# Patient Record
Sex: FEMALE | Race: WHITE | NOT HISPANIC OR LATINO | Employment: OTHER | URBAN - METROPOLITAN AREA
[De-identification: names, ages, dates, MRNs, and addresses within clinical notes are randomized per-mention and may not be internally consistent; named-entity substitution may affect disease eponyms.]

---

## 2017-01-13 DIAGNOSIS — E03.8 OTHER SPECIFIED ACQUIRED HYPOTHYROIDISM: Primary | ICD-10-CM

## 2017-01-18 ENCOUNTER — RESULTS ENCOUNTER (OUTPATIENT)
Dept: FAMILY MEDICINE CLINIC | Facility: CLINIC | Age: 77
End: 2017-01-18

## 2017-01-18 DIAGNOSIS — E03.8 OTHER SPECIFIED ACQUIRED HYPOTHYROIDISM: ICD-10-CM

## 2017-01-18 LAB — TSH SERPL DL<=0.005 MIU/L-ACNC: 2.19 MIU/ML (ref 0.27–4.2)

## 2017-03-03 RX ORDER — ROSUVASTATIN CALCIUM 20 MG/1
TABLET, COATED ORAL
Qty: 90 TABLET | Refills: 1 | Status: SHIPPED | OUTPATIENT
Start: 2017-03-03 | End: 2017-09-01 | Stop reason: SDUPTHER

## 2017-03-20 RX ORDER — OXYBUTYNIN CHLORIDE 10 MG/1
TABLET, EXTENDED RELEASE ORAL
Qty: 90 TABLET | Refills: 1 | Status: SHIPPED | OUTPATIENT
Start: 2017-03-20 | End: 2018-04-04

## 2017-04-08 DIAGNOSIS — M19.90 ARTHRITIS: ICD-10-CM

## 2017-04-10 RX ORDER — LOSARTAN POTASSIUM AND HYDROCHLOROTHIAZIDE 12.5; 5 MG/1; MG/1
1 TABLET ORAL DAILY
Qty: 90 TABLET | Refills: 0 | Status: SHIPPED | OUTPATIENT
Start: 2017-04-10 | End: 2017-07-05 | Stop reason: SDUPTHER

## 2017-04-11 RX ORDER — TRAMADOL HYDROCHLORIDE 50 MG/1
TABLET ORAL
Qty: 60 TABLET | Refills: 0 | OUTPATIENT
Start: 2017-04-11 | End: 2018-04-04 | Stop reason: SDUPTHER

## 2017-07-05 RX ORDER — LOSARTAN POTASSIUM AND HYDROCHLOROTHIAZIDE 12.5; 5 MG/1; MG/1
TABLET ORAL
Qty: 90 TABLET | Refills: 0 | Status: SHIPPED | OUTPATIENT
Start: 2017-07-05 | End: 2017-10-06 | Stop reason: SDUPTHER

## 2017-08-18 RX ORDER — LEVOTHYROXINE SODIUM 0.1 MG/1
TABLET ORAL
Qty: 90 TABLET | Refills: 0 | Status: SHIPPED | OUTPATIENT
Start: 2017-08-18 | End: 2018-02-15 | Stop reason: SDUPTHER

## 2017-09-01 RX ORDER — ROSUVASTATIN CALCIUM 20 MG/1
TABLET, COATED ORAL
Qty: 90 TABLET | Refills: 0 | Status: SHIPPED | OUTPATIENT
Start: 2017-09-01 | End: 2017-11-27 | Stop reason: SDUPTHER

## 2017-10-06 RX ORDER — LOSARTAN POTASSIUM AND HYDROCHLOROTHIAZIDE 12.5; 5 MG/1; MG/1
TABLET ORAL
Qty: 90 TABLET | Refills: 0 | Status: SHIPPED | OUTPATIENT
Start: 2017-10-06 | End: 2018-04-04 | Stop reason: SDUPTHER

## 2017-11-27 RX ORDER — ROSUVASTATIN CALCIUM 20 MG/1
TABLET, COATED ORAL
Qty: 30 TABLET | Refills: 0 | Status: SHIPPED | OUTPATIENT
Start: 2017-11-27 | End: 2018-04-04 | Stop reason: SDUPTHER

## 2017-12-26 RX ORDER — ROSUVASTATIN CALCIUM 20 MG/1
TABLET, COATED ORAL
Qty: 30 TABLET | Refills: 0 | OUTPATIENT
Start: 2017-12-26

## 2018-01-02 RX ORDER — LOSARTAN POTASSIUM AND HYDROCHLOROTHIAZIDE 12.5; 5 MG/1; MG/1
TABLET ORAL
Qty: 90 TABLET | Refills: 0 | OUTPATIENT
Start: 2018-01-02

## 2018-02-16 RX ORDER — LEVOTHYROXINE SODIUM 0.1 MG/1
TABLET ORAL
Qty: 30 TABLET | Refills: 0 | Status: SHIPPED | OUTPATIENT
Start: 2018-02-16 | End: 2018-04-04 | Stop reason: SDUPTHER

## 2018-04-04 ENCOUNTER — OFFICE VISIT (OUTPATIENT)
Dept: FAMILY MEDICINE CLINIC | Facility: CLINIC | Age: 78
End: 2018-04-04

## 2018-04-04 VITALS
BODY MASS INDEX: 35.14 KG/M2 | WEIGHT: 223.9 LBS | HEIGHT: 67 IN | DIASTOLIC BLOOD PRESSURE: 78 MMHG | OXYGEN SATURATION: 98 % | RESPIRATION RATE: 16 BRPM | HEART RATE: 76 BPM | SYSTOLIC BLOOD PRESSURE: 122 MMHG

## 2018-04-04 DIAGNOSIS — M25.561 CHRONIC PAIN OF RIGHT KNEE: ICD-10-CM

## 2018-04-04 DIAGNOSIS — I10 ESSENTIAL HYPERTENSION: ICD-10-CM

## 2018-04-04 DIAGNOSIS — E03.9 ACQUIRED HYPOTHYROIDISM: Primary | ICD-10-CM

## 2018-04-04 DIAGNOSIS — B00.9 HERPES SIMPLEX: ICD-10-CM

## 2018-04-04 DIAGNOSIS — R21 RASH AND NONSPECIFIC SKIN ERUPTION: ICD-10-CM

## 2018-04-04 DIAGNOSIS — E78.49 OTHER HYPERLIPIDEMIA: ICD-10-CM

## 2018-04-04 DIAGNOSIS — G89.29 CHRONIC PAIN OF RIGHT KNEE: ICD-10-CM

## 2018-04-04 DIAGNOSIS — Z91.018 HX OF FOOD ANAPHYLAXIS: ICD-10-CM

## 2018-04-04 PROBLEM — T78.40XA ALLERGIC REACTION: Status: ACTIVE | Noted: 2018-04-04

## 2018-04-04 PROCEDURE — 99214 OFFICE O/P EST MOD 30 MIN: CPT | Performed by: FAMILY MEDICINE

## 2018-04-04 RX ORDER — LEVOTHYROXINE SODIUM 0.1 MG/1
100 TABLET ORAL DAILY
Qty: 90 TABLET | Refills: 3 | Status: SHIPPED | OUTPATIENT
Start: 2018-04-04 | End: 2018-08-30 | Stop reason: SDUPTHER

## 2018-04-04 RX ORDER — GABAPENTIN 400 MG/1
400 CAPSULE ORAL 2 TIMES DAILY
Qty: 60 CAPSULE | Refills: 5 | Status: SHIPPED | OUTPATIENT
Start: 2018-04-04 | End: 2018-06-07 | Stop reason: DRUGHIGH

## 2018-04-04 RX ORDER — LOSARTAN POTASSIUM AND HYDROCHLOROTHIAZIDE 12.5; 5 MG/1; MG/1
1 TABLET ORAL DAILY
Qty: 90 TABLET | Refills: 2 | Status: SHIPPED | OUTPATIENT
Start: 2018-04-04 | End: 2019-01-03 | Stop reason: SDUPTHER

## 2018-04-04 RX ORDER — EPINEPHRINE 0.3 MG/.3ML
0.3 INJECTION SUBCUTANEOUS ONCE
Qty: 1 EACH | Refills: 0 | Status: SHIPPED | OUTPATIENT
Start: 2018-04-04 | End: 2018-04-04

## 2018-04-04 RX ORDER — TRAMADOL HYDROCHLORIDE 50 MG/1
50 TABLET ORAL EVERY 8 HOURS PRN
Qty: 60 TABLET | Refills: 5 | Status: SHIPPED | OUTPATIENT
Start: 2018-04-04 | End: 2018-10-17 | Stop reason: SDUPTHER

## 2018-04-04 RX ORDER — ROSUVASTATIN CALCIUM 20 MG/1
20 TABLET, COATED ORAL DAILY
Qty: 90 TABLET | Refills: 2 | Status: SHIPPED | OUTPATIENT
Start: 2018-04-04 | End: 2019-01-03 | Stop reason: SDUPTHER

## 2018-04-04 RX ORDER — METOPROLOL SUCCINATE 50 MG/1
25 TABLET, EXTENDED RELEASE ORAL DAILY
Qty: 90 TABLET | Refills: 2 | Status: SHIPPED | OUTPATIENT
Start: 2018-04-04 | End: 2019-11-27 | Stop reason: SDUPTHER

## 2018-04-04 RX ORDER — ACYCLOVIR 50 MG/G
OINTMENT TOPICAL 4 TIMES DAILY
Qty: 15 G | Refills: 4 | Status: SHIPPED | OUTPATIENT
Start: 2018-04-04 | End: 2018-05-10

## 2018-04-04 RX ORDER — SOLIFENACIN SUCCINATE 10 MG/1
10 TABLET, FILM COATED ORAL DAILY
COMMUNITY
End: 2020-04-10 | Stop reason: HOSPADM

## 2018-04-04 NOTE — PROGRESS NOTES
Subjective   Rekha Bear is a 78 y.o. female.     History of Present Illness   Pt is here for a thyroid f/u.   Pt states she has a skin condition in her lips, surrounding her mouth. Problem started after a fever blister she had 2 months ago. Pt says she is been using vinegar, and Zovirax. It helped a little.   She has a hx of herpes simplex and needs a refill of Zovirax to help with breakouts.   Rekha has a history of chronic hypothyroidism and has been well controlled on current dose of replacement.She report no concerning symptoms: no cold intolerance, fatigue or hair loss.   She has multiple allergies and needs an Epipen.  She has pain from arthritis in right leg and has avoided knee replacement. She is taking tramadol and gababpentin to help with pain.She needs refills today.  Rekha has a history of chronic hypertension and has been well controlled on current medications.She is tolerating medications without side effect. She reports no vision changes, headaches or lightheadedness. She is requesting refills of medications.  She has a history of chronic hyperlipidemia . She is tolerating medications well without side effects.      The following portions of the patient's history were reviewed and updated as appropriate: allergies, current medications, past medical history, past social history and problem list.    Review of Systems   Skin: Positive for color change and rash.       Objective   Physical Exam   Constitutional: She is oriented to person, place, and time. She appears well-developed.   Eyes: EOM are normal. Pupils are equal, round, and reactive to light.   Cardiovascular: Normal rate and regular rhythm.    Pulmonary/Chest: Effort normal and breath sounds normal.   Abdominal: Soft.   Neurological: She is alert and oriented to person, place, and time.   Skin: Skin is warm and dry. Rash noted.   Red rash on cheeks, chin   Psychiatric: She has a normal mood and affect. Her behavior is normal.  Judgment and thought content normal.   Nursing note and vitals reviewed.      Assessment/Plan   Rekha was seen today for rash and hypothyroidism.    Diagnoses and all orders for this visit:    Acquired hypothyroidism  Labs today to make sure she is well managed.   -     TSH  -     levothyroxine (SYNTHROID, LEVOTHROID) 100 MCG tablet; Take 1 tablet by mouth Daily.    Rash and nonspecific skin eruption  -     Ambulatory Referral to Dermatology    Chronic pain of right knee  -     traMADol (ULTRAM) 50 MG tablet; Take 1 tablet by mouth Every 8 (Eight) Hours As Needed for Moderate Pain .  -     gabapentin (NEURONTIN) 400 MG capsule; Take 1 capsule by mouth 2 (Two) Times a Day.  The patient has read and signed the [facility] Controlled Substance Contract.  I will continue to see patient for regular follow up appointments.  She are well controlled on current medication.  SAUL has been reviewed by me and is updated every 3 months. The patient is aware of the potential for addiction and dependence.      Other hyperlipidemia  Well controlled on Crestor  -     rosuvastatin (CRESTOR) 20 MG tablet; Take 1 tablet by mouth Daily.    Essential hypertension  Well controlled on current medications.  -     losartan-hydrochlorothiazide (HYZAAR) 50-12.5 MG per tablet; Take 1 tablet by mouth Daily.  -     metoprolol succinate XL (TOPROL-XL) 50 MG 24 hr tablet; Take 0.5 tablets by mouth Daily.    Hx of food anaphylaxis  -     EPINEPHrine (EPIPEN) 0.3 MG/0.3ML solution auto-injector injection; Inject 0.3 mL into the shoulder, thigh, or buttocks 1 (One) Time for 1 dose.    Herpes simplex  -     acyclovir (ZOVIRAX) 5 % ointment; Apply  topically 4 (Four) Times a Day.      She will come back for a preventive exam.

## 2018-04-05 LAB — TSH SERPL DL<=0.005 MIU/L-ACNC: 5.83 UIU/ML (ref 0.45–4.5)

## 2018-05-10 ENCOUNTER — OFFICE VISIT (OUTPATIENT)
Dept: ORTHOPEDIC SURGERY | Facility: CLINIC | Age: 78
End: 2018-05-10

## 2018-05-10 VITALS — WEIGHT: 216 LBS | TEMPERATURE: 97.3 F | HEIGHT: 67 IN | BODY MASS INDEX: 33.9 KG/M2

## 2018-05-10 DIAGNOSIS — M25.561 RIGHT KNEE PAIN, UNSPECIFIED CHRONICITY: Primary | ICD-10-CM

## 2018-05-10 DIAGNOSIS — M17.11 PRIMARY OSTEOARTHRITIS OF RIGHT KNEE: ICD-10-CM

## 2018-05-10 PROCEDURE — 73562 X-RAY EXAM OF KNEE 3: CPT | Performed by: ORTHOPAEDIC SURGERY

## 2018-05-10 PROCEDURE — 99204 OFFICE O/P NEW MOD 45 MIN: CPT | Performed by: ORTHOPAEDIC SURGERY

## 2018-05-10 RX ORDER — KETOCONAZOLE 20 MG/ML
SHAMPOO TOPICAL
COMMUNITY
Start: 2018-05-03 | End: 2020-04-10 | Stop reason: HOSPADM

## 2018-05-10 RX ORDER — VENLAFAXINE HYDROCHLORIDE 150 MG/1
CAPSULE, EXTENDED RELEASE ORAL
COMMUNITY
Start: 2018-04-23 | End: 2019-11-27 | Stop reason: SDUPTHER

## 2018-05-10 RX ORDER — VENLAFAXINE HYDROCHLORIDE 75 MG/1
CAPSULE, EXTENDED RELEASE ORAL
COMMUNITY
Start: 2018-04-23 | End: 2018-11-30

## 2018-05-10 NOTE — PROGRESS NOTES
Patient: Rekha Bear  YOB: 1940 78 y.o. female  Medical Record Number: [id number]    Chief Complaints:   Chief Complaint   Patient presents with   • Right Knee - Pain, Establish Care       History of Present Illness:Rekha Bear is a 78 y.o. female who presents with Right knee pain which is severe and constant in nature.  It has been ongoing for over 5 years.  She describes a grinding aching pain on the medial aspect of the right knee.  She has seen Dr. Mckeon's in years past and has had both her hips and her left knee replaced.  They considered replacement of her right knee a few years ago but she is held off.  She is using a cane.  The pain limits her basic activities of daily living.  She lives at home by herself.  The pain is gotten to the point where she has trouble performing basic activities and walking even short distances.      Allergies:   Allergies   Allergen Reactions   • Iodine        Medications:   Current Outpatient Prescriptions   Medication Sig Dispense Refill   • Acetylcysteine (N-ACETYL-L-CYSTEINE) 600 MG capsule Take 1 tablet by mouth 3 (Three) Times a Day.     • ALPRAZolam (XANAX) 0.25 MG tablet Take 1 tablet by mouth daily as needed.     • BuPROPion HCl (WELLBUTRIN PO) Take 300 mg by mouth Daily.     • cetirizine (ZyrTEC) 10 MG tablet Take 10 mg by mouth daily.     • clobetasol (TEMOVATE) 0.05 % cream Apply  topically 2 (two) times a day.     • colesevelam (WELCHOL) 625 MG tablet Take 625 mg by mouth 6 (six) times a day.     • ESTRACE VAGINAL 0.1 MG/GM vaginal cream 3x weekly     • gabapentin (NEURONTIN) 400 MG capsule Take 1 capsule by mouth 2 (Two) Times a Day. 60 capsule 5   • guaiFENesin (HUMIBID 3) 400 MG tablet Take 400 mg by mouth daily as needed.     • hydrocortisone 2.5 % cream      • ketoconazole (NIZORAL) 2 % shampoo      • levothyroxine (SYNTHROID, LEVOTHROID) 100 MCG tablet Take 1 tablet by mouth Daily. 90 tablet 3   • losartan-hydrochlorothiazide (HYZAAR)  "50-12.5 MG per tablet Take 1 tablet by mouth Daily. 90 tablet 2   • metoprolol succinate XL (TOPROL-XL) 50 MG 24 hr tablet Take 0.5 tablets by mouth Daily. 90 tablet 2   • Ranitidine HCl (ZANTAC 75 PO) Take 2 tablets by mouth 1 (One) Time.     • rosuvastatin (CRESTOR) 20 MG tablet Take 1 tablet by mouth Daily. 90 tablet 2   • solifenacin (VESICARE) 10 MG tablet Take 10 mg by mouth Daily.     • traMADol (ULTRAM) 50 MG tablet Take 1 tablet by mouth Every 8 (Eight) Hours As Needed for Moderate Pain . 60 tablet 5   • venlafaxine XR (EFFEXOR-XR) 150 MG 24 hr capsule      • venlafaxine XR (EFFEXOR-XR) 75 MG 24 hr capsule        No current facility-administered medications for this visit.          The following portions of the patient's history were reviewed and updated as appropriate: allergies, current medications, past family history, past medical history, past social history, past surgical history and problem list.    Review of Systems:   A 14 point review of systems was performed. All systems negative except pertinent positives/negative listed in HPI above    Physical Exam:   Vitals:    05/10/18 1437   Temp: 97.3 °F (36.3 °C)   Weight: 98 kg (216 lb)   Height: 170.2 cm (67\")       General: A and O x 3, ASA, NAD    SCLERA:    Normal    DENTITION:   Normal   Knee:  right    ALIGNMENT:     Varus  ,   Patella  tracks  midline    GAIT:    Antalgic    SKIN:    No abnormality    RANGE OF MOTION:   2  -  120   DEG    STRENGTH:   4  / 5    LIGAMENTS:    No varus / valgus instability.   Negative  Lachman.    MENISCUS:     Negative   Hardy       DISTAL PULSES:    Paplable    DISTAL SENSATION :   Intact    LYMPHATICS:     No   lymphadenopathy    OTHER:          - Positive   effusion      - Crepitance with ROM         Radiology:  Xrays 3views RIGHT KNEE (ap,lateral, sunrise) were ordered and reviewed for evaluation of knee pain demonstrating advanced varus osteoarthritis with bone on bone articulation, subchondral cysts, and " periarticular osteophytes. There are no previous films for comparision.    Assessment/Plan: Severe end-stage right knee osteoarthritis.  Clearly she has at the point where her knee is severely debilitating.  She has become deconditioned due to this.  I set a goal for her to lose 10 pounds.  She currently weighs 216.  I will send her to physical therapy for some leg strengthening exercises.  I have asked that she see Dr. Porter her primary care physician as well as her cardiologist for clearance.  I'm going to see her back in a month if she is ready to proceed at that point we'll get her scheduled for a right total knee replacement.  We had discussions about inpatient versus outpatient categorization and I explained that she should plan on being able to go home.  If there is a medical reason why she cannot be discharged home we could document that and work on rehabilitation stay but she should not plan on that being guaranteed based on Medicare guidelines.  She is going to discuss with her children the possibility of having somebody at home following surgery to assist in recovery for the first week.  I'll check her back in a month and ready to proceed below get her scheduled.  She does have a trip planned in mid September and we would need to have her surgery done by mid July for me to feel comfortable with her traveling.  Of note she did state that her mother developed a pulmonary embolism at age 40 after an abdominal surgery so that is certainly on her mind.  Body mass index is 33.83 kg/m².      Saji Kuo MD  5/10/2018

## 2018-05-17 ENCOUNTER — HOSPITAL ENCOUNTER (OUTPATIENT)
Dept: PHYSICAL THERAPY | Facility: HOSPITAL | Age: 78
Setting detail: THERAPIES SERIES
Discharge: HOME | End: 2018-05-17
Attending: ORTHOPAEDIC SURGERY

## 2018-05-17 DIAGNOSIS — M17.11 OSTEOARTHRITIS OF RIGHT KNEE, UNSPECIFIED OSTEOARTHRITIS TYPE: ICD-10-CM

## 2018-05-17 DIAGNOSIS — M54.41 CHRONIC BILATERAL LOW BACK PAIN WITH RIGHT-SIDED SCIATICA: ICD-10-CM

## 2018-05-17 DIAGNOSIS — R26.2 DIFFICULTY WALKING: ICD-10-CM

## 2018-05-17 DIAGNOSIS — M25.561 RECURRENT PAIN OF RIGHT KNEE: Primary | ICD-10-CM

## 2018-05-17 DIAGNOSIS — R26.81 GAIT INSTABILITY: ICD-10-CM

## 2018-05-17 DIAGNOSIS — G89.29 CHRONIC BILATERAL LOW BACK PAIN WITH RIGHT-SIDED SCIATICA: ICD-10-CM

## 2018-05-17 PROCEDURE — 97110 THERAPEUTIC EXERCISES: CPT | Performed by: PHYSICAL THERAPIST

## 2018-05-17 PROCEDURE — G8978 MOBILITY CURRENT STATUS: HCPCS | Performed by: PHYSICAL THERAPIST

## 2018-05-17 PROCEDURE — 97161 PT EVAL LOW COMPLEX 20 MIN: CPT | Performed by: PHYSICAL THERAPIST

## 2018-05-17 PROCEDURE — G8979 MOBILITY GOAL STATUS: HCPCS | Performed by: PHYSICAL THERAPIST

## 2018-05-17 NOTE — THERAPY EVALUATION
"    Outpatient Physical Therapy Ortho Initial Evaluation   [facility]     Patient Name: Rekha Bear  : 1940  MRN: [id number]  Today's Date: 2018      Visit Date: 2018    Patient Active Problem List   Diagnosis   • Acute bronchitis   • Chronic pain of right knee   • Chest pain   • Depression   • Diabetes mellitus   • Hyperlipidemia   • Hypertension   • Hypothyroidism   • Impaired glucose tolerance   • Renal insufficiency   • Urinary incontinence   • Urinary tract infection   • Cobalamin deficiency   • Cardiac arrhythmia   • Hyperlipemia   • Allergic reaction   • Hx of food anaphylaxis   • Herpes simplex        Past Medical History:   Diagnosis Date   • Acute bronchitis    • Anxiety    • Chest pain    • Chilblains     \"Chilblian's\"   • Depression    • Diabetes mellitus, type II    • Fatty liver    • GERD (gastroesophageal reflux disease)    • Health care maintenance    • History of mammogram    • Hyperlipidemia    • Hypertension    • Hypothyroid    • Incontinence    • Osteoarthritis     OA  Marvin THR, LT TKR - hydrocodone prescibed by Dr. Almaguer   • Pain of esophagus     \" nervous esophagus\" - she takes the occasional alprazolam   • Peptic ulcer disease    • Prediabetes    • Raynaud's disease     \"Raynauds\"   • Renal disease     followed by Dr. Grewal   • UTI (urinary tract infection)    • Vitamin B 12 deficiency         Past Surgical History:   Procedure Laterality Date   • CATARACT EXTRACTION     • CHOLECYSTECTOMY     • COLONOSCOPY     • COLONOSCOPY N/A 2016    Procedure: COLONOSCOPY with hot snare polypectomy;  Surgeon: George Pabon MD;  Location: [facility] ENDOSCOPY;  Service:    • FOOT SURGERY     • TONSILLECTOMY     • TOTAL HIP ARTHROPLASTY Bilateral     OA  Marvin THR, LT TKR - hydrocodone prescibed by Dr. Almaguer   • TOTAL KNEE ARTHROPLASTY Left     OA  Marvin THR, LT TKR - hydrocodone prescibed by Dr. Almaguer       Visit Dx:     ICD-10-CM ICD-9-CM   1. Recurrent pain of right " "knee M25.561 719.46   2. Osteoarthritis of right knee, unspecified osteoarthritis type M17.11 715.96   3. Difficulty walking R26.2 719.7   4. Gait instability R26.81 781.2   5. Chronic bilateral low back pain with right-sided sciatica M54.41 724.2    G89.29 724.3     338.29                 PT Ortho     Row Name 05/17/18 1500       Subjective Comments    Subjective Comments 79 y/o F with history of B SAVITA and L TKR by Dr. Gonzalez referred now to Dr. Kuo for preoperative strengthening of her R knee.  States she thinks her problem started when partially tearing her achilles 4-5 years ago. States her walking has been off since; was unable to have surgery to correct per Raynaud's chilbains.  She is here today for pre-operative management of her knee.  States Dr. Kuo would like her to lose 10 lbs before TKA; will follow up with his office in 3 weeks. Also with c/o current \"sciatica\" from her lumbar region to below her R knee.  She does own a cane but forgets it sometimes. Her walking is limited to less than a block.  Standing tolerance is better.  Sitting is not bothered. She does wake at night but this may be due to her sciatica. She denies paresthesias.  -GR       Subjective Pain    Able to rate subjective pain? yes  -GR    Pre-Treatment Pain Level 5  -GR    Post-Treatment Pain Level 5  -GR    Subjective Pain Comment at worst 7/10 at best 2/10  -GR       Posture/Observations    Posture/Observations Comments Trendelenburg lurch  -GR       General ROM    RT Lower Ext Rt Knee Extension/Flexion  -GR    LT Lower Ext Lt Knee Extension/Flexion  -GR       Right Lower Ext    Rt Knee Extension/Flexion AROM -6/115  -GR       Left Lower Ext    Lt Knee Extension/Flexion AROM 0/130  -GR       MMT (Manual Muscle Testing)    Additional Documentation General Assessment (Manual Muscle Testing) (Group)  -GR       General Assessment (Manual Muscle Testing)    General Manual Muscle Testing (MMT) Assessment lower extremity strength " deficits identified  -GR       Lower Extremity (Manual Muscle Testing)    Lower Extremity: Manual Muscle Testing (MMT) left hip strength deficit;right hip strength deficit;left knee strength deficit;right knee strength deficit;left ankle strength deficit;right ankle strength deficit  -GR       Left Hip (Manual Muscle Testing)    MMT, Left Hip: Manual Muscle Testing (MMT) hip flex 5/5 hip abd 4/5  -GR       Right Hip (Manual Muscle Testing)    MMT, Right Hip: Manual Muscle Testing (MMT) hip flex 5/5 hip abd 4-/5  -GR       Left Knee (Manual Muscle Testing)    Comment, Left Knee: Manual Muscle Testing (MMT) flex/ext 5/5  -GR       Right Knee (Manual Muscle Testing)    Comment, Right Knee: Manual Muscle Testing (MMT) flex/ext 5/5  -GR       Left Ankle/Foot (Manual Muscle Testing)    Comment, MMT: Left Ankle/Foot DF 5/5 PF 4-/5  -GR       Right Ankle/Foot (Manual Muscle Testing)    Comment, MMT: Right Ankle/Foot DF 3+/5 PF 3+/5  -GR       Gait/Stairs Assessment/Training    Comment (Gait/Stairs) Reciprocal stairs with B rails and grimacing  -GR      User Key  (r) = Recorded By, (t) = Taken By, (c) = Cosigned By    Initials Name Provider Type    GR Roe Vasquez, PT Physical Therapist                      Therapy Education  Education Details: role of preop PT, expectations, goals, importance of glut strength and back pain control for gait  Given: HEP, Symptoms/condition management, Pain management  Program: New  How Provided: Verbal, Written, Demonstration  Provided to: Patient  Level of Understanding: Teach back education performed, Verbalized, Demonstrated           PT OP Goals     Row Name 05/17/18 1600          PT Short Term Goals    STG Date to Achieve 06/01/18  -GR     STG 1 Patient will be independent with initial HEP.  -GR     STG 1 Progress New  -GR     STG 2 Patient will deny falls.  -GR     STG 2 Progress New  -GR     STG 3 Patient will demonstrate R knee flexion >/=120 degrees to facilitate ease with  stairs.  -GR     STG 3 Progress New  -GR        Long Term Goals    LTG Date to Achieve 07/01/18  -GR     LTG 1 Patient will be independent with progressive HEP.  -GR     LTG 1 Progress New  -GR     LTG 2 Patient will demonstrate R knee AROM 0-125 to facilitate optimal post operative outcomes.  -GR     LTG 2 Progress New  -GR     LTG 3 Patient will demonstrate R hip abd to >/=4/5 per MMT to assist with gait normalization.  -GR     LTG 3 Progress New  -GR     LTG 4 Patient will score </=60% disability on the KOS-ADL to indicate improved perceived performance with ADLs.  -GR     LTG 4 Progress New  -GR        Time Calculation    PT Goal Re-Cert Due Date 08/15/18  -GR       User Key  (r) = Recorded By, (t) = Taken By, (c) = Cosigned By    Initials Name Provider Type    HALINA Vasquez, PT Physical Therapist                PT Assessment/Plan     Row Name 05/17/18 8920          PT Assessment    Functional Limitations Decreased safety during functional activities;Impaired gait;Limitation in home management;Impaired locomotion;Limitations in community activities;Limitations in functional capacity and performance;Performance in leisure activities;Performance in self-care ADL  -GR     Impairments Balance;Endurance;Gait;Muscle strength;Pain;Poor body mechanics;Posture;Range of motion;Joint mobility  -     Assessment Comments 79 y/o F referred to outpatient PT for preoperative R knee strengthening (TKA pending).  She presents lacking AROM -6 to 115 degrees.  Quad and hamstring strength is good, gluteal strength is decreased and presents as lurch with gait.  Patient also with c/o bilateral low back pain referring to RLE which has impaired her mobility and impedes her endurance.  Recommend skilled PT to address her current impairments and promote optimal post operative outcomes.  Her perceived disability as per the KOS-ADL is 72%. Thank you for this referral.  -GR     Please refer to paper survey for additional  self-reported information Yes  -GR     Rehab Potential Good  -GR     Patient/caregiver participated in establishment of treatment plan and goals Yes  -GR     Patient would benefit from skilled therapy intervention Yes  -GR        PT Plan    PT Frequency 2x/week  -GR     Predicted Duration of Therapy Intervention (OT Eval) 3-4  weeks.  -GR     Planned CPT's? PT EVAL LOW COMPLEXITY: 83639;PT RE-EVAL: 75098;PT THER PROC EA 15 MIN: 01787;PT THER ACT EA 15 MIN: 90952;PT MANUAL THERAPY EA 15 MIN: 20723;PT NEUROMUSC RE-EDUCATION EA 15 MIN: 70634;PT GAIT TRAINING EA 15 MIN: 48099;PT SELF CARE/HOME MGMT/TRAIN EA 15: 57894;PT HOT OR COLD PACK TREAT MCARE;PT ELECTRICAL STIM UNATTEND:   -GR     Physical Therapy Interventions (Optional Details) balance training;home exercise program;joint mobilization;manual therapy techniques;modalities;neuromuscular re-education;patient/family education;postural re-education;ROM (Range of Motion);stair training;strengthening;stretching;swiss ball techniques  -GR     PT Plan Comments Begin with nustep or recumbent bike within tolerance.  Review HEP.  Add standing LE strengthening series with cues for TA activation and weighted LAQ/SAQ.  Patient is realistic regarding strength and range focused goals/expectations for R knee; will monitor and control LBP symptoms as best able to facilitate optimal outcomes.  -GR       User Key  (r) = Recorded By, (t) = Taken By, (c) = Cosigned By    Initials Name Provider Type     Roe Vasquez, PT Physical Therapist                  Exercises     Row Name 05/17/18 1500             Subjective Comments    Subjective Comments 79 y/o F with history of B SAVITA and L TKR by Dr. Gonzalez referred now to Dr. Kuo for preoperative strengthening of her R knee.  States she thinks her problem started when partially tearing her achilles 4-5 years ago. States her walking has been off since; was unable to have surgery to correct per Raynaud's chilbains.  She is here  "today for pre-operative management of her knee.  States Dr. Kuo would like her to lose 10 lbs before TKA; will follow up with his office in 3 weeks. Also with c/o current \"sciatica\" from her lumbar region to below her R knee.  She does own a cane but forgets it sometimes. Her walking is limited to less than a block.  Standing tolerance is better.  Sitting is not bothered. She does wake at night but this may be due to her sciatica. She denies paresthesias.  -GR         Subjective Pain    Able to rate subjective pain? yes  -GR      Pre-Treatment Pain Level 5  -GR      Post-Treatment Pain Level 5  -GR      Subjective Pain Comment at worst 7/10 at best 2/10  -GR         Exercise 1    Exercise Name 1 Nustep L1  -GR      Time 1 5 minutes  -GR         Exercise 2    Exercise Name 2 PPT  -GR      Cueing 2 Demo  -GR      Sets 2 1  -GR      Reps 2 10  -GR         Exercise 3    Exercise Name 3 H/L clam  -GR      Cueing 3 Demo  -GR      Sets 3 1  -GR      Reps 3 10  -GR      Additional Comments YTB  -GR         Exercise 4    Exercise Name 4 H/L hip add  -GR      Cueing 4 Demo  -GR      Sets 4 1  -GR      Reps 4 10  -GR         Exercise 5    Exercise Name 5 Bridge  -GR      Cueing 5 Demo  -GR      Sets 5 1  -GR      Reps 5 10  -GR         Exercise 6    Exercise Name 6 S/L clam  -GR      Cueing 6 Demo  -GR      Sets 6 1  -GR      Reps 6 10  -GR        User Key  (r) = Recorded By, (t) = Taken By, (c) = Cosigned By    Initials Name Provider Type    GR Roe Vasquez PT Physical Therapist                        Outcome Measure Options: Knee Outcome Score- ADL  Knee Outcome Score  Knee Outcome Score Comments: 72.5% disability      Time Calculation:   Start Time: 1545  Stop Time: 1630  Time Calculation (min): 45 min  Total Timed Code Minutes- PT: 15 minute(s)     Therapy Charges for Today     Code Description Service Date Service Provider Modifiers Qty    12214517980  PT MOBILITY CURRENT 5/17/2018 Roe Vasquez PT GP, CL 1 "    01266218136 HC PT MOBILITY PROJECTED 5/17/2018 Roe Vasquez, PT GP, CK 1    27125635759 HC PT THER PROC EA 15 MIN 5/17/2018 Roe Vasquez, PT GP 1    30308308445 HC PT EVAL LOW COMPLEXITY 2 5/17/2018 Roe Vasquez, PT GP 1          PT G-Codes  Outcome Measure Options: Knee Outcome Score- ADL  Score: 72.5% disability  Functional Limitation: Mobility: Walking and moving around  Mobility: Walking and Moving Around Current Status (): At least 60 percent but less than 80 percent impaired, limited or restricted  Mobility: Walking and Moving Around Goal Status (): At least 40 percent but less than 60 percent impaired, limited or restricted         Roe Vasquez, PT  5/17/2018

## 2018-05-23 ENCOUNTER — HOSPITAL ENCOUNTER (OUTPATIENT)
Dept: PHYSICAL THERAPY | Facility: HOSPITAL | Age: 78
Setting detail: THERAPIES SERIES
Discharge: HOME | End: 2018-05-23

## 2018-05-23 DIAGNOSIS — G89.29 CHRONIC BILATERAL LOW BACK PAIN WITH RIGHT-SIDED SCIATICA: ICD-10-CM

## 2018-05-23 DIAGNOSIS — R26.81 GAIT INSTABILITY: ICD-10-CM

## 2018-05-23 DIAGNOSIS — M54.41 CHRONIC BILATERAL LOW BACK PAIN WITH RIGHT-SIDED SCIATICA: ICD-10-CM

## 2018-05-23 DIAGNOSIS — R26.2 DIFFICULTY WALKING: ICD-10-CM

## 2018-05-23 DIAGNOSIS — M25.561 RECURRENT PAIN OF RIGHT KNEE: Primary | ICD-10-CM

## 2018-05-23 DIAGNOSIS — M17.11 OSTEOARTHRITIS OF RIGHT KNEE, UNSPECIFIED OSTEOARTHRITIS TYPE: ICD-10-CM

## 2018-05-23 PROCEDURE — 97110 THERAPEUTIC EXERCISES: CPT | Performed by: PHYSICAL THERAPIST

## 2018-05-23 NOTE — THERAPY TREATMENT NOTE
"    Outpatient Physical Therapy Ortho Treatment Note   [facility]     Patient Name: Rekha Bear  : 1940  MRN: [id number]  Today's Date: 2018      Visit Date: 2018    Visit Dx:    ICD-10-CM ICD-9-CM   1. Recurrent pain of right knee M25.561 719.46   2. Osteoarthritis of right knee, unspecified osteoarthritis type M17.11 715.96   3. Difficulty walking R26.2 719.7   4. Gait instability R26.81 781.2   5. Chronic bilateral low back pain with right-sided sciatica M54.41 724.2    G89.29 724.3     338.29       Patient Active Problem List   Diagnosis   • Acute bronchitis   • Chronic pain of right knee   • Chest pain   • Depression   • Diabetes mellitus   • Hyperlipidemia   • Hypertension   • Hypothyroidism   • Impaired glucose tolerance   • Renal insufficiency   • Urinary incontinence   • Urinary tract infection   • Cobalamin deficiency   • Cardiac arrhythmia   • Hyperlipemia   • Allergic reaction   • Hx of food anaphylaxis   • Herpes simplex        Past Medical History:   Diagnosis Date   • Acute bronchitis    • Anxiety    • Chest pain    • Chilblains     \"Chilblian's\"   • Depression    • Diabetes mellitus, type II    • Fatty liver    • GERD (gastroesophageal reflux disease)    • Health care maintenance    • History of mammogram    • Hyperlipidemia    • Hypertension    • Hypothyroid    • Incontinence    • Osteoarthritis     OA  Marvin THR, LT TKR - hydrocodone prescibed by Dr. Almaguer   • Pain of esophagus     \" nervous esophagus\" - she takes the occasional alprazolam   • Peptic ulcer disease    • Prediabetes    • Raynaud's disease     \"Raynauds\"   • Renal disease     followed by Dr. Grewal   • UTI (urinary tract infection)    • Vitamin B 12 deficiency         Past Surgical History:   Procedure Laterality Date   • CATARACT EXTRACTION     • CHOLECYSTECTOMY     • COLONOSCOPY     • COLONOSCOPY N/A 2016    Procedure: COLONOSCOPY with hot snare polypectomy;  Surgeon: George Pabon MD;  " Location: [facility] ENDOSCOPY;  Service:    • FOOT SURGERY     • TONSILLECTOMY     • TOTAL HIP ARTHROPLASTY Bilateral     OA  Marvin THR, LT TKR - hydrocodone prescibed by Dr. Almaguer   • TOTAL KNEE ARTHROPLASTY Left     OA  Marvin THR, LT TKR - hydrocodone prescibed by Dr. Almaguer             PT Ortho     Row Name 05/23/18 1156       Subjective Comments    Subjective Comments Reports suffering from head cold, therefore did not perform any exercises at home. R knee pain is improved.  -JS       Subjective Pain    Able to rate subjective pain? yes  -JS    Pre-Treatment Pain Level 4  -JS    Post-Treatment Pain Level 4  -JS      User Key  (r) = Recorded By, (t) = Taken By, (c) = Cosigned By    Initials Name Provider Type    SUKUMAR Link PT Physical Therapist                            PT Assessment/Plan     Row Name 05/23/18 2773          PT Assessment    Assessment Comments Pt presents with improving subjective report of pain.  No performance of HEP due to recent illness, though good understanding of initial exercises with intermittent cueing for core stabilization & postural awareness.  Tolerates progression of exercises without increased pain.  -JS        PT Plan    PT Plan Comments Continue to emphasize core stabilization during exercises, progressing LE/hip strengthening as tolerated.  -JS       User Key  (r) = Recorded By, (t) = Taken By, (c) = Cosigned By    Initials Name Provider Type    SUKUMAR Link PT Physical Therapist                    Exercises     Row Name 05/23/18 1578             Subjective Comments    Subjective Comments Reports suffering from head cold, therefore did not perform any exercises at home. R knee pain is improved.  -JS         Subjective Pain    Able to rate subjective pain? yes  -JS      Pre-Treatment Pain Level 4  -JS      Post-Treatment Pain Level 4  -JS         Exercise 1    Exercise Name 1 NuStep L2  -JS      Time 1 5 minutes  -JS         Exercise 2    Exercise Name 2 PPT  -JS      Cueing 2  Verbal  -JS      Sets 2 2  -JS      Reps 2 10  -JS         Exercise 3    Exercise Name 3 H/L clam  -JS      Cueing 3 Verbal  -JS      Sets 3 2  -JS      Reps 3 10  -JS      Additional Comments YTB  -JS         Exercise 4    Exercise Name 4 H/L hip add   -JS      Cueing 4 Verbal  -JS      Sets 4 2  -JS      Reps 4 10  -JS      Additional Comments Ball  -JS         Exercise 5    Exercise Name 5 Bridge  -JS      Cueing 5 Verbal   cues for breathing  -JS      Sets 5 2  -JS      Reps 5 5  -JS         Exercise 6    Exercise Name 6 S/L clam  -JS      Cueing 6 Verbal  -JS      Sets 6 2  -JS      Reps 6 10  -JS         Exercise 7    Exercise Name 7 Standing bilateral heel raise  -JS      Reps 7 20  -JS         Exercise 8    Exercise Name 8 Sidestepping at bar  -JS      Reps 8 8 feet x 3  -JS         Exercise 9    Exercise Name 9 LAQ  -JS      Sets 9 2  -JS      Reps 9 10  -JS      Additional Comments 2#  -JS         Exercise 10    Exercise Name 10 SAQ  -JS      Sets 10 2  -JS      Reps 10 10  -JS      Additional Comments 2#  -JS        User Key  (r) = Recorded By, (t) = Taken By, (c) = Cosigned By    Initials Name Provider Type    SUKUMAR Link PT Physical Therapist                             Therapy Education  Education Details: Progression of HEP & review of exercises issued at initial eval. intermittent cueing for proper breathing & core stabilization during exercises.  Added weight with LAQ & SAQ, as well as addition of initial standing ex  Given: HEP  Program: Reinforced  How Provided: Verbal  Provided to: Patient  Level of Understanding: Teach back education performed, Verbalized, Demonstrated              Time Calculation:   Start Time: 1145  Stop Time: 1235  Time Calculation (min): 50 min    Therapy Charges for Today     Code Description Service Date Service Provider Modifiers Qty    52687059073 HC PT THER PROC EA 15 MIN 5/23/2018 Shona Link PT GP 3                    Shona Link PT  5/23/2018

## 2018-05-30 ENCOUNTER — HOSPITAL ENCOUNTER (OUTPATIENT)
Dept: PHYSICAL THERAPY | Facility: HOSPITAL | Age: 78
Setting detail: THERAPIES SERIES
Discharge: HOME | End: 2018-05-30

## 2018-05-30 DIAGNOSIS — M17.11 OSTEOARTHRITIS OF RIGHT KNEE, UNSPECIFIED OSTEOARTHRITIS TYPE: ICD-10-CM

## 2018-05-30 DIAGNOSIS — M25.561 RECURRENT PAIN OF RIGHT KNEE: Primary | ICD-10-CM

## 2018-05-30 DIAGNOSIS — R26.2 DIFFICULTY WALKING: ICD-10-CM

## 2018-05-30 PROCEDURE — 97110 THERAPEUTIC EXERCISES: CPT | Performed by: PHYSICAL THERAPIST

## 2018-05-30 NOTE — THERAPY TREATMENT NOTE
"    Outpatient Physical Therapy Ortho Treatment Note   [facility]     Patient Name: Rekha Bear  : 1940  MRN: [id number]  Today's Date: 2018      Visit Date: 2018    Visit Dx:    ICD-10-CM ICD-9-CM   1. Recurrent pain of right knee M25.561 719.46   2. Osteoarthritis of right knee, unspecified osteoarthritis type M17.11 715.96   3. Difficulty walking R26.2 719.7       Patient Active Problem List   Diagnosis   • Acute bronchitis   • Chronic pain of right knee   • Chest pain   • Depression   • Diabetes mellitus   • Hyperlipidemia   • Hypertension   • Hypothyroidism   • Impaired glucose tolerance   • Renal insufficiency   • Urinary incontinence   • Urinary tract infection   • Cobalamin deficiency   • Cardiac arrhythmia   • Hyperlipemia   • Allergic reaction   • Hx of food anaphylaxis   • Herpes simplex        Past Medical History:   Diagnosis Date   • Acute bronchitis    • Anxiety    • Chest pain    • Chilblains     \"Chilblian's\"   • Depression    • Diabetes mellitus, type II    • Fatty liver    • GERD (gastroesophageal reflux disease)    • Health care maintenance    • History of mammogram    • Hyperlipidemia    • Hypertension    • Hypothyroid    • Incontinence    • Osteoarthritis     OA  Marvin THR, LT TKR - hydrocodone prescibed by Dr. Almaguer   • Pain of esophagus     \" nervous esophagus\" - she takes the occasional alprazolam   • Peptic ulcer disease    • Prediabetes    • Raynaud's disease     \"Raynauds\"   • Renal disease     followed by Dr. Grewal   • UTI (urinary tract infection)    • Vitamin B 12 deficiency         Past Surgical History:   Procedure Laterality Date   • CATARACT EXTRACTION     • CHOLECYSTECTOMY     • COLONOSCOPY     • COLONOSCOPY N/A 2016    Procedure: COLONOSCOPY with hot snare polypectomy;  Surgeon: George Pabon MD;  Location: [facility];  Service:    • FOOT SURGERY     • TONSILLECTOMY     • TOTAL HIP ARTHROPLASTY Bilateral     OA  Marvin THR, LT TKR - " hydrocodone prescibed by Dr. Almaguer   • TOTAL KNEE ARTHROPLASTY Left     OA  Marvin THR, LT TKR - hydrocodone prescibed by Dr. Almaguer                             PT Assessment/Plan     Row Name 05/30/18 8121          PT Assessment    Assessment Comments Pt. presents with moderate gait deficit without AD, obvious R knee varus. Pt. would likely benefit from R knee brace to allow for quality of life and avoid compensation injury untl patient can have surgery in the fall.   -KJ        PT Plan    PT Plan Comments Continue to progress core and LE stab program. To see Ayaan next week and discuss surgery plan (likely Fall) and option of brace.   -KJ       User Key  (r) = Recorded By, (t) = Taken By, (c) = Cosigned By    Initials Name Provider Type    LIZETTE Lopez, PAULA Physical Therapist                Modalities     Row Name 05/30/18 1700             Ice    Location Ice pack to R knee in supine over pilllow  -KJ      Rx Minutes 10 mins  -KJ        User Key  (r) = Recorded By, (t) = Taken By, (c) = Cosigned By    Initials Name Provider Type    LIZETTE Lopez, PT Physical Therapist                Exercises     Row Name 05/30/18 1700             Subjective Comments    Subjective Comments It's OK, hurts when I walk, 4/10. I think I'm going to wait until the fall to do the TKA because I'll travel to [place] for a wedding and have a higher risk of a blood clot. Denies using a brace.   -KJ         Subjective Pain    Pre-Treatment Pain Level 4  -KJ         Exercise 1    Exercise Name 1 NuStep L3  -KJ      Time 1 7 minutes  -KJ         Exercise 2    Exercise Name 2 PPT  -KJ      Cueing 2 Verbal  -KJ      Sets 2 --  -KJ      Reps 2 15  -KJ         Exercise 3    Exercise Name 3 H/L hip abduction/ER  -KJ      Cueing 3 Verbal  -KJ      Sets 3 --  -KJ      Reps 3 15  -KJ      Additional Comments RTB  -KJ         Exercise 4    Exercise Name 4 H/L hip add   -KJ      Cueing 4 Verbal  -KJ      Sets 4 --  -KJ      Reps 4 15  -KJ          Exercise 5    Exercise Name 5 Bridge  -KJ      Cueing 5 Verbal   cues for breathing  -KJ      Sets 5 --  -KJ      Reps 5 15  -KJ         Exercise 6    Exercise Name 6 --  -KJ      Cueing 6 --  -KJ      Sets 6 --  -KJ      Reps 6 --  -KJ         Exercise 7    Exercise Name 7 Standing bilateral heel raise  -KJ      Reps 7 15  -KJ         Exercise 8    Exercise Name 8 Standing hip abduction  -KJ      Reps 8 15  -KJ      Additional Comments R  -KJ         Exercise 9    Exercise Name 9 LAQ  -KJ      Cueing 9 Verbal;Tactile  -KJ      Sets 9 --  -KJ      Reps 9 15  -KJ      Additional Comments 2#  -KJ         Exercise 10    Exercise Name 10 SAQ  -KJ      Cueing 10 Verbal;Tactile  -KJ      Sets 10 --  -KJ      Reps 10 15  -KJ      Additional Comments 2#  -KJ        User Key  (r) = Recorded By, (t) = Taken By, (c) = Cosigned By    Initials Name Provider Type    LIZETTE Lopez, PT Physical Therapist                               PT OP Goals     Row Name 05/30/18 1700          PT Short Term Goals    STG Date to Achieve 06/01/18  -KJ     STG 1 Patient will be independent with initial HEP.  -KJ     STG 1 Progress Ongoing  -KJ     STG 1 Progress Comments Continuing to require cuing.   -KJ     STG 2 Patient will deny falls.  -KJ     STG 2 Progress Ongoing  -KJ     STG 2 Progress Comments No falls in last three weeks.   -KJ     STG 3 Patient will demonstrate R knee flexion >/=120 degrees to facilitate ease with stairs.  -KJ     STG 3 Progress Ongoing  -KJ        Long Term Goals    LTG Date to Achieve 07/01/18  -KJ     LTG 1 Patient will be independent with progressive HEP.  -KJ     LTG 1 Progress Ongoing  -KJ     LTG 2 Patient will demonstrate R knee AROM 0-125 to facilitate optimal post operative outcomes.  -KJ     LTG 2 Progress Ongoing  -KJ     LTG 3 Patient will demonstrate R hip abd to >/=4/5 per MMT to assist with gait normalization.  -KJ     LTG 3 Progress Ongoing  -KJ     LTG 4 Patient will score </=60%  disability on the KOS-ADL to indicate improved perceived performance with ADLs.  -KJ     LTG 4 Progress Ongoing  -KJ       User Key  (r) = Recorded By, (t) = Taken By, (c) = Cosigned By    Initials Name Provider Type    LIZETTE Lopez PT Physical Therapist          Therapy Education  Education Details: Discussed use of ice, brace, strengthening to protect knee. Some education provided while on ice.   Given: HEP, Symptoms/condition management, Pain management  Program: Reinforced  How Provided: Verbal, Demonstration  Provided to: Patient  Level of Understanding: Teach back education performed, Verbalized, Demonstrated              Time Calculation:   Start Time: 1720  Stop Time: 1806  Time Calculation (min): 46 min  Total Timed Code Minutes- PT: 38 minute(s)    Therapy Charges for Today     Code Description Service Date Service Provider Modifiers Qty    93871923296 HC PT HOT OR COLD PACK TREAT MCARE 5/30/2018 Brigid Lopez, PT GP 1    65958820125 HC PT THER PROC EA 15 MIN 5/30/2018 Brigid Lopez, PT GP 3                    Brigid Lopez, PT  5/30/2018

## 2018-06-01 ENCOUNTER — OFFICE VISIT (OUTPATIENT)
Dept: CARDIOLOGY | Facility: CLINIC | Age: 78
End: 2018-06-01

## 2018-06-01 ENCOUNTER — HOSPITAL ENCOUNTER (OUTPATIENT)
Dept: PHYSICAL THERAPY | Facility: HOSPITAL | Age: 78
Setting detail: THERAPIES SERIES
Discharge: HOME | End: 2018-06-01

## 2018-06-01 VITALS
WEIGHT: 219 LBS | HEIGHT: 67 IN | BODY MASS INDEX: 34.37 KG/M2 | HEART RATE: 58 BPM | SYSTOLIC BLOOD PRESSURE: 122 MMHG | DIASTOLIC BLOOD PRESSURE: 64 MMHG

## 2018-06-01 DIAGNOSIS — M17.11 OSTEOARTHRITIS OF RIGHT KNEE, UNSPECIFIED OSTEOARTHRITIS TYPE: ICD-10-CM

## 2018-06-01 DIAGNOSIS — M25.561 RECURRENT PAIN OF RIGHT KNEE: Primary | ICD-10-CM

## 2018-06-01 DIAGNOSIS — I10 ESSENTIAL HYPERTENSION: ICD-10-CM

## 2018-06-01 DIAGNOSIS — G89.29 CHRONIC BILATERAL LOW BACK PAIN WITH RIGHT-SIDED SCIATICA: ICD-10-CM

## 2018-06-01 DIAGNOSIS — R26.2 DIFFICULTY WALKING: ICD-10-CM

## 2018-06-01 DIAGNOSIS — R26.81 GAIT INSTABILITY: ICD-10-CM

## 2018-06-01 DIAGNOSIS — R94.31 ABNORMAL ELECTROCARDIOGRAM: ICD-10-CM

## 2018-06-01 DIAGNOSIS — M54.41 CHRONIC BILATERAL LOW BACK PAIN WITH RIGHT-SIDED SCIATICA: ICD-10-CM

## 2018-06-01 DIAGNOSIS — E78.5 HYPERLIPIDEMIA, UNSPECIFIED HYPERLIPIDEMIA TYPE: ICD-10-CM

## 2018-06-01 DIAGNOSIS — I49.9 CARDIAC ARRHYTHMIA, UNSPECIFIED CARDIAC ARRHYTHMIA TYPE: Primary | ICD-10-CM

## 2018-06-01 PROCEDURE — 97110 THERAPEUTIC EXERCISES: CPT | Performed by: PHYSICAL THERAPIST

## 2018-06-01 PROCEDURE — 99214 OFFICE O/P EST MOD 30 MIN: CPT | Performed by: INTERNAL MEDICINE

## 2018-06-01 PROCEDURE — 93000 ELECTROCARDIOGRAM COMPLETE: CPT | Performed by: INTERNAL MEDICINE

## 2018-06-01 NOTE — PROGRESS NOTES
Subjective:     Encounter Date:06/01/2018      Patient ID: Rekha Bear is a 78 y.o. female.    Chief Complaint:  History of Present Illness    The patient is a 78-year-old female with a history of GERD, hypertension, dyslipidemia, diabetes mellitus type 2, and intermittent wandering atrial pacemaker who presents for pre-operative evaluation.      I saw the patient initially in 10/2015 when she presented for an evaluation of chest pain.  She was also complaining of chronic stable dyspnea on exertion.  I set her up for a PET stress study which was negative for ischemia.  A few months later I was asked to clear the patient for a gynecologic surgery, which I went ahead and did.  However, prior to that the patient called to say that she had received a copy of her stress test and it was noted on the baseline EKG that she was in atrial fibrillation.  I had the patient come in at that time and an EKG done in the office showed a wandering atrial pacemaker with a heart rate in the low 40s.  I asked her to decrease her metoprolol succinate and had her followup.  I also had her undergo a 24-hour Holter monitor which was performed in 05/2016 which revealed sinus rhythm with rare PACs and PVCs but no atrial arrhythmias including atrial fibrillation.   At her last follow-up in 11/2016, on a lower dose of metoprolol succinate her rhythm appeared to be in sinus with a rate in the 50's.     Today she presents for preoperative evaluation.  She continues to have dyspnea and fatigue on exertion that has worsened some since her last visit.  She should use this to worsening issues with her right knee preventing her from exerting herself for very long.  She reports that with minimal activity she is having a great discomfort with her knee now.  She recently saw Dr. Doreen Kuo and it was recommended that she proceed with knee replacement.  She initially was getting at the stent in the summer but because her granddaughter is getting   in September and she believes that she is can wait until after the wedding.  She denies any chest pain, palpitations, PND or orthopnea, near-syncope or syncope, or lower extremity edema.  She does have some lightheadedness with position changes that is chronic and unchanged.    Review of Systems   Constitution: Negative for weakness and malaise/fatigue.   HENT: Negative for hearing loss, hoarse voice, nosebleeds and sore throat.    Eyes: Negative for pain.   Cardiovascular: Positive for dyspnea on exertion. Negative for chest pain, claudication, cyanosis, irregular heartbeat, leg swelling, near-syncope, orthopnea, palpitations, paroxysmal nocturnal dyspnea and syncope.   Respiratory: Negative for shortness of breath and snoring.    Endocrine: Negative for cold intolerance, heat intolerance, polydipsia, polyphagia and polyuria.   Skin: Negative for itching and rash.   Musculoskeletal: Positive for joint pain. Negative for arthritis, falls, joint swelling, muscle cramps, muscle weakness and myalgias.   Gastrointestinal: Negative for constipation, diarrhea, dysphagia, heartburn, hematemesis, hematochezia, melena, nausea and vomiting.   Genitourinary: Negative for frequency, hematuria and hesitancy.   Neurological: Positive for light-headedness. Negative for excessive daytime sleepiness, dizziness, headaches and numbness.   Psychiatric/Behavioral: Negative for depression. The patient is not nervous/anxious.           Current Outpatient Prescriptions:   •  Acetylcysteine (N-ACETYL-L-CYSTEINE) 600 MG capsule, Take 1 tablet by mouth 3 (Three) Times a Day., Disp: , Rfl:   •  ALPRAZolam (XANAX) 0.25 MG tablet, Take 1 tablet by mouth daily as needed., Disp: , Rfl:   •  BuPROPion HCl (WELLBUTRIN PO), Take 300 mg by mouth Daily., Disp: , Rfl:   •  cetirizine (ZyrTEC) 10 MG tablet, Take 10 mg by mouth daily., Disp: , Rfl:   •  clobetasol (TEMOVATE) 0.05 % cream, Apply  topically 2 (two) times a day., Disp: , Rfl:   •   "colesevelam (WELCHOL) 625 MG tablet, Take 625 mg by mouth 6 (six) times a day., Disp: , Rfl:   •  ESTRACE VAGINAL 0.1 MG/GM vaginal cream, 3x weekly, Disp: , Rfl:   •  gabapentin (NEURONTIN) 400 MG capsule, Take 1 capsule by mouth 2 (Two) Times a Day., Disp: 60 capsule, Rfl: 5  •  guaiFENesin (HUMIBID 3) 400 MG tablet, Take 400 mg by mouth daily as needed., Disp: , Rfl:   •  hydrocortisone 2.5 % cream, , Disp: , Rfl:   •  ketoconazole (NIZORAL) 2 % shampoo, , Disp: , Rfl:   •  levothyroxine (SYNTHROID, LEVOTHROID) 100 MCG tablet, Take 1 tablet by mouth Daily., Disp: 90 tablet, Rfl: 3  •  losartan-hydrochlorothiazide (HYZAAR) 50-12.5 MG per tablet, Take 1 tablet by mouth Daily., Disp: 90 tablet, Rfl: 2  •  metoprolol succinate XL (TOPROL-XL) 50 MG 24 hr tablet, Take 0.5 tablets by mouth Daily., Disp: 90 tablet, Rfl: 2  •  Ranitidine HCl (ZANTAC 75 PO), Take 2 tablets by mouth 1 (One) Time., Disp: , Rfl:   •  rosuvastatin (CRESTOR) 20 MG tablet, Take 1 tablet by mouth Daily., Disp: 90 tablet, Rfl: 2  •  solifenacin (VESICARE) 10 MG tablet, Take 10 mg by mouth Daily., Disp: , Rfl:   •  traMADol (ULTRAM) 50 MG tablet, Take 1 tablet by mouth Every 8 (Eight) Hours As Needed for Moderate Pain ., Disp: 60 tablet, Rfl: 5  •  venlafaxine XR (EFFEXOR-XR) 150 MG 24 hr capsule, , Disp: , Rfl:   •  venlafaxine XR (EFFEXOR-XR) 75 MG 24 hr capsule, , Disp: , Rfl:     Past Medical History:   Diagnosis Date   • Acute bronchitis    • Anxiety    • Chest pain    • Chilblains     \"Chilblian's\"   • Depression    • Diabetes mellitus, type II    • Fatty liver    • GERD (gastroesophageal reflux disease)    • Health care maintenance    • History of mammogram 2009   • Hyperlipidemia    • Hypertension    • Hypothyroid    • Incontinence    • Osteoarthritis     OA  Marvin THR, LT TKR - hydrocodone prescibed by Dr. Almaguer   • Pain of esophagus     \" nervous esophagus\" - she takes the occasional alprazolam   • Peptic ulcer disease    • Prediabetes  " "  • Raynaud's disease     \"Raynauds\"   • Renal disease     followed by Dr. Grewal   • UTI (urinary tract infection)    • Vitamin B 12 deficiency      Past Surgical History:   Procedure Laterality Date   • CATARACT EXTRACTION     • CHOLECYSTECTOMY     • COLONOSCOPY  2009   • COLONOSCOPY N/A 12/20/2016    Procedure: COLONOSCOPY with hot snare polypectomy;  Surgeon: George Pabon MD;  Location: [facility] [place];  Service:    • FOOT SURGERY     • TONSILLECTOMY     • TOTAL HIP ARTHROPLASTY Bilateral     OA  Marvin THR, LT TKR - hydrocodone prescibed by Dr. Almaguer   • TOTAL KNEE ARTHROPLASTY Left     OA  Marvin THR, LT TKR - hydrocodone prescibed by Dr. Almaguer     Family History   Problem Relation Age of Onset   • Hypertension Mother    • Diabetes type II Mother    • Hypertension Father    • Coronary artery disease Father    • Diabetes type II Father    • Colon cancer Brother    • Stroke Son    • Breast cancer Maternal Aunt    • Hypertension Other    • Other Other         Lipids  Thyroid     Social History   Substance Use Topics   • Smoking status: Former Smoker     Quit date: 1975   • Smokeless tobacco: Not on file      Comment: 40 yrs quit   • Alcohol use No           ECG 12 Lead  Date/Time: 6/1/2018 3:42 PM  Performed by: REGINA WHALEN  Authorized by: REGINA WHALEN   Comparison: compared with previous ECG   Similar to previous ECG  Rhythm: sinus rhythm  Conduction: LAFB               Objective:         Visit Vitals  /64 (BP Location: Right arm, Patient Position: Sitting)   Pulse 58   Ht 170.2 cm (67\")   Wt 99.3 kg (219 lb)   BMI 34.30 kg/m²          Physical Exam   Constitutional: She is oriented to person, place, and time. She appears well-developed and well-nourished.   HENT:   Head: Normocephalic and atraumatic.   Eyes: Conjunctivae, EOM and lids are normal. Pupils are equal, round, and reactive to light.   Neck: Normal range of motion and full passive range of motion without pain. Neck supple. No JVD present. " Carotid bruit is not present.   Cardiovascular: Normal rate, regular rhythm, S1 normal and S2 normal.  Exam reveals no gallop.    No murmur heard.  Pulses:       Radial pulses are 2+ on the right side, and 2+ on the left side.   No bilateral lower extremity edema   Pulmonary/Chest: Effort normal and breath sounds normal.   Abdominal: Soft. Normal appearance.   Lymphadenopathy:     She has no cervical adenopathy.   Neurological: She is alert and oriented to person, place, and time.   Skin: Skin is warm, dry and intact.   Psychiatric: She has a normal mood and affect.       Lab Review:       Assessment:          Diagnosis Plan   1. Cardiac arrhythmia, unspecified cardiac arrhythmia type  Adult Transthoracic Echo Complete W/ Cont if Necessary Per Protocol   2. Essential hypertension     3. Hyperlipidemia, unspecified hyperlipidemia type     4. Abnormal electrocardiogram   Adult Transthoracic Echo Complete W/ Cont if Necessary Per Protocol          Plan:       1.  History of wandering atrial pacemaker.  Not present on her EKG today.  This appears to have resolved after reduction in her metoprolol succinate dosage.  2.  Dyspnea on exertion.  This is a chronic issue that has worsened lately.  The worsening is likely related to deconditioning and limited mobility from her right knee osteoarthritis.  Since she is planning on having surgery will go ahead and proceed with an echocardiogram to ensure no new issues have developed.  If this is okay at think she is okay to proceed with her planned surgery.  3.  Hypertension.  Well-controlled on her current medications.  4.  Hyperlipidemia.    We'll call and discuss results of the echocardiogram is showing further workup, management, and follow-up based on those results.

## 2018-06-01 NOTE — THERAPY TREATMENT NOTE
"    Outpatient Physical Therapy Ortho Treatment Note  [facility]     Patient Name: Rekha Bear  : 1940  MRN: [id number]  Today's Date: 2018      Visit Date: 2018    Visit Dx:    ICD-10-CM ICD-9-CM   1. Recurrent pain of right knee M25.561 719.46   2. Osteoarthritis of right knee, unspecified osteoarthritis type M17.11 715.96   3. Difficulty walking R26.2 719.7   4. Gait instability R26.81 781.2   5. Chronic bilateral low back pain with right-sided sciatica M54.41 724.2    G89.29 724.3     338.29       Patient Active Problem List   Diagnosis   • Acute bronchitis   • Chronic pain of right knee   • Chest pain   • Depression   • Diabetes mellitus   • Hyperlipidemia   • Hypertension   • Hypothyroidism   • Impaired glucose tolerance   • Renal insufficiency   • Urinary incontinence   • Urinary tract infection   • Cobalamin deficiency   • Cardiac arrhythmia   • Hyperlipemia   • Allergic reaction   • Hx of food anaphylaxis   • Herpes simplex        Past Medical History:   Diagnosis Date   • Acute bronchitis    • Anxiety    • Chest pain    • Chilblains     \"Chilblian's\"   • Depression    • Diabetes mellitus, type II    • Fatty liver    • GERD (gastroesophageal reflux disease)    • Health care maintenance    • History of mammogram    • Hyperlipidemia    • Hypertension    • Hypothyroid    • Incontinence    • Osteoarthritis     OA  Marvin THR, LT TKR - hydrocodone prescibed by Dr. Almaguer   • Pain of esophagus     \" nervous esophagus\" - she takes the occasional alprazolam   • Peptic ulcer disease    • Prediabetes    • Raynaud's disease     \"Raynauds\"   • Renal disease     followed by Dr. Grewal   • UTI (urinary tract infection)    • Vitamin B 12 deficiency         Past Surgical History:   Procedure Laterality Date   • CATARACT EXTRACTION     • CHOLECYSTECTOMY     • COLONOSCOPY     • COLONOSCOPY N/A 2016    Procedure: COLONOSCOPY with hot snare polypectomy;  Surgeon: George Pabon MD;  " Location: [facility] ENDOSCOPY;  Service:    • FOOT SURGERY     • TONSILLECTOMY     • TOTAL HIP ARTHROPLASTY Bilateral     OA  Marvin THR, LT TKR - hydrocodone prescibed by Dr. Almaguer   • TOTAL KNEE ARTHROPLASTY Left     OA  Marvin THR, LT TKR - hydrocodone prescibed by Dr. Almaguer                             PT Assessment/Plan     Row Name 06/01/18 1528          PT Assessment    Assessment Comments Patient presents using quad cane today - reports 2/2 increased knee pain today from grocery shopping yesterday (no motorized carts available) and even reports having some L knee pain as well.  She is thinking she will hold off on knee surgery until October (has a wedding to go to in [place] mid September).  -RA        PT Plan    PT Plan Comments Skilled therapy for core/hip/knee strength/stabilization and functional mobility in preparation for future TKA.    -RA       User Key  (r) = Recorded By, (t) = Taken By, (c) = Cosigned By    Initials Name Provider Type    HERMELINDA Kaye, PT Physical Therapist                Modalities     Row Name 06/01/18 1700             Ice    Location Ice pack to R knee in supine over pilllow  -RA      Rx Minutes 12 mins  -RA      Ice S/P Rx Yes  -RA        User Key  (r) = Recorded By, (t) = Taken By, (c) = Cosigned By    Initials Name Provider Type    HERMELINDA Kaye, PT Physical Therapist                Exercises     Row Name 06/01/18 1500             Subjective Comments    Subjective Comments R knee worse today, went grocery shopping yesterday and no motorized cart available so more WB/walking and I'm paying for it now.  My L knee is even hurting some.     -RA         Subjective Pain    Pre-Treatment Pain Level 6  -RA         Exercise 1    Exercise Name 1 NuStep L3  -RA      Time 1 6 minutes  -RA         Exercise 2    Exercise Name 2 PPT  -RA      Cueing 2 Verbal  -RA      Reps 2 15  -RA         Exercise 3    Exercise Name 3 TB H/L hip abduction/ER  -RA      Cueing 3 Verbal  -RA      Reps 3  15  -RA      Additional Comments red  -RA         Exercise 4    Exercise Name 4 H/L hip add   -RA      Cueing 4 Verbal  -RA      Reps 4 15  -RA         Exercise 5    Exercise Name 5 Bridge  -RA      Cueing 5 Verbal   cues for breathing  -RA      Reps 5 15  -RA         Exercise 6    Exercise Name 6 S/L clam  -RA      Cueing 6 Verbal  -RA      Reps 6 12  -RA      Additional Comments B   -RA         Exercise 7    Exercise Name 7 Standing bilateral heel raise  -RA      Reps 7 15  -RA         Exercise 8    Exercise Name 8 Standing hip abduction  -RA      Reps 8 15  -RA      Additional Comments R only - too painful to stand on R to perform on L   -RA         Exercise 9    Exercise Name 9 LAQ  -RA      Cueing 9 Verbal;Tactile  -RA      Reps 9 15  -RA      Additional Comments 2# B   -RA         Exercise 10    Exercise Name 10 SAQ  -RA      Cueing 10 Verbal;Tactile  -RA      Reps 10 15  -RA      Additional Comments 2# B  -RA        User Key  (r) = Recorded By, (t) = Taken By, (c) = Cosigned By    Initials Name Provider Type    RA Cristina Kaye, PT Physical Therapist                             Therapy Education  Given: Symptoms/condition management, Mobility training, HEP  Program: Reinforced  How Provided: Verbal, Demonstration  Provided to: Patient  Level of Understanding: Teach back education performed, Verbalized              Time Calculation:   Start Time: 1445  Stop Time: 1535  Time Calculation (min): 50 min  Total Timed Code Minutes- PT: 40 minute(s)    Therapy Charges for Today     Code Description Service Date Service Provider Modifiers Qty    61140440419 HC PT THER PROC EA 15 MIN 6/1/2018 Cristina Kaye, PT GP 3    49592925181 HC PT HOT OR COLD PACK TREAT MCARE 6/1/2018 Cristina Kaye, PT GP 1                    Cristina Kaye, PT  6/1/2018

## 2018-06-04 ENCOUNTER — HOSPITAL ENCOUNTER (OUTPATIENT)
Dept: PHYSICAL THERAPY | Facility: HOSPITAL | Age: 78
Setting detail: THERAPIES SERIES
Discharge: HOME | End: 2018-06-04

## 2018-06-04 DIAGNOSIS — M54.41 CHRONIC BILATERAL LOW BACK PAIN WITH RIGHT-SIDED SCIATICA: ICD-10-CM

## 2018-06-04 DIAGNOSIS — M25.561 RECURRENT PAIN OF RIGHT KNEE: Primary | ICD-10-CM

## 2018-06-04 DIAGNOSIS — R26.2 DIFFICULTY WALKING: ICD-10-CM

## 2018-06-04 DIAGNOSIS — M17.11 OSTEOARTHRITIS OF RIGHT KNEE, UNSPECIFIED OSTEOARTHRITIS TYPE: ICD-10-CM

## 2018-06-04 DIAGNOSIS — G89.29 CHRONIC BILATERAL LOW BACK PAIN WITH RIGHT-SIDED SCIATICA: ICD-10-CM

## 2018-06-04 DIAGNOSIS — R26.81 GAIT INSTABILITY: ICD-10-CM

## 2018-06-04 PROCEDURE — 97110 THERAPEUTIC EXERCISES: CPT

## 2018-06-04 NOTE — THERAPY TREATMENT NOTE
"    Outpatient Physical Therapy Ortho Treatment Note  [facility]     Patient Name: Rekha Bear  : 1940  MRN: [id number]  Today's Date: 2018      Visit Date: 2018    Visit Dx:    ICD-10-CM ICD-9-CM   1. Recurrent pain of right knee M25.561 719.46   2. Osteoarthritis of right knee, unspecified osteoarthritis type M17.11 715.96   3. Difficulty walking R26.2 719.7   4. Gait instability R26.81 781.2   5. Chronic bilateral low back pain with right-sided sciatica M54.41 724.2    G89.29 724.3     338.29       Patient Active Problem List   Diagnosis   • Acute bronchitis   • Chronic pain of right knee   • Chest pain   • Depression   • Diabetes mellitus   • Hyperlipidemia   • Hypertension   • Hypothyroidism   • Impaired glucose tolerance   • Renal insufficiency   • Urinary incontinence   • Urinary tract infection   • Cobalamin deficiency   • Cardiac arrhythmia   • Hyperlipemia   • Allergic reaction   • Hx of food anaphylaxis   • Herpes simplex        Past Medical History:   Diagnosis Date   • Acute bronchitis    • Anxiety    • Chest pain    • Chilblains     \"Chilblian's\"   • Depression    • Diabetes mellitus, type II    • Fatty liver    • GERD (gastroesophageal reflux disease)    • Health care maintenance    • History of mammogram    • Hyperlipidemia    • Hypertension    • Hypothyroid    • Incontinence    • Osteoarthritis     OA  Marvin THR, LT TKR - hydrocodone prescibed by Dr. Almaguer   • Pain of esophagus     \" nervous esophagus\" - she takes the occasional alprazolam   • Peptic ulcer disease    • Prediabetes    • Raynaud's disease     \"Raynauds\"   • Renal disease     followed by Dr. Grewal   • UTI (urinary tract infection)    • Vitamin B 12 deficiency         Past Surgical History:   Procedure Laterality Date   • CATARACT EXTRACTION     • CHOLECYSTECTOMY     • COLONOSCOPY     • COLONOSCOPY N/A 2016    Procedure: COLONOSCOPY with hot snare polypectomy;  Surgeon: George Pabon MD;  " Location: [facility] ENDOSCOPY;  Service:    • FOOT SURGERY     • TONSILLECTOMY     • TOTAL HIP ARTHROPLASTY Bilateral     OA  Marvin THR, LT TKR - hydrocodone prescibed by Dr. Almaguer   • TOTAL KNEE ARTHROPLASTY Left     OA  Marvin THR, LT TKR - hydrocodone prescibed by Dr. Almaguer                             PT Assessment/Plan     Row Name 06/04/18 1327          PT Assessment    Assessment Comments Added HS curl to HEP. Continue work on strengtheng (Prehab)  -WS       User Key  (r) = Recorded By, (t) = Taken By, (c) = Cosigned By    Initials Name Provider Type    WS Fidencio Jain PTA Physical Therapy Assistant                Modalities     Row Name 06/04/18 1240             Ice    Ice Applied Yes  -WS      Location Ice pack to R knee in supine over pilllow  -WS      Rx Minutes 10 mins  -WS      Ice S/P Rx Yes  -WS        User Key  (r) = Recorded By, (t) = Taken By, (c) = Cosigned By    Initials Name Provider Type     Fidencio Jain PTA Physical Therapy Assistant                Exercises     Row Name 06/04/18 1240             Subjective Comments    Subjective Comments Walked a little more over the weekend  -WS         Subjective Pain    Pre-Treatment Pain Level 6  -WS         Exercise 1    Exercise Name 1 NuStep L3  -WS      Time 1 6 minutes  -WS         Exercise 2    Exercise Name 2 PPT  -WS      Cueing 2 Verbal  -WS      Reps 2 15  -WS         Exercise 3    Exercise Name 3 TB H/L hip abduction/ER  -WS      Cueing 3 Verbal  -WS      Reps 3 15  -WS      Additional Comments red  -WS         Exercise 4    Exercise Name 4 H/L hip add   -WS      Cueing 4 Verbal  -WS      Reps 4 15  -WS      Additional Comments ball  -WS         Exercise 5    Exercise Name 5 Bridge  -WS      Cueing 5 Verbal  -WS      Reps 5 15  -WS         Exercise 6    Exercise Name 6 S/L clam  -WS      Cueing 6 Verbal  -WS      Reps 6 15  -WS      Additional Comments B  -WS         Exercise 7    Exercise Name 7 Standing bilateral heel raise  -WS       Reps 7 15  -WS         Exercise 8    Exercise Name 8 Standing hip abduction  -WS      Reps 8 15  -WS      Additional Comments R only 2#  -WS         Exercise 9    Exercise Name 9 LAQ  -WS      Cueing 9 Verbal;Tactile  -WS      Reps 9 15  -WS      Additional Comments 3# B  -WS         Exercise 10    Exercise Name 10 SAQ  -WS      Cueing 10 Verbal;Tactile  -WS      Reps 10 15  -WS      Additional Comments 3#  -WS         Exercise 11    Exercise Name 11 standing HS curl  -WS      Additional Comments R only 2#  -WS        User Key  (r) = Recorded By, (t) = Taken By, (c) = Cosigned By    Initials Name Provider Type    DOUGLAS Jain PTA Physical Therapy Assistant                               PT OP Goals     Row Name 06/04/18 1300          PT Short Term Goals    STG Date to Achieve 06/01/18  -     STG 1 Patient will be independent with initial HEP.  -     STG 1 Progress Met  -     STG 2 Patient will deny falls.  -     STG 2 Progress Ongoing  -     STG 3 Patient will demonstrate R knee flexion >/=120 degrees to facilitate ease with stairs.  -     STG 3 Progress Ongoing  -        Long Term Goals    LTG Date to Achieve 07/01/18  -     LTG 1 Patient will be independent with progressive HEP.  -     LTG 1 Progress Ongoing  -     LTG 2 Patient will demonstrate R knee AROM 0-125 to facilitate optimal post operative outcomes.  -     LTG 2 Progress Ongoing  -     LTG 3 Patient will demonstrate R hip abd to >/=4/5 per MMT to assist with gait normalization.  -     LTG 3 Progress Ongoing  -     LTG 4 Patient will score </=60% disability on the KOS-ADL to indicate improved perceived performance with ADLs.  -     LTG 4 Progress Ongoing  -       User Key  (r) = Recorded By, (t) = Taken By, (c) = Cosigned By    Initials Name Provider Type    DOUGLAS Jain PTA Physical Therapy Assistant          Therapy Education  Given: HEP, Symptoms/condition management, Pain management, Edema management,  Posture/body mechanics  Program: Reinforced  How Provided: Verbal  Provided to: Patient              Time Calculation:   Start Time: 1240  Stop Time: 1340  Time Calculation (min): 60 min    Therapy Charges for Today     Code Description Service Date Service Provider Modifiers Qty    93734898037 HC PT THER PROC EA 15 MIN 6/4/2018 Fidencio Jain PTA GP 3    96921079465 HC PT HOT OR COLD PACK TREAT MCARE 6/4/2018 Fidencio Jain PTA GP 1                    Fidencio Jain PTA  6/4/2018

## 2018-06-06 ENCOUNTER — HOSPITAL ENCOUNTER (OUTPATIENT)
Dept: PHYSICAL THERAPY | Facility: HOSPITAL | Age: 78
Setting detail: THERAPIES SERIES
Discharge: HOME | End: 2018-06-06

## 2018-06-06 DIAGNOSIS — M25.561 RECURRENT PAIN OF RIGHT KNEE: Primary | ICD-10-CM

## 2018-06-06 DIAGNOSIS — M17.11 OSTEOARTHRITIS OF RIGHT KNEE, UNSPECIFIED OSTEOARTHRITIS TYPE: ICD-10-CM

## 2018-06-06 DIAGNOSIS — R26.81 GAIT INSTABILITY: ICD-10-CM

## 2018-06-06 DIAGNOSIS — R26.2 DIFFICULTY WALKING: ICD-10-CM

## 2018-06-06 PROCEDURE — 97110 THERAPEUTIC EXERCISES: CPT | Performed by: PHYSICAL THERAPIST

## 2018-06-06 NOTE — THERAPY TREATMENT NOTE
"    Outpatient Physical Therapy Ortho Treatment Note  [facility]     Patient Name: Rekha Bear  : 1940  MRN: [id number]  Today's Date: 2018      Visit Date: 2018    Visit Dx:    ICD-10-CM ICD-9-CM   1. Recurrent pain of right knee M25.561 719.46   2. Osteoarthritis of right knee, unspecified osteoarthritis type M17.11 715.96   3. Difficulty walking R26.2 719.7   4. Gait instability R26.81 781.2       Patient Active Problem List   Diagnosis   • Acute bronchitis   • Chronic pain of right knee   • Chest pain   • Depression   • Diabetes mellitus   • Hyperlipidemia   • Hypertension   • Hypothyroidism   • Impaired glucose tolerance   • Renal insufficiency   • Urinary incontinence   • Urinary tract infection   • Cobalamin deficiency   • Cardiac arrhythmia   • Hyperlipemia   • Allergic reaction   • Hx of food anaphylaxis   • Herpes simplex        Past Medical History:   Diagnosis Date   • Acute bronchitis    • Anxiety    • Chest pain    • Chilblains     \"Chilblian's\"   • Depression    • Diabetes mellitus, type II    • Fatty liver    • GERD (gastroesophageal reflux disease)    • Health care maintenance    • History of mammogram    • Hyperlipidemia    • Hypertension    • Hypothyroid    • Incontinence    • Osteoarthritis     OA  Marvin THR, LT TKR - hydrocodone prescibed by Dr. Almaguer   • Pain of esophagus     \" nervous esophagus\" - she takes the occasional alprazolam   • Peptic ulcer disease    • Prediabetes    • Raynaud's disease     \"Raynauds\"   • Renal disease     followed by Dr. Grewal   • UTI (urinary tract infection)    • Vitamin B 12 deficiency         Past Surgical History:   Procedure Laterality Date   • CATARACT EXTRACTION     • CHOLECYSTECTOMY     • COLONOSCOPY     • COLONOSCOPY N/A 2016    Procedure: COLONOSCOPY with hot snare polypectomy;  Surgeon: George Pabon MD;  Location: [facility] ENDOSCOPY;  Service:    • FOOT SURGERY     • TONSILLECTOMY     • TOTAL HIP ARTHROPLASTY " "Bilateral     OA  Marvin THR, LT TKR - hydrocodone prescibed by Dr. Almaguer   • TOTAL KNEE ARTHROPLASTY Left     OA  Marvin THR, LT TKR - hydrocodone prescibed by Dr. Almaguer                             PT Assessment/Plan     Row Name 06/06/18 1300          PT Assessment    Assessment Comments Ms. Bear returns today to work on \"prehab\" for upcoming R TKA.  We progressed her program today and discussed activity modifications.  We moved her HEP to once every other day to allow for recovery.  Ms. Bear verbalizes a good understanding of her condition and remains motivated.  She continue sto be a good candidate for skilled physical therapy.   -GJ        PT Plan    PT Plan Comments continue pre hab for R knee, workon hip/core strength/stabilization.  HEP to be every other day, modalities prn  -GJ       User Key  (r) = Recorded By, (t) = Taken By, (c) = Cosigned By    Initials Name Provider Type    KIERA Weber, PT Physical Therapist                Modalities     Row Name 06/06/18 1100             Ice    Ice Applied Yes  -GJ      Location Ice pack to R knee in supine over pilllow  -GJ      Rx Minutes 10 mins  -GJ      Ice S/P Rx Yes  -GJ        User Key  (r) = Recorded By, (t) = Taken By, (c) = Cosigned By    Initials Name Provider Type    KIERA Weber, PT Physical Therapist                Exercises     Row Name 06/06/18 1100             Subjective Comments    Subjective Comments appt time 1130, arrival 1144.  Just getting ready for surgery, my R leg is weak.    -GJ         Subjective Pain    Pre-Treatment Pain Level 4  -GJ         Exercise 1    Exercise Name 1 NuStep L3  -GJ      Time 1 5 mmin  -GJ         Exercise 5    Exercise Name 5 Bridge  -GJ      Cueing 5 Verbal  -GJ      Reps 5 15  -GJ      Time 5 3s  -GJ         Exercise 6    Exercise Name 6 S/L clam  -GJ      Cueing 6 Verbal  -GJ      Reps 6 15  -GJ      Additional Comments bialteral  -GJ         Exercise 7    Exercise Name 7 Standing bilateral heel raise  " -GJ      Cueing 7 Verbal  -GJ      Reps 7 20  -GJ         Exercise 8    Exercise Name 8 Standing hip abduction  -GJ      Sets 8 2  -GJ      Reps 8 10  -GJ      Additional Comments 2#. RLE only   -GJ         Exercise 9    Exercise Name 9 LAQ  -GJ      Cueing 9 Verbal;Tactile  -GJ      Reps 9 15  -GJ      Additional Comments 3#  -GJ         Exercise 10    Exercise Name 10 SAQ  -GJ      Cueing 10 Verbal;Tactile  -GJ      Reps 10 15  -GJ      Additional Comments 3#  -GJ         Exercise 11    Exercise Name 11 standing HS curl  -GJ      Cueing 11 Verbal  -GJ      Reps 11 15  -GJ      Additional Comments 2#, RLE only   -GJ         Exercise 12    Exercise Name 12 seated TKE into ball  -GJ      Cueing 12 Demo;Verbal  -GJ      Reps 12 15  -GJ      Time 12 5 s  -GJ        User Key  (r) = Recorded By, (t) = Taken By, (c) = Cosigned By    Initials Name Provider Type    KIERA Weber, PT Physical Therapist                               PT OP Goals     Row Name 06/06/18 1100          PT Short Term Goals    STG Date to Achieve 06/01/18  -GJ     STG 1 Patient will be independent with initial HEP.  -GJ     STG 1 Progress Met  -GJ     STG 2 Patient will deny falls.  -GJ     STG 2 Progress Ongoing  -GJ     STG 2 Progress Comments pt denies falls since initiating therapy, will continue to assess  -GJ     STG 3 Patient will demonstrate R knee flexion >/=120 degrees to facilitate ease with stairs.  -GJ     STG 3 Progress Ongoing  -GJ        Long Term Goals    LTG Date to Achieve 07/01/18  -GJ     LTG 1 Patient will be independent with progressive HEP.  -GJ     LTG 1 Progress Ongoing  -GJ     LTG 2 Patient will demonstrate R knee AROM 0-125 to facilitate optimal post operative outcomes.  -GJ     LTG 2 Progress Ongoing  -GJ     LTG 3 Patient will demonstrate R hip abd to >/=4/5 per MMT to assist with gait normalization.  -GJ     LTG 3 Progress Ongoing  -GJ     LTG 4 Patient will score </=60% disability on the KOS-ADL to indicate  improved perceived performance with ADLs.  -KIERA     LTG 4 Progress Ongoing  -KIERA       User Key  (r) = Recorded By, (t) = Taken By, (c) = Cosigned By    Initials Name Provider Type    KIERA Weber PT Physical Therapist          Therapy Education  Education Details: HEP to be only once every other day. encouraged use of home ice daily  Given: HEP, Symptoms/condition management, Pain management, Posture/body mechanics, Mobility training, Edema management, Fall prevention and home safety  Program: New  How Provided: Verbal, Demonstration, Written  Provided to: Patient  Level of Understanding: Teach back education performed, Verbalized, Demonstrated              Time Calculation:   Start Time: 1144  Stop Time: 1240  Time Calculation (min): 56 min    Therapy Charges for Today     Code Description Service Date Service Provider Modifiers Qty    48036590871 HC PT THER PROC EA 15 MIN 6/6/2018 Henrique Weber, PT GP 3    [id number]  PT HOT OR COLD PACK TREAT MCARE 6/6/2018 Henrique Weber, PT GP 1                    Henrique Weber, PT  6/6/2018

## 2018-06-07 ENCOUNTER — OFFICE VISIT (OUTPATIENT)
Dept: ORTHOPEDIC SURGERY | Facility: CLINIC | Age: 78
End: 2018-06-07

## 2018-06-07 VITALS — WEIGHT: 215.4 LBS | HEIGHT: 67 IN | BODY MASS INDEX: 33.81 KG/M2 | TEMPERATURE: 97.2 F

## 2018-06-07 DIAGNOSIS — M25.561 RIGHT KNEE PAIN, UNSPECIFIED CHRONICITY: Primary | ICD-10-CM

## 2018-06-07 PROCEDURE — 20610 DRAIN/INJ JOINT/BURSA W/O US: CPT | Performed by: NURSE PRACTITIONER

## 2018-06-07 RX ORDER — METHYLPREDNISOLONE ACETATE 80 MG/ML
80 INJECTION, SUSPENSION INTRA-ARTICULAR; INTRALESIONAL; INTRAMUSCULAR; SOFT TISSUE
Status: COMPLETED | OUTPATIENT
Start: 2018-06-07 | End: 2018-06-07

## 2018-06-07 RX ORDER — LIDOCAINE HYDROCHLORIDE 20 MG/ML
2 INJECTION, SOLUTION EPIDURAL; INFILTRATION; INTRACAUDAL; PERINEURAL
Status: COMPLETED | OUTPATIENT
Start: 2018-06-07 | End: 2018-06-07

## 2018-06-07 RX ORDER — GABAPENTIN 600 MG/1
400 TABLET ORAL 2 TIMES DAILY
COMMUNITY
Start: 2018-06-01 | End: 2020-04-10 | Stop reason: HOSPADM

## 2018-06-07 RX ADMIN — LIDOCAINE HYDROCHLORIDE 2 ML: 20 INJECTION, SOLUTION EPIDURAL; INFILTRATION; INTRACAUDAL; PERINEURAL at 11:33

## 2018-06-07 RX ADMIN — METHYLPREDNISOLONE ACETATE 80 MG: 80 INJECTION, SUSPENSION INTRA-ARTICULAR; INTRALESIONAL; INTRAMUSCULAR; SOFT TISSUE at 11:33

## 2018-06-07 NOTE — PROGRESS NOTES
6/7/2018    Rekha Bear is here today for worsening knee pain. Pt knows that she needs a knee replacement, but would like to hold off until the fall.  She is requesting cortisone injection today.    KNEE Injection Procedure Note:    Large Joint Arthrocentesis  Date/Time: 6/7/2018 11:33 AM  Consent given by: patient  Site marked: site marked  Timeout: Immediately prior to procedure a time out was called to verify the correct patient, procedure, equipment, support staff and site/side marked as required   Supporting Documentation  Indications: pain and joint swelling   Procedure Details  Location: knee - R knee  Preparation: Patient was prepped and draped in the usual sterile fashion  Needle size: 22 G  Approach: anterolateral  Medications administered: 80 mg methylPREDNISolone acetate 80 MG/ML; 2 mL lidocaine PF 2% 2 %  Patient tolerance: patient tolerated the procedure well with no immediate complications        Prior to injection risks were discussed including pain and infection.  Patient verbalized understanding would like to proceed with injection  At the conclusion of the injection I discussed the importance of continued quad strengthening exercises on a daily basis. I will see the patient back if the symptoms should fail to improve or worsen.    Zoraida Mclean, APRN  6/7/2018

## 2018-06-11 DIAGNOSIS — Z79.899 HIGH RISK MEDICATION USE: ICD-10-CM

## 2018-06-11 DIAGNOSIS — E11.9 DIABETES MELLITUS, STABLE (HCC): ICD-10-CM

## 2018-06-11 DIAGNOSIS — E03.9 HYPOTHYROIDISM, UNSPECIFIED TYPE: ICD-10-CM

## 2018-06-11 DIAGNOSIS — F32.A DEPRESSION, UNSPECIFIED DEPRESSION TYPE: ICD-10-CM

## 2018-06-11 DIAGNOSIS — E78.2 MIXED HYPERLIPIDEMIA: Primary | ICD-10-CM

## 2018-06-11 LAB
ALBUMIN SERPL-MCNC: 4.4 G/DL (ref 3.5–5.2)
ALBUMIN/GLOB SERPL: 1.6 G/DL
ALP SERPL-CCNC: 82 U/L (ref 39–117)
ALT SERPL-CCNC: 19 U/L (ref 1–33)
AST SERPL-CCNC: 24 U/L (ref 1–32)
BILIRUB SERPL-MCNC: 0.8 MG/DL (ref 0.1–1.2)
BUN SERPL-MCNC: 15 MG/DL (ref 8–23)
BUN/CREAT SERPL: 13.2 (ref 7–25)
CALCIUM SERPL-MCNC: 9.9 MG/DL (ref 8.6–10.5)
CHLORIDE SERPL-SCNC: 97 MMOL/L (ref 98–107)
CHOLEST SERPL-MCNC: 147 MG/DL (ref 0–200)
CO2 SERPL-SCNC: 29.4 MMOL/L (ref 22–29)
CREAT SERPL-MCNC: 1.14 MG/DL (ref 0.57–1)
ERYTHROCYTE [DISTWIDTH] IN BLOOD BY AUTOMATED COUNT: 14.1 % (ref 11.7–13)
GFR SERPLBLD CREATININE-BSD FMLA CKD-EPI: 46 ML/MIN/1.73
GFR SERPLBLD CREATININE-BSD FMLA CKD-EPI: 56 ML/MIN/1.73
GLOBULIN SER CALC-MCNC: 2.8 GM/DL
GLUCOSE SERPL-MCNC: 186 MG/DL (ref 65–99)
HBA1C MFR BLD: 8.1 % (ref 4.8–5.6)
HCT VFR BLD AUTO: 45.3 % (ref 35.6–45.5)
HDLC SERPL-MCNC: 51 MG/DL (ref 40–60)
HGB BLD-MCNC: 14.1 G/DL (ref 11.9–15.5)
LDLC SERPL CALC-MCNC: 61 MG/DL (ref 0–100)
LDLC/HDLC SERPL: 1.19 {RATIO}
MCH RBC QN AUTO: 29.3 PG (ref 26.9–32)
MCHC RBC AUTO-ENTMCNC: 31.1 G/DL (ref 32.4–36.3)
MCV RBC AUTO: 94 FL (ref 80.5–98.2)
PLATELET # BLD AUTO: 241 10*3/MM3 (ref 140–500)
POTASSIUM SERPL-SCNC: 4.9 MMOL/L (ref 3.5–5.2)
PROT SERPL-MCNC: 7.2 G/DL (ref 6–8.5)
RBC # BLD AUTO: 4.82 10*6/MM3 (ref 3.9–5.2)
SODIUM SERPL-SCNC: 140 MMOL/L (ref 136–145)
TRIGL SERPL-MCNC: 177 MG/DL (ref 0–150)
TSH SERPL DL<=0.005 MIU/L-ACNC: 2.16 MIU/ML (ref 0.27–4.2)
VLDLC SERPL CALC-MCNC: 35.4 MG/DL (ref 5–40)
WBC # BLD AUTO: 7.41 10*3/MM3 (ref 4.5–10.7)

## 2018-06-12 ENCOUNTER — HOSPITAL ENCOUNTER (OUTPATIENT)
Dept: CARDIOLOGY | Facility: HOSPITAL | Age: 78
Discharge: HOME | End: 2018-06-12
Attending: INTERNAL MEDICINE | Admitting: INTERNAL MEDICINE

## 2018-06-12 VITALS
BODY MASS INDEX: 33.74 KG/M2 | WEIGHT: 215 LBS | HEART RATE: 60 BPM | HEIGHT: 67 IN | SYSTOLIC BLOOD PRESSURE: 138 MMHG | DIASTOLIC BLOOD PRESSURE: 73 MMHG

## 2018-06-12 PROCEDURE — 93306 TTE W/DOPPLER COMPLETE: CPT

## 2018-06-12 PROCEDURE — 93306 TTE W/DOPPLER COMPLETE: CPT | Performed by: INTERNAL MEDICINE

## 2018-06-12 PROCEDURE — 0399T ADULT TRANSTHORACIC ECHO COMPLETE W/ CONT IF NECESSARY PER PROTOCOL: CPT | Performed by: INTERNAL MEDICINE

## 2018-06-12 PROCEDURE — 0399T HC MYOCARDL STRAIN IMAG QUAN ASSMT PER SESS: CPT

## 2018-06-13 ENCOUNTER — HOSPITAL ENCOUNTER (OUTPATIENT)
Dept: PHYSICAL THERAPY | Facility: HOSPITAL | Age: 78
Setting detail: THERAPIES SERIES
Discharge: HOME | End: 2018-06-13

## 2018-06-13 ENCOUNTER — OFFICE VISIT (OUTPATIENT)
Dept: FAMILY MEDICINE CLINIC | Facility: CLINIC | Age: 78
End: 2018-06-13

## 2018-06-13 VITALS
DIASTOLIC BLOOD PRESSURE: 74 MMHG | HEART RATE: 54 BPM | HEIGHT: 67 IN | WEIGHT: 210 LBS | RESPIRATION RATE: 16 BRPM | OXYGEN SATURATION: 98 % | BODY MASS INDEX: 32.96 KG/M2 | SYSTOLIC BLOOD PRESSURE: 132 MMHG

## 2018-06-13 DIAGNOSIS — N18.30 TYPE 2 DIABETES MELLITUS WITH STAGE 3 CHRONIC KIDNEY DISEASE, WITHOUT LONG-TERM CURRENT USE OF INSULIN (HCC): ICD-10-CM

## 2018-06-13 DIAGNOSIS — M17.11 OSTEOARTHRITIS OF RIGHT KNEE, UNSPECIFIED OSTEOARTHRITIS TYPE: ICD-10-CM

## 2018-06-13 DIAGNOSIS — Z00.00 ROUTINE GENERAL MEDICAL EXAMINATION AT A HEALTH CARE FACILITY: Primary | ICD-10-CM

## 2018-06-13 DIAGNOSIS — R26.81 GAIT INSTABILITY: ICD-10-CM

## 2018-06-13 DIAGNOSIS — E78.5 HYPERLIPIDEMIA, UNSPECIFIED HYPERLIPIDEMIA TYPE: ICD-10-CM

## 2018-06-13 DIAGNOSIS — Z23 NEED FOR VACCINATION: ICD-10-CM

## 2018-06-13 DIAGNOSIS — I10 ESSENTIAL HYPERTENSION: ICD-10-CM

## 2018-06-13 DIAGNOSIS — R26.2 DIFFICULTY WALKING: ICD-10-CM

## 2018-06-13 DIAGNOSIS — S80.212A ABRASION OF LEFT KNEE, INITIAL ENCOUNTER: ICD-10-CM

## 2018-06-13 DIAGNOSIS — E11.22 TYPE 2 DIABETES MELLITUS WITH STAGE 3 CHRONIC KIDNEY DISEASE, WITHOUT LONG-TERM CURRENT USE OF INSULIN (HCC): ICD-10-CM

## 2018-06-13 DIAGNOSIS — M15.8 OTHER OSTEOARTHRITIS INVOLVING MULTIPLE JOINTS: ICD-10-CM

## 2018-06-13 DIAGNOSIS — M25.561 RECURRENT PAIN OF RIGHT KNEE: Primary | ICD-10-CM

## 2018-06-13 DIAGNOSIS — E03.9 ACQUIRED HYPOTHYROIDISM: ICD-10-CM

## 2018-06-13 PROBLEM — M19.90 OSTEOARTHRITIS: Status: ACTIVE | Noted: 2018-06-13

## 2018-06-13 LAB
ASCENDING AORTA: 3.2 CM
BH CV ECHO MEAS - ACS: 1.8 CM
BH CV ECHO MEAS - AO MAX PG (FULL): 1.8 MMHG
BH CV ECHO MEAS - AO MAX PG: 5.5 MMHG
BH CV ECHO MEAS - AO MEAN PG (FULL): 1.5 MMHG
BH CV ECHO MEAS - AO MEAN PG: 3.5 MMHG
BH CV ECHO MEAS - AO ROOT AREA (BSA CORRECTED): 1.4
BH CV ECHO MEAS - AO ROOT AREA: 6.8 CM^2
BH CV ECHO MEAS - AO ROOT DIAM: 2.9 CM
BH CV ECHO MEAS - AO V2 MAX: 116.7 CM/SEC
BH CV ECHO MEAS - AO V2 MEAN: 89.4 CM/SEC
BH CV ECHO MEAS - AO V2 VTI: 27.1 CM
BH CV ECHO MEAS - AVA(I,A): 2.7 CM^2
BH CV ECHO MEAS - AVA(I,D): 2.7 CM^2
BH CV ECHO MEAS - AVA(V,A): 2.5 CM^2
BH CV ECHO MEAS - AVA(V,D): 2.5 CM^2
BH CV ECHO MEAS - BSA(HAYCOCK): 2.2 M^2
BH CV ECHO MEAS - BSA: 2.1 M^2
BH CV ECHO MEAS - BZI_BMI: 33.7 KILOGRAMS/M^2
BH CV ECHO MEAS - BZI_METRIC_HEIGHT: 170.2 CM
BH CV ECHO MEAS - BZI_METRIC_WEIGHT: 97.5 KG
BH CV ECHO MEAS - CONTRAST EF (2CH): 65.2 ML/M^2
BH CV ECHO MEAS - CONTRAST EF 4CH: 65.7 ML/M^2
BH CV ECHO MEAS - EDV(MOD-SP2): 112 ML
BH CV ECHO MEAS - EDV(MOD-SP4): 99 ML
BH CV ECHO MEAS - EDV(TEICH): 89.3 ML
BH CV ECHO MEAS - EF(CUBED): 68.7 %
BH CV ECHO MEAS - EF(MOD-BP): 66 %
BH CV ECHO MEAS - EF(MOD-SP2): 65.2 %
BH CV ECHO MEAS - EF(MOD-SP4): 65.7 %
BH CV ECHO MEAS - EF(TEICH): 60.5 %
BH CV ECHO MEAS - ESV(MOD-SP2): 39 ML
BH CV ECHO MEAS - ESV(MOD-SP4): 34 ML
BH CV ECHO MEAS - ESV(TEICH): 35.3 ML
BH CV ECHO MEAS - FS: 32.1 %
BH CV ECHO MEAS - IVS/LVPW: 0.97
BH CV ECHO MEAS - IVSD: 1.3 CM
BH CV ECHO MEAS - LAT PEAK E' VEL: 10 CM/SEC
BH CV ECHO MEAS - LV DIASTOLIC VOL/BSA (35-75): 47.5 ML/M^2
BH CV ECHO MEAS - LV MASS(C)D: 233.5 GRAMS
BH CV ECHO MEAS - LV MASS(C)DI: 112 GRAMS/M^2
BH CV ECHO MEAS - LV MAX PG: 3.7 MMHG
BH CV ECHO MEAS - LV MEAN PG: 2 MMHG
BH CV ECHO MEAS - LV SYSTOLIC VOL/BSA (12-30): 16.3 ML/M^2
BH CV ECHO MEAS - LV V1 MAX: 95.6 CM/SEC
BH CV ECHO MEAS - LV V1 MEAN: 66.5 CM/SEC
BH CV ECHO MEAS - LV V1 VTI: 24.6 CM
BH CV ECHO MEAS - LVIDD: 4.4 CM
BH CV ECHO MEAS - LVIDS: 3 CM
BH CV ECHO MEAS - LVLD AP2: 7.5 CM
BH CV ECHO MEAS - LVLD AP4: 7.7 CM
BH CV ECHO MEAS - LVLS AP2: 6.4 CM
BH CV ECHO MEAS - LVLS AP4: 6.5 CM
BH CV ECHO MEAS - LVOT AREA (M): 3.1 CM^2
BH CV ECHO MEAS - LVOT AREA: 3 CM^2
BH CV ECHO MEAS - LVOT DIAM: 2 CM
BH CV ECHO MEAS - LVPWD: 1.4 CM
BH CV ECHO MEAS - MED PEAK E' VEL: 7 CM/SEC
BH CV ECHO MEAS - MR MAX PG: 14.7 MMHG
BH CV ECHO MEAS - MR MAX VEL: 191.4 CM/SEC
BH CV ECHO MEAS - MV A DUR: 0.14 SEC
BH CV ECHO MEAS - MV A MAX VEL: 70.3 CM/SEC
BH CV ECHO MEAS - MV DEC SLOPE: 279.8 CM/SEC^2
BH CV ECHO MEAS - MV DEC TIME: 0.23 SEC
BH CV ECHO MEAS - MV E MAX VEL: 66 CM/SEC
BH CV ECHO MEAS - MV E/A: 0.94
BH CV ECHO MEAS - MV MAX PG: 2 MMHG
BH CV ECHO MEAS - MV MEAN PG: 0.87 MMHG
BH CV ECHO MEAS - MV P1/2T MAX VEL: 66 CM/SEC
BH CV ECHO MEAS - MV P1/2T: 69.1 MSEC
BH CV ECHO MEAS - MV V2 MAX: 70.6 CM/SEC
BH CV ECHO MEAS - MV V2 MEAN: 44 CM/SEC
BH CV ECHO MEAS - MV V2 VTI: 24.8 CM
BH CV ECHO MEAS - MVA P1/2T LCG: 3.3 CM^2
BH CV ECHO MEAS - MVA(P1/2T): 3.2 CM^2
BH CV ECHO MEAS - MVA(VTI): 3 CM^2
BH CV ECHO MEAS - PA ACC TIME: 0.16 SEC
BH CV ECHO MEAS - PA MAX PG (FULL): 1.7 MMHG
BH CV ECHO MEAS - PA MAX PG: 4.8 MMHG
BH CV ECHO MEAS - PA PR(ACCEL): 7.7 MMHG
BH CV ECHO MEAS - PA V2 MAX: 109.9 CM/SEC
BH CV ECHO MEAS - PI END-D VEL: 85.5 CM/SEC
BH CV ECHO MEAS - PULM A REVS DUR: 0.13 SEC
BH CV ECHO MEAS - PULM A REVS VEL: 20.3 CM/SEC
BH CV ECHO MEAS - PULM DIAS VEL: 22.2 CM/SEC
BH CV ECHO MEAS - PULM S/D: 1.7
BH CV ECHO MEAS - PULM SYS VEL: 38.1 CM/SEC
BH CV ECHO MEAS - PVA(V,A): 3.1 CM^2
BH CV ECHO MEAS - PVA(V,D): 3.1 CM^2
BH CV ECHO MEAS - QP/QS: 1
BH CV ECHO MEAS - RAP SYSTOLE: 8 MMHG
BH CV ECHO MEAS - RV MAX PG: 3.1 MMHG
BH CV ECHO MEAS - RV MEAN PG: 1.5 MMHG
BH CV ECHO MEAS - RV V1 MAX: 88.3 CM/SEC
BH CV ECHO MEAS - RV V1 MEAN: 57 CM/SEC
BH CV ECHO MEAS - RV V1 VTI: 19.7 CM
BH CV ECHO MEAS - RVOT AREA: 3.9 CM^2
BH CV ECHO MEAS - RVOT DIAM: 2.2 CM
BH CV ECHO MEAS - RVSP: 33.5 MMHG
BH CV ECHO MEAS - SI(AO): 87.6 ML/M^2
BH CV ECHO MEAS - SI(CUBED): 28.7 ML/M^2
BH CV ECHO MEAS - SI(LVOT): 35.5 ML/M^2
BH CV ECHO MEAS - SI(MOD-SP2): 35 ML/M^2
BH CV ECHO MEAS - SI(MOD-SP4): 31.2 ML/M^2
BH CV ECHO MEAS - SI(TEICH): 25.9 ML/M^2
BH CV ECHO MEAS - SUP REN AO DIAM: 2 CM
BH CV ECHO MEAS - SV(AO): 182.8 ML
BH CV ECHO MEAS - SV(CUBED): 59.9 ML
BH CV ECHO MEAS - SV(LVOT): 73.9 ML
BH CV ECHO MEAS - SV(MOD-SP2): 73 ML
BH CV ECHO MEAS - SV(MOD-SP4): 65 ML
BH CV ECHO MEAS - SV(RVOT): 76.6 ML
BH CV ECHO MEAS - SV(TEICH): 54 ML
BH CV ECHO MEAS - TAPSE (>1.6): 2.1 CM2
BH CV ECHO MEAS - TR MAX VEL: 252.5 CM/SEC
BH CV ECHO MEASUREMENTS AVERAGE E/E' RATIO: 7.76
BH CV XLRA - RV BASE: 2.6 CM
BH CV XLRA - TDI S': 13 CM/SEC
LEFT ATRIUM VOLUME INDEX: 32 ML/M2
LV EF 2D ECHO EST: 66 %
MAXIMAL PREDICTED HEART RATE: 144 BPM
SINUS: 2.6 CM
STJ: 2.5 CM
STRESS TARGET HR: 122 BPM

## 2018-06-13 PROCEDURE — 90714 TD VACC NO PRESV 7 YRS+ IM: CPT | Performed by: FAMILY MEDICINE

## 2018-06-13 PROCEDURE — 96372 THER/PROPH/DIAG INJ SC/IM: CPT | Performed by: FAMILY MEDICINE

## 2018-06-13 PROCEDURE — 99214 OFFICE O/P EST MOD 30 MIN: CPT | Performed by: FAMILY MEDICINE

## 2018-06-13 PROCEDURE — 90732 PPSV23 VACC 2 YRS+ SUBQ/IM: CPT | Performed by: FAMILY MEDICINE

## 2018-06-13 PROCEDURE — G0009 ADMIN PNEUMOCOCCAL VACCINE: HCPCS | Performed by: FAMILY MEDICINE

## 2018-06-13 PROCEDURE — 97110 THERAPEUTIC EXERCISES: CPT

## 2018-06-13 PROCEDURE — G0439 PPPS, SUBSEQ VISIT: HCPCS | Performed by: FAMILY MEDICINE

## 2018-06-13 RX ORDER — GLIMEPIRIDE 2 MG/1
2 TABLET ORAL
Qty: 30 TABLET | Refills: 3 | Status: SHIPPED | OUTPATIENT
Start: 2018-06-13 | End: 2018-10-21 | Stop reason: SDUPTHER

## 2018-06-13 NOTE — THERAPY TREATMENT NOTE
"    Outpatient Physical Therapy Ortho Treatment Note  [facility]     Patient Name: Rekha Bear  : 1940  MRN: [id number]  Today's Date: 2018      Visit Date: 2018    Visit Dx:    ICD-10-CM ICD-9-CM   1. Recurrent pain of right knee M25.561 719.46   2. Osteoarthritis of right knee, unspecified osteoarthritis type M17.11 715.96   3. Difficulty walking R26.2 719.7   4. Gait instability R26.81 781.2       Patient Active Problem List   Diagnosis   • Acute bronchitis   • Chronic pain of right knee   • Chest pain   • Depression   • Type 2 diabetes mellitus with stage 3 chronic kidney disease, without long-term current use of insulin   • Hyperlipidemia   • Hypertension   • Hypothyroidism   • Impaired glucose tolerance   • Renal insufficiency   • Urinary incontinence   • Urinary tract infection   • Cobalamin deficiency   • Cardiac arrhythmia   • Hyperlipemia   • Allergic reaction   • Hx of food anaphylaxis   • Herpes simplex   • Osteoarthritis        Past Medical History:   Diagnosis Date   • Acute bronchitis    • Anxiety    • Chest pain    • Chilblains     \"Chilblian's\"   • Depression    • Diabetes mellitus, type II    • Fatty liver    • GERD (gastroesophageal reflux disease)    • Health care maintenance    • History of mammogram    • Hyperlipidemia    • Hypertension    • Hypothyroid    • Incontinence    • Osteoarthritis     OA  Marvin THR, LT TKR - hydrocodone prescibed by Dr. Almaguer   • Pain of esophagus     \" nervous esophagus\" - she takes the occasional alprazolam   • Peptic ulcer disease    • Prediabetes    • Raynaud's disease     \"Raynauds\"   • Renal disease     followed by Dr. Grewal   • UTI (urinary tract infection)    • Vitamin B 12 deficiency         Past Surgical History:   Procedure Laterality Date   • CATARACT EXTRACTION     • CHOLECYSTECTOMY     • COLONOSCOPY     • COLONOSCOPY N/A 2016    Procedure: COLONOSCOPY with hot snare polypectomy;  Surgeon: George Pabon MD;  " Location: [facility] ENDOSCOPY;  Service:    • FOOT SURGERY     • TONSILLECTOMY     • TOTAL HIP ARTHROPLASTY Bilateral     OA  Marvin THR, LT TKR - hydrocodone prescibed by Dr. Almaguer   • TOTAL KNEE ARTHROPLASTY Left     OA  Marvin THR, LT TKR - hydrocodone prescibed by Dr. Almaguer                             PT Assessment/Plan     Row Name 06/13/18 1534          PT Assessment    Assessment Comments Patient fell on opp knee riday from tripping. Continue to work on increasing strength for future TKA  -WS       User Key  (r) = Recorded By, (t) = Taken By, (c) = Cosigned By    Initials Name Provider Type     Fidencio Jain PTA Physical Therapy Assistant                Modalities     Row Name 06/13/18 1500             Ice    Ice Applied Yes  -WS      Location Ice pack to R knee in supine over pilllow  -WS      Rx Minutes 10 mins  -WS      Ice S/P Rx Yes  -WS        User Key  (r) = Recorded By, (t) = Taken By, (c) = Cosigned By    Initials Name Provider Type     Fidencio Jain PTA Physical Therapy Assistant                Exercises     Row Name 06/13/18 1500             Subjective Comments    Subjective Comments Right knee feels a little better after injection  -WS         Subjective Pain    Pre-Treatment Pain Level 3  -WS      Subjective Pain Comment Fell on other knee friday and had a hard time bending it a couple days  -WS         Total Minutes    75119 - PT Therapeutic Exercise Minutes 30  -WS         Exercise 1    Exercise Name 1 NuStep L3  -WS      Time 1 5 min  -WS         Exercise 2    Exercise Name 2 PPT  -WS      Reps 2 15  -WS         Exercise 3    Exercise Name 3 TB H/L hip abduction/ER  -WS      Cueing 3 Verbal  -WS      Reps 3 15  -WS      Additional Comments red  -WS         Exercise 4    Exercise Name 4 H/L hip add   -WS      Cueing 4 Verbal  -WS      Reps 4 15  -WS      Additional Comments ball  -WS         Exercise 5    Exercise Name 5 Bridge  -WS      Cueing 5 Verbal  -WS      Reps 5 15  -WS      Time 5  3s  -WS         Exercise 6    Exercise Name 6 S/L clam  -WS      Cueing 6 Verbal  -WS      Reps 6 15  -WS      Additional Comments tierney  -WS         Exercise 7    Exercise Name 7 Standing bilateral heel raise  -WS      Cueing 7 Verbal  -WS      Reps 7 20  -WS         Exercise 8    Exercise Name 8 Standing hip abduction  -WS      Sets 8 2  -WS      Reps 8 10  -WS      Additional Comments 3# right only  -WS         Exercise 9    Exercise Name 9 LAQ  -WS      Cueing 9 Verbal;Tactile  -WS      Reps 9 20  -WS      Additional Comments 4# B  -WS         Exercise 10    Exercise Name 10 SAQ  -WS      Cueing 10 Verbal;Tactile  -WS      Reps 10 20  -WS      Additional Comments 4# B  -WS         Exercise 11    Exercise Name 11 standing HS curl  -WS      Cueing 11 Verbal  -WS      Reps 11 20  -WS      Additional Comments 3 R  -WS         Exercise 12    Exercise Name 12 seated TKE into ball  -WS      Cueing 12 Demo;Verbal  -WS      Reps 12 15  -WS      Time 12 5 s  -WS        User Key  (r) = Recorded By, (t) = Taken By, (c) = Cosigned By    Initials Name Provider Type    DOUGLAS Jain, PTA Physical Therapy Assistant                               PT OP Goals     Row Name 06/13/18 1500          PT Short Term Goals    STG Date to Achieve 06/01/18  -     STG 1 Patient will be independent with initial HEP.  -     STG 1 Progress Met  -     STG 2 Patient will deny falls.  -     STG 2 Progress Ongoing  -     STG 3 Patient will demonstrate R knee flexion >/=120 degrees to facilitate ease with stairs.  -     STG 3 Progress Ongoing  -        Long Term Goals    LTG Date to Achieve 07/01/18  -     LTG 1 Patient will be independent with progressive HEP.  -     LTG 1 Progress Ongoing  -     LTG 2 Patient will demonstrate R knee AROM 0-125 to facilitate optimal post operative outcomes.  -     LTG 2 Progress Ongoing  -     LTG 3 Patient will demonstrate R hip abd to >/=4/5 per MMT to assist with gait normalization.   -     LTG 3 Progress Ongoing  -     LTG 4 Patient will score </=60% disability on the KOS-ADL to indicate improved perceived performance with ADLs.  -     LTG 4 Progress Ongoing  -       User Key  (r) = Recorded By, (t) = Taken By, (c) = Cosigned By    Initials Name Provider Type    DOUGLAS Jain PTA Physical Therapy Assistant          Therapy Education  Given: HEP, Symptoms/condition management, Pain management, Edema management  Program: Reinforced  How Provided: Verbal  Provided to: Patient              Time Calculation:   Start Time: 1500  Stop Time: 1540  Time Calculation (min): 40 min  Therapy Suggested Charges     Code   Minutes Charges    08154 (CPT®) Hc Pt Neuromusc Re Education Ea 15 Min      74878 (CPT®) Hc Pt Ther Proc Ea 15 Min 30 2    71205 (CPT®) Hc Gait Training Ea 15 Min      68673 (CPT®) Hc Pt Therapeutic Act Ea 15 Min      17447 (CPT®) Hc Pt Manual Therapy Ea 15 Min      88291 (CPT®) Hc Pt Ther Massage- Per 15 Min      25668 (CPT®) Hc Pt Iontophoresis Ea 15 Min      61002 (CPT®) Hc Pt Elec Stim Ea-Per 15 Min      53946 (CPT®) Hc Pt Ultrasound Ea 15 Min      95194 (CPT®) Hc Pt Self Care/Mgmt/Train Ea 15 Min      Total  30 2        Therapy Charges for Today     Code Description Service Date Service Provider Modifiers Qty    56546052732 HC PT HOT OR COLD PACK TREAT MCARE 6/13/2018 Fidencio Jain PTA GP 1    70900803691 HC PT THER PROC EA 15 MIN 6/13/2018 Fidencio Jain PTA GP 2                    Fidencio Jain PTA  6/13/2018

## 2018-06-13 NOTE — PATIENT INSTRUCTIONS
Medicare Wellness  Personal Prevention Plan of Service   I have started you on Amaryl 2 mg a day. Please come back in 3 months for a blood test.  Date of Office Visit:  2018  Encounter Provider:  Eben Porter MD  Place of Service:  [facility] PR[facility]  Patient Name: Rekha Bear  :  1940    As part of the Medicare Wellness portion of your visit today, we are providing you with this personalized preventive plan of services (PPPS). This plan is based upon recommendations of the [place] Preventive Services Task Force (USPSTF) and the Advisory Committee on Immunization Practices (ACIP).    This lists the preventive care services that should be considered, and provides dates of when you are due. Items listed as completed are up-to-date and do not require any further intervention.    Health Maintenance   Topic Date Due   • ZOSTER VACCINE (2 of 2) 2015   • TDAP/TD VACCINES (1 - Tdap) 2016   • PNEUMOCOCCAL VACCINES (65+ LOW/MEDIUM RISK) (2 of 2 - PPSV23) 2017   • INFLUENZA VACCINE  2018   • HEMOGLOBIN A1C  2018   • LIPID PANEL  2019   • MEDICARE ANNUAL WELLNESS  2019   • DIABETIC FOOT EXAM  2019   • URINE MICROALBUMIN  2019   • MAMMOGRAM  2019   • COLONOSCOPY  2026       Orders Placed This Encounter   Procedures   • Td Vaccine Greater Than or Equal To 6yo With Preservative IM   • Pneumococcal Polysaccharide Vaccine 23-Valent Greater Than or Equal To 3yo Subcutaneous / IM       Return in about 3 months (around 2018) for Recheck HBA1C.

## 2018-06-13 NOTE — PROGRESS NOTES
Medicare Subsequent Wellness Visit  Subjective   History of Present Illness    Rekha Bear is a 78 y.o. female who presents for an Medicare Wellness Visit. In addition, we addressed the following health issues:  She is seeing Dr. Grewal for renal disease, Dr Contreras for urogyn issues and Esperanza Love for psych concerns.  1)She fell and scraped left knee a week ago and notes that her tetanus shot is not UTD.    2)She has DM2 and was doing well of medication but now needs a little more help with blood sugar control. She is on a statin. She is on an ARB.  She is going to see the dentist soon and eye exam is UTD.    3) Rekha has a history of chronic hypertension and has been well controlled on current medications.She is tolerating medications without side effect. She reports no vision changes, headaches or lightheadedness. She is requesting refills of medications.    4)Rekha has a history of chronic hypothyroidism and has been well controlled on current dose of replacement.She report no concerning symptoms: no cold intolerance, fatigue or hair loss.     5) She has a history of chronic hyperlipidemia and needs lab work today to evaluate response to therapy. She is tolerating medications well without side effects.    6)Pain management - She has a hx of OA in multiple joints and cannot take NSAIDs. She is getting relief from tramadol and gabapentin. SAUL and contract UTD and on the chart.       PMH, PSH, SocHx, FamHx, Allergies, and Medications: Reviewed and updated in the history section of chart.  Family History   Problem Relation Age of Onset   • Hypertension Mother    • Diabetes type II Mother    • Hypertension Father    • Coronary artery disease Father    • Diabetes type II Father    • Colon cancer Brother    • Stroke Son    • Breast cancer Maternal Aunt    • Hypertension Other    • Other Other         Lipids  Thyroid       Social History     Social History Narrative    Lives in own home       Allergies    Allergen Reactions   • Iodine Rash       Outpatient Medications Prior to Visit   Medication Sig Dispense Refill   • Acetylcysteine (N-ACETYL-L-CYSTEINE) 600 MG capsule Take 1 tablet by mouth 3 (Three) Times a Day.     • BuPROPion HCl (WELLBUTRIN PO) Take 300 mg by mouth Daily.     • cetirizine (ZyrTEC) 10 MG tablet Take 10 mg by mouth daily.     • ESTRACE VAGINAL 0.1 MG/GM vaginal cream 3x weekly     • gabapentin (NEURONTIN) 600 MG tablet      • levothyroxine (SYNTHROID, LEVOTHROID) 100 MCG tablet Take 1 tablet by mouth Daily. 90 tablet 3   • losartan-hydrochlorothiazide (HYZAAR) 50-12.5 MG per tablet Take 1 tablet by mouth Daily. 90 tablet 2   • metoprolol succinate XL (TOPROL-XL) 50 MG 24 hr tablet Take 0.5 tablets by mouth Daily. 90 tablet 2   • rosuvastatin (CRESTOR) 20 MG tablet Take 1 tablet by mouth Daily. 90 tablet 2   • solifenacin (VESICARE) 10 MG tablet Take 10 mg by mouth Daily.     • traMADol (ULTRAM) 50 MG tablet Take 1 tablet by mouth Every 8 (Eight) Hours As Needed for Moderate Pain . 60 tablet 5   • venlafaxine XR (EFFEXOR-XR) 150 MG 24 hr capsule      • ALPRAZolam (XANAX) 0.25 MG tablet Take 1 tablet by mouth daily as needed.     • clobetasol (TEMOVATE) 0.05 % cream Apply  topically 2 (two) times a day.     • colesevelam (WELCHOL) 625 MG tablet Take 625 mg by mouth 6 (six) times a day.     • guaiFENesin (HUMIBID 3) 400 MG tablet Take 400 mg by mouth daily as needed.     • hydrocortisone 2.5 % cream      • ketoconazole (NIZORAL) 2 % shampoo      • Ranitidine HCl (ZANTAC 75 PO) Take 2 tablets by mouth 1 (One) Time.     • venlafaxine XR (EFFEXOR-XR) 75 MG 24 hr capsule        No facility-administered medications prior to visit.         Patient Active Problem List   Diagnosis   • Acute bronchitis   • Chronic pain of right knee   • Chest pain   • Depression   • Type 2 diabetes mellitus with stage 3 chronic kidney disease, without long-term current use of insulin   • Hyperlipidemia   • Hypertension   •  Hypothyroidism   • Impaired glucose tolerance   • Renal insufficiency   • Urinary incontinence   • Urinary tract infection   • Cobalamin deficiency   • Cardiac arrhythmia   • Hyperlipemia   • Allergic reaction   • Hx of food anaphylaxis   • Herpes simplex   • Osteoarthritis         Patient Care Team:  Eben Porter MD as PCP - General  Eben Porter MD as PCP - Family Medicine  Health Habits:  Current Diet: Well balanced diet  Dental Exam. advised  Eye Exam. UTD  Exercise:none  Current exercise activities include:    Recent Hospitalizations:  none    Age-appropriate Screening Schedule:  Refer to the list below for future screening recommendations based on patient's age. Orders for these recommended tests are listed in the plan section. The patient has been provided with a written plan.    Health Maintenance   Topic Date Due   • ZOSTER VACCINE (2 of 2) 12/11/2015   • TDAP/TD VACCINES (1 - Tdap) 08/17/2016   • PNEUMOCOCCAL VACCINES (65+ LOW/MEDIUM RISK) (2 of 2 - PPSV23) 01/14/2017   • INFLUENZA VACCINE  08/01/2018   • HEMOGLOBIN A1C  12/11/2018   • LIPID PANEL  06/11/2019   • MEDICARE ANNUAL WELLNESS  06/13/2019   • DIABETIC FOOT EXAM  06/13/2019   • URINE MICROALBUMIN  06/13/2019   • MAMMOGRAM  08/24/2019   • COLONOSCOPY  12/20/2026       Depression Screen:   PHQ-2/PHQ-9 Depression Screening 6/13/2018   Little interest or pleasure in doing things 0   Feeling down, depressed, or hopeless 0   Total Score 0       Functional and Cognitive Screening:  Functional & Cognitive Status 6/13/2018   Do you have difficulty preparing food and eating? No   Do you have difficulty bathing yourself, getting dressed or grooming yourself? No   Do you have difficulty using the toilet? No   Do you have difficulty moving around from place to place? No   Do you have trouble with steps or getting out of a bed or a chair? Yes   In the past year have you fallen or experienced a near fall? Yes   Do you need help using the phone?  No  "  Are you deaf or do you have serious difficulty hearing?  No   Do you need help with transportation? No   Do you need help shopping? No   Do you need help preparing meals?  No   Do you need help with housework?  Yes   Do you need help with laundry? Yes   Do you need help taking your medications? No   Do you need help managing money? No   Do you ever drive or ride in a car without wearing a seat belt? No   Have you felt unusual stress, anger or loneliness in the last month? No   Who do you live with? Alone   If you need help, do you have trouble finding someone available to you? No   Have you been bothered in the last four weeks by sexual problems? No     Does the patient have evidence of cognitive impairment? none      Review of Systems   Constitutional: Positive for activity change.   HENT: Negative.    Eyes: Negative.    Respiratory: Negative.    Cardiovascular: Negative.    Gastrointestinal: Negative.    Endocrine: Negative.    Genitourinary: Negative.    Musculoskeletal: Positive for back pain, gait problem, joint swelling and myalgias.   Skin: Negative.    Allergic/Immunologic: Negative.    Hematological: Negative.    Psychiatric/Behavioral: Negative.        Objective     Vitals:    06/13/18 1118   BP: 132/74   Pulse: 54   Resp: 16   SpO2: 98%   Weight: 95.3 kg (210 lb)   Height: 170.2 cm (67\")       Body mass index is 32.89 kg/m².    PHYSICAL EXAM  Vitals reviewed and on chart.  HEENT: PERRLA, EOMI. Oral mucosa moist,   No LAD.  CV: RRR, no murmurs, rubs, clicks or gallops  LUNGS: CTA bilaterally  EXT: No edema, FROM in bilateral upper and lower ext  NEURO: CN II - XII grossly intact  Diabetic Foot Exam  Bilateral feet have toes with decreased cap refill, thick callouses from Raynauds  Sensitive to filament on bilateral plantar surface of feet but not toes  No wounds noted          ASSESSMENT AND PLAN      Problem List Items Addressed This Visit        Cardiovascular and Mediastinum    Hyperlipidemia  Will " check lipid panel today and She was advised to continue a healthy low fat diet, include regular exercise and take medications as prescribed.    Hypertension  Well controlled on current medication, will refill medication today and as needed. She will RTO for repeat B/P check in 6 months.       Endocrine    Type 2 diabetes mellitus with stage 3 chronic kidney disease, without long-term current use of insulin  She is not well controlled but is not a candidate for metformin. I have started her on  Amaryl to help with blood sugar. She will f/u with me for labs in 3 months    Relevant Medications    glimepiride (AMARYL) 2 MG tablet    Hypothyroidism  Well managed on current dose of thyroid replacement. TSH reviewed and on chart.  Well refill medications as needed.       Musculoskeletal and Integument    Osteoarthritis  The patient has read and signed the [facility] Controlled Substance Contract.  I will continue to see patient for regular follow up appointments.  She are well controlled on current medication.  SAUL has been reviewed by me and is updated every 3 months. The patient is aware of the potential for addiction and dependence.          Other Visit Diagnoses     Routine general medical examination at a health care facility    -  Primary  Labs reviewed and on chart    Abrasion of left knee, initial encounter        Relevant Orders    Td Vaccine Greater Than or Equal To 6yo With Preservative IM    Need for vaccination        Relevant Orders    Pneumococcal Polysaccharide Vaccine 23-Valent Greater Than or Equal To 3yo Subcutaneous / IM          Orders:  Orders Placed This Encounter   Procedures   • Td Vaccine Greater Than or Equal To 6yo With Preservative IM   • Pneumococcal Polysaccharide Vaccine 23-Valent Greater Than or Equal To 3yo Subcutaneous / IM       Follow Up:  Return in about 3 months (around 9/13/2018) for Recheck HBA1C.     ADVANCED DIRECTIVES: advised    An After Visit Summary and PPPS with all of  these plans were given to the patient.

## 2018-06-14 LAB
ALBUMIN/CREAT UR: 23.8 MG/G CREAT (ref 0–30)
CREAT UR-MCNC: 106.1 MG/DL
MICROALBUMIN UR-MCNC: 25.3 UG/ML

## 2018-06-15 ENCOUNTER — HOSPITAL ENCOUNTER (OUTPATIENT)
Dept: PHYSICAL THERAPY | Facility: HOSPITAL | Age: 78
Setting detail: THERAPIES SERIES
Discharge: HOME | End: 2018-06-15

## 2018-06-15 DIAGNOSIS — R26.81 GAIT INSTABILITY: ICD-10-CM

## 2018-06-15 DIAGNOSIS — M25.561 RECURRENT PAIN OF RIGHT KNEE: Primary | ICD-10-CM

## 2018-06-15 DIAGNOSIS — R26.2 DIFFICULTY WALKING: ICD-10-CM

## 2018-06-15 DIAGNOSIS — M17.11 OSTEOARTHRITIS OF RIGHT KNEE, UNSPECIFIED OSTEOARTHRITIS TYPE: ICD-10-CM

## 2018-06-15 PROCEDURE — 97110 THERAPEUTIC EXERCISES: CPT

## 2018-06-20 ENCOUNTER — APPOINTMENT (OUTPATIENT)
Dept: PHYSICAL THERAPY | Facility: HOSPITAL | Age: 78
End: 2018-06-20

## 2018-06-22 ENCOUNTER — APPOINTMENT (OUTPATIENT)
Dept: PHYSICAL THERAPY | Facility: HOSPITAL | Age: 78
End: 2018-06-22

## 2018-06-26 ENCOUNTER — HOSPITAL ENCOUNTER (OUTPATIENT)
Dept: PHYSICAL THERAPY | Facility: HOSPITAL | Age: 78
Setting detail: THERAPIES SERIES
Discharge: HOME | End: 2018-06-26

## 2018-06-26 DIAGNOSIS — M54.41 CHRONIC BILATERAL LOW BACK PAIN WITH RIGHT-SIDED SCIATICA: ICD-10-CM

## 2018-06-26 DIAGNOSIS — R26.81 GAIT INSTABILITY: ICD-10-CM

## 2018-06-26 DIAGNOSIS — M25.561 RECURRENT PAIN OF RIGHT KNEE: Primary | ICD-10-CM

## 2018-06-26 DIAGNOSIS — G89.29 CHRONIC BILATERAL LOW BACK PAIN WITH RIGHT-SIDED SCIATICA: ICD-10-CM

## 2018-06-26 DIAGNOSIS — M17.11 OSTEOARTHRITIS OF RIGHT KNEE, UNSPECIFIED OSTEOARTHRITIS TYPE: ICD-10-CM

## 2018-06-26 DIAGNOSIS — R26.2 DIFFICULTY WALKING: ICD-10-CM

## 2018-06-26 PROCEDURE — G8978 MOBILITY CURRENT STATUS: HCPCS | Performed by: PHYSICAL THERAPIST

## 2018-06-26 PROCEDURE — G8979 MOBILITY GOAL STATUS: HCPCS | Performed by: PHYSICAL THERAPIST

## 2018-06-26 PROCEDURE — 97110 THERAPEUTIC EXERCISES: CPT | Performed by: PHYSICAL THERAPIST

## 2018-06-26 PROCEDURE — 97112 NEUROMUSCULAR REEDUCATION: CPT | Performed by: PHYSICAL THERAPIST

## 2018-06-26 NOTE — THERAPY RE-EVALUATION
"    Outpatient Physical Therapy Ortho Re-Assessment  [facility]     Patient Name: Rekha Bear  : 1940  MRN: [id number]  Today's Date: 2018      Visit Date: 2018    Patient Active Problem List   Diagnosis   • Acute bronchitis   • Chronic pain of right knee   • Chest pain   • Depression   • Type 2 diabetes mellitus with stage 3 chronic kidney disease, without long-term current use of insulin   • Hyperlipidemia   • Hypertension   • Hypothyroidism   • Impaired glucose tolerance   • Renal insufficiency   • Urinary incontinence   • Urinary tract infection   • Cobalamin deficiency   • Cardiac arrhythmia   • Hyperlipemia   • Allergic reaction   • Hx of food anaphylaxis   • Herpes simplex   • Osteoarthritis        Past Medical History:   Diagnosis Date   • Acute bronchitis    • Anxiety    • Chest pain    • Chilblains     \"Chilblian's\"   • Depression    • Diabetes mellitus, type II    • Fatty liver    • GERD (gastroesophageal reflux disease)    • Health care maintenance    • History of mammogram    • Hyperlipidemia    • Hypertension    • Hypothyroid    • Incontinence    • Osteoarthritis     OA  Marvin THR, LT TKR - hydrocodone prescibed by Dr. Almaguer   • Pain of esophagus     \" nervous esophagus\" - she takes the occasional alprazolam   • Peptic ulcer disease    • Prediabetes    • Raynaud's disease     \"Raynauds\"   • Renal disease     followed by Dr. Grewal   • UTI (urinary tract infection)    • Vitamin B 12 deficiency         Past Surgical History:   Procedure Laterality Date   • CATARACT EXTRACTION     • CHOLECYSTECTOMY     • COLONOSCOPY     • COLONOSCOPY N/A 2016    Procedure: COLONOSCOPY with hot snare polypectomy;  Surgeon: George Pabon MD;  Location: [facility] ENDOSCOPY;  Service:    • FOOT SURGERY     • TONSILLECTOMY     • TOTAL HIP ARTHROPLASTY Bilateral     OA  Marvin THR, LT TKR - hydrocodone prescibed by Dr. Almaguer   • TOTAL KNEE ARTHROPLASTY Left     OA  Marvin THR, LT TKR - " hydrocodone prescibed by Dr. Almaguer       Visit Dx:     ICD-10-CM ICD-9-CM   1. Recurrent pain of right knee M25.561 719.46   2. Osteoarthritis of right knee, unspecified osteoarthritis type M17.11 715.96   3. Difficulty walking R26.2 719.7   4. Gait instability R26.81 781.2   5. Chronic bilateral low back pain with right-sided sciatica M54.41 724.2    G89.29 724.3     338.29                 PT Ortho     Row Name 06/26/18 0900       Subjective Comments    Subjective Comments Patient reports improvements in her strength since coming to PT.  Initial problem knee doing better, L side remains sore 2/2 fall and back has been irritable.  R TKA likely in October.  -GR       Subjective Pain    Able to rate subjective pain? yes  -GR       Right Lower Ext    Rt Knee Extension/Flexion AROM -6/120  -GR       Right Knee (Manual Muscle Testing)    Right Knee Manual Muscle Testing (MMT) extension;flexion  -GR    MMT: Extension, Right Knee extension  -GR    MMT, Gross Movement: Right Knee Extension (4+/5) good plus  -GR    MMT: Flexion, Right Knee flexion  -GR    MMT, Gross Movement: Right Knee Flexion (4+/5) good plus  -GR      User Key  (r) = Recorded By, (t) = Taken By, (c) = Cosigned By    Initials Name Provider Type    HALINA Vasquez, PT Physical Therapist                      Therapy Education  Education Details: addition of balance exercises and update to POC; conserve visits for post op recommended  Given: HEP, Symptoms/condition management  Program: New  How Provided: Verbal  Provided to: Patient  Level of Understanding: Teach back education performed, Verbalized, Demonstrated           PT OP Goals     Row Name 06/26/18 0900          PT Short Term Goals    STG Date to Achieve 06/01/18  -GR     STG 1 Patient will be independent with initial HEP.  -GR     STG 1 Progress Met  -GR     STG 2 Patient will deny falls.  -GR     STG 2 Progress Not Met  -GR     STG 3 Patient will demonstrate R knee flexion >/=120 degrees to  facilitate ease with stairs.  -     STG 3 Progress Met  -        Long Term Goals    LTG Date to Achieve 07/01/18  -GR     LTG 1 Patient will be independent with progressive HEP.  -GR     LTG 1 Progress Ongoing  -GR     LTG 2 Patient will demonstrate R knee AROM 0-125 to facilitate optimal post operative outcomes.  -GR     LTG 2 Progress Ongoing  -GR     LTG 3 Patient will demonstrate R hip abd to >/=4/5 per MMT to assist with gait normalization.  -GR     LTG 3 Progress Ongoing  -GR     LTG 4 Patient will score </=60% disability on the KOS-ADL to indicate improved perceived performance with ADLs.  -GR     LTG 4 Progress Met  -     LTG 4 Progress Comments 55%  -       User Key  (r) = Recorded By, (t) = Taken By, (c) = Cosigned By    Initials Name Provider Type    HALINA Vasquez PT Physical Therapist                PT Assessment/Plan     Row Name 06/26/18 1215          PT Assessment    Assessment Comments Ms. Bear has attended 9 visits of skilled PT for R knee pain, preoperative.  She has experienced 1 fall at home since IE and with continued c/o L knee pain since.  Perceived disability as per the KOS-ADL has improved from 72% impaired to 55% impaired.  Active knee flexion R has improved from 115 to 120, continues to lack 6 degrees of extension.  Patient remains with hip girdle weakness and balance defictis. May benefit from 1-3 more visits to finalize HEP, advance with stability training.   -        PT Plan    PT Plan Comments Continue 1x/week x 3 weeks. Resume hip girdle strengthening and continue with new  balance exercises.  -       User Key  (r) = Recorded By, (t) = Taken By, (c) = Cosigned By    Initials Name Provider Type    HALINA Vasquez PT Physical Therapist                  Exercises     Row Name 06/26/18 0900             Subjective Comments    Subjective Comments Patient reports improvements in her strength since coming to PT.  Initial problem knee doing better, L side remains sore  2/2 fall and back has been irritable.  R TKA likely in October.  -GR         Subjective Pain    Able to rate subjective pain? yes  -GR      Pre-Treatment Pain Level 2  -GR      Subjective Pain Comment 2 in R knee, 5 in L knee (still from fall)  -GR         Total Minutes    20861 - PT Therapeutic Exercise Minutes 27  -GR      66707 -  PT Neuromuscular Reeducation Minutes 13  -GR         Exercise 1    Exercise Name 1 Nustep L4  -GR      Time 1 6 min  -GR         Exercise 2    Exercise Name 2 PPT  -GR      Sets 2 2  -GR      Reps 2 10  -GR         Exercise 13    Exercise Name 13 Yellow foam  -GR      Time 13 5 minutes  -GR      Additional Comments eyes open, closed, head turns both directions  -GR         Exercise 14    Exercise Name 14 Blue foam  -GR      Time 14 5 minutes  -GR      Additional Comments eyes open, closed, head turns both directions  -GR         Exercise 15    Exercise Name 15 Green foam  -GR      Time 15 3 minutes  -GR      Additional Comments eyes open, closed, head turns both directions  -GR        User Key  (r) = Recorded By, (t) = Taken By, (c) = Cosigned By    Initials Name Provider Type    GR Roe Vasquez, PT Physical Therapist                        Outcome Measure Options: Knee Outcome Score- ADL  Knee Outcome Score  Knee Outcome Score Comments: 55% disability      Time Calculation:     Therapy Suggested Charges     Code   Minutes Charges    76689 (CPT®) Hc Pt Neuromusc Re Education Ea 15 Min 13 1    74676 (CPT®) Hc Pt Ther Proc Ea 15 Min 27 2    14333 (CPT®) Hc Gait Training Ea 15 Min      42149 (CPT®) Hc Pt Therapeutic Act Ea 15 Min      24724 (CPT®) Hc Pt Manual Therapy Ea 15 Min      27506 (CPT®) Hc Pt Ther Massage- Per 15 Min      34614 (CPT®) Hc Pt Iontophoresis Ea 15 Min      34238 (CPT®) Hc Pt Elec Stim Ea-Per 15 Min      60746 (CPT®) Hc Pt Ultrasound Ea 15 Min      50512 (CPT®) Hc Pt Self Care/Mgmt/Train Ea 15 Min      Total  40 3          Start Time: 0935  Stop Time: 1015  Time  Calculation (min): 40 min  Total Timed Code Minutes- PT: 40 minute(s)     Therapy Charges for Today     Code Description Service Date Service Provider Modifiers Qty    95440451167 HC PT MOBILITY CURRENT 6/26/2018 Roe Vasquez, PT GP, CK 1    47401610344 HC PT MOBILITY PROJECTED 6/26/2018 Roe Vasquez, PT GP, CJ 1    01901404668 HC PT NEUROMUSC RE EDUCATION EA 15 MIN 6/26/2018 Roe Vasquez PT GP 1    19931786815 HC PT THER PROC EA 15 MIN 6/26/2018 Roe Vasquez, PT GP 2          PT G-Codes  PT Professional Judgement Used?: Yes  Outcome Measure Options: Knee Outcome Score- ADL  Score: 55%  Functional Limitation: Mobility: Walking and moving around  Mobility: Walking and Moving Around Current Status (): At least 40 percent but less than 60 percent impaired, limited or restricted  Mobility: Walking and Moving Around Goal Status (): At least 20 percent but less than 40 percent impaired, limited or restricted         Roe Vasquez, PT  6/26/2018

## 2018-07-06 ENCOUNTER — HOSPITAL ENCOUNTER (OUTPATIENT)
Dept: PHYSICAL THERAPY | Facility: HOSPITAL | Age: 78
Setting detail: THERAPIES SERIES
Discharge: HOME | End: 2018-07-06

## 2018-07-06 DIAGNOSIS — M25.561 RECURRENT PAIN OF RIGHT KNEE: Primary | ICD-10-CM

## 2018-07-06 DIAGNOSIS — R26.81 GAIT INSTABILITY: ICD-10-CM

## 2018-07-06 DIAGNOSIS — M54.41 CHRONIC BILATERAL LOW BACK PAIN WITH RIGHT-SIDED SCIATICA: ICD-10-CM

## 2018-07-06 DIAGNOSIS — R26.2 DIFFICULTY WALKING: ICD-10-CM

## 2018-07-06 DIAGNOSIS — M17.11 OSTEOARTHRITIS OF RIGHT KNEE, UNSPECIFIED OSTEOARTHRITIS TYPE: ICD-10-CM

## 2018-07-06 DIAGNOSIS — G89.29 CHRONIC BILATERAL LOW BACK PAIN WITH RIGHT-SIDED SCIATICA: ICD-10-CM

## 2018-07-06 PROCEDURE — 97110 THERAPEUTIC EXERCISES: CPT | Performed by: PHYSICAL THERAPIST

## 2018-07-06 PROCEDURE — 97112 NEUROMUSCULAR REEDUCATION: CPT | Performed by: PHYSICAL THERAPIST

## 2018-07-06 NOTE — THERAPY TREATMENT NOTE
"    Outpatient Physical Therapy Ortho Treatment Note  [facility]     Patient Name: Rekha Bear  : 1940  MRN: [id number]  Today's Date: 2018      Visit Date: 2018    Visit Dx:    ICD-10-CM ICD-9-CM   1. Recurrent pain of right knee M25.561 719.46   2. Osteoarthritis of right knee, unspecified osteoarthritis type M17.11 715.96   3. Difficulty walking R26.2 719.7   4. Gait instability R26.81 781.2   5. Chronic bilateral low back pain with right-sided sciatica M54.41 724.2    G89.29 724.3     338.29       Patient Active Problem List   Diagnosis   • Acute bronchitis   • Chronic pain of right knee   • Chest pain   • Depression   • Type 2 diabetes mellitus with stage 3 chronic kidney disease, without long-term current use of insulin (CMS/HCC)   • Hyperlipidemia   • Hypertension   • Hypothyroidism   • Impaired glucose tolerance   • Renal insufficiency   • Urinary incontinence   • Urinary tract infection   • Cobalamin deficiency   • Cardiac arrhythmia   • Hyperlipemia   • Allergic reaction   • Hx of food anaphylaxis   • Herpes simplex   • Osteoarthritis        Past Medical History:   Diagnosis Date   • Acute bronchitis    • Anxiety    • Chest pain    • Chilblains     \"Chilblian's\"   • Depression    • Diabetes mellitus, type II (CMS/HCC)    • Fatty liver    • GERD (gastroesophageal reflux disease)    • Health care maintenance    • History of mammogram    • Hyperlipidemia    • Hypertension    • Hypothyroid    • Incontinence    • Osteoarthritis     OA  Marvin THR, LT TKR - hydrocodone prescibed by Dr. Almaguer   • Pain of esophagus     \" nervous esophagus\" - she takes the occasional alprazolam   • Peptic ulcer disease    • Prediabetes    • Raynaud's disease     \"Raynauds\"   • Renal disease     followed by Dr. Grewal   • UTI (urinary tract infection)    • Vitamin B 12 deficiency         Past Surgical History:   Procedure Laterality Date   • CATARACT EXTRACTION     • CHOLECYSTECTOMY     • COLONOSCOPY   "   • COLONOSCOPY N/A 12/20/2016    Procedure: COLONOSCOPY with hot snare polypectomy;  Surgeon: George Pabon MD;  Location: [facility] ENDOSCOPY;  Service:    • FOOT SURGERY     • TONSILLECTOMY     • TOTAL HIP ARTHROPLASTY Bilateral     OA  Marvin THR, LT TKR - hydrocodone prescibed by Dr. Almaguer   • TOTAL KNEE ARTHROPLASTY Left     OA  Marvin THR, LT TKR - hydrocodone prescibed by Dr. Almaguer                             PT Assessment/Plan     Row Name 07/06/18 1157          PT Assessment    Assessment Comments Patient tolerates balance well with seated rest breaks to offload her spine and knee.  Needs cueing for body mechanics.  -GR       User Key  (r) = Recorded By, (t) = Taken By, (c) = Cosigned By    Initials Name Provider Type    HALINA Vasquez, PT Physical Therapist                Modalities     Row Name 07/06/18 1100             Moist Heat    MH Applied Yes  -GR      Location lumbar spine   -GR      Rx Minutes Other:   20 mins  -GR      MH S/P Rx Yes  -GR         Ice    Ice Applied Yes  -GR      Location Ice pack to R knee in supine over pilllow  -GR      Rx Minutes Other:   20 mins  -GR      Ice S/P Rx Yes  -GR        User Key  (r) = Recorded By, (t) = Taken By, (c) = Cosigned By    Initials Name Provider Type    HALINA Vasquez, PT Physical Therapist                Exercises     Row Name 07/06/18 1100             Subjective Comments    Subjective Comments Patient arrives 11 minutes late.  She denies pain in the R knee but states the L knee and back are bothering her and did for 3 days following the balance exercises.  -GR         Subjective Pain    Able to rate subjective pain? yes  -GR      Pre-Treatment Pain Level 4  -GR      Subjective Pain Comment L knee and back  -GR         Total Minutes    59001 - PT Therapeutic Exercise Minutes 15  -GR      49907 -  PT Neuromuscular Reeducation Minutes 15  -GR         Exercise 1    Exercise Name 1 Nustep L5  -GR      Time 1 5 min  -GR         Exercise 5    Exercise  Name 5 Bridge  -GR      Cueing 5 Verbal  -GR      Reps 5 15  -GR      Time 5 3s  -GR         Exercise 9    Exercise Name 9 LAQ  -GR      Cueing 9 Verbal;Tactile  -GR      Reps 9 20  -GR      Additional Comments 4#  -GR         Exercise 10    Exercise Name 10 SAQ  -GR      Cueing 10 Verbal;Tactile  -GR      Reps 10 20  -GR      Additional Comments 4#  -GR         Exercise 14    Exercise Name 14 Blue foam - eyes open, closed, head turns  -GR      Time 14 3 minutes  -GR      Additional Comments 1 seated rest break  -GR         Exercise 15    Exercise Name 15 Green foam - eyes open, closed, head turns  -GR      Time 15 3 minutes  -GR      Additional Comments 1 seated rest break   -GR         Exercise 16    Exercise Name 16 Seated HS stretch  -GR      Cueing 16 Demo  -GR      Sets 16 1  -GR      Reps 16 3  -GR      Time 16 20 sec   -GR      Additional Comments each - during rest break from balance  -GR         Exercise 17    Exercise Name 17 Seated swiss ball roll out for trunk flexion  -GR      Cueing 17 Demo  -GR      Sets 17 1  -GR      Reps 17 10  -GR      Time 17 10 sec  -GR      Additional Comments during rest break from balance  -GR        User Key  (r) = Recorded By, (t) = Taken By, (c) = Cosigned By    Initials Name Provider Type    GR Roe Vasquez, PT Physical Therapist                               PT OP Goals     Row Name 07/06/18 1100          PT Short Term Goals    STG Date to Achieve 06/01/18  -GR     STG 1 Patient will be independent with initial HEP.  -GR     STG 1 Progress Met  -GR     STG 2 Patient will deny falls.  -GR     STG 2 Progress Not Met  -GR     STG 3 Patient will demonstrate R knee flexion >/=120 degrees to facilitate ease with stairs.  -GR     STG 3 Progress Met  -GR        Long Term Goals    LTG Date to Achieve 07/01/18  -GR     LTG 1 Patient will be independent with progressive HEP.  -GR     LTG 1 Progress Ongoing  -GR     LTG 2 Patient will demonstrate R knee AROM 0-125 to facilitate  optimal post operative outcomes.  -GR     LTG 2 Progress Ongoing  -GR     LTG 3 Patient will demonstrate R hip abd to >/=4/5 per MMT to assist with gait normalization.  -GR     LTG 3 Progress Ongoing  -GR     LTG 4 Patient will score </=60% disability on the KOS-ADL to indicate improved perceived performance with ADLs.  -GR     LTG 4 Progress Met  -GR       User Key  (r) = Recorded By, (t) = Taken By, (c) = Cosigned By    Initials Name Provider Type    GR Roe Vasquez PT Physical Therapist          Therapy Education  Education Details: rest breaks and TA activation to off load spine during alance exercises  Given: HEP, Symptoms/condition management  Program: New  How Provided: Verbal  Provided to: Patient  Level of Understanding: Teach back education performed              Time Calculation:   Start Time: 1100  Stop Time: 1150  Time Calculation (min): 50 min  Total Timed Code Minutes- PT: 30 minute(s)  Therapy Suggested Charges     Code   Minutes Charges    07607 (CPT®) Hc Pt Neuromusc Re Education Ea 15 Min 15 1    97290 (CPT®) Hc Pt Ther Proc Ea 15 Min 15 1    92363 (CPT®) Hc Gait Training Ea 15 Min      06791 (CPT®) Hc Pt Therapeutic Act Ea 15 Min      55833 (CPT®) Hc Pt Manual Therapy Ea 15 Min      04208 (CPT®) Hc Pt Ther Massage- Per 15 Min      23420 (CPT®) Hc Pt Iontophoresis Ea 15 Min      22044 (CPT®) Hc Pt Elec Stim Ea-Per 15 Min      31869 (CPT®) Hc Pt Ultrasound Ea 15 Min      66310 (CPT®) Hc Pt Self Care/Mgmt/Train Ea 15 Min      Total  30 2        Therapy Charges for Today     Code Description Service Date Service Provider Modifiers Qty    19568652238 HC PT NEUROMUSC RE EDUCATION EA 15 MIN 7/6/2018 Roe Vasquez, PT GP 1    88480058566 HC PT THER PROC EA 15 MIN 7/6/2018 Roe Vasquez, PT GP 1    60980846021 HC PT HOT OR COLD PACK TREAT MCARE 7/6/2018 Roe Vasquez, PT GP 1                    Roe Vasquez, PT  7/6/2018

## 2018-07-13 ENCOUNTER — HOSPITAL ENCOUNTER (OUTPATIENT)
Dept: PHYSICAL THERAPY | Facility: HOSPITAL | Age: 78
Setting detail: THERAPIES SERIES
Discharge: HOME | End: 2018-07-13

## 2018-07-13 DIAGNOSIS — G89.29 CHRONIC BILATERAL LOW BACK PAIN WITH RIGHT-SIDED SCIATICA: ICD-10-CM

## 2018-07-13 DIAGNOSIS — M25.561 RECURRENT PAIN OF RIGHT KNEE: Primary | ICD-10-CM

## 2018-07-13 DIAGNOSIS — R26.81 GAIT INSTABILITY: ICD-10-CM

## 2018-07-13 DIAGNOSIS — M17.11 OSTEOARTHRITIS OF RIGHT KNEE, UNSPECIFIED OSTEOARTHRITIS TYPE: ICD-10-CM

## 2018-07-13 DIAGNOSIS — M54.41 CHRONIC BILATERAL LOW BACK PAIN WITH RIGHT-SIDED SCIATICA: ICD-10-CM

## 2018-07-13 DIAGNOSIS — R26.2 DIFFICULTY WALKING: ICD-10-CM

## 2018-07-13 PROCEDURE — 97112 NEUROMUSCULAR REEDUCATION: CPT | Performed by: PHYSICAL THERAPIST

## 2018-07-13 PROCEDURE — 97110 THERAPEUTIC EXERCISES: CPT | Performed by: PHYSICAL THERAPIST

## 2018-07-13 NOTE — THERAPY TREATMENT NOTE
"    Outpatient Physical Therapy Ortho Treatment Note  [facility]     Patient Name: Rekha Bear  : 1940  MRN: [id number]  Today's Date: 2018      Visit Date: 2018    Visit Dx:    ICD-10-CM ICD-9-CM   1. Recurrent pain of right knee M25.561 719.46   2. Osteoarthritis of right knee, unspecified osteoarthritis type M17.11 715.96   3. Difficulty walking R26.2 719.7   4. Gait instability R26.81 781.2   5. Chronic bilateral low back pain with right-sided sciatica M54.41 724.2    G89.29 724.3     338.29       Patient Active Problem List   Diagnosis   • Acute bronchitis   • Chronic pain of right knee   • Chest pain   • Depression   • Type 2 diabetes mellitus with stage 3 chronic kidney disease, without long-term current use of insulin (CMS/HCC)   • Hyperlipidemia   • Hypertension   • Hypothyroidism   • Impaired glucose tolerance   • Renal insufficiency   • Urinary incontinence   • Urinary tract infection   • Cobalamin deficiency   • Cardiac arrhythmia   • Hyperlipemia   • Allergic reaction   • Hx of food anaphylaxis   • Herpes simplex   • Osteoarthritis        Past Medical History:   Diagnosis Date   • Acute bronchitis    • Anxiety    • Chest pain    • Chilblains     \"Chilblian's\"   • Depression    • Diabetes mellitus, type II (CMS/HCC)    • Fatty liver    • GERD (gastroesophageal reflux disease)    • Health care maintenance    • History of mammogram    • Hyperlipidemia    • Hypertension    • Hypothyroid    • Incontinence    • Osteoarthritis     OA  Marvin THR, LT TKR - hydrocodone prescibed by Dr. Almaguer   • Pain of esophagus     \" nervous esophagus\" - she takes the occasional alprazolam   • Peptic ulcer disease    • Prediabetes    • Raynaud's disease     \"Raynauds\"   • Renal disease     followed by Dr. Grewal   • UTI (urinary tract infection)    • Vitamin B 12 deficiency         Past Surgical History:   Procedure Laterality Date   • CATARACT EXTRACTION     • CHOLECYSTECTOMY     • COLONOSCOPY   "   • COLONOSCOPY N/A 12/20/2016    Procedure: COLONOSCOPY with hot snare polypectomy;  Surgeon: George Pabon MD;  Location: [facility] ENDOSCOPY;  Service:    • FOOT SURGERY     • TONSILLECTOMY     • TOTAL HIP ARTHROPLASTY Bilateral     OA  Marvin THR, LT TKR - hydrocodone prescibed by Dr. Almaguer   • TOTAL KNEE ARTHROPLASTY Left     OA  Marvin THR, LT TKR - hydrocodone prescibed by Dr. Almaguer                       Vestibular Eval     Row Name 07/13/18 1300             Positional Testing    Bath-Hallpike Right No nystagmus  -GR      Bath-Hallpike Left No nystagmus  -GR        User Key  (r) = Recorded By, (t) = Taken By, (c) = Cosigned By    Initials Name Provider Type    HALINA Vasquez, PT Physical Therapist                  PT Assessment/Plan     Row Name 07/13/18 1453          PT Assessment    Assessment Comments Progressed with tandem stance balance/functional strengthening. Screened for BPPV at her request; all findings were negative.  -GR        PT Plan    PT Plan Comments Continue with hip girld strengthening and functional balance.  -GR       User Key  (r) = Recorded By, (t) = Taken By, (c) = Cosigned By    Initials Name Provider Type    HALINA Vasquez, PT Physical Therapist                    Exercises     Row Name 07/13/18 1300             Subjective Comments    Subjective Comments Patient arrives 7' late.  States she found a closer spot today so she didn't bring her cane. Pain in L leg with movement 5/10 and R knee 4/10. No pain atrest.  -GR         Subjective Pain    Able to rate subjective pain? yes  -GR      Pre-Treatment Pain Level 4  -GR         Total Minutes    16627 - PT Therapeutic Exercise Minutes 10  -GR      75543 -  PT Neuromuscular Reeducation Minutes 28  -GR         Exercise 1    Exercise Name 1 Nustep L5  -GR      Time 1 5 min  -GR         Exercise 14    Exercise Name 14 Blue foam - eyes open, closed, head turns  -GR      Time 14 3 minutes  -GR      Additional Comments 1 seated rest break   -GR         Exercise 15    Exercise Name 15 Green foam - eyes open, closed, head turns  -GR      Time 15 3 minutes  -GR      Additional Comments 1 seated rest break  -GR         Exercise 16    Exercise Name 16 Seated HS stretch  -GR      Cueing 16 Demo  -GR      Sets 16 1  -GR      Reps 16 3  -GR      Time 16 20 sec   -GR      Additional Comments each - during rest break from balance  -GR         Exercise 17    Exercise Name 17 Seated swiss ball roll out for trunk flexion  -GR      Cueing 17 Demo  -GR      Sets 17 1  -GR      Reps 17 10  -GR      Time 17 10 sec  -GR      Additional Comments each - during rest break from balance  -GR         Exercise 18    Exercise Name 18 Tandem stance red beam  -GR      Cueing 18 Demo  -GR      Sets 18 1  -GR      Reps 18 3  -GR      Time 18 30 seconds  -GR        User Key  (r) = Recorded By, (t) = Taken By, (c) = Cosigned By    Initials Name Provider Type    GR Roe Vasquez, PT Physical Therapist                               PT OP Goals     Row Name 07/13/18 1400          PT Short Term Goals    STG Date to Achieve 06/01/18  -GR     STG 1 Patient will be independent with initial HEP.  -GR     STG 1 Progress Met  -GR     STG 2 Patient will deny falls.  -GR     STG 2 Progress Not Met  -GR     STG 3 Patient will demonstrate R knee flexion >/=120 degrees to facilitate ease with stairs.  -GR     STG 3 Progress Met  -GR        Long Term Goals    LTG Date to Achieve 07/01/18  -GR     LTG 1 Patient will be independent with progressive HEP.  -GR     LTG 1 Progress Ongoing  -GR     LTG 2 Patient will demonstrate R knee AROM 0-125 to facilitate optimal post operative outcomes.  -GR     LTG 2 Progress Ongoing  -GR     LTG 3 Patient will demonstrate R hip abd to >/=4/5 per MMT to assist with gait normalization.  -GR     LTG 3 Progress Ongoing  -GR     LTG 4 Patient will score </=60% disability on the KOS-ADL to indicate improved perceived performance with ADLs.  -GR     LTG 4 Progress Met   -GR       User Key  (r) = Recorded By, (t) = Taken By, (c) = Cosigned By    Initials Name Provider Type    GR Roe Vasquez PT Physical Therapist          Therapy Education  Education Details: screened BPPV at her request and per c/o dizziness that her dtr thought PT might help for- this was negative and explained process  Given: Symptoms/condition management  Program: New  How Provided: Verbal  Provided to: Patient  Level of Understanding: Teach back education performed, Verbalized              Time Calculation:   Start Time: 1322  Stop Time: 1400  Time Calculation (min): 38 min  Total Timed Code Minutes- PT: 38 minute(s)  Therapy Suggested Charges     Code   Minutes Charges    54457 (CPT®) Hc Pt Neuromusc Re Education Ea 15 Min 28 2    18544 (CPT®) Hc Pt Ther Proc Ea 15 Min 10 1    42143 (CPT®) Hc Gait Training Ea 15 Min      05375 (CPT®) Hc Pt Therapeutic Act Ea 15 Min      04742 (CPT®) Hc Pt Manual Therapy Ea 15 Min      96707 (CPT®) Hc Pt Ther Massage- Per 15 Min      27510 (CPT®) Hc Pt Iontophoresis Ea 15 Min      03496 (CPT®) Hc Pt Elec Stim Ea-Per 15 Min      36871 (CPT®) Hc Pt Ultrasound Ea 15 Min      47084 (CPT®) Hc Pt Self Care/Mgmt/Train Ea 15 Min      Total  38 3        Therapy Charges for Today     Code Description Service Date Service Provider Modifiers Qty    93321451303 HC PT NEUROMUSC RE EDUCATION EA 15 MIN 7/13/2018 Roe Vasquez, PT GP 2    70754366708 HC PT THER PROC EA 15 MIN 7/13/2018 Roe Vasquez, PT GP 1                    Roe Vasquez, PT  7/13/2018

## 2018-07-20 ENCOUNTER — HOSPITAL ENCOUNTER (OUTPATIENT)
Dept: PHYSICAL THERAPY | Facility: HOSPITAL | Age: 78
Setting detail: THERAPIES SERIES
Discharge: HOME | End: 2018-07-20

## 2018-07-20 DIAGNOSIS — R26.2 DIFFICULTY WALKING: ICD-10-CM

## 2018-07-20 DIAGNOSIS — M25.561 RECURRENT PAIN OF RIGHT KNEE: Primary | ICD-10-CM

## 2018-07-20 DIAGNOSIS — M54.41 CHRONIC BILATERAL LOW BACK PAIN WITH RIGHT-SIDED SCIATICA: ICD-10-CM

## 2018-07-20 DIAGNOSIS — G89.29 CHRONIC BILATERAL LOW BACK PAIN WITH RIGHT-SIDED SCIATICA: ICD-10-CM

## 2018-07-20 DIAGNOSIS — M17.11 OSTEOARTHRITIS OF RIGHT KNEE, UNSPECIFIED OSTEOARTHRITIS TYPE: ICD-10-CM

## 2018-07-20 DIAGNOSIS — R26.81 GAIT INSTABILITY: ICD-10-CM

## 2018-07-20 PROCEDURE — G8979 MOBILITY GOAL STATUS: HCPCS | Performed by: PHYSICAL THERAPIST

## 2018-07-20 PROCEDURE — G8978 MOBILITY CURRENT STATUS: HCPCS | Performed by: PHYSICAL THERAPIST

## 2018-07-20 PROCEDURE — G8980 MOBILITY D/C STATUS: HCPCS | Performed by: PHYSICAL THERAPIST

## 2018-07-20 PROCEDURE — 97112 NEUROMUSCULAR REEDUCATION: CPT | Performed by: PHYSICAL THERAPIST

## 2018-07-20 NOTE — THERAPY DISCHARGE NOTE
"     Outpatient Physical Therapy Ortho Treatment Note/Discharge Summary  Psychiatric     Patient Name: Rekha Bear  : 1940  MRN: [id number]  Today's Date: 2018      Visit Date: 2018    Visit Dx:    ICD-10-CM ICD-9-CM   1. Recurrent pain of right knee M25.561 719.46   2. Osteoarthritis of right knee, unspecified osteoarthritis type M17.11 715.96   3. Difficulty walking R26.2 719.7   4. Gait instability R26.81 781.2   5. Chronic bilateral low back pain with right-sided sciatica M54.41 724.2    G89.29 724.3     338.29       Patient Active Problem List   Diagnosis   • Acute bronchitis   • Chronic pain of right knee   • Chest pain   • Depression   • Type 2 diabetes mellitus with stage 3 chronic kidney disease, without long-term current use of insulin (CMS/[facility], [facility])   • Hyperlipidemia   • Hypertension   • Hypothyroidism   • Impaired glucose tolerance   • Renal insufficiency   • Urinary incontinence   • Urinary tract infection   • Cobalamin deficiency   • Cardiac arrhythmia   • Hyperlipemia   • Allergic reaction   • Hx of food anaphylaxis   • Herpes simplex   • Osteoarthritis        Past Medical History:   Diagnosis Date   • Acute bronchitis    • Anxiety    • Chest pain    • Chilblains     \"Chilblian's\"   • Depression    • Diabetes mellitus, type II (CMS/HCC)    • Fatty liver    • GERD (gastroesophageal reflux disease)    • Health care maintenance    • History of mammogram    • Hyperlipidemia    • Hypertension    • Hypothyroid    • Incontinence    • Osteoarthritis     OA  Marvin THR, LT TKR - hydrocodone prescibed by Dr. Almaguer   • Pain of esophagus     \" nervous esophagus\" - she takes the occasional alprazolam   • Peptic ulcer disease    • Prediabetes    • Raynaud's disease     \"Raynauds\"   • Renal disease     followed by Dr. Grewal   • UTI (urinary tract infection)    • Vitamin B 12 deficiency         Past Surgical History:   Procedure Laterality Date   • CATARACT EXTRACTION     • CHOLECYSTECTOMY   "   • COLONOSCOPY  2009   • COLONOSCOPY N/A 12/20/2016    Procedure: COLONOSCOPY with hot snare polypectomy;  Surgeon: George Pabon MD;  Location: [facility] ENDOSCOPY;  Service:    • FOOT SURGERY     • TONSILLECTOMY     • TOTAL HIP ARTHROPLASTY Bilateral     OA  Marvin THR, LT TKR - hydrocodone prescibed by Dr. Almaguer   • TOTAL KNEE ARTHROPLASTY Left     OA  Marvin THR, LT TKR - hydrocodone prescibed by Dr. Almaguer                             PT Assessment/Plan     Row Name 07/20/18 1440          PT Assessment    Assessment Comments Ms. Bear attended 12 visits of skilled PT for treatment of R knee pain, likely pre-operative. At this time her R knee pain is well controlled and strength is progressing; she does c/o L knee pain secondary to fall and is pending ortho follow up.  She is independent with a progressive HEP and appropriate for d/c. Thank you for this referral.  -GR        PT Plan    PT Plan Comments D/C to I HEP.  -GR       User Key  (r) = Recorded By, (t) = Taken By, (c) = Cosigned By    Initials Name Provider Type    GR Roe Vasquez, PT Physical Therapist                    Exercises     Row Name 07/20/18 1400             Subjective Comments    Subjective Comments Patient arrives 15' late.  Plans to d/c today and follow up with ortho regarding L knee (not the knee she was referred here for).  -GR         Subjective Pain    Able to rate subjective pain? yes  -GR      Pre-Treatment Pain Level 4  -GR      Subjective Pain Comment L knee  -GR         Total Minutes    74373 - PT Therapeutic Exercise Minutes 5  -GR      98744 -  PT Neuromuscular Reeducation Minutes 25  -GR         Exercise 1    Exercise Name 1 Nustep L5  -GR      Time 1 5 min  -GR         Exercise 2    Exercise Name 2 Review of finalized HEP and reissued handout  -GR         Exercise 14    Exercise Name 14 Trampoline - eyes open, eyes closed, head turns  -GR      Time 14 5 minutes  -GR         Exercise 15    Exercise Name 15 Green foam - eyes  open, closed, head turns  -GR      Time 15 5 minutes  -GR         Exercise 16    Exercise Name 16 BOSU - eyes open, closed  -GR      Time 16 5 minutes  -GR         Exercise 17    Exercise Name 17 Seated swiss ball roll out for trunk flexion  -GR      Cueing 17 Verbal  -GR      Sets 17 1  -GR      Reps 17 10  -GR        User Key  (r) = Recorded By, (t) = Taken By, (c) = Cosigned By    Initials Name Provider Type    HALINA Vasquez PT Physical Therapist                               PT OP Goals     Row Name 07/20/18 1400          PT Short Term Goals    STG Date to Achieve 06/01/18  -GR     STG 1 Patient will be independent with initial HEP.  -GR     STG 1 Progress Met  -GR     STG 2 Patient will deny falls.  -GR     STG 2 Progress Not Met  -GR     STG 3 Patient will demonstrate R knee flexion >/=120 degrees to facilitate ease with stairs.  -GR     STG 3 Progress Met  -GR        Long Term Goals    LTG Date to Achieve 07/01/18  -GR     LTG 1 Patient will be independent with progressive HEP.  -GR     LTG 1 Progress Met  -GR     LTG 2 Patient will demonstrate R knee AROM 0-125 to facilitate optimal post operative outcomes.  -GR     LTG 2 Progress Partially Met  -GR     LTG 3 Patient will demonstrate R hip abd to >/=4/5 per MMT to assist with gait normalization.  -GR     LTG 3 Progress Partially Met  -GR     LTG 4 Patient will score </=60% disability on the KOS-ADL to indicate improved perceived performance with ADLs.  -GR     LTG 4 Progress Met  -GR       User Key  (r) = Recorded By, (t) = Taken By, (c) = Cosigned By    Initials Name Provider Type    HALINA Vasquez PT Physical Therapist          Therapy Education  Education Details: finalized HEP  Given: HEP, Symptoms/condition management  Program: New  How Provided: Verbal  Provided to: Patient  Level of Understanding: Teach back education performed              Time Calculation:   Start Time: 1330  Stop Time: 1400  Time Calculation (min): 30 min  Total Timed  Code Minutes- PT: 30 minute(s)  Therapy Suggested Charges     Code   Minutes Charges    14260 (CPT®) Hc Pt Neuromusc Re Education Ea 15 Min 25 2    41031 (CPT®) Hc Pt Ther Proc Ea 15 Min 5     12633 (CPT®) Hc Gait Training Ea 15 Min      69854 (CPT®) Hc Pt Therapeutic Act Ea 15 Min      80143 (CPT®) Hc Pt Manual Therapy Ea 15 Min      92525 (CPT®) Hc Pt Ther Massage- Per 15 Min      52617 (CPT®) Hc Pt Iontophoresis Ea 15 Min      06376 (CPT®) Hc Pt Elec Stim Ea-Per 15 Min      21712 (CPT®) Hc Pt Ultrasound Ea 15 Min      65616 (CPT®) Hc Pt Self Care/Mgmt/Train Ea 15 Min      Total  30 2        Therapy Charges for Today     Code Description Service Date Service Provider Modifiers Qty    51875617628 HC PT MOBILITY CURRENT 7/20/2018 Roe Vasquez, PT GP, CK 1    08421500843 HC PT MOBILITY PROJECTED 7/20/2018 Roe Vasquez, PT GP, CJ 1    96301990238 HC PT MOBILITY DISCHARGE 7/20/2018 Roe Vasquez, PT GP, CK 1    39775493225 HC PT NEUROMUSC RE EDUCATION EA 15 MIN 7/20/2018 Roe Vasquez, PT GP 2          PT G-Codes  PT Professional Judgement Used?: Yes  Functional Limitation: Mobility: Walking and moving around  Mobility: Walking and Moving Around Current Status (): At least 40 percent but less than 60 percent impaired, limited or restricted  Mobility: Walking and Moving Around Goal Status (): At least 20 percent but less than 40 percent impaired, limited or restricted  Mobility: Walking and Moving Around Discharge Status (): At least 40 percent but less than 60 percent impaired, limited or restricted     OP PT Discharge Summary  Date of Discharge: 07/20/18  Reason for Discharge: Maximum functional potential achieved  Outcomes Achieved: Patient able to partially acheive established goals  Discharge Destination: Home with home program  Discharge Instructions/Additional Comments: d/c to I HEP.      Roe Vasquez, PT  7/20/2018

## 2018-08-30 DIAGNOSIS — E03.9 ACQUIRED HYPOTHYROIDISM: ICD-10-CM

## 2018-08-30 RX ORDER — LEVOTHYROXINE SODIUM 0.1 MG/1
100 TABLET ORAL DAILY
Qty: 90 TABLET | Refills: 3 | Status: SHIPPED | OUTPATIENT
Start: 2018-08-30 | End: 2019-10-31 | Stop reason: SDUPTHER

## 2018-10-17 DIAGNOSIS — M25.561 CHRONIC PAIN OF RIGHT KNEE: ICD-10-CM

## 2018-10-17 DIAGNOSIS — G89.29 CHRONIC PAIN OF RIGHT KNEE: ICD-10-CM

## 2018-10-21 DIAGNOSIS — N18.30 TYPE 2 DIABETES MELLITUS WITH STAGE 3 CHRONIC KIDNEY DISEASE, WITHOUT LONG-TERM CURRENT USE OF INSULIN (HCC): ICD-10-CM

## 2018-10-21 DIAGNOSIS — E11.22 TYPE 2 DIABETES MELLITUS WITH STAGE 3 CHRONIC KIDNEY DISEASE, WITHOUT LONG-TERM CURRENT USE OF INSULIN (HCC): ICD-10-CM

## 2018-10-22 RX ORDER — GLIMEPIRIDE 2 MG/1
TABLET ORAL
Qty: 30 TABLET | Refills: 0 | Status: SHIPPED | OUTPATIENT
Start: 2018-10-22 | End: 2018-11-19 | Stop reason: SDUPTHER

## 2018-10-22 RX ORDER — TRAMADOL HYDROCHLORIDE 50 MG/1
TABLET ORAL
Qty: 60 TABLET | Refills: 4 | Status: SHIPPED | OUTPATIENT
Start: 2018-10-22 | End: 2019-11-27 | Stop reason: SDUPTHER

## 2018-11-19 DIAGNOSIS — E11.22 TYPE 2 DIABETES MELLITUS WITH STAGE 3 CHRONIC KIDNEY DISEASE, WITHOUT LONG-TERM CURRENT USE OF INSULIN (HCC): ICD-10-CM

## 2018-11-19 DIAGNOSIS — N18.30 TYPE 2 DIABETES MELLITUS WITH STAGE 3 CHRONIC KIDNEY DISEASE, WITHOUT LONG-TERM CURRENT USE OF INSULIN (HCC): ICD-10-CM

## 2018-11-19 RX ORDER — GLIMEPIRIDE 2 MG/1
TABLET ORAL
Qty: 30 TABLET | Refills: 1 | Status: SHIPPED | OUTPATIENT
Start: 2018-11-19 | End: 2019-11-27 | Stop reason: SDUPTHER

## 2018-11-30 ENCOUNTER — OFFICE VISIT (OUTPATIENT)
Dept: FAMILY MEDICINE CLINIC | Facility: CLINIC | Age: 78
End: 2018-11-30

## 2018-11-30 VITALS
HEART RATE: 68 BPM | BODY MASS INDEX: 34.21 KG/M2 | OXYGEN SATURATION: 98 % | RESPIRATION RATE: 20 BRPM | WEIGHT: 218 LBS | SYSTOLIC BLOOD PRESSURE: 118 MMHG | DIASTOLIC BLOOD PRESSURE: 76 MMHG | HEIGHT: 67 IN

## 2018-11-30 DIAGNOSIS — E11.22 TYPE 2 DIABETES MELLITUS WITH STAGE 3 CHRONIC KIDNEY DISEASE, WITHOUT LONG-TERM CURRENT USE OF INSULIN (HCC): Primary | ICD-10-CM

## 2018-11-30 DIAGNOSIS — G89.29 CHRONIC PAIN OF RIGHT KNEE: ICD-10-CM

## 2018-11-30 DIAGNOSIS — M15.8 OTHER OSTEOARTHRITIS INVOLVING MULTIPLE JOINTS: ICD-10-CM

## 2018-11-30 DIAGNOSIS — N18.30 TYPE 2 DIABETES MELLITUS WITH STAGE 3 CHRONIC KIDNEY DISEASE, WITHOUT LONG-TERM CURRENT USE OF INSULIN (HCC): Primary | ICD-10-CM

## 2018-11-30 DIAGNOSIS — E03.9 ACQUIRED HYPOTHYROIDISM: ICD-10-CM

## 2018-11-30 DIAGNOSIS — M25.561 CHRONIC PAIN OF RIGHT KNEE: ICD-10-CM

## 2018-11-30 LAB — TSH SERPL DL<=0.005 MIU/L-ACNC: 4.72 MIU/ML (ref 0.27–4.2)

## 2018-11-30 PROCEDURE — 99214 OFFICE O/P EST MOD 30 MIN: CPT | Performed by: FAMILY MEDICINE

## 2018-11-30 RX ORDER — BUPROPION HYDROCHLORIDE 300 MG/1
TABLET ORAL
COMMUNITY
Start: 2018-11-04 | End: 2020-07-16 | Stop reason: SDUPTHER

## 2018-11-30 NOTE — PROGRESS NOTES
Subjective   Rekha Bear is a 78 y.o. female.     History of Present Illness   Rekha is here today for medication recheck for A1c.  She has a hx of OA in multiple joints and cannot take NSAIDs. She is getting relief from tramadol and gabapentin. SAUL and contract UTD and on the chart  She has DM2 and was doing well off medication but we recently added amaryl 2 mg and is tolerating well and needs labs today. She is on a statin. She is on an ARB.  Dental and eye exam are UTD.  Rekha has a history of chronic hypothyroidism and has been well controlled on current dose of replacement.She report no concerning symptoms: no cold intolerance, fatigue or hair loss.     The following portions of the patient's history were reviewed and updated as appropriate: allergies, current medications, past medical history, past social history, past surgical history and problem list.    Review of Systems   Constitutional: Negative for activity change, appetite change, chills, fatigue, fever and unexpected weight change.   HENT: Negative for congestion, ear pain, hearing loss, nosebleeds, rhinorrhea and sore throat.    Eyes: Negative for pain, redness and visual disturbance.   Respiratory: Negative for cough, shortness of breath and wheezing.    Cardiovascular: Negative for chest pain, palpitations and leg swelling.   Gastrointestinal: Negative for abdominal pain, blood in stool, constipation, diarrhea, nausea and vomiting.   Endocrine: Negative for cold intolerance and heat intolerance.   Genitourinary: Negative for difficulty urinating, dysuria, frequency, hematuria, pelvic pain, urgency and vaginal discharge.   Musculoskeletal: Negative for arthralgias, back pain and joint swelling.   Skin: Negative for rash and wound.   Neurological: Negative for dizziness, weakness, numbness and headaches.   Hematological: Does not bruise/bleed easily.   Psychiatric/Behavioral: Negative for dysphoric mood, sleep disturbance and suicidal  ideas. The patient is not nervous/anxious.        Objective   Physical Exam   Constitutional: She is oriented to person, place, and time. She appears well-developed.   Cardiovascular: Normal rate and regular rhythm.   Pulmonary/Chest: Effort normal.   Neurological: She is alert and oriented to person, place, and time.   Nursing note and vitals reviewed.      Assessment/Plan   Rekha was seen today for diabetes.    Diagnoses and all orders for this visit:    Type 2 diabetes mellitus with stage 3 chronic kidney disease, without long-term current use of insulin (CMS/[facility])  -     Hemoglobin A1c    Other osteoarthritis involving multiple joints  Chronic pain of right knee  The patient has read and signed the [place] Controlled Substance Contract.  I will continue to see patient for regular follow up appointments.  She are well controlled on current medication.  SAUL has been reviewed by me and is updated every 3 months. The patient is aware of the potential for addiction and dependence.    Hypothyroid  I will check TSH today

## 2018-12-01 LAB — HBA1C MFR BLD: 7.45 % (ref 4.8–5.6)

## 2019-01-03 DIAGNOSIS — E78.49 OTHER HYPERLIPIDEMIA: ICD-10-CM

## 2019-01-03 RX ORDER — LOSARTAN POTASSIUM AND HYDROCHLOROTHIAZIDE 12.5; 5 MG/1; MG/1
TABLET ORAL
Qty: 90 TABLET | Refills: 0 | Status: SHIPPED | OUTPATIENT
Start: 2019-01-03 | End: 2019-11-27

## 2019-01-03 RX ORDER — ROSUVASTATIN CALCIUM 20 MG/1
TABLET, COATED ORAL
Qty: 90 TABLET | Refills: 0 | Status: SHIPPED | OUTPATIENT
Start: 2019-01-03 | End: 2019-06-02 | Stop reason: SDUPTHER

## 2019-03-19 DIAGNOSIS — R73.01 IMPAIRED FASTING GLUCOSE: ICD-10-CM

## 2019-03-19 DIAGNOSIS — E03.9 HYPOTHYROIDISM, UNSPECIFIED TYPE: Primary | ICD-10-CM

## 2019-06-02 DIAGNOSIS — E78.49 OTHER HYPERLIPIDEMIA: ICD-10-CM

## 2019-06-03 RX ORDER — ROSUVASTATIN CALCIUM 20 MG/1
TABLET, COATED ORAL
Qty: 90 TABLET | Refills: 0 | Status: SHIPPED | OUTPATIENT
Start: 2019-06-03 | End: 2019-09-04 | Stop reason: SDUPTHER

## 2019-06-07 ENCOUNTER — TELEPHONE (OUTPATIENT)
Dept: FAMILY MEDICINE CLINIC | Facility: CLINIC | Age: 79
End: 2019-06-07

## 2019-06-07 NOTE — TELEPHONE ENCOUNTER
----- Message from Kateryna Castillo MA sent at 6/3/2019  2:06 PM EDT -----  Please call patient and remind her she is due for her physical and labs  ----- Message -----  From: SYSTEM  Sent: 3/24/2019  12:12 AM  To: [facility]

## 2019-06-19 ENCOUNTER — OFFICE VISIT (OUTPATIENT)
Dept: CARDIOLOGY | Facility: CLINIC | Age: 79
End: 2019-06-19

## 2019-06-19 VITALS
BODY MASS INDEX: 33.74 KG/M2 | SYSTOLIC BLOOD PRESSURE: 165 MMHG | HEART RATE: 59 BPM | WEIGHT: 215 LBS | DIASTOLIC BLOOD PRESSURE: 74 MMHG | HEIGHT: 67 IN

## 2019-06-19 DIAGNOSIS — E11.22 TYPE 2 DIABETES MELLITUS WITH STAGE 3 CHRONIC KIDNEY DISEASE, WITHOUT LONG-TERM CURRENT USE OF INSULIN (HCC): ICD-10-CM

## 2019-06-19 DIAGNOSIS — E78.5 HYPERLIPIDEMIA, UNSPECIFIED HYPERLIPIDEMIA TYPE: ICD-10-CM

## 2019-06-19 DIAGNOSIS — N18.30 TYPE 2 DIABETES MELLITUS WITH STAGE 3 CHRONIC KIDNEY DISEASE, WITHOUT LONG-TERM CURRENT USE OF INSULIN (HCC): ICD-10-CM

## 2019-06-19 DIAGNOSIS — I10 ESSENTIAL HYPERTENSION: ICD-10-CM

## 2019-06-19 DIAGNOSIS — I49.8 WANDERING ATRIAL PACEMAKER BY ELECTROCARDIOGRAM: Primary | ICD-10-CM

## 2019-06-19 PROCEDURE — 99214 OFFICE O/P EST MOD 30 MIN: CPT | Performed by: INTERNAL MEDICINE

## 2019-06-19 PROCEDURE — 93000 ELECTROCARDIOGRAM COMPLETE: CPT | Performed by: INTERNAL MEDICINE

## 2019-06-19 NOTE — PROGRESS NOTES
Subjective:     Encounter Date:06/19/2019      Patient ID: Rekha Bear is a 79 y.o. female.    Chief Complaint:  History of Present Illness    This is a 79-year-old female with a history of GERD, hypertension, dyslipidemia, diabetes mellitus type 2, and intermittent wandering atrial pacemaker, who presents for follow up.        I saw the patient initially in 10/2015 when she presented for an evaluation of chest pain.  She was also complaining of chronic stable dyspnea on exertion at that time.  I set her up for a PET stress study which was negative for ischemia.  A few months later I was asked to clear the patient for a gynecologic surgery, which I went ahead and did.  However, prior to that the patient called to say that she had received a copy of her stress test and it was noted on the baseline EKG that she was in atrial fibrillation.  I had the patient come in at that time and an EKG done in the office showed a wandering atrial pacemaker with a heart rate in the low 40s.  I asked her to decrease her metoprolol succinate and had her followup.  I also had her undergo a 24-hour Holter monitor which was performed in 05/2016 which revealed sinus rhythm with rare PACs and PVCs but no atrial arrhythmias including atrial fibrillation.   At her last follow-up in 11/2016, on a lower dose of metoprolol succinate her rhythm appeared to be in sinus with a rate in the 50's.      I saw her last in 6/2018 again for preoperative evaluation.  She continued to complain of dyspnea and fatigue on exertion that had worsened.    She did admit that some of this could be due to increasing issues with her knee for which she is planning on having surgery performed by Dr. Saji Kuo.  At that office visit I felt that her symptoms were likely due to deconditioning and limited mobility related to her right knee.  I recommended proceeding with an echocardiogram which was performed on 6/12/2018 and showed normal left ventricular  systolic function and wall motion with an EF 66%, normal diastolic function, and no significant valvular disease.  I cleared the patient for surgery at that time.    Today she presents for routine, annual follow-up.  Since I saw her last she has not had opportunity to have her surgery performed but is still hoping to get it done sometime this year.  She reports that her dyspnea on exertion is stable and unchanged.  She denies any chest pain, palpitations, PND orthopnea, near-syncope or syncope, or significant lower extremity edema.  She does report some episodes of dizziness that occur intermittently whether she is at rest or standing up.  These episodes have been present for the last year and a half or so and have not really changed in frequency.  They usually pretty brief and they are not associated with any near-syncope or syncope.    Review of Systems   Constitution: Positive for malaise/fatigue. Negative for weakness.   HENT: Negative for hearing loss, hoarse voice, nosebleeds and sore throat.    Eyes: Negative for pain.   Cardiovascular: Positive for dyspnea on exertion. Negative for chest pain, claudication, cyanosis, irregular heartbeat, leg swelling, near-syncope, orthopnea, palpitations, paroxysmal nocturnal dyspnea and syncope.   Respiratory: Negative for shortness of breath and snoring.    Endocrine: Negative for cold intolerance, heat intolerance, polydipsia, polyphagia and polyuria.   Skin: Negative for itching and rash.   Musculoskeletal: Negative for arthritis, falls, joint pain, joint swelling, muscle cramps, muscle weakness and myalgias.   Gastrointestinal: Negative for constipation, diarrhea, dysphagia, heartburn, hematemesis, hematochezia, melena, nausea and vomiting.   Genitourinary: Negative for frequency, hematuria and hesitancy.   Neurological: Positive for light-headedness. Negative for excessive daytime sleepiness, dizziness, headaches and numbness.   Psychiatric/Behavioral: Negative for  depression. The patient is not nervous/anxious.           Current Outpatient Medications:   •  Acetylcysteine (N-ACETYL-L-CYSTEINE) 600 MG capsule, Take 1 tablet by mouth 3 (Three) Times a Day., Disp: , Rfl:   •  buPROPion XL (WELLBUTRIN XL) 300 MG 24 hr tablet, , Disp: , Rfl:   •  cetirizine (ZyrTEC) 10 MG tablet, Take 10 mg by mouth daily., Disp: , Rfl:   •  clobetasol (TEMOVATE) 0.05 % cream, Apply  topically 2 (two) times a day., Disp: , Rfl:   •  colesevelam (WELCHOL) 625 MG tablet, Take 625 mg by mouth 6 (six) times a day., Disp: , Rfl:   •  ESTRACE VAGINAL 0.1 MG/GM vaginal cream, 3x weekly, Disp: , Rfl:   •  gabapentin (NEURONTIN) 600 MG tablet, Take 600 mg by mouth 2 (Two) Times a Day., Disp: , Rfl:   •  glimepiride (AMARYL) 2 MG tablet, TAKE ONE TABLET BY MOUTH EVERY MORNING BEFORE BREAKFAST, Disp: 30 tablet, Rfl: 1  •  guaiFENesin (HUMIBID 3) 400 MG tablet, Take 400 mg by mouth daily as needed., Disp: , Rfl:   •  hydrocortisone 2.5 % cream, , Disp: , Rfl:   •  ketoconazole (NIZORAL) 2 % shampoo, , Disp: , Rfl:   •  levothyroxine (SYNTHROID, LEVOTHROID) 100 MCG tablet, Take 1 tablet by mouth Daily., Disp: 90 tablet, Rfl: 3  •  losartan-hydrochlorothiazide (HYZAAR) 50-12.5 MG per tablet, TAKE ONE TABLET BY MOUTH DAILY, Disp: 90 tablet, Rfl: 0  •  metoprolol succinate XL (TOPROL-XL) 50 MG 24 hr tablet, Take 0.5 tablets by mouth Daily., Disp: 90 tablet, Rfl: 2  •  Ranitidine HCl (ZANTAC 75 PO), Take 2 tablets by mouth 1 (One) Time., Disp: , Rfl:   •  rosuvastatin (CRESTOR) 20 MG tablet, TAKE ONE TABLET BY MOUTH DAILY, Disp: 90 tablet, Rfl: 0  •  solifenacin (VESICARE) 10 MG tablet, Take 10 mg by mouth Daily., Disp: , Rfl:   •  traMADol (ULTRAM) 50 MG tablet, TAKE ONE TABLET BY MOUTH EVERY 8 HOURS AS NEEDED FOR MODERATE PAIN, Disp: 60 tablet, Rfl: 4  •  venlafaxine XR (EFFEXOR-XR) 150 MG 24 hr capsule, , Disp: , Rfl:   •  ALPRAZolam (XANAX) 0.25 MG tablet, Take 1 tablet by mouth daily as needed., Disp: , Rfl:  "    Past Medical History:   Diagnosis Date   • Acute bronchitis    • Anxiety    • Chest pain    • Chilblains     \"Chilblian's\"   • Depression    • Diabetes mellitus, type II (CMS/HCC)    • Fatty liver    • GERD (gastroesophageal reflux disease)    • Health care maintenance    • History of mammogram    • Hyperlipidemia    • Hypertension    • Hypothyroid    • Incontinence    • Osteoarthritis     OA  Marvin THR, LT TKR - hydrocodone prescibed by Dr. Almaguer   • Pain of esophagus     \" nervous esophagus\" - she takes the occasional alprazolam   • Peptic ulcer disease    • Prediabetes    • Raynaud's disease     \"Raynauds\"   • Renal disease     followed by Dr. Grewal   • UTI (urinary tract infection)    • Vitamin B 12 deficiency      Past Surgical History:   Procedure Laterality Date   • CATARACT EXTRACTION     • CHOLECYSTECTOMY     • COLONOSCOPY     • COLONOSCOPY N/A 2016    Procedure: COLONOSCOPY with hot snare polypectomy;  Surgeon: George Pabon MD;  Location: [facility] ENDOSCOPY;  Service:    • DENTAL PROCEDURE     • FOOT SURGERY     • TONSILLECTOMY     • TOTAL HIP ARTHROPLASTY Bilateral     OA  Marvin THR, LT TKR - hydrocodone prescibed by Dr. Almaguer   • TOTAL KNEE ARTHROPLASTY Left     OA  Marvin THR, LT TKR - hydrocodone prescibed by Dr. Almaguer     Family History   Problem Relation Age of Onset   • Hypertension Mother    • Diabetes type II Mother    • Hypertension Father    • Coronary artery disease Father    • Diabetes type II Father    • Colon cancer Brother    • Stroke Son    • Breast cancer Maternal Aunt    • Hypertension Other    • Other Other         Lipids  Thyroid     Social History     Tobacco Use   • Smoking status: Former Smoker     Last attempt to quit: 1975     Years since quittin.4   • Smokeless tobacco: Never Used   • Tobacco comment: 40 yrs quit   Substance Use Topics   • Alcohol use: No   • Drug use: No           ECG 12 Lead  Date/Time: 2019 4:01 PM  Performed by: Elizabeth Pina, " "MD  Authorized by: Elizabeth Pina MD   Comments: Wandering atrial pacemaker               Objective:         Visit Vitals  /74   Pulse 59   Ht 170.2 cm (67\")   Wt 97.5 kg (215 lb)   LMP  (LMP Unknown)   BMI 33.67 kg/m²          Physical Exam   Constitutional: She is oriented to person, place, and time. She appears well-developed and well-nourished.   HENT:   Head: Normocephalic and atraumatic.   Eyes: Conjunctivae, EOM and lids are normal. Pupils are equal, round, and reactive to light.   Neck: Normal range of motion and full passive range of motion without pain. Neck supple. No JVD present. Carotid bruit is not present.   Cardiovascular: Normal rate, regular rhythm, S1 normal and S2 normal. Exam reveals no gallop.   No murmur heard.  Pulses:       Radial pulses are 2+ on the right side, and 2+ on the left side.   No bilateral lower extremity edema   Pulmonary/Chest: Effort normal and breath sounds normal.   Abdominal: Soft. Normal appearance.   Lymphadenopathy:     She has no cervical adenopathy.   Neurological: She is alert and oriented to person, place, and time.   Skin: Skin is warm, dry and intact.   Psychiatric: She has a normal mood and affect.       Lab Review:       Assessment:          Diagnosis Plan   1. 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 Essential hypertension     3. Hyperlipidemia, unspecified hyperlipidemia type     4. Type 2 diabetes mellitus with stage 3 chronic kidney disease, without long-term current use of insulin (CMS/[facility])            Plan:       1.  Dizziness.  Monitor episodes for now.  If they start to increase in frequency we will consider getting her set up with a monitor.  2.  Wandering atrial pacemaker.  Noted on her EKG today again.  3.  Hypertension.  Elevated in the office but she reports it is normally well controlled.  Will monitor for now.  4.  Hyperlipidemia.  On rosuvastatin which is managed by Dr. Porter.  5.  Diabetes mellitus type 2    We will plan on " seeing the patient back again in 6 months.

## 2019-09-04 DIAGNOSIS — E78.49 OTHER HYPERLIPIDEMIA: ICD-10-CM

## 2019-09-04 RX ORDER — ROSUVASTATIN CALCIUM 20 MG/1
TABLET, COATED ORAL
Qty: 30 TABLET | Refills: 0 | Status: SHIPPED | OUTPATIENT
Start: 2019-09-04 | End: 2019-10-06 | Stop reason: SDUPTHER

## 2019-10-06 DIAGNOSIS — E78.49 OTHER HYPERLIPIDEMIA: ICD-10-CM

## 2019-10-07 RX ORDER — ROSUVASTATIN CALCIUM 20 MG/1
TABLET, COATED ORAL
Qty: 30 TABLET | Refills: 0 | Status: SHIPPED | OUTPATIENT
Start: 2019-10-07 | End: 2019-11-14 | Stop reason: SDUPTHER

## 2019-10-31 DIAGNOSIS — E03.9 ACQUIRED HYPOTHYROIDISM: ICD-10-CM

## 2019-10-31 RX ORDER — LEVOTHYROXINE SODIUM 0.1 MG/1
TABLET ORAL
Qty: 30 TABLET | Refills: 0 | Status: SHIPPED | OUTPATIENT
Start: 2019-10-31 | End: 2019-11-27 | Stop reason: SDUPTHER

## 2019-11-14 DIAGNOSIS — E78.49 OTHER HYPERLIPIDEMIA: ICD-10-CM

## 2019-11-14 RX ORDER — ROSUVASTATIN CALCIUM 20 MG/1
TABLET, COATED ORAL
Qty: 30 TABLET | Refills: 0 | Status: SHIPPED | OUTPATIENT
Start: 2019-11-14 | End: 2019-11-27 | Stop reason: SDUPTHER

## 2019-11-27 ENCOUNTER — OFFICE VISIT (OUTPATIENT)
Dept: FAMILY MEDICINE CLINIC | Facility: CLINIC | Age: 79
End: 2019-11-27

## 2019-11-27 VITALS
DIASTOLIC BLOOD PRESSURE: 60 MMHG | BODY MASS INDEX: 31.55 KG/M2 | HEIGHT: 67 IN | SYSTOLIC BLOOD PRESSURE: 124 MMHG | OXYGEN SATURATION: 99 % | WEIGHT: 201 LBS | HEART RATE: 64 BPM | RESPIRATION RATE: 16 BRPM

## 2019-11-27 DIAGNOSIS — E11.22 TYPE 2 DIABETES MELLITUS WITH STAGE 3 CHRONIC KIDNEY DISEASE, WITHOUT LONG-TERM CURRENT USE OF INSULIN (HCC): ICD-10-CM

## 2019-11-27 DIAGNOSIS — M25.561 CHRONIC PAIN OF RIGHT KNEE: ICD-10-CM

## 2019-11-27 DIAGNOSIS — G89.29 CHRONIC PAIN OF RIGHT KNEE: ICD-10-CM

## 2019-11-27 DIAGNOSIS — N28.9 RENAL INSUFFICIENCY: ICD-10-CM

## 2019-11-27 DIAGNOSIS — E03.9 ACQUIRED HYPOTHYROIDISM: ICD-10-CM

## 2019-11-27 DIAGNOSIS — N18.30 TYPE 2 DIABETES MELLITUS WITH STAGE 3 CHRONIC KIDNEY DISEASE, WITHOUT LONG-TERM CURRENT USE OF INSULIN (HCC): ICD-10-CM

## 2019-11-27 DIAGNOSIS — E78.49 OTHER HYPERLIPIDEMIA: ICD-10-CM

## 2019-11-27 DIAGNOSIS — I10 ESSENTIAL HYPERTENSION: ICD-10-CM

## 2019-11-27 DIAGNOSIS — Z00.00 MEDICARE ANNUAL WELLNESS VISIT, SUBSEQUENT: Primary | ICD-10-CM

## 2019-11-27 DIAGNOSIS — Z12.31 ENCOUNTER FOR SCREENING MAMMOGRAM FOR MALIGNANT NEOPLASM OF BREAST: ICD-10-CM

## 2019-11-27 DIAGNOSIS — Z12.39 SCREENING FOR MALIGNANT NEOPLASM OF BREAST: ICD-10-CM

## 2019-11-27 LAB
CHOLEST SERPL-MCNC: 215 MG/DL (ref 0–200)
HBA1C MFR BLD: 10.3 % (ref 4.8–5.6)
HDLC SERPL-MCNC: 38 MG/DL (ref 40–60)
LDLC SERPL CALC-MCNC: 120 MG/DL (ref 0–100)
LDLC/HDLC SERPL: 3.16 {RATIO}
TRIGL SERPL-MCNC: 284 MG/DL (ref 0–150)
TSH SERPL DL<=0.005 MIU/L-ACNC: 4.35 UIU/ML (ref 0.27–4.2)
VLDLC SERPL CALC-MCNC: 56.8 MG/DL

## 2019-11-27 PROCEDURE — G0439 PPPS, SUBSEQ VISIT: HCPCS | Performed by: FAMILY MEDICINE

## 2019-11-27 PROCEDURE — 99214 OFFICE O/P EST MOD 30 MIN: CPT | Performed by: FAMILY MEDICINE

## 2019-11-27 RX ORDER — LEVOTHYROXINE SODIUM 0.1 MG/1
100 TABLET ORAL DAILY
Qty: 90 TABLET | Refills: 1 | Status: SHIPPED | OUTPATIENT
Start: 2019-11-27 | End: 2020-07-16 | Stop reason: SDUPTHER

## 2019-11-27 RX ORDER — VENLAFAXINE HYDROCHLORIDE 150 MG/1
150 CAPSULE, EXTENDED RELEASE ORAL DAILY
COMMUNITY
End: 2020-04-10 | Stop reason: HOSPADM

## 2019-11-27 RX ORDER — GLIMEPIRIDE 2 MG/1
2 TABLET ORAL
Qty: 90 TABLET | Refills: 1 | Status: SHIPPED | OUTPATIENT
Start: 2019-11-27 | End: 2020-01-14 | Stop reason: SDUPTHER

## 2019-11-27 RX ORDER — METOPROLOL SUCCINATE 50 MG/1
25 TABLET, EXTENDED RELEASE ORAL DAILY
Qty: 90 TABLET | Refills: 1 | Status: SHIPPED | OUTPATIENT
Start: 2019-11-27 | End: 2020-04-10 | Stop reason: HOSPADM

## 2019-11-27 RX ORDER — TRAMADOL HYDROCHLORIDE 50 MG/1
50 TABLET ORAL EVERY 8 HOURS PRN
Qty: 60 TABLET | Refills: 4 | Status: SHIPPED | OUTPATIENT
Start: 2019-11-27 | End: 2020-04-10 | Stop reason: HOSPADM

## 2019-11-27 RX ORDER — ROSUVASTATIN CALCIUM 20 MG/1
20 TABLET, COATED ORAL DAILY
Qty: 90 TABLET | Refills: 1 | Status: SHIPPED | OUTPATIENT
Start: 2019-11-27 | End: 2020-07-16 | Stop reason: SDUPTHER

## 2019-11-27 RX ORDER — LEVOTHYROXINE SODIUM 0.1 MG/1
TABLET ORAL
Qty: 30 TABLET | Refills: 0 | Status: SHIPPED | OUTPATIENT
Start: 2019-11-27 | End: 2019-11-27 | Stop reason: SDUPTHER

## 2019-11-27 NOTE — PATIENT INSTRUCTIONS
Medicare Wellness  Personal Prevention Plan of Service Counseling   I will call you with lab results.   Date of Office Visit:  2019  Encounter Provider:  Eben Porter MD  Place of Service:  [facility] PRIMARY [facility]ARE  Patient Name: Rekha Bear  :  1940    As part of the Medicare Wellness portion of your visit today, we are providing you with this personalized preventive plan of services (PPPS). This plan is based upon recommendations of the [place] Preventive Services Task Force (USPSTF) and the Advisory Committee on Immunization Practices (ACIP).    This lists the preventive care services that should be considered, and provides dates of when you are due. Items listed as completed are up-to-date and do not require any further intervention.    Health Maintenance   Topic Date Due   • ZOSTER VACCINE (2 of 2) 2015   • TDAP/TD VACCINES (1 - Tdap) 2018   • HEMOGLOBIN A1C  2019   • URINE MICROALBUMIN  2019   • MAMMOGRAM  2019   • LIPID PANEL  2019 (Originally 2019)   • DIABETIC EYE EXAM  2020   • MEDICARE ANNUAL WELLNESS  2020   • COLONOSCOPY  2026   • INFLUENZA VACCINE  Completed   • PNEUMOCOCCAL VACCINES (65+ LOW/MEDIUM RISK)  Completed       Orders Placed This Encounter   Procedures   • Mammo Screening Bilateral With CAD     Standing Status:   Future     Order Specific Question:   Reason for Exam:     Answer:   screening   • Comprehensive Metabolic Panel   • Microalbumin / Creatinine Urine Ratio - Urine, Clean Catch   • Lipid Panel With LDL / HDL Ratio   • Hemoglobin A1c       Return in about 3 months (around 2020) for Recheck.

## 2019-11-27 NOTE — PROGRESS NOTES
Medicare Subsequent Wellness Visit  Subjective   History of Present Illness    Rekha Bear is a 79 y.o. female who presents for an Medicare Wellness Visit. In addition, we addressed the following health issues:  She has a hx of OA in multiple joints and cannot take NSAIDs because of chronic renal disease. She is getting relief from tramadol and gabapentin. SAUL and contract UTD and on the chart  She has DM2 and was doing well off medication but we added amaryl 2 mg - she was tolerating well but has run out of medication and has not been taking this recently.. She is on a statin. She is no longer on an ARB.  Dental and eye exam are UTD.  Rekha has a history of chronic hypothyroidism and has been well controlled on current dose of replacement.She report no concerning symptoms: no cold intolerance, fatigue or hair loss.   Rekha has chronic hypertension and has been well controlled on current medications.She is tolerating medications without side effect. She reports no vision changes, headaches or lightheadedness. She is requesting refills of medications.She is now taking metoprolol.       PMH, PSH, SocHx, FamHx, Allergies, and Medications: Reviewed and updated in the history section of chart.  Family History   Problem Relation Age of Onset   • Hypertension Mother    • Diabetes type II Mother    • Hypertension Father    • Coronary artery disease Father    • Diabetes type II Father    • Colon cancer Brother    • Stroke Son    • Breast cancer Maternal Aunt    • Hypertension Other    • Other Other         Lipids  Thyroid       Social History     Social History Narrative    Lives in own home, by herself. She has good family support       Allergies   Allergen Reactions   • Iodine Rash       Outpatient Medications Prior to Visit   Medication Sig Dispense Refill   • CBD (cannabidiol) oral oil Take  by mouth.     • venlafaxine XR (EFFEXOR-XR) 150 MG 24 hr capsule Take 150 mg by mouth Daily.     • Acetylcysteine  (N-ACETYL-L-CYSTEINE) 600 MG capsule Take 1 tablet by mouth 3 (Three) Times a Day.     • buPROPion XL (WELLBUTRIN XL) 300 MG 24 hr tablet      • cetirizine (ZyrTEC) 10 MG tablet Take 10 mg by mouth daily.     • clobetasol (TEMOVATE) 0.05 % cream Apply  topically 2 (two) times a day.     • colesevelam (WELCHOL) 625 MG tablet Take 625 mg by mouth 6 (six) times a day.     • ESTRACE VAGINAL 0.1 MG/GM vaginal cream 3x weekly     • gabapentin (NEURONTIN) 600 MG tablet Take 600 mg by mouth 2 (Two) Times a Day.     • guaiFENesin (HUMIBID 3) 400 MG tablet Take 400 mg by mouth daily as needed.     • hydrocortisone 2.5 % cream      • ketoconazole (NIZORAL) 2 % shampoo      • Ranitidine HCl (ZANTAC 75 PO) Take 2 tablets by mouth 1 (One) Time.     • solifenacin (VESICARE) 10 MG tablet Take 10 mg by mouth Daily.     • ALPRAZolam (XANAX) 0.25 MG tablet Take 1 tablet by mouth daily as needed.     • glimepiride (AMARYL) 2 MG tablet TAKE ONE TABLET BY MOUTH EVERY MORNING BEFORE BREAKFAST 30 tablet 1   • levothyroxine (SYNTHROID, LEVOTHROID) 100 MCG tablet TAKE ONE TABLET BY MOUTH DAILY *MUST BE SEEN* 30 tablet 0   • losartan-hydrochlorothiazide (HYZAAR) 50-12.5 MG per tablet TAKE ONE TABLET BY MOUTH DAILY 90 tablet 0   • metoprolol succinate XL (TOPROL-XL) 50 MG 24 hr tablet Take 0.5 tablets by mouth Daily. 90 tablet 2   • rosuvastatin (CRESTOR) 20 MG tablet TAKE ONE TABLET BY MOUTH DAILY 30 tablet 0   • traMADol (ULTRAM) 50 MG tablet TAKE ONE TABLET BY MOUTH EVERY 8 HOURS AS NEEDED FOR MODERATE PAIN 60 tablet 4   • venlafaxine XR (EFFEXOR-XR) 150 MG 24 hr capsule        No facility-administered medications prior to visit.         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 UTD  Eye Exam. UTD  Exercise:none    Recent Hospitalizations:  none    Age-appropriate Screening Schedule:  Refer to the list below for future screening recommendations based on patient's age. Orders for these recommended tests are listed in the plan section. The patient has been provided with a written plan.      Health Maintenance   Topic Date Due   • ZOSTER VACCINE (2 of 2) 12/11/2015   • TDAP/TD VACCINES (1 - Tdap) 06/14/2018   • HEMOGLOBIN A1C  05/30/2019   • URINE MICROALBUMIN  06/13/2019   • MAMMOGRAM  08/24/2019   • LIPID PANEL  11/27/2019 (Originally 6/11/2019)   • DIABETIC EYE EXAM  09/01/2020   • MEDICARE ANNUAL WELLNESS  11/27/2020   • COLONOSCOPY  12/20/2026   • INFLUENZA VACCINE  Completed   • PNEUMOCOCCAL VACCINES (65+ LOW/MEDIUM RISK)  Completed       Depression Screen:   PHQ-2/PHQ-9 Depression Screening 11/27/2019   Little interest or pleasure in doing things 0   Feeling down, depressed, or hopeless 0   Total Score 0       Functional and Cognitive Screening:  Functional & Cognitive Status 11/27/2019   Do you have difficulty preparing food and eating? No   Do you have difficulty bathing yourself, getting dressed or grooming yourself? No   Do you have difficulty using the toilet? No   Do you have difficulty moving around from place to place? Yes   Do you have trouble with steps or getting out of a bed or a chair? Yes   Current Diet Well Balanced Diet   Dental Exam Up to date   Eye Exam Up  "to date   Exercise (times per week) 0 times per week   Current Exercise Activities Include None   Do you need help using the phone?  No   Are you deaf or do you have serious difficulty hearing?  No   Do you need help with transportation? No   Do you need help shopping? Yes   Do you need help preparing meals?  No   Do you need help with housework?  Yes   Do you need help with laundry? No   Do you need help taking your medications? No   Do you need help managing money? No   Do you ever drive or ride in a car without wearing a seat belt? No   Have you felt unusual stress, anger or loneliness in the last month? No   Who do you live with? Alone   If you need help, do you have trouble finding someone available to you? Yes   Have you been bothered in the last four weeks by sexual problems? No   Do you have difficulty concentrating, remembering or making decisions? No     Does the patient have evidence of cognitive impairment? none    Compared to one year ago, the patient feels their physical health is about the same and mental health is a little worse.       Review of Systems   Constitutional: Positive for activity change.   HENT: Negative.    Eyes: Negative.    Respiratory: Negative.    Cardiovascular: Negative.    Gastrointestinal: Negative.    Endocrine: Negative.    Genitourinary: Negative.    Musculoskeletal: Positive for back pain, gait problem, joint swelling and myalgias.   Skin: Negative.    Allergic/Immunologic: Negative.    Hematological: Negative.    Psychiatric/Behavioral: Positive for dysphoric mood.       Objective     Vitals:    11/27/19 1135   BP: 124/60   Pulse: 64   Resp: 16   SpO2: 99%   Weight: 91.2 kg (201 lb)   Height: 170.2 cm (67\")       Body mass index is 31.48 kg/m².    PHYSICAL EXAM  Vitals reviewed and on chart.  HEENT: PERRLA, EOMI. Oral mucosa moist,   No LAD.  CV: RRR, no murmurs, rubs, clicks or gallops  LUNGS: CTA bilaterally  EXT: No edema, FROM in bilateral upper and lower ext  NEURO: CN " II - XII grossly intact  PSYCH: Sad and aware she is not taking good care of herself,  alert and engaged. No thoughts of self harm expressed.    ASSESSMENT AND PLAN      Problem List Items Addressed This Visit        Cardiovascular and Mediastinum    Hyperlipidemia  Refilled medications and will check labs    Relevant Medications    rosuvastatin (CRESTOR) 20 MG tablet    Other Relevant Orders    Lipid Panel With LDL / HDL Ratio    Hypertension  Well controlled on current medication, will refill medication today and as needed. She will RTO for repeat B/P check in 6 months.    Relevant Medications    metoprolol succinate XL (TOPROL-XL) 50 MG 24 hr tablet       Endocrine    Type 2 diabetes mellitus with stage 3 chronic kidney disease, without long-term current use of insulin (CMS/[facility])  Refilled meds and will check labs.     Relevant Medications    glimepiride (AMARYL) 2 MG tablet    Other Relevant Orders    Comprehensive Metabolic Panel    Microalbumin / Creatinine Urine Ratio - Urine, Clean Catch    Hemoglobin A1c    Hypothyroidism  This is a chronic problem that has been well managed on current medications. Will refill as needed.   Labs today to evaluate    Relevant Medications    levothyroxine (SYNTHROID, LEVOTHROID) 100 MCG tablet    metoprolol succinate XL (TOPROL-XL) 50 MG 24 hr tablet       Musculoskeletal and Integument    Chronic pain of right knee\  The patient has read and signed the [facility] Controlled Substance Contract.  I will continue to see patient for regular follow up appointments.  She are well controlled on current medication.  SAUL has been reviewed by me and is updated every 3 months. The patient is aware of the potential for addiction and dependence.      Relevant Medications    traMADol (ULTRAM) 50 MG tablet       Genitourinary    Renal insufficiency  Followed by renal and stable per her report.       Other Visit Diagnoses     Medicare annual wellness visit, subsequent    -  Primary     Screening for malignant neoplasm of breast        Relevant Orders    Mammo Screening Bilateral With CAD    Encounter for screening mammogram for malignant neoplasm of breast         Relevant Orders    Mammo Screening Bilateral With CAD          Orders:  Orders Placed This Encounter   Procedures   • Mammo Screening Bilateral With CAD   • Comprehensive Metabolic Panel   • Microalbumin / Creatinine Urine Ratio - Urine, Clean Catch   • Lipid Panel With LDL / HDL Ratio   • Hemoglobin A1c            Medicare Wellness  Personal Prevention Plan of Service Counseling   I will call you with lab results.   Date of Office Visit:  2019  Encounter Provider:  Eben Porter MD  Place of Service:  [facility]  Patient Name: Rekha Bear  :  1940    As part of the Medicare Wellness portion of your visit today, we are providing you with this personalized preventive plan of services (PPPS). This plan is based upon recommendations of the [place] Preventive Services Task Force (USPSTF) and the Advisory Committee on Immunization Practices (ACIP).    This lists the preventive care services that should be considered, and provides dates of when you are due. Items listed as completed are up-to-date and do not require any further intervention.    Health Maintenance   Topic Date Due   • ZOSTER VACCINE (2 of 2) 2015   • TDAP/TD VACCINES (1 - Tdap) 2018   • HEMOGLOBIN A1C  2019   • URINE MICROALBUMIN  2019   • MAMMOGRAM  2019   • LIPID PANEL  2019 (Originally 2019)   • DIABETIC EYE EXAM  2020   • MEDICARE ANNUAL WELLNESS  2020   • COLONOSCOPY  2026   • INFLUENZA VACCINE  Completed   • PNEUMOCOCCAL VACCINES (65+ LOW/MEDIUM RISK)  Completed       Orders Placed This Encounter   Procedures   • Mammo Screening Bilateral With CAD     Standing Status:   Future     Order Specific Question:   Reason for Exam:     Answer:   screening   •  Comprehensive Metabolic Panel   • Microalbumin / Creatinine Urine Ratio - Urine, Clean Catch   • Lipid Panel With LDL / HDL Ratio   • Hemoglobin A1c       Follow Up:  Return in about 3 months (around 2/27/2020) for Recheck.     ADVANCED DIRECTIVES:   Patient does not have an advance directive - information provided to the patient today    An After Visit Summary and PPPS with all of these plans were given to the patient.

## 2019-11-28 LAB
ALBUMIN SERPL-MCNC: 4.4 G/DL (ref 3.5–5.2)
ALBUMIN/CREAT UR: 13.2 MG/G CREAT (ref 0–30)
ALBUMIN/GLOB SERPL: 1.5 G/DL
ALP SERPL-CCNC: 93 U/L (ref 39–117)
ALT SERPL-CCNC: 21 U/L (ref 1–33)
AST SERPL-CCNC: 27 U/L (ref 1–32)
BILIRUB SERPL-MCNC: 1.1 MG/DL (ref 0.2–1.2)
BUN SERPL-MCNC: 20 MG/DL (ref 8–23)
BUN/CREAT SERPL: 15.9 (ref 7–25)
CALCIUM SERPL-MCNC: 9.3 MG/DL (ref 8.6–10.5)
CHLORIDE SERPL-SCNC: 94 MMOL/L (ref 98–107)
CO2 SERPL-SCNC: 26.9 MMOL/L (ref 22–29)
CREAT SERPL-MCNC: 1.26 MG/DL (ref 0.57–1)
CREAT UR-MCNC: 277 MG/DL
GLOBULIN SER CALC-MCNC: 3 GM/DL
GLUCOSE SERPL-MCNC: 401 MG/DL (ref 65–99)
MICROALBUMIN UR-MCNC: 36.5 UG/ML
POTASSIUM SERPL-SCNC: 3.9 MMOL/L (ref 3.5–5.2)
PROT SERPL-MCNC: 7.4 G/DL (ref 6–8.5)
SODIUM SERPL-SCNC: 135 MMOL/L (ref 136–145)

## 2019-12-10 ENCOUNTER — TELEPHONE (OUTPATIENT)
Dept: FAMILY MEDICINE CLINIC | Facility: CLINIC | Age: 79
End: 2019-12-10

## 2020-01-14 ENCOUNTER — OFFICE VISIT (OUTPATIENT)
Dept: FAMILY MEDICINE CLINIC | Facility: CLINIC | Age: 80
End: 2020-01-14

## 2020-01-14 VITALS
SYSTOLIC BLOOD PRESSURE: 114 MMHG | DIASTOLIC BLOOD PRESSURE: 68 MMHG | HEIGHT: 67 IN | WEIGHT: 200 LBS | BODY MASS INDEX: 31.39 KG/M2 | OXYGEN SATURATION: 98 % | HEART RATE: 61 BPM | RESPIRATION RATE: 16 BRPM

## 2020-01-14 DIAGNOSIS — E11.22 TYPE 2 DIABETES MELLITUS WITH STAGE 3 CHRONIC KIDNEY DISEASE, WITHOUT LONG-TERM CURRENT USE OF INSULIN (HCC): ICD-10-CM

## 2020-01-14 DIAGNOSIS — N18.30 TYPE 2 DIABETES MELLITUS WITH STAGE 3 CHRONIC KIDNEY DISEASE, WITHOUT LONG-TERM CURRENT USE OF INSULIN (HCC): ICD-10-CM

## 2020-01-14 DIAGNOSIS — E03.9 ACQUIRED HYPOTHYROIDISM: ICD-10-CM

## 2020-01-14 DIAGNOSIS — I10 ESSENTIAL HYPERTENSION: Primary | ICD-10-CM

## 2020-01-14 PROCEDURE — 99214 OFFICE O/P EST MOD 30 MIN: CPT | Performed by: FAMILY MEDICINE

## 2020-01-14 RX ORDER — GLIMEPIRIDE 2 MG/1
4 TABLET ORAL
Qty: 60 TABLET | Refills: 4 | Status: SHIPPED | OUTPATIENT
Start: 2020-01-14 | End: 2020-04-10 | Stop reason: HOSPADM

## 2020-01-14 NOTE — PROGRESS NOTES
Subjective   Rekha Bear is a 79 y.o. female.     History of Present Illness   Imelda is here for diabetes follow up.  Her medication was increased after her last visit.  She states she is doing well on the increased dosage and reports no side effects.Her last HBA1C was 10.3 . She is here for a re-check. She has been trying to lose weight. She cannot exercise much because of joint pain.     She has chronic hypothyroidism  and needs this checked today because her THS was elevated.    Rekha has chronic hypertension and has been well controlled on current medications.She is tolerating medications without side effect. She reports no vision changes, headaches or lightheadedness. She is requesting refills of medications.    The following portions of the patient's history were reviewed and updated as appropriate: allergies, current medications, past family history, past medical history, past social history, past surgical history and problem list.    Review of Systems   Respiratory: Negative for shortness of breath.    Cardiovascular: Negative for chest pain.   Endocrine: Negative for polydipsia, polyphagia and polyuria.   Musculoskeletal: Positive for arthralgias.   All other systems reviewed and are negative.      Objective   Physical Exam   Constitutional: She is oriented to person, place, and time. She appears well-developed.   HENT:   Head: Normocephalic and atraumatic.   Eyes: Pupils are equal, round, and reactive to light. EOM are normal.   Neck: Normal range of motion. Neck supple.   Cardiovascular: Normal rate, regular rhythm and normal heart sounds.   Pulmonary/Chest: Effort normal and breath sounds normal.   Abdominal: Soft. Bowel sounds are normal.   Musculoskeletal: Normal range of motion. She exhibits no edema.   Neurological: She is alert and oriented to person, place, and time.   Skin: Skin is warm and dry. No rash noted.   Psychiatric: She has a normal mood and affect. Her behavior is normal.  Judgment and thought content normal.   Nursing note and vitals reviewed.        Assessment/Plan   Rekha was seen today for hypothyroidism, hyperlipidemia and diabetes.    Diagnoses and all orders for this visit:    Essential hypertension  Well controlled on current medication, will refill medication today and as needed. She will RTO for repeat B/P check in 6 months.    Acquired hypothyroidism  -     TSH    Type 2 diabetes mellitus with stage 3 chronic kidney disease, without long-term current use of insulin (CMS/[facility])  -     Hemoglobin A1c  -     Comprehensive Metabolic Panel  -     glimepiride (AMARYL) 2 MG tablet; Take 2 tablets by mouth Every Morning Before Breakfast.

## 2020-01-15 LAB
ALBUMIN SERPL-MCNC: 4.2 G/DL (ref 3.5–5.2)
ALBUMIN/GLOB SERPL: 1.7 G/DL
ALP SERPL-CCNC: 72 U/L (ref 39–117)
ALT SERPL-CCNC: 11 U/L (ref 1–33)
AST SERPL-CCNC: 16 U/L (ref 1–32)
BILIRUB SERPL-MCNC: 1.2 MG/DL (ref 0.2–1.2)
BUN SERPL-MCNC: 16 MG/DL (ref 8–23)
BUN/CREAT SERPL: 13.3 (ref 7–25)
CALCIUM SERPL-MCNC: 9.3 MG/DL (ref 8.6–10.5)
CHLORIDE SERPL-SCNC: 99 MMOL/L (ref 98–107)
CO2 SERPL-SCNC: 26.2 MMOL/L (ref 22–29)
CREAT SERPL-MCNC: 1.2 MG/DL (ref 0.57–1)
GLOBULIN SER CALC-MCNC: 2.5 GM/DL
GLUCOSE SERPL-MCNC: 182 MG/DL (ref 65–99)
HBA1C MFR BLD: 8.7 % (ref 4.8–5.6)
POTASSIUM SERPL-SCNC: 4.5 MMOL/L (ref 3.5–5.2)
PROT SERPL-MCNC: 6.7 G/DL (ref 6–8.5)
SODIUM SERPL-SCNC: 139 MMOL/L (ref 136–145)
TSH SERPL DL<=0.005 MIU/L-ACNC: 1.42 UIU/ML (ref 0.27–4.2)

## 2020-02-24 DIAGNOSIS — Z12.39 SCREENING FOR MALIGNANT NEOPLASM OF BREAST: Primary | ICD-10-CM

## 2020-02-24 DIAGNOSIS — Z09 ENCOUNTER FOR FOLLOW-UP EXAMINATION AFTER COMPLETED TREATMENT FOR CONDITIONS OTHER THAN MALIGNANT NEOPLASM: ICD-10-CM

## 2020-02-25 DIAGNOSIS — N64.59 BREAST THICKENING: Primary | ICD-10-CM

## 2020-03-21 ENCOUNTER — HOSPITAL ENCOUNTER (INPATIENT)
Facility: HOSPITAL | Age: 80
LOS: 20 days | Discharge: NURSING, REHAB OR LONG-TERM CARE | End: 2020-04-10
Attending: EMERGENCY MEDICINE | Admitting: INTERNAL MEDICINE

## 2020-03-21 ENCOUNTER — APPOINTMENT (OUTPATIENT)
Dept: GENERAL RADIOLOGY | Facility: HOSPITAL | Age: 80
End: 2020-03-21

## 2020-03-21 DIAGNOSIS — J18.9 COMMUNITY ACQUIRED PNEUMONIA OF LEFT LOWER LOBE OF LUNG: Primary | ICD-10-CM

## 2020-03-21 DIAGNOSIS — N28.9 ACUTE RENAL INSUFFICIENCY: ICD-10-CM

## 2020-03-21 DIAGNOSIS — E87.1 HYPONATREMIA: ICD-10-CM

## 2020-03-21 DIAGNOSIS — J18.9 COMMUNITY ACQUIRED PNEUMONIA OF RIGHT LOWER LOBE OF LUNG: ICD-10-CM

## 2020-03-21 PROBLEM — E86.0 DEHYDRATION: Status: ACTIVE | Noted: 2020-03-21

## 2020-03-21 PROBLEM — E11.9 DIABETES MELLITUS, TYPE II: Status: ACTIVE | Noted: 2020-03-21

## 2020-03-21 LAB
ALBUMIN SERPL-MCNC: 3.6 G/DL (ref 3.5–5.2)
ALBUMIN/GLOB SERPL: 1 G/DL
ALP SERPL-CCNC: 63 U/L (ref 39–117)
ALT SERPL W P-5'-P-CCNC: 13 U/L (ref 1–33)
ANION GAP SERPL CALCULATED.3IONS-SCNC: 14.6 MMOL/L (ref 5–15)
AST SERPL-CCNC: 23 U/L (ref 1–32)
B PARAPERT DNA SPEC QL NAA+PROBE: NOT DETECTED
B PERT DNA SPEC QL NAA+PROBE: NOT DETECTED
BACTERIA UR QL AUTO: ABNORMAL /HPF
BASOPHILS # BLD AUTO: 0.01 10*3/MM3 (ref 0–0.2)
BASOPHILS NFR BLD AUTO: 0.2 % (ref 0–1.5)
BILIRUB SERPL-MCNC: 0.7 MG/DL (ref 0.2–1.2)
BILIRUB UR QL STRIP: NEGATIVE
BUN BLD-MCNC: 27 MG/DL (ref 8–23)
BUN/CREAT SERPL: 18.5 (ref 7–25)
C PNEUM DNA NPH QL NAA+NON-PROBE: NOT DETECTED
CALCIUM SPEC-SCNC: 8.7 MG/DL (ref 8.6–10.5)
CHLORIDE SERPL-SCNC: 91 MMOL/L (ref 98–107)
CLARITY UR: ABNORMAL
CO2 SERPL-SCNC: 21.4 MMOL/L (ref 22–29)
COLOR UR: YELLOW
CREAT BLD-MCNC: 1.46 MG/DL (ref 0.57–1)
D-LACTATE SERPL-SCNC: 1.7 MMOL/L (ref 0.5–2)
D-LACTATE SERPL-SCNC: 3.3 MMOL/L (ref 0.5–2)
DEPRECATED RDW RBC AUTO: 45.8 FL (ref 37–54)
EOSINOPHIL # BLD AUTO: 0 10*3/MM3 (ref 0–0.4)
EOSINOPHIL NFR BLD AUTO: 0 % (ref 0.3–6.2)
ERYTHROCYTE [DISTWIDTH] IN BLOOD BY AUTOMATED COUNT: 14.1 % (ref 12.3–15.4)
FLUAV H1 2009 PAND RNA NPH QL NAA+PROBE: NOT DETECTED
FLUAV H1 HA GENE NPH QL NAA+PROBE: NOT DETECTED
FLUAV H3 RNA NPH QL NAA+PROBE: NOT DETECTED
FLUAV SUBTYP SPEC NAA+PROBE: NOT DETECTED
FLUBV RNA ISLT QL NAA+PROBE: NOT DETECTED
GFR SERPL CREATININE-BSD FRML MDRD: 35 ML/MIN/1.73
GLOBULIN UR ELPH-MCNC: 3.5 GM/DL
GLUCOSE BLD-MCNC: 301 MG/DL (ref 65–99)
GLUCOSE BLDC GLUCOMTR-MCNC: 261 MG/DL (ref 70–130)
GLUCOSE UR STRIP-MCNC: ABNORMAL MG/DL
GRAN CASTS URNS QL MICRO: ABNORMAL /LPF
HADV DNA SPEC NAA+PROBE: NOT DETECTED
HCOV 229E RNA SPEC QL NAA+PROBE: NOT DETECTED
HCOV HKU1 RNA SPEC QL NAA+PROBE: NOT DETECTED
HCOV NL63 RNA SPEC QL NAA+PROBE: NOT DETECTED
HCOV OC43 RNA SPEC QL NAA+PROBE: NOT DETECTED
HCT VFR BLD AUTO: 47.1 % (ref 34–46.6)
HGB BLD-MCNC: 15.8 G/DL (ref 12–15.9)
HGB UR QL STRIP.AUTO: NEGATIVE
HMPV RNA NPH QL NAA+NON-PROBE: NOT DETECTED
HOLD SPECIMEN: NORMAL
HOLD SPECIMEN: NORMAL
HPIV1 RNA SPEC QL NAA+PROBE: NOT DETECTED
HPIV2 RNA SPEC QL NAA+PROBE: NOT DETECTED
HPIV3 RNA NPH QL NAA+PROBE: NOT DETECTED
HPIV4 P GENE NPH QL NAA+PROBE: NOT DETECTED
HYALINE CASTS UR QL AUTO: ABNORMAL /LPF
IMM GRANULOCYTES # BLD AUTO: 0.02 10*3/MM3 (ref 0–0.05)
IMM GRANULOCYTES NFR BLD AUTO: 0.4 % (ref 0–0.5)
KETONES UR QL STRIP: ABNORMAL
LACTATE HOLD SPECIMEN: NORMAL
LEUKOCYTE ESTERASE UR QL STRIP.AUTO: NEGATIVE
LYMPHOCYTES # BLD AUTO: 0.82 10*3/MM3 (ref 0.7–3.1)
LYMPHOCYTES NFR BLD AUTO: 15.3 % (ref 19.6–45.3)
M PNEUMO IGG SER IA-ACNC: NOT DETECTED
MCH RBC QN AUTO: 29.4 PG (ref 26.6–33)
MCHC RBC AUTO-ENTMCNC: 33.5 G/DL (ref 31.5–35.7)
MCV RBC AUTO: 87.5 FL (ref 79–97)
MONOCYTES # BLD AUTO: 0.35 10*3/MM3 (ref 0.1–0.9)
MONOCYTES NFR BLD AUTO: 6.5 % (ref 5–12)
NEUTROPHILS # BLD AUTO: 4.16 10*3/MM3 (ref 1.7–7)
NEUTROPHILS NFR BLD AUTO: 77.6 % (ref 42.7–76)
NITRITE UR QL STRIP: NEGATIVE
NRBC BLD AUTO-RTO: 0 /100 WBC (ref 0–0.2)
PH UR STRIP.AUTO: <=5 [PH] (ref 5–8)
PLATELET # BLD AUTO: 138 10*3/MM3 (ref 140–450)
PMV BLD AUTO: 9.6 FL (ref 6–12)
POTASSIUM BLD-SCNC: 3.9 MMOL/L (ref 3.5–5.2)
PROCALCITONIN SERPL-MCNC: 0.16 NG/ML (ref 0.1–0.25)
PROT SERPL-MCNC: 7.1 G/DL (ref 6–8.5)
PROT UR QL STRIP: ABNORMAL
RBC # BLD AUTO: 5.38 10*6/MM3 (ref 3.77–5.28)
RBC # UR: ABNORMAL /HPF
REF LAB TEST METHOD: ABNORMAL
RHINOVIRUS RNA SPEC NAA+PROBE: NOT DETECTED
RSV RNA NPH QL NAA+NON-PROBE: NOT DETECTED
SODIUM BLD-SCNC: 127 MMOL/L (ref 136–145)
SP GR UR STRIP: 1.03 (ref 1–1.03)
SQUAMOUS #/AREA URNS HPF: ABNORMAL /HPF
TRANS CELLS #/AREA URNS HPF: ABNORMAL /HPF
TROPONIN T SERPL-MCNC: <0.01 NG/ML (ref 0–0.03)
UROBILINOGEN UR QL STRIP: ABNORMAL
WBC NRBC COR # BLD: 5.36 10*3/MM3 (ref 3.4–10.8)
WBC UR QL AUTO: ABNORMAL /HPF
WHOLE BLOOD HOLD SPECIMEN: NORMAL
WHOLE BLOOD HOLD SPECIMEN: NORMAL

## 2020-03-21 PROCEDURE — P9612 CATHETERIZE FOR URINE SPEC: HCPCS

## 2020-03-21 PROCEDURE — 85025 COMPLETE CBC W/AUTO DIFF WBC: CPT | Performed by: NURSE PRACTITIONER

## 2020-03-21 PROCEDURE — 63710000001 INSULIN LISPRO (HUMAN) PER 5 UNITS: Performed by: INTERNAL MEDICINE

## 2020-03-21 PROCEDURE — 84145 PROCALCITONIN (PCT): CPT | Performed by: NURSE PRACTITIONER

## 2020-03-21 PROCEDURE — 87040 BLOOD CULTURE FOR BACTERIA: CPT | Performed by: NURSE PRACTITIONER

## 2020-03-21 PROCEDURE — 80053 COMPREHEN METABOLIC PANEL: CPT | Performed by: NURSE PRACTITIONER

## 2020-03-21 PROCEDURE — 93010 ELECTROCARDIOGRAM REPORT: CPT | Performed by: INTERNAL MEDICINE

## 2020-03-21 PROCEDURE — 81001 URINALYSIS AUTO W/SCOPE: CPT | Performed by: NURSE PRACTITIONER

## 2020-03-21 PROCEDURE — 25010000002 CEFTRIAXONE PER 250 MG: Performed by: NURSE PRACTITIONER

## 2020-03-21 PROCEDURE — 84484 ASSAY OF TROPONIN QUANT: CPT | Performed by: NURSE PRACTITIONER

## 2020-03-21 PROCEDURE — 93005 ELECTROCARDIOGRAM TRACING: CPT | Performed by: NURSE PRACTITIONER

## 2020-03-21 PROCEDURE — 82962 GLUCOSE BLOOD TEST: CPT

## 2020-03-21 PROCEDURE — 99285 EMERGENCY DEPT VISIT HI MDM: CPT

## 2020-03-21 PROCEDURE — 0100U HC BIOFIRE FILMARRAY RESP PANEL 2: CPT | Performed by: NURSE PRACTITIONER

## 2020-03-21 PROCEDURE — 83605 ASSAY OF LACTIC ACID: CPT | Performed by: NURSE PRACTITIONER

## 2020-03-21 PROCEDURE — 25010000002 AZITHROMYCIN PER 500 MG: Performed by: NURSE PRACTITIONER

## 2020-03-21 PROCEDURE — 25010000002 ENOXAPARIN PER 10 MG: Performed by: INTERNAL MEDICINE

## 2020-03-21 PROCEDURE — 71046 X-RAY EXAM CHEST 2 VIEWS: CPT

## 2020-03-21 RX ORDER — CHOLESTYRAMINE LIGHT 4 G/5.7G
1 POWDER, FOR SUSPENSION ORAL DAILY
Status: DISCONTINUED | OUTPATIENT
Start: 2020-03-22 | End: 2020-03-26

## 2020-03-21 RX ORDER — ACETAMINOPHEN 650 MG/1
650 SUPPOSITORY RECTAL EVERY 4 HOURS PRN
Status: DISCONTINUED | OUTPATIENT
Start: 2020-03-21 | End: 2020-03-25 | Stop reason: SDUPTHER

## 2020-03-21 RX ORDER — SODIUM CHLORIDE 0.9 % (FLUSH) 0.9 %
10 SYRINGE (ML) INJECTION AS NEEDED
Status: DISCONTINUED | OUTPATIENT
Start: 2020-03-21 | End: 2020-04-10 | Stop reason: HOSPADM

## 2020-03-21 RX ORDER — DEXTROSE MONOHYDRATE 25 G/50ML
25 INJECTION, SOLUTION INTRAVENOUS
Status: DISCONTINUED | OUTPATIENT
Start: 2020-03-21 | End: 2020-04-10 | Stop reason: HOSPADM

## 2020-03-21 RX ORDER — NICOTINE POLACRILEX 4 MG
15 LOZENGE BUCCAL
Status: DISCONTINUED | OUTPATIENT
Start: 2020-03-21 | End: 2020-04-10 | Stop reason: HOSPADM

## 2020-03-21 RX ORDER — GLIPIZIDE 5 MG/1
5 TABLET ORAL
Status: DISCONTINUED | OUTPATIENT
Start: 2020-03-22 | End: 2020-03-24

## 2020-03-21 RX ORDER — NITROGLYCERIN 0.4 MG/1
0.4 TABLET SUBLINGUAL
Status: DISCONTINUED | OUTPATIENT
Start: 2020-03-21 | End: 2020-04-10 | Stop reason: HOSPADM

## 2020-03-21 RX ORDER — CEFTRIAXONE SODIUM 1 G/50ML
1 INJECTION, SOLUTION INTRAVENOUS ONCE
Status: COMPLETED | OUTPATIENT
Start: 2020-03-21 | End: 2020-03-21

## 2020-03-21 RX ORDER — ACETAMINOPHEN 500 MG
1000 TABLET ORAL ONCE
Status: COMPLETED | OUTPATIENT
Start: 2020-03-21 | End: 2020-03-21

## 2020-03-21 RX ORDER — CETIRIZINE HYDROCHLORIDE 10 MG/1
5 TABLET ORAL DAILY
Status: DISCONTINUED | OUTPATIENT
Start: 2020-03-22 | End: 2020-03-25

## 2020-03-21 RX ORDER — GUAIFENESIN 200 MG/1
400 TABLET ORAL DAILY PRN
Status: DISCONTINUED | OUTPATIENT
Start: 2020-03-21 | End: 2020-03-25

## 2020-03-21 RX ORDER — ACETAMINOPHEN 325 MG/1
650 TABLET ORAL EVERY 4 HOURS PRN
Status: DISCONTINUED | OUTPATIENT
Start: 2020-03-21 | End: 2020-03-25 | Stop reason: SDUPTHER

## 2020-03-21 RX ORDER — ACETAMINOPHEN 160 MG/5ML
650 SOLUTION ORAL EVERY 4 HOURS PRN
Status: DISCONTINUED | OUTPATIENT
Start: 2020-03-21 | End: 2020-03-25 | Stop reason: SDUPTHER

## 2020-03-21 RX ORDER — CEFTRIAXONE SODIUM 1 G/50ML
1 INJECTION, SOLUTION INTRAVENOUS EVERY 24 HOURS
Status: COMPLETED | OUTPATIENT
Start: 2020-03-22 | End: 2020-03-26

## 2020-03-21 RX ORDER — GABAPENTIN 300 MG/1
600 CAPSULE ORAL EVERY 12 HOURS SCHEDULED
Status: DISCONTINUED | OUTPATIENT
Start: 2020-03-21 | End: 2020-03-26

## 2020-03-21 RX ORDER — TRAMADOL HYDROCHLORIDE 50 MG/1
50 TABLET ORAL EVERY 8 HOURS PRN
Status: DISCONTINUED | OUTPATIENT
Start: 2020-03-21 | End: 2020-03-27

## 2020-03-21 RX ORDER — SODIUM CHLORIDE 9 MG/ML
125 INJECTION, SOLUTION INTRAVENOUS CONTINUOUS
Status: DISCONTINUED | OUTPATIENT
Start: 2020-03-21 | End: 2020-03-24

## 2020-03-21 RX ORDER — LEVOTHYROXINE SODIUM 0.1 MG/1
100 TABLET ORAL DAILY
Status: DISCONTINUED | OUTPATIENT
Start: 2020-03-22 | End: 2020-04-10 | Stop reason: HOSPADM

## 2020-03-21 RX ORDER — ROSUVASTATIN CALCIUM 20 MG/1
20 TABLET, COATED ORAL DAILY
Status: DISCONTINUED | OUTPATIENT
Start: 2020-03-22 | End: 2020-03-25

## 2020-03-21 RX ORDER — SODIUM CHLORIDE 0.9 % (FLUSH) 0.9 %
10 SYRINGE (ML) INJECTION EVERY 12 HOURS SCHEDULED
Status: DISCONTINUED | OUTPATIENT
Start: 2020-03-21 | End: 2020-04-10 | Stop reason: HOSPADM

## 2020-03-21 RX ORDER — ONDANSETRON 2 MG/ML
4 INJECTION INTRAMUSCULAR; INTRAVENOUS EVERY 6 HOURS PRN
Status: DISCONTINUED | OUTPATIENT
Start: 2020-03-21 | End: 2020-04-07 | Stop reason: SDUPTHER

## 2020-03-21 RX ORDER — OXYBUTYNIN CHLORIDE 10 MG/1
10 TABLET, EXTENDED RELEASE ORAL DAILY
Status: DISCONTINUED | OUTPATIENT
Start: 2020-03-22 | End: 2020-03-25

## 2020-03-21 RX ORDER — METOPROLOL SUCCINATE 25 MG/1
25 TABLET, EXTENDED RELEASE ORAL DAILY
Status: DISCONTINUED | OUTPATIENT
Start: 2020-03-22 | End: 2020-03-26

## 2020-03-21 RX ADMIN — AZITHROMYCIN MONOHYDRATE 500 MG: 500 INJECTION, POWDER, LYOPHILIZED, FOR SOLUTION INTRAVENOUS at 20:14

## 2020-03-21 RX ADMIN — SODIUM CHLORIDE 125 ML/HR: 9 INJECTION, SOLUTION INTRAVENOUS at 21:45

## 2020-03-21 RX ADMIN — ACETAMINOPHEN 1000 MG: 500 TABLET, FILM COATED ORAL at 20:14

## 2020-03-21 RX ADMIN — SODIUM CHLORIDE 1000 ML: 9 INJECTION, SOLUTION INTRAVENOUS at 17:32

## 2020-03-21 RX ADMIN — INSULIN LISPRO 6 UNITS: 100 INJECTION, SOLUTION INTRAVENOUS; SUBCUTANEOUS at 22:01

## 2020-03-21 RX ADMIN — GABAPENTIN 600 MG: 300 CAPSULE ORAL at 22:01

## 2020-03-21 RX ADMIN — SODIUM CHLORIDE, PRESERVATIVE FREE 10 ML: 5 INJECTION INTRAVENOUS at 23:03

## 2020-03-21 RX ADMIN — ENOXAPARIN SODIUM 40 MG: 40 INJECTION SUBCUTANEOUS at 22:01

## 2020-03-21 RX ADMIN — CEFTRIAXONE SODIUM 1 G: 1 INJECTION, SOLUTION INTRAVENOUS at 19:23

## 2020-03-22 LAB
ADV 40+41 DNA STL QL NAA+NON-PROBE: NOT DETECTED
ALBUMIN SERPL-MCNC: 3.2 G/DL (ref 3.5–5.2)
ALBUMIN/GLOB SERPL: 1 G/DL
ALP SERPL-CCNC: 53 U/L (ref 39–117)
ALT SERPL W P-5'-P-CCNC: 12 U/L (ref 1–33)
ANION GAP SERPL CALCULATED.3IONS-SCNC: 10.9 MMOL/L (ref 5–15)
AST SERPL-CCNC: 27 U/L (ref 1–32)
ASTRO TYP 1-8 RNA STL QL NAA+NON-PROBE: NOT DETECTED
BASOPHILS # BLD AUTO: 0.01 10*3/MM3 (ref 0–0.2)
BASOPHILS NFR BLD AUTO: 0.2 % (ref 0–1.5)
BILIRUB SERPL-MCNC: 0.4 MG/DL (ref 0.2–1.2)
BUN BLD-MCNC: 29 MG/DL (ref 8–23)
BUN/CREAT SERPL: 21 (ref 7–25)
C CAYETANENSIS DNA STL QL NAA+NON-PROBE: NOT DETECTED
CALCIUM SPEC-SCNC: 8.2 MG/DL (ref 8.6–10.5)
CAMPY SP DNA.DIARRHEA STL QL NAA+PROBE: NOT DETECTED
CHLORIDE SERPL-SCNC: 99 MMOL/L (ref 98–107)
CO2 SERPL-SCNC: 23.1 MMOL/L (ref 22–29)
CREAT BLD-MCNC: 1.38 MG/DL (ref 0.57–1)
CRYPTOSP STL CULT: NOT DETECTED
D DIMER PPP FEU-MCNC: 0.99 MCGFEU/ML (ref 0–0.49)
D-LACTATE SERPL-SCNC: 2.7 MMOL/L (ref 0.5–2)
DEPRECATED RDW RBC AUTO: 42.5 FL (ref 37–54)
E COLI DNA SPEC QL NAA+PROBE: NOT DETECTED
E HISTOLYT AG STL-ACNC: NOT DETECTED
EAEC PAA PLAS AGGR+AATA ST NAA+NON-PRB: NOT DETECTED
EC STX1 + STX2 GENES STL NAA+PROBE: NOT DETECTED
EOSINOPHIL # BLD AUTO: 0.01 10*3/MM3 (ref 0–0.4)
EOSINOPHIL NFR BLD AUTO: 0.2 % (ref 0.3–6.2)
EPEC EAE GENE STL QL NAA+NON-PROBE: NOT DETECTED
ERYTHROCYTE [DISTWIDTH] IN BLOOD BY AUTOMATED COUNT: 13.7 % (ref 12.3–15.4)
ETEC LTA+ST1A+ST1B TOX ST NAA+NON-PROBE: NOT DETECTED
FERRITIN SERPL-MCNC: 273 NG/ML (ref 13–150)
G LAMBLIA DNA SPEC QL NAA+PROBE: NOT DETECTED
GFR SERPL CREATININE-BSD FRML MDRD: 37 ML/MIN/1.73
GLOBULIN UR ELPH-MCNC: 3.1 GM/DL
GLUCOSE BLD-MCNC: 145 MG/DL (ref 65–99)
GLUCOSE BLDC GLUCOMTR-MCNC: 109 MG/DL (ref 70–130)
GLUCOSE BLDC GLUCOMTR-MCNC: 115 MG/DL (ref 70–130)
GLUCOSE BLDC GLUCOMTR-MCNC: 130 MG/DL (ref 70–130)
GLUCOSE BLDC GLUCOMTR-MCNC: 76 MG/DL (ref 70–130)
HBA1C MFR BLD: 8 % (ref 4.8–5.6)
HCT VFR BLD AUTO: 41.7 % (ref 34–46.6)
HGB BLD-MCNC: 14.4 G/DL (ref 12–15.9)
IMM GRANULOCYTES # BLD AUTO: 0.01 10*3/MM3 (ref 0–0.05)
IMM GRANULOCYTES NFR BLD AUTO: 0.2 % (ref 0–0.5)
L PNEUMO1 AG UR QL IA: NEGATIVE
LYMPHOCYTES # BLD AUTO: 1.2 10*3/MM3 (ref 0.7–3.1)
LYMPHOCYTES NFR BLD AUTO: 24.5 % (ref 19.6–45.3)
MCH RBC QN AUTO: 29.6 PG (ref 26.6–33)
MCHC RBC AUTO-ENTMCNC: 34.5 G/DL (ref 31.5–35.7)
MCV RBC AUTO: 85.8 FL (ref 79–97)
MONOCYTES # BLD AUTO: 0.4 10*3/MM3 (ref 0.1–0.9)
MONOCYTES NFR BLD AUTO: 8.2 % (ref 5–12)
NEUTROPHILS # BLD AUTO: 3.27 10*3/MM3 (ref 1.7–7)
NEUTROPHILS NFR BLD AUTO: 66.7 % (ref 42.7–76)
NOROVIRUS GI+II RNA STL QL NAA+NON-PROBE: NOT DETECTED
NRBC BLD AUTO-RTO: 0 /100 WBC (ref 0–0.2)
P SHIGELLOIDES DNA STL QL NAA+PROBE: NOT DETECTED
PLATELET # BLD AUTO: 121 10*3/MM3 (ref 140–450)
PMV BLD AUTO: 9.1 FL (ref 6–12)
POTASSIUM BLD-SCNC: 4.3 MMOL/L (ref 3.5–5.2)
PROT SERPL-MCNC: 6.3 G/DL (ref 6–8.5)
RBC # BLD AUTO: 4.86 10*6/MM3 (ref 3.77–5.28)
RV RNA STL NAA+PROBE: NOT DETECTED
S PNEUM AG SPEC QL LA: NEGATIVE
SALMONELLA DNA SPEC QL NAA+PROBE: NOT DETECTED
SAPO I+II+IV+V RNA STL QL NAA+NON-PROBE: NOT DETECTED
SHIGELLA SP+EIEC IPAH STL QL NAA+PROBE: NOT DETECTED
SODIUM BLD-SCNC: 133 MMOL/L (ref 136–145)
V CHOLERAE DNA SPEC QL NAA+PROBE: NOT DETECTED
VIBRIO DNA SPEC NAA+PROBE: NOT DETECTED
WBC NRBC COR # BLD: 4.9 10*3/MM3 (ref 3.4–10.8)
YERSINIA STL CULT: NOT DETECTED

## 2020-03-22 PROCEDURE — 85379 FIBRIN DEGRADATION QUANT: CPT | Performed by: INTERNAL MEDICINE

## 2020-03-22 PROCEDURE — 0097U HC BIOFIRE FILMARRAY GI PANEL: CPT | Performed by: INTERNAL MEDICINE

## 2020-03-22 PROCEDURE — 87899 AGENT NOS ASSAY W/OPTIC: CPT | Performed by: INTERNAL MEDICINE

## 2020-03-22 PROCEDURE — 82962 GLUCOSE BLOOD TEST: CPT

## 2020-03-22 PROCEDURE — 83036 HEMOGLOBIN GLYCOSYLATED A1C: CPT | Performed by: INTERNAL MEDICINE

## 2020-03-22 PROCEDURE — 82728 ASSAY OF FERRITIN: CPT | Performed by: INTERNAL MEDICINE

## 2020-03-22 PROCEDURE — 25010000002 AZITHROMYCIN PER 500 MG: Performed by: INTERNAL MEDICINE

## 2020-03-22 PROCEDURE — 25010000002 CEFTRIAXONE PER 250 MG: Performed by: INTERNAL MEDICINE

## 2020-03-22 PROCEDURE — 83605 ASSAY OF LACTIC ACID: CPT | Performed by: INTERNAL MEDICINE

## 2020-03-22 PROCEDURE — 25010000002 ENOXAPARIN PER 10 MG: Performed by: INTERNAL MEDICINE

## 2020-03-22 PROCEDURE — 80053 COMPREHEN METABOLIC PANEL: CPT | Performed by: INTERNAL MEDICINE

## 2020-03-22 PROCEDURE — 87040 BLOOD CULTURE FOR BACTERIA: CPT | Performed by: INTERNAL MEDICINE

## 2020-03-22 PROCEDURE — 85025 COMPLETE CBC W/AUTO DIFF WBC: CPT | Performed by: INTERNAL MEDICINE

## 2020-03-22 RX ADMIN — ACETAMINOPHEN 650 MG: 325 TABLET, FILM COATED ORAL at 08:44

## 2020-03-22 RX ADMIN — GABAPENTIN 600 MG: 300 CAPSULE ORAL at 20:17

## 2020-03-22 RX ADMIN — CETIRIZINE HYDROCHLORIDE 5 MG: 10 TABLET, FILM COATED ORAL at 08:44

## 2020-03-22 RX ADMIN — OXYBUTYNIN CHLORIDE 10 MG: 10 TABLET, EXTENDED RELEASE ORAL at 08:45

## 2020-03-22 RX ADMIN — SODIUM CHLORIDE 125 ML/HR: 9 INJECTION, SOLUTION INTRAVENOUS at 13:54

## 2020-03-22 RX ADMIN — SODIUM CHLORIDE 125 ML/HR: 9 INJECTION, SOLUTION INTRAVENOUS at 05:53

## 2020-03-22 RX ADMIN — SODIUM CHLORIDE, PRESERVATIVE FREE 10 ML: 5 INJECTION INTRAVENOUS at 20:19

## 2020-03-22 RX ADMIN — CEFTRIAXONE SODIUM 1 G: 1 INJECTION, SOLUTION INTRAVENOUS at 18:32

## 2020-03-22 RX ADMIN — ACETAMINOPHEN 650 MG: 325 TABLET, FILM COATED ORAL at 22:20

## 2020-03-22 RX ADMIN — AZITHROMYCIN DIHYDRATE 500 MG: 500 INJECTION, POWDER, LYOPHILIZED, FOR SOLUTION INTRAVENOUS at 20:17

## 2020-03-22 RX ADMIN — ENOXAPARIN SODIUM 40 MG: 40 INJECTION SUBCUTANEOUS at 22:20

## 2020-03-22 RX ADMIN — SODIUM CHLORIDE, PRESERVATIVE FREE 10 ML: 5 INJECTION INTRAVENOUS at 11:18

## 2020-03-22 RX ADMIN — ROSUVASTATIN CALCIUM 20 MG: 20 TABLET, FILM COATED ORAL at 08:47

## 2020-03-22 RX ADMIN — GABAPENTIN 600 MG: 300 CAPSULE ORAL at 08:44

## 2020-03-22 RX ADMIN — ACETAMINOPHEN 650 MG: 325 TABLET, FILM COATED ORAL at 18:32

## 2020-03-22 RX ADMIN — ACETAMINOPHEN 650 MG: 325 TABLET, FILM COATED ORAL at 13:55

## 2020-03-22 RX ADMIN — METOPROLOL SUCCINATE 25 MG: 25 TABLET, EXTENDED RELEASE ORAL at 08:46

## 2020-03-22 RX ADMIN — GLIPIZIDE 5 MG: 5 TABLET ORAL at 06:36

## 2020-03-22 RX ADMIN — CHOLESTYRAMINE 4 G: 4 POWDER, FOR SUSPENSION ORAL at 11:18

## 2020-03-22 RX ADMIN — LEVOTHYROXINE SODIUM 100 MCG: 100 TABLET ORAL at 06:36

## 2020-03-23 LAB
ANION GAP SERPL CALCULATED.3IONS-SCNC: 12.4 MMOL/L (ref 5–15)
BUN BLD-MCNC: 19 MG/DL (ref 8–23)
BUN/CREAT SERPL: 17.4 (ref 7–25)
CALCIUM SPEC-SCNC: 7.7 MG/DL (ref 8.6–10.5)
CHLORIDE SERPL-SCNC: 105 MMOL/L (ref 98–107)
CO2 SERPL-SCNC: 18.6 MMOL/L (ref 22–29)
CREAT BLD-MCNC: 1.09 MG/DL (ref 0.57–1)
DEPRECATED RDW RBC AUTO: 42.2 FL (ref 37–54)
ERYTHROCYTE [DISTWIDTH] IN BLOOD BY AUTOMATED COUNT: 13.6 % (ref 12.3–15.4)
GFR SERPL CREATININE-BSD FRML MDRD: 49 ML/MIN/1.73
GLUCOSE BLD-MCNC: 114 MG/DL (ref 65–99)
GLUCOSE BLD-MCNC: 61 MG/DL (ref 65–99)
GLUCOSE BLDC GLUCOMTR-MCNC: 104 MG/DL (ref 70–130)
GLUCOSE BLDC GLUCOMTR-MCNC: 113 MG/DL (ref 70–130)
GLUCOSE BLDC GLUCOMTR-MCNC: 39 MG/DL (ref 70–130)
GLUCOSE BLDC GLUCOMTR-MCNC: 44 MG/DL (ref 70–130)
GLUCOSE BLDC GLUCOMTR-MCNC: 54 MG/DL (ref 70–130)
GLUCOSE BLDC GLUCOMTR-MCNC: 88 MG/DL (ref 70–130)
GLUCOSE BLDC GLUCOMTR-MCNC: 98 MG/DL (ref 70–130)
HCT VFR BLD AUTO: 38 % (ref 34–46.6)
HGB BLD-MCNC: 12.6 G/DL (ref 12–15.9)
LYMPHOCYTES # BLD MANUAL: 1.1 10*3/MM3 (ref 0.7–3.1)
LYMPHOCYTES NFR BLD MANUAL: 21 % (ref 19.6–45.3)
LYMPHOCYTES NFR BLD MANUAL: 3 % (ref 5–12)
MCH RBC QN AUTO: 28.5 PG (ref 26.6–33)
MCHC RBC AUTO-ENTMCNC: 33.2 G/DL (ref 31.5–35.7)
MCV RBC AUTO: 86 FL (ref 79–97)
MONOCYTES # BLD AUTO: 0.16 10*3/MM3 (ref 0.1–0.9)
NEUTROPHILS # BLD AUTO: 4 10*3/MM3 (ref 1.7–7)
NEUTROPHILS NFR BLD MANUAL: 76 % (ref 42.7–76)
PLAT MORPH BLD: NORMAL
PLATELET # BLD AUTO: 112 10*3/MM3 (ref 140–450)
PMV BLD AUTO: 9.4 FL (ref 6–12)
POTASSIUM BLD-SCNC: 3.8 MMOL/L (ref 3.5–5.2)
RBC # BLD AUTO: 4.42 10*6/MM3 (ref 3.77–5.28)
RBC MORPH BLD: NORMAL
SODIUM BLD-SCNC: 136 MMOL/L (ref 136–145)
WBC MORPH BLD: NORMAL
WBC NRBC COR # BLD: 5.26 10*3/MM3 (ref 3.4–10.8)

## 2020-03-23 PROCEDURE — 80048 BASIC METABOLIC PNL TOTAL CA: CPT | Performed by: INTERNAL MEDICINE

## 2020-03-23 PROCEDURE — 25010000002 ENOXAPARIN PER 10 MG: Performed by: INTERNAL MEDICINE

## 2020-03-23 PROCEDURE — 82962 GLUCOSE BLOOD TEST: CPT

## 2020-03-23 PROCEDURE — 85007 BL SMEAR W/DIFF WBC COUNT: CPT | Performed by: INTERNAL MEDICINE

## 2020-03-23 PROCEDURE — 82947 ASSAY GLUCOSE BLOOD QUANT: CPT | Performed by: INTERNAL MEDICINE

## 2020-03-23 PROCEDURE — 85025 COMPLETE CBC W/AUTO DIFF WBC: CPT | Performed by: INTERNAL MEDICINE

## 2020-03-23 PROCEDURE — 25010000002 CEFTRIAXONE PER 250 MG: Performed by: INTERNAL MEDICINE

## 2020-03-23 PROCEDURE — 25010000002 AZITHROMYCIN PER 500 MG: Performed by: INTERNAL MEDICINE

## 2020-03-23 RX ORDER — L.ACID,PARA/B.BIFIDUM/S.THERM 8B CELL
1 CAPSULE ORAL DAILY
Status: DISCONTINUED | OUTPATIENT
Start: 2020-03-23 | End: 2020-03-26

## 2020-03-23 RX ADMIN — ROSUVASTATIN CALCIUM 20 MG: 20 TABLET, FILM COATED ORAL at 09:04

## 2020-03-23 RX ADMIN — SODIUM CHLORIDE, PRESERVATIVE FREE 10 ML: 5 INJECTION INTRAVENOUS at 21:00

## 2020-03-23 RX ADMIN — AZITHROMYCIN DIHYDRATE 500 MG: 500 INJECTION, POWDER, LYOPHILIZED, FOR SOLUTION INTRAVENOUS at 19:48

## 2020-03-23 RX ADMIN — CEFTRIAXONE SODIUM 1 G: 1 INJECTION, SOLUTION INTRAVENOUS at 19:48

## 2020-03-23 RX ADMIN — LEVOTHYROXINE SODIUM 100 MCG: 100 TABLET ORAL at 06:11

## 2020-03-23 RX ADMIN — GABAPENTIN 600 MG: 300 CAPSULE ORAL at 09:09

## 2020-03-23 RX ADMIN — ACETAMINOPHEN 650 MG: 325 TABLET, FILM COATED ORAL at 02:00

## 2020-03-23 RX ADMIN — ENOXAPARIN SODIUM 40 MG: 40 INJECTION SUBCUTANEOUS at 21:49

## 2020-03-23 RX ADMIN — METOPROLOL SUCCINATE 25 MG: 25 TABLET, EXTENDED RELEASE ORAL at 09:04

## 2020-03-23 RX ADMIN — SODIUM CHLORIDE 125 ML/HR: 9 INJECTION, SOLUTION INTRAVENOUS at 16:20

## 2020-03-23 RX ADMIN — Medication 1 CAPSULE: at 16:20

## 2020-03-23 RX ADMIN — SODIUM CHLORIDE 125 ML/HR: 9 INJECTION, SOLUTION INTRAVENOUS at 06:12

## 2020-03-23 RX ADMIN — ACETAMINOPHEN 650 MG: 325 TABLET, FILM COATED ORAL at 21:12

## 2020-03-23 RX ADMIN — OXYBUTYNIN CHLORIDE 10 MG: 10 TABLET, EXTENDED RELEASE ORAL at 09:04

## 2020-03-23 RX ADMIN — ACETAMINOPHEN 650 MG: 325 TABLET, FILM COATED ORAL at 23:57

## 2020-03-23 RX ADMIN — SODIUM CHLORIDE, PRESERVATIVE FREE 10 ML: 5 INJECTION INTRAVENOUS at 09:01

## 2020-03-23 RX ADMIN — CETIRIZINE HYDROCHLORIDE 5 MG: 10 TABLET, FILM COATED ORAL at 09:03

## 2020-03-23 RX ADMIN — LINAGLIPTIN 5 MG: 5 TABLET, FILM COATED ORAL at 08:30

## 2020-03-23 RX ADMIN — CHOLESTYRAMINE 4 G: 4 POWDER, FOR SUSPENSION ORAL at 09:05

## 2020-03-23 RX ADMIN — ACETAMINOPHEN 650 MG: 325 TABLET, FILM COATED ORAL at 06:08

## 2020-03-23 RX ADMIN — GLIPIZIDE 5 MG: 5 TABLET ORAL at 09:04

## 2020-03-23 RX ADMIN — GABAPENTIN 600 MG: 300 CAPSULE ORAL at 21:49

## 2020-03-24 PROBLEM — U07.1 PNEUMONIA DUE TO COVID-19 VIRUS: Status: ACTIVE | Noted: 2020-03-24

## 2020-03-24 PROBLEM — J12.82 PNEUMONIA DUE TO COVID-19 VIRUS: Status: ACTIVE | Noted: 2020-03-24

## 2020-03-24 LAB
ANION GAP SERPL CALCULATED.3IONS-SCNC: 10.2 MMOL/L (ref 5–15)
BUN BLD-MCNC: 12 MG/DL (ref 8–23)
BUN/CREAT SERPL: 12.8 (ref 7–25)
CALCIUM SPEC-SCNC: 7.1 MG/DL (ref 8.6–10.5)
CHLORIDE SERPL-SCNC: 109 MMOL/L (ref 98–107)
CO2 SERPL-SCNC: 17.8 MMOL/L (ref 22–29)
CREAT BLD-MCNC: 0.94 MG/DL (ref 0.57–1)
DEPRECATED RDW RBC AUTO: 43.2 FL (ref 37–54)
ERYTHROCYTE [DISTWIDTH] IN BLOOD BY AUTOMATED COUNT: 14 % (ref 12.3–15.4)
GFR SERPL CREATININE-BSD FRML MDRD: 58 ML/MIN/1.73
GLUCOSE BLD-MCNC: 117 MG/DL (ref 65–99)
GLUCOSE BLD-MCNC: 77 MG/DL (ref 65–99)
GLUCOSE BLDC GLUCOMTR-MCNC: 129 MG/DL (ref 70–130)
GLUCOSE BLDC GLUCOMTR-MCNC: 135 MG/DL (ref 70–130)
GLUCOSE BLDC GLUCOMTR-MCNC: 227 MG/DL (ref 70–130)
GLUCOSE BLDC GLUCOMTR-MCNC: 65 MG/DL (ref 70–130)
HCT VFR BLD AUTO: 34.5 % (ref 34–46.6)
HGB BLD-MCNC: 11.8 G/DL (ref 12–15.9)
MCH RBC QN AUTO: 29.3 PG (ref 26.6–33)
MCHC RBC AUTO-ENTMCNC: 34.2 G/DL (ref 31.5–35.7)
MCV RBC AUTO: 85.6 FL (ref 79–97)
PLATELET # BLD AUTO: 121 10*3/MM3 (ref 140–450)
PMV BLD AUTO: 9.7 FL (ref 6–12)
POTASSIUM BLD-SCNC: 3.7 MMOL/L (ref 3.5–5.2)
PROCALCITONIN SERPL-MCNC: 0.6 NG/ML (ref 0.1–0.25)
RBC # BLD AUTO: 4.03 10*6/MM3 (ref 3.77–5.28)
SODIUM BLD-SCNC: 137 MMOL/L (ref 136–145)
WBC NRBC COR # BLD: 4.97 10*3/MM3 (ref 3.4–10.8)

## 2020-03-24 PROCEDURE — 25010000002 CEFTRIAXONE PER 250 MG: Performed by: INTERNAL MEDICINE

## 2020-03-24 PROCEDURE — 25010000002 ENOXAPARIN PER 10 MG: Performed by: INTERNAL MEDICINE

## 2020-03-24 PROCEDURE — 80048 BASIC METABOLIC PNL TOTAL CA: CPT | Performed by: INTERNAL MEDICINE

## 2020-03-24 PROCEDURE — 84145 PROCALCITONIN (PCT): CPT | Performed by: INTERNAL MEDICINE

## 2020-03-24 PROCEDURE — 82962 GLUCOSE BLOOD TEST: CPT

## 2020-03-24 PROCEDURE — 85027 COMPLETE CBC AUTOMATED: CPT | Performed by: INTERNAL MEDICINE

## 2020-03-24 PROCEDURE — 63710000001 INSULIN LISPRO (HUMAN) PER 5 UNITS: Performed by: INTERNAL MEDICINE

## 2020-03-24 PROCEDURE — 99223 1ST HOSP IP/OBS HIGH 75: CPT | Performed by: INTERNAL MEDICINE

## 2020-03-24 PROCEDURE — 82947 ASSAY GLUCOSE BLOOD QUANT: CPT | Performed by: INTERNAL MEDICINE

## 2020-03-24 RX ORDER — BUPROPION HYDROCHLORIDE 150 MG/1
150 TABLET ORAL DAILY
Status: DISCONTINUED | OUTPATIENT
Start: 2020-03-24 | End: 2020-03-25

## 2020-03-24 RX ORDER — BENZONATATE 100 MG/1
100 CAPSULE ORAL 3 TIMES DAILY PRN
Status: DISCONTINUED | OUTPATIENT
Start: 2020-03-24 | End: 2020-03-25

## 2020-03-24 RX ORDER — VENLAFAXINE HYDROCHLORIDE 150 MG/1
150 CAPSULE, EXTENDED RELEASE ORAL
Status: DISCONTINUED | OUTPATIENT
Start: 2020-03-25 | End: 2020-03-25

## 2020-03-24 RX ORDER — SODIUM CHLORIDE 9 MG/ML
75 INJECTION, SOLUTION INTRAVENOUS CONTINUOUS
Status: DISCONTINUED | OUTPATIENT
Start: 2020-03-24 | End: 2020-03-25

## 2020-03-24 RX ADMIN — GABAPENTIN 600 MG: 300 CAPSULE ORAL at 20:52

## 2020-03-24 RX ADMIN — LEVOTHYROXINE SODIUM 100 MCG: 100 TABLET ORAL at 06:14

## 2020-03-24 RX ADMIN — METOPROLOL SUCCINATE 25 MG: 25 TABLET, EXTENDED RELEASE ORAL at 08:24

## 2020-03-24 RX ADMIN — INSULIN LISPRO 4 UNITS: 100 INJECTION, SOLUTION INTRAVENOUS; SUBCUTANEOUS at 17:16

## 2020-03-24 RX ADMIN — Medication 1 CAPSULE: at 08:23

## 2020-03-24 RX ADMIN — CEFTRIAXONE SODIUM 1 G: 1 INJECTION, SOLUTION INTRAVENOUS at 19:38

## 2020-03-24 RX ADMIN — ACETAMINOPHEN 650 MG: 325 TABLET, FILM COATED ORAL at 10:24

## 2020-03-24 RX ADMIN — TRAMADOL HYDROCHLORIDE 50 MG: 50 TABLET, FILM COATED ORAL at 18:23

## 2020-03-24 RX ADMIN — SODIUM CHLORIDE, PRESERVATIVE FREE 10 ML: 5 INJECTION INTRAVENOUS at 08:24

## 2020-03-24 RX ADMIN — SODIUM CHLORIDE 75 ML/HR: 9 INJECTION, SOLUTION INTRAVENOUS at 10:25

## 2020-03-24 RX ADMIN — BENZONATATE 100 MG: 100 CAPSULE ORAL at 11:33

## 2020-03-24 RX ADMIN — ACETAMINOPHEN 650 MG: 325 TABLET, FILM COATED ORAL at 20:52

## 2020-03-24 RX ADMIN — OXYBUTYNIN CHLORIDE 10 MG: 10 TABLET, EXTENDED RELEASE ORAL at 08:24

## 2020-03-24 RX ADMIN — GABAPENTIN 600 MG: 300 CAPSULE ORAL at 10:24

## 2020-03-24 RX ADMIN — BUPROPION HYDROCHLORIDE 150 MG: 150 TABLET, FILM COATED, EXTENDED RELEASE ORAL at 22:03

## 2020-03-24 RX ADMIN — ACETAMINOPHEN 650 MG: 325 TABLET, FILM COATED ORAL at 14:27

## 2020-03-24 RX ADMIN — BENZONATATE 100 MG: 100 CAPSULE ORAL at 18:14

## 2020-03-24 RX ADMIN — GUAIFENESIN 400 MG: 200 TABLET ORAL at 20:51

## 2020-03-24 RX ADMIN — CETIRIZINE HYDROCHLORIDE 5 MG: 10 TABLET, FILM COATED ORAL at 08:23

## 2020-03-24 RX ADMIN — ENOXAPARIN SODIUM 40 MG: 40 INJECTION SUBCUTANEOUS at 20:51

## 2020-03-24 RX ADMIN — SODIUM CHLORIDE, PRESERVATIVE FREE 10 ML: 5 INJECTION INTRAVENOUS at 22:03

## 2020-03-24 RX ADMIN — TRAMADOL HYDROCHLORIDE 50 MG: 50 TABLET, FILM COATED ORAL at 01:03

## 2020-03-24 RX ADMIN — CHOLESTYRAMINE 4 G: 4 POWDER, FOR SUSPENSION ORAL at 08:24

## 2020-03-24 RX ADMIN — ROSUVASTATIN CALCIUM 20 MG: 20 TABLET, FILM COATED ORAL at 08:24

## 2020-03-25 ENCOUNTER — APPOINTMENT (OUTPATIENT)
Dept: GENERAL RADIOLOGY | Facility: HOSPITAL | Age: 80
End: 2020-03-25

## 2020-03-25 LAB
ANION GAP SERPL CALCULATED.3IONS-SCNC: 10.3 MMOL/L (ref 5–15)
ARTERIAL PATENCY WRIST A: POSITIVE
ATMOSPHERIC PRESS: 748.4 MMHG
BASE EXCESS BLDA CALC-SCNC: -1.9 MMOL/L (ref 0–2)
BDY SITE: ABNORMAL
BUN BLD-MCNC: 8 MG/DL (ref 8–23)
BUN/CREAT SERPL: 9.6 (ref 7–25)
CALCIUM SPEC-SCNC: 7.6 MG/DL (ref 8.6–10.5)
CHLORIDE SERPL-SCNC: 105 MMOL/L (ref 98–107)
CO2 SERPL-SCNC: 18.7 MMOL/L (ref 22–29)
CREAT BLD-MCNC: 0.83 MG/DL (ref 0.57–1)
CRP SERPL-MCNC: 21.56 MG/DL (ref 0–0.5)
D DIMER PPP FEU-MCNC: 3.06 MCGFEU/ML (ref 0–0.49)
DEPRECATED RDW RBC AUTO: 42.3 FL (ref 37–54)
ERYTHROCYTE [DISTWIDTH] IN BLOOD BY AUTOMATED COUNT: 14 % (ref 12.3–15.4)
FERRITIN SERPL-MCNC: 486 NG/ML (ref 13–150)
GFR SERPL CREATININE-BSD FRML MDRD: 66 ML/MIN/1.73
GLUCOSE BLD-MCNC: 121 MG/DL (ref 65–99)
GLUCOSE BLDC GLUCOMTR-MCNC: 161 MG/DL (ref 70–130)
GLUCOSE BLDC GLUCOMTR-MCNC: 171 MG/DL (ref 70–130)
GLUCOSE BLDC GLUCOMTR-MCNC: 192 MG/DL (ref 70–130)
HCO3 BLDA-SCNC: 23.5 MMOL/L (ref 22–28)
HCT VFR BLD AUTO: 33.1 % (ref 34–46.6)
HGB BLD-MCNC: 11.4 G/DL (ref 12–15.9)
HOROWITZ INDEX BLD+IHG-RTO: 100 %
LYMPHOCYTES # BLD MANUAL: 0.8 10*3/MM3 (ref 0.7–3.1)
LYMPHOCYTES NFR BLD MANUAL: 12 % (ref 19.6–45.3)
LYMPHOCYTES NFR BLD MANUAL: 4 % (ref 5–12)
MCH RBC QN AUTO: 28.9 PG (ref 26.6–33)
MCHC RBC AUTO-ENTMCNC: 34.4 G/DL (ref 31.5–35.7)
MCV RBC AUTO: 84 FL (ref 79–97)
MODALITY: ABNORMAL
MONOCYTES # BLD AUTO: 0.27 10*3/MM3 (ref 0.1–0.9)
NEUTROPHILS # BLD AUTO: 5.58 10*3/MM3 (ref 1.7–7)
NEUTROPHILS NFR BLD MANUAL: 84 % (ref 42.7–76)
O2 A-A PPRESDIFF RESPIRATORY: 0.4 MMHG
PCO2 BLDA: 41.4 MM HG (ref 35–45)
PEEP RESPIRATORY: 10 CM[H2O]
PH BLDA: 7.36 PH UNITS (ref 7.35–7.45)
PLAT MORPH BLD: NORMAL
PLATELET # BLD AUTO: 158 10*3/MM3 (ref 140–450)
PMV BLD AUTO: 9.5 FL (ref 6–12)
PO2 BLDA: 305.2 MM HG (ref 80–100)
POTASSIUM BLD-SCNC: 4.1 MMOL/L (ref 3.5–5.2)
RBC # BLD AUTO: 3.94 10*6/MM3 (ref 3.77–5.28)
RBC MORPH BLD: NORMAL
REF LAB TEST METHOD: ABNORMAL
SAO2 % BLDCOA: 99.9 % (ref 92–99)
SARS-COV-2 RNA RESP QL NAA+PROBE: DETECTED
SET MECH RESP RATE: 18
SODIUM BLD-SCNC: 134 MMOL/L (ref 136–145)
TOTAL RATE: 21 BREATHS/MINUTE
VENTILATOR MODE: ABNORMAL
WBC MORPH BLD: NORMAL
WBC NRBC COR # BLD: 6.64 10*3/MM3 (ref 3.4–10.8)

## 2020-03-25 PROCEDURE — 82962 GLUCOSE BLOOD TEST: CPT

## 2020-03-25 PROCEDURE — 25010000002 CEFTRIAXONE PER 250 MG: Performed by: INTERNAL MEDICINE

## 2020-03-25 PROCEDURE — 25010000002 PROPOFOL 10 MG/ML EMULSION

## 2020-03-25 PROCEDURE — 02HV33Z INSERTION OF INFUSION DEVICE INTO SUPERIOR VENA CAVA, PERCUTANEOUS APPROACH: ICD-10-PCS | Performed by: INTERNAL MEDICINE

## 2020-03-25 PROCEDURE — 80048 BASIC METABOLIC PNL TOTAL CA: CPT | Performed by: INTERNAL MEDICINE

## 2020-03-25 PROCEDURE — 25010000002 FUROSEMIDE PER 20 MG: Performed by: INTERNAL MEDICINE

## 2020-03-25 PROCEDURE — 94799 UNLISTED PULMONARY SVC/PX: CPT

## 2020-03-25 PROCEDURE — 25010000002 FENTANYL CITRATE (PF) 100 MCG/2ML SOLUTION: Performed by: INTERNAL MEDICINE

## 2020-03-25 PROCEDURE — 85007 BL SMEAR W/DIFF WBC COUNT: CPT | Performed by: INTERNAL MEDICINE

## 2020-03-25 PROCEDURE — 0BH17EZ INSERTION OF ENDOTRACHEAL AIRWAY INTO TRACHEA, VIA NATURAL OR ARTIFICIAL OPENING: ICD-10-PCS | Performed by: INTERNAL MEDICINE

## 2020-03-25 PROCEDURE — 25010000002 ENOXAPARIN PER 10 MG: Performed by: INTERNAL MEDICINE

## 2020-03-25 PROCEDURE — 82803 BLOOD GASES ANY COMBINATION: CPT

## 2020-03-25 PROCEDURE — 82728 ASSAY OF FERRITIN: CPT | Performed by: INTERNAL MEDICINE

## 2020-03-25 PROCEDURE — 86140 C-REACTIVE PROTEIN: CPT | Performed by: INTERNAL MEDICINE

## 2020-03-25 PROCEDURE — 5A1945Z RESPIRATORY VENTILATION, 24-96 CONSECUTIVE HOURS: ICD-10-PCS | Performed by: INTERNAL MEDICINE

## 2020-03-25 PROCEDURE — 94002 VENT MGMT INPAT INIT DAY: CPT

## 2020-03-25 PROCEDURE — 71045 X-RAY EXAM CHEST 1 VIEW: CPT

## 2020-03-25 PROCEDURE — 36600 WITHDRAWAL OF ARTERIAL BLOOD: CPT

## 2020-03-25 PROCEDURE — 85379 FIBRIN DEGRADATION QUANT: CPT | Performed by: INTERNAL MEDICINE

## 2020-03-25 PROCEDURE — 25010000002 PROPOFOL 10 MG/ML EMULSION: Performed by: INTERNAL MEDICINE

## 2020-03-25 PROCEDURE — 85025 COMPLETE CBC W/AUTO DIFF WBC: CPT | Performed by: INTERNAL MEDICINE

## 2020-03-25 PROCEDURE — 25010000002 FENTANYL CITRATE (PF) 100 MCG/2ML SOLUTION

## 2020-03-25 RX ORDER — FENTANYL CITRATE 50 UG/ML
100 INJECTION, SOLUTION INTRAMUSCULAR; INTRAVENOUS
Status: DISCONTINUED | OUTPATIENT
Start: 2020-03-25 | End: 2020-03-28

## 2020-03-25 RX ORDER — FUROSEMIDE 10 MG/ML
40 INJECTION INTRAMUSCULAR; INTRAVENOUS ONCE
Status: COMPLETED | OUTPATIENT
Start: 2020-03-25 | End: 2020-03-25

## 2020-03-25 RX ORDER — POTASSIUM CHLORIDE 1.5 G/1.77G
40 POWDER, FOR SOLUTION ORAL AS NEEDED
Status: DISCONTINUED | OUTPATIENT
Start: 2020-03-25 | End: 2020-04-10 | Stop reason: HOSPADM

## 2020-03-25 RX ORDER — NOREPINEPHRINE BIT/0.9 % NACL 8 MG/250ML
.02-.3 INFUSION BOTTLE (ML) INTRAVENOUS
Status: DISCONTINUED | OUTPATIENT
Start: 2020-03-26 | End: 2020-03-28

## 2020-03-25 RX ORDER — ONDANSETRON 2 MG/ML
4 INJECTION INTRAMUSCULAR; INTRAVENOUS EVERY 6 HOURS PRN
Status: DISCONTINUED | OUTPATIENT
Start: 2020-03-25 | End: 2020-04-10 | Stop reason: HOSPADM

## 2020-03-25 RX ORDER — POTASSIUM CHLORIDE 750 MG/1
40 CAPSULE, EXTENDED RELEASE ORAL AS NEEDED
Status: DISCONTINUED | OUTPATIENT
Start: 2020-03-25 | End: 2020-04-10 | Stop reason: HOSPADM

## 2020-03-25 RX ORDER — HYDROXYCHLOROQUINE SULFATE 200 MG/1
200 TABLET, FILM COATED ORAL EVERY 8 HOURS
Status: DISCONTINUED | OUTPATIENT
Start: 2020-03-26 | End: 2020-03-30

## 2020-03-25 RX ORDER — FENTANYL CITRATE 50 UG/ML
INJECTION, SOLUTION INTRAMUSCULAR; INTRAVENOUS
Status: COMPLETED
Start: 2020-03-25 | End: 2020-03-25

## 2020-03-25 RX ORDER — NALOXONE HCL 0.4 MG/ML
0.4 VIAL (ML) INJECTION
Status: DISCONTINUED | OUTPATIENT
Start: 2020-03-25 | End: 2020-04-10 | Stop reason: HOSPADM

## 2020-03-25 RX ORDER — MAGNESIUM SULFATE HEPTAHYDRATE 40 MG/ML
2 INJECTION, SOLUTION INTRAVENOUS AS NEEDED
Status: DISCONTINUED | OUTPATIENT
Start: 2020-03-25 | End: 2020-04-10 | Stop reason: HOSPADM

## 2020-03-25 RX ORDER — ALUMINA, MAGNESIA, AND SIMETHICONE 2400; 2400; 240 MG/30ML; MG/30ML; MG/30ML
15 SUSPENSION ORAL EVERY 6 HOURS PRN
Status: DISCONTINUED | OUTPATIENT
Start: 2020-03-25 | End: 2020-04-10 | Stop reason: HOSPADM

## 2020-03-25 RX ORDER — ACETAMINOPHEN 325 MG/1
650 TABLET ORAL EVERY 4 HOURS PRN
Status: DISCONTINUED | OUTPATIENT
Start: 2020-03-25 | End: 2020-04-10 | Stop reason: HOSPADM

## 2020-03-25 RX ORDER — HYDROCODONE BITARTRATE AND ACETAMINOPHEN 5; 325 MG/1; MG/1
1 TABLET ORAL EVERY 4 HOURS PRN
Status: DISCONTINUED | OUTPATIENT
Start: 2020-03-25 | End: 2020-04-10 | Stop reason: HOSPADM

## 2020-03-25 RX ORDER — BISACODYL 10 MG
10 SUPPOSITORY, RECTAL RECTAL DAILY PRN
Status: DISCONTINUED | OUTPATIENT
Start: 2020-03-25 | End: 2020-04-10 | Stop reason: HOSPADM

## 2020-03-25 RX ORDER — BUPROPION HYDROCHLORIDE 100 MG/1
100 TABLET ORAL EVERY 12 HOURS SCHEDULED
Status: DISPENSED | OUTPATIENT
Start: 2020-03-25 | End: 2020-04-01

## 2020-03-25 RX ORDER — HYDROXYCHLOROQUINE SULFATE 200 MG/1
600 TABLET, FILM COATED ORAL EVERY 12 HOURS
Status: COMPLETED | OUTPATIENT
Start: 2020-03-25 | End: 2020-03-26

## 2020-03-25 RX ORDER — POTASSIUM CHLORIDE 29.8 MG/ML
20 INJECTION INTRAVENOUS
Status: DISCONTINUED | OUTPATIENT
Start: 2020-03-25 | End: 2020-04-10 | Stop reason: HOSPADM

## 2020-03-25 RX ORDER — MAGNESIUM SULFATE HEPTAHYDRATE 40 MG/ML
4 INJECTION, SOLUTION INTRAVENOUS AS NEEDED
Status: DISCONTINUED | OUTPATIENT
Start: 2020-03-25 | End: 2020-04-10 | Stop reason: HOSPADM

## 2020-03-25 RX ORDER — PROPOFOL 10 MG/ML
VIAL (ML) INTRAVENOUS
Status: COMPLETED
Start: 2020-03-25 | End: 2020-03-25

## 2020-03-25 RX ORDER — ONDANSETRON 4 MG/1
4 TABLET, FILM COATED ORAL EVERY 6 HOURS PRN
Status: DISCONTINUED | OUTPATIENT
Start: 2020-03-25 | End: 2020-04-10 | Stop reason: HOSPADM

## 2020-03-25 RX ORDER — MORPHINE SULFATE 2 MG/ML
1 INJECTION, SOLUTION INTRAMUSCULAR; INTRAVENOUS EVERY 4 HOURS PRN
Status: DISCONTINUED | OUTPATIENT
Start: 2020-03-25 | End: 2020-03-26

## 2020-03-25 RX ORDER — FENTANYL CITRATE 50 UG/ML
75 INJECTION, SOLUTION INTRAMUSCULAR; INTRAVENOUS
Status: DISCONTINUED | OUTPATIENT
Start: 2020-03-25 | End: 2020-03-28

## 2020-03-25 RX ORDER — ACETAMINOPHEN 160 MG/5ML
650 SOLUTION ORAL EVERY 4 HOURS PRN
Status: DISCONTINUED | OUTPATIENT
Start: 2020-03-25 | End: 2020-04-10 | Stop reason: HOSPADM

## 2020-03-25 RX ORDER — BISACODYL 5 MG/1
5 TABLET, DELAYED RELEASE ORAL DAILY PRN
Status: DISCONTINUED | OUTPATIENT
Start: 2020-03-25 | End: 2020-04-10 | Stop reason: HOSPADM

## 2020-03-25 RX ORDER — NALOXONE HCL 0.4 MG/ML
0.4 VIAL (ML) INJECTION
Status: DISCONTINUED | OUTPATIENT
Start: 2020-03-25 | End: 2020-04-07 | Stop reason: SDUPTHER

## 2020-03-25 RX ORDER — HYDROCODONE BITARTRATE AND ACETAMINOPHEN 10; 325 MG/1; MG/1
1 TABLET ORAL EVERY 4 HOURS PRN
Status: DISCONTINUED | OUTPATIENT
Start: 2020-03-25 | End: 2020-03-28

## 2020-03-25 RX ORDER — HYDROMORPHONE HYDROCHLORIDE 1 MG/ML
0.5 INJECTION, SOLUTION INTRAMUSCULAR; INTRAVENOUS; SUBCUTANEOUS
Status: DISCONTINUED | OUTPATIENT
Start: 2020-03-25 | End: 2020-03-26

## 2020-03-25 RX ORDER — ACETAMINOPHEN 650 MG/1
650 SUPPOSITORY RECTAL EVERY 4 HOURS PRN
Status: DISCONTINUED | OUTPATIENT
Start: 2020-03-25 | End: 2020-04-10 | Stop reason: HOSPADM

## 2020-03-25 RX ORDER — FAMOTIDINE 10 MG/ML
20 INJECTION, SOLUTION INTRAVENOUS EVERY 12 HOURS SCHEDULED
Status: DISCONTINUED | OUTPATIENT
Start: 2020-03-25 | End: 2020-03-26

## 2020-03-25 RX ADMIN — ENOXAPARIN SODIUM 40 MG: 40 INJECTION SUBCUTANEOUS at 22:01

## 2020-03-25 RX ADMIN — BUPROPION HYDROCHLORIDE 150 MG: 150 TABLET, FILM COATED, EXTENDED RELEASE ORAL at 08:37

## 2020-03-25 RX ADMIN — Medication 1 CAPSULE: at 08:37

## 2020-03-25 RX ADMIN — ACETAMINOPHEN 650 MG: 325 TABLET, FILM COATED ORAL at 04:25

## 2020-03-25 RX ADMIN — ROSUVASTATIN CALCIUM 20 MG: 20 TABLET, FILM COATED ORAL at 08:37

## 2020-03-25 RX ADMIN — FENTANYL CITRATE 100 MCG: 50 INJECTION, SOLUTION INTRAMUSCULAR; INTRAVENOUS at 19:07

## 2020-03-25 RX ADMIN — LEVOTHYROXINE SODIUM 100 MCG: 100 TABLET ORAL at 08:37

## 2020-03-25 RX ADMIN — CETIRIZINE HYDROCHLORIDE 5 MG: 10 TABLET, FILM COATED ORAL at 08:38

## 2020-03-25 RX ADMIN — FAMOTIDINE 20 MG: 10 INJECTION, SOLUTION INTRAVENOUS at 21:56

## 2020-03-25 RX ADMIN — BENZONATATE 100 MG: 100 CAPSULE ORAL at 01:02

## 2020-03-25 RX ADMIN — SODIUM CHLORIDE, PRESERVATIVE FREE 10 ML: 5 INJECTION INTRAVENOUS at 20:27

## 2020-03-25 RX ADMIN — CEFTRIAXONE SODIUM 1 G: 1 INJECTION, SOLUTION INTRAVENOUS at 21:31

## 2020-03-25 RX ADMIN — FUROSEMIDE 40 MG: 10 INJECTION, SOLUTION INTRAMUSCULAR; INTRAVENOUS at 10:46

## 2020-03-25 RX ADMIN — ACETAMINOPHEN 650 MG: 325 TABLET, FILM COATED ORAL at 22:11

## 2020-03-25 RX ADMIN — FENTANYL CITRATE 100 MCG: 50 INJECTION INTRAMUSCULAR; INTRAVENOUS at 17:00

## 2020-03-25 RX ADMIN — SODIUM CHLORIDE 75 ML/HR: 9 INJECTION, SOLUTION INTRAVENOUS at 01:03

## 2020-03-25 RX ADMIN — CHOLESTYRAMINE 4 G: 4 POWDER, FOR SUSPENSION ORAL at 08:35

## 2020-03-25 RX ADMIN — PROPOFOL 40 MCG/KG/MIN: 10 INJECTION, EMULSION INTRAVENOUS at 20:26

## 2020-03-25 RX ADMIN — Medication 0.02 MCG/KG/MIN: at 23:37

## 2020-03-25 RX ADMIN — HYDROXYCHLOROQUINE SULFATE 600 MG: 200 TABLET ORAL at 21:55

## 2020-03-25 RX ADMIN — VENLAFAXINE HYDROCHLORIDE 150 MG: 150 CAPSULE, EXTENDED RELEASE ORAL at 08:38

## 2020-03-25 RX ADMIN — FENTANYL CITRATE 100 MCG: 50 INJECTION, SOLUTION INTRAMUSCULAR; INTRAVENOUS at 17:00

## 2020-03-25 RX ADMIN — METOPROLOL SUCCINATE 25 MG: 25 TABLET, EXTENDED RELEASE ORAL at 08:37

## 2020-03-25 RX ADMIN — PROPOFOL 40 MCG/KG/MIN: 10 INJECTION, EMULSION INTRAVENOUS at 17:20

## 2020-03-25 RX ADMIN — OXYBUTYNIN CHLORIDE 10 MG: 10 TABLET, EXTENDED RELEASE ORAL at 08:37

## 2020-03-25 RX ADMIN — SODIUM CHLORIDE, PRESERVATIVE FREE 10 ML: 5 INJECTION INTRAVENOUS at 08:38

## 2020-03-25 RX ADMIN — GABAPENTIN 600 MG: 300 CAPSULE ORAL at 08:36

## 2020-03-25 RX ADMIN — GABAPENTIN 600 MG: 300 CAPSULE ORAL at 21:58

## 2020-03-26 ENCOUNTER — APPOINTMENT (OUTPATIENT)
Dept: GENERAL RADIOLOGY | Facility: HOSPITAL | Age: 80
End: 2020-03-26

## 2020-03-26 LAB
ANION GAP SERPL CALCULATED.3IONS-SCNC: 13.1 MMOL/L (ref 5–15)
BACTERIA SPEC AEROBE CULT: NORMAL
BACTERIA SPEC AEROBE CULT: NORMAL
BUN BLD-MCNC: 12 MG/DL (ref 8–23)
BUN/CREAT SERPL: 11.3 (ref 7–25)
C3 SERPL-MCNC: 116 MG/DL (ref 82–167)
C4 SERPL-MCNC: 29 MG/DL (ref 14–44)
CALCIUM SPEC-SCNC: 7.7 MG/DL (ref 8.6–10.5)
CHLORIDE SERPL-SCNC: 98 MMOL/L (ref 98–107)
CO2 SERPL-SCNC: 19.9 MMOL/L (ref 22–29)
CREAT BLD-MCNC: 1.06 MG/DL (ref 0.57–1)
CRP SERPL-MCNC: 24.66 MG/DL (ref 0–0.5)
D DIMER PPP FEU-MCNC: 3.3 MCGFEU/ML (ref 0–0.49)
DEPRECATED RDW RBC AUTO: 44.3 FL (ref 37–54)
ERYTHROCYTE [DISTWIDTH] IN BLOOD BY AUTOMATED COUNT: 14.2 % (ref 12.3–15.4)
FERRITIN SERPL-MCNC: 527 NG/ML (ref 13–150)
GFR SERPL CREATININE-BSD FRML MDRD: 50 ML/MIN/1.73
GLUCOSE BLD-MCNC: 191 MG/DL (ref 65–99)
GLUCOSE BLDC GLUCOMTR-MCNC: 116 MG/DL (ref 70–130)
GLUCOSE BLDC GLUCOMTR-MCNC: 133 MG/DL (ref 70–130)
GLUCOSE BLDC GLUCOMTR-MCNC: 199 MG/DL (ref 70–130)
HCT VFR BLD AUTO: 33.6 % (ref 34–46.6)
HGB BLD-MCNC: 11.3 G/DL (ref 12–15.9)
LYMPHOCYTES # BLD MANUAL: 0.82 10*3/MM3 (ref 0.7–3.1)
LYMPHOCYTES NFR BLD MANUAL: 10.4 % (ref 19.6–45.3)
MCH RBC QN AUTO: 29 PG (ref 26.6–33)
MCHC RBC AUTO-ENTMCNC: 33.6 G/DL (ref 31.5–35.7)
MCV RBC AUTO: 86.4 FL (ref 79–97)
NEUTROPHILS # BLD AUTO: 7.03 10*3/MM3 (ref 1.7–7)
NEUTROPHILS NFR BLD MANUAL: 89.6 % (ref 42.7–76)
PLAT MORPH BLD: NORMAL
PLATELET # BLD AUTO: 214 10*3/MM3 (ref 140–450)
PMV BLD AUTO: 10 FL (ref 6–12)
POTASSIUM BLD-SCNC: 4 MMOL/L (ref 3.5–5.2)
RBC # BLD AUTO: 3.89 10*6/MM3 (ref 3.77–5.28)
RBC MORPH BLD: NORMAL
SMUDGE CELLS BLD QL SMEAR: ABNORMAL
SODIUM BLD-SCNC: 131 MMOL/L (ref 136–145)
WBC NRBC COR # BLD: 7.85 10*3/MM3 (ref 3.4–10.8)

## 2020-03-26 PROCEDURE — 25010000002 FENTANYL CITRATE (PF) 100 MCG/2ML SOLUTION: Performed by: INTERNAL MEDICINE

## 2020-03-26 PROCEDURE — 85379 FIBRIN DEGRADATION QUANT: CPT | Performed by: INTERNAL MEDICINE

## 2020-03-26 PROCEDURE — 80048 BASIC METABOLIC PNL TOTAL CA: CPT | Performed by: INTERNAL MEDICINE

## 2020-03-26 PROCEDURE — 85007 BL SMEAR W/DIFF WBC COUNT: CPT | Performed by: INTERNAL MEDICINE

## 2020-03-26 PROCEDURE — 25010000002 PROPOFOL 10 MG/ML EMULSION: Performed by: INTERNAL MEDICINE

## 2020-03-26 PROCEDURE — 86140 C-REACTIVE PROTEIN: CPT | Performed by: INTERNAL MEDICINE

## 2020-03-26 PROCEDURE — 82728 ASSAY OF FERRITIN: CPT | Performed by: INTERNAL MEDICINE

## 2020-03-26 PROCEDURE — 94799 UNLISTED PULMONARY SVC/PX: CPT

## 2020-03-26 PROCEDURE — 86160 COMPLEMENT ANTIGEN: CPT | Performed by: INTERNAL MEDICINE

## 2020-03-26 PROCEDURE — 85025 COMPLETE CBC W/AUTO DIFF WBC: CPT | Performed by: INTERNAL MEDICINE

## 2020-03-26 PROCEDURE — 82962 GLUCOSE BLOOD TEST: CPT

## 2020-03-26 PROCEDURE — 83520 IMMUNOASSAY QUANT NOS NONAB: CPT | Performed by: INTERNAL MEDICINE

## 2020-03-26 PROCEDURE — 25010000002 HYDROMORPHONE 1 MG/ML SOLUTION: Performed by: INTERNAL MEDICINE

## 2020-03-26 PROCEDURE — 25010000002 ENOXAPARIN PER 10 MG: Performed by: INTERNAL MEDICINE

## 2020-03-26 PROCEDURE — 63710000001 INSULIN LISPRO (HUMAN) PER 5 UNITS: Performed by: INTERNAL MEDICINE

## 2020-03-26 PROCEDURE — 94003 VENT MGMT INPAT SUBQ DAY: CPT

## 2020-03-26 PROCEDURE — 71045 X-RAY EXAM CHEST 1 VIEW: CPT

## 2020-03-26 PROCEDURE — 25010000002 CEFTRIAXONE PER 250 MG: Performed by: INTERNAL MEDICINE

## 2020-03-26 RX ORDER — FAMOTIDINE 20 MG/1
20 TABLET, FILM COATED ORAL
Status: DISCONTINUED | OUTPATIENT
Start: 2020-03-26 | End: 2020-03-28

## 2020-03-26 RX ORDER — GABAPENTIN 250 MG/5ML
300 SOLUTION ORAL EVERY 8 HOURS SCHEDULED
Status: DISCONTINUED | OUTPATIENT
Start: 2020-03-26 | End: 2020-03-29

## 2020-03-26 RX ADMIN — BUPROPION HYDROCHLORIDE 100 MG: 100 TABLET, FILM COATED ORAL at 21:47

## 2020-03-26 RX ADMIN — FENTANYL CITRATE 100 MCG: 50 INJECTION, SOLUTION INTRAMUSCULAR; INTRAVENOUS at 10:25

## 2020-03-26 RX ADMIN — GABAPENTIN 300 MG: 250 SOLUTION ORAL at 21:46

## 2020-03-26 RX ADMIN — PROPOFOL 40 MCG/KG/MIN: 10 INJECTION, EMULSION INTRAVENOUS at 04:01

## 2020-03-26 RX ADMIN — FAMOTIDINE 20 MG: 10 INJECTION, SOLUTION INTRAVENOUS at 08:40

## 2020-03-26 RX ADMIN — BUPROPION HYDROCHLORIDE 100 MG: 100 TABLET, FILM COATED ORAL at 08:40

## 2020-03-26 RX ADMIN — PROPOFOL 50 MCG/KG/MIN: 10 INJECTION, EMULSION INTRAVENOUS at 06:22

## 2020-03-26 RX ADMIN — FENTANYL CITRATE 100 MCG: 50 INJECTION, SOLUTION INTRAMUSCULAR; INTRAVENOUS at 18:35

## 2020-03-26 RX ADMIN — HYDROMORPHONE HYDROCHLORIDE 1 MG: 1 INJECTION, SOLUTION INTRAMUSCULAR; INTRAVENOUS; SUBCUTANEOUS at 10:25

## 2020-03-26 RX ADMIN — Medication 1 CAPSULE: at 08:40

## 2020-03-26 RX ADMIN — HYDROMORPHONE HYDROCHLORIDE 1 MG: 10 INJECTION INTRAMUSCULAR; INTRAVENOUS; SUBCUTANEOUS at 21:39

## 2020-03-26 RX ADMIN — GABAPENTIN 600 MG: 300 CAPSULE ORAL at 08:40

## 2020-03-26 RX ADMIN — FAMOTIDINE 20 MG: 20 TABLET, FILM COATED ORAL at 18:18

## 2020-03-26 RX ADMIN — LEVOTHYROXINE SODIUM 100 MCG: 100 TABLET ORAL at 06:28

## 2020-03-26 RX ADMIN — ENOXAPARIN SODIUM 40 MG: 40 INJECTION SUBCUTANEOUS at 21:44

## 2020-03-26 RX ADMIN — INSULIN LISPRO 2 UNITS: 100 INJECTION, SOLUTION INTRAVENOUS; SUBCUTANEOUS at 08:40

## 2020-03-26 RX ADMIN — SODIUM CHLORIDE, PRESERVATIVE FREE 10 ML: 5 INJECTION INTRAVENOUS at 21:41

## 2020-03-26 RX ADMIN — HYDROMORPHONE HYDROCHLORIDE 1 MG: 10 INJECTION INTRAMUSCULAR; INTRAVENOUS; SUBCUTANEOUS at 16:28

## 2020-03-26 RX ADMIN — CEFTRIAXONE SODIUM 1 G: 1 INJECTION, SOLUTION INTRAVENOUS at 21:38

## 2020-03-26 RX ADMIN — HYDROXYCHLOROQUINE SULFATE 600 MG: 200 TABLET ORAL at 06:26

## 2020-03-26 RX ADMIN — PROPOFOL 20 MCG/KG/MIN: 10 INJECTION, EMULSION INTRAVENOUS at 21:38

## 2020-03-26 RX ADMIN — HYDROMORPHONE HYDROCHLORIDE 1 MG: 10 INJECTION INTRAMUSCULAR; INTRAVENOUS; SUBCUTANEOUS at 12:26

## 2020-03-26 RX ADMIN — GABAPENTIN 300 MG: 250 SOLUTION ORAL at 15:18

## 2020-03-26 RX ADMIN — FENTANYL CITRATE 100 MCG: 50 INJECTION, SOLUTION INTRAMUSCULAR; INTRAVENOUS at 15:22

## 2020-03-26 RX ADMIN — ACETAMINOPHEN 650 MG: 325 TABLET, FILM COATED ORAL at 21:57

## 2020-03-26 RX ADMIN — HYDROXYCHLOROQUINE SULFATE 200 MG: 200 TABLET ORAL at 18:19

## 2020-03-26 RX ADMIN — SODIUM CHLORIDE, PRESERVATIVE FREE 10 ML: 5 INJECTION INTRAVENOUS at 11:45

## 2020-03-26 RX ADMIN — PROPOFOL 40 MCG/KG/MIN: 10 INJECTION, EMULSION INTRAVENOUS at 10:25

## 2020-03-27 LAB
ANION GAP SERPL CALCULATED.3IONS-SCNC: 11.1 MMOL/L (ref 5–15)
BACTERIA SPEC AEROBE CULT: NORMAL
BACTERIA SPEC AEROBE CULT: NORMAL
BASOPHILS # BLD AUTO: 0 10*3/MM3 (ref 0–0.2)
BASOPHILS NFR BLD AUTO: 0 % (ref 0–1.5)
BUN BLD-MCNC: 17 MG/DL (ref 8–23)
BUN/CREAT SERPL: 13.3 (ref 7–25)
C3 SERPL-MCNC: 123 MG/DL (ref 82–167)
C4 SERPL-MCNC: 30 MG/DL (ref 14–44)
CALCIUM SPEC-SCNC: 7.9 MG/DL (ref 8.6–10.5)
CHLORIDE SERPL-SCNC: 103 MMOL/L (ref 98–107)
CO2 SERPL-SCNC: 22.9 MMOL/L (ref 22–29)
CREAT BLD-MCNC: 1.28 MG/DL (ref 0.57–1)
CRP SERPL-MCNC: 14.61 MG/DL (ref 0–0.5)
D DIMER PPP FEU-MCNC: 3.97 MCGFEU/ML (ref 0–0.49)
DEPRECATED RDW RBC AUTO: 43.7 FL (ref 37–54)
EOSINOPHIL # BLD AUTO: 0.05 10*3/MM3 (ref 0–0.4)
EOSINOPHIL NFR BLD AUTO: 1.2 % (ref 0.3–6.2)
ERYTHROCYTE [DISTWIDTH] IN BLOOD BY AUTOMATED COUNT: 13.8 % (ref 12.3–15.4)
FERRITIN SERPL-MCNC: 572 NG/ML (ref 13–150)
GFR SERPL CREATININE-BSD FRML MDRD: 40 ML/MIN/1.73
GLUCOSE BLD-MCNC: 163 MG/DL (ref 65–99)
GLUCOSE BLDC GLUCOMTR-MCNC: 190 MG/DL (ref 70–130)
GLUCOSE BLDC GLUCOMTR-MCNC: 209 MG/DL (ref 70–130)
HCT VFR BLD AUTO: 29.2 % (ref 34–46.6)
HGB BLD-MCNC: 9.5 G/DL (ref 12–15.9)
IMM GRANULOCYTES # BLD AUTO: 0.05 10*3/MM3 (ref 0–0.05)
IMM GRANULOCYTES NFR BLD AUTO: 1.2 % (ref 0–0.5)
LYMPHOCYTES # BLD AUTO: 0.58 10*3/MM3 (ref 0.7–3.1)
LYMPHOCYTES NFR BLD AUTO: 13.5 % (ref 19.6–45.3)
MCH RBC QN AUTO: 28.4 PG (ref 26.6–33)
MCHC RBC AUTO-ENTMCNC: 32.5 G/DL (ref 31.5–35.7)
MCV RBC AUTO: 87.2 FL (ref 79–97)
MONOCYTES # BLD AUTO: 0.25 10*3/MM3 (ref 0.1–0.9)
MONOCYTES NFR BLD AUTO: 5.8 % (ref 5–12)
NEUTROPHILS # BLD AUTO: 3.36 10*3/MM3 (ref 1.7–7)
NEUTROPHILS NFR BLD AUTO: 78.3 % (ref 42.7–76)
NRBC BLD AUTO-RTO: 0 /100 WBC (ref 0–0.2)
PLATELET # BLD AUTO: 163 10*3/MM3 (ref 140–450)
PMV BLD AUTO: 9.4 FL (ref 6–12)
POTASSIUM BLD-SCNC: 4.2 MMOL/L (ref 3.5–5.2)
RBC # BLD AUTO: 3.35 10*6/MM3 (ref 3.77–5.28)
SODIUM BLD-SCNC: 137 MMOL/L (ref 136–145)
WBC NRBC COR # BLD: 4.29 10*3/MM3 (ref 3.4–10.8)

## 2020-03-27 PROCEDURE — 94799 UNLISTED PULMONARY SVC/PX: CPT

## 2020-03-27 PROCEDURE — 86160 COMPLEMENT ANTIGEN: CPT | Performed by: INTERNAL MEDICINE

## 2020-03-27 PROCEDURE — 25010000002 FENTANYL CITRATE (PF) 100 MCG/2ML SOLUTION: Performed by: INTERNAL MEDICINE

## 2020-03-27 PROCEDURE — 25010000002 HYDROMORPHONE 1 MG/ML SOLUTION: Performed by: INTERNAL MEDICINE

## 2020-03-27 PROCEDURE — 82962 GLUCOSE BLOOD TEST: CPT

## 2020-03-27 PROCEDURE — 85025 COMPLETE CBC W/AUTO DIFF WBC: CPT | Performed by: INTERNAL MEDICINE

## 2020-03-27 PROCEDURE — 85379 FIBRIN DEGRADATION QUANT: CPT | Performed by: INTERNAL MEDICINE

## 2020-03-27 PROCEDURE — 80048 BASIC METABOLIC PNL TOTAL CA: CPT | Performed by: INTERNAL MEDICINE

## 2020-03-27 PROCEDURE — 82728 ASSAY OF FERRITIN: CPT | Performed by: INTERNAL MEDICINE

## 2020-03-27 PROCEDURE — 86140 C-REACTIVE PROTEIN: CPT | Performed by: INTERNAL MEDICINE

## 2020-03-27 PROCEDURE — 25010000002 ENOXAPARIN PER 10 MG: Performed by: INTERNAL MEDICINE

## 2020-03-27 PROCEDURE — 63710000001 INSULIN REGULAR HUMAN PER 5 UNITS: Performed by: INTERNAL MEDICINE

## 2020-03-27 PROCEDURE — 94003 VENT MGMT INPAT SUBQ DAY: CPT

## 2020-03-27 RX ORDER — TRAMADOL HYDROCHLORIDE 50 MG/1
100 TABLET ORAL EVERY 8 HOURS PRN
Status: DISCONTINUED | OUTPATIENT
Start: 2020-03-27 | End: 2020-04-10 | Stop reason: HOSPADM

## 2020-03-27 RX ADMIN — FAMOTIDINE 20 MG: 20 TABLET, FILM COATED ORAL at 13:41

## 2020-03-27 RX ADMIN — FENTANYL CITRATE 100 MCG: 50 INJECTION, SOLUTION INTRAMUSCULAR; INTRAVENOUS at 06:53

## 2020-03-27 RX ADMIN — GABAPENTIN 300 MG: 250 SOLUTION ORAL at 06:09

## 2020-03-27 RX ADMIN — HYDROXYCHLOROQUINE SULFATE 200 MG: 200 TABLET ORAL at 13:36

## 2020-03-27 RX ADMIN — SODIUM CHLORIDE, PRESERVATIVE FREE 10 ML: 5 INJECTION INTRAVENOUS at 22:04

## 2020-03-27 RX ADMIN — BUPROPION HYDROCHLORIDE 100 MG: 100 TABLET, FILM COATED ORAL at 08:28

## 2020-03-27 RX ADMIN — HYDROXYCHLOROQUINE SULFATE 200 MG: 200 TABLET ORAL at 08:30

## 2020-03-27 RX ADMIN — INSULIN HUMAN 3 UNITS: 100 INJECTION, SOLUTION PARENTERAL at 08:29

## 2020-03-27 RX ADMIN — TRAMADOL HYDROCHLORIDE 100 MG: 50 TABLET, FILM COATED ORAL at 16:16

## 2020-03-27 RX ADMIN — FENTANYL CITRATE 75 MCG: 50 INJECTION, SOLUTION INTRAMUSCULAR; INTRAVENOUS at 12:26

## 2020-03-27 RX ADMIN — GABAPENTIN 300 MG: 250 SOLUTION ORAL at 22:03

## 2020-03-27 RX ADMIN — INSULIN HUMAN 3 UNITS: 100 INJECTION, SOLUTION PARENTERAL at 17:23

## 2020-03-27 RX ADMIN — SODIUM CHLORIDE, PRESERVATIVE FREE 10 ML: 5 INJECTION INTRAVENOUS at 08:29

## 2020-03-27 RX ADMIN — INSULIN HUMAN 5 UNITS: 100 INJECTION, SOLUTION PARENTERAL at 12:17

## 2020-03-27 RX ADMIN — HYDROXYCHLOROQUINE SULFATE 200 MG: 200 TABLET ORAL at 06:10

## 2020-03-27 RX ADMIN — BUPROPION HYDROCHLORIDE 100 MG: 100 TABLET, FILM COATED ORAL at 22:03

## 2020-03-27 RX ADMIN — HYDROXYCHLOROQUINE SULFATE 200 MG: 200 TABLET ORAL at 22:04

## 2020-03-27 RX ADMIN — ENOXAPARIN SODIUM 40 MG: 40 INJECTION SUBCUTANEOUS at 22:03

## 2020-03-27 RX ADMIN — LEVOTHYROXINE SODIUM 100 MCG: 100 TABLET ORAL at 06:10

## 2020-03-27 RX ADMIN — FAMOTIDINE 20 MG: 20 TABLET, FILM COATED ORAL at 06:11

## 2020-03-27 RX ADMIN — HYDROMORPHONE HYDROCHLORIDE 1 MG: 10 INJECTION INTRAMUSCULAR; INTRAVENOUS; SUBCUTANEOUS at 11:03

## 2020-03-27 RX ADMIN — GABAPENTIN 300 MG: 250 SOLUTION ORAL at 13:38

## 2020-03-28 ENCOUNTER — APPOINTMENT (OUTPATIENT)
Dept: GENERAL RADIOLOGY | Facility: HOSPITAL | Age: 80
End: 2020-03-28

## 2020-03-28 LAB
ALBUMIN SERPL-MCNC: 2.7 G/DL (ref 3.5–5.2)
ALBUMIN/GLOB SERPL: 0.9 G/DL
ALP SERPL-CCNC: 123 U/L (ref 39–117)
ALT SERPL W P-5'-P-CCNC: 20 U/L (ref 1–33)
ANION GAP SERPL CALCULATED.3IONS-SCNC: 9.3 MMOL/L (ref 5–15)
AST SERPL-CCNC: 46 U/L (ref 1–32)
BASOPHILS # BLD AUTO: 0 10*3/MM3 (ref 0–0.2)
BASOPHILS NFR BLD AUTO: 0 % (ref 0–1.5)
BILIRUB SERPL-MCNC: 0.3 MG/DL (ref 0.2–1.2)
BUN BLD-MCNC: 18 MG/DL (ref 8–23)
BUN/CREAT SERPL: 15.7 (ref 7–25)
C3 SERPL-MCNC: 132 MG/DL (ref 82–167)
C4 SERPL-MCNC: 30 MG/DL (ref 14–44)
CALCIUM SPEC-SCNC: 8 MG/DL (ref 8.6–10.5)
CHLORIDE SERPL-SCNC: 101 MMOL/L (ref 98–107)
CO2 SERPL-SCNC: 24.7 MMOL/L (ref 22–29)
CREAT BLD-MCNC: 1.15 MG/DL (ref 0.57–1)
CRP SERPL-MCNC: 9.61 MG/DL (ref 0–0.5)
D DIMER PPP FEU-MCNC: 5.26 MCGFEU/ML (ref 0–0.49)
DEPRECATED RDW RBC AUTO: 43.1 FL (ref 37–54)
EOSINOPHIL # BLD AUTO: 0.06 10*3/MM3 (ref 0–0.4)
EOSINOPHIL NFR BLD AUTO: 1.2 % (ref 0.3–6.2)
ERYTHROCYTE [DISTWIDTH] IN BLOOD BY AUTOMATED COUNT: 13.5 % (ref 12.3–15.4)
FERRITIN SERPL-MCNC: 461 NG/ML (ref 13–150)
GFR SERPL CREATININE-BSD FRML MDRD: 46 ML/MIN/1.73
GLOBULIN UR ELPH-MCNC: 3.1 GM/DL
GLUCOSE BLD-MCNC: 171 MG/DL (ref 65–99)
GLUCOSE BLDC GLUCOMTR-MCNC: 132 MG/DL (ref 70–130)
GLUCOSE BLDC GLUCOMTR-MCNC: 181 MG/DL (ref 70–130)
GLUCOSE BLDC GLUCOMTR-MCNC: 193 MG/DL (ref 70–130)
GLUCOSE BLDC GLUCOMTR-MCNC: 194 MG/DL (ref 70–130)
GLUCOSE BLDC GLUCOMTR-MCNC: 228 MG/DL (ref 70–130)
HCT VFR BLD AUTO: 29 % (ref 34–46.6)
HGB BLD-MCNC: 9.5 G/DL (ref 12–15.9)
IL6 SERPL-MCNC: 198.5 PG/ML (ref 0–15.5)
IMM GRANULOCYTES # BLD AUTO: 0.05 10*3/MM3 (ref 0–0.05)
IMM GRANULOCYTES NFR BLD AUTO: 1 % (ref 0–0.5)
LYMPHOCYTES # BLD AUTO: 0.58 10*3/MM3 (ref 0.7–3.1)
LYMPHOCYTES NFR BLD AUTO: 12.1 % (ref 19.6–45.3)
MAGNESIUM SERPL-MCNC: 2 MG/DL (ref 1.6–2.4)
MCH RBC QN AUTO: 28.7 PG (ref 26.6–33)
MCHC RBC AUTO-ENTMCNC: 32.8 G/DL (ref 31.5–35.7)
MCV RBC AUTO: 87.6 FL (ref 79–97)
MONOCYTES # BLD AUTO: 0.25 10*3/MM3 (ref 0.1–0.9)
MONOCYTES NFR BLD AUTO: 5.2 % (ref 5–12)
NEUTROPHILS # BLD AUTO: 3.87 10*3/MM3 (ref 1.7–7)
NEUTROPHILS NFR BLD AUTO: 80.5 % (ref 42.7–76)
NRBC BLD AUTO-RTO: 0 /100 WBC (ref 0–0.2)
PHOSPHATE SERPL-MCNC: 1.1 MG/DL (ref 2.5–4.5)
PHOSPHATE SERPL-MCNC: 2 MG/DL (ref 2.5–4.5)
PLATELET # BLD AUTO: 178 10*3/MM3 (ref 140–450)
PMV BLD AUTO: 10.2 FL (ref 6–12)
POTASSIUM BLD-SCNC: 4 MMOL/L (ref 3.5–5.2)
PROT SERPL-MCNC: 5.8 G/DL (ref 6–8.5)
RBC # BLD AUTO: 3.31 10*6/MM3 (ref 3.77–5.28)
SODIUM BLD-SCNC: 135 MMOL/L (ref 136–145)
WBC NRBC COR # BLD: 4.81 10*3/MM3 (ref 3.4–10.8)

## 2020-03-28 PROCEDURE — 86140 C-REACTIVE PROTEIN: CPT | Performed by: INTERNAL MEDICINE

## 2020-03-28 PROCEDURE — 94799 UNLISTED PULMONARY SVC/PX: CPT

## 2020-03-28 PROCEDURE — 85379 FIBRIN DEGRADATION QUANT: CPT | Performed by: INTERNAL MEDICINE

## 2020-03-28 PROCEDURE — 94003 VENT MGMT INPAT SUBQ DAY: CPT

## 2020-03-28 PROCEDURE — 74018 RADEX ABDOMEN 1 VIEW: CPT

## 2020-03-28 PROCEDURE — 85025 COMPLETE CBC W/AUTO DIFF WBC: CPT | Performed by: INTERNAL MEDICINE

## 2020-03-28 PROCEDURE — 82962 GLUCOSE BLOOD TEST: CPT

## 2020-03-28 PROCEDURE — 25010000002 ENOXAPARIN PER 10 MG: Performed by: INTERNAL MEDICINE

## 2020-03-28 PROCEDURE — 25010000002 HYDROMORPHONE 1 MG/ML SOLUTION: Performed by: INTERNAL MEDICINE

## 2020-03-28 PROCEDURE — 80053 COMPREHEN METABOLIC PANEL: CPT | Performed by: INTERNAL MEDICINE

## 2020-03-28 PROCEDURE — 86160 COMPLEMENT ANTIGEN: CPT | Performed by: INTERNAL MEDICINE

## 2020-03-28 PROCEDURE — 83735 ASSAY OF MAGNESIUM: CPT | Performed by: INTERNAL MEDICINE

## 2020-03-28 PROCEDURE — 63710000001 INSULIN REGULAR HUMAN PER 5 UNITS: Performed by: INTERNAL MEDICINE

## 2020-03-28 PROCEDURE — 84100 ASSAY OF PHOSPHORUS: CPT | Performed by: INTERNAL MEDICINE

## 2020-03-28 PROCEDURE — 82728 ASSAY OF FERRITIN: CPT | Performed by: INTERNAL MEDICINE

## 2020-03-28 RX ADMIN — FAMOTIDINE 20 MG: 20 TABLET, FILM COATED ORAL at 08:12

## 2020-03-28 RX ADMIN — INSULIN HUMAN 3 UNITS: 100 INJECTION, SOLUTION PARENTERAL at 00:13

## 2020-03-28 RX ADMIN — INSULIN HUMAN 5 UNITS: 100 INJECTION, SOLUTION PARENTERAL at 18:31

## 2020-03-28 RX ADMIN — SODIUM CHLORIDE, PRESERVATIVE FREE 10 ML: 5 INJECTION INTRAVENOUS at 08:12

## 2020-03-28 RX ADMIN — ENOXAPARIN SODIUM 40 MG: 40 INJECTION SUBCUTANEOUS at 21:04

## 2020-03-28 RX ADMIN — GABAPENTIN 300 MG: 250 SOLUTION ORAL at 08:12

## 2020-03-28 RX ADMIN — HYDROMORPHONE HYDROCHLORIDE 1 MG: 10 INJECTION INTRAMUSCULAR; INTRAVENOUS; SUBCUTANEOUS at 13:19

## 2020-03-28 RX ADMIN — BUPROPION HYDROCHLORIDE 100 MG: 100 TABLET, FILM COATED ORAL at 08:12

## 2020-03-28 RX ADMIN — POTASSIUM PHOSPHATE, MONOBASIC AND POTASSIUM PHOSPHATE, DIBASIC 45 MMOL: 224; 236 INJECTION, SOLUTION INTRAVENOUS at 05:55

## 2020-03-28 RX ADMIN — GABAPENTIN 300 MG: 250 SOLUTION ORAL at 13:18

## 2020-03-28 RX ADMIN — HYDROXYCHLOROQUINE SULFATE 200 MG: 200 TABLET ORAL at 21:04

## 2020-03-28 RX ADMIN — HYDROXYCHLOROQUINE SULFATE 200 MG: 200 TABLET ORAL at 13:19

## 2020-03-28 RX ADMIN — SODIUM CHLORIDE, PRESERVATIVE FREE 10 ML: 5 INJECTION INTRAVENOUS at 21:05

## 2020-03-28 RX ADMIN — HYDROMORPHONE HYDROCHLORIDE 1 MG: 10 INJECTION INTRAMUSCULAR; INTRAVENOUS; SUBCUTANEOUS at 02:53

## 2020-03-28 RX ADMIN — FAMOTIDINE 20 MG: 20 TABLET, FILM COATED ORAL at 13:18

## 2020-03-28 RX ADMIN — HYDROCODONE BITARTRATE AND ACETAMINOPHEN 1 TABLET: 10; 325 TABLET ORAL at 08:12

## 2020-03-28 RX ADMIN — LEVOTHYROXINE SODIUM 100 MCG: 100 TABLET ORAL at 08:12

## 2020-03-28 RX ADMIN — BUPROPION HYDROCHLORIDE 100 MG: 100 TABLET, FILM COATED ORAL at 21:04

## 2020-03-28 RX ADMIN — HYDROMORPHONE HYDROCHLORIDE 1 MG: 10 INJECTION INTRAMUSCULAR; INTRAVENOUS; SUBCUTANEOUS at 05:55

## 2020-03-28 RX ADMIN — HYDROXYCHLOROQUINE SULFATE 200 MG: 200 TABLET ORAL at 08:12

## 2020-03-28 RX ADMIN — INSULIN HUMAN 3 UNITS: 100 INJECTION, SOLUTION PARENTERAL at 06:01

## 2020-03-28 RX ADMIN — GABAPENTIN 300 MG: 250 SOLUTION ORAL at 21:04

## 2020-03-29 LAB
C3 SERPL-MCNC: 139 MG/DL (ref 82–167)
C4 SERPL-MCNC: 32 MG/DL (ref 14–44)
CRP SERPL-MCNC: 11.61 MG/DL (ref 0–0.5)
D DIMER PPP FEU-MCNC: 4.3 MCGFEU/ML (ref 0–0.49)
FERRITIN SERPL-MCNC: 448 NG/ML (ref 13–150)
GLUCOSE BLDC GLUCOMTR-MCNC: 186 MG/DL (ref 70–130)
GLUCOSE BLDC GLUCOMTR-MCNC: 201 MG/DL (ref 70–130)
GLUCOSE BLDC GLUCOMTR-MCNC: 255 MG/DL (ref 70–130)

## 2020-03-29 PROCEDURE — 86160 COMPLEMENT ANTIGEN: CPT | Performed by: INTERNAL MEDICINE

## 2020-03-29 PROCEDURE — 63710000001 INSULIN REGULAR HUMAN PER 5 UNITS: Performed by: INTERNAL MEDICINE

## 2020-03-29 PROCEDURE — 25010000002 LORAZEPAM PER 2 MG: Performed by: INTERNAL MEDICINE

## 2020-03-29 PROCEDURE — 25010000002 LORAZEPAM PER 2 MG

## 2020-03-29 PROCEDURE — 82728 ASSAY OF FERRITIN: CPT | Performed by: INTERNAL MEDICINE

## 2020-03-29 PROCEDURE — 86140 C-REACTIVE PROTEIN: CPT | Performed by: INTERNAL MEDICINE

## 2020-03-29 PROCEDURE — 82962 GLUCOSE BLOOD TEST: CPT

## 2020-03-29 PROCEDURE — 94799 UNLISTED PULMONARY SVC/PX: CPT

## 2020-03-29 PROCEDURE — 25010000002 ENOXAPARIN PER 10 MG: Performed by: INTERNAL MEDICINE

## 2020-03-29 PROCEDURE — 85379 FIBRIN DEGRADATION QUANT: CPT | Performed by: INTERNAL MEDICINE

## 2020-03-29 RX ORDER — LORAZEPAM 2 MG/ML
1 INJECTION INTRAMUSCULAR ONCE
Status: DISCONTINUED | OUTPATIENT
Start: 2020-03-29 | End: 2020-04-07

## 2020-03-29 RX ORDER — LORAZEPAM 2 MG/ML
INJECTION INTRAMUSCULAR
Status: COMPLETED
Start: 2020-03-29 | End: 2020-03-29

## 2020-03-29 RX ORDER — DEXTROMETHORPHAN POLISTIREX 30 MG/5ML
30 SUSPENSION ORAL 2 TIMES DAILY PRN
Status: DISCONTINUED | OUTPATIENT
Start: 2020-03-29 | End: 2020-04-10 | Stop reason: HOSPADM

## 2020-03-29 RX ORDER — GABAPENTIN 250 MG/5ML
300 SOLUTION ORAL EVERY 12 HOURS SCHEDULED
Status: DISCONTINUED | OUTPATIENT
Start: 2020-03-29 | End: 2020-03-31

## 2020-03-29 RX ADMIN — HYDROCODONE BITARTRATE AND ACETAMINOPHEN 1 TABLET: 5; 325 TABLET ORAL at 22:05

## 2020-03-29 RX ADMIN — HYDROCODONE BITARTRATE AND ACETAMINOPHEN 1 TABLET: 5; 325 TABLET ORAL at 17:18

## 2020-03-29 RX ADMIN — HYDROXYCHLOROQUINE SULFATE 200 MG: 200 TABLET ORAL at 14:38

## 2020-03-29 RX ADMIN — HYDROXYCHLOROQUINE SULFATE 200 MG: 200 TABLET ORAL at 06:18

## 2020-03-29 RX ADMIN — INSULIN HUMAN 3 UNITS: 100 INJECTION, SOLUTION PARENTERAL at 18:37

## 2020-03-29 RX ADMIN — INSULIN HUMAN 3 UNITS: 100 INJECTION, SOLUTION PARENTERAL at 00:22

## 2020-03-29 RX ADMIN — GUAIFENESIN 200 MG: 200 SOLUTION ORAL at 18:37

## 2020-03-29 RX ADMIN — BUPROPION HYDROCHLORIDE 100 MG: 100 TABLET, FILM COATED ORAL at 08:44

## 2020-03-29 RX ADMIN — DEXMEDETOMIDINE HYDROCHLORIDE 0.3 MCG/KG/HR: 100 INJECTION, SOLUTION, CONCENTRATE INTRAVENOUS at 10:27

## 2020-03-29 RX ADMIN — HYDROXYCHLOROQUINE SULFATE 200 MG: 200 TABLET ORAL at 22:05

## 2020-03-29 RX ADMIN — GABAPENTIN 300 MG: 250 SOLUTION ORAL at 20:11

## 2020-03-29 RX ADMIN — GUAIFENESIN 200 MG: 200 SOLUTION ORAL at 14:38

## 2020-03-29 RX ADMIN — SODIUM CHLORIDE, PRESERVATIVE FREE 10 ML: 5 INJECTION INTRAVENOUS at 20:11

## 2020-03-29 RX ADMIN — SODIUM CHLORIDE, PRESERVATIVE FREE 10 ML: 5 INJECTION INTRAVENOUS at 09:13

## 2020-03-29 RX ADMIN — HYDROCODONE BITARTRATE AND ACETAMINOPHEN 1 TABLET: 5; 325 TABLET ORAL at 08:44

## 2020-03-29 RX ADMIN — Medication 0.5 MCG/KG/HR: at 00:25

## 2020-03-29 RX ADMIN — DEXMEDETOMIDINE HYDROCHLORIDE 0.5 MCG/KG/HR: 100 INJECTION, SOLUTION, CONCENTRATE INTRAVENOUS at 00:25

## 2020-03-29 RX ADMIN — LORAZEPAM: 2 INJECTION INTRAMUSCULAR; INTRAVENOUS at 00:23

## 2020-03-29 RX ADMIN — GABAPENTIN 300 MG: 250 SOLUTION ORAL at 14:38

## 2020-03-29 RX ADMIN — DEXTROMETHORPHAN POLISTIREX 30 MG: 30 SUSPENSION ORAL at 14:38

## 2020-03-29 RX ADMIN — ENOXAPARIN SODIUM 40 MG: 40 INJECTION SUBCUTANEOUS at 22:05

## 2020-03-29 RX ADMIN — POTASSIUM PHOSPHATE, MONOBASIC AND POTASSIUM PHOSPHATE, DIBASIC 15 MMOL: 224; 236 INJECTION, SOLUTION INTRAVENOUS at 01:18

## 2020-03-29 RX ADMIN — BUPROPION HYDROCHLORIDE 100 MG: 100 TABLET, FILM COATED ORAL at 20:11

## 2020-03-29 RX ADMIN — DEXTROMETHORPHAN POLISTIREX 30 MG: 30 SUSPENSION ORAL at 22:05

## 2020-03-29 RX ADMIN — INSULIN HUMAN 8 UNITS: 100 INJECTION, SOLUTION PARENTERAL at 06:19

## 2020-03-29 RX ADMIN — LEVOTHYROXINE SODIUM 100 MCG: 100 TABLET ORAL at 06:18

## 2020-03-29 RX ADMIN — GABAPENTIN 300 MG: 250 SOLUTION ORAL at 06:18

## 2020-03-29 RX ADMIN — INSULIN HUMAN 5 UNITS: 100 INJECTION, SOLUTION PARENTERAL at 12:31

## 2020-03-30 LAB
ALBUMIN SERPL-MCNC: 2.2 G/DL (ref 3.5–5.2)
ALBUMIN/GLOB SERPL: 0.6 G/DL
ALP SERPL-CCNC: 244 U/L (ref 39–117)
ALT SERPL W P-5'-P-CCNC: 35 U/L (ref 1–33)
ANION GAP SERPL CALCULATED.3IONS-SCNC: 11.3 MMOL/L (ref 5–15)
AST SERPL-CCNC: 45 U/L (ref 1–32)
BASOPHILS # BLD AUTO: 0.01 10*3/MM3 (ref 0–0.2)
BASOPHILS NFR BLD AUTO: 0.1 % (ref 0–1.5)
BILIRUB SERPL-MCNC: 0.7 MG/DL (ref 0.2–1.2)
BUN BLD-MCNC: 23 MG/DL (ref 8–23)
BUN/CREAT SERPL: 19 (ref 7–25)
C3 SERPL-MCNC: 162 MG/DL (ref 82–167)
C4 SERPL-MCNC: 33 MG/DL (ref 14–44)
CALCIUM SPEC-SCNC: 8.4 MG/DL (ref 8.6–10.5)
CHLORIDE SERPL-SCNC: 101 MMOL/L (ref 98–107)
CO2 SERPL-SCNC: 24.7 MMOL/L (ref 22–29)
CREAT BLD-MCNC: 1.21 MG/DL (ref 0.57–1)
CRP SERPL-MCNC: 13.2 MG/DL (ref 0–0.5)
D DIMER PPP FEU-MCNC: 5.82 MCGFEU/ML (ref 0–0.49)
DEPRECATED RDW RBC AUTO: 45.1 FL (ref 37–54)
EOSINOPHIL # BLD AUTO: 0.03 10*3/MM3 (ref 0–0.4)
EOSINOPHIL NFR BLD AUTO: 0.3 % (ref 0.3–6.2)
ERYTHROCYTE [DISTWIDTH] IN BLOOD BY AUTOMATED COUNT: 13.6 % (ref 12.3–15.4)
FERRITIN SERPL-MCNC: 501 NG/ML (ref 13–150)
GFR SERPL CREATININE-BSD FRML MDRD: 43 ML/MIN/1.73
GLOBULIN UR ELPH-MCNC: 4 GM/DL
GLUCOSE BLD-MCNC: 178 MG/DL (ref 65–99)
GLUCOSE BLDC GLUCOMTR-MCNC: 156 MG/DL (ref 70–130)
GLUCOSE BLDC GLUCOMTR-MCNC: 214 MG/DL (ref 70–130)
GLUCOSE BLDC GLUCOMTR-MCNC: 252 MG/DL (ref 70–130)
HCT VFR BLD AUTO: 31.6 % (ref 34–46.6)
HGB BLD-MCNC: 10.3 G/DL (ref 12–15.9)
LYMPHOCYTES # BLD AUTO: 0.83 10*3/MM3 (ref 0.7–3.1)
LYMPHOCYTES NFR BLD AUTO: 9 % (ref 19.6–45.3)
MCH RBC QN AUTO: 29.1 PG (ref 26.6–33)
MCHC RBC AUTO-ENTMCNC: 32.6 G/DL (ref 31.5–35.7)
MCV RBC AUTO: 89.3 FL (ref 79–97)
MONOCYTES # BLD AUTO: 0.63 10*3/MM3 (ref 0.1–0.9)
MONOCYTES NFR BLD AUTO: 6.9 % (ref 5–12)
NEUTROPHILS # BLD AUTO: 7.57 10*3/MM3 (ref 1.7–7)
NEUTROPHILS NFR BLD AUTO: 82.5 % (ref 42.7–76)
PHOSPHATE SERPL-MCNC: 1.5 MG/DL (ref 2.5–4.5)
PHOSPHATE SERPL-MCNC: 3 MG/DL (ref 2.5–4.5)
PLATELET # BLD AUTO: 259 10*3/MM3 (ref 140–450)
PMV BLD AUTO: 9.4 FL (ref 6–12)
POTASSIUM BLD-SCNC: 5.3 MMOL/L (ref 3.5–5.2)
POTASSIUM BLD-SCNC: 5.9 MMOL/L (ref 3.5–5.2)
PROT SERPL-MCNC: 6.2 G/DL (ref 6–8.5)
RBC # BLD AUTO: 3.54 10*6/MM3 (ref 3.77–5.28)
SODIUM BLD-SCNC: 137 MMOL/L (ref 136–145)
WBC NRBC COR # BLD: 9.18 10*3/MM3 (ref 3.4–10.8)

## 2020-03-30 PROCEDURE — 82728 ASSAY OF FERRITIN: CPT | Performed by: INTERNAL MEDICINE

## 2020-03-30 PROCEDURE — 86160 COMPLEMENT ANTIGEN: CPT | Performed by: INTERNAL MEDICINE

## 2020-03-30 PROCEDURE — 84100 ASSAY OF PHOSPHORUS: CPT | Performed by: INTERNAL MEDICINE

## 2020-03-30 PROCEDURE — 25010000002 ENOXAPARIN PER 10 MG: Performed by: INTERNAL MEDICINE

## 2020-03-30 PROCEDURE — 82962 GLUCOSE BLOOD TEST: CPT

## 2020-03-30 PROCEDURE — 84132 ASSAY OF SERUM POTASSIUM: CPT | Performed by: INTERNAL MEDICINE

## 2020-03-30 PROCEDURE — 85025 COMPLETE CBC W/AUTO DIFF WBC: CPT | Performed by: INTERNAL MEDICINE

## 2020-03-30 PROCEDURE — 85379 FIBRIN DEGRADATION QUANT: CPT | Performed by: INTERNAL MEDICINE

## 2020-03-30 PROCEDURE — 86140 C-REACTIVE PROTEIN: CPT | Performed by: INTERNAL MEDICINE

## 2020-03-30 PROCEDURE — 85007 BL SMEAR W/DIFF WBC COUNT: CPT | Performed by: INTERNAL MEDICINE

## 2020-03-30 PROCEDURE — 80053 COMPREHEN METABOLIC PANEL: CPT | Performed by: INTERNAL MEDICINE

## 2020-03-30 PROCEDURE — 63710000001 INSULIN REGULAR HUMAN PER 5 UNITS: Performed by: INTERNAL MEDICINE

## 2020-03-30 RX ORDER — SODIUM POLYSTYRENE SULFONATE 15 G/60ML
30 SUSPENSION ORAL; RECTAL ONCE
Status: COMPLETED | OUTPATIENT
Start: 2020-03-30 | End: 2020-03-30

## 2020-03-30 RX ORDER — HYDROXYCHLOROQUINE SULFATE 200 MG/1
200 TABLET, FILM COATED ORAL 3 TIMES DAILY
Status: COMPLETED | OUTPATIENT
Start: 2020-03-30 | End: 2020-04-04

## 2020-03-30 RX ADMIN — SODIUM POLYSTYRENE SULFONATE 30 G: 15 SUSPENSION ORAL; RECTAL at 20:35

## 2020-03-30 RX ADMIN — GABAPENTIN 300 MG: 250 SOLUTION ORAL at 20:35

## 2020-03-30 RX ADMIN — ENOXAPARIN SODIUM 40 MG: 40 INJECTION SUBCUTANEOUS at 20:35

## 2020-03-30 RX ADMIN — HYDROXYCHLOROQUINE SULFATE 200 MG: 200 TABLET ORAL at 14:15

## 2020-03-30 RX ADMIN — INSULIN HUMAN 5 UNITS: 100 INJECTION, SOLUTION PARENTERAL at 05:17

## 2020-03-30 RX ADMIN — INSULIN HUMAN 8 UNITS: 100 INJECTION, SOLUTION PARENTERAL at 00:59

## 2020-03-30 RX ADMIN — BUPROPION HYDROCHLORIDE 100 MG: 100 TABLET, FILM COATED ORAL at 08:06

## 2020-03-30 RX ADMIN — GUAIFENESIN 200 MG: 200 SOLUTION ORAL at 04:01

## 2020-03-30 RX ADMIN — SODIUM CHLORIDE, PRESERVATIVE FREE 10 ML: 5 INJECTION INTRAVENOUS at 08:06

## 2020-03-30 RX ADMIN — HYDROXYCHLOROQUINE SULFATE 200 MG: 200 TABLET ORAL at 05:13

## 2020-03-30 RX ADMIN — GUAIFENESIN 200 MG: 200 SOLUTION ORAL at 08:06

## 2020-03-30 RX ADMIN — GABAPENTIN 300 MG: 250 SOLUTION ORAL at 08:06

## 2020-03-30 RX ADMIN — INSULIN HUMAN 3 UNITS: 100 INJECTION, SOLUTION PARENTERAL at 12:06

## 2020-03-30 RX ADMIN — POTASSIUM PHOSPHATE, MONOBASIC AND POTASSIUM PHOSPHATE, DIBASIC 30 MMOL: 224; 236 INJECTION, SOLUTION INTRAVENOUS at 01:31

## 2020-03-30 RX ADMIN — HYDROXYCHLOROQUINE SULFATE 200 MG: 200 TABLET ORAL at 20:35

## 2020-03-30 RX ADMIN — BUPROPION HYDROCHLORIDE 100 MG: 100 TABLET, FILM COATED ORAL at 20:35

## 2020-03-30 RX ADMIN — GUAIFENESIN 200 MG: 200 SOLUTION ORAL at 20:35

## 2020-03-30 RX ADMIN — LEVOTHYROXINE SODIUM 100 MCG: 100 TABLET ORAL at 05:13

## 2020-03-30 RX ADMIN — HYDROCODONE BITARTRATE AND ACETAMINOPHEN 1 TABLET: 5; 325 TABLET ORAL at 08:13

## 2020-03-30 RX ADMIN — SODIUM CHLORIDE, PRESERVATIVE FREE 10 ML: 5 INJECTION INTRAVENOUS at 20:38

## 2020-03-31 LAB
ANION GAP SERPL CALCULATED.3IONS-SCNC: 10.4 MMOL/L (ref 5–15)
BASOPHILS # BLD AUTO: 0.01 10*3/MM3 (ref 0–0.2)
BASOPHILS NFR BLD AUTO: 0.1 % (ref 0–1.5)
BUN BLD-MCNC: 21 MG/DL (ref 8–23)
BUN/CREAT SERPL: 21.2 (ref 7–25)
CALCIUM SPEC-SCNC: 8.1 MG/DL (ref 8.6–10.5)
CHLORIDE SERPL-SCNC: 99 MMOL/L (ref 98–107)
CO2 SERPL-SCNC: 28.6 MMOL/L (ref 22–29)
CREAT BLD-MCNC: 0.99 MG/DL (ref 0.57–1)
DEPRECATED RDW RBC AUTO: 42.3 FL (ref 37–54)
EOSINOPHIL # BLD AUTO: 0.06 10*3/MM3 (ref 0–0.4)
EOSINOPHIL NFR BLD AUTO: 0.7 % (ref 0.3–6.2)
ERYTHROCYTE [DISTWIDTH] IN BLOOD BY AUTOMATED COUNT: 13.1 % (ref 12.3–15.4)
GFR SERPL CREATININE-BSD FRML MDRD: 54 ML/MIN/1.73
GLUCOSE BLD-MCNC: 146 MG/DL (ref 65–99)
GLUCOSE BLDC GLUCOMTR-MCNC: 103 MG/DL (ref 70–130)
GLUCOSE BLDC GLUCOMTR-MCNC: 200 MG/DL (ref 70–130)
GLUCOSE BLDC GLUCOMTR-MCNC: 206 MG/DL (ref 70–130)
HCT VFR BLD AUTO: 31.4 % (ref 34–46.6)
HGB BLD-MCNC: 10.2 G/DL (ref 12–15.9)
IMM GRANULOCYTES # BLD AUTO: 0.12 10*3/MM3 (ref 0–0.05)
IMM GRANULOCYTES NFR BLD AUTO: 1.5 % (ref 0–0.5)
LYMPHOCYTES # BLD AUTO: 0.8 10*3/MM3 (ref 0.7–3.1)
LYMPHOCYTES NFR BLD AUTO: 9.7 % (ref 19.6–45.3)
MCH RBC QN AUTO: 28.5 PG (ref 26.6–33)
MCHC RBC AUTO-ENTMCNC: 32.5 G/DL (ref 31.5–35.7)
MCV RBC AUTO: 87.7 FL (ref 79–97)
MONOCYTES # BLD AUTO: 0.6 10*3/MM3 (ref 0.1–0.9)
MONOCYTES NFR BLD AUTO: 7.3 % (ref 5–12)
NEUTROPHILS # BLD AUTO: 6.63 10*3/MM3 (ref 1.7–7)
NEUTROPHILS NFR BLD AUTO: 80.7 % (ref 42.7–76)
NRBC BLD AUTO-RTO: 0 /100 WBC (ref 0–0.2)
NT-PROBNP SERPL-MCNC: ABNORMAL PG/ML (ref 5–1800)
PLATELET # BLD AUTO: 292 10*3/MM3 (ref 140–450)
PMV BLD AUTO: 9.1 FL (ref 6–12)
POTASSIUM BLD-SCNC: 4.7 MMOL/L (ref 3.5–5.2)
RBC # BLD AUTO: 3.58 10*6/MM3 (ref 3.77–5.28)
SODIUM BLD-SCNC: 138 MMOL/L (ref 136–145)
WBC NRBC COR # BLD: 8.22 10*3/MM3 (ref 3.4–10.8)

## 2020-03-31 PROCEDURE — 85025 COMPLETE CBC W/AUTO DIFF WBC: CPT | Performed by: INTERNAL MEDICINE

## 2020-03-31 PROCEDURE — 92610 EVALUATE SWALLOWING FUNCTION: CPT

## 2020-03-31 PROCEDURE — 63710000001 INSULIN REGULAR HUMAN PER 5 UNITS: Performed by: INTERNAL MEDICINE

## 2020-03-31 PROCEDURE — 63710000001 INSULIN LISPRO (HUMAN) PER 5 UNITS: Performed by: INTERNAL MEDICINE

## 2020-03-31 PROCEDURE — 83880 ASSAY OF NATRIURETIC PEPTIDE: CPT | Performed by: INTERNAL MEDICINE

## 2020-03-31 PROCEDURE — 25010000002 ENOXAPARIN PER 10 MG: Performed by: INTERNAL MEDICINE

## 2020-03-31 PROCEDURE — 82962 GLUCOSE BLOOD TEST: CPT

## 2020-03-31 PROCEDURE — 80048 BASIC METABOLIC PNL TOTAL CA: CPT | Performed by: INTERNAL MEDICINE

## 2020-03-31 RX ORDER — GABAPENTIN 300 MG/1
300 CAPSULE ORAL EVERY 12 HOURS SCHEDULED
Status: COMPLETED | OUTPATIENT
Start: 2020-03-31 | End: 2020-04-09

## 2020-03-31 RX ORDER — BUDESONIDE AND FORMOTEROL FUMARATE DIHYDRATE 160; 4.5 UG/1; UG/1
2 AEROSOL RESPIRATORY (INHALATION)
Status: DISPENSED | OUTPATIENT
Start: 2020-03-31 | End: 2020-04-03

## 2020-03-31 RX ORDER — BUPROPION HYDROCHLORIDE 300 MG/1
300 TABLET ORAL DAILY
Status: DISCONTINUED | OUTPATIENT
Start: 2020-04-01 | End: 2020-04-10 | Stop reason: HOSPADM

## 2020-03-31 RX ADMIN — HYDROXYCHLOROQUINE SULFATE 200 MG: 200 TABLET ORAL at 21:39

## 2020-03-31 RX ADMIN — BUPROPION HYDROCHLORIDE 100 MG: 100 TABLET, FILM COATED ORAL at 11:54

## 2020-03-31 RX ADMIN — ENOXAPARIN SODIUM 40 MG: 40 INJECTION SUBCUTANEOUS at 21:39

## 2020-03-31 RX ADMIN — INSULIN HUMAN 5 UNITS: 100 INJECTION, SOLUTION PARENTERAL at 06:28

## 2020-03-31 RX ADMIN — HYDROXYCHLOROQUINE SULFATE 200 MG: 200 TABLET ORAL at 18:05

## 2020-03-31 RX ADMIN — BUPROPION HYDROCHLORIDE 100 MG: 100 TABLET, FILM COATED ORAL at 21:39

## 2020-03-31 RX ADMIN — SODIUM CHLORIDE, PRESERVATIVE FREE 10 ML: 5 INJECTION INTRAVENOUS at 10:40

## 2020-03-31 RX ADMIN — LEVOTHYROXINE SODIUM 100 MCG: 100 TABLET ORAL at 13:00

## 2020-03-31 RX ADMIN — DEXTROMETHORPHAN POLISTIREX 30 MG: 30 SUSPENSION ORAL at 03:28

## 2020-03-31 RX ADMIN — GABAPENTIN 300 MG: 300 CAPSULE ORAL at 13:19

## 2020-03-31 RX ADMIN — HYDROCODONE BITARTRATE AND ACETAMINOPHEN 1 TABLET: 5; 325 TABLET ORAL at 03:28

## 2020-03-31 RX ADMIN — GABAPENTIN 300 MG: 300 CAPSULE ORAL at 21:39

## 2020-03-31 RX ADMIN — SODIUM CHLORIDE, PRESERVATIVE FREE 10 ML: 5 INJECTION INTRAVENOUS at 21:40

## 2020-03-31 RX ADMIN — INSULIN LISPRO 5 UNITS: 100 INJECTION, SOLUTION INTRAVENOUS; SUBCUTANEOUS at 18:05

## 2020-03-31 RX ADMIN — HYDROXYCHLOROQUINE SULFATE 200 MG: 200 TABLET ORAL at 11:53

## 2020-04-01 LAB
ANION GAP SERPL CALCULATED.3IONS-SCNC: 12.9 MMOL/L (ref 5–15)
BUN BLD-MCNC: 18 MG/DL (ref 8–23)
BUN/CREAT SERPL: 20.7 (ref 7–25)
CALCIUM SPEC-SCNC: 8.1 MG/DL (ref 8.6–10.5)
CHLORIDE SERPL-SCNC: 97 MMOL/L (ref 98–107)
CO2 SERPL-SCNC: 24.1 MMOL/L (ref 22–29)
CREAT BLD-MCNC: 0.87 MG/DL (ref 0.57–1)
GFR SERPL CREATININE-BSD FRML MDRD: 63 ML/MIN/1.73
GLUCOSE BLD-MCNC: 111 MG/DL (ref 65–99)
GLUCOSE BLDC GLUCOMTR-MCNC: 123 MG/DL (ref 70–130)
GLUCOSE BLDC GLUCOMTR-MCNC: 140 MG/DL (ref 70–130)
GLUCOSE BLDC GLUCOMTR-MCNC: 160 MG/DL (ref 70–130)
GLUCOSE BLDC GLUCOMTR-MCNC: 184 MG/DL (ref 70–130)
PHOSPHATE SERPL-MCNC: 3.2 MG/DL (ref 2.5–4.5)
POTASSIUM BLD-SCNC: 4.5 MMOL/L (ref 3.5–5.2)
SODIUM BLD-SCNC: 134 MMOL/L (ref 136–145)

## 2020-04-01 PROCEDURE — 84100 ASSAY OF PHOSPHORUS: CPT | Performed by: INTERNAL MEDICINE

## 2020-04-01 PROCEDURE — 63710000001 INSULIN LISPRO (HUMAN) PER 5 UNITS: Performed by: INTERNAL MEDICINE

## 2020-04-01 PROCEDURE — 94640 AIRWAY INHALATION TREATMENT: CPT

## 2020-04-01 PROCEDURE — 94799 UNLISTED PULMONARY SVC/PX: CPT

## 2020-04-01 PROCEDURE — 80048 BASIC METABOLIC PNL TOTAL CA: CPT | Performed by: INTERNAL MEDICINE

## 2020-04-01 PROCEDURE — 97163 PT EVAL HIGH COMPLEX 45 MIN: CPT

## 2020-04-01 PROCEDURE — 97110 THERAPEUTIC EXERCISES: CPT

## 2020-04-01 PROCEDURE — 82962 GLUCOSE BLOOD TEST: CPT

## 2020-04-01 PROCEDURE — 25010000002 ENOXAPARIN PER 10 MG: Performed by: INTERNAL MEDICINE

## 2020-04-01 RX ADMIN — SODIUM CHLORIDE, PRESERVATIVE FREE 10 ML: 5 INJECTION INTRAVENOUS at 09:26

## 2020-04-01 RX ADMIN — INSULIN LISPRO 3 UNITS: 100 INJECTION, SOLUTION INTRAVENOUS; SUBCUTANEOUS at 20:47

## 2020-04-01 RX ADMIN — HYDROXYCHLOROQUINE SULFATE 200 MG: 200 TABLET ORAL at 09:25

## 2020-04-01 RX ADMIN — SODIUM CHLORIDE, PRESERVATIVE FREE 10 ML: 5 INJECTION INTRAVENOUS at 20:49

## 2020-04-01 RX ADMIN — LEVOTHYROXINE SODIUM 100 MCG: 100 TABLET ORAL at 09:25

## 2020-04-01 RX ADMIN — BUDESONIDE AND FORMOTEROL FUMARATE DIHYDRATE 2 PUFF: 160; 4.5 AEROSOL RESPIRATORY (INHALATION) at 19:53

## 2020-04-01 RX ADMIN — BUDESONIDE AND FORMOTEROL FUMARATE DIHYDRATE 2 PUFF: 160; 4.5 AEROSOL RESPIRATORY (INHALATION) at 07:25

## 2020-04-01 RX ADMIN — HYDROXYCHLOROQUINE SULFATE 200 MG: 200 TABLET ORAL at 17:09

## 2020-04-01 RX ADMIN — GABAPENTIN 300 MG: 300 CAPSULE ORAL at 09:30

## 2020-04-01 RX ADMIN — HYDROXYCHLOROQUINE SULFATE 200 MG: 200 TABLET ORAL at 20:47

## 2020-04-01 RX ADMIN — INSULIN LISPRO 3 UNITS: 100 INJECTION, SOLUTION INTRAVENOUS; SUBCUTANEOUS at 17:15

## 2020-04-01 RX ADMIN — GABAPENTIN 300 MG: 300 CAPSULE ORAL at 20:47

## 2020-04-01 RX ADMIN — BUPROPION HYDROCHLORIDE 300 MG: 300 TABLET, EXTENDED RELEASE ORAL at 09:25

## 2020-04-01 RX ADMIN — ENOXAPARIN SODIUM 40 MG: 40 INJECTION SUBCUTANEOUS at 20:48

## 2020-04-01 NOTE — PLAN OF CARE
"  Problem: Patient Care Overview  Goal: Plan of Care Review  Outcome: Ongoing (interventions implemented as appropriate)  Flowsheets  Taken 3/28/2020 0618 by Jalen Chauhan, RN  Progress: no change  Taken 3/31/2020 2046 by Barbara Alexander, RN  Plan of Care Reviewed With: patient  Note:   Pt vss, pt denies worsening of SOA, pt states she is \"feeling much better\" she is calm cooperative and denies pain at this time, Pt o2 was weaned to 1.5 LNC prior to shift start and titrated up to 2LNC as needed. PT cont on diet as planned, Spoke to daughter via phone with status updated, plan of care discussed with pt and family with questions and concerns addressed, Will cont to monitor.      "

## 2020-04-01 NOTE — PLAN OF CARE
Pt stable, doing well. Remains on 2 L O2. Performed passive & active ROM and strength training with pt in bed, pt tolerated well. PT eval today; pt was able to stand a few times per PT. Probably will need rehab. Chest x-ray ordered for tomorrow am. IJ removed. M patient.

## 2020-04-01 NOTE — THERAPY EVALUATION
"Patient Name: Rekha Bear  : 1940    MRN: [id number]                              Today's Date: 2020       Admit Date: 3/21/2020    Visit Dx:     ICD-10-CM ICD-9-CM   1. Community acquired pneumonia of left lower lobe of lung (CMS/HCC) J18.1 481   2. Community acquired pneumonia of right lower lobe of lung (CMS/HCC) J18.1 481   3. Hyponatremia E87.1 276.1   4. 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   • Community acquired pneumonia of left lower lobe of lung (CMS/HCC)   • Diabetes mellitus, type II (CMS/[facility])   • Hyponatremia   • Dehydration   • Pneumonia due to COVID-19 virus     Past Medical History:   Diagnosis Date   • Acute bronchitis    • Anxiety    • Chest pain    • Chilblains     \"Chilblian's\"   • Depression    • Diabetes mellitus, type II (CMS/[facility])    • Fatty liver    • GERD (gastroesophageal reflux disease)    • Health care maintenance    • History of mammogram    • Hyperlipidemia    • Hypertension    • Hypothyroid    • Incontinence    • Osteoarthritis     OA  Marvin THR, LT TKR - hydrocodone prescibed by Dr. Almaguer   • Pain of esophagus     \" nervous esophagus\" - she takes the occasional alprazolam   • Peptic ulcer disease    • Prediabetes    • Raynaud's disease     \"Raynauds\"   • Renal disease     followed by Dr. Grewal   • UTI (urinary tract infection)    • Vitamin B 12 deficiency    • Wandering atrial pacemaker by " electrocardiogram      Past Surgical History:   Procedure Laterality Date   • CATARACT EXTRACTION     • CHOLECYSTECTOMY     • COLONOSCOPY  2009   • COLONOSCOPY N/A 12/20/2016    Procedure: COLONOSCOPY with hot snare polypectomy;  Surgeon: George Pabon MD;  Location: [facility] ENDOSCOPY;  Service:    • DENTAL PROCEDURE     • FOOT SURGERY     • TONSILLECTOMY     • TOTAL HIP ARTHROPLASTY Bilateral     OA  Marvin THR, LT TKR - hydrocodone prescibed by Dr. Almaguer   • TOTAL KNEE ARTHROPLASTY Left     OA  Marvin THR, LT TKR - hydrocodone prescibed by Dr. Almaguer     General Information     Row Name 04/01/20 1442          PT Evaluation Time/Intention    Document Type  evaluation  -EM     Mode of Treatment  individual therapy;physical therapy  -EM     Row Name 04/01/20 1442          General Information    Patient Profile Reviewed?  yes  -EM     Prior Level of Function  independent:;community mobility  -EM     Existing Precautions/Restrictions  (S) -- COVID + isolation   -EM     Row Name 04/01/20 1442          Relationship/Environment    Lives With  alone  -EM     Row Name 04/01/20 1442          Resource/Environmental Concerns    Current Living Arrangements  home/apartment/condo  -EM     Row Name 04/01/20 1442          Home Main Entrance    Number of Stairs, Main Entrance  five  -EM     Stair Railings, Main Entrance  railings safe and in good condition  -EM     Row Name 04/01/20 1442          Stairs Within Home, Primary    Number of Stairs, Within Home, Primary  none  -EM     Row Name 04/01/20 1442          Cognitive Assessment/Intervention- PT/OT    Orientation Status (Cognition)  oriented x 4  -EM     Row Name 04/01/20 1442          Safety Issues, Functional Mobility    Impairments Affecting Function (Mobility)  balance;endurance/activity tolerance;strength  -EM       User Key  (r) = Recorded By, (t) = Taken By, (c) = Cosigned By    Initials Name Provider Type    EM Jessy Altman, PT Physical Therapist        Mobility      Row Name 04/01/20 1444          Bed Mobility Assessment/Treatment    Bed Mobility Assessment/Treatment  supine-sit;sit-supine  -EM     Supine-Sit [place] (Bed Mobility)  minimum assist (75% patient effort)  -EM     Sit-Supine [place] (Bed Mobility)  contact guard  -EM     Assistive Device (Bed Mobility)  head of bed elevated;bed rails  -EM     Row Name 04/01/20 1444          Sit-Stand Transfer    Sit-Stand [place] (Transfers)  moderate assist (50% patient effort);verbal cues  -EM     Assistive Device (Sit-Stand Transfers)  walker, front-wheeled  -EM     Row Name 04/01/20 1444          Gait/Stairs Assessment/Training    Comment (Gait/Stairs)  unable to take steps, attempted sidestepping but unable to complete, sit to stand x 2 attempts, able to achieve full upright standing   -EM       User Key  (r) = Recorded By, (t) = Taken By, (c) = Cosigned By    Initials Name Provider Type    EM Jessy Altman, PT Physical Therapist        Obj/Interventions     Row Name 04/01/20 1444          General ROM    GENERAL ROM COMMENTS  ROM WNL   -EM     Row Name 04/01/20 1444          MMT (Manual Muscle Testing)    General MMT Comments  no focal deficits identified, significant generalized weakness from prolonged immobility   -EM     Row Name 04/01/20 1444          Therapeutic Exercise    Lower Extremity (Therapeutic Exercise)  quad sets, bilateral;gluteal sets;LAQ (long arc quad), bilateral  -EM     Lower Extremity Range of Motion (Therapeutic Exercise)  ankle dorsiflexion/plantar flexion, bilateral  -EM     Sets/Reps (Therapeutic Exercise)  1/10  -EM     Row Name 04/01/20 1444          Static Sitting Balance    Level of [place] (Unsupported Sitting, Static Balance)  supervision  -EM     Sitting Position (Unsupported Sitting, Static Balance)  sitting on edge of bed  -EM     Row Name 04/01/20 1444          Dynamic Sitting Balance    Level of [place], Reaches Outside Midline (Sitting, Dynamic Balance)   contact guard assist  -EM     Sitting Position, Reaches Outside Midline (Sitting, Dynamic Balance)  sitting on edge of bed  -EM     Row Name 04/01/20 1444          Static Standing Balance    Level of [place] (Supported Standing, Static Balance)  minimal assist, 75% patient effort  -EM     Assistive Device Utilized (Supported Standing, Static Balance)  walker, rolling  -EM     Row Name 04/01/20 1444          Sensory Assessment/Intervention    Sensory General Assessment  no sensation deficits identified  -EM       User Key  (r) = Recorded By, (t) = Taken By, (c) = Cosigned By    Initials Name Provider Type    EM Jessy Altman, PT Physical Therapist        Goals/Plan     Row Name 04/01/20 1451          Bed Mobility Goal 1 (PT)    Activity/Assistive Device (Bed Mobility Goal 1, PT)  bed mobility activities, all  -EM     [place] Level/Cues Needed (Bed Mobility Goal 1, PT)  supervision required  -EM     Time Frame (Bed Mobility Goal 1, PT)  2 weeks  -EM     Row Name 04/01/20 1451          Transfer Goal 1 (PT)    Activity/Assistive Device (Transfer Goal 1, PT)  sit-to-stand/stand-to-sit;walker, rolling  -EM     [place] Level/Cues Needed (Transfer Goal 1, PT)  minimum assist (75% or more patient effort)  -EM     Time Frame (Transfer Goal 1, PT)  2 weeks  -EM     Row Name 04/01/20 1451          Gait Training Goal 1 (PT)    Activity/Assistive Device (Gait Training Goal 1, PT)  gait (walking locomotion);walker, rolling  -EM     [place] Level (Gait Training Goal 1, PT)  minimum assist (75% or more patient effort)  -EM     Distance (Gait Goal 1, PT)  25  -EM     Time Frame (Gait Training Goal 1, PT)  2 weeks  -EM     Row Name 04/01/20 1451          Patient Education Goal (PT)    Activity (Patient Education Goal, PT)  patient able to perform HEP for generalized strengthening   -EM     [place]/Cues/Accuracy (Memory Goal 2, PT)  demonstrates adequately  -EM     Time Frame (Patient Education Goal, PT)   2 weeks  -EM       User Key  (r) = Recorded By, (t) = Taken By, (c) = Cosigned By    Initials Name Provider Type    Jessy Allison, PT Physical Therapist        Clinical Impression     Row Name 04/01/20 1449          Plan of Care Review    Plan of Care Reviewed With  patient  -EM     Row Name 04/01/20 1449          Physical Therapy Clinical Impression    Patient/Family Goals Statement (PT Clinical Impression)  get stronger   -EM     Criteria for Skilled Interventions Met (PT Clinical Impression)  yes;treatment indicated  -EM     Rehab Potential (PT Clinical Summary)  good, to achieve stated therapy goals  -EM     Row Name 04/01/20 1449          Vital Signs    Pre SpO2 (%)  93  -EM     O2 Delivery Pre Treatment  supplemental O2  -EM     Post SpO2 (%)  90  -EM     O2 Delivery Post Treatment  supplemental O2  -EM     Row Name 04/01/20 1449          Positioning and Restraints    Pre-Treatment Position  in bed  -EM     Post Treatment Position  bed  -EM     In Bed  side lying right;call light within reach;with nsg  -EM       User Key  (r) = Recorded By, (t) = Taken By, (c) = Cosigned By    Initials Name Provider Type    Jessy Allison, PT Physical Therapist        Outcome Measures     Row Name 04/01/20 1453          How much help from another person do you currently need...    Turning from your back to your side while in flat bed without using bedrails?  3  -EM     Moving from lying on back to sitting on the side of a flat bed without bedrails?  3  -EM     Moving to and from a bed to a chair (including a wheelchair)?  2  -EM     Standing up from a chair using your arms (e.g., wheelchair, bedside chair)?  2  -EM     Climbing 3-5 steps with a railing?  1  -EM     To walk in hospital room?  1  -EM     AM-PAC 6 Clicks Score (PT)  12  -EM     Row Name 04/01/20 1453          Functional Assessment    Outcome Measure Options  AM-PAC 6 Clicks Basic Mobility (PT)  -EM       User Key  (r) = Recorded By, (t) =  Taken By, (c) = Cosigned By    Initials Name Provider Type    Jessy Allison PT Physical Therapist          PT Recommendation and Plan  Planned Therapy Interventions (PT Eval): balance training, bed mobility training, gait training, home exercise program, transfer training  Outcome Summary/Treatment Plan (PT)  Anticipated Discharge Disposition (PT): skilled nursing facility  Plan of Care Reviewed With: patient  Outcome Summary: Patient is a 80 y.o. female admitted to [facility] for pna, COVID+ requiring mechanical ventilation on 3/21/2020. PMHx includes DM, CKD. Patient is independent at baseline and lives alone. Patient reports 5 steps to enter home, none inside, use of cane prior to admission. Today, patient performed bed mobility with [place], required modA for transfers, and unable to ambulate.  Patient demonstrates impairments consisting of generalized weakness, decreased activity tolerance. Patient may benefit from skilled PT services acutely to address functional deficits as well as improve level of independence prior to discharge. Anticipate SNU upon DC. Given current situation and attempts to limit all patient exposures, PT will f/u every 3 days. Patient instructed in supine exercises today, communication with [facility] staff about mobilization efforts of up to chair to progress mobility as patient able to tolerate.     Time Calculation:   PT Charges     Row Name 04/01/20 1457             Time Calculation    Start Time  1345  -EM      Stop Time  1425  -EM      Time Calculation (min)  40 min  -EM      PT Received On  04/01/20  -EM      PT - Next Appointment  04/04/20  -EM      PT Goal Re-Cert Due Date  04/15/20  -EM         Time Calculation- PT    Total Timed Code Minutes- PT  25 minute(s)  -EM        User Key  (r) = Recorded By, (t) = Taken By, (c) = Cosigned By    Initials Name Provider Type    Jessy Allison PT Physical Therapist        Therapy Charges for Today     Code Description Service Date  Service Provider Modifiers Qty    81240026944 HC PT EVAL HIGH COMPLEXITY 2 4/1/2020 Jessy Altman, PT GP 1    57600314693 HC PT THER PROC EA 15 MIN 4/1/2020 Jessy Altman, PT GP 2          PT G-Codes  Outcome Measure Options: AM-PAC 6 Clicks Basic Mobility (PT)  AM-PAC 6 Clicks Score (PT): 12    Jessy Altman, PT  4/1/2020

## 2020-04-01 NOTE — PROGRESS NOTES
Name: Rekha Bear ADMIT: 3/21/2020   : 1940  PCP: Eben Porter MD    MRN: [id number] LOS: 11 days   AGE/SEX: 80 y.o. female  ROOM: [facility]     Subjective   Subjective   Patient says she feels better but still very weak.  Denies shortness of breath    Review of Systems     Objective   Objective   Vital Signs  Temp:  [96.2 °F (35.7 °C)-98.5 °F (36.9 °C)] 98.5 °F (36.9 °C)  Heart Rate:  [75-90] 83  Resp:  [18-22] 20  BP: (147-157)/(85-93) 157/87  SpO2:  [90 %-95 %] 92 %  on  Flow (L/min):  [1.5-3] 2;   Device (Oxygen Therapy): nasal cannula  Body mass index is 33.15 kg/m².  Physical Exam   Constitutional: She has a sickly appearance. No distress.   HENT:   Head: Normocephalic and atraumatic.   .   Eyes: Conjunctivae are normal. No scleral icterus.   Neck: Neck supple. No JVD present.   Cardiovascular: Normal rate and regular rhythm.   Murmur heard.  Pulmonary/Chest: Effort normal. No stridor. She has no wheezes.   Scattered rales   Abdominal: Soft. Bowel sounds are normal. She exhibits no distension and no mass. There is no tenderness. There is no rebound and no guarding.   Musculoskeletal: She exhibits no edema.   Neurological: She is alert.   Skin: Skin is dry. No rash noted. She is not diaphoretic.   Psychiatric: She has a normal mood and affect.   Nursing note and vitals reviewed.      Results Review:       I reviewed the patient's new clinical results.  Results from last 7 days   Lab Units 20  1632 20  0822 20  0307 20  0603   WBC 10*3/mm3 8.22 9.18 4.81 4.29   HEMOGLOBIN g/dL 10.2* 10.3* 9.5* 9.5*   PLATELETS 10*3/mm3 292 259 178 163     Results from last 7 days   Lab Units 20  0427 20  1117 20  1600 20  0822 20  0307   SODIUM mmol/L 134* 138  --  137 135*   POTASSIUM mmol/L 4.5 4.7 5.9* 5.3* 4.0   CHLORIDE mmol/L 97* 99  --  101 101   CO2 mmol/L 24.1 28.6  --  24.7 24.7   BUN mg/dL 18 21  --  23 18   CREATININE mg/dL 0.87 0.99  --   1.21* 1.15*   GLUCOSE mg/dL 111* 146*  --  178* 171*   Estimated Creatinine Clearance: 63.4 mL/min (by C-G formula based on SCr of 0.87 mg/dL).  Results from last 7 days   Lab Units 03/30/20  0822 03/28/20  0307   ALBUMIN g/dL 2.20* 2.70*   BILIRUBIN mg/dL 0.7 0.3   ALK PHOS U/L 244* 123*   AST (SGOT) U/L 45* 46*   ALT (SGPT) U/L 35* 20     Results from last 7 days   Lab Units 04/01/20  0427 03/31/20  1117 03/30/20  1600 03/30/20  0822 03/30/20  0005 03/28/20 2120 03/28/20  0307   CALCIUM mg/dL 8.1* 8.1*  --  8.4*  --   --  8.0*   ALBUMIN g/dL  --   --   --  2.20*  --   --  2.70*   MAGNESIUM mg/dL  --   --   --   --   --   --  2.0   PHOSPHORUS mg/dL 3.2  --  3.0  --  1.5* 2.0* 1.1*       Glucose   Date/Time Value Ref Range Status   04/01/2020 1134 140 (H) 70 - 130 mg/dL Final   04/01/2020 0637 123 70 - 130 mg/dL Final   03/31/2020 2137 103 70 - 130 mg/dL Final   03/31/2020 1732 200 (H) 70 - 130 mg/dL Final   03/31/2020 0610 206 (H) 70 - 130 mg/dL Final   03/30/2020 1118 156 (H) 70 - 130 mg/dL Final   03/30/2020 0514 214 (H) 70 - 130 mg/dL Final         budesonide-formoterol 2 puff Inhalation BID - RT   buPROPion  mg Oral Daily   enoxaparin 40 mg Subcutaneous Q24H   gabapentin 300 mg Oral Q12H   hydroxychloroquine 200 mg Oral TID   insulin lispro 0-14 Units Subcutaneous 4x Daily With Meals & Nightly   levothyroxine 100 mcg Oral Daily   LORazepam 1 mg Intravenous Once   sodium chloride 10 mL Intravenous Q12H      Diet Dysphagia; III - Pureed With Some Mashed; Nectar / Syrup Thick; Consistent Carbohydrate       Assessment/Plan     Active Hospital Problems    Diagnosis  POA   • **Community acquired pneumonia of left lower lobe of lung (CMS/HCC) [J18.1]  Yes   • Pneumonia due to COVID-19 virus [U07.1, J12.89]  Yes   • Diabetes mellitus, type II (CMS/HCC) [E11.9]  Yes   • Hyponatremia [E87.1]  Yes   • Dehydration [E86.0]  Yes   • Hypertension [I10]  Yes   • Hypothyroidism [E03.9]  Yes   • Renal insufficiency  [N28.9]  Yes   • Depression [F32.9]  Yes      Resolved Hospital Problems   No resolved problems to display.     Sepsis secondary to pneumonia related to COVID 19  -Improving steadily.  -Continue hydroxychloroquine    Acute hypoxic respiratory failure secondary to above  -Sats improving, wean as tolerated    DM2, stable    Transaminitis, mild related to viral infection    Generalized weakness secondary to above/deconditioning  -Complicated issues secondary to the COVID 19.  PT has evaluated and is recommending skilled nursing placement but not sure of the status of skilled nursing facilities accepting patients in the midst of this outbreak.  Will discuss with CCP  -Continue PT as tolerated      Silvano Rose MD  [facility]  04/01/20  15:52

## 2020-04-01 NOTE — PROGRESS NOTES
Adult Nutrition  Assessment/PES    Patient Name:  Rekha Bear  YOB: 1940  MRN: [id number]  Admit Date:  3/21/2020    Assessment Date:  4/1/2020    Comments:  TF/PO f/u: PO diet started on 3/31 after cleared by SLP and Cortrak removed. Tolerating diet well. RN notes following water protocol in between meals with adequate oral care. No need for supplement at this time. Will continue to monitor.     Reason for Assessment     Row Name 04/01/20 1516          Reason for Assessment    Reason For Assessment  follow-up protocol;TF/PN     Diagnosis  infection/sepsis     Identified At Risk by Screening Criteria  difficulty chewing/swallowing;tube feeding or parenteral nutrition         Nutrition/Diet History     Row Name 04/01/20 1516          Nutrition/Diet History    Typical Food/Fluid Intake  PO diet started on 3/31 and Cortrak removed. Tolerating diet well. RN notes following water protocol in between meals with adequate oral care.      Factors Affecting Nutritional Intake  difficulty/impaired swallowing           Labs/Tests/Procedures/Meds     Row Name 04/01/20 1517          Labs/Procedures/Meds    Lab Results Reviewed  reviewed, pertinent     Lab Results Comments  Glu, Na, CL, Ca, H/H        Diagnostic Tests/Procedures    Diagnostic Test/Procedure Reviewed  reviewed        Medications    Pertinent Medications Reviewed  reviewed, pertinent     Pertinent Medications Comments  lovenox, humalog, synthroid         Physical Findings     Row Name 04/01/20 1518          Physical Findings    Overall Physical Appearance  obese;on oxygen therapy     Oral/Mouth Cavity  poor dentition;other (see comments) missing teeth, dry mouth     Skin  other (see comments) B=15           Nutrition Prescription Ordered     Row Name 04/01/20 1518          Nutrition Prescription PO    Current PO Diet  Dysphagia     Dysphagia Level  3  Pureed with some mashed     Fluid Consistency  Nectar/syrup thick     Common Modifiers   Consistent Carbohydrate           Problem/Interventions:  Problem 1     Row Name 04/01/20 1519          Nutrition Diagnoses Problem 1    Problem 1  Swallowing Difficulty     Pulmonary/Critical Care  Pneumonia;Extubated     Signs/Symptoms (evidenced by)  SLP/Swallow eval     Swallow eval status  Done     Type of SLP Evaluation  VFSS           Intervention Goal     Row Name 04/01/20 1519          Intervention Goal    General  Maintain nutrition;Meet nutritional needs for age/condition;Disease management/therapy     PO  Maintain intake;Meet estimated needs     Weight  Maintain weight         Nutrition Intervention     Row Name 04/01/20 1519          Nutrition Intervention    RD/Tech Action  Care plan reviewd;Follow Tx progress;Encourage intake           Education/Evaluation     Row Name 04/01/20 1519          Education    Education  Will Instruct as appropriate        Monitor/Evaluation    Monitor  Per protocol;PO intake;Pertinent labs;Weight;Skin status;Symptoms     Education Follow-up  Reinforce PRN           Electronically signed by:  Juliane Fontenot MS,RD,LD  04/01/20 15:20

## 2020-04-01 NOTE — PROGRESS NOTES
LPC INPATIENT PROGRESS NOTE         [address]    2020      PATIENT IDENTIFICATION:  Name: Rekha Bear ADMIT: 3/21/2020   : 1940  PCP: Eben Porter MD    MRN: [id number] LOS: 11 days   AGE/SEX: 80 y.o. female  ROOM: [facility]                     LOS 11    Reason for visit: Follow-up COVID-19 pneumonia and respiratory failure      SUBJECTIVE:      Breathing much better.  Down to 2 L of oxygen.  Fairly weak appearing and need to get physical therapy to at least assess.  Need to work on plan for discharge whether it be rehab somewhere versus home with family.  Diminished breath sounds on auscultation with no significant wheezing or rhonchi.  Discussed with nursing at bedside.  Discontinue central line.    Objective   OBJECTIVE:    Vital Sign Min/Max for last 24 hours  Temp  Min: 96.2 °F (35.7 °C)  Max: 98.5 °F (36.9 °C)   BP  Min: 147/86  Max: 157/87   Pulse  Min: 71  Max: 89   Resp  Min: 18  Max: 22   SpO2  Min: 88 %  Max: 96 %   No data recorded   No data recorded                   FiO2 (%): 30 %     Body mass index is 33.15 kg/m².    Intake/Output Summary (Last 24 hours) at 2020 1226  Last data filed at 2020 0400  Gross per 24 hour   Intake --   Output 1100 ml   Net -1100 ml         Exam:  GEN:  No distress, appears stated age  EYES:   PERRL, anicteric sclera  ENT:    External ears/nose normal, OP clear  NECK:  No adenopathy, midline trachea  LUNGS: Normal chest on inspection, palpation and diminished on auscultation  CV:  Normal S1S2, without murmur  ABD:  Non tender, non distended, no hepatosplenomegaly, +BS  EXT:  No edema, cyanosis or clubbing    Scheduled meds:    budesonide-formoterol 2 puff Inhalation BID - RT   buPROPion  mg Oral Daily   enoxaparin 40 mg Subcutaneous Q24H   gabapentin 300 mg Oral Q12H   hydroxychloroquine 200 mg Oral TID   insulin lispro 0-14 Units Subcutaneous 4x Daily With Meals & Nightly   levothyroxine 100 mcg Oral Daily    LORazepam 1 mg Intravenous Once   sodium chloride 10 mL Intravenous Q12H     IV meds:                         Data Review:  Results from last 7 days   Lab Units 04/01/20  0427 03/31/20  1117 03/30/20  1600 03/30/20  0822 03/28/20  0307 03/27/20  0603   SODIUM mmol/L 134* 138  --  137 135* 137   POTASSIUM mmol/L 4.5 4.7 5.9* 5.3* 4.0 4.2   CHLORIDE mmol/L 97* 99  --  101 101 103   CO2 mmol/L 24.1 28.6  --  24.7 24.7 22.9   BUN mg/dL 18 21  --  23 18 17   CREATININE mg/dL 0.87 0.99  --  1.21* 1.15* 1.28*   GLUCOSE mg/dL 111* 146*  --  178* 171* 163*   CALCIUM mg/dL 8.1* 8.1*  --  8.4* 8.0* 7.9*         Estimated Creatinine Clearance: 63.4 mL/min (by C-G formula based on SCr of 0.87 mg/dL).  Results from last 7 days   Lab Units 03/31/20  1632 03/30/20  0822 03/28/20  0307 03/27/20  0603 03/26/20  0414   WBC 10*3/mm3 8.22 9.18 4.81 4.29 7.85   HEMOGLOBIN g/dL 10.2* 10.3* 9.5* 9.5* 11.3*   PLATELETS 10*3/mm3 292 259 178 163 214         Results from last 7 days   Lab Units 03/30/20  0822 03/28/20  0307   ALT (SGPT) U/L 35* 20   AST (SGOT) U/L 45* 46*     Results from last 7 days   Lab Units 03/25/20  1756   PH, ARTERIAL pH units 7.362   PO2 ART mm Hg 305.2*   PCO2, ARTERIAL mm Hg 41.4   HCO3 ART mmol/L 23.5             proBNP 12,654        Most recent chest x-ray 3/28/2020 reviewed: Shows persistent infiltrates.          Microbiology reviewed: Cultures negative.  Respiratory viral panel negative.    COVID19   Date Value Ref Range Status   03/21/2020 Detected (C) Not Detected, Invalid Final       )        Active Hospital Problems    Diagnosis  POA   • **Community acquired pneumonia of left lower lobe of lung (CMS/HCC) [J18.1]  Yes   • Pneumonia due to COVID-19 virus [U07.1, J12.89]  Yes   • Diabetes mellitus, type II (CMS/HCC) [E11.9]  Yes   • Hyponatremia [E87.1]  Yes   • Dehydration [E86.0]  Yes   • Hypertension [I10]  Yes   • Hypothyroidism [E03.9]  Yes   • Renal insufficiency [N28.9]  Yes   • Depression [F32.9]  Yes       Resolved Hospital Problems   No resolved problems to display.       Assessment   ASSESSMENT:  COVID-19 pneumonia  Acute respiratory failure requiring mechanical ventilation  Acute kidney injury  Hypophosphatemia  Diabetes mellitus type 2  Hyponatremia    PLAN:  Making good progress.  Weaning oxygen as able.  Still fairly weak and will get physical therapy to do at least an assessment.  With positive COVID-19 unlikely to be able to go to rehab.  May need to consider home with family depending on how she does with physical therapy assessment.  Making pretty good progress however.  Discontinue central line.  Discussed with nursing staff at bedside.    I am seeing the patient for the first time today.  All patient problems are new to me.      Eyal Caldwell MD  Pulmonary and Critical Care Medicine  [facility], [facility]  4/1/2020    12:26

## 2020-04-01 NOTE — PLAN OF CARE
Problem: Patient Care Overview  Goal: Plan of Care Review  Flowsheets  Taken 4/1/2020 144  Plan of Care Reviewed With: patient  Taken 4/1/2020 1889  Outcome Summary: Patient is a 80 y.o. female admitted to [facility] for pna, COVID+ requiring mechanical ventilation on 3/21/2020. PMHx includes DM, CKD. Patient is independent at baseline and lives alone. Patient reports 5 steps to enter home, none inside, use of cane prior to admission. Today, patient performed bed mobility with [place], required modA for transfers, and unable to ambulate.  Patient demonstrates impairments consisting of generalized weakness, decreased activity tolerance. Patient may benefit from skilled PT services acutely to address functional deficits as well as improve level of independence prior to discharge. Anticipate SNU upon DC. Given current situation and attempts to limit all patient exposures, PT will f/u every 3 days. Patient instructed in supine exercises today, communication with [facility] staff about mobilization efforts of up to chair to progress mobility as patient able to tolerate.

## 2020-04-01 NOTE — NURSING NOTE
A&O x 4, No distress noted, [place] in place, Meds whole with applesauce, Safety maintained, will continue to monitor.

## 2020-04-02 ENCOUNTER — APPOINTMENT (OUTPATIENT)
Dept: GENERAL RADIOLOGY | Facility: HOSPITAL | Age: 80
End: 2020-04-02

## 2020-04-02 LAB
ANION GAP SERPL CALCULATED.3IONS-SCNC: 11.8 MMOL/L (ref 5–15)
BUN BLD-MCNC: 17 MG/DL (ref 8–23)
BUN/CREAT SERPL: 16.3 (ref 7–25)
CALCIUM SPEC-SCNC: 8.2 MG/DL (ref 8.6–10.5)
CHLORIDE SERPL-SCNC: 96 MMOL/L (ref 98–107)
CO2 SERPL-SCNC: 25.2 MMOL/L (ref 22–29)
CREAT BLD-MCNC: 1.04 MG/DL (ref 0.57–1)
GFR SERPL CREATININE-BSD FRML MDRD: 51 ML/MIN/1.73
GLUCOSE BLD-MCNC: 126 MG/DL (ref 65–99)
GLUCOSE BLDC GLUCOMTR-MCNC: 121 MG/DL (ref 70–130)
GLUCOSE BLDC GLUCOMTR-MCNC: 138 MG/DL (ref 70–130)
GLUCOSE BLDC GLUCOMTR-MCNC: 154 MG/DL (ref 70–130)
GLUCOSE BLDC GLUCOMTR-MCNC: 172 MG/DL (ref 70–130)
POTASSIUM BLD-SCNC: 4.5 MMOL/L (ref 3.5–5.2)
SODIUM BLD-SCNC: 133 MMOL/L (ref 136–145)

## 2020-04-02 PROCEDURE — 94799 UNLISTED PULMONARY SVC/PX: CPT

## 2020-04-02 PROCEDURE — 80048 BASIC METABOLIC PNL TOTAL CA: CPT | Performed by: INTERNAL MEDICINE

## 2020-04-02 PROCEDURE — 25010000002 ENOXAPARIN PER 10 MG: Performed by: INTERNAL MEDICINE

## 2020-04-02 PROCEDURE — 71045 X-RAY EXAM CHEST 1 VIEW: CPT

## 2020-04-02 PROCEDURE — 82962 GLUCOSE BLOOD TEST: CPT

## 2020-04-02 RX ADMIN — LEVOTHYROXINE SODIUM 100 MCG: 100 TABLET ORAL at 08:13

## 2020-04-02 RX ADMIN — GABAPENTIN 300 MG: 300 CAPSULE ORAL at 21:01

## 2020-04-02 RX ADMIN — HYDROXYCHLOROQUINE SULFATE 200 MG: 200 TABLET ORAL at 16:22

## 2020-04-02 RX ADMIN — HYDROXYCHLOROQUINE SULFATE 200 MG: 200 TABLET ORAL at 08:13

## 2020-04-02 RX ADMIN — GABAPENTIN 300 MG: 300 CAPSULE ORAL at 08:14

## 2020-04-02 RX ADMIN — BUDESONIDE AND FORMOTEROL FUMARATE DIHYDRATE 2 PUFF: 160; 4.5 AEROSOL RESPIRATORY (INHALATION) at 10:10

## 2020-04-02 RX ADMIN — ENOXAPARIN SODIUM 40 MG: 40 INJECTION SUBCUTANEOUS at 21:01

## 2020-04-02 RX ADMIN — SODIUM CHLORIDE, PRESERVATIVE FREE 10 ML: 5 INJECTION INTRAVENOUS at 21:01

## 2020-04-02 RX ADMIN — HYDROXYCHLOROQUINE SULFATE 200 MG: 200 TABLET ORAL at 21:00

## 2020-04-02 RX ADMIN — BUDESONIDE AND FORMOTEROL FUMARATE DIHYDRATE 2 PUFF: 160; 4.5 AEROSOL RESPIRATORY (INHALATION) at 20:37

## 2020-04-02 RX ADMIN — SODIUM CHLORIDE, PRESERVATIVE FREE 10 ML: 5 INJECTION INTRAVENOUS at 08:14

## 2020-04-02 RX ADMIN — BUPROPION HYDROCHLORIDE 300 MG: 300 TABLET, EXTENDED RELEASE ORAL at 08:14

## 2020-04-02 NOTE — PROGRESS NOTES
Continued Stay Note  [facility]     Patient Name: Rekha Bear  MRN: [id number]  Today's Date: 4/2/2020    Admit Date: 3/21/2020    Discharge Plan     Row Name 04/02/20 1128       Plan    Plan  SNF    Provided Post Acute Provider List?  Yes    Post Acute Provider List  Nursing Home    N/A Provider List Comment  Dara Hernández [phone]     Provided Post Acute Provider Quality & Resource List?  Yes    Delivered To  Support Person    Support Person  Dara Hernández [phone]     Patient/Family in Agreement with Plan  yes    Plan Comments  Spoke with Dr. Dara Hernández daughter [phone], discussed with daughter patient's discharge needs, per therapy and nursing discussion and notes, patient is only able to stand at bedside and is very weak even moving in bed.  Discussed with daughter rehab options, gave her instruction on hope to access Medicare.gov and [email or web address].  Daughter states that patient has been to [facility] in the past, she requested referral, spoke to Brandi with [facility], she will follow, Brandi is unclear of [facility] requirements for COVID positive patients.  Daughter also requested referral to [place], spoke to Marsha she will follow.  Per Marsha since patient is COVID +, they will need 2 negative COVID tests, 24 hours apart, before they can accept any COVID + patient.  Updated daughter on findings.  Will continue monitor for new or changing needs.        Discharge Codes    No documentation.             Zandra Rodríguez RN

## 2020-04-02 NOTE — PROGRESS NOTES
LPC INPATIENT PROGRESS NOTE         [address]    2020      PATIENT IDENTIFICATION:  Name: Rekha Bear ADMIT: 3/21/2020   : 1940  PCP: Eben Porter MD    MRN: [id number] LOS: 12 days   AGE/SEX: 80 y.o. female  ROOM: [facility]                     LOS 12    Reason for visit: Follow-up COVID-19 pneumonia and respiratory failure      SUBJECTIVE:      Breathing much better.  Down to 2 L of oxygen.  Fairly weak appearing and need to get physical therapy to at least assess.  Need to work on plan for discharge whether it be rehab somewhere versus home with family.  Diminished breath sounds on auscultation with no significant wheezing or rhonchi.  Discussed with nursing at bedside.  Discontinue central line.    Objective   OBJECTIVE:    Vital Sign Min/Max for last 24 hours  Temp  Min: 97.4 °F (36.3 °C)  Max: 98.7 °F (37.1 °C)   BP  Min: 146/94  Max: 154/88   Pulse  Min: 77  Max: 90   Resp  Min: 16  Max: 20   SpO2  Min: 90 %  Max: 97 %   No data recorded   No data recorded                   FiO2 (%): 30 %     Body mass index is 33.15 kg/m².    Intake/Output Summary (Last 24 hours) at 2020 1045  Last data filed at 2020 0234  Gross per 24 hour   Intake --   Output 600 ml   Net -600 ml         Exam:  GEN:  No distress    Scheduled meds:      budesonide-formoterol 2 puff Inhalation BID - RT   buPROPion  mg Oral Daily   enoxaparin 40 mg Subcutaneous Q24H   gabapentin 300 mg Oral Q12H   hydroxychloroquine 200 mg Oral TID   insulin lispro 0-14 Units Subcutaneous 4x Daily With Meals & Nightly   levothyroxine 100 mcg Oral Daily   LORazepam 1 mg Intravenous Once   sodium chloride 10 mL Intravenous Q12H     IV meds:                         Data Review:  Results from last 7 days   Lab Units 20  0446 20  0427 20  1117 20  1600 20  0822 20  0307   SODIUM mmol/L 133* 134* 138  --  137 135*   POTASSIUM mmol/L 4.5 4.5 4.7 5.9* 5.3* 4.0   CHLORIDE  mmol/L 96* 97* 99  --  101 101   CO2 mmol/L 25.2 24.1 28.6  --  24.7 24.7   BUN mg/dL 17 18 21  --  23 18   CREATININE mg/dL 1.04* 0.87 0.99  --  1.21* 1.15*   GLUCOSE mg/dL 126* 111* 146*  --  178* 171*   CALCIUM mg/dL 8.2* 8.1* 8.1*  --  8.4* 8.0*         Estimated Creatinine Clearance: 53.1 mL/min (A) (by C-G formula based on SCr of 1.04 mg/dL (H)).  Results from last 7 days   Lab Units 03/31/20  1632 03/30/20  0822 03/28/20  0307 03/27/20  0603   WBC 10*3/mm3 8.22 9.18 4.81 4.29   HEMOGLOBIN g/dL 10.2* 10.3* 9.5* 9.5*   PLATELETS 10*3/mm3 292 259 178 163         Results from last 7 days   Lab Units 03/30/20  0822 03/28/20  0307   ALT (SGPT) U/L 35* 20   AST (SGOT) U/L 45* 46*                 proBNP 12,654        Most recent chest x-ray 4/2/2020 reviewed: Shows improving infiltrates.          Microbiology reviewed: Cultures negative.  Respiratory viral panel negative.    COVID19   Date Value Ref Range Status   03/21/2020 Detected (C) Not Detected, Invalid Final       )        Active Hospital Problems    Diagnosis  POA   • **Community acquired pneumonia of left lower lobe of lung (CMS/HCC) [J18.1]  Yes   • Pneumonia due to COVID-19 virus [U07.1, J12.89]  Yes   • Diabetes mellitus, type II (CMS/HCC) [E11.9]  Yes   • Hyponatremia [E87.1]  Yes   • Dehydration [E86.0]  Yes   • Hypertension [I10]  Yes   • Hypothyroidism [E03.9]  Yes   • Renal insufficiency [N28.9]  Yes   • Depression [F32.9]  Yes      Resolved Hospital Problems   No resolved problems to display.       Assessment   ASSESSMENT:  COVID-19 pneumonia  Acute respiratory failure requiring mechanical ventilation  Acute kidney injury  Hypophosphatemia  Diabetes mellitus type 2  Hyponatremia    PLAN:  Making good progress.  Weaning oxygen as able.  Still fairly weak.   Respiratory status improving.  Following peripherally.  Discussed with Dr Rose last night.    Eyal Caldwell MD  Pulmonary and Critical Care Medicine  [facility],  [facility]  4/2/2020    10:45

## 2020-04-02 NOTE — DISCHARGE PLACEMENT REQUEST
"Rekha Osman (80 y.o. Female)     Date of Birth Social Security Number Address Home Phone MRN    1940  [address] [id number] [phone] [id number]    [employer] Mar[employer] Status          Voodoo        Admission Date Admission Type Admitting Provider Attending Provider Department, Room/Bed    3/21/20 Emergency Davon Byers MD Anaya, Ervin H., MD [address], E447/1    Discharge Date Discharge Disposition Discharge Destination                       Attending Provider:  Davon Byers MD    Allergies:  Iodine    Isolation:  Enh Drop/Con   Infection:  COVID (confirmed) (03/24/20)   Code Status:  CPR    Ht:  170.2 cm (67\")   Wt:  96 kg (211 lb 10.3 oz)    Admission Cmt:  None   Principal Problem:  Community acquired pneumonia of left lower lobe of lung (CMS/HCC) [J18.1]                 Active Insurance as of 3/21/2020     Primary Coverage     Payor Plan Insurance Group Employer/Plan Group    MEDICARE MEDICARE A & B      Payor Plan Address Payor Plan Phone Number Payor Plan Fax Number Effective Dates    [address] [phone]  1/1/2008 - None Entered    [facility] [id number]       Subscriber Name Subscriber Birth Date Member ID       REKHA OSMAN 1940 [id number]           Secondary Coverage     Payor Plan Insurance Group Employer/Plan Group    ANTHEM BLUE CROSS [facility] SUPP [id number]     Payor Plan Address Payor Plan Phone Number Payor Plan Fax Number Effective Dates    [address]   12/1/2016 - None Entered    [facility] [id number]       Subscriber Name Subscriber Birth Date Member ID       REKHA OSMAN 1940 [id number]                 Emergency Contacts      (Rel.) Home Phone Work Phone Mobile Phone    Dara Hernández (Daughter) [phone] -- [phone]              "

## 2020-04-02 NOTE — DISCHARGE PLACEMENT REQUEST
"Rekha Osman (80 y.o. Female)     Date of Birth Social Security Number Address Home Phone MRN    1940  [address] [id number] [phone] [id number]    [employer] Marital Status          [place]        Admission Date Admission Type Admitting Provider Attending Provider Department, Room/Bed    3/21/20 Emergency Davon Byers MD Anaya, Ervin H., MD [address], E447/1    Discharge Date Discharge Disposition Discharge Destination                       Attending Provider:  Davon Byers MD    Allergies:  Iodine    Isolation:  Enh Drop/Con   Infection:  COVID (confirmed) (03/24/20)   Code Status:  CPR    Ht:  170.2 cm (67\")   Wt:  96 kg (211 lb 10.3 oz)    Admission Cmt:  None   Principal Problem:  Community acquired pneumonia of left lower lobe of lung (CMS/HCC) [J18.1]                 Active Insurance as of 3/21/2020     Primary Coverage     Payor Plan Insurance Group Employer/Plan Group    MEDICARE MEDICARE A & B      Payor Plan Address Payor Plan Phone Number Payor Plan Fax Number Effective Dates    [address] [phone]  1/1/2008 - None Entered    [facility] [id number]       Subscriber Name Subscriber Birth Date Member ID       REKHA OSMAN 1940 [id number]           Secondary Coverage     Payor Plan Insurance Group Employer/Plan Group    ANTHEM BLUE CROSS [facility] SUPP [id number]     Payor Plan Address Payor Plan Phone Number Payor Plan Fax Number Effective Dates    [address]   12/1/2016 - None Entered    [facility] [id number]       Subscriber Name Subscriber Birth Date Member ID       REKHA OSMAN 1940 [id number]                 Emergency Contacts      (Rel.) Home Phone Work Phone Mobile Phone    Dara Hernández (Daughter) [phone] -- [phone]            "

## 2020-04-02 NOTE — PROGRESS NOTES
Continued Stay Note  [facility]     Patient Name: Rekha Bear  MRN: [id number]  Today's Date: 4/2/2020    Admit Date: 3/21/2020    Discharge Plan     Row Name 04/02/20 1345       Plan    Plan  SNF    Plan Comments  Daughter returned call, requested the following facilities be added to the referral list for her mom Guy Rojas, [facility].     Row Name 04/02/20 1127       Plan    Plan  SNF    Provided Post Acute Provider List?  Yes    Post Acute Provider List  Nursing Home    N/A Provider List Comment  Dara Hernández [phone]     Provided Post Acute Provider Quality & Resource List?  Yes    Delivered To  Support Person    Support Person  Dara Hernández [phone]     Patient/Family in Agreement with Plan  yes    Plan Comments  Spoke with Dr. Dara Hernández daughter [phone], discussed with daughter patient's discharge needs, per therapy and nursing discussion and notes, patient is only able to stand at bedside and is very weak even moving in bed.  Discussed with daughter rehab options, gave her instruction on hope to access Medicare.gov and [facility].  Daughter states that patient has been to [facility] in the past, she requested referral, spoke to Brandi with [facility], she will follow, Brandi is unclear of [facility] requirements for COVID positive patients.  Daughter also requested referral to [place], spoke to Marsha she will follow.  Per Marsha since patient is COVID +, they will need 2 negative COVID tests, 24 hours apart, before they can accept any COVID + patient.  Updated daughter on findings.  Will continue monitor for new or changing needs.        Discharge Codes    No documentation.             Zandra Rodríguez RN

## 2020-04-02 NOTE — PROGRESS NOTES
INFECTIOUS DISEASES PROGRESS NOTE    CC: f/u COVID 19    S:   She is feeling weak  Breathing is improved, still productive thick mucus  No f/c/ns    O:  Physical Exam:  Temp:  [97.4 °F (36.3 °C)-98.7 °F (37.1 °C)] 97.4 °F (36.3 °C)  Heart Rate:  [77-90] 78  Resp:  [16-20] 16  BP: (146-154)/(84-94) 154/88  Physical Exam  No acute distress, speech clear coherent, appropriate mood and affect, good insight, rest of exam deferred due to strict isolation precautions and need to preserve PPE   Diagnostics:    Cr 1.04   CXR, independently interpreted: improved patchy infiltrates  Glucose 121 through 184    microbiology:  3/21 respiratory pathogen panel negative  3/21 blood cultures 2/2 no growth  3/22 blood cultures 2/2 no growth  3/20 GI PCR, strep and Legionella antigens all negative    Assessment/Plan   1.  COVID-19 with pneumonia:   2.  Acute hypoxic respiratory failure secondary above.    3.  Diabetes mellitus type 2, continue glycemic control efforts to prevent/control infectious complications    She is feeling okay.  Continues to improve and is actually quite stable.  She is going to need rehabilitation and we will see what her accepting facility may require in terms of repeat COVID-19 testing or not.  Continue the Plaquenil.      Scottie Oneill MD  11:21 AM  04/02/20

## 2020-04-02 NOTE — PLAN OF CARE
Pt asmt unchanged from previous shift, pt on 2LNC, pt denies pain, family called and updated on plan of care  and advised that PT stated pt needs rehab before returning home. Will cont to monitor

## 2020-04-02 NOTE — PROGRESS NOTES
Name: Rekha Bear ADMIT: 3/21/2020   : 1940  PCP: Eben Porter MD    MRN: [id number] LOS: 12 days   AGE/SEX: 80 y.o. female  ROOM: [facility]     Subjective   Subjective   Patient says she feels better but still very weak.  Denies shortness of breath    Review of Systems     Objective   Objective   Vital Signs  Temp:  [97.4 °F (36.3 °C)-98.7 °F (37.1 °C)] 97.6 °F (36.4 °C)  Heart Rate:  [77-90] 79  Resp:  [16-20] 18  BP: (140-154)/(84-94) 140/88  SpO2:  [90 %-97 %] 96 %  on  Flow (L/min):  [2] 2;   Device (Oxygen Therapy): nasal cannula  Body mass index is 33.15 kg/m².  Physical Exam   Constitutional: She has a sickly appearance. No distress.   HENT:   Head: Normocephalic and atraumatic.   .   Eyes: Conjunctivae are normal. No scleral icterus.   Neck: Neck supple. No JVD present.   Cardiovascular: Normal rate and regular rhythm.   Murmur heard.  Pulmonary/Chest: Effort normal. No stridor. She has no wheezes.   Scattered rales   Abdominal: Soft. Bowel sounds are normal. She exhibits no distension and no mass. There is no tenderness. There is no rebound and no guarding.   Musculoskeletal: She exhibits no edema.   Neurological: She is alert.   Skin: Skin is dry. No rash noted. She is not diaphoretic.   Psychiatric: She has a normal mood and affect.   Nursing note and vitals reviewed.      Results Review:       I reviewed the patient's new clinical results.  Results from last 7 days   Lab Units 20  1632 20  0822 20  0307 20  0603   WBC 10*3/mm3 8.22 9.18 4.81 4.29   HEMOGLOBIN g/dL 10.2* 10.3* 9.5* 9.5*   PLATELETS 10*3/mm3 292 259 178 163     Results from last 7 days   Lab Units 20  0446 20  0427 20  1117 20  1600 20  0822   SODIUM mmol/L 133* 134* 138  --  137   POTASSIUM mmol/L 4.5 4.5 4.7 5.9* 5.3*   CHLORIDE mmol/L 96* 97* 99  --  101   CO2 mmol/L 25.2 24.1 28.6  --  24.7   BUN mg/dL 17 18 21  --  23   CREATININE mg/dL 1.04* 0.87 0.99  --   1.21*   GLUCOSE mg/dL 126* 111* 146*  --  178*   Estimated Creatinine Clearance: 53.1 mL/min (A) (by C-G formula based on SCr of 1.04 mg/dL (H)).  Results from last 7 days   Lab Units 03/30/20  0822 03/28/20  0307   ALBUMIN g/dL 2.20* 2.70*   BILIRUBIN mg/dL 0.7 0.3   ALK PHOS U/L 244* 123*   AST (SGOT) U/L 45* 46*   ALT (SGPT) U/L 35* 20     Results from last 7 days   Lab Units 04/02/20  0446 04/01/20  0427 03/31/20  1117 03/30/20  1600 03/30/20  0822 03/30/20  0005 03/28/20 2120 03/28/20  0307   CALCIUM mg/dL 8.2* 8.1* 8.1*  --  8.4*  --   --  8.0*   ALBUMIN g/dL  --   --   --   --  2.20*  --   --  2.70*   MAGNESIUM mg/dL  --   --   --   --   --   --   --  2.0   PHOSPHORUS mg/dL  --  3.2  --  3.0  --  1.5* 2.0* 1.1*       Glucose   Date/Time Value Ref Range Status   04/02/2020 1122 138 (H) 70 - 130 mg/dL Final   04/02/2020 0607 121 70 - 130 mg/dL Final   04/01/2020 1925 184 (H) 70 - 130 mg/dL Final   04/01/2020 1707 160 (H) 70 - 130 mg/dL Final   04/01/2020 1134 140 (H) 70 - 130 mg/dL Final   04/01/2020 0637 123 70 - 130 mg/dL Final   03/31/2020 2137 103 70 - 130 mg/dL Final         budesonide-formoterol 2 puff Inhalation BID - RT   buPROPion  mg Oral Daily   enoxaparin 40 mg Subcutaneous Q24H   gabapentin 300 mg Oral Q12H   hydroxychloroquine 200 mg Oral TID   insulin lispro 0-14 Units Subcutaneous 4x Daily With Meals & Nightly   levothyroxine 100 mcg Oral Daily   LORazepam 1 mg Intravenous Once   sodium chloride 10 mL Intravenous Q12H      Diet Dysphagia; III - Pureed With Some Mashed; Nectar / Syrup Thick; Consistent Carbohydrate       Assessment/Plan     Active Hospital Problems    Diagnosis  POA   • **Community acquired pneumonia of left lower lobe of lung (CMS/HCC) [J18.1]  Yes   • Pneumonia due to COVID-19 virus [U07.1, J12.89]  Yes   • Diabetes mellitus, type II (CMS/HCC) [E11.9]  Yes   • Hyponatremia [E87.1]  Yes   • Dehydration [E86.0]  Yes   • Hypertension [I10]  Yes   • Hypothyroidism [E03.9]   Yes   • Renal insufficiency [N28.9]  Yes   • Depression [F32.9]  Yes      Resolved Hospital Problems   No resolved problems to display.     Sepsis secondary to pneumonia related to COVID 19  -Improving steadily.  -Continue hydroxychloroquine    Acute hypoxic respiratory failure secondary to above  -Sats improving, wean as tolerated    DM2, stable    Transaminitis, mild related to viral infection    Generalized weakness secondary to above/deconditioning  -Complicated issue secondary to the COVID 19.  PT has evaluated and is recommending skilled nursing placement   - CCP working on SNF. Not sure of efficacy of retesting, how long test will be positive after infection, etc.  -Continue PT as tolerated      Silvano Rose MD  [facility]  04/02/20  13:56

## 2020-04-02 NOTE — PLAN OF CARE
Problem: Patient Care Overview  Goal: Plan of Care Review  Outcome: Ongoing (interventions implemented as appropriate)   Vss. Up in chair with assist x's 2 and walker. Remains afebrile and on o2 @ 2l n/c. No c/o pain or n/v.

## 2020-04-03 PROBLEM — E86.0 DEHYDRATION: Status: RESOLVED | Noted: 2020-03-21 | Resolved: 2020-04-03

## 2020-04-03 LAB
ANION GAP SERPL CALCULATED.3IONS-SCNC: 12.3 MMOL/L (ref 5–15)
BUN BLD-MCNC: 17 MG/DL (ref 8–23)
BUN/CREAT SERPL: 16.5 (ref 7–25)
CALCIUM SPEC-SCNC: 8 MG/DL (ref 8.6–10.5)
CHLORIDE SERPL-SCNC: 98 MMOL/L (ref 98–107)
CO2 SERPL-SCNC: 23.7 MMOL/L (ref 22–29)
CREAT BLD-MCNC: 1.03 MG/DL (ref 0.57–1)
GFR SERPL CREATININE-BSD FRML MDRD: 52 ML/MIN/1.73
GLUCOSE BLD-MCNC: 125 MG/DL (ref 65–99)
GLUCOSE BLDC GLUCOMTR-MCNC: 130 MG/DL (ref 70–130)
GLUCOSE BLDC GLUCOMTR-MCNC: 133 MG/DL (ref 70–130)
GLUCOSE BLDC GLUCOMTR-MCNC: 137 MG/DL (ref 70–130)
GLUCOSE BLDC GLUCOMTR-MCNC: 199 MG/DL (ref 70–130)
POTASSIUM BLD-SCNC: 4.5 MMOL/L (ref 3.5–5.2)
SODIUM BLD-SCNC: 134 MMOL/L (ref 136–145)

## 2020-04-03 PROCEDURE — 63710000001 INSULIN LISPRO (HUMAN) PER 5 UNITS: Performed by: INTERNAL MEDICINE

## 2020-04-03 PROCEDURE — 94799 UNLISTED PULMONARY SVC/PX: CPT

## 2020-04-03 PROCEDURE — 80048 BASIC METABOLIC PNL TOTAL CA: CPT | Performed by: INTERNAL MEDICINE

## 2020-04-03 PROCEDURE — 25010000002 ENOXAPARIN PER 10 MG: Performed by: INTERNAL MEDICINE

## 2020-04-03 PROCEDURE — 82962 GLUCOSE BLOOD TEST: CPT

## 2020-04-03 PROCEDURE — 92526 ORAL FUNCTION THERAPY: CPT | Performed by: SPEECH-LANGUAGE PATHOLOGIST

## 2020-04-03 RX ADMIN — HYDROXYCHLOROQUINE SULFATE 200 MG: 200 TABLET ORAL at 16:26

## 2020-04-03 RX ADMIN — INSULIN LISPRO 3 UNITS: 100 INJECTION, SOLUTION INTRAVENOUS; SUBCUTANEOUS at 12:30

## 2020-04-03 RX ADMIN — SODIUM CHLORIDE, PRESERVATIVE FREE 10 ML: 5 INJECTION INTRAVENOUS at 10:03

## 2020-04-03 RX ADMIN — GABAPENTIN 300 MG: 300 CAPSULE ORAL at 20:50

## 2020-04-03 RX ADMIN — BUDESONIDE AND FORMOTEROL FUMARATE DIHYDRATE 2 PUFF: 160; 4.5 AEROSOL RESPIRATORY (INHALATION) at 07:37

## 2020-04-03 RX ADMIN — SODIUM CHLORIDE, PRESERVATIVE FREE 10 ML: 5 INJECTION INTRAVENOUS at 21:14

## 2020-04-03 RX ADMIN — BUPROPION HYDROCHLORIDE 300 MG: 300 TABLET, EXTENDED RELEASE ORAL at 10:01

## 2020-04-03 RX ADMIN — LEVOTHYROXINE SODIUM 100 MCG: 100 TABLET ORAL at 10:01

## 2020-04-03 RX ADMIN — GABAPENTIN 300 MG: 300 CAPSULE ORAL at 10:01

## 2020-04-03 RX ADMIN — HYDROXYCHLOROQUINE SULFATE 200 MG: 200 TABLET ORAL at 20:50

## 2020-04-03 RX ADMIN — HYDROXYCHLOROQUINE SULFATE 200 MG: 200 TABLET ORAL at 10:01

## 2020-04-03 RX ADMIN — ENOXAPARIN SODIUM 40 MG: 40 INJECTION SUBCUTANEOUS at 20:50

## 2020-04-03 NOTE — PLAN OF CARE
Problem: Patient Care Overview  Goal: Plan of Care Review  Outcome: Ongoing (interventions implemented as appropriate)  Flowsheets (Taken 4/3/2020 3084)  Progress: improving     VSS. Pt on 2L of O2. Pt sat in chair for an hour of the beginning of the shift. Pt slept most of the night. Will continue to monitor.

## 2020-04-03 NOTE — PLAN OF CARE
Problem: Patient Care Overview  Goal: Plan of Care Review  Outcome: Ongoing (interventions implemented as appropriate)  Flowsheets (Taken 4/3/2020 4963)  Plan of Care Reviewed With: patient  Outcome Summary: Pt swallow re-evaluated.  Pt showed inconsistent throat clearing with mixed consistencies.  No overt s/s with solids or thin liquids by small cup sip.  Slow but adequate mastication.  Pt assessed both with and without upper partial.  REC mech soft, no mixed consistencies, chopped meats, thin liquids.  Upright w/ all po, no straws.  Meds w/ puree.

## 2020-04-03 NOTE — THERAPY RE-EVALUATION
"Acute Care - Speech Language Pathology   Swallow Re-Evaluation [facility]     Patient Name: Rekha Bear  : 1940  MRN: [id number]  Today's Date: 4/3/2020               Admit Date: 3/21/2020    Visit Dx:     ICD-10-CM ICD-9-CM   1. Community acquired pneumonia of left lower lobe of lung (CMS/HCC) J18.1 481   2. Community acquired pneumonia of right lower lobe of lung (CMS/HCC) J18.1 481   3. Hyponatremia E87.1 276.1   4. 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   • Community acquired pneumonia of left lower lobe of lung (CMS/[facility])   • Diabetes mellitus, type II (CMS/[facility])   • Hyponatremia   • Pneumonia due to COVID-19 virus     Past Medical History:   Diagnosis Date   • Acute bronchitis    • Anxiety    • Chest pain    • Chilblains     \"Chilblian's\"   • Depression    • Diabetes mellitus, type II (CMS/[facility])    • Fatty liver    • GERD (gastroesophageal reflux disease)    • Health care maintenance    • History of mammogram    • Hyperlipidemia    • Hypertension    • Hypothyroid    • Incontinence    • Osteoarthritis     OA  Marvin THR, LT TKR - hydrocodone prescibed by Dr. Almaguer   • Pain of esophagus     \" nervous esophagus\" - she takes the occasional alprazolam   • Peptic ulcer disease    • Prediabetes    • Raynaud's disease     \"Raynauds\"   • Renal disease     followed by Dr. 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 Almaguer   • TOTAL KNEE ARTHROPLASTY Left     OA  Marvin THR, LT TKR - hydrocodone prescibed by Dr. Almaguer        SWALLOW EVALUATION (last 72 hours)      SLP Adult Swallow Evaluation     Row Name 04/03/20 1600                   Rehab Evaluation    Document Type  re-evaluation  -SA        Subjective Information  complains of food; hasn't eaten food  -SA        Patient Observations  alert;cooperative  -SA        Patient Effort  good  -SA        Symptoms Noted During/After Treatment  none  -SA           General Information    Current Method of Nutrition  pureed with some mashed;nectar/syrup-thick liquids  -SA        Plans/Goals Discussed with  patient  -SA        Barriers to Rehab  medically complex  -SA        Patient's Goals for Discharge  return to regular diet  -SA           Oral Motor and Function    Dentition Assessment  missing teeth;other (see comments) partial placed; eval'd both w/ and w/o partial  -SA           Clinical Swallow Eval    Oral Prep Phase  impaired  -SA        Oral Transit  WFL  -SA        Oral Residue  WFL  -SA        Pharyngeal Phase  suspected pharyngeal impairment  -SA        Esophageal Phase  suspected esophageal impairment  -SA           Oral Prep Concerns    Oral Prep Concerns  prolonged mastication  -SA        Prolonged Mastication  mixed consistencies;mechanical soft  -SA           Pharyngeal Phase Concerns    Pharyngeal Phase Concerns  wet vocal quality;cough;throat clear  -SA        Throat Clear  mixed  consistencies  -           Clinical Impression    SLP Swallowing Diagnosis  suspected pharyngeal dysfunction  -        Functional Impact  risk of aspiration/pneumonia  -        Rehab Potential/Prognosis, Swallowing  adequate, monitor progress closely  -        Swallow Criteria for Skilled Therapeutic Interventions Met  demonstrates skilled criteria  -           Recommendations    Therapy Frequency (Swallow)  PRN  -        SLP Diet Recommendation  mechanical soft with no mixed consistencies;chopped;thin liquids  -        Recommended Precautions and Strategies  upright posture during/after eating;small bites of food and sips of liquid  -        SLP Rec. for Method of Medication Administration  with pudding or applesauce  -        Monitor for Signs of Aspiration  notify SLP if any concerns  -          User Key  (r) = Recorded By, (t) = Taken By, (c) = Cosigned By    Initials Name Effective Dates     Justina Dewey MS [facility]-SLP 03/07/18 -           EDUCATION  The patient has been educated in the following areas:   Dysphagia (Swallowing Impairment) Modified Diet Instruction.    SLP Recommendation and Plan  SLP Swallowing Diagnosis: suspected pharyngeal dysfunction  SLP Diet Recommendation: mechanical soft with no mixed consistencies, chopped, thin liquids  Recommended Precautions and Strategies: upright posture during/after eating, small bites of food and sips of liquid  SLP Rec. for Method of Medication Administration: with pudding or applesauce     Monitor for Signs of Aspiration: notify SLP if any concerns     Swallow Criteria for Skilled Therapeutic Interventions Met: demonstrates skilled criteria     Rehab Potential/Prognosis, Swallowing: adequate, monitor progress closely  Therapy Frequency (Swallow): PRN          Plan of Care Reviewed With: patient  Outcome Summary: Pt swallow re-evaluated.  Pt showed inconsistent throat clearing with mixed consistencies.  No overt s/s with solids or thin  liquids by small cup sip.  Slow but adequate mastication.  Pt assessed both with and without upper partial.  REC mech soft, no mixed consistencies, thin liquids.  Upright w/ all po, no straws.  Meds w/ puree.         SLP Outcome Measures (last 72 hours)      SLP Outcome Measures     Row Name 04/03/20 1600             SLP Outcome Measures    Outcome Measure Used?  Adult NOMS  -SA         Adult FCM Scores    Swallowing FCM Score  5  -        User Key  (r) = Recorded By, (t) = Taken By, (c) = Cosigned By    Initials Name Effective Dates    Justina Interiano MS CCC-PAULETTE 03/07/18 -            Time Calculation:   Time Calculation- SLP     Row Name 04/03/20 1644             Time Calculation- SLP    SLP Start Time  1530  -      SLP Received On  04/03/20  -        User Key  (r) = Recorded By, (t) = Taken By, (c) = Cosigned By    Initials Name Provider Type    Justina Interiano MS CCC-SLP Speech and Language Pathologist          Therapy Charges for Today     Code Description Service Date Service Provider Modifiers Qty    [id number] HC ST TREATMENT SWALLOW 5 4/3/2020 Justina Dewey MS CCC-PAULETTE GN 1               MS THOM Crawford  4/3/2020

## 2020-04-03 NOTE — PROGRESS NOTES
[facility] INPATIENT PROGRESS NOTE         [address]    4/3/2020      PATIENT IDENTIFICATION:  Name: Rekha Bear ADMIT: 3/21/2020   : 1940  PCP: Eben Porter MD    MRN: [id number] LOS: 13 days   AGE/SEX: 80 y.o. female  ROOM: [facility]                     LOS 13    Reason for visit: Follow-up COVID-19 pneumonia and respiratory failure      SUBJECTIVE:      Breathing much better.  Down to 2 L of oxygen.     Objective   OBJECTIVE:    Vital Sign Min/Max for last 24 hours  Temp  Min: 96 °F (35.6 °C)  Max: 98 °F (36.7 °C)   BP  Min: 124/77  Max: 160/96   Pulse  Min: 68  Max: 83   Resp  Min: 16  Max: 20   SpO2  Min: 90 %  Max: 97 %   No data recorded   No data recorded                   FiO2 (%): 30 %     Body mass index is 33.15 kg/m².    Intake/Output Summary (Last 24 hours) at 4/3/2020 1352  Last data filed at 4/3/2020 0846  Gross per 24 hour   Intake --   Output 850 ml   Net -850 ml         Exam:  GEN:  No distress    Scheduled meds:      budesonide-formoterol 2 puff Inhalation BID - RT   buPROPion  mg Oral Daily   enoxaparin 40 mg Subcutaneous Q24H   gabapentin 300 mg Oral Q12H   hydroxychloroquine 200 mg Oral TID   insulin lispro 0-14 Units Subcutaneous 4x Daily With Meals & Nightly   levothyroxine 100 mcg Oral Daily   LORazepam 1 mg Intravenous Once   sodium chloride 10 mL Intravenous Q12H     IV meds:                         Data Review:  Results from last 7 days   Lab Units 20  0320 20  0446 20  0427 20  1117 20  1600 20  0822   SODIUM mmol/L 134* 133* 134* 138  --  137   POTASSIUM mmol/L 4.5 4.5 4.5 4.7 5.9* 5.3*   CHLORIDE mmol/L 98 96* 97* 99  --  101   CO2 mmol/L 23.7 25.2 24.1 28.6  --  24.7   BUN mg/dL 17 17 18 21  --  23   CREATININE mg/dL 1.03* 1.04* 0.87 0.99  --  1.21*   GLUCOSE mg/dL 125* 126* 111* 146*  --  178*   CALCIUM mg/dL 8.0* 8.2* 8.1* 8.1*  --  8.4*         Estimated Creatinine Clearance: 53.6 mL/min (A) (by C-G  formula based on SCr of 1.03 mg/dL (H)).  Results from last 7 days   Lab Units 03/31/20  1632 03/30/20  0822 03/28/20  0307   WBC 10*3/mm3 8.22 9.18 4.81   HEMOGLOBIN g/dL 10.2* 10.3* 9.5*   PLATELETS 10*3/mm3 292 259 178         Results from last 7 days   Lab Units 03/30/20  0822 03/28/20  0307   ALT (SGPT) U/L 35* 20   AST (SGOT) U/L 45* 46*                 proBNP 12,654        Most recent chest x-ray 4/2/2020 reviewed: Shows improving infiltrates.          Microbiology reviewed: Cultures negative.  Respiratory viral panel negative.    COVID19   Date Value Ref Range Status   03/21/2020 Detected (C) Not Detected, Invalid Final       )        Active Hospital Problems    Diagnosis  POA   • **Community acquired pneumonia of left lower lobe of lung (CMS/HCC) [J18.1]  Yes   • Pneumonia due to COVID-19 virus [U07.1, J12.89]  Yes   • Diabetes mellitus, type II (CMS/HCC) [E11.9]  Yes   • Hyponatremia [E87.1]  Yes   • Dehydration [E86.0]  Yes   • Hypertension [I10]  Yes   • Hypothyroidism [E03.9]  Yes   • Renal insufficiency [N28.9]  Yes   • Depression [F32.9]  Yes      Resolved Hospital Problems   No resolved problems to display.       Assessment   ASSESSMENT:  COVID-19 pneumonia  Acute respiratory failure requiring mechanical ventilation  Acute kidney injury  Hypophosphatemia  Diabetes mellitus type 2  Hyponatremia    PLAN:  Making good progress.  Weaning oxygen as able.  Still fairly weak.   Respiratory status improving.  Following peripherally.      Eyal Caldwell MD  Pulmonary and Critical Care Medicine  [facility], [facility]  4/3/2020    13:52

## 2020-04-03 NOTE — PROGRESS NOTES
INFECTIOUS DISEASES PROGRESS NOTE    CC: f/u COVID 19    S:   She is feeling weak  Breathing is improved, still on 2L; still with thick productive clear  No f/c/ns    O:  Physical Exam:  Temp:  [96 °F (35.6 °C)-98 °F (36.7 °C)] 96 °F (35.6 °C)  Heart Rate:  [68-83] 74  Resp:  [18-20] 18  BP: (134-160)/(82-96) 134/82  Physical Exam  No acute distress, speech clear coherent, appropriate mood and affect, good insight, rest of exam deferred due to strict isolation precautions and need to preserve PPE   Diagnostics:    Cr 1.03   CXR, independently interpreted: improved patchy infiltrates  Glucose 1121-199    microbiology:  3/21 respiratory pathogen panel negative  3/21 blood cultures 2/2 no growth  3/22 blood cultures 2/2 no growth  3/20 GI PCR, strep and Legionella antigens all negative    Assessment/Plan   1.  COVID-19 with pneumonia:   2.  Acute hypoxic respiratory failure secondary above.    3.  Diabetes mellitus type 2, continue glycemic control efforts to prevent/control infectious complications    She is feeling okay.  Continues to improve and is actually quite stable.  She is going to need rehabilitation and we will see what her accepting facility may require in terms of repeat COVID-19 testing or not.  Does not appear we will have capability to perform multiple negative tests given scarcity of kits. We will be happy to assist in discharge planning to the extent possible but otherwise have nothing to add and will not be planning to see daily    Scottie Oneill MD  11:21 AM  04/03/20

## 2020-04-03 NOTE — PROGRESS NOTES
Name: Rekha Bear ADMIT: 3/21/2020   : 1940  PCP: Eben Porter MD    MRN: [id number] LOS: 13 days   AGE/SEX: 80 y.o. female  ROOM: [facility]     Subjective   Subjective   Patient says she feels better but still very weak.  Denies shortness of breath    Review of Systems     Objective   Objective   Vital Signs  Temp:  [96 °F (35.6 °C)-98 °F (36.7 °C)] 96.9 °F (36.1 °C)  Heart Rate:  [68-83] 83  Resp:  [16-20] 16  BP: (124-160)/(77-96) 124/77  SpO2:  [90 %-97 %] 95 %  on  Flow (L/min):  [2] 2;   Device (Oxygen Therapy): nasal cannula  Body mass index is 33.15 kg/m².  Physical Exam   Constitutional: She has a sickly appearance. No distress.   HENT:   Head: Normocephalic and atraumatic.   .   Eyes: Conjunctivae are normal. No scleral icterus.   Neck: Neck supple. No JVD present.   Cardiovascular: Normal rate and regular rhythm.   Murmur heard.  Pulmonary/Chest: Effort normal. No stridor. She has no wheezes.   Scattered rales   Abdominal: Soft. Bowel sounds are normal. She exhibits no distension and no mass. There is no tenderness. There is no rebound and no guarding.   Musculoskeletal: She exhibits no edema.   Neurological: She is alert.   Skin: Skin is dry. No rash noted. She is not diaphoretic.   Psychiatric: She has a normal mood and affect.   Nursing note and vitals reviewed.      Results Review:       I reviewed the patient's new clinical results.  Results from last 7 days   Lab Units 20  1632 20  0822 20  0307   WBC 10*3/mm3 8.22 9.18 4.81   HEMOGLOBIN g/dL 10.2* 10.3* 9.5*   PLATELETS 10*3/mm3 292 259 178     Results from last 7 days   Lab Units 20  0320 20  0446 20  0427 20  1117   SODIUM mmol/L 134* 133* 134* 138   POTASSIUM mmol/L 4.5 4.5 4.5 4.7   CHLORIDE mmol/L 98 96* 97* 99   CO2 mmol/L 23.7 25.2 24.1 28.6   BUN mg/dL 17 17 18 21   CREATININE mg/dL 1.03* 1.04* 0.87 0.99   GLUCOSE mg/dL 125* 126* 111* 146*   Estimated Creatinine Clearance: 53.6  mL/min (A) (by C-G formula based on SCr of 1.03 mg/dL (H)).  Results from last 7 days   Lab Units 03/30/20  0822 03/28/20  0307   ALBUMIN g/dL 2.20* 2.70*   BILIRUBIN mg/dL 0.7 0.3   ALK PHOS U/L 244* 123*   AST (SGOT) U/L 45* 46*   ALT (SGPT) U/L 35* 20     Results from last 7 days   Lab Units 04/03/20  0320 04/02/20  0446 04/01/20  0427 03/31/20  1117 03/30/20  1600 03/30/20  0822 03/30/20  0005 03/28/20  2120 03/28/20  0307   CALCIUM mg/dL 8.0* 8.2* 8.1* 8.1*  --  8.4*  --   --  8.0*   ALBUMIN g/dL  --   --   --   --   --  2.20*  --   --  2.70*   MAGNESIUM mg/dL  --   --   --   --   --   --   --   --  2.0   PHOSPHORUS mg/dL  --   --  3.2  --  3.0  --  1.5* 2.0* 1.1*       Glucose   Date/Time Value Ref Range Status   04/03/2020 1141 199 (H) 70 - 130 mg/dL Final   04/03/2020 0620 137 (H) 70 - 130 mg/dL Final   04/02/2020 2014 172 (H) 70 - 130 mg/dL Final   04/02/2020 1619 154 (H) 70 - 130 mg/dL Final   04/02/2020 1122 138 (H) 70 - 130 mg/dL Final   04/02/2020 0607 121 70 - 130 mg/dL Final   04/01/2020 1925 184 (H) 70 - 130 mg/dL Final         budesonide-formoterol 2 puff Inhalation BID - RT   buPROPion  mg Oral Daily   enoxaparin 40 mg Subcutaneous Q24H   gabapentin 300 mg Oral Q12H   hydroxychloroquine 200 mg Oral TID   insulin lispro 0-14 Units Subcutaneous 4x Daily With Meals & Nightly   levothyroxine 100 mcg Oral Daily   LORazepam 1 mg Intravenous Once   sodium chloride 10 mL Intravenous Q12H      Diet Dysphagia; III - Pureed With Some Mashed; Nectar / Syrup Thick; Consistent Carbohydrate       Assessment/Plan     Active Hospital Problems    Diagnosis  POA   • **Pneumonia due to COVID-19 virus [U07.1, J12.89]  Yes   • Diabetes mellitus, type II (CMS/HCC) [E11.9]  Yes   • Hyponatremia [E87.1]  Yes   • Hypertension [I10]  Yes   • Hypothyroidism [E03.9]  Yes   • Renal insufficiency [N28.9]  Yes   • Depression [F32.9]  Yes      Resolved Hospital Problems    Diagnosis Date Resolved POA   • Dehydration [E86.0]  04/03/2020 Yes     Sepsis secondary to pneumonia related to COVID 19  -Improving steadily.  -Continue hydroxychloroquine  -Chest x-ray improving 4/2    Acute hypoxic respiratory failure secondary to above  -Sats improving, wean as tolerated    DM2, stable    Transaminitis, mild related to viral infection    Generalized weakness secondary to above/deconditioning  -Complicated issue secondary to the COVID 19.  PT has evaluated and is recommending skilled nursing placement   - CCP working on SNF but COVID diagnosis is making this difficult.  Patient is medically stable for discharge whenever suitable disposition is arranged.  -Continue PT as tolerated.  Asked nursing to get the patient out of bed twice daily at least since PT is only rounding every 72 hours      Silvano Rose MD  [facility]  04/03/20  14:32

## 2020-04-03 NOTE — PLAN OF CARE
Pt stable, VSS, afebrile. Remains on 2 L O2. D/c planning for rehab. SLP re-eval today. Updated Dara, daughter, via telephone. Up to BSC/chair and had a BM this afternoon. WCM patient.

## 2020-04-03 NOTE — PROGRESS NOTES
Continued Stay Note  [facility]     Patient Name: Rekha Bear  MRN: [id number]  Today's Date: 4/3/2020    Admit Date: 3/21/2020    Discharge Plan     Row Name 04/03/20 1120       Plan    Plan  [facility] for SNF. [facility] is requiring 2 neg COVID test 24 hours apart before being able to accept.       Patient/Family in Agreement with Plan  yes    Plan Comments  [facility] is the only facility accepting pt's that have had a positive Covid 19 test. [facility] does require the pt have 2 neg COVID test 24 hours apart before being able to accept.           Discharge Codes    No documentation.             Drew Layton RN

## 2020-04-04 LAB
DEPRECATED RDW RBC AUTO: 42.2 FL (ref 37–54)
ERYTHROCYTE [DISTWIDTH] IN BLOOD BY AUTOMATED COUNT: 13.1 % (ref 12.3–15.4)
GLUCOSE BLDC GLUCOMTR-MCNC: 121 MG/DL (ref 70–130)
GLUCOSE BLDC GLUCOMTR-MCNC: 144 MG/DL (ref 70–130)
GLUCOSE BLDC GLUCOMTR-MCNC: 182 MG/DL (ref 70–130)
GLUCOSE BLDC GLUCOMTR-MCNC: 198 MG/DL (ref 70–130)
HCT VFR BLD AUTO: 31.2 % (ref 34–46.6)
HGB BLD-MCNC: 10.1 G/DL (ref 12–15.9)
MCH RBC QN AUTO: 28.8 PG (ref 26.6–33)
MCHC RBC AUTO-ENTMCNC: 32.4 G/DL (ref 31.5–35.7)
MCV RBC AUTO: 88.9 FL (ref 79–97)
PLATELET # BLD AUTO: 251 10*3/MM3 (ref 140–450)
PMV BLD AUTO: 9.6 FL (ref 6–12)
RBC # BLD AUTO: 3.51 10*6/MM3 (ref 3.77–5.28)
WBC NRBC COR # BLD: 7.35 10*3/MM3 (ref 3.4–10.8)

## 2020-04-04 PROCEDURE — 82962 GLUCOSE BLOOD TEST: CPT

## 2020-04-04 PROCEDURE — 85027 COMPLETE CBC AUTOMATED: CPT | Performed by: INTERNAL MEDICINE

## 2020-04-04 PROCEDURE — 63710000001 INSULIN LISPRO (HUMAN) PER 5 UNITS: Performed by: INTERNAL MEDICINE

## 2020-04-04 PROCEDURE — 25010000002 ENOXAPARIN PER 10 MG: Performed by: INTERNAL MEDICINE

## 2020-04-04 RX ADMIN — GABAPENTIN 300 MG: 300 CAPSULE ORAL at 21:31

## 2020-04-04 RX ADMIN — SODIUM CHLORIDE, PRESERVATIVE FREE 10 ML: 5 INJECTION INTRAVENOUS at 21:31

## 2020-04-04 RX ADMIN — HYDROXYCHLOROQUINE SULFATE 200 MG: 200 TABLET ORAL at 08:33

## 2020-04-04 RX ADMIN — GABAPENTIN 300 MG: 300 CAPSULE ORAL at 08:33

## 2020-04-04 RX ADMIN — LEVOTHYROXINE SODIUM 100 MCG: 100 TABLET ORAL at 08:33

## 2020-04-04 RX ADMIN — INSULIN LISPRO 3 UNITS: 100 INJECTION, SOLUTION INTRAVENOUS; SUBCUTANEOUS at 12:44

## 2020-04-04 RX ADMIN — ENOXAPARIN SODIUM 40 MG: 40 INJECTION SUBCUTANEOUS at 21:31

## 2020-04-04 RX ADMIN — INSULIN LISPRO 3 UNITS: 100 INJECTION, SOLUTION INTRAVENOUS; SUBCUTANEOUS at 21:30

## 2020-04-04 RX ADMIN — SODIUM CHLORIDE, PRESERVATIVE FREE 10 ML: 5 INJECTION INTRAVENOUS at 08:33

## 2020-04-04 RX ADMIN — BUPROPION HYDROCHLORIDE 300 MG: 300 TABLET, EXTENDED RELEASE ORAL at 08:33

## 2020-04-04 NOTE — PROGRESS NOTES
[facility] INPATIENT PROGRESS NOTE         [address]    2020      PATIENT IDENTIFICATION:  Name: Rekha Bear ADMIT: 3/21/2020   : 1940  PCP: Eben Porter MD    MRN: [id number] LOS: 14 days   AGE/SEX: 80 y.o. female  ROOM: [facility]                     LOS 14    Reason for visit: Follow-up COVID-19 pneumonia and respiratory failure      SUBJECTIVE:      Breathing much better.  Working with therapy q72hr to try to build strength and nursing daily.    Objective   OBJECTIVE:    Vital Sign Min/Max for last 24 hours  Temp  Min: 96.8 °F (36 °C)  Max: 98.2 °F (36.8 °C)   BP  Min: 124/77  Max: 139/91   Pulse  Min: 72  Max: 83   Resp  Min: 16  Max: 18   SpO2  Min: 90 %  Max: 97 %   No data recorded   No data recorded                   FiO2 (%): 30 %     Body mass index is 33.15 kg/m².    Intake/Output Summary (Last 24 hours) at 2020 1229  Last data filed at 2020 1200  Gross per 24 hour   Intake --   Output 1000 ml   Net -1000 ml         Exam:  GEN:  No distress    Scheduled meds:      buPROPion  mg Oral Daily   enoxaparin 40 mg Subcutaneous Q24H   gabapentin 300 mg Oral Q12H   insulin lispro 0-14 Units Subcutaneous 4x Daily With Meals & Nightly   levothyroxine 100 mcg Oral Daily   LORazepam 1 mg Intravenous Once   sodium chloride 10 mL Intravenous Q12H     IV meds:                         Data Review:  Results from last 7 days   Lab Units 20  0320 20  0446 20  0427 20  1117 20  1600 20  0822   SODIUM mmol/L 134* 133* 134* 138  --  137   POTASSIUM mmol/L 4.5 4.5 4.5 4.7 5.9* 5.3*   CHLORIDE mmol/L 98 96* 97* 99  --  101   CO2 mmol/L 23.7 25.2 24.1 28.6  --  24.7   BUN mg/dL 17 17 18 21  --  23   CREATININE mg/dL 1.03* 1.04* 0.87 0.99  --  1.21*   GLUCOSE mg/dL 125* 126* 111* 146*  --  178*   CALCIUM mg/dL 8.0* 8.2* 8.1* 8.1*  --  8.4*         Estimated Creatinine Clearance: 53.6 mL/min (A) (by C-G formula based on SCr of 1.03 mg/dL  (H)).  Results from last 7 days   Lab Units 04/04/20  0439 03/31/20  1632 03/30/20  0822   WBC 10*3/mm3 7.35 8.22 9.18   HEMOGLOBIN g/dL 10.1* 10.2* 10.3*   PLATELETS 10*3/mm3 251 292 259         Results from last 7 days   Lab Units 03/30/20  0822   ALT (SGPT) U/L 35*   AST (SGOT) U/L 45*                 proBNP 12,654        Most recent chest x-ray 4/2/2020 reviewed: Shows improving infiltrates.          Microbiology reviewed: Cultures negative.  Respiratory viral panel negative.    COVID19   Date Value Ref Range Status   03/21/2020 Detected (C) Not Detected, Invalid Final       )        Active Hospital Problems    Diagnosis  POA   • **Pneumonia due to COVID-19 virus [U07.1, J12.89]  Yes   • Diabetes mellitus, type II (CMS/HCC) [E11.9]  Yes   • Hyponatremia [E87.1]  Yes   • Hypertension [I10]  Yes   • Hypothyroidism [E03.9]  Yes   • Renal insufficiency [N28.9]  Yes   • Depression [F32.9]  Yes      Resolved Hospital Problems    Diagnosis Date Resolved POA   • Dehydration [E86.0] 04/03/2020 Yes       Assessment   ASSESSMENT:  COVID-19 pneumonia  Acute respiratory failure requiring mechanical ventilation  Acute kidney injury  Hypophosphatemia  Diabetes mellitus type 2  Hyponatremia    PLAN:  Making good progress.    Still fairly weak.   Respiratory status improving.  Following peripherally.      Eyal Caldwell MD  Pulmonary and Critical Care Medicine  [facility], [facility]  4/4/2020    12:29

## 2020-04-04 NOTE — PROGRESS NOTES
Name: Rekha Bear ADMIT: 3/21/2020   : 1940  PCP: Eben Porter MD    MRN: [id number] LOS: 14 days   AGE/SEX: 80 y.o. female  ROOM: [facility]     Subjective   Subjective   Patient still has some cough. Non productive. Feels OK    Review of Systems     Objective   Objective   Vital Signs  Temp:  [96.8 °F (36 °C)-98.2 °F (36.8 °C)] 96.8 °F (36 °C)  Heart Rate:  [72-75] 74  Resp:  [16-18] 18  BP: (127-139)/(73-91) 130/85  SpO2:  [90 %-97 %] 94 %  on  Flow (L/min):  [2] 2;   Device (Oxygen Therapy): nasal cannula  Body mass index is 33.15 kg/m².  Physical Exam   Constitutional: She has a sickly appearance. No distress.   HENT:   Head: Normocephalic and atraumatic.   .   Eyes: No scleral icterus.   Neck: Neck supple. No JVD present.   Cardiovascular: Normal rate and regular rhythm.   Murmur heard.  Pulmonary/Chest: Effort normal. No stridor. She has no wheezes.   Scattered rales   Abdominal: Soft. She exhibits no distension and no mass. There is no tenderness. There is no rebound and no guarding.   Musculoskeletal: She exhibits no edema.   Neurological: She is alert.   Skin: Skin is dry. No rash noted. She is not diaphoretic.   Psychiatric: She has a normal mood and affect.   Nursing note and vitals reviewed.      Results Review:       I reviewed the patient's new clinical results.  Results from last 7 days   Lab Units 20  0439 20  1632 20  0822   WBC 10*3/mm3 7.35 8.22 9.18   HEMOGLOBIN g/dL 10.1* 10.2* 10.3*   PLATELETS 10*3/mm3 251 292 259     Results from last 7 days   Lab Units 20  0320 20  0446 20  0427 20  1117   SODIUM mmol/L 134* 133* 134* 138   POTASSIUM mmol/L 4.5 4.5 4.5 4.7   CHLORIDE mmol/L 98 96* 97* 99   CO2 mmol/L 23.7 25.2 24.1 28.6   BUN mg/dL 17 17 18 21   CREATININE mg/dL 1.03* 1.04* 0.87 0.99   GLUCOSE mg/dL 125* 126* 111* 146*   Estimated Creatinine Clearance: 53.6 mL/min (A) (by C-G formula based on SCr of 1.03 mg/dL (H)).  Results  from last 7 days   Lab Units 03/30/20  0822   ALBUMIN g/dL 2.20*   BILIRUBIN mg/dL 0.7   ALK PHOS U/L 244*   AST (SGOT) U/L 45*   ALT (SGPT) U/L 35*     Results from last 7 days   Lab Units 04/03/20  0320 04/02/20  0446 04/01/20  0427 03/31/20  1117 03/30/20  1600 03/30/20  0822  03/30/20  0005 03/28/20  2120   CALCIUM mg/dL 8.0* 8.2* 8.1* 8.1*  --  8.4*   < >  --   --    ALBUMIN g/dL  --   --   --   --   --  2.20*  --   --   --    PHOSPHORUS mg/dL  --   --  3.2  --  3.0  --   --  1.5* 2.0*    < > = values in this interval not displayed.       Glucose   Date/Time Value Ref Range Status   04/04/2020 1156 182 (H) 70 - 130 mg/dL Final   04/04/2020 0631 144 (H) 70 - 130 mg/dL Final   04/03/2020 2026 133 (H) 70 - 130 mg/dL Final   04/03/2020 1630 130 70 - 130 mg/dL Final   04/03/2020 1141 199 (H) 70 - 130 mg/dL Final   04/03/2020 0620 137 (H) 70 - 130 mg/dL Final   04/02/2020 2014 172 (H) 70 - 130 mg/dL Final         buPROPion  mg Oral Daily   enoxaparin 40 mg Subcutaneous Q24H   gabapentin 300 mg Oral Q12H   insulin lispro 0-14 Units Subcutaneous 4x Daily With Meals & Nightly   levothyroxine 100 mcg Oral Daily   LORazepam 1 mg Intravenous Once   sodium chloride 10 mL Intravenous Q12H      Diet Dysphagia; IV - Mechanical Soft No Mixed Consistencies; Thin; Consistent Carbohydrate       Assessment/Plan     Active Hospital Problems    Diagnosis  POA   • **Pneumonia due to COVID-19 virus [U07.1, J12.89]  Yes   • Diabetes mellitus, type II (CMS/HCC) [E11.9]  Yes   • Hyponatremia [E87.1]  Yes   • Hypertension [I10]  Yes   • Hypothyroidism [E03.9]  Yes   • Renal insufficiency [N28.9]  Yes   • Depression [F32.9]  Yes      Resolved Hospital Problems    Diagnosis Date Resolved POA   • Dehydration [E86.0] 04/03/2020 Yes     Sepsis secondary to pneumonia related to COVID 19  -Improving steadily.  - Finished hydroxychloroquine course  -Chest x-ray improving 4/2    Acute hypoxic respiratory failure secondary to above  -Sats  stable on 2L, wean as tolerated    DM2, stable    Transaminitis, mild related to viral infection    Generalized weakness secondary to above/deconditioning  -Complicated issue secondary to the COVID 19.  PT has evaluated and is recommending skilled nursing placement   - CCP working on SNF but COVID diagnosis is making this difficult.  Patient is medically stable for discharge whenever suitable disposition is arranged.  -Continue PT as tolerated.  Asked nursing to get the patient out of bed twice daily at least since PT is only rounding every 72 hours        Silvano Rose MD  [facility]  04/04/20  14:05

## 2020-04-04 NOTE — PLAN OF CARE
Problem: Patient Care Overview  Goal: Plan of Care Review  Outcome: Ongoing (interventions implemented as appropriate)   Vss. Pt sat on edge of bed instead of chair for lunch and into early afternoon. Will get up to chair for dinner. O2 @ 2l n/c. No c/o pain or n/v.

## 2020-04-04 NOTE — PLAN OF CARE
Problem: Patient Care Overview  Goal: Plan of Care Review  Outcome: Ongoing (interventions implemented as appropriate)  Flowsheets  Taken 4/4/2020 0314  Progress: improving  Outcome Summary: Pt up with assist x 2; pt afebrile; pills with applesauce; pt in high spirits after being taken off of vent; no s/s of distress  Taken 4/4/2020 0038  Plan of Care Reviewed With: patient

## 2020-04-05 LAB
GLUCOSE BLDC GLUCOMTR-MCNC: 102 MG/DL (ref 70–130)
GLUCOSE BLDC GLUCOMTR-MCNC: 204 MG/DL (ref 70–130)
GLUCOSE BLDC GLUCOMTR-MCNC: 217 MG/DL (ref 70–130)

## 2020-04-05 PROCEDURE — 63710000001 INSULIN LISPRO (HUMAN) PER 5 UNITS: Performed by: HOSPITALIST

## 2020-04-05 PROCEDURE — 97110 THERAPEUTIC EXERCISES: CPT

## 2020-04-05 PROCEDURE — 25010000002 ENOXAPARIN PER 10 MG: Performed by: INTERNAL MEDICINE

## 2020-04-05 PROCEDURE — 82962 GLUCOSE BLOOD TEST: CPT

## 2020-04-05 RX ADMIN — BUPROPION HYDROCHLORIDE 300 MG: 300 TABLET, EXTENDED RELEASE ORAL at 07:58

## 2020-04-05 RX ADMIN — INSULIN LISPRO 5 UNITS: 100 INJECTION, SOLUTION INTRAVENOUS; SUBCUTANEOUS at 17:07

## 2020-04-05 RX ADMIN — ENOXAPARIN SODIUM 40 MG: 40 INJECTION SUBCUTANEOUS at 20:23

## 2020-04-05 RX ADMIN — SODIUM CHLORIDE, PRESERVATIVE FREE 10 ML: 5 INJECTION INTRAVENOUS at 07:58

## 2020-04-05 RX ADMIN — SODIUM CHLORIDE, PRESERVATIVE FREE 10 ML: 5 INJECTION INTRAVENOUS at 20:30

## 2020-04-05 RX ADMIN — LEVOTHYROXINE SODIUM 100 MCG: 100 TABLET ORAL at 07:58

## 2020-04-05 RX ADMIN — GABAPENTIN 300 MG: 300 CAPSULE ORAL at 20:23

## 2020-04-05 RX ADMIN — GABAPENTIN 300 MG: 300 CAPSULE ORAL at 07:57

## 2020-04-05 NOTE — PROGRESS NOTES
Name: Rekha Bear ADMIT: 3/21/2020   : 1940  PCP: Eben Porter MD    MRN: [id number] LOS: 15 days   AGE/SEX: 80 y.o. female  ROOM: [facility]     Subjective   Subjective   Spoke with patient by phone today.  She says that she feels about the same, still winded with too much activity but able to sit on the edge of the bed and overall stable.  She is asking about when she can leave as she is hoping to get out of the hospital soon.  She still feels too weak to go home and think she needs rehab which is pending at this time.  Denies significant chest pain or cough, nausea, vomiting or other complaints.    Review of Systems     Objective   Objective   Vital Signs  Temp:  [96.1 °F (35.6 °C)-99.2 °F (37.3 °C)] 97.1 °F (36.2 °C)  Heart Rate:  [69-79] 79  Resp:  [18] 18  BP: (134-162)/(81-92) 162/92  SpO2:  [93 %-99 %] 93 %  on  Flow (L/min):  [2] 2;   Device (Oxygen Therapy): nasal cannula  Body mass index is 33.15 kg/m².  Physical Exam   Constitutional: She is oriented to person, place, and time. She is cooperative. No distress.   Neurological: She is alert and oriented to person, place, and time.   Psychiatric: She has a normal mood and affect. Her behavior is normal. Judgment and thought content normal.   Nursing note and vitals reviewed.  Encounter conducted via telephone so as to minimize exposure and PPE usage.    Results Review:       I reviewed the patient's new clinical results.  Results from last 7 days   Lab Units 20  0439 20  1632 20  0822   WBC 10*3/mm3 7.35 8.22 9.18   HEMOGLOBIN g/dL 10.1* 10.2* 10.3*   PLATELETS 10*3/mm3 251 292 259     Results from last 7 days   Lab Units 20  0320 20  0446 20  0427 20  1117   SODIUM mmol/L 134* 133* 134* 138   POTASSIUM mmol/L 4.5 4.5 4.5 4.7   CHLORIDE mmol/L 98 96* 97* 99   CO2 mmol/L 23.7 25.2 24.1 28.6   BUN mg/dL 17 17 18 21   CREATININE mg/dL 1.03* 1.04* 0.87 0.99   GLUCOSE mg/dL 125* 126* 111* 146*    Estimated Creatinine Clearance: 53.6 mL/min (A) (by C-G formula based on SCr of 1.03 mg/dL (H)).  Results from last 7 days   Lab Units 03/30/20  0822   ALBUMIN g/dL 2.20*   BILIRUBIN mg/dL 0.7   ALK PHOS U/L 244*   AST (SGOT) U/L 45*   ALT (SGPT) U/L 35*     Results from last 7 days   Lab Units 04/03/20  0320 04/02/20  0446 04/01/20  0427 03/31/20  1117 03/30/20  1600 03/30/20  0822  03/30/20  0005   CALCIUM mg/dL 8.0* 8.2* 8.1* 8.1*  --  8.4*   < >  --    ALBUMIN g/dL  --   --   --   --   --  2.20*  --   --    PHOSPHORUS mg/dL  --   --  3.2  --  3.0  --   --  1.5*    < > = values in this interval not displayed.       Glucose   Date/Time Value Ref Range Status   04/05/2020 0558 102 70 - 130 mg/dL Final   04/04/2020 2012 198 (H) 70 - 130 mg/dL Final   04/04/2020 1622 121 70 - 130 mg/dL Final   04/04/2020 1156 182 (H) 70 - 130 mg/dL Final   04/04/2020 0631 144 (H) 70 - 130 mg/dL Final   04/03/2020 2026 133 (H) 70 - 130 mg/dL Final   04/03/2020 1630 130 70 - 130 mg/dL Final         buPROPion  mg Oral Daily   enoxaparin 40 mg Subcutaneous Q24H   gabapentin 300 mg Oral Q12H   insulin lispro 0-14 Units Subcutaneous BID AC   levothyroxine 100 mcg Oral Daily   LORazepam 1 mg Intravenous Once   sodium chloride 10 mL Intravenous Q12H      Diet Dysphagia; IV - Mechanical Soft No Mixed Consistencies; Thin; Consistent Carbohydrate       Assessment/Plan     Active Hospital Problems    Diagnosis  POA   • **Pneumonia due to COVID-19 virus [U07.1, J12.89]  Yes   • Diabetes mellitus, type II (CMS/HCC) [E11.9]  Yes   • Hyponatremia [E87.1]  Yes   • Hypertension [I10]  Yes   • Hypothyroidism [E03.9]  Yes   • Renal insufficiency [N28.9]  Yes   • Depression [F32.9]  Yes      Resolved Hospital Problems    Diagnosis Date Resolved POA   • Dehydration [E86.0] 04/03/2020 Yes     Sepsis secondary to pneumonia related to COVID 19  -Improving steadily.  - Finished hydroxychloroquine course  -Chest x-ray improving 4/2    Acute hypoxic  respiratory failure secondary to above  -Sats stable on 2L, wean as tolerated    DM2, stable    Transaminitis, mild related to viral infection    Generalized weakness secondary to above/deconditioning  -Skilled nursing placement pending due to her COVID positive status.  Per discussion with CCP Friday they are working with 2 facilities to try to start excepting COVID patients.  Hopefully can make progress on this tomorrow and get her out of the hospital soon as she is quite stable medically.  She does need continued PT though, and it looks like they have not seen her for 4 days now.  We will reconsult.          Silvano Rose MD  [facility]  04/05/20  13:55

## 2020-04-05 NOTE — PLAN OF CARE
Problem: Patient Care Overview  Goal: Plan of Care Review  Outcome: Ongoing (interventions implemented as appropriate)   Vss. Sat on edge of bed for breakfast and lunch. Worked with PT. Awaiting rehab placement. No c/o pain or n/v.

## 2020-04-05 NOTE — PLAN OF CARE
Problem: Patient Care Overview  Goal: Plan of Care Review  Outcome: Ongoing (interventions implemented as appropriate)  Flowsheets  Taken 4/5/2020 0331  Progress: improving  Outcome Summary: VSS; pt remains afebrile; no c/o pain or nausea; no s/s of  distress  Taken 4/5/2020 0005  Plan of Care Reviewed With: patient

## 2020-04-05 NOTE — THERAPY TREATMENT NOTE
Acute Care - Physical Therapy Treatment Note  [facility]     Patient Name: Rekha Bear  : 1940  MRN: [id number]  Today's Date: 2020             Admit Date: 3/21/2020    Visit Dx:    ICD-10-CM ICD-9-CM   1. Community acquired pneumonia of left lower lobe of lung (CMS/HCC) J18.1 481   2. Community acquired pneumonia of right lower lobe of lung (CMS/HCC) J18.1 481   3. Hyponatremia E87.1 276.1   4. Acute renal insufficiency N28.9 593.9     Patient Active Problem List   Diagnosis   • Acute bronchitis   • Chronic pain of right knee   • Chest pain   • Depression   • Type 2 diabetes mellitus with stage 3 chronic kidney disease, without long-term current use of insulin (CMS/[facility])   • Hyperlipidemia   • Hypertension   • Hypothyroidism   • Renal insufficiency   • Urinary incontinence   • Urinary tract infection   • Cobalamin deficiency   • Cardiac arrhythmia   • Hyperlipemia   • Allergic reaction   • Hx of food anaphylaxis   • Herpes simplex   • Osteoarthritis   • 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   • Community acquired pneumonia of left lower lobe of lung (CMS/HCC)   • Diabetes mellitus, type II (CMS/[facility])   • Hyponatremia   • Pneumonia due to COVID-19 virus       Therapy Treatment    Rehabilitation Treatment Summary     Row Name 20 7405             Treatment Time/Intention    Discipline  physical therapy assistant  -      Document Type  therapy note (daily note)  -NOHEMI      Subjective Information  complains of;weakness;pain  -      Mode of Treatment  individual therapy;physical therapy  -      Care Plan Review  patient/other agree to care plan  -      Existing Precautions/Restrictions  fall  -      Treatment Considerations/Comments  (S) -- COVID+ prec  -NOHEMI      Recorded by [NOHEMI] Ragini Veronica PTA 20 1619      Row Name 20 1541             Bed Mobility Assessment/Treatment    Supine-Sit [place] (Bed Mobility)  minimum assist (75% patient effort)  -      Bed  Mobility, Safety Issues  decreased use of arms for pushing/pulling;decreased use of legs for bridging/pushing  -      Assistive Device (Bed Mobility)  bed rails  -      Comment (Bed Mobility)  pt wanting to be indep but wkness limiting-used rail  -      Recorded by [] Ragini Veronica PTA 04/05/20 1619      Row Name 04/05/20 1549             Sit-Stand Transfer    Sit-Stand [place] (Transfers)  moderate assist (50% patient effort);maximum assist (25% patient effort);verbal cues  -      Assistive Device (Sit-Stand Transfers)  -- HHA, perf x2 prior to pivot to chair  -      Recorded by [] Ragini Veronica PTA 04/05/20 1619      Row Name 04/05/20 1545             Gait/Stairs Assessment/Training    Comment (Gait/Stairs)  3 shuffle steps to chair , HHA, blocked R knee pt states she need knee replacement R  -      Recorded by [] Ragini Veronica PTA 04/05/20 1619      Row Name 04/05/20 154             Therapeutic Exercise    Lower Extremity (Therapeutic Exercise)  quad sets, bilateral;LAQ (long arc quad), bilateral;marching while seated;gluteal sets  -      Lower Extremity Range of Motion (Therapeutic Exercise)  ankle dorsiflexion/plantar flexion, bilateral  -      Sets/Reps (Therapeutic Exercise)  10 -12 reps  -      Recorded by [] Ragini Veronica [facility] 04/05/20 1619      Row Name 04/05/20 154             Positioning and Restraints    Pre-Treatment Position  in bed  -      Post Treatment Position  chair  -      In Chair  reclined;call light within reach;encouraged to call for assist;exit alarm on  -      Recorded by [] Ragini Veronica [facility] 04/05/20 1619      Row Name 04/05/20 1541             Pain Scale: Numbers Pre/Post-Treatment    Pre/Post Treatment Pain Comment  just tenderness R knee-no exact pn level reported  -      Recorded by [] Ragini Veronica [facility] 04/05/20 1619        User Key  (r) = Recorded By, (t) = Taken By, (c) = Cosigned By    Initials Name Effective Dates  Discipline    Ragini Dhillon PTA 03/07/18 -  PT                       PT Recommendation and Plan     Plan of Care Reviewed With: patient  Progress: improving  Outcome Summary: yuval standing x2 at bedside, LE ther exer, pivot tfer to chair taking a few shuffle  steps w/R knee blocked; still reports feeling very weak, but highly motivated; pt lives alone so will need SNU at [place]  Outcome Measures     Row Name 04/05/20 1600             How much help from another person do you currently need...    Turning from your back to your side while in flat bed without using bedrails?  3  -JM      Moving from lying on back to sitting on the side of a flat bed without bedrails?  3  -JM      Moving to and from a bed to a chair (including a wheelchair)?  2  -JM      Standing up from a chair using your arms (e.g., wheelchair, bedside chair)?  2  -JM      Climbing 3-5 steps with a railing?  1  -JM      To walk in hospital room?  1  -JM      AM-PAC 6 Clicks Score (PT)  12  -        User Key  (r) = Recorded By, (t) = Taken By, (c) = Cosigned By    Initials Name Provider Type    Ragini Dhillon PTA Physical Therapy Assistant         Time Calculation:   PT Charges     Row Name 04/05/20 1601             Time Calculation    Start Time  1500  -      Stop Time  1539  -      Time Calculation (min)  39 min  -      PT Received On  04/05/20  -NOHEMI      PT - Next Appointment  04/07/20  -NOHEMI        User Key  (r) = Recorded By, (t) = Taken By, (c) = Cosigned By    Initials Name Provider Type    Ragini Dhillon PTA Physical Therapy Assistant        Therapy Charges for Today     Code Description Service Date Service Provider Modifiers Qty    [id number] HC PT THER PROC EA 15 MIN 4/5/2020 Ragini Veronica PTA GP 3          PT G-Codes  Outcome Measure Options: AM-PAC 6 Clicks Basic Mobility (PT)  AM-PAC 6 Clicks Score (PT): 12    Ragini Veronica PTA  4/5/2020

## 2020-04-05 NOTE — PROGRESS NOTES
[facility] INPATIENT PROGRESS NOTE         [address]    2020      PATIENT IDENTIFICATION:  Name: Rekha Bear ADMIT: 3/21/2020   : 1940  PCP: Eben Porter MD    MRN: [id number] LOS: 15 days   AGE/SEX: 80 y.o. female  ROOM: [facility]                     LOS 15    Reason for visit: Follow-up COVID-19 pneumonia and respiratory failure      SUBJECTIVE:      Breathing much better.  Working with therapy q72hr to try to build strength and nursing daily.    Objective   OBJECTIVE:    Vital Sign Min/Max for last 24 hours  Temp  Min: 96.1 °F (35.6 °C)  Max: 99.2 °F (37.3 °C)   BP  Min: 134/81  Max: 162/92   Pulse  Min: 69  Max: 79   Resp  Min: 18  Max: 18   SpO2  Min: 93 %  Max: 99 %   No data recorded   No data recorded                   FiO2 (%): 30 %     Body mass index is 33.15 kg/m².    Intake/Output Summary (Last 24 hours) at 2020 0944  Last data filed at 2020 0429  Gross per 24 hour   Intake --   Output 1600 ml   Net -1600 ml         Exam:  GEN:  No distress    Scheduled meds:      buPROPion  mg Oral Daily   enoxaparin 40 mg Subcutaneous Q24H   gabapentin 300 mg Oral Q12H   insulin lispro 0-14 Units Subcutaneous 4x Daily With Meals & Nightly   levothyroxine 100 mcg Oral Daily   LORazepam 1 mg Intravenous Once   sodium chloride 10 mL Intravenous Q12H     IV meds:                         Data Review:  Results from last 7 days   Lab Units 20  0320 20  0446 20  0427 20  1117 20  1600 20  0822   SODIUM mmol/L 134* 133* 134* 138  --  137   POTASSIUM mmol/L 4.5 4.5 4.5 4.7 5.9* 5.3*   CHLORIDE mmol/L 98 96* 97* 99  --  101   CO2 mmol/L 23.7 25.2 24.1 28.6  --  24.7   BUN mg/dL 17 17 18 21  --  23   CREATININE mg/dL 1.03* 1.04* 0.87 0.99  --  1.21*   GLUCOSE mg/dL 125* 126* 111* 146*  --  178*   CALCIUM mg/dL 8.0* 8.2* 8.1* 8.1*  --  8.4*         Estimated Creatinine Clearance: 53.6 mL/min (A) (by C-G formula based on SCr of 1.03 mg/dL  (H)).  Results from last 7 days   Lab Units 04/04/20  0439 03/31/20  1632 03/30/20  0822   WBC 10*3/mm3 7.35 8.22 9.18   HEMOGLOBIN g/dL 10.1* 10.2* 10.3*   PLATELETS 10*3/mm3 251 292 259         Results from last 7 days   Lab Units 03/30/20  0822   ALT (SGPT) U/L 35*   AST (SGOT) U/L 45*                 proBNP 12,654        Most recent chest x-ray 4/2/2020 reviewed: Shows improving infiltrates.          Microbiology reviewed: Cultures negative.  Respiratory viral panel negative.    COVID19   Date Value Ref Range Status   03/21/2020 Detected (C) Not Detected, Invalid Final       )        Active Hospital Problems    Diagnosis  POA   • **Pneumonia due to COVID-19 virus [U07.1, J12.89]  Yes   • Diabetes mellitus, type II (CMS/HCC) [E11.9]  Yes   • Hyponatremia [E87.1]  Yes   • Hypertension [I10]  Yes   • Hypothyroidism [E03.9]  Yes   • Renal insufficiency [N28.9]  Yes   • Depression [F32.9]  Yes      Resolved Hospital Problems    Diagnosis Date Resolved POA   • Dehydration [E86.0] 04/03/2020 Yes       Assessment   ASSESSMENT:  COVID-19 pneumonia  Acute respiratory failure requiring mechanical ventilation  Acute kidney injury  Hypophosphatemia  Diabetes mellitus type 2  Hyponatremia    PLAN:  Making good progress.  Only requiring 2 L supplemental oxygen.  Completed hydroxychloroquine course.    Still fairly weak.  Working with PT and nursing.  Respiratory status improving.  Following peripherally.      Eyal Caldwell MD  Pulmonary and Critical Care Medicine  [facility]  4/5/2020    09:44

## 2020-04-05 NOTE — PLAN OF CARE
Problem: Patient Care Overview  Goal: Plan of Care Review  Outcome: Ongoing (interventions implemented as appropriate)  Flowsheets (Taken 4/5/2020 1619)  Progress: improving  Plan of Care Reviewed With: patient  Outcome Summary: yuval standing x2 at bedside, LE ther exer, pivot tfer to chair taking a few shuffle  steps w/R knee blocked; still reports feeling very weak, but highly motivated; pt lives alone so will need SNU at [place]

## 2020-04-06 LAB
GLUCOSE BLDC GLUCOMTR-MCNC: 161 MG/DL (ref 70–130)
GLUCOSE BLDC GLUCOMTR-MCNC: 174 MG/DL (ref 70–130)
GLUCOSE BLDC GLUCOMTR-MCNC: 179 MG/DL (ref 70–130)

## 2020-04-06 PROCEDURE — 63710000001 INSULIN LISPRO (HUMAN) PER 5 UNITS: Performed by: HOSPITALIST

## 2020-04-06 PROCEDURE — 82962 GLUCOSE BLOOD TEST: CPT

## 2020-04-06 PROCEDURE — 25010000002 ENOXAPARIN PER 10 MG: Performed by: INTERNAL MEDICINE

## 2020-04-06 RX ADMIN — SODIUM CHLORIDE, PRESERVATIVE FREE 10 ML: 5 INJECTION INTRAVENOUS at 21:00

## 2020-04-06 RX ADMIN — INSULIN LISPRO 3 UNITS: 100 INJECTION, SOLUTION INTRAVENOUS; SUBCUTANEOUS at 07:55

## 2020-04-06 RX ADMIN — LEVOTHYROXINE SODIUM 100 MCG: 100 TABLET ORAL at 10:18

## 2020-04-06 RX ADMIN — GABAPENTIN 300 MG: 300 CAPSULE ORAL at 21:51

## 2020-04-06 RX ADMIN — GABAPENTIN 300 MG: 300 CAPSULE ORAL at 07:55

## 2020-04-06 RX ADMIN — INSULIN LISPRO 3 UNITS: 100 INJECTION, SOLUTION INTRAVENOUS; SUBCUTANEOUS at 18:13

## 2020-04-06 RX ADMIN — BUPROPION HYDROCHLORIDE 300 MG: 300 TABLET, EXTENDED RELEASE ORAL at 07:55

## 2020-04-06 RX ADMIN — TRAMADOL HYDROCHLORIDE 100 MG: 50 TABLET, FILM COATED ORAL at 21:51

## 2020-04-06 RX ADMIN — SODIUM CHLORIDE, PRESERVATIVE FREE 10 ML: 5 INJECTION INTRAVENOUS at 07:56

## 2020-04-06 RX ADMIN — ENOXAPARIN SODIUM 40 MG: 40 INJECTION SUBCUTANEOUS at 21:51

## 2020-04-06 NOTE — PROGRESS NOTES
[facility]     LOS: 16 days     Name: Rekha Bear  Age: 80 y.o.  Sex: female  :  1940  MRN: [id number]         Primary Care Physician: Eben Porter MD    Diet Dysphagia; IV - Mechanical Soft No Mixed Consistencies; Thin; Consistent Carbohydrate    Subjective   no new complaints. weakness and some JUSTICE  patient new to me. reviewed some records    Review of Systems   Constitutional: Negative for fever.     Objective   Temp:  [96.9 °F (36.1 °C)-98.9 °F (37.2 °C)] 96.9 °F (36.1 °C)  Heart Rate:  [74-81] 74  Resp:  [18] 18  BP: (139-148)/(89-95) 146/89  SpO2:  [96 %] 96 %  on  Flow (L/min):  [2] 2;   Device (Oxygen Therapy): room air  Body mass index is 33.15 kg/m².    Physical Exam   Constitutional: She is oriented to person, place, and time. No distress.   Cardiovascular: Normal rate and regular rhythm.   Pulmonary/Chest: Effort normal. No respiratory distress. She has decreased breath sounds.   Abdominal: Soft. Bowel sounds are normal. There is no tenderness.   Musculoskeletal: She exhibits no edema.   Neurological: She is alert and oriented to person, place, and time.   Skin: Skin is warm and dry.   Psychiatric: She has a normal mood and affect. Her behavior is normal.   Nursing note and vitals reviewed.  patient examined with current PPE recommendations. patient did not have mask. no coughing    Reviewed medications and new clinical results    Scheduled Meds  buPROPion  mg Oral Daily   enoxaparin 40 mg Subcutaneous Q24H   gabapentin 300 mg Oral Q12H   insulin lispro 0-14 Units Subcutaneous BID AC   levothyroxine 100 mcg Oral Daily   LORazepam 1 mg Intravenous Once   sodium chloride 10 mL Intravenous Q12H     Continuous Infusions   PRN Meds  •  acetaminophen **OR** acetaminophen **OR** acetaminophen  •  aluminum-magnesium hydroxide-simethicone  •  bisacodyl  •  bisacodyl  •  calcium gluconate IVPB **AND** calcium gluconate IVPB **AND**  Calcium  •  dextromethorphan polistirex ER  •  dextrose  •  dextrose  •  glucagon (human recombinant)  •  guaiFENesin  •  HYDROcodone-acetaminophen  •  magnesium sulfate **OR** magnesium sulfate **OR** magnesium sulfate  •  [DISCONTINUED] Morphine **AND** naloxone  •  [DISCONTINUED] HYDROmorphone **AND** naloxone  •  nitroglycerin  •  ondansetron  •  ondansetron **OR** ondansetron  •  potassium chloride **OR** potassium chloride **OR** potassium chloride  •  potassium phosphate infusion greater than 15 mMoles **OR** potassium phosphate infusion greater than 15 mMoles **OR** potassium phosphate **OR** sodium phosphate IVPB **OR** sodium phosphate IVPB  •  sodium chloride  •  [COMPLETED] Insert peripheral IV **AND** sodium chloride  •  sodium chloride  •  traMADol    Results from last 7 days   Lab Units 04/04/20  0439 03/31/20  1632   WBC 10*3/mm3 7.35 8.22   HEMOGLOBIN g/dL 10.1* 10.2*   PLATELETS 10*3/mm3 251 292     Results from last 7 days   Lab Units 04/03/20  0320 04/02/20  0446 04/01/20  0427 03/31/20  1117 03/30/20  1600   SODIUM mmol/L 134* 133* 134* 138  --    POTASSIUM mmol/L 4.5 4.5 4.5 4.7 5.9*   CHLORIDE mmol/L 98 96* 97* 99  --    CO2 mmol/L 23.7 25.2 24.1 28.6  --    BUN mg/dL 17 17 18 21  --    CREATININE mg/dL 1.03* 1.04* 0.87 0.99  --    CALCIUM mg/dL 8.0* 8.2* 8.1* 8.1*  --    GLUCOSE mg/dL 125* 126* 111* 146*  --      Lab Results   Component Value Date    ANIONGAP 12.3 04/03/2020     Glucose   Date/Time Value Ref Range Status   04/06/2020 1129 174 (H) 70 - 130 mg/dL Final   04/06/2020 0629 161 (H) 70 - 130 mg/dL Final   04/05/2020 1955 204 (H) 70 - 130 mg/dL Final   04/05/2020 1558 217 (H) 70 - 130 mg/dL Final   04/05/2020 0558 102 70 - 130 mg/dL Final   04/04/2020 2012 198 (H) 70 - 130 mg/dL Final   04/04/2020 1622 121 70 - 130 mg/dL Final   04/04/2020 1156 182 (H) 70 - 130 mg/dL Final     Estimated Creatinine Clearance: 53.6 mL/min (A) (by C-G formula based on SCr of 1.03 mg/dL (H)).    I  personally reviewed CXR    Assessment/Plan   Active Hospital Problems    Diagnosis  POA   • **Pneumonia due to COVID-19 virus [U07.1, J12.89]  Yes   • Diabetes mellitus, type II (CMS/HCC) [E11.9]  Yes   • Hyponatremia [E87.1]  Yes   • Hypertension [I10]  Yes   • Hypothyroidism [E03.9]  Yes   • Renal insufficiency [N28.9]  Yes   • Depression [F32.9]  Yes      Resolved Hospital Problems    Diagnosis Date Resolved POA   • Dehydration [E86.0] 04/03/2020 Yes     80 y.o. female admitted with cough    · COVID 19: improving, s/p hydroxychloroquine  · acute hypoxic respiratory failure d/t above now on room air  · DM 2 controlled  · Disposition awaiting skilled facility  · Discussed with patient and nursing staff.    Lenard Hinojosa MD   04/06/20  13:55

## 2020-04-06 NOTE — PROGRESS NOTES
"  PROGRESS NOTE  Patient Name: Rekha Bear  Age/Sex: 80 y.o. female  : 1940  MRN: [id number]    Date of Admission: 3/21/2020  Date of Encounter Visit: 20   LOS: 16 days   Patient Care Team:  Eben Porter MD as PCP - General  Eben Porter MD as PCP - Family Medicine  Brendan Grewal MD as PCP - Claims Attributed    Chief Complaint: On room air    Hospital course: This is a follow-up on pneumonia, patient is doing better, out of the unit, on 2 L nasal cannula oxygen, working with physical therapy, less respiratory complaints    Interval History: Improving dyspnea, tolerating p.o., diabetes mellitus per the primary team    REVIEW OF SYSTEMS:   CONSTITUTIONAL: no fever or chills    Ventilator/Non-Invasive Ventilation Settings (From admission, onward)       Vital Signs  Temp:  [96.9 °F (36.1 °C)-98.9 °F (37.2 °C)] 98.1 °F (36.7 °C)  Heart Rate:  [74-81] 76  Resp:  [18] 18  BP: (139-149)/(83-95) 149/83  SpO2:  [92 %-96 %] 92 %  on  Flow (L/min):  [2] 2 Device (Oxygen Therapy): room air    Intake/Output Summary (Last 24 hours) at 2020 1730  Last data filed at 2020 0637  Gross per 24 hour   Intake 330 ml   Output 1500 ml   Net -1170 ml     Flowsheet Rows      First Filed Value   Admission Height  170.2 cm (67\") Documented at 2020 1626   Admission Weight  90.7 kg (200 lb) Documented at 2020 1626        Body mass index is 33.15 kg/m².      20  0500 20  0545 20  0316   Weight: 90.7 kg (199 lb 15.3 oz) 96.1 kg (211 lb 13.8 oz) 96 kg (211 lb 10.3 oz)       Physical Exam:  Exam was not done trying to preserve PPE during the COVID pandemic, hospitalist exam findings were reviewed    Results Review:      Results from last 7 days   Lab Units 20  0320 20  0446 20  0427 20  1117   SODIUM mmol/L 134* 133* 134* 138   POTASSIUM mmol/L 4.5 4.5 4.5 4.7   CHLORIDE mmol/L 98 96* 97* 99   CO2 mmol/L 23.7 25.2 24.1 28.6   BUN mg/dL 17 17 18 21 "   CREATININE mg/dL 1.03* 1.04* 0.87 0.99   CALCIUM mg/dL 8.0* 8.2* 8.1* 8.1*   ANION GAP mmol/L 12.3 11.8 12.9 10.4             Results from last 7 days   Lab Units 03/31/20  1117   PROBNP pg/mL 12,654.0*     Results from last 7 days   Lab Units 04/04/20  0439 03/31/20  1632   WBC 10*3/mm3 7.35 8.22   HEMOGLOBIN g/dL 10.1* 10.2*   HEMATOCRIT % 31.2* 31.4*   PLATELETS 10*3/mm3 251 292   MCV fL 88.9 87.7   NEUTROPHIL % %  --  80.7*   LYMPHOCYTE % %  --  9.7*   MONOCYTES % %  --  7.3   EOSINOPHIL % %  --  0.7   BASOPHIL % %  --  0.1   IMM GRAN % %  --  1.5*                   Invalid input(s): LDLCALC          Glucose   Date/Time Value Ref Range Status   04/06/2020 1604 179 (H) 70 - 130 mg/dL Final   04/06/2020 1129 174 (H) 70 - 130 mg/dL Final   04/06/2020 0629 161 (H) 70 - 130 mg/dL Final   04/05/2020 1955 204 (H) 70 - 130 mg/dL Final   04/05/2020 1558 217 (H) 70 - 130 mg/dL Final   04/05/2020 0558 102 70 - 130 mg/dL Final   04/04/2020 2012 198 (H) 70 - 130 mg/dL Final   04/04/2020 1622 121 70 - 130 mg/dL Final                               Imaging:     I reviewed the patient's new clinical results.  I personally viewed and interpreted the patient's imaging results: No new films for me to review        Medication Review:     buPROPion  mg Oral Daily   enoxaparin 40 mg Subcutaneous Q24H   gabapentin 300 mg Oral Q12H   insulin lispro 0-14 Units Subcutaneous BID AC   levothyroxine 100 mcg Oral Daily   LORazepam 1 mg Intravenous Once   sodium chloride 10 mL Intravenous Q12H            ASSESSMENT:   COVID-19 pneumonia  Acute respiratory failure requiring mechanical ventilation  Acute kidney injury  Hypophosphatemia  Diabetes mellitus type 2  Hyponatremia    PLAN:  On room air, we will sign off  Hydroxychloroquine can be discontinued from my standpoint  We will transfer the care back to the hospitalist group if okay with them    Disposition: Hospitalist    Deangelo Jade MD  04/06/20  17:30         Dictated utilizing  Francisca dictation

## 2020-04-06 NOTE — PROGRESS NOTES
Continued Stay Note  [facility]     Patient Name: Rekha Bear  MRN: [id number]  Today's Date: 4/6/2020    Admit Date: 3/21/2020    Discharge Plan     Row Name 04/06/20 1332       Plan    Plan  [facility] ([facility] requiring 2 neg COVID test 24 hours apart before being able to accept.) vs [facility] (an accept if asymptomatic with 1 negative Covid test. )      Patient/Family in Agreement with Plan  yes    Plan Comments  Called and spoke to pt and Daughter Dara Hernández [phone] regarding discharge. Informed of issue with facilities not accepting pt's that have tested Covid 19 positive. Discussed that due to limited testing available per day, repeat testing could not be done at this time. Pt states a relative of her's works at [facility] and request referral. Referral placed.  Called [facility] and was told that they require 1 negative covid test and the pt must be symptom free before they can accept. Pt and daughter informed that pt would be held until acceptance to SNF.  Drew Layton RN-BC    Row Name 04/06/20 1200       Plan    Plan  [facility] for SNF. [facility] is requiring 2 neg COVID test 24 hours apart before being able to accept.       Patient/Family in Agreement with Plan  yes    Plan Comments  [facility] requiring 2 negative Covid 19 test prior to acceptance to SNF. Due to limited testing ability this can not currently occur. Unable to find SNF to take pt's that are positive for Covid 19 at this time. Daily follow ups with facilities regarding acceptance of Covid 19 positive pts. Drew Layton RN-BC        Discharge Codes    No documentation.             Drew Layton RN

## 2020-04-06 NOTE — PROGRESS NOTES
Continued Stay Note  [facility]     Patient Name: Rekha Bear  MRN: [id number]  Today's Date: 4/6/2020    Admit Date: 3/21/2020    Discharge Plan     Row Name 04/06/20 1440       Plan    Plan  Referrals for [facility] placed for Covid 19 units opening week of 4/13    Patient/Family in Agreement with Plan  yes    Plan Comments  [facility] opening Covid 19 unit at their facility the week of 4/13. Referrals placed for both. Drew LANCASTER    Row Name 04/06/20 1332       Plan    Plan  [facility] ([facility] is requiring 2 neg COVID test 24 hours apart before being able to accept.) vs [facility] (an accept if asymptomatic with 1 negative Covid test. )      Patient/Family in Agreement with Plan  yes    Plan Comments  Called and spoke to pt and Daughter Dara Hernández [phone] regarding discharge. Informed of issue with facilities not accepting pt's that have tested Covid 19 positive. Discussed that due to limited testing available per day, repeat testing could not be done at this time. Pt states a relative of her's works at [facility] and request referral. Referral placed.  Called [facility] and was told that they require 1 negative covid test and the pt must be symptom free before they can accept. Pt and daughter informed that pt would be held until acceptance to SNF.  Drew LANCASTER    Row Name 04/06/20 1200       Plan    Plan  [facility] for SNF. [facility] is requiring 2 neg COVID test 24 hours apart before being able to accept.       Patient/Family in Agreement with Plan  yes    Plan Comments  [facility] requiring 2 negative Covid 19 test prior to acceptance to SNF. Due to limited testing ability this can not currently occur. Unable to find SNF to take pt's that are positive for Covid 19 at this time. Daily follow ups with facilities regarding acceptance of Covid 19 positive pts. Drew LANCASTER        Discharge Codes    No documentation.              Drew Layton RN

## 2020-04-06 NOTE — PLAN OF CARE
Problem: Patient Care Overview  Goal: Plan of Care Review  Outcome: Ongoing (interventions implemented as appropriate)  Flowsheets (Taken 4/6/2020 9133)  Progress: improving  Outcome Summary: VSS. Awaiting acceptence to rehab. Due to limited testing cannot do 2 more test. Coordinating d/c plans. Will continue to monitor.

## 2020-04-06 NOTE — PROGRESS NOTES
Continued Stay Note  [facility]     Patient Name: Rekha Bear  MRN: [id number]  Today's Date: 4/6/2020    Admit Date: 3/21/2020    Discharge Plan     Row Name 04/06/20 1200       Plan    Plan  [facility] for SNF. [facility] is requiring 2 neg COVID test 24 hours apart before being able to accept.       Patient/Family in Agreement with Plan  yes    Plan Comments  [facility] requiring 2 negative Covid 19 test prior to acceptance to SNF. Due to limited testing ability this can not currently occur. Unable to find SNF to take pt's that are positive for Covid 19 at this time. Daily follow ups with facilities regarding acceptance of Covid 19 positive pts. Drew Layton RN-BC        Discharge Codes    No documentation.             Drew Layton RN

## 2020-04-07 LAB
GLUCOSE BLDC GLUCOMTR-MCNC: 146 MG/DL (ref 70–130)
GLUCOSE BLDC GLUCOMTR-MCNC: 163 MG/DL (ref 70–130)
GLUCOSE BLDC GLUCOMTR-MCNC: 222 MG/DL (ref 70–130)

## 2020-04-07 PROCEDURE — 97110 THERAPEUTIC EXERCISES: CPT

## 2020-04-07 PROCEDURE — 97530 THERAPEUTIC ACTIVITIES: CPT

## 2020-04-07 PROCEDURE — 63710000001 INSULIN LISPRO (HUMAN) PER 5 UNITS: Performed by: HOSPITALIST

## 2020-04-07 PROCEDURE — 82962 GLUCOSE BLOOD TEST: CPT

## 2020-04-07 PROCEDURE — 25010000002 ENOXAPARIN PER 10 MG: Performed by: INTERNAL MEDICINE

## 2020-04-07 RX ADMIN — GABAPENTIN 300 MG: 300 CAPSULE ORAL at 20:01

## 2020-04-07 RX ADMIN — SODIUM CHLORIDE, PRESERVATIVE FREE 10 ML: 5 INJECTION INTRAVENOUS at 20:01

## 2020-04-07 RX ADMIN — GABAPENTIN 300 MG: 300 CAPSULE ORAL at 08:38

## 2020-04-07 RX ADMIN — SODIUM CHLORIDE, PRESERVATIVE FREE 10 ML: 5 INJECTION INTRAVENOUS at 08:39

## 2020-04-07 RX ADMIN — BUPROPION HYDROCHLORIDE 300 MG: 300 TABLET, EXTENDED RELEASE ORAL at 08:38

## 2020-04-07 RX ADMIN — INSULIN LISPRO 2 UNITS: 100 INJECTION, SOLUTION INTRAVENOUS; SUBCUTANEOUS at 06:28

## 2020-04-07 RX ADMIN — ENOXAPARIN SODIUM 40 MG: 40 INJECTION SUBCUTANEOUS at 20:01

## 2020-04-07 RX ADMIN — LEVOTHYROXINE SODIUM 100 MCG: 100 TABLET ORAL at 08:40

## 2020-04-07 NOTE — PROGRESS NOTES
Adult Nutrition  Assessment/PES    Patient Name:  Rekha Bear  YOB: 1940  MRN: [id number]  Admit Date:  3/21/2020    Assessment Date:  4/7/2020    Comments:  PO follow up: Diet advanced to MS with thin liquids on 4/3. Continues to tolerate diet well with adequate intake. Wt stable. Improving from COVID19 and now back on room air, pulmonology has signed off but remains in precautions. D/c planning for pt to be placed in SNF, will likely need retested prior to d/c. Pt is confirmed COVID19, d/t pandemic and efforts to preserve PPEs, follow up was done remotely and completely based on review of EMR and/or discussions with RN/MD. Will continue to monitor.     Reason for Assessment     Row Name 04/07/20 1533          Reason for Assessment    Reason For Assessment  follow-up protocol     Diagnosis  infection/sepsis;pulmonary disease     Identified At Risk by Screening Criteria  difficulty chewing/swallowing         Nutrition/Diet History     Row Name 04/07/20 1533          Nutrition/Diet History    Typical Food/Fluid Intake  Diet advanced to MS with thin liquids on 4/3. Continues to tolerate diet well with adequate intake. Wt stable.      Factors Affecting Nutritional Intake  chewing difficulties/inability to chew food           Labs/Tests/Procedures/Meds     Row Name 04/07/20 1535          Labs/Procedures/Meds    Lab Results Reviewed  reviewed     Lab Results Comments  Glu        Diagnostic Tests/Procedures    Diagnostic Test/Procedure Reviewed  reviewed        Medications    Pertinent Medications Reviewed  reviewed, pertinent     Pertinent Medications Comments  lovenox, humalog, synthroid         Physical Findings     Row Name 04/07/20 1540          Physical Findings    Overall Physical Appearance  obese     Oral/Mouth Cavity  poor dentition missing teeth     Skin  other (see comments) B=19           Nutrition Prescription Ordered     Row Name 04/07/20 1541          Nutrition Prescription PO     Current PO Diet  Dysphagia     Dysphagia Level  4  Mechanical soft no mixed consistencies     Fluid Consistency  Thin     Common Modifiers  Consistent Carbohydrate         Problem/Interventions:    Intervention Goal     Row Name 04/07/20 1542          Intervention Goal    General  Maintain nutrition;Meet nutritional needs for age/condition;Disease management/therapy     PO  Maintain intake;Meet estimated needs     Weight  Maintain weight         Nutrition Intervention     Row Name 04/07/20 1542          Nutrition Intervention    RD/Tech Action  Care plan reviewd;Follow Tx progress           Education/Evaluation     Row Name 04/07/20 1543          Education    Education  Will Instruct as appropriate        Monitor/Evaluation    Monitor  Per protocol;PO intake;Weight;Pertinent labs     Education Follow-up  Reinforce PRN           Electronically signed by:  Juliane Fontenot, MS,RD,LD  04/07/20 15:44

## 2020-04-07 NOTE — THERAPY TREATMENT NOTE
"Patient Name: Rekha Bear  : 1940    MRN: [id number]                              Today's Date: 2020       Admit Date: 3/21/2020    Visit Dx:     ICD-10-CM ICD-9-CM   1. Community acquired pneumonia of left lower lobe of lung (CMS/HCC) J18.1 481   2. Community acquired pneumonia of right lower lobe of lung (CMS/HCC) J18.1 481   3. Hyponatremia E87.1 276.1   4. Acute renal insufficiency N28.9 593.9     Patient Active Problem List   Diagnosis   • Acute bronchitis   • Chronic pain of right knee   • Chest pain   • Depression   • Type 2 diabetes mellitus with stage 3 chronic kidney disease, without long-term current use of insulin (CMS/[facility])   • Hyperlipidemia   • Hypertension   • Hypothyroidism   • Renal insufficiency   • Urinary incontinence   • Urinary tract infection   • Cobalamin deficiency   • Cardiac arrhythmia   • Hyperlipemia   • Allergic reaction   • Hx of food anaphylaxis   • Herpes simplex   • Osteoarthritis   • 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   • Community acquired pneumonia of left lower lobe of lung (CMS/HCC)   • Diabetes mellitus, type II (CMS/[facility])   • Hyponatremia   • Pneumonia due to COVID-19 virus     Past Medical History:   Diagnosis Date   • Acute bronchitis    • Anxiety    • Chest pain    • Chilblains     \"Chilblian's\"   • Depression    • Diabetes mellitus, type II (CMS/[facility])    • Fatty liver    • GERD (gastroesophageal reflux disease)    • Health care maintenance    • History of mammogram    • Hyperlipidemia    • Hypertension    • Hypothyroid    • Incontinence    • Osteoarthritis     OA  Marvin THR, LT TKR - hydrocodone prescibed by Dr. Almaguer   • Pain of esophagus     \" nervous esophagus\" - she takes the occasional alprazolam   • Peptic ulcer disease    • Prediabetes    • Raynaud's disease     \"Raynauds\"   • Renal disease     followed by Dr. 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 Almaguer   • TOTAL KNEE ARTHROPLASTY Left     OA  Marvin THR, LT TKR - hydrocodone prescibed by Dr. Almaguer     General Information     Row Name 04/07/20 1449          PT Evaluation Time/Intention    Document Type  therapy note (daily note)  -     Mode of Treatment  individual therapy;physical therapy  -[facility] Name 04/07/20 1449          General Information    Existing Precautions/Restrictions  fall  -[facility] Name 04/07/20 1449          Cognitive Assessment/Intervention- PT/OT    Orientation Status (Cognition)  oriented x 4  -[facility] Name 04/07/20 1449          Safety Issues, Functional Mobility    Impairments Affecting Function (Mobility)  balance;endurance/activity tolerance;strength  -       User Key  (r) = Recorded By, (t) = Taken By, (c) = Cosigned By    Initials Name Provider Type     Mely Shen PTA Physical Therapy Assistant        Mobility     Row Name 04/07/20 1449          Bed Mobility Assessment/Treatment    Supine-Sit [place] (Bed Mobility)  supervision  -     Sit-Supine [place] (Bed Mobility)  not tested  -     Assistive Device (Bed Mobility)  bed rails;head of bed elevated  -[facility] Name 04/07/20 1449          Sit-Stand Transfer    Sit-Stand [place] (Transfers)  minimum assist (75% patient effort);verbal cues  -     Assistive Device (Sit-Stand Transfers)  walker, front-wheeled  -     Row Name 04/07/20 1449          Gait/Stairs Assessment/Training    Labette Level (Gait)  minimum assist (75% patient effort)  -     Assistive Device  (Gait)  walker, front-wheeled  -     Distance in Feet (Gait)  5  -     Deviations/Abnormal Patterns (Gait)  base of support, narrow;stride length decreased;gait speed decreased;eric decreased  -     Comment (Gait/Stairs)  pt did several standing marches and ambulated towards chair for a total of 5 feet.   -       User Key  (r) = Recorded By, (t) = Taken By, (c) = Cosigned By    Initials Name Provider Type     Mely Shen PTA Physical Therapy Assistant        Obj/Interventions     Row Name 04/07/20 1451          Therapeutic Exercise    Lower Extremity (Therapeutic Exercise)  heel slides, bilateral;LAQ (long arc quad), bilateral;marching while seated;marching while standing;quad sets, bilateral  -     Lower Extremity Range of Motion (Therapeutic Exercise)  hip abduction/adduction, bilateral;ankle dorsiflexion/plantar flexion, bilateral  -     Exercise Type (Therapeutic Exercise)  AROM (active range of motion)  -     Position (Therapeutic Exercise)  seated;standing;supine  -     Sets/Reps (Therapeutic Exercise)  X10  -     Row Name 04/07/20 1451          Static Sitting Balance    Level of [place] (Unsupported Sitting, Static Balance)  supervision  -     Sitting Position (Unsupported Sitting, Static Balance)  sitting on edge of bed  -     Time Able to Maintain Position (Unsupported Sitting, Static Balance)  3 to 4 minutes  -       User Key  (r) = Recorded By, (t) = Taken By, (c) = Cosigned By    Initials Name Provider Type     Mely Shen PTA Physical Therapy Assistant        Goals/Plan    No documentation.       Clinical Impression     Row Name 04/07/20 1451          Plan of Care Review    Plan of Care Reviewed With  patient  -     Progress  improving  -     Outcome Summary  Pt tolerated treatment with no complaints. Pt is progressing with mobility. Pt performed several bilateral LE exercises in supine,seated, and standing position with minimal c/o difficulty. Pt required  supervision for supine to sit transfer. Pt is Sean for STS transfers and ambulated 5ft with rwx, Sean. Pt has some mild unsteadiness but no LOB. Will continue to progress pt as able.   -     Row Name 04/07/20 0108          Positioning and Restraints    Pre-Treatment Position  in bed  -EH     Post Treatment Position  chair  -EH     In Chair  reclined;call light within reach;encouraged to call for assist;notified nsg  -       User Key  (r) = Recorded By, (t) = Taken By, (c) = Cosigned By    Initials Name Provider Type     Mely Shen PTA Physical Therapy Assistant        Outcome Measures     Row Name 04/07/20 7093          How much help from another person do you currently need...    Turning from your back to your side while in flat bed without using bedrails?  3  -EH     Moving from lying on back to sitting on the side of a flat bed without bedrails?  3  -EH     Moving to and from a bed to a chair (including a wheelchair)?  3  -EH     Standing up from a chair using your arms (e.g., wheelchair, bedside chair)?  3  -EH     Climbing 3-5 steps with a railing?  1  -EH     To walk in hospital room?  3  -EH     AM-PAC 6 Clicks Score (PT)  16  -EH       User Key  (r) = Recorded By, (t) = Taken By, (c) = Cosigned By    Initials Name Provider Type    Mely Anthony PTA Physical Therapy Assistant          PT Recommendation and Plan     Plan of Care Reviewed With: patient  Progress: improving  Outcome Summary: Pt tolerated treatment with no complaints. Pt is progressing with mobility. Pt performed several bilateral LE exercises in supine,seated, and standing position with minimal c/o difficulty. Pt required supervision for supine to sit transfer. Pt is Sean for STS transfers and ambulated 5ft with rwx, Sean. Pt has some mild unsteadiness but no LOB. Will continue to progress pt as able.      Time Calculation:   PT Charges     Row Name 04/07/20 1445             Time Calculation    Start Time  1315  -      Stop Time   1350  -      Time Calculation (min)  35 min  -      PT Received On  04/07/20  -      PT - Next Appointment  04/09/20  -         Time Calculation- PT    Total Timed Code Minutes- PT  35 minute(s)  -        User Key  (r) = Recorded By, (t) = Taken By, (c) = Cosigned By    Initials Name Provider Type     Mely Shen PTA Physical Therapy Assistant        Therapy Charges for Today     Code Description Service Date Service Provider Modifiers Qty    79255191194 HC PT THER PROC EA 15 MIN 4/7/2020 Mely Shen PTA GP 1    18607579653 HC PT THERAPEUTIC ACT EA 15 MIN 4/7/2020 Mely Shen PTA GP 1          PT G-Codes  Outcome Measure Options: AM-PAC 6 Clicks Basic Mobility (PT)  AM-PAC 6 Clicks Score (PT): 16    Mely Shen PTA  4/7/2020

## 2020-04-07 NOTE — PLAN OF CARE
Problem: Patient Care Overview  Goal: Plan of Care Review  Outcome: Ongoing (interventions implemented as appropriate)  Flowsheets  Taken 4/7/2020 0213  Progress: improving  Taken 4/7/2020 0000  Plan of Care Reviewed With: patient  Note:   VSS, no complaints during the night, appears to have slept well. Perineum kept clean and barrier cream applied, tolerating liquids well. Will continue to monitor.

## 2020-04-07 NOTE — PROGRESS NOTES
[facility]     LOS: 17 days     Name: Rekha Bear  Age: 80 y.o.  Sex: female  :  1940  MRN: [id number]         Primary Care Physician: Eben Porter MD    Diet Dysphagia; IV - Mechanical Soft No Mixed Consistencies; Thin; Consistent Carbohydrate    Subjective    discussed with patient over the phone to limit exposure.  She has some dyspnea on exertion that she thinks is improving.  Appetite is fine for the food she is getting.  She is worried about getting pneumonia since she is not ambulating much on her own.  No worsening cough, fever, shortness of breath, nausea, vomiting.    Review of Systems   Constitutional: Negative for fever.   Respiratory: Negative for cough and shortness of breath.    Cardiovascular: Negative for chest pain.   Gastrointestinal: Negative for abdominal pain.     Objective   Temp:  [97.3 °F (36.3 °C)-98.9 °F (37.2 °C)] 97.3 °F (36.3 °C)  Heart Rate:  [70-79] 70  Resp:  [16-18] 18  BP: (132-166)/(79-98) 132/85  SpO2:  [92 %-98 %] 94 %  on  Flow (L/min):  [2] 2;   Device (Oxygen Therapy): room air  Body mass index is 33.15 kg/m².    Physical Exam   Constitutional: She is oriented to person, place, and time. She is cooperative.   Neurological: She is alert and oriented to person, place, and time.   Psychiatric: She has a normal mood and affect. Her behavior is normal. Her speech is not slurred. She is not agitated and not aggressive. Cognition and memory are not impaired. She is attentive.   Nursing note and vitals reviewed.  Encounter conducted via telephone to minimize exposure and PPE usage.    Reviewed medications and new clinical results    Scheduled Meds    buPROPion  mg Oral Daily   enoxaparin 40 mg Subcutaneous Q24H   gabapentin 300 mg Oral Q12H   insulin lispro 0-14 Units Subcutaneous BID AC   levothyroxine 100 mcg Oral Daily   sodium chloride 10 mL Intravenous Q12H     Continuous Infusions   PRN Meds  •   acetaminophen **OR** acetaminophen **OR** acetaminophen  •  aluminum-magnesium hydroxide-simethicone  •  bisacodyl  •  bisacodyl  •  calcium gluconate IVPB **AND** calcium gluconate IVPB **AND** Calcium  •  dextromethorphan polistirex ER  •  dextrose  •  dextrose  •  glucagon (human recombinant)  •  guaiFENesin  •  HYDROcodone-acetaminophen  •  magnesium sulfate **OR** magnesium sulfate **OR** magnesium sulfate  •  [DISCONTINUED] HYDROmorphone **AND** naloxone  •  nitroglycerin  •  ondansetron **OR** ondansetron  •  potassium chloride **OR** potassium chloride **OR** potassium chloride  •  potassium phosphate infusion greater than 15 mMoles **OR** potassium phosphate infusion greater than 15 mMoles **OR** potassium phosphate **OR** sodium phosphate IVPB **OR** sodium phosphate IVPB  •  sodium chloride  •  [COMPLETED] Insert peripheral IV **AND** sodium chloride  •  sodium chloride  •  traMADol    Results from last 7 days   Lab Units 04/04/20  0439 03/31/20  1632   WBC 10*3/mm3 7.35 8.22   HEMOGLOBIN g/dL 10.1* 10.2*   PLATELETS 10*3/mm3 251 292     Results from last 7 days   Lab Units 04/03/20  0320 04/02/20  0446 04/01/20  0427 03/31/20  1117   SODIUM mmol/L 134* 133* 134* 138   POTASSIUM mmol/L 4.5 4.5 4.5 4.7   CHLORIDE mmol/L 98 96* 97* 99   CO2 mmol/L 23.7 25.2 24.1 28.6   BUN mg/dL 17 17 18 21   CREATININE mg/dL 1.03* 1.04* 0.87 0.99   CALCIUM mg/dL 8.0* 8.2* 8.1* 8.1*   GLUCOSE mg/dL 125* 126* 111* 146*     Lab Results   Component Value Date    ANIONGAP 12.3 04/03/2020     Glucose   Date/Time Value Ref Range Status   04/07/2020 0604 163 (H) 70 - 130 mg/dL Final   04/06/2020 1604 179 (H) 70 - 130 mg/dL Final   04/06/2020 1129 174 (H) 70 - 130 mg/dL Final   04/06/2020 0629 161 (H) 70 - 130 mg/dL Final   04/05/2020 1955 204 (H) 70 - 130 mg/dL Final   04/05/2020 1558 217 (H) 70 - 130 mg/dL Final   04/05/2020 0558 102 70 - 130 mg/dL Final   04/04/2020 2012 198 (H) 70 - 130 mg/dL Final     Estimated Creatinine  Clearance: 53.6 mL/min (A) (by C-G formula based on SCr of 1.03 mg/dL (H)).    Assessment/Plan   Active Hospital Problems    Diagnosis  POA   • **Pneumonia due to COVID-19 virus [U07.1, J12.89]  Yes   • Diabetes mellitus, type II (CMS/HCC) [E11.9]  Yes   • Hyponatremia [E87.1]  Yes   • Hypertension [I10]  Yes   • Hypothyroidism [E03.9]  Yes   • Renal insufficiency [N28.9]  Yes   • Depression [F32.9]  Yes      Resolved Hospital Problems    Diagnosis Date Resolved POA   • Dehydration [E86.0] 04/03/2020 Yes     80 y.o. female admitted with cough    · COVID 19: improving, s/p hydroxychloroquine  · acute hypoxic respiratory failure d/t above now on room air (though was on nasal cannula 2 L/min overnight)  · DM 2 controlled  · Disposition awaiting skilled facility  · Discussed with patient and nursing staff and CCP.  Discussed with Dr. Jade yesterday.    Lenard Hinojosa MD   04/07/20  10:42

## 2020-04-07 NOTE — PLAN OF CARE
Problem: Patient Care Overview  Goal: Plan of Care Review  Outcome: Ongoing (interventions implemented as appropriate)  Flowsheets (Taken 4/7/2020 1000)  Progress: improving  Plan of Care Reviewed With: patient  Outcome Summary: No c/o pain or soa. Currently on room air. Up with PT and then nursing staff to chair. Tolerating diet. Awaiting rehab placement.  Goal: Individualization and Mutuality  Outcome: Ongoing (interventions implemented as appropriate)  Goal: Discharge Needs Assessment  Outcome: Ongoing (interventions implemented as appropriate)  Goal: Interprofessional Rounds/Family Conf  Outcome: Ongoing (interventions implemented as appropriate)     Problem: Fall Risk (Adult)  Goal: Absence of Fall  Outcome: Ongoing (interventions implemented as appropriate)     Problem: Skin Injury Risk (Adult)  Goal: Skin Health and Integrity  Outcome: Ongoing (interventions implemented as appropriate)     Problem: Pneumonia (Adult)  Goal: Signs and Symptoms of Listed Potential Problems Will be Absent, Minimized or Managed (Pneumonia)  Outcome: Ongoing (interventions implemented as appropriate)

## 2020-04-07 NOTE — PLAN OF CARE
Problem: Patient Care Overview  Goal: Plan of Care Review  Outcome: Ongoing (interventions implemented as appropriate)  Flowsheets (Taken 4/7/2020 6892)  Progress: improving  Plan of Care Reviewed With: patient  Outcome Summary: Pt tolerated treatment with no complaints. Pt is progressing with mobility. Pt performed several bilateral LE exercises in supine,seated, and standing position with minimal c/o difficulty. Pt required supervision for supine to sit transfer. Pt is Sean for STS transfers and ambulated 5ft with rwx, Sean. Pt has some mild unsteadiness but no LOB. Will continue to progress pt as able.

## 2020-04-08 LAB
GLUCOSE BLDC GLUCOMTR-MCNC: 167 MG/DL (ref 70–130)
GLUCOSE BLDC GLUCOMTR-MCNC: 218 MG/DL (ref 70–130)
GLUCOSE BLDC GLUCOMTR-MCNC: 245 MG/DL (ref 70–130)

## 2020-04-08 PROCEDURE — 82962 GLUCOSE BLOOD TEST: CPT

## 2020-04-08 PROCEDURE — 63710000001 INSULIN LISPRO (HUMAN) PER 5 UNITS: Performed by: HOSPITALIST

## 2020-04-08 PROCEDURE — 25010000002 ENOXAPARIN PER 10 MG: Performed by: INTERNAL MEDICINE

## 2020-04-08 PROCEDURE — 87635 SARS-COV-2 COVID-19 AMP PRB: CPT | Performed by: HOSPITALIST

## 2020-04-08 RX ADMIN — GABAPENTIN 300 MG: 300 CAPSULE ORAL at 08:07

## 2020-04-08 RX ADMIN — SODIUM CHLORIDE, PRESERVATIVE FREE 10 ML: 5 INJECTION INTRAVENOUS at 08:13

## 2020-04-08 RX ADMIN — GABAPENTIN 300 MG: 300 CAPSULE ORAL at 20:07

## 2020-04-08 RX ADMIN — BUPROPION HYDROCHLORIDE 300 MG: 300 TABLET, EXTENDED RELEASE ORAL at 08:07

## 2020-04-08 RX ADMIN — INSULIN LISPRO 5 UNITS: 100 INJECTION, SOLUTION INTRAVENOUS; SUBCUTANEOUS at 17:14

## 2020-04-08 RX ADMIN — SODIUM CHLORIDE, PRESERVATIVE FREE 10 ML: 5 INJECTION INTRAVENOUS at 20:08

## 2020-04-08 RX ADMIN — LEVOTHYROXINE SODIUM 100 MCG: 100 TABLET ORAL at 08:07

## 2020-04-08 RX ADMIN — INSULIN LISPRO 3 UNITS: 100 INJECTION, SOLUTION INTRAVENOUS; SUBCUTANEOUS at 08:07

## 2020-04-08 RX ADMIN — ENOXAPARIN SODIUM 40 MG: 40 INJECTION SUBCUTANEOUS at 20:07

## 2020-04-08 NOTE — PROGRESS NOTES
Continued Stay Note  [facility]     Patient Name: Rekha Bear  MRN: [id number]  Today's Date: 4/8/2020    Admit Date: 3/21/2020    Discharge Plan     Row Name 04/08/20 1011       Plan    Plan  [facility]errals for [facility] for Covid 19 units opening week of 4/13    Patient/Family in Agreement with Plan  yes    Plan Comments  Continue to wait for skilled rehab that will accept Covid 19 positive patients. [facility] Covid 19 unit at their facility the week of 4/13. Referrals placed for both. Derw Layton RN-BC        Discharge Codes    No documentation.             Drew Layton, RN

## 2020-04-08 NOTE — PROGRESS NOTES
[facility]     LOS: 18 days     Name: Rekha Bear  Age: 80 y.o.  Sex: female  :  1940  MRN: [id number]         Primary Care Physician: Eben Porter MD    Diet Dysphagia; IV - Mechanical Soft No Mixed Consistencies; Thin; Consistent Carbohydrate    Subjective    No new complaints.  She is feeling better. Coughing improved. No fever.    Review of Systems   Constitutional: Negative for fever.   Respiratory: Negative for cough and shortness of breath.    Cardiovascular: Negative for chest pain.   Gastrointestinal: Negative for abdominal pain.     Objective   Temp:  [97.4 °F (36.3 °C)-98.5 °F (36.9 °C)] 98.5 °F (36.9 °C)  Heart Rate:  [69-75] 74  Resp:  [17-18] 18  BP: (138-152)/(74-85) 149/81  SpO2:  [91 %-95 %] 95 %  on   ;   Device (Oxygen Therapy): room air  Body mass index is 33.15 kg/m².    Physical Exam   Constitutional: She is oriented to person, place, and time. She is cooperative.   Pulmonary/Chest: Effort normal. She has no wheezes. She has no rhonchi.   Abdominal: Soft. There is no tenderness.   Neurological: She is alert and oriented to person, place, and time.   Psychiatric: She has a normal mood and affect. Her behavior is normal. Her speech is not slurred. She is not agitated and not aggressive. Cognition and memory are not impaired. She is attentive.   Nursing note and vitals reviewed.    Reviewed medications and new clinical results    Scheduled Meds    buPROPion  mg Oral Daily   enoxaparin 40 mg Subcutaneous Q24H   gabapentin 300 mg Oral Q12H   insulin lispro 0-14 Units Subcutaneous BID AC   levothyroxine 100 mcg Oral Daily   sodium chloride 10 mL Intravenous Q12H     Continuous Infusions   PRN Meds  •  acetaminophen **OR** acetaminophen **OR** acetaminophen  •  aluminum-magnesium hydroxide-simethicone  •  bisacodyl  •  bisacodyl  •  calcium gluconate IVPB **AND** calcium gluconate IVPB **AND** Calcium  •   dextromethorphan polistirex ER  •  dextrose  •  dextrose  •  glucagon (human recombinant)  •  guaiFENesin  •  HYDROcodone-acetaminophen  •  magnesium sulfate **OR** magnesium sulfate **OR** magnesium sulfate  •  [DISCONTINUED] HYDROmorphone **AND** naloxone  •  nitroglycerin  •  ondansetron **OR** ondansetron  •  potassium chloride **OR** potassium chloride **OR** potassium chloride  •  potassium phosphate infusion greater than 15 mMoles **OR** potassium phosphate infusion greater than 15 mMoles **OR** potassium phosphate **OR** sodium phosphate IVPB **OR** sodium phosphate IVPB  •  sodium chloride  •  [COMPLETED] Insert peripheral IV **AND** sodium chloride  •  sodium chloride  •  traMADol    Results from last 7 days   Lab Units 04/04/20  0439   WBC 10*3/mm3 7.35   HEMOGLOBIN g/dL 10.1*   PLATELETS 10*3/mm3 251     Results from last 7 days   Lab Units 04/03/20  0320 04/02/20  0446   SODIUM mmol/L 134* 133*   POTASSIUM mmol/L 4.5 4.5   CHLORIDE mmol/L 98 96*   CO2 mmol/L 23.7 25.2   BUN mg/dL 17 17   CREATININE mg/dL 1.03* 1.04*   CALCIUM mg/dL 8.0* 8.2*   GLUCOSE mg/dL 125* 126*     Lab Results   Component Value Date    ANIONGAP 12.3 04/03/2020     Glucose   Date/Time Value Ref Range Status   04/08/2020 0559 167 (H) 70 - 130 mg/dL Final   04/07/2020 2059 222 (H) 70 - 130 mg/dL Final   04/07/2020 1638 146 (H) 70 - 130 mg/dL Final   04/07/2020 0604 163 (H) 70 - 130 mg/dL Final   04/06/2020 1604 179 (H) 70 - 130 mg/dL Final   04/06/2020 1129 174 (H) 70 - 130 mg/dL Final   04/06/2020 0629 161 (H) 70 - 130 mg/dL Final   04/05/2020 1955 204 (H) 70 - 130 mg/dL Final     Estimated Creatinine Clearance: 53.6 mL/min (A) (by C-G formula based on SCr of 1.03 mg/dL (H)).    Assessment/Plan   Active Hospital Problems    Diagnosis  POA   • **Pneumonia due to COVID-19 virus [U07.1, J12.89]  Yes   • Diabetes mellitus, type II (CMS/HCC) [E11.9]  Yes   • Hyponatremia [E87.1]  Yes   • Hypertension [I10]  Yes   • Hypothyroidism [E03.9]   Yes   • Renal insufficiency [N28.9]  Yes   • Depression [F32.9]  Yes      Resolved Hospital Problems    Diagnosis Date Resolved POA   • Dehydration [E86.0] 04/03/2020 Yes     80 y.o. female admitted with cough    · COVID 19: improving, s/p hydroxychloroquine  · acute hypoxic respiratory failure d/t above now on room air  · DM 2 controlled  · Disposition awaiting skilled facility  · Discussed with patient and nursing staff and CCP.  Discussed with ID and they are looking into recommendations for discontinuation of isolation    Lenard Hinojosa MD   04/08/20  12:49

## 2020-04-08 NOTE — PLAN OF CARE
Problem: Patient Care Overview  Goal: Plan of Care Review  Outcome: Ongoing (interventions implemented as appropriate)  Flowsheets (Taken 4/8/2020 0500)  Progress: improving  Plan of Care Reviewed With: patient  Outcome Summary: VSS. RA/2L with sleep. SR. Purewick in place. No complaints of pain. Awaiting rehab placement. Up with assist. Will continue to monitor.

## 2020-04-08 NOTE — PLAN OF CARE
Problem: Patient Care Overview  Goal: Plan of Care Review  Outcome: Ongoing (interventions implemented as appropriate)  Flowsheets  Taken 4/8/2020 3681  Progress: improving  Outcome Summary: VSS. 1L O2. no c/o of SOA or pain. tolerated ambulating to the bathroom with walker. up in chair for the afternoon. COVID 19 swab to be sent to determine placement in a rehab facility. will continue to monitor  Taken 4/8/2020 8315  Plan of Care Reviewed With: patient

## 2020-04-09 LAB
GLUCOSE BLDC GLUCOMTR-MCNC: 156 MG/DL (ref 70–130)
GLUCOSE BLDC GLUCOMTR-MCNC: 191 MG/DL (ref 70–130)
SARS-COV-2 RNA RESP QL NAA+PROBE: NOT DETECTED

## 2020-04-09 PROCEDURE — 63710000001 INSULIN LISPRO (HUMAN) PER 5 UNITS: Performed by: HOSPITALIST

## 2020-04-09 PROCEDURE — 25010000002 ENOXAPARIN PER 10 MG: Performed by: INTERNAL MEDICINE

## 2020-04-09 PROCEDURE — 82962 GLUCOSE BLOOD TEST: CPT

## 2020-04-09 RX ADMIN — SODIUM CHLORIDE, PRESERVATIVE FREE 10 ML: 5 INJECTION INTRAVENOUS at 20:07

## 2020-04-09 RX ADMIN — INSULIN LISPRO 3 UNITS: 100 INJECTION, SOLUTION INTRAVENOUS; SUBCUTANEOUS at 16:56

## 2020-04-09 RX ADMIN — INSULIN LISPRO 3 UNITS: 100 INJECTION, SOLUTION INTRAVENOUS; SUBCUTANEOUS at 08:16

## 2020-04-09 RX ADMIN — GABAPENTIN 300 MG: 300 CAPSULE ORAL at 08:16

## 2020-04-09 RX ADMIN — BUPROPION HYDROCHLORIDE 300 MG: 300 TABLET, EXTENDED RELEASE ORAL at 08:16

## 2020-04-09 RX ADMIN — SODIUM CHLORIDE, PRESERVATIVE FREE 10 ML: 5 INJECTION INTRAVENOUS at 08:16

## 2020-04-09 RX ADMIN — LEVOTHYROXINE SODIUM 100 MCG: 100 TABLET ORAL at 08:16

## 2020-04-09 RX ADMIN — ENOXAPARIN SODIUM 40 MG: 40 INJECTION SUBCUTANEOUS at 20:07

## 2020-04-09 NOTE — CONSULTS
"Spiritual Care: pastoral care and assessment via phone due to COVID status.  Pt very open to conversation, drawing upon her spiritual resources for increased hoping and coping, \"Violet will be a celebration no matter what because they thought I was gonna die.\"  rober Moreira  "

## 2020-04-09 NOTE — PROGRESS NOTES
Continued Stay Note  [facility]     Patient Name: Rekha Bear  MRN: [id number]  Today's Date: 4/9/2020    Admit Date: 3/21/2020    Discharge Plan     Row Name 04/09/20 1358       Plan    Plan  [facility] for Covid 19 units opening week of 4/13    Patient/Family in Agreement with Plan  yes    Plan Comments  #1 repeat Covid 19 test negative. Plan is for #2 Covid 19 test tomorrow. If both negative pt would be able to transfer to SNF. Drew Layton RN-BC        Discharge Codes    No documentation.             Drew Layton, RN

## 2020-04-09 NOTE — PROGRESS NOTES
Continued Stay Note  [facility]     Patient Name: Rekha Bear  MRN: [id number]  Today's Date: 4/9/2020    Admit Date: 3/21/2020    Discharge Plan     Row Name 04/09/20 1437       Plan    Plan  [facility] for SNF. [facility] is requiring 2 neg COVID test 24 hours apart before being able to accept.  [facility] for Covid 19 units opening week of 4/13    Patient/Family in Agreement with Plan  yes    Plan Comments  [facility]ed [facility] regarding acceptance of pt tomorrow if #2 Covid 19 test is negative.  [facility] stated [facility] could accept as long as she has 2 negative Covid 19 test. Called into room and spoke with pt regarding discharge plan. Pt agreeable to [facility] for SNF possibly tomorrow. Drew Layton RN-BC    Row Name 04/09/20 6790       Plan    Plan  [facility]errals [facility] for Covid 19 units opening week of 4/13    Patient/Family in Agreement with Plan  yes    Plan Comments  #1 repeat Covid 19 test negative. Plan is for #2 Covid 19 test tomorrow. If both negative pt would be able to transfer to SNF. Drew Layton RN-BC        Discharge Codes    No documentation.             Drew Layton RN

## 2020-04-09 NOTE — PLAN OF CARE
Problem: Patient Care Overview  Goal: Plan of Care Review  Outcome: Ongoing (interventions implemented as appropriate)  Flowsheets  Taken 4/9/2020 0158  Progress: no change  Outcome Summary: VSS; up to bathroom with walker with 1 assist; no c/o pain or nausea; no s/s of distress  Taken 4/9/2020 0046  Plan of Care Reviewed With: patient

## 2020-04-09 NOTE — PROGRESS NOTES
[facility]     LOS: 19 days     Name: Rekha Bear  Age: 80 y.o.  Sex: female  :  1940  MRN: [id number]         Primary Care Physician: Eben Porter MD    Diet Dysphagia; IV - Mechanical Soft No Mixed Consistencies; Thin; Consistent Carbohydrate    Subjective    discussed with patient over the phone to limit exposure.  She states she is feeling better and getting stronger daily.  Mild cough no worse.  Discussed with nursing staff.    Review of Systems   Constitutional: Negative for fever.   Respiratory: Negative for shortness of breath.    Cardiovascular: Negative for chest pain.   Gastrointestinal: Negative for abdominal pain.     Objective   Temp:  [97.3 °F (36.3 °C)-98.8 °F (37.1 °C)] 97.3 °F (36.3 °C)  Heart Rate:  [70-75] 70  Resp:  [16-18] 16  BP: (126-163)/(78-98) 160/98  SpO2:  [96 %-98 %] 98 %  on  Flow (L/min):  [1-2] 1;   Device (Oxygen Therapy): nasal cannula  Body mass index is 33.15 kg/m².    Physical Exam   Constitutional: She is oriented to person, place, and time. She is cooperative.   Neurological: She is alert and oriented to person, place, and time.   Psychiatric: She has a normal mood and affect. Her behavior is normal. Her speech is not slurred. She is not agitated and not aggressive. Cognition and memory are not impaired. She is attentive.   Nursing note and vitals reviewed.  Encounter conducted via telephone to minimize exposure and PPE usage.    Reviewed medications and new clinical results    Scheduled Meds    buPROPion  mg Oral Daily   enoxaparin 40 mg Subcutaneous Q24H   insulin lispro 0-14 Units Subcutaneous BID AC   levothyroxine 100 mcg Oral Daily   sodium chloride 10 mL Intravenous Q12H     Continuous Infusions   PRN Meds  •  acetaminophen **OR** acetaminophen **OR** acetaminophen  •  aluminum-magnesium hydroxide-simethicone  •  bisacodyl  •  bisacodyl  •  calcium gluconate IVPB **AND** calcium  gluconate IVPB **AND** Calcium  •  dextromethorphan polistirex ER  •  dextrose  •  dextrose  •  glucagon (human recombinant)  •  guaiFENesin  •  HYDROcodone-acetaminophen  •  magnesium sulfate **OR** magnesium sulfate **OR** magnesium sulfate  •  [DISCONTINUED] HYDROmorphone **AND** naloxone  •  nitroglycerin  •  ondansetron **OR** ondansetron  •  potassium chloride **OR** potassium chloride **OR** potassium chloride  •  potassium phosphate infusion greater than 15 mMoles **OR** potassium phosphate infusion greater than 15 mMoles **OR** potassium phosphate **OR** sodium phosphate IVPB **OR** sodium phosphate IVPB  •  sodium chloride  •  [COMPLETED] Insert peripheral IV **AND** sodium chloride  •  sodium chloride  •  traMADol    Results from last 7 days   Lab Units 04/04/20  0439   WBC 10*3/mm3 7.35   HEMOGLOBIN g/dL 10.1*   PLATELETS 10*3/mm3 251     Results from last 7 days   Lab Units 04/03/20  0320   SODIUM mmol/L 134*   POTASSIUM mmol/L 4.5   CHLORIDE mmol/L 98   CO2 mmol/L 23.7   BUN mg/dL 17   CREATININE mg/dL 1.03*   CALCIUM mg/dL 8.0*   GLUCOSE mg/dL 125*     Lab Results   Component Value Date    ANIONGAP 12.3 04/03/2020     Glucose   Date/Time Value Ref Range Status   04/09/2020 0552 156 (H) 70 - 130 mg/dL Final   04/08/2020 2104 218 (H) 70 - 130 mg/dL Final   04/08/2020 1632 245 (H) 70 - 130 mg/dL Final   04/08/2020 0559 167 (H) 70 - 130 mg/dL Final   04/07/2020 2059 222 (H) 70 - 130 mg/dL Final   04/07/2020 1638 146 (H) 70 - 130 mg/dL Final   04/07/2020 0604 163 (H) 70 - 130 mg/dL Final   04/06/2020 1604 179 (H) 70 - 130 mg/dL Final     Estimated Creatinine Clearance: 53.6 mL/min (A) (by C-G formula based on SCr of 1.03 mg/dL (H)).    Assessment/Plan   Active Hospital Problems    Diagnosis  POA   • **Pneumonia due to COVID-19 virus [U07.1, J12.89]  Yes   • Diabetes mellitus, type II (CMS/HCC) [E11.9]  Yes   • Hyponatremia [E87.1]  Yes   • Hypertension [I10]  Yes   • Hypothyroidism [E03.9]  Yes   • Renal  insufficiency [N28.9]  Yes   • Depression [F32.9]  Yes      Resolved Hospital Problems    Diagnosis Date Resolved POA   • Dehydration [E86.0] 04/03/2020 Yes     80 y.o. female admitted with cough    · COVID 19: improving, s/p hydroxychloroquine  · acute hypoxic respiratory failure d/t above currently on 1 L nasal cannula  · DM 2 controlled  · Disposition awaiting skilled facility.  Discussed with ID yesterday awaiting negative COVID 19 x2 for discharge to facility (4/8/2020 test negative)  · Discussed with patient and nursing staff     Lenard Hinojosa MD   04/09/20  13:26

## 2020-04-10 VITALS
DIASTOLIC BLOOD PRESSURE: 74 MMHG | TEMPERATURE: 97.8 F | RESPIRATION RATE: 18 BRPM | OXYGEN SATURATION: 96 % | BODY MASS INDEX: 33.22 KG/M2 | HEIGHT: 67 IN | HEART RATE: 76 BPM | SYSTOLIC BLOOD PRESSURE: 125 MMHG | WEIGHT: 211.64 LBS

## 2020-04-10 LAB
GLUCOSE BLDC GLUCOMTR-MCNC: 230 MG/DL (ref 70–130)
SARS-COV-2 RNA RESP QL NAA+PROBE: NOT DETECTED

## 2020-04-10 PROCEDURE — 82962 GLUCOSE BLOOD TEST: CPT

## 2020-04-10 PROCEDURE — 87635 SARS-COV-2 COVID-19 AMP PRB: CPT | Performed by: HOSPITALIST

## 2020-04-10 PROCEDURE — 63710000001 INSULIN LISPRO (HUMAN) PER 5 UNITS: Performed by: HOSPITALIST

## 2020-04-10 RX ORDER — DEXTROMETHORPHAN POLISTIREX 30 MG/5ML
30 SUSPENSION ORAL 2 TIMES DAILY PRN
Qty: 280 ML
Start: 2020-04-10

## 2020-04-10 RX ADMIN — BUPROPION HYDROCHLORIDE 300 MG: 300 TABLET, EXTENDED RELEASE ORAL at 08:53

## 2020-04-10 RX ADMIN — SODIUM CHLORIDE, PRESERVATIVE FREE 10 ML: 5 INJECTION INTRAVENOUS at 10:06

## 2020-04-10 RX ADMIN — LEVOTHYROXINE SODIUM 100 MCG: 100 TABLET ORAL at 08:53

## 2020-04-10 RX ADMIN — INSULIN LISPRO 5 UNITS: 100 INJECTION, SOLUTION INTRAVENOUS; SUBCUTANEOUS at 09:01

## 2020-04-10 NOTE — PROGRESS NOTES
[facility]     LOS: 20 days     Name: Rekha Bear  Age: 80 y.o.  Sex: female  :  1940  MRN: [id number]         Primary Care Physician: Eben Porter MD    Diet Dysphagia; IV - Mechanical Soft No Mixed Consistencies; Thin; Consistent Carbohydrate    Subjective    discussed with patient over the phone to limit exposure.  She is feeling stronger today.  Still some mild dyspnea on exertion but it is improved.    Review of Systems   Constitutional: Negative for fever.   Respiratory: Negative for shortness of breath.    Cardiovascular: Negative for chest pain.   Gastrointestinal: Negative for abdominal pain.     Objective   Temp:  [97.2 °F (36.2 °C)-98.2 °F (36.8 °C)] 97.2 °F (36.2 °C)  Heart Rate:  [72-97] 97  Resp:  [16] 16  BP: (143-158)/(74-95) 143/74  SpO2:  [94 %-97 %] 96 %  on  Flow (L/min):  [0.05-1] 0.05;   Device (Oxygen Therapy): nasal cannula  Body mass index is 33.15 kg/m².    Physical Exam   Constitutional: She is oriented to person, place, and time. She is cooperative.   Neurological: She is alert and oriented to person, place, and time.   Psychiatric: She has a normal mood and affect. Her behavior is normal. Her speech is not slurred. She is not agitated and not aggressive. Cognition and memory are not impaired. She is attentive.   Nursing note and vitals reviewed.  Encounter conducted via telephone to minimize exposure and PPE usage.    Reviewed medications and new clinical results    Scheduled Meds    buPROPion  mg Oral Daily   enoxaparin 40 mg Subcutaneous Q24H   insulin lispro 0-14 Units Subcutaneous BID AC   levothyroxine 100 mcg Oral Daily   sodium chloride 10 mL Intravenous Q12H     Continuous Infusions   PRN Meds  •  acetaminophen **OR** acetaminophen **OR** acetaminophen  •  aluminum-magnesium hydroxide-simethicone  •  bisacodyl  •  bisacodyl  •  calcium gluconate IVPB **AND** calcium gluconate IVPB **AND**  Calcium  •  dextromethorphan polistirex ER  •  dextrose  •  dextrose  •  glucagon (human recombinant)  •  guaiFENesin  •  HYDROcodone-acetaminophen  •  magnesium sulfate **OR** magnesium sulfate **OR** magnesium sulfate  •  [DISCONTINUED] HYDROmorphone **AND** naloxone  •  nitroglycerin  •  ondansetron **OR** ondansetron  •  potassium chloride **OR** potassium chloride **OR** potassium chloride  •  potassium phosphate infusion greater than 15 mMoles **OR** potassium phosphate infusion greater than 15 mMoles **OR** potassium phosphate **OR** sodium phosphate IVPB **OR** sodium phosphate IVPB  •  sodium chloride  •  [COMPLETED] Insert peripheral IV **AND** sodium chloride  •  sodium chloride  •  traMADol    Results from last 7 days   Lab Units 04/04/20  0439   WBC 10*3/mm3 7.35   HEMOGLOBIN g/dL 10.1*   PLATELETS 10*3/mm3 251         Lab Results   Component Value Date    ANIONGAP 12.3 04/03/2020     Glucose   Date/Time Value Ref Range Status   04/10/2020 0855 230 (H) 70 - 130 mg/dL Final   04/09/2020 1654 191 (H) 70 - 130 mg/dL Final   04/09/2020 0552 156 (H) 70 - 130 mg/dL Final   04/08/2020 2104 218 (H) 70 - 130 mg/dL Final   04/08/2020 1632 245 (H) 70 - 130 mg/dL Final   04/08/2020 0559 167 (H) 70 - 130 mg/dL Final   04/07/2020 2059 222 (H) 70 - 130 mg/dL Final   04/07/2020 1638 146 (H) 70 - 130 mg/dL Final     Estimated Creatinine Clearance: 53.6 mL/min (A) (by C-G formula based on SCr of 1.03 mg/dL (H)).    Assessment/Plan   Active Hospital Problems    Diagnosis  POA   • **Pneumonia due to COVID-19 virus [U07.1, J12.89]  Yes   • Diabetes mellitus, type II (CMS/HCC) [E11.9]  Yes   • Hyponatremia [E87.1]  Yes   • Hypertension [I10]  Yes   • Hypothyroidism [E03.9]  Yes   • Renal insufficiency [N28.9]  Yes   • Depression [F32.9]  Yes      Resolved Hospital Problems    Diagnosis Date Resolved POA   • Dehydration [E86.0] 04/03/2020 Yes     80 y.o. female admitted with cough    · COVID 19: improving, s/p  hydroxychloroquine  · 4/8/2020 test negative  · Repeat swab done today  · acute hypoxic respiratory failure d/t above improved  · DM 2 control acceptable  · Disposition awaiting skilled facility.  Awaiting second negative test  · Discussed with patient and nursing staff     Lenard Hinojosa MD   04/10/20  11:16

## 2020-04-10 NOTE — PROGRESS NOTES
Continued Stay Note  [facility]     Patient Name: Rekha Bear  MRN: [id number]  Today's Date: 4/10/2020    Admit Date: 3/21/2020    Discharge Plan     Row Name 04/10/20 1556       Plan    Plan  D/C to [facility]    Patient/Family in Agreement with Plan  yes    Plan Comments  Pt discharging to [facility]. Notified daughter and pt of acceptance and discharge to [id number] to transport to [facility] at 1700. Packet given to nurse. Drew Layton RN-BC        Discharge Codes    No documentation.       Expected Discharge Date and Time     Expected Discharge Date Expected Discharge Time    Apr 10, 2020             Drew Layton RN

## 2020-04-10 NOTE — DISCHARGE SUMMARY
[facility]    Date of Discharge:  4/10/2020    PCP: Eben Porter MD    Discharge Diagnosis:   Active Hospital Problems    Diagnosis  POA   • **Pneumonia due to COVID-19 virus [U07.1, J12.89]  Yes   • Diabetes mellitus, type II (CMS/HCC) [E11.9]  Yes   • Hyponatremia [E87.1]  Yes   • Hypertension [I10]  Yes   • Hypothyroidism [E03.9]  Yes   • Renal insufficiency [N28.9]  Yes   • Depression [F32.9]  Yes      Resolved Hospital Problems    Diagnosis Date Resolved POA   • Dehydration [E86.0] 04/03/2020 Yes      Consults     Date and Time Order Name Status Description    3/24/2020 0904 Inpatient Infectious Diseases Consult Completed     3/22/2020 0813 Inpatient Pulmonology Consult Completed     3/21/2020 1847 [facility] (on-call MD unless specified) Details Completed         Hospital Course  Please see history and physical for details. Patient is a 80 y.o. female admitted with cough and fevers diagnosed with COVID 19.  She had worsening respiratory failure and had to be transferred to the intensive care unit and intubated.  She was on vent support for a few days and managed by intensivist and also received hydroxychloroquine therapy and made steady improvement.  Length of stay was prolonged due to need for two negative tests which the last one resulted today.  She is going to be transferred to skilled nursing facility.    I discussed the patient's findings and my recommendations with patient and nursing staff.    Condition on Discharge: Improved.     Temp:  [97.2 °F (36.2 °C)-98.2 °F (36.8 °C)] 97.2 °F (36.2 °C)  Heart Rate:  [72-97] 97  Resp:  [16] 16  BP: (143-158)/(74-95) 143/74  Body mass index is 33.15 kg/m².    Physical Exam   see progress note from today      Discharge Medications      New Medications      Instructions Start Date   dextromethorphan polistirex ER 30 MG/5ML Suspension Extended Release oral suspension  Commonly known as:  DELSYM   30 mg, Oral, 2  Times Daily PRN      guaiFENesin 100 MG/5ML solution oral solution  Commonly known as:  ROBITUSSIN  Replaces:  guaiFENesin 400 MG tablet   200 mg, Oral, Every 4 Hours PRN      insulin lispro 100 UNIT/ML injection  Commonly known as:  humaLOG   0-14 Units, Subcutaneous, 2 Times Daily Before Meals         Continue These Medications      Instructions Start Date   buPROPion  MG 24 hr tablet  Commonly known as:  WELLBUTRIN XL   No dose, route, or frequency recorded.      levothyroxine 100 MCG tablet  Commonly known as:  SYNTHROID, LEVOTHROID   100 mcg, Oral, Daily      rosuvastatin 20 MG tablet  Commonly known as:  CRESTOR   20 mg, Oral, Daily         Stop These Medications    CBD oral oil  Commonly known as:  cannabidiol     cetirizine 10 MG tablet  Commonly known as:  zyrTEC     clobetasol 0.05 % cream  Commonly known as:  TEMOVATE     ESTRACE VAGINAL 0.1 MG/GM vaginal cream  Generic drug:  estradiol     gabapentin 600 MG tablet  Commonly known as:  NEURONTIN     glimepiride 2 MG tablet  Commonly known as:  AMARYL     guaiFENesin 400 MG tablet  Commonly known as:  HUMIBID 3  Replaced by:  guaiFENesin 100 MG/5ML solution oral solution     hydrocortisone 2.5 % cream     ketoconazole 2 % shampoo  Commonly known as:  NIZORAL     metoprolol succinate XL 50 MG 24 hr tablet  Commonly known as:  TOPROL-XL     N-Acetyl-L-Cysteine 600 MG capsule     traMADol 50 MG tablet  Commonly known as:  ULTRAM     venlafaxine  MG 24 hr capsule  Commonly known as:  EFFEXOR-XR     VESIcare 10 MG tablet  Generic drug:  solifenacin     Welchol 625 MG tablet  Generic drug:  colesevelam     ZANTAC 75 PO           Diet Instructions     Diet: Regular, Dysphagia, Consistent Carbohydrate; Thin Liquids, No Restrictions; Mechanical Soft      Discharge Diet:   Regular  Dysphagia  Consistent Carbohydrate       Fluid Consistency:  Thin Liquids, No Restrictions    Pureed Options:  Mechanical Soft         Activity Instructions     Activity as  Tolerated           Additional Instructions for the Follow-ups that You Need to Schedule     Call MD for problems / concerns.   As directed        Follow-up Information     Eben Porter MD Follow up.    Specialty:  Family Medicine  Contact information:  [address]  [place] [id number] [phone]             Brendan Grewal MD .    Specialty:  Nephrology  Contact information:  [address]    [place] [address]  [phone]                  Lenard Hinojosa MD  04/10/20  13:36    Discharge time spent greater than 30 minutes.

## 2020-04-10 NOTE — PLAN OF CARE
Problem: Patient Care Overview  Goal: Plan of Care Review  Outcome: Ongoing (interventions implemented as appropriate)  Flowsheets  Taken 4/10/2020 0127  Progress: improving  Outcome Summary: Pt still waiting on DC to facility - possible [facility] now; Walked with patient/walker in room to door and back x 2; no s/s of distress  Taken 4/9/2020 2007  Plan of Care Reviewed With: patient

## 2020-04-12 NOTE — PROGRESS NOTES
Case Management Discharge Note      Final Note: pt dc'd to SNF at [facility] via [facility] 4/10/2020    Provided Post Acute Provider List?: Yes  Post Acute Provider List: Nursing Home  N/A Provider List Comment: Dara Edgar [phone]   Provided Post Acute Provider Quality & Resource List?: Yes  Delivered To: Support Person  Support Person: Dara Edgar [phone]     Destination - Selection Complete      Service Provider Request Status Selected Services Address Phone Number Fax Number    [facility] Selected Skilled Nursing [address] [place] [id number] [phone]      Durable Medical Equipment      No service has been selected for the patient.      Dialysis/Infusion      No service has been selected for the patient.      Home Medical Care      No service has been selected for the patient.      Therapy      No service has been selected for the patient.      Community Resources      No service has been selected for the patient.        Transportation Services  [facility]    Final Discharge Disposition Code: 03 - skilled nursing facility (SNF)

## 2020-04-13 ENCOUNTER — EPISODE CHANGES (OUTPATIENT)
Dept: CASE MANAGEMENT | Facility: OTHER | Age: 80
End: 2020-04-13

## 2020-04-28 ENCOUNTER — TELEPHONE (OUTPATIENT)
Dept: FAMILY MEDICINE CLINIC | Facility: CLINIC | Age: 80
End: 2020-04-28

## 2020-04-28 NOTE — TELEPHONE ENCOUNTER
Marc calling [facility].    Tested positive for Covid and was in the hospital and has had 2 negative tests before entering [facility].    Now she is going home with her daughter to [facility].    They would like orders for home health      Pt and ot.    OK?

## 2020-04-30 ENCOUNTER — EPISODE CHANGES (OUTPATIENT)
Dept: CASE MANAGEMENT | Facility: OTHER | Age: 80
End: 2020-04-30

## 2020-05-01 ENCOUNTER — PATIENT OUTREACH (OUTPATIENT)
Dept: CASE MANAGEMENT | Facility: OTHER | Age: 80
End: 2020-05-01

## 2020-05-01 NOTE — OUTREACH NOTE
Patient Outreach Note    Spoke briefly as reported home health nurse arrived for initial start of care.  Transitioned from SNF to her daughter's home in [place], [place], with [facility] securing home health orders from PCP, Dr. Porter. Agreeable to return call next week.     Vandana Arreaga RN  Ambulatory     5/1/2020, 10:40

## 2020-05-06 ENCOUNTER — TELEPHONE (OUTPATIENT)
Dept: FAMILY MEDICINE CLINIC | Facility: CLINIC | Age: 80
End: 2020-05-06

## 2020-05-06 NOTE — TELEPHONE ENCOUNTER
Home Health nurse linh is calling to advise that patient refused nurse evaluation. 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

## 2020-05-07 ENCOUNTER — EPISODE CHANGES (OUTPATIENT)
Dept: CASE MANAGEMENT | Facility: OTHER | Age: 80
End: 2020-05-07

## 2020-05-07 ENCOUNTER — TELEPHONE (OUTPATIENT)
Dept: FAMILY MEDICINE CLINIC | Facility: CLINIC | Age: 80
End: 2020-05-07

## 2020-05-07 NOTE — TELEPHONE ENCOUNTER
Home health nurse Brandi has called to report the following vitals post COVID-19:       Resting Hr 60bpm    After Walking 10 ft she became dizzy and weak 50bpm after activity.     Sitting at rest her hr dropped to 48bpm then flucuated to 52bpm

## 2020-05-07 NOTE — TELEPHONE ENCOUNTER
Please call her tomorrow - Brandi is PT and Imelda had declined nursing visits. Brandi thinks she will do them now and she called and asked home health to try again. Please call and reinforce the need.  I called and left her a VM as well.

## 2020-05-08 ENCOUNTER — TELEPHONE (OUTPATIENT)
Dept: CARDIOLOGY | Facility: CLINIC | Age: 80
End: 2020-05-08

## 2020-05-08 ENCOUNTER — TELEPHONE (OUTPATIENT)
Dept: FAMILY MEDICINE CLINIC | Facility: CLINIC | Age: 80
End: 2020-05-08

## 2020-05-08 ENCOUNTER — HOSPITAL ENCOUNTER (EMERGENCY)
Facility: HOSPITAL | Age: 80
Discharge: HOME | End: 2020-05-08
Attending: EMERGENCY MEDICINE | Admitting: EMERGENCY MEDICINE

## 2020-05-08 ENCOUNTER — APPOINTMENT (OUTPATIENT)
Dept: CARDIOLOGY | Facility: HOSPITAL | Age: 80
End: 2020-05-08

## 2020-05-08 VITALS
SYSTOLIC BLOOD PRESSURE: 167 MMHG | RESPIRATION RATE: 18 BRPM | WEIGHT: 192.8 LBS | TEMPERATURE: 95.6 F | HEART RATE: 52 BPM | DIASTOLIC BLOOD PRESSURE: 84 MMHG | OXYGEN SATURATION: 100 % | HEIGHT: 67 IN | BODY MASS INDEX: 30.26 KG/M2

## 2020-05-08 DIAGNOSIS — R42 INTERMITTENT LIGHTHEADEDNESS: ICD-10-CM

## 2020-05-08 DIAGNOSIS — N28.9 MILD RENAL INSUFFICIENCY: ICD-10-CM

## 2020-05-08 DIAGNOSIS — R00.1 SINUS BRADYCARDIA: Primary | ICD-10-CM

## 2020-05-08 LAB
ALBUMIN SERPL-MCNC: 4 G/DL (ref 3.5–5.2)
ALBUMIN/GLOB SERPL: 1.3 G/DL
ALP SERPL-CCNC: 68 U/L (ref 39–117)
ALT SERPL W P-5'-P-CCNC: 14 U/L (ref 1–33)
ANION GAP SERPL CALCULATED.3IONS-SCNC: 9.6 MMOL/L (ref 5–15)
AST SERPL-CCNC: 18 U/L (ref 1–32)
BASOPHILS # BLD AUTO: 0.03 10*3/MM3 (ref 0–0.2)
BASOPHILS NFR BLD AUTO: 0.5 % (ref 0–1.5)
BILIRUB SERPL-MCNC: 0.5 MG/DL (ref 0.2–1.2)
BUN BLD-MCNC: 27 MG/DL (ref 8–23)
BUN/CREAT SERPL: 19.7 (ref 7–25)
CALCIUM SPEC-SCNC: 9.2 MG/DL (ref 8.6–10.5)
CHLORIDE SERPL-SCNC: 100 MMOL/L (ref 98–107)
CO2 SERPL-SCNC: 28.4 MMOL/L (ref 22–29)
CREAT BLD-MCNC: 1.37 MG/DL (ref 0.57–1)
DEPRECATED RDW RBC AUTO: 45.3 FL (ref 37–54)
EOSINOPHIL # BLD AUTO: 0.18 10*3/MM3 (ref 0–0.4)
EOSINOPHIL NFR BLD AUTO: 3.2 % (ref 0.3–6.2)
ERYTHROCYTE [DISTWIDTH] IN BLOOD BY AUTOMATED COUNT: 14.2 % (ref 12.3–15.4)
GFR SERPL CREATININE-BSD FRML MDRD: 37 ML/MIN/1.73
GLOBULIN UR ELPH-MCNC: 3.2 GM/DL
GLUCOSE BLD-MCNC: 138 MG/DL (ref 65–99)
HCT VFR BLD AUTO: 37.3 % (ref 34–46.6)
HGB BLD-MCNC: 12.4 G/DL (ref 12–15.9)
HOLD SPECIMEN: NORMAL
IMM GRANULOCYTES # BLD AUTO: 0.01 10*3/MM3 (ref 0–0.05)
IMM GRANULOCYTES NFR BLD AUTO: 0.2 % (ref 0–0.5)
LYMPHOCYTES # BLD AUTO: 2.39 10*3/MM3 (ref 0.7–3.1)
LYMPHOCYTES NFR BLD AUTO: 42.6 % (ref 19.6–45.3)
MCH RBC QN AUTO: 29.5 PG (ref 26.6–33)
MCHC RBC AUTO-ENTMCNC: 33.2 G/DL (ref 31.5–35.7)
MCV RBC AUTO: 88.8 FL (ref 79–97)
MONOCYTES # BLD AUTO: 0.55 10*3/MM3 (ref 0.1–0.9)
MONOCYTES NFR BLD AUTO: 9.8 % (ref 5–12)
NEUTROPHILS # BLD AUTO: 2.45 10*3/MM3 (ref 1.7–7)
NEUTROPHILS NFR BLD AUTO: 43.7 % (ref 42.7–76)
NRBC BLD AUTO-RTO: 0 /100 WBC (ref 0–0.2)
PLATELET # BLD AUTO: 200 10*3/MM3 (ref 140–450)
PMV BLD AUTO: 9.2 FL (ref 6–12)
POTASSIUM BLD-SCNC: 4.1 MMOL/L (ref 3.5–5.2)
PROT SERPL-MCNC: 7.2 G/DL (ref 6–8.5)
RBC # BLD AUTO: 4.2 10*6/MM3 (ref 3.77–5.28)
SODIUM BLD-SCNC: 138 MMOL/L (ref 136–145)
TROPONIN T SERPL-MCNC: <0.01 NG/ML (ref 0–0.03)
WBC NRBC COR # BLD: 5.61 10*3/MM3 (ref 3.4–10.8)
WHOLE BLOOD HOLD SPECIMEN: NORMAL

## 2020-05-08 PROCEDURE — 99284 EMERGENCY DEPT VISIT MOD MDM: CPT

## 2020-05-08 PROCEDURE — 85025 COMPLETE CBC W/AUTO DIFF WBC: CPT | Performed by: EMERGENCY MEDICINE

## 2020-05-08 PROCEDURE — 84484 ASSAY OF TROPONIN QUANT: CPT | Performed by: EMERGENCY MEDICINE

## 2020-05-08 PROCEDURE — 0296T HC EXT ECG > 48HR TO 21 DAY RCRD W/CONECT INTL RCRD: CPT

## 2020-05-08 PROCEDURE — 80053 COMPREHEN METABOLIC PANEL: CPT | Performed by: EMERGENCY MEDICINE

## 2020-05-08 PROCEDURE — 93010 ELECTROCARDIOGRAM REPORT: CPT | Performed by: INTERNAL MEDICINE

## 2020-05-08 PROCEDURE — 93005 ELECTROCARDIOGRAM TRACING: CPT | Performed by: EMERGENCY MEDICINE

## 2020-05-08 RX ORDER — SODIUM CHLORIDE 0.9 % (FLUSH) 0.9 %
10 SYRINGE (ML) INJECTION AS NEEDED
Status: DISCONTINUED | OUTPATIENT
Start: 2020-05-08 | End: 2020-05-08 | Stop reason: HOSPADM

## 2020-05-08 RX ADMIN — SODIUM CHLORIDE 500 ML: 9 INJECTION, SOLUTION INTRAVENOUS at 18:26

## 2020-05-08 NOTE — ED TRIAGE NOTES
Pt reports she has felt lightheaded when she walks. Pt reports she has home health come to see her and she has had a low heart rate. Pt reports its been as low as 48.     Pt was wearing a mask upon arrival.  This RN was wearing PPE.

## 2020-05-08 NOTE — TELEPHONE ENCOUNTER
05/08/20  1:47 PM  Rekha Bear  1940  Home Phone [phone]   Mobile [phone]       Rekha Bear is a patient of Dr Pina.  Last seen 6/19/19 and was diagnosed with a wandering atrial pacemaker.  Dr Eben Porter called and is wanting this patient seen today for possible pacemaker need.    Since that time the patient has been hospitalized for COVID and received hydroxychloroquine.  She is now home after a rehab stay at [facility].  She is negative for COVID and she is having heart rates in the 40s and 50s.  She is dizzy and weak and Dr Porter feel the Hydroxychloroquine could have worsened her bradycardia.    Please let me know if you will see this patient in the [facility]?  Ping Damico RN  Triage nurse

## 2020-05-08 NOTE — ED PROVIDER NOTES
EMERGENCY DEPARTMENT ENCOUNTER    Room Number:  39/39  Date of encounter:  5/8/2020  PCP: Eben Porter MD  Historian: Patient     I used full protective equipment while examining this patient.  This includes face mask, gloves and protective eyewear.  I washed my hands before entering the room and immediately upon leaving the room.  Patient was wearing a surgical mask.      HPI:  Chief Complaint: Lightheaded  A complete HPI/ROS/PMH/PSH/SH/FH are unobtainable due to: None    Context: Rekha Bear is a 80 y.o. female who presents to the ED c/o getting lightheaded when she walks.  The symptoms are intermittent and have been going on for several days.  Patient reports that while she is walking she will feel lightheaded and off balance.  Her heart rate during these episodes has been in the upper 40s to low 50s.  Symptoms resolve after she sits down.  She denies feeling faint or having any chest pain, shortness of breath, nausea, vomiting, headache, palpitations, numbness/tingling/weakness, urinary complaints, or sweating.  Patient is on Toprol 25 mg daily but has not taken this for the past 2 days.  Patient was hospitalized here in March for COVID-19 pneumonia and respiratory failure.  Patient states her appetite has been normal.  She denies any recent changes in her medications.  PAST MEDICAL HISTORY  Active Ambulatory Problems     Diagnosis Date Noted   • Acute bronchitis 05/18/2016   • Chronic pain of right knee 05/18/2016   • Chest pain 05/18/2016   • Depression 05/18/2016   • Type 2 diabetes mellitus with stage 3 chronic kidney disease, without long-term current use of insulin (CMS/[facility]) 05/18/2016   • Hyperlipidemia 05/18/2016   • Hypertension 05/18/2016   • Hypothyroidism 05/18/2016   • Renal insufficiency 05/18/2016   • Urinary incontinence 05/18/2016   • Urinary tract infection 05/18/2016   • Cobalamin deficiency 05/18/2016   • Cardiac arrhythmia 05/18/2016   • Hyperlipemia 05/18/2016   • Allergic  reaction 04/04/2018   • Hx of food anaphylaxis 04/04/2018   • Herpes simplex 04/04/2018   • Osteoarthritis 06/13/2018   • 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/19/2019   • Community acquired pneumonia of left lower lobe of lung (CMS/HCC) 03/21/2020   • Diabetes mellitus, type II (CMS/[facility]) 03/21/2020   • Hyponatremia 03/21/2020   • Pneumonia due to COVID-19 virus 03/24/2020     Resolved Ambulatory Problems     Diagnosis Date Noted   • Dehydration 03/21/2020     Past Medical History:   Diagnosis Date   • Anxiety    • Chilblains    • Fatty liver    • GERD (gastroesophageal reflux disease)    • Health care maintenance    • History of mammogram 2009   • Hypothyroid    • Incontinence    • Pain of esophagus    • Peptic ulcer disease    • Prediabetes    • Raynaud's disease    • Renal disease    • UTI (urinary tract infection)    • Vitamin B 12 deficiency          PAST SURGICAL HISTORY  Past Surgical History:   Procedure Laterality Date   • CATARACT EXTRACTION     • CHOLECYSTECTOMY     • COLONOSCOPY  2009   • COLONOSCOPY N/A 12/20/2016    Procedure: COLONOSCOPY with hot snare polypectomy;  Surgeon: George Pabon MD;  Location: [facility] ENDOSCOPY;  Service:    • DENTAL PROCEDURE     • FOOT SURGERY     • TONSILLECTOMY     • TOTAL HIP ARTHROPLASTY Bilateral     OA  Marvin THR, LT TKR - hydrocodone prescibed by Dr. Almaguer   • TOTAL KNEE ARTHROPLASTY Left     OA  Marvin THR, LT TKR - hydrocodone prescibed by Dr. Almaguer         FAMILY HISTORY  Family History   Problem Relation Age of Onset   • Hypertension Mother    • Diabetes type II Mother    • Hypertension Father    • Coronary artery disease Father    • Diabetes type II Father    • Colon cancer Brother    • Stroke Son    • Breast cancer Maternal Aunt    • Hypertension Other    • Other Other         Lipids  Thyroid         SOCIAL HISTORY  Social History     Socioeconomic History   • Marital status:      Spouse name: Not on file   • Number of children: 5   •  Years of education: Not on file   • Highest education level: Not on file   Occupational History   • Occupation: retired nurse   Tobacco Use   • Smoking status: Former Smoker     Last attempt to quit: 1975     Years since quittin.3   • Smokeless tobacco: Never Used   • Tobacco comment: 40 yrs quit   Substance and Sexual Activity   • Alcohol use: No   • Drug use: No   • Sexual activity: Never   Social History Narrative    Lives in own home, by herself. She has good family support         ALLERGIES  Iodine       REVIEW OF SYSTEMS  Review of Systems      All systems have been reviewed and are negative except as as discussed in the HPI    PHYSICAL EXAM    I have reviewed the triage vital signs and nursing notes.    ED Triage Vitals [20 1653]   Temp Heart Rate Resp BP SpO2   95.6 °F (35.3 °C) 63 18 -- 97 %      Temp src Heart Rate Source Patient Position BP Location FiO2 (%)   Tympanic Monitor -- -- --       Physical Exam  GENERAL: Awake and alert in no acute distress  HENT: NCAT, nares patent, moist mucous membranes, TMs are normal bilaterally  NECK: supple, no lymphadenopathy, no carotid bruit  EYES: no scleral icterus, extraocular muscles intact, no nystagmus  CV: regular rhythm, regular rate, no murmur  RESPIRATORY: normal effort, clear to auscultation bilaterally  ABDOMEN: soft, nontender, nondistended  MUSCULOSKELETAL: no deformity, normal range of motion, no calf tenderness, no pedal edema  NEURO: Cranial nerves II through XII are intact bilaterally.  Normal finger-to-nose and heel shin testing bilaterally.  Normal strength and sensation in bilateral upper and lower extremities.  Speech and mentation are unremarkable.  No aphasia.  No facial droop.  SKIN: warm, dry, no rash  PSYCH: Normal mood and affect      LAB RESULTS  Recent Results (from the past 24 hour(s))   Comprehensive Metabolic Panel    Collection Time: 20  5:26 PM   Result Value Ref Range    Glucose 138 (H) 65 - 99 mg/dL    BUN 27 (H) 8  - 23 mg/dL    Creatinine 1.37 (H) 0.57 - 1.00 mg/dL    Sodium 138 136 - 145 mmol/L    Potassium 4.1 3.5 - 5.2 mmol/L    Chloride 100 98 - 107 mmol/L    CO2 28.4 22.0 - 29.0 mmol/L    Calcium 9.2 8.6 - 10.5 mg/dL    Total Protein 7.2 6.0 - 8.5 g/dL    Albumin 4.00 3.50 - 5.20 g/dL    ALT (SGPT) 14 1 - 33 U/L    AST (SGOT) 18 1 - 32 U/L    Alkaline Phosphatase 68 39 - 117 U/L    Total Bilirubin 0.5 0.2 - 1.2 mg/dL    eGFR Non African Amer 37 (L) >60 mL/min/1.73    Globulin 3.2 gm/dL    A/G Ratio 1.3 g/dL    BUN/Creatinine Ratio 19.7 7.0 - 25.0    Anion Gap 9.6 5.0 - 15.0 mmol/L   Troponin    Collection Time: 05/08/20  5:26 PM   Result Value Ref Range    Troponin T <0.010 0.000 - 0.030 ng/mL   CBC Auto Differential    Collection Time: 05/08/20  5:26 PM   Result Value Ref Range    WBC 5.61 3.40 - 10.80 10*3/mm3    RBC 4.20 3.77 - 5.28 10*6/mm3    Hemoglobin 12.4 12.0 - 15.9 g/dL    Hematocrit 37.3 34.0 - 46.6 %    MCV 88.8 79.0 - 97.0 fL    MCH 29.5 26.6 - 33.0 pg    MCHC 33.2 31.5 - 35.7 g/dL    RDW 14.2 12.3 - 15.4 %    RDW-SD 45.3 37.0 - 54.0 fl    MPV 9.2 6.0 - 12.0 fL    Platelets 200 140 - 450 10*3/mm3    Neutrophil % 43.7 42.7 - 76.0 %    Lymphocyte % 42.6 19.6 - 45.3 %    Monocyte % 9.8 5.0 - 12.0 %    Eosinophil % 3.2 0.3 - 6.2 %    Basophil % 0.5 0.0 - 1.5 %    Immature Grans % 0.2 0.0 - 0.5 %    Neutrophils, Absolute 2.45 1.70 - 7.00 10*3/mm3    Lymphocytes, Absolute 2.39 0.70 - 3.10 10*3/mm3    Monocytes, Absolute 0.55 0.10 - 0.90 10*3/mm3    Eosinophils, Absolute 0.18 0.00 - 0.40 10*3/mm3    Basophils, Absolute 0.03 0.00 - 0.20 10*3/mm3    Immature Grans, Absolute 0.01 0.00 - 0.05 10*3/mm3    nRBC 0.0 0.0 - 0.2 /100 WBC   Light Blue Top    Collection Time: 05/08/20  5:31 PM   Result Value Ref Range    Extra Tube hold for add-on    Gold Top - SST    Collection Time: 05/08/20  5:31 PM   Result Value Ref Range    Extra Tube Hold for add-ons.        Ordered the above labs and independently reviewed the  results.      RADIOLOGY  No Radiology Exams Resulted Within Past 24 Hours    I ordered the above noted radiological studies. Reviewed by me and discussed with radiologist.  See dictation for official radiology interpretation.      PROCEDURES  Procedures      MEDICATIONS GIVEN IN ER    Medications   sodium chloride 0.9 % flush 10 mL (has no administration in time range)   sodium chloride 0.9 % bolus 500 mL (0 mL Intravenous Stopped 5/8/20 1925)         PROGRESS, DATA ANALYSIS, CONSULTS, AND MEDICAL DECISION MAKING    All labs have been independently reviewed by me.  All radiology studies have been reviewed by me and discussed with radiologist dictating the report.   EKG's independently viewed and interpreted by me.  I have reviewed the nurse's notes, vital signs, past medical history, and medication list.  Discussion below represents my analysis of pertinent findings related to patient's condition, differential diagnosis, treatment plan and final disposition.      ED Course as of May 08 2119   Fri May 08, 2020   1709 Old records reviewed.  Patient was admitted here 3/21 through 4/10/2020 for COVID-19 pneumonia.  Patient was intubated at that time.    [WH]   1755 Orthostatics reviewed.  Patient's systolic blood pressure decreased upon standing.  Heart rate slightly increased.  Patient will be given IV fluids.    [WH]   1807 EKG          EKG time: 1752  Rhythm/Rate: Sinus bradycardia rate 55  P waves and AZ: Normal, first-degree AV block  QRS, axis: Right axis deviation, IVCD  ST and T waves: Lateral T wave changes    Interpreted Contemporaneously by me, independently viewed  EKG is changed compared to prior EKG done 3/21/2020.  Rate was 84 and lateral T wave changes were less pronounced at that time.      [WH]   1809 1.03 one month ago   Creatinine(!): 1.37 [WH]   1926 Test results discussed with the patient.  ZIO monitor has been placed.  I advised her to stop taking Toprol and to follow-up with her primary care  doctor to have her blood pressure checked.  I also recommended that she drink an adequate amount of fluid daily as her creatinine is slightly elevated from baseline.  She was informed that she will need to have her creatinine level rechecked in the next 1 to 2 weeks.    [WH]   2116 Patient was mildly bradycardic while in the ER.  Labs are unremarkable except for mildly elevated creatinine.  She denied having any presyncopal symptoms or chest pain.  Patient was able to ambulate without difficulty.  Patient is on Toprol although she has not taken it for the past 2 days.  Suspect patient's symptoms are due to combination of dehydration, Toprol, and deconditioning secondary to recent hospitalization.  She will be discharged with a ZIO patch.    [WH]      ED Course User Index  [WH] Fidencio Woodard MD       AS OF 21:19 VITALS:    BP - 167/84  HR - 52  TEMP - 95.6 °F (35.3 °C) (Tympanic)  O2 SATS - 100%      DIAGNOSIS  Final diagnoses:   Sinus bradycardia   Intermittent lightheadedness   Mild renal insufficiency         DISPOSITION  Discharge    DISCHARGE    Patient discharged in stable condition.    Reviewed implications of results, diagnosis, meds, responsibility to follow up, warning signs and symptoms of possible worsening, potential complications and reasons to return to ER, including worsening or persistent symptoms, fainting, chest pain, shortness of breath, or other concern..    Patient/Family voiced understanding of above instructions.    Discussed plan for discharge, as there is no emergent indication for admission. Patient referred to primary care provider for BP management due to today's BP. Pt/family is agreeable and understands need for follow up and repeat testing.  Pt is aware that discharge does not mean that nothing is wrong but it indicates no emergency is present that requires admission and they must continue care with follow-up as given below or physician of their choice.     FOLLOW-UP  [place]  Eben Hliton MD  [address] [facility] [id number] [phone]    Call in 3 days           Medication List      No changes were made to your prescriptions during this visit.                Fidencio Woodard MD  05/08/20 [id number]

## 2020-05-08 NOTE — TELEPHONE ENCOUNTER
I spoke with Dr. Hernández, Imelda's daughter, and she is going to talk to her mother and connect her to the cardiology office. I have advised  that she should encourage her to go to the ER for evaluation. She is a great risk for falls.

## 2020-05-08 NOTE — TELEPHONE ENCOUNTER
Called dtr, joao Hernández and left vm with recommendations to take her mom to the ER and to call me back to assure they received my message.  Thanks  Ping Damico RN  Triage nurse

## 2020-05-08 NOTE — TELEPHONE ENCOUNTER
Patient called back and I passed on that she needed to go to the ER and our concerns about her low heart rate.  She is waiting on her daughter to come home so she can bring her to the ER.  She is agreeable to go.  Thanks  Ping Damico RN  Triage nurse

## 2020-05-08 NOTE — ED NOTES
Pt states that a couple days ago during PT, she became lightheaded and dizzy. Pt denies loc. Pt states that it has happened multiple times. Onset only when walking. Pt reports checking heart rate during episodes and it was low. Symptoms subside with rest. No facial droop. Speech clear. No drift     Arabella Patel, RN  05/08/20 [id number]

## 2020-05-08 NOTE — DISCHARGE INSTRUCTIONS
Stop taking metoprolol/Toprol.  Monitor your blood pressure regularly.  Wear heart monitor as instructed.  Make sure you are drinking an adequate amount of fluid every day.  Return to the emergency department for worsening symptoms, fainting, chest pain, shortness of breath, or other concern.  Follow-up with your primary care doctor next week.  Your creatinine level was mildly elevated today.  You will need to have this rechecked in the next 1 to 2 weeks.

## 2020-05-08 NOTE — TELEPHONE ENCOUNTER
Discussed case with Dr Fuentes/RAMONA.  She thinks, due to the complicated history and likely need for pacemaker she need to go the ER.    i have tried calling home number and was unable to leave a message.  Called the cell phone and left a VM I will continue to try and reach the patient   Ping Damico RN  Triage nurse

## 2020-05-11 ENCOUNTER — EPISODE CHANGES (OUTPATIENT)
Dept: CASE MANAGEMENT | Facility: OTHER | Age: 80
End: 2020-05-11

## 2020-05-12 ENCOUNTER — TELEPHONE (OUTPATIENT)
Dept: CARDIOLOGY | Facility: CLINIC | Age: 80
End: 2020-05-12

## 2020-05-12 NOTE — TELEPHONE ENCOUNTER
Patient called today asking about the next step after heart monitor.  I explained to her that would depend on the outcome of those results, and that we would call to discuss once available.

## 2020-06-09 PROCEDURE — 0298T HOLTER MONITOR - 72 HOUR UP TO 21 DAY: CPT | Performed by: INTERNAL MEDICINE

## 2020-07-16 ENCOUNTER — TELEMEDICINE (OUTPATIENT)
Dept: FAMILY MEDICINE CLINIC | Facility: CLINIC | Age: 80
End: 2020-07-16

## 2020-07-16 VITALS — BODY MASS INDEX: 29.6 KG/M2 | WEIGHT: 189 LBS | SYSTOLIC BLOOD PRESSURE: 136 MMHG | DIASTOLIC BLOOD PRESSURE: 82 MMHG

## 2020-07-16 DIAGNOSIS — M25.561 CHRONIC PAIN OF RIGHT KNEE: ICD-10-CM

## 2020-07-16 DIAGNOSIS — E11.22 TYPE 2 DIABETES MELLITUS WITH STAGE 3 CHRONIC KIDNEY DISEASE, WITHOUT LONG-TERM CURRENT USE OF INSULIN (HCC): ICD-10-CM

## 2020-07-16 DIAGNOSIS — M15.8 OTHER OSTEOARTHRITIS INVOLVING MULTIPLE JOINTS: ICD-10-CM

## 2020-07-16 DIAGNOSIS — F34.1 PRIMARY DYSTHYMIA: ICD-10-CM

## 2020-07-16 DIAGNOSIS — G89.29 CHRONIC PAIN OF RIGHT KNEE: ICD-10-CM

## 2020-07-16 DIAGNOSIS — N18.30 TYPE 2 DIABETES MELLITUS WITH STAGE 3 CHRONIC KIDNEY DISEASE, WITHOUT LONG-TERM CURRENT USE OF INSULIN (HCC): ICD-10-CM

## 2020-07-16 DIAGNOSIS — J12.82 PNEUMONIA DUE TO COVID-19 VIRUS: Primary | ICD-10-CM

## 2020-07-16 DIAGNOSIS — E03.9 ACQUIRED HYPOTHYROIDISM: ICD-10-CM

## 2020-07-16 DIAGNOSIS — E78.49 OTHER HYPERLIPIDEMIA: ICD-10-CM

## 2020-07-16 DIAGNOSIS — N39.41 URGE INCONTINENCE OF URINE: ICD-10-CM

## 2020-07-16 DIAGNOSIS — I10 ESSENTIAL HYPERTENSION: ICD-10-CM

## 2020-07-16 DIAGNOSIS — U07.1 PNEUMONIA DUE TO COVID-19 VIRUS: Primary | ICD-10-CM

## 2020-07-16 PROCEDURE — 99214 OFFICE O/P EST MOD 30 MIN: CPT | Performed by: FAMILY MEDICINE

## 2020-07-16 RX ORDER — TRAMADOL HYDROCHLORIDE 50 MG/1
50 TABLET ORAL EVERY 6 HOURS PRN
Qty: 90 TABLET | Refills: 2 | Status: SHIPPED | OUTPATIENT
Start: 2020-07-16 | End: 2021-01-29 | Stop reason: SDUPTHER

## 2020-07-16 RX ORDER — SOLIFENACIN SUCCINATE 10 MG/1
10 TABLET, FILM COATED ORAL DAILY
COMMUNITY
End: 2020-07-16

## 2020-07-16 RX ORDER — VENLAFAXINE HYDROCHLORIDE 150 MG/1
150 TABLET, EXTENDED RELEASE ORAL
Qty: 90 EACH | Refills: 1 | Status: SHIPPED | OUTPATIENT
Start: 2020-07-16 | End: 2021-04-29

## 2020-07-16 RX ORDER — TRAMADOL HYDROCHLORIDE 50 MG/1
50 TABLET ORAL EVERY 6 HOURS PRN
COMMUNITY
End: 2020-07-16 | Stop reason: SDUPTHER

## 2020-07-16 RX ORDER — GLIMEPIRIDE 2 MG/1
4 TABLET ORAL 2 TIMES DAILY
Qty: 90 TABLET | Refills: 2 | Status: SHIPPED | OUTPATIENT
Start: 2020-07-16 | End: 2020-09-04

## 2020-07-16 RX ORDER — GLIMEPIRIDE 2 MG/1
2 TABLET ORAL 2 TIMES DAILY
COMMUNITY
End: 2020-07-16 | Stop reason: SDUPTHER

## 2020-07-16 RX ORDER — BUPROPION HYDROCHLORIDE 300 MG/1
300 TABLET ORAL EVERY MORNING
Qty: 90 TABLET | Refills: 1 | Status: SHIPPED | OUTPATIENT
Start: 2020-07-16 | End: 2021-01-27

## 2020-07-16 RX ORDER — TOLTERODINE 4 MG/1
4 CAPSULE, EXTENDED RELEASE ORAL DAILY
Qty: 90 CAPSULE | Refills: 1 | Status: SHIPPED | OUTPATIENT
Start: 2020-07-16 | End: 2021-05-14

## 2020-07-16 RX ORDER — LEVOTHYROXINE SODIUM 0.1 MG/1
100 TABLET ORAL DAILY
Qty: 90 TABLET | Refills: 1 | Status: SHIPPED | OUTPATIENT
Start: 2020-07-16 | End: 2021-01-27

## 2020-07-16 RX ORDER — GABAPENTIN 600 MG/1
600 TABLET ORAL 3 TIMES DAILY
COMMUNITY
End: 2020-07-16 | Stop reason: SDUPTHER

## 2020-07-16 RX ORDER — ROSUVASTATIN CALCIUM 20 MG/1
20 TABLET, COATED ORAL DAILY
Qty: 90 TABLET | Refills: 1 | Status: SHIPPED | OUTPATIENT
Start: 2020-07-16 | End: 2021-01-27

## 2020-07-16 RX ORDER — VENLAFAXINE HYDROCHLORIDE 150 MG/1
TABLET, EXTENDED RELEASE ORAL
COMMUNITY
Start: 2017-03-12 | End: 2020-07-16 | Stop reason: SDUPTHER

## 2020-07-16 RX ORDER — GABAPENTIN 600 MG/1
600 TABLET ORAL 3 TIMES DAILY
Qty: 90 TABLET | Refills: 3 | Status: SHIPPED | OUTPATIENT
Start: 2020-07-16 | End: 2021-10-14 | Stop reason: SDUPTHER

## 2020-07-16 RX ORDER — ERGOCALCIFEROL (VITAMIN D2) 10 MCG
400 TABLET ORAL 2 TIMES DAILY
COMMUNITY

## 2020-07-16 NOTE — PATIENT INSTRUCTIONS
I have reviewed her recent history refill medications as needed.  I have asked her to come back and see me in 3 months.

## 2020-07-16 NOTE — PROGRESS NOTES
Subjective   Rekha Bear is a 80 y.o. female.   Hypertension (Hospital follow up)    History of Present Illness   Imelda is doing a video visit for follow up after having Covid, being in the hospital and going to rehab.  She has been home almost 2 mos.  She states she still has not gotten her strength back.  She can manage her ADL's but not much else.  She is having some Lt shoulder pain that radiates to her Lt shoulder blade.  Imelda is also c/o her hair falling out.  B/P medication was stopped after she got lightheadedness. She is doing well off medication.   She has hypothyroidism and is on medication to treat.  Feels she is doing well with this.  And is asking for refills  She has chronic pain from her back and arthralgias she has been taking tramadol occasionally and she takes gabapentin 3 times daily to help manage her back discomfort.  She feels like these medications are working well for her and she would like to continue.  She is asking for refills.  She has chronic dysthymia and has found life recently even a little more difficult but she has been taking venlafaxine 150 mg and bupropion  mg once a day and believes it really helps her get through the day.  She would like to continue taking these medications and feels they help her with her  activities of daily living and she would like to have refills.  She has chronic urge incontinence and had been taking Vesicare but it is too expensive.  She is asking me to prescribe generic Detrol for her instead.  She is also has chronic type 2 diabetes and has been well controlled with diet and she has been taking Amaryl 4 mg once a day.  This was not refilled when she did was discharged in the hospital and she is now at a medication and she is requesting a refill.  She is on a statin but is no longer taking anything for blood pressure because her blood pressure dropped too low when she was in the hospital.  She was diagnosed with pneumonia and due to COVID-19  while in the hospital and she needs follow-up with a pulmonologist.  She would like to see the pulmonologist she saw in the hospital.  The following portions of the patient's history were reviewed and updated as appropriate: allergies, current medications, past family history, past medical history, past social history, past surgical history and problem list.    Review of Systems   Constitutional: Positive for fatigue. Negative for fever.   Respiratory: Positive for shortness of breath. Negative for cough.    Genitourinary: Positive for urinary incontinence.   Musculoskeletal: Positive for arthralgias and back pain.        Lt Shoulder pain   Neurological: Negative.    Psychiatric/Behavioral: Negative.        Objective   Physical Exam   Constitutional: She appears well-developed and well-nourished.   HENT:   Head: Normocephalic and atraumatic.   Eyes: Pupils are equal, round, and reactive to light. Conjunctivae and EOM are normal.   Neck: Neck normal appearance.  Pulmonary/Chest: Effort normal.  No respiratory distress.  Abdominal: Abdomen appears normal.   Musculoskeletal: Normal range of motion.   Neurological: She is alert.   Skin: No rash noted.   Psychiatric: She has a normal mood and affect.   Vitals reviewed.        Assessment/Plan   Problem List Items Addressed This Visit        Cardiovascular and Mediastinum    Hyperlipidemia  She has been well controlled on current medication will refill as requested.      Relevant Medications    rosuvastatin (CRESTOR) 20 MG tablet    Hypertension  She is off all medication now and her blood pressure is in normal range       Respiratory    Pneumonia due to COVID-19 virus - Primary  He has recovered well but is still a little tired and she is not complaining of shortness of breath.  I have referred her to pulmonology for further evaluation    Relevant Orders    Ambulatory Referral to Pulmonology       Endocrine    Type 2 diabetes mellitus with stage 3 chronic kidney disease,  without long-term current use of insulin (CMS/[facility])  He has done well for several years on Amaryl I have refilled that medication for her and she will follow-up with me in the next couple of months.    Relevant Medications    glimepiride (AMARYL) 2 MG tablet    Hypothyroidism  Has been well controlled on current medication.  She needs to come in to get labs.    Relevant Medications    levothyroxine (SYNTHROID, LEVOTHROID) 100 MCG tablet       Musculoskeletal and Integument    Chronic pain of right knee    Relevant Medications    traMADol (ULTRAM) 50 MG tablet    Osteoarthritis  The patient has read and signed the [facility] Controlled Substance Contract.  I will continue to see patient for regular follow up appointments.  She are well controlled on current medication.  SAUL has been reviewed by me and is updated every 3 months. The patient is aware of the potential for addiction and dependence.        Overview            Relevant Medications    gabapentin (NEURONTIN) 600 MG tablet         Genitourinary    Urinary incontinence   I have DC'd Vesicare and started her on generic Detrol.    Relevant Medications    tolterodine LA (DETROL LA) 4 MG 24 hr capsule       Other    Primary dysthymia  She is well managed on current meds and reports no signs or symptoms of self harm, suicidal ideation. This medication helps with daily life and relationships. Will refill as needed and advised 6 month follow up.    Relevant Medications    buPROPion XL (WELLBUTRIN XL) 300 MG 24 hr tablet    venlafaxine (EFFEXOR) 150 MG tablet sustained-release 24 hour 24 hr tablet               Return in about 3 months (around 10/16/2020).

## 2020-07-24 ENCOUNTER — OFFICE VISIT (OUTPATIENT)
Dept: CARDIOLOGY | Facility: CLINIC | Age: 80
End: 2020-07-24

## 2020-07-24 VITALS
SYSTOLIC BLOOD PRESSURE: 128 MMHG | HEIGHT: 67 IN | HEART RATE: 71 BPM | BODY MASS INDEX: 29.41 KG/M2 | DIASTOLIC BLOOD PRESSURE: 80 MMHG | WEIGHT: 187.4 LBS

## 2020-07-24 DIAGNOSIS — E78.5 HYPERLIPIDEMIA, UNSPECIFIED HYPERLIPIDEMIA TYPE: ICD-10-CM

## 2020-07-24 DIAGNOSIS — E11.649 TYPE 2 DIABETES MELLITUS WITH HYPOGLYCEMIA WITHOUT COMA, UNSPECIFIED WHETHER LONG TERM INSULIN USE (HCC): ICD-10-CM

## 2020-07-24 DIAGNOSIS — I49.8 WANDERING ATRIAL PACEMAKER BY ELECTROCARDIOGRAM: Primary | ICD-10-CM

## 2020-07-24 DIAGNOSIS — I10 ESSENTIAL HYPERTENSION: ICD-10-CM

## 2020-07-24 PROCEDURE — 93000 ELECTROCARDIOGRAM COMPLETE: CPT | Performed by: INTERNAL MEDICINE

## 2020-07-24 PROCEDURE — 99214 OFFICE O/P EST MOD 30 MIN: CPT | Performed by: INTERNAL MEDICINE

## 2020-07-24 NOTE — PROGRESS NOTES
Subjective:     Encounter Date:07/24/2020      Patient ID: Rekha Bear is a 80 y.o. female.    Chief Complaint:  History of Present Illness    This is a 80-year-old female with a history of GERD, hypertension, dyslipidemia, diabetes mellitus type 2, and intermittent wandering atrial pacemaker, who presents for follow up.        She presents today for routine follow-up.  She is continues to slowly recover from her hospitalization and COVID-19.  She continues to have fatigue and easy fatigability with activity.  She otherwise denies any significant shortness of breath, chest pain, palpitations, orthopnea, near-syncope or syncope, or lower extremity edema.    Prior History:  I saw the patient initially in 10/2015 when she presented for an evaluation of chest pain.  She was also complaining of chronic stable dyspnea on exertion at that time.  I set her up for a PET stress study which was negative for ischemia.  A few months later I was asked to clear the patient for a gynecologic surgery, which I went ahead and did.  However, prior to that the patient called to say that she had received a copy of her stress test and it was noted on the baseline EKG that she was in atrial fibrillation.  I had the patient come in at that time and an EKG done in the office showed a wandering atrial pacemaker with a heart rate in the low 40s.  I asked her to decrease her metoprolol succinate and had her followup.  I also had her undergo a 24-hour Holter monitor which was performed in 05/2016 which revealed sinus rhythm with rare PACs and PVCs but no atrial arrhythmias including atrial fibrillation.   At her last follow-up in 11/2016, on a lower dose of metoprolol succinate her rhythm appeared to be in sinus with a rate in the 50's.      I saw her last in 6/2018 again for preoperative evaluation.  She continued to complain of dyspnea and fatigue on exertion that had worsened.    She did admit that some of this could be due to  increasing issues with her knee for which she is planning on having surgery performed by Dr. Saji Kuo.  At that office visit I felt that her symptoms were likely due to deconditioning and limited mobility related to her right knee.  I recommended proceeding with an echocardiogram which was performed on 6/12/2018 and showed normal left ventricular systolic function and wall motion with an EF 66%, normal diastolic function, and no significant valvular disease.  I cleared the patient for surgery at that time.     Since she was last seen in the office she was admitted from 3/22/2020 to 4/10/2020 with COVID-19 pneumonia and acute hypoxic respiratory failure.  She required intubation and was treated with hydroxychloroquine.  Following her discharge she was a transfer to a skilled nursing facility where she underwent rehab..  In 5/2020 she began having issues with low heart rates in the 40s to 50s associated with dizziness and weakness.  She ended up going to the emergency room and her metoprolol succinate was discontinued.  She had a ZIO monitor placed around that time which was unremarkable except for rare episodes of nonsustained trigger tachycardia.  When I called her to discuss the monitor results she reported that she was feeling better off of the metoprolol.    Review of Systems   Constitution: Positive for malaise/fatigue.   HENT: Negative for hearing loss, hoarse voice, nosebleeds and sore throat.    Eyes: Negative for pain.   Cardiovascular: Positive for dyspnea on exertion. Negative for chest pain, claudication, cyanosis, irregular heartbeat, leg swelling, near-syncope, orthopnea, palpitations, paroxysmal nocturnal dyspnea and syncope.   Respiratory: Negative for shortness of breath and snoring.    Endocrine: Negative for cold intolerance, heat intolerance, polydipsia, polyphagia and polyuria.   Skin: Negative for itching and rash.   Musculoskeletal: Positive for joint pain and myalgias. Negative for  arthritis, falls, joint swelling, muscle cramps and muscle weakness.   Gastrointestinal: Negative for constipation, diarrhea, dysphagia, heartburn, hematemesis, hematochezia, melena, nausea and vomiting.   Genitourinary: Negative for frequency, hematuria and hesitancy.   Neurological: Negative for excessive daytime sleepiness, dizziness, headaches, light-headedness, numbness and weakness.   Psychiatric/Behavioral: Positive for depression. The patient is nervous/anxious.          Current Outpatient Medications:   •  Acetylcysteine (Nac 600) 600 MG capsule, Take 2 capsules by mouth Daily., Disp: , Rfl:   •  buPROPion XL (WELLBUTRIN XL) 300 MG 24 hr tablet, Take 1 tablet by mouth Every Morning., Disp: 90 tablet, Rfl: 1  •  dextromethorphan polistirex ER (DELSYM) 30 MG/5ML Suspension Extended Release oral suspension, Take 30 mg by mouth 2 (Two) Times a Day As Needed (cough)., Disp: 280 mL, Rfl:   •  gabapentin (NEURONTIN) 600 MG tablet, Take 1 tablet by mouth 3 (Three) Times a Day., Disp: 90 tablet, Rfl: 3  •  glimepiride (AMARYL) 2 MG tablet, Take 2 tablets by mouth 2 (two) times a day., Disp: 90 tablet, Rfl: 2  •  guaiFENesin (ROBITUSSIN) 100 MG/5ML solution oral solution, Take 10 mL by mouth Every 4 (Four) Hours As Needed (cough)., Disp: , Rfl:   •  levothyroxine (SYNTHROID, LEVOTHROID) 100 MCG tablet, Take 1 tablet by mouth Daily., Disp: 90 tablet, Rfl: 1  •  rosuvastatin (CRESTOR) 20 MG tablet, Take 1 tablet by mouth Daily., Disp: 90 tablet, Rfl: 1  •  tolterodine LA (DETROL LA) 4 MG 24 hr capsule, Take 1 capsule by mouth Daily., Disp: 90 capsule, Rfl: 1  •  traMADol (ULTRAM) 50 MG tablet, Take 1 tablet by mouth Every 6 (Six) Hours As Needed for Moderate Pain ., Disp: 90 tablet, Rfl: 2  •  venlafaxine (EFFEXOR) 150 MG tablet sustained-release 24 hour 24 hr tablet, Take 1 tablet by mouth Daily With Breakfast., Disp: 90 each, Rfl: 1  •  Vitamin D, Cholecalciferol, (CHOLECALCIFEROL) 10 MCG (400 UNIT) tablet, Take 400  "Units by mouth 2 (two) times a day., Disp: , Rfl:     Past Medical History:   Diagnosis Date   • Acute bronchitis    • Anxiety    • Chest pain    • Chilblains     \"Chilblian's\"   • Depression    • Diabetes mellitus, type II (CMS/HCC)    • Fatty liver    • GERD (gastroesophageal reflux disease)    • Health care maintenance    • History of mammogram 2009   • Hyperlipidemia    • Hypertension    • Hypothyroid    • Incontinence    • Intermittent lightheadedness    • Osteoarthritis     OA  Marvin THR, LT TKR - hydrocodone prescibed by Dr. Almaguer   • Pain of esophagus     \" nervous esophagus\" - she takes the occasional alprazolam   • Peptic ulcer disease    • Pneumonia due to COVID-19 virus 03/2020    now tested negative   • Prediabetes    • Raynaud's disease     \"Raynauds\"   • Renal disease     followed by Dr. Grewal   • Sinus bradycardia    • UTI (urinary tract infection)    • Vitamin B 12 deficiency    • 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        Past Surgical History:   Procedure Laterality Date   • CATARACT EXTRACTION     • CHOLECYSTECTOMY     • COLONOSCOPY  2009   • COLONOSCOPY N/A 12/20/2016    Procedure: COLONOSCOPY with hot snare polypectomy;  Surgeon: George Pabon MD;  Location: [facility] ENDOSCOPY;  Service:    • DENTAL PROCEDURE     • FOOT SURGERY     • TONSILLECTOMY     • TOTAL HIP ARTHROPLASTY Bilateral     OA  Marvin THR, LT TKR - hydrocodone prescibed by Dr. Almaguer   • TOTAL KNEE ARTHROPLASTY Left     OA  Marvin THR, LT TKR - hydrocodone prescibed by Dr. Almaguer       Family History   Problem Relation Age of Onset   • Hypertension Mother    • Diabetes type II Mother    • Hypertension Father    • Coronary artery disease Father    • Diabetes type II Father    • Colon cancer Brother    • Stroke Son    • Breast cancer Maternal Aunt    • Hypertension Other    • Other Other         Lipids  Thyroid       Social History     Tobacco Use   • Smoking status: Former Smoker     Packs/day: 0.50     Years: 14.00     Pack " "years: 7.00     Last attempt to quit:      Years since quittin.5   • Smokeless tobacco: Never Used   • Tobacco comment: 40 yrs quit   Substance Use Topics   • Alcohol use: No   • Drug use: No         ECG 12 Lead  Date/Time: 2020 5:18 PM  Performed by: Elizabeth Pina MD  Authorized by: Elizabeth Pina MD   Comparison: compared with previous ECG   Similar to previous ECG  Conduction: left anterior fascicular block  Comments: Wandering atrial pacemaker                Objective:     Visit Vitals  /80 (BP Location: Right arm, Patient Position: Sitting)   Pulse 71   Ht 170.2 cm (67\")   Wt 85 kg (187 lb 6.4 oz)   LMP  (LMP Unknown)   BMI 29.35 kg/m²         Physical Exam   Constitutional: She is oriented to person, place, and time. She appears well-developed and well-nourished.   HENT:   Head: Normocephalic and atraumatic.   Eyes: Pupils are equal, round, and reactive to light. Conjunctivae, EOM and lids are normal.   Neck: Normal range of motion and full passive range of motion without pain. Neck supple. No JVD present. Carotid bruit is not present.   Cardiovascular: Normal rate, regular rhythm, S1 normal and S2 normal. Exam reveals no gallop.   No murmur heard.  Pulses:       Radial pulses are 2+ on the right side, and 2+ on the left side.   Pulmonary/Chest: Effort normal and breath sounds normal.   Abdominal: Soft. Normal appearance.   Musculoskeletal: She exhibits no edema.   Lymphadenopathy:     She has no cervical adenopathy.   Neurological: She is alert and oriented to person, place, and time.   Skin: Skin is warm, dry and intact.   Psychiatric: She has a normal mood and affect.           Assessment:          Diagnosis Plan   1. 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 Hyperlipidemia, unspecified hyperlipidemia type     3. Essential hypertension     4. Type 2 diabetes mellitus with hypoglycemia without coma, unspecified whether long term insulin use (CMS/[facility])            Plan:     "   1.  Wandering atrial pacemaker.  Still present on her EKG today.  The only changes you have slightly different heart rates.  2.  Lightheadedness.  This is resolved with discontinuation of the metoprolol succinate.  3.  Hypertension.  Well-controlled despite being off of the metoprolol.  She reports that she on occasion has pressures in the 140s to 150s but unclear how often this occurs.  Will monitor for now.  4.  Hyperlipidemia.  On rosuvastatin which is managed by Dr. Porter.  5.  Diabetes mellitus type 2    We will plan on seeing the patient back again in 6 months.

## 2020-09-03 ENCOUNTER — TELEPHONE (OUTPATIENT)
Dept: FAMILY MEDICINE CLINIC | Facility: CLINIC | Age: 80
End: 2020-09-03

## 2020-09-03 NOTE — TELEPHONE ENCOUNTER
PATIENT CALLED STATING PHARMACY HAS BEEN TRYING TO REACH THE OFFICE TO GET A CLARIFICATION ON DOSAGE OF GLIMEPIRIDE. THERE IS A DOSAGE DISCREPANCY THEY NEED TO DISCUSS BEFORE DISPENSING    [place] [id number] AT [facility] [address]  - [phone]  - [phone] FX

## 2020-09-04 DIAGNOSIS — E11.22 TYPE 2 DIABETES MELLITUS WITH STAGE 3 CHRONIC KIDNEY DISEASE, WITHOUT LONG-TERM CURRENT USE OF INSULIN (HCC): Primary | ICD-10-CM

## 2020-09-04 DIAGNOSIS — N18.30 TYPE 2 DIABETES MELLITUS WITH STAGE 3 CHRONIC KIDNEY DISEASE, WITHOUT LONG-TERM CURRENT USE OF INSULIN (HCC): ICD-10-CM

## 2020-09-04 DIAGNOSIS — E11.22 TYPE 2 DIABETES MELLITUS WITH STAGE 3 CHRONIC KIDNEY DISEASE, WITHOUT LONG-TERM CURRENT USE OF INSULIN (HCC): ICD-10-CM

## 2020-09-04 DIAGNOSIS — N18.30 TYPE 2 DIABETES MELLITUS WITH STAGE 3 CHRONIC KIDNEY DISEASE, WITHOUT LONG-TERM CURRENT USE OF INSULIN (HCC): Primary | ICD-10-CM

## 2020-09-04 RX ORDER — GLIMEPIRIDE 2 MG/1
4 TABLET ORAL
Qty: 180 TABLET | Refills: 1 | Status: SHIPPED | OUTPATIENT
Start: 2020-09-04 | End: 2021-04-29

## 2020-09-04 RX ORDER — GLIMEPIRIDE 2 MG/1
4 TABLET ORAL 2 TIMES DAILY
Qty: 90 TABLET | Refills: 2 | Status: CANCELLED | OUTPATIENT
Start: 2020-09-04

## 2021-01-23 DIAGNOSIS — E78.49 OTHER HYPERLIPIDEMIA: ICD-10-CM

## 2021-01-23 DIAGNOSIS — E03.9 ACQUIRED HYPOTHYROIDISM: ICD-10-CM

## 2021-01-23 DIAGNOSIS — F34.1 PRIMARY DYSTHYMIA: ICD-10-CM

## 2021-01-27 RX ORDER — ROSUVASTATIN CALCIUM 20 MG/1
TABLET, COATED ORAL
Qty: 90 TABLET | Refills: 0 | Status: SHIPPED | OUTPATIENT
Start: 2021-01-27 | End: 2021-04-26

## 2021-01-27 RX ORDER — LEVOTHYROXINE SODIUM 0.1 MG/1
TABLET ORAL
Qty: 90 TABLET | Refills: 0 | Status: SHIPPED | OUTPATIENT
Start: 2021-01-27 | End: 2021-04-26

## 2021-01-27 RX ORDER — BUPROPION HYDROCHLORIDE 300 MG/1
TABLET ORAL
Qty: 90 TABLET | Refills: 0 | Status: SHIPPED | OUTPATIENT
Start: 2021-01-27 | End: 2021-04-26

## 2021-01-29 DIAGNOSIS — M25.561 CHRONIC PAIN OF RIGHT KNEE: ICD-10-CM

## 2021-01-29 DIAGNOSIS — G89.29 CHRONIC PAIN OF RIGHT KNEE: ICD-10-CM

## 2021-01-29 RX ORDER — TRAMADOL HYDROCHLORIDE 50 MG/1
50 TABLET ORAL EVERY 6 HOURS PRN
Qty: 90 TABLET | Refills: 2 | Status: SHIPPED | OUTPATIENT
Start: 2021-01-29 | End: 2021-08-23

## 2021-02-02 ENCOUNTER — OFFICE VISIT (OUTPATIENT)
Dept: CARDIOLOGY | Facility: CLINIC | Age: 81
End: 2021-02-02

## 2021-02-02 VITALS
HEART RATE: 84 BPM | HEIGHT: 67 IN | BODY MASS INDEX: 32.08 KG/M2 | OXYGEN SATURATION: 98 % | DIASTOLIC BLOOD PRESSURE: 96 MMHG | WEIGHT: 204.4 LBS | SYSTOLIC BLOOD PRESSURE: 160 MMHG | RESPIRATION RATE: 18 BRPM

## 2021-02-02 DIAGNOSIS — E11.649 TYPE 2 DIABETES MELLITUS WITH HYPOGLYCEMIA WITHOUT COMA, UNSPECIFIED WHETHER LONG TERM INSULIN USE (HCC): ICD-10-CM

## 2021-02-02 DIAGNOSIS — I49.8 WANDERING ATRIAL PACEMAKER BY ELECTROCARDIOGRAM: ICD-10-CM

## 2021-02-02 DIAGNOSIS — I10 ESSENTIAL HYPERTENSION: Primary | ICD-10-CM

## 2021-02-02 PROCEDURE — 99214 OFFICE O/P EST MOD 30 MIN: CPT | Performed by: INTERNAL MEDICINE

## 2021-02-02 PROCEDURE — 93000 ELECTROCARDIOGRAM COMPLETE: CPT | Performed by: INTERNAL MEDICINE

## 2021-02-02 RX ORDER — AMLODIPINE BESYLATE 5 MG/1
5 TABLET ORAL DAILY
Qty: 30 TABLET | Refills: 5 | Status: SHIPPED | OUTPATIENT
Start: 2021-02-02 | End: 2021-02-05

## 2021-02-02 NOTE — PROGRESS NOTES
Subjective:     Encounter Date:02/02/2021      Patient ID: Rekha Bear is a 80 y.o. female.    Chief Complaint:  History of Present Illness    This is a 80-year-old female with a history of GERD, hypertension, dyslipidemia, diabetes mellitus type 2, and intermittent wandering atrial pacemaker, who presents for follow up.        She presents today for 6-month follow-up appointment.  She has been following her blood pressures at home and has noted that they have been more elevated.  She reports chronic dyspnea on exertion which has been a little bit worse over the last several months since her hospitalization with Covid last year.  She admits that she has not been quite as active especially due to joint issues.  She denies any chest pain, palpitations, orthopnea, near-syncope or syncope, or lower extremity edema.      Prior History:  I saw the patient initially in 10/2015 when she presented for an evaluation of chest pain.  She was also complaining of chronic stable dyspnea on exertion at that time.  I set her up for a PET stress study which was negative for ischemia.  A few months later I was asked to clear the patient for a gynecologic surgery, which I went ahead and did.  However, prior to that the patient called to say that she had received a copy of her stress test and it was noted on the baseline EKG that she was in atrial fibrillation.  I had the patient come in at that time and an EKG done in the office showed a wandering atrial pacemaker with a heart rate in the low 40s.  I asked her to decrease her metoprolol succinate and had her followup.  I also had her undergo a 24-hour Holter monitor which was performed in 05/2016 which revealed sinus rhythm with rare PACs and PVCs but no atrial arrhythmias including atrial fibrillation.   At her last follow-up in 11/2016, on a lower dose of metoprolol succinate her rhythm appeared to be in sinus with a rate in the 50's.      I saw her last in 6/2018 again  for preoperative evaluation.  She continued to complain of dyspnea and fatigue on exertion that had worsened.    She did admit that some of this could be due to increasing issues with her knee for which she is planning on having surgery performed by Dr. Saji Kuo.  At that office visit I felt that her symptoms were likely due to deconditioning and limited mobility related to her right knee.  I recommended proceeding with an echocardiogram which was performed on 6/12/2018 and showed normal left ventricular systolic function and wall motion with an EF 66%, normal diastolic function, and no significant valvular disease.  I cleared the patient for surgery at that time.     Since she was last seen in the office she was admitted from 3/22/2020 to 4/10/2020 with COVID-19 pneumonia and acute hypoxic respiratory failure.  She required intubation and was treated with hydroxychloroquine.  Following her discharge she was a transfer to a skilled nursing facility where she underwent rehab..  In 5/2020 she began having issues with low heart rates in the 40s to 50s associated with dizziness and weakness.  She ended up going to the emergency room and her metoprolol succinate was discontinued.  She had a ZIO monitor placed around that time which was unremarkable except for rare episodes of nonsustained trigger tachycardia.  When I called her to discuss the monitor results she reported that she was feeling better off of the metoprolol.  At her follow-up in 7/2020 she was slowly recovering from her hospitalization.  Her blood pressures remained well controlled off the metoprolol.    Review of Systems   Constitution: Positive for malaise/fatigue.   HENT: Negative for hearing loss, hoarse voice, nosebleeds and sore throat.    Eyes: Negative for pain.   Cardiovascular: Positive for dyspnea on exertion. Negative for chest pain, claudication, cyanosis, irregular heartbeat, leg swelling, near-syncope, orthopnea, palpitations, paroxysmal  nocturnal dyspnea and syncope.   Respiratory: Negative for shortness of breath and snoring.    Endocrine: Negative for cold intolerance, heat intolerance, polydipsia, polyphagia and polyuria.   Skin: Negative for itching and rash.   Musculoskeletal: Positive for joint pain. Negative for arthritis, falls, joint swelling, muscle cramps, muscle weakness and myalgias.   Gastrointestinal: Negative for constipation, diarrhea, dysphagia, heartburn, hematemesis, hematochezia, melena, nausea and vomiting.   Genitourinary: Negative for frequency, hematuria and hesitancy.   Neurological: Positive for light-headedness. Negative for excessive daytime sleepiness, dizziness, headaches, numbness and weakness.   Psychiatric/Behavioral: Positive for depression. The patient is not nervous/anxious.          Current Outpatient Medications:   •  Acetylcysteine (Nac 600) 600 MG capsule, Take 2 capsules by mouth Daily., Disp: , Rfl:   •  buPROPion XL (WELLBUTRIN XL) 300 MG 24 hr tablet, TAKE ONE TABLET BY MOUTH EVERY MORNING, Disp: 90 tablet, Rfl: 0  •  dextromethorphan polistirex ER (DELSYM) 30 MG/5ML Suspension Extended Release oral suspension, Take 30 mg by mouth 2 (Two) Times a Day As Needed (cough)., Disp: 280 mL, Rfl:   •  gabapentin (NEURONTIN) 600 MG tablet, Take 1 tablet by mouth 3 (Three) Times a Day., Disp: 90 tablet, Rfl: 3  •  glimepiride (Amaryl) 2 MG tablet, Take 2 tablets by mouth Every Morning Before Breakfast., Disp: 180 tablet, Rfl: 1  •  guaiFENesin (ROBITUSSIN) 100 MG/5ML solution oral solution, Take 10 mL by mouth Every 4 (Four) Hours As Needed (cough)., Disp: , Rfl:   •  levothyroxine (SYNTHROID, LEVOTHROID) 100 MCG tablet, TAKE ONE TABLET BY MOUTH DAILY, Disp: 90 tablet, Rfl: 0  •  rosuvastatin (CRESTOR) 20 MG tablet, TAKE ONE TABLET BY MOUTH DAILY, Disp: 90 tablet, Rfl: 0  •  tolterodine LA (DETROL LA) 4 MG 24 hr capsule, Take 1 capsule by mouth Daily., Disp: 90 capsule, Rfl: 1  •  traMADol (ULTRAM) 50 MG tablet,  "Take 1 tablet by mouth Every 6 (Six) Hours As Needed for Moderate Pain ., Disp: 90 tablet, Rfl: 2  •  venlafaxine (EFFEXOR) 150 MG tablet sustained-release 24 hour 24 hr tablet, Take 1 tablet by mouth Daily With Breakfast., Disp: 90 each, Rfl: 1  •  Vitamin D, Cholecalciferol, (CHOLECALCIFEROL) 10 MCG (400 UNIT) tablet, Take 400 Units by mouth 2 (two) times a day., Disp: , Rfl:     Past Medical History:   Diagnosis Date   • Acute bronchitis    • Anxiety    • Chest pain    • Chilblains     \"Chilblian's\"   • Depression    • Diabetes mellitus, type II (CMS/HCC)    • Fatty liver    • GERD (gastroesophageal reflux disease)    • Health care maintenance    • History of mammogram 2009   • Hyperlipidemia    • Hypertension    • Hypothyroid    • Incontinence    • Intermittent lightheadedness    • Osteoarthritis     OA  Marvin THR, LT TKR - hydrocodone prescibed by Dr. Almaguer   • Pain of esophagus     \" nervous esophagus\" - she takes the occasional alprazolam   • Peptic ulcer disease    • Pneumonia due to COVID-19 virus 03/2020    now tested negative   • Prediabetes    • Raynaud's disease     \"Raynauds\"   • Renal disease     followed by Dr. Grewal   • Sinus bradycardia    • UTI (urinary tract infection)    • Vitamin B 12 deficiency    • 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        Past Surgical History:   Procedure Laterality Date   • CATARACT EXTRACTION     • CHOLECYSTECTOMY     • COLONOSCOPY  2009   • COLONOSCOPY N/A 12/20/2016    Procedure: COLONOSCOPY with hot snare polypectomy;  Surgeon: Geroge Pabon MD;  Location: [facility] ENDOSCOPY;  Service:    • DENTAL PROCEDURE     • FOOT SURGERY     • TONSILLECTOMY     • TOTAL HIP ARTHROPLASTY Bilateral     OA  Marvin THR, LT TKR - hydrocodone prescibed by Dr. Almaguer   • TOTAL KNEE ARTHROPLASTY Left     OA  Marvin THR, LT TKR - hydrocodone prescibed by Dr. Almaguer       Family History   Problem Relation Age of Onset   • Hypertension Mother    • Diabetes type II Mother    • " "Hypertension Father    • Coronary artery disease Father    • Diabetes type II Father    • Colon cancer Brother    • Stroke Son    • Breast cancer Maternal Aunt    • Hypertension Other    • Other Other         Lipids  Thyroid       Social History     Tobacco Use   • Smoking status: Former Smoker     Packs/day: 0.50     Years: 14.00     Pack years: 7.00     Quit date:      Years since quittin.1   • Smokeless tobacco: Never Used   • Tobacco comment: CAFFEINE USE   Substance Use Topics   • Alcohol use: No   • Drug use: No         ECG 12 Lead    Date/Time: 2021 1:22 PM  Performed by: Elizabeth Pina MD  Authorized by: Elizabeth Pina MD   Comparison: compared with previous ECG   Similar to previous ECG  Conduction: left anterior fascicular block  Comments: Wandering atrial pacemaker               Objective:     Visit Vitals  /96 (BP Location: Left arm, Patient Position: Sitting)   Pulse 84   Resp 18   Ht 170.2 cm (67\")   Wt 92.7 kg (204 lb 6.4 oz)   LMP  (LMP Unknown)   SpO2 98%   BMI 32.01 kg/m²         Constitutional:       Appearance: Normal appearance. Well-developed.   Eyes:      General: Lids are normal.      Conjunctiva/sclera: Conjunctivae normal.      Pupils: Pupils are equal, round, and reactive to light.   HENT:      Head: Normocephalic and atraumatic.   Neck:      Musculoskeletal: Full passive range of motion without pain, normal range of motion and neck supple.      Vascular: No carotid bruit or JVD.      Lymphadenopathy: No cervical adenopathy.   Pulmonary:      Effort: Pulmonary effort is normal.      Breath sounds: Normal breath sounds.   Cardiovascular:      Normal rate. Regular rhythm.      No gallop.   Pulses:     Radial: 2+ bilaterally.  Edema:     Peripheral edema absent.   Abdominal:      Palpations: Abdomen is soft.   Skin:     General: Skin is warm and dry.   Neurological:      Mental Status: Alert and oriented to person, place, and time.             Assessment:          " Diagnosis Plan   1. Essential hypertension     2. 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     3. Type 2 diabetes mellitus with hypoglycemia without coma, unspecified whether long term insulin use (CMS/[facility])            Plan:       1.  Hypertension.  Poorly controlled.  Suspect this is due to her 17 pound weight gain in the last 6 months.  Since she had issues with bradycardia in the past with beta-blocker and she has chronic renal insufficiency we will try amlodipine 5 mg daily.  2.  Wandering atrial pacemaker.  Stable appearing rhythm.  She remains asymptomatic.  3.  Dyspnea on exertion.  Chronic symptom with some worsening recently.  This may be due to deconditioning and weight gain.  Will monitor for now.  4.  Hyperlipidemia.  On rosuvastatin which is managed by Dr. Porter.  5.  Diabetes mellitus type 2  6.  Weight gain.  Discussed working on weight loss and increasing her activity.    We will plan on seeing the patient back again in 2 months.  Will call me with any issues after starting amlodipine prior to that.

## 2021-02-05 ENCOUNTER — TELEPHONE (OUTPATIENT)
Dept: FAMILY MEDICINE CLINIC | Facility: CLINIC | Age: 81
End: 2021-02-05

## 2021-02-05 ENCOUNTER — TELEPHONE (OUTPATIENT)
Dept: CARDIOLOGY | Facility: CLINIC | Age: 81
End: 2021-02-05

## 2021-02-05 RX ORDER — METOPROLOL SUCCINATE 25 MG/1
25 TABLET, EXTENDED RELEASE ORAL DAILY
Qty: 30 TABLET | Refills: 5 | Status: SHIPPED | OUTPATIENT
Start: 2021-02-05 | End: 2021-08-19

## 2021-02-05 NOTE — TELEPHONE ENCOUNTER
KIMBERLY WITH [facility] WOULD LIKE TO KNOW IF PT HAS TRIED THE CAPSULE VERSION OF venlafaxine (EFFEXOR) 150 MG tablet sustained-release 24 hour 24 hr tablet.    KIMBERLY STATED THAT CAPSULE IS COVERED BY INSURANCE BUT TABLET IS NOT. PHARMACY REQUESTED A CALL BY 4 TODAY TO BE FILLED.    KIMBERLY [phone]

## 2021-02-05 NOTE — TELEPHONE ENCOUNTER
Dr. Pina,    Pt called this morning. She said she has had a headache last night and this morning. She said she took her B/P this morning. It is 161/114, . She feels like it's the amlodipine that is causing her symptoms. She asked if you could start her back on the Toprol she was on last year. She did not know the dose she was on and I couldn't find it in her med list.    Do you have any recommendations for her?    Thank you,    Juliane Suarez RN  Triage MG

## 2021-02-05 NOTE — TELEPHONE ENCOUNTER
Notified pt of Dr. Pina's orders and recommendations. She verbalized understanding.    Thank you,    Juliane Suarez, RN  Triage MG

## 2021-02-05 NOTE — TELEPHONE ENCOUNTER
We will send in a prescription for metoprolol succinate 25 mg daily.  Her know she will need to monitor her heart rates with this since we stopped it last year because she was having issues with low heart rates.

## 2021-03-02 DIAGNOSIS — Z23 IMMUNIZATION DUE: ICD-10-CM

## 2021-04-26 DIAGNOSIS — E78.49 OTHER HYPERLIPIDEMIA: ICD-10-CM

## 2021-04-26 DIAGNOSIS — F34.1 PRIMARY DYSTHYMIA: ICD-10-CM

## 2021-04-26 DIAGNOSIS — E03.9 ACQUIRED HYPOTHYROIDISM: ICD-10-CM

## 2021-04-26 RX ORDER — LEVOTHYROXINE SODIUM 0.1 MG/1
TABLET ORAL
Qty: 30 TABLET | Refills: 0 | Status: SHIPPED | OUTPATIENT
Start: 2021-04-26 | End: 2022-11-03 | Stop reason: SDUPTHER

## 2021-04-26 RX ORDER — ROSUVASTATIN CALCIUM 20 MG/1
TABLET, COATED ORAL
Qty: 30 TABLET | Refills: 0 | Status: SHIPPED | OUTPATIENT
Start: 2021-04-26

## 2021-04-26 RX ORDER — BUPROPION HYDROCHLORIDE 300 MG/1
TABLET ORAL
Qty: 30 TABLET | Refills: 0 | Status: SHIPPED | OUTPATIENT
Start: 2021-04-26 | End: 2022-05-31 | Stop reason: SDUPTHER

## 2021-04-27 ENCOUNTER — OFFICE VISIT (OUTPATIENT)
Dept: CARDIOLOGY | Facility: CLINIC | Age: 81
End: 2021-04-27

## 2021-04-27 VITALS
SYSTOLIC BLOOD PRESSURE: 138 MMHG | BODY MASS INDEX: 32.49 KG/M2 | WEIGHT: 207 LBS | HEIGHT: 67 IN | DIASTOLIC BLOOD PRESSURE: 72 MMHG | HEART RATE: 73 BPM

## 2021-04-27 DIAGNOSIS — E78.5 HYPERLIPIDEMIA, UNSPECIFIED HYPERLIPIDEMIA TYPE: ICD-10-CM

## 2021-04-27 DIAGNOSIS — U07.1 PNEUMONIA DUE TO COVID-19 VIRUS: ICD-10-CM

## 2021-04-27 DIAGNOSIS — E11.22 TYPE 2 DIABETES MELLITUS WITH STAGE 3 CHRONIC KIDNEY DISEASE, WITHOUT LONG-TERM CURRENT USE OF INSULIN, UNSPECIFIED WHETHER STAGE 3A OR 3B CKD (HCC): ICD-10-CM

## 2021-04-27 DIAGNOSIS — J12.82 PNEUMONIA DUE TO COVID-19 VIRUS: ICD-10-CM

## 2021-04-27 DIAGNOSIS — N18.30 TYPE 2 DIABETES MELLITUS WITH STAGE 3 CHRONIC KIDNEY DISEASE, WITHOUT LONG-TERM CURRENT USE OF INSULIN, UNSPECIFIED WHETHER STAGE 3A OR 3B CKD (HCC): ICD-10-CM

## 2021-04-27 DIAGNOSIS — I10 ESSENTIAL HYPERTENSION: ICD-10-CM

## 2021-04-27 DIAGNOSIS — I49.8 WANDERING ATRIAL PACEMAKER BY ELECTROCARDIOGRAM: Primary | ICD-10-CM

## 2021-04-27 PROCEDURE — 93000 ELECTROCARDIOGRAM COMPLETE: CPT | Performed by: INTERNAL MEDICINE

## 2021-04-27 PROCEDURE — 99214 OFFICE O/P EST MOD 30 MIN: CPT | Performed by: INTERNAL MEDICINE

## 2021-04-27 NOTE — PROGRESS NOTES
Subjective:     Encounter Date:04/27/2021      Patient ID: Rekha Bear is a 81 y.o. female.    Chief Complaint:  History of Present Illness    This is a 81-year-old female with a history of GERD, hypertension, dyslipidemia, diabetes mellitus type 2, and intermittent wandering atrial pacemaker, who presents for follow up.        At a routine follow-up in 2/2021 she reported that her blood pressures were elevated at the time.  She did report some worsening of her chronic dyspnea on exertion but admitted that this occurred after she contracted Covid 19 the year prior and had gained weight over the prior year.  I recommended starting amlodipine 5 mg daily at the time.  Was hesitant to resume metoprolol since she had had issues with bradycardia with beta-blockers in the past.  Shortly after he started the medication she reported issues with headaches.  At that point we discontinue the amlodipine and started her back on metoprolol succinate 25 mg daily.    She returns today for routine follow-up.  She reports that her blood pressure cuff at home which is a wrist cuff indicates that her blood pressures are running high.  However her blood pressures are well controlled today in the office.  She denies any chest pain, palpitations, orthopnea, near-syncope or syncope, or lower extremity edema.  She is gained a few more pounds since I saw her last.  In addition she has been having a lot of issues with her joints.  This is limited her activity.  She continues to have dyspnea on exertion although she feels like this is largely unchanged.  She admits that her dyspnea is probably in part due to her weight gain and just activity due to her joint issues.       Prior History:  I saw the patient initially in 10/2015 when she presented for an evaluation of chest pain.  She was also complaining of chronic stable dyspnea on exertion at that time.  I set her up for a PET stress study which was negative for ischemia.  A few  months later I was asked to clear the patient for a gynecologic surgery, which I went ahead and did.  However, prior to that the patient called to say that she had received a copy of her stress test and it was noted on the baseline EKG that she was in atrial fibrillation.  I had the patient come in at that time and an EKG done in the office showed a wandering atrial pacemaker with a heart rate in the low 40s.  I asked her to decrease her metoprolol succinate and had her followup.  I also had her undergo a 24-hour Holter monitor which was performed in 05/2016 which revealed sinus rhythm with rare PACs and PVCs but no atrial arrhythmias including atrial fibrillation.   At her last follow-up in 11/2016, on a lower dose of metoprolol succinate her rhythm appeared to be in sinus with a rate in the 50's.      I saw her last in 6/2018 again for preoperative evaluation.  She continued to complain of dyspnea and fatigue on exertion that had worsened.    She did admit that some of this could be due to increasing issues with her knee for which she is planning on having surgery performed by Dr. Saji Kuo.  At that office visit I felt that her symptoms were likely due to deconditioning and limited mobility related to her right knee.  I recommended proceeding with an echocardiogram which was performed on 6/12/2018 and showed normal left ventricular systolic function and wall motion with an EF 66%, normal diastolic function, and no significant valvular disease.  I cleared the patient for surgery at that time.     Since she was last seen in the office she was admitted from 3/22/2020 to 4/10/2020 with COVID-19 pneumonia and acute hypoxic respiratory failure.  She required intubation and was treated with hydroxychloroquine.  Following her discharge she was a transfer to a skilled nursing facility where she underwent rehab..  In 5/2020 she began having issues with low heart rates in the 40s to 50s associated with dizziness and  weakness.  She ended up going to the emergency room and her metoprolol succinate was discontinued.  She had a ZIO monitor placed around that time which was unremarkable except for rare episodes of nonsustained trigger tachycardia.  When I called her to discuss the monitor results she reported that she was feeling better off of the metoprolol.  At her follow-up in 7/2020 she was slowly recovering from her hospitalization.  Her blood pressures remained well controlled off the metoprolol.    Review of Systems   Constitutional: Negative for malaise/fatigue.   HENT: Negative for hearing loss, hoarse voice, nosebleeds and sore throat.    Eyes: Negative for pain.   Cardiovascular: Positive for dyspnea on exertion. Negative for chest pain, claudication, cyanosis, irregular heartbeat, leg swelling, near-syncope, orthopnea, palpitations, paroxysmal nocturnal dyspnea and syncope.   Respiratory: Negative for shortness of breath and snoring.    Endocrine: Negative for cold intolerance, heat intolerance, polydipsia, polyphagia and polyuria.   Skin: Negative for itching and rash.   Musculoskeletal: Positive for joint pain. Negative for arthritis, falls, joint swelling, muscle cramps, muscle weakness and myalgias.   Gastrointestinal: Negative for constipation, diarrhea, dysphagia, heartburn, hematemesis, hematochezia, melena, nausea and vomiting.   Genitourinary: Negative for frequency, hematuria and hesitancy.   Neurological: Positive for light-headedness. Negative for excessive daytime sleepiness, dizziness, headaches, numbness and weakness.   Psychiatric/Behavioral: Negative for depression. The patient is not nervous/anxious.          Current Outpatient Medications:   •  Acetylcysteine (Nac 600) 600 MG capsule, Take 2 capsules by mouth Daily., Disp: , Rfl:   •  buPROPion XL (WELLBUTRIN XL) 300 MG 24 hr tablet, TAKE ONE TABLET BY MOUTH EVERY MORNING, Disp: 30 tablet, Rfl: 0  •  dextromethorphan polistirex ER (DELSYM) 30 MG/5ML  "Suspension Extended Release oral suspension, Take 30 mg by mouth 2 (Two) Times a Day As Needed (cough)., Disp: 280 mL, Rfl:   •  gabapentin (NEURONTIN) 600 MG tablet, Take 1 tablet by mouth 3 (Three) Times a Day., Disp: 90 tablet, Rfl: 3  •  glimepiride (Amaryl) 2 MG tablet, Take 2 tablets by mouth Every Morning Before Breakfast., Disp: 180 tablet, Rfl: 1  •  guaiFENesin (ROBITUSSIN) 100 MG/5ML solution oral solution, Take 10 mL by mouth Every 4 (Four) Hours As Needed (cough)., Disp: , Rfl:   •  levothyroxine (SYNTHROID, LEVOTHROID) 100 MCG tablet, TAKE ONE TABLET BY MOUTH DAILY, Disp: 30 tablet, Rfl: 0  •  metoprolol succinate XL (TOPROL-XL) 25 MG 24 hr tablet, Take 1 tablet by mouth Daily., Disp: 30 tablet, Rfl: 5  •  rosuvastatin (CRESTOR) 20 MG tablet, TAKE ONE TABLET BY MOUTH DAILY, Disp: 30 tablet, Rfl: 0  •  tolterodine LA (DETROL LA) 4 MG 24 hr capsule, Take 1 capsule by mouth Daily., Disp: 90 capsule, Rfl: 1  •  traMADol (ULTRAM) 50 MG tablet, Take 1 tablet by mouth Every 6 (Six) Hours As Needed for Moderate Pain ., Disp: 90 tablet, Rfl: 2  •  venlafaxine (EFFEXOR) 150 MG tablet sustained-release 24 hour 24 hr tablet, Take 1 tablet by mouth Daily With Breakfast., Disp: 90 each, Rfl: 1  •  Vitamin D, Cholecalciferol, (CHOLECALCIFEROL) 10 MCG (400 UNIT) tablet, Take 400 Units by mouth 2 (two) times a day., Disp: , Rfl:     Past Medical History:   Diagnosis Date   • Acute bronchitis    • Anxiety    • Chest pain    • Chilblains     \"Chilblian's\"   • Depression    • Diabetes mellitus, type II (CMS/HCC)    • Fatty liver    • GERD (gastroesophageal reflux disease)    • Health care maintenance    • History of mammogram 2009   • Hyperlipidemia    • Hypertension    • Hypothyroid    • Incontinence    • Intermittent lightheadedness    • Osteoarthritis     OA  Marvin THR, LT TKR - hydrocodone prescibed by Dr. Almaguer   • Pain of esophagus     \" nervous esophagus\" - she takes the occasional alprazolam   • Peptic ulcer " "disease    • Pneumonia due to COVID-19 virus 2020    now tested negative   • Prediabetes    • Raynaud's disease     \"Raynauds\"   • Renal disease     followed by Dr. Grewal   • Sinus bradycardia    • UTI (urinary tract infection)    • Vitamin B 12 deficiency    • 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        Past Surgical History:   Procedure Laterality Date   • CATARACT EXTRACTION     • CHOLECYSTECTOMY     • COLONOSCOPY     • COLONOSCOPY N/A 2016    Procedure: COLONOSCOPY with hot snare polypectomy;  Surgeon: George Pabon MD;  Location: [facility] ENDOSCOPY;  Service:    • DENTAL PROCEDURE     • FOOT SURGERY     • TONSILLECTOMY     • TOTAL HIP ARTHROPLASTY Bilateral     OA  Marvin THR, LT TKR - hydrocodone prescibed by Dr. Almaguer   • TOTAL KNEE ARTHROPLASTY Left     OA  Marvin THR, LT TKR - hydrocodone prescibed by Dr. Almaguer       Family History   Problem Relation Age of Onset   • Hypertension Mother    • Diabetes type II Mother    • Hypertension Father    • Coronary artery disease Father    • Diabetes type II Father    • Colon cancer Brother    • Stroke Son    • Breast cancer Maternal Aunt    • Hypertension Other    • Other Other         Lipids  Thyroid       Social History     Tobacco Use   • Smoking status: Former Smoker     Packs/day: 0.50     Years: 14.00     Pack years: 7.00     Quit date:      Years since quittin.3   • Smokeless tobacco: Never Used   • Tobacco comment: CAFFEINE USE   Vaping Use   • Vaping Use: Never used   Substance Use Topics   • Alcohol use: No   • Drug use: No         ECG 12 Lead    Date/Time: 2021 12:22 PM  Performed by: Elizabeth Pina MD  Authorized by: Elizabeth Pina MD   Comparison: compared with previous ECG   Comments: Wandering atrial pacemaker                 Objective:     Visit Vitals  /72 (BP Location: Left arm, Patient Position: Sitting, Cuff Size: Large Adult)   Pulse 73   Ht 170.2 cm (67\")   Wt 93.9 kg (207 lb)   LMP  (LMP Unknown)   BMI " 32.42 kg/m²         Constitutional:       Appearance: Normal appearance. Well-developed.   Eyes:      General: Lids are normal.      Conjunctiva/sclera: Conjunctivae normal.      Pupils: Pupils are equal, round, and reactive to light.   HENT:      Head: Normocephalic and atraumatic.   Neck:      Vascular: No carotid bruit or JVD.      Lymphadenopathy: No cervical adenopathy.   Pulmonary:      Effort: Pulmonary effort is normal.      Breath sounds: Normal breath sounds.   Cardiovascular:      Normal rate. Regular rhythm.      No gallop.   Pulses:     Radial: 2+ bilaterally.  Edema:     Peripheral edema absent.   Abdominal:      Palpations: Abdomen is soft.   Musculoskeletal:      Cervical back: Full passive range of motion without pain, normal range of motion and neck supple. Skin:     General: Skin is warm and dry.   Neurological:      Mental Status: Alert and oriented to person, place, and time.             Assessment:          Diagnosis Plan   1. 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 Essential hypertension     3. Hyperlipidemia, unspecified hyperlipidemia type     4. Type 2 diabetes mellitus with stage 3 chronic kidney disease, without long-term current use of insulin, unspecified whether stage 3a or 3b CKD (CMS/HCC)     5. Pneumonia due to COVID-19 virus            Plan:         1.  Hypertension.  Better controlled with resumption of metoprolol succinate.  So far her heart rates appear to be tolerating it.  We will continue current management.  2.  Wandering atrial pacemaker.  Appears stable.  3.  Hyperlipidemia.  On rosuvastatin which is managed by Dr. Porter.  4.  Dyspnea on exertion.  Largely stable.  This appears to be multifactorial due to weight gain and deconditioning.  5.  Diabetes mellitus type 2    I will plan on seeing the patient back again in 6 months.

## 2021-04-29 DIAGNOSIS — F34.1 PRIMARY DYSTHYMIA: ICD-10-CM

## 2021-04-29 DIAGNOSIS — N18.30 TYPE 2 DIABETES MELLITUS WITH STAGE 3 CHRONIC KIDNEY DISEASE, WITHOUT LONG-TERM CURRENT USE OF INSULIN (HCC): ICD-10-CM

## 2021-04-29 DIAGNOSIS — E11.22 TYPE 2 DIABETES MELLITUS WITH STAGE 3 CHRONIC KIDNEY DISEASE, WITHOUT LONG-TERM CURRENT USE OF INSULIN (HCC): ICD-10-CM

## 2021-04-29 RX ORDER — GLIMEPIRIDE 2 MG/1
4 TABLET ORAL
Qty: 180 TABLET | Refills: 0 | Status: SHIPPED | OUTPATIENT
Start: 2021-04-29 | End: 2021-07-28

## 2021-04-29 RX ORDER — VENLAFAXINE HYDROCHLORIDE 150 MG/1
TABLET, EXTENDED RELEASE ORAL
Qty: 90 EACH | Refills: 0 | Status: SHIPPED | OUTPATIENT
Start: 2021-04-29 | End: 2021-05-06

## 2021-05-03 NOTE — PLAN OF CARE
Problem: Patient Care Overview  Goal: Plan of Care Review  Outcome: Ongoing (interventions implemented as appropriate)  Flowsheets (Taken 4/9/2020 0326)  Progress: improving  Plan of Care Reviewed With: patient  Outcome Summary: No c/o pain or soa. Covid test from yesterday negative, need another negative tomorrow for possible discharge tomorrow to SNF.  Goal: Individualization and Mutuality  Outcome: Ongoing (interventions implemented as appropriate)  Goal: Discharge Needs Assessment  Outcome: Ongoing (interventions implemented as appropriate)  Goal: Interprofessional Rounds/Family Conf  Outcome: Ongoing (interventions implemented as appropriate)     Problem: Fall Risk (Adult)  Goal: Absence of Fall  Outcome: Ongoing (interventions implemented as appropriate)     Problem: Skin Injury Risk (Adult)  Goal: Skin Health and Integrity  Outcome: Ongoing (interventions implemented as appropriate)     Problem: Pneumonia (Adult)  Goal: Signs and Symptoms of Listed Potential Problems Will be Absent, Minimized or Managed (Pneumonia)  Outcome: Ongoing (interventions implemented as appropriate)      SOG7ORY, BEART, [id number], PHART, THGBART, [id number], PO2ART, [id number]    

## 2021-05-06 DIAGNOSIS — F34.1 PRIMARY DYSTHYMIA: ICD-10-CM

## 2021-05-06 RX ORDER — VENLAFAXINE HYDROCHLORIDE 150 MG/1
TABLET, EXTENDED RELEASE ORAL
Qty: 90 EACH | Refills: 0 | Status: SHIPPED | OUTPATIENT
Start: 2021-05-06 | End: 2021-11-22

## 2021-05-07 DIAGNOSIS — E78.2 MIXED HYPERLIPIDEMIA: Primary | ICD-10-CM

## 2021-05-07 DIAGNOSIS — E03.8 OTHER SPECIFIED HYPOTHYROIDISM: ICD-10-CM

## 2021-05-07 DIAGNOSIS — Z11.59 NEED FOR HEPATITIS C SCREENING TEST: ICD-10-CM

## 2021-05-07 DIAGNOSIS — E11.9 DIABETES MELLITUS WITHOUT COMPLICATION (HCC): ICD-10-CM

## 2021-05-11 ENCOUNTER — TELEPHONE (OUTPATIENT)
Dept: FAMILY MEDICINE CLINIC | Facility: CLINIC | Age: 81
End: 2021-05-11

## 2021-05-11 NOTE — TELEPHONE ENCOUNTER
Caller: Rekha Bear    Relationship to patient: Self    Best call back number: [phone] (M)    Patient is needing: PATIENT CALLING IN REGARDS TO NEEDING HER A1C RESULTS SENT TO DENTIST PATIENT STATES WILL COME BY AND  RESULTS      PLEASE ADVISE

## 2021-05-12 LAB
ALBUMIN SERPL-MCNC: 4.1 G/DL (ref 3.5–5.2)
ALBUMIN/CREAT UR: 89 MG/G CREAT (ref 0–29)
ALBUMIN/GLOB SERPL: 1.7 G/DL
ALP SERPL-CCNC: 87 U/L (ref 39–117)
ALT SERPL-CCNC: 10 U/L (ref 1–33)
AST SERPL-CCNC: 14 U/L (ref 1–32)
BILIRUB SERPL-MCNC: 0.7 MG/DL (ref 0–1.2)
BUN SERPL-MCNC: 19 MG/DL (ref 8–23)
BUN/CREAT SERPL: 14.5 (ref 7–25)
CALCIUM SERPL-MCNC: 9.4 MG/DL (ref 8.6–10.5)
CHLORIDE SERPL-SCNC: 101 MMOL/L (ref 98–107)
CHOLEST SERPL-MCNC: 149 MG/DL (ref 0–200)
CO2 SERPL-SCNC: 24.7 MMOL/L (ref 22–29)
CREAT SERPL-MCNC: 1.31 MG/DL (ref 0.57–1)
CREAT UR-MCNC: 138.7 MG/DL
GLOBULIN SER CALC-MCNC: 2.4 GM/DL
GLUCOSE SERPL-MCNC: 267 MG/DL (ref 65–99)
HBA1C MFR BLD: 9.1 % (ref 4.8–5.6)
HCV AB S/CO SERPL IA: <0.1 S/CO RATIO (ref 0–0.9)
HDLC SERPL-MCNC: 48 MG/DL (ref 40–60)
LDLC SERPL CALC-MCNC: 64 MG/DL (ref 0–100)
LDLC/HDLC SERPL: 1.16 {RATIO}
MICROALBUMIN UR-MCNC: 123.6 UG/ML
POTASSIUM SERPL-SCNC: 4.5 MMOL/L (ref 3.5–5.2)
PROT SERPL-MCNC: 6.5 G/DL (ref 6–8.5)
SODIUM SERPL-SCNC: 137 MMOL/L (ref 136–145)
TRIGL SERPL-MCNC: 227 MG/DL (ref 0–150)
TSH SERPL DL<=0.005 MIU/L-ACNC: 3.84 UIU/ML (ref 0.27–4.2)
VLDLC SERPL CALC-MCNC: 37 MG/DL (ref 5–40)

## 2021-05-14 ENCOUNTER — OFFICE VISIT (OUTPATIENT)
Dept: FAMILY MEDICINE CLINIC | Facility: CLINIC | Age: 81
End: 2021-05-14

## 2021-05-14 VITALS
DIASTOLIC BLOOD PRESSURE: 70 MMHG | SYSTOLIC BLOOD PRESSURE: 120 MMHG | OXYGEN SATURATION: 99 % | BODY MASS INDEX: 32.65 KG/M2 | HEIGHT: 67 IN | RESPIRATION RATE: 16 BRPM | HEART RATE: 64 BPM | WEIGHT: 208 LBS

## 2021-05-14 DIAGNOSIS — N39.41 URGE INCONTINENCE OF URINE: ICD-10-CM

## 2021-05-14 DIAGNOSIS — R35.0 URINARY FREQUENCY: ICD-10-CM

## 2021-05-14 DIAGNOSIS — R31.9 HEMATURIA, UNSPECIFIED TYPE: Primary | ICD-10-CM

## 2021-05-14 DIAGNOSIS — Z91.81 AT MODERATE RISK FOR FALL: ICD-10-CM

## 2021-05-14 DIAGNOSIS — N18.30 TYPE 2 DIABETES MELLITUS WITH STAGE 3 CHRONIC KIDNEY DISEASE, WITHOUT LONG-TERM CURRENT USE OF INSULIN, UNSPECIFIED WHETHER STAGE 3A OR 3B CKD (HCC): ICD-10-CM

## 2021-05-14 DIAGNOSIS — I47.1 SUPRAVENTRICULAR TACHYCARDIA (HCC): ICD-10-CM

## 2021-05-14 DIAGNOSIS — E11.22 TYPE 2 DIABETES MELLITUS WITH STAGE 3 CHRONIC KIDNEY DISEASE, WITHOUT LONG-TERM CURRENT USE OF INSULIN, UNSPECIFIED WHETHER STAGE 3A OR 3B CKD (HCC): ICD-10-CM

## 2021-05-14 DIAGNOSIS — J96.01 ACUTE RESPIRATORY FAILURE WITH HYPOXIA (HCC): ICD-10-CM

## 2021-05-14 DIAGNOSIS — F33.0 MAJOR DEPRESSIVE DISORDER, RECURRENT, MILD (HCC): ICD-10-CM

## 2021-05-14 DIAGNOSIS — I10 ESSENTIAL HYPERTENSION: ICD-10-CM

## 2021-05-14 DIAGNOSIS — Z00.00 MEDICARE ANNUAL WELLNESS VISIT, SUBSEQUENT: Primary | ICD-10-CM

## 2021-05-14 DIAGNOSIS — E03.8 OTHER SPECIFIED HYPOTHYROIDISM: ICD-10-CM

## 2021-05-14 PROBLEM — I47.10 SUPRAVENTRICULAR TACHYCARDIA: Status: ACTIVE | Noted: 2021-05-14

## 2021-05-14 LAB
BILIRUB BLD-MCNC: NEGATIVE MG/DL
CLARITY, POC: CLEAR
COLOR UR: ABNORMAL
GLUCOSE UR STRIP-MCNC: ABNORMAL MG/DL
KETONES UR QL: ABNORMAL
LEUKOCYTE EST, POC: NEGATIVE
NITRITE UR-MCNC: POSITIVE MG/ML
PH UR: 5 [PH] (ref 5–8)
PROT UR STRIP-MCNC: ABNORMAL MG/DL
RBC # UR STRIP: ABNORMAL /UL
SP GR UR: 1.03 (ref 1–1.03)
UROBILINOGEN UR QL: NORMAL

## 2021-05-14 PROCEDURE — 99214 OFFICE O/P EST MOD 30 MIN: CPT | Performed by: FAMILY MEDICINE

## 2021-05-14 PROCEDURE — 81002 URINALYSIS NONAUTO W/O SCOPE: CPT | Performed by: FAMILY MEDICINE

## 2021-05-14 PROCEDURE — G0439 PPPS, SUBSEQ VISIT: HCPCS | Performed by: FAMILY MEDICINE

## 2021-05-14 RX ORDER — SOLIFENACIN SUCCINATE 5 MG/1
5 TABLET, FILM COATED ORAL DAILY
Qty: 90 TABLET | Refills: 1 | Status: SHIPPED | OUTPATIENT
Start: 2021-05-14 | End: 2021-08-23

## 2021-05-14 RX ORDER — CIPROFLOXACIN 500 MG/1
500 TABLET, FILM COATED ORAL 2 TIMES DAILY
Qty: 10 TABLET | Refills: 0 | Status: SHIPPED | OUTPATIENT
Start: 2021-05-14 | End: 2021-05-19

## 2021-05-14 RX ORDER — METFORMIN HYDROCHLORIDE 500 MG/1
500 TABLET, EXTENDED RELEASE ORAL
Qty: 30 TABLET | Refills: 1 | Status: CANCELLED | OUTPATIENT
Start: 2021-05-14

## 2021-05-16 LAB
BACTERIA UR CULT: ABNORMAL
BACTERIA UR CULT: ABNORMAL
OTHER ANTIBIOTIC SUSC ISLT: ABNORMAL

## 2021-06-03 ENCOUNTER — OFFICE VISIT (OUTPATIENT)
Dept: FAMILY MEDICINE CLINIC | Facility: CLINIC | Age: 81
End: 2021-06-03

## 2021-06-03 VITALS
SYSTOLIC BLOOD PRESSURE: 124 MMHG | BODY MASS INDEX: 32.8 KG/M2 | HEIGHT: 67 IN | WEIGHT: 209 LBS | RESPIRATION RATE: 16 BRPM | DIASTOLIC BLOOD PRESSURE: 70 MMHG | OXYGEN SATURATION: 100 % | HEART RATE: 54 BPM

## 2021-06-03 DIAGNOSIS — N18.31 TYPE 2 DIABETES MELLITUS WITH STAGE 3A CHRONIC KIDNEY DISEASE, WITHOUT LONG-TERM CURRENT USE OF INSULIN (HCC): Primary | ICD-10-CM

## 2021-06-03 DIAGNOSIS — E11.22 TYPE 2 DIABETES MELLITUS WITH STAGE 3A CHRONIC KIDNEY DISEASE, WITHOUT LONG-TERM CURRENT USE OF INSULIN (HCC): Primary | ICD-10-CM

## 2021-06-03 LAB — HBA1C MFR BLD: 7.3 %

## 2021-06-03 PROCEDURE — 99213 OFFICE O/P EST LOW 20 MIN: CPT | Performed by: FAMILY MEDICINE

## 2021-06-03 PROCEDURE — 83036 HEMOGLOBIN GLYCOSYLATED A1C: CPT | Performed by: FAMILY MEDICINE

## 2021-06-03 NOTE — PROGRESS NOTES
Subjective   Rekha Bear is a 81 y.o. female.   Diabetes and Fall    History of Present Illness   Imelda is here for on Type 2 DM.  She is trying to get some dental work done and her dentist will not move ahead until her blood sugar is under better control.  She is now taking Januvia and states she is doing well and no adverse side effects.  She continues to take glimepiride.  She cannot take Metformin because of her kidney function.  She has been trying to eat well and move a little bit more  She last ate at 4 am.    Imelda had a fall on Sunday at [employer].  She states she caught her shoe on a pew and fell forward hitting her head.  She did not seek medical care.Did not feel dizzy, no loss of consciousness.     The following portions of the patient's history were reviewed and updated as appropriate: allergies, current medications, past family history, past medical history, past social history, past surgical history and problem list.    Review of Systems   Constitutional: Negative.    HENT: Negative.    Eyes: Negative.    Respiratory: Negative.    Cardiovascular: Negative.    Genitourinary: Negative.    Skin: Negative.    Neurological: Negative.    Psychiatric/Behavioral: Negative.        Objective   Physical Exam  Constitutional:       Appearance: Normal appearance.   HENT:      Head: Normocephalic.      Comments: Bruise on forehead  Eyes:      Extraocular Movements: Extraocular movements intact.      Pupils: Pupils are equal, round, and reactive to light.   Cardiovascular:      Rate and Rhythm: Normal rate. Rhythm irregular.   Pulmonary:      Effort: Pulmonary effort is normal.      Breath sounds: Normal breath sounds.   Musculoskeletal:         General: Normal range of motion.      Cervical back: Normal range of motion.   Skin:     General: Skin is warm and dry.   Neurological:      Mental Status: She is alert.   Psychiatric:         Mood and Affect: Mood normal.         Behavior: Behavior normal.         Thought  Content: Thought content normal.         Judgment: Judgment normal.           Assessment/Plan   Problem List Items Addressed This Visit        Endocrine and Metabolic    Type 2 diabetes mellitus with stage 3 chronic kidney disease, without long-term current use of insulin (CMS/[facility]) - Primary    Relevant Orders    Comprehensive metabolic panel    POC Glycosylated Hemoglobin (Hb A1C) (Completed)        She is trying and working on diet.  Is important for her to get this done so she can get her teeth taken care of.  Today's POC hemoglobin A1c was 7.3.  We are running a CMP and hemoglobin A1c today.       No follow-ups on file.

## 2021-06-04 LAB
ALBUMIN SERPL-MCNC: 4.5 G/DL (ref 3.5–5.2)
ALBUMIN/GLOB SERPL: 1.9 G/DL
ALP SERPL-CCNC: 71 U/L (ref 39–117)
ALT SERPL-CCNC: 10 U/L (ref 1–33)
AST SERPL-CCNC: 16 U/L (ref 1–32)
BILIRUB SERPL-MCNC: 0.6 MG/DL (ref 0–1.2)
BUN SERPL-MCNC: 18 MG/DL (ref 8–23)
BUN/CREAT SERPL: 15.4 (ref 7–25)
CALCIUM SERPL-MCNC: 9.9 MG/DL (ref 8.6–10.5)
CHLORIDE SERPL-SCNC: 105 MMOL/L (ref 98–107)
CO2 SERPL-SCNC: 27.1 MMOL/L (ref 22–29)
CREAT SERPL-MCNC: 1.17 MG/DL (ref 0.57–1)
GLOBULIN SER CALC-MCNC: 2.4 GM/DL
GLUCOSE SERPL-MCNC: 172 MG/DL (ref 65–99)
POTASSIUM SERPL-SCNC: 4.7 MMOL/L (ref 3.5–5.2)
PROT SERPL-MCNC: 6.9 G/DL (ref 6–8.5)
SODIUM SERPL-SCNC: 142 MMOL/L (ref 136–145)
SPECIMEN STATUS: NORMAL

## 2021-06-15 DIAGNOSIS — N18.31 TYPE 2 DIABETES MELLITUS WITH STAGE 3A CHRONIC KIDNEY DISEASE, WITHOUT LONG-TERM CURRENT USE OF INSULIN (HCC): Primary | ICD-10-CM

## 2021-06-15 DIAGNOSIS — E11.22 TYPE 2 DIABETES MELLITUS WITH STAGE 3A CHRONIC KIDNEY DISEASE, WITHOUT LONG-TERM CURRENT USE OF INSULIN (HCC): Primary | ICD-10-CM

## 2021-06-23 LAB
ALBUMIN SERPL-MCNC: 4.3 G/DL (ref 3.5–5.2)
ALBUMIN/GLOB SERPL: 1.7 G/DL
ALP SERPL-CCNC: 79 U/L (ref 39–117)
ALT SERPL-CCNC: 10 U/L (ref 1–33)
AST SERPL-CCNC: 17 U/L (ref 1–32)
BILIRUB SERPL-MCNC: 0.9 MG/DL (ref 0–1.2)
BUN SERPL-MCNC: 22 MG/DL (ref 8–23)
BUN/CREAT SERPL: 16.2 (ref 7–25)
CALCIUM SERPL-MCNC: 9.5 MG/DL (ref 8.6–10.5)
CHLORIDE SERPL-SCNC: 100 MMOL/L (ref 98–107)
CO2 SERPL-SCNC: 25.8 MMOL/L (ref 22–29)
CREAT SERPL-MCNC: 1.36 MG/DL (ref 0.57–1)
GLOBULIN SER CALC-MCNC: 2.5 GM/DL
GLUCOSE SERPL-MCNC: 145 MG/DL (ref 65–99)
HBA1C MFR BLD: 8.6 % (ref 4.8–5.6)
POTASSIUM SERPL-SCNC: 4.3 MMOL/L (ref 3.5–5.2)
PROT SERPL-MCNC: 6.8 G/DL (ref 6–8.5)
SODIUM SERPL-SCNC: 137 MMOL/L (ref 136–145)

## 2021-07-28 DIAGNOSIS — N18.30 TYPE 2 DIABETES MELLITUS WITH STAGE 3 CHRONIC KIDNEY DISEASE, WITHOUT LONG-TERM CURRENT USE OF INSULIN (HCC): ICD-10-CM

## 2021-07-28 DIAGNOSIS — E11.22 TYPE 2 DIABETES MELLITUS WITH STAGE 3 CHRONIC KIDNEY DISEASE, WITHOUT LONG-TERM CURRENT USE OF INSULIN (HCC): ICD-10-CM

## 2021-07-28 DIAGNOSIS — N39.41 URGE INCONTINENCE OF URINE: ICD-10-CM

## 2021-07-28 RX ORDER — TOLTERODINE 4 MG/1
CAPSULE, EXTENDED RELEASE ORAL
Qty: 90 CAPSULE | Refills: 1 | Status: SHIPPED | OUTPATIENT
Start: 2021-07-28 | End: 2021-10-14 | Stop reason: SDUPTHER

## 2021-07-28 RX ORDER — GLIMEPIRIDE 2 MG/1
4 TABLET ORAL
Qty: 180 TABLET | Refills: 1 | Status: SHIPPED | OUTPATIENT
Start: 2021-07-28 | End: 2022-03-21

## 2021-08-19 DIAGNOSIS — E11.22 TYPE 2 DIABETES MELLITUS WITH STAGE 3 CHRONIC KIDNEY DISEASE, WITHOUT LONG-TERM CURRENT USE OF INSULIN, UNSPECIFIED WHETHER STAGE 3A OR 3B CKD (HCC): ICD-10-CM

## 2021-08-19 DIAGNOSIS — N18.30 TYPE 2 DIABETES MELLITUS WITH STAGE 3 CHRONIC KIDNEY DISEASE, WITHOUT LONG-TERM CURRENT USE OF INSULIN, UNSPECIFIED WHETHER STAGE 3A OR 3B CKD (HCC): ICD-10-CM

## 2021-08-19 RX ORDER — METOPROLOL SUCCINATE 25 MG/1
TABLET, EXTENDED RELEASE ORAL
Qty: 30 TABLET | Refills: 1 | Status: SHIPPED | OUTPATIENT
Start: 2021-08-19 | End: 2022-01-19

## 2021-08-19 RX ORDER — SITAGLIPTIN 25 MG/1
TABLET, FILM COATED ORAL
Qty: 30 TABLET | Refills: 2 | Status: SHIPPED | OUTPATIENT
Start: 2021-08-19 | End: 2022-01-18

## 2021-08-20 DIAGNOSIS — M25.561 CHRONIC PAIN OF RIGHT KNEE: ICD-10-CM

## 2021-08-20 DIAGNOSIS — G89.29 CHRONIC PAIN OF RIGHT KNEE: ICD-10-CM

## 2021-08-20 DIAGNOSIS — N39.41 URGE INCONTINENCE OF URINE: ICD-10-CM

## 2021-08-23 RX ORDER — SOLIFENACIN SUCCINATE 5 MG/1
TABLET, FILM COATED ORAL
Qty: 90 TABLET | Refills: 1 | Status: SHIPPED | OUTPATIENT
Start: 2021-08-23 | End: 2021-10-14 | Stop reason: SDUPTHER

## 2021-08-23 RX ORDER — TRAMADOL HYDROCHLORIDE 50 MG/1
TABLET ORAL
Qty: 90 TABLET | Refills: 0 | Status: SHIPPED | OUTPATIENT
Start: 2021-08-23 | End: 2021-12-23 | Stop reason: SDUPTHER

## 2021-10-14 ENCOUNTER — OFFICE VISIT (OUTPATIENT)
Dept: FAMILY MEDICINE CLINIC | Facility: CLINIC | Age: 81
End: 2021-10-14

## 2021-10-14 VITALS
HEART RATE: 72 BPM | HEIGHT: 67 IN | WEIGHT: 200 LBS | BODY MASS INDEX: 31.39 KG/M2 | DIASTOLIC BLOOD PRESSURE: 78 MMHG | RESPIRATION RATE: 16 BRPM | OXYGEN SATURATION: 98 % | SYSTOLIC BLOOD PRESSURE: 128 MMHG

## 2021-10-14 DIAGNOSIS — E11.22 TYPE 2 DIABETES MELLITUS WITH STAGE 3A CHRONIC KIDNEY DISEASE, WITHOUT LONG-TERM CURRENT USE OF INSULIN (HCC): Primary | ICD-10-CM

## 2021-10-14 DIAGNOSIS — M15.8 OTHER OSTEOARTHRITIS INVOLVING MULTIPLE JOINTS: ICD-10-CM

## 2021-10-14 DIAGNOSIS — N18.31 TYPE 2 DIABETES MELLITUS WITH STAGE 3A CHRONIC KIDNEY DISEASE, WITHOUT LONG-TERM CURRENT USE OF INSULIN (HCC): Primary | ICD-10-CM

## 2021-10-14 DIAGNOSIS — Z80.0 FAMILY HX OF COLON CANCER REQUIRING SCREENING COLONOSCOPY: ICD-10-CM

## 2021-10-14 DIAGNOSIS — N39.41 URGE INCONTINENCE OF URINE: ICD-10-CM

## 2021-10-14 DIAGNOSIS — R39.9 UTI SYMPTOMS: ICD-10-CM

## 2021-10-14 DIAGNOSIS — E03.8 OTHER SPECIFIED HYPOTHYROIDISM: ICD-10-CM

## 2021-10-14 LAB
BILIRUB BLD-MCNC: NEGATIVE MG/DL
CLARITY, POC: CLEAR
COLOR UR: YELLOW
GLUCOSE UR STRIP-MCNC: NEGATIVE MG/DL
KETONES UR QL: NEGATIVE
LEUKOCYTE EST, POC: ABNORMAL
NITRITE UR-MCNC: NEGATIVE MG/ML
PH UR: 5 [PH] (ref 5–8)
PROT UR STRIP-MCNC: ABNORMAL MG/DL
RBC # UR STRIP: ABNORMAL /UL
SP GR UR: 1.03 (ref 1–1.03)
UROBILINOGEN UR QL: NORMAL

## 2021-10-14 PROCEDURE — 81002 URINALYSIS NONAUTO W/O SCOPE: CPT | Performed by: FAMILY MEDICINE

## 2021-10-14 PROCEDURE — 99214 OFFICE O/P EST MOD 30 MIN: CPT | Performed by: FAMILY MEDICINE

## 2021-10-14 RX ORDER — TOLTERODINE 4 MG/1
4 CAPSULE, EXTENDED RELEASE ORAL DAILY
Qty: 180 CAPSULE | Refills: 3 | Status: SHIPPED | OUTPATIENT
Start: 2021-10-14

## 2021-10-14 RX ORDER — CIPROFLOXACIN 250 MG/1
250 TABLET, FILM COATED ORAL 2 TIMES DAILY
Qty: 6 TABLET | Refills: 0 | Status: SHIPPED | OUTPATIENT
Start: 2021-10-14 | End: 2021-10-17

## 2021-10-14 RX ORDER — GABAPENTIN 600 MG/1
600 TABLET ORAL 3 TIMES DAILY
Qty: 90 TABLET | Refills: 3 | Status: SHIPPED | OUTPATIENT
Start: 2021-10-14 | End: 2022-02-22

## 2021-10-14 NOTE — PROGRESS NOTES
Subjective   Rekha Bear is a 81 y.o. female.   Diabetes and Urinary Tract Infection    History of Present Illness     Imelda is here for a follow up on diabetes.   She has type 2 diabetes that has not been well controlled and she has chronic kidney disease that prevents her from taking Metformin on a regular basis.  I started her on glimepiride and Januvia and she is working on a low-carb diet.  She has been in the process of getting implants and is not eating much and that is making it a little bit easier for her to control her diet.  She is here today for hemoglobin A1c and to evaluate her response to therapy.  Rekha has chronic hypothyroidism and has been well controlled on current dose of replacement.She report no concerning symptoms: no cold intolerance, fatigue or hair loss.   She has chronic urinary urge incontinence and has been taking Detrol 2 mg and would like to increase to the 4 mg pill to see how that we will do for her.  She has chronic osteoarthritis involving multiple joints and has been relying on me to provide tramadol and gabapentin to help her with her discomfort.  This is helping her get through her day and she would like refills on both medications.  She also notes that she has had urinary frequency and discomfort and is concerned that she may have a urinary tract infection and has left a specimen for evaluation.  The following portions of the patient's history were reviewed and updated as appropriate: allergies, current medications, past family history, past medical history, past social history, past surgical history and problem list.    Review of Systems   Constitutional: Positive for activity change.   HENT:        Healing implant spikes.   Eyes: Negative.    Respiratory: Negative.    Genitourinary: Positive for pelvic pressure, urgency and urinary incontinence.   Musculoskeletal: Positive for arthralgias and back pain.   Neurological: Negative.    Psychiatric/Behavioral: Negative.         Objective   Physical Exam  Constitutional:       Appearance: Normal appearance.   HENT:      Head: Normocephalic.   Eyes:      Extraocular Movements: Extraocular movements intact.      Pupils: Pupils are equal, round, and reactive to light.   Cardiovascular:      Rate and Rhythm: Normal rate. Rhythm irregular.   Pulmonary:      Effort: Pulmonary effort is normal.      Breath sounds: Normal breath sounds.   Musculoskeletal:         General: Normal range of motion.      Cervical back: Normal range of motion.   Skin:     General: Skin is warm and dry.   Neurological:      Mental Status: She is alert.   Psychiatric:         Mood and Affect: Mood normal.         Behavior: Behavior normal.         Thought Content: Thought content normal.         Judgment: Judgment normal.           Assessment/Plan   Problem List Items Addressed This Visit        Endocrine and Metabolic    Type 2 diabetes mellitus with stage 3 chronic kidney disease, without long-term current use of insulin (HCC) - Primary    Overview     We have been fighting this for quite a while.  Due to chronic kidney disease we cannot use metformin.  So she has been on Amaryl 4 mg and Januvia 25 mg a day.  She has been tolerating well and we will check her hemoglobin A1c today to evaluate her response to therapy.         Relevant Orders    Comprehensive Metabolic Panel (Completed)    Hemoglobin A1c (Completed)    Lipid Panel With LDL / HDL Ratio (Completed)    Hypothyroidism    Overview     Well managed on current dose of thyroid replacement. TSH reviewed and on chart.Patient will return to the office in 6 month for regularly scheduled review of treatment.   Well refill medications as needed.  Levothyroxine 100 mcg a day.         Relevant Orders    TSH (Completed)       Genitourinary and Reproductive     Urinary incontinence    Overview     She has tolerated Detrol LA in the past to 2 mg and she would like to increase to 4 mg for better control.          Relevant Medications    tolterodine LA (DETROL LA) 4 MG 24 hr capsule       Musculoskeletal and Injuries    Osteoarthritis    Overview     Imelda has chronic osteoarthritis and has been taking tramadol as needed and gabapentin 600 mg 3 times a day.  Imelda has read and signed the [facility] Controlled Substance Contract.  I will continue to see Imelda for regular follow up appointments.  She are well controlled on current medication.  SAUL has been reviewed by me and is updated every 3 months. The patient is aware of the potential for addiction and dependence.               Relevant Medications    gabapentin (NEURONTIN) 600 MG tablet      Other Visit Diagnoses     UTI symptoms        Relevant Medications    ciprofloxacin (Cipro) 250 MG tablet    Other Relevant Orders    Urine Culture - Urine, Urine, Clean Catch    POC Urinalysis Dipstick (Completed)    Family hx of colon cancer requiring screening colonoscopy        Relevant Orders    Ambulatory Referral to General Surgery               Return in about 3 months (around 1/14/2022) for Recheck.

## 2021-10-15 LAB
ALBUMIN SERPL-MCNC: 4.4 G/DL (ref 3.5–5.2)
ALBUMIN/GLOB SERPL: 1.8 G/DL
ALP SERPL-CCNC: 87 U/L (ref 39–117)
ALT SERPL-CCNC: 13 U/L (ref 1–33)
AST SERPL-CCNC: 15 U/L (ref 1–32)
BILIRUB SERPL-MCNC: 0.3 MG/DL (ref 0–1.2)
BUN SERPL-MCNC: 16 MG/DL (ref 8–23)
BUN/CREAT SERPL: 15.1 (ref 7–25)
CALCIUM SERPL-MCNC: 9.7 MG/DL (ref 8.6–10.5)
CHLORIDE SERPL-SCNC: 104 MMOL/L (ref 98–107)
CHOLEST SERPL-MCNC: 146 MG/DL (ref 0–200)
CO2 SERPL-SCNC: 27.9 MMOL/L (ref 22–29)
CREAT SERPL-MCNC: 1.06 MG/DL (ref 0.57–1)
GLOBULIN SER CALC-MCNC: 2.4 GM/DL
GLUCOSE SERPL-MCNC: 271 MG/DL (ref 65–99)
HBA1C MFR BLD: 7.9 % (ref 4.8–5.6)
HDLC SERPL-MCNC: 41 MG/DL (ref 40–60)
LDLC SERPL CALC-MCNC: 64 MG/DL (ref 0–100)
LDLC/HDLC SERPL: 1.3 {RATIO}
POTASSIUM SERPL-SCNC: 5.3 MMOL/L (ref 3.5–5.2)
PROT SERPL-MCNC: 6.8 G/DL (ref 6–8.5)
SODIUM SERPL-SCNC: 142 MMOL/L (ref 136–145)
TRIGL SERPL-MCNC: 258 MG/DL (ref 0–150)
TSH SERPL DL<=0.005 MIU/L-ACNC: 4.5 UIU/ML (ref 0.27–4.2)
VLDLC SERPL CALC-MCNC: 41 MG/DL (ref 5–40)

## 2021-10-17 LAB
BACTERIA UR CULT: ABNORMAL
BACTERIA UR CULT: ABNORMAL
OTHER ANTIBIOTIC SUSC ISLT: ABNORMAL

## 2021-10-21 ENCOUNTER — OFFICE VISIT (OUTPATIENT)
Dept: CARDIOLOGY | Facility: CLINIC | Age: 81
End: 2021-10-21

## 2021-10-21 VITALS
WEIGHT: 201.8 LBS | OXYGEN SATURATION: 97 % | SYSTOLIC BLOOD PRESSURE: 122 MMHG | BODY MASS INDEX: 31.67 KG/M2 | HEIGHT: 67 IN | DIASTOLIC BLOOD PRESSURE: 70 MMHG | HEART RATE: 58 BPM

## 2021-10-21 DIAGNOSIS — I49.8 WANDERING ATRIAL PACEMAKER BY ELECTROCARDIOGRAM: Primary | ICD-10-CM

## 2021-10-21 DIAGNOSIS — N18.31 TYPE 2 DIABETES MELLITUS WITH STAGE 3A CHRONIC KIDNEY DISEASE, WITHOUT LONG-TERM CURRENT USE OF INSULIN (HCC): ICD-10-CM

## 2021-10-21 DIAGNOSIS — I10 PRIMARY HYPERTENSION: ICD-10-CM

## 2021-10-21 DIAGNOSIS — E78.5 HYPERLIPIDEMIA, UNSPECIFIED HYPERLIPIDEMIA TYPE: ICD-10-CM

## 2021-10-21 DIAGNOSIS — I47.1 SUPRAVENTRICULAR TACHYCARDIA (HCC): ICD-10-CM

## 2021-10-21 DIAGNOSIS — E11.22 TYPE 2 DIABETES MELLITUS WITH STAGE 3A CHRONIC KIDNEY DISEASE, WITHOUT LONG-TERM CURRENT USE OF INSULIN (HCC): ICD-10-CM

## 2021-10-21 PROCEDURE — 99214 OFFICE O/P EST MOD 30 MIN: CPT | Performed by: INTERNAL MEDICINE

## 2021-10-21 PROCEDURE — 93000 ELECTROCARDIOGRAM COMPLETE: CPT | Performed by: INTERNAL MEDICINE

## 2021-10-21 NOTE — PROGRESS NOTES
Subjective:     Encounter Date:10/21/2021      Patient ID: Rekha Bear is a 81 y.o. female.    Chief Complaint:  History of Present Illness    This is a 81-year-old female with a history of GERD, hypertension, dyslipidemia, diabetes mellitus type 2, and intermittent wandering atrial pacemaker, who presents for follow up.        She presents today for follow-up.  She does report intermittent episodes of lightheadedness but this mainly is with position changes or if she is on her feet for period of time.  She felt this may be due to dehydration because she is not good about drinking water.  Since then she has been trying to stay better hydrated and does report that her lightheadedness has improved some.  She cannot tell me what her heart rates are running at home.  She denies any chest pain, palpitations, orthopnea, near-syncope or syncope, or lower extremity edema.    Prior History:  I saw the patient initially in 10/2015 when she presented for an evaluation of chest pain.  She was also complaining of chronic stable dyspnea on exertion at that time.  I set her up for a PET stress study which was negative for ischemia.  A few months later I was asked to clear the patient for a gynecologic surgery, which I went ahead and did.  However, prior to that the patient called to say that she had received a copy of her stress test and it was noted on the baseline EKG that she was in atrial fibrillation.  I had the patient come in at that time and an EKG done in the office showed a wandering atrial pacemaker with a heart rate in the low 40s.  I asked her to decrease her metoprolol succinate and had her followup.  I also had her undergo a 24-hour Holter monitor which was performed in 05/2016 which revealed sinus rhythm with rare PACs and PVCs but no atrial arrhythmias including atrial fibrillation.   At her last follow-up in 11/2016, on a lower dose of metoprolol succinate her rhythm appeared to be in sinus with a rate  in the 50's.      I saw her last in 6/2018 again for preoperative evaluation.  She continued to complain of dyspnea and fatigue on exertion that had worsened.    She did admit that some of this could be due to increasing issues with her knee for which she is planning on having surgery performed by Dr. Saji Kuo.  At that office visit I felt that her symptoms were likely due to deconditioning and limited mobility related to her right knee.  I recommended proceeding with an echocardiogram which was performed on 6/12/2018 and showed normal left ventricular systolic function and wall motion with an EF 66%, normal diastolic function, and no significant valvular disease.  I cleared the patient for surgery at that time.     Since she was last seen in the office she was admitted from 3/22/2020 to 4/10/2020 with COVID-19 pneumonia and acute hypoxic respiratory failure.  She required intubation and was treated with hydroxychloroquine.  Following her discharge she was a transfer to a skilled nursing facility where she underwent rehab..  In 5/2020 she began having issues with low heart rates in the 40s to 50s associated with dizziness and weakness.  She ended up going to the emergency room and her metoprolol succinate was discontinued.  She had a ZIO monitor placed around that time which was unremarkable except for rare episodes of nonsustained atrial tachycardia.  When I called her to discuss the monitor results she reported that she was feeling better off of the metoprolol.  At her follow-up in 7/2020 she was slowly recovering from her hospitalization.  Her blood pressures remained well controlled off the metoprolol.     In 2/2021 she reported that her blood pressures were elevated at the time.  She did report some worsening of her chronic dyspnea on exertion but admitted that this occurred after she contracted Covid 19 the year prior and had gained weight over the prior year.  I recommended starting amlodipine 5 mg daily at  the time but she developed headaches with this.  We ultimately switched her back to metoprolol succinate 25 mg daily with plans to monitor her for the development of bradycardia which she had issues with in the past.    Follow-up her blood pressures were well controlled and she was not having any significant issues with bradycardia.       Review of Systems   Constitutional: Negative for malaise/fatigue.   HENT: Negative for hearing loss, hoarse voice, nosebleeds and sore throat.    Eyes: Negative for pain.   Cardiovascular: Negative for chest pain, claudication, cyanosis, dyspnea on exertion, irregular heartbeat, leg swelling, near-syncope, orthopnea, palpitations, paroxysmal nocturnal dyspnea and syncope.   Respiratory: Negative for shortness of breath and snoring.    Endocrine: Negative for cold intolerance, heat intolerance, polydipsia, polyphagia and polyuria.   Skin: Negative for itching and rash.   Musculoskeletal: Negative for arthritis, falls, joint pain, joint swelling, muscle cramps, muscle weakness and myalgias.   Gastrointestinal: Negative for constipation, diarrhea, dysphagia, heartburn, hematemesis, hematochezia, melena, nausea and vomiting.   Genitourinary: Negative for frequency, hematuria and hesitancy.   Neurological: Positive for light-headedness. Negative for excessive daytime sleepiness, dizziness, headaches, numbness and weakness.   Psychiatric/Behavioral: Negative for depression. The patient is not nervous/anxious.          Current Outpatient Medications:   •  Acetylcysteine (Nac 600) 600 MG capsule, Take 2 capsules by mouth Daily., Disp: , Rfl:   •  buPROPion XL (WELLBUTRIN XL) 300 MG 24 hr tablet, TAKE ONE TABLET BY MOUTH EVERY MORNING, Disp: 30 tablet, Rfl: 0  •  dextromethorphan polistirex ER (DELSYM) 30 MG/5ML Suspension Extended Release oral suspension, Take 30 mg by mouth 2 (Two) Times a Day As Needed (cough)., Disp: 280 mL, Rfl:   •  gabapentin (NEURONTIN) 600 MG tablet, Take 1 tablet  "by mouth 3 (Three) Times a Day., Disp: 90 tablet, Rfl: 3  •  glimepiride (AMARYL) 2 MG tablet, TAKE 2 TABLETS BY MOUTH EVERY MORNING BEFORE BREAKFAST, Disp: 180 tablet, Rfl: 1  •  guaiFENesin (ROBITUSSIN) 100 MG/5ML solution oral solution, Take 10 mL by mouth Every 4 (Four) Hours As Needed (cough)., Disp: , Rfl:   •  Januvia 25 MG tablet, TAKE ONE TABLET BY MOUTH DAILY, Disp: 30 tablet, Rfl: 2  •  levothyroxine (SYNTHROID, LEVOTHROID) 100 MCG tablet, TAKE ONE TABLET BY MOUTH DAILY, Disp: 30 tablet, Rfl: 0  •  metoprolol succinate XL (TOPROL-XL) 25 MG 24 hr tablet, TAKE ONE TABLET BY MOUTH DAILY, Disp: 30 tablet, Rfl: 1  •  rosuvastatin (CRESTOR) 20 MG tablet, TAKE ONE TABLET BY MOUTH DAILY, Disp: 30 tablet, Rfl: 0  •  tolterodine LA (DETROL LA) 4 MG 24 hr capsule, Take 1 capsule by mouth Daily., Disp: 180 capsule, Rfl: 3  •  traMADol (ULTRAM) 50 MG tablet, TAKE ONE TABLET BY MOUTH EVERY 6 HOURS AS NEEDED FOR MODERATE PAIN, Disp: 90 tablet, Rfl: 0  •  venlafaxine (EFFEXOR) 150 MG tablet sustained-release 24 hour 24 hr tablet, TAKE ONE TABLET BY MOUTH DAILY WITH BREAKFAST, Disp: 90 each, Rfl: 0  •  Vitamin D, Cholecalciferol, (CHOLECALCIFEROL) 10 MCG (400 UNIT) tablet, Take 400 Units by mouth 2 (two) times a day., Disp: , Rfl:     Past Medical History:   Diagnosis Date   • Acute bronchitis    • Chest pain    • Chilblains     \"Chilblian's\"   • Depression    • Fatty liver    • GERD (gastroesophageal reflux disease)    • Hyperlipidemia    • Hypertension    • Incontinence    • Intermittent lightheadedness    • Osteoarthritis     OA  Marvin THR, LT TKR - hydrocodone prescibed by Dr. Almaguer   • Pain of esophagus     \" nervous esophagus\" - she takes the occasional alprazolam   • Peptic ulcer disease    • Pneumonia due to COVID-19 virus 03/2020    now tested negative   • Raynaud's disease     \"Raynauds\"   • Renal disease     followed by Dr. Grewal   • Sinus bradycardia    • Vitamin B 12 deficiency    • Wandering atrial pacemaker by " "electrocardiogram        Past Surgical History:   Procedure Laterality Date   • CATARACT EXTRACTION     • CHOLECYSTECTOMY     • COLONOSCOPY     • COLONOSCOPY N/A 2016    Procedure: COLONOSCOPY with hot snare polypectomy;  Surgeon: George Pabon MD;  Location: [facility] ENDOSCOPY;  Service:    • DENTAL PROCEDURE     • FOOT SURGERY     • TONSILLECTOMY     • TOTAL HIP ARTHROPLASTY Bilateral     OA  Marvin THR, LT TKR - hydrocodone prescibed by Dr. Almaguer   • TOTAL KNEE ARTHROPLASTY Left     OA  Marvin THR, LT TKR - hydrocodone prescibed by Dr. Almaguer       Family History   Problem Relation Age of Onset   • Hypertension Mother    • Diabetes type II Mother    • Hypertension Father    • Coronary artery disease Father    • Diabetes type II Father    • Colon cancer Brother    • Stroke Son    • Breast cancer Maternal Aunt    • Hypertension Other    • Other Other         Lipids  Thyroid       Social History     Tobacco Use   • Smoking status: Former Smoker     Packs/day: 0.50     Years: 14.00     Pack years: 7.00     Quit date:      Years since quittin.8   • Smokeless tobacco: Never Used   • Tobacco comment: CAFFEINE USE   Vaping Use   • Vaping Use: Never used   Substance Use Topics   • Alcohol use: No   • Drug use: No         ECG 12 Lead    Date/Time: 10/21/2021 6:18 PM  Performed by: Elizabeth Pina MD  Authorized by: Elizabeth Pina MD   Comparison: compared with previous ECG   Comparison to previous ECG: Rate has decreased  Comments: Wandering atrial pacemaker               Objective:     Visit Vitals  /70 (BP Location: Right arm, Patient Position: Sitting, Cuff Size: Large Adult)   Pulse 58   Ht 170.2 cm (67\")   Wt 91.5 kg (201 lb 12.8 oz)   LMP  (LMP Unknown)   SpO2 97%   BMI 31.61 kg/m²         Constitutional:       Appearance: Normal appearance. Well-developed.   Eyes:      General: Lids are normal.      Conjunctiva/sclera: Conjunctivae normal.      Pupils: Pupils are equal, round, and reactive " to light.   HENT:      Head: Normocephalic and atraumatic.   Neck:      Vascular: No carotid bruit or JVD.      Lymphadenopathy: No cervical adenopathy.   Pulmonary:      Effort: Pulmonary effort is normal.      Breath sounds: Normal breath sounds.   Cardiovascular:      Normal rate. Irregular rhythm.      No gallop.   Pulses:     Radial: 2+ bilaterally.  Edema:     Peripheral edema absent.   Abdominal:      Palpations: Abdomen is soft.   Musculoskeletal:      Cervical back: Full passive range of motion without pain, normal range of motion and neck supple. Skin:     General: Skin is warm and dry.   Neurological:      Mental Status: Alert and oriented to person, place, and time.           Assessment:          Diagnosis Plan   1. 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 Supraventricular tachycardia (HCC)     3. Hyperlipidemia, unspecified hyperlipidemia type     4. Primary hypertension     5. Type 2 diabetes mellitus with stage 3a chronic kidney disease, without long-term current use of insulin (HCC)            Plan:       1.  Bradycardia.  Heart rates are in the 40s on her EKG today but up in the 50s on exam.  She is not having any apparent symptoms with this at this time.  Cannot really associate this with the lightheadedness she has been intermittently feeling which has improved with better hydration.  I recommended that we just monitor her heart rates for now on her current dose of metoprolol.  2.  Hypertension.  Well-controlled on the metoprolol.  Plan to monitor her heart rates for now as above.  Continue current management.  3.  Wandering atrial pacemaker.  Appears stable.  4.  Hyperlipidemia.  On rosuvastatin which is managed by Dr. Porter.  5.  Diabetes mellitus type 2    I will plan on seeing the patient back again in 6 months.

## 2021-11-20 DIAGNOSIS — F34.1 PRIMARY DYSTHYMIA: ICD-10-CM

## 2021-11-22 RX ORDER — VENLAFAXINE HYDROCHLORIDE 150 MG/1
TABLET, EXTENDED RELEASE ORAL
Qty: 90 TABLET | Refills: 1 | Status: SHIPPED | OUTPATIENT
Start: 2021-11-22 | End: 2022-04-12

## 2021-12-22 DIAGNOSIS — R39.9 UTI SYMPTOMS: ICD-10-CM

## 2021-12-22 RX ORDER — CIPROFLOXACIN 250 MG/1
TABLET, FILM COATED ORAL
Qty: 6 TABLET | Refills: 0 | OUTPATIENT
Start: 2021-12-22

## 2021-12-23 DIAGNOSIS — G89.29 CHRONIC PAIN OF RIGHT KNEE: ICD-10-CM

## 2021-12-23 DIAGNOSIS — M25.561 CHRONIC PAIN OF RIGHT KNEE: ICD-10-CM

## 2021-12-23 RX ORDER — TRAMADOL HYDROCHLORIDE 50 MG/1
50 TABLET ORAL EVERY 6 HOURS PRN
Qty: 90 TABLET | Refills: 0 | Status: SHIPPED | OUTPATIENT
Start: 2021-12-23

## 2022-01-18 DIAGNOSIS — N18.30 TYPE 2 DIABETES MELLITUS WITH STAGE 3 CHRONIC KIDNEY DISEASE, WITHOUT LONG-TERM CURRENT USE OF INSULIN, UNSPECIFIED WHETHER STAGE 3A OR 3B CKD: ICD-10-CM

## 2022-01-18 DIAGNOSIS — E11.22 TYPE 2 DIABETES MELLITUS WITH STAGE 3 CHRONIC KIDNEY DISEASE, WITHOUT LONG-TERM CURRENT USE OF INSULIN, UNSPECIFIED WHETHER STAGE 3A OR 3B CKD: ICD-10-CM

## 2022-01-18 RX ORDER — SITAGLIPTIN 25 MG/1
TABLET, FILM COATED ORAL
Qty: 30 TABLET | Refills: 2 | Status: SHIPPED | OUTPATIENT
Start: 2022-01-18 | End: 2022-04-21

## 2022-01-19 RX ORDER — METOPROLOL SUCCINATE 25 MG/1
TABLET, EXTENDED RELEASE ORAL
Qty: 30 TABLET | Refills: 5 | Status: SHIPPED | OUTPATIENT
Start: 2022-01-19 | End: 2022-08-03

## 2022-02-22 DIAGNOSIS — M15.8 OTHER OSTEOARTHRITIS INVOLVING MULTIPLE JOINTS: ICD-10-CM

## 2022-02-22 RX ORDER — GABAPENTIN 600 MG/1
TABLET ORAL
Qty: 90 TABLET | Refills: 3 | Status: SHIPPED | OUTPATIENT
Start: 2022-02-22 | End: 2022-07-06

## 2022-03-19 DIAGNOSIS — E11.22 TYPE 2 DIABETES MELLITUS WITH STAGE 3 CHRONIC KIDNEY DISEASE, WITHOUT LONG-TERM CURRENT USE OF INSULIN: ICD-10-CM

## 2022-03-19 DIAGNOSIS — N18.30 TYPE 2 DIABETES MELLITUS WITH STAGE 3 CHRONIC KIDNEY DISEASE, WITHOUT LONG-TERM CURRENT USE OF INSULIN: ICD-10-CM

## 2022-03-21 RX ORDER — GLIMEPIRIDE 2 MG/1
4 TABLET ORAL
Qty: 180 TABLET | Refills: 0 | Status: SHIPPED | OUTPATIENT
Start: 2022-03-21 | End: 2022-10-31

## 2022-04-12 DIAGNOSIS — F34.1 PRIMARY DYSTHYMIA: ICD-10-CM

## 2022-04-12 RX ORDER — VENLAFAXINE HYDROCHLORIDE 150 MG/1
150 CAPSULE, EXTENDED RELEASE ORAL DAILY
Qty: 90 CAPSULE | Refills: 1 | Status: SHIPPED | OUTPATIENT
Start: 2022-04-12 | End: 2022-10-24

## 2022-04-21 ENCOUNTER — OFFICE VISIT (OUTPATIENT)
Dept: CARDIOLOGY | Facility: CLINIC | Age: 82
End: 2022-04-21

## 2022-04-21 VITALS
SYSTOLIC BLOOD PRESSURE: 128 MMHG | OXYGEN SATURATION: 99 % | HEIGHT: 67 IN | HEART RATE: 84 BPM | WEIGHT: 198.4 LBS | DIASTOLIC BLOOD PRESSURE: 82 MMHG | BODY MASS INDEX: 31.14 KG/M2

## 2022-04-21 DIAGNOSIS — I47.1 SUPRAVENTRICULAR TACHYCARDIA: ICD-10-CM

## 2022-04-21 DIAGNOSIS — E11.22 TYPE 2 DIABETES MELLITUS WITH STAGE 3A CHRONIC KIDNEY DISEASE, WITHOUT LONG-TERM CURRENT USE OF INSULIN: ICD-10-CM

## 2022-04-21 DIAGNOSIS — R06.09 DYSPNEA ON EXERTION: ICD-10-CM

## 2022-04-21 DIAGNOSIS — I10 PRIMARY HYPERTENSION: ICD-10-CM

## 2022-04-21 DIAGNOSIS — I49.3 PVC (PREMATURE VENTRICULAR CONTRACTION): ICD-10-CM

## 2022-04-21 DIAGNOSIS — E78.5 HYPERLIPIDEMIA, UNSPECIFIED HYPERLIPIDEMIA TYPE: ICD-10-CM

## 2022-04-21 DIAGNOSIS — I49.8 WANDERING ATRIAL PACEMAKER BY ELECTROCARDIOGRAM: Primary | ICD-10-CM

## 2022-04-21 DIAGNOSIS — N18.31 TYPE 2 DIABETES MELLITUS WITH STAGE 3A CHRONIC KIDNEY DISEASE, WITHOUT LONG-TERM CURRENT USE OF INSULIN: ICD-10-CM

## 2022-04-21 PROCEDURE — 99214 OFFICE O/P EST MOD 30 MIN: CPT | Performed by: INTERNAL MEDICINE

## 2022-04-21 PROCEDURE — 93000 ELECTROCARDIOGRAM COMPLETE: CPT | Performed by: INTERNAL MEDICINE

## 2022-04-21 NOTE — PROGRESS NOTES
Subjective:     Encounter Date:04/21/2022      Patient ID: Rekha Bear is a 82 y.o. female.    Chief Complaint:  History of Present Illness    This is a 82-year-old female with a history of GERD, hypertension, dyslipidemia, diabetes mellitus type 2, and intermittent wandering atrial pacemaker, who presents for follow up.        She presents today for routine follow-up.  Her main complaint today is dyspnea on exertion.  This is a chronic issue but she does feel like it has progressed.  She cannot tell me exactly to what degree she feels like her symptoms have progressed.  She does state that she can do very little without getting out of breath.  She is now having to use the motorized cart at the grocery store to get her shopping done.  She denies any significant chest pain.  She denies any palpitations, orthopnea, near-syncope or syncope, or lower extremity edema.     Prior History:  I saw the patient initially in 10/2015 when she presented for an evaluation of chest pain.  She was also complaining of chronic stable dyspnea on exertion at that time.  I set her up for a PET stress study which was negative for ischemia.  A few months later I was asked to clear the patient for a gynecologic surgery, which I went ahead and did.  However, prior to that the patient called to say that she had received a copy of her stress test and it was noted on the baseline EKG that she was in atrial fibrillation.  I had the patient come in at that time and an EKG done in the office showed a wandering atrial pacemaker with a heart rate in the low 40s.  I asked her to decrease her metoprolol succinate and had her followup.  I also had her undergo a 24-hour Holter monitor which was performed in 05/2016 which revealed sinus rhythm with rare PACs and PVCs but no atrial arrhythmias including atrial fibrillation.   At her last follow-up in 11/2016, on a lower dose of metoprolol succinate her rhythm appeared to be in sinus with a rate  in the 50's.      I saw her last in 6/2018 again for preoperative evaluation.  She continued to complain of dyspnea and fatigue on exertion that had worsened.    She did admit that some of this could be due to increasing issues with her knee for which she is planning on having surgery performed by Dr. Saji Kuo.  At that office visit I felt that her symptoms were likely due to deconditioning and limited mobility related to her right knee.  I recommended proceeding with an echocardiogram which was performed on 6/12/2018 and showed normal left ventricular systolic function and wall motion with an EF 66%, normal diastolic function, and no significant valvular disease.  I cleared the patient for surgery at that time.     Since she was last seen in the office she was admitted from 3/22/2020 to 4/10/2020 with COVID-19 pneumonia and acute hypoxic respiratory failure.  She required intubation and was treated with hydroxychloroquine.  Following her discharge she was a transfer to a skilled nursing facility where she underwent rehab..  In 5/2020 she began having issues with low heart rates in the 40s to 50s associated with dizziness and weakness.  She ended up going to the emergency room and her metoprolol succinate was discontinued.  She had a ZIO monitor placed around that time which was unremarkable except for rare episodes of nonsustained atrial tachycardia.  When I called her to discuss the monitor results she reported that she was feeling better off of the metoprolol.  At her follow-up in 7/2020 she was slowly recovering from her hospitalization.  Her blood pressures remained well controlled off the metoprolol.     In 2/2021 she reported that her blood pressures were elevated at the time.  She did report some worsening of her chronic dyspnea on exertion but admitted that this occurred after she contracted Covid 19 the year prior and had gained weight over the prior year.  I recommended starting amlodipine 5 mg daily at  the time but she developed headaches with this.  We ultimately switched her back to metoprolol succinate 25 mg daily with plans to monitor her for the development of bradycardia which she had issues with in the past.    Follow-up her blood pressures were well controlled and she was not having any significant issues with bradycardia.     Review of Systems   Constitutional: Positive for malaise/fatigue.   HENT: Negative for hearing loss, hoarse voice, nosebleeds and sore throat.    Eyes: Negative for pain.   Cardiovascular: Positive for dyspnea on exertion. Negative for chest pain, claudication, cyanosis, irregular heartbeat, leg swelling, near-syncope, orthopnea, palpitations, paroxysmal nocturnal dyspnea and syncope.   Respiratory: Negative for shortness of breath and snoring.    Endocrine: Negative for cold intolerance, heat intolerance, polydipsia, polyphagia and polyuria.   Skin: Negative for itching and rash.   Musculoskeletal: Positive for joint pain. Negative for arthritis, falls, joint swelling, muscle cramps, muscle weakness and myalgias.   Gastrointestinal: Negative for constipation, diarrhea, dysphagia, heartburn, hematemesis, hematochezia, melena, nausea and vomiting.   Genitourinary: Negative for frequency, hematuria and hesitancy.   Neurological: Negative for excessive daytime sleepiness, dizziness, headaches, light-headedness, numbness and weakness.   Psychiatric/Behavioral: Negative for depression. The patient is not nervous/anxious.          Current Outpatient Medications:   •  Acetylcysteine capsule capsule, Take 2 capsules by mouth Daily., Disp: , Rfl:   •  buPROPion XL (WELLBUTRIN XL) 300 MG 24 hr tablet, TAKE ONE TABLET BY MOUTH EVERY MORNING, Disp: 30 tablet, Rfl: 0  •  dextromethorphan polistirex ER (DELSYM) 30 MG/5ML Suspension Extended Release oral suspension, Take 30 mg by mouth 2 (Two) Times a Day As Needed (cough)., Disp: 280 mL, Rfl:   •  gabapentin (NEURONTIN) 600 MG tablet, TAKE ONE  "TABLET BY MOUTH THREE TIMES A DAY, Disp: 90 tablet, Rfl: 3  •  glimepiride (AMARYL) 2 MG tablet, TAKE 2 TABLETS BY MOUTH EVERY MORNING BEFORE BREAKFAST, Disp: 180 tablet, Rfl: 0  •  guaiFENesin (ROBITUSSIN) 100 MG/5ML solution oral solution, Take 10 mL by mouth Every 4 (Four) Hours As Needed (cough)., Disp: , Rfl:   •  levothyroxine (SYNTHROID, LEVOTHROID) 100 MCG tablet, TAKE ONE TABLET BY MOUTH DAILY, Disp: 30 tablet, Rfl: 0  •  metoprolol succinate XL (TOPROL-XL) 25 MG 24 hr tablet, TAKE ONE TABLET BY MOUTH DAILY, Disp: 30 tablet, Rfl: 5  •  rosuvastatin (CRESTOR) 20 MG tablet, TAKE ONE TABLET BY MOUTH DAILY, Disp: 30 tablet, Rfl: 0  •  tolterodine LA (DETROL LA) 4 MG 24 hr capsule, Take 1 capsule by mouth Daily., Disp: 180 capsule, Rfl: 3  •  traMADol (ULTRAM) 50 MG tablet, Take 1 tablet by mouth Every 6 (Six) Hours As Needed for Moderate Pain ., Disp: 90 tablet, Rfl: 0  •  venlafaxine XR (EFFEXOR-XR) 150 MG 24 hr capsule, Take 1 capsule by mouth Daily., Disp: 90 capsule, Rfl: 1  •  Vitamin D, Cholecalciferol, (CHOLECALCIFEROL) 10 MCG (400 UNIT) tablet, Take 400 Units by mouth 2 (two) times a day., Disp: , Rfl:     Past Medical History:   Diagnosis Date   • Acute bronchitis    • Chest pain    • Chilblains     \"Chilblian's\"   • Depression    • Fatty liver    • GERD (gastroesophageal reflux disease)    • Hyperlipidemia    • Hypertension    • Incontinence    • Intermittent lightheadedness    • Osteoarthritis     OA  Marvin THR, LT TKR - hydrocodone prescibed by Dr. Almaguer   • Pain of esophagus     \" nervous esophagus\" - she takes the occasional alprazolam   • Peptic ulcer disease    • Pneumonia due to COVID-19 virus 03/2020    now tested negative   • Raynaud's disease     \"Raynauds\"   • Renal disease     followed by Dr. Grewal   • Sinus bradycardia    • Vitamin B 12 deficiency    • 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        Past Surgical History:   Procedure Laterality Date   • CATARACT EXTRACTION     • " "CHOLECYSTECTOMY     • COLONOSCOPY     • COLONOSCOPY N/A 2016    Procedure: COLONOSCOPY with hot snare polypectomy;  Surgeon: George Pabon MD;  [facility] END[facility]COPY;  Service:    • DENTAL PROCEDURE     • FOOT SURGERY     • TONSILLECTOMY     • TOTAL HIP ARTHROPLASTY Bilateral     OA  Marvin THR, LT TKR - hydrocodone prescibed by Dr. Almaguer   • TOTAL KNEE ARTHROPLASTY Left     OA  Marvin THR, LT TKR - hydrocodone prescibed by Dr. Almaguer       Family History   Problem Relation Age of Onset   • Hypertension Mother    • Diabetes type II Mother    • Hypertension Father    • Coronary artery disease Father    • Diabetes type II Father    • Colon cancer Brother    • Stroke Son    • Breast cancer Maternal Aunt    • Hypertension Other    • Other Other         Lipids  Thyroid       Social History     Tobacco Use   • Smoking status: Former Smoker     Packs/day: 0.50     Years: 14.00     Pack years: 7.00     Quit date:      Years since quittin.3   • Smokeless tobacco: Never Used   • Tobacco comment: CAFFEINE USE   Vaping Use   • Vaping Use: Never used   Substance Use Topics   • Alcohol use: No   • Drug use: No         ECG 12 Lead    Date/Time: 2022 11:46 AM  Performed by: Elizabeth Pina MD  Authorized by: Elizabeth Pina MD   Comparison: compared with previous ECG   Comparison to previous ECG: Wandering atrial pacemaker has resolved, PVCs are new  Rhythm: sinus rhythm  Ectopy: unifocal PVCs  Conduction: left anterior fascicular block               Objective:     Visit Vitals  /82 (BP Location: Right arm, Patient Position: Sitting, Cuff Size: Large Adult)   Pulse 84   Ht 170.2 cm (67\")   Wt 90 kg (198 lb 6.4 oz)   LMP  (LMP Unknown)   SpO2 99%   BMI 31.07 kg/m²         Constitutional:       Appearance: Normal appearance. Well-developed.   Eyes:      General: Lids are normal.      Conjunctiva/sclera: Conjunctivae normal.      Pupils: Pupils are equal, round, and reactive to light.   HENT:      " Head: Normocephalic and atraumatic.   Neck:      Vascular: No carotid bruit or JVD.      Lymphadenopathy: No cervical adenopathy.   Pulmonary:      Effort: Pulmonary effort is normal.      Breath sounds: Normal breath sounds.   Cardiovascular:      Normal rate. Regular rhythm.      No gallop.   Pulses:     Radial: 2+ bilaterally.  Edema:     Peripheral edema absent.   Abdominal:      Palpations: Abdomen is soft.   Musculoskeletal:      Cervical back: Full passive range of motion without pain, normal range of motion and neck supple. Skin:     General: Skin is warm and dry.   Neurological:      Mental Status: Alert and oriented to person, place, and time.             Assessment:          Diagnosis Plan   1. 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 Hyperlipidemia, unspecified hyperlipidemia type     3. Primary hypertension     4. Supraventricular tachycardia (HCC)     5. Type 2 diabetes mellitus with stage 3a chronic kidney disease, without long-term current use of insulin (HCC)     6. PVC (premature ventricular contraction)  Stress Test With Myocardial Perfusion One Day    Adult Transthoracic Echo Complete w/ Color, Spectral and Contrast if Necessary Per Protocol   7. Dyspnea on exertion  Stress Test With Myocardial Perfusion One Day    Adult Transthoracic Echo Complete w/ Color, Spectral and Contrast if Necessary Per Protocol          Plan:       1.  Dyspnea on exertion.  Her EKG shows no new ischemic changes although she does have PVCs which are new.  Is unclear if her dyspnea is due to deconditioning from her joint issues and possibly some lingering lung issues since her bout with COVID-19 pneumonia in 2020 or if this could be an anginal equivalent.  She has not had any cardiac testing performed in a few years.  I will go ahead and proceed with an echocardiogram and stress test to rule out a cardiac issue in light of her risk factors.  2.  PVCs.  Plan as per #1.  3.  Wandering atrial pacemaker.  No  evidence of this on her EKG today.  4.  Hypertension.  Well-controlled on current medications.  Continue the same.  5.  Hyperlipidemia.  On rosuvastatin which is managed by Dr. Porter.  6.  Diabetes mellitus type 2.    I will call and discuss results of her stress test and echocardiogram and determine further recommendations based on those results.

## 2022-04-26 ENCOUNTER — APPOINTMENT (OUTPATIENT)
Dept: CT IMAGING | Facility: HOSPITAL | Age: 82
End: 2022-04-26

## 2022-04-26 ENCOUNTER — HOSPITAL ENCOUNTER (OUTPATIENT)
Facility: HOSPITAL | Age: 82
Setting detail: OBSERVATION
Discharge: HOME | End: 2022-04-27
Attending: EMERGENCY MEDICINE | Admitting: EMERGENCY MEDICINE

## 2022-04-26 DIAGNOSIS — I95.1 ORTHOSTASIS: ICD-10-CM

## 2022-04-26 DIAGNOSIS — E11.65 TYPE 2 DIABETES MELLITUS WITH HYPERGLYCEMIA, WITHOUT LONG-TERM CURRENT USE OF INSULIN: ICD-10-CM

## 2022-04-26 DIAGNOSIS — R42 DIZZINESS: Primary | ICD-10-CM

## 2022-04-26 DIAGNOSIS — N18.9 CHRONIC KIDNEY DISEASE, UNSPECIFIED CKD STAGE: ICD-10-CM

## 2022-04-26 LAB
ALBUMIN SERPL-MCNC: 3.8 G/DL (ref 3.5–5.2)
ALBUMIN/GLOB SERPL: 1.4 G/DL
ALP SERPL-CCNC: 89 U/L (ref 39–117)
ALT SERPL W P-5'-P-CCNC: 9 U/L (ref 1–33)
ANION GAP SERPL CALCULATED.3IONS-SCNC: 12.1 MMOL/L (ref 5–15)
AST SERPL-CCNC: 11 U/L (ref 1–32)
BACTERIA UR QL AUTO: ABNORMAL /HPF
BASOPHILS # BLD AUTO: 0.03 10*3/MM3 (ref 0–0.2)
BASOPHILS NFR BLD AUTO: 0.5 % (ref 0–1.5)
BILIRUB SERPL-MCNC: 0.7 MG/DL (ref 0–1.2)
BILIRUB UR QL STRIP: NEGATIVE
BUN SERPL-MCNC: 16 MG/DL (ref 8–23)
BUN/CREAT SERPL: 13.4 (ref 7–25)
CALCIUM SPEC-SCNC: 8.9 MG/DL (ref 8.6–10.5)
CHLORIDE SERPL-SCNC: 97 MMOL/L (ref 98–107)
CLARITY UR: ABNORMAL
CO2 SERPL-SCNC: 24.9 MMOL/L (ref 22–29)
COLOR UR: YELLOW
CREAT SERPL-MCNC: 1.19 MG/DL (ref 0.57–1)
DEPRECATED RDW RBC AUTO: 44.3 FL (ref 37–54)
EGFRCR SERPLBLD CKD-EPI 2021: 46.3 ML/MIN/1.73
EOSINOPHIL # BLD AUTO: 0.16 10*3/MM3 (ref 0–0.4)
EOSINOPHIL NFR BLD AUTO: 2.8 % (ref 0.3–6.2)
ERYTHROCYTE [DISTWIDTH] IN BLOOD BY AUTOMATED COUNT: 13.7 % (ref 12.3–15.4)
GLOBULIN UR ELPH-MCNC: 2.8 GM/DL
GLUCOSE SERPL-MCNC: 279 MG/DL (ref 65–99)
GLUCOSE UR STRIP-MCNC: ABNORMAL MG/DL
HCT VFR BLD AUTO: 41.3 % (ref 34–46.6)
HGB BLD-MCNC: 14.1 G/DL (ref 12–15.9)
HGB UR QL STRIP.AUTO: ABNORMAL
HOLD SPECIMEN: NORMAL
HOLD SPECIMEN: NORMAL
HYALINE CASTS UR QL AUTO: ABNORMAL /LPF
IMM GRANULOCYTES # BLD AUTO: 0.01 10*3/MM3 (ref 0–0.05)
IMM GRANULOCYTES NFR BLD AUTO: 0.2 % (ref 0–0.5)
KETONES UR QL STRIP: ABNORMAL
LEUKOCYTE ESTERASE UR QL STRIP.AUTO: ABNORMAL
LYMPHOCYTES # BLD AUTO: 1.44 10*3/MM3 (ref 0.7–3.1)
LYMPHOCYTES NFR BLD AUTO: 24.8 % (ref 19.6–45.3)
MCH RBC QN AUTO: 30.2 PG (ref 26.6–33)
MCHC RBC AUTO-ENTMCNC: 34.1 G/DL (ref 31.5–35.7)
MCV RBC AUTO: 88.4 FL (ref 79–97)
MONOCYTES # BLD AUTO: 0.53 10*3/MM3 (ref 0.1–0.9)
MONOCYTES NFR BLD AUTO: 9.1 % (ref 5–12)
NEUTROPHILS NFR BLD AUTO: 3.64 10*3/MM3 (ref 1.7–7)
NEUTROPHILS NFR BLD AUTO: 62.6 % (ref 42.7–76)
NITRITE UR QL STRIP: NEGATIVE
NRBC BLD AUTO-RTO: 0 /100 WBC (ref 0–0.2)
PH UR STRIP.AUTO: <=5 [PH] (ref 5–8)
PLATELET # BLD AUTO: 164 10*3/MM3 (ref 140–450)
PMV BLD AUTO: 8.4 FL (ref 6–12)
POTASSIUM SERPL-SCNC: 4.3 MMOL/L (ref 3.5–5.2)
PROT SERPL-MCNC: 6.6 G/DL (ref 6–8.5)
PROT UR QL STRIP: ABNORMAL
QT INTERVAL: 459 MS
RBC # BLD AUTO: 4.67 10*6/MM3 (ref 3.77–5.28)
RBC # UR STRIP: ABNORMAL /HPF
REF LAB TEST METHOD: ABNORMAL
SARS-COV-2 RNA RESP QL NAA+PROBE: NOT DETECTED
SODIUM SERPL-SCNC: 134 MMOL/L (ref 136–145)
SP GR UR STRIP: 1.02 (ref 1–1.03)
SQUAMOUS #/AREA URNS HPF: ABNORMAL /HPF
TROPONIN T SERPL-MCNC: <0.01 NG/ML (ref 0–0.03)
TSH SERPL DL<=0.05 MIU/L-ACNC: 5.52 UIU/ML (ref 0.27–4.2)
UROBILINOGEN UR QL STRIP: ABNORMAL
WBC # UR STRIP: ABNORMAL /HPF
WBC NRBC COR # BLD: 5.81 10*3/MM3 (ref 3.4–10.8)
WHOLE BLOOD HOLD SPECIMEN: NORMAL
WHOLE BLOOD HOLD SPECIMEN: NORMAL
YEAST URNS QL MICRO: ABNORMAL /HPF

## 2022-04-26 PROCEDURE — 99284 EMERGENCY DEPT VISIT MOD MDM: CPT

## 2022-04-26 PROCEDURE — 70450 CT HEAD/BRAIN W/O DYE: CPT

## 2022-04-26 PROCEDURE — C9803 HOPD COVID-19 SPEC COLLECT: HCPCS

## 2022-04-26 PROCEDURE — 93010 ELECTROCARDIOGRAM REPORT: CPT | Performed by: INTERNAL MEDICINE

## 2022-04-26 PROCEDURE — 80053 COMPREHEN METABOLIC PANEL: CPT | Performed by: NURSE PRACTITIONER

## 2022-04-26 PROCEDURE — G0378 HOSPITAL OBSERVATION PER HR: HCPCS

## 2022-04-26 PROCEDURE — 84443 ASSAY THYROID STIM HORMONE: CPT | Performed by: STUDENT IN AN ORGANIZED HEALTH CARE EDUCATION/TRAINING PROGRAM

## 2022-04-26 PROCEDURE — 96360 HYDRATION IV INFUSION INIT: CPT

## 2022-04-26 PROCEDURE — U0003 INFECTIOUS AGENT DETECTION BY NUCLEIC ACID (DNA OR RNA); SEVERE ACUTE RESPIRATORY SYNDROME CORONAVIRUS 2 (SARS-COV-2) (CORONAVIRUS DISEASE [COVID-19]), AMPLIFIED PROBE TECHNIQUE, MAKING USE OF HIGH THROUGHPUT TECHNOLOGIES AS DESCRIBED BY CMS-2020-01-R: HCPCS | Performed by: NURSE PRACTITIONER

## 2022-04-26 PROCEDURE — 81001 URINALYSIS AUTO W/SCOPE: CPT | Performed by: NURSE PRACTITIONER

## 2022-04-26 PROCEDURE — 96361 HYDRATE IV INFUSION ADD-ON: CPT

## 2022-04-26 PROCEDURE — 93005 ELECTROCARDIOGRAM TRACING: CPT | Performed by: NURSE PRACTITIONER

## 2022-04-26 PROCEDURE — 84439 ASSAY OF FREE THYROXINE: CPT | Performed by: STUDENT IN AN ORGANIZED HEALTH CARE EDUCATION/TRAINING PROGRAM

## 2022-04-26 PROCEDURE — 84484 ASSAY OF TROPONIN QUANT: CPT | Performed by: NURSE PRACTITIONER

## 2022-04-26 PROCEDURE — 85025 COMPLETE CBC W/AUTO DIFF WBC: CPT | Performed by: NURSE PRACTITIONER

## 2022-04-26 PROCEDURE — 84481 FREE ASSAY (FT-3): CPT | Performed by: STUDENT IN AN ORGANIZED HEALTH CARE EDUCATION/TRAINING PROGRAM

## 2022-04-26 PROCEDURE — U0005 INFEC AGEN DETEC AMPLI PROBE: HCPCS | Performed by: NURSE PRACTITIONER

## 2022-04-26 RX ORDER — SODIUM CHLORIDE 0.9 % (FLUSH) 0.9 %
10 SYRINGE (ML) INJECTION AS NEEDED
Status: DISCONTINUED | OUTPATIENT
Start: 2022-04-26 | End: 2022-04-27 | Stop reason: HOSPADM

## 2022-04-26 RX ORDER — GABAPENTIN 300 MG/1
600 CAPSULE ORAL 3 TIMES DAILY
Refills: 3 | Status: DISCONTINUED | OUTPATIENT
Start: 2022-04-26 | End: 2022-04-27 | Stop reason: HOSPADM

## 2022-04-26 RX ORDER — METOPROLOL SUCCINATE 25 MG/1
25 TABLET, EXTENDED RELEASE ORAL DAILY
Status: DISCONTINUED | OUTPATIENT
Start: 2022-04-27 | End: 2022-04-27 | Stop reason: HOSPADM

## 2022-04-26 RX ORDER — NITROGLYCERIN 0.4 MG/1
0.4 TABLET SUBLINGUAL
Status: DISCONTINUED | OUTPATIENT
Start: 2022-04-26 | End: 2022-04-27 | Stop reason: HOSPADM

## 2022-04-26 RX ORDER — SODIUM CHLORIDE 9 MG/ML
75 INJECTION, SOLUTION INTRAVENOUS CONTINUOUS
Status: DISCONTINUED | OUTPATIENT
Start: 2022-04-26 | End: 2022-04-27 | Stop reason: HOSPADM

## 2022-04-26 RX ORDER — LEVOTHYROXINE SODIUM 0.05 MG/1
100 TABLET ORAL DAILY
Status: DISCONTINUED | OUTPATIENT
Start: 2022-04-27 | End: 2022-04-27 | Stop reason: HOSPADM

## 2022-04-26 RX ORDER — DEXTROSE MONOHYDRATE 25 G/50ML
25 INJECTION, SOLUTION INTRAVENOUS
Status: DISCONTINUED | OUTPATIENT
Start: 2022-04-26 | End: 2022-04-27 | Stop reason: HOSPADM

## 2022-04-26 RX ORDER — INSULIN LISPRO 100 [IU]/ML
0-9 INJECTION, SOLUTION INTRAVENOUS; SUBCUTANEOUS
Status: DISCONTINUED | OUTPATIENT
Start: 2022-04-27 | End: 2022-04-27 | Stop reason: HOSPADM

## 2022-04-26 RX ORDER — SODIUM CHLORIDE 9 MG/ML
100 INJECTION, SOLUTION INTRAVENOUS CONTINUOUS
Status: DISCONTINUED | OUTPATIENT
Start: 2022-04-26 | End: 2022-04-26

## 2022-04-26 RX ORDER — TRAMADOL HYDROCHLORIDE 50 MG/1
50 TABLET ORAL EVERY 6 HOURS PRN
Status: DISCONTINUED | OUTPATIENT
Start: 2022-04-26 | End: 2022-04-27 | Stop reason: HOSPADM

## 2022-04-26 RX ORDER — ONDANSETRON 2 MG/ML
4 INJECTION INTRAMUSCULAR; INTRAVENOUS EVERY 6 HOURS PRN
Status: DISCONTINUED | OUTPATIENT
Start: 2022-04-26 | End: 2022-04-27 | Stop reason: HOSPADM

## 2022-04-26 RX ORDER — SODIUM CHLORIDE 0.9 % (FLUSH) 0.9 %
10 SYRINGE (ML) INJECTION EVERY 12 HOURS SCHEDULED
Status: DISCONTINUED | OUTPATIENT
Start: 2022-04-26 | End: 2022-04-27 | Stop reason: HOSPADM

## 2022-04-26 RX ORDER — ACETAMINOPHEN 325 MG/1
650 TABLET ORAL EVERY 4 HOURS PRN
Status: DISCONTINUED | OUTPATIENT
Start: 2022-04-26 | End: 2022-04-27 | Stop reason: HOSPADM

## 2022-04-26 RX ORDER — ROSUVASTATIN CALCIUM 20 MG/1
20 TABLET, COATED ORAL DAILY
Status: DISCONTINUED | OUTPATIENT
Start: 2022-04-27 | End: 2022-04-27 | Stop reason: HOSPADM

## 2022-04-26 RX ORDER — BUPROPION HYDROCHLORIDE 150 MG/1
300 TABLET ORAL EVERY MORNING
Status: DISCONTINUED | OUTPATIENT
Start: 2022-04-27 | End: 2022-04-27 | Stop reason: HOSPADM

## 2022-04-26 RX ORDER — ACETAMINOPHEN 650 MG/1
650 SUPPOSITORY RECTAL EVERY 4 HOURS PRN
Status: DISCONTINUED | OUTPATIENT
Start: 2022-04-26 | End: 2022-04-27 | Stop reason: HOSPADM

## 2022-04-26 RX ORDER — NICOTINE POLACRILEX 4 MG
15 LOZENGE BUCCAL
Status: DISCONTINUED | OUTPATIENT
Start: 2022-04-26 | End: 2022-04-27 | Stop reason: HOSPADM

## 2022-04-26 RX ORDER — VENLAFAXINE HYDROCHLORIDE 150 MG/1
150 CAPSULE, EXTENDED RELEASE ORAL DAILY
Status: DISCONTINUED | OUTPATIENT
Start: 2022-04-27 | End: 2022-04-27 | Stop reason: HOSPADM

## 2022-04-26 RX ORDER — ACETAMINOPHEN 160 MG/5ML
650 SOLUTION ORAL EVERY 4 HOURS PRN
Status: DISCONTINUED | OUTPATIENT
Start: 2022-04-26 | End: 2022-04-27 | Stop reason: HOSPADM

## 2022-04-26 RX ADMIN — SODIUM CHLORIDE 500 ML: 9 INJECTION, SOLUTION INTRAVENOUS at 16:08

## 2022-04-26 RX ADMIN — SODIUM CHLORIDE 75 ML/HR: 9 INJECTION, SOLUTION INTRAVENOUS at 23:14

## 2022-04-26 RX ADMIN — SODIUM CHLORIDE 500 ML: 9 INJECTION, SOLUTION INTRAVENOUS at 16:42

## 2022-04-26 RX ADMIN — SODIUM CHLORIDE 100 ML/HR: 9 INJECTION, SOLUTION INTRAVENOUS at 17:40

## 2022-04-26 RX ADMIN — TRAMADOL HYDROCHLORIDE 50 MG: 50 TABLET, COATED ORAL at 23:13

## 2022-04-26 RX ADMIN — GABAPENTIN 600 MG: 300 CAPSULE ORAL at 23:13

## 2022-04-27 ENCOUNTER — READMISSION MANAGEMENT (OUTPATIENT)
Dept: CALL CENTER | Facility: HOSPITAL | Age: 82
End: 2022-04-27

## 2022-04-27 VITALS
OXYGEN SATURATION: 95 % | RESPIRATION RATE: 18 BRPM | TEMPERATURE: 98.4 F | BODY MASS INDEX: 31.08 KG/M2 | SYSTOLIC BLOOD PRESSURE: 138 MMHG | DIASTOLIC BLOOD PRESSURE: 62 MMHG | HEART RATE: 61 BPM | HEIGHT: 67 IN | WEIGHT: 198 LBS

## 2022-04-27 LAB
ANION GAP SERPL CALCULATED.3IONS-SCNC: 10.9 MMOL/L (ref 5–15)
BUN SERPL-MCNC: 15 MG/DL (ref 8–23)
BUN/CREAT SERPL: 15.6 (ref 7–25)
CALCIUM SPEC-SCNC: 8.3 MG/DL (ref 8.6–10.5)
CHLORIDE SERPL-SCNC: 101 MMOL/L (ref 98–107)
CO2 SERPL-SCNC: 23.1 MMOL/L (ref 22–29)
CREAT SERPL-MCNC: 0.96 MG/DL (ref 0.57–1)
DEPRECATED RDW RBC AUTO: 45.4 FL (ref 37–54)
EGFRCR SERPLBLD CKD-EPI 2021: 59.9 ML/MIN/1.73
ERYTHROCYTE [DISTWIDTH] IN BLOOD BY AUTOMATED COUNT: 13.7 % (ref 12.3–15.4)
GLUCOSE BLDC GLUCOMTR-MCNC: 214 MG/DL (ref 70–130)
GLUCOSE BLDC GLUCOMTR-MCNC: 255 MG/DL (ref 70–130)
GLUCOSE SERPL-MCNC: 242 MG/DL (ref 65–99)
HCT VFR BLD AUTO: 38.9 % (ref 34–46.6)
HGB BLD-MCNC: 12.7 G/DL (ref 12–15.9)
MCH RBC QN AUTO: 29.4 PG (ref 26.6–33)
MCHC RBC AUTO-ENTMCNC: 32.6 G/DL (ref 31.5–35.7)
MCV RBC AUTO: 90 FL (ref 79–97)
PLATELET # BLD AUTO: 166 10*3/MM3 (ref 140–450)
PMV BLD AUTO: 8.4 FL (ref 6–12)
POTASSIUM SERPL-SCNC: 3.9 MMOL/L (ref 3.5–5.2)
RBC # BLD AUTO: 4.32 10*6/MM3 (ref 3.77–5.28)
SODIUM SERPL-SCNC: 135 MMOL/L (ref 136–145)
T3FREE SERPL-MCNC: 2.2 PG/ML (ref 2–4.4)
T4 FREE SERPL-MCNC: 1.17 NG/DL (ref 0.93–1.7)
WBC NRBC COR # BLD: 7.37 10*3/MM3 (ref 3.4–10.8)

## 2022-04-27 PROCEDURE — 82962 GLUCOSE BLOOD TEST: CPT

## 2022-04-27 PROCEDURE — 80048 BASIC METABOLIC PNL TOTAL CA: CPT | Performed by: EMERGENCY MEDICINE

## 2022-04-27 PROCEDURE — 97161 PT EVAL LOW COMPLEX 20 MIN: CPT

## 2022-04-27 PROCEDURE — 85027 COMPLETE CBC AUTOMATED: CPT | Performed by: EMERGENCY MEDICINE

## 2022-04-27 PROCEDURE — 99213 OFFICE O/P EST LOW 20 MIN: CPT | Performed by: INTERNAL MEDICINE

## 2022-04-27 PROCEDURE — G0378 HOSPITAL OBSERVATION PER HR: HCPCS

## 2022-04-27 PROCEDURE — 99214 OFFICE O/P EST MOD 30 MIN: CPT | Performed by: NURSE PRACTITIONER

## 2022-04-27 PROCEDURE — 97110 THERAPEUTIC EXERCISES: CPT

## 2022-04-27 PROCEDURE — 63710000001 INSULIN LISPRO (HUMAN) PER 5 UNITS: Performed by: STUDENT IN AN ORGANIZED HEALTH CARE EDUCATION/TRAINING PROGRAM

## 2022-04-27 RX ADMIN — LEVOTHYROXINE SODIUM 100 MCG: 0.05 TABLET ORAL at 09:38

## 2022-04-27 RX ADMIN — Medication 10 ML: at 09:38

## 2022-04-27 RX ADMIN — GABAPENTIN 600 MG: 300 CAPSULE ORAL at 09:38

## 2022-04-27 RX ADMIN — INSULIN LISPRO 4 UNITS: 100 INJECTION, SOLUTION INTRAVENOUS; SUBCUTANEOUS at 07:29

## 2022-04-27 RX ADMIN — VENLAFAXINE HYDROCHLORIDE 150 MG: 150 CAPSULE, EXTENDED RELEASE ORAL at 09:38

## 2022-04-27 RX ADMIN — BUPROPION HYDROCHLORIDE 300 MG: 150 TABLET, EXTENDED RELEASE ORAL at 07:29

## 2022-04-27 RX ADMIN — INSULIN LISPRO 6 UNITS: 100 INJECTION, SOLUTION INTRAVENOUS; SUBCUTANEOUS at 12:06

## 2022-04-27 RX ADMIN — METOPROLOL SUCCINATE 25 MG: 25 TABLET, EXTENDED RELEASE ORAL at 09:38

## 2022-04-27 NOTE — OUTREACH NOTE
Prep Survey    Flowsheet Row Responses   [facility] patient discharged from? [place]   Is LACE score < 7 ? Yes   Emergency Room discharge w/ pulse ox? No   Eligibility [facility]   Date of Admission 04/26/22   Date of Discharge 04/27/22   Discharge Disposition Home or Self Care   Discharge diagnosis dizziness   Does the patient have one of the following disease processes/diagnoses(primary or secondary)? Other   Does the patient have Home health ordered? No   Is there a DME ordered? No   Prep survey completed? Yes          XIOMARA Rojas Registered Nurse

## 2022-04-28 ENCOUNTER — TRANSITIONAL CARE MANAGEMENT TELEPHONE ENCOUNTER (OUTPATIENT)
Dept: CALL CENTER | Facility: HOSPITAL | Age: 82
End: 2022-04-28

## 2022-04-28 NOTE — OUTREACH NOTE
Call Center TCM Note    Flowsheet Row Responses   [facility] patient discharged from? [place]   Does the patient have one of the following disease processes/diagnoses(primary or secondary)? Other   TCM attempt successful? No   Unsuccessful attempts Attempt 2          Jazzmine Sánchez RN    4/28/2022, 14:30 EDT

## 2022-04-28 NOTE — OUTREACH NOTE
Call Center TCM Note    Flowsheet Row Responses   [facility] patient discharged from? [place]   Does the patient have one of the following disease processes/diagnoses(primary or secondary)? Other   TCM attempt successful? No   Unsuccessful attempts Attempt 1  [PCP Verbal release out of date ]          Jazzmine Sánchez RN    4/28/2022, 11:20 EDT

## 2022-04-29 ENCOUNTER — TRANSITIONAL CARE MANAGEMENT TELEPHONE ENCOUNTER (OUTPATIENT)
Dept: CALL CENTER | Facility: HOSPITAL | Age: 82
End: 2022-04-29

## 2022-04-29 NOTE — OUTREACH NOTE
Call Center TCM Note    Flowsheet Row Responses   [facility] patient discharged from? [place]   Does the patient have one of the following disease processes/diagnoses(primary or secondary)? Other   TCM attempt successful? No  [no verbal release]   Unsuccessful attempts Attempt 3          Pilar Beltre RN    4/29/2022, 08:43 EDT

## 2022-05-04 ENCOUNTER — OFFICE VISIT (OUTPATIENT)
Dept: FAMILY MEDICINE CLINIC | Facility: CLINIC | Age: 82
End: 2022-05-04

## 2022-05-04 VITALS
HEIGHT: 67 IN | BODY MASS INDEX: 31.55 KG/M2 | SYSTOLIC BLOOD PRESSURE: 134 MMHG | OXYGEN SATURATION: 99 % | WEIGHT: 201 LBS | HEART RATE: 54 BPM | RESPIRATION RATE: 16 BRPM | DIASTOLIC BLOOD PRESSURE: 74 MMHG

## 2022-05-04 DIAGNOSIS — R41.3 MEMORY DEFICITS: Primary | ICD-10-CM

## 2022-05-04 DIAGNOSIS — Z23 NEED FOR VACCINATION: ICD-10-CM

## 2022-05-04 DIAGNOSIS — F33.0 MAJOR DEPRESSIVE DISORDER, RECURRENT, MILD: ICD-10-CM

## 2022-05-04 DIAGNOSIS — H91.90 HEARING LOSS, UNSPECIFIED HEARING LOSS TYPE, UNSPECIFIED LATERALITY: ICD-10-CM

## 2022-05-04 PROCEDURE — 99215 OFFICE O/P EST HI 40 MIN: CPT | Performed by: FAMILY MEDICINE

## 2022-05-04 PROCEDURE — 91305 COVID-19 (PFIZER) 12+ YRS: CPT | Performed by: FAMILY MEDICINE

## 2022-05-04 PROCEDURE — 0051A COVID-19 (PFIZER) 12+ YRS: CPT | Performed by: FAMILY MEDICINE

## 2022-05-04 NOTE — PROGRESS NOTES
"Subjective   Rekha Bear is a 82 y.o. female.   Memory Loss, Hypertension, and Diabetes    History of Present Illness   Imelda is here accompanied by her daughter and gives permission to discuss her health issues in her presence.  Her children are concerned because Imelda has had word finding issues, memory issues and difficulty organizing certain events in her mind  She was in the ER last week because she had missed some medications and became dizzy.  Her daughter notes that her mother had not eaten that day.  Imelda does not feel this is a big problem for her but her daughter states they would like to discuss an evaluation for their mom.  Just feels that she is always had ADHD.  She thinks that a lot of the problem.  She definitely has some mobility issues and is taking tramadol to help with the pain that she feels in her legs but says she only takes it occasionally.  She is also taking gabapentin 900 mg 3 times a day.  She ambulates with a cane because of the pain in her feet.  She has pain from neuropathy and she has been diagnosed in the past with chilblains.  She has not seen anyone for see in quite a long time and was advised to always wear socks to help keep her toes warm and she does not like to do that so she is not wearing socks.  She is having a lot of difficulty managing a new cell phone.  She tells me right upfront that she is \"a hoarder\".  Her daughter tells me that they have cleaned out the house at least 3 times and she just goes back to her familiar behavior.  A Mini-Mental status was done in office and Imelda had a score of 29.  I asked daughter about possible hearing loss and she is thinks it could be a possibility and is willing to get the hearing evaluation for her mother.  She is agrees to this as well.    The following portions of the patient's history were reviewed and updated as appropriate: allergies, current medications, past family history, past medical history, past social history, past " surgical history and problem list.    Review of Systems   Constitutional: Negative.    HENT: Negative.    Eyes: Negative.    Respiratory: Negative.    Cardiovascular: Negative.    Genitourinary: Negative.    Musculoskeletal: Positive for gait problem and myalgias.   Skin: Negative.    Neurological: Positive for numbness.   Psychiatric/Behavioral: Negative.        Objective   Physical Exam  Constitutional:       Appearance: Normal appearance.   HENT:      Head: Normocephalic.   Eyes:      Extraocular Movements: Extraocular movements intact.      Pupils: Pupils are equal, round, and reactive to light.   Cardiovascular:      Rate and Rhythm: Normal rate. Rhythm irregular.   Pulmonary:      Effort: Pulmonary effort is normal.      Breath sounds: Normal breath sounds.   Musculoskeletal:         General: Normal range of motion.      Cervical back: Normal range of motion.      Comments: All toes on both feet are swollen, cool to the touch and callused.  No tenderness with palpation.   Skin:     General: Skin is warm and dry.   Neurological:      Mental Status: She is alert and oriented to person, place, and time.   Psychiatric:         Mood and Affect: Mood normal.         Behavior: Behavior normal.         Thought Content: Thought content normal.         Judgment: Judgment normal.           Assessment/Plan   Problem List Items Addressed This Visit        Mental Health    Major depressive disorder, recurrent, mild (HCC)    Overview     She is well managed on current meds and reports no signs or symptoms of self harm, suicidal ideation. This medication helps with daily life and relationships. Will refill as needed and advised 6 month follow up.Effexor 150 mg a day.             Other Visit Diagnoses     Memory deficits    -  Primary    Relevant Orders    Ambulatory Referral to Neuropsychology    Hearing loss, unspecified hearing loss type, unspecified laterality        Relevant Orders    Ambulatory Referral to Audiology     Need for vaccination        Relevant Orders    COVID-19 Vaccine (Pfizer) Gray Cap (Completed)      Imelda, her daughter and I spent over 40 minutes in face-to-face conversation regarding the above-noted concerns.  I conducted a Mini-Mental status exam, and I have advised follow-up for hearing loss and for neuropsych evaluation.  Patient and her daughter will follow up with me after we have these results.         Return in about 2 months (around 7/4/2022) for Recheck.

## 2022-05-10 ENCOUNTER — HOSPITAL ENCOUNTER (OUTPATIENT)
Dept: CARDIOLOGY | Facility: HOSPITAL | Age: 82
Discharge: HOME | End: 2022-05-10

## 2022-05-10 VITALS
DIASTOLIC BLOOD PRESSURE: 84 MMHG | WEIGHT: 201 LBS | HEIGHT: 67 IN | SYSTOLIC BLOOD PRESSURE: 116 MMHG | BODY MASS INDEX: 31.55 KG/M2 | HEART RATE: 60 BPM

## 2022-05-10 DIAGNOSIS — R06.09 DYSPNEA ON EXERTION: ICD-10-CM

## 2022-05-10 DIAGNOSIS — I49.3 PVC (PREMATURE VENTRICULAR CONTRACTION): ICD-10-CM

## 2022-05-10 LAB
AORTIC ARCH: 2.4 CM
ASCENDING AORTA: 3.1 CM
BH CV ECHO MEAS - ACS: 1.67 CM
BH CV ECHO MEAS - AO MAX PG: 10.3 MMHG
BH CV ECHO MEAS - AO MEAN PG: 5.1 MMHG
BH CV ECHO MEAS - AO ROOT DIAM: 3.1 CM
BH CV ECHO MEAS - AO V2 MAX: 160.2 CM/SEC
BH CV ECHO MEAS - AO V2 VTI: 34.7 CM
BH CV ECHO MEAS - AVA(I,D): 1.2 CM2
BH CV ECHO MEAS - EDV(CUBED): 85.8 ML
BH CV ECHO MEAS - EDV(MOD-SP2): 95 ML
BH CV ECHO MEAS - EDV(MOD-SP4): 107 ML
BH CV ECHO MEAS - EF(MOD-BP): 66.3 %
BH CV ECHO MEAS - EF(MOD-SP2): 68.4 %
BH CV ECHO MEAS - EF(MOD-SP4): 66.4 %
BH CV ECHO MEAS - ESV(CUBED): 23.6 ML
BH CV ECHO MEAS - ESV(MOD-SP2): 30 ML
BH CV ECHO MEAS - ESV(MOD-SP4): 36 ML
BH CV ECHO MEAS - FS: 34.9 %
BH CV ECHO MEAS - IVS/LVPW: 1.06 CM
BH CV ECHO MEAS - IVSD: 1.24 CM
BH CV ECHO MEAS - LAT PEAK E' VEL: 7.3 CM/SEC
BH CV ECHO MEAS - LV DIASTOLIC VOL/BSA (35-75): 52.8 CM2
BH CV ECHO MEAS - LV MASS(C)D: 193.6 GRAMS
BH CV ECHO MEAS - LV MAX PG: 1.44 MMHG
BH CV ECHO MEAS - LV MEAN PG: 0.87 MMHG
BH CV ECHO MEAS - LV SYSTOLIC VOL/BSA (12-30): 17.8 CM2
BH CV ECHO MEAS - LV V1 MAX: 60 CM/SEC
BH CV ECHO MEAS - LV V1 VTI: 15.7 CM
BH CV ECHO MEAS - LVIDD: 4.4 CM
BH CV ECHO MEAS - LVIDS: 2.9 CM
BH CV ECHO MEAS - LVOT AREA: 2.6 CM2
BH CV ECHO MEAS - LVOT DIAM: 1.83 CM
BH CV ECHO MEAS - LVPWD: 1.17 CM
BH CV ECHO MEAS - MED PEAK E' VEL: 5.3 CM/SEC
BH CV ECHO MEAS - MR MAX PG: 70.4 MMHG
BH CV ECHO MEAS - MR MAX VEL: 419.4 CM/SEC
BH CV ECHO MEAS - MV A DUR: 0.11 SEC
BH CV ECHO MEAS - MV A MAX VEL: 79.7 CM/SEC
BH CV ECHO MEAS - MV DEC SLOPE: 219.9 CM/SEC2
BH CV ECHO MEAS - MV DEC TIME: 0.25 MSEC
BH CV ECHO MEAS - MV E MAX VEL: 82.7 CM/SEC
BH CV ECHO MEAS - MV E/A: 1.04
BH CV ECHO MEAS - MV MAX PG: 5.8 MMHG
BH CV ECHO MEAS - MV MEAN PG: 1.04 MMHG
BH CV ECHO MEAS - MV P1/2T: 164.5 MSEC
BH CV ECHO MEAS - MV V2 VTI: 45.6 CM
BH CV ECHO MEAS - MVA(P1/2T): 1.34 CM2
BH CV ECHO MEAS - MVA(VTI): 0.91 CM2
BH CV ECHO MEAS - PA ACC TIME: 0.19 SEC
BH CV ECHO MEAS - PA PR(ACCEL): -5.4 MMHG
BH CV ECHO MEAS - PA V2 MAX: 116.7 CM/SEC
BH CV ECHO MEAS - PULM A REVS DUR: 0.07 SEC
BH CV ECHO MEAS - PULM A REVS VEL: 15.9 CM/SEC
BH CV ECHO MEAS - PULM DIAS VEL: 39.4 CM/SEC
BH CV ECHO MEAS - PULM S/D: 1.02
BH CV ECHO MEAS - PULM SYS VEL: 40.3 CM/SEC
BH CV ECHO MEAS - QP/QS: 1.89
BH CV ECHO MEAS - RAP SYSTOLE: 3 MMHG
BH CV ECHO MEAS - RV MAX PG: 2.5 MMHG
BH CV ECHO MEAS - RV V1 MAX: 79.7 CM/SEC
BH CV ECHO MEAS - RV V1 VTI: 23.1 CM
BH CV ECHO MEAS - RVOT DIAM: 2.08 CM
BH CV ECHO MEAS - RVSP: 31 MMHG
BH CV ECHO MEAS - SI(MOD-SP2): 32.1 ML/M2
BH CV ECHO MEAS - SI(MOD-SP4): 35 ML/M2
BH CV ECHO MEAS - SUP REN AO DIAM: 2.3 CM
BH CV ECHO MEAS - SV(LVOT): 41.5 ML
BH CV ECHO MEAS - SV(MOD-SP2): 65 ML
BH CV ECHO MEAS - SV(MOD-SP4): 71 ML
BH CV ECHO MEAS - SV(RVOT): 78.2 ML
BH CV ECHO MEAS - TAPSE (>1.6): 2.6 CM
BH CV ECHO MEAS - TR MAX PG: 27 MMHG
BH CV ECHO MEAS - TR MAX VEL: 261 CM/SEC
BH CV ECHO MEASUREMENTS AVERAGE E/E' RATIO: 13.13
BH CV NUCLEAR PRIOR STUDY: 3
BH CV REST NUCLEAR ISOTOPE DOSE: 10.9 MCI
BH CV STRESS BP STAGE 1: NORMAL
BH CV STRESS COMMENTS STAGE 1: NORMAL
BH CV STRESS DOSE REGADENOSON STAGE 1: 0.4
BH CV STRESS DURATION MIN STAGE 1: 0
BH CV STRESS DURATION SEC STAGE 1: 10
BH CV STRESS HR STAGE 1: 58
BH CV STRESS NUCLEAR ISOTOPE DOSE: 34.6 MCI
BH CV STRESS PROTOCOL 1: NORMAL
BH CV STRESS RECOVERY BP: NORMAL MMHG
BH CV STRESS RECOVERY HR: 47 BPM
BH CV STRESS STAGE 1: 1
BH CV XLRA - RV BASE: 2.8 CM
BH CV XLRA - RV LENGTH: 7.5 CM
BH CV XLRA - RV MID: 3.4 CM
BH CV XLRA - TDI S': 10.8 CM/SEC
LEFT ATRIUM VOLUME INDEX: 22.9 ML/M2
LV EF NUC BP: 61 %
MAXIMAL PREDICTED HEART RATE: 140 BPM
MAXIMAL PREDICTED HEART RATE: 140 BPM
PERCENT MAX PREDICTED HR: 41.43 %
SINUS: 2.7 CM
STJ: 2.21 CM
STRESS BASELINE BP: NORMAL MMHG
STRESS BASELINE HR: 39 BPM
STRESS PERCENT HR: 49 %
STRESS POST EXERCISE DUR SEC: 10 SEC
STRESS POST PEAK BP: NORMAL MMHG
STRESS POST PEAK HR: 58 BPM
STRESS TARGET HR: 119 BPM
STRESS TARGET HR: 119 BPM

## 2022-05-10 PROCEDURE — 93018 CV STRESS TEST I&R ONLY: CPT | Performed by: INTERNAL MEDICINE

## 2022-05-10 PROCEDURE — 93017 CV STRESS TEST TRACING ONLY: CPT

## 2022-05-10 PROCEDURE — A9502 TC99M TETROFOSMIN: HCPCS | Performed by: INTERNAL MEDICINE

## 2022-05-10 PROCEDURE — 78452 HT MUSCLE IMAGE SPECT MULT: CPT | Performed by: INTERNAL MEDICINE

## 2022-05-10 PROCEDURE — 93016 CV STRESS TEST SUPVJ ONLY: CPT | Performed by: INTERNAL MEDICINE

## 2022-05-10 PROCEDURE — 25010000002 REGADENOSON 0.4 MG/5ML SOLUTION: Performed by: INTERNAL MEDICINE

## 2022-05-10 PROCEDURE — 78452 HT MUSCLE IMAGE SPECT MULT: CPT

## 2022-05-10 PROCEDURE — 93306 TTE W/DOPPLER COMPLETE: CPT | Performed by: INTERNAL MEDICINE

## 2022-05-10 PROCEDURE — 93306 TTE W/DOPPLER COMPLETE: CPT

## 2022-05-10 PROCEDURE — 0 TECHNETIUM TETROFOSMIN KIT: Performed by: INTERNAL MEDICINE

## 2022-05-10 RX ADMIN — TETROFOSMIN 1 DOSE: 1.38 INJECTION, POWDER, LYOPHILIZED, FOR SOLUTION INTRAVENOUS at 13:25

## 2022-05-10 RX ADMIN — TETROFOSMIN 1 DOSE: 1.38 INJECTION, POWDER, LYOPHILIZED, FOR SOLUTION INTRAVENOUS at 14:06

## 2022-05-10 RX ADMIN — REGADENOSON 0.4 MG: 0.08 INJECTION, SOLUTION INTRAVENOUS at 14:06

## 2022-05-11 NOTE — PROGRESS NOTES
I called and left a message with the normal stress test and unremarkable echocardiogram results.  I will arrange for her to see me back in follow-up in 6 months.

## 2022-05-31 ENCOUNTER — TELEPHONE (OUTPATIENT)
Dept: FAMILY MEDICINE CLINIC | Facility: CLINIC | Age: 82
End: 2022-05-31

## 2022-05-31 DIAGNOSIS — F34.1 PRIMARY DYSTHYMIA: ICD-10-CM

## 2022-05-31 RX ORDER — BUPROPION HYDROCHLORIDE 300 MG/1
300 TABLET ORAL EVERY MORNING
Qty: 90 TABLET | Refills: 1 | Status: SHIPPED | OUTPATIENT
Start: 2022-05-31 | End: 2022-12-12

## 2022-05-31 NOTE — TELEPHONE ENCOUNTER
Caller: Rekha Bear    Relationship: Self    Best call back number: [phone]*    Requested Prescriptions:   Requested Prescriptions     Pending Prescriptions Disp Refills   • buPROPion XL (WELLBUTRIN XL) 300 MG 24 hr tablet 30 tablet 0     Sig: Take 1 tablet by mouth Every Morning.        Pharmacy where request should be sent: [id number] - [phone]  - [phone] FX     Additional details provided by patient: PATIENT STATES SHE IS COMPLETELY OUT OF MEDICATION AND HADN'T HAD IT REFILLED DUE TO THE COST, BUT SHE WILL BE USING [facility] AT [facility] AND REQUESTING 90-DAY REFILL.    Does the patient have less than a 3 day supply:  [x] Yes  [] No    Pawan Rivera   05/31/22 13:55 EDT

## 2022-05-31 NOTE — TELEPHONE ENCOUNTER
Caller: Rekha Bear    Relationship: Self    Best call back number: [phone]*    What is the best time to reach you: ANYTIME    Who are you requesting to speak with (clinical staff, provider,  specific staff member): GERSON    What was the call regarding: PATIENT CALLING FOR GERSON. PATIENT STATES THAT SHE IS NOT HAVING ANY LUCK WITH GETTING ASSISTANCE WITH THE JANUVIA, TO HELP WITH THE COST. THE PATIENT IS WANTING TO KNOW IF THERE IS ANOTHER MEDICATION THAT SHE CAN TAKE THAT WOULD BE LOWER IN COST.    Do you require a callback: YES

## 2022-06-01 NOTE — TELEPHONE ENCOUNTER
Please call Imelda - I think we can increase her glimepiride up to 8 mg a day.  Please confirm that she is now taking 4 mg a day.  If so, I would like her to come in for a hemoglobin A1c and a complete metabolic profile so I can gauge where she is at this moment.  Her last hemoglobin A1c looks to be done about 6 months ago.  And if her kidney function is good and her hemoglobin A1c is what I think it is we can try to increase this medication slowly but we can do it.  Please ask her to come in for these labs and we can go from there.  And make sure Marino about her current dose.

## 2022-06-03 DIAGNOSIS — E11.9 DIABETES MELLITUS WITHOUT COMPLICATION: Primary | ICD-10-CM

## 2022-06-08 ENCOUNTER — OFFICE VISIT (OUTPATIENT)
Dept: FAMILY MEDICINE CLINIC | Facility: CLINIC | Age: 82
End: 2022-06-08

## 2022-06-08 VITALS
WEIGHT: 197 LBS | HEART RATE: 56 BPM | HEIGHT: 67 IN | SYSTOLIC BLOOD PRESSURE: 120 MMHG | RESPIRATION RATE: 16 BRPM | BODY MASS INDEX: 30.92 KG/M2 | DIASTOLIC BLOOD PRESSURE: 82 MMHG

## 2022-06-08 DIAGNOSIS — E03.9 ACQUIRED HYPOTHYROIDISM: Primary | ICD-10-CM

## 2022-06-08 DIAGNOSIS — E11.22 TYPE 2 DIABETES MELLITUS WITH STAGE 3A CHRONIC KIDNEY DISEASE, WITHOUT LONG-TERM CURRENT USE OF INSULIN: ICD-10-CM

## 2022-06-08 DIAGNOSIS — N18.31 TYPE 2 DIABETES MELLITUS WITH STAGE 3A CHRONIC KIDNEY DISEASE, WITHOUT LONG-TERM CURRENT USE OF INSULIN: ICD-10-CM

## 2022-06-08 PROCEDURE — 99214 OFFICE O/P EST MOD 30 MIN: CPT | Performed by: FAMILY MEDICINE

## 2022-06-08 NOTE — PROGRESS NOTES
Subjective   Rekha Bear is a 82 y.o. female.   Diabetes    History of Present Illness   Imelda is here for diabetes follow up.  She could not afford the Januvia and is here to discuss medication changes.  She has type 2 diabetes that has not been well controlled and she has chronic kidney disease that has prevented me from prescribing Metformin in the past. However, she cannot afford other meds as advised and she may have adequate renal function now. She needs labs today to see if this is possible. She is taking glimepiride and she is working on a low-carb diet.  She has been in the process of getting implants and is not eating much and that is making it a little bit easier for her to control her diet.      The following portions of the patient's history were reviewed and updated as appropriate: allergies, current medications, past family history, past medical history, past social history, past surgical history and problem list.    Review of Systems   Constitutional: Negative.    HENT: Negative.    Eyes: Negative.    Respiratory: Negative.    Cardiovascular: Negative.    Genitourinary: Negative.    Musculoskeletal: Positive for gait problem and myalgias.   Skin: Negative.    Neurological: Positive for numbness.   Psychiatric/Behavioral: Negative.        Objective   Physical Exam  Constitutional:       Appearance: Normal appearance.   HENT:      Head: Normocephalic.      Comments: Bruise on forehead  Eyes:      Extraocular Movements: Extraocular movements intact.      Pupils: Pupils are equal, round, and reactive to light.   Cardiovascular:      Rate and Rhythm: Normal rate. Rhythm irregular.   Pulmonary:      Effort: Pulmonary effort is normal.      Breath sounds: Normal breath sounds.   Musculoskeletal:         General: Normal range of motion.      Cervical back: Normal range of motion.   Skin:     General: Skin is warm and dry.   Neurological:      Mental Status: She is alert.   Psychiatric:         Mood and  Affect: Mood normal.         Behavior: Behavior normal.         Thought Content: Thought content normal.         Judgment: Judgment normal.           Assessment & Plan   Problem List Items Addressed This Visit        Endocrine and Metabolic    Type 2 diabetes mellitus with stage 3 chronic kidney disease, without long-term current use of insulin (HCC)    Overview     We have been fighting this for quite a while.  Due to chronic kidney disease in the past, I have not tried  metformin.  So she has been on Amaryl 4 mg  Will check CMP today to see if we can give metformin a chance..           Hypothyroidism - Primary    Overview     Well managed on current dose of thyroid replacement. TSH reviewed and on chart.Patient will return to the office in 6 month for regularly scheduled review of treatment.   Well refill medications as needed.  Levothyroxine 100 mcg a day.           Relevant Orders    TSH               Return in about 4 weeks (around 7/6/2022) for Recheck.

## 2022-06-09 DIAGNOSIS — E11.9 DIABETES MELLITUS WITHOUT COMPLICATION: Primary | ICD-10-CM

## 2022-06-09 DIAGNOSIS — Z79.899 HIGH RISK MEDICATION USE: Primary | ICD-10-CM

## 2022-06-09 LAB
ALBUMIN SERPL-MCNC: 4.2 G/DL (ref 3.7–4.7)
ALBUMIN/GLOB SERPL: 1.6 {RATIO} (ref 1.2–2.2)
ALP SERPL-CCNC: 90 IU/L (ref 44–121)
ALT SERPL-CCNC: 11 IU/L (ref 0–32)
AST SERPL-CCNC: 15 IU/L (ref 0–40)
BILIRUB SERPL-MCNC: 0.5 MG/DL (ref 0–1.2)
BUN SERPL-MCNC: 18 MG/DL (ref 8–27)
BUN/CREAT SERPL: 17 (ref 12–28)
CALCIUM SERPL-MCNC: 9.5 MG/DL (ref 8.7–10.3)
CHLORIDE SERPL-SCNC: 100 MMOL/L (ref 96–106)
CO2 SERPL-SCNC: 23 MMOL/L (ref 20–29)
CREAT SERPL-MCNC: 1.09 MG/DL (ref 0.57–1)
EGFRCR SERPLBLD CKD-EPI 2021: 51 ML/MIN/1.73
GLOBULIN SER CALC-MCNC: 2.7 G/DL (ref 1.5–4.5)
GLUCOSE SERPL-MCNC: 273 MG/DL (ref 65–99)
HBA1C MFR BLD: 9.3 % (ref 4.8–5.6)
POTASSIUM SERPL-SCNC: 4.8 MMOL/L (ref 3.5–5.2)
PROT SERPL-MCNC: 6.9 G/DL (ref 6–8.5)
SODIUM SERPL-SCNC: 138 MMOL/L (ref 134–144)
TSH SERPL DL<=0.005 MIU/L-ACNC: 2.75 UIU/ML (ref 0.45–4.5)

## 2022-06-09 RX ORDER — METFORMIN HYDROCHLORIDE 500 MG/1
500 TABLET, EXTENDED RELEASE ORAL
Qty: 30 TABLET | Refills: 2 | Status: SHIPPED | OUTPATIENT
Start: 2022-06-09

## 2022-07-01 DIAGNOSIS — E11.9 DIABETES MELLITUS WITHOUT COMPLICATION: Primary | ICD-10-CM

## 2022-07-01 LAB
ALBUMIN SERPL-MCNC: 4.1 G/DL (ref 3.7–4.7)
ALBUMIN/GLOB SERPL: 1.4 {RATIO} (ref 1.2–2.2)
ALP SERPL-CCNC: 95 IU/L (ref 44–121)
ALT SERPL-CCNC: 8 IU/L (ref 0–32)
AST SERPL-CCNC: 13 IU/L (ref 0–40)
BILIRUB SERPL-MCNC: 0.9 MG/DL (ref 0–1.2)
BUN SERPL-MCNC: 22 MG/DL (ref 8–27)
BUN/CREAT SERPL: 15 (ref 12–28)
CALCIUM SERPL-MCNC: 9.1 MG/DL (ref 8.7–10.3)
CHLORIDE SERPL-SCNC: 100 MMOL/L (ref 96–106)
CO2 SERPL-SCNC: 20 MMOL/L (ref 20–29)
CREAT SERPL-MCNC: 1.48 MG/DL (ref 0.57–1)
EGFRCR SERPLBLD CKD-EPI 2021: 36 ML/MIN/1.73
GLOBULIN SER CALC-MCNC: 2.9 G/DL (ref 1.5–4.5)
GLUCOSE SERPL-MCNC: 178 MG/DL (ref 65–99)
POTASSIUM SERPL-SCNC: 4.5 MMOL/L (ref 3.5–5.2)
PROT SERPL-MCNC: 7 G/DL (ref 6–8.5)
SODIUM SERPL-SCNC: 140 MMOL/L (ref 134–144)
TSH SERPL DL<=0.005 MIU/L-ACNC: 0.99 UIU/ML (ref 0.45–4.5)

## 2022-07-06 DIAGNOSIS — M15.8 OTHER OSTEOARTHRITIS INVOLVING MULTIPLE JOINTS: ICD-10-CM

## 2022-07-06 RX ORDER — GABAPENTIN 600 MG/1
TABLET ORAL
Qty: 90 TABLET | Refills: 1 | Status: SHIPPED | OUTPATIENT
Start: 2022-07-06 | End: 2022-09-02

## 2022-08-03 RX ORDER — METOPROLOL SUCCINATE 25 MG/1
TABLET, EXTENDED RELEASE ORAL
Qty: 30 TABLET | Refills: 5 | Status: SHIPPED | OUTPATIENT
Start: 2022-08-03

## 2022-09-02 DIAGNOSIS — M15.8 OTHER OSTEOARTHRITIS INVOLVING MULTIPLE JOINTS: ICD-10-CM

## 2022-09-02 RX ORDER — GABAPENTIN 600 MG/1
TABLET ORAL
Qty: 90 TABLET | Refills: 4 | Status: SHIPPED | OUTPATIENT
Start: 2022-09-02

## 2022-10-24 RX ORDER — VENLAFAXINE HYDROCHLORIDE 150 MG/1
CAPSULE, EXTENDED RELEASE ORAL
Qty: 90 CAPSULE | Refills: 1 | Status: SHIPPED | OUTPATIENT
Start: 2022-10-24

## 2022-10-30 DIAGNOSIS — N18.30 TYPE 2 DIABETES MELLITUS WITH STAGE 3 CHRONIC KIDNEY DISEASE, WITHOUT LONG-TERM CURRENT USE OF INSULIN: ICD-10-CM

## 2022-10-30 DIAGNOSIS — E11.22 TYPE 2 DIABETES MELLITUS WITH STAGE 3 CHRONIC KIDNEY DISEASE, WITHOUT LONG-TERM CURRENT USE OF INSULIN: ICD-10-CM

## 2022-10-31 RX ORDER — GLIMEPIRIDE 2 MG/1
TABLET ORAL
Qty: 180 TABLET | Refills: 0 | Status: SHIPPED | OUTPATIENT
Start: 2022-10-31 | End: 2023-01-27

## 2022-11-03 DIAGNOSIS — E03.9 ACQUIRED HYPOTHYROIDISM: ICD-10-CM

## 2022-11-03 NOTE — TELEPHONE ENCOUNTER
Caller: Rekha Bear    Relationship: Self    Best call back number:   [phone]          Requested Prescriptions:   Requested Prescriptions     Pending Prescriptions Disp Refills   • levothyroxine (SYNTHROID, LEVOTHROID) 100 MCG tablet 30 tablet 0     Sig: Take 1 tablet by mouth Daily.        Pharmacy where request should be sent: [facility] [id number] [address] [address] [facility] [address]  - [phone]  - [phone] FX     Additional details provided by patient:       OUT OF MEDICATION    PATIENT WOULD LIKE FOR THE SCRIPT TO BE WRITTEN FOR 90 DAYS NOT 30 PLEASE    Does the patient have less than a 3 day supply:  [x] Yes  [] No    Marilyn Galeas Rep   11/03/22 15:44 EDT

## 2022-11-04 ENCOUNTER — TELEPHONE (OUTPATIENT)
Dept: CARDIOLOGY | Facility: CLINIC | Age: 82
End: 2022-11-04

## 2022-11-04 RX ORDER — LEVOTHYROXINE SODIUM 0.1 MG/1
100 TABLET ORAL DAILY
Qty: 90 TABLET | Refills: 2 | Status: SHIPPED | OUTPATIENT
Start: 2022-11-04

## 2022-11-04 NOTE — TELEPHONE ENCOUNTER
Pt left vm. B/p has been a little low she went to the her endocrinologist b/p was 70/51& they stop her metoprolol. She says she is felling fine but she will go back sometime next week to get her b/b check again.

## 2022-11-28 ENCOUNTER — TELEPHONE (OUTPATIENT)
Dept: FAMILY MEDICINE CLINIC | Facility: CLINIC | Age: 82
End: 2022-11-28

## 2022-11-28 DIAGNOSIS — N64.59 BREAST THICKENING: ICD-10-CM

## 2022-11-28 DIAGNOSIS — N63.10 MASS OF RIGHT BREAST, UNSPECIFIED QUADRANT: ICD-10-CM

## 2022-11-28 DIAGNOSIS — R92.8 ABNORMAL MAMMOGRAM OF BOTH BREASTS: Primary | ICD-10-CM

## 2022-11-28 NOTE — TELEPHONE ENCOUNTER
Tami from [facility] called to request orders for Mammo for bilateral diagnostic with ultrasound. Patient is scheduled for 11/29/22.    Reason for this is a Right breast mass, biopsy  Left breast thickening     Caller requesting verbal orders if we cant send one over today.     Phone: [phone] ask for aTmi  Fax: [phone]

## 2022-11-29 NOTE — TELEPHONE ENCOUNTER
Received call from Tami at [facility] requesting that both referrals be updated and faxed to their office as soon as possible. Caller states that patient is being seen today 11/29 and is currently in the office. Requesting that dx on order for both US and mammo be updated to:   Right Breast Mass  Left Breast Thickening    See fax/phone info below. Please advise.

## 2022-12-10 DIAGNOSIS — F34.1 PRIMARY DYSTHYMIA: ICD-10-CM

## 2022-12-12 RX ORDER — BUPROPION HYDROCHLORIDE 300 MG/1
300 TABLET ORAL EVERY MORNING
Qty: 30 TABLET | Refills: 0 | Status: SHIPPED | OUTPATIENT
Start: 2022-12-12

## 2023-01-27 DIAGNOSIS — N18.30 TYPE 2 DIABETES MELLITUS WITH STAGE 3 CHRONIC KIDNEY DISEASE, WITHOUT LONG-TERM CURRENT USE OF INSULIN: ICD-10-CM

## 2023-01-27 DIAGNOSIS — E11.22 TYPE 2 DIABETES MELLITUS WITH STAGE 3 CHRONIC KIDNEY DISEASE, WITHOUT LONG-TERM CURRENT USE OF INSULIN: ICD-10-CM

## 2023-01-27 RX ORDER — GLIMEPIRIDE 2 MG/1
4 TABLET ORAL
Qty: 60 TABLET | Refills: 0 | Status: SHIPPED | OUTPATIENT
Start: 2023-01-27

## 2023-04-18 DIAGNOSIS — F34.1 PRIMARY DYSTHYMIA: ICD-10-CM

## 2023-04-18 RX ORDER — BUPROPION HYDROCHLORIDE 300 MG/1
300 TABLET ORAL EVERY MORNING
Qty: 30 TABLET | Refills: 0 | Status: SHIPPED | OUTPATIENT
Start: 2023-04-18

## 2023-04-18 NOTE — TELEPHONE ENCOUNTER
PATIENT CALLED FOR MEDICATION REFILL/ NEW PRESCRIPTION OF    buPROPion XL (WELLBUTRIN XL) 300 MG 24 hr tablet  SHE IS OUT OF MEDICATION    NEW PHARMACY    [id number] - [place], [place] - [address] - [phone]  - [phone]   [phone]    CALL BACK NUMBER [phone]

## 2023-05-09 RX ORDER — VENLAFAXINE HYDROCHLORIDE 150 MG/1
CAPSULE, EXTENDED RELEASE ORAL
Qty: 90 CAPSULE | Refills: 0 | Status: SHIPPED | OUTPATIENT
Start: 2023-05-09

## 2023-05-12 DIAGNOSIS — F34.1 PRIMARY DYSTHYMIA: ICD-10-CM

## 2023-05-12 RX ORDER — BUPROPION HYDROCHLORIDE 300 MG/1
TABLET ORAL
Qty: 30 TABLET | Refills: 4 | Status: SHIPPED | OUTPATIENT
Start: 2023-05-12

## 2023-08-06 DIAGNOSIS — E03.9 ACQUIRED HYPOTHYROIDISM: ICD-10-CM

## 2023-08-07 RX ORDER — LEVOTHYROXINE SODIUM 0.1 MG/1
TABLET ORAL
Qty: 90 TABLET | Refills: 0 | Status: SHIPPED | OUTPATIENT
Start: 2023-08-07

## 2023-10-08 ENCOUNTER — APPOINTMENT (OUTPATIENT)
Dept: GENERAL RADIOLOGY | Facility: HOSPITAL | Age: 83
End: 2023-10-08
Payer: MEDICARE

## 2023-10-08 ENCOUNTER — APPOINTMENT (OUTPATIENT)
Dept: CT IMAGING | Facility: HOSPITAL | Age: 83
End: 2023-10-08
Payer: MEDICARE

## 2023-10-08 ENCOUNTER — HOSPITAL ENCOUNTER (EMERGENCY)
Facility: HOSPITAL | Age: 83
Discharge: HOME | End: 2023-10-08
Attending: EMERGENCY MEDICINE | Admitting: EMERGENCY MEDICINE
Payer: MEDICARE

## 2023-10-08 VITALS
HEART RATE: 58 BPM | DIASTOLIC BLOOD PRESSURE: 82 MMHG | HEIGHT: 67 IN | TEMPERATURE: 98.1 F | WEIGHT: 189 LBS | OXYGEN SATURATION: 94 % | RESPIRATION RATE: 14 BRPM | BODY MASS INDEX: 29.66 KG/M2 | SYSTOLIC BLOOD PRESSURE: 132 MMHG

## 2023-10-08 DIAGNOSIS — S09.90XA INJURY OF HEAD, INITIAL ENCOUNTER: Primary | ICD-10-CM

## 2023-10-08 DIAGNOSIS — S01.01XA LACERATION OF SCALP, INITIAL ENCOUNTER: ICD-10-CM

## 2023-10-08 DIAGNOSIS — M17.11 PRIMARY OSTEOARTHRITIS OF RIGHT KNEE: ICD-10-CM

## 2023-10-08 DIAGNOSIS — S80.211A ABRASION OF RIGHT KNEE, INITIAL ENCOUNTER: ICD-10-CM

## 2023-10-08 PROCEDURE — 70450 CT HEAD/BRAIN W/O DYE: CPT

## 2023-10-08 PROCEDURE — 99284 EMERGENCY DEPT VISIT MOD MDM: CPT

## 2023-10-08 PROCEDURE — 90471 IMMUNIZATION ADMIN: CPT | Performed by: NURSE PRACTITIONER

## 2023-10-08 PROCEDURE — 25010000002 TETANUS-DIPHTH-ACELL PERTUSSIS 5-2.5-18.5 LF-MCG/0.5 SUSPENSION PREFILLED SYRINGE: Performed by: NURSE PRACTITIONER

## 2023-10-08 PROCEDURE — 90715 TDAP VACCINE 7 YRS/> IM: CPT | Performed by: NURSE PRACTITIONER

## 2023-10-08 PROCEDURE — 73562 X-RAY EXAM OF KNEE 3: CPT

## 2023-10-08 RX ORDER — DIAPER,BRIEF,INFANT-TODD,DISP
1 EACH MISCELLANEOUS ONCE
Status: COMPLETED | OUTPATIENT
Start: 2023-10-08 | End: 2023-10-08

## 2023-10-08 RX ORDER — LIDOCAINE HYDROCHLORIDE AND EPINEPHRINE 10; 10 MG/ML; UG/ML
10 INJECTION, SOLUTION INFILTRATION; PERINEURAL ONCE
Status: COMPLETED | OUTPATIENT
Start: 2023-10-08 | End: 2023-10-08

## 2023-10-08 RX ADMIN — LIDOCAINE HYDROCHLORIDE,EPINEPHRINE BITARTRATE 10 ML: 10; .01 INJECTION, SOLUTION INFILTRATION; PERINEURAL at 20:04

## 2023-10-08 RX ADMIN — TETANUS TOXOID, REDUCED DIPHTHERIA TOXOID AND ACELLULAR PERTUSSIS VACCINE, ADSORBED 0.5 ML: 5; 2.5; 8; 8; 2.5 SUSPENSION INTRAMUSCULAR at 18:41

## 2023-10-08 RX ADMIN — BACITRACIN 0.9 G: 500 OINTMENT TOPICAL at 20:34

## 2023-10-08 NOTE — FSED PROVIDER NOTE
"Subjective   History of Present Illness  Patient is an 83-year-old female who presents complaining of head injury and right knee pain status post mechanical fall.  States she tripped while walking through [employer].  She denies anticoagulants, LOC.  Denies any neck or back pain.  Reports laceration to her forehead and abrasions to her knee.      Review of Systems   Musculoskeletal:  Positive for arthralgias.   Skin:  Positive for wound.   Neurological:  Positive for headaches.   All other systems reviewed and are negative.      Past Medical History:   Diagnosis Date    Acute bronchitis     Chest pain     Chilblains     \"Chilblian's\"    Depression     Fatty liver     GERD (gastroesophageal reflux disease)     Hyperlipidemia     Hypertension     Incontinence     Intermittent lightheadedness     Osteoarthritis     OA  Marvin THR, LT TKR - hydrocodone prescibed by Dr. Almaguer    Pain of esophagus     \" nervous esophagus\" - she takes the occasional alprazolam    Peptic ulcer disease     Pneumonia due to COVID-19 virus 03/2020    now tested negative    Raynaud's disease     \"Raynauds\"    Renal disease     followed by Dr. 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 ENDOSCOPY;  Service:     DENTAL PROCEDURE      FOOT SURGERY      TONSILLECTOMY      TOTAL HIP ARTHROPLASTY Bilateral     OA  Marvin THR, LT TKR - hydrocodone prescibed by Dr. Almaguer    TOTAL KNEE ARTHROPLASTY Left     OA  Marvin THR, LT TKR - hydrocodone prescibed by Dr. Almaguer       Family History   Problem Relation Age " of Onset    Hypertension Mother     Diabetes type II Mother     Hypertension Father     Coronary artery disease Father     Diabetes type II Father     Colon cancer Brother     Stroke Son     Breast cancer Maternal Aunt     Hypertension Other     Other Other         Lipids  Thyroid       Social History     Socioeconomic History    Marital status:     Number of children: 5   Tobacco Use    Smoking status: Former     Packs/day: 0.50     Years: 14.00     Additional pack years: 0.00     Total pack years: 7.00     Types: Cigarettes     Quit date:      Years since quittin.8    Smokeless tobacco: Never    Tobacco comments:     CAFFEINE USE   Vaping Use    Vaping Use: Never used   Substance and Sexual Activity    Alcohol use: No    Drug use: No    Sexual activity: Never           Objective   Physical Exam  Vitals and nursing note reviewed.   Constitutional:       Appearance: Normal appearance.   HENT:      Head: Normocephalic.   Pulmonary:      Effort: Pulmonary effort is normal.   Musculoskeletal:      Cervical back: Normal range of motion and neck supple. No tenderness.   Skin:     General: Skin is warm and dry.      Capillary Refill: Capillary refill takes less than 2 seconds.      Comments: Laceration to right forehead, abrasion to right knee.   Neurological:      General: No focal deficit present.      Mental Status: She is alert and oriented to person, place, and time.         Laceration Repair    Date/Time: 10/8/2023 6:59 PM    Performed by: Conchita Wiley APRN  Authorized by: Ran Sanon MD    Consent:     Consent obtained:  Verbal    Consent given by:  Patient    Risks discussed:  Infection and pain  Anesthesia:     Anesthesia method:  Local infiltration    Local anesthetic:  Lidocaine 1% WITH epi  Laceration details:     Location:  Scalp    Scalp location:  Frontal    Length (cm):  1.5  Exploration:     Hemostasis achieved with:  Direct pressure    Contaminated: no    Treatment:      Area cleansed with:  Chlorhexidine    Irrigation solution:  Sterile saline  Skin repair:     Repair method:  Sutures    Suture size:  4-0    Suture material:  Nylon    Suture technique:  Simple interrupted    Number of sutures:  3  Approximation:     Approximation:  Close  Repair type:     Repair type:  Simple             ED Course                                           Medical Decision Making  Care transferred to Dr Mendoza pending imaging results and dispo    Amount and/or Complexity of Data Reviewed  Radiology: ordered.    Risk  Prescription drug management.        Final diagnoses:   Injury of head, initial encounter   Laceration of scalp, initial encounter       ED Disposition  ED Disposition       None            No follow-up provider specified.       Medication List      No changes were made to your prescriptions during this visit.

## 2023-10-09 ENCOUNTER — TELEPHONE (OUTPATIENT)
Dept: FAMILY MEDICINE CLINIC | Facility: CLINIC | Age: 83
End: 2023-10-09

## 2023-10-09 NOTE — FSED PROVIDER NOTE
"Subjective   History of Present Illness  Assumed care from [facility] 2000hrs.  See previous note for H&P/ROS.    Fall      Review of Systems    Past Medical History:   Diagnosis Date    Acute bronchitis     Chest pain     Chilblains     \"Chilblian's\"    Depression     Fatty liver     GERD (gastroesophageal reflux disease)     Hyperlipidemia     Hypertension     Incontinence     Intermittent lightheadedness     Osteoarthritis     OA  Marvin THR, LT TKR - hydrocodone prescibed by Dr. Almaguer    Pain of esophagus     \" nervous esophagus\" - she takes the occasional alprazolam    Peptic ulcer disease     Pneumonia due to COVID-19 virus 03/2020    now tested negative    Raynaud's disease     \"Raynauds\"    Renal disease     followed by Dr. 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 Almaguer    TOTAL KNEE ARTHROPLASTY Left     OA  Marvin THR, LT TKR - hydrocodone prescibed by Dr. Almaguer       Family History   Problem Relation Age of Onset    Hypertension Mother     Diabetes type II Mother     Hypertension Father     Coronary artery disease Father     Diabetes type II Father     Colon cancer Brother     Stroke Son     Breast cancer Maternal Aunt     Hypertension Other     Other Other         Lipids  Thyroid       Social History     Socioeconomic History    Marital status:     Number of children: " 5   Tobacco Use    Smoking status: Former     Packs/day: 0.50     Years: 14.00     Additional pack years: 0.00     Total pack years: 7.00     Types: Cigarettes     Quit date:      Years since quittin.8    Smokeless tobacco: Never    Tobacco comments:     CAFFEINE USE   Vaping Use    Vaping Use: Never used   Substance and Sexual Activity    Alcohol use: No    Drug use: No    Sexual activity: Never           Objective   Physical Exam  Vitals and nursing note reviewed.   HENT:      Head: Normocephalic. No raccoon eyes or Simpson's sign.        Right Ear: External ear normal.      Left Ear: External ear normal.      Nose: Nose normal.      Mouth/Throat:      Mouth: Mucous membranes are moist.      Pharynx: Oropharynx is clear.   Eyes:      Pupils: Pupils are equal, round, and reactive to light.   Cardiovascular:      Rate and Rhythm: Normal rate and regular rhythm.      Pulses: Normal pulses.      Heart sounds: Normal heart sounds. No murmur heard.     No friction rub.   Pulmonary:      Effort: Pulmonary effort is normal. No respiratory distress.      Breath sounds: Normal breath sounds. No wheezing, rhonchi or rales.   Abdominal:      General: Abdomen is flat. Bowel sounds are normal. There is no distension.      Palpations: Abdomen is soft.      Tenderness: There is no abdominal tenderness.   Musculoskeletal:      Cervical back: Full passive range of motion without pain, normal range of motion and neck supple. No rigidity or tenderness.      Right knee: No swelling, deformity, effusion, erythema or lacerations. Decreased range of motion. Tenderness present over the patellar tendon.        Legs:    Lymphadenopathy:      Cervical: No cervical adenopathy.   Skin:     General: Skin is warm and dry.   Neurological:      General: No focal deficit present.      Mental Status: She is alert and oriented to person, place, and time.      GCS: GCS eye subscore is 4. GCS verbal subscore is 5. GCS motor subscore is 6.       Sensory: Sensation is intact.      Motor: Motor function is intact.         Procedures           ED Course      XR Knee 3 View Right    Result Date: 10/8/2023  Narrative: RIGHT KNEE  HISTORY: Fall, knee pain.  COMPARISON: None.  FINDINGS: AP, lateral and sunrise views of the right knee demonstrates severe loss of joint space involving the medial compartment. There is no evidence of fracture or of a suprapatellar fluid collection. Extensive vascular calcifications noted.      CT Head Without Contrast    Result Date: 10/8/2023  Narrative: CT HEAD WITHOUT CONTRAST  HISTORY: Fall, hit head.  COMPARISON: CT head 04/26/2022.  FINDINGS: The brain and ventricles are symmetrical. There is no evidence of intracranial hemorrhage, hydrocephalus or of a calvarial fracture. A scalp laceration is noted in the frontal region to the right.      Impression: There is a frontal scalp laceration to the right. There is no evidence of fracture or of intracranial hemorrhage.   Radiation dose reduction techniques were utilized, including automated exposure control and exposure modulation based on body size.                                           Medical Decision Making  ED note/radiographic findings reviewed.  CT head: negative.  (R) knee XR: neg acute finding/advanced osteoarthritis right knee.  GCS 15. Nontender c spine. AFVSS. Stable discharge with pmd followup.  Return precautions provided.    Problems Addressed:  Abrasion of right knee, initial encounter: complicated acute illness or injury  Injury of head, initial encounter: complicated acute illness or injury  Laceration of scalp, initial encounter: complicated acute illness or injury  Primary osteoarthritis of right knee: complicated acute illness or injury    Amount and/or Complexity of Data Reviewed  Radiology: ordered.    Risk  OTC drugs.  Prescription drug management.        Final diagnoses:   Injury of head, initial encounter   Laceration of scalp, initial encounter    Abrasion of right knee, initial encounter   Primary osteoarthritis of right knee       ED Disposition  ED Disposition       ED Disposition   Discharge    Condition   Good    Comment   --               Eben Porter MD  [id number] [id number] [phone]    In 2 days  For wound re-check         Medication List      No changes were made to your prescriptions during this visit.

## 2023-10-09 NOTE — DISCHARGE INSTRUCTIONS
Suture removal 7 days  Return ED headache, vomiting, neck pain, infection, bleeding, worse condition, any other concerns

## 2023-10-09 NOTE — TELEPHONE ENCOUNTER
"  Caller: Rekha Bear \"MARIA ANTONIA\"    Relationship: Self    Best call back number: [phone]    What was the call regarding: PLEASE CONTACT PATIENT TO ADVISE IF SHE CAN HAVE HER STICHES REMOVED ON 10/16 WHEN SHE COMES IN FOR HER ANNUAL. PATIENT STATES THAT ON 10/08 SHE HAD STICHES PUT IN, AND NEEDS THEM REMOVED ONE WEEK FROM THAT DATE. PLEASE ADVISE IF THIS CAN BE DONE ON THE 16TH.         "

## 2023-10-11 ENCOUNTER — TELEPHONE (OUTPATIENT)
Dept: CASE MANAGEMENT | Facility: OTHER | Age: 83
End: 2023-10-11
Payer: MEDICARE

## 2023-10-16 ENCOUNTER — OFFICE VISIT (OUTPATIENT)
Dept: FAMILY MEDICINE CLINIC | Facility: CLINIC | Age: 83
End: 2023-10-16
Payer: MEDICARE

## 2023-10-16 VITALS
DIASTOLIC BLOOD PRESSURE: 78 MMHG | HEIGHT: 67 IN | SYSTOLIC BLOOD PRESSURE: 128 MMHG | OXYGEN SATURATION: 98 % | HEART RATE: 61 BPM | RESPIRATION RATE: 18 BRPM | BODY MASS INDEX: 29.19 KG/M2 | WEIGHT: 186 LBS

## 2023-10-16 DIAGNOSIS — Z48.02 VISIT FOR SUTURE REMOVAL: ICD-10-CM

## 2023-10-16 DIAGNOSIS — Z00.00 ENCOUNTER FOR SUBSEQUENT ANNUAL WELLNESS VISIT (AWV) IN MEDICARE PATIENT: Primary | ICD-10-CM

## 2023-10-16 DIAGNOSIS — M15.8 OTHER OSTEOARTHRITIS INVOLVING MULTIPLE JOINTS: ICD-10-CM

## 2023-10-16 DIAGNOSIS — E11.22 TYPE 2 DIABETES MELLITUS WITH STAGE 3A CHRONIC KIDNEY DISEASE, WITHOUT LONG-TERM CURRENT USE OF INSULIN: ICD-10-CM
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
## 2023-10-16 DIAGNOSIS — Z85.89 HISTORY OF SQUAMOUS CELL CARCINOMA: ICD-10-CM

## 2023-10-16 DIAGNOSIS — N18.31 TYPE 2 DIABETES MELLITUS WITH STAGE 3A CHRONIC KIDNEY DISEASE, WITHOUT LONG-TERM CURRENT USE OF INSULIN: ICD-10-CM

## 2023-10-16 DIAGNOSIS — Z80.0 FAMILY HISTORY OF COLON CANCER: ICD-10-CM

## 2023-10-16 DIAGNOSIS — Z78.0 POSTMENOPAUSAL: ICD-10-CM

## 2023-10-16 RX ORDER — COLESEVELAM 180 1/1
1875 TABLET ORAL
COMMUNITY

## 2023-10-16 RX ORDER — GINSENG 100 MG
1 CAPSULE ORAL 2 TIMES DAILY
Qty: 14.2 G | Refills: 0 | Status: SHIPPED | OUTPATIENT
Start: 2023-10-16

## 2023-10-16 RX ORDER — LINAGLIPTIN 5 MG/1
5 TABLET, FILM COATED ORAL DAILY
COMMUNITY

## 2023-10-16 NOTE — PROGRESS NOTES
The ABCs of the Annual Wellness Visit  Subsequent Medicare Wellness Visit    Subjective    Rekha Bear is a 83 y.o. female who presents for a Subsequent Medicare Wellness Visit.    The following portions of the patient's history were reviewed and   updated as appropriate: allergies, current medications, past family history, past medical history, past social history, past surgical history, and problem list.    Compared to one year ago, the patient feels her physical   health is worse.    Compared to one year ago, the patient feels her mental   health is the same.    Recent Hospitalizations:  She was not admitted to the hospital during the last year.       Current Medical Providers:  Patient Care Team:  Eben Porter MD as PCP - General  Eben Porter MD as PCP - Family Medicine    Outpatient Medications Prior to Visit   Medication Sig Dispense Refill   • Acetylcysteine capsule capsule Take 2 capsules by mouth Daily.     • buPROPion XL (WELLBUTRIN XL) 300 MG 24 hr tablet TAKE 1 TABLET BY MOUTH EVERY DAY IN THE MORNING 30 tablet 4   • colesevelam (WELCHOL) 625 MG tablet Take 3 tablets by mouth.     • dextromethorphan polistirex ER (DELSYM) 30 MG/5ML Suspension Extended Release oral suspension Take 30 mg by mouth 2 (Two) Times a Day As Needed (cough). 280 mL    • empagliflozin (JARDIANCE) 10 MG tablet tablet 1 tablet Daily.     • gabapentin (NEURONTIN) 600 MG tablet TAKE ONE TABLET BY MOUTH THREE TIMES A DAY 90 tablet 4   • glimepiride (AMARYL) 2 MG tablet Take 2 tablets by mouth Every Morning Before Breakfast. APPOINTMENT IS NEEDED FOR MORE REFILLS 60 tablet 0   • guaiFENesin (ROBITUSSIN) 100 MG/5ML solution oral solution Take 10 mL by mouth Every 4 (Four) Hours As Needed (cough).     • levothyroxine (SYNTHROID, LEVOTHROID) 100 MCG tablet TAKE 1 TABLET BY MOUTH EVERY DAY 90 tablet 0   • metFORMIN ER (GLUCOPHAGE-XR) 500 MG 24 hr tablet Take 1 tablet by mouth Daily With Breakfast. 30 tablet 2   •  metoprolol succinate XL (TOPROL-XL) 25 MG 24 hr tablet TAKE ONE TABLET BY MOUTH DAILY 30 tablet 5   • rosuvastatin (CRESTOR) 20 MG tablet TAKE ONE TABLET BY MOUTH DAILY 30 tablet 0   • tolterodine LA (DETROL LA) 4 MG 24 hr capsule Take 1 capsule by mouth Daily. 180 capsule 3   • Tradjenta 5 MG tablet tablet Take 1 tablet by mouth Daily.     • traMADol (ULTRAM) 50 MG tablet Take 1 tablet by mouth Every 6 (Six) Hours As Needed for Moderate Pain . 90 tablet 0   • venlafaxine XR (EFFEXOR-XR) 150 MG 24 hr capsule TAKE 1 CAPSULE BY MOUTH EVERY DAY 90 capsule 0   • Vitamin D, Cholecalciferol, (CHOLECALCIFEROL) 10 MCG (400 UNIT) tablet Take 1 tablet by mouth 2 (two) times a day.       No facility-administered medications prior to visit.       Opioid medication/s are on active medication list.  and I have evaluated her active treatment plan and pain score trends (see table).  There were no vitals filed for this visit.  I have reviewed the chart for potential of high risk medication and harmful drug interactions in the elderly.          Aspirin is not on active medication list.  Aspirin use is not indicated based on review of current medical condition/s. Risk of harm outweighs potential benefits.  .    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  ACP discussion was held with the patient during this visit. Patient does not have an advance directive, information provided.     Objective    Vitals:    10/16/23 1005   BP: 128/78   Pulse: 61   Resp: 18   SpO2: 98%   Weight: 84.4 kg (186 lb)   Height: 170.2 cm (67\")     Estimated body mass index is 29.13 kg/m² as calculated from the following:    Height as of this encounter: 170.2 cm (67\").    Weight as of this encounter: 84.4 kg (186 lb).    BMI is >= 25 and <30. (Overweight) The following options were offered after discussion;: exercise counseling/recommendations and nutrition counseling/recommendations      Does the patient have evidence of cognitive impairment? No    Lab Results   Component Value Date    CHLPL 151 2023    TRIG 264 (H) 2023    HDL 47 (L) 2023    LDL 69 2023        HEALTH RISK ASSESSMENT    Smoking Status:  Social History     Tobacco Use   Smoking Status Former   • Packs/day: 0.50   • Years: 14.00   • Additional pack years: 0.00   • Total pack years: 7.00   • Types: Cigarettes   • Quit date:    • Years since quittin.8   Smokeless Tobacco Never   Tobacco Comments    CAFFEINE USE     Alcohol Consumption:  Social History     Substance and Sexual Activity   Alcohol Use No     Fall Risk Screen:    STEADI Fall Risk Assessment was completed, and patient is at LOW risk for falls.Assessment completed on:10/16/2023    Depression Screening:      10/16/2023    10:13 AM   PHQ-2/PHQ-9 Depression Screening   Little Interest or Pleasure in Doing Things 0-->not at all   Feeling Down, Depressed or Hopeless 0-->not at all   PHQ-9: Brief Depression Severity Measure Score 0       Health Habits and Functional and " Cognitive Screening:      10/16/2023    10:11 AM   Functional & Cognitive Status   Do you have difficulty preparing food and eating? No   Do you have difficulty bathing yourself, getting dressed or grooming yourself? No   Do you have difficulty using the toilet? No   Do you have difficulty moving around from place to place? No   Do you have trouble with steps or getting out of a bed or a chair? No   Current Diet Well Balanced Diet   Dental Exam Up to date   Eye Exam Up to date   Exercise (times per week) 0 times per week   Current Exercises Include No Regular Exercise   Do you need help using the phone?  No   Are you deaf or do you have serious difficulty hearing?  No   Do you need help to go to places out of walking distance? No   Do you need help shopping? No   Do you need help preparing meals?  No   Do you need help with housework?  Yes   Do you need help with laundry? No   Do you need help taking your medications? No   Do you need help managing money? No   Do you ever drive or ride in a car without wearing a seat belt? No   Have you felt unusual stress, anger or loneliness in the last month? No   Who do you live with? Alone   If you need help, do you have trouble finding someone available to you? No   Have you been bothered in the last four weeks by sexual problems? No   Do you have difficulty concentrating, remembering or making decisions? No       Age-appropriate Screening Schedule:  Refer to the list below for future screening recommendations based on patient's age, sex and/or medical conditions. Orders for these recommended tests are listed in the plan section. The patient has been provided with a written plan.    Health Maintenance   Topic Date Due   • DXA SCAN  Never done   • COLORECTAL CANCER SCREENING  12/20/2021   • COVID-19 Vaccine (8 - 2023-24 season) 11/25/2023 (Originally 9/1/2023)   • URINE MICROALBUMIN  12/29/2023   • DIABETIC EYE EXAM  12/30/2023   • HEMOGLOBIN A1C  01/19/2024   • LIPID PANEL   07/19/2024   • ANNUAL WELLNESS VISIT  10/16/2024   • BMI FOLLOWUP  10/16/2024   • TDAP/TD VACCINES (6 - Td or Tdap) 10/08/2033   • INFLUENZA VACCINE  Completed   • Pneumococcal Vaccine 65+  Completed   • ZOSTER VACCINE  Completed                  CMS Preventative Services Quick Reference  Risk Factors Identified During Encounter  Chronic Pain: Chronic Pain Educational material Discussed and Shared in After Visit Summary for Patient.  Physical Therapy Referral Ordered  Orthopedics Referral Ordered  Fall Risk-High or Moderate: Discussed Fall Prevention in the home  Immunizations Discussed/Encouraged: Influenza, COVID19, and RSV  Dental Screening Recommended  Vision Screening Recommended  The above risks/problems have been discussed with the patient.  Pertinent information has been shared with the patient in the After Visit Summary.  An After Visit Summary and PPPS were made available to the patient.    Follow Up:   Next Medicare Wellness visit to be scheduled in 1 year.       Additional E&M Note during same encounter follows:  Patient has multiple medical problems which are significant and separately identifiable that require additional work above and beyond the Medicare Wellness Visit.      Chief Complaint  Annual Exam and Suture / Staple Removal    Subjective        HPI  Rekha Bear is also being seen today for acute fall on concrete and ER visit 10/8/23. She received sutures x 3 . All imaging negative, No LOC.  She has been using neosporin and site is erythematous. No dng. She has been patting this and cleansing daily. TDAP given in ER. Bruising resolving. Some headache after she cleanses wound.     Acute on Chronic OA and R knee arthritis. Worsening R knee pain. She has prev seen Dr CHACHA Kuo. Would consider injections or knee replacement. Complicated by diabetes. Last A1c 8.7 (sees endo). She is on tramadol, gabapentin. She was previously on diclofenac PO and this harmed her kidneys (last renal fxn creat  "1.52, EGFR 34).  She is not taking any tylenol at all. Reviewed liver fxn and this is normal (AST 14, ALT 11)    Review of Systems   Constitutional:  Positive for fatigue. Negative for activity change and fever.   HENT: Negative.  Negative for congestion.    Respiratory:  Negative for cough, shortness of breath and wheezing.    Cardiovascular:  Negative for chest pain, palpitations and leg swelling.   Gastrointestinal:  Negative for constipation, diarrhea, nausea and vomiting.   Endocrine: Negative for polydipsia, polyphagia and polyuria.   Genitourinary:  Negative for dysuria and hematuria.   Musculoskeletal:  Positive for arthralgias and gait problem. Negative for back pain.   Skin:  Positive for wound.   Allergic/Immunologic: Negative for environmental allergies.   Neurological:  Positive for weakness. Negative for seizures and syncope.   Hematological:  Does not bruise/bleed easily.   Psychiatric/Behavioral:  Negative for dysphoric mood and sleep disturbance.        Objective   Vital Signs:  /78   Pulse 61   Resp 18   Ht 170.2 cm (67\")   Wt 84.4 kg (186 lb)   SpO2 98%   BMI 29.13 kg/m²     Physical Exam  Vitals reviewed.   Constitutional:       Appearance: Normal appearance. She is normal weight.   HENT:      Head: Normocephalic.      Nose: Nose normal.      Mouth/Throat:      Mouth: Mucous membranes are moist.   Eyes:      Pupils: Pupils are equal, round, and reactive to light.   Cardiovascular:      Rate and Rhythm: Normal rate and regular rhythm.      Pulses: Normal pulses.      Heart sounds: Normal heart sounds.   Pulmonary:      Effort: Pulmonary effort is normal.      Breath sounds: Normal breath sounds.   Abdominal:      General: Bowel sounds are normal.      Palpations: Abdomen is soft.   Musculoskeletal:         General: Tenderness (R knee) present. No swelling. Normal range of motion.      Cervical back: Normal range of motion.   Skin:     General: Skin is warm.      Findings: Bruising (R " eye and R forehead) and erythema present.   Neurological:      General: No focal deficit present.      Mental Status: She is alert.   Psychiatric:         Mood and Affect: Mood normal.                         Assessment and Plan   Diagnoses and all orders for this visit:    1. Encounter for subsequent annual wellness visit (AWV) in Medicare patient (Primary)    2. Fall on concrete  -     Ambulatory Referral to Orthopedic Surgery  -     Ambulatory Referral to Physical Therapy Evaluate and treat    3. Visit for suture removal    4. Other osteoarthritis involving multiple joints  -     Ambulatory Referral to Orthopedic Surgery  -     DEXA Bone Density Axial; Future  -     Ambulatory Referral to Physical Therapy Evaluate and treat    5. BMI 29.0-29.9,adult    6. Type 2 diabetes mellitus with stage 3a chronic kidney disease, without long-term current use of insulin    7. Postmenopausal  -     DEXA Bone Density Axial; Future    8. Family history of colon cancer  -     Ambulatory Referral For Screening Colonoscopy    9. History of squamous cell carcinoma  -     Ambulatory Referral to Dermatology    Other orders  -     bacitracin 500 UNIT/GM ointment; Apply 1 application  topically to the appropriate area as directed 2 (Two) Times a Day.  Dispense: 14.2 g; Refill: 0  -     Fluzone High-Dose 65+yrs (2884-0564)    Fall/sutures- do not use neosporin, cleanse w soap and water and apply bacitracin daily. Return in 2 days for suture removal, wound is still very moist and swollen. Refer PT    OA- reviewed labs, try tylenol 500-1000 tid for pain, heat.ice, cw tramdol and gabapentin per Dr Porter. Refer ortho Dr Saji Kuo to consider injections or replacement    Discussed risk vs benefit colonoscopy and she wishes to have colonoscopy.referral sent    Refer derm for skin check w hx skin ca, wear sunscreen      BMI is >= 25 and <30. (Overweight) The following options were offered after discussion;: weight loss educational material  (shared in after visit summary), exercise counseling/recommendations, and nutrition counseling/recommendations        Follow Up   Return in about 6 months (around 4/16/2024) for Recheck.  Patient was given instructions and counseling regarding her condition or for health maintenance advice. Please see specific information pulled into the AVS if appropriate.

## 2023-10-16 NOTE — PATIENT INSTRUCTIONS
Xs tylenol 1000 mg up to 3 x day for knee pain   Topical rubs (stay away from NSAIDs and voltaren gel)

## 2023-10-18 ENCOUNTER — CLINICAL SUPPORT (OUTPATIENT)
Dept: FAMILY MEDICINE CLINIC | Facility: CLINIC | Age: 83
End: 2023-10-18
Payer: MEDICARE

## 2023-11-01 DIAGNOSIS — F34.1 PRIMARY DYSTHYMIA: ICD-10-CM

## 2023-11-02 RX ORDER — BUPROPION HYDROCHLORIDE 300 MG/1
TABLET ORAL
Qty: 30 TABLET | Refills: 0 | Status: SHIPPED | OUTPATIENT
Start: 2023-11-02

## 2023-11-09 DIAGNOSIS — E03.9 ACQUIRED HYPOTHYROIDISM: ICD-10-CM

## 2023-11-10 ENCOUNTER — TELEPHONE (OUTPATIENT)
Dept: FAMILY MEDICINE CLINIC | Facility: CLINIC | Age: 83
End: 2023-11-10
Payer: MEDICARE

## 2023-11-10 DIAGNOSIS — E78.2 MIXED HYPERLIPIDEMIA: Primary | ICD-10-CM

## 2023-11-10 DIAGNOSIS — E03.8 OTHER SPECIFIED HYPOTHYROIDISM: ICD-10-CM

## 2023-11-10 DIAGNOSIS — E11.22 TYPE 2 DIABETES MELLITUS WITH STAGE 3 CHRONIC KIDNEY DISEASE, WITHOUT LONG-TERM CURRENT USE OF INSULIN, UNSPECIFIED WHETHER STAGE 3A OR 3B CKD: ICD-10-CM

## 2023-11-10 DIAGNOSIS — N18.30 TYPE 2 DIABETES MELLITUS WITH STAGE 3 CHRONIC KIDNEY DISEASE, WITHOUT LONG-TERM CURRENT USE OF INSULIN, UNSPECIFIED WHETHER STAGE 3A OR 3B CKD: ICD-10-CM

## 2023-11-10 RX ORDER — LEVOTHYROXINE SODIUM 0.1 MG/1
TABLET ORAL
Qty: 15 TABLET | Refills: 0 | Status: SHIPPED | OUTPATIENT
Start: 2023-11-10

## 2023-12-05 DIAGNOSIS — F34.1 PRIMARY DYSTHYMIA: ICD-10-CM

## 2023-12-05 RX ORDER — BUPROPION HYDROCHLORIDE 300 MG/1
TABLET ORAL
Qty: 30 TABLET | Refills: 0 | Status: SHIPPED | OUTPATIENT
Start: 2023-12-05

## 2023-12-21 ENCOUNTER — APPOINTMENT (OUTPATIENT)
Dept: GENERAL RADIOLOGY | Facility: HOSPITAL | Age: 83
End: 2023-12-21
Payer: MEDICARE

## 2023-12-21 ENCOUNTER — APPOINTMENT (OUTPATIENT)
Dept: CT IMAGING | Facility: HOSPITAL | Age: 83
End: 2023-12-21
Payer: MEDICARE

## 2023-12-21 ENCOUNTER — HOSPITAL ENCOUNTER (INPATIENT)
Facility: HOSPITAL | Age: 83
LOS: 5 days | End: 2023-12-26
Attending: EMERGENCY MEDICINE | Admitting: INTERNAL MEDICINE
Payer: MEDICARE

## 2023-12-21 DIAGNOSIS — N18.9 ACUTE ON CHRONIC RENAL INSUFFICIENCY: ICD-10-CM

## 2023-12-21 DIAGNOSIS — I63.532 ACUTE ISCHEMIC LEFT PCA STROKE: Primary | ICD-10-CM

## 2023-12-21 DIAGNOSIS — I63.532 ACUTE LEFT PCA STROKE: ICD-10-CM

## 2023-12-21 DIAGNOSIS — R53.1 GENERALIZED WEAKNESS: ICD-10-CM

## 2023-12-21 DIAGNOSIS — Z86.79 HISTORY OF HYPERTENSION: ICD-10-CM

## 2023-12-21 DIAGNOSIS — N28.9 ACUTE ON CHRONIC RENAL INSUFFICIENCY: ICD-10-CM

## 2023-12-21 DIAGNOSIS — R41.0 CONFUSION: ICD-10-CM

## 2023-12-21 DIAGNOSIS — I10 HYPERTENSION, UNSPECIFIED TYPE: ICD-10-CM

## 2023-12-21 PROBLEM — I63.9 STROKE: Status: ACTIVE | Noted: 2023-12-21

## 2023-12-21 LAB
ALBUMIN SERPL-MCNC: 4.2 G/DL (ref 3.5–5.2)
ALBUMIN/GLOB SERPL: 1.7 G/DL
ALP SERPL-CCNC: 86 U/L (ref 39–117)
ALT SERPL W P-5'-P-CCNC: 11 U/L (ref 1–33)
ANION GAP SERPL CALCULATED.3IONS-SCNC: 12 MMOL/L (ref 5–15)
AST SERPL-CCNC: 20 U/L (ref 1–32)
BACTERIA UR QL AUTO: NORMAL /HPF
BASOPHILS # BLD AUTO: 0.01 10*3/MM3 (ref 0–0.2)
BASOPHILS NFR BLD AUTO: 0.1 % (ref 0–1.5)
BILIRUB SERPL-MCNC: 1.2 MG/DL (ref 0–1.2)
BILIRUB UR QL STRIP: NEGATIVE
BUN SERPL-MCNC: 19 MG/DL (ref 8–23)
BUN/CREAT SERPL: 11.9 (ref 7–25)
CALCIUM SPEC-SCNC: 9.4 MG/DL (ref 8.6–10.5)
CHLORIDE SERPL-SCNC: 98 MMOL/L (ref 98–107)
CK SERPL-CCNC: 141 U/L (ref 20–180)
CLARITY UR: CLEAR
CO2 SERPL-SCNC: 25 MMOL/L (ref 22–29)
COLOR UR: YELLOW
CREAT SERPL-MCNC: 1.59 MG/DL (ref 0.57–1)
D-LACTATE SERPL-SCNC: 1.7 MMOL/L (ref 0.5–2)
DEPRECATED RDW RBC AUTO: 48.3 FL (ref 37–54)
EGFRCR SERPLBLD CKD-EPI 2021: 32.5 ML/MIN/1.73
EOSINOPHIL # BLD AUTO: 0.05 10*3/MM3 (ref 0–0.4)
EOSINOPHIL NFR BLD AUTO: 0.5 % (ref 0.3–6.2)
ERYTHROCYTE [DISTWIDTH] IN BLOOD BY AUTOMATED COUNT: 14.9 % (ref 12.3–15.4)
GLOBULIN UR ELPH-MCNC: 2.5 GM/DL
GLUCOSE BLDC GLUCOMTR-MCNC: 92 MG/DL (ref 70–130)
GLUCOSE SERPL-MCNC: 225 MG/DL (ref 65–99)
GLUCOSE UR STRIP-MCNC: NEGATIVE MG/DL
HCT VFR BLD AUTO: 43 % (ref 34–46.6)
HGB BLD-MCNC: 13.9 G/DL (ref 12–15.9)
HGB UR QL STRIP.AUTO: NEGATIVE
HOLD SPECIMEN: NORMAL
HOLD SPECIMEN: NORMAL
HYALINE CASTS UR QL AUTO: NORMAL /LPF
IMM GRANULOCYTES # BLD AUTO: 0.03 10*3/MM3 (ref 0–0.05)
IMM GRANULOCYTES NFR BLD AUTO: 0.3 % (ref 0–0.5)
KETONES UR QL STRIP: ABNORMAL
LEUKOCYTE ESTERASE UR QL STRIP.AUTO: ABNORMAL
LYMPHOCYTES # BLD AUTO: 1.2 10*3/MM3 (ref 0.7–3.1)
LYMPHOCYTES NFR BLD AUTO: 12.4 % (ref 19.6–45.3)
MAGNESIUM SERPL-MCNC: 2 MG/DL (ref 1.6–2.4)
MCH RBC QN AUTO: 29.1 PG (ref 26.6–33)
MCHC RBC AUTO-ENTMCNC: 32.3 G/DL (ref 31.5–35.7)
MCV RBC AUTO: 90 FL (ref 79–97)
MONOCYTES # BLD AUTO: 0.7 10*3/MM3 (ref 0.1–0.9)
MONOCYTES NFR BLD AUTO: 7.2 % (ref 5–12)
NEUTROPHILS NFR BLD AUTO: 7.71 10*3/MM3 (ref 1.7–7)
NEUTROPHILS NFR BLD AUTO: 79.5 % (ref 42.7–76)
NITRITE UR QL STRIP: NEGATIVE
NRBC BLD AUTO-RTO: 0 /100 WBC (ref 0–0.2)
PH UR STRIP.AUTO: 7 [PH] (ref 5–8)
PLATELET # BLD AUTO: 198 10*3/MM3 (ref 140–450)
PMV BLD AUTO: 8.7 FL (ref 6–12)
POTASSIUM SERPL-SCNC: 4.9 MMOL/L (ref 3.5–5.2)
PROT SERPL-MCNC: 6.7 G/DL (ref 6–8.5)
PROT UR QL STRIP: ABNORMAL
RBC # BLD AUTO: 4.78 10*6/MM3 (ref 3.77–5.28)
RBC # UR STRIP: NORMAL /HPF
REF LAB TEST METHOD: NORMAL
SODIUM SERPL-SCNC: 135 MMOL/L (ref 136–145)
SP GR UR STRIP: 1.02 (ref 1–1.03)
SQUAMOUS #/AREA URNS HPF: NORMAL /HPF
UROBILINOGEN UR QL STRIP: ABNORMAL
WBC # UR STRIP: NORMAL /HPF
WBC NRBC COR # BLD AUTO: 9.7 10*3/MM3 (ref 3.4–10.8)
WHOLE BLOOD HOLD COAG: NORMAL
WHOLE BLOOD HOLD SPECIMEN: NORMAL

## 2023-12-21 PROCEDURE — 83735 ASSAY OF MAGNESIUM: CPT | Performed by: EMERGENCY MEDICINE

## 2023-12-21 PROCEDURE — 82948 REAGENT STRIP/BLOOD GLUCOSE: CPT

## 2023-12-21 PROCEDURE — 82550 ASSAY OF CK (CPK): CPT | Performed by: EMERGENCY MEDICINE

## 2023-12-21 PROCEDURE — 83605 ASSAY OF LACTIC ACID: CPT | Performed by: EMERGENCY MEDICINE

## 2023-12-21 PROCEDURE — 25810000003 SODIUM CHLORIDE 0.9 % SOLUTION: Performed by: EMERGENCY MEDICINE

## 2023-12-21 PROCEDURE — 73521 X-RAY EXAM HIPS BI 2 VIEWS: CPT

## 2023-12-21 PROCEDURE — 85025 COMPLETE CBC W/AUTO DIFF WBC: CPT | Performed by: EMERGENCY MEDICINE

## 2023-12-21 PROCEDURE — 81001 URINALYSIS AUTO W/SCOPE: CPT | Performed by: EMERGENCY MEDICINE

## 2023-12-21 PROCEDURE — 25810000003 SODIUM CHLORIDE 0.9 % SOLUTION: Performed by: INTERNAL MEDICINE

## 2023-12-21 PROCEDURE — P9612 CATHETERIZE FOR URINE SPEC: HCPCS

## 2023-12-21 PROCEDURE — 70450 CT HEAD/BRAIN W/O DYE: CPT

## 2023-12-21 PROCEDURE — 99285 EMERGENCY DEPT VISIT HI MDM: CPT

## 2023-12-21 PROCEDURE — 80053 COMPREHEN METABOLIC PANEL: CPT | Performed by: EMERGENCY MEDICINE

## 2023-12-21 RX ORDER — GABAPENTIN 400 MG/1
400 CAPSULE ORAL EVERY 8 HOURS SCHEDULED
Status: DISCONTINUED | OUTPATIENT
Start: 2023-12-21 | End: 2023-12-26 | Stop reason: HOSPADM

## 2023-12-21 RX ORDER — BUPROPION HYDROCHLORIDE 300 MG/1
300 TABLET ORAL EVERY MORNING
Status: DISCONTINUED | OUTPATIENT
Start: 2023-12-22 | End: 2023-12-26 | Stop reason: HOSPADM

## 2023-12-21 RX ORDER — VENLAFAXINE HYDROCHLORIDE 150 MG/1
150 CAPSULE, EXTENDED RELEASE ORAL DAILY
Status: DISCONTINUED | OUTPATIENT
Start: 2023-12-22 | End: 2023-12-26 | Stop reason: HOSPADM

## 2023-12-21 RX ORDER — ONDANSETRON 2 MG/ML
4 INJECTION INTRAMUSCULAR; INTRAVENOUS EVERY 6 HOURS PRN
Status: DISCONTINUED | OUTPATIENT
Start: 2023-12-21 | End: 2023-12-26 | Stop reason: HOSPADM

## 2023-12-21 RX ORDER — IBUPROFEN 600 MG/1
1 TABLET ORAL
Status: DISCONTINUED | OUTPATIENT
Start: 2023-12-21 | End: 2023-12-26 | Stop reason: HOSPADM

## 2023-12-21 RX ORDER — NICOTINE POLACRILEX 4 MG
15 LOZENGE BUCCAL
Status: DISCONTINUED | OUTPATIENT
Start: 2023-12-21 | End: 2023-12-26 | Stop reason: HOSPADM

## 2023-12-21 RX ORDER — OXYBUTYNIN CHLORIDE 10 MG/1
10 TABLET, EXTENDED RELEASE ORAL DAILY
Status: DISCONTINUED | OUTPATIENT
Start: 2023-12-22 | End: 2023-12-26 | Stop reason: HOSPADM

## 2023-12-21 RX ORDER — ATORVASTATIN CALCIUM 80 MG/1
80 TABLET, FILM COATED ORAL NIGHTLY
Status: DISCONTINUED | OUTPATIENT
Start: 2023-12-21 | End: 2023-12-22

## 2023-12-21 RX ORDER — SODIUM CHLORIDE 0.9 % (FLUSH) 0.9 %
10 SYRINGE (ML) INJECTION AS NEEDED
Status: DISCONTINUED | OUTPATIENT
Start: 2023-12-21 | End: 2023-12-26 | Stop reason: HOSPADM

## 2023-12-21 RX ORDER — SODIUM CHLORIDE 9 MG/ML
75 INJECTION, SOLUTION INTRAVENOUS CONTINUOUS
Status: DISCONTINUED | OUTPATIENT
Start: 2023-12-21 | End: 2023-12-23

## 2023-12-21 RX ORDER — ASPIRIN 325 MG
325 TABLET ORAL DAILY
Status: DISCONTINUED | OUTPATIENT
Start: 2023-12-21 | End: 2023-12-26 | Stop reason: HOSPADM

## 2023-12-21 RX ORDER — METOPROLOL SUCCINATE 25 MG/1
25 TABLET, EXTENDED RELEASE ORAL DAILY
Status: DISCONTINUED | OUTPATIENT
Start: 2023-12-22 | End: 2023-12-23

## 2023-12-21 RX ORDER — ACETAMINOPHEN 650 MG/1
650 SUPPOSITORY RECTAL EVERY 4 HOURS PRN
Status: DISCONTINUED | OUTPATIENT
Start: 2023-12-21 | End: 2023-12-26 | Stop reason: HOSPADM

## 2023-12-21 RX ORDER — ACETAMINOPHEN 325 MG/1
650 TABLET ORAL EVERY 4 HOURS PRN
Status: DISCONTINUED | OUTPATIENT
Start: 2023-12-21 | End: 2023-12-26 | Stop reason: HOSPADM

## 2023-12-21 RX ORDER — DEXTROSE MONOHYDRATE 25 G/50ML
25 INJECTION, SOLUTION INTRAVENOUS
Status: DISCONTINUED | OUTPATIENT
Start: 2023-12-21 | End: 2023-12-26 | Stop reason: HOSPADM

## 2023-12-21 RX ORDER — ASPIRIN 300 MG/1
300 SUPPOSITORY RECTAL DAILY
Status: DISCONTINUED | OUTPATIENT
Start: 2023-12-21 | End: 2023-12-26 | Stop reason: HOSPADM

## 2023-12-21 RX ORDER — PIOGLITAZONEHYDROCHLORIDE 15 MG/1
15 TABLET ORAL DAILY
Status: ON HOLD | COMMUNITY

## 2023-12-21 RX ORDER — INSULIN LISPRO 100 [IU]/ML
2-7 INJECTION, SOLUTION INTRAVENOUS; SUBCUTANEOUS
Status: DISCONTINUED | OUTPATIENT
Start: 2023-12-21 | End: 2023-12-26 | Stop reason: HOSPADM

## 2023-12-21 RX ORDER — BISACODYL 10 MG
10 SUPPOSITORY, RECTAL RECTAL DAILY PRN
Status: DISCONTINUED | OUTPATIENT
Start: 2023-12-21 | End: 2023-12-26 | Stop reason: HOSPADM

## 2023-12-21 RX ORDER — ASPIRIN 325 MG
325 TABLET ORAL ONCE
Status: COMPLETED | OUTPATIENT
Start: 2023-12-21 | End: 2023-12-21

## 2023-12-21 RX ORDER — LEVOTHYROXINE SODIUM 0.1 MG/1
100 TABLET ORAL
Status: DISCONTINUED | OUTPATIENT
Start: 2023-12-22 | End: 2023-12-26 | Stop reason: HOSPADM

## 2023-12-21 RX ORDER — OMEGA-3S/DHA/EPA/FISH OIL/D3 300MG-1000
400 CAPSULE ORAL DAILY
Status: DISCONTINUED | OUTPATIENT
Start: 2023-12-22 | End: 2023-12-26 | Stop reason: HOSPADM

## 2023-12-21 RX ADMIN — ATORVASTATIN CALCIUM 80 MG: 80 TABLET, FILM COATED ORAL at 22:47

## 2023-12-21 RX ADMIN — SODIUM CHLORIDE 75 ML/HR: 9 INJECTION, SOLUTION INTRAVENOUS at 19:59

## 2023-12-21 RX ADMIN — GABAPENTIN 400 MG: 400 CAPSULE ORAL at 22:47

## 2023-12-21 RX ADMIN — ASPIRIN 325 MG: 325 TABLET ORAL at 18:00

## 2023-12-21 RX ADMIN — SODIUM CHLORIDE 1000 ML: 9 INJECTION, SOLUTION INTRAVENOUS at 16:09

## 2023-12-21 NOTE — ED PROVIDER NOTES
EMERGENCY DEPARTMENT ENCOUNTER    Room Number:  10/10  PCP: Eben Porter MD  Historian: Patient, daughter      HPI:  Chief Complaint: Confusion, weakness  A complete HPI/ROS/PMH/PSH/SH/FH are unobtainable due to: None    Context: Rekha Bear is a 83 y.o. female who presents to the ED via same [facility] from home when the patient had been on the ground for several hours, unclear how she got to the ground, she denies fall but she also cannot member how she got to the ground.  Was able to eventually crawl to the front door to let her family and over the course of a couple of hours.  Complaining of maybe some mild left hip pain.  Daughter reports some recent confusion.  Patient denies any recent nausea, vomit, diarrhea, dysuria although urinary output has decreased somewhat.  Denies any chest pain, shortness of breath.  Not on any anticoagulation.      MEDICAL RECORD REVIEW    External (non-ED) record review: CT head without contrast reviewed from October 8, 2023 that showed a frontal scalp laceration to the right side with no skull fracture or intracranial hemorrhage noted    PAST MEDICAL HISTORY  Active Ambulatory Problems     Diagnosis Date Noted    Acute bronchitis 05/18/2016    Chronic pain of right knee 05/18/2016    Chest pain 05/18/2016    Type 2 diabetes mellitus with stage 3 chronic kidney disease, without long-term current use of insulin 05/18/2016    Hyperlipidemia 05/18/2016    Hypertension 05/18/2016    Hypothyroidism 05/18/2016    Urinary incontinence 05/18/2016    Cardiac arrhythmia 05/18/2016    Hyperlipemia 05/18/2016    Allergic reaction 04/04/2018    Hx of food anaphylaxis 04/04/2018    Herpes simplex 04/04/2018    Osteoarthritis 06/13/2018    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/19/2019    Community acquired pneumonia of left lower lobe of lung 03/21/2020    Hyponatremia 03/21/2020    Pneumonia due to COVID-19 virus 03/24/2020    Major depressive disorder, recurrent,  mild 05/14/2021    Acute respiratory failure with hypoxia 05/14/2021    Supraventricular tachycardia 05/14/2021    PVC (premature ventricular contraction) 04/21/2022    Dyspnea on exertion 04/21/2022    Dizziness 04/26/2022    Depressive disorder 02/04/2016    Gastroesophageal reflux disease 02/04/2016    Midline cystocele 02/04/2016    Rectocele 02/04/2016    BMI 29.0-29.9,adult 10/16/2023     Resolved Ambulatory Problems     Diagnosis Date Noted    Dehydration 03/21/2020     Past Medical History:   Diagnosis Date    Chilblains     Depression     Fatty liver     GERD (gastroesophageal reflux disease)     Incontinence     Intermittent lightheadedness     Pain of esophagus     Peptic ulcer disease     Raynaud's disease     Renal disease     Sinus bradycardia     Squamous cell carcinoma of neck     Vitamin B 12 deficiency          PAST SURGICAL HISTORY  Past Surgical History:   Procedure Laterality Date    CATARACT EXTRACTION      CHOLECYSTECTOMY      COLONOSCOPY  2009    COLONOSCOPY N/A 12/20/2016    Procedure: COLONOSCOPY with hot snare polypectomy;  Surgeon: George Pabon MD;  Location: [facility] ENDOSCOPY;  Service:     DENTAL PROCEDURE      FOOT SURGERY      TONSILLECTOMY      TOTAL HIP ARTHROPLASTY Bilateral     OA  Marvin THR, LT TKR - hydrocodone prescibed by Dr. Almaguer    TOTAL KNEE ARTHROPLASTY Left     OA  Marvin THR, LT TKR - hydrocodone prescibed by Dr. Almaguer         FAMILY HISTORY  Family History   Problem Relation Age of Onset    Hypertension Mother     Diabetes type II Mother     Hypertension Father     Coronary artery disease Father     Diabetes type II Father     Colon cancer Brother     Skin cancer Brother     Stroke Son     Breast cancer Maternal Aunt     Hypertension Other     Other Other         Lipids  Thyroid         SOCIAL HISTORY  Social History     Socioeconomic History    Marital status:     Number of children: 5   Tobacco Use    Smoking status: Former     Packs/day: 0.50     Years:  14.00     Additional pack years: 0.00     Total pack years: 7.00     Types: Cigarettes     Quit date:      Years since quittin.0    Smokeless tobacco: Never    Tobacco comments:     CAFFEINE USE   Vaping Use    Vaping Use: Never used   Substance and Sexual Activity    Alcohol use: No    Drug use: No    Sexual activity: Never         ALLERGIES  Shellfish-derived products, Amlodipine, and Iodine        REVIEW OF SYSTEMS  Review of Systems     All systems reviewed and negative except for those discussed in HPI.       PHYSICAL EXAM    I have reviewed the triage vital signs and nursing notes.    ED Triage Vitals [23 1514]   Temp Heart Rate Resp BP SpO2   97.5 °F (36.4 °C) 72 18 179/75 98 %      Temp src Heart Rate Source Patient Position BP Location FiO2 (%)   -- -- -- -- --       Physical Exam  General: No acute distress, nontoxic  HEENT: Mucous membranes dry, contusion to the right superior lateral forehead noted, EOMI  Neck: Full ROM, supple, nontender  Pulm: Symmetric chest rise, nonlabored, lungs CTAB  Cardiovascular: Regular rate and rhythm, intact distal pulses  GI: Soft, nontender, nondistended, no rebound, no guarding, bowel sounds present  MSK: Full ROM, no deformity, tenderness to the left hip joint to palpation but no shortening or malrotation  Skin: Warm, dry  Neuro: Awake, alert, oriented x 4, GCS 15, moving all extremities, no focal deficits  Psych: Calm, cooperative        LAB RESULTS  Recent Results (from the past 24 hour(s))   Green Top (Gel)    Collection Time: 23  3:31 PM   Result Value Ref Range    Extra Tube Hold for add-ons.    Lavender Top    Collection Time: 23  3:31 PM   Result Value Ref Range    Extra Tube hold for add-on    Gold Top - SST    Collection Time: 23  3:31 PM   Result Value Ref Range    Extra Tube Hold for add-ons.    Light Blue Top    Collection Time: 23  3:31 PM   Result Value Ref Range    Extra Tube Hold for add-ons.    Comprehensive  Metabolic Panel    Collection Time: 12/21/23  3:31 PM    Specimen: Blood   Result Value Ref Range    Glucose 225 (H) 65 - 99 mg/dL    BUN 19 8 - 23 mg/dL    Creatinine 1.59 (H) 0.57 - 1.00 mg/dL    Sodium 135 (L) 136 - 145 mmol/L    Potassium 4.9 3.5 - 5.2 mmol/L    Chloride 98 98 - 107 mmol/L    CO2 25.0 22.0 - 29.0 mmol/L    Calcium 9.4 8.6 - 10.5 mg/dL    Total Protein 6.7 6.0 - 8.5 g/dL    Albumin 4.2 3.5 - 5.2 g/dL    ALT (SGPT) 11 1 - 33 U/L    AST (SGOT) 20 1 - 32 U/L    Alkaline Phosphatase 86 39 - 117 U/L    Total Bilirubin 1.2 0.0 - 1.2 mg/dL    Globulin 2.5 gm/dL    A/G Ratio 1.7 g/dL    BUN/Creatinine Ratio 11.9 7.0 - 25.0    Anion Gap 12.0 5.0 - 15.0 mmol/L    eGFR 32.5 (L) >60.0 mL/min/1.73   CK    Collection Time: 12/21/23  3:31 PM    Specimen: Blood   Result Value Ref Range    Creatine Kinase 141 20 - 180 U/L   Magnesium    Collection Time: 12/21/23  3:31 PM    Specimen: Blood   Result Value Ref Range    Magnesium 2.0 1.6 - 2.4 mg/dL   CBC Auto Differential    Collection Time: 12/21/23  3:31 PM    Specimen: Blood   Result Value Ref Range    WBC 9.70 3.40 - 10.80 10*3/mm3    RBC 4.78 3.77 - 5.28 10*6/mm3    Hemoglobin 13.9 12.0 - 15.9 g/dL    Hematocrit 43.0 34.0 - 46.6 %    MCV 90.0 79.0 - 97.0 fL    MCH 29.1 26.6 - 33.0 pg    MCHC 32.3 31.5 - 35.7 g/dL    RDW 14.9 12.3 - 15.4 %    RDW-SD 48.3 37.0 - 54.0 fl    MPV 8.7 6.0 - 12.0 fL    Platelets 198 140 - 450 10*3/mm3    Neutrophil % 79.5 (H) 42.7 - 76.0 %    Lymphocyte % 12.4 (L) 19.6 - 45.3 %    Monocyte % 7.2 5.0 - 12.0 %    Eosinophil % 0.5 0.3 - 6.2 %    Basophil % 0.1 0.0 - 1.5 %    Immature Grans % 0.3 0.0 - 0.5 %    Neutrophils, Absolute 7.71 (H) 1.70 - 7.00 10*3/mm3    Lymphocytes, Absolute 1.20 0.70 - 3.10 10*3/mm3    Monocytes, Absolute 0.70 0.10 - 0.90 10*3/mm3    Eosinophils, Absolute 0.05 0.00 - 0.40 10*3/mm3    Basophils, Absolute 0.01 0.00 - 0.20 10*3/mm3    Immature Grans, Absolute 0.03 0.00 - 0.05 10*3/mm3    nRBC 0.0 0.0 - 0.2 /100  WBC   Lactic Acid, Plasma    Collection Time: 12/21/23  4:09 PM    Specimen: Blood   Result Value Ref Range    Lactate 1.7 0.5 - 2.0 mmol/L   Urinalysis With Microscopic If Indicated (No Culture) - Straight Cath    Collection Time: 12/21/23  4:22 PM    Specimen: Straight Cath; Urine   Result Value Ref Range    Color, UA Yellow Yellow, Straw    Appearance, UA Clear Clear    pH, UA 7.0 5.0 - 8.0    Specific Gravity, UA 1.022 1.005 - 1.030    Glucose, UA Negative Negative    Ketones, UA Trace (A) Negative    Bilirubin, UA Negative Negative    Blood, UA Negative Negative    Protein, UA Trace (A) Negative    Leuk Esterase, UA Trace (A) Negative    Nitrite, UA Negative Negative    Urobilinogen, UA 1.0 E.U./dL 0.2 - 1.0 E.U./dL   Urinalysis, Microscopic Only - Straight Cath    Collection Time: 12/21/23  4:22 PM    Specimen: Straight Cath; Urine   Result Value Ref Range    RBC, UA 0-2 None Seen, 0-2 /HPF    WBC, UA 0-2 None Seen, 0-2 /HPF    Bacteria, UA None Seen None Seen /HPF    Squamous Epithelial Cells, UA 0-2 None Seen, 0-2 /HPF    Hyaline Casts, UA 3-6 None Seen /LPF    Methodology Automated Microscopy        Ordered the above labs and independently interpreted results. My findings will be discussed in the medical decision making section below        RADIOLOGY  No Radiology Exams Resulted Within Past 24 Hours    Ordered the above noted radiological studies.  Independently interpreted by me and my independent review of findings can be found in the ED Course.  See dictation for official radiology interpretation.      PROCEDURES    Procedures      MEDICATIONS GIVEN IN ER    Medications   sodium chloride 0.9 % flush 10 mL (has no administration in time range)   sodium chloride 0.9 % infusion (has no administration in time range)   acetaminophen (TYLENOL) tablet 650 mg (has no administration in time range)     Or   acetaminophen (TYLENOL) suppository 650 mg (has no administration in time range)   aspirin tablet 325 mg  (has no administration in time range)     Or   aspirin suppository 300 mg (has no administration in time range)   atorvastatin (LIPITOR) tablet 80 mg (has no administration in time range)   ondansetron (ZOFRAN) injection 4 mg (has no administration in time range)   bisacodyl (DULCOLAX) suppository 10 mg (has no administration in time range)   sodium chloride 0.9 % bolus 1,000 mL (0 mL Intravenous Stopped 12/21/23 1712)   aspirin tablet 325 mg (325 mg Oral Given 12/21/23 1800)         PROGRESS, DATA ANALYSIS, CONSULTS, AND MEDICAL DECISION MAKING    Please note that this section constitutes my independent interpretation of clinical data including lab results, radiology, EKG's.  This constitutes my independent professional opinion regarding differential diagnosis and management of this patient.  It may include any factors such as history from outside sources, review of external records, social determinants of health, management of medications, response to those treatments, and discussions with other providers.    Differential Diagnosis and Plan: Initial concern for possible fall, head injury, intracranial hemorrhage, dehydration, renal failure, electrolyte abnormalities, anemia, UTI, among others.  Plan for labs, IV fluids, CT scan, and reevaluation with results.    Additional sources:  - Discussed/ obtained information from independent historians: Daughter provides history of recent confusion and prolonged downtime on the floor today due to weakness     - Chronic or social conditions impacting care:      - Shared decision making:  Patient and daughter at bedside fully updated on and in agreement with the course and plan moving forward    ED Course as of 12/21/23 1813   Thu Dec 21, 2023   1609 WBC: 9.70 [DC]   1609 Hemoglobin: 13.9 [DC]   1609 Platelets: 198 [DC]   1638 Glucose(!): 225 [DC]   1638 BUN: 19 [DC]   1638 Creatinine(!): 1.59  1.48 one year ago [DC]   1638 Sodium(!): 135 [DC]   1638 Potassium: 4.9 [DC]    1638 ALT (SGPT): 11 [DC]   1638 AST (SGOT): 20 [DC]   1638 Alkaline Phosphatase: 86 [DC]   1638 Total Bilirubin: 1.2 [DC]   1638 Creatine Kinase: 141 [DC]   1638 Lactate: 1.7 [DC]   1638 Magnesium: 2.0 [DC]   1702 Nitrite, UA: Negative [DC]   1702 Leukocytes, UA(!): Trace [DC]   1702 Ketones, UA(!): Trace [DC]   1702 Bacteria, UA: None Seen [DC]   1702 WBC, UA: 0-2 [DC]   1702 RBC, UA: 0-2 [DC]   1702 Lactate: 1.7 [DC]   1720 CT Head Without Contrast  Per my independent interpretation of the CT head, no overtly obvious intracranial hemorrhage [DC]   1728 CT Head Without Contrast  Discussed with Dr. Whitehead, radiology, patient with evidence of a left PCA distribution stroke, likely acute [DC]   1806 Discussed with Dr. Watson, Neurology, discussed patient's clinical course and findings today, treatment modalities, and need for hospitalization with consult.  He recommends holding off on CT angiogram of the head and neck, will plan for MRI, full dose aspirin [DC]   1810 Discussed with Dr. Sawant, [facility], discussed patient's clinical course and findings today, treatment modalities, and need for hospitalization. [DC]      ED Course User Index  [DC] Julian Ortiz MD     Patient and family fully updated on the findings today and discussions with neurology, plans for hospitalization for further stroke workup.  All questions or concerns addressed.    Hospitalization Considered?: yes    Orders Placed During This Visit:  Orders Placed This Encounter   Procedures    CT Head Without Contrast    XR Hips Bilateral With or Without Pelvis 2 View    MRI Brain Without Contrast    [place] Draw    Comprehensive Metabolic Panel    Urinalysis With Microscopic If Indicated (No Culture) - Urine, Catheter    Lactic Acid, Plasma    CK    Magnesium    CBC Auto Differential    Urinalysis, Microscopic Only - Urine, Clean Catch    CBC (No Diff)    Comprehensive Metabolic Panel    Hemoglobin A1c    Lipid Panel    TSH    NPO Diet NPO Type: Strict  NPO    Vital Signs    Pulse Oximetry, Continuous    Telemetry - Maintain IV Access    Telemetry - Place Orders & Notify Provider of Results When Patient Experiences Acute Chest Pain, Dysrhythmia or Respiratory Distress    Notify physician of changes in level of consciousness, worsening of stroke symptoms, acute headache or severe nausea and vomiting or any of the following vital sign parameters:    Activity As Tolerated    Nursing Dysphagia Screening    RN to Place Order SLP Consult - Eval & Treat Choosing Reason of RN Dysphagia Screen Failed    Nurse to Call MD or Nutrition Services for Diet if Patient Passes Dysphagia Screen    Intake and Output    Neuro Checks    NIHSS Assessment    Order CT Head Without Contrast for Neurological Decline    Provide Stroke Education Material    Tobacco Cessation Education    Place Sequential Compression Device    Maintain Sequential Compression Device    Inpatient Neurology Consult Stroke    [facility] (on-call MD unless specified) Details    Notify Stroke Coordinator    Inpatient Rehab Admission Consult    Consult to Case Management     Consult to Diabetes Educator    Inpatient Neurology Consult Stroke    OT Consult: Eval & Treat    PT Consult: Eval & Treat as tolerated    Oxygen Therapy-    SLP Consult: Eval & Treat Communication Disorder    POC Glucose Q6H    ECG 12 Lead Stroke Evaluation    Adult transthoracic echo complete    Insert peripheral IV    Inpatient Admission    Fall Precautions    Green Top (Gel)    Lavender Top    Gold Top - SST    Light Blue Top    CBC & Differential       Additional orders considered but not placed:      Independent interpretation of labs, radiology studies, and discussions with consultants: See ED Course        AS OF 18:13 EST VITALS:    BP - 155/95  HR - 64  TEMP - 97.5 °F (36.4 °C)  02 SATS - 99%        DIAGNOSIS  Final diagnoses:   Acute ischemic left PCA stroke   Generalized weakness   Confusion   Hypertension, unspecified type    History of hypertension   Acute on chronic renal insufficiency         DISPOSITION  HOSPITALIZATION    Discussed treatment plan and reason for hospitalization with pt/family and hospitalizing physician.  Pt/family voiced understanding of the plan for hospitalization for further testing/treatment as needed.                 --    Please note that portions of this were completed with a voice recognition program.       Note Disclaimer: At [facility], we believe that sharing information builds trust and better relationships. You are receiving this note because you are receiving care at [facility] or recently visited. It is possible you will see health information before a provider has talked with you about it. This kind of information can be easy to misunderstand. To help you fully understand what it means for your health, we urge you to discuss this note with your provider.           Julian Ortiz MD  12/21/23 5009

## 2023-12-21 NOTE — ED TRIAGE NOTES
Pt from home via ems, meals on wheels called family after pt didn't answer the door, family found pt on the floor, pt denies injuries from the fall but was on the floor for a few hours, family concerned for AMS but also reports has memory issues

## 2023-12-22 ENCOUNTER — APPOINTMENT (OUTPATIENT)
Dept: CARDIOLOGY | Facility: HOSPITAL | Age: 83
End: 2023-12-22
Payer: MEDICARE

## 2023-12-22 ENCOUNTER — APPOINTMENT (OUTPATIENT)
Dept: MRI IMAGING | Facility: HOSPITAL | Age: 83
End: 2023-12-22
Payer: MEDICARE

## 2023-12-22 PROBLEM — I63.532 ACUTE LEFT PCA STROKE: Status: ACTIVE | Noted: 2023-12-21

## 2023-12-22 LAB
ALBUMIN SERPL-MCNC: 3.5 G/DL (ref 3.5–5.2)
ALBUMIN/GLOB SERPL: 1.3 G/DL
ALP SERPL-CCNC: 70 U/L (ref 39–117)
ALT SERPL W P-5'-P-CCNC: 8 U/L (ref 1–33)
ANION GAP SERPL CALCULATED.3IONS-SCNC: 10.7 MMOL/L (ref 5–15)
ANION GAP SERPL CALCULATED.3IONS-SCNC: 13 MMOL/L (ref 5–15)
ASCENDING AORTA: 3.3 CM
AST SERPL-CCNC: 16 U/L (ref 1–32)
BH CV ECHO MEAS - AO MAX PG: 9.1 MMHG
BH CV ECHO MEAS - AO MEAN PG: 4.7 MMHG
BH CV ECHO MEAS - AO V2 MAX: 150.5 CM/SEC
BH CV ECHO MEAS - AO V2 VTI: 34.1 CM
BH CV ECHO MEAS - AVA(I,D): 2.25 CM2
BH CV ECHO MEAS - EDV(CUBED): 129.5 ML
BH CV ECHO MEAS - EDV(MOD-SP2): 79 ML
BH CV ECHO MEAS - EDV(MOD-SP4): 97 ML
BH CV ECHO MEAS - EF(MOD-BP): 59.6 %
BH CV ECHO MEAS - EF(MOD-SP2): 64.6 %
BH CV ECHO MEAS - EF(MOD-SP4): 58.8 %
BH CV ECHO MEAS - ESV(CUBED): 53.3 ML
BH CV ECHO MEAS - ESV(MOD-SP2): 28 ML
BH CV ECHO MEAS - ESV(MOD-SP4): 40 ML
BH CV ECHO MEAS - FS: 25.6 %
BH CV ECHO MEAS - IVS/LVPW: 1.02 CM
BH CV ECHO MEAS - IVSD: 1.06 CM
BH CV ECHO MEAS - LAT PEAK E' VEL: 7.2 CM/SEC
BH CV ECHO MEAS - LV MASS(C)D: 196.3 GRAMS
BH CV ECHO MEAS - LV MAX PG: 4.1 MMHG
BH CV ECHO MEAS - LV MEAN PG: 2.23 MMHG
BH CV ECHO MEAS - LV V1 MAX: 100.7 CM/SEC
BH CV ECHO MEAS - LV V1 VTI: 24.7 CM
BH CV ECHO MEAS - LVIDD: 5.1 CM
BH CV ECHO MEAS - LVIDS: 3.8 CM
BH CV ECHO MEAS - LVOT AREA: 3.1 CM2
BH CV ECHO MEAS - LVOT DIAM: 1.99 CM
BH CV ECHO MEAS - LVPWD: 1.03 CM
BH CV ECHO MEAS - MED PEAK E' VEL: 6.1 CM/SEC
BH CV ECHO MEAS - MV A DUR: 0.15 SEC
BH CV ECHO MEAS - MV A MAX VEL: 64.7 CM/SEC
BH CV ECHO MEAS - MV DEC SLOPE: 458.7 CM/SEC2
BH CV ECHO MEAS - MV DEC TIME: 0.24 SEC
BH CV ECHO MEAS - MV E MAX VEL: 76.3 CM/SEC
BH CV ECHO MEAS - MV E/A: 1.18
BH CV ECHO MEAS - MV MAX PG: 4.5 MMHG
BH CV ECHO MEAS - MV MEAN PG: 1.85 MMHG
BH CV ECHO MEAS - MV P1/2T: 67.8 MSEC
BH CV ECHO MEAS - MV V2 VTI: 31.6 CM
BH CV ECHO MEAS - MVA(P1/2T): 3.2 CM2
BH CV ECHO MEAS - MVA(VTI): 2.43 CM2
BH CV ECHO MEAS - PA ACC TIME: 0.17 SEC
BH CV ECHO MEAS - PA V2 MAX: 95 CM/SEC
BH CV ECHO MEAS - PULM A REVS DUR: 0.1 SEC
BH CV ECHO MEAS - PULM A REVS VEL: 19.3 CM/SEC
BH CV ECHO MEAS - PULM DIAS VEL: 44.6 CM/SEC
BH CV ECHO MEAS - PULM S/D: 0.94
BH CV ECHO MEAS - PULM SYS VEL: 42 CM/SEC
BH CV ECHO MEAS - QP/QS: 0.69
BH CV ECHO MEAS - RAP SYSTOLE: 3 MMHG
BH CV ECHO MEAS - RV MAX PG: 2.8 MMHG
BH CV ECHO MEAS - RV V1 MAX: 83.6 CM/SEC
BH CV ECHO MEAS - RV V1 VTI: 19.6 CM
BH CV ECHO MEAS - RVOT DIAM: 1.85 CM
BH CV ECHO MEAS - RVSP: 38 MMHG
BH CV ECHO MEAS - SV(LVOT): 76.7 ML
BH CV ECHO MEAS - SV(MOD-SP2): 51 ML
BH CV ECHO MEAS - SV(MOD-SP4): 57 ML
BH CV ECHO MEAS - SV(RVOT): 52.5 ML
BH CV ECHO MEAS - TAPSE (>1.6): 2.5 CM
BH CV ECHO MEAS - TR MAX PG: 35 MMHG
BH CV ECHO MEAS - TR MAX VEL: 295.9 CM/SEC
BH CV ECHO MEASUREMENTS AVERAGE E/E' RATIO: 11.47
BH CV ECHO SHUNT ASSESSMENT PERFORMED (HIDDEN SCRIPTING): 1
BH CV XLRA - RV BASE: 3.8 CM
BH CV XLRA - RV LENGTH: 6.3 CM
BH CV XLRA - RV MID: 3.6 CM
BH CV XLRA - TDI S': 10.7 CM/SEC
BH CV XLRA MEAS LEFT DIST CCA EDV: -11.8 CM/SEC
BH CV XLRA MEAS LEFT DIST CCA PSV: -61.4 CM/SEC
BH CV XLRA MEAS LEFT DIST ICA EDV: -17.2 CM/SEC
BH CV XLRA MEAS LEFT DIST ICA PSV: -63.8 CM/SEC
BH CV XLRA MEAS LEFT ICA/CCA RATIO: 2.04
BH CV XLRA MEAS LEFT MID ICA EDV: -13 CM/SEC
BH CV XLRA MEAS LEFT MID ICA PSV: -50.8 CM/SEC
BH CV XLRA MEAS LEFT PROX CCA EDV: -13.7 CM/SEC
BH CV XLRA MEAS LEFT PROX CCA PSV: -75.2 CM/SEC
BH CV XLRA MEAS LEFT PROX ECA EDV: 0 CM/SEC
BH CV XLRA MEAS LEFT PROX ECA PSV: -113.5 CM/SEC
BH CV XLRA MEAS LEFT PROX ICA EDV: -23.1 CM/SEC
BH CV XLRA MEAS LEFT PROX ICA PSV: -125.4 CM/SEC
BH CV XLRA MEAS LEFT PROX SCLA PSV: 147.2 CM/SEC
BH CV XLRA MEAS LEFT VERTEBRAL A EDV: -9.3 CM/SEC
BH CV XLRA MEAS LEFT VERTEBRAL A PSV: -72.7 CM/SEC
BH CV XLRA MEAS RIGHT DIST CCA EDV: -12.3 CM/SEC
BH CV XLRA MEAS RIGHT DIST CCA PSV: -72.2 CM/SEC
BH CV XLRA MEAS RIGHT DIST ICA EDV: -12.9 CM/SEC
BH CV XLRA MEAS RIGHT DIST ICA PSV: -48.2 CM/SEC
BH CV XLRA MEAS RIGHT ICA/CCA RATIO: 1
BH CV XLRA MEAS RIGHT MID ICA EDV: -15.8 CM/SEC
BH CV XLRA MEAS RIGHT MID ICA PSV: -70.2 CM/SEC
BH CV XLRA MEAS RIGHT PROX CCA EDV: 11.3 CM/SEC
BH CV XLRA MEAS RIGHT PROX CCA PSV: 72.2 CM/SEC
BH CV XLRA MEAS RIGHT PROX ECA EDV: -8.4 CM/SEC
BH CV XLRA MEAS RIGHT PROX ECA PSV: -125.4 CM/SEC
BH CV XLRA MEAS RIGHT PROX ICA EDV: -12.7 CM/SEC
BH CV XLRA MEAS RIGHT PROX ICA PSV: -69.4 CM/SEC
BH CV XLRA MEAS RIGHT PROX SCLA PSV: 161.3 CM/SEC
BH CV XLRA MEAS RIGHT VERTEBRAL A EDV: 6.5 CM/SEC
BH CV XLRA MEAS RIGHT VERTEBRAL A PSV: 27.9 CM/SEC
BILIRUB SERPL-MCNC: 1.2 MG/DL (ref 0–1.2)
BUN SERPL-MCNC: 17 MG/DL (ref 8–23)
BUN SERPL-MCNC: 17 MG/DL (ref 8–23)
BUN/CREAT SERPL: 12.4 (ref 7–25)
BUN/CREAT SERPL: 13.4 (ref 7–25)
CALCIUM SPEC-SCNC: 8.7 MG/DL (ref 8.6–10.5)
CALCIUM SPEC-SCNC: 8.7 MG/DL (ref 8.6–10.5)
CHLORIDE SERPL-SCNC: 102 MMOL/L (ref 98–107)
CHLORIDE SERPL-SCNC: 103 MMOL/L (ref 98–107)
CHOLEST SERPL-MCNC: 146 MG/DL (ref 0–200)
CO2 SERPL-SCNC: 22 MMOL/L (ref 22–29)
CO2 SERPL-SCNC: 24.3 MMOL/L (ref 22–29)
CREAT SERPL-MCNC: 1.27 MG/DL (ref 0.57–1)
CREAT SERPL-MCNC: 1.37 MG/DL (ref 0.57–1)
DEPRECATED RDW RBC AUTO: 46.8 FL (ref 37–54)
EGFRCR SERPLBLD CKD-EPI 2021: 38.9 ML/MIN/1.73
EGFRCR SERPLBLD CKD-EPI 2021: 42.6 ML/MIN/1.73
ERYTHROCYTE [DISTWIDTH] IN BLOOD BY AUTOMATED COUNT: 14.7 % (ref 12.3–15.4)
FOLATE SERPL-MCNC: 7.47 NG/ML (ref 4.78–24.2)
GLOBULIN UR ELPH-MCNC: 2.7 GM/DL
GLUCOSE BLDC GLUCOMTR-MCNC: 100 MG/DL (ref 70–130)
GLUCOSE BLDC GLUCOMTR-MCNC: 113 MG/DL (ref 70–130)
GLUCOSE BLDC GLUCOMTR-MCNC: 118 MG/DL (ref 70–130)
GLUCOSE BLDC GLUCOMTR-MCNC: 148 MG/DL (ref 70–130)
GLUCOSE SERPL-MCNC: 105 MG/DL (ref 65–99)
GLUCOSE SERPL-MCNC: 138 MG/DL (ref 65–99)
HBA1C MFR BLD: 9.1 % (ref 4.8–5.6)
HCT VFR BLD AUTO: 37 % (ref 34–46.6)
HDLC SERPL-MCNC: 44 MG/DL (ref 40–60)
HGB BLD-MCNC: 12 G/DL (ref 12–15.9)
LDLC SERPL CALC-MCNC: 75 MG/DL (ref 0–100)
LDLC/HDLC SERPL: 1.6 {RATIO}
LEFT ATRIUM VOLUME INDEX: 45.5 ML/M2
MCH RBC QN AUTO: 28.8 PG (ref 26.6–33)
MCHC RBC AUTO-ENTMCNC: 32.4 G/DL (ref 31.5–35.7)
MCV RBC AUTO: 88.9 FL (ref 79–97)
PLATELET # BLD AUTO: 181 10*3/MM3 (ref 140–450)
PMV BLD AUTO: 8.3 FL (ref 6–12)
POTASSIUM SERPL-SCNC: 4.5 MMOL/L (ref 3.5–5.2)
POTASSIUM SERPL-SCNC: 4.7 MMOL/L (ref 3.5–5.2)
PROT SERPL-MCNC: 6.2 G/DL (ref 6–8.5)
QT INTERVAL: 477 MS
QTC INTERVAL: 461 MS
RBC # BLD AUTO: 4.16 10*6/MM3 (ref 3.77–5.28)
SINUS: 2.7 CM
SODIUM SERPL-SCNC: 137 MMOL/L (ref 136–145)
SODIUM SERPL-SCNC: 138 MMOL/L (ref 136–145)
STJ: 2.46 CM
TRIGL SERPL-MCNC: 158 MG/DL (ref 0–150)
TSH SERPL DL<=0.05 MIU/L-ACNC: 5.13 UIU/ML (ref 0.27–4.2)
VIT B12 BLD-MCNC: 154 PG/ML (ref 211–946)
VLDLC SERPL-MCNC: 27 MG/DL (ref 5–40)
WBC NRBC COR # BLD AUTO: 7.96 10*3/MM3 (ref 3.4–10.8)

## 2023-12-22 PROCEDURE — 80053 COMPREHEN METABOLIC PANEL: CPT | Performed by: INTERNAL MEDICINE

## 2023-12-22 PROCEDURE — 82746 ASSAY OF FOLIC ACID SERUM: CPT | Performed by: HOSPITALIST

## 2023-12-22 PROCEDURE — 92523 SPEECH SOUND LANG COMPREHEN: CPT

## 2023-12-22 PROCEDURE — 93880 EXTRACRANIAL BILAT STUDY: CPT

## 2023-12-22 PROCEDURE — 93306 TTE W/DOPPLER COMPLETE: CPT

## 2023-12-22 PROCEDURE — 82607 VITAMIN B-12: CPT | Performed by: HOSPITALIST

## 2023-12-22 PROCEDURE — 70544 MR ANGIOGRAPHY HEAD W/O DYE: CPT

## 2023-12-22 PROCEDURE — 97530 THERAPEUTIC ACTIVITIES: CPT

## 2023-12-22 PROCEDURE — 36415 COLL VENOUS BLD VENIPUNCTURE: CPT | Performed by: INTERNAL MEDICINE

## 2023-12-22 PROCEDURE — 84443 ASSAY THYROID STIM HORMONE: CPT | Performed by: INTERNAL MEDICINE

## 2023-12-22 PROCEDURE — 85027 COMPLETE CBC AUTOMATED: CPT | Performed by: INTERNAL MEDICINE

## 2023-12-22 PROCEDURE — 93005 ELECTROCARDIOGRAM TRACING: CPT | Performed by: PSYCHIATRY & NEUROLOGY

## 2023-12-22 PROCEDURE — 82948 REAGENT STRIP/BLOOD GLUCOSE: CPT

## 2023-12-22 PROCEDURE — 83036 HEMOGLOBIN GLYCOSYLATED A1C: CPT | Performed by: INTERNAL MEDICINE

## 2023-12-22 PROCEDURE — 25810000003 SODIUM CHLORIDE 0.9 % SOLUTION: Performed by: INTERNAL MEDICINE

## 2023-12-22 PROCEDURE — 70551 MRI BRAIN STEM W/O DYE: CPT

## 2023-12-22 PROCEDURE — 93010 ELECTROCARDIOGRAM REPORT: CPT | Performed by: STUDENT IN AN ORGANIZED HEALTH CARE EDUCATION/TRAINING PROGRAM

## 2023-12-22 PROCEDURE — 93306 TTE W/DOPPLER COMPLETE: CPT | Performed by: INTERNAL MEDICINE

## 2023-12-22 PROCEDURE — 25010000002 CYANOCOBALAMIN PER 1000 MCG: Performed by: PSYCHIATRY & NEUROLOGY

## 2023-12-22 PROCEDURE — 97166 OT EVAL MOD COMPLEX 45 MIN: CPT

## 2023-12-22 PROCEDURE — 80061 LIPID PANEL: CPT | Performed by: INTERNAL MEDICINE

## 2023-12-22 PROCEDURE — 97162 PT EVAL MOD COMPLEX 30 MIN: CPT

## 2023-12-22 PROCEDURE — 99223 1ST HOSP IP/OBS HIGH 75: CPT | Performed by: PSYCHIATRY & NEUROLOGY

## 2023-12-22 RX ORDER — CYANOCOBALAMIN 1000 UG/ML
1000 INJECTION, SOLUTION INTRAMUSCULAR; SUBCUTANEOUS DAILY
Status: DISCONTINUED | OUTPATIENT
Start: 2023-12-22 | End: 2023-12-26 | Stop reason: HOSPADM

## 2023-12-22 RX ORDER — ATORVASTATIN CALCIUM 20 MG/1
40 TABLET, FILM COATED ORAL NIGHTLY
Status: DISCONTINUED | OUTPATIENT
Start: 2023-12-22 | End: 2023-12-26 | Stop reason: HOSPADM

## 2023-12-22 RX ORDER — CHOLECALCIFEROL (VITAMIN D3) 125 MCG
1000 CAPSULE ORAL DAILY
Status: DISCONTINUED | OUTPATIENT
Start: 2023-12-29 | End: 2023-12-26 | Stop reason: HOSPADM

## 2023-12-22 RX ADMIN — LEVOTHYROXINE SODIUM 100 MCG: 100 TABLET ORAL at 06:22

## 2023-12-22 RX ADMIN — ATORVASTATIN CALCIUM 40 MG: 20 TABLET, FILM COATED ORAL at 21:07

## 2023-12-22 RX ADMIN — BUPROPION HYDROCHLORIDE 300 MG: 300 TABLET, EXTENDED RELEASE ORAL at 06:22

## 2023-12-22 RX ADMIN — SODIUM CHLORIDE 75 ML/HR: 9 INJECTION, SOLUTION INTRAVENOUS at 10:50

## 2023-12-22 RX ADMIN — VENLAFAXINE HYDROCHLORIDE 150 MG: 150 CAPSULE, EXTENDED RELEASE ORAL at 10:50

## 2023-12-22 RX ADMIN — OXYBUTYNIN CHLORIDE 10 MG: 10 TABLET, EXTENDED RELEASE ORAL at 10:50

## 2023-12-22 RX ADMIN — GABAPENTIN 400 MG: 400 CAPSULE ORAL at 14:40

## 2023-12-22 RX ADMIN — GABAPENTIN 400 MG: 400 CAPSULE ORAL at 21:07

## 2023-12-22 RX ADMIN — GABAPENTIN 400 MG: 400 CAPSULE ORAL at 06:22

## 2023-12-22 RX ADMIN — CHOLECALCIFEROL TAB 10 MCG (400 UNIT) 400 UNITS: 10 TAB at 10:50

## 2023-12-22 RX ADMIN — CYANOCOBALAMIN 1000 MCG: 1000 INJECTION, SOLUTION INTRAMUSCULAR; SUBCUTANEOUS at 21:07

## 2023-12-22 RX ADMIN — METOPROLOL SUCCINATE 25 MG: 25 TABLET, EXTENDED RELEASE ORAL at 10:50

## 2023-12-22 RX ADMIN — ASPIRIN 325 MG: 325 TABLET ORAL at 10:50

## 2023-12-22 NOTE — PLAN OF CARE
The pt was admitted to [facility] 2/2 to being found down at home. + acute stroke. Pmhx significant for HTN, renal insufficiency. The pt lives alone, independent with ADL's and uses a cane. Today the pt was pleasant and agreeable to therapy. Generalized confusion present. She completed bed mobility with CGA. On exam the pt demonstrated a severe R visual deficits - Homonymous hemianopia. She required consistent cues for turning her head to scan her environment while mobilizing. She currently requires assistance for all ADL's and mobility due to her visual deficits and generalized weakness. IRF vs. SNF recommended.    Anticipated Discharge Disposition (OT): inpatient rehabilitation facility, skilled nursing facility

## 2023-12-22 NOTE — CONSULTS
"Neurology Consult Note    Consult Date: 12/22/2023    Referring MD: Dr. Hinojosa    Reason for Consult I have been asked to see the patient in neurological consultation to render advice and opinion regarding stroke    Rekha Bear is a 83 y.o. female with hypertension, GERD, hyperlipidemia, chronic kidney disease.  No prior history of stroke or dementia although per family report patient has a serious problem with hoarding in her home.    Her son came to check on her yesterday and she had difficulty coming to the door.  It took her over an hour to get the door open.  He noted that she was confused and had urinated on herself.  She seemed to have trouble seeing on the right side.  When her confusion failed to improve she was brought to the hospital.  MRI brain revealed stroke.  Symptoms have been fairly stable today.  They note persistent difficulty with vision as well as some impairment of memory.    No previous history of stroke or atrial fibrillation.    Past Medical History:   Diagnosis Date    Acute bronchitis     Chest pain     Chilblains     \"Chilblian's\"    Depression     Fatty liver     GERD (gastroesophageal reflux disease)     Hyperlipidemia     Hypertension     Incontinence     Intermittent lightheadedness     Osteoarthritis     OA  Marvin THR, LT TKR - hydrocodone prescibed by Dr. Almaguer    Pain of esophagus     \" nervous esophagus\" - she takes the occasional alprazolam    Peptic ulcer disease     Pneumonia due to COVID-19 virus 03/2020    now tested negative    Raynaud's disease     \"Raynauds\"    Renal disease     followed by Dr. 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  CN: Right homonymous hemianopia, pupils equal, conjugate gaze, no facial droop, no dysarthria  Motor: 5/5 throughout upper and lower extremities, normal tone  Coordination: No dysmetria  Sensory: Mildly diminished cold temperature sensation right upper extremity    DATA:    Lab Results   Component Value Date    GLUCOSE 138 (H) 12/22/2023    CALCIUM 8.7 12/22/2023     12/22/2023    K 4.5 12/22/2023    CO2 22.0 12/22/2023     12/22/2023    BUN 17 12/22/2023    CREATININE 1.27 (H) 12/22/2023    EGFRIFAFRI 61 10/14/2021    EGFRIFNONA 50 (L) 10/14/2021    BCR 13.4 12/22/2023    ANIONGAP 13.0 12/22/2023     Lab Results   Component Value Date    WBC 7.96 12/22/2023    HGB 12.0 12/22/2023    HCT 37.0 12/22/2023    MCV 88.9 12/22/2023     12/22/2023       Lab review: Creatinine 1.3, CBC normal    Imaging review: I personally reviewed her MRI of the brain performed since admission which is significant for a relatively complete left PCA stroke with slight involvement of the thalamus and corpus callosum.  Radiology report reviewed.  MRA head and bilateral carotid duplex ordered    2D echo showed moderate left atrial enlargement    Diagnoses:  Stroke, left posterior cerebral artery, embolic  Right homonymous hemianopia  B12 deficiency    Pre-stroke MRS: 0  NIHSS: 3    Comment: Left PCA stroke.  Most likely cardioembolic.  No previous history of A-fib.  2D echo with moderate LAE    PLAN:  Aspirin, statin  Check EKG  MRI head  Bilateral carotid duplex  Replace B12    Prognosis discussed at length with family.  No driving from a neurology standpoint due to hemianopia.  High risk of vascular dementia with this type of stroke.

## 2023-12-22 NOTE — PROGRESS NOTES
[facility] Acute Inpt Rehab Note     Referral received via stroke order set.  Please note this is a screening only, rehab admissions will not actively be evaluating this patient.  If felt patient is appropriate for our services once therapies begin, please call our office at [phone], to initiate a full referral.    Thank you,   Anay Veronica RN   Rehab Admission Nurse

## 2023-12-22 NOTE — PROGRESS NOTES
[facility] Acute Inpt Rehab     Received request for acute inpatient rehab eval.    Chart reviewed.    Therapy notes reviewed.    Will reach out and discuss with patient and family acute rehab and discharge plan.     Will follow up with CCP when eval is complete.    Thanks,   Anay Veronica RN

## 2023-12-22 NOTE — PLAN OF CARE
Goal Outcome Evaluation:                      VSS. Pt up to chair; ambulating with assist. MRI completed; positive for stroke.

## 2023-12-22 NOTE — PROGRESS NOTES
Name: Rekha Bear ADMIT: 2023   : 1940  PCP: Eben Porter MD    MRN: [id number] LOS: 1 days   AGE/SEX: 83 y.o. female  ROOM: [facility]     Subjective   Subjective   Denies current complaint. No chest pain or shortness of breath. No abdominal pain.      Objective   Objective   Vital Signs  Temp:  [97.5 °F (36.4 °C)-98.1 °F (36.7 °C)] 98.1 °F (36.7 °C)  Heart Rate:  [62-72] 65  Resp:  [18] 18  BP: (155-179)/(75-98) 166/86  SpO2:  [78 %-100 %] 98 %  on  Flow (L/min):  [2] 2;   Device (Oxygen Therapy): nasal cannula  Body mass index is 29.76 kg/m².    Physical Exam  Constitutional:       General: She is not in acute distress.     Appearance: Normal appearance. She is not toxic-appearing.   HENT:      Head: Normocephalic and atraumatic.   Eyes:      Extraocular Movements: Extraocular movements intact.   Cardiovascular:      Rate and Rhythm: Normal rate and regular rhythm.   Pulmonary:      Effort: Pulmonary effort is normal. No respiratory distress.      Breath sounds: Normal breath sounds.   Abdominal:      General: Bowel sounds are normal.      Palpations: Abdomen is soft.      Tenderness: There is no abdominal tenderness. There is no guarding or rebound.   Musculoskeletal:         General: No swelling.      Right lower leg: No edema.      Left lower leg: No edema.   Skin:     General: Skin is warm and dry.   Neurological:      Mental Status: She is alert and oriented to person, place, and time.      Comments: states not seeing some of my fingers on exam (right). moving all extremities, no drift.   Psychiatric:         Mood and Affect: Mood normal.         Behavior: Behavior normal.        1a. Level of Consciousness: 0-->Alert, keenly responsive  1b. LOC Questions: 0-->Answers both questions correctly  1c. LOC Commands: 0-->Performs both tasks correctly  2. Best Gaze: 0-->Normal  3. Visual: 0-->No visual loss  4. Facial Palsy: 0-->Normal symmetrical movements  5a. Motor Arm, Left: 0-->No  drift, limb holds 90 (or 45) degrees for full 10 secs  5b. Motor Arm, Right: 0-->No drift, limb holds 90 (or 45) degrees for full 10 secs  6a. Motor Leg, Left: 0-->No drift, leg holds 30 degree position for full 5 secs  6b. Motor Leg, Right: 0-->No drift, leg holds 30 degree position for full 5 secs  7. Limb Ataxia: 0-->Absent  8. Sensory: 0-->Normal, no sensory loss  9. Best Language: 0-->No aphasia, normal  10. Dysarthria: 0-->Normal  11. Extinction and Inattention (formerly Neglect): 0-->No abnormality    Total (NIH Stroke Scale): 0    Results Review  I reviewed the patient's new clinical results.  Results from last 7 days   Lab Units 12/22/23  0647 12/21/23  1531   WBC 10*3/mm3 7.96 9.70   HEMOGLOBIN g/dL 12.0 13.9   PLATELETS 10*3/mm3 181 198     Results from last 7 days   Lab Units 12/21/23  1531   SODIUM mmol/L 135*   POTASSIUM mmol/L 4.9   CHLORIDE mmol/L 98   CO2 mmol/L 25.0   BUN mg/dL 19   CREATININE mg/dL 1.59*   GLUCOSE mg/dL 225*     Lab Results   Component Value Date    ANIONGAP 12.0 12/21/2023     Estimated Creatinine Clearance: 31.3 mL/min (A) (by C-G formula based on SCr of 1.59 mg/dL (H)).   Lab Results   Component Value Date    EGFR 32.5 (L) 12/21/2023     Results from last 7 days   Lab Units 12/21/23  1531   ALBUMIN g/dL 4.2   BILIRUBIN mg/dL 1.2   ALK PHOS U/L 86   AST (SGOT) U/L 20   ALT (SGPT) U/L 11     Results from last 7 days   Lab Units 12/21/23  1531   CALCIUM mg/dL 9.4   ALBUMIN g/dL 4.2   MAGNESIUM mg/dL 2.0     Results from last 7 days   Lab Units 12/21/23  1609   LACTATE mmol/L 1.7     Hemoglobin A1C   Date/Time Value Ref Range Status   12/22/2023 0647 9.10 (H) 4.80 - 5.60 % Final     Glucose   Date/Time Value Ref Range Status   12/22/2023 0555 100 70 - 130 mg/dL Final   12/21/2023 2245 92 70 - 130 mg/dL Final       CT Head Without Contrast    Result Date: 12/21/2023  There is an acute infarct involving the left PCA vascular distribution measuring approximately 1.6 x 4.2 x 3.4 cm in  size. There is faint increased attenuation suggestive of petechial hemorrhage. There is no evidence of a focal intraparenchymal hematoma, hydrocephalus or of midline shift. Vascular calcification and moderate small vessel ischemic disease is noted. The above information was called to and discussed with Dr. Ortiz.   Radiation dose reduction techniques were utilized, including automated exposure control and exposure modulation based on body size.   This report was finalized on 12/21/2023 8:39 PM by Dr. George Whitehead M.D on Workstation: [id number]       Scheduled Meds  aspirin, 325 mg, Oral, Daily   Or  aspirin, 300 mg, Rectal, Daily  atorvastatin, 80 mg, Oral, Nightly  buPROPion XL, 300 mg, Oral, QAM  cholecalciferol, 400 Units, Oral, Daily  gabapentin, 400 mg, Oral, Q8H  insulin lispro, 2-7 Units, Subcutaneous, 4x Daily AC & at Bedtime  levothyroxine, 100 mcg, Oral, Q AM  metoprolol succinate XL, 25 mg, Oral, Daily  oxybutynin XL, 10 mg, Oral, Daily  venlafaxine XR, 150 mg, Oral, Daily    Continuous Infusions  sodium chloride, 75 mL/hr, Last Rate: 75 mL/hr (12/21/23 1959)    PRN Meds    acetaminophen **OR** acetaminophen    bisacodyl    dextrose    dextrose    glucagon (human recombinant)    ondansetron    sodium chloride    sodium chloride, 75 mL/hr, Last Rate: 75 mL/hr (12/21/23 1959)    Diet  NPO Diet NPO Type: Strict NPO    I have personally reviewed:  [x]  Medications  [x]  Laboratory   []  Microbiology   [x]  Radiology   []  EKG/Telemetry   []  Cardiology/Vascular   []  Pathology   []  Records      Assessment/Plan     Active Hospital Problems    Diagnosis  POA    **Acute left PCA stroke [I63.532]  Yes    Hyperlipemia [E78.5]  Yes    Hypothyroidism [E03.9]  Yes    Hypertension [I10]  Yes    Type 2 diabetes mellitus with stage 3 chronic kidney disease, without long-term current use of insulin [E11.22, N18.30]  Yes      Resolved Hospital Problems   No resolved problems to display.     83 y.o. female found on floor.  Family reported recent confusion    Acute left PCA stroke  Aspirin, statin, IVF  MRI pending  Echocardiogram  Neurochecks, NIHSS  Neurology consult.   Therapies    DM2 with CKD 3  Correctional insulin  A1c 9.10%  Creatinine a little higher than previous continue to monitor on IVF    HTN  Metoprolol with holding parameters for SBP less than 160 for now    Hypothyroidism  Levothyroxine  Last TSH 2022 5.520. recheck already ordered    DVT prophylaxis  SCDs    Discharge  TBD  Expected discharge date/ time has not been documented.    Discussed with patient and nursing staff    Lenard Hinojosa MD  [facility]  12/22/23

## 2023-12-22 NOTE — THERAPY EVALUATION
"Acute Care - Speech Language Pathology Initial Evaluation  [facility]     Patient Name: Rekha Bear  : 1940  MRN: [id number]  Today's Date: 2023               Admit Date: 2023     Visit Dx:    ICD-10-CM ICD-9-CM   1. Acute ischemic left PCA stroke  I63.532 434.91   2. Generalized weakness  R53.1 780.79   3. Confusion  R41.0 298.9   4. Hypertension, unspecified type  I10 401.9   5. History of hypertension  Z86.79 V12.59   6. 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    Community acquired pneumonia of left lower lobe of lung    Hyponatremia    Pneumonia due to COVID-19 virus    Major depressive disorder, recurrent, mild    Acute respiratory failure with hypoxia    Supraventricular tachycardia    PVC (premature ventricular contraction)    Dyspnea on exertion    Dizziness    Depressive disorder    Gastroesophageal reflux disease    Midline cystocele    Rectocele    BMI 29.0-29.9,adult    Acute left PCA stroke     Past Medical History:   Diagnosis Date    Acute bronchitis     Chest pain     Chilblains     \"Chilblian's\"    Depression     Fatty liver     GERD (gastroesophageal reflux disease)     Hyperlipidemia     Hypertension     Incontinence     Intermittent lightheadedness     Osteoarthritis     OA  Marvin THR, LT TKR - hydrocodone prescibed by Dr. Almaguer    Pain of esophagus     \" nervous esophagus\" - she takes the occasional alprazolam    Peptic ulcer disease     Pneumonia due to COVID-19 virus 2020    now tested " "negative    Raynaud's disease     \"Raynauds\"    Renal disease     followed by Dr. 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 ENDOSCOPY;  Service:     DENTAL PROCEDURE      FOOT SURGERY      TONSILLECTOMY      TOTAL HIP ARTHROPLASTY Bilateral     OA  Marvin THR, LT TKR - hydrocodone prescibed by Dr. Almaguer    TOTAL KNEE ARTHROPLASTY Left     OA  Marvin THR, LT TKR - hydrocodone prescibed by Dr. Almaguer       SLP Recommendation and Plan  Clinical impression: moderate to severe cognitive deficits, mild to moderate expressive aphasia.   Ongoing intensive tx required at next level of care. Pt with high PLOF - lives in home alone, independent with all IADLS including driving, finance, medication management. 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 Pt highly motivated for return to all prior roles/responsibility. Excellent rehab potential.       SLP EVALUATION (last 72 hours)       SLP SLC Evaluation       Row Name 12/22/23 1300       Communication Assessment/Intervention    Document Type evaluation  -AB    Subjective Information no complaints  -AB    Patient Observations alert;cooperative;agree to therapy  -AB    Patient/Family/Caregiver Comments/Observations up in chair, seen in room p596, son and daughter in law at bedside  -AB    Patient Effort excellent  -AB    Symptoms Noted During/After Treatment none  -AB       General Information    Patient Profile Reviewed yes  -AB    Pertinent History Of Current Problem 83 y.o. female found on floor. Family reported recent confusion     Acute left PCA stroke  -AB    Precautions/Limitations, Vision WFL  -AB " "   Precautions/Limitations, Hearing WFL  -AB    Patient Level of Education Social hx: pt lives in home alone, independent with all IADLS including driving, finance, medication management. 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 Pt and family report mild memory/word finding concerns at baseline.  -AB    Prior Level of Function-Communication --  mild concerns with memory, word finding, no dx  -AB    Plans/Goals Discussed with patient;family;agreed upon  -AB    Barriers to Rehab none identified  -AB    Patient's Goals for Discharge return to all previous roles/activities  -AB    Family Goals for Discharge patient able to return to previous activities/roles  -AB    Standardized Assessment Used SLUMS  -AB       Comprehension Assessment/Intervention    Comprehension Assessment/Intervention Auditory Comprehension  -AB       Auditory Comprehension Assessment/Intervention    Auditory Comprehension (Communication) WFL  -AB       Expression Assessment/Intervention    Expression Assessment/Intervention verbal expression  -AB       Verbal Expression Assessment/Intervention    Verbal Expression mild impairment;moderate impairment  -AB    Sentence Formulation circumlocution;delayed responses  -AB    Verbal Expression, Comment Informally, anomia and expressive aphasia present at sentence level tasks. Confrontational and responsive naming tasks 100 accurate. Automatics for counting, KARO 100% accurate with increased time, delayed responses. Wh- questions, 50% accurate. Anomic events additionally appreciated when pt describing home or address, prior career, improved with field of 3 options presents. Frequent rote responses \"wait a minute,\" or \"Oh I know it.\"  -AB       Motor Speech Assessment/Intervention    Motor Speech Function WNL  -AB    Speech intelligibility 100%;with unfamiliar listener  -AB       Cognitive Assessment Intervention- SLP    Cognitive Function (Cognition) moderate " "impairment;severe impairment  -AB    Cognition, Comment [facility] SLUMS breakdown as follows:    Orientation: 2/3 (year: \", closing on \")    Mental math: 1/3 (correct addition, incorrect subtraction)     Divergent namin/3 (4 items in 1 minute, concrete category, x2 repetition)     Mental manipulation: 1/2 (+ 2 and 3 digit, error for 4 digit)     Delayed recall: 2/5 (can recall 4/5 semantic cues, 5/5 field of 2 cues)     Clock draw: 0/4 (see attached photo, x13 numbers, number 11 perseverated, numbers presented counter clockwise, inefficient spacing, no hands for time)           Directions: 2/2     Working memory paragraph:      Total score: 14/30 - may indicate dementia.  -AB       Standardized Tests    Cognitive/Memory Tests SLUMS: [facility] Status Examination  -AB       SLUMS: [facility] Status Examination    SLUMS Score 14  -AB    SLUMS Range 1-20: Dementia (High school education or higher)  -AB       SLP Evaluation Clinical Impressions    SLP Diagnosis moderate-severe;cognitive-linguistic disorder;mild-moderate;aphasia  -AB    Rehab Potential/Prognosis excellent  -AB    SLC Criteria for Skilled Therapy Interventions Met yes  -AB    Functional Impact difficulty in expressing complex messages;restrictions in personal and social life;difficulty completing home management task  -AB       SLP Treatment Clinical Impressions    Care Plan Review evaluation/treatment results reviewed;care plan/treatment goals reviewed;risks/benefits reviewed;current/potential barriers reviewed;patient/other agree to care plan  -AB    Care Plan Review, Other Participant(s) son;daughter  -AB       Recommendations    Therapy Frequency (SLP SLC) PRN  -AB    Predicted Duration Therapy Intervention (Days) until discharge  -AB    Anticipated Discharge Disposition (SLP) anticipate therapy at next level of care;inpatient rehabilitation facility;skilled nursing facility  -AB       Communication " Treatment Objective and Progress Goals (SLP)    Verbal Expression Treatment Objectives Verbal Expression Treatment Objectives (Group)  -AB    Cognitive Linguistic Treatment Objectives Cognitive Linguistic Treatment Objectives (Group)  -AB       Verbal Expression Treatment Objectives    Word Retrieval Skills Selection word retrieval, SLP goal 1  -AB    Connected Speech Selection connected speech, SLP goal 1  -AB       Word Retrieval Skills Goal 1 (SLP)    Improve Word Retrieval Skills By Goal 1 (SLP) completing functional word finding tasks;90%;independently (over 90% accuracy)  -AB    Time Frame (Word Retrieval Goal 1, SLP) short term goal (STG)  -AB    Progress/Outcomes (Word Retrieval Goal 1, SLP) new goal  -AB       Connected Speech to Express Thoughts Goal 1 (SLP)    Improve Narrative Discourse to Express Thoughts By Goal 1 (SLP) describing a picture;conversational task on a given topic;90%;independently (over 90% accuracy)  -AB    Time Frame (Connected Speech Goal 1, SLP) short term goal (STG)  -AB    Progress/Outcomes (Connected Speech Goal 1, SLP) new goal  -AB       Cognitive Linguistic Treatment Objectives    Memory Skills Selection memory skills, SLP goal 1  -AB    Functional Math Skills Selection functional math skills, SLP goal 1  -AB    Executive Function Skills Selection executive function skills, SLP goal 1  -AB    Right Hemisphere Function Selection right hemisphere function, SLP goal 1  -AB       Memory Skills Goal 1 (SLP)    Improve Memory Skills Through Goal 1 (SLP) use memory strategies;use external memory aid;80%;with minimal cues (75-90%)  -AB    Time Frame (Memory Skills Goal 1, SLP) short term goal (STG)  -AB    Progress/Outcomes (Memory Skills Goal 1, SLP) new goal  -AB       Functional Math Skills Goal 1 (SLP)    Improve Functional Math Skills Through Goal 1 (SLP) complete functional math task;80%;with minimal cues (75-90%)  -AB    Time Frame (Functional Math Skills Goal 1, SLP) short term  goal (STG)  -AB    Progress/Outcomes (Functional Math Skills Goal 1, SLP) new goal  -AB       Executive Functional Skills Goal 1 (SLP)    Improve Executive Function Skills Goal 1 (SLP) home management activity;80%;with minimal cues (75-90%)  -AB    Time Frame (Executive Function Skills Goal 1, SLP) short term goal (STG)  -AB    Progress/Outcomes (Executive Function Skills Goal 1, SLP) new goal  -AB       Right Hemisphere Function Goal 1 (SLP)    Improve Right Hemisphere Function Through Goal 1 (SLP) complete visuo-spatial activities (visual closure, trail making, mazes;complete visuo-perceptual activities (L/R discrimination, spatial concepts);80%;with minimal cues (75-90%)  -AB    Time Frame (Right Hemisphere Function Goal 1, SLP) short term goal (STG)  -AB    Progress/Outcomes (Right Hemisphere Function Goal 1, SLP) new goal  -AB              User Key  (r) = Recorded By, (t) = Taken By, (c) = Cosigned By      Initials Name Effective Dates    Jazzmine Brown, MS CCC-SLP 12/27/22 -                        EDUCATION  The patient has been educated in the following areas:     Cognitive Impairment.           SLP GOALS       Row Name 12/22/23 1300             Word Retrieval Skills Goal 1 (SLP)    Improve Word Retrieval Skills By Goal 1 (SLP) completing functional word finding tasks;90%;independently (over 90% accuracy)  -AB      Time Frame (Word Retrieval Goal 1, SLP) short term goal (STG)  -AB      Progress/Outcomes (Word Retrieval Goal 1, SLP) new goal  -AB         Connected Speech to Express Thoughts Goal 1 (SLP)    Improve Narrative Discourse to Express Thoughts By Goal 1 (SLP) describing a picture;conversational task on a given topic;90%;independently (over 90% accuracy)  -AB      Time Frame (Connected Speech Goal 1, SLP) short term goal (STG)  -AB      Progress/Outcomes (Connected Speech Goal 1, SLP) new goal  -AB         Memory Skills Goal 1 (SLP)    Improve Memory Skills Through Goal 1 (SLP) use memory  strategies;use external memory aid;80%;with minimal cues (75-90%)  -AB      Time Frame (Memory Skills Goal 1, SLP) short term goal (STG)  -AB      Progress/Outcomes (Memory Skills Goal 1, SLP) new goal  -AB         Functional Math Skills Goal 1 (SLP)    Improve Functional Math Skills Through Goal 1 (SLP) complete functional math task;80%;with minimal cues (75-90%)  -AB      Time Frame (Functional Math Skills Goal 1, SLP) short term goal (STG)  -AB      Progress/Outcomes (Functional Math Skills Goal 1, SLP) new goal  -AB         Executive Functional Skills Goal 1 (SLP)    Improve Executive Function Skills Goal 1 (SLP) home management activity;80%;with minimal cues (75-90%)  -AB      Time Frame (Executive Function Skills Goal 1, SLP) short term goal (STG)  -AB      Progress/Outcomes (Executive Function Skills Goal 1, SLP) new goal  -AB         Right Hemisphere Function Goal 1 (SLP)    Improve Right Hemisphere Function Through Goal 1 (SLP) complete visuo-spatial activities (visual closure, trail making, mazes;complete visuo-perceptual activities (L/R discrimination, spatial concepts);80%;with minimal cues (75-90%)  -AB      Time Frame (Right Hemisphere Function Goal 1, SLP) short term goal (STG)  -AB      Progress/Outcomes (Right Hemisphere Function Goal 1, SLP) new goal  -AB                User Key  (r) = Recorded By, (t) = Taken By, (c) = Cosigned By      Initials Name Provider Type    Jazzmine Brown, MS CCC-SLP Speech and Language Pathologist                            Time Calculation:      Time Calculation- SLP       Row Name 12/22/23 1336             Time Calculation- SLP    SLP Received On 12/22/23  -AB                User Key  (r) = Recorded By, (t) = Taken By, (c) = Cosigned By      Initials Name Provider Type    Jazzmine Brown, MS CCC-SLP Speech and Language Pathologist                    Therapy Charges for Today       Code Description Service Date Service Provider Modifiers Qty    [id number]   ST EVAL SPEECH AND PROD W LANG  5 12/22/2023 Jazzmine Kendall, MS CCC-SLP GN 1                       Jazzmine Kendall MS CCC-SLP  12/22/2023

## 2023-12-22 NOTE — PLAN OF CARE
Goal Outcome Evaluation:  Plan of Care Reviewed With: patient           Outcome Evaluation: Pt. is an 83 year old Female admitted after being found down on the floor in her home.  Pt. with confusion/weakness and diagnostic testing reveals an acute CVA.  Pt./family reports that prior to admission pt. was using cane for ambulation and has had a h/o recent falls at home. Pt. currently presents with decreased strength, decreased balance, visual impairments (Bilateral right sided visual field deficit), and decreased tolerance to functional activity.  This AM, pt. able to ambulate 100 feet, CGA x 2, with use of Rwx.  Pt. requires CGA x 1 for bed mobility and CGA x 1 for sit <-> stand transfers.  Verbal/tactile cues given during ambulation for posture correction and Rwx guidance largely due to visual impairment and overall weakness.  BLE ther. ex. program x 10 reps completed for general strengthening.  Pt. will benefit from skilled inpt. P.T. to address her functional deficits and to assist pt. in regaining her maximum level of independence with functional mobility.      Anticipated Discharge Disposition (PT): inpatient rehabilitation facility, skilled nursing facility (Pending pt. progress)

## 2023-12-22 NOTE — H&P
"HISTORY AND PHYSshe wasICAL   [facility]        Date of Admission: 2023  Patient Identification:  Name: Rekha Bear  Age: 83 y.o.  Sex: female  :  1940  MRN: [id number]                     Primary Care Physician: Eben Porter MD    Chief Complaint:  83 year old female who was brought in from home where she was found on the ground; she was able to crawl to the door and ems was called; she is not sure how she ended up on the ground; she is not able to give any history     History of Present Illness:   As above    Past Medical History:  Past Medical History:   Diagnosis Date    Acute bronchitis     Chest pain     Chilblains     \"Chilblian's\"    Depression     Fatty liver     GERD (gastroesophageal reflux disease)     Hyperlipidemia     Hypertension     Incontinence     Intermittent lightheadedness     Osteoarthritis     OA  Marvin THR, LT TKR - hydrocodone prescibed by Dr. Almaguer    Pain of esophagus     \" nervous esophagus\" - she takes the occasional alprazolam    Peptic ulcer disease     Pneumonia due to COVID-19 virus 2020    now tested negative    Raynaud's disease     \"Raynauds\"    Renal disease     followed by Dr. 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 Almaguer    TOTAL KNEE ARTHROPLASTY Left     OA  Marvin THR, LT TKR - hydrocodone prescibed by Dr. Almaguer      Home Meds:  Medications Prior to Admission   Medication Sig Dispense " Refill Last Dose    buPROPion XL (WELLBUTRIN XL) 300 MG 24 hr tablet TAKE 1 TABLET BY MOUTH EVERY DAY IN THE MORNING 30 tablet 0 12/20/2023 at 0900    gabapentin (NEURONTIN) 600 MG tablet TAKE ONE TABLET BY MOUTH THREE TIMES A DAY 90 tablet 4 12/19/2023 at 2100    glimepiride (AMARYL) 2 MG tablet Take 2 tablets by mouth Every Morning Before Breakfast. APPOINTMENT IS NEEDED FOR MORE REFILLS 60 tablet 0 12/19/2023 at 0900    levothyroxine (SYNTHROID, LEVOTHROID) 100 MCG tablet TAKE 1 TABLET BY MOUTH EVERY DAY 15 tablet 0 12/19/2023 at 0600    rosuvastatin (CRESTOR) 20 MG tablet TAKE ONE TABLET BY MOUTH DAILY 30 tablet 0 12/19/2023 at 2100    tolterodine LA (DETROL LA) 4 MG 24 hr capsule Take 1 capsule by mouth Daily. 180 capsule 3 12/19/2023 at 0900    Acetylcysteine capsule capsule Take 2 capsules by mouth Daily.       bacitracin 500 UNIT/GM ointment Apply 1 application  topically to the appropriate area as directed 2 (Two) Times a Day. 14.2 g 0     colesevelam (WELCHOL) 625 MG tablet Take 3 tablets by mouth.       dextromethorphan polistirex ER (DELSYM) 30 MG/5ML Suspension Extended Release oral suspension Take 30 mg by mouth 2 (Two) Times a Day As Needed (cough). 280 mL      empagliflozin (JARDIANCE) 10 MG tablet tablet 1 tablet Daily.       guaiFENesin (ROBITUSSIN) 100 MG/5ML solution oral solution Take 10 mL by mouth Every 4 (Four) Hours As Needed (cough).       metFORMIN ER (GLUCOPHAGE-XR) 500 MG 24 hr tablet Take 1 tablet by mouth Daily With Breakfast. 30 tablet 2     metoprolol succinate XL (TOPROL-XL) 25 MG 24 hr tablet TAKE ONE TABLET BY MOUTH DAILY 30 tablet 5     pioglitazone (ACTOS) 15 MG tablet Take 1 tablet by mouth Daily.   12/19/2023 at 0900    Tradjenta 5 MG tablet tablet Take 1 tablet by mouth Daily.   12/19/2023    traMADol (ULTRAM) 50 MG tablet Take 1 tablet by mouth Every 6 (Six) Hours As Needed for Moderate Pain . 90 tablet 0 Unknown    venlafaxine XR (EFFEXOR-XR) 150 MG 24 hr capsule TAKE 1  CAPSULE BY MOUTH EVERY DAY 90 capsule 0 2023 at 0900    Vitamin D, Cholecalciferol, (CHOLECALCIFEROL) 10 MCG (400 UNIT) tablet Take 1 tablet by mouth 2 (two) times a day.          Allergies:  Allergies   Allergen Reactions    Shellfish-Derived Products Anaphylaxis    Amlodipine Other (See Comments)     Heart race    Iodine Rash     Immunizations:  Immunization History   Administered Date(s) Administered    COVID-19 (PFIZER) BIVALENT 12+YRS 10/06/2022    COVID-19 (PFIZER) Purple Cap Monovalent 2021, 2021, 2021, 2021, 10/14/2021    Covid-19 (Pfizer) Gray Cap Monovalent 2022    FLUAD TRI 65YR+ 2020, 2020    FluMist 2-49yrs 2015    Fluad Quad 65+ 10/14/2021    Fluzone High Dose =>65 Years (Vaxcare ONLY) 10/21/2016, 2017    Fluzone High-Dose 65+yrs 10/03/2022, 10/16/2023    Pneumococcal Conjugate 13-Valent (PCV13) 2016    Pneumococcal Polysaccharide (PPSV23) 2018    Shingrix 2021, 2021    TD Preservative Free (Tenivac) 2016, 2018    Td (TDVAX) 2016, 2018    Tdap 10/08/2023     Social History:   Social History     Social History Narrative    Lives in own home, by herself. She has good family support     Social History     Socioeconomic History    Marital status:     Number of children: 5   Tobacco Use    Smoking status: Former     Packs/day: 0.50     Years: 14.00     Additional pack years: 0.00     Total pack years: 7.00     Types: Cigarettes     Quit date: 1975     Years since quittin.0    Smokeless tobacco: Never    Tobacco comments:     CAFFEINE USE   Vaping Use    Vaping Use: Never used   Substance and Sexual Activity    Alcohol use: No    Drug use: No    Sexual activity: Never       Family History:  Family History   Problem Relation Age of Onset    Hypertension Mother     Diabetes type II Mother     Hypertension Father     Coronary artery disease Father     Diabetes type II Father     Colon cancer  "Brother     Skin cancer Brother     Stroke Son     Breast cancer Maternal Aunt     Hypertension Other     Other Other         Lipids  Thyroid        Review of Systems  Not obtainable    Objective:  T Max 24 hrs: Temp (24hrs), Av.6 °F (36.4 °C), Min:97.5 °F (36.4 °C), Max:97.7 °F (36.5 °C)    Vitals Ranges:   Temp:  [97.5 °F (36.4 °C)-97.7 °F (36.5 °C)] 97.7 °F (36.5 °C)  Heart Rate:  [62-72] 68  Resp:  [18] 18  BP: (155-179)/(75-98) 158/78      Exam:  /78 (BP Location: Left arm, Patient Position: Lying)   Pulse 68   Temp 97.7 °F (36.5 °C) (Oral)   Resp 18   Ht 170.2 cm (67\")   Wt 86.2 kg (190 lb)   LMP  (LMP Unknown)   SpO2 (!) 78%   BMI 29.76 kg/m²     General Appearance:    Alert, cooperative, no distress, appears stated age   Head:    Normocephalic, without obvious abnormality, atraumatic   Eyes:    PERRL, conjunctivae/corneas clear, EOM's intact, both eyes   Ears:    Normal external ear canals, both ears   Nose:   Nares normal, septum midline, mucosa normal, no drainage    or sinus tenderness   Throat:   Lips, mucosa, and tongue normal   Neck:   Supple, symmetrical, trachea midline, no adenopathy;     thyroid:  no enlargement/tenderness/nodules; no carotid    bruit or JVD   Back:     Symmetric, no curvature, ROM normal, no CVA tenderness   Lungs:     Clear to auscultation bilaterally, respirations unlabored   Chest Wall:    No tenderness or deformity    Heart:    Regular rate and rhythm, S1 and S2 normal, no murmur, rub   or gallop   Abdomen:     Soft, nontender, bowel sounds active all four quadrants,     no masses, no hepatomegaly, no splenomegaly   Extremities:   Extremities normal, atraumatic, no cyanosis or edema   Pulses:   2+ and symmetric all extremities   Skin:   Skin color, texture, turgor normal, no rashes or lesions   Lymph nodes:   Cervical, supraclavicular, and axillary nodes normal   Neurologic:   CNII-XII intact, normal strength, sensation intact throughout      .    Data " Review:  Labs in chart were reviewed.  WBC   Date Value Ref Range Status   12/21/2023 9.70 3.40 - 10.80 10*3/mm3 Final     Hemoglobin   Date Value Ref Range Status   12/21/2023 13.9 12.0 - 15.9 g/dL Final     Hematocrit   Date Value Ref Range Status   12/21/2023 43.0 34.0 - 46.6 % Final     Platelets   Date Value Ref Range Status   12/21/2023 198 140 - 450 10*3/mm3 Final     Sodium   Date Value Ref Range Status   12/21/2023 135 (L) 136 - 145 mmol/L Final     Potassium   Date Value Ref Range Status   12/21/2023 4.9 3.5 - 5.2 mmol/L Final     Comment:     Slight hemolysis detected by analyzer. Result may be falsely elevated.     Chloride   Date Value Ref Range Status   12/21/2023 98 98 - 107 mmol/L Final     CO2   Date Value Ref Range Status   12/21/2023 25.0 22.0 - 29.0 mmol/L Final     BUN   Date Value Ref Range Status   12/21/2023 19 8 - 23 mg/dL Final     Creatinine   Date Value Ref Range Status   12/21/2023 1.59 (H) 0.57 - 1.00 mg/dL Final     Glucose   Date Value Ref Range Status   12/21/2023 225 (H) 65 - 99 mg/dL Final     Calcium   Date Value Ref Range Status   12/21/2023 9.4 8.6 - 10.5 mg/dL Final     Magnesium   Date Value Ref Range Status   12/21/2023 2.0 1.6 - 2.4 mg/dL Final                Imaging Results (All)       Procedure Component Value Units Date/Time    CT Head Without Contrast [[id number]] Collected: 12/21/23 1921     Updated: 12/21/23 2042    Narrative:      CT HEAD WITHOUT CONTRAST     HISTORY:  Unwitnessed fall, dementia.     COMPARISON: CT head 10/08/2023.     FINDINGS: There is an area of decreased attenuation involving the medial  aspect of the left occipital lobe consistent with an acute infarct. This  is new as compared to 10/08/2023 and measures 43 mm in AP dimension and  approximately 1.6 cm in transverse dimension anteriorly. It measures  approximately 3.4 cm in the craniocaudal dimension. There is faint  increased attenuation centrally, gyriform which may represent mild  petechial  hemorrhage.     There is no evidence of hydrocephalus or midline shift. Moderate  vascular calcification is noted.       Impression:      There is an acute infarct involving the left PCA vascular  distribution measuring approximately 1.6 x 4.2 x 3.4 cm in size. There  is faint increased attenuation suggestive of petechial hemorrhage. There  is no evidence of a focal intraparenchymal hematoma, hydrocephalus or of  midline shift. Vascular calcification and moderate small vessel ischemic  disease is noted. The above information was called to and discussed with  Dr. Ortiz.         Radiation dose reduction techniques were utilized, including automated  exposure control and exposure modulation based on body size.        This report was finalized on 12/21/2023 8:39 PM by Dr. George Whitehead M.D on Workstation: [id number]       XR Hips Bilateral With or Without Pelvis 2 View [[id number]] Collected: 12/21/23 1954     Updated: 12/21/23 2001    Narrative:      BILATERAL HIPS     HISTORY: Fall, bilateral hip pain.     COMPARISON: None.     FINDINGS: A single AP view of the pelvis as well as AP and frog-leg  lateral views of the right hip and AP and frog-leg lateral views of the  left hip were obtained. Bilateral hip prostheses are appreciated with no  evidence of fracture or dislocation. There is a mild amount of stool  present within the colon distally.        This report was finalized on 12/21/2023 7:58 PM by Dr. George Whitehead M.D on Workstation: [id number]                 Assessment:  Active Hospital Problems    Diagnosis  POA    **Stroke [I63.9]  Yes      Resolved Hospital Problems   No resolved problems to display.   Diabetes  Hypertension  Hyperlipidemia  ckd3    Plan:  Will continue stroke workup  Neurology to see   Therapies evaluation  Ct noted  Accu checks, insulin sliding scale  Dw patient and ed provider    Ronna Sawant MD  12/21/2023  21:59 EST

## 2023-12-22 NOTE — DISCHARGE PLACEMENT REQUEST
"Rekha Bear \"MARIA ANTONIA\" (83 y.o. Female)       Date of Birth   1940    Social Security Number       Address   [address] [place] [address]    Home Phone   [phone]    MRN   [id number]       [employer]   [employer]    Marital Status                               Admission Date   12/21/23    Admission Type   Emergency    Admitting Provider   Ronna Sawant MD    Attending Provider   Lenard Hinojosa MD    Department, Room/Bed   [address], P596/1       Discharge Date       Discharge Disposition       Discharge Destination                                 Attending Provider: Lenard Hinojosa MD    Allergies: Shellfish-derived Products, Amlodipine, Iodine    Isolation: None   Infection: None   Code Status: CPR    Ht: 170.2 cm (67\")   Wt: 86.2 kg (190 lb)    Admission Cmt: None   Principal Problem: Acute left PCA stroke [I63.532]                   Active Insurance as of 12/21/2023       Primary Coverage       Payor Plan Insurance Group Employer/Plan Group    MEDICARE MEDICARE A & B        Payor Plan Address Payor Plan Phone Number Payor Plan Fax Number Effective Dates    [address] [phone]  1/1/2008 - None Entered    [facility] [id number]         Subscriber Name Subscriber Birth Date Member ID       REKHA BEAR 1940 [id number]               Secondary Coverage       Payor Plan Insurance Group Employer/Plan Group    [facility] SUPP [id number]       Payor Plan Address Payor Plan Phone Number Payor Plan Fax Number Effective Dates    [address]   12/1/2016 - None Entered    [facility] [id number]         Subscriber Name Subscriber Birth Date Member ID       REKHA BEAR 1940 [id number]                     Emergency Contacts        (Rel.) Home Phone Work Phone Mobile Phone    Ping Hernández (Daughter) [phone] -- [phone]              "

## 2023-12-22 NOTE — PLAN OF CARE
"Goal Outcome Evaluation:    SLP orders received and appreciated per CVA protocol.     Pt passed initial swallow screen. Regular/thin diet initiated per MD orders. Pt/family report no difficulties with oropharyngeal swallow function. Bedside swallow evaluation deferred at this time.     Cognitive linguistic eval completed in setting of acute L PCA CVA and concerns of memory impairment/word finding deficits at baseline.     [facility] SLUMS breakdown as follows:  Orientation: 2/3 (year: \", closing on \")  Mental math: 1/3 (correct addition, incorrect subtraction)   Divergent namin/3 (4 items in 1 minute, concrete category, x2 repetition)   Mental manipulation: 1/2 (+ 2 and 3 digit, error for 4 digit)   Delayed recall: 2/5 (can recall 4/5 semantic cues, 5/5 field of 2 cues)   Clock draw: 0/4 (see attached photo in eval, x13 numbers, number 11 perseverated, numbers presented counter clockwise, inefficient spacing, no hands for time)   Directions: 2/2   Working memory paragraph:    Total score: 14/30 - may indicate dementia.     Informally, anomia and expressive aphasia present at sentence level tasks. Confrontational and responsive naming tasks 100 accurate. Automatics for counting, KARO 100% accurate with increased time, delayed responses. Wh- questions, 50% accurate. Anomic events additionally appreciated when pt describing home or address, prior career, improved with field of 3 options presents. Frequent rote responses \"wait a minute,\" or \"Oh I know it.\"     Clinical impression: moderate to severe cognitive deficits, mild to moderate expressive aphasia.   Ongoing intensive tx required at next level of care. Pt with high PLOF - lives in home alone, independent with all IADLS including driving, finance, medication management. 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 Pt highly motivated for return to all prior roles/responsibility. Excellent rehab potential.   "

## 2023-12-22 NOTE — THERAPY EVALUATION
"Patient Name: Rekha Bear  : 1940    MRN: [id number]                              Today's Date: 2023       Admit Date: 2023    Visit Dx:     ICD-10-CM ICD-9-CM   1. Acute ischemic left PCA stroke  I63.532 434.91   2. Generalized weakness  R53.1 780.79   3. Confusion  R41.0 298.9   4. Hypertension, unspecified type  I10 401.9   5. History of hypertension  Z86.79 V12.59   6. 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    Community acquired pneumonia of left lower lobe of lung    Hyponatremia    Pneumonia due to COVID-19 virus    Major depressive disorder, recurrent, mild    Acute respiratory failure with hypoxia    Supraventricular tachycardia    PVC (premature ventricular contraction)    Dyspnea on exertion    Dizziness    Depressive disorder    Gastroesophageal reflux disease    Midline cystocele    Rectocele    BMI 29.0-29.9,adult    Acute left PCA stroke     Past Medical History:   Diagnosis Date    Acute bronchitis     Chest pain     Chilblains     \"Chilblian's\"    Depression     Fatty liver     GERD (gastroesophageal reflux disease)     Hyperlipidemia     Hypertension     Incontinence     Intermittent lightheadedness     Osteoarthritis     OA  Marvin THR, LT TKR - hydrocodone prescibed by Dr. Almaguer    Pain of esophagus     \" nervous esophagus\" - she takes the occasional alprazolam    Peptic ulcer disease     Pneumonia due to COVID-19 virus 2020    now tested negative    Raynaud's disease     \"Raynauds\"    Renal " disease     followed by Dr. 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 Almaguer    TOTAL KNEE ARTHROPLASTY Left     OA  Marvin THR, LT TKR - hydrocodone prescibed by Dr. Almaguer      General Information       Row Name 12/22/23 1129          Physical Therapy Time and Intention    Document Type evaluation  Pt. admitted after being found down on the floor at home with confusion/weakness;  Diagnostic testing reveals an acute CVA  -MS     Mode of Treatment physical therapy;occupational therapy;co-treatment  Activity limitation due to fatigue/weakness; Working on functional balance and strengthening while performing ADL's  -MS       Row Name 12/22/23 1129          General Information    Patient Profile Reviewed yes  -MS     Prior Level of Function --  Use of Cane for ambulation just prior to admission  -MS     Existing Precautions/Restrictions fall  Exit alarm  -MS     Barriers to Rehab visual deficit   Pt. with bilateral Right sided visual deficit  -MS       Row Name 12/22/23 1129          Cognition    Orientation Status (Cognition) oriented to;person;place  -MS       Row Name 12/22/23 1129          Safety Issues, Functional Mobility    Comment, Safety Issues/Impairments (Mobility) Gait belt used for safety.  -MS               User Key  (r) = Recorded By, (t) = Taken By, (c) = Cosigned By      Initials Name Provider Type    Silvano Rayo, PT Physical Therapist                   Mobility       Row Name  12/22/23 1131          Bed Mobility    Bed Mobility supine-sit;sit-supine  -MS     Supine-Sit [place] (Bed Mobility) contact guard  -MS       Row Name 12/22/23 1131          Sit-Stand Transfer    Sit-Stand [place] (Transfers) contact guard  -MS     Assistive Device (Sit-Stand Transfers) walker, front-wheeled  -MS       Row Name 12/22/23 1131          Gait/Stairs (Locomotion)    [place] Level (Gait) contact guard;2 person assist  -MS     Assistive Device (Gait) walker, front-wheeled  -MS     Distance in Feet (Gait) 100 feet  -MS     Deviations/Abnormal Patterns (Gait) eric decreased  -MS     Bilateral Gait Deviations forward flexed posture  -MS     Comment, (Gait/Stairs) Verbal/tactile cues given for posture correction and Rwx guidance. Limited in safety due to visual impairment and overall weakness.  -MS               User Key  (r) = Recorded By, (t) = Taken By, (c) = Cosigned By      Initials Name Provider Type    Silvano Rayo, PT Physical Therapist                   Obj/Interventions       Row Name 12/22/23 1132          Range of Motion Comprehensive    Comment, General Range of Motion BLE (WFL's)  -MS       Row Name 12/22/23 1132          Strength Comprehensive (MMT)    Comment, General Manual Muscle Testing (MMT) Assessment BLE (3+/5)  -MS       Row Name 12/22/23 1132          Motor Skills    Therapeutic Exercise --  BLE ther. ex. program x 10 reps completed (Hip Flexion, LAQ's)  -MS               User Key  (r) = Recorded By, (t) = Taken By, (c) = Cosigned By      Initials Name Provider Type    Silvano Rayo, PT Physical Therapist                   Goals/Plan       Row Name 12/22/23 1133          Bed Mobility Goal 1 (PT)    Activity/Assistive Device (Bed Mobility Goal 1, PT) bed mobility activities, all  -MS     [place] Level/Cues Needed (Bed Mobility Goal 1, PT) standby assist  -MS     Time Frame (Bed Mobility Goal 1, PT) long term goal (LTG);1 week  -MS       Row Name  12/22/23 1133          Transfer Goal 1 (PT)    Activity/Assistive Device (Transfer Goal 1, PT) transfers, all;walker, rolling  -MS     [place] Level/Cues Needed (Transfer Goal 1, PT) standby assist  -MS     Time Frame (Transfer Goal 1, PT) long term goal (LTG);1 week  -MS       Row Name 12/22/23 1133          Gait Training Goal 1 (PT)    Activity/Assistive Device (Gait Training Goal 1, PT) gait (walking locomotion);walker, rolling  -MS     [place] Level (Gait Training Goal 1, PT) standby assist  -MS     Distance (Gait Training Goal 1, PT) 150 feet  -MS     Time Frame (Gait Training Goal 1, PT) long term goal (LTG);1 week  -MS       Row Name 12/22/23 1133          Therapy Assessment/Plan (PT)    Planned Therapy Interventions (PT) balance training;bed mobility training;gait training;home exercise program;patient/family education;postural re-education;transfer training;strengthening  -MS               User Key  (r) = Recorded By, (t) = Taken By, (c) = Cosigned By      Initials Name Provider Type    MS Silvano Mayers JOSE, PT Physical Therapist                   Clinical Impression       Row Name 12/22/23 1132          Pain    Pretreatment Pain Rating 0/10 - no pain  -MS     Posttreatment Pain Rating 0/10 - no pain  -MS       Row Name 12/22/23 1132          Plan of Care Review    Plan of Care Reviewed With patient;family  -MS       Row Name 12/22/23 1132          Therapy Assessment/Plan (PT)    Rehab Potential (PT) good, to achieve stated therapy goals  -MS     Criteria for Skilled Interventions Met (PT) skilled treatment is necessary  -MS     Therapy Frequency (PT) daily  -MS       Row Name 12/22/23 1132          Positioning and Restraints    Pre-Treatment Position in bed  -MS     Post Treatment Position chair  -MS     In Chair notified nsg;reclined;sitting;call light within reach;encouraged to call for assist;exit alarm on;with family/caregiver  -MS               User Key  (r) = Recorded By, (t) = Taken By,  (c) = Cosigned By      Initials Name Provider Type    MS Mayers, Silvano GARCIA, PT Physical Therapist                   Outcome Measures       Row Name 12/22/23 1133          How much help from another person do you currently need...    Turning from your back to your side while in flat bed without using bedrails? 3  -MS     Moving from lying on back to sitting on the side of a flat bed without bedrails? 3  -MS     Moving to and from a bed to a chair (including a wheelchair)? 3  -MS     Standing up from a chair using your arms (e.g., wheelchair, bedside chair)? 3  -MS     Climbing 3-5 steps with a railing? 2  -MS     To walk in hospital room? 3  -MS     AM-PAC 6 Clicks Score (PT) 17  -MS     Highest Level of Mobility Goal 5 --> Static standing  -MS       Row Name 12/22/23 1133          Functional Assessment    Outcome Measure Options AM-PAC 6 Clicks Basic Mobility (PT)  -MS               User Key  (r) = Recorded By, (t) = Taken By, (c) = Cosigned By      Initials Name Provider Type    MS MayersSilvano, PT Physical Therapist                                 Physical Therapy Education       Title: PT OT SLP Therapies (Done)       Topic: Physical Therapy (Done)       Point: Mobility training (Done)       Learning Progress Summary             Patient Acceptance, E,D, VU,NR by MS at 12/22/2023 1133                         Point: Home exercise program (Done)       Learning Progress Summary             Patient Acceptance, E,D, VU,NR by MS at 12/22/2023 1133                         Point: Body mechanics (Done)       Learning Progress Summary             Patient Acceptance, E,D, VU,NR by MS at 12/22/2023 1133                         Point: Precautions (Done)       Learning Progress Summary             Patient Acceptance, E,D, VU,NR by MS at 12/22/2023 1133                                         User Key       Initials Effective Dates Name Provider Type Discipline    MS 06/16/21 -  Silvano Mayers, PT Physical Therapist PT                   PT Recommendation and Plan  Planned Therapy Interventions (PT): balance training, bed mobility training, gait training, home exercise program, patient/family education, postural re-education, transfer training, strengthening  Plan of Care Reviewed With: patient  Outcome Evaluation: Pt. is an 83 year old Female admitted after being found down on the floor in her home.  Pt. with confusion/weakness and diagnostic testing reveals an acute CVA.  Pt./family reports that prior to admission pt. was using cane for ambulation and has had a h/o recent falls at home. Pt. currently presents with decreased strength, decreased balance, visual impairments (Bilateral right sided visual field deficit), and decreased tolerance to functional activity.  This AM, pt. able to ambulate 100 feet, CGA x 2, with use of Rwx.  Pt. requires CGA x 1 for bed mobility and CGA x 1 for sit <-> stand transfers.  Verbal/tactile cues given during ambulation for posture correction and Rwx guidance largely due to visual impairment and overall weakness.  BLE ther. ex. program x 10 reps completed for general strengthening.  Pt. will benefit from skilled inpt. P.T. to address her functional deficits and to assist pt. in regaining her maximum level of independence with functional mobility.     Time Calculation:         PT Charges       Row Name 12/22/23 1135             Time Calculation    Start Time 1020  -MS      Stop Time 1053  -MS      Time Calculation (min) 33 min  -MS      PT Received On 12/22/23  -MS      PT - Next Appointment 12/23/23  -MS      PT Goal Re-Cert Due Date 12/29/23  -MS         Time Calculation- PT    Total Timed Code Minutes- PT 31 minute(s)  -MS                User Key  (r) = Recorded By, (t) = Taken By, (c) = Cosigned By      Initials Name Provider Type    Silvano Rayo, PT Physical Therapist                  Therapy Charges for Today       Code Description Service Date Service Provider Modifiers Qty     56359314379 HC PT EVAL MOD COMPLEXITY 3 12/22/2023 Silvano Mayers, PT GP 1    11714925744 HC PT THERAPEUTIC ACT EA 15 MIN 12/22/2023 Silvano Mayers, PT GP 1            PT G-Codes  Outcome Measure Options: AM-PAC 6 Clicks Basic Mobility (PT)  AM-PAC 6 Clicks Score (PT): 17  PT Discharge Summary  Anticipated Discharge Disposition (PT): skilled nursing facility, inpatient rehabilitation facility    Silvano Mayers, PT  12/22/2023

## 2023-12-22 NOTE — CASE MANAGEMENT/SOCIAL WORK
Discharge Planning Assessment  [facility]     Patient Name: Rekha Bear  MRN: [id number]  Today's Date: 12/22/2023    Admit Date: 12/21/2023    Plan: Pending referral to [facility] vs[facility] SNF   Discharge Needs Assessment       Row Name 12/22/23 1426       Living Environment    People in Home alone    Current Living Arrangements home    Potentially Unsafe Housing Conditions none    Primary Care Provided by self    Provides Primary Care For no one    Family Caregiver if Needed child([place]), adult    Quality of Family Relationships helpful;involved       Transition Planning    Patient/Family Anticipates Transition to inpatient rehabilitation facility       Discharge Needs Assessment    Current Outpatient/Agency/Support Group skilled nursing facility    Equipment Currently Used at Home walker, rolling;cane, straight    Concerns to be Addressed care coordination/care conferences    Anticipated Changes Related to Illness none    Equipment Needed After Discharge none    Outpatient/Agency/Support Group Needs inpatient rehabilitation facility;skilled nursing facility    Discharge Facility/Level of Care Needs nursing facility, skilled;rehabilitation facility                   Discharge Plan       Row Name 12/22/23 1426       Plan    Plan Pending referral to [facility] vs[facility] SNF    Plan Comments CCP met with patient and patient's family at bedside (daughter-Jessica [phone] and son-Jl [phone] and daughter in law). Patient agreeable to CCP discussing discharge plans with family members present. Patient lives alone, with 5 steps to the entrance of her home. Patient's bedroom and bathroom are on the main level. Patient has a cane and walker at home. Patient receives meals on wheels. Patient's family inquired about additional assistance at home if needed. CCP discussed [facility] services; patient and family agreeable to [facility] referral. Patient has been to [facility] for rehab. CCP discussed  PT/OT recommendations of acute rehab vs. subacute. Patient's family request [facility] as first choice, then [facility]. CCP discussed needing to meet certain criteria for acute rehab; patient and family verbalized understanding. CCP discussed SNF as back up options. Patient and family requested referrals to 5 and 4 star facilities. CCP placed referrals in [facility]. CCP will follow for pending referrals and assist as needed. Joellen ATKINS                  Continued Care and Services - Admitted Since 12/21/2023    Coordination has not been started for this encounter.          Demographic Summary       Row Name 12/22/23 1425       General Information    Admission Type inpatient    Arrived From emergency department    Required Notices Provided Important Message from Medicare    Referral Source admission list    Reason for Consult discharge planning    Preferred Language English                   Functional Status       Row Name 12/22/23 1425       Functional Status    Usual Activity Tolerance good    Current Activity Tolerance moderate       Functional Status, IADL    Medications assistive equipment    Meal Preparation assistive equipment    Housekeeping assistive equipment    Laundry assistive equipment    Shopping assistive equipment       Mental Status    General Appearance WDL WDL       Mental Status Summary    Recent Changes in Mental Status/Cognitive Functioning no changes                   Psychosocial    No documentation.                  Abuse/Neglect    No documentation.                  Legal    No documentation.                  Substance Abuse    No documentation.                  Patient Forms    No documentation.                     WILBERT Yee

## 2023-12-23 PROBLEM — R00.1 BRADYCARDIA: Status: ACTIVE | Noted: 2023-12-23

## 2023-12-23 PROBLEM — I49.1 PREMATURE ATRIAL CONTRACTIONS: Status: ACTIVE | Noted: 2023-12-23

## 2023-12-23 PROBLEM — E53.8 B12 DEFICIENCY: Status: ACTIVE | Noted: 2023-12-23

## 2023-12-23 LAB
ANION GAP SERPL CALCULATED.3IONS-SCNC: 9 MMOL/L (ref 5–15)
BUN SERPL-MCNC: 20 MG/DL (ref 8–23)
BUN/CREAT SERPL: 16.9 (ref 7–25)
CALCIUM SPEC-SCNC: 8.7 MG/DL (ref 8.6–10.5)
CHLORIDE SERPL-SCNC: 102 MMOL/L (ref 98–107)
CO2 SERPL-SCNC: 24 MMOL/L (ref 22–29)
CREAT SERPL-MCNC: 1.18 MG/DL (ref 0.57–1)
DEPRECATED RDW RBC AUTO: 51.5 FL (ref 37–54)
EGFRCR SERPLBLD CKD-EPI 2021: 46.5 ML/MIN/1.73
ERYTHROCYTE [DISTWIDTH] IN BLOOD BY AUTOMATED COUNT: 15 % (ref 12.3–15.4)
GLUCOSE BLDC GLUCOMTR-MCNC: 127 MG/DL (ref 70–130)
GLUCOSE BLDC GLUCOMTR-MCNC: 149 MG/DL (ref 70–130)
GLUCOSE BLDC GLUCOMTR-MCNC: 153 MG/DL (ref 70–130)
GLUCOSE BLDC GLUCOMTR-MCNC: 167 MG/DL (ref 70–130)
GLUCOSE BLDC GLUCOMTR-MCNC: 197 MG/DL (ref 70–130)
GLUCOSE SERPL-MCNC: 137 MG/DL (ref 65–99)
HCT VFR BLD AUTO: 38.6 % (ref 34–46.6)
HGB BLD-MCNC: 12.8 G/DL (ref 12–15.9)
MCH RBC QN AUTO: 30.8 PG (ref 26.6–33)
MCHC RBC AUTO-ENTMCNC: 33.2 G/DL (ref 31.5–35.7)
MCV RBC AUTO: 93 FL (ref 79–97)
PLATELET # BLD AUTO: 175 10*3/MM3 (ref 140–450)
PMV BLD AUTO: 8.7 FL (ref 6–12)
POTASSIUM SERPL-SCNC: 4 MMOL/L (ref 3.5–5.2)
RBC # BLD AUTO: 4.15 10*6/MM3 (ref 3.77–5.28)
SODIUM SERPL-SCNC: 135 MMOL/L (ref 136–145)
WBC NRBC COR # BLD AUTO: 9.52 10*3/MM3 (ref 3.4–10.8)

## 2023-12-23 PROCEDURE — 99222 1ST HOSP IP/OBS MODERATE 55: CPT | Performed by: INTERNAL MEDICINE

## 2023-12-23 PROCEDURE — 63710000001 INSULIN LISPRO (HUMAN) PER 5 UNITS: Performed by: INTERNAL MEDICINE

## 2023-12-23 PROCEDURE — 97530 THERAPEUTIC ACTIVITIES: CPT

## 2023-12-23 PROCEDURE — 99233 SBSQ HOSP IP/OBS HIGH 50: CPT | Performed by: PHYSICIAN ASSISTANT

## 2023-12-23 PROCEDURE — 85027 COMPLETE CBC AUTOMATED: CPT | Performed by: HOSPITALIST

## 2023-12-23 PROCEDURE — 82948 REAGENT STRIP/BLOOD GLUCOSE: CPT

## 2023-12-23 PROCEDURE — 25810000003 SODIUM CHLORIDE 0.9 % SOLUTION: Performed by: HOSPITALIST

## 2023-12-23 PROCEDURE — 93010 ELECTROCARDIOGRAM REPORT: CPT | Performed by: INTERNAL MEDICINE

## 2023-12-23 PROCEDURE — 93005 ELECTROCARDIOGRAM TRACING: CPT | Performed by: HOSPITALIST

## 2023-12-23 PROCEDURE — 25010000002 CYANOCOBALAMIN PER 1000 MCG: Performed by: PSYCHIATRY & NEUROLOGY

## 2023-12-23 PROCEDURE — 80048 BASIC METABOLIC PNL TOTAL CA: CPT | Performed by: HOSPITALIST

## 2023-12-23 RX ORDER — SODIUM CHLORIDE 9 MG/ML
75 INJECTION, SOLUTION INTRAVENOUS CONTINUOUS
Status: DISCONTINUED | OUTPATIENT
Start: 2023-12-23 | End: 2023-12-25

## 2023-12-23 RX ADMIN — OXYBUTYNIN CHLORIDE 10 MG: 10 TABLET, EXTENDED RELEASE ORAL at 09:52

## 2023-12-23 RX ADMIN — ASPIRIN 325 MG: 325 TABLET ORAL at 09:52

## 2023-12-23 RX ADMIN — SODIUM CHLORIDE 75 ML/HR: 9 INJECTION, SOLUTION INTRAVENOUS at 09:56

## 2023-12-23 RX ADMIN — GABAPENTIN 400 MG: 400 CAPSULE ORAL at 06:10

## 2023-12-23 RX ADMIN — SODIUM CHLORIDE 75 ML/HR: 9 INJECTION, SOLUTION INTRAVENOUS at 14:47

## 2023-12-23 RX ADMIN — VENLAFAXINE HYDROCHLORIDE 150 MG: 150 CAPSULE, EXTENDED RELEASE ORAL at 09:52

## 2023-12-23 RX ADMIN — CYANOCOBALAMIN 1000 MCG: 1000 INJECTION, SOLUTION INTRAMUSCULAR; SUBCUTANEOUS at 09:52

## 2023-12-23 RX ADMIN — INSULIN LISPRO 2 UNITS: 100 INJECTION, SOLUTION INTRAVENOUS; SUBCUTANEOUS at 12:33

## 2023-12-23 RX ADMIN — GABAPENTIN 400 MG: 400 CAPSULE ORAL at 14:52

## 2023-12-23 RX ADMIN — INSULIN LISPRO 2 UNITS: 100 INJECTION, SOLUTION INTRAVENOUS; SUBCUTANEOUS at 17:19

## 2023-12-23 RX ADMIN — GABAPENTIN 400 MG: 400 CAPSULE ORAL at 22:26

## 2023-12-23 RX ADMIN — ATORVASTATIN CALCIUM 40 MG: 20 TABLET, FILM COATED ORAL at 22:26

## 2023-12-23 RX ADMIN — BUPROPION HYDROCHLORIDE 300 MG: 300 TABLET, EXTENDED RELEASE ORAL at 06:10

## 2023-12-23 RX ADMIN — INSULIN LISPRO 2 UNITS: 100 INJECTION, SOLUTION INTRAVENOUS; SUBCUTANEOUS at 22:27

## 2023-12-23 RX ADMIN — CHOLECALCIFEROL TAB 10 MCG (400 UNIT) 400 UNITS: 10 TAB at 09:52

## 2023-12-23 RX ADMIN — LEVOTHYROXINE SODIUM 100 MCG: 100 TABLET ORAL at 06:10

## 2023-12-23 NOTE — PLAN OF CARE
Goal Outcome Evaluation:  Plan of Care Reviewed With: patient           Outcome Evaluation: Upon entering room, pt. supine in bed, awake/alert, and agreeable to work with P.T. this date. Pt. able to ambulate 80 feet, Min. assist x 1, with use of Rwx this PM. Pt. requires Min. assist x 1 for sit <-> stand transfers and CGA x1 for bed mobility. BLE ther. ex. program x 10 reps completed for general strengthening. Verbal/tactile cues given during ambulation for posture correction and Rwx guidance.  Will continue to progress functional mobility as tolerated.      Anticipated Discharge Disposition (PT): skilled nursing facility, inpatient rehabilitation facility

## 2023-12-23 NOTE — PLAN OF CARE
Goal Outcome Evaluation:        Problem: Adult Inpatient Plan of Care  Goal: Plan of Care Review  Outcome: Ongoing, Progressing  Goal: Patient-Specific Goal (Individualized)  Outcome: Ongoing, Progressing  Goal: Absence of Hospital-Acquired Illness or Injury  Outcome: Ongoing, Progressing  Intervention: Identify and Manage Fall Risk  Recent Flowsheet Documentation  Taken 12/23/2023 0400 by Elkin Greenwood RN  Safety Promotion/Fall Prevention:   activity supervised   fall prevention program maintained   safety round/check completed  Taken 12/23/2023 0200 by Elkin Greenwood RN  Safety Promotion/Fall Prevention:   activity supervised   fall prevention program maintained   room organization consistent  Taken 12/23/2023 0000 by Elkin Greenwood RN  Safety Promotion/Fall Prevention:   activity supervised   fall prevention program maintained   safety round/check completed  Taken 12/22/2023 2200 by Elkin Greenwood RN  Safety Promotion/Fall Prevention:   assistive device/personal items within reach   fall prevention program maintained   safety round/check completed  Taken 12/22/2023 1958 by Elkin Greenwood RN  Safety Promotion/Fall Prevention:   activity supervised   fall prevention program maintained   safety round/check completed  Intervention: Prevent Skin Injury  Recent Flowsheet Documentation  Taken 12/23/2023 0400 by Elkin Greenwood RN  Body Position: position changed independently  Taken 12/23/2023 0200 by Elkin Greenwood RN  Body Position: position changed independently  Taken 12/23/2023 0000 by Elkin Greenwood RN  Body Position: position changed independently  Taken 12/22/2023 2200 by Elkin Greenwood RN  Body Position: position changed independently  Taken 12/22/2023 1958 by Elkin Greenwood RN  Body Position: position changed independently  Intervention: Prevent and Manage VTE (Venous Thromboembolism) Risk  Recent Flowsheet Documentation  Taken 12/22/2023 1958 by Elkin Greenwood RN  VTE Prevention/Management:    bilateral   compression stockings on  Intervention: Prevent Infection  Recent Flowsheet Documentation  Taken 12/22/2023 1958 by Elkin Greenwood RN  Infection Prevention:   single patient room provided   rest/sleep promoted  Goal: Optimal Comfort and Wellbeing  Outcome: Ongoing, Progressing  Intervention: Provide Person-Centered Care  Recent Flowsheet Documentation  Taken 12/22/2023 1958 by Elkin Greenwood RN  Trust Relationship/Rapport: choices provided  Goal: Readiness for Transition of Care  Outcome: Ongoing, Progressing     Problem: Fall Injury Risk  Goal: Absence of Fall and Fall-Related Injury  Outcome: Ongoing, Progressing  Intervention: Identify and Manage Contributors  Recent Flowsheet Documentation  Taken 12/23/2023 0400 by Elkin Greenwood RN  Medication Review/Management: medications reviewed  Taken 12/23/2023 0200 by Elkin Greenwood RN  Medication Review/Management: medications reviewed  Taken 12/23/2023 0000 by Elkin Greenwood RN  Medication Review/Management: medications reviewed  Taken 12/22/2023 2200 by Elkin Greenwood RN  Medication Review/Management: medications reviewed  Taken 12/22/2023 1958 by Elkin Greenwood RN  Medication Review/Management: medications reviewed  Intervention: Promote Injury-Free Environment  Recent Flowsheet Documentation  Taken 12/23/2023 0400 by Elkin Greenwood RN  Safety Promotion/Fall Prevention:   activity supervised   fall prevention program maintained   safety round/check completed  Taken 12/23/2023 0200 by Elkin Greenwood RN  Safety Promotion/Fall Prevention:   activity supervised   fall prevention program maintained   room organization consistent  Taken 12/23/2023 0000 by Elkin Greenwood RN  Safety Promotion/Fall Prevention:   activity supervised   fall prevention program maintained   safety round/check completed  Taken 12/22/2023 2200 by Elkin Greenwood RN  Safety Promotion/Fall Prevention:   assistive device/personal items within reach   fall prevention program  maintained   safety round/check completed  Taken 12/22/2023 1958 by Elkin Greenwood RN  Safety Promotion/Fall Prevention:   activity supervised   fall prevention program maintained   safety round/check completed     Problem: Adjustment to Illness (Stroke, Ischemic/Transient Ischemic Attack)  Goal: Optimal Coping  Outcome: Ongoing, Progressing  Intervention: Support Psychosocial Response to Stroke  Recent Flowsheet Documentation  Taken 12/22/2023 1958 by Elkin Greenwood RN  Supportive Measures: active listening utilized  Family/Support System Care:   presence promoted   self-care encouraged     Problem: Bowel Elimination Impaired (Stroke, Ischemic/Transient Ischemic Attack)  Goal: Effective Bowel Elimination  Outcome: Ongoing, Progressing     Problem: Cerebral Tissue Perfusion (Stroke, Ischemic/Transient Ischemic Attack)  Goal: Optimal Cerebral Tissue Perfusion  Outcome: Ongoing, Progressing     Problem: Cognitive Impairment (Stroke, Ischemic/Transient Ischemic Attack)  Goal: Optimal Cognitive Function  Outcome: Ongoing, Progressing     Problem: Communication Impairment (Stroke, Ischemic/Transient Ischemic Attack)  Goal: Improved Communication Skills  Outcome: Ongoing, Progressing     Problem: Functional Ability Impaired (Stroke, Ischemic/Transient Ischemic Attack)  Goal: Optimal Functional Ability  Outcome: Ongoing, Progressing     Problem: Respiratory Compromise (Stroke, Ischemic/Transient Ischemic Attack)  Goal: Effective Oxygenation and Ventilation  Outcome: Ongoing, Progressing  Intervention: Optimize Oxygenation and Ventilation  Recent Flowsheet Documentation  Taken 12/23/2023 0400 by Elkin Greenwood RN  Head of Bed (HOB) Positioning: HOB elevated  Taken 12/23/2023 0200 by Elkin Greenwood RN  Head of Bed (HOB) Positioning: HOB elevated  Taken 12/23/2023 0000 by Elkin Greenwood RN  Head of Bed (HOB) Positioning: HOB elevated  Taken 12/22/2023 2200 by Elkin Greenwood RN  Head of Bed (HOB) Positioning: HOB at  20-30 degrees  Taken 12/22/2023 1958 by Elkin Greenwood RN  Head of Bed (HOB) Positioning: HOB at 30-45 degrees     Problem: Sensorimotor Impairment (Stroke, Ischemic/Transient Ischemic Attack)  Goal: Improved Sensorimotor Function  Outcome: Ongoing, Progressing  Intervention: Optimize Range of Motion, Motor Control and Function  Recent Flowsheet Documentation  Taken 12/23/2023 0400 by Elkin Greenwood RN  Positioning/Transfer Devices:   pillows   in use  Taken 12/23/2023 0200 by Elkin Greenwood RN  Positioning/Transfer Devices:   pillows   in use  Taken 12/23/2023 0000 by Elkin Greenwood RN  Positioning/Transfer Devices:   pillows   in use  Taken 12/22/2023 2200 by Elkin Greenwood RN  Positioning/Transfer Devices:   pillows   in use  Taken 12/22/2023 1958 by Elkin Greenwood RN  Positioning/Transfer Devices:   pillows   in use     Problem: Swallowing Impairment (Stroke, Ischemic/Transient Ischemic Attack)  Goal: Optimal Eating and Swallowing without Aspiration  Outcome: Ongoing, Progressing     Problem: Urinary Elimination Impaired (Stroke, Ischemic/Transient Ischemic Attack)  Goal: Effective Urinary Elimination  Outcome: Ongoing, Progressing     Problem: Asthma Comorbidity  Goal: Maintenance of Asthma Control  Outcome: Ongoing, Progressing  Intervention: Maintain Asthma Symptom Control  Recent Flowsheet Documentation  Taken 12/23/2023 0400 by Elkin Greenwood RN  Medication Review/Management: medications reviewed  Taken 12/23/2023 0200 by Elkin Greenwood RN  Medication Review/Management: medications reviewed  Taken 12/23/2023 0000 by Elkin Greenwood RN  Medication Review/Management: medications reviewed  Taken 12/22/2023 2200 by Elkin Greenwood RN  Medication Review/Management: medications reviewed  Taken 12/22/2023 1958 by Elkin Greenwood RN  Medication Review/Management: medications reviewed     Problem: Behavioral Health Comorbidity  Goal: Maintenance of Behavioral Health Symptom Control  Outcome: Ongoing,  Progressing  Intervention: Maintain Behavioral Health Symptom Control  Recent Flowsheet Documentation  Taken 12/23/2023 0400 by Elkin Greenwood RN  Medication Review/Management: medications reviewed  Taken 12/23/2023 0200 by Elkin Greenwood RN  Medication Review/Management: medications reviewed  Taken 12/23/2023 0000 by Elkin Greenwood RN  Medication Review/Management: medications reviewed  Taken 12/22/2023 2200 by Elkin Greenwood RN  Medication Review/Management: medications reviewed  Taken 12/22/2023 1958 by Elkin Greenwood RN  Medication Review/Management: medications reviewed     Problem: COPD (Chronic Obstructive Pulmonary Disease) Comorbidity  Goal: Maintenance of COPD Symptom Control  Outcome: Ongoing, Progressing  Intervention: Maintain COPD-Symptom Control  Recent Flowsheet Documentation  Taken 12/23/2023 0400 by Elkin Greenwood RN  Medication Review/Management: medications reviewed  Taken 12/23/2023 0200 by Elkin Greenwood RN  Medication Review/Management: medications reviewed  Taken 12/23/2023 0000 by Elkin Greenwood RN  Medication Review/Management: medications reviewed  Taken 12/22/2023 2200 by Elkin Greenwood RN  Medication Review/Management: medications reviewed  Taken 12/22/2023 1958 by Elkin Greenwood RN  Supportive Measures: active listening utilized  Medication Review/Management: medications reviewed     Problem: Diabetes Comorbidity  Goal: Blood Glucose Level Within Targeted Range  Outcome: Ongoing, Progressing     Problem: Heart Failure Comorbidity  Goal: Maintenance of Heart Failure Symptom Control  Outcome: Ongoing, Progressing  Intervention: Maintain Heart Failure-Management  Recent Flowsheet Documentation  Taken 12/23/2023 0400 by Elkin Greenwood RN  Medication Review/Management: medications reviewed  Taken 12/23/2023 0200 by Elkin Greenwood RN  Medication Review/Management: medications reviewed  Taken 12/23/2023 0000 by Elkin Greenwood RN  Medication Review/Management: medications  reviewed  Taken 12/22/2023 2200 by Elkin Greenwood RN  Medication Review/Management: medications reviewed  Taken 12/22/2023 1958 by Elkin Greenwood RN  Medication Review/Management: medications reviewed     Problem: Hypertension Comorbidity  Goal: Blood Pressure in Desired Range  Outcome: Ongoing, Progressing  Intervention: Maintain Blood Pressure Management  Recent Flowsheet Documentation  Taken 12/23/2023 0400 by Elkin Greenwood RN  Medication Review/Management: medications reviewed  Taken 12/23/2023 0200 by Elkin Greenwood RN  Medication Review/Management: medications reviewed  Taken 12/23/2023 0000 by Elkin Greenwood RN  Medication Review/Management: medications reviewed  Taken 12/22/2023 2200 by Elkin Greenwood RN  Medication Review/Management: medications reviewed  Taken 12/22/2023 1958 by Elkin Greenwood RN  Medication Review/Management: medications reviewed     Problem: Obstructive Sleep Apnea Risk or Actual Comorbidity Management  Goal: Unobstructed Breathing During Sleep  Outcome: Ongoing, Progressing     Problem: Osteoarthritis Comorbidity  Goal: Maintenance of Osteoarthritis Symptom Control  Outcome: Ongoing, Progressing  Intervention: Maintain Osteoarthritis Symptom Control  Recent Flowsheet Documentation  Taken 12/23/2023 0400 by Elkin Greenwood RN  Medication Review/Management: medications reviewed  Taken 12/23/2023 0200 by Elkin Greenwood RN  Medication Review/Management: medications reviewed  Taken 12/23/2023 0000 by Elkin Greenwood RN  Medication Review/Management: medications reviewed  Taken 12/22/2023 2200 by Elkin Greenwood RN  Medication Review/Management: medications reviewed  Taken 12/22/2023 1958 by Elkin Greenwood RN  Medication Review/Management: medications reviewed     Problem: Pain Chronic (Persistent) (Comorbidity Management)  Goal: Acceptable Pain Control and Functional Ability  Outcome: Ongoing, Progressing  Intervention: Manage Persistent Pain  Recent Flowsheet Documentation  Taken  12/23/2023 0400 by Elkin Greenwood RN  Medication Review/Management: medications reviewed  Taken 12/23/2023 0200 by Elkin Greenwood RN  Medication Review/Management: medications reviewed  Taken 12/23/2023 0000 by Elkin Greenwood RN  Medication Review/Management: medications reviewed  Taken 12/22/2023 2200 by Elkin Greenwood RN  Medication Review/Management: medications reviewed  Taken 12/22/2023 1958 by Elkin Greenwood RN  Medication Review/Management: medications reviewed  Intervention: Optimize Psychosocial Wellbeing  Recent Flowsheet Documentation  Taken 12/22/2023 1958 by Elkin Greenwood RN  Supportive Measures: active listening utilized  Diversional Activities: television  Family/Support System Care:   presence promoted   self-care encouraged     Problem: Seizure Disorder Comorbidity  Goal: Maintenance of Seizure Control  Outcome: Ongoing, Progressing

## 2023-12-23 NOTE — THERAPY TREATMENT NOTE
"Patient Name: Rekha Bear  : 1940    MRN: [id number]                              Today's Date: 2023       Admit Date: 2023    Visit Dx:     ICD-10-CM ICD-9-CM   1. Acute ischemic left PCA stroke  I63.532 434.91   2. Generalized weakness  R53.1 780.79   3. Confusion  R41.0 298.9   4. Hypertension, unspecified type  I10 401.9   5. History of hypertension  Z86.79 V12.59   6. 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    Community acquired pneumonia of left lower lobe of lung    Hyponatremia    Pneumonia due to COVID-19 virus    Major depressive disorder, recurrent, mild    Acute respiratory failure with hypoxia    Supraventricular tachycardia    PVC (premature ventricular contraction)    Dyspnea on exertion    Dizziness    Depressive disorder    Gastroesophageal reflux disease    Midline cystocele    Rectocele    BMI 29.0-29.9,adult    Acute left PCA stroke    B12 deficiency    Bradycardia    Premature atrial contractions     Past Medical History:   Diagnosis Date    Chilblains     \"Chilblian's\"    CKD (chronic kidney disease)     Depression     Fatty liver     GERD (gastroesophageal reflux disease)     Hyperlipidemia     Hypertension     Incontinence     Osteoarthritis     OA  Marvin THR, LT TKR - hydrocodone prescibed by Dr. 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;  Service:     DENTAL PROCEDURE      FOOT SURGERY      TONSILLECTOMY      TOTAL HIP ARTHROPLASTY Bilateral     OA  Marvin THR, LT TKR - hydrocodone prescibed by Dr. Almaguer    TOTAL KNEE ARTHROPLASTY Left     OA  Marvin THR, LT TKR - hydrocodone prescibed by Dr. Almaguer      General Information       Row Name 12/23/23 1518          Physical Therapy Time and Intention    Document Type therapy note (daily note)  -MS     Mode of Treatment physical therapy;individual therapy  -MS       Row Name 12/23/23 1518          General Information    Patient Profile Reviewed yes  -MS     Existing Precautions/Restrictions fall   Exit alarm  -MS     Barriers to Rehab visual deficit  -MS       Row Name 12/23/23 1518          Cognition    Orientation Status (Cognition) oriented to;person  -MS       Row Name 12/23/23 1518          Safety Issues, Functional Mobility    Comment, Safety Issues/Impairments (Mobility) Gait belt used for safety.  -MS               User Key  (r) = Recorded By, (t) = Taken By, (c) = Cosigned By      Initials Name Provider Type    MS Silvano Mayers, PT Physical Therapist                   Mobility       Row Name 12/23/23 1519          Bed Mobility    Supine-Sit [place] (Bed Mobility) contact guard  -MS       Row Name 12/23/23 1519          Sit-Stand Transfer    Sit-Stand [place] (Transfers) minimum assist (75% patient effort)  -MS     Assistive Device (Sit-Stand Transfers) walker, front-wheeled  -MS       Row Name 12/23/23 1519          Gait/Stairs (Locomotion)    [place] Level " (Gait) minimum assist (75% patient effort)  -MS     Assistive Device (Gait) walker, front-wheeled  -MS     Distance in Feet (Gait) 80 feet  -MS     Deviations/Abnormal Patterns (Gait) eric decreased;stride length decreased  -MS     Bilateral Gait Deviations forward flexed posture  -MS     Comment, (Gait/Stairs) Verbal/tactile cues given for posture correction and Rwx guidance.  -MS               User Key  (r) = Recorded By, (t) = Taken By, (c) = Cosigned By      Initials Name Provider Type    Silvano Rayo, PT Physical Therapist                   Obj/Interventions       Row Name 12/23/23 1519          Motor Skills    Therapeutic Exercise --  BLE ther. ex. program x 10 reps completed (Ankle pumps, Hip Flexion, LAQ's)  -MS               User Key  (r) = Recorded By, (t) = Taken By, (c) = Cosigned By      Initials Name Provider Type    Silvano Rayo, PT Physical Therapist                   Goals/Plan    No documentation.                  Clinical Impression       Row Name 12/23/23 1519          Pain    Pretreatment Pain Rating 0/10 - no pain  -MS     Posttreatment Pain Rating 0/10 - no pain  -MS       Row Name 12/23/23 1519          Positioning and Restraints    Pre-Treatment Position in bed  -MS     Post Treatment Position chair  -MS     In Chair notified nsg;sitting;call light within reach;encouraged to call for assist;exit alarm on;with nsg;with family/caregiver  -MS               User Key  (r) = Recorded By, (t) = Taken By, (c) = Cosigned By      Initials Name Provider Type    Silvano Rayo, PT Physical Therapist                   Outcome Measures       Row Name 12/23/23 1520          How much help from another person do you currently need...    Turning from your back to your side while in flat bed without using bedrails? 3  -MS     Moving from lying on back to sitting on the side of a flat bed without bedrails? 3  -MS     Moving to and from a bed to a chair (including a wheelchair)? 3  -MS      Standing up from a chair using your arms (e.g., wheelchair, bedside chair)? 3  -MS     Climbing 3-5 steps with a railing? 2  -MS     To walk in hospital room? 3  -MS     AM-PAC 6 Clicks Score (PT) 17  -MS     Highest Level of Mobility Goal 5 --> Static standing  -MS       Row Name 12/23/23 1520          Functional Assessment    Outcome Measure Options AM-PAC 6 Clicks Basic Mobility (PT)  -MS               User Key  (r) = Recorded By, (t) = Taken By, (c) = Cosigned By      Initials Name Provider Type    MS Silvano Mayers, PT Physical Therapist                                 Physical Therapy Education       Title: PT OT SLP Therapies (In Progress)       Topic: Physical Therapy (Done)       Point: Mobility training (Done)       Learning Progress Summary             Patient Acceptance, E,D, VU,NR by MS at 12/23/2023 1520    Acceptance, E,D, VU,NR by MS at 12/22/2023 1133                         Point: Home exercise program (Done)       Learning Progress Summary             Patient Acceptance, E,D, VU,NR by MS at 12/23/2023 1520    Acceptance, E,D, VU,NR by MS at 12/22/2023 1133                         Point: Body mechanics (Done)       Learning Progress Summary             Patient Acceptance, E,D, VU,NR by MS at 12/23/2023 1520    Acceptance, E,D, VU,NR by MS at 12/22/2023 1133                         Point: Precautions (Done)       Learning Progress Summary             Patient Acceptance, E,D, VU,NR by MS at 12/23/2023 1520    Acceptance, E,D, VU,NR by MS at 12/22/2023 1133                                         User Key       Initials Effective Dates Name Provider Type Discipline    MS 06/16/21 -  Silvano Mayers PT Physical Therapist PT                  PT Recommendation and Plan  Planned Therapy Interventions (PT): balance training, bed mobility training, gait training, home exercise program, patient/family education, postural re-education, transfer training, strengthening  Plan of Care Reviewed With:  patient  Outcome Evaluation: Upon entering room, pt. supine in bed, awake/alert, and agreeable to work with P.T. this date. Pt. able to ambulate 80 feet, Min. assist x 1, with use of Rwx this PM. Pt. requires Min. assist x 1 for sit <-> stand transfers and CGA x1 for bed mobility. BLE ther. ex. program x 10 reps completed for general strengthening. Verbal/tactile cues given during ambulation for posture correction and Rwx guidance.  Will continue to progress functional mobility as tolerated.     Time Calculation:         PT Charges       Row Name 12/23/23 1522             Time Calculation    Start Time 1410  -MS      Stop Time 1423  -MS      Time Calculation (min) 13 min  -MS      PT Received On 12/23/23  -MS      PT - Next Appointment 12/26/23  -MS         Time Calculation- PT    Total Timed Code Minutes- PT 12 minute(s)  -MS                User Key  (r) = Recorded By, (t) = Taken By, (c) = Cosigned By      Initials Name Provider Type    Silvano Rayo, PT Physical Therapist                  Therapy Charges for Today       Code Description Service Date Service Provider Modifiers Qty    23599585850  PT EVAL MOD COMPLEXITY 3 12/22/2023 Silvano Mayers, PT GP 1    34556230049 HC PT THERAPEUTIC ACT EA 15 MIN 12/22/2023 Silvano Mayers, PT GP 1    79469803214 HC PT THERAPEUTIC ACT EA 15 MIN 12/23/2023 Silvano Mayers, PT GP 1            PT G-Codes  Outcome Measure Options: AM-PAC 6 Clicks Basic Mobility (PT)  AM-PAC 6 Clicks Score (PT): 17  AM-PAC 6 Clicks Score (OT): 12  Modified Daniel Scale: 4 - Moderately severe disability.  Unable to walk without assistance, and unable to attend to own bodily needs without assistance.  PT Discharge Summary  Anticipated Discharge Disposition (PT): skilled nursing facility, inpatient rehabilitation facility    Silvano Mayers, PAULA  12/23/2023

## 2023-12-23 NOTE — PROGRESS NOTES
"    Name: Rekha Bear ADMIT: 2023   : 1940  PCP: Eben Porter MD    MRN: [id number] LOS: 2 days   AGE/SEX: 83 y.o. female  ROOM: [facility]     Subjective   Subjective   Denies current complaint. No chest pain or shortness of breath. No abdominal pain. Daughter at bedside. Patient is bradycardic however denies any lightheadedness, weakness, any complaint really     Objective   Objective   Vital Signs  Temp:  [97.9 °F (36.6 °C)-98.8 °F (37.1 °C)] 97.9 °F (36.6 °C)  Heart Rate:  [55-66] 55  Resp:  [16-20] 16  BP: (129-168)/(11-95) 129/11  SpO2:  [94 %-100 %] 97 %  on  Flow (L/min):  [2] 2;   Device (Oxygen Therapy): room air  Body mass index is 29.76 kg/m².    Physical Exam  Constitutional:       General: She is not in acute distress.     Appearance: Normal appearance. She is not toxic-appearing.   HENT:      Head: Normocephalic.   Eyes:      Extraocular Movements: Extraocular movements intact.   Cardiovascular:      Rate and Rhythm: Regular rhythm. Bradycardia present.   Pulmonary:      Effort: Pulmonary effort is normal. No respiratory distress.      Breath sounds: Normal breath sounds. No wheezing or rhonchi.   Abdominal:      General: Bowel sounds are normal.      Palpations: Abdomen is soft.      Tenderness: There is no abdominal tenderness. There is no guarding or rebound.   Musculoskeletal:         General: No swelling.      Right lower leg: No edema.      Left lower leg: No edema.   Skin:     General: Skin is warm and dry.   Neurological:      Mental Status: She is alert.      Comments: right visual field deficit. oriented to self and hospital (but not specific) not year. situation \"something wrong with my brain\"   Psychiatric:         Mood and Affect: Mood normal.         Behavior: Behavior normal.        1a. Level of Consciousness: 0-->Alert, keenly responsive  1b. LOC Questions: 0-->Answers both questions correctly  1c. LOC Commands: 0-->Performs both tasks correctly  2. Best Gaze: " 0-->Normal  3. Visual: 1-->Partial hemianopia  4. Facial Palsy: 0-->Normal symmetrical movements  5a. Motor Arm, Left: 0-->No drift, limb holds 90 (or 45) degrees for full 10 secs  5b. Motor Arm, Right: 0-->No drift, limb holds 90 (or 45) degrees for full 10 secs  6a. Motor Leg, Left: 0-->No drift, leg holds 30 degree position for full 5 secs  6b. Motor Leg, Right: 0-->No drift, leg holds 30 degree position for full 5 secs  7. Limb Ataxia: 0-->Absent  8. Sensory: 1-->Mild-to-moderate sensory loss, patient feels pinprick is less sharp or is dull on the affected side, or there is a loss of superficial pain with pinprick, but patient is aware of being touched  9. Best Language: 0-->No aphasia, normal  10. Dysarthria: 0-->Normal  11. Extinction and Inattention (formerly Neglect): 0-->No abnormality    Total (NIH Stroke Scale): 2    Results Review  I reviewed the patient's new clinical results.  Results from last 7 days   Lab Units 12/23/23  0635 12/22/23  0647 12/21/23  1531   WBC 10*3/mm3 9.52 7.96 9.70   HEMOGLOBIN g/dL 12.8 12.0 13.9   PLATELETS 10*3/mm3 175 181 198     Results from last 7 days   Lab Units 12/23/23  0635 12/22/23  1128 12/22/23  0647 12/21/23  1531   SODIUM mmol/L 135* 137 138 135*   POTASSIUM mmol/L 4.0 4.5 4.7 4.9   CHLORIDE mmol/L 102 102 103 98   CO2 mmol/L 24.0 22.0 24.3 25.0   BUN mg/dL 20 17 17 19   CREATININE mg/dL 1.18* 1.27* 1.37* 1.59*   GLUCOSE mg/dL 137* 138* 105* 225*     Lab Results   Component Value Date    ANIONGAP 9.0 12/23/2023     Estimated Creatinine Clearance: 42.1 mL/min (A) (by C-G formula based on SCr of 1.18 mg/dL (H)).   Lab Results   Component Value Date    EGFR 46.5 (L) 12/23/2023     Results from last 7 days   Lab Units 12/22/23  0647 12/21/23  1531   ALBUMIN g/dL 3.5 4.2   BILIRUBIN mg/dL 1.2 1.2   ALK PHOS U/L 70 86   AST (SGOT) U/L 16 20   ALT (SGPT) U/L 8 11     Results from last 7 days   Lab Units 12/23/23  0635 12/22/23  1128 12/22/23  0647 12/21/23  1531   CALCIUM  mg/dL 8.7 8.7 8.7 9.4   ALBUMIN g/dL  --   --  3.5 4.2   MAGNESIUM mg/dL  --   --   --  2.0     Results from last 7 days   Lab Units 12/21/23  1609   LACTATE mmol/L 1.7     Hemoglobin A1C   Date/Time Value Ref Range Status   12/22/2023 0647 9.10 (H) 4.80 - 5.60 % Final     Glucose   Date/Time Value Ref Range Status   12/23/2023 0621 127 70 - 130 mg/dL Final   12/22/2023 2345 148 (H) 70 - 130 mg/dL Final   12/22/2023 1224 118 70 - 130 mg/dL Final   12/22/2023 0805 113 70 - 130 mg/dL Final   12/22/2023 0555 100 70 - 130 mg/dL Final   12/21/2023 2245 92 70 - 130 mg/dL Final       MRI Angiogram Head Without Contrast    Result Date: 12/22/2023  1. Limited whole brain MRI images consisting of just axial FLAIR and diffusion weighted images are degraded by motion artifact, limits evaluation, but they appear unchanged since MRI of the brain earlier this morning on 12/22/2023 at 9:17 a.m. There is a 6 x 3 cm acute infarct throughout the medial left occipital lobe in the left PCA territory with patchy areas of signal loss centrally within the acutely infarcted brain on the FLAIR and diffusion weighted images indicating petechial hemorrhage throughout the central portion of this acute infarct, and there is a 1 cm acute infarct in the left side of the splenium of the corpus callosum, also in the left PCA territory, and there are 3 small infarcts in the left thalamus in the distribution of thalamoperforator branches off the P1 segment left posterior cerebral artery. There is moderate small vessel disease in cerebral white matter. The remainder of the limited whole brain MRI of the brain is normal.  2. The MRA of the head is significantly degraded by motion artifact which significantly limits evaluation. There is limited evaluation of the proximal intracranial segments of the vertebral arteries, but I believe the vertebral arteries are most probably widely patent from the C1 ring level to the vertebrobasilar junction. The basilar  artery and basilar tip is normal in appearance. There is limited evaluation of the cavernous segments of the internal carotid arteries, distal internal carotid arteries are normal in appearance. The P1 and P2 segments of the posterior cerebral arteries are widely patent. The P3 branches are not adequately assessed. M1 segments of the middle cerebral arteries in the middle cerebral artery bifurcations, in the A1 segments of the anterior cerebral arteries and A2 segments are normal in appearance.  This report was finalized on 12/22/2023 10:02 PM by Dr. Edouard Curiel M.D on Workstation: [id number]      MRI Brain Without Contrast    Result Date: 12/22/2023   There is an acute infarct within the medial aspect left occipital lobe with petechial hemorrhagic transformation that is within the left PCA distribution. This measures up to 6.0 x 2.6 cm in greatest axial dimensions. A 1 cm focus of acute infarction is seen within the left lateral aspect of the splenium of the corpus callosum also within the left PCA distribution. Foci of acute infarction are noted within the superior and medial aspect of the left thalamus measuring up to 1.5 x 0.5 cm in greatest axial dimensions and within the pulvinar of the left thalamus measuring up to 1.5 x 0.5 cm in greatest axial dimensions. These are within thalamoperforator distribution.  Moderate changes of chronic small vessel ischemic phenomena are incidentally appreciated.  These findings were discussed with Dr. Watson on 12/22/2023 at approximately 10:45 a.m.  This report was finalized on 12/22/2023 11:46 AM by Dr. Kong Trotter M.D on Workstation: [id number]      CT Head Without Contrast    Result Date: 12/21/2023  There is an acute infarct involving the left PCA vascular distribution measuring approximately 1.6 x 4.2 x 3.4 cm in size. There is faint increased attenuation suggestive of petechial hemorrhage. There is no evidence of a focal intraparenchymal hematoma, hydrocephalus or of  midline shift. Vascular calcification and moderate small vessel ischemic disease is noted. The above information was called to and discussed with Dr. Ortiz.   Radiation dose reduction techniques were utilized, including automated exposure control and exposure modulation based on body size.   This report was finalized on 12/21/2023 8:39 PM by Dr. George Whitehead M.D on Workstation: [id number]       Scheduled Meds  aspirin, 325 mg, Oral, Daily   Or  aspirin, 300 mg, Rectal, Daily  atorvastatin, 40 mg, Oral, Nightly  buPROPion XL, 300 mg, Oral, QAM  cholecalciferol, 400 Units, Oral, Daily  cyanocobalamin, 1,000 mcg, Subcutaneous, Daily   Followed by  [START ON 12/29/2023] vitamin B-12, 1,000 mcg, Oral, Daily  gabapentin, 400 mg, Oral, Q8H  insulin lispro, 2-7 Units, Subcutaneous, 4x Daily AC & at Bedtime  levothyroxine, 100 mcg, Oral, Q AM  oxybutynin XL, 10 mg, Oral, Daily  venlafaxine XR, 150 mg, Oral, Daily    Continuous Infusions  sodium chloride, 75 mL/hr, Last Rate: 75 mL/hr (12/22/23 1050)    PRN Meds    acetaminophen **OR** acetaminophen    bisacodyl    dextrose    dextrose    glucagon (human recombinant)    ondansetron    sodium chloride    sodium chloride, 75 mL/hr, Last Rate: 75 mL/hr (12/22/23 1050)    Diet  Diet: Regular/House Diet; Texture: Regular Texture (IDDSI 7); Fluid Consistency: Thin (IDDSI 0)    I have personally reviewed:  [x]  Medications  [x]  Laboratory   []  Microbiology   [x]  Radiology   [x]  EKG/Telemetry kelli  []  Cardiology/Vascular   []  Pathology   []  Records     Results for orders placed during the hospital encounter of 12/21/23    Adult transthoracic echo complete    Interpretation Summary    Left ventricular systolic function is normal. Calculated left ventricular EF = 59.6%    Left ventricular wall thickness is consistent with borderline concentric hypertrophy.    Left ventricular diastolic function was normal.    Left atrial volume is moderately increased.    Saline test results are  negative.    There is calcification of the aortic valve.    Estimated right ventricular systolic pressure from tricuspid regurgitation is mildly elevated (35-45 mmHg). Calculated right ventricular systolic pressure from tricuspid regurgitation is 38 mmHg.    Mild pulmonary hypertension is present.     Results for orders placed during the hospital encounter of 12/21/23    Duplex Carotid Ultrasound CAR    Interpretation Summary    Right internal carotid artery demonstrates a less than 50% stenosis.    Left internal carotid artery demonstrates a 50-69% stenosis.        Assessment/Plan     Active Hospital Problems    Diagnosis  POA    **Acute left PCA stroke [I63.532]  Yes    B12 deficiency [E53.8]  Unknown    Bradycardia [R00.1]  Unknown    Hyperlipemia [E78.5]  Yes    Hypothyroidism [E03.9]  Yes    Hypertension [I10]  Yes    Type 2 diabetes mellitus with stage 3 chronic kidney disease, without long-term current use of insulin [E11.22, N18.30]  Yes      Resolved Hospital Problems   No resolved problems to display.     83 y.o. female found on floor. Family reported recent confusion    Acute left PCA stroke, embolic  Right homonymous hemianopsia  Bilateral carotid stenosis  Aspirin, statin  Echocardiogram and duplex above  Neurochecks, NIHSS  Neurology following.   Therapies following will likely need rehab  Patient knows no driving d/t vision deficit    DM2 with CKD 3  Correctional insulin and control is acceptable  A1c 9.10%  Creatinine improved stop IVF    HTN  Bradycardia (asymptomatic)  stop metoprolol due to bradycardia and monitor (received a dose yesterday)  Consult cardio    Hypothyroidism  Levothyroxine  Family is concerned that she is not compliant probably contributing to bradycardia. Family states multiple pill bottles with various levels of medication at patient's house    B12 deficiency  On replacement now  Family member states has some memory impairment a little worse in the hospital    DVT  prophylaxis  SCDs    Discharge  Rehab  Expected Discharge Date: 12/26/2023; Expected Discharge Time:     Discussed with patient, family, and nursing staff (daughter is a physician).    Lenard Hinojosa MD  [facility]  12/23/23

## 2023-12-23 NOTE — CONSULTS
Date of Hospital Visit: 23  Encounter Provider: Cy Fuentes MD  Place of Service: [facility] CARDIOLOGY  Patient Name: Rekha Bear  :1940  Referral Provider: Dr Watson    Chief complaint: visual changes and weakness    History of Present Illness    Ms. Bear is an 83 year old woman who follows with Dr Pina.  She has a history of hypertension, diabetes, hyperlipidemia and wandering atrial pacemaker.  She has a known history of sinus bradycardia.  Metoprolol was discontinued in  due to this finding.  However, it was resumed in  due to hypertension.  She was last seen in the office in 2022 and has not followed up since.  She had a normal perfusion stress test at that time.    I have reviewed the chart and spoke with her daughter, who is a psychiatrist.  The patient lives alone in a house and is a hoarder.  She has been falling frequently due to mechanical reasons and still has an abrasion on her right forehead from where she fell and hit the concrete at [employer] in October.  She has not passed out or felt like she was going to pass out.  She can barely get around her home due to the amount of material that she has collected.  She also appears to be experiencing some memory loss.    She was brought in 2 days ago after being found down.  She was eventually able to crawl to the door and open it to let family come in.  She has a left PCA stroke and there is concern that it could be cardioembolic.  She has also been noted to have bradycardia.  She denies lightheadedness or near syncope.  She denies palpitations.    An echo was performed this admission and shows severe left atrial dilation.  The left atrial appendage is visualized and appears markedly dilated.  It is not visualized well enough to truly say whether or not there is a thrombus, but there is a haziness within the appendage that could be consistent with thrombus.          Cardiology has been  "asked to see this patient for bradycardia.     ECHO 12/22/23    Left ventricular systolic function is normal. Calculated left ventricular EF = 59.6%    Left ventricular wall thickness is consistent with borderline concentric hypertrophy.    Left ventricular diastolic function was normal.    Left atrial volume is moderately increased.    Saline test results are negative.    There is calcification of the aortic valve.    Estimated right ventricular systolic pressure from tricuspid regurgitation is mildly elevated (35-45 mmHg). Calculated right ventricular systolic pressure from tricuspid regurgitation is 38 mmHg.    Mild pulmonary hypertension is present.     Stress test 5/10/2022  Left ventricular ejection fraction is normal. (Calculated EF = 61%).  Myocardial perfusion imaging indicates a normal myocardial perfusion study with no evidence of ischemia.  Impressions are consistent with a low risk study.    Past Medical History:   Diagnosis Date    Chilblains     \"Chilblian's\"    CKD (chronic kidney disease)     Depression     Fatty liver     GERD (gastroesophageal reflux disease)     Hyperlipidemia     Hypertension     Incontinence     Osteoarthritis     OA  Marvin THR, LT TKR - hydrocodone prescibed by Dr. 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 Almaguer    TOTAL KNEE ARTHROPLASTY Left     OA  Marvin THR, LT TKR - hydrocodone prescibed by Dr. Almaguer       Prior to Admission medications    Medication Sig Start Date End Date Taking? Authorizing Provider   buPROPion XL (WELLBUTRIN XL) 300 MG 24 hr tablet TAKE 1 TABLET BY MOUTH EVERY DAY IN THE MORNING 12/5/23  Yes Eben Porter MD   gabapentin (NEURONTIN) 600 MG tablet TAKE ONE TABLET BY MOUTH THREE TIMES A DAY 9/2/22  Yes Eben Porter MD   glimepiride (AMARYL) 2 MG tablet Take 2 tablets by mouth Every Morning Before Breakfast. APPOINTMENT IS NEEDED FOR MORE REFILLS 1/27/23  Yes Eben Porter MD   levothyroxine (SYNTHROID, LEVOTHROID) 100 MCG tablet TAKE 1 TABLET BY MOUTH EVERY DAY 11/10/23  Yes Eben Porter MD   rosuvastatin (CRESTOR) 20 MG tablet TAKE ONE TABLET BY MOUTH DAILY 4/26/21  Yes Ragini Selby APRN   tolterodine LA (DETROL LA) 4 MG 24 hr capsule Take 1 capsule by mouth Daily. 10/14/21  Yes Eben Porter MD   Acetylcysteine capsule capsule Take 2 capsules by mouth Daily.    ProviderDat MD   bacitracin 500 UNIT/GM ointment Apply 1 application  topically to the appropriate area as directed 2 (Two) Times a Day. 10/16/23   Ynes Mccrary APRN   colesevelam (WELCHOL) 625 MG tablet Take 3 tablets by mouth.    ProviderDat MD   dextromethorphan polistirex ER (DELSYM) 30 MG/5ML Suspension Extended Release oral suspension Take 30 mg by mouth 2 (Two) Times a Day As Needed (cough). 4/10/20   Lenard Hinojosa MD   empagliflozin (JARDIANCE) 10 MG tablet tablet 1 tablet Daily. 7/26/23   Dat Leon MD   guaiFENesin (ROBITUSSIN) 100 MG/5ML solution oral solution Take 10 mL by mouth Every 4 (Four) Hours As Needed (cough). 4/10/20   Lenard Hinojosa MD   metFORMIN ER (GLUCOPHAGE-XR) 500 MG 24 hr tablet Take 1 tablet by mouth Daily With Breakfast. 6/9/22   Eben Porter MD    metoprolol succinate XL (TOPROL-XL) 25 MG 24 hr tablet TAKE ONE TABLET BY MOUTH DAILY 8/3/22   Elizabeth Pina MD   pioglitazone (ACTOS) 15 MG tablet Take 1 tablet by mouth Daily.    ProviderDat MD   Tradjenta 5 MG tablet tablet Take 1 tablet by mouth Daily.    Dat Leon MD   traMADol (ULTRAM) 50 MG tablet Take 1 tablet by mouth Every 6 (Six) Hours As Needed for Moderate Pain . 21   Cesar Quiroz MD   venlafaxine XR (EFFEXOR-XR) 150 MG 24 hr capsule TAKE 1 CAPSULE BY MOUTH EVERY DAY 23   Eben Porter MD   Vitamin D, Cholecalciferol, (CHOLECALCIFEROL) 10 MCG (400 UNIT) tablet Take 1 tablet by mouth 2 (two) times a day.    Dat Leon MD       Social History     Socioeconomic History    Marital status:     Number of children: 5   Tobacco Use    Smoking status: Former     Packs/day: 0.50     Years: 14.00     Additional pack years: 0.00     Total pack years: 7.00     Types: Cigarettes     Quit date:      Years since quittin.0    Smokeless tobacco: Never    Tobacco comments:     CAFFEINE USE   Vaping Use    Vaping Use: Never used   Substance and Sexual Activity    Alcohol use: No    Drug use: No    Sexual activity: Never       Family History   Problem Relation Age of Onset    Hypertension Mother     Diabetes type II Mother     Hypertension Father     Coronary artery disease Father     Diabetes type II Father     Colon cancer Brother     Skin cancer Brother     Stroke Son     Breast cancer Maternal Aunt     Hypertension Other     Other Other         Lipids  Thyroid       Review of Systems     Objective:     Vitals:    23 2345 23 0410 23 0959 23 1214   BP: 150/90 (!) 129/11  123/63   BP Location: Right arm Right arm  Left arm   Patient Position: Lying Lying  Lying   Pulse: 55      Resp: 18 16 14 15   Temp: 97.9 °F (36.6 °C) 97.9 °F (36.6 °C)  98.2 °F (36.8 °C)   TempSrc: Oral Oral  Oral   SpO2: 97%      Weight:      "  Height:         Body mass index is 29.76 kg/m².    Last Weight and Admission Weight        12/22/23  0856   Weight: 86.2 kg (190 lb)     Flowsheet Rows      Flowsheet Row First Filed Value   Admission Height 170.2 cm (67\") Documented at 12/21/2023 1514   Admission Weight 86.2 kg (190 lb) Documented at 12/21/2023 1514              Intake/Output Summary (Last 24 hours) at 12/23/2023 1329  Last data filed at 12/23/2023 0410  Gross per 24 hour   Intake --   Output 550 ml   Net -550 ml         Physical Exam  Constitutional:       General: She is not in acute distress.  HENT:      Head: Normocephalic.      Nose: Nose normal.      Mouth/Throat:      Mouth: Mucous membranes are dry.   Eyes:      Conjunctiva/sclera: Conjunctivae normal.   Cardiovascular:      Rate and Rhythm: Bradycardia present. Frequent      Heart sounds: Normal heart sounds.   Pulmonary:      Effort: Pulmonary effort is normal.      Breath sounds: Normal breath sounds.   Abdominal:      Palpations: Abdomen is soft.      Tenderness: There is no abdominal tenderness.   Musculoskeletal:      Right lower leg: No edema.      Left lower leg: No edema.   Skin:     General: Skin is warm and dry.      Comments: Healing abrasion right forehead   Psychiatric:         Mood and Affect: Mood normal.                 Lab Review:                Results from last 7 days   Lab Units 12/23/23  0635   SODIUM mmol/L 135*   POTASSIUM mmol/L 4.0   CHLORIDE mmol/L 102   CO2 mmol/L 24.0   BUN mg/dL 20   CREATININE mg/dL 1.18*   GLUCOSE mg/dL 137*   CALCIUM mg/dL 8.7     Results from last 7 days   Lab Units 12/21/23  1531   CK TOTAL U/L 141     Results from last 7 days   Lab Units 12/23/23  0635   WBC 10*3/mm3 9.52   HEMOGLOBIN g/dL 12.8   HEMATOCRIT % 38.6   PLATELETS 10*3/mm3 175         Results from last 7 days   Lab Units 12/22/23  0647   CHOLESTEROL mg/dL 146     Results from last 7 days   Lab Units 12/21/23  1531   MAGNESIUM mg/dL 2.0     Results from last 7 days   Lab " Units 12/22/23  0647   CHOLESTEROL mg/dL 146   TRIGLYCERIDES mg/dL 158*   HDL CHOL mg/dL 44   LDL CHOL mg/dL 75       12/22/23 4/26/22        I personally viewed and interpreted the patient's EKG/Telemetry data    Assessment/Plan:     1. Acute left PCA stroke  2. Sinus bradycardia/wandering atrial pacemaker  3. Frequent falls  4. HTN  5. DM  6. Hoarding    She has severe left atrial dilation and a dilated left atrial appendage.  I really cannot see the appendage well, but it is hazy so I cannot say that there is no thrombus.  I would recommend starting oral anticoagulation as soon as allowed from a neurologic standpoint.  I think it is extremely reasonable to suspect that she has intermittent atrial fibrillation.  Anticoagulation also carry significant risk in this patient who lives alone and appears to have an unsafe living environment as well as frequent falls.  It is certainly not an easy choice.    From the standpoint of her bradycardia, this is also longstanding issue.  She has not had any syncopal events, just falls.  While I think these are mechanical, I cannot say that her heart rate is not playing a role.  I agree with permanently stopping metoprolol.  If her blood pressure goes up, we will have to choose a different option.  She was previously on amlodipine and did not tolerate it due to headaches.    If she is still in the hospital, we will see her again on Tuesday.

## 2023-12-23 NOTE — PROGRESS NOTES
"DOS: 2023  NAME: Rekha Bear   : 1940  PCP: Eben Porter MD  Chief Complaint   Patient presents with    Weakness - Generalized       Chief complaint: stroke like symptoms  Subjective: Daughter at bedside.  Pt needing information repeated, some inattentive.  No new neuro symptom to report.  She has had sinus kelli on tele    Objective:  Vital signs: /63 (BP Location: Left arm, Patient Position: Lying)   Pulse 55   Temp 98.2 °F (36.8 °C) (Oral)   Resp 15   Ht 170.2 cm (67\")   Wt 86.2 kg (190 lb)   LMP  (LMP Unknown)   SpO2 97%   BMI 29.76 kg/m²      Gen: NAD, vitals reviewed  MS: oriented x3, recent/remote memory intact, normal attention/concentration, language intact, no neglect.  CN: dense HH on R, PERRL, EOMI, no facial droop, no dysarthria  Motor: antigravity, no assymetric weakness, normal tone  Sensory: intact to light touch all 4 ext.    ROS:  No weakness, numbness  No fevers, chills      Laboratory results:  Lab Results   Component Value Date    GLUCOSE 137 (H) 2023    CALCIUM 8.7 2023     (L) 2023    K 4.0 2023    CO2 24.0 2023     2023    BUN 20 2023    CREATININE 1.18 (H) 2023    EGFRIFAFRI 61 10/14/2021    EGFRIFNONA 50 (L) 10/14/2021    BCR 16.9 2023    ANIONGAP 9.0 2023     Lab Results   Component Value Date    WBC 9.52 2023    HGB 12.8 2023    HCT 38.6 2023    MCV 93.0 2023     2023     Lab Results   Component Value Date    LDL 75 2023    LDL 64 10/14/2021    LDL 64 2021         Lab 23  0647   HEMOGLOBIN A1C 9.10*        Review of labs:     Review and interpretation of imaging:   HCT neg for hemorrhage, there is hypodensity of L PCA territory and petechial hemorrhage    MRI brain, MRA head  IMPRESSION:  1. Limited whole brain MRI images consisting of just axial FLAIR and  diffusion weighted images are degraded by motion artifact, " limits  evaluation, but they appear unchanged since MRI of the brain earlier  this morning on 12/22/2023 at 9:17 a.m. There is a 6 x 3 cm acute  infarct throughout the medial left occipital lobe in the left PCA  territory with patchy areas of signal loss centrally within the acutely  infarcted brain on the FLAIR and diffusion weighted images indicating  petechial hemorrhage throughout the central portion of this acute  infarct, and there is a 1 cm acute infarct in the left side of the  splenium of the corpus callosum, also in the left PCA territory, and  there are 3 small infarcts in the left thalamus in the distribution of  thalamoperforator branches off the P1 segment left posterior cerebral  artery. There is moderate small vessel disease in cerebral white matter.  The remainder of the limited whole brain MRI of the brain is normal.     2. The MRA of the head is significantly degraded by motion artifact  which significantly limits evaluation. There is limited evaluation of  the proximal intracranial segments of the vertebral arteries, but I  believe the vertebral arteries are most probably widely patent from the  C1 ring level to the vertebrobasilar junction. The basilar artery and  basilar tip is normal in appearance. There is limited evaluation of the  cavernous segments of the internal carotid arteries, distal internal  carotid arteries are normal in appearance. The P1 and P2 segments of the  posterior cerebral arteries are widely patent. The P3 branches are not  adequately assessed. M1 segments of the middle cerebral arteries in the  middle cerebral artery bifurcations, in the A1 segments of the anterior  cerebral arteries and A2 segments are normal in appearance.  Workup to date:  TTE    Left ventricular systolic function is normal. Calculated left ventricular EF = 59.6%    Left ventricular wall thickness is consistent with borderline concentric hypertrophy.    Left ventricular diastolic function was  normal.    Left atrial volume is moderately increased.    Saline test results are negative.    There is calcification of the aortic valve.    Estimated right ventricular systolic pressure from tricuspid regurgitation is mildly elevated (35-45 mmHg). Calculated right ventricular systolic pressure from tricuspid regurgitation is 38 mmHg.    Mild pulmonary hypertension is present.    Carotid doppler      Right ICA:  Imaging indicates <50% stenosis.            Left ICA:  Imaging indicates 50-69% stenosis.     Diagnoses:  1 L PCA stroke  2 suspected vascular dementia    Impression: 84 yo RH F with h/o HTN, HLD, CKD and severe covid infection in 2020.  She was brought to the hospital by family for confusion and was found to have sizable L PCA infarct.  She was no intervention candidate.  There is no significant vascular abnormality.  Etiology strongly suspected to be cardioembolic and she has had atrial ectopy here on tele.  Agree with anticoagulation for secondary prevention.  Would initiate in a week.  Other things include low thyroid and b12 and encephalopathy.  There is concern for vascular dementia r/t issues of memory and given worsened cognitive complaints now which would not be unexpected if underlying mild dementia at baseline.  She will need ongoing cognitive appts, repletion of thyroid and b12,  and full eval with neuro psych before initiation of anticholinesterase inhibitor    Plan  1 continue ASA and high intensity statin  2 Agree with anticoagulation, would wait at least 7 days before initiation  3 better control of vascular risk factors  4 AR planned, will likely need increased support at home or facility in light of new disability    We will be following along with you      Thank you for this consultation.  Discussed above plan with neuro attending, Dr. Watson who agrees with above plan.  Neurology team is available for concerns or questions.

## 2023-12-24 LAB
ANION GAP SERPL CALCULATED.3IONS-SCNC: 9.1 MMOL/L (ref 5–15)
BUN SERPL-MCNC: 26 MG/DL (ref 8–23)
BUN/CREAT SERPL: 18.7 (ref 7–25)
CALCIUM SPEC-SCNC: 8.8 MG/DL (ref 8.6–10.5)
CHLORIDE SERPL-SCNC: 104 MMOL/L (ref 98–107)
CO2 SERPL-SCNC: 24.9 MMOL/L (ref 22–29)
CREAT SERPL-MCNC: 1.39 MG/DL (ref 0.57–1)
DEPRECATED RDW RBC AUTO: 51.6 FL (ref 37–54)
EGFRCR SERPLBLD CKD-EPI 2021: 38.2 ML/MIN/1.73
ERYTHROCYTE [DISTWIDTH] IN BLOOD BY AUTOMATED COUNT: 15.2 % (ref 12.3–15.4)
GLUCOSE BLDC GLUCOMTR-MCNC: 123 MG/DL (ref 70–130)
GLUCOSE BLDC GLUCOMTR-MCNC: 159 MG/DL (ref 70–130)
GLUCOSE BLDC GLUCOMTR-MCNC: 163 MG/DL (ref 70–130)
GLUCOSE BLDC GLUCOMTR-MCNC: 237 MG/DL (ref 70–130)
GLUCOSE SERPL-MCNC: 143 MG/DL (ref 65–99)
HCT VFR BLD AUTO: 35.8 % (ref 34–46.6)
HGB BLD-MCNC: 11.9 G/DL (ref 12–15.9)
MAGNESIUM SERPL-MCNC: 2.1 MG/DL (ref 1.6–2.4)
MCH RBC QN AUTO: 30.7 PG (ref 26.6–33)
MCHC RBC AUTO-ENTMCNC: 33.2 G/DL (ref 31.5–35.7)
MCV RBC AUTO: 92.5 FL (ref 79–97)
PLATELET # BLD AUTO: 167 10*3/MM3 (ref 140–450)
PMV BLD AUTO: 8.9 FL (ref 6–12)
POTASSIUM SERPL-SCNC: 5.2 MMOL/L (ref 3.5–5.2)
RBC # BLD AUTO: 3.87 10*6/MM3 (ref 3.77–5.28)
SODIUM SERPL-SCNC: 138 MMOL/L (ref 136–145)
WBC NRBC COR # BLD AUTO: 9.76 10*3/MM3 (ref 3.4–10.8)

## 2023-12-24 PROCEDURE — 82948 REAGENT STRIP/BLOOD GLUCOSE: CPT

## 2023-12-24 PROCEDURE — 99233 SBSQ HOSP IP/OBS HIGH 50: CPT | Performed by: PHYSICIAN ASSISTANT

## 2023-12-24 PROCEDURE — 80048 BASIC METABOLIC PNL TOTAL CA: CPT | Performed by: HOSPITALIST

## 2023-12-24 PROCEDURE — 85027 COMPLETE CBC AUTOMATED: CPT | Performed by: HOSPITALIST

## 2023-12-24 PROCEDURE — 25010000002 CYANOCOBALAMIN PER 1000 MCG: Performed by: PSYCHIATRY & NEUROLOGY

## 2023-12-24 PROCEDURE — 83735 ASSAY OF MAGNESIUM: CPT | Performed by: NURSE PRACTITIONER

## 2023-12-24 PROCEDURE — 63710000001 INSULIN LISPRO (HUMAN) PER 5 UNITS: Performed by: INTERNAL MEDICINE

## 2023-12-24 RX ADMIN — OXYBUTYNIN CHLORIDE 10 MG: 10 TABLET, EXTENDED RELEASE ORAL at 09:46

## 2023-12-24 RX ADMIN — INSULIN LISPRO 2 UNITS: 100 INJECTION, SOLUTION INTRAVENOUS; SUBCUTANEOUS at 18:48

## 2023-12-24 RX ADMIN — ASPIRIN 325 MG: 325 TABLET ORAL at 09:45

## 2023-12-24 RX ADMIN — LEVOTHYROXINE SODIUM 100 MCG: 100 TABLET ORAL at 06:17

## 2023-12-24 RX ADMIN — CYANOCOBALAMIN 1000 MCG: 1000 INJECTION, SOLUTION INTRAMUSCULAR; SUBCUTANEOUS at 09:46

## 2023-12-24 RX ADMIN — GABAPENTIN 400 MG: 400 CAPSULE ORAL at 20:40

## 2023-12-24 RX ADMIN — GABAPENTIN 400 MG: 400 CAPSULE ORAL at 15:49

## 2023-12-24 RX ADMIN — INSULIN LISPRO 2 UNITS: 100 INJECTION, SOLUTION INTRAVENOUS; SUBCUTANEOUS at 21:51

## 2023-12-24 RX ADMIN — CHOLECALCIFEROL TAB 10 MCG (400 UNIT) 400 UNITS: 10 TAB at 09:46

## 2023-12-24 RX ADMIN — GABAPENTIN 400 MG: 400 CAPSULE ORAL at 06:16

## 2023-12-24 RX ADMIN — VENLAFAXINE HYDROCHLORIDE 150 MG: 150 CAPSULE, EXTENDED RELEASE ORAL at 09:46

## 2023-12-24 RX ADMIN — BUPROPION HYDROCHLORIDE 300 MG: 300 TABLET, EXTENDED RELEASE ORAL at 06:16

## 2023-12-24 RX ADMIN — ATORVASTATIN CALCIUM 40 MG: 20 TABLET, FILM COATED ORAL at 20:40

## 2023-12-24 RX ADMIN — INSULIN LISPRO 3 UNITS: 100 INJECTION, SOLUTION INTRAVENOUS; SUBCUTANEOUS at 12:02

## 2023-12-24 NOTE — PROGRESS NOTES
"DOS: 2023  NAME: Rekha Bear   : 1940  PCP: Eben Porter MD  Chief Complaint   Patient presents with    Weakness - Generalized       Chief complaint: stroke like symptoms  Subjective: Son at bedside.  Pt up in chair.  She did not sleep well last night and unsure why.  No new neuro complaint    Objective:  Vital signs: /58 (BP Location: Left arm, Patient Position: Lying)   Pulse (!) 48   Temp 98.6 °F (37 °C) (Oral)   Resp 16   Ht 170.2 cm (67\")   Wt 86.2 kg (190 lb)   LMP  (LMP Unknown)   SpO2 100%   BMI 29.76 kg/m²      Gen: NAD, vitals reviewed  MS: oriented x3, recent/remote memory intact, normal attention/concentration, language intact, no neglect.  CN: visual acuity grossly normal, PERRL, EOMI, no facial droop, no dysarthria  Motor: 5/5 throughout upper and lower extremities, normal tone  Sensory: intact to light touch all 4 ext.    ROS:  No weakness, numbness  No fevers, chills      Laboratory results:  Lab Results   Component Value Date    GLUCOSE 143 (H) 2023    CALCIUM 8.8 2023     2023    K 5.2 2023    CO2 24.9 2023     2023    BUN 26 (H) 2023    CREATININE 1.39 (H) 2023    EGFRIFAFRI 61 10/14/2021    EGFRIFNONA 50 (L) 10/14/2021    BCR 18.7 2023    ANIONGAP 9.1 2023     Lab Results   Component Value Date    WBC 9.76 2023    HGB 11.9 (L) 2023    HCT 35.8 2023    MCV 92.5 2023     2023     Lab Results   Component Value Date    LDL 75 2023    LDL 64 10/14/2021    LDL 64 2021         Lab 23  0647   HEMOGLOBIN A1C 9.10*        Review of labs:     Review and interpretation of imaging:     HCT neg for hemorrhage, there is hypodensity of L PCA territory and petechial hemorrhage     MRI brain, MRA head  IMPRESSION:  1. Limited whole brain MRI images consisting of just axial FLAIR and  diffusion weighted images are degraded by motion artifact, " limits  evaluation, but they appear unchanged since MRI of the brain earlier  this morning on 12/22/2023 at 9:17 a.m. There is a 6 x 3 cm acute  infarct throughout the medial left occipital lobe in the left PCA  territory with patchy areas of signal loss centrally within the acutely  infarcted brain on the FLAIR and diffusion weighted images indicating  petechial hemorrhage throughout the central portion of this acute  infarct, and there is a 1 cm acute infarct in the left side of the  splenium of the corpus callosum, also in the left PCA territory, and  there are 3 small infarcts in the left thalamus in the distribution of  thalamoperforator branches off the P1 segment left posterior cerebral  artery. There is moderate small vessel disease in cerebral white matter.  The remainder of the limited whole brain MRI of the brain is normal.     2. The MRA of the head is significantly degraded by motion artifact  which significantly limits evaluation. There is limited evaluation of  the proximal intracranial segments of the vertebral arteries, but I  believe the vertebral arteries are most probably widely patent from the  C1 ring level to the vertebrobasilar junction. The basilar artery and  basilar tip is normal in appearance. There is limited evaluation of the  cavernous segments of the internal carotid arteries, distal internal  carotid arteries are normal in appearance. The P1 and P2 segments of the  posterior cerebral arteries are widely patent. The P3 branches are not  adequately assessed. M1 segments of the middle cerebral arteries in the  middle cerebral artery bifurcations, in the A1 segments of the anterior  cerebral arteries and A2 segments are normal in appearance.    Workup to date:  TTE    Left ventricular systolic function is normal. Calculated left ventricular EF = 59.6%    Left ventricular wall thickness is consistent with borderline concentric hypertrophy.    Left ventricular diastolic function was  normal.    Left atrial volume is moderately increased.    Saline test results are negative.    There is calcification of the aortic valve.    Estimated right ventricular systolic pressure from tricuspid regurgitation is mildly elevated (35-45 mmHg). Calculated right ventricular systolic pressure from tricuspid regurgitation is 38 mmHg.    Mild pulmonary hypertension is present.     Carotid doppler      Right ICA:  Imaging indicates <50% stenosis.            Left ICA:  Imaging indicates 50-69% stenosis.     Diagnoses:  1 L PCA stroke  2 suspected vascular dementia     Impression: 84 yo RH F with h/o HTN, HLD, CKD and severe covid infection in 2020.  She was brought to the hospital by family for confusion and was found to have sizable L PCA infarct.  She was no intervention candidate.  There is no significant vascular abnormality.  Etiology strongly suspected to be cardioembolic and she has had atrial ectopy here on tele.  Agree with anticoagulation for secondary prevention.  Would initiate in a week.  Other things include low thyroid and b12 and encephalopathy.  There is concern for vascular dementia r/t issues of memory and given worsened cognitive complaints now which would not be unexpected if underlying mild dementia at baseline.  She will need ongoing cognitive appts, repletion of thyroid and b12,  and full eval with neuro psych before initiation of anticholinesterase inhibitor     Plan  1 continue ASA and high intensity statin  2 Agree with anticoagulation, would wait at least 7 days before initiation.  Would start eliqius on 1/29 and can d/c asa then, no need for both from neuro perspective  3 better control of vascular risk factors  4 AR planned, will likely need increased support at home or facility in light of new disability    Sent electronic request to f/u in neuro clinic.  Pt referral for h/o SHEY not treated as well    Neuro team available as needed      Thank you for this consultation.  Discussed above  plan with neuro attending, Dr. Watson who agrees with above plan.  Neurology team is available for concerns or questions.

## 2023-12-24 NOTE — PLAN OF CARE
Problem: Adult Inpatient Plan of Care  Goal: Plan of Care Review  Outcome: Ongoing, Progressing  Goal: Patient-Specific Goal (Individualized)  Outcome: Ongoing, Progressing  Goal: Absence of Hospital-Acquired Illness or Injury  Outcome: Ongoing, Progressing  Intervention: Identify and Manage Fall Risk  Recent Flowsheet Documentation  Taken 12/23/2023 2045 by Rosario Guerra RN  Safety Promotion/Fall Prevention: safety round/check completed  Intervention: Prevent Skin Injury  Recent Flowsheet Documentation  Taken 12/23/2023 2045 by Rosario Guerra RN  Body Position: position changed independently  Intervention: Prevent and Manage VTE (Venous Thromboembolism) Risk  Recent Flowsheet Documentation  Taken 12/23/2023 2045 by Rosario Guerra RN  Activity Management: activity encouraged  Intervention: Prevent Infection  Recent Flowsheet Documentation  Taken 12/23/2023 2045 by Rosario Guerra RN  Infection Prevention: rest/sleep promoted  Goal: Optimal Comfort and Wellbeing  Outcome: Ongoing, Progressing  Intervention: Provide Person-Centered Care  Recent Flowsheet Documentation  Taken 12/23/2023 2045 by Rosario Guerra RN  Trust Relationship/Rapport:   care explained   choices provided  Goal: Readiness for Transition of Care  Outcome: Ongoing, Progressing     Problem: Fall Injury Risk  Goal: Absence of Fall and Fall-Related Injury  Outcome: Ongoing, Progressing  Intervention: Identify and Manage Contributors  Recent Flowsheet Documentation  Taken 12/23/2023 2045 by Rosairo Guerra RN  Medication Review/Management: medications reviewed  Intervention: Promote Injury-Free Environment  Recent Flowsheet Documentation  Taken 12/23/2023 2045 by Rosario Guerra RN  Safety Promotion/Fall Prevention: safety round/check completed     Problem: Adjustment to Illness (Stroke, Ischemic/Transient Ischemic Attack)  Goal: Optimal Coping  Outcome: Ongoing, Progressing  Intervention: Support Psychosocial Response to  Stroke  Recent Flowsheet Documentation  Taken 12/23/2023 2045 by Rosario Guerra, RN  Family/Support System Care: self-care encouraged     Problem: Bowel Elimination Impaired (Stroke, Ischemic/Transient Ischemic Attack)  Goal: Effective Bowel Elimination  Outcome: Ongoing, Progressing     Problem: Cerebral Tissue Perfusion (Stroke, Ischemic/Transient Ischemic Attack)  Goal: Optimal Cerebral Tissue Perfusion  Outcome: Ongoing, Progressing     Problem: Cognitive Impairment (Stroke, Ischemic/Transient Ischemic Attack)  Goal: Optimal Cognitive Function  Outcome: Ongoing, Progressing     Problem: Communication Impairment (Stroke, Ischemic/Transient Ischemic Attack)  Goal: Improved Communication Skills  Outcome: Ongoing, Progressing     Problem: Functional Ability Impaired (Stroke, Ischemic/Transient Ischemic Attack)  Goal: Optimal Functional Ability  Outcome: Ongoing, Progressing  Intervention: Optimize Functional Ability  Recent Flowsheet Documentation  Taken 12/23/2023 2045 by Rosario Guerra RN  Activity Management: activity encouraged     Problem: Respiratory Compromise (Stroke, Ischemic/Transient Ischemic Attack)  Goal: Effective Oxygenation and Ventilation  Outcome: Ongoing, Progressing  Intervention: Optimize Oxygenation and Ventilation  Recent Flowsheet Documentation  Taken 12/23/2023 2045 by Rosario Guerra, RN  Head of Bed (HOB) Positioning: HOB elevated     Problem: Sensorimotor Impairment (Stroke, Ischemic/Transient Ischemic Attack)  Goal: Improved Sensorimotor Function  Outcome: Ongoing, Progressing  Intervention: Optimize Range of Motion, Motor Control and Function  Recent Flowsheet Documentation  Taken 12/23/2023 2045 by Rosario Guerra, ALIVIA  Positioning/Transfer Devices:   in use   pillows     Problem: Swallowing Impairment (Stroke, Ischemic/Transient Ischemic Attack)  Goal: Optimal Eating and Swallowing without Aspiration  Outcome: Ongoing, Progressing     Problem: Urinary Elimination Impaired  (Stroke, Ischemic/Transient Ischemic Attack)  Goal: Effective Urinary Elimination  Outcome: Ongoing, Progressing     Problem: Asthma Comorbidity  Goal: Maintenance of Asthma Control  Outcome: Ongoing, Progressing  Intervention: Maintain Asthma Symptom Control  Recent Flowsheet Documentation  Taken 12/23/2023 2045 by Rosario Guerra RN  Medication Review/Management: medications reviewed     Problem: Behavioral Health Comorbidity  Goal: Maintenance of Behavioral Health Symptom Control  Outcome: Ongoing, Progressing  Intervention: Maintain Behavioral Health Symptom Control  Recent Flowsheet Documentation  Taken 12/23/2023 2045 by Rosario Guerra RN  Medication Review/Management: medications reviewed     Problem: COPD (Chronic Obstructive Pulmonary Disease) Comorbidity  Goal: Maintenance of COPD Symptom Control  Outcome: Ongoing, Progressing  Intervention: Maintain COPD-Symptom Control  Recent Flowsheet Documentation  Taken 12/23/2023 2045 by Rosario Guerra RN  Medication Review/Management: medications reviewed     Problem: Diabetes Comorbidity  Goal: Blood Glucose Level Within Targeted Range  Outcome: Ongoing, Progressing     Problem: Heart Failure Comorbidity  Goal: Maintenance of Heart Failure Symptom Control  Outcome: Ongoing, Progressing  Intervention: Maintain Heart Failure-Management  Recent Flowsheet Documentation  Taken 12/23/2023 2045 by Rosario Guerra RN  Medication Review/Management: medications reviewed     Problem: Hypertension Comorbidity  Goal: Blood Pressure in Desired Range  Outcome: Ongoing, Progressing  Intervention: Maintain Blood Pressure Management  Recent Flowsheet Documentation  Taken 12/23/2023 2045 by Rosario Guerra RN  Medication Review/Management: medications reviewed     Problem: Obstructive Sleep Apnea Risk or Actual Comorbidity Management  Goal: Unobstructed Breathing During Sleep  Outcome: Ongoing, Progressing     Problem: Osteoarthritis Comorbidity  Goal: Maintenance of  Osteoarthritis Symptom Control  Outcome: Ongoing, Progressing  Intervention: Maintain Osteoarthritis Symptom Control  Recent Flowsheet Documentation  Taken 12/23/2023 2045 by Rosario Guerra, RN  Activity Management: activity encouraged  Medication Review/Management: medications reviewed     Problem: Pain Chronic (Persistent) (Comorbidity Management)  Goal: Acceptable Pain Control and Functional Ability  Outcome: Ongoing, Progressing  Intervention: Manage Persistent Pain  Recent Flowsheet Documentation  Taken 12/23/2023 2045 by Rosario Guerra, RN  Medication Review/Management: medications reviewed  Intervention: Optimize Psychosocial Wellbeing  Recent Flowsheet Documentation  Taken 12/23/2023 2045 by Rosario Guerra, RN  Family/Support System Care: self-care encouraged     Problem: Seizure Disorder Comorbidity  Goal: Maintenance of Seizure Control  Outcome: Ongoing, Progressing   Goal Outcome Evaluation:

## 2023-12-24 NOTE — PROGRESS NOTES
Name: Rekha Bear ADMIT: 2023   : 1940  PCP: Eben Porter MD    MRN: [id number] LOS: 3 days   AGE/SEX: 83 y.o. female  ROOM: [facility]     Subjective   Subjective   Denies new complaint. No chest pain or shortness of breath. No abdominal pain. Still with right visual deficit     Objective   Objective   Vital Signs  Temp:  [97.7 °F (36.5 °C)-98.6 °F (37 °C)] 98.6 °F (37 °C)  Heart Rate:  [48] 48  Resp:  [14-24] 16  BP: (107-156)/(58-67) 107/58  SpO2:  [100 %] 100 %  on   ;   Device (Oxygen Therapy): room air  Body mass index is 29.76 kg/m².    Physical Exam  Constitutional:       General: She is not in acute distress.     Appearance: Normal appearance. She is not toxic-appearing.   HENT:      Head: Normocephalic.   Eyes:      Extraocular Movements: Extraocular movements intact.   Cardiovascular:      Rate and Rhythm: Normal rate and regular rhythm.   Pulmonary:      Effort: Pulmonary effort is normal. No respiratory distress.      Breath sounds: Normal breath sounds. No wheezing or rhonchi.   Abdominal:      General: Bowel sounds are normal.      Palpations: Abdomen is soft.      Tenderness: There is no abdominal tenderness. There is no guarding or rebound.   Musculoskeletal:         General: No swelling.      Right lower leg: No edema.      Left lower leg: No edema.   Skin:     General: Skin is warm and dry.   Neurological:      Mental Status: She is alert.      Comments: right visual field deficit.    Psychiatric:         Mood and Affect: Mood normal.         Behavior: Behavior normal.        1a. Level of Consciousness: 0-->Alert, keenly responsive  1b. LOC Questions: 0-->Answers both questions correctly  1c. LOC Commands: 0-->Performs both tasks correctly  2. Best Gaze: 0-->Normal  3. Visual: 1-->Partial hemianopia  4. Facial Palsy: 0-->Normal symmetrical movements  5a. Motor Arm, Left: 0-->No drift, limb holds 90 (or 45) degrees for full 10 secs  5b. Motor Arm, Right: 0-->No drift,  limb holds 90 (or 45) degrees for full 10 secs  6a. Motor Leg, Left: 0-->No drift, leg holds 30 degree position for full 5 secs  6b. Motor Leg, Right: 0-->No drift, leg holds 30 degree position for full 5 secs  7. Limb Ataxia: 0-->Absent  8. Sensory: 0-->Normal, no sensory loss  9. Best Language: 0-->No aphasia, normal  10. Dysarthria: 0-->Normal  11. Extinction and Inattention (formerly Neglect): 0-->No abnormality    Total (NIH Stroke Scale): 1    Results Review  I reviewed the patient's new clinical results.  Results from last 7 days   Lab Units 12/24/23  0726 12/23/23  0635 12/22/23  0647 12/21/23  1531   WBC 10*3/mm3 9.76 9.52 7.96 9.70   HEMOGLOBIN g/dL 11.9* 12.8 12.0 13.9   PLATELETS 10*3/mm3 167 175 181 198     Results from last 7 days   Lab Units 12/24/23  0726 12/23/23  0635 12/22/23  1128 12/22/23  0647   SODIUM mmol/L 138 135* 137 138   POTASSIUM mmol/L 5.2 4.0 4.5 4.7   CHLORIDE mmol/L 104 102 102 103   CO2 mmol/L 24.9 24.0 22.0 24.3   BUN mg/dL 26* 20 17 17   CREATININE mg/dL 1.39* 1.18* 1.27* 1.37*   GLUCOSE mg/dL 143* 137* 138* 105*     Lab Results   Component Value Date    ANIONGAP 9.1 12/24/2023     Estimated Creatinine Clearance: 35.8 mL/min (A) (by C-G formula based on SCr of 1.39 mg/dL (H)).   Lab Results   Component Value Date    EGFR 38.2 (L) 12/24/2023     Results from last 7 days   Lab Units 12/22/23  0647 12/21/23  1531   ALBUMIN g/dL 3.5 4.2   BILIRUBIN mg/dL 1.2 1.2   ALK PHOS U/L 70 86   AST (SGOT) U/L 16 20   ALT (SGPT) U/L 8 11     Results from last 7 days   Lab Units 12/24/23  0726 12/23/23  0635 12/22/23  1128 12/22/23  0647 12/21/23  1531   CALCIUM mg/dL 8.8 8.7 8.7 8.7 9.4   ALBUMIN g/dL  --   --   --  3.5 4.2   MAGNESIUM mg/dL 2.1  --   --   --  2.0     Results from last 7 days   Lab Units 12/21/23  1609   LACTATE mmol/L 1.7     Hemoglobin A1C   Date/Time Value Ref Range Status   12/22/2023 0647 9.10 (H) 4.80 - 5.60 % Final     Glucose   Date/Time Value Ref Range Status    12/24/2023 0601 123 70 - 130 mg/dL Final   12/23/2023 2113 153 (H) 70 - 130 mg/dL Final   12/23/2023 1704 197 (H) 70 - 130 mg/dL Final   12/23/2023 1218 167 (H) 70 - 130 mg/dL Final   12/23/2023 0822 149 (H) 70 - 130 mg/dL Final   12/23/2023 0621 127 70 - 130 mg/dL Final   12/22/2023 2345 148 (H) 70 - 130 mg/dL Final       MRI Angiogram Head Without Contrast    Result Date: 12/22/2023  1. Limited whole brain MRI images consisting of just axial FLAIR and diffusion weighted images are degraded by motion artifact, limits evaluation, but they appear unchanged since MRI of the brain earlier this morning on 12/22/2023 at 9:17 a.m. There is a 6 x 3 cm acute infarct throughout the medial left occipital lobe in the left PCA territory with patchy areas of signal loss centrally within the acutely infarcted brain on the FLAIR and diffusion weighted images indicating petechial hemorrhage throughout the central portion of this acute infarct, and there is a 1 cm acute infarct in the left side of the splenium of the corpus callosum, also in the left PCA territory, and there are 3 small infarcts in the left thalamus in the distribution of thalamoperforator branches off the P1 segment left posterior cerebral artery. There is moderate small vessel disease in cerebral white matter. The remainder of the limited whole brain MRI of the brain is normal.  2. The MRA of the head is significantly degraded by motion artifact which significantly limits evaluation. There is limited evaluation of the proximal intracranial segments of the vertebral arteries, but I believe the vertebral arteries are most probably widely patent from the C1 ring level to the vertebrobasilar junction. The basilar artery and basilar tip is normal in appearance. There is limited evaluation of the cavernous segments of the internal carotid arteries, distal internal carotid arteries are normal in appearance. The P1 and P2 segments of the posterior cerebral arteries are  widely patent. The P3 branches are not adequately assessed. M1 segments of the middle cerebral arteries in the middle cerebral artery bifurcations, in the A1 segments of the anterior cerebral arteries and A2 segments are normal in appearance.  This report was finalized on 12/22/2023 10:02 PM by Dr. Edouard Curiel M.D on Workstation: [id number]      MRI Brain Without Contrast    Result Date: 12/22/2023   There is an acute infarct within the medial aspect left occipital lobe with petechial hemorrhagic transformation that is within the left PCA distribution. This measures up to 6.0 x 2.6 cm in greatest axial dimensions. A 1 cm focus of acute infarction is seen within the left lateral aspect of the splenium of the corpus callosum also within the left PCA distribution. Foci of acute infarction are noted within the superior and medial aspect of the left thalamus measuring up to 1.5 x 0.5 cm in greatest axial dimensions and within the pulvinar of the left thalamus measuring up to 1.5 x 0.5 cm in greatest axial dimensions. These are within thalamoperforator distribution.  Moderate changes of chronic small vessel ischemic phenomena are incidentally appreciated.  These findings were discussed with Dr. Watson on 12/22/2023 at approximately 10:45 a.m.  This report was finalized on 12/22/2023 11:46 AM by Dr. Kong Trotter M.D on Workstation: [id number]       Scheduled Meds  aspirin, 325 mg, Oral, Daily   Or  aspirin, 300 mg, Rectal, Daily  atorvastatin, 40 mg, Oral, Nightly  buPROPion XL, 300 mg, Oral, QAM  cholecalciferol, 400 Units, Oral, Daily  cyanocobalamin, 1,000 mcg, Subcutaneous, Daily   Followed by  [START ON 12/29/2023] vitamin B-12, 1,000 mcg, Oral, Daily  gabapentin, 400 mg, Oral, Q8H  insulin lispro, 2-7 Units, Subcutaneous, 4x Daily AC & at Bedtime  levothyroxine, 100 mcg, Oral, Q AM  oxybutynin XL, 10 mg, Oral, Daily  venlafaxine XR, 150 mg, Oral, Daily    Continuous Infusions  sodium chloride, 75 mL/hr, Last Rate: 75  mL/hr (12/23/23 1447)    PRN Meds    acetaminophen **OR** acetaminophen    bisacodyl    dextrose    dextrose    glucagon (human recombinant)    ondansetron    sodium chloride    sodium chloride, 75 mL/hr, Last Rate: 75 mL/hr (12/23/23 1447)    Diet  Diet: Regular/House Diet; Texture: Regular Texture (IDDSI 7); Fluid Consistency: Thin (IDDSI 0)    I have personally reviewed:  [x]  Medications  [x]  Laboratory   []  Microbiology   []  Radiology   [x]  EKG/Telemetry sr  []  Cardiology/Vascular   []  Pathology   []  Records     Results for orders placed during the hospital encounter of 12/21/23    Adult transthoracic echo complete    Interpretation Summary    Left ventricular systolic function is normal. Calculated left ventricular EF = 59.6%    Left ventricular wall thickness is consistent with borderline concentric hypertrophy.    Left ventricular diastolic function was normal.    Left atrial volume is moderately increased.    Saline test results are negative.    There is calcification of the aortic valve.    Estimated right ventricular systolic pressure from tricuspid regurgitation is mildly elevated (35-45 mmHg). Calculated right ventricular systolic pressure from tricuspid regurgitation is 38 mmHg.    Mild pulmonary hypertension is present.     Results for orders placed during the hospital encounter of 12/21/23    Duplex Carotid Ultrasound CAR    Interpretation Summary    Right internal carotid artery demonstrates a less than 50% stenosis.    Left internal carotid artery demonstrates a 50-69% stenosis.        Assessment/Plan     Active Hospital Problems    Diagnosis  POA    **Acute left PCA stroke [I63.532]  Yes    B12 deficiency [E53.8]  Unknown    Bradycardia [R00.1]  Unknown    Premature atrial contractions [I49.1]  Unknown    Hyperlipemia [E78.5]  Yes    Hypothyroidism [E03.9]  Yes    Hypertension [I10]  Yes    Type 2 diabetes mellitus with stage 3 chronic kidney disease, without long-term current use of  insulin [E11.22, N18.30]  Yes      Resolved Hospital Problems   No resolved problems to display.     83 y.o. female found on floor. Family reported recent confusion    Acute left PCA stroke, embolic  Right homonymous hemianopsia  Bilateral carotid stenosis  Aspirin, statin. Start anticoagulation after at least 7 days. Cardiology suspects intermittent atrial fibrillation.   Echocardiogram and duplex above  Neurochecks, NIHSS  Therapies following will likely need rehab  Patient knows no driving d/t vision deficit (discussed again today)    DM2 with CKD 3  Correctional insulin and control is acceptable  A1c 9.10%  Monitoring renal function    HTN  Bradycardia   Bradycardia resolved off metoprolol permanently    Hypothyroidism  Probable noncompliant with medication  Levothyroxine  Family is concerned that she is not compliant probably contributing to bradycardia. Family states multiple pill bottles with various levels of medication at patient's house    B12 deficiency  On replacement now  Family member states has some memory impairment a little worse in the hospital    DVT prophylaxis  SCDs    Discharge  Rehab eval. Long-term she will need a higher level of care than living by herself.  Expected Discharge Date: 12/26/2023; Expected Discharge Time:     Discussed with patient, family, and nursing staff (Dr. Hernández, daughter)     Lenard Hinojosa MD  [facility]  12/24/23

## 2023-12-24 NOTE — PROGRESS NOTES
[facility]    Eval complete. Discussed with Dr. Quan and patient accepted. Bed available tomorrow 12/25 pending medical stability. Please complete discharge -readmit option in epic when discharging to [facility] rehab. The rehab admissions office will be closed tomorrow. Questions may be directed to the rehab unit at [phone].    Dhara Barahona RN  Rehab Admissions Coordinator  [phone]

## 2023-12-24 NOTE — PLAN OF CARE
Problem: Adult Inpatient Plan of Care  Goal: Plan of Care Review  Outcome: Ongoing, Progressing  Goal: Patient-Specific Goal (Individualized)  Outcome: Ongoing, Progressing  Goal: Absence of Hospital-Acquired Illness or Injury  Outcome: Ongoing, Progressing  Intervention: Identify and Manage Fall Risk  Recent Flowsheet Documentation  Taken 12/24/2023 1200 by Joey Hill RN  Safety Promotion/Fall Prevention: activity supervised  Taken 12/24/2023 0800 by Joey Hill RN  Safety Promotion/Fall Prevention: activity supervised  Intervention: Prevent Skin Injury  Recent Flowsheet Documentation  Taken 12/24/2023 0800 by Joey Hill RN  Body Position: position changed independently  Intervention: Prevent and Manage VTE (Venous Thromboembolism) Risk  Recent Flowsheet Documentation  Taken 12/24/2023 1200 by Joey Hill RN  Activity Management: up in chair  Taken 12/24/2023 0800 by Joey Hill RN  Activity Management: activity encouraged  VTE Prevention/Management:   sequential compression devices on   bilateral  Goal: Optimal Comfort and Wellbeing  Outcome: Ongoing, Progressing  Intervention: Provide Person-Centered Care  Recent Flowsheet Documentation  Taken 12/24/2023 0800 by Joey Hill RN  Trust Relationship/Rapport:   questions answered   thoughts/feelings acknowledged  Goal: Readiness for Transition of Care  Outcome: Ongoing, Progressing     Problem: Fall Injury Risk  Goal: Absence of Fall and Fall-Related Injury  Outcome: Ongoing, Progressing  Intervention: Promote Injury-Free Environment  Recent Flowsheet Documentation  Taken 12/24/2023 1200 by Joey Hill RN  Safety Promotion/Fall Prevention: activity supervised  Taken 12/24/2023 0800 by Joey Hill RN  Safety Promotion/Fall Prevention: activity supervised     Problem: Adjustment to Illness (Stroke, Ischemic/Transient Ischemic Attack)  Goal: Optimal Coping  Outcome: Ongoing, Progressing  Intervention:  Support Psychosocial Response to Stroke  Recent Flowsheet Documentation  Taken 12/24/2023 0800 by Joey Hill RN  Supportive Measures:   active listening utilized   verbalization of feelings encouraged     Problem: Bowel Elimination Impaired (Stroke, Ischemic/Transient Ischemic Attack)  Goal: Effective Bowel Elimination  Outcome: Ongoing, Progressing     Problem: Cerebral Tissue Perfusion (Stroke, Ischemic/Transient Ischemic Attack)  Goal: Optimal Cerebral Tissue Perfusion  Outcome: Ongoing, Progressing     Problem: Cognitive Impairment (Stroke, Ischemic/Transient Ischemic Attack)  Goal: Optimal Cognitive Function  Outcome: Ongoing, Progressing     Problem: Communication Impairment (Stroke, Ischemic/Transient Ischemic Attack)  Goal: Improved Communication Skills  Outcome: Ongoing, Progressing  Intervention: Optimize Communication Skills  Recent Flowsheet Documentation  Taken 12/24/2023 0800 by Joey Hill RN  Communication Enhancement Strategies:   communication board used   device use encouraged     Problem: Functional Ability Impaired (Stroke, Ischemic/Transient Ischemic Attack)  Goal: Optimal Functional Ability  Outcome: Ongoing, Progressing  Intervention: Optimize Functional Ability  Recent Flowsheet Documentation  Taken 12/24/2023 1200 by Joey Hill RN  Activity Management: up in chair  Taken 12/24/2023 0800 by Joey Hill RN  Activity Management: activity encouraged     Problem: Respiratory Compromise (Stroke, Ischemic/Transient Ischemic Attack)  Goal: Effective Oxygenation and Ventilation  Outcome: Ongoing, Progressing  Intervention: Optimize Oxygenation and Ventilation  Recent Flowsheet Documentation  Taken 12/24/2023 0800 by Joey Hill RN  Head of Bed (HOB) Positioning: HOB at 30-45 degrees     Problem: Sensorimotor Impairment (Stroke, Ischemic/Transient Ischemic Attack)  Goal: Improved Sensorimotor Function  Outcome: Ongoing, Progressing  Intervention: Optimize  Range of Motion, Motor Control and Function  Recent Flowsheet Documentation  Taken 12/24/2023 0800 by Joey Hill RN  Positioning/Transfer Devices: pillows     Problem: Swallowing Impairment (Stroke, Ischemic/Transient Ischemic Attack)  Goal: Optimal Eating and Swallowing without Aspiration  Outcome: Ongoing, Progressing     Problem: Urinary Elimination Impaired (Stroke, Ischemic/Transient Ischemic Attack)  Goal: Effective Urinary Elimination  Outcome: Ongoing, Progressing     Problem: Asthma Comorbidity  Goal: Maintenance of Asthma Control  Outcome: Ongoing, Progressing     Problem: Behavioral Health Comorbidity  Goal: Maintenance of Behavioral Health Symptom Control  Outcome: Ongoing, Progressing     Problem: COPD (Chronic Obstructive Pulmonary Disease) Comorbidity  Goal: Maintenance of COPD Symptom Control  Outcome: Ongoing, Progressing  Intervention: Maintain COPD-Symptom Control  Recent Flowsheet Documentation  Taken 12/24/2023 0800 by Joey Hill RN  Supportive Measures:   active listening utilized   verbalization of feelings encouraged     Problem: Diabetes Comorbidity  Goal: Blood Glucose Level Within Targeted Range  Outcome: Ongoing, Progressing     Problem: Heart Failure Comorbidity  Goal: Maintenance of Heart Failure Symptom Control  Outcome: Ongoing, Progressing     Problem: Hypertension Comorbidity  Goal: Blood Pressure in Desired Range  Outcome: Ongoing, Progressing     Problem: Obstructive Sleep Apnea Risk or Actual Comorbidity Management  Goal: Unobstructed Breathing During Sleep  Outcome: Ongoing, Progressing     Problem: Osteoarthritis Comorbidity  Goal: Maintenance of Osteoarthritis Symptom Control  Outcome: Ongoing, Progressing  Intervention: Maintain Osteoarthritis Symptom Control  Recent Flowsheet Documentation  Taken 12/24/2023 1200 by Joey Hill RN  Activity Management: up in chair  Taken 12/24/2023 0800 by Joey Hill RN  Activity Management: activity  encouraged     Problem: Pain Chronic (Persistent) (Comorbidity Management)  Goal: Acceptable Pain Control and Functional Ability  Outcome: Ongoing, Progressing  Intervention: Optimize Psychosocial Wellbeing  Recent Flowsheet Documentation  Taken 12/24/2023 0800 by Joey Hill RN  Supportive Measures:   active listening utilized   verbalization of feelings encouraged     Problem: Seizure Disorder Comorbidity  Goal: Maintenance of Seizure Control  Outcome: Ongoing, Progressing   Goal Outcome Evaluation:

## 2023-12-24 NOTE — PROGRESS NOTES
[facility]    PT noted. Will follow up in AM to confirm discharge plans and review with Dr. Quan for determination of most appropriate rehab level.    Dhara Barahona RN  Rehab Admissions Coordinator  [phone]

## 2023-12-25 LAB
ANION GAP SERPL CALCULATED.3IONS-SCNC: 11 MMOL/L (ref 5–15)
BUN SERPL-MCNC: 21 MG/DL (ref 8–23)
BUN/CREAT SERPL: 15.4 (ref 7–25)
CALCIUM SPEC-SCNC: 8.5 MG/DL (ref 8.6–10.5)
CHLORIDE SERPL-SCNC: 104 MMOL/L (ref 98–107)
CO2 SERPL-SCNC: 22 MMOL/L (ref 22–29)
CREAT SERPL-MCNC: 1.36 MG/DL (ref 0.57–1)
DEPRECATED RDW RBC AUTO: 50.5 FL (ref 37–54)
EGFRCR SERPLBLD CKD-EPI 2021: 39.2 ML/MIN/1.73
ERYTHROCYTE [DISTWIDTH] IN BLOOD BY AUTOMATED COUNT: 14.9 % (ref 12.3–15.4)
GLUCOSE BLDC GLUCOMTR-MCNC: 101 MG/DL (ref 70–130)
GLUCOSE BLDC GLUCOMTR-MCNC: 145 MG/DL (ref 70–130)
GLUCOSE BLDC GLUCOMTR-MCNC: 194 MG/DL (ref 70–130)
GLUCOSE BLDC GLUCOMTR-MCNC: 196 MG/DL (ref 70–130)
GLUCOSE SERPL-MCNC: 235 MG/DL (ref 65–99)
HCT VFR BLD AUTO: 35.9 % (ref 34–46.6)
HGB BLD-MCNC: 12 G/DL (ref 12–15.9)
MCH RBC QN AUTO: 30.8 PG (ref 26.6–33)
MCHC RBC AUTO-ENTMCNC: 33.4 G/DL (ref 31.5–35.7)
MCV RBC AUTO: 92.3 FL (ref 79–97)
PLATELET # BLD AUTO: 168 10*3/MM3 (ref 140–450)
PMV BLD AUTO: 9 FL (ref 6–12)
POTASSIUM SERPL-SCNC: 4.2 MMOL/L (ref 3.5–5.2)
QT INTERVAL: 516 MS
QTC INTERVAL: 416 MS
RBC # BLD AUTO: 3.89 10*6/MM3 (ref 3.77–5.28)
SODIUM SERPL-SCNC: 137 MMOL/L (ref 136–145)
WBC NRBC COR # BLD AUTO: 9.36 10*3/MM3 (ref 3.4–10.8)

## 2023-12-25 PROCEDURE — 25010000002 CYANOCOBALAMIN PER 1000 MCG: Performed by: PSYCHIATRY & NEUROLOGY

## 2023-12-25 PROCEDURE — 63710000001 INSULIN LISPRO (HUMAN) PER 5 UNITS: Performed by: INTERNAL MEDICINE

## 2023-12-25 PROCEDURE — 80048 BASIC METABOLIC PNL TOTAL CA: CPT | Performed by: HOSPITALIST

## 2023-12-25 PROCEDURE — 85027 COMPLETE CBC AUTOMATED: CPT | Performed by: HOSPITALIST

## 2023-12-25 PROCEDURE — 82948 REAGENT STRIP/BLOOD GLUCOSE: CPT

## 2023-12-25 RX ADMIN — ATORVASTATIN CALCIUM 40 MG: 20 TABLET, FILM COATED ORAL at 21:03

## 2023-12-25 RX ADMIN — Medication 10 ML: at 21:03

## 2023-12-25 RX ADMIN — GABAPENTIN 400 MG: 400 CAPSULE ORAL at 05:43

## 2023-12-25 RX ADMIN — VENLAFAXINE HYDROCHLORIDE 150 MG: 150 CAPSULE, EXTENDED RELEASE ORAL at 09:08

## 2023-12-25 RX ADMIN — ASPIRIN 325 MG: 325 TABLET ORAL at 09:08

## 2023-12-25 RX ADMIN — OXYBUTYNIN CHLORIDE 10 MG: 10 TABLET, EXTENDED RELEASE ORAL at 09:08

## 2023-12-25 RX ADMIN — GABAPENTIN 400 MG: 400 CAPSULE ORAL at 14:48

## 2023-12-25 RX ADMIN — INSULIN LISPRO 2 UNITS: 100 INJECTION, SOLUTION INTRAVENOUS; SUBCUTANEOUS at 22:09

## 2023-12-25 RX ADMIN — CHOLECALCIFEROL TAB 10 MCG (400 UNIT) 400 UNITS: 10 TAB at 09:08

## 2023-12-25 RX ADMIN — LEVOTHYROXINE SODIUM 100 MCG: 100 TABLET ORAL at 05:43

## 2023-12-25 RX ADMIN — GABAPENTIN 400 MG: 400 CAPSULE ORAL at 21:03

## 2023-12-25 RX ADMIN — CYANOCOBALAMIN 1000 MCG: 1000 INJECTION, SOLUTION INTRAMUSCULAR; SUBCUTANEOUS at 09:08

## 2023-12-25 RX ADMIN — INSULIN LISPRO 2 UNITS: 100 INJECTION, SOLUTION INTRAVENOUS; SUBCUTANEOUS at 09:08

## 2023-12-25 RX ADMIN — BUPROPION HYDROCHLORIDE 300 MG: 300 TABLET, EXTENDED RELEASE ORAL at 09:08

## 2023-12-25 NOTE — PLAN OF CARE
Goal Outcome Evaluation:      VSS throughout shift. Pt up to chair for most of shift. Pt no complaints at this time, awaiting room at [facility].

## 2023-12-25 NOTE — PLAN OF CARE
Goal Outcome Evaluation:  Plan of Care Reviewed With: patient        Progress: improving  Outcome Evaluation: Patient alert and oriented, forgetful at times. Denies any pain this shift, voiding per purewick. Patient to discharge to [facility] [place] by [facility] rehab today.

## 2023-12-25 NOTE — PROGRESS NOTES
Name: Rekha Bear ADMIT: 2023   : 1940  PCP: Eben Porter MD    MRN: [id number] LOS: 4 days   AGE/SEX: 83 y.o. female  ROOM: [facility]     Subjective   Subjective   Patient is lying on the bed and is in no major distress.  Denies numbness, tingling, one-sided weakness and continues to have visual field defect on the right side.     Objective   Objective   Vital Signs  Temp:  [97.4 °F (36.3 °C)-100.2 °F (37.9 °C)] 98.4 °F (36.9 °C)  Heart Rate:  [53-67] 61  Resp:  [15-18] 18  BP: (127-154)/(67-92) 127/78  SpO2:  [98 %-100 %] 100 %  on   ;   Device (Oxygen Therapy): room air  Body mass index is 29.76 kg/m².    Physical Exam  Constitutional:       General: She is not in acute distress.     Appearance: Normal appearance. She is not toxic-appearing.   HENT:      Head: Normocephalic and atraumatic.      Mouth/Throat:      Mouth: Mucous membranes are moist.   Eyes:      Extraocular Movements: Extraocular movements intact.   Cardiovascular:      Rate and Rhythm: Normal rate and regular rhythm.   Pulmonary:      Effort: Pulmonary effort is normal. No respiratory distress.      Breath sounds: Normal breath sounds. No wheezing or rhonchi.   Abdominal:      General: Bowel sounds are normal.      Palpations: Abdomen is soft.      Tenderness: There is no abdominal tenderness. There is no guarding or rebound.   Musculoskeletal:         General: No swelling.      Cervical back: Normal range of motion and neck supple.      Right lower leg: No edema.      Left lower leg: No edema.   Skin:     General: Skin is warm and dry.   Neurological:      General: No focal deficit present.      Mental Status: She is alert and oriented to person, place, and time.      Comments: right visual field deficit.    Psychiatric:         Mood and Affect: Mood normal.         Behavior: Behavior normal.        1a. Level of Consciousness: 0-->Alert, keenly responsive  1b. LOC Questions: 0-->Answers both questions correctly  1c.  LOC Commands: 0-->Performs both tasks correctly  2. Best Gaze: 0-->Normal  3. Visual: 1-->Partial hemianopia  4. Facial Palsy: 0-->Normal symmetrical movements  5a. Motor Arm, Left: 0-->No drift, limb holds 90 (or 45) degrees for full 10 secs  5b. Motor Arm, Right: 0-->No drift, limb holds 90 (or 45) degrees for full 10 secs  6a. Motor Leg, Left: 0-->No drift, leg holds 30 degree position for full 5 secs  6b. Motor Leg, Right: 0-->No drift, leg holds 30 degree position for full 5 secs  7. Limb Ataxia: 0-->Absent  8. Sensory: 1-->Mild-to-moderate sensory loss, patient feels pinprick is less sharp or is dull on the affected side, or there is a loss of superficial pain with pinprick, but patient is aware of being touched  9. Best Language: 0-->No aphasia, normal  10. Dysarthria: 0-->Normal  11. Extinction and Inattention (formerly Neglect): 0-->No abnormality    Total (NIH Stroke Scale): 2    Results Review  I reviewed the patient's new clinical results.  Results from last 7 days   Lab Units 12/25/23  0558 12/24/23  0726 12/23/23  0635 12/22/23  0647   WBC 10*3/mm3 9.36 9.76 9.52 7.96   HEMOGLOBIN g/dL 12.0 11.9* 12.8 12.0   PLATELETS 10*3/mm3 168 167 175 181     Results from last 7 days   Lab Units 12/25/23  0558 12/24/23  0726 12/23/23  0635 12/22/23  1128   SODIUM mmol/L 137 138 135* 137   POTASSIUM mmol/L 4.2 5.2 4.0 4.5   CHLORIDE mmol/L 104 104 102 102   CO2 mmol/L 22.0 24.9 24.0 22.0   BUN mg/dL 21 26* 20 17   CREATININE mg/dL 1.36* 1.39* 1.18* 1.27*   GLUCOSE mg/dL 235* 143* 137* 138*     Lab Results   Component Value Date    ANIONGAP 11.0 12/25/2023     Estimated Creatinine Clearance: 36.6 mL/min (A) (by C-G formula based on SCr of 1.36 mg/dL (H)).   Lab Results   Component Value Date    EGFR 39.2 (L) 12/25/2023     Results from last 7 days   Lab Units 12/22/23  0647 12/21/23  1531   ALBUMIN g/dL 3.5 4.2   BILIRUBIN mg/dL 1.2 1.2   ALK PHOS U/L 70 86   AST (SGOT) U/L 16 20   ALT (SGPT) U/L 8 11     Results  from last 7 days   Lab Units 12/25/23  0558 12/24/23  0726 12/23/23  0635 12/22/23  1128 12/22/23  0647 12/21/23  1531   CALCIUM mg/dL 8.5* 8.8 8.7 8.7 8.7 9.4   ALBUMIN g/dL  --   --   --   --  3.5 4.2   MAGNESIUM mg/dL  --  2.1  --   --   --  2.0     Results from last 7 days   Lab Units 12/21/23  1609   LACTATE mmol/L 1.7     Glucose   Date/Time Value Ref Range Status   12/25/2023 1122 101 70 - 130 mg/dL Final   12/25/2023 0752 194 (H) 70 - 130 mg/dL Final   12/24/2023 2140 159 (H) 70 - 130 mg/dL Final   12/24/2023 1741 163 (H) 70 - 130 mg/dL Final   12/24/2023 1209 237 (H) 70 - 130 mg/dL Final   12/24/2023 0601 123 70 - 130 mg/dL Final   12/23/2023 2113 153 (H) 70 - 130 mg/dL Final       No radiology results for the last day    Scheduled Meds  aspirin, 325 mg, Oral, Daily   Or  aspirin, 300 mg, Rectal, Daily  atorvastatin, 40 mg, Oral, Nightly  buPROPion XL, 300 mg, Oral, QAM  cholecalciferol, 400 Units, Oral, Daily  cyanocobalamin, 1,000 mcg, Subcutaneous, Daily   Followed by  [START ON 12/29/2023] vitamin B-12, 1,000 mcg, Oral, Daily  gabapentin, 400 mg, Oral, Q8H  insulin lispro, 2-7 Units, Subcutaneous, 4x Daily AC & at Bedtime  levothyroxine, 100 mcg, Oral, Q AM  oxybutynin XL, 10 mg, Oral, Daily  venlafaxine XR, 150 mg, Oral, Daily    Continuous Infusions  sodium chloride, 75 mL/hr, Last Rate: 75 mL/hr (12/23/23 1447)    PRN Meds    acetaminophen **OR** acetaminophen    bisacodyl    dextrose    dextrose    glucagon (human recombinant)    ondansetron    sodium chloride    sodium chloride, 75 mL/hr, Last Rate: 75 mL/hr (12/23/23 1447)    Diet  Diet: Regular/House Diet; Texture: Regular Texture (IDDSI 7); Fluid Consistency: Thin (IDDSI 0)    I have personally reviewed:  [x]  Medications  [x]  Laboratory   []  Microbiology   []  Radiology   [x]  EKG/Telemetry sr  []  Cardiology/Vascular   []  Pathology   []  Records     Results for orders placed during the hospital encounter of 12/21/23    Adult transthoracic  echo complete    Interpretation Summary    Left ventricular systolic function is normal. Calculated left ventricular EF = 59.6%    Left ventricular wall thickness is consistent with borderline concentric hypertrophy.    Left ventricular diastolic function was normal.    Left atrial volume is moderately increased.    Saline test results are negative.    There is calcification of the aortic valve.    Estimated right ventricular systolic pressure from tricuspid regurgitation is mildly elevated (35-45 mmHg). Calculated right ventricular systolic pressure from tricuspid regurgitation is 38 mmHg.    Mild pulmonary hypertension is present.     Results for orders placed during the hospital encounter of 12/21/23    Duplex Carotid Ultrasound CAR    Interpretation Summary    Right internal carotid artery demonstrates a less than 50% stenosis.    Left internal carotid artery demonstrates a 50-69% stenosis.        Assessment/Plan     Active Hospital Problems    Diagnosis  POA    **Acute left PCA stroke [I63.532]  Yes    B12 deficiency [E53.8]  Unknown    Bradycardia [R00.1]  Unknown    Premature atrial contractions [I49.1]  Unknown    Hyperlipemia [E78.5]  Yes    Hypothyroidism [E03.9]  Yes    Hypertension [I10]  Yes    Type 2 diabetes mellitus with stage 3 chronic kidney disease, without long-term current use of insulin [E11.22, N18.30]  Yes      Resolved Hospital Problems   No resolved problems to display.     83 y.o. female found on floor. Family reported recent confusion    Acute left PCA stroke, embolic  Right homonymous hemianopsia  Bilateral carotid stenosis  Aspirin, statin.  Cardiology suspects intermittent A-fib and defer anticoagulation to cardiology.  Echocardiogram and duplex above  Neurochecks, NIHSS  PT/OT did evaluate and recommended rehab  Patient knows no driving d/t vision deficit (discussed again today)    DM2 with CKD 3  Correctional insulin and control is acceptable  A1c 9.10%  Monitoring renal  function    HTN  Bradycardia   Bradycardia resolved off metoprolol permanently    Hypothyroidism  Probable noncompliant with medication  Levothyroxine  Family is concerned that she is not compliant probably contributing to bradycardia. Family states multiple pill bottles with various levels of medication at patient's house    B12 deficiency  On replacement now  Family member states has some memory impairment a little worse in the hospital  Mental status at baseline today    DVT prophylaxis  SCDs    Discharge  To rehab once a bed is available  Discussed with patient, family, and nursing staff ( Edgar, daughter)     Copy text on this note has been reviewed by me on 12/25/2023    Santos Brizuela MD  [facility]  12/25/23

## 2023-12-26 ENCOUNTER — HOSPITAL ENCOUNTER (INPATIENT)
Facility: HOSPITAL | Age: 83
DRG: 066 | End: 2023-12-26
Attending: PHYSICAL MEDICINE & REHABILITATION | Admitting: PHYSICAL MEDICINE & REHABILITATION
Payer: MEDICARE

## 2023-12-26 VITALS
HEIGHT: 67 IN | BODY MASS INDEX: 29.82 KG/M2 | WEIGHT: 190 LBS | OXYGEN SATURATION: 96 % | HEART RATE: 59 BPM | RESPIRATION RATE: 19 BRPM | TEMPERATURE: 97.7 F | DIASTOLIC BLOOD PRESSURE: 81 MMHG | SYSTOLIC BLOOD PRESSURE: 148 MMHG

## 2023-12-26 DIAGNOSIS — N39.41 URGE INCONTINENCE OF URINE: ICD-10-CM

## 2023-12-26 DIAGNOSIS — E03.9 ACQUIRED HYPOTHYROIDISM: ICD-10-CM

## 2023-12-26 DIAGNOSIS — M15.8 OTHER OSTEOARTHRITIS INVOLVING MULTIPLE JOINTS: Primary | ICD-10-CM

## 2023-12-26 PROBLEM — I63.9 STROKE: Status: ACTIVE | Noted: 2023-12-26

## 2023-12-26 LAB
ANION GAP SERPL CALCULATED.3IONS-SCNC: 9.9 MMOL/L (ref 5–15)
BASOPHILS # BLD AUTO: 0.02 10*3/MM3 (ref 0–0.2)
BASOPHILS NFR BLD AUTO: 0.3 % (ref 0–1.5)
BUN SERPL-MCNC: 17 MG/DL (ref 8–23)
BUN/CREAT SERPL: 15.3 (ref 7–25)
CALCIUM SPEC-SCNC: 8.9 MG/DL (ref 8.6–10.5)
CHLORIDE SERPL-SCNC: 104 MMOL/L (ref 98–107)
CO2 SERPL-SCNC: 23.1 MMOL/L (ref 22–29)
CREAT SERPL-MCNC: 1.11 MG/DL (ref 0.57–1)
DEPRECATED RDW RBC AUTO: 52.9 FL (ref 37–54)
EGFRCR SERPLBLD CKD-EPI 2021: 50 ML/MIN/1.73
EOSINOPHIL # BLD AUTO: 0.19 10*3/MM3 (ref 0–0.4)
EOSINOPHIL NFR BLD AUTO: 2.5 % (ref 0.3–6.2)
ERYTHROCYTE [DISTWIDTH] IN BLOOD BY AUTOMATED COUNT: 15.4 % (ref 12.3–15.4)
GLUCOSE BLDC GLUCOMTR-MCNC: 120 MG/DL (ref 70–130)
GLUCOSE BLDC GLUCOMTR-MCNC: 185 MG/DL (ref 70–130)
GLUCOSE BLDC GLUCOMTR-MCNC: 361 MG/DL (ref 70–130)
GLUCOSE SERPL-MCNC: 136 MG/DL (ref 65–99)
HCT VFR BLD AUTO: 37.1 % (ref 34–46.6)
HGB BLD-MCNC: 12.4 G/DL (ref 12–15.9)
IMM GRANULOCYTES # BLD AUTO: 0.01 10*3/MM3 (ref 0–0.05)
IMM GRANULOCYTES NFR BLD AUTO: 0.1 % (ref 0–0.5)
LYMPHOCYTES # BLD AUTO: 1.89 10*3/MM3 (ref 0.7–3.1)
LYMPHOCYTES NFR BLD AUTO: 24.7 % (ref 19.6–45.3)
MCH RBC QN AUTO: 30.9 PG (ref 26.6–33)
MCHC RBC AUTO-ENTMCNC: 33.4 G/DL (ref 31.5–35.7)
MCV RBC AUTO: 92.5 FL (ref 79–97)
MONOCYTES # BLD AUTO: 0.8 10*3/MM3 (ref 0.1–0.9)
MONOCYTES NFR BLD AUTO: 10.4 % (ref 5–12)
NEUTROPHILS NFR BLD AUTO: 4.75 10*3/MM3 (ref 1.7–7)
NEUTROPHILS NFR BLD AUTO: 62 % (ref 42.7–76)
NRBC BLD AUTO-RTO: 0 /100 WBC (ref 0–0.2)
PLATELET # BLD AUTO: 175 10*3/MM3 (ref 140–450)
PMV BLD AUTO: 9 FL (ref 6–12)
POTASSIUM SERPL-SCNC: 4.1 MMOL/L (ref 3.5–5.2)
RBC # BLD AUTO: 4.01 10*6/MM3 (ref 3.77–5.28)
SODIUM SERPL-SCNC: 137 MMOL/L (ref 136–145)
WBC NRBC COR # BLD AUTO: 7.66 10*3/MM3 (ref 3.4–10.8)

## 2023-12-26 PROCEDURE — 63710000001 INSULIN LISPRO (HUMAN) PER 5 UNITS: Performed by: INTERNAL MEDICINE

## 2023-12-26 PROCEDURE — 99232 SBSQ HOSP IP/OBS MODERATE 35: CPT | Performed by: STUDENT IN AN ORGANIZED HEALTH CARE EDUCATION/TRAINING PROGRAM

## 2023-12-26 PROCEDURE — 82948 REAGENT STRIP/BLOOD GLUCOSE: CPT

## 2023-12-26 PROCEDURE — 25010000002 CYANOCOBALAMIN PER 1000 MCG: Performed by: PSYCHIATRY & NEUROLOGY

## 2023-12-26 PROCEDURE — 85025 COMPLETE CBC W/AUTO DIFF WBC: CPT | Performed by: INTERNAL MEDICINE

## 2023-12-26 PROCEDURE — 97110 THERAPEUTIC EXERCISES: CPT

## 2023-12-26 PROCEDURE — 80048 BASIC METABOLIC PNL TOTAL CA: CPT | Performed by: HOSPITALIST

## 2023-12-26 RX ORDER — CYANOCOBALAMIN 1000 UG/ML
1000 INJECTION, SOLUTION INTRAMUSCULAR; SUBCUTANEOUS DAILY
Status: CANCELLED | OUTPATIENT
Start: 2023-12-27 | End: 2023-12-29

## 2023-12-26 RX ORDER — GABAPENTIN 400 MG/1
400 CAPSULE ORAL EVERY 8 HOURS SCHEDULED
Status: CANCELLED | OUTPATIENT
Start: 2023-12-26

## 2023-12-26 RX ORDER — OMEGA-3S/DHA/EPA/FISH OIL/D3 300MG-1000
400 CAPSULE ORAL DAILY
Status: DISCONTINUED | OUTPATIENT
Start: 2023-12-27 | End: 2023-12-28

## 2023-12-26 RX ORDER — OXYBUTYNIN CHLORIDE 10 MG/1
10 TABLET, EXTENDED RELEASE ORAL DAILY
Status: CANCELLED | OUTPATIENT
Start: 2023-12-27

## 2023-12-26 RX ORDER — CHOLECALCIFEROL (VITAMIN D3) 125 MCG
1000 CAPSULE ORAL DAILY
Status: CANCELLED | OUTPATIENT
Start: 2023-12-29

## 2023-12-26 RX ORDER — GABAPENTIN 400 MG/1
400 CAPSULE ORAL EVERY 8 HOURS SCHEDULED
Status: DISPENSED | OUTPATIENT
Start: 2023-12-26

## 2023-12-26 RX ORDER — ACETAMINOPHEN 650 MG/1
650 SUPPOSITORY RECTAL EVERY 4 HOURS PRN
Status: CANCELLED | OUTPATIENT
Start: 2023-12-26

## 2023-12-26 RX ORDER — UREA 10 %
1 LOTION (ML) TOPICAL NIGHTLY PRN
Status: ACTIVE | OUTPATIENT
Start: 2023-12-26

## 2023-12-26 RX ORDER — IBUPROFEN 600 MG/1
1 TABLET ORAL
Status: CANCELLED | OUTPATIENT
Start: 2023-12-26

## 2023-12-26 RX ORDER — INSULIN LISPRO 100 [IU]/ML
2-7 INJECTION, SOLUTION INTRAVENOUS; SUBCUTANEOUS
Status: CANCELLED | OUTPATIENT
Start: 2023-12-26

## 2023-12-26 RX ORDER — LEVOTHYROXINE SODIUM 0.1 MG/1
100 TABLET ORAL
Status: DISPENSED | OUTPATIENT
Start: 2023-12-27

## 2023-12-26 RX ORDER — ACETAMINOPHEN 325 MG/1
650 TABLET ORAL EVERY 4 HOURS PRN
Status: ACTIVE | OUTPATIENT
Start: 2023-12-26

## 2023-12-26 RX ORDER — NICOTINE POLACRILEX 4 MG
15 LOZENGE BUCCAL
Status: ACTIVE | OUTPATIENT
Start: 2023-12-26

## 2023-12-26 RX ORDER — BUPROPION HYDROCHLORIDE 150 MG/1
150 TABLET ORAL EVERY MORNING
Status: DISPENSED | OUTPATIENT
Start: 2023-12-27

## 2023-12-26 RX ORDER — INSULIN LISPRO 100 [IU]/ML
2-7 INJECTION, SOLUTION INTRAVENOUS; SUBCUTANEOUS
Status: DISPENSED | OUTPATIENT
Start: 2023-12-26

## 2023-12-26 RX ORDER — NICOTINE POLACRILEX 4 MG
15 LOZENGE BUCCAL
Status: CANCELLED | OUTPATIENT
Start: 2023-12-26

## 2023-12-26 RX ORDER — ONDANSETRON 4 MG/1
4 TABLET, FILM COATED ORAL EVERY 6 HOURS PRN
Status: ACTIVE | OUTPATIENT
Start: 2023-12-26

## 2023-12-26 RX ORDER — IBUPROFEN 600 MG/1
1 TABLET ORAL
Status: ACTIVE | OUTPATIENT
Start: 2023-12-26

## 2023-12-26 RX ORDER — CYANOCOBALAMIN 1000 UG/ML
1000 INJECTION, SOLUTION INTRAMUSCULAR; SUBCUTANEOUS DAILY
Status: COMPLETED | OUTPATIENT
Start: 2023-12-27 | End: 2023-12-28

## 2023-12-26 RX ORDER — BISACODYL 10 MG
10 SUPPOSITORY, RECTAL RECTAL DAILY PRN
Status: CANCELLED | OUTPATIENT
Start: 2023-12-26

## 2023-12-26 RX ORDER — BISACODYL 10 MG
10 SUPPOSITORY, RECTAL RECTAL DAILY PRN
Status: ACTIVE | OUTPATIENT
Start: 2023-12-26

## 2023-12-26 RX ORDER — ACETAMINOPHEN 325 MG/1
650 TABLET ORAL EVERY 4 HOURS PRN
Status: CANCELLED | OUTPATIENT
Start: 2023-12-26

## 2023-12-26 RX ORDER — LEVOTHYROXINE SODIUM 0.1 MG/1
100 TABLET ORAL
Status: CANCELLED | OUTPATIENT
Start: 2023-12-27

## 2023-12-26 RX ORDER — BUPROPION HYDROCHLORIDE 150 MG/1
150 TABLET ORAL EVERY MORNING
Status: CANCELLED | OUTPATIENT
Start: 2023-12-27

## 2023-12-26 RX ORDER — CHOLECALCIFEROL (VITAMIN D3) 125 MCG
1000 CAPSULE ORAL DAILY
Status: DISPENSED | OUTPATIENT
Start: 2023-12-29

## 2023-12-26 RX ORDER — VENLAFAXINE HYDROCHLORIDE 150 MG/1
150 CAPSULE, EXTENDED RELEASE ORAL DAILY
Status: CANCELLED | OUTPATIENT
Start: 2023-12-27

## 2023-12-26 RX ORDER — ASPIRIN 300 MG/1
300 SUPPOSITORY RECTAL DAILY
Status: CANCELLED | OUTPATIENT
Start: 2023-12-27

## 2023-12-26 RX ORDER — ONDANSETRON 2 MG/ML
4 INJECTION INTRAMUSCULAR; INTRAVENOUS EVERY 6 HOURS PRN
Status: CANCELLED | OUTPATIENT
Start: 2023-12-26

## 2023-12-26 RX ORDER — ASPIRIN 300 MG/1
300 SUPPOSITORY RECTAL DAILY
Status: DISCONTINUED | OUTPATIENT
Start: 2023-12-27 | End: 2023-12-27

## 2023-12-26 RX ORDER — OMEGA-3S/DHA/EPA/FISH OIL/D3 300MG-1000
400 CAPSULE ORAL DAILY
Status: CANCELLED | OUTPATIENT
Start: 2023-12-27

## 2023-12-26 RX ORDER — ASPIRIN 325 MG
325 TABLET ORAL DAILY
Status: CANCELLED | OUTPATIENT
Start: 2023-12-27

## 2023-12-26 RX ORDER — VENLAFAXINE HYDROCHLORIDE 150 MG/1
150 CAPSULE, EXTENDED RELEASE ORAL DAILY
Status: DISPENSED | OUTPATIENT
Start: 2023-12-27

## 2023-12-26 RX ORDER — ACETAMINOPHEN 650 MG/1
650 SUPPOSITORY RECTAL EVERY 4 HOURS PRN
Status: ACTIVE | OUTPATIENT
Start: 2023-12-26

## 2023-12-26 RX ORDER — ASPIRIN 325 MG
325 TABLET ORAL DAILY
Status: DISPENSED | OUTPATIENT
Start: 2023-12-27

## 2023-12-26 RX ORDER — ATORVASTATIN CALCIUM 20 MG/1
40 TABLET, FILM COATED ORAL NIGHTLY
Status: CANCELLED | OUTPATIENT
Start: 2023-12-26

## 2023-12-26 RX ORDER — OXYBUTYNIN CHLORIDE 10 MG/1
10 TABLET, EXTENDED RELEASE ORAL DAILY
Status: DISPENSED | OUTPATIENT
Start: 2023-12-27

## 2023-12-26 RX ORDER — ATORVASTATIN CALCIUM 20 MG/1
40 TABLET, FILM COATED ORAL NIGHTLY
Status: DISPENSED | OUTPATIENT
Start: 2023-12-26

## 2023-12-26 RX ADMIN — OXYBUTYNIN CHLORIDE 10 MG: 10 TABLET, EXTENDED RELEASE ORAL at 09:07

## 2023-12-26 RX ADMIN — ASPIRIN 325 MG: 325 TABLET ORAL at 09:07

## 2023-12-26 RX ADMIN — INSULIN LISPRO 6 UNITS: 100 INJECTION, SOLUTION INTRAVENOUS; SUBCUTANEOUS at 11:15

## 2023-12-26 RX ADMIN — CHOLECALCIFEROL TAB 10 MCG (400 UNIT) 400 UNITS: 10 TAB at 09:07

## 2023-12-26 RX ADMIN — GABAPENTIN 400 MG: 400 CAPSULE ORAL at 14:32

## 2023-12-26 RX ADMIN — LEVOTHYROXINE SODIUM 100 MCG: 100 TABLET ORAL at 06:23

## 2023-12-26 RX ADMIN — GABAPENTIN 400 MG: 400 CAPSULE ORAL at 06:24

## 2023-12-26 RX ADMIN — INSULIN LISPRO 2 UNITS: 100 INJECTION, SOLUTION INTRAVENOUS; SUBCUTANEOUS at 23:03

## 2023-12-26 RX ADMIN — GABAPENTIN 400 MG: 400 CAPSULE ORAL at 23:03

## 2023-12-26 RX ADMIN — BUPROPION HYDROCHLORIDE 300 MG: 300 TABLET, EXTENDED RELEASE ORAL at 06:23

## 2023-12-26 RX ADMIN — ATORVASTATIN CALCIUM 40 MG: 20 TABLET, FILM COATED ORAL at 23:03

## 2023-12-26 RX ADMIN — VENLAFAXINE HYDROCHLORIDE 150 MG: 150 CAPSULE, EXTENDED RELEASE ORAL at 09:07

## 2023-12-26 RX ADMIN — CYANOCOBALAMIN 1000 MCG: 1000 INJECTION, SOLUTION INTRAMUSCULAR; SUBCUTANEOUS at 09:07

## 2023-12-26 NOTE — DISCHARGE SUMMARY
Patient Name: Rekha Bear  : 1940  MRN: [id number]    Date of Admission: 2023  Date of Discharge:  2023  Primary Care Physician: Eben Porter MD      Chief Complaint:   Weakness - Generalized      Discharge Diagnoses     Active Hospital Problems    Diagnosis  POA    **Acute left PCA stroke [I63.532]  Yes    B12 deficiency [E53.8]  Unknown    Bradycardia [R00.1]  Unknown    Premature atrial contractions [I49.1]  Unknown    Hyperlipemia [E78.5]  Yes    Hypothyroidism [E03.9]  Yes    Hypertension [I10]  Yes    Type 2 diabetes mellitus with stage 3 chronic kidney disease, without long-term current use of insulin [E11.22, N18.30]  Yes      Resolved Hospital Problems   No resolved problems to display.        Hospital Course   83 year old female who was brought in from home where she was found on the ground; she was able to crawl to the door and ems was called; she is not sure how she ended up on the ground; she is not able to give any history.       Acute left PCA stroke, embolic  Right homonymous hemianopsia  Bilateral carotid stenosis  Aspirin, statin.  Cardiology suspects intermittent A-fib and defer anticoagulation to cardiology.  Echocardiogram and duplex above  Patient does not have any focal findings on exam  PT/OT did evaluate and recommended rehab when the bed is available today  Patient knows no driving d/t vision deficit (discussed  today)  Follow-up with neurology and cardiology as an outpatient basis     DM2 with CKD 3  Correctional insulin and control is acceptable  A1c 9.10%  Monitoring renal function     HTN  Bradycardia   Bradycardia resolved off metoprolol permanently     Hypothyroidism  Probable noncompliant with medication  Levothyroxine     B12 deficiency  On replacement now  Family member states has some memory impairment a little worse in the hospital  Mental status at baseline today    Copied text on this note has been reviewed by me on 2023    Day of  Discharge         Physical Exam:  Temp:  [97.5 °F (36.4 °C)-98.5 °F (36.9 °C)] 97.5 °F (36.4 °C)  Heart Rate:  [53-59] 59  Resp:  [18] 18  BP: (141-196)/(71-85) 154/82  Body mass index is 29.76 kg/m².  Physical Exam  Constitutional:       General: She is not in acute distress.     Appearance: Normal appearance. She is not toxic-appearing.   HENT:      Head: Normocephalic and atraumatic.      Mouth/Throat:      Mouth: Mucous membranes are moist.   Eyes:      Extraocular Movements: Extraocular movements intact.   Cardiovascular:      Rate and Rhythm: Normal rate and regular rhythm.   Pulmonary:      Effort: Pulmonary effort is normal. No respiratory distress.      Breath sounds: Normal breath sounds. No wheezing or rhonchi.   Abdominal:      General: Bowel sounds are normal.      Palpations: Abdomen is soft.      Tenderness: There is no abdominal tenderness. There is no guarding or rebound.   Musculoskeletal:         General: No swelling.      Cervical back: Normal range of motion and neck supple.      Right lower leg: No edema.      Left lower leg: No edema.   Skin:     General: Skin is warm and dry.   Neurological:      General: No focal deficit present.      Mental Status: She is alert and oriented to person, place, and time.      Comments: right visual field deficit.    Psychiatric:         Mood and Affect: Mood normal.         Behavior: Behavior normal.         Consultants     Consult Orders (all) (From admission, onward)       Start     Ordered    12/23/23 0827  Inpatient Cardiology Consult  Once        Specialty:  Cardiology  Provider:  Steve Diop III, MD    12/23/23 0826 12/21/23 1810  Notify Stroke Coordinator  Once        Provider:  (Not yet assigned)    12/21/23 1810 12/21/23 1810  Inpatient Rehab Admission Consult  Once        Provider:  (Not yet assigned)    12/21/23 1810 12/21/23 1810  Consult to Case Management   Once        Provider:  (Not yet assigned)    12/21/23 1810     12/21/23 1810  Consult to Diabetes Educator  Once,   Status:  Canceled        Provider:  (Not yet assigned)    12/21/23 1810 12/21/23 1810  Inpatient Neurology Consult Stroke  Once        Specialty:  Neurology  Provider:  Lance Pike MD    12/21/23 1810 12/21/23 1754  [facility] (on-call MD unless specified) Details  Once        Specialty:  Hospitalist  Provider:  Ronna Sawant MD    12/21/23 1753 12/21/23 1734  Inpatient Neurology Consult Stroke  STAT        Specialty:  Neurology  Provider:  Rayo Watson MD    12/21/23 1733    Signed and Held  Inpatient Rehab Admission Consult  Once        Provider:  (Not yet assigned)    Signed and Held                  Procedures     * Surgery not found *    Imaging Results (All)       Procedure Component Value Units Date/Time    MRI Angiogram Head Without Contrast [[id number]] Collected: 12/22/23 2139     Updated: 12/22/23 2205    Narrative:      MRA OF THE HEAD WITHOUT CONTRAST ON 12/22/2023     CLINICAL HISTORY: Memory loss, confusion, patient was found down on  ground, acute stroke in the left PCA territory.     TECHNIQUE: Axial FLAIR and diffusion-weighted images were obtained of  the entire head, in addition 3D time-of-flight images were obtained of  the vertebrobasilar system and of the [place] with maximum  intensity projection reconstructions for an MRA examination.     COMPARISON: This is correlated to prior MRI of the brain earlier this  morning on 12/22/2023 at 9:17 a.m.     FINDINGS: Limited whole brain MRI images consisted of axial FLAIR and  diffusion weighted images were obtained and they are motion degraded  which limits evaluation. There is patchy and nodular T2 high signal in  periventricular extending into the subcortical white matter of the  cerebral hemispheres, consistent with moderate small vessel disease.  Reidentified is diffusion hyperintensity throughout the medial left  occipital lobe measuring 6 x 3 cm in  anterior posterior and mediolateral  dimension, compatible with acute infarct in the distribution of the  medial occipital branch of left PCA territory. There are some areas of  signal loss centrally within the infarct compatible with areas of  petechial hemorrhage, similar to MRI of the brain earlier this morning.  There is also an acute infarct left-sided splenium corpus callosum  measuring 10 mm in size, also in the left PCA territory, and there are  acute infarcts in the superior medial left thalamus measuring 10 mm in  size, 2 acute infarcts in posterior left thalamus measuring 6 x 4 mm in  size, and these are in the distribution of thalamoperforator branches  off the P1 segment of the left posterior cerebral artery. The remainder  of the brain parenchyma is normal in signal intensity. The ventricles  are normal in size. I see no mass effect, no midline shift, no  extra-axial fluid collections are identified. Time-of-flight images  through the vertebrobasilar system are motion degraded which slightly  limits evaluation. Areas of diminished flow related signal in the  proximal intracranial segment of the vertebral arteries that is likely  artifact. There is good flow signal seen in the upper cervical and  distal intracranial segments of the vertebral arteries to the  vertebrobasilar junction. Proximal basilar artery is normal in  appearance. Upper cervical and petrous segments of the internal carotid  arteries are within normal limits. Time-of-flight images through the  [place] are degraded by motion artifact. The mid and distal  basilar artery and the basilar tip is within normal limits. There is  flow signal seen in the P1 and P2 segments of the posterior cerebral  arteries and they are patent. There is motion artifact degrading  evaluation of the distal internal carotid arteries but the cavernous and  supracavernous segments are patent to the carotid termini. A1 segments  of the anterior cerebral  arteries, anterior communicating artery origin  is within normal limits. M1 segments middle cerebral arteries, middle  cerebral artery bifurcations are within normal limits.       Impression:      1. Limited whole brain MRI images consisting of just axial FLAIR and  diffusion weighted images are degraded by motion artifact, limits  evaluation, but they appear unchanged since MRI of the brain earlier  this morning on 12/22/2023 at 9:17 a.m. There is a 6 x 3 cm acute  infarct throughout the medial left occipital lobe in the left PCA  territory with patchy areas of signal loss centrally within the acutely  infarcted brain on the FLAIR and diffusion weighted images indicating  petechial hemorrhage throughout the central portion of this acute  infarct, and there is a 1 cm acute infarct in the left side of the  splenium of the corpus callosum, also in the left PCA territory, and  there are 3 small infarcts in the left thalamus in the distribution of  thalamoperforator branches off the P1 segment left posterior cerebral  artery. There is moderate small vessel disease in cerebral white matter.  The remainder of the limited whole brain MRI of the brain is normal.     2. The MRA of the head is significantly degraded by motion artifact  which significantly limits evaluation. There is limited evaluation of  the proximal intracranial segments of the vertebral arteries, but I  believe the vertebral arteries are most probably widely patent from the  C1 ring level to the vertebrobasilar junction. The basilar artery and  basilar tip is normal in appearance. There is limited evaluation of the  cavernous segments of the internal carotid arteries, distal internal  carotid arteries are normal in appearance. The P1 and P2 segments of the  posterior cerebral arteries are widely patent. The P3 branches are not  adequately assessed. M1 segments of the middle cerebral arteries in the  middle cerebral artery bifurcations, in the A1 segments  of the anterior  cerebral arteries and A2 segments are normal in appearance.     This report was finalized on 12/22/2023 10:02 PM by Dr. Edouard Curiel M.D  on Workstation: [id number]       MRI Brain Without Contrast [[id number]] Collected: 12/22/23 1105     Updated: 12/22/23 1149    Narrative:      MRI OF THE BRAIN WITHOUT CONTRAST     CLINICAL HISTORY: Acute petechial hemorrhagic infarct within the left  occipital lobe noted on yesterday's head CT.     TECHNIQUE: MRI of the brain was obtained with sagittal T1, axial T1,  axial FLAIR, axial T2, axial diffusion, and axial gradient echo images.     COMPARISON: CT head dated 12/21/2023.     FINDINGS:     There is an acute infarct within the medial aspect of the left occipital  lobe which measures up to 6.0 x 2.6 cm in greatest axial dimensions.  Petechial hemorrhagic transformation is seen within this infarct. This  is within the left PCA distribution. Additionally, there is a 1 cm focus  of acute infarction within the left lateral aspect of the splenium of  the corpus callosum also within the PCA distribution. Foci of acute  infarction are noted within the left thalamus within thalamoperforator  distribution. A focus of infarction within the pulvinar of the left  thalamus measures up to 1.5 x 0.5 cm in greatest axial dimensions and  additional foci of acute infarction are seen within the more superior  and medial aspect of the left thalamus measuring up to approximately 1.5  x 0.9 cm in greatest axial dimensions. Similar findings were seen on the  prior CT scan of the head dated 12/21/2023.     Otherwise, the ventricles, sulci, and cisterns are age-appropriate.  There are moderate changes of chronic small vessel ischemic phenomenon.  The major intracranial flow related signal voids are within normal  limits.     Mucosal thickening is appreciated within the ethmoid air cells.       Impression:         There is an acute infarct within the medial aspect left occipital  lobe  with petechial hemorrhagic transformation that is within the left PCA  distribution. This measures up to 6.0 x 2.6 cm in greatest axial  dimensions. A 1 cm focus of acute infarction is seen within the left  lateral aspect of the splenium of the corpus callosum also within the  left PCA distribution. Foci of acute infarction are noted within the  superior and medial aspect of the left thalamus measuring up to 1.5 x  0.5 cm in greatest axial dimensions and within the pulvinar of the left  thalamus measuring up to 1.5 x 0.5 cm in greatest axial dimensions.  These are within thalamoperforator distribution.     Moderate changes of chronic small vessel ischemic phenomena are  incidentally appreciated.     These findings were discussed with Dr. Watson on 12/22/2023 at  approximately 10:45 a.m.     This report was finalized on 12/22/2023 11:46 AM by Dr. Kong Trotter M.D on Workstation: [id number]       CT Head Without Contrast [[id number]] Collected: 12/21/23 1921     Updated: 12/21/23 2042    Narrative:      CT HEAD WITHOUT CONTRAST     HISTORY:  Unwitnessed fall, dementia.     COMPARISON: CT head 10/08/2023.     FINDINGS: There is an area of decreased attenuation involving the medial  aspect of the left occipital lobe consistent with an acute infarct. This  is new as compared to 10/08/2023 and measures 43 mm in AP dimension and  approximately 1.6 cm in transverse dimension anteriorly. It measures  approximately 3.4 cm in the craniocaudal dimension. There is faint  increased attenuation centrally, gyriform which may represent mild  petechial hemorrhage.     There is no evidence of hydrocephalus or midline shift. Moderate  vascular calcification is noted.       Impression:      There is an acute infarct involving the left PCA vascular  distribution measuring approximately 1.6 x 4.2 x 3.4 cm in size. There  is faint increased attenuation suggestive of petechial hemorrhage. There  is no evidence of a focal  intraparenchymal hematoma, hydrocephalus or of  midline shift. Vascular calcification and moderate small vessel ischemic  disease is noted. The above information was called to and discussed with  Dr. Ortiz.         Radiation dose reduction techniques were utilized, including automated  exposure control and exposure modulation based on body size.        This report was finalized on 12/21/2023 8:39 PM by Dr. George Whitehead M.D on Workstation: [id number]       XR Hips Bilateral With or Without Pelvis 2 View [[id number]] Collected: 12/21/23 1954     Updated: 12/21/23 2001    Narrative:      BILATERAL HIPS     HISTORY: Fall, bilateral hip pain.     COMPARISON: None.     FINDINGS: A single AP view of the pelvis as well as AP and frog-leg  lateral views of the right hip and AP and frog-leg lateral views of the  left hip were obtained. Bilateral hip prostheses are appreciated with no  evidence of fracture or dislocation. There is a mild amount of stool  present within the colon distally.        This report was finalized on 12/21/2023 7:58 PM by Dr. George Whitehead M.D on Workstation: [id number]             Results for orders placed during the hospital encounter of 12/21/23    Duplex Carotid Ultrasound CAR    Interpretation Summary    Right internal carotid artery demonstrates a less than 50% stenosis.    Left internal carotid artery demonstrates a 50-69% stenosis.    Results for orders placed during the hospital encounter of 12/21/23    Adult transthoracic echo complete    Interpretation Summary    Left ventricular systolic function is normal. Calculated left ventricular EF = 59.6%    Left ventricular wall thickness is consistent with borderline concentric hypertrophy.    Left ventricular diastolic function was normal.    Left atrial volume is moderately increased.    Saline test results are negative.    There is calcification of the aortic valve.    Estimated right ventricular systolic pressure from tricuspid regurgitation is  mildly elevated (35-45 mmHg). Calculated right ventricular systolic pressure from tricuspid regurgitation is 38 mmHg.    Mild pulmonary hypertension is present.    Pertinent Labs     Results from last 7 days   Lab Units 12/26/23  0602 12/25/23  0558 12/24/23  0726 12/23/23  0635   WBC 10*3/mm3 7.66 9.36 9.76 9.52   HEMOGLOBIN g/dL 12.4 12.0 11.9* 12.8   PLATELETS 10*3/mm3 175 168 167 175     Results from last 7 days   Lab Units 12/26/23  0602 12/25/23  0558 12/24/23  0726 12/23/23  0635   SODIUM mmol/L 137 137 138 135*   POTASSIUM mmol/L 4.1 4.2 5.2 4.0   CHLORIDE mmol/L 104 104 104 102   CO2 mmol/L 23.1 22.0 24.9 24.0   BUN mg/dL 17 21 26* 20   CREATININE mg/dL 1.11* 1.36* 1.39* 1.18*   GLUCOSE mg/dL 136* 235* 143* 137*   EGFR mL/min/1.73 50.0* 39.2* 38.2* 46.5*     Results from last 7 days   Lab Units 12/22/23  0647 12/21/23  1531   ALBUMIN g/dL 3.5 4.2   BILIRUBIN mg/dL 1.2 1.2   ALK PHOS U/L 70 86   AST (SGOT) U/L 16 20   ALT (SGPT) U/L 8 11     Results from last 7 days   Lab Units 12/26/23  0602 12/25/23  0558 12/24/23  0726 12/23/23  0635 12/22/23  1128 12/22/23  0647 12/21/23  1531   CALCIUM mg/dL 8.9 8.5* 8.8 8.7   < > 8.7 9.4   ALBUMIN g/dL  --   --   --   --   --  3.5 4.2   MAGNESIUM mg/dL  --   --  2.1  --   --   --  2.0    < > = values in this interval not displayed.       Results from last 7 days   Lab Units 12/21/23  1531   CK TOTAL U/L 141       Results from last 7 days   Lab Units 12/22/23  0647   CHOLESTEROL mg/dL 146   TRIGLYCERIDES mg/dL 158*   HDL CHOL mg/dL 44   LDL CHOL mg/dL 75             Test Results Pending at Discharge       Discharge Details        Discharge Medications        ASK your doctor about these medications        Instructions Start Date   Acetylcysteine capsule capsule   2 capsules, Oral, Daily      bacitracin 500 UNIT/GM ointment   0.9 g, Topical, 2 Times Daily      buPROPion  MG 24 hr tablet  Commonly known as: WELLBUTRIN XL   TAKE 1 TABLET BY MOUTH EVERY DAY IN THE  MORNING      colesevelam 625 MG tablet  Commonly known as: WELCHOL   1,875 mg, Oral      dextromethorphan polistirex ER 30 MG/5ML Suspension Extended Release oral suspension  Commonly known as: DELSYM   30 mg, Oral, 2 Times Daily PRN      empagliflozin 10 MG tablet tablet  Commonly known as: JARDIANCE   1 tablet, Daily      gabapentin 600 MG tablet  Commonly known as: NEURONTIN   TAKE ONE TABLET BY MOUTH THREE TIMES A DAY      glimepiride 2 MG tablet  Commonly known as: AMARYL   4 mg, Oral, Every Morning Before Breakfast, APPOINTMENT IS NEEDED FOR MORE REFILLS      guaiFENesin 100 MG/5ML solution oral solution  Commonly known as: ROBITUSSIN   200 mg, Oral, Every 4 Hours PRN      levothyroxine 100 MCG tablet  Commonly known as: SYNTHROID, LEVOTHROID   TAKE 1 TABLET BY MOUTH EVERY DAY      metFORMIN  MG 24 hr tablet  Commonly known as: GLUCOPHAGE-XR   500 mg, Oral, Daily With Breakfast      metoprolol succinate XL 25 MG 24 hr tablet  Commonly known as: TOPROL-XL   TAKE ONE TABLET BY MOUTH DAILY      pioglitazone 15 MG tablet  Commonly known as: ACTOS   15 mg, Oral, Daily      rosuvastatin 20 MG tablet  Commonly known as: CRESTOR   TAKE ONE TABLET BY MOUTH DAILY      tolterodine LA 4 MG 24 hr capsule  Commonly known as: DETROL LA   4 mg, Oral, Daily      Tradjenta 5 MG tablet tablet  Generic drug: linagliptin   5 mg, Oral, Daily      traMADol 50 MG tablet  Commonly known as: ULTRAM   50 mg, Oral, Every 6 Hours PRN      venlafaxine  MG 24 hr capsule  Commonly known as: EFFEXOR-XR   TAKE 1 CAPSULE BY MOUTH EVERY DAY      Vitamin D (Cholecalciferol) 10 MCG (400 UNIT) tablet  Commonly known as: CHOLECALCIFEROL   400 Units, Oral, 2 times daily               Allergies   Allergen Reactions    Shellfish-Derived Products Anaphylaxis    Amlodipine Other (See Comments)     Heart race    Iodine Rash       Discharge Disposition:        Discharge Diet:  Diet Order   Procedures    Diet: Regular/House Diet; Texture:  Regular Texture (IDDSI 7); Fluid Consistency: Thin (IDDSI 0)       Discharge Activity:       CODE STATUS:    Code Status and Medical Interventions:   Ordered at: 12/22/23 0750     Code Status (Patient has no pulse and is not breathing):    CPR (Attempt to Resuscitate)     Medical Interventions (Patient has pulse or is breathing):    Full Support       Future Appointments   Date Time Provider Department Center   2/6/2024  2:00 PM Rayo Mendoza MD [facility] None     Additional Instructions for the Follow-ups that You Need to Schedule       Ambulatory Referral to Sleep Medicine   As directed      H/o SHEY, doesn't like mask    Order Comments: H/o SHEY, doesn't like mask    Follow-up needed: Yes                Additional Instructions for the Follow-ups that You Need to Schedule       Ambulatory Referral to Sleep Medicine   As directed      H/o SHEY, doesn't like mask    Order Comments: H/o SHEY, doesn't like mask    Follow-up needed: Yes            Time Spent on Discharge:  Greater than 30 minutes      Santos Brizuela MD  [facility]  12/26/23  16:12 EST

## 2023-12-26 NOTE — PROGRESS NOTES
[facility] Acute Rehab    Pt has been accepted by Dr. Quan for Acute Rehab.     Per neuro note on 12/24, please conform start date of Eliquis (is it 12/29 or 1/29?)   If medically ready, please complete the DC/ Readmit along with a DC Summary before moving to Acute Rehab    Called [facility] to inform. Crystal will reach out to neuro to conform when to restart Eliquis.     Thank You  Bhavani GomezRN  Acute Rehab Admission Nurse      [id number] called [facility] - she is working with MD to see if ready for dc to acute rehab

## 2023-12-26 NOTE — PLAN OF CARE
Goal Outcome Evaluation:  Plan of Care Reviewed With: patient, family           Outcome Evaluation: Pt agreed to PT session, pt yuval bed to BR , then into hallway, pt amb total of ~60ft, but has R neglect, R visual field deficit and also ambulated to R side inside of rwx, min/CGA due to balance, visual deficits, L lean , plans BAR possibly later today      Anticipated Discharge Disposition (PT): inpatient rehabilitation facility

## 2023-12-26 NOTE — PLAN OF CARE
Goal Outcome Evaluation:      Progressing per careplan. No fall noted. Patient now alert and oriented, some forgetfulness noted. Working with thearpy and tolerating well. Waiting for rehab

## 2023-12-26 NOTE — THERAPY TREATMENT NOTE
"Patient Name: Rekha Bear  : 1940    MRN: [id number]                              Today's Date: 2023       Admit Date: 2023    Visit Dx:     ICD-10-CM ICD-9-CM   1. Acute ischemic left PCA stroke  I63.532 434.91   2. Generalized weakness  R53.1 780.79   3. Confusion  R41.0 298.9   4. Hypertension, unspecified type  I10 401.9   5. History of hypertension  Z86.79 V12.59   6. Acute on chronic renal insufficiency  N28.9 593.9    N18.9 585.9   7. 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    Community acquired pneumonia of left lower lobe of lung    Hyponatremia    Pneumonia due to COVID-19 virus    Major depressive disorder, recurrent, mild    Acute respiratory failure with hypoxia    Supraventricular tachycardia    PVC (premature ventricular contraction)    Dyspnea on exertion    Dizziness    Depressive disorder    Gastroesophageal reflux disease    Midline cystocele    Rectocele    BMI 29.0-29.9,adult    Acute left PCA stroke    B12 deficiency    Bradycardia    Premature atrial contractions     Past Medical History:   Diagnosis Date    Chilblains     \"Chilblian's\"    CKD (chronic kidney disease)     Depression     Fatty liver     GERD (gastroesophageal reflux disease)     Hyperlipidemia     Hypertension     Incontinence     Osteoarthritis     OA  Marvin THR, LT TKR - hydrocodone prescibed by Dr. 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 Almaguer    TOTAL KNEE ARTHROPLASTY Left     OA  Marvin THR, LT TKR - hydrocodone prescibed by Dr. Almaguer      General Information       Row Name 12/26/23 1731          Physical Therapy Time and Intention    Document Type therapy note (daily note)  -     Mode of Treatment individual therapy;physical therapy  -       Row Name 12/26/23 1731          General Information    Patient Profile Reviewed yes  -     Existing Precautions/Restrictions fall  -     Barriers to Rehab visual deficit  R visual field deficit  -       Row Name 12/26/23 1731          Living Environment    People in Home alone  -       Row Name 12/26/23 1731          Cognition    Orientation Status (Cognition) oriented x 3  -       Row Name 12/26/23 1731          Safety Issues, Functional Mobility    Safety Issues Affecting Function (Mobility) awareness of need for assistance;impulsivity  -     Impairments Affecting Function (Mobility) balance;coordination;strength;visual/perceptual  -     Comment, Safety Issues/Impairments (Mobility) R side neglect  -               User Key  (r) = Recorded By, (t) = Taken By, (c) = Cosigned By      Initials Name Provider Type    Ragini Dhillon PTA Physical Therapist Assistant                   Mobility       Row Name 12/26/23 1733          Bed Mobility    Scooting/Bridging " "[place] (Bed Mobility) minimum assist (75% patient effort);contact guard;verbal cues  -JM     Supine-Sit [place] (Bed Mobility) contact guard;minimum assist (75% patient effort);verbal cues  heavy L lean to compensate for R wkness to \"swing\" towards EOB  -     Sit-Supine [place] (Bed Mobility) contact guard;verbal cues  -     Assistive Device (Bed Mobility) bed rails;head of bed elevated  -       Row Name 12/26/23 1733          Sit-Stand Transfer    Sit-Stand [place] (Transfers) minimum assist (75% patient effort);verbal cues  -     Assistive Device (Sit-Stand Transfers) walker, front-wheeled  -       Row Name 12/26/23 1733          Gait/Stairs (Locomotion)    [place] Level (Gait) minimum assist (75% patient effort);verbal cues  -     Assistive Device (Gait) walker, front-wheeled  -     Distance in Feet (Gait) 60  -     Deviations/Abnormal Patterns (Gait) eric decreased;base of support, narrow;stride length decreased  -     Bilateral Gait Deviations forward flexed posture  -     Comment, (Gait/Stairs) ambulates to R side within rwx, NBOS, more noticeable with distance in esteban, L lean with short dist to BR  -               User Key  (r) = Recorded By, (t) = Taken By, (c) = Cosigned By      Initials Name Provider Type    Ragini Dhillon, NELI Physical Therapist Assistant                   Obj/Interventions    No documentation.                  Goals/Plan    No documentation.                  Clinical Impression       Row Name 12/26/23 1736          Pain    Pretreatment Pain Rating 0/10 - no pain  -     Posttreatment Pain Rating 0/10 - no pain  -       Row Name 12/26/23 1736          Plan of Care Review    Plan of Care Reviewed With patient;family  -     Outcome Evaluation Pt agreed to PT session, pt yuval bed to BR , then into hallway, pt amb total of ~60ft, but has R neglect, R visual field deficit and also ambulated to R side inside of rwx, min/CGA due to " balance, visual deficits, L lean , plans BAR possibly later today  -       Row Name 12/26/23 1736          Therapy Assessment/Plan (PT)    Rehab Potential (PT) good, to achieve stated therapy goals  -     Criteria for Skilled Interventions Met (PT) yes  -       Row Name 12/26/23 1736          Vital Signs    Pre SpO2 (%) 96  -JM     O2 Delivery Pre Treatment room air  -JM     Intra SpO2 (%) 98  -JM     O2 Delivery Intra Treatment room air  -JM     Post SpO2 (%) 97  -JM     O2 Delivery Post Treatment room air  -       Row Name 12/26/23 1736          Positioning and Restraints    Pre-Treatment Position in bed  -JM     Post Treatment Position bed  -JM     In Bed fowlers;call light within reach;encouraged to call for assist;exit alarm on;with family/caregiver;SCD pump applied;notified nsg  -               User Key  (r) = Recorded By, (t) = Taken By, (c) = Cosigned By      Initials Name Provider Type    Ragini Dhillon PTA Physical Therapist Assistant                   Outcome Measures       Row Name 12/26/23 1740 12/26/23 1000       How much help from another person do you currently need...    Turning from your back to your side while in flat bed without using bedrails? 4  - 4  -LC    Moving from lying on back to sitting on the side of a flat bed without bedrails? 3  - 3  -LC    Moving to and from a bed to a chair (including a wheelchair)? 3  - 3  -LC    Standing up from a chair using your arms (e.g., wheelchair, bedside chair)? 3  - 3  -LC    Climbing 3-5 steps with a railing? 1  - 2  -LC    To walk in hospital room? 3  -JM 3  -LC    AM-PAC 6 Clicks Score (PT) 17  - 18  -    Highest Level of Mobility Goal 5 --> Static standing  - 6 --> Walk 10 steps or more  -              User Key  (r) = Recorded By, (t) = Taken By, (c) = Cosigned By      Initials Name Provider Type    Ragini Dhillon PTA Physical Therapist Assistant    Keren Tian RN Registered Nurse                                  Physical Therapy Education       Title: PT OT SLP Therapies (In Progress)       Topic: Physical Therapy (Done)       Point: Mobility training (Done)       Learning Progress Summary             Patient Eager, E,TB,D, VU,NR by NOHEMI at 12/26/2023 1741    Acceptance, E, NR by DJ at 12/24/2023 0647    Acceptance, E,D, VU,NR by MS at 12/23/2023 1520    Acceptance, E,D, VU,NR by MS at 12/22/2023 1133   Family Eager, E,TB,D, VU,NR by NOHEMI at 12/26/2023 1741                         Point: Home exercise program (Done)       Learning Progress Summary             Patient Eager, E,TB,D, VU,NR by NOHEMI at 12/26/2023 1741    Acceptance, E, NR by DJ at 12/24/2023 0647    Acceptance, E,D, VU,NR by MS at 12/23/2023 1520    Acceptance, E,D, VU,NR by MS at 12/22/2023 1133   Family Eager, E,TB,D, VU,NR by NOHEMI at 12/26/2023 1741                         Point: Body mechanics (Done)       Learning Progress Summary             Patient Eager, E,TB,D, VU,NR by NOHEMI at 12/26/2023 1741    Acceptance, E, NR by DJ at 12/24/2023 0647    Acceptance, E,D, VU,NR by MS at 12/23/2023 1520    Acceptance, E,D, VU,NR by MS at 12/22/2023 1133   Family Eager, E,TB,D, VU,NR by NOHEMI at 12/26/2023 1741                         Point: Precautions (Done)       Learning Progress Summary             Patient Eager, E,TB,D, VU,NR by NOHEMI at 12/26/2023 1741    Acceptance, E, NR by DJ at 12/24/2023 0647    Acceptance, E,D, VU,NR by MS at 12/23/2023 1520    Acceptance, E,D, VU,NR by MS at 12/22/2023 1133   Family Eager, E,TB,D, VU,NR by NOHEMI at 12/26/2023 1741                                         User Key       Initials Effective Dates Name Provider Type Discipline    NOHEMI 03/07/18 -  Ragini Veronica PTA Physical Therapist Assistant PT    MS 06/16/21 -  Silvano Mayers PT Physical Therapist PT    DJ 11/29/23 -  Rosario Guerra, RN Registered Nurse Nurse                  PT Recommendation and Plan     Plan of Care Reviewed With: patient, family  Outcome Evaluation: Pt  agreed to PT session, pt yuval bed to BR , then into hallway, pt amb total of ~60ft, but has R neglect, R visual field deficit and also ambulated to R side inside of rwx, min/CGA due to balance, visual deficits, L lean , plans BAR possibly later today     Time Calculation:         PT Charges       Row Name 12/26/23 1744             Time Calculation    Start Time 1603  -      Stop Time 1627  -      Time Calculation (min) 24 min  -      PT Received On 12/26/23  -NOHEMI      PT - Next Appointment 12/27/23  -NOHEMI                User Key  (r) = Recorded By, (t) = Taken By, (c) = Cosigned By      Initials Name Provider Type    Ragini Dhillon PTA Physical Therapist Assistant                  Therapy Charges for Today       Code Description Service Date Service Provider Modifiers Qty    50076878132 HC PT THER PROC EA 15 MIN 12/26/2023 Ragini Veronica PTA GP 2            PT G-Codes  Outcome Measure Options: AM-PAC 6 Clicks Basic Mobility (PT)  AM-PAC 6 Clicks Score (PT): 17  AM-PAC 6 Clicks Score (OT): 12  Modified Daniel Scale: 4 - Moderately severe disability.  Unable to walk without assistance, and unable to attend to own bodily needs without assistance.  PT Discharge Summary  Anticipated Discharge Disposition (PT): inpatient rehabilitation facility    Ragini Veronica PTA  12/26/2023

## 2023-12-26 NOTE — PROGRESS NOTES
"    Patient Name: Rekha Bear  :1940  83 y.o.      Patient Care Team:  Eben Porter MD as PCP - General  Eben Porter MD as PCP - Family Medicine    Chief Complaint:   Stroke    Interval History:   NAEO, son at bedside.     Objective   Vital Signs  Temp:  [98.2 °F (36.8 °C)-100.2 °F (37.9 °C)] 98.2 °F (36.8 °C)  Heart Rate:  [55-61] 55  Resp:  [18] 18  BP: (127-196)/(67-78) 169/72    Intake/Output Summary (Last 24 hours) at 2023 0831  Last data filed at 2023 0348  Gross per 24 hour   Intake 440 ml   Output 700 ml   Net -260 ml     Flowsheet Rows      Flowsheet Row First Filed Value   Admission Height 170.2 cm (67\") Documented at 2023 1514   Admission Weight 86.2 kg (190 lb) Documented at 2023 1514            GEN: no distress, alert and oriented  HEENT: NACT, EOMI, moist mucous membranes  Lungs: CTAB, no wheezes, rales or rhonchi  CV: normal rate, regular rhythm, normal S1, S2, no murmurs, +2 radial pulses b/l, no carotid bruit  Abdomen: soft, nontender, nondistended, NABS  Extremities: no edema  Skin: no rash, warm, dry  Heme/Lymph: no bruising  Psych: organized thought, normal behavior and affect    Results Review:    Results from last 7 days   Lab Units 23  0602   SODIUM mmol/L 137   POTASSIUM mmol/L 4.1   CHLORIDE mmol/L 104   CO2 mmol/L 23.1   BUN mg/dL 17   CREATININE mg/dL 1.11*   GLUCOSE mg/dL 136*   CALCIUM mg/dL 8.9     Results from last 7 days   Lab Units 23  1531   CK TOTAL U/L 141     Results from last 7 days   Lab Units 23  0602   WBC 10*3/mm3 7.66   HEMOGLOBIN g/dL 12.4   HEMATOCRIT % 37.1   PLATELETS 10*3/mm3 175         Results from last 7 days   Lab Units 23  0726   MAGNESIUM mg/dL 2.1     Results from last 7 days   Lab Units 23  0647   CHOLESTEROL mg/dL 146   TRIGLYCERIDES mg/dL 158*   HDL CHOL mg/dL 44   LDL CHOL mg/dL 75               Medication Review:   aspirin, 325 mg, Oral, Daily   Or  aspirin, 300 mg, Rectal, " Daily  atorvastatin, 40 mg, Oral, Nightly  buPROPion XL, 300 mg, Oral, QAM  cholecalciferol, 400 Units, Oral, Daily  cyanocobalamin, 1,000 mcg, Subcutaneous, Daily   Followed by  [START ON 12/29/2023] vitamin B-12, 1,000 mcg, Oral, Daily  gabapentin, 400 mg, Oral, Q8H  insulin lispro, 2-7 Units, Subcutaneous, 4x Daily AC & at Bedtime  levothyroxine, 100 mcg, Oral, Q AM  oxybutynin XL, 10 mg, Oral, Daily  venlafaxine XR, 150 mg, Oral, Daily              Assessment & Plan   #L PCA stroke  #DM  #CKD  #HTN  #hypothyroidism    83-year-old woman with hypertension, diabetes, hypothyroidism, CKD who presented with visual changes, found to have left PCA stroke, concerning for cardioembolic source.  Neurology agrees to start AC for secondary prevention given presumed cardioembolic stroke.    - Per neurology, they would wait 7 days since stroke before initiation of AC, for now can continue aspirin and statin. Once stable from neurologic standpoint, can start eliquis 5mg BID    No further cardiac workup indicated at this time.  We will sign off.  Please call back with any questions or concerns.    Jesu Bunch MD, [facility]  12/26/23  08:31 EST

## 2023-12-27 LAB
GLUCOSE BLDC GLUCOMTR-MCNC: 125 MG/DL (ref 70–130)
GLUCOSE BLDC GLUCOMTR-MCNC: 134 MG/DL (ref 70–130)
GLUCOSE BLDC GLUCOMTR-MCNC: 153 MG/DL (ref 70–130)
GLUCOSE BLDC GLUCOMTR-MCNC: 166 MG/DL (ref 70–130)

## 2023-12-27 PROCEDURE — 63710000001 INSULIN LISPRO (HUMAN) PER 5 UNITS: Performed by: INTERNAL MEDICINE

## 2023-12-27 PROCEDURE — 97535 SELF CARE MNGMENT TRAINING: CPT

## 2023-12-27 PROCEDURE — 97530 THERAPEUTIC ACTIVITIES: CPT

## 2023-12-27 PROCEDURE — 82948 REAGENT STRIP/BLOOD GLUCOSE: CPT

## 2023-12-27 PROCEDURE — 97162 PT EVAL MOD COMPLEX 30 MIN: CPT

## 2023-12-27 PROCEDURE — 96125 COGNITIVE TEST BY HC PRO: CPT

## 2023-12-27 PROCEDURE — 25010000002 CYANOCOBALAMIN PER 1000 MCG: Performed by: INTERNAL MEDICINE

## 2023-12-27 PROCEDURE — 97166 OT EVAL MOD COMPLEX 45 MIN: CPT

## 2023-12-27 PROCEDURE — 97112 NEUROMUSCULAR REEDUCATION: CPT

## 2023-12-27 RX ORDER — GLIMEPIRIDE 4 MG/1
4 TABLET ORAL 2 TIMES DAILY
Status: ON HOLD | COMMUNITY

## 2023-12-27 RX ADMIN — CYANOCOBALAMIN 1000 MCG: 1000 INJECTION, SOLUTION INTRAMUSCULAR; SUBCUTANEOUS at 08:52

## 2023-12-27 RX ADMIN — LEVOTHYROXINE SODIUM 100 MCG: 100 TABLET ORAL at 05:41

## 2023-12-27 RX ADMIN — ASPIRIN 325 MG: 325 TABLET ORAL at 08:52

## 2023-12-27 RX ADMIN — GABAPENTIN 400 MG: 400 CAPSULE ORAL at 05:41

## 2023-12-27 RX ADMIN — BUPROPION HYDROCHLORIDE 150 MG: 150 TABLET, EXTENDED RELEASE ORAL at 05:41

## 2023-12-27 RX ADMIN — INSULIN LISPRO 2 UNITS: 100 INJECTION, SOLUTION INTRAVENOUS; SUBCUTANEOUS at 11:44

## 2023-12-27 RX ADMIN — INSULIN LISPRO 2 UNITS: 100 INJECTION, SOLUTION INTRAVENOUS; SUBCUTANEOUS at 20:24

## 2023-12-27 RX ADMIN — OXYBUTYNIN CHLORIDE 10 MG: 10 TABLET, EXTENDED RELEASE ORAL at 08:53

## 2023-12-27 RX ADMIN — ATORVASTATIN CALCIUM 40 MG: 20 TABLET, FILM COATED ORAL at 20:24

## 2023-12-27 RX ADMIN — GABAPENTIN 400 MG: 400 CAPSULE ORAL at 15:02

## 2023-12-27 RX ADMIN — CHOLECALCIFEROL TAB 10 MCG (400 UNIT) 400 UNITS: 10 TAB at 08:53

## 2023-12-27 RX ADMIN — VENLAFAXINE HYDROCHLORIDE 150 MG: 150 CAPSULE, EXTENDED RELEASE ORAL at 08:52

## 2023-12-27 RX ADMIN — GABAPENTIN 400 MG: 400 CAPSULE ORAL at 20:24

## 2023-12-27 NOTE — PROGRESS NOTES
Discharge Planning Assessment  [facility]     Patient Name: Rekha Bear  MRN: [id number]  Today's Date: 12/27/2023    Admit Date: 12/26/2023    Plan: Discharge plan is home with assist from adult children and grandchildren for short term. Family discussing options.   Discharge Needs Assessment    No documentation.                  Discharge Plan       Row Name 12/27/23 7698       Plan    Plan Discharge plan is home with assist from adult children and grandchildren for short term. Family discussing options.    Patient/Family in Agreement with Plan yes    Plan Comments Completed assessment with patient and with son, Jl, and Ping elliott, on phone, as they were at patient's home cleaning. Patient lives alone in one story home with 5 step entry with rails. Patient was independent at home but patient acknowledges she was not maintaining her home well. Patient also reported she has had a lot of falls. She stated she doesn't use any device in the home but takes her cane or walker with her when out. Daughter reports patient would take her cane but wasn't using it resulting in several falls. Daughter and son reported patient's home is cluttered and not well kept. They have had to clean out patient's home in past and currently trying to get it decluttered again. They report some memory issues prior to stroke, but feel memory and cogntion are worse now. Patient was driving and doing own grocery shopping. Patient was managing her own finances and medications. Son and daughter report they are sure patient was forgetting to take her medication as they have found a lot of medications in home. Patient does not have POA and daughter and son report they are not sure about patient's finances. They ackowledge need for POA. Discussed need for this with patient as well. Discussed with son and daughter need to have knowledge of her finances so they can plan for care in future. Discussed option of assisted living with both  patient and family. Patient stated she is concerned about losing her freedom and ability to drive. Patient does get meals on wheels delivered on Thursday to her home. She gets 5 frozen meals a week but family has found she has a lot in her freezer that she has not fixed. Discussed d/c plans. They are currently planning for patient to go home with family providing assistance until they can determine whether patient can return to living alone with intermittent help or if need to look at other options. They are unsure if 24/7 assistance can be provided between adult children and adult grandchildren. Discussed SW role, team and family conference. Will follow and assist with plans.                  Continued Care and Services - Admitted Since 12/26/2023    Coordination has not been started for this encounter.          Demographic Summary    No documentation.                  Functional Status       Row Name 12/27/23 1707       Functional Status    Usual Activity Tolerance moderate    Current Activity Tolerance fair    Functional Status Comments Patient was independent at home with mobility and self care tasks.       Functional Status, IADL    Medications independent    Meal Preparation independent;other (see comments)  Had frozen meals brought to her on Thursdays through Meals on Wheels program    Housekeeping independent    Laundry assistive person    Shopping independent    IADL Comments Patient was driving and doing own grocery shopping. She had frozen meals through [employer] delivered to her on Thursday for 5 day supply. Patient's daughter was doing laundry as they didn't want patient going down to basement.       Mental Status    General Appearance WDL WDL       Mental Status Summary    Recent Changes in Mental Status/Cognitive Functioning mood;memory (recent)    Mental Status Comments Family has noticed more memory impairment since stroke. Patient acknowledges mood is down and worries about being able to be  independent at home and driving.       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                   Psychosocial       Row Name 12/27/23 4966       Values/Beliefs    Spiritual, Cultural Beliefs, Temple Practices, Values that Affect Care no    Values/Beliefs Comment Denominational       Behavior WDL    Behavior WDL interactions    Interactions appropriate to situation;cooperative;eye contact appropriate       Emotion Mood WDL    Emotion/Mood/Affect WDL emotion mood    Emotion/Mood appropriate to situation       Speech WDL    Speech WDL speech    Speech other (see comments)  some word finding issues       Perceptual State WDL    Perceptual State WDL WDL       Thought Process WDL    Thought Process WDL judgment and insight    Judgment and Insight insight not appropriate to situation       Intellectual Performance WDL    Intellectual Performance WDL intellectual performance    Intellectual Performance memory deficit, recent;forgetfulness;impaired concentration       Coping/Stress    Major Change/Loss/Stressor loss of independence;medical condition/diagnosis    Patient Personal Strengths expressive of emotions;expressive of needs;motivated;positive attitude;strong support system    Sources of Support adult child(d[place]);sibling(s)    Techniques to [place] with Loss/Stress/Change medication    Reaction to Health Status motivated;uncertainty    Understanding of Condition and Treatment needs time to process;partial understanding of medical condition;partial understanding of treatment    Coping/Stress Comments Patient expressed being down about what has happened to her. She does report history of depression and takes medications at home for this prescribed by PCP.       Developmental Stage (Alphonseon's)    Developmental Stage Stage 8 (65 years-death/Late Adulthood) Integrity vs. Despair                   Abuse/Neglect    No  documentation.                  Legal       Row Name 12/27/23 2271       Financial/Legal    Source of Income pension/long term;social security    Who Manages Finances if Patient Unable Son, Jl Nguyen Comments Patient does not have POA. Discussed importance of having POA to assist her. She and adult children to discuss.                   Substance Abuse    No documentation.                  Patient Forms    No documentation.                     ROSANNE Leslie

## 2023-12-27 NOTE — PROGRESS NOTES
"Section B. Hearing, Speech, Vision: Expression of Ideas and Wants: Exhibits some  difficulty with expressing needs and ideas (e.g., some words or finishing  thoughts) or speech is not clear.  Understanding Verbal and Non-Verbal Content: Understands: Clear comprehension  without cues or repetitions.    Section C. Cognitive Patterns: Brief Interview for Mental Status (BIMS) was  conducted.  Repetition of Three Words: Three words  Able to report correct year: Correct  Able to report correct month: Accurate within 5 days  Able to report correct day of the week: Incorrect or no answer  Able to recall \"sock\": Yes, after cuing  Able to recall \"blue\": No, could not recall  Able to recall \"bed\": No, could not recall    BIMS SUMMARY SCORE: 9 Moderately impaired Patient was able to complete the Brief  Interview for Mental Status    Section C. Signs and Symptoms of Delirium (from CAM): Evidence of Acute Change  in Mental Status:   No  Inattention: Behavior not present  Thinking Disorganized or Incoherent:   Behavior not present  Altered Level of Consciousness:   Behavior not present    Signed by: Jazzmine Kendall SLP    "

## 2023-12-27 NOTE — PROGRESS NOTES
SECTION GG    Self Care Performance:   Oral Hygiene: Falcon sets up or cleans up; patient completes activity. Falcon  assists only prior to or following the activity.   Toileting Hygiene: Falcon does more than half the effort. Falcon lifts or holds  trunk or limbs and provides more than half the effort.   Shower/Bathe Self: Falcon does more than half the effort. Falcon lifts or holds  trunk or limbs and provides more than half the effort.   Upper Body Dressing: Falcon does less than half the effort. Falcon lifts, holds  or supports trunk or limbs but provides less than half the effort.   Lower Body Dressing: Falcon does more than half the effort. Falcon lifts or  holds trunk or limbs and provides more than half the effort.   Putting On/Taking Off Footwear: Falcon does more than half the effort. Falcon  lifts or holds trunk or limbs and provides more than half the effort.    Self Care Discharge Goals:   Oral Hygiene: Patient completed the activities by themself with no assistance  from a helper.   Toileting Hygiene: Falcon provides verbal cues or touching/steadying assistance  as patient completes activity.   Shower/Bathe Self: Falcon provides verbal cues or touching/steadying assistance  as patient completes activity.   Upper Body Dressing: Patient completed the activities by themself with no  assistance from a helper.   Lower Body Dressing: Falcon provides verbal cues or touching/steadying  assistance as patient completes activity.   Putting On/Taking Off Footwear: Falcon sets up or cleans up; patient completes  activity. Falcon assists only prior to or following the activity.    Mobility Toilet Transfer Performance: Falcon does less than half the effort.  Falcon lifts, holds or supports trunk or limbs but provides less than half the  effort.    Mobility Toilet Transfer Discharge Goal: Falcon provides verbal cues or  touching/steadying assistance as patient completes activity.    Signed by: Andres Estrella OT

## 2023-12-27 NOTE — CONSULTS
"Nutrition Services    Patient Name:  Rekha Bear  YOB: 1940  MRN: [id number]  Admit Date:  12/26/2023    Assessment Date:  12/27/23    Summary: Rehab Admission Assessment    Pt is a, 83 year old female admitted to acute rehab following hospitalization for PCA stroke. Pt's current diet is Consistent Carb (reg/thins). Appetite is good, with intake %. Labs and skin status reviewed, wound noted on left third toe (black eschar noted). Aic 9.1%. Deficits include cognitive & mobility impairments. No swallowing issues.     Plan/Recommend:  Continue with diet as ordered. Assist with meal set up as needed.  Monitor intake, labs, weight and skin status.   RD to follow.      CLINICAL NUTRITION ASSESSMENT      Reason for Assessment Nurse Admission Screen     Admitting Diagnosis   Stroke       Medical/Surgical History Past Medical History:   Diagnosis Date    Chilblains     \"Chilblian's\"    CKD (chronic kidney disease)     Depression     Fatty liver     GERD (gastroesophageal reflux disease)     Hyperlipidemia     Hypertension     Incontinence     Osteoarthritis     OA  Marvin THR, LT TKR - hydrocodone prescibed by Dr. 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 Almaguer        Current Nutrition Orders & Evaluation of Intake       Oral Nutrition      Food Allergies Shellfish    Current PO Diet Diet: Regular/House Diet, Diabetic Diets; Consistent Carbohydrate; Texture: Regular Texture (IDDSI 7); Fluid Consistency: Thin (IDDSI 0)    Supplement -   PO Evaluation      PO Intake % %     Factors Affecting Intake No factors at this time   --  Anthropometrics        Current Height  Current Weight (CBW)  BMI kg/m2 Height: 170.2 cm (67\")  Weight: 87.4 kg (192 lb 10.9 oz) (12/26/23 1917)  Body mass index is 30.18 kg/m².   BMI Category Obese, Class I (30 - 34.9)   Ideal Weight (IBW) 135#   Usual Weight (UBW) 190#   Weight Trend Stable   Weight History Wt Readings from Last 15 Encounters:   12/26/23 1917 87.4 kg (192 lb 10.9 oz)   12/22/23 0856 86.2 kg (190 lb)   12/21/23 1514 86.2 kg (190 lb)   10/16/23 1005 84.4 kg (186 lb)   10/08/23 1819 85.7 kg (189 lb)   06/08/22 1418 89.4 kg (197 lb)   05/10/22 1312 91.2 kg (201 lb)   05/04/22 1516 91.2 kg (201 lb)   04/26/22 1712 89.8 kg (198 lb)   04/26/22 1618 89.8 kg (198 lb)   04/21/22 1133 90 kg (198 lb 6.4 oz)   10/21/21 1525 91.5 kg (201 lb 12.8 oz)   10/14/21 1004 90.7 kg (200 lb)   06/03/21 1322 94.8 kg (209 lb)   05/14/21 1449 94.3 kg (208 lb)   04/27/21 1210 93.9 kg (207 lb)   02/02/21 1207 92.7 kg (204 lb 6.4 oz)      --  Physical Findings          General Appearance alert   Oral/Mouth Cavity dental appliance   Edema  no edema   Gastrointestinal last bowel movement: 12/26   Skin  location of wound: third toe (bottom) with black eschar   Tubes/Drains/Lines none   NFPE Not indicated at this time       Medications & Labs           Scheduled Medications aspirin, 325 mg, Oral, Daily  atorvastatin, 40 mg, Oral, Nightly  buPROPion XL, 150 mg, Oral, QAM  cholecalciferol, 400 Units, Oral, Daily  cyanocobalamin, 1,000 mcg, Subcutaneous, Daily   " Followed by  [START ON 12/29/2023] vitamin B-12, 1,000 mcg, Oral, Daily  gabapentin, 400 mg, Oral, Q8H  insulin lispro, 2-7 Units, Subcutaneous, 4x Daily AC & at Bedtime  levothyroxine, 100 mcg, Oral, Q AM  oxybutynin XL, 10 mg, Oral, Daily  venlafaxine XR, 150 mg, Oral, Daily       Infusions     PRN Medications   acetaminophen **OR** acetaminophen    bisacodyl    dextrose    glucagon (human recombinant)    melatonin    ondansetron            Pertinent Labs   Results from last 7 days   Lab Units 12/26/23  0602 12/25/23  0558 12/24/23  0726 12/22/23  1128 12/22/23  0647 12/21/23  1531   SODIUM mmol/L 137 137 138   < > 138 135*   POTASSIUM mmol/L 4.1 4.2 5.2   < > 4.7 4.9   CHLORIDE mmol/L 104 104 104   < > 103 98   CO2 mmol/L 23.1 22.0 24.9   < > 24.3 25.0   BUN mg/dL 17 21 26*   < > 17 19   CREATININE mg/dL 1.11* 1.36* 1.39*   < > 1.37* 1.59*   CALCIUM mg/dL 8.9 8.5* 8.8   < > 8.7 9.4   BILIRUBIN mg/dL  --   --   --   --  1.2 1.2   ALK PHOS U/L  --   --   --   --  70 86   ALT (SGPT) U/L  --   --   --   --  8 11   AST (SGOT) U/L  --   --   --   --  16 20   GLUCOSE mg/dL 136* 235* 143*   < > 105* 225*    < > = values in this interval not displayed.     Results from last 7 days   Lab Units 12/26/23  0602 12/25/23  0558 12/24/23  0726 12/23/23  0635 12/22/23  0647 12/21/23  1531   MAGNESIUM mg/dL  --   --  2.1  --   --  2.0   HEMOGLOBIN g/dL 12.4   < > 11.9*   < > 12.0 13.9   HEMATOCRIT % 37.1   < > 35.8   < > 37.0 43.0   WBC 10*3/mm3 7.66   < > 9.76   < > 7.96 9.70   TRIGLYCERIDES mg/dL  --   --   --   --  158*  --    ALBUMIN g/dL  --   --   --   --  3.5 4.2    < > = values in this interval not displayed.     Results from last 7 days   Lab Units 12/26/23  0602 12/25/23  0558 12/24/23  0726 12/23/23  0635 12/22/23  0647   PLATELETS 10*3/mm3 175 168 167 175 181     Lab Results   Component Value Date    HGBA1C 9.10 (H) 12/22/2023        Nutrition Diagnosis        Nutrition Dx Problem  Problem: Altered Nutrition Related to  Labs  Etiology: Medical Diagnosis - T2DM    Signs/Symptoms: Biochemical       NUTRITION INTERVENTION / PLAN OF CARE  Goals        Intervention Goal(s) Improved nutrition related labs and Maintain intake     Nutrition Intervention        RD Action Continue to monitor     Prescription        Diet Prescription    Supplement Prescription    Snack Prescription    EN Prescription    New Prescription Ordered? No changes at this time     Monitor/Evaluation        Monitor Per protocol   Discharge Needs Pending clinical course     RD to follow up per protocol.     Electronically signed by:  Yvonne Pompa RD  12/27/23 12:18 EST

## 2023-12-27 NOTE — PROGRESS NOTES
Inpatient Rehabilitation Plan of Care Note    Plan of Care  Updated Problems/Interventions  Mobility    [PT] Bed/Chair/Wheelchair(Active)  Current Status(12/27/2023): Sean RW  Weekly Goal(01/03/2024): CGA  Discharge Goal: SBA/sup    [PT] Bed Mobility(Active)  Current Status(12/27/2023): SBA up to Sean.  Weekly Goal(01/03/2024): CGA  Discharge Goal: Mod-I    [PT] Walk(Active)  Current Status(12/27/2023): Min RW 80'  Weekly Goal(01/03/2024): CGA RW 80'  Discharge Goal: SBA    [PT] Stairs(Active)  Current Status(12/27/2023): Sean 4 steps  Weekly Goal(01/03/2024): CGA  Discharge Goal: SBA    Signed by: Dayanna Turpin, PT

## 2023-12-27 NOTE — PROGRESS NOTES
Inpatient Rehabilitation Functional Measures Assessment and Plan of Care    Plan of Care  Updated Problems/Interventions  Cognition    [ST] Memory(Active)  Current Status(12/27/2023): 0/3 recall with 5 minute delay no cues  Weekly Goal(01/04/2024): 2/3 recall with 5 minute dlay MIN cues  Discharge Goal: functional verbal expression for return to home, PLOF, IADL and  community participation    [ST] Attention(Active)  Current Status(12/27/2023): mild to moderate deficits in sustained attention  Weekly Goal(01/04/2024): min cues for sustained attention  Discharge Goal: functional attention for return to home, PLOF, IADL, and  community participation        Communication    [ST] Expression(Active)  Current Status(12/27/2023): anomic events, utilizes word finding to repair in 0%  opps MIN cues  Weekly Goal(01/04/2024): anomic events, utilizes word finding to repair in 50%  opps MIN cues  Discharge Goal: functional verbal expression for return to home, PLOF, IADL and  community participation    Functional Measures  [facility] Eating:  [facility] Grooming: [facility] Bathing:  [facility] Upper Body Dressing:  [facility] Lower Body Dressing:  [facility] Toileting:  [facility] Bladder Management  Level of Assistance:  [place]  Frequency/Number of Accidents this Shift:  [facility] Bowel Management  Level of Assistance: [place]  Frequency/Number of Accidents this Shift: [facility] Bed/Chair/Wheelchair Transfer:  [facility] Toilet Transfer:  [facility] Tub/Shower Transfer:  [place]    Previously Documented Mode of Locomotion at Discharge: Field  KD Expected Mode of Locomotion at Discharge: [facility] Walk/Wheelchair:  [facility] Stairs:  [facility] Comprehension:  [facility] Expression:  [facility] Social Interaction:  [facility] Problem Solving:  [facility] Memory:  [place]    Therapy Mode Minutes  Occupational Therapy: Branch  Physical Therapy: Branch  Speech Language Pathology:  [facility]    Signed by: Jazzmine  Enoch, SLP

## 2023-12-27 NOTE — PROGRESS NOTES
SECTION GG    Mobility Performance:     Roll Left and Right: [place] provides verbal cues and/or touching/steadying  and/or contact guard assistance as patient completes activity. Assistance may be  provided throughout the activity or intermittently.   Sit to Lying: [place] does less than half the effort. [place] lifts, holds or  supports trunk or limbs but provides less than half the effort.   Lying to Sitting on Side of Bed: [place] does less than half the effort. [place]  lifts, holds or supports trunk or limbs but provides less than half the effort.   Sit to Stand: [place] does less than half the effort. [place] lifts, holds or  supports trunk or limbs but provides less than half the effort.   Chair/Bed to Chair Transfer: [place] does less than half the effort. [place]  lifts, holds or supports trunk or limbs but provides less than half the effort.   Car Transfer: [place] does less than half the effort. [place] lifts, holds or  supports trunk or limbs but provides less than half the effort.   Walk 10 Feet:   [place] does less than half the effort. [place] lifts, holds or  supports trunk or limbs but provides less than half the effort.  Walk 50 Feet with 2 Turns:   [place] does less than half the effort. [place]  lifts, holds or supports trunk or limbs but provides less than half the effort.  Walk 150 Feet:   Not attempted due to medical or safety concerns.  Walking 10 Feet on Uneven Surfaces:   [place] does less than half the effort.  [place] lifts, holds or supports trunk or limbs but provides less than half the  effort.  1 Step Over Curb or Up/Down Stair:   [place] does less than half the effort.  [place] lifts, holds or supports trunk or limbs but provides less than half the  effort.  4 Steps Up and Down, With/Without Rail:   [place] does less than half the effort.  [place] lifts, holds or supports trunk or limbs but provides less than half the  effort.  12 Steps Up and Down, With/Without Rail:   Not attempted due to medical  or  safety concerns.  Picking up an Object:   Not attempted due to medical or safety concerns.  Uses Wheelchair/Scooter: No    Mobility Discharge Goals:   Chair/Bed to Chair Transfer: [place] provides verbal cues or touching/steadying  assistance as patient completes activity.   Sit to Stand: [place] provides verbal cues or touching/steadying assistance as  patient completes activity.   Walk Discharge Goals:   Walk 150 Feet: [place] provides verbal cues or touching/steadying assistance as  patient completes activity.    Section J. Health Conditions (Pain):  Pain Interference with Therapy Activities:   Rarely or not at all  Pain Interference with Day-to-Day Activities:   Rarely or not at all    Signed by: Dayanna Turpin, PT

## 2023-12-27 NOTE — PLAN OF CARE
Problem: Rehabilitation (IRF) Plan of Care  Goal: Plan of Care Review  Outcome: Ongoing, Progressing  Flowsheets (Taken 12/27/2023 0355)  Progress: improving  Plan of Care Reviewed With: patient  Outcome Evaluation: Pt is a new admission to rehab from the [facility] hospital ([facility]) at beginning of shift, she is a stroke, R side rhoda, R side visual field cut, R neglect, R sensory more dull, up w/ asst x1 to WC, meds whole w/ water, pt is A&Ox4, however can be forgetful, NIH 2, I did not notice any aphasia while doing NIH but throughout regular conversation and doing admission noticed at times she had some issues w/ word finding, generalized bruising, SCDS, cont of B/B however does have some urgency and can sometimes be incontinent, but no issues w/ incontinence tonight, resting well, plan of care ongoing.   Goal Outcome Evaluation:  Plan of Care Reviewed With: patient        Progress: improving  Outcome Evaluation: Pt is a new admission to rehab from the [facility] hospital ([facility]) at beginning of shift, she is a stroke, R side rhoda, R side visual field cut, R neglect, R sensory more dull, up w/ asst x1 to WC, meds whole w/ water, pt is A&Ox4, however can be forgetful, NIH 2, I did not notice any aphasia while doing NIH but throughout regular conversation and doing admission noticed at times she had some issues w/ word finding, generalized bruising, SCDS, cont of B/B however does have some urgency and can sometimes be incontinent, but no issues w/ incontinence tonight, resting well, plan of care ongoing.

## 2023-12-27 NOTE — H&P
[facility]   HISTORY AND PHYSICAL    Patient Name: Rekha Bear  : 1940  MRN: [id number]  Primary Care Physician:  Eben Porter MD  Date of admission: 2023    Subjective   Subjective     Chief Complaint:   Status post left PCA stroke-felt cardioembolic  Bilateral carotid stenosis  Stroke prophylaxis-aspirin 7 days through , then start Eliquis 5 mg twice daily on /atorvastatin  Right visual field deficit-right homonymous hemianopsia  Impaired mobility/self-care      History of Present Illness  Patient is 83-year-old female who presented to [facility] on 2023 with history of hypertension, hyperlipidemia, chronic kidney disease who had difficulty coming to the door of her home.  It took her over an hour to get the door open.  Son said she was confused and had urinated on self.  Had trouble seeing to the right side.  Presented to [facility] diagnosed with a left PCA stroke.    She has had cognitive impairment, right homonymous hemianopsia, impairments with her mobility and self-care.  For stroke prophylaxis she is on aspirin statin with aspirin to continue through  and switch to Eliquis 5 mg twice daily as felt to be cardioembolic source.  She had bradycardia that resolved off of beta-blocker.  Started on B12 replacement.    She has right homonymous hemianopsia , impairments with her memory.  On  noted to have moderate to severe cognitive deficits and mild to moderate expressive aphasia.  With physical therapy she ambulated about 60 feet, right neck leg contact-guard min assist for balance, leans to the left.    Given her functional Impairment and comorbidities now admitted for acute inpatient rehabilitation.    Denies any headache.  Continues with impaired vision to the right side.  No swallowing complaints.  Denies any focal weakness.  Tolerating therapies.  No bowel or bladder complaints.  Review of  "Systems     Personal History     Past Medical History:   Diagnosis Date    Chilblains     \"Chilblian's\"    CKD (chronic kidney disease)     Depression     Fatty liver     GERD (gastroesophageal reflux disease)     Hyperlipidemia     Hypertension     Incontinence     Osteoarthritis     OA  Marvin THR, LT TKR - hydrocodone prescibed by Dr. 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 Almaguer    TOTAL KNEE ARTHROPLASTY Left     OA  Marvin THR, LT TKR - hydrocodone prescibed by Dr. Almaguer       Family History: family history includes Breast cancer in her maternal aunt; Colon cancer in her brother; Coronary artery disease in her father; Diabetes type II in her father and mother; Hypertension in her father, mother, and another family member; Other in an other family member; Skin cancer in her brother; Stroke in her son. Otherwise pertinent FHx was reviewed and not pertinent to current issue.    Social History:  reports that she quit smoking about 49 years ago. Her smoking use included cigarettes. She has a 7.00 pack-year smoking history. She has never used smokeless tobacco. She reports that she does not drink alcohol and does not use drugs.  Patient lives alone with 5 steps into the home.  " Bed and bath on the main level.  A cane and walker at home.  Meals on Wheels.  Home Medications:  Vitamin D (Cholecalciferol), buPROPion XL, glimepiride, levothyroxine, linagliptin, metoprolol succinate XL, pioglitazone, rosuvastatin, and venlafaxine XR    Allergies:  Allergies   Allergen Reactions    Shellfish-Derived Products Anaphylaxis    Amlodipine Hives and Other (See Comments)     Heart race    Iodine Rash     Scheduled Meds:aspirin, 325 mg, Oral, Daily  atorvastatin, 40 mg, Oral, Nightly  buPROPion XL, 150 mg, Oral, QAM  cholecalciferol, 400 Units, Oral, Daily  cyanocobalamin, 1,000 mcg, Subcutaneous, Daily   Followed by  [START ON 12/29/2023] vitamin B-12, 1,000 mcg, Oral, Daily  gabapentin, 400 mg, Oral, Q8H  insulin lispro, 2-7 Units, Subcutaneous, 4x Daily AC & at Bedtime  levothyroxine, 100 mcg, Oral, Q AM  oxybutynin XL, 10 mg, Oral, Daily  venlafaxine XR, 150 mg, Oral, Daily      Continuous Infusions:   PRN Meds:.  acetaminophen **OR** acetaminophen    bisacodyl    dextrose    glucagon (human recombinant)    melatonin    ondansetron    Objective    Objective     Vitals:   Temp:  [97.5 °F (36.4 °C)-98.2 °F (36.8 °C)] 98.2 °F (36.8 °C)  Heart Rate:  [59-68] 68  Resp:  [18-19] 18  BP: (144-170)/(66-82) 144/66  Flow (L/min):  [2] 2    Physical Exam  MENTAL STATUS -  AWAKE / ALERT  HEENT- NCAT, PUPILS EQUALLY ROUND, SCLERAE ANICTERIC, CONJUNCTIVAE PINK, OP MOIST, NO JVD, EARS UNREMARKABLE EXTERNALLY  LUNGS - CTA, NO WHEEZES, RALES OR RHONCHI  HEART- RRR, NO RUB, MURMUR, OR GALLOP  ABD - NORMOACTIVE BOWEL SOUNDS, SOFT, NT. NO HEPATOSPLENOMEGALY APPRECIATED  EXT - NO EDEMA OR CYANOSIS  NEURO -oriented person place and general situation  Expressive language appears improved  Able to do simple money management.  Able to spell her last name backward  Right homonymous hemianopsia  MOTOR EXAM - RUE/RLE 5/5. LUE/LLE 5/5.      Result Review    Result Review:    Results from last 7 days   Lab Units 12/26/23  0602  12/25/23  0558 12/24/23  0726   WBC 10*3/mm3 7.66 9.36 9.76   HEMOGLOBIN g/dL 12.4 12.0 11.9*   HEMATOCRIT % 37.1 35.9 35.8   PLATELETS 10*3/mm3 175 168 167     Results from last 7 days   Lab Units 12/26/23  0602 12/25/23  0558 12/24/23  0726 12/22/23  1128 12/22/23  0647 12/21/23  1531   SODIUM mmol/L 137 137 138   < > 138 135*   POTASSIUM mmol/L 4.1 4.2 5.2   < > 4.7 4.9   CHLORIDE mmol/L 104 104 104   < > 103 98   CO2 mmol/L 23.1 22.0 24.9   < > 24.3 25.0   BUN mg/dL 17 21 26*   < > 17 19   CREATININE mg/dL 1.11* 1.36* 1.39*   < > 1.37* 1.59*   CALCIUM mg/dL 8.9 8.5* 8.8   < > 8.7 9.4   BILIRUBIN mg/dL  --   --   --   --  1.2 1.2   ALK PHOS U/L  --   --   --   --  70 86   ALT (SGPT) U/L  --   --   --   --  8 11   AST (SGOT) U/L  --   --   --   --  16 20   GLUCOSE mg/dL 136* 235* 143*   < > 105* 225*    < > = values in this interval not displayed.     Results from last 7 days   Lab Units 12/22/23  0647   CHOLESTEROL mg/dL 146   TRIGLYCERIDES mg/dL 158*   HDL CHOL mg/dL 44   LDL CHOL mg/dL 75      Latest Reference Range & Units 12/22/23 06:47 12/22/23 11:28   Hemoglobin A1C 4.80 - 5.60 % 9.10 (H)    TSH Baseline 0.270 - 4.200 uIU/mL 5.130 (H)    Vitamin B-12 211 - 946 pg/mL  154 (L)   (H): Data is abnormally high  (L): Data is abnormally low    Assessment & Plan   Assessment / Plan          Active Hospital Problems:  Active Hospital Problems    Diagnosis     **Stroke       Status post left PCA stroke-felt cardioembolic-December 22, 2023- 6 x 3 cm acute infarct throughout the medial left occipital lobe in the left PCA territory    Bilateral carotid stenosis-left 50-69%, right less than 50%    Stroke prophylaxis-aspirin 7 days through December 28, then start Eliquis 5 mg twice daily on December 29/atorvastatin    Right visual field deficit-right homonymous hemianopsia    Impaired mobility/self-care    Diabetes mellitus type 2-sliding scale insulin    Chronic kidney disease    Hypertension    Status post  bradycardia-resolved off metoprolol    Hypothyroidism-on replacement    B12 deficiency-on replacement    Depression-bupropion/venlafaxine    -oxybutynin    DVT prophylaxis-SCDs    Outpatient evaluation for obstructive sleep apnea    Now admit for comprehensive acute inpatient rehabilitation .  This would be an interdisciplinary program with physical therapy 1 hour,  occupational therapy 1 hour, and speech therapy 1 hour, 5 days a week.  Rehabilitation nursing for carryover, monitoring of diabetes mellitus and cardiac and neurologic   status, bowel and bladder, and skin  Ongoing physician follow-up.  Weekly team conferences.  Goals are to achieve a level of supervision with  mobility and self-care and improved cognition and attention to the right.   Rehabilitation prognosis fair.  Medical prognosis fair.  Estimated length of stay is approximately 10 days, but is only an estimation.   The patient's functional status and clinical status is unchanged from preadmission assessment and the patient continues appropriate for acute inpatient rehabilitation.  Goal is for home with outpatient   therapies.  Barrier to discharge: Impaired mobility self-care cognition- work on attention to the right, executive function, memory, balance, gait, ADLs to overcome.         DVT prophylaxis:  Mechanical DVT prophylaxis orders are present.    CODE STATUS:    Code Status (Patient has no pulse and is not breathing): CPR (Attempt to Resuscitate)  Medical Interventions (Patient has pulse or is breathing): Full Support    Admission Status:  I believe this patient meets inpatient status.    Sanjeev Quan MD

## 2023-12-27 NOTE — PROGRESS NOTES
Inpatient Rehabilitation Plan of Care Note    Plan of Care  Care Plan Reviewed - No updates at this time.    Safety    [RN] Potential for Injury(Active)  Current Status(12/27/2023): Risk for fall  Weekly Goal(01/03/2024): Cue to use call bell  Discharge Goal: PT /Family will be aware of risk for fall and safety in the home  setting        Psychosocial    [RN] Coping/Adjustment(Active)  Current Status(12/27/2023): Expressing appropriate coping  Weekly Goal(01/03/2024): Provide educational material regarding stroke  Discharge Goal: Knowleadgeable of care needs and stroke recovery        Sphincter Control    [RN] Bladder Management(Active)  Current Status(12/27/2023): Bladder urgency  Weekly Goal(01/03/2024): Continent 50%  Discharge Goal: Continent 100%    [RN] Bowel Management(Active)  Current Status(12/27/2023): Continent 100%  Weekly Goal(01/03/2024): Continent 100%  Discharge Goal: Continent 100%    Signed by: Ro Verma RN

## 2023-12-27 NOTE — PROGRESS NOTES
Case Management  Inpatient Rehabilitation Plan of Care and Discharge Plan Note    Rehabilitation Diagnosis:  Branch  Date of Onset:  Tomer    Medical Summary:  Branch  Past Medical History: Branch    Plan of Care  Updated Problems/Interventions  Field    Expected Intensity:  Branch  Interdisciplinary Team:  Tomer  Estimated Length of Stay/Anticipated Discharge Date: Branch  Anticipated Discharge Destination:  Anticipated discharge destination from inpatient rehabilitation is community  discharge with assistance. Patient lives alone in one story home with 5 steps  and 2 rails to enter.  D/C plan is home with assistance from adult children and grandchildren.      Based on the patient's medical and functional status, their prognosis and  expected level of functional improvement is:  Tomer    Signed by: FEMI Hernandez

## 2023-12-27 NOTE — NURSING NOTE
Wound/Ostomy: We see the patient at the request of the floor nurse, regarding skin issue to left foot on 3rd Toe. Patient is sitting in the wheelchair, able to raise her leg to show me the injury, upon assessment is observed a hard, dry lesion with a callous appearance, according to the patient she has had the injury for around 3 years. Silvasorb gel is ordered, (she is allergic to Iodine).  Please re-consult for any additional needs.

## 2023-12-27 NOTE — PLAN OF CARE
Goal Outcome Evaluation:  Plan of Care Reviewed With: patient        Progress: improving  Outcome Evaluation: Pt has been up in WC attending therapies and has had no pain or problems voiced. Right visual field cut noted.

## 2023-12-27 NOTE — PROGRESS NOTES
SECTION GG    Eating Performance: Hancock sets up or cleans up; patient completes activity.  Hancock assists only prior to or following the activity.    Eating Discharge Goals: Patient completed the activities by themself with no  assistance from a helper.    Section B. Hearing and Vision  Ability to Hear:  Adequate - no difficulty in normal conversation, social  interaction, listening to TV  Ability to See in Adequate Light:  Adequate - sees fine detail, such as regular  print in newspapers/books    Section B. Health Literacy  Frequency of Needing Assistance Reading:  Never    Section D. Mood  Presence of little interest or pleasure in doing things:   Yes  Frequency of having little interest or pleasure in doing things:   2-6 days  (several days)  Presence of feeling down, depressed, or hopeless:   Yes  Frequency of feeling down, depressed, or hopeless:   2-6 days (several days)   Interview Ended. Above responses do not meet criteria to continue.  Total Severity Score:   2    Section D. Social Isolation  Frequecy of Feeling Lonely or Isolated:  Sometimes    Section J. Health Conditions (Pain Effect on Sleep)  Pain Effect on Sleep:   Rarely or not at all    Signed by: Ro Verma RN

## 2023-12-27 NOTE — THERAPY EVALUATION
"Inpatient Rehabilitation - Speech Language Pathology Initial Evaluation  [facility]     Patient Name: Rekha Bear  : 1940  MRN: [id number]  Today's Date: 2023               Admit Date: 2023     Visit Dx:  No diagnosis found.  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    Community acquired pneumonia of left lower lobe of lung    Hyponatremia    Pneumonia due to COVID-19 virus    Major depressive disorder, recurrent, mild    Acute respiratory failure with hypoxia    Supraventricular tachycardia    PVC (premature ventricular contraction)    Dyspnea on exertion    Dizziness    Depressive disorder    Gastroesophageal reflux disease    Midline cystocele    Rectocele    BMI 29.0-29.9,adult    Acute left PCA stroke    B12 deficiency    Bradycardia    Premature atrial contractions    Stroke     Past Medical History:   Diagnosis Date    Chilblains     \"Chilblian's\"    CKD (chronic kidney disease)     Depression     Fatty liver     GERD (gastroesophageal reflux disease)     Hyperlipidemia     Hypertension     Incontinence     Osteoarthritis     OA  Marvin THR, LT TKR - hydrocodone prescibed by Dr. 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 Almaguer    TOTAL KNEE ARTHROPLASTY Left     OA  Marvin THR, LT TKR - hydrocodone prescibed by Dr. Almaguer         SLP EVALUATION (last 72 hours)       SLP SLC Evaluation       Row Name 12/27/23 1200 12/27/23 1030                Communication Assessment/Intervention    Document Type -- evaluation  -AB       Subjective Information -- no complaints  -AB       Patient Observations -- alert;cooperative;agree to therapy  -AB       Patient/Family/Caregiver Comments/Observations -- pt seen in room 4401, motivated, pleasant, cooperative  -AB       Patient Effort -- excellent  -AB       Symptoms Noted During/After Treatment -- none  -AB          General Information    Patient Profile Reviewed -- yes  -AB       Pertinent History Of Current Problem -- 83-year-old female who presented to [facility] on December 21, 2023 with history of hypertension, hyperlipidemia, chronic kidney disease who had difficulty coming to the door of her home.  It took her over an hour to get the door open.  Send on she was confused and had urinated on self.  Had trouble seeing to the right side.  Presented to [facility] diagnosed with a left PCA stroke.     She has had cognitive impairment, right pharmacy [place], impairments with her mobility and self-care.  For stroke prophylaxis she is on aspirin statin with aspirin to continue through December 28 and switch to Eliquis 5 mg twice daily as felt to be cardioembolic source.  She had bradycardia that resolved off of beta-blocker.  Started on B12 replacement.     She has right homonymous  hemianopsia impairments with her memory.  On December 22 noted to have moderate to severe cognitive deficits and mild to moderate expressive aphasia.  With physical therapy she ambulated about 60 feet, right neck leg contact-guard min assist for balance, leans to the left.     Given her functional Impairment and comorbidities now admitted for acute inpatient rehabilitation.     Denies any headache.  Continues with impaired vision to the right side.  No swallowing complaints.  Denies any focal weakness.  Tolerating therapies.  No bowel or bladder complaints.  -AB       Precautions/Limitations, Vision -- vision impairment, right  homonymous hemianopsia  -AB       Precautions/Limitations, Hearing -- WFL  -AB       Patient Level of Education -- Social hx: pt lives in home alone, independent with all IADLS including driving, finance, medication management. 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 Pt and family report mild memory/word finding concerns at baseline. Pt enjoys playing poker, watching TV, going out to eat.  -AB       Prior Level of Function-Communication -- WFL;other (see comments)  mild memory, word finding deficits  -AB       Plans/Goals Discussed with -- patient;agreed upon  -AB       Barriers to Rehab -- none identified  -AB       Patient's Goals for Discharge -- return to all previous roles/activities  -AB          Pain    Additional Documentation -- Pain Scale: Numbers Pre/Post-Treatment (Group)  -AB          Pain Scale: Numbers Pre/Post-Treatment    Pretreatment Pain Rating -- 0/10 - no pain  -AB       Posttreatment Pain Rating -- 0/10 - no pain  -AB          Standardized Tests    Cognitive/Memory Tests CLQT: Cognitive Linguistic Quick Test  -AB --          CLQT (The Cognitive Linguistic Quick Test)    Attention Domain Score 101  -AB --       Attention Severity Rating 3: Mild  -AB --       Memory Domain Score 170  -AB --       Memory Severity Rating 4: WNL  -AB --        Executive Function Domain Score 30  -AB --       Executive Function Severity Rating 4: WNL  -AB --       Language Domain Score 33  -AB --       Language Severity Rating 4: WNL  -AB --       Visuospatial Domain Score 68  -AB --       Visuospatial Severity Rating 4: WNL  -AB --       Clock Drawing Total Score 8  -AB --       Clock Drawing Severity Rating Moderate  -AB --       Composite Severity Rating 3.8  -AB --       Composite Severity Rating Range 4.0 - 3.5: WNL  -AB --       CLQT Comments Within subtests, note the following clinical impressions. PERSONAL FACTS:  ([facility] year of birth ). SYMBOL CANCELLATION: / (incorrect symbol, R neglect). CONFRONTATION NAMING: 10/10. CLOCK DRAWIN/13 (numbers not arranged in [place], poorly spaced with L sided errors, hands of equal length, both pointing at 11). STORY RETELLIN/18 for story elements,  for paragraph level comprehension. SYMBOL TRAILS: 9/10 (error x1 missed pattern) GENERATIVE NAMING: x7 concrete items within 1 minute, x24 abstract items within x1 minute. DESIGN MEMORY: ; MAZES:  (crosses walls, incorrect completion). DESIGN GENERATION: x10 different designs, x3 self repetitions.   Although scores indicated cognition WNL, x3 subtests below criterion cut off - personal facts (paraphasia, [facility] year of birth ); symbol cancellation (2/12 correct and R sided neglect); and clock draw (inaccurate spacing, 2 hands equal length with wrong time).   Within BIMS, pt disoriented to MATI, and unable to recall items with 5 minute delay, cues increase to only 1/3 recall with 5 minute delay.   Within conversation, note over x8 anomic events with pt unable to repair for personally salient words/information including city where child lives, prior workplace, prior surgeries, restaurants, places.   Overall impression, mild cognitive impairment characterized by visuospatial impairment, attention deficits; mild to moderate expressive aphasia at  sentence/conversational level. Pt will benefit from ongoing diagnostic cognitive tx, acute rehabilitation therapy for return to all PLOF. Pt lives home alone, manages all IADLS including driving, finances, medications and hopes to return to independence sa much as possible.  -AB --          SLP Evaluation Clinical Impressions    SLP Diagnosis mild;cognitive-linguistic disorder;mild-moderate;aphasia  -AB --       Rehab Potential/Prognosis excellent  -AB --       [facility] Criteria for Skilled Therapy Interventions Met yes  -AB --       Functional Impact difficulty in expressing complex messages;restrictions in personal and social life;difficulty completing home management task  -AB --          SLP Treatment Clinical Impressions    Care Plan Review evaluation/treatment results reviewed;care plan/treatment goals reviewed;risks/benefits reviewed;current/potential barriers reviewed;patient/other agree to care plan  -AB --          Recommendations    Therapy Frequency (SLP [facility]) 5 days per week  -AB --       Predicted Duration Therapy Intervention (Days) until discharge  -AB --       Anticipated Discharge Disposition (SLP) home with home health;home with  services  -AB --          Communication Treatment Objective and Progress Goals (SLP)    Diagnostic Treatment Objective Diagnostic Treatment Objectives (Group)  -AB --       Verbal Expression Treatment Objectives Verbal Expression Treatment Objectives (Group)  -AB --          Diagnostic Treatment Objectives    Diagnostic Treatment Objective Selection Diagnostic Treatment Objectives  -AB --          SLP Diagnostic Treatment     Patient will participate in further assessment in the following areas verbal expression;higher-level cognitive-linguistic   -AB --       Time Frame (Diagnostic) short term goal (STG)  -AB --       Progress/Outcomes (Additional Goal 1, SLP) new goal  -AB --          Verbal Expression Treatment Objectives    Connected Speech Selection connected speech, SLP  goal 2  -AB --          Word Retrieval Skills Goal 1 (SLP)    Improve Word Retrieval Skills By Goal 1 (SLP) completing functional word finding tasks;90%;independently (over 90% accuracy)  -AB --       Time Frame (Word Retrieval Goal 1, SLP) short term goal (STG)  -AB --       Progress/Outcomes (Word Retrieval Goal 1, SLP) new goal  -AB --          Ability to Construct Phrase and Sentence Level Response Goal 1 (SLP)    Improve Ability to Construct Phrase and Sentence Level Responses By Goal 1 (SLP) constructing a sentence with a key word;90%;independently (over 90% accuracy)  -AB --       Time Frame (Phrase and Sentence Level Response Goal 1, SLP) short term goal (STG)  -AB --       Progress/Outcomes (Phrase and Sentence Level Response Goal 1, SLP) new goal  -AB --          Connected Speech to Express Thoughts Goal 1 (SLP)    Improve Narrative Discourse to Express Thoughts By Goal 1 (SLP) describing a picture;90%;independently (over 90% accuracy)  -AB --       Time Frame (Connected Speech Goal 1, SLP) short term goal (STG)  -AB --       Progress/Outcomes (Connected Speech Goal 1, SLP) new goal  -AB --          Connected Speech to Express Thoughts Goal 2 (SLP)    Improve Narrative Discourse to Express Thoughts By Goal 2 (SLP) giving basic definition of a word;giving multiple definitions of a word;90%;independently (over 90% accuracy)  -AB --       Time Frame (Connected Speech Goal 2, SLP) short term goal (STG)  -AB --       Progress/Outcomes (Connected Speech Goal 2, SLP) new goal  -AB --          Cognitive Linguistic Treatment Objectives    Attention Selection attention, SLP goal 1  -AB --       Organizational Skills Selection organizational skills, SLP goal 1  -AB --          Attention Goal 1 (SLP)    Improve Attention by Goal 1 (SLP) complete sustained attention task;90%;independently (over 90% accuracy)  -AB --       Time Frame (Attention Goal 1, SLP) short term goal (STG)  -AB --       Progress/Outcomes  (Attention Goal 1, SLP) new goal  -AB --          Memory Skills Goal 1 (SLP)    Improve Memory Skills Through Goal 1 (SLP) use memory strategies;use external memory aid;80%;with minimal cues (75-90%)  -AB --       Time Frame (Memory Skills Goal 1, SLP) short term goal (STG)  -AB --       Progress/Outcomes (Memory Skills Goal 1, SLP) new goal  -AB --          Functional Math Skills Goal 1 (SLP)    Improve Functional Math Skills Through Goal 1 (SLP) complete functional math task;80%;with minimal cues (75-90%)  -AB --       Time Frame (Functional Math Skills Goal 1, SLP) short term goal (STG)  -AB --       Progress/Outcomes (Functional Math Skills Goal 1, SLP) new goal  -AB --          Executive Functional Skills Goal 1 (SLP)    Improve Executive Function Skills Goal 1 (SLP) home management activity;80%;with minimal cues (75-90%)  -AB --       Time Frame (Executive Function Skills Goal 1, SLP) short term goal (STG)  -AB --       Progress/Outcomes (Executive Function Skills Goal 1, SLP) new goal  -AB --          Right Hemisphere Function Goal 1 (SLP)    Improve Right Hemisphere Function Through Goal 1 (SLP) complete visuo-spatial activities (visual closure, trail making, mazes;complete visuo-perceptual activities (L/R discrimination, spatial concepts);80%;with minimal cues (75-90%)  -AB --       Time Frame (Right Hemisphere Function Goal 1, SLP) short term goal (STG)  -AB --       Progress/Outcomes (Right Hemisphere Function Goal 1, SLP) new goal  -AB --                 User Key  (r) = Recorded By, (t) = Taken By, (c) = Cosigned By      Initials Name Effective Dates    Jazzmine Brown, MS CCC-SLP 12/27/22 -                        EDUCATION  The patient has been educated in the following areas:     Cognitive Impairment Communication Impairment.           SLP GOALS       Row Name 12/27/23 1200             SLP Diagnostic Treatment     Patient will participate in further assessment in the following areas verbal  expression;higher-level cognitive-linguistic   -AB      Time Frame (Diagnostic) short term goal (STG)  -AB      Progress/Outcomes (Additional Goal 1, SLP) new goal  -AB         Word Retrieval Skills Goal 1 (SLP)    Improve Word Retrieval Skills By Goal 1 (SLP) completing functional word finding tasks;90%;independently (over 90% accuracy)  -AB      Time Frame (Word Retrieval Goal 1, SLP) short term goal (STG)  -AB      Progress/Outcomes (Word Retrieval Goal 1, SLP) new goal  -AB         Ability to Construct Phrase and Sentence Level Response Goal 1 (SLP)    Improve Ability to Construct Phrase and Sentence Level Responses By Goal 1 (SLP) constructing a sentence with a key word;90%;independently (over 90% accuracy)  -AB      Time Frame (Phrase and Sentence Level Response Goal 1, SLP) short term goal (STG)  -AB      Progress/Outcomes (Phrase and Sentence Level Response Goal 1, SLP) new goal  -AB         Connected Speech to Express Thoughts Goal 1 (SLP)    Improve Narrative Discourse to Express Thoughts By Goal 1 (SLP) describing a picture;90%;independently (over 90% accuracy)  -AB      Time Frame (Connected Speech Goal 1, SLP) short term goal (STG)  -AB      Progress/Outcomes (Connected Speech Goal 1, SLP) new goal  -AB         Connected Speech to Express Thoughts Goal 2 (SLP)    Improve Narrative Discourse to Express Thoughts By Goal 2 (SLP) giving basic definition of a word;giving multiple definitions of a word;90%;independently (over 90% accuracy)  -AB      Time Frame (Connected Speech Goal 2, SLP) short term goal (STG)  -AB      Progress/Outcomes (Connected Speech Goal 2, SLP) new goal  -AB         Attention Goal 1 (SLP)    Improve Attention by Goal 1 (SLP) complete sustained attention task;90%;independently (over 90% accuracy)  -AB      Time Frame (Attention Goal 1, SLP) short term goal (STG)  -AB      Progress/Outcomes (Attention Goal 1, SLP) new goal  -AB         Memory Skills Goal 1 (SLP)    Improve Memory  Skills Through Goal 1 (SLP) use memory strategies;use external memory aid;80%;with minimal cues (75-90%)  -AB      Time Frame (Memory Skills Goal 1, SLP) short term goal (STG)  -AB      Progress/Outcomes (Memory Skills Goal 1, SLP) new goal  -AB         Functional Math Skills Goal 1 (SLP)    Improve Functional Math Skills Through Goal 1 (SLP) complete functional math task;80%;with minimal cues (75-90%)  -AB      Time Frame (Functional Math Skills Goal 1, SLP) short term goal (STG)  -AB      Progress/Outcomes (Functional Math Skills Goal 1, SLP) new goal  -AB         Executive Functional Skills Goal 1 (SLP)    Improve Executive Function Skills Goal 1 (SLP) home management activity;80%;with minimal cues (75-90%)  -AB      Time Frame (Executive Function Skills Goal 1, SLP) short term goal (STG)  -AB      Progress/Outcomes (Executive Function Skills Goal 1, SLP) new goal  -AB         Right Hemisphere Function Goal 1 (SLP)    Improve Right Hemisphere Function Through Goal 1 (SLP) complete visuo-spatial activities (visual closure, trail making, mazes;complete visuo-perceptual activities (L/R discrimination, spatial concepts);80%;with minimal cues (75-90%)  -AB      Time Frame (Right Hemisphere Function Goal 1, SLP) short term goal (STG)  -AB      Progress/Outcomes (Right Hemisphere Function Goal 1, SLP) new goal  -AB                User Key  (r) = Recorded By, (t) = Taken By, (c) = Cosigned By      Initials Name Provider Type    Jazzmine Brown, MS CCC-SLP Speech and Language Pathologist                            Time Calculation:      Time Calculation- SLP       Row Name 12/27/23 1456 12/27/23 1312          Time Calculation- SLP    SLP Start Time -- 1030  -AB     SLP Stop Time -- 1130  -AB     SLP Time Calculation (min) -- 60 min  -AB     SLP Received On -- 12/27/23  -AB        Timed Charges    96125-Standardized cognitive performance testing 120  -AB --        Total Minutes    Timed Charges Total Minutes 120  -AB  --      Total Minutes 120  -AB --               User Key  (r) = Recorded By, (t) = Taken By, (c) = Cosigned By      Initials Name Provider Type    AB Jazzmine Kendall, MS CCC-SLP Speech and Language Pathologist                    Therapy Charges for Today       Code Description Service Date Service Provider Modifiers Qty    [id number]  ST STD COG PERF TEST PER HOUR 12/27/2023 Jazzmine Kendall, MS OSIRIS-SLP GN 2                       Jazzmine Kendall MS CCC-PAULETTE  12/27/2023

## 2023-12-27 NOTE — PROGRESS NOTES
Inpatient Rehabilitation Admission  Section A. Transportation  Issues Due to Lack of Transportation:   No    Signed by: FEMI Hernandez

## 2023-12-27 NOTE — THERAPY EVALUATION
"Inpatient Rehabilitation - Occupational Therapy Initial Evaluation    [facility]     Patient Name: Rekha Bear  : 1940  MRN: [id number]    Today's Date: 2023                 Admit Date: 2023       No diagnosis found.    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    Community acquired pneumonia of left lower lobe of lung    Hyponatremia    Pneumonia due to COVID-19 virus    Major depressive disorder, recurrent, mild    Acute respiratory failure with hypoxia    Supraventricular tachycardia    PVC (premature ventricular contraction)    Dyspnea on exertion    Dizziness    Depressive disorder    Gastroesophageal reflux disease    Midline cystocele    Rectocele    BMI 29.0-29.9,adult    Acute left PCA stroke    B12 deficiency    Bradycardia    Premature atrial contractions    Stroke       Past Medical History:   Diagnosis Date    Chilblains     \"Chilblian's\"    CKD (chronic kidney disease)     Depression     Fatty liver     GERD (gastroesophageal reflux disease)     Hyperlipidemia     Hypertension     Incontinence     Osteoarthritis     OA  Marvin THR, LT TKR - hydrocodone prescibed by Dr. 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 Almaguer    TOTAL KNEE ARTHROPLASTY Left     OA  Marvin THR, LT TKR - hydrocodone prescibed by Dr. Almaguer             IRF OT ASSESSMENT FLOWSHEET (last 12 hours)       IRF OT Evaluation and Treatment       Row Name 12/27/23 1500 12/27/23 1000       OT Time and Intention    Document Type initial evaluation  -KN initial evaluation  -KN    Mode of Treatment individual therapy;occupational therapy  -KN individual therapy;occupational therapy  -KN    Patient Effort excellent  -KN excellent  -KN      Row Name 12/27/23 1500 12/27/23 1000       General Information    Patient Profile Reviewed yes  -KN yes  -KN    General Observations of Patient -- supine in bed eating breakfast  -KN    Existing Precautions/Restrictions -- fall  -KN    Limitations/Impairments -- visual  R visual neglect  -KN      Row Name 12/27/23 1000          Previous Level of Function/Home Environm    Bathing/Grooming, Premorbid Functional Level independent  -KN     Dressing, Premorbid Functional Level independent  -KN     Eating/Feeding, Premorbid Functional Level independent  -KN     Toileting, Premorbid Functional Level independent  -KN     BADLs, Premorbid Functional Level independent  -KN     IADLs, Premorbid Functional Level independent  -KN       Row Name 12/27/23 1000          Living Environment    Current Living Arrangements home  -KN     Home Accessibility stairs to enter home  -KN     People in Home alone  -KN       Row Name 12/27/23 1000          Home Use of Assistive/Adaptive Equipment    Equipment Currently Used at Home rollator;walker, rolling;cane, straight  -KN        Row Name 12/27/23 1500 12/27/23 1000       Pain Assessment    Pretreatment Pain Rating 0/10 - no pain  -KN 0/10 - no pain  -KN    Posttreatment Pain Rating 0/10 - no pain  -KN 0/10 - no pain  -KN      Row Name 12/27/23 1500 12/27/23 1000       Cognition/Psychosocial    Affect/Mental Status (Cognition) confused  -KN confused  -KN    Orientation Status (Cognition) oriented x 3  -KN oriented x 3  -KN    Follows Commands (Cognition) follows one-step commands;75-90% accuracy;verbal cues/prompting required  -KN follows one-step commands;75-90% accuracy;verbal cues/prompting required  -KN    Personal Safety Interventions -- safety round/check completed;fall prevention program maintained;gait belt;nonskid shoes/slippers when out of bed  -KN      Row Name 12/27/23 1000          Range of Motion (ROM)    Range of Motion ROM is WNL  -KN       Row Name 12/27/23 1000          Range of Motion Comprehensive    General Range of Motion bilateral upper extremity ROM WNL  -KN       Row Name 12/27/23 1500 12/27/23 1000       Strength (Manual Muscle Testing)    Strength (Manual Muscle Testing) -- bilateral upper extremities  -KN    Left Hand, Setting 2 (Dynamometer Testing) 50  -KN --    Right Hand, Setting 2 (Dynamometer Testing) 60  -KN --    Left Hand: Lateral (Key) Pinch Strength (Pinch Dynamometer Testing) 17  -KN --    Right Hand: Lateral (Key) Pinch Strength (Pinch Dynamometer Testing) 13  -KN --    Additional Documentation Left Hand, Setting 2 (Dynamometer Testing) (Row);Right Hand, Setting 2 (Dynamometer Testing) (Row);Right Hand: Tip (Pincer) Pinch Strength (Pinch Dynamometer Testing) (Row);Right Hand: Lateral (Key) Pinch Strength (Pinch Dynamometer Testing) (Row);Left Hand: Lateral (Key) Pinch Strength (Pinch Dynamometer Testing) (Row);Left Hand: Tip (Pincer) Pinch Strength (Pinch Dynamometer Testing) (Row)  -KN --      Row Name 12/27/23 1000          Strength Comprehensive (MMT)    Comment, General Manual Muscle Testing  (MMT) Assessment 4/5 B UE strength  -       Row Name 12/27/23 1000          Sensory Assessment (Somatosensory)    Sensory Assessment (Somatosensory) UE sensation intact  -[facility] Name 12/27/23 1000          Basic Activities of Daily Living (BADLs)    Basic Activities of Daily Living bathing;upper body dressing;lower body dressing;grooming;toileting;self-feeding  -       Row Name 12/27/23 1000          Bathing    [place] Level (Bathing) moderate assist (50% patient effort);bathing skills;verbal cues  -KN     Position (Bathing) supported sitting;supported standing;sink side  -KN     Set-up Assistance (Bathing) obtain supplies;adjust water temperature  -       Row Name 12/27/23 1000          Upper Body Dressing    [place] Level (Upper Body Dressing) upper body dressing skills;verbal cues;minimum assist (75% or more patient effort)  -KN     Position (Upper Body Dressing) supported sitting  -KN     Set-up Assistance (Upper Body Dressing) obtain clothing  -[facility] Name 12/27/23 1000          Lower Body Dressing    [place] Level (Lower Body Dressing) doff;pants/bottoms;don;shoes/slippers;socks;moderate assist (50% patient effort)  -KN     Position (Lower Body Dressing) supported standing;supported sitting  -KN     Set-up Assistance (Lower Body Dressing) obtain clothing  -[facility] Name 12/27/23 1000          Grooming    [place] Level (Grooming) grooming skills;set up;verbal cues;hair care, combing/brushing;deodorant application;oral care regimen;wash face, hands  -KN     Position (Grooming) sink side;supported sitting  -KN     Set-up Assistance (Grooming) obtain supplies  -KN       Row Name 12/27/23 1000          Toileting    [place] Level (Toileting) maximum assist (25% patient effort);toileting skills  -KN     Position (Toileting) supported sitting;supported standing  -[facility] Name 12/27/23 1000          Self-Feeding    [place] Level (Self-Feeding) feeding  skills;independent  -       Row Name 12/27/23 1000          Transfer Assessment/Treatment    Transfers sit-stand transfer;stand-sit transfer;toilet transfer;wheelchair transfer  -       Row Name 12/27/23 1000          Sit-Stand Transfer    Sit-Stand [place] (Transfers) minimum assist (75% patient effort);verbal cues  -KN     Assistive Device (Sit-Stand Transfers) wheelchair;walker, front-wheeled  -KN       Row Name 12/27/23 1000          Stand-Sit Transfer    Stand-Sit [place] (Transfers) minimum assist (75% patient effort);verbal cues  -KN     Assistive Device (Stand-Sit Transfers) wheelchair;walker, front-wheeled  -KN       Row Name 12/27/23 1000          Toilet Transfer    Type (Toilet Transfer) sit-stand;stand-sit  -KN     [place] Level (Toilet Transfer) minimum assist (75% patient effort)  -KN     Assistive Device (Toilet Transfer) commode;walker, front-wheeled;grab bars/safety frame  -       Row Name 12/27/23 1000          Safety Issues, Functional Mobility    Safety Issues Affecting Function (Mobility) awareness of need for assistance  -       Row Name 12/27/23 1500          Motor Skills    Coordination fine motor deficit;gross motor deficit;9 Hole Peg Test of Fine Motor Coordination Results  -     Results, 9 Hole Peg Test of Fine Motor Coordination R 32 L 28  -KN     Additional Documentation --  box and blocks: R45 L49  -       Row Name 12/27/23 1500 12/27/23 1000       Positioning and Restraints    Pre-Treatment Position --  wc  -KN in bed  -KN    Post Treatment Position wheelchair  -KN wheelchair  -KN    In Wheelchair with other staff  -KN with nsg  -      Row Name 12/27/23 1000          Therapy Assessment/Plan (OT)    Patient's Goals For Discharge return home  -       Row Name 12/27/23 1000          Therapy Assessment/Plan (OT)    Functional Level at Time of Evaluation (OT) Pt presents today with R visual neglect and need mod cues to attend to R side.  -KN     Rehab  Potential/Prognosis (OT) good, to achieve stated therapy goals  -KN     Frequency of Treatment (OT) 5 times per week;30 minutes per session  -KN     Estimated Duration of Therapy (OT) 2 weeks;3 weeks  -KN     Problem List (OT) balance;problems related to;cognition;coordination;strength;vision  -KN     Planned Therapy Interventions (OT) activity tolerance training;adaptive equipment training;BADL retraining;functional balance retraining;neuromuscular control/coordination retraining;manual therapy/joint mobilization;occupation/activity based interventions;orthotic fabrication/fitting/training;patient/caregiver education/training;strengthening exercise;transfer/mobility retraining;ROM/therapeutic exercise  -       Row Name 12/27/23 1000          Evaluation Complexity (OT)    Review Occupational Profile/Medical/Therapy History Complexity expanded/moderate complexity  -KN     Assessment, Occupational Performance/Identification of Deficit Complexity 3-5 performance deficits  -KN     Clinical Decision Making Complexity (OT) detailed assessment/moderate complexity  -KN     Overall Complexity of Evaluation (OT) moderate complexity  -       Row Name 12/27/23 1000          Therapy Plan Review/Discharge Plan (OT)    Therapy Plan Review (OT) evaluation/treatment results reviewed;care plan/treatment goals reviewed;participants voiced agreement with care plan;patient  -KN     Anticipated Discharge Disposition (OT) home with 24/7 care;home with assist  -       Row Name 12/27/23 1000          IRF OT Goals    Transfer Goal Selection (OT-IRF) transfers, OT goal 1;transfers, OT goal 2  -KN     Bathing Goal Selection (OT-IRF) bathing, OT goal 1;bathing, OT goal 2  -KN     UB Dressing Goal Selection (OT-IRF) UB dressing, OT goal 2;UB dressing, OT goal 1  -KN     LB Dressing Goal Selection (OT-IRF) LB dressing, OT goal 1;LB dressing, OT goal 2  -KN     Grooming Goal Selection (OT-IRF) grooming, OT goal 1  -KN     Toileting Goal  Selection (OT-IRF) toileting, OT goal 1;toileting, OT goal 2  -KN     Strength Goal Selection (OT-IRF) strength, OT goal 1 (free text)  -KN     Activity Tolerance Goal Selection (OT) activity tolerance, OT goal 1  -KN     Caregiver Training Goal Selection (OT-IRF) caregiver training, OT goal 1  -KN     Safety Awareness Goal Selection (OT-IRF) safety awareness, OT goal 1  -KN       Row Name 12/27/23 1000          Transfer Goal 1 (OT-IRF)    Activity/Assistive Device (Transfer Goal 1, OT-IRF) all transfers  -KN     [place] Level (Transfer Goal 1, OT-IRF) contact guard required;verbal cues required  -KN     Time Frame (Transfer Goal 1, OT-IRF) short-term goal (STG);1 week  -KN       Row Name 12/27/23 1000          Transfer Goal 2 (OT-IRF)    Activity/Assistive Device (Transfer Goal 2, OT-IRF) all transfers  -KN     [place] Level (Transfer Goal 2, OT-IRF) supervision required  -KN     Time Frame (Transfer Goal 2, OT-IRF) 2 weeks;3 weeks;long-term goal (LTG)  -       Row Name 12/27/23 1000          Bathing Goal 1 (OT-IRF)    Activity/Device (Bathing Goal 1, OT-IRF) bathing skills, all  -KN     [place] Level (Bathing Goal 1, OT-IRF) minimum assist (75% or more patient effort);verbal cues required  -KN     Time Frame (Bathing Goal 1, OT-IRF) short-term goal (STG);1 week  -       Row Name 12/27/23 1000          Bathing Goal 2 (OT-IRF)    Activity/Device (Bathing Goal 2, OT-IRF) bathing skills, all  -KN     [place] Level (Bathing Goal 2, OT-IRF) contact guard required;verbal cues required;supervision required  -KN     Time Frame (Bathing Goal 2, OT-IRF) long-term goal (LTG);3 weeks;2 weeks  -       Row Name 12/27/23 1000          UB Dressing Goal 1 (OT-IRF)    Activity/Device (UB Dressing Goal 1, OT-IRF) upper body dressing  -KN     [place] (UB Dress Goal 1, OT-IRF) set-up required  -KN     Time Frame (UB Dressing Goal 1, OT-IRF) short-term goal (STG);1 week  -       Row Name 12/27/23 1000           UB Dressing Goal 2 (OT-IRF)    Activity/Device (UB Dressing Goal 2, OT-IRF) upper body dressing  -KN     [place] (UB Dress Goal 2, OT-IRF) independent  -KN     Time Frame (UB Dressing Goal 2, OT-IRF) long-term goal (LTG);2 weeks  -KN       Row Name 12/27/23 1000          LB Dressing Goal 1 (OT-IRF)    Activity/Device (LB Dressing Goal 1, OT-IRF) lower body dressing  -KN     [place] (LB Dressing Goal 1, OT-IRF) minimum assist (75% or more patient effort)  -KN     Time Frame (LB Dressing Goal 1, OT-IRF) short-term goal (STG);1 week  -KN       Row Name 12/27/23 1000          LB Dressing Goal 2 (OT-IRF)    Activity/Device (LB Dressing Goal 2, OT-IRF) lower body dressing  -KN     [place] (LB Dressing Goal 2, OT-IRF) contact guard required;supervision required  -KN     Time Frame (LB Dressing Goal 2, OT-IRF) long-term goal (LTG);2 weeks;3 weeks  -KN       Row Name 12/27/23 1000          Grooming Goal 1 (OT-IRF)    Activity/Device (Grooming Goal 1, OT-IRF) grooming skills, all  -KN     [place] (Grooming Goal 1, OT-IRF) independent  seated at sink  -KN     Time Frame (Grooming Goal 1, OT-IRF) long-term goal (LTG);2 weeks  -KN       Row Name 12/27/23 1000          Toileting Goal 1 (OT-IRF)    Activity/Device (Toileting Goal 1, OT-IRF) toileting skills, all  -KN     [place] Level (Toileting Goal 1, OT-IRF) minimum assist (75% or more patient effort);moderate assist (50-74% patient effort)  -KN     Time Frame (Toileting Goal 1, OT-IRF) short-term goal (STG);1 week  -KN       Row Name 12/27/23 1000          Toileting Goal 2 (OT-IRF)    Activity/Device (Toileting Goal 2, OT-IRF) toileting skills, all  -KN     [place] Level (Toileting Goal 2, OT-IRF) contact guard required;supervision required  -KN     Time Frame (Toileting Goal 2, OT-IRF) long-term goal (LTG);2 weeks;3 weeks  -KN       Row Name 12/27/23 1000          Strength Goal 1 (OT-IRF)    Strength Goal 1 (OT-IRF) Pt will improve B UE  shoulder strength in all planes to 4/5 MMT  -KN     Time Frame (Strength Goal 1, OT-IRF) long-term goal (LTG);3 weeks;2 weeks  -KN       Row Name 12/27/23 1000           Activity Tolerance Goal 1 (OT)    Activity Tolerance Goal 1 (OT) Pt will demo improve activity tolerance as seen through standing ADL task  -KN     Activity Level (Endurance Goal 1, OT) 10 min activity  -KN     Time Frame (Activity Tolerance Goal 1, OT) long term goal (LTG);2 weeks;3 weeks  -KN       Row Name 12/27/23 1000          Caregiver Training Goal 1 (OT-IRF)    Caregiver Training Goal 1 (OT-IRF) Pt and caregiver will be ind. with safety techniques for functional tf  -KN     Time Frame (Caregiver Training Goal 1, OT-IRF) long-term goal (LTG);2 weeks  -KN       Row Name 12/27/23 1000          Safety Awareness Goal 1 (OT-IRF)    Activity (Safety Awareness Goal 1, OT-IRF) awareness of need for assistance;awareness of right side;judgment;insight into deficits/self-awareness  -KN     [place]/Cues/Accuracy (Safety Awareness Goal 1, OT-IRF) with minimum  -KN     Time Frame (Safety Awareness Goal 1, OT-IRF) long-term goal (LTG);2 weeks;3 weeks  -KN               User Key  (r) = Recorded By, (t) = Taken By, (c) = Cosigned By      Initials Name Effective Dates    KN Andres Estrella, OT 08/02/22 -                      Occupational Therapy Education       Title: PT OT SLP Therapies (Not Started)       Topic: Occupational Therapy (Not Started)       Point: ADL training (Not Started)       Description:   Instruct learner(s) on proper safety adaptation and remediation techniques during self care or transfers.   Instruct in proper use of assistive devices.                  Learner Progress:  Not documented in this visit.              Point: Home exercise program (Not Started)       Description:   Instruct learner(s) on appropriate technique for monitoring, assisting and/or progressing therapeutic exercises/activities.                  Learner Progress:   Not documented in this visit.              Point: Precautions (Not Started)       Description:   Instruct learner(s) on prescribed precautions during self-care and functional transfers.                  Learner Progress:  Not documented in this visit.              Point: Body mechanics (Not Started)       Description:   Instruct learner(s) on proper positioning and spine alignment during self-care, functional mobility activities and/or exercises.                  Learner Progress:  Not documented in this visit.                                        OT Recommendation and Plan    Planned Therapy Interventions (OT): activity tolerance training, adaptive equipment training, BADL retraining, functional balance retraining, neuromuscular control/coordination retraining, manual therapy/joint mobilization, occupation/activity based interventions, orthotic fabrication/fitting/training, patient/caregiver education/training, strengthening exercise, transfer/mobility retraining, ROM/therapeutic exercise                    Time Calculation:      Time Calculation- OT       Row Name 12/27/23 1529 12/27/23 1202          Time Calculation- OT    OT Start Time 1230  -KN 0800  -KN     OT Stop Time 1300  -KN 0845  -KN     OT Time Calculation (min) 30 min  -KN 45 min  -KN     OT Received On 12/27/23  -KN 12/27/23  -KN        Timed Charges    39748 -  OT Neuromuscular Reeducation Minutes 30  -KN --     65584 - OT Self Care/Mgmt Minutes -- 25  -KN        Untimed Charges    OT Eval/Re-eval Minutes -- 20  -KN        Total Minutes    Timed Charges Total Minutes 30  -KN 25  -KN     Untimed Charges Total Minutes -- 20  -KN      Total Minutes 30  -KN 45  -KN               User Key  (r) = Recorded By, (t) = Taken By, (c) = Cosigned By      Initials Name Provider Type    Andres Burks OT Occupational Therapist                  Therapy Charges for Today       Code Description Service Date Service Provider Modifiers Qty    68780888692  OT SELF  CARE/MGMT/TRAIN EA 15 MIN 12/27/2023 Andres Estrella OT GO 2    08806524002 HC OT EVAL MOD COMPLEXITY 2 12/27/2023 Andres Estrella OT GO 1    55920770375 HC OT NEUROMUSC RE EDUCATION EA 15 MIN 12/27/2023 Andres Estrella OT GO 2                     Andres Estrella OT  12/27/2023

## 2023-12-27 NOTE — CASE MANAGEMENT/SOCIAL WORK
Case Management Discharge Note      Final Note: Patient discharged to [facility] Acute Rehab         Selected Continued Care - Discharged on 12/26/2023 Admission date: 12/21/2023 - Discharge disposition: Skilled Nursing Facility (D[facility] - [facility])      Destination    No services have been selected for the patient.                Durable Medical Equipment    No services have been selected for the patient.                Dialysis/Infusion    No services have been selected for the patient.                Home Medical Care    No services have been selected for the patient.                Therapy    No services have been selected for the patient.                Community Resources    No services have been selected for the patient.                Community & DME    No services have been selected for the patient.                         Final Discharge Disposition Code: 62 - inpatient rehab facility

## 2023-12-27 NOTE — PROGRESS NOTES
Recreational Therapy Note    Patient Name: Rekha Bear   MRN: [id number]    Therapeutic Recreation Eval and Treat (last 12 hours)       Therapeutic Recreation Eval & Treat       Row Name 12/27/23 1450       Pertinent Diagnosis/Presenting Problems    Presenting Problems/Reason for Referral Stroke  -TW      Row Name 12/27/23 1450       Lifestyle/Recreational History/Interest    Profession/Job retired  -TW    Current Living Situation alone  -TW    Transportation Situation car, drives self  -TW      Row Name 12/27/23 1450       Recreational History and Interests    How Important is Recreation to You? somewhat important  -TW    Satisfied With Leisure Lifestyle? yes  -TW    Participate Regularly in Leisure Activity? yes  -TW    Are You a Social Person? yes  -TW    Current Hobbies/Interests group/social activities;games;reading;television/movies/videos;family functions  -TW    Group/Social Activities family functions;dining out;shopping  -TW    Games card games  -TW      Row Name 12/27/23 1450       Barriers To Leisure Activity    Barriers to Leisure Activity cognitive limitations;mobility limitations  -TW    Cognitive Limitations (Barriers to Leisure) decreased attention span  -TW    Mobility Limitations (Barriers to Leisure) balance limitations;strength limitations;endurance limitations  -TW      Row Name 12/27/23 1450       Coping/Wellbeing    Current State of Wellbeing calm;positive attitude  -TW    Factors Impacting Current State of Wellbeing no significant issues  -TW      Row Name 12/27/23 1450       Therapeutic Recreation Participation    Orientation to Therapeutic Recreation patient;received explanation of recreation therapy programs;completed therapeutic recreation assessment  -TW      Row Name 12/27/23 1450       Therapeutic Recreation Assessment/Plan    Recreation Therapy Goals/Objectives improve;concentration attention span;functional leisure skills;strength and endurance  -TW    Recreation Plan leisure  exploration;structured leisure participation  -TW              User Key  (r) = Recorded By, (t) = Taken By, (c) = Cosigned By      Initials Name Provider Type    Rosa Herrera, KATERYNAS Recreational Therapist                      AMADEO [place]  12/27/2023

## 2023-12-27 NOTE — THERAPY EVALUATION
"Inpatient Rehabilitation - Physical Therapy Initial Evaluation       [facility]     Patient Name: Rekha Bear  : 1940  MRN: [id number]    Today's Date: 2023                    Admit Date: 2023      Visit Dx:   No diagnosis found.    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    Community acquired pneumonia of left lower lobe of lung    Hyponatremia    Pneumonia due to COVID-19 virus    Major depressive disorder, recurrent, mild    Acute respiratory failure with hypoxia    Supraventricular tachycardia    PVC (premature ventricular contraction)    Dyspnea on exertion    Dizziness    Depressive disorder    Gastroesophageal reflux disease    Midline cystocele    Rectocele    BMI 29.0-29.9,adult    Acute left PCA stroke    B12 deficiency    Bradycardia    Premature atrial contractions    Stroke       Past Medical History:   Diagnosis Date    Chilblains     \"Chilblian's\"    CKD (chronic kidney disease)     Depression     Fatty liver     GERD (gastroesophageal reflux disease)     Hyperlipidemia     Hypertension     Incontinence     Osteoarthritis     OA  Marvin THR, LT TKR - hydrocodone prescibed by Dr. 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 ENDOSCOPY;  Service:     DENTAL PROCEDURE      FOOT SURGERY      TONSILLECTOMY      TOTAL HIP ARTHROPLASTY Bilateral     OA  Marvin THR, LT TKR - hydrocodone prescibed by Dr. Almaguer    TOTAL KNEE ARTHROPLASTY Left     OA  Marvin THR, LT TKR - hydrocodone prescibed by Dr. Almaguer       PT ASSESSMENT (last 12 hours)       IRF PT Evaluation and Treatment       Row Name 12/27/23 0901          PT Time and Intention    Document Type initial evaluation  -MG     Mode of Treatment individual therapy;physical therapy  -MG     Patient/Family/Caregiver Comments/Observations Pt reports having a good day so far, pleasant and ready for PT.  -MG     Total Minutes, Physical Therapy 60  -MG       Row Name 12/27/23 0901          General Information    Patient Profile Reviewed yes  -MG     Existing Precautions/Restrictions fall  -MG     Limitations/Impairments visual  R visual field/peripheral deficit.  -MG     Comment, General Information Some R neglect/inattention.  -MG       Row Name 12/27/23 0901          Previous Level of Function/Home Environm    Bed Mobility, Premorbid Functional Level independent;uses device or equipment  -MG     Transfers, Premorbid Functional Level independent;uses device or equipment  -MG     Household Ambulation, Premorbid Functional Level independent;uses device or equipment  -MG     Stairs, Premorbid Functional Level independent;uses device or equipment  -MG     Community Ambulation, Premorbid Functional Level independent;uses device or equipment  -MG     Previous Level of Function, Premorbid Driving. 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 Recent history of falls.  -MG       Row Name 12/27/23 0901          Living Environment    Current Living Arrangements home  -MG     Home  Accessibility stairs to enter home;stairs within home  -MG     People in Home alone  -MG       Row Name 12/27/23 0901          Home Main Entrance    Number of Stairs, Main Entrance four;five  2STE front, but always goes in back where there is 4-5.  -MG     Stair Railings, Main Entrance railings safe and in good condition;railings on both sides of stairs  Back steps. Front steps thinks it is 1 HR, but is unsure.  -MG       Row Name 12/27/23 0901          Stairs Within Home, Primary    Stairs, Within Home, Primary Basement, but does not go down there. Laundry in basement - kids help w/ laundry until pt gets washer/dryer put in upstairs.  -MG     Number of Stairs, Within Home, Primary twelve  -MG       Row Name 12/27/23 0901          Home Use of Assistive/Adaptive Equipment    Equipment Currently Used at Home rollator;walker, rolling;cane, straight  States depending on where she was going, but mainly used rollator. Held onto the wall or rollator inside the home.  -MG       Row Name 12/27/23 0901          Pain Assessment    Pretreatment Pain Rating 0/10 - no pain  -MG     Posttreatment Pain Rating 0/10 - no pain  -MG       Row Name 12/27/23 0901          Cognition/Psychosocial    Affect/Mental Status (Cognition) confused  -MG     Orientation Status (Cognition) oriented to;person;place;situation;disoriented to;time;verbal cues/prompts needed for orientation  Stated Dec 1921. With increased time able to state 2023.  -MG     Follows Commands (Cognition) follows one-step commands;75-90% accuracy;verbal cues/prompting required  -MG     Personal Safety Interventions fall prevention program maintained;gait belt;muscle strengthening facilitated;nonskid shoes/slippers when out of bed;supervised activity  -MG       Row Name 12/27/23 0901          Range of Motion Comprehensive    General Range of Motion bilateral lower extremity ROM WNL  -MG       Row Name 12/27/23 0901          Strength Comprehensive (MMT)    Comment, General  Manual Muscle Testing (MMT) Assessment BLE 4+/5  -MG       Row Name 12/27/23 0901          Sensory    Additional Documentation Sensory Assessment (Somatosensory) (Group)  -MG       Row Name 12/27/23 0901          Sensory Assessment (Somatosensory)    Sensory Assessment (Somatosensory) LE sensation intact  N/T BLEs knees down - chronic.  -MG       Row Name 12/27/23 0901          Mobility    Advanced Gait Activity rough/uneven surfaces  -MG     Additional Documentation Advanced Gait Activity (Row)  -MG       Row Name 12/27/23 0901          Bed Mobility    Bed Mobility rolling left;rolling right;supine-sit;sit-supine  -MG     Rolling Left [place] (Bed Mobility) standby assist  -MG     Rolling Right [place] (Bed Mobility) standby assist  -MG     Supine-Sit [place] (Bed Mobility) minimum assist (75% patient effort);verbal cues  -MG     Sit-Supine [place] (Bed Mobility) contact guard  -MG     Bed Mobility, Safety Issues decreased use of arms for pushing/pulling;decreased use of legs for bridging/pushing  -MG     Assistive Device (Bed Mobility) other (see comments)  Mat table  -MG     Comment, (Bed Mobility) L log roll to sit. Increased time to tsf to sitting. Required cueing for directioning of task.  -MG       Row Name 12/27/23 0901          Transfer Assessment/Treatment    Transfers bed-chair transfer;chair-bed transfer;sit-stand transfer;stand-sit transfer;car transfer  -MG       Row Name 12/27/23 0901          Bed-Chair Transfer    Bed-Chair [place] (Transfers) minimum assist (75% patient effort);verbal cues  -MG     Assistive Device (Bed-Chair Transfers) wheelchair;walker, front-wheeled  -MG     Comment, (Bed-Chair Transfer) Cueing for sequencing and visual scanning to R.  -MG       Row Name 12/27/23 0901          Chair-Bed Transfer    Chair-Bed [place] (Transfers) minimum assist (75% patient effort);verbal cues  -MG     Assistive Device (Chair-Bed Transfers) wheelchair;walker,  front-wheeled  -MG     Comment, (Chair-Bed Transfer) Cueing for sequencing and visual scanning to R.  -MG       Row Name 12/27/23 0901          Sit-Stand Transfer    Sit-Stand [place] (Transfers) minimum assist (75% patient effort);verbal cues  -MG     Assistive Device (Sit-Stand Transfers) wheelchair;walker, front-wheeled  -MG     Comment, (Sit-Stand Transfer) Cueing for hand placement.  -MG       Row Name 12/27/23 0901          Stand-Sit Transfer    Stand-Sit [place] (Transfers) minimum assist (75% patient effort);verbal cues  -MG     Assistive Device (Stand-Sit Transfers) wheelchair;walker, front-wheeled  -MG     Comment, (Stand-Sit Transfer) Cued for hand placement.  -MG       Row Name 12/27/23 0901          Car Transfer    Type (Car Transfer) sit-stand;stand-sit  -MG     [place] Level (Car Transfer) minimum assist (75% patient effort);verbal cues  -MG     Assistive Device (Car Transfer) walker, front-wheeled  -MG     Comment, (Car Transfer) Good carryover of teaching back education on hand placement and safety, but difficulty following requiring cueing for hand placement and sequencing.  -MG       Row Name 12/27/23 0901          Gait/Stairs (Locomotion)    [place] Level (Gait) minimum assist (75% patient effort);verbal cues  -MG     Assistive Device (Gait) walker, front-wheeled  -MG     Distance in Feet (Gait) 80' x2  -MG     Pattern (Gait) step-to;step-through  -MG     Deviations/Abnormal Patterns (Gait) gait speed decreased;base of support, narrow;stride length decreased;weight shifting decreased  -MG     Bilateral Gait Deviations forward flexed posture;heel strike decreased  -MG     Gait Assessment/Intervention R veering. Cued and assisted with RW management for obstacle avoidance and turning to R. Cueing for visual scanning/turning head to R. Unsteady throughout each gait bout, but no knee buckling or overt LOB. One moment of scissoring during a turn.  -MG     [place] Level (Stairs)  contact guard;minimum assist (75% patient effort);2 person assist;verbal cues  -MG     Handrail Location (Stairs) both sides  -MG     Number of Steps (Stairs) 4  -MG     Ascending Technique (Stairs) step-to-step  -MG     Descending Technique (Stairs) step-to-step  -MG     Stairs, Safety Issues balance decreased during turns  -MG     Stairs, Impairments strength decreased;impaired balance;coordination impaired;impaired vision  -MG     Stairs Assessment/Intervention R neglect. Required cueing for RUE on handrail. Decreased balance during turn. Cueing to turn head to R for visual scanning to find handrail, place RUE on hand rail and for visualizing step prior to descending.  -       Row Name 12/27/23 0901          Safety Issues, Functional Mobility    Safety Issues Affecting Function (Mobility) awareness of need for assistance;impulsivity;insight into deficits/self-awareness;judgment;safety precaution awareness;safety precautions follow-through/compliance;sequencing abilities  -MG     Impairments Affecting Function (Mobility) balance;cognition;coordination;endurance/activity tolerance;strength;visual/perceptual  -MG       Row Name 12/27/23 0901          Rough/Uneven Surface Gait Skills (Mobility)    [place], Gait on Rough/Uneven Surface (Mobility) minimal assist, 75% or more patient effort;moderate assist, 50-74% patient effort;verbal cues  -MG     Assistive Device (Rough/Uneven Surface Gait) walker, front-wheeled  -MG     Distance in Feet (Rough/Uneven Surface Gait) 10  -MG     Comment, Gait Rough/Uneven Surface (Mobility) Red mat. Decreased balance requiring up to ModA to maintain. Leans to R and has a NBOS.  -MG       Row Name 12/27/23 0901          Motor Skills    Motor Skills coordination  -     Coordination WFL;heel to shin  BLEs SLICK WFL  -MG     Therapeutic Exercise aerobic;core strength;ankle;knee;hip  -MG       Row Name 12/27/23 0901          Hip (Therapeutic Exercise)    Hip (Therapeutic Exercise)  strengthening exercise  -MG       Row Name 12/27/23 0901          Knee (Therapeutic Exercise)    Knee (Therapeutic Exercise) strengthening exercise  -MG       Row Name 12/27/23 0901          Ankle (Therapeutic Exercise)    Ankle (Therapeutic Exercise) AROM (active range of motion)  -MG       Row Name 12/27/23 0901          Positioning and Restraints    Pre-Treatment Position sitting in chair/recliner  -MG     Post Treatment Position wheelchair  PM in room w/ family present, call light in reach and exit alarm on.  -MG     In Wheelchair sitting;call light within reach;encouraged to call for assist;exit alarm on  w/ SW  -MG       Row Name 12/27/23 0901          Therapy Assessment/Plan (PT)    Functional Level at Time of Evaluation (PT) Sean  -MG     PT Diagnosis (PT) Pt is a 84 y/o F who presented to [facility] on 12/21 after being found down in her home with confusion/weakness. Imaging (+) L PCA CVA. Pt subsequently tsf'd to [facility] on 12/26. Pt currently presents with decreased strength, impaired balance/coordination, decreased endurance/activity tolerance, R neglect/inattention, impaired cognition and R visual field deficit. Pt requires primarily Sean for all mobility tasks w/ use of RW and frequent cueing/directioning. Pt will benefit from skilled PT services to address the deficits listed above and progress her functional mobility to decrease her falls risk for a safe return home.  -MG     Rehab Potential/Prognosis (PT) good, to achieve stated therapy goals  -MG     Frequency of Treatment (PT) 60 minutes per session;5 times per week  -MG     Estimated Duration of Therapy (PT) 2 weeks;3 weeks  -MG     Problem List (PT) problems related to;balance;cognition;coordination;mobility;motor control;sensation;strength;postural control;vision;inability to direct their own care  R neglect/inattention.  -MG     Activity Limitations Related to Problem List (PT) unable to ambulate safely;unable to transfer safely;BADLs not performed  adequately or safely;IADLs not performed adequately or safely;community activities not performed adequately or safely;home management activity not performed adequately or safely  -MG       Row Name 12/27/23 0901          Daily Progress Summary (PT)    Daily Progress Summary (PT) Family present at end of session in room. Discussed sitting on pts R side and having her visually scan for family and turning head to R when talking.  -MG       Row Name 12/27/23 0901          IRF PT Goals    Bed Mobility Goal Selection (PT-IRF) bed mobility, PT goal 1  -MG     Transfer Goal Selection (PT-IRF) transfers, PT goal 1;transfers, PT goal 2;transfers, PT goal 3  -MG     Gait (Walking Locomotion) Goal Selection (PT-IRF) gait, PT goal 1;gait, PT goal 2  -MG     Stairs Goal Selection (PT-IRF) stairs, PT goal 1;stairs, PT goal 2  -MG       Row Name 12/27/23 0901          Bed Mobility Goal 1 (PT-IRF)    Activity/Assistive Device (Bed Mobility Goal 1, PT-IRF) bed mobility activities, all  -MG     [place] Level (Bed Mobility Goal 1, PT-IRF) supervision required  -MG     Time Frame (Bed Mobility Goal 1, PT-IRF) long-term goal (LTG);2 weeks  -MG       Row Name 12/27/23 0901          Transfer Goal 1 (PT-IRF)    Activity/Assistive Device (Transfer Goal 1, PT-IRF) sit-to-stand/stand-to-sit;bed-to-chair/chair-to-bed  -MG     [place] Level (Transfer Goal 1, PT-IRF) contact guard required  -MG     Time Frame (Transfer Goal 1, PT-IRF) short-term goal (STG);1 week  -MG       Row Name 12/27/23 0901          Transfer Goal 2 (PT-IRF)    Activity/Assistive Device (Transfer Goal 2, PT-IRF) sit-to-stand/stand-to-sit;bed-to-chair/chair-to-bed  -MG     [place] Level (Transfer Goal 2, PT-IRF) standby assist;supervision required  -MG     Time Frame (Transfer Goal 2, PT-IRF) long-term goal (LTG);2 weeks  -MG       Row Name 12/27/23 0901          Transfer Goal 3 (PT-IRF)    Activity/Assistive Device (Transfer Goal 3, PT-IRF) car transfer  -MG      [place] Level (Transfer Goal 3, PT-IRF) standby assist  -MG     Time Frame (Transfer Goal 3, PT-IRF) long-term goal (LTG);2 weeks  -MG       Row Name 12/27/23 0901          Gait/Walking Locomotion Goal 1 (PT-IRF)    Activity/Assistive Device (Gait/Walking Locomotion Goal 1, PT-IRF) gait (walking locomotion)  LRAD  -MG     Gait/Walking Locomotion Distance Goal 1 (PT-IRF) 80  -MG     [place] Level (Gait/Walking Locomotion Goal 1, PT-IRF) contact guard required  -MG     Time Frame (Gait/Walking Locomotion Goal 1, PT-IRF) short-term goal (STG);1 week  -MG       Row Name 12/27/23 0901          Gait/Walking Locomotion Goal 2 (PT-IRF)    Activity/Assistive Device (Gait/Walking Locomotion Goal 2, PT-IRF) gait (walking locomotion)  LRAD  -MG     Gait/Walking Locomotion Distance Goal 2 (PT-IRF) 160  -MG     [place] Level (Gait/Walking Locomotion Goal 2, PT-IRF) standby assist;supervision required  -MG     Time Frame (Gait/Walking Locomotion Goal 2, PT-IRF) long-term goal (LTG);2 weeks  -MG       Row Name 12/27/23 0901          Stairs Goal 1 (PT-IRF)    Activity/Assistive Device (Stairs Goal 1, PT-IRF) stairs, all skills  -MG     Number of Stairs (Stairs Goal 1, PT-IRF) 4  -MG     [place] Level (Stairs Goal 1, PT-IRF) contact guard required  -MG     Time Frame (Stairs Goal 1, PT-IRF) short-term goal (STG);1 week  -MG       Row Name 12/27/23 0901          Stairs Goal 2 (PT-IRF)    Activity/Assistive Device (Stairs Goal 2, PT-IRF) stairs, all skills  -MG     Number of Stairs (Stairs Goal 2, PT-IRF) 4  -MG     [place] Level (Stairs Goal 2, PT-IRF) standby assist  -MG     Time Frame (Stairs Goal 2, PT-IRF) long-term goal (LTG);2 weeks  -MG               User Key  (r) = Recorded By, (t) = Taken By, (c) = Cosigned By      Initials Name Provider Type    MG Dayanna Turpin, PT Physical Therapist                  Wound 12/27/23 0045 Left posterior third toe (Active)   Wound Image   12/27/23 0047   Closure  Open to air 12/27/23 0901   Base black eschar;dry 12/27/23 1147   Periwound Temperature warm 12/27/23 0047   [place] Skin Turgor soft 12/27/23 0047   Edges callused 12/27/23 0047   Care, Wound cleansed with 12/27/23 1147     Physical Therapy Education       Title: PT OT SLP Therapies (In Progress)       Topic: Physical Therapy (In Progress)       Point: Mobility training (Done)       Learning Progress Summary             Patient Acceptance, E,TB,D, VU,NR by  at 12/27/2023 1554                         Point: Home exercise program (Not Started)       Learner Progress:  Not documented in this visit.              Point: Body mechanics (Done)       Learning Progress Summary             Patient Acceptance, E,TB,D, VU,NR by  at 12/27/2023 1554                         Point: Precautions (Done)       Learning Progress Summary             Patient Acceptance, E,TB,D, VU,NR by  at 12/27/2023 1554                                         User Key       Initials Effective Dates Name Provider Type Discipline     05/24/22 -  Dayanna Turpin, PT Physical Therapist PT                    PT Recommendation and Plan    Planned Therapy Interventions (PT): balance training, bed mobility training, gait training, home exercise program, lumbar stabilization, manual therapy techniques, motor coordination training, neuromuscular re-education, orthotic fitting/training, patient/family education, postural re-education, ROM (range of motion), stair training, strengthening, stretching, swiss ball techniques, transfer training, wheelchair management/propulsion training  Frequency of Treatment (PT): 60 minutes per session, 5 times per week     Daily Progress Summary (PT)  Daily Progress Summary (PT): Family present at end of session in room. Discussed sitting on pts R side and having her visually scan for family and turning head to R when talking.               Time Calculation:      PT Charges       Row Name 12/27/23 1542 12/27/23 1545           Time Calculation    Start Time 1430  -MG 0900  -MG     Stop Time 1500  -MG 0930  -MG     Time Calculation (min) 30 min  -MG 30 min  -MG     PT Received On -- 12/27/23  -MG     PT - Next Appointment -- 12/28/23  -MG     PT Goal Re-Cert Due Date -- 01/03/24  -MG               User Key  (r) = Recorded By, (t) = Taken By, (c) = Cosigned By      Initials Name Provider Type    MG Dayanna Turpin, PT Physical Therapist                    Therapy Charges for Today       Code Description Service Date Service Provider Modifiers Qty    70486336305  PT EVAL MOD COMPLEXITY 2 12/27/2023 Dayanna Turpin, PT GP 1    96777465013 HC PT THERAPEUTIC ACT EA 15 MIN 12/27/2023 Dayanna Turpin, PT GP 3    65363964059 HC PT THER SUPP EA 15 MIN 12/27/2023 Dayanna Turpin, PT GP 1              PT G-Codes  AM-PAC 6 Clicks Score (PT): 16      Dayanna Turpin PT  12/27/2023

## 2023-12-27 NOTE — PLAN OF CARE
Goal Outcome Evaluation:    SLP completed thorough cognitive linguistic evaluation utilizing CLQT (Cognitive Linguistic Quick Test). 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 Within BIMS, pt disoriented to MATI, and unable to recall items with 5 minute delay, cues increase to only 1/3 recall with 5 minute delay.   Within conversation, note over x8 anomic events with pt unable to repair for personally salient words/information including city where child lives, prior workplace, prior surgeries, restaurants, places.     Overall impression, mild cognitive impairment characterized by visuospatial impairment, attention deficits; mild to moderate expressive aphasia at sentence/conversational level. Pt will benefit from ongoing diagnostic cognitive tx, acute rehabilitation therapy for return to all PLOF. Pt lives home alone, manages all IADLS including driving, finances, medications and hopes to return to independence as much as possible.

## 2023-12-27 NOTE — PROGRESS NOTES
Inpatient Rehabilitation Plan of Care Note    Plan of Care  Care Plan Reviewed - No updates at this time.    Safety    Performed Intervention(s)  Safety rounds  Bed alarm and/chair alarm      Psychosocial    Performed Intervention(s)  Vebalizes her needs and concerns      Sphincter Control    Performed Intervention(s)  Bladder training program  Encourage fluid intake  Incontinence products    Signed by: Josefina Gibson RN

## 2023-12-28 LAB
25(OH)D3 SERPL-MCNC: 13.9 NG/ML (ref 30–100)
ANION GAP SERPL CALCULATED.3IONS-SCNC: 9.7 MMOL/L (ref 5–15)
BASOPHILS # BLD AUTO: 0.03 10*3/MM3 (ref 0–0.2)
BASOPHILS NFR BLD AUTO: 0.5 % (ref 0–1.5)
BUN SERPL-MCNC: 22 MG/DL (ref 8–23)
BUN/CREAT SERPL: 17.7 (ref 7–25)
CALCIUM SPEC-SCNC: 9.1 MG/DL (ref 8.6–10.5)
CHLORIDE SERPL-SCNC: 104 MMOL/L (ref 98–107)
CO2 SERPL-SCNC: 25.3 MMOL/L (ref 22–29)
CREAT SERPL-MCNC: 1.24 MG/DL (ref 0.57–1)
DEPRECATED RDW RBC AUTO: 47.8 FL (ref 37–54)
EGFRCR SERPLBLD CKD-EPI 2021: 43.8 ML/MIN/1.73
EOSINOPHIL # BLD AUTO: 0.23 10*3/MM3 (ref 0–0.4)
EOSINOPHIL NFR BLD AUTO: 3.5 % (ref 0.3–6.2)
ERYTHROCYTE [DISTWIDTH] IN BLOOD BY AUTOMATED COUNT: 14.5 % (ref 12.3–15.4)
GLUCOSE BLDC GLUCOMTR-MCNC: 140 MG/DL (ref 70–130)
GLUCOSE BLDC GLUCOMTR-MCNC: 165 MG/DL (ref 70–130)
GLUCOSE BLDC GLUCOMTR-MCNC: 212 MG/DL (ref 70–130)
GLUCOSE BLDC GLUCOMTR-MCNC: 222 MG/DL (ref 70–130)
GLUCOSE SERPL-MCNC: 228 MG/DL (ref 65–99)
HCT VFR BLD AUTO: 34.3 % (ref 34–46.6)
HGB BLD-MCNC: 11.8 G/DL (ref 12–15.9)
IMM GRANULOCYTES # BLD AUTO: 0.03 10*3/MM3 (ref 0–0.05)
IMM GRANULOCYTES NFR BLD AUTO: 0.5 % (ref 0–0.5)
LYMPHOCYTES # BLD AUTO: 2.05 10*3/MM3 (ref 0.7–3.1)
LYMPHOCYTES NFR BLD AUTO: 31.3 % (ref 19.6–45.3)
MCH RBC QN AUTO: 31.1 PG (ref 26.6–33)
MCHC RBC AUTO-ENTMCNC: 34.4 G/DL (ref 31.5–35.7)
MCV RBC AUTO: 90.5 FL (ref 79–97)
MONOCYTES # BLD AUTO: 0.67 10*3/MM3 (ref 0.1–0.9)
MONOCYTES NFR BLD AUTO: 10.2 % (ref 5–12)
NEUTROPHILS NFR BLD AUTO: 3.54 10*3/MM3 (ref 1.7–7)
NEUTROPHILS NFR BLD AUTO: 54 % (ref 42.7–76)
NRBC BLD AUTO-RTO: 0 /100 WBC (ref 0–0.2)
PLATELET # BLD AUTO: 205 10*3/MM3 (ref 140–450)
PMV BLD AUTO: 8.8 FL (ref 6–12)
POTASSIUM SERPL-SCNC: 4.3 MMOL/L (ref 3.5–5.2)
RBC # BLD AUTO: 3.79 10*6/MM3 (ref 3.77–5.28)
SODIUM SERPL-SCNC: 139 MMOL/L (ref 136–145)
WBC NRBC COR # BLD AUTO: 6.55 10*3/MM3 (ref 3.4–10.8)

## 2023-12-28 PROCEDURE — 97112 NEUROMUSCULAR REEDUCATION: CPT | Performed by: OCCUPATIONAL THERAPIST

## 2023-12-28 PROCEDURE — 97130 THER IVNTJ EA ADDL 15 MIN: CPT

## 2023-12-28 PROCEDURE — 63710000001 INSULIN LISPRO (HUMAN) PER 5 UNITS: Performed by: INTERNAL MEDICINE

## 2023-12-28 PROCEDURE — 97535 SELF CARE MNGMENT TRAINING: CPT

## 2023-12-28 PROCEDURE — 97129 THER IVNTJ 1ST 15 MIN: CPT

## 2023-12-28 PROCEDURE — 97112 NEUROMUSCULAR REEDUCATION: CPT

## 2023-12-28 PROCEDURE — 80048 BASIC METABOLIC PNL TOTAL CA: CPT | Performed by: PHYSICAL MEDICINE & REHABILITATION

## 2023-12-28 PROCEDURE — 82306 VITAMIN D 25 HYDROXY: CPT | Performed by: PHYSICAL MEDICINE & REHABILITATION

## 2023-12-28 PROCEDURE — 25010000002 CYANOCOBALAMIN PER 1000 MCG: Performed by: INTERNAL MEDICINE

## 2023-12-28 PROCEDURE — 97116 GAIT TRAINING THERAPY: CPT

## 2023-12-28 PROCEDURE — 82948 REAGENT STRIP/BLOOD GLUCOSE: CPT

## 2023-12-28 PROCEDURE — 85025 COMPLETE CBC W/AUTO DIFF WBC: CPT | Performed by: PHYSICAL MEDICINE & REHABILITATION

## 2023-12-28 RX ORDER — ERGOCALCIFEROL 1.25 MG/1
50000 CAPSULE ORAL
Status: DISPENSED | OUTPATIENT
Start: 2023-12-28

## 2023-12-28 RX ADMIN — ATORVASTATIN CALCIUM 40 MG: 20 TABLET, FILM COATED ORAL at 21:25

## 2023-12-28 RX ADMIN — CYANOCOBALAMIN 1000 MCG: 1000 INJECTION, SOLUTION INTRAMUSCULAR; SUBCUTANEOUS at 08:44

## 2023-12-28 RX ADMIN — ASPIRIN 325 MG: 325 TABLET ORAL at 08:44

## 2023-12-28 RX ADMIN — INSULIN LISPRO 2 UNITS: 100 INJECTION, SOLUTION INTRAVENOUS; SUBCUTANEOUS at 21:26

## 2023-12-28 RX ADMIN — GABAPENTIN 400 MG: 400 CAPSULE ORAL at 06:07

## 2023-12-28 RX ADMIN — INSULIN LISPRO 3 UNITS: 100 INJECTION, SOLUTION INTRAVENOUS; SUBCUTANEOUS at 08:44

## 2023-12-28 RX ADMIN — GABAPENTIN 400 MG: 400 CAPSULE ORAL at 14:49

## 2023-12-28 RX ADMIN — CHOLECALCIFEROL TAB 10 MCG (400 UNIT) 400 UNITS: 10 TAB at 08:44

## 2023-12-28 RX ADMIN — VENLAFAXINE HYDROCHLORIDE 150 MG: 150 CAPSULE, EXTENDED RELEASE ORAL at 08:44

## 2023-12-28 RX ADMIN — INSULIN LISPRO 3 UNITS: 100 INJECTION, SOLUTION INTRAVENOUS; SUBCUTANEOUS at 17:30

## 2023-12-28 RX ADMIN — OXYBUTYNIN CHLORIDE 10 MG: 10 TABLET, EXTENDED RELEASE ORAL at 08:44

## 2023-12-28 RX ADMIN — LEVOTHYROXINE SODIUM 100 MCG: 100 TABLET ORAL at 06:07

## 2023-12-28 RX ADMIN — BUPROPION HYDROCHLORIDE 150 MG: 150 TABLET, EXTENDED RELEASE ORAL at 06:07

## 2023-12-28 RX ADMIN — GABAPENTIN 400 MG: 400 CAPSULE ORAL at 21:26

## 2023-12-28 RX ADMIN — ERGOCALCIFEROL 50000 UNITS: 1.25 CAPSULE ORAL at 14:49

## 2023-12-28 NOTE — PROGRESS NOTES
Inpatient Rehabilitation Plan of Care Note    Plan of Care  Care Plan Reviewed - No updates at this time.    Safety    [RN] Potential for Injury(Active)  Current Status(12/27/2023): Risk for fall  Weekly Goal(01/03/2024): Cue to use call bell  Discharge Goal: PT /Family will be aware of risk for fall and safety in the home  setting    Performed Intervention(s)  Safety rounds  Bed alarm and/chair alarm      Psychosocial    [RN] Coping/Adjustment(Active)  Current Status(12/27/2023): Expressing appropriate coping  Weekly Goal(01/03/2024): Provide educational material regarding stroke  Discharge Goal: Knowleadgeable of care needs and stroke recovery    Performed Intervention(s)  Vebalizes her needs and concerns      Sphincter Control    [RN] Bladder Management(Active)  Current Status(12/27/2023): Bladder urgency  Weekly Goal(01/03/2024): Continent 50%  Discharge Goal: Continent 100%    [RN] Bowel Management(Active)  Current Status(12/27/2023): Continent 100%  Weekly Goal(01/03/2024): Continent 100%  Discharge Goal: Continent 100%    Performed Intervention(s)  Bladder training program  Encourage fluid intake  Incontinence products    Signed by: Bailey Thompson RN

## 2023-12-28 NOTE — THERAPY TREATMENT NOTE
"Inpatient Rehabilitation - Speech Language Pathology Treatment Note    [facility]     Patient Name: Rekha Bear  : 1940  MRN: [id number]    Today's Date: 2023                   Admit Date: 2023       Visit Dx:    No diagnosis found.    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    Community acquired pneumonia of left lower lobe of lung    Hyponatremia    Pneumonia due to COVID-19 virus    Major depressive disorder, recurrent, mild    Acute respiratory failure with hypoxia    Supraventricular tachycardia    PVC (premature ventricular contraction)    Dyspnea on exertion    Dizziness    Depressive disorder    Gastroesophageal reflux disease    Midline cystocele    Rectocele    BMI 29.0-29.9,adult    Acute left PCA stroke    B12 deficiency    Bradycardia    Premature atrial contractions    Stroke       Past Medical History:   Diagnosis Date    Chilblains     \"Chilblian's\"    CKD (chronic kidney disease)     Depression     Fatty liver     GERD (gastroesophageal reflux disease)     Hyperlipidemia     Hypertension     Incontinence     Osteoarthritis     OA  Marvin THR, LT TKR - hydrocodone prescibed by Dr. 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 Almaguer    TOTAL KNEE ARTHROPLASTY Left     OA  Marvin THR, LT TKR - hydrocodone prescibed by Dr. Almaguer       SLP Recommendation and Plan                                                               SLP EVALUATION (last 72 hours)       SLP SLC Evaluation       Row Name 12/28/23 1400 12/28/23 0930 12/27/23 1200 12/27/23 1030          Communication Assessment/Intervention    Document Type therapy note (daily note)  -CR therapy note (daily note)  -CR -- evaluation  -AB     Subjective Information no complaints  -CR no complaints  -CR -- no complaints  -AB     Patient Observations alert;cooperative  -CR alert;cooperative  -CR -- alert;cooperative;agree to therapy  -AB     Patient/Family/Caregiver Comments/Observations -- -- -- pt seen in room 4401, motivated, pleasant, cooperative  -AB     Patient Effort excellent  -CR excellent  -CR -- excellent  -AB     Symptoms Noted During/After Treatment -- -- -- none  -AB        General Information    Patient Profile Reviewed yes  -CR yes  -CR -- yes  -AB     Pertinent History Of Current Problem -- -- -- 83-year-old female who presented to [facility] on December 21, 2023 with history of hypertension, hyperlipidemia, chronic kidney disease who had difficulty coming to the door of her home.  It took her over an hour to get the door open.  Send on she was confused and had urinated on self.  Had trouble seeing to the right side.  Presented to [facility] diagnosed with a left PCA stroke.     She has had cognitive impairment, right pharmacy [place], impairments  with her mobility and self-care.  For stroke prophylaxis she is on aspirin statin with aspirin to continue through December 28 and switch to Eliquis 5 mg twice daily as felt to be cardioembolic source.  She had bradycardia that resolved off of beta-blocker.  Started on B12 replacement.     She has right homonymous hemianopsia impairments with her memory.  On December 22 noted to have moderate to severe cognitive deficits and mild to moderate expressive aphasia.  With physical therapy she ambulated about 60 feet, right neck leg contact-guard min assist for balance, leans to the left.     Given her functional Impairment and comorbidities now admitted for acute inpatient rehabilitation.     Denies any headache.  Continues with impaired vision to the right side.  No swallowing complaints.  Denies any focal weakness.  Tolerating therapies.  No bowel or bladder complaints.  -AB     Precautions/Limitations, Vision -- -- -- vision impairment, right  homonymous hemianopsia  -AB     Precautions/Limitations, Hearing -- -- -- WFL  -AB     Patient Level of Education -- -- -- Social hx: pt lives in home alone, independent with all IADLS including driving, finance, medication management. 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 Pt and family report mild memory/word finding concerns at baseline. Pt enjoys playing poker, watching TV, going out to eat.  -AB     Prior Level of Function-Communication -- -- -- WFL;other (see comments)  mild memory, word finding deficits  -AB     Plans/Goals Discussed with -- -- -- patient;agreed upon  -AB     Barriers to Rehab -- -- -- none identified  -AB     Patient's Goals for Discharge -- -- -- return to all previous roles/activities  -AB        Pain    Additional Documentation -- -- -- Pain Scale: Numbers Pre/Post-Treatment (Group)  -AB        Pain Scale: Numbers Pre/Post-Treatment    Pretreatment Pain Rating 0/10 - no pain  -CR 0/10 - no pain  -CR -- 0/10 - no pain   -AB     Posttreatment Pain Rating 0/10 - no pain  -CR -- -- 0/10 - no pain  -AB        Standardized Tests    Cognitive/Memory Tests -- -- CLQT: Cognitive Linguistic Quick Test  -AB --        CLQT (The Cognitive Linguistic Quick Test)    Attention Domain Score -- -- 101  -AB --     Attention Severity Rating -- -- 3: Mild  -AB --     Memory Domain Score -- -- 170  -AB --     Memory Severity Rating -- -- 4: WNL  -AB --     Executive Function Domain Score -- -- 30  -AB --     Executive Function Severity Rating -- -- 4: WNL  -AB --     Language Domain Score -- -- 33  -AB --     Language Severity Rating -- -- 4: WNL  -AB --     Visuospatial Domain Score -- -- 68  -AB --     Visuospatial Severity Rating -- -- 4: WNL  -AB --     Clock Drawing Total Score -- -- 8  -AB --     Clock Drawing Severity Rating -- -- Moderate  -AB --     Composite Severity Rating -- -- 3.8  -AB --     Composite Severity Rating Range -- -- 4.0 - 3.5: WNL  -AB --     CLQT Comments -- -- Within subtests, note the following clinical impressions. PERSONAL FACTS:  (\[facility] [place] [facility]\"" year of birth 2003). SYMBOL CANCELLATION:  (incorrect symbol, R neglect). CONFRONTATION NAMING: 10/10. CLOCK DRAWIN/13 (numbers not arranged in [place], poorly spaced with L sided errors, hands of equal length, both pointing at 11). STORY RETELLIN/18 for story elements,  for paragraph level comprehension. SYMBOL TRAILS: 9/10 (error x1 missed pattern) GENERATIVE NAMING: x7 concrete items within 1 minute, x24 abstract items within x1 minute. DESIGN MEMORY: ; MAZES:  (crosses walls, incorrect completion). DESIGN GENERATION: x10 different designs, x3 self repetitions.   Although scores indicated cognition WNL, x3 subtests below criterion cut off - personal facts (paraphasia, \[facility] [place] [facility]\""  of birth ); symbol cancellation (2/12 correct and R sided neglect); and clock draw (inaccurate spacing, 2 hands equal length with wrong time).   Within BIMS, pt disoriented to  MATI, and unable to recall items with 5 minute delay, cues increase to only 1/3 recall with 5 minute delay.   Within conversation, note over x8 anomic events with pt unable to repair for personally salient words/information including city where child lives, prior workplace, prior surgeries, restaurants, places.   Overall impression, mild cognitive impairment characterized by visuospatial impairment, attention deficits; mild to moderate expressive aphasia at sentence/conversational level. Pt will benefit from ongoing diagnostic cognitive tx, acute rehabilitation therapy for return to all PLOF. Pt lives home alone, manages all IADLS including driving, finances, medications and hopes to return to independence sa much as possible.  -AB --        SLP Evaluation Clinical Impressions    SLP Diagnosis -- -- mild;cognitive-linguistic disorder;mild-moderate;aphasia  -AB --     Rehab Potential/Prognosis -- -- excellent  -AB --     SLC Criteria for Skilled Therapy Interventions Met -- -- yes  -AB --     Functional Impact -- -- difficulty in expressing complex messages;restrictions in personal and social life;difficulty completing home management task  -AB --        SLP Treatment Clinical Impressions    Care Plan Review -- -- evaluation/treatment results reviewed;care plan/treatment goals reviewed;risks/benefits reviewed;current/potential barriers reviewed;patient/other agree to care plan  -AB --        Recommendations    Therapy Frequency (SLP SLC) -- -- 5 days per week  -AB --     Predicted Duration Therapy Intervention (Days) -- -- until discharge  -AB --     Anticipated Discharge Disposition (SLP) -- -- home with home health;home with  services  -AB --        Communication Treatment Objective and Progress Goals (SLP)    Diagnostic Treatment Objective -- -- Diagnostic Treatment Objectives (Group)  -AB --     Verbal Expression Treatment Objectives -- -- Verbal Expression Treatment Objectives (Group)  -AB --        Diagnostic  Treatment Objectives    Diagnostic Treatment Objective Selection -- -- Diagnostic Treatment Objectives  -AB --        SLP Diagnostic Treatment     Patient will participate in further assessment in the following areas -- -- verbal expression;higher-level cognitive-linguistic   -AB --     Time Frame (Diagnostic) -- -- short term goal (STG)  -AB --     Progress/Outcomes (Additional Goal 1, SLP) -- -- new goal  -AB --        Verbal Expression Treatment Objectives    Connected Speech Selection -- -- connected speech, SLP goal 2  -AB --        Word Retrieval Skills Goal 1 (SLP)    Improve Word Retrieval Skills By Goal 1 (SLP) -- -- completing functional word finding tasks;90%;independently (over 90% accuracy)  -AB --     Time Frame (Word Retrieval Goal 1, SLP) -- -- short term goal (STG)  -AB --     Progress/Outcomes (Word Retrieval Goal 1, SLP) -- -- new goal  -AB --        Ability to Construct Phrase and Sentence Level Response Goal 1 (SLP)    Improve Ability to Construct Phrase and Sentence Level Responses By Goal 1 (SLP) -- -- constructing a sentence with a key word;90%;independently (over 90% accuracy)  -AB --     Time Frame (Phrase and Sentence Level Response Goal 1, SLP) -- -- short term goal (STG)  -AB --     Progress/Outcomes (Phrase and Sentence Level Response Goal 1, SLP) -- -- new goal  -AB --        Connected Speech to Express Thoughts Goal 1 (SLP)    Improve Narrative Discourse to Express Thoughts By Goal 1 (SLP) -- -- describing a picture;90%;independently (over 90% accuracy)  -AB --     Time Frame (Connected Speech Goal 1, SLP) -- -- short term goal (STG)  -AB --     Progress/Outcomes (Connected Speech Goal 1, SLP) -- -- new goal  -AB --        Connected Speech to Express Thoughts Goal 2 (SLP)    Improve Narrative Discourse to Express Thoughts By Goal 2 (SLP) -- -- giving basic definition of a word;giving multiple definitions of a word;90%;independently (over 90% accuracy)  -AB --     Time Frame (Connected  Speech Goal 2, SLP) -- -- short term goal (STG)  -AB --     Progress/Outcomes (Connected Speech Goal 2, SLP) -- -- new goal  -AB --        Cognitive Linguistic Treatment Objectives    Attention Selection -- -- attention, SLP goal 1  -AB --     Organizational Skills Selection -- -- organizational skills, SLP goal 1  -AB --        Attention Goal 1 (SLP)    Improve Attention by Goal 1 (SLP) -- -- complete sustained attention task;90%;independently (over 90% accuracy)  -AB --     Time Frame (Attention Goal 1, SLP) -- -- short term goal (STG)  -AB --     Progress/Outcomes (Attention Goal 1, SLP) -- -- new goal  -AB --        Memory Skills Goal 1 (SLP)    Improve Memory Skills Through Goal 1 (SLP) -- -- use memory strategies;use external memory aid;80%;with minimal cues (75-90%)  -AB --     Time Frame (Memory Skills Goal 1, SLP) -- -- short term goal (STG)  -AB --     Progress/Outcomes (Memory Skills Goal 1, SLP) -- -- new goal  -AB --        Functional Math Skills Goal 1 (SLP)    Improve Functional Math Skills Through Goal 1 (SLP) -- -- complete functional math task;80%;with minimal cues (75-90%)  -AB --     Time Frame (Functional Math Skills Goal 1, SLP) -- -- short term goal (STG)  -AB --     Progress/Outcomes (Functional Math Skills Goal 1, SLP) -- -- new goal  -AB --        Executive Functional Skills Goal 1 (SLP)    Improve Executive Function Skills Goal 1 (SLP) -- -- home management activity;80%;with minimal cues (75-90%)  -AB --     Time Frame (Executive Function Skills Goal 1, SLP) -- -- short term goal (STG)  -AB --     Progress/Outcomes (Executive Function Skills Goal 1, SLP) -- -- new goal  -AB --        Right Hemisphere Function Goal 1 (SLP)    Improve Right Hemisphere Function Through Goal 1 (SLP) -- -- complete visuo-spatial activities (visual closure, trail making, mazes;complete visuo-perceptual activities (L/R discrimination, spatial concepts);80%;with minimal cues (75-90%)  -AB --     Time Frame (Right  Hemisphere Function Goal 1, SLP) -- -- short term goal (STG)  -AB --     Progress/Outcomes (Right Hemisphere Function Goal 1, SLP) -- -- new goal  -AB --               User Key  (r) = Recorded By, (t) = Taken By, (c) = Cosigned By      Initials Name Effective Dates    AB EnochJazzmine, MS CCC-SLP 12/27/22 -     CR Katina Whatley, SLP 08/28/23 -                        EDUCATION    The patient has been educated in the following areas:       Cognitive Impairment Communication Impairment.             SLP GOALS       Row Name 12/28/23 1400 12/28/23 0930 12/27/23 1200       SLP Diagnostic Treatment     Patient will participate in further assessment in the following areas -- -- verbal expression;higher-level cognitive-linguistic   -AB    Time Frame (Diagnostic) -- -- short term goal (STG)  -AB    Progress/Outcomes (Additional Goal 1, SLP) -- -- new goal  -AB       Word Retrieval Skills Goal 1 (SLP)    Improve Word Retrieval Skills By Goal 1 (SLP) completing functional word finding tasks;90%;independently (over 90% accuracy)  -CR completing functional word finding tasks;90%;independently (over 90% accuracy)  -CR completing functional word finding tasks;90%;independently (over 90% accuracy)  -AB    Time Frame (Word Retrieval Goal 1, SLP) short term goal (STG)  -CR short term goal (STG)  -CR short term goal (STG)  -AB    Progress/Outcomes (Word Retrieval Goal 1, SLP) goal ongoing  -CR goal ongoing  -CR new goal  -AB    Comment (Word Retrieval Goal 1, SLP) Convergent task identifying target word given 3 clues completed with 80% accuracy given NO cues, 100% given MIN cues.  -CR Patient completed category member task given target initial letter with 75% accuracy given NO cues, increased to100% accuracy when given MOD cues.  -CR --       Ability to Construct Phrase and Sentence Level Response Goal 1 (SLP)    Improve Ability to Construct Phrase and Sentence Level Responses By Goal 1 (SLP) -- -- constructing a sentence with  a key word;90%;independently (over 90% accuracy)  -AB    Time Frame (Phrase and Sentence Level Response Goal 1, SLP) -- -- short term goal (STG)  -AB    Progress/Outcomes (Phrase and Sentence Level Response Goal 1, SLP) -- -- new goal  -AB       Connected Speech to Express Thoughts Goal 1 (SLP)    Improve Narrative Discourse to Express Thoughts By Goal 1 (SLP) -- -- describing a picture;90%;independently (over 90% accuracy)  -AB    Time Frame (Connected Speech Goal 1, SLP) -- -- short term goal (STG)  -AB    Progress/Outcomes (Connected Speech Goal 1, SLP) -- -- new goal  -AB       Connected Speech to Express Thoughts Goal 2 (SLP)    Improve Narrative Discourse to Express Thoughts By Goal 2 (SLP) -- -- giving basic definition of a word;giving multiple definitions of a word;90%;independently (over 90% accuracy)  -AB    Time Frame (Connected Speech Goal 2, SLP) -- -- short term goal (STG)  -AB    Progress/Outcomes (Connected Speech Goal 2, SLP) -- -- new goal  -AB       Attention Goal 1 (SLP)    Improve Attention by Goal 1 (SLP) complete sustained attention task;90%;independently (over 90% accuracy)  -CR -- complete sustained attention task;90%;independently (over 90% accuracy)  -AB    Time Frame (Attention Goal 1, SLP) short term goal (STG)  -CR -- short term goal (STG)  -AB    Progress/Outcomes (Attention Goal 1, SLP) good progress toward goal  -CR -- new goal  -AB    Comment (Attention Goal 1, SLP) Mildly complex written direction task completed with 50% accuracy given NO cues, increased to 90% accuracy given MOD cues for attention to detail.  -CR -- --       Memory Skills Goal 1 (SLP)    Improve Memory Skills Through Goal 1 (SLP) -- -- use memory strategies;use external memory aid;80%;with minimal cues (75-90%)  -AB    Time Frame (Memory Skills Goal 1, SLP) -- -- short term goal (STG)  -AB    Progress/Outcomes (Memory Skills Goal 1, SLP) -- -- new goal  -AB       Functional Math Skills Goal 1 (SLP)    Improve  Functional Math Skills Through Goal 1 (SLP) complete functional math task;80%;with minimal cues (75-90%)  -CR complete functional math task;80%;with minimal cues (75-90%)  -CR complete functional math task;80%;with minimal cues (75-90%)  -AB    Time Frame (Functional Math Skills Goal 1, SLP) short term goal (STG)  -CR short term goal (STG)  -CR short term goal (STG)  -AB    Progress/Outcomes (Functional Math Skills Goal 1, SLP) goal ongoing  -CR goal ongoing  -CR new goal  -AB    Comment (Functional Math Skills Goal 1, SLP) Completed moderately complex functional math task involving money in PM session. Patient with 80% accuracy given NO cues, increased to 100% accuracy given MIN cues.  -CR Initiated moderately complex functional math task involving money, however, unable to complete due to time constraints. 50% accuracy given NO cues thus far.  -CR --       Executive Functional Skills Goal 1 (SLP)    Improve Executive Function Skills Goal 1 (SLP) -- -- home management activity;80%;with minimal cues (75-90%)  -AB    Time Frame (Executive Function Skills Goal 1, SLP) -- -- short term goal (STG)  -AB    Progress/Outcomes (Executive Function Skills Goal 1, SLP) -- -- new goal  -AB       Right Hemisphere Function Goal 1 (SLP)    Improve Right Hemisphere Function Through Goal 1 (SLP) complete visuo-spatial activities (visual closure, trail making, mazes;complete visuo-perceptual activities (L/R discrimination, spatial concepts);80%;with minimal cues (75-90%)  -CR complete visuo-spatial activities (visual closure, trail making, mazes;complete visuo-perceptual activities (L/R discrimination, spatial concepts);80%;with minimal cues (75-90%)  -CR complete visuo-spatial activities (visual closure, trail making, mazes;complete visuo-perceptual activities (L/R discrimination, spatial concepts);80%;with minimal cues (75-90%)  -AB    Time Frame (Right Hemisphere Function Goal 1, SLP) short term goal (STG)  -CR short term goal  (STG)  -CR short term goal (STG)  -AB    Progress/Outcomes (Right Hemisphere Function Goal 1, SLP) good progress toward goal  -CR good progress toward goal  -CR new goal  -AB    Comment (Right Hemisphere Function Goal 1, SLP) MOD cues required for L/R visual scanning during written direction task and attention to right visual field for calculator use during functional math task.  -CR Visual scanning task with letters completed with 100% accuracy given NO cues and following education for L/R scanning. Patient required MOD cues to attend to right during functional math task.  -CR --              User Key  (r) = Recorded By, (t) = Taken By, (c) = Cosigned By      Initials Name Provider Type    AB Jazzmine Kendall, MS CCC-SLP Speech and Language Pathologist    Katina Lam SLP Speech and Language Pathologist                                Time Calculation:        Time Calculation- SLP       Row Name 12/28/23 1434 12/28/23 1003          Time Calculation- SLP    SLP Start Time 1400  -CR 0930  -CR     SLP Stop Time 1430  -CR 1000  -CR     SLP Time Calculation (min) 30 min  -CR 30 min  -CR     SLP Received On 12/28/23  -CR 12/28/23  -CR        Timed Charges    62784-MT Dev of Cogn Skills Initial Minutes 15  -CR --     48173-NX Dev of Cogn Skills Add Minutes 45  -CR --        Total Minutes    Timed Charges Total Minutes 60  -CR --      Total Minutes 60  -CR --               User Key  (r) = Recorded By, (t) = Taken By, (c) = Cosigned By      Initials Name Provider Type    Katina Lam SLP Speech and Language Pathologist                      Therapy Charges for Today       Code Description Service Date Service Provider Modifiers Qty    21019902603 HC ST DEV OF COGN SKILLS INITIAL 15 MIN 12/28/2023 Katina Whatley SLP  1    98459923007 HC ST DEV OF COGN SKILLS EACH ADDT'L 15 MIN 12/28/2023 Katina Whatley SLP  3                             PAULETTE Rodriguez  12/28/2023

## 2023-12-28 NOTE — THERAPY TREATMENT NOTE
"Inpatient Rehabilitation - Occupational Therapy Treatment Note    [facility]     Patient Name: Rekha Bear  : 1940  MRN: [id number]    Today's Date: 2023                 Admit Date: 2023       No diagnosis found.    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    Community acquired pneumonia of left lower lobe of lung    Hyponatremia    Pneumonia due to COVID-19 virus    Major depressive disorder, recurrent, mild    Acute respiratory failure with hypoxia    Supraventricular tachycardia    PVC (premature ventricular contraction)    Dyspnea on exertion    Dizziness    Depressive disorder    Gastroesophageal reflux disease    Midline cystocele    Rectocele    BMI 29.0-29.9,adult    Acute left PCA stroke    B12 deficiency    Bradycardia    Premature atrial contractions    Stroke       Past Medical History:   Diagnosis Date    Chilblains     \"Chilblian's\"    CKD (chronic kidney disease)     Depression     Fatty liver     GERD (gastroesophageal reflux disease)     Hyperlipidemia     Hypertension     Incontinence     Osteoarthritis     OA  Marvin THR, LT TKR - hydrocodone prescibed by Dr. 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 Almaguer    TOTAL KNEE ARTHROPLASTY Left     OA  Marvin THR, LT TKR - hydrocodone prescibed by Dr. Almaguer             IRF OT ASSESSMENT FLOWSHEET (last 12 hours)       IRF OT Evaluation and Treatment       Row Name 12/28/23 1507 12/28/23 0953       OT Time and Intention    Document Type daily treatment  -KP daily treatment  -CC    Mode of Treatment individual therapy;occupational therapy  -KP occupational therapy  -CC    Patient Effort good  -KP excellent  -CC    Symptoms Noted During/After Treatment none  -KP --      Row Name 12/28/23 1507          General Information    Patient/Family/Caregiver Comments/Observations pt sitting in wc in room. ex  present in room.  -KP     Existing Precautions/Restrictions fall  -KP     Limitations/Impairments visual  -KP     Comment, General Information R neglect  -KP       Row Name 12/28/23 1507 12/28/23 0953       Pain Assessment    Pretreatment Pain Rating 0/10 - no pain  -KP 0/10 - no pain  -CC    Posttreatment Pain Rating 0/10 - no pain  -KP 0/10 - no pain  -CC      Row Name 12/28/23 1507 12/28/23 0953       Cognition/Psychosocial    Affect/Mental Status (Cognition) WFL  -KP WFL  -CC    Orientation Status (Cognition) oriented x 3  -KP oriented x 3  -CC    Follows Commands (Cognition) follows one-step commands;over 90% accuracy;verbal cues/prompting required  -KP follows one-step commands;75-90% accuracy;verbal cues/prompting required  -CC    Personal Safety Interventions fall prevention program maintained;gait belt;muscle strengthening facilitated;nonskid shoes/slippers when out of bed  -KP  fall prevention program maintained;gait belt;nonskid shoes/slippers when out of bed  -    Additional Documentation -- --  word finding deficit  -[facility] Name 12/28/23 1507          Sensory    Additional Documentation Vision Assessment/Intervention (Group)  -[facility] Name 12/28/23 1507          Vision Assessment/Intervention    Visual Field Deficit homonymous hemianopsia, right  -     Visual Treatment Interventions therapeutic activities;visual training/eye exercises  -     Vision Assessment Comment pt completed mighty minds task to incr visual skills on R side w min diff locating item on R side for 25%. pt completed small color peg design toincr visual skills on the R side and pt able to locate all pegs on R side min diff w finding color purple compared to blue.  pt completed drawing of a clock toincr visual skills w 90% accuracy of numbers, numbers correct but number 5 too close to number 4 but pt realized and able to correct. pt completed HWT task toincr visual skills and HWT skills pt reports this HWT is decreased compared to her normal HWT. HWT was w good placement on the line and good spacing.  -[facility] Name 12/28/23 0953          Bathing    [place] Level (Bathing) bathing skills;lower body;upper body;contact guard assist;minimum assist (75% patient effort)  -     Position (Bathing) sink side;supported sitting;supported standing  -     Set-up Assistance (Bathing) adjust water temperature;obtain supplies  -[facility] Name 12/28/23 0953          Upper Body Dressing    [place] Level (Upper Body Dressing) upper body dressing skills;doff;don;pull over garment;supervision  -     Position (Upper Body Dressing) supported sitting  -     Set-up Assistance (Upper Body Dressing) obtain clothing  -[facility] Name 12/28/23 0953          Lower Body Dressing    [place] Level (Lower Body Dressing) doff;don;pants/bottoms;shoes/slippers;socks;minimum assist (75% patient effort)  -      Position (Lower Body Dressing) supported standing;supported sitting  -CC     Set-up Assistance (Lower Body Dressing) obtain clothing  -CC     Comment (Lower Body Dressing) assist w pants  -CC       Row Name 12/28/23 0953          Grooming    [place] Level (Grooming) grooming skills;deodorant application;hair care, combing/brushing;wash face, hands;oral care regimen;supervision  -CC     Position (Grooming) sink side;supported sitting  -CC     Set-up Assistance (Grooming) obtain supplies  -CC       Row Name 12/28/23 0953          Toileting    [place] Level (Toileting) moderate assist (50% patient effort)  -     Position (Toileting) supported standing  -CC     Comment (Toileting) brief change after incont episode. Pt able to perform hygiene. assist needed w brief  -CC       Row Name 12/28/23 1507 12/28/23 0953       Bed Mobility    Rolling Left [place] (Bed Mobility) -- standby assist  -    Supine-Sit [place] (Bed Mobility) -- verbal cues;contact guard  -    Comment, (Bed Mobility) in [facility] --      Row Name 12/28/23 0953          Bed-Chair Transfer    Bed-Chair [place] (Transfers) minimum assist (75% patient effort);verbal cues  -     Assistive Device (Bed-Chair Transfers) wheelchair  -CC       Row Name 12/28/23 1507 12/28/23 0953       Sit-Stand Transfer    Sit-Stand [place] (Transfers) set up;contact guard  - contact guard  -CC    Assistive Device (Sit-Stand Transfers) wheelchair  - wheelchair  -CC      Row Name 12/28/23 1507 12/28/23 0953       Stand-Sit Transfer    Stand-Sit [place] (Transfers) set up;contact guard  - contact guard  -CC    Assistive Device (Stand-Sit Transfers) wheelchair  - wheelchair  -CC      Row Name 12/28/23 1507 12/28/23 0953       Balance    Balance Assessment -- sitting static balance;sitting dynamic balance;standing static balance;standing dynamic balance  -    Static Sitting Balance standby assist  - standby assist  -     Dynamic Sitting Balance standby assist  -KP standby assist  -CC    Position, Sitting Balance sitting edge of mat  -KP --    Static Standing Balance contact guard  -KP contact guard  -CC    Dynamic Standing Balance contact guard  -KP --      Row Name 12/28/23 1507 12/28/23 0953       Positioning and Restraints    Pre-Treatment Position sitting in chair/recliner  -KP in bed  -CC    Post Treatment Position wheelchair  -KP wheelchair  -CC    In Wheelchair sitting;exit alarm on;with PT  -KP sitting;call light within reach;encouraged to call for assist;exit alarm on;notified nsg  -CC              User Key  (r) = Recorded By, (t) = Taken By, (c) = Cosigned By      Initials Name Effective Dates    CC Sahra Crisostomo, OTR 06/16/21 -     KP Brigid Osorio, OTR 07/11/23 -                      Occupational Therapy Education       Title: PT OT SLP Therapies (In Progress)       Topic: Occupational Therapy (Not Started)       Point: ADL training (Not Started)       Description:   Instruct learner(s) on proper safety adaptation and remediation techniques during self care or transfers.   Instruct in proper use of assistive devices.                  Learner Progress:  Not documented in this visit.              Point: Home exercise program (Not Started)       Description:   Instruct learner(s) on appropriate technique for monitoring, assisting and/or progressing therapeutic exercises/activities.                  Learner Progress:  Not documented in this visit.              Point: Precautions (Not Started)       Description:   Instruct learner(s) on prescribed precautions during self-care and functional transfers.                  Learner Progress:  Not documented in this visit.              Point: Body mechanics (Not Started)       Description:   Instruct learner(s) on proper positioning and spine alignment during self-care, functional mobility activities and/or exercises.                  Learner Progress:  Not documented in  this visit.                                        OT Recommendation and Plan                         Time Calculation:      Time Calculation- OT       Row Name 12/28/23 1514 12/28/23 0800          Time Calculation- OT    OT Start Time 1230  - 0800  -CC     OT Stop Time 1300  - 0830  -CC     OT Time Calculation (min) 30 min  - 30 min  -CC               User Key  (r) = Recorded By, (t) = Taken By, (c) = Cosigned By      Initials Name Provider Type    CC Sahra Crisostomo OTR Occupational Therapist     Brigid Osorio OTR Occupational Therapist                  Therapy Charges for Today       Code Description Service Date Service Provider Modifiers Qty    [id number] HC OT NEUROMUSC RE EDUCATION EA 15 MIN 12/28/2023 Brigid Osorio OTR GO 2                     GRACIELA Dow  12/28/2023

## 2023-12-28 NOTE — PLAN OF CARE
Goal Outcome Evaluation:  Plan of Care Reviewed With: patient             Problem: Rehabilitation (IRF) Plan of Care  Goal: Plan of Care Review  Outcome: Ongoing, Progressing  Flowsheets (Taken 12/27/2023 4649)  Plan of Care Reviewed With: patient  Outcome Evaluation: Pt is alert and oriented x 4. Word finding issues at times. Can be forgetful. Meds whole PO with thins. Continent of bowel and bladder. Large BM 12/27. Right visual field cut noted. Denies pain. Compliant with SCD this evening. Able to turn self in bed. Accumax pump added to bed. No unsafe behaviors. Call light within reach.

## 2023-12-28 NOTE — DISCHARGE INSTRUCTIONS
NO DRIVING. NO ALCOHOL.     Ergocalciferol 50,000 units weekly x 8 weeks starting Thursday, December 28, 2023, then change to cholecalciferol 1,000 units twice a day.

## 2023-12-28 NOTE — PROGRESS NOTES
Inpatient Rehabilitation Plan of Care Note    Plan of Care  Care Plan Reviewed - No updates at this time.    Safety    Performed Intervention(s)  Safety rounds  Bed alarm and/chair alarm      Psychosocial    Performed Intervention(s)  Vebalizes her needs and concerns      Sphincter Control    Performed Intervention(s)  Bladder training program  Encourage fluid intake  Incontinence products    Signed by: Jacey Williamson, RN

## 2023-12-28 NOTE — PLAN OF CARE
Goal Outcome Evaluation:  Plan of Care Reviewed With: patient           Outcome Evaluation: Imelda is alert and oriented, assist x1, and continent of b/b, Dressing to left 3rd toe changed this afternoon. Accucheck AC/HS-coverage needed, Vit D 50,000 units every 7 days started, and medication with water. No pain, no unsafe behavior, and VS stable. Tolerated all therapies and famiy/friends at bedside.

## 2023-12-28 NOTE — PROGRESS NOTES
LOS: 2 days   Patient Care Team:  Eben Porter MD as PCP - General  Eben Porter MD as PCP - Family Medicine      MICHAEL S JACQUI  1940      ADMITTING DIAGNOSIS:  Status post left PCA stroke-felt cardioembolic  Bilateral carotid stenosis  Stroke prophylaxis-aspirin 7 days through December 28, then start Eliquis 5 mg twice daily on December 29/atorvastatin  Right visual field deficit-right homonymous hemianopsia  Impaired mobility/self-car      Subjective   Patient denies any headache.  Comfortable with her breathing.  No new weakness.  Continues with impaired vision to the right side.      Objective     Vitals:    12/28/23 0500   BP: 126/60   Pulse: 51   Resp: 19   Temp: 97.5 °F (36.4 °C)   SpO2: 99%       Intake/Output Summary (Last 24 hours) at 12/28/2023 1253  Last data filed at 12/27/2023 1700  Gross per 24 hour   Intake 240 ml   Output --   Net 240 ml     PHYSICAL EXAM:     MENTAL STATUS -  AWAKE / ALERT  HEENT-    LUNGS - CTA, NO WHEEZES, RALES OR RHONCHI  HEART- RRR, NO RUB, MURMUR, OR GALLOP  ABD - NORMOACTIVE BOWEL SOUNDS, SOFT, NT.    EXT - NO EDEMA OR CYANOSIS  NEURO -oriented person place and general situation  Expressive language appears improved  Right homonymous hemianopsia  MOTOR EXAM - RUE/RLE 5/5. LUE/LLE 5/5.      MEDICATIONS  Scheduled Meds:aspirin, 325 mg, Oral, Daily  atorvastatin, 40 mg, Oral, Nightly  buPROPion XL, 150 mg, Oral, QAM  gabapentin, 400 mg, Oral, Q8H  insulin lispro, 2-7 Units, Subcutaneous, 4x Daily AC & at Bedtime  levothyroxine, 100 mcg, Oral, Q AM  oxybutynin XL, 10 mg, Oral, Daily  venlafaxine XR, 150 mg, Oral, Daily  [START ON 12/29/2023] vitamin B-12, 1,000 mcg, Oral, Daily  vitamin D, 50,000 Units, Oral, Q7 Days      Continuous Infusions:   PRN Meds:.  acetaminophen **OR** acetaminophen    bisacodyl    dextrose    glucagon (human recombinant)    melatonin    ondansetron      RESULTS  Glucose   Date/Time Value Ref Range Status   12/28/2023 1112 140  (H) 70 - 130 mg/dL Final   12/28/2023 0734 222 (H) 70 - 130 mg/dL Final   12/27/2023 1937 166 (H) 70 - 130 mg/dL Final   12/27/2023 1633 125 70 - 130 mg/dL Final   12/27/2023 1137 153 (H) 70 - 130 mg/dL Final   12/27/2023 0736 134 (H) 70 - 130 mg/dL Final   12/26/2023 1953 185 (H) 70 - 130 mg/dL Final   12/26/2023 1705 120 70 - 130 mg/dL Final     Results from last 7 days   Lab Units 12/28/23  0610 12/26/23  0602 12/25/23  0558   WBC 10*3/mm3 6.55 7.66 9.36   HEMOGLOBIN g/dL 11.8* 12.4 12.0   HEMATOCRIT % 34.3 37.1 35.9   PLATELETS 10*3/mm3 205 175 168     Results from last 7 days   Lab Units 12/28/23  0610 12/26/23  0602 12/25/23  0558 12/22/23  1128 12/22/23  0647 12/21/23  1531   SODIUM mmol/L 139 137 137   < > 138 135*   POTASSIUM mmol/L 4.3 4.1 4.2   < > 4.7 4.9   CHLORIDE mmol/L 104 104 104   < > 103 98   CO2 mmol/L 25.3 23.1 22.0   < > 24.3 25.0   BUN mg/dL 22 17 21   < > 17 19   CREATININE mg/dL 1.24* 1.11* 1.36*   < > 1.37* 1.59*   CALCIUM mg/dL 9.1 8.9 8.5*   < > 8.7 9.4   BILIRUBIN mg/dL  --   --   --   --  1.2 1.2   ALK PHOS U/L  --   --   --   --  70 86   ALT (SGPT) U/L  --   --   --   --  8 11   AST (SGOT) U/L  --   --   --   --  16 20   GLUCOSE mg/dL 228* 136* 235*   < > 105* 225*    < > = values in this interval not displayed.        Latest Reference Range & Units 12/28/23 06:09   25 Hydroxy, Vitamin D 30.0 - 100.0 ng/ml 13.9 (L)   (L): Data is abnormally low    ASSESSMENT and PLAN    Stroke    Status post left PCA stroke-felt cardioembolic-December 22, 2023- 6 x 3 cm acute infarct throughout the medial left occipital lobe in the left PCA territory     Bilateral carotid stenosis-left 50-69%, right less than 50%     Stroke prophylaxis-aspirin 7 days through December 28, then start Eliquis 5 mg twice daily on December 29/atorvastatin     Right visual field deficit-right homonymous hemianopsia     Impaired mobility/self-care     Diabetes mellitus type 2-sliding scale insulin     Chronic kidney disease      Hypertension     Status post bradycardia-resolved off metoprolol     Hypothyroidism-on replacement     B12 deficiency-on replacement    Vitamin D deficiency-change to ergocalciferol 50,000 units weekly for 8 weeks     Depression-bupropion/venlafaxine     -oxybutynin     DVT prophylaxis-SCDs     Outpatient evaluation for obstructive sleep apnea    TEAM CONF - DEC 28 - TRANSFERS MIN ASSIST. GAIT 80 FEET RW MIN ASSIST. 4 STAIRS MIN ASSIST.   BATH MOD . UBD MIN. LBD MOD MAX. TOILET TRANSFERS MIN ASSIST.   GOOD FINE MOTOR SKILLS. RIGHT VISUAL NEGLECT. DECREASED INSIGHT INTO DEFICITS. OVERALL MILD COGNITIVE IMPAIRMENT.  MILD MODERATE EXPRESSIVE DEFICITS AT SENTENCE LEVEL. MILD ANOMIA. CONTINENT BOWEL AND BLADDER.   ASA COMPLETES TODAY AND STARTS ELIQUIS TOMORROW.   ELOS - TWO WEEKS - MIGHT BE A GOOD CANDIDATE FOR ASSISTED LIVIN    Goal is for home with outpatient   therapies.  Barrier to discharge: Impaired mobility self-care cognition- work on attention to the right, executive function, memory, balance, gait, ADLs to overcome.     Sanjeev Quan MD      Appropriate PPE was worn during the entire visit.  Hand hygiene was completed before and after.

## 2023-12-28 NOTE — PROGRESS NOTES
TEAM CONF - DEC 28 - TRANSFERS MIN ASSIST. GAIT 80 FEET RW MIN ASSIST. 4 STAIRS MIN ASSIST.   BATH MOD . UBD MIN. LBD MOD MAX. TOILET TRANSFERS MIN ASSIST.   GOOD FINE MOTOR SKILLS. RIGHT VISUAL NEGLECT. DECREASED INSIGHT INTO DEFICITS. OVERALL MILD COGNITIVE IMPAIRMENT.  MILD MODERATE EXPRESSIVE DEFICITS AT SENTENCE LEVEL. MILD ANOMIA. CONTINENT BOWEL AND BLADDER.   ASA COMPLETES TODAY AND STARTS ELIQUIS TOMORROW.   ELOS - TWO WEEKS - MIGHT BE A GOOD CANDIDATE FOR ASSISTED LIVING

## 2023-12-28 NOTE — THERAPY TREATMENT NOTE
"Inpatient Rehabilitation - Physical Therapy Treatment Note       [facility]     Patient Name: Rekha Bear  : 1940  MRN: [id number]    Today's Date: 2023                    Admit Date: 2023      Visit Dx:   No diagnosis found.    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    Community acquired pneumonia of left lower lobe of lung    Hyponatremia    Pneumonia due to COVID-19 virus    Major depressive disorder, recurrent, mild    Acute respiratory failure with hypoxia    Supraventricular tachycardia    PVC (premature ventricular contraction)    Dyspnea on exertion    Dizziness    Depressive disorder    Gastroesophageal reflux disease    Midline cystocele    Rectocele    BMI 29.0-29.9,adult    Acute left PCA stroke    B12 deficiency    Bradycardia    Premature atrial contractions    Stroke       Past Medical History:   Diagnosis Date    Chilblains     \"Chilblian's\"    CKD (chronic kidney disease)     Depression     Fatty liver     GERD (gastroesophageal reflux disease)     Hyperlipidemia     Hypertension     Incontinence     Osteoarthritis     OA  Marvin THR, LT TKR - hydrocodone prescibed by Dr. 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 Almaguer    TOTAL KNEE ARTHROPLASTY Left     OA  Marvin THR, LT TKR - hydrocodone prescibed by Dr. Almaguer       PT ASSESSMENT (last 12 hours)       IRF PT Evaluation and Treatment       Row Name 12/28/23 0914          PT Time and Intention    Document Type daily treatment  -MD     Mode of Treatment physical therapy  -MD     Patient/Family/Caregiver Comments/Observations Pt sitting in WC showing no signs of acute distress.  -MD       Row Name 12/28/23 0914          General Information    Existing Precautions/Restrictions fall  -MD       Row Name 12/28/23 0914          Pain Assessment    Pretreatment Pain Rating 0/10 - no pain  -MD       Row Name 12/28/23 0914          Cognition/Psychosocial    Orientation Status (Cognition) oriented x 3  -MD     Follows Commands (Cognition) follows one-step commands;75-90% accuracy;verbal cues/prompting required  -MD     Personal Safety Interventions fall prevention program maintained;gait belt  -MD       Row Name 12/28/23 0914          Sit-Stand Transfer    Sit-Stand [place] (Transfers) verbal cues;minimum assist (75% patient effort)  -MD     Assistive Device (Sit-Stand Transfers) wheelchair;walker, front-wheeled  -MD       Row Name 12/28/23 0914          Stand-Sit Transfer    Stand-Sit [place] (Transfers) minimum assist (75% patient effort);verbal cues  -MD     Assistive Device (Stand-Sit Transfers) wheelchair;walker, front-wheeled  -MD       Row Name 12/28/23 0914          Car Transfer    Type (Car Transfer) sit-stand;stand-sit  -MD     [place] Level (Car Transfer) verbal  cues;contact guard  -MD     Assistive Device (Car Transfer) walker, front-wheeled  -MD     Comment, (Car Transfer) VC for hand placement  -MD       Row Name 12/28/23 0914          Gait/Stairs (Locomotion)    [place] Level (Gait) verbal cues;minimum assist (75% patient effort)  -MD     Assistive Device (Gait) walker, front-wheeled  -MD     Distance in Feet (Gait) 80'x2 and 200'x2  -MD     Deviations/Abnormal Patterns (Gait) gait speed decreased;base of support, narrow;stride length decreased;weight shifting decreased  -MD     Bilateral Gait Deviations forward flexed posture;heel strike decreased  -MD     [place] Level (Stairs) verbal cues;contact guard  -MD     Handrail Location (Stairs) both sides  -MD     Number of Steps (Stairs) 4  -MD     Ascending Technique (Stairs) step-to-step  -MD     Descending Technique (Stairs) step-to-step  -MD     Stairs, Impairments strength decreased;impaired balance;coordination impaired;impaired vision  -MD     Comment, (Gait/Stairs) difficulty w depth perception on the stairs  -MD       Row Name 12/28/23 0914          Balance    Dynamic Standing Balance verbal cues;contact guard  -MD     Balance Interventions standing;combined head and eye movements;core stability exercise;other (see comments)  weaving into and out of cones w RWx  -MD     Comment, Balance locating hidden objects around room (on R side) to increase pts awareness of R side vision deficit.  Pt was able to locate 5/5 objects w mod-max VC from PT to cue pt to scan to the R.  -MD       Row Name 12/28/23 0914          Positioning and Restraints    Pre-Treatment Position sitting in chair/recliner  -MD     Post Treatment Position wheelchair  -MD     In Wheelchair sitting;call light within reach;exit alarm on  -MD               User Key  (r) = Recorded By, (t) = Taken By, (c) = Cosigned By      Initials Name Provider Type    Gianna Eisenberg PT Physical Therapist                  Wound 12/27/23 0045 Left posterior  third toe (Active)   Closure Unable to assess 12/27/23 2028   Base dressing in place, unable to visualize 12/27/23 2028   Edges callused 12/28/23 1345   Dressing Care dressing changed 12/28/23 1345     Physical Therapy Education       Title: PT OT SLP Therapies (In Progress)       Topic: Physical Therapy (In Progress)       Point: Mobility training (Done)       Learning Progress Summary             Patient Acceptance, E,TB,D, VU,NR by MG at 12/27/2023 1554                         Point: Home exercise program (Not Started)       Learner Progress:  Not documented in this visit.              Point: Body mechanics (Done)       Learning Progress Summary             Patient Acceptance, E,TB,D, VU,NR by MG at 12/27/2023 1554                         Point: Precautions (In Progress)       Learning Progress Summary             Patient Acceptance, E, NR by MD at 12/28/2023 0916    Acceptance, E,TB,D, VU,NR by MG at 12/27/2023 1554                                         User Key       Initials Effective Dates Name Provider Type Discipline    MD 06/16/21 -  Gianna Atwood PT Physical Therapist PT    MG 05/24/22 -  Dayanna Turpin PT Physical Therapist PT                    PT Recommendation and Plan                          Time Calculation:      PT Charges       Row Name 12/28/23 1438 12/28/23 0914          Time Calculation    Start Time 1430  -MD 0900  -MD     Stop Time 1500  -MD 0930  -MD     Time Calculation (min) 30 min  -MD 30 min  -MD     PT Received On -- 12/28/23  -MD     PT - Next Appointment -- 12/29/23  -MD               User Key  (r) = Recorded By, (t) = Taken By, (c) = Cosigned By      Initials Name Provider Type    Gianna Eisenberg, PT Physical Therapist                    Therapy Charges for Today       Code Description Service Date Service Provider Modifiers Qty    30626545167 HC GAIT TRAINING EA 15 MIN 12/28/2023 Gianna Atwood, PT GP 2    07849972958 HC PT NEUROMUSC RE EDUCATION EA 15 MIN 12/28/2023 Gianna Atwood, PT GP 2               PT G-Codes  AM-PAC 6 Clicks Score (PT): 17      Gianna Atwood, PT  12/28/2023

## 2023-12-28 NOTE — PROGRESS NOTES
Recreational Therapy Note    Patient Name: Rekha Bear   MRN: [id number]    Therapeutic Recreation Eval and Treat (last 12 hours)       Therapeutic Recreation Eval & Treat       Row Name 12/28/23 1400       Therapeutic Recreation Participation    Recreation Therapy Participation games  -TW    Games card games  [place]  -TW    Objectives of Recreation Participation increase;sense of autonomy by choosing level of participation;positive attitudes leading to a healthy leisure lifestyle;motivation and activity level through successful participation  -TW    Comment, Recreation Participation min cues for directions; BUE use to place cards; occ cues to scan to the R  -TW    Recreation Therapy Summary of Participation active participation  -TW              User Key  (r) = Recorded By, (t) = Taken By, (c) = Cosigned By      Initials Name Provider Type    Rosa Herrera CTRS Recreational Therapist                      AMADEO Alamo  12/28/2023

## 2023-12-28 NOTE — PROGRESS NOTES
Reviewed team conference report with patient and daughter, by phone. Discussed current status, weekly and d/c goals, and ELOS. Also discussed need for 24/7 assist at d/c for safety and supervision. Will schedule family conference closer to d/c. Family trying to find out information on patient's financial situation as they were not helping her with this prior to hospitalization. Discussed with patient need for POA. She stated she wants son, Jl, to be POA and thinks he is working on getting paperwork for this. Daughter stated patient's son, in [place], is an  and he is coming up next weekend so sons can discuss paperwork with her. Will follow and assist with plans.

## 2023-12-29 LAB
GLUCOSE BLDC GLUCOMTR-MCNC: 141 MG/DL (ref 70–130)
GLUCOSE BLDC GLUCOMTR-MCNC: 146 MG/DL (ref 70–130)
GLUCOSE BLDC GLUCOMTR-MCNC: 176 MG/DL (ref 70–130)
GLUCOSE BLDC GLUCOMTR-MCNC: 228 MG/DL (ref 70–130)

## 2023-12-29 PROCEDURE — 63710000001 INSULIN LISPRO (HUMAN) PER 5 UNITS: Performed by: INTERNAL MEDICINE

## 2023-12-29 PROCEDURE — 82948 REAGENT STRIP/BLOOD GLUCOSE: CPT

## 2023-12-29 PROCEDURE — 97112 NEUROMUSCULAR REEDUCATION: CPT | Performed by: OCCUPATIONAL THERAPIST

## 2023-12-29 PROCEDURE — 97116 GAIT TRAINING THERAPY: CPT

## 2023-12-29 PROCEDURE — 97130 THER IVNTJ EA ADDL 15 MIN: CPT

## 2023-12-29 PROCEDURE — 97112 NEUROMUSCULAR REEDUCATION: CPT

## 2023-12-29 PROCEDURE — 97530 THERAPEUTIC ACTIVITIES: CPT

## 2023-12-29 PROCEDURE — 97129 THER IVNTJ 1ST 15 MIN: CPT

## 2023-12-29 PROCEDURE — 97535 SELF CARE MNGMENT TRAINING: CPT | Performed by: OCCUPATIONAL THERAPIST

## 2023-12-29 RX ORDER — LISINOPRIL 2.5 MG/1
2.5 TABLET ORAL
Status: DISPENSED | OUTPATIENT
Start: 2023-12-29

## 2023-12-29 RX ADMIN — ATORVASTATIN CALCIUM 40 MG: 20 TABLET, FILM COATED ORAL at 21:20

## 2023-12-29 RX ADMIN — GABAPENTIN 400 MG: 400 CAPSULE ORAL at 05:45

## 2023-12-29 RX ADMIN — GABAPENTIN 400 MG: 400 CAPSULE ORAL at 21:20

## 2023-12-29 RX ADMIN — Medication 1000 MCG: at 08:39

## 2023-12-29 RX ADMIN — GABAPENTIN 400 MG: 400 CAPSULE ORAL at 14:43

## 2023-12-29 RX ADMIN — LEVOTHYROXINE SODIUM 100 MCG: 100 TABLET ORAL at 05:44

## 2023-12-29 RX ADMIN — INSULIN LISPRO 3 UNITS: 100 INJECTION, SOLUTION INTRAVENOUS; SUBCUTANEOUS at 12:16

## 2023-12-29 RX ADMIN — BUPROPION HYDROCHLORIDE 150 MG: 150 TABLET, EXTENDED RELEASE ORAL at 05:44

## 2023-12-29 RX ADMIN — VENLAFAXINE HYDROCHLORIDE 150 MG: 150 CAPSULE, EXTENDED RELEASE ORAL at 08:39

## 2023-12-29 RX ADMIN — INSULIN LISPRO 2 UNITS: 100 INJECTION, SOLUTION INTRAVENOUS; SUBCUTANEOUS at 21:20

## 2023-12-29 RX ADMIN — LISINOPRIL 2.5 MG: 2.5 TABLET ORAL at 16:46

## 2023-12-29 RX ADMIN — OXYBUTYNIN CHLORIDE 10 MG: 10 TABLET, EXTENDED RELEASE ORAL at 08:39

## 2023-12-29 RX ADMIN — ASPIRIN 325 MG: 325 TABLET ORAL at 08:39

## 2023-12-29 NOTE — PROGRESS NOTES
LOS: 3 days   Patient Care Team:  Eben Porter MD as PCP - General  Eben Porter MD as PCP - Family Medicine      MICHAEL WHITEAF  1940      ADMITTING DIAGNOSIS:  Status post left PCA stroke-felt cardioembolic  Bilateral carotid stenosis  Stroke prophylaxis-aspirin 7 days through December 28, then start Eliquis 5 mg twice daily on December 29/atorvastatin  Right visual field deficit-right homonymous hemianopsia  Impaired mobility/self-car      Subjective     Patient denies headache.  Continues with impaired vision to the right side.  She feels that it is getting a little bit better.  Does not describe any new weakness.  Tolerating therapies      Objective     Vitals:    12/29/23 0837   BP: 113/62   Pulse: 54   Resp:    Temp:    SpO2:        Intake/Output Summary (Last 24 hours) at 12/29/2023 1004  Last data filed at 12/29/2023 0837  Gross per 24 hour   Intake 240 ml   Output --   Net 240 ml     PHYSICAL EXAM:     MENTAL STATUS -  AWAKE / ALERT  HEENT-    LUNGS - CTA, NO WHEEZES, RALES OR RHONCHI  HEART- RRR, NO RUB, MURMUR, OR GALLOP  ABD - NORMOACTIVE BOWEL SOUNDS, SOFT, NT.    EXT - NO EDEMA OR CYANOSIS  NEURO -oriented person place and general situation  Expressive language appears improved  Right homonymous hemianopsia  MOTOR EXAM - RUE/RLE 5/5. LUE/LLE 5/5.      MEDICATIONS  Scheduled Meds:aspirin, 325 mg, Oral, Daily  atorvastatin, 40 mg, Oral, Nightly  buPROPion XL, 150 mg, Oral, QAM  gabapentin, 400 mg, Oral, Q8H  insulin lispro, 2-7 Units, Subcutaneous, 4x Daily AC & at Bedtime  levothyroxine, 100 mcg, Oral, Q AM  oxybutynin XL, 10 mg, Oral, Daily  venlafaxine XR, 150 mg, Oral, Daily  vitamin B-12, 1,000 mcg, Oral, Daily  vitamin D, 50,000 Units, Oral, Q7 Days      Continuous Infusions:   PRN Meds:.  acetaminophen **OR** acetaminophen    bisacodyl    dextrose    glucagon (human recombinant)    melatonin    ondansetron      RESULTS  Glucose   Date/Time Value Ref Range Status    12/29/2023 0705 141 (H) 70 - 130 mg/dL Final   12/28/2023 1928 165 (H) 70 - 130 mg/dL Final   12/28/2023 1625 212 (H) 70 - 130 mg/dL Final   12/28/2023 1112 140 (H) 70 - 130 mg/dL Final   12/28/2023 0734 222 (H) 70 - 130 mg/dL Final   12/27/2023 1937 166 (H) 70 - 130 mg/dL Final   12/27/2023 1633 125 70 - 130 mg/dL Final   12/27/2023 1137 153 (H) 70 - 130 mg/dL Final     Results from last 7 days   Lab Units 12/28/23  0610 12/26/23  0602 12/25/23  0558   WBC 10*3/mm3 6.55 7.66 9.36   HEMOGLOBIN g/dL 11.8* 12.4 12.0   HEMATOCRIT % 34.3 37.1 35.9   PLATELETS 10*3/mm3 205 175 168     Results from last 7 days   Lab Units 12/28/23  0610 12/26/23  0602 12/25/23  0558   SODIUM mmol/L 139 137 137   POTASSIUM mmol/L 4.3 4.1 4.2   CHLORIDE mmol/L 104 104 104   CO2 mmol/L 25.3 23.1 22.0   BUN mg/dL 22 17 21   CREATININE mg/dL 1.24* 1.11* 1.36*   CALCIUM mg/dL 9.1 8.9 8.5*   GLUCOSE mg/dL 228* 136* 235*        Latest Reference Range & Units 12/28/23 06:09   25 Hydroxy, Vitamin D 30.0 - 100.0 ng/ml 13.9 (L)   (L): Data is abnormally low    ASSESSMENT and PLAN    Stroke    Status post left PCA stroke-felt cardioembolic-December 22, 2023- 6 x 3 cm acute infarct throughout the medial left occipital lobe in the left PCA territory     Bilateral carotid stenosis-left 50-69%, right less than 50%     Stroke prophylaxis-aspirin 7 days through December 28, then start Eliquis 5 mg twice daily on December 29/atorvastatin  December 29-starting Eliquis today     Right visual field deficit-right homonymous hemianopsia     Impaired mobility/self-care     Diabetes mellitus type 2-sliding scale insulin     Chronic kidney disease     Hypertension-off metoprolol secondary bradycardia  December 29-systolic blood pressure 157/68 this a.m., later 113/62-variable pattern.  Allergy to amlodipine.  Will add lisinopril initially 2.5 mg daily     Status post bradycardia-resolved off metoprolol     Hypothyroidism-on replacement     B12 deficiency-on  replacement    Vitamin D deficiency-change to ergocalciferol 50,000 units weekly for 8 weeks     Depression-bupropion/venlafaxine     -oxybutynin     DVT prophylaxis-SCDs     Outpatient evaluation for obstructive sleep apnea    TEAM CONF - DEC 28 - TRANSFERS MIN ASSIST. GAIT 80 FEET RW MIN ASSIST. 4 STAIRS MIN ASSIST.   BATH MOD . UBD MIN. LBD MOD MAX. TOILET TRANSFERS MIN ASSIST.   GOOD FINE MOTOR SKILLS. RIGHT VISUAL NEGLECT. DECREASED INSIGHT INTO DEFICITS. OVERALL MILD COGNITIVE IMPAIRMENT.  MILD MODERATE EXPRESSIVE DEFICITS AT SENTENCE LEVEL. MILD ANOMIA. CONTINENT BOWEL AND BLADDER.   ASA COMPLETES TODAY AND STARTS ELIQUIS TOMORROW.   ELOS - TWO WEEKS - MIGHT BE A GOOD CANDIDATE FOR ASSISTED LIVIN    Goal is for home with outpatient   therapies.  Barrier to discharge: Impaired mobility self-care cognition- work on attention to the right, executive function, memory, balance, gait, ADLs to overcome.     Sanjeev Quan MD      Appropriate PPE was worn during the entire visit.  Hand hygiene was completed before and after.

## 2023-12-29 NOTE — THERAPY TREATMENT NOTE
"Inpatient Rehabilitation - Occupational Therapy Treatment Note    [facility]     Patient Name: Rekha Bear  : 1940  MRN: [id number]    Today's Date: 2023                 Admit Date: 2023       No diagnosis found.    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    Community acquired pneumonia of left lower lobe of lung    Hyponatremia    Pneumonia due to COVID-19 virus    Major depressive disorder, recurrent, mild    Acute respiratory failure with hypoxia    Supraventricular tachycardia    PVC (premature ventricular contraction)    Dyspnea on exertion    Dizziness    Depressive disorder    Gastroesophageal reflux disease    Midline cystocele    Rectocele    BMI 29.0-29.9,adult    Acute left PCA stroke    B12 deficiency    Bradycardia    Premature atrial contractions    Stroke       Past Medical History:   Diagnosis Date    Chilblains     \"Chilblian's\"    CKD (chronic kidney disease)     Depression     Fatty liver     GERD (gastroesophageal reflux disease)     Hyperlipidemia     Hypertension     Incontinence     Osteoarthritis     OA  Marvin THR, LT TKR - hydrocodone prescibed by Dr. 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 Almaguer    TOTAL KNEE ARTHROPLASTY Left     OA  Marvin THR, LT TKR - hydrocodone prescibed by Dr. Almaguer             IRF OT ASSESSMENT FLOWSHEET (last 12 hours)       IRF OT Evaluation and Treatment       Row Name 12/29/23 1215          OT Time and Intention    Document Type daily treatment  -KP     Mode of Treatment individual therapy;occupational therapy  -KP     Patient Effort good  -KP     Symptoms Noted During/After Treatment fatigue  rough night sleep, woken up a lot per pt report  -       Row Name 12/29/23 1215          General Information    Patient/Family/Caregiver Comments/Observations pt supine in bed asleep  -     Existing Precautions/Restrictions fall  -KP     Limitations/Impairments visual  -     Comment, General Information R neglect  -KP       Row Name 12/29/23 1215          Pain Assessment    Pretreatment Pain Rating 0/10 - no pain  -     Posttreatment Pain Rating 0/10 - no pain  -KP       Row Name 12/29/23 1215          Cognition/Psychosocial    Affect/Mental Status (Cognition) WFL  -KP     Orientation Status (Cognition) oriented x 3  -KP     Follows Commands (Cognition) follows one-step commands;over 90% accuracy  -     Personal Safety Interventions fall prevention program maintained;gait belt;nonskid shoes/slippers when out of bed  -     Cognitive Function memory deficit  -KP       Row Name 12/29/23 1215          Vision Assessment/Intervention    Visual Field Deficit homonymous hemianopsia, right  -KP       Row Name 12/29/23 1215          Bathing    [place] Level (Bathing) bathing skills;upper body;set  up;standby assist;lower body;contact guard assist  -     Position (Bathing) sink side;supported sitting;supported standing  -     Set-up Assistance (Bathing) open containers;obtain supplies  -       Row Name 12/29/23 [facility]5          Upper Body Dressing    [place] Level (Upper Body Dressing) upper body dressing skills;don;doff;pull over garment;set up assistance;supervision  -     Position (Upper Body Dressing) supported sitting  -     Set-up Assistance (Upper Body Dressing) obtain clothing  -       Row Name 12/29/23 1215          Lower Body Dressing    [place] Level (Lower Body Dressing) doff;don;pants/bottoms;set up;shoes/slippers;socks;standby assist;minimum assist (75% patient effort)  -     Position (Lower Body Dressing) supported sitting;supported standing  -     Set-up Assistance (Lower Body Dressing) obtain clothing  -[facility] Name 12/29/23 1215          Grooming    [place] Level (Grooming) grooming skills;deodorant application;hair care, combing/brushing;wash face, hands;oral care regimen;supervision  -     Position (Grooming) sink side;supported sitting  -     Set-up Assistance (Grooming) obtain supplies;open containers  -[facility] Name 12/29/23 1215          Toileting    [place] Level (Toileting) toileting skills;adjust/manage clothing;perform perineal hygiene;set up assistance;verbal cues;moderate assist (50% patient effort)  -     Position (Toileting) supported sitting;supported standing  -       Row Name 12/29/23 1215          Bed Mobility    Supine-Sit [place] (Bed Mobility) set up;standby assist  -       Row Name 12/29/23 1215          Transfer Assessment/Treatment    Transfers shower transfer  -       Row Name 12/29/23 1215          Bed-Chair Transfer    Bed-Chair [place] (Transfers) set up;verbal cues;minimum assist (75% patient effort)  -     Assistive Device (Bed-Chair Transfers) wheelchair  -       Row Name 12/29/23 1215           Sit-Stand Transfer    Sit-Stand [place] (Transfers) set up;minimum assist (75% patient effort);verbal cues  -     Assistive Device (Sit-Stand Transfers) wheelchair  -KP       Row Name 12/29/23 1215          Stand-Sit Transfer    Stand-Sit [place] (Transfers) set up;minimum assist (75% patient effort)  -     Assistive Device (Stand-Sit Transfers) wheelchair  -KP       Row Name 12/29/23 1215          Toilet Transfer    Type (Toilet Transfer) sit-stand;stand-sit;stand pivot/stand step  -     [place] Level (Toilet Transfer) set up;verbal cues;minimum assist (75% patient effort);contact guard  -     Assistive Device (Toilet Transfer) grab bars/safety frame;wheelchair  -KP       Row Name 12/29/23 1215          Shower Transfer    Comment, (Shower Transfer) pt wanted to wash at the sink, bad night and woken up a lot per pt report  -       Row Name 12/29/23 1215          Balance    Static Sitting Balance standby assist  -     Dynamic Sitting Balance standby assist  -KP     Position, Sitting Balance sitting in chair  -     Static Standing Balance contact guard  -     Dynamic Standing Balance contact guard  -KP       Row Name 12/29/23 1215          Positioning and Restraints    Pre-Treatment Position in bed  -KP     Post Treatment Position wheelchair  -KP     In Wheelchair sitting;call light within reach;encouraged to call for assist;exit alarm on  -               User Key  (r) = Recorded By, (t) = Taken By, (c) = Cosigned By      Initials Name Effective Dates     Brigid Osorio, OTR 07/11/23 -                      Occupational Therapy Education       Title: PT OT SLP Therapies (In Progress)       Topic: Occupational Therapy (Not Started)       Point: ADL training (Not Started)       Description:   Instruct learner(s) on proper safety adaptation and remediation techniques during self care or transfers.   Instruct in proper use of assistive devices.                  Learner Progress:   Not documented in this visit.              Point: Home exercise program (Not Started)       Description:   Instruct learner(s) on appropriate technique for monitoring, assisting and/or progressing therapeutic exercises/activities.                  Learner Progress:  Not documented in this visit.              Point: Precautions (Not Started)       Description:   Instruct learner(s) on prescribed precautions during self-care and functional transfers.                  Learner Progress:  Not documented in this visit.              Point: Body mechanics (Not Started)       Description:   Instruct learner(s) on proper positioning and spine alignment during self-care, functional mobility activities and/or exercises.                  Learner Progress:  Not documented in this visit.                                        OT Recommendation and Plan                         Time Calculation:      Time Calculation- OT       Row Name 12/29/23 1219             Time Calculation- OT    OT Start Time 0800  -      OT Stop Time 0830  -      OT Time Calculation (min) 30 min  -                User Key  (r) = Recorded By, (t) = Taken By, (c) = Cosigned By      Initials Name Provider Type     Brigid Osorio OTR Occupational Therapist                  Therapy Charges for Today       Code Description Service Date Service Provider Modifiers Qty    22683815978  OT NEUROMUSC RE EDUCATION EA 15 MIN 12/28/2023 Brigid Osorio OTR GO 2    [id number]  OT SELF CARE/MGMT/TRAIN EA 15 MIN 12/29/2023 Brigid Osorio OTR GO 2                     GRACIELA Dow  12/29/2023

## 2023-12-29 NOTE — THERAPY TREATMENT NOTE
"Inpatient Rehabilitation - Physical Therapy Treatment Note       [facility]     Patient Name: Rekha Bear  : 1940  MRN: [id number]    Today's Date: 2023                    Admit Date: 2023      Visit Dx:   No diagnosis found.    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    Community acquired pneumonia of left lower lobe of lung    Hyponatremia    Pneumonia due to COVID-19 virus    Major depressive disorder, recurrent, mild    Acute respiratory failure with hypoxia    Supraventricular tachycardia    PVC (premature ventricular contraction)    Dyspnea on exertion    Dizziness    Depressive disorder    Gastroesophageal reflux disease    Midline cystocele    Rectocele    BMI 29.0-29.9,adult    Acute left PCA stroke    B12 deficiency    Bradycardia    Premature atrial contractions    Stroke       Past Medical History:   Diagnosis Date    Chilblains     \"Chilblian's\"    CKD (chronic kidney disease)     Depression     Fatty liver     GERD (gastroesophageal reflux disease)     Hyperlipidemia     Hypertension     Incontinence     Osteoarthritis     OA  Marvin THR, LT TKR - hydrocodone prescibed by Dr. 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 Almaguer    TOTAL KNEE ARTHROPLASTY Left     OA  Marvin THR, LT TKR - hydrocodone prescibed by Dr. Almaguer       PT ASSESSMENT (last 12 hours)       [facility] PT Evaluation and Treatment       Row Name 12/29/23 1135          PT Time and Intention    Document Type daily treatment  -DP     Mode of Treatment individual therapy;physical therapy  -DP     Patient/Family/Caregiver Comments/Observations Pt sitting up in w/c upon PT arrival  -DP       Row Name 12/29/23 1135          General Information    Patient Profile Reviewed yes  -DP     Existing Precautions/Restrictions fall  -DP     Comment, General Information R neglect  -DP       Row Name 12/29/23 1135          Pain Assessment    Pretreatment Pain Rating 0/10 - no pain  -DP     Posttreatment Pain Rating 0/10 - no pain  -DP       Row Name 12/29/23 1135          Cognition/Psychosocial    Affect/Mental Status (Cognition) WFL  -DP     Orientation Status (Cognition) oriented x 3  -DP     Follows Commands (Cognition) follows one-step commands;over 90% accuracy;verbal cues/prompting required  -DP     Personal Safety Interventions safety round/check completed;elopement precautions initiated;fall prevention program maintained;gait belt;muscle strengthening facilitated;nonskid shoes/slippers when out of bed  -DP     Cognitive Function memory deficit  -DP       Row Name 12/29/23 1135          Sit-Stand Transfer    Sit-Stand [place] (Transfers) set up;contact guard  -DP     Assistive Device (Sit-Stand Transfers) wheelchair  -DP       Row Name 12/29/23 1135          Stand-Sit Transfer     Stand-Sit [place] (Transfers) set up;contact guard  -DP     Assistive Device (Stand-Sit Transfers) wheelchair  -DP       Row Name 12/29/23 1135          Gait/Stairs (Locomotion)    [place] Level (Gait) verbal cues;minimum assist (75% patient effort)  -DP     Assistive Device (Gait) walker, front-wheeled  -DP     Distance in Feet (Gait) 80'x1, 100'x1, 200'x2  -DP     Pattern (Gait) step-to;step-through  -DP     Deviations/Abnormal Patterns (Gait) gait speed decreased;base of support, narrow;stride length decreased;weight shifting decreased  -DP     Bilateral Gait Deviations forward flexed posture;heel strike decreased  -DP     Gait Assessment/Intervention needed cuing for turning R as her R neglect is her biggest limiting factor. Winded with longer distances Spo2% 96  -DP       Row Name 12/29/23 1135          Safety Issues, Functional Mobility    Impairments Affecting Function (Mobility) balance;cognition;coordination;endurance/activity tolerance;strength;visual/perceptual  -DP       Row Name 12/29/23 1135          Balance    Balance Interventions step overs  stepping over hurdles 6 times with increased difficulty when stepping over with RLE first vs LLE. [place] when leading with RLE, CGA when leading with LLE  -DP     Comment, Balance Locating hidden object with finding 5/5 on L and R but needed no cuing to find them;however she did need cuing during activity as she was running into a rolling stool with her walker and did not know it. Pt stood with no UE support and threw ~15 bean bags to matching placement mats on the ground with 100% accuracy; placement mats were in an arc configuration fromt left to right.  -DP       Row Name 12/29/23 1135          Knee (Therapeutic Exercise)    Knee Strengthening (Therapeutic Exercise) bilateral;LAQ (long arc quad);sitting;red;resistance band;10 repetitions  -DP       Row Name 12/29/23 1135          Positioning and Restraints    Pre-Treatment Position sitting in  chair/recliner  -DP     Post Treatment Position wheelchair  -DP     In Wheelchair sitting;call light within reach;exit alarm on  -DP               User Key  (r) = Recorded By, (t) = Taken By, (c) = Cosigned By      Initials Name Provider Type    DP Julián Pathak, PT Physical Therapist                  Wound 12/27/23 0045 Left posterior third toe (Active)   Base dressing in place, unable to visualize 12/28/23 1945     Physical Therapy Education       Title: PT OT SLP Therapies (In Progress)       Topic: Physical Therapy (In Progress)       Point: Mobility training (Done)       Learning Progress Summary             Patient Acceptance, E,TB,D, VU,NR by MG at 12/27/2023 1554                         Point: Home exercise program (Not Started)       Learner Progress:  Not documented in this visit.              Point: Body mechanics (Done)       Learning Progress Summary             Patient Acceptance, E,TB,D, VU,NR by MG at 12/27/2023 1554                         Point: Precautions (In Progress)       Learning Progress Summary             Patient Acceptance, E, NR by DP at 12/29/2023 1147    Comment: scanning for objects and avoidance of them when walking    Acceptance, E, NR by MD at 12/28/2023 0916    Acceptance, E,TB,D, VU,NR by MG at 12/27/2023 1554                                         User Key       Initials Effective Dates Name Provider Type Discipline    MD 06/16/21 -  Gianna Atwood, PT Physical Therapist PT    MG 05/24/22 -  Dayanna Turpin, PT Physical Therapist PT    DP 08/24/21 -  Julián Pathak PT Physical Therapist PT                    PT Recommendation and Plan                          Time Calculation:      PT Charges       Row Name 12/29/23 1531 12/29/23 1148          Time Calculation    Start Time 1330  -DP 1100  -DP     Stop Time 1400  -DP 1130  -DP     Time Calculation (min) 30 min  -DP 30 min  -DP     PT Received On -- 12/29/23  -DP     PT - Next Appointment -- 12/30/23  -DP        Time Calculation-  PT    Total Timed Code Minutes- PT 30 minute(s)  -DP 30 minute(s)  -DP               User Key  (r) = Recorded By, (t) = Taken By, (c) = Cosigned By      Initials Name Provider Type    Julián Irwin, PT Physical Therapist                    Therapy Charges for Today       Code Description Service Date Service Provider Modifiers Qty    94446465664 HC PT NEUROMUSC RE EDUCATION EA 15 MIN 12/29/2023 Julián Pathak, PT GP 2    38993959319 HC PT THERAPEUTIC ACT EA 15 MIN 12/29/2023 Julián Pathak, PT GP 1    41228167974 HC GAIT TRAINING EA 15 MIN 12/29/2023 Julián Pathak, PT GP 1              PT G-Codes  AM-PAC 6 Clicks Score (PT): 17      Julián Pathak, PT  12/29/2023

## 2023-12-29 NOTE — PROGRESS NOTES
Inpatient Rehabilitation Plan of Care Note    Plan of Care  Care Plan Reviewed - No updates at this time.    Safety    [RN] Potential for Injury(Active)  Current Status(12/29/2023): Risk for fall  Weekly Goal(01/03/2024): Cue to use call bell  Discharge Goal: PT /Family will be aware of risk for fall and safety in the home  setting    Performed Intervention(s)  Safety rounds  Bed alarm and/chair alarm      Psychosocial    [RN] Coping/Adjustment(Active)  Current Status(12/29/2023): Expressing appropriate coping  Weekly Goal(01/03/2024): Provide educational material regarding stroke  Discharge Goal: Knowleadgeable of care needs and stroke recovery    Performed Intervention(s)  Vebalizes her needs and concerns      Sphincter Control    [RN] Bladder Management(Active)  Current Status(12/29/2023): Bladder urgency  Weekly Goal(01/03/2024): Continent 50%  Discharge Goal: Continent 100%    [RN] Bowel Management(Active)  Current Status(12/29/2023): Continent 100%  Weekly Goal(01/03/2024): Continent 100%  Discharge Goal: Continent 100%    Performed Intervention(s)  Bladder training program  Encourage fluid intake  Incontinence products    Signed by: Ro Verma RN

## 2023-12-29 NOTE — THERAPY TREATMENT NOTE
"Inpatient Rehabilitation - Speech Language Pathology Treatment Note    [facility]     Patient Name: Rekha Bear  : 1940  MRN: [id number]    Today's Date: 2023                   Admit Date: 2023       Visit Dx:    No diagnosis found.    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    Community acquired pneumonia of left lower lobe of lung    Hyponatremia    Pneumonia due to COVID-19 virus    Major depressive disorder, recurrent, mild    Acute respiratory failure with hypoxia    Supraventricular tachycardia    PVC (premature ventricular contraction)    Dyspnea on exertion    Dizziness    Depressive disorder    Gastroesophageal reflux disease    Midline cystocele    Rectocele    BMI 29.0-29.9,adult    Acute left PCA stroke    B12 deficiency    Bradycardia    Premature atrial contractions    Stroke       Past Medical History:   Diagnosis Date    Chilblains     \"Chilblian's\"    CKD (chronic kidney disease)     Depression     Fatty liver     GERD (gastroesophageal reflux disease)     Hyperlipidemia     Hypertension     Incontinence     Osteoarthritis     OA  Marvin THR, LT TKR - hydrocodone prescibed by Dr. 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 Almaguer    TOTAL KNEE ARTHROPLASTY Left     OA  Marvin THR, LT TKR - hydrocodone prescibed by Dr. Almaguer       SLP Recommendation and Plan                                                               SLP EVALUATION (last 72 hours)       SLP SLC Evaluation       Row Name 12/29/23 1000 12/28/23 1400 12/28/23 0930 12/27/23 1200 12/27/23 1030       Communication Assessment/Intervention    Document Type therapy note (daily note)  -CR therapy note (daily note)  -CR therapy note (daily note)  -CR -- evaluation  -AB    Subjective Information no complaints  -CR no complaints  -CR no complaints  -CR -- no complaints  -AB    Patient Observations alert;cooperative  -CR alert;cooperative  -CR alert;cooperative  -CR -- alert;cooperative;agree to therapy  -AB    Patient/Family/Caregiver Comments/Observations -- -- -- -- pt seen in room 4401, motivated, pleasant, cooperative  -AB    Patient Effort good  -CR excellent  -CR excellent  -CR -- excellent  -AB    Symptoms Noted During/After Treatment none  -CR -- -- -- none  -AB       General Information    Patient Profile Reviewed yes  -CR yes  -CR yes  -CR -- yes  -AB    Pertinent History Of Current Problem -- -- -- -- 83-year-old female who presented to [facility] on December 21, 2023 with history of hypertension, hyperlipidemia, chronic kidney disease who had difficulty coming to the door of her home.  It took her over an hour to get the door open.  Send on she was confused and had urinated on self.  Had trouble seeing to the right side.  Presented to [facility]  [place] diagnosed with a left PCA stroke.     She has had cognitive impairment, right pharmacy [place], impairments with her mobility and self-care.  For stroke prophylaxis she is on aspirin statin with aspirin to continue through December 28 and switch to Eliquis 5 mg twice daily as felt to be cardioembolic source.  She had bradycardia that resolved off of beta-blocker.  Started on B12 replacement.     She has right homonymous hemianopsia impairments with her memory.  On December 22 noted to have moderate to severe cognitive deficits and mild to moderate expressive aphasia.  With physical therapy she ambulated about 60 feet, right neck leg contact-guard min assist for balance, leans to the left.     Given her functional Impairment and comorbidities now admitted for acute inpatient rehabilitation.     Denies any headache.  Continues with impaired vision to the right side.  No swallowing complaints.  Denies any focal weakness.  Tolerating therapies.  No bowel or bladder complaints.  -AB    Precautions/Limitations, Vision -- -- -- -- vision impairment, right  homonymous hemianopsia  -AB    Precautions/Limitations, Hearing -- -- -- -- WFL  -AB    Patient Level of Education -- -- -- -- Social hx: pt lives in home alone, independent with all IADLS including driving, finance, medication management. 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 Pt and family report mild memory/word finding concerns at baseline. Pt enjoys playing poker, watching TV, going out to eat.  -AB    Prior Level of Function-Communication -- -- -- -- WFL;other (see comments)  mild memory, word finding deficits  -AB    Plans/Goals Discussed with -- -- -- -- patient;agreed upon  -AB    Barriers to Rehab -- -- -- -- none identified  -AB    Patient's Goals for Discharge -- -- -- -- return to all previous roles/activities  -AB       Pain    Additional Documentation -- -- -- -- Pain Scale: Numbers Pre/Post-Treatment (Group)   -AB       Pain Scale: Numbers Pre/Post-Treatment    Pretreatment Pain Rating 0/10 - no pain  -CR 0/10 - no pain  -CR 0/10 - no pain  -CR -- 0/10 - no pain  -AB    Posttreatment Pain Rating 0/10 - no pain  -CR 0/10 - no pain  -CR -- -- 0/10 - no pain  -AB       Standardized Tests    Cognitive/Memory Tests -- -- -- CLQT: Cognitive Linguistic Quick Test  -AB --       CLQT (The Cognitive Linguistic Quick Test)    Attention Domain Score -- -- -- 101  -AB --    Attention Severity Rating -- -- -- 3: Mild  -AB --    Memory Domain Score -- -- -- 170  -AB --    Memory Severity Rating -- -- -- 4: WNL  -AB --    Executive Function Domain Score -- -- -- 30  -AB --    Executive Function Severity Rating -- -- -- 4: WNL  -AB --    Language Domain Score -- -- -- 33  -AB --    Language Severity Rating -- -- -- 4: WNL  -AB --    Visuospatial Domain Score -- -- -- 68  -AB --    Visuospatial Severity Rating -- -- -- 4: WNL  -AB --    Clock Drawing Total Score -- -- -- 8  -AB --    Clock Drawing Severity Rating -- -- -- Moderate  -AB --    Composite Severity Rating -- -- -- 3.8  -AB --    Composite Severity Rating Range -- -- -- 4.0 - 3.5: WNL  -AB --    CLQT Comments -- -- -- Within subtests, note the following clinical impressions. PERSONAL FACTS:  (states year of birth ). SYMBOL CANCELLATION:  (incorrect symbol, R neglect). CONFRONTATION NAMING: 10/10. CLOCK DRAWIN/13 (numbers not arranged in [place], poorly spaced with L sided errors, hands of equal length, both pointing at 11). STORY RETELLIN/18 for story elements,  for paragraph level comprehension. SYMBOL TRAILS: 9/10 (error x1 missed pattern) GENERATIVE NAMING: x7 concrete items within 1 minute, x24 abstract items within x1 minute. DESIGN MEMORY: ; MAZES:  (crosses walls, incorrect completion). DESIGN GENERATION: x10 different designs, x3 self repetitions.   Although scores indicated cognition WNL, x3 subtests below criterion cut off - personal facts  (paraphasia, states year of birth 2003); symbol cancellation (2/12 correct and R sided neglect); and clock draw (inaccurate spacing, 2 hands equal length with wrong time).   Within BIMS, pt disoriented to MATI, and unable to recall items with 5 minute delay, cues increase to only 1/3 recall with 5 minute delay.   Within conversation, note over x8 anomic events with pt unable to repair for personally salient words/information including city where child lives, prior workplace, prior surgeries, restaurants, places.   Overall impression, mild cognitive impairment characterized by visuospatial impairment, attention deficits; mild to moderate expressive aphasia at sentence/conversational level. Pt will benefit from ongoing diagnostic cognitive tx, acute rehabilitation therapy for return to all PLOF. Pt lives home alone, manages all IADLS including driving, finances, medications and hopes to return to independence sa much as possible.  -AB --       SLP Evaluation Clinical Impressions    SLP Diagnosis -- -- -- mild;cognitive-linguistic disorder;mild-moderate;aphasia  -AB --    Rehab Potential/Prognosis -- -- -- excellent  -AB --    [facility] [place] Interventions Met -- -- -- yes  -AB --    Functional Impact -- -- -- difficulty in expressing complex messages;restrictions in personal and social life;difficulty completing home management task  -AB --       SLP Treatment Clinical Impressions    Care Plan Review -- -- -- evaluation/treatment results reviewed;care plan/treatment goals reviewed;risks/benefits reviewed;current/potential barriers reviewed;patient/other agree to care plan  -AB --       Recommendations    Therapy Frequency (SLP SLC) -- -- -- 5 days per week  -AB --    Predicted Duration Therapy Intervention (Days) -- -- -- until discharge  -AB --    Anticipated Discharge Disposition (SLP) -- -- -- home with home health;home with OP services  -AB --       Communication Treatment Objective and Progress  Goals (SLP)    Diagnostic Treatment Objective -- -- -- Diagnostic Treatment Objectives (Group)  -AB --    Verbal Expression Treatment Objectives -- -- -- Verbal Expression Treatment Objectives (Group)  -AB --       Diagnostic Treatment Objectives    Diagnostic Treatment Objective Selection -- -- -- Diagnostic Treatment Objectives  -AB --       SLP Diagnostic Treatment     Patient will participate in further assessment in the following areas -- -- -- verbal expression;higher-level cognitive-linguistic   -AB --    Time Frame (Diagnostic) -- -- -- short term goal (STG)  -AB --    Progress/Outcomes (Additional Goal 1, SLP) -- -- -- new goal  -AB --       Verbal Expression Treatment Objectives    Connected Speech Selection -- -- -- connected speech, SLP goal 2  -AB --       Word Retrieval Skills Goal 1 (SLP)    Improve Word Retrieval Skills By Goal 1 (SLP) -- -- -- completing functional word finding tasks;90%;independently (over 90% accuracy)  -AB --    Time Frame (Word Retrieval Goal 1, SLP) -- -- -- short term goal (STG)  -AB --    Progress/Outcomes (Word Retrieval Goal 1, SLP) -- -- -- new goal  -AB --       Ability to Construct Phrase and Sentence Level Response Goal 1 (SLP)    Improve Ability to Construct Phrase and Sentence Level Responses By Goal 1 (SLP) -- -- -- constructing a sentence with a key word;90%;independently (over 90% accuracy)  -AB --    Time Frame (Phrase and Sentence Level Response Goal 1, SLP) -- -- -- short term goal (STG)  -AB --    Progress/Outcomes (Phrase and Sentence Level Response Goal 1, SLP) -- -- -- new goal  -AB --       Connected Speech to Express Thoughts Goal 1 (SLP)    Improve Narrative Discourse to Express Thoughts By Goal 1 (SLP) -- -- -- describing a picture;90%;independently (over 90% accuracy)  -AB --    Time Frame (Connected Speech Goal 1, SLP) -- -- -- short term goal (STG)  -AB --    Progress/Outcomes (Connected Speech Goal 1, SLP) -- -- -- new goal  -AB --       Connected  Speech to Express Thoughts Goal 2 (SLP)    Improve Narrative Discourse to Express Thoughts By Goal 2 (SLP) -- -- -- giving basic definition of a word;giving multiple definitions of a word;90%;independently (over 90% accuracy)  -AB --    Time Frame (Connected Speech Goal 2, SLP) -- -- -- short term goal (STG)  -AB --    Progress/Outcomes (Connected Speech Goal 2, SLP) -- -- -- new goal  -AB --       Cognitive Linguistic Treatment Objectives    Attention Selection -- -- -- attention, SLP goal 1  -AB --    Organizational Skills Selection -- -- -- organizational skills, SLP goal 1  -AB --       Attention Goal 1 (SLP)    Improve Attention by Goal 1 (SLP) -- -- -- complete sustained attention task;90%;independently (over 90% accuracy)  -AB --    Time Frame (Attention Goal 1, SLP) -- -- -- short term goal (STG)  -AB --    Progress/Outcomes (Attention Goal 1, SLP) -- -- -- new goal  -AB --       Memory Skills Goal 1 (SLP)    Improve Memory Skills Through Goal 1 (SLP) -- -- -- use memory strategies;use external memory aid;80%;with minimal cues (75-90%)  -AB --    Time Frame (Memory Skills Goal 1, SLP) -- -- -- short term goal (STG)  -AB --    Progress/Outcomes (Memory Skills Goal 1, SLP) -- -- -- new goal  -AB --       Functional Math Skills Goal 1 (SLP)    Improve Functional Math Skills Through Goal 1 (SLP) -- -- -- complete functional math task;80%;with minimal cues (75-90%)  -AB --    Time Frame (Functional Math Skills Goal 1, SLP) -- -- -- short term goal (STG)  -AB --    Progress/Outcomes (Functional Math Skills Goal 1, SLP) -- -- -- new goal  -AB --       Executive Functional Skills Goal 1 (SLP)    Improve Executive Function Skills Goal 1 (SLP) -- -- -- home management activity;80%;with minimal cues (75-90%)  -AB --    Time Frame (Executive Function Skills Goal 1, SLP) -- -- -- short term goal (STG)  -AB --    Progress/Outcomes (Executive Function Skills Goal 1, SLP) -- -- -- new goal  -AB --       Right Hemisphere  Function Goal 1 (SLP)    Improve Right Hemisphere Function Through Goal 1 (SLP) -- -- -- complete visuo-spatial activities (visual closure, trail making, mazes;complete visuo-perceptual activities (L/R discrimination, spatial concepts);80%;with minimal cues (75-90%)  -AB --    Time Frame (Right Hemisphere Function Goal 1, SLP) -- -- -- short term goal (STG)  -AB --    Progress/Outcomes (Right Hemisphere Function Goal 1, SLP) -- -- -- new goal  -AB --              User Key  (r) = Recorded By, (t) = Taken By, (c) = Cosigned By      Initials Name Effective Dates    AB Jazzmine Kendall, MS CCC-SLP 12/27/22 -     CR Katina Whatley, SLP 08/28/23 -                        EDUCATION    The patient has been educated in the following areas:       Cognitive Impairment.             SLP GOALS       Row Name 12/29/23 1000 12/28/23 1400 12/28/23 0930       Word Retrieval Skills Goal 1 (SLP)    Improve Word Retrieval Skills By Goal 1 (SLP) completing functional word finding tasks;90%;independently (over 90% accuracy)  -CR completing functional word finding tasks;90%;independently (over 90% accuracy)  -CR completing functional word finding tasks;90%;independently (over 90% accuracy)  -CR    Time Frame (Word Retrieval Goal 1, SLP) short term goal (STG)  -CR short term goal (STG)  -CR short term goal (STG)  -CR    Progress/Outcomes (Word Retrieval Goal 1, SLP) goal ongoing  -CR goal ongoing  -CR goal ongoing  -CR    Comment (Word Retrieval Goal 1, SLP) Convergent naming task completed with 100% accuracy given NO cues.  -CR Convergent task identifying target word given 3 clues completed with 80% accuracy given NO cues, 100% given MIN cues.  -CR Patient completed category member task given target initial letter with 75% accuracy given NO cues, increased to100% accuracy when given MOD cues.  -CR       Attention Goal 1 (SLP)    Improve Attention by Goal 1 (SLP) complete sustained attention task;90%;independently (over 90% accuracy)   "-CR complete sustained attention task;90%;independently (over 90% accuracy)  -CR --    Time Frame (Attention Goal 1, SLP) short term goal (STG)  -CR short term goal (STG)  -CR --    Progress/Outcomes (Attention Goal 1, SLP) good progress toward goal  -CR good progress toward goal  -CR --    Comment (Attention Goal 1, SLP) Alternating attention between numbers/letters during visual task completed with 70% accuracy given NO cues, increased to 100% accuracy when given MOD cues. Written directions completed with 80% accuracy given NO cues. \"Blink\" card sort given x3 paramenters completed; patient sorted 30 cards in 3.5 minutes given MIN cues.  -CR Mildly complex written direction task completed with 50% accuracy given NO cues, increased to 90% accuracy given MOD cues for attention to detail.  -CR --       Functional Math Skills Goal 1 (SLP)    Improve Functional Math Skills Through Goal 1 (SLP) -- complete functional math task;80%;with minimal cues (75-90%)  -CR complete functional math task;80%;with minimal cues (75-90%)  -CR    Time Frame (Functional Math Skills Goal 1, SLP) -- short term goal (STG)  -CR short term goal (STG)  -CR    Progress/Outcomes (Functional Math Skills Goal 1, SLP) -- goal ongoing  -CR goal ongoing  -CR    Comment (Functional Math Skills Goal 1, SLP) -- Completed moderately complex functional math task involving money in PM session. Patient with 80% accuracy given NO cues, increased to 100% accuracy given MIN cues.  -CR Initiated moderately complex functional math task involving money, however, unable to complete due to time constraints. 50% accuracy given NO cues thus far.  -CR       Right Hemisphere Function Goal 1 (SLP)    Improve Right Hemisphere Function Through Goal 1 (SLP) complete visuo-spatial activities (visual closure, trail making, mazes;complete visuo-perceptual activities (L/R discrimination, spatial concepts);80%;with minimal cues (75-90%)  -CR complete visuo-spatial activities " (visual closure, trail making, mazes;complete visuo-perceptual activities (L/R discrimination, spatial concepts);80%;with minimal cues (75-90%)  -CR complete visuo-spatial activities (visual closure, trail making, mazes;complete visuo-perceptual activities (L/R discrimination, spatial concepts);80%;with minimal cues (75-90%)  -CR    Time Frame (Right Hemisphere Function Goal 1, SLP) short term goal (STG)  -CR short term goal (STG)  -CR short term goal (STG)  -CR    Progress/Outcomes (Right Hemisphere Function Goal 1, SLP) good progress toward goal  -CR good progress toward goal  -CR good progress toward goal  -CR    Comment (Right Hemisphere Function Goal 1, SLP) x2 visual tasks completed consisting of alternating attention/connect the dots and differentiating between two pictures. NO cues needed to attend to right visual field during both tasks.  -CR MOD cues required for L/R visual scanning during written direction task and attention to right visual field for calculator use during functional math task.  -CR Visual scanning task with letters completed with 100% accuracy given NO cues and following education for L/R scanning. Patient required MOD cues to attend to right during functional math task.  -CR      Row Name 12/27/23 1200             SLP Diagnostic Treatment     Patient will participate in further assessment in the following areas verbal expression;higher-level cognitive-linguistic   -AB      Time Frame (Diagnostic) short term goal (STG)  -AB      Progress/Outcomes (Additional Goal 1, SLP) new goal  -AB         Word Retrieval Skills Goal 1 (SLP)    Improve Word Retrieval Skills By Goal 1 (SLP) completing functional word finding tasks;90%;independently (over 90% accuracy)  -AB      Time Frame (Word Retrieval Goal 1, SLP) short term goal (STG)  -AB      Progress/Outcomes (Word Retrieval Goal 1, SLP) new goal  -AB         Ability to Construct Phrase and Sentence Level Response Goal 1 (SLP)    Improve Ability  to Construct Phrase and Sentence Level Responses By Goal 1 (SLP) constructing a sentence with a key word;90%;independently (over 90% accuracy)  -AB      Time Frame (Phrase and Sentence Level Response Goal 1, SLP) short term goal (STG)  -AB      Progress/Outcomes (Phrase and Sentence Level Response Goal 1, SLP) new goal  -AB         Connected Speech to Express Thoughts Goal 1 (SLP)    Improve Narrative Discourse to Express Thoughts By Goal 1 (SLP) describing a picture;90%;independently (over 90% accuracy)  -AB      Time Frame (Connected Speech Goal 1, SLP) short term goal (STG)  -AB      Progress/Outcomes (Connected Speech Goal 1, SLP) new goal  -AB         Connected Speech to Express Thoughts Goal 2 (SLP)    Improve Narrative Discourse to Express Thoughts By Goal 2 (SLP) giving basic definition of a word;giving multiple definitions of a word;90%;independently (over 90% accuracy)  -AB      Time Frame (Connected Speech Goal 2, SLP) short term goal (STG)  -AB      Progress/Outcomes (Connected Speech Goal 2, SLP) new goal  -AB         Attention Goal 1 (SLP)    Improve Attention by Goal 1 (SLP) complete sustained attention task;90%;independently (over 90% accuracy)  -AB      Time Frame (Attention Goal 1, SLP) short term goal (STG)  -AB      Progress/Outcomes (Attention Goal 1, SLP) new goal  -AB         Memory Skills Goal 1 (SLP)    Improve Memory Skills Through Goal 1 (SLP) use memory strategies;use external memory aid;80%;with minimal cues (75-90%)  -AB      Time Frame (Memory Skills Goal 1, SLP) short term goal (STG)  -AB      Progress/Outcomes (Memory Skills Goal 1, SLP) new goal  -AB         Functional Math Skills Goal 1 (SLP)    Improve Functional Math Skills Through Goal 1 (SLP) complete functional math task;80%;with minimal cues (75-90%)  -AB      Time Frame (Functional Math Skills Goal 1, SLP) short term goal (STG)  -AB      Progress/Outcomes (Functional Math Skills Goal 1, SLP) new goal  -AB         Executive  Functional Skills Goal 1 (SLP)    Improve Executive Function Skills Goal 1 (SLP) home management activity;80%;with minimal cues (75-90%)  -AB      Time Frame (Executive Function Skills Goal 1, SLP) short term goal (STG)  -AB      Progress/Outcomes (Executive Function Skills Goal 1, SLP) new goal  -AB         Right Hemisphere Function Goal 1 (SLP)    Improve Right Hemisphere Function Through Goal 1 (SLP) complete visuo-spatial activities (visual closure, trail making, mazes;complete visuo-perceptual activities (L/R discrimination, spatial concepts);80%;with minimal cues (75-90%)  -AB      Time Frame (Right Hemisphere Function Goal 1, SLP) short term goal (STG)  -AB      Progress/Outcomes (Right Hemisphere Function Goal 1, SLP) new goal  -AB                User Key  (r) = Recorded By, (t) = Taken By, (c) = Cosigned By      Initials Name Provider Type    AB Jazzmine Kendall, MS CCC-SLP Speech and Language Pathologist    Katina Lam SLP Speech and Language Pathologist                                Time Calculation:        Time Calculation- SLP       Row Name 12/29/23 1058             Time Calculation- SLP    SLP Start Time 1000  -CR      SLP Stop Time 1100  -CR      SLP Time Calculation (min) 60 min  -CR      SLP Received On 12/29/23  -CR                User Key  (r) = Recorded By, (t) = Taken By, (c) = Cosigned By      Initials Name Provider Type    Katina Lam SLP Speech and Language Pathologist                      Therapy Charges for Today       Code Description Service Date Service Provider Modifiers Qty    89974238223 HC ST DEV OF COGN SKILLS INITIAL 15 MIN 12/28/2023 Katina Whatley SLP  1    80252334234 HC ST DEV OF COGN SKILLS EACH ADDT'L 15 MIN 12/28/2023 Katina Whatley SLP  3    06271703479 HC ST DEV OF COGN SKILLS INITIAL 15 MIN 12/29/2023 Katina Whatley SLP  1    70736555491 HC ST DEV OF COGN SKILLS EACH ADDT'L 15 MIN 12/29/2023 Katina Whatley SLP  3                              Katina Whatley, PAULETTE  12/29/2023

## 2023-12-29 NOTE — PLAN OF CARE
Goal Outcome Evaluation:  Plan of Care Reviewed With: patient           Outcome Evaluation: Imelda is alert and oriented, assist x1, and continent of b/b, Dressing to left 3rd toe changed this afternoon. Accucheck AC/HS-coverage needed, Vit D 50,000 units every 7 days started, and medication with water. No pain, no unsafe behavior, and VS stable. Lisinopril added, tolerated therapy and no other issues.

## 2023-12-29 NOTE — THERAPY TREATMENT NOTE
"Inpatient Rehabilitation - Occupational Therapy Treatment Note    [facility]     Patient Name: Rekha Bear  : 1940  MRN: [id number]    Today's Date: 2023                 Admit Date: 2023       No diagnosis found.    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    Community acquired pneumonia of left lower lobe of lung    Hyponatremia    Pneumonia due to COVID-19 virus    Major depressive disorder, recurrent, mild    Acute respiratory failure with hypoxia    Supraventricular tachycardia    PVC (premature ventricular contraction)    Dyspnea on exertion    Dizziness    Depressive disorder    Gastroesophageal reflux disease    Midline cystocele    Rectocele    BMI 29.0-29.9,adult    Acute left PCA stroke    B12 deficiency    Bradycardia    Premature atrial contractions    Stroke       Past Medical History:   Diagnosis Date    Chilblains     \"Chilblian's\"    CKD (chronic kidney disease)     Depression     Fatty liver     GERD (gastroesophageal reflux disease)     Hyperlipidemia     Hypertension     Incontinence     Osteoarthritis     OA  Marvin THR, LT TKR - hydrocodone prescibed by Dr. 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 ENDOSCOPY;  Service:     DENTAL PROCEDURE      FOOT SURGERY      TONSILLECTOMY      TOTAL HIP ARTHROPLASTY Bilateral     OA  Marvin THR, LT TKR - hydrocodone prescibed by Dr. Almaguer    TOTAL KNEE ARTHROPLASTY Left     OA  Marvin THR, LT TKR - hydrocodone prescibed by Dr. Almaguer             IRF OT ASSESSMENT FLOWSHEET (last 12 hours)       IRF OT Evaluation and Treatment       Row Name 12/29/23 1517 12/29/23 1215       OT Time and Intention    Document Type daily treatment  -KP daily treatment  -KP    Mode of Treatment individual therapy;occupational therapy  -KP individual therapy;occupational therapy  -KP    Patient Effort good  -KP good  -KP    Symptoms Noted During/After Treatment none  -KP fatigue  rough night sleep, woken up a lot per pt report  -KP      Row Name 12/29/23 1517 12/29/23 1215       General Information    Patient/Family/Caregiver Comments/Observations pt sitting in wc room finishing up lunch  -KP pt supine in bed asleep  -KP    Existing Precautions/Restrictions fall  -KP fall  -KP    Limitations/Impairments visual  -KP visual  -KP    Comment, General Information R neglect  -KP R neglect  -KP      Row Name 12/29/23 1517 12/29/23 1215       Pain Assessment    Pretreatment Pain Rating 0/10 - no pain  -KP 0/10 - no pain  -KP    Posttreatment Pain Rating 0/10 - no pain  -KP 0/10 - no pain  -KP      Row Name 12/29/23 1517 12/29/23 1215       Cognition/Psychosocial    Affect/Mental Status (Cognition) WFL  -KP WFL  -KP    Orientation Status (Cognition) oriented x 3  -KP oriented x 3  -KP    Follows Commands (Cognition) follows one-step commands;over 90% accuracy  -KP follows one-step commands;over 90% accuracy  -KP    Personal Safety Interventions fall prevention program maintained;gait belt;muscle  strengthening facilitated;nonskid shoes/slippers when out of bed  - fall prevention program maintained;gait belt;nonskid shoes/slippers when out of bed  -    Cognitive Function memory deficit  - memory deficit  -      Row Name 12/29/23 1517 12/29/23 1215       Vision Assessment/Intervention    Visual Field Deficit homonymous hemianopsia, right  -KP homonymous hemianopsia, right  -KP    Visual Treatment Interventions therapeutic activities;visual training/eye exercises  - --    Vision Assessment Comment pt completed scanning task to the R to complete sm color peg design w min diff. but able to complete 100% accuracy. pt completed matching beads onto melina to incr visual skills w 100 % accuracy.  - --      Row Name 12/29/23 1215          Bathing    [place] Level (Bathing) bathing skills;upper body;set up;standby assist;lower body;contact guard assist  -     Position (Bathing) sink side;supported sitting;supported standing  [facility]     Set-up Assistance (Bathing) open containers;obtain supplies  [facility] Name 12/29/23 1215          Upper Body Dressing    [place] Level (Upper Body Dressing) upper body dressing skills;don;doff;pull over garment;set up assistance;supervision  -     Position (Upper Body Dressing) supported sitting  -     Set-up Assistance (Upper Body Dressing) obtain clothing  [facility] Name 12/29/23 1215          Lower Body Dressing    [place] Level (Lower Body Dressing) doff;don;pants/bottoms;set up;shoes/slippers;socks;standby assist;minimum assist (75% patient effort)  -     Position (Lower Body Dressing) supported sitting;supported standing  -     Set-up Assistance (Lower Body Dressing) obtain clothing  [facility]       Row Name 12/29/23 1215          Grooming    [place] Level (Grooming) grooming skills;deodorant application;hair care, combing/brushing;wash face, hands;oral care regimen;supervision  -     Position (Grooming) sink side;supported sitting  [facility]      Set-up Assistance (Grooming) obtain supplies;open containers  -       Row Name 12/29/23 1215          Toileting    [place] Level (Toileting) toileting skills;adjust/manage clothing;perform perineal hygiene;set up assistance;verbal cues;moderate assist (50% patient effort)  -     Position (Toileting) supported sitting;supported standing  -       Row Name 12/29/23 1517 12/29/23 1215       Bed Mobility    Supine-Sit [place] (Bed Mobility) -- set up;standby assist  -KP    Comment, (Bed Mobility) in [facility] --      Row Name 12/29/23 1517 12/29/23 1215       Transfer Assessment/Treatment    Transfers -- shower transfer  -    Comment, (Transfers) to [facility] and back to [facility]  - --      Row Name 12/29/23 1215          Bed-Chair Transfer    Bed-Chair [place] (Transfers) set up;verbal cues;minimum assist (75% patient effort)  -     Assistive Device (Bed-Chair Transfers) wheelchair  -       Row Name 12/29/23 1517 12/29/23 1215       Sit-Stand Transfer    Sit-Stand [place] (Transfers) contact guard  -KP set up;minimum assist (75% patient effort);verbal cues  -    Assistive Device (Sit-Stand Transfers) wheelchair  - wheelchair  -      Row Name 12/29/23 1517 12/29/23 1215       Stand-Sit Transfer    Stand-Sit [place] (Transfers) set up;contact guard  -KP set up;minimum assist (75% patient effort)  -KP    Assistive Device (Stand-Sit Transfers) wheelchair  - wheelchair  -      Row Name 12/29/23 1215          Toilet Transfer    Type (Toilet Transfer) sit-stand;stand-sit;stand pivot/stand step  -KP     [place] Level (Toilet Transfer) set up;verbal cues;minimum assist (75% patient effort);contact guard  -     Assistive Device (Toilet Transfer) grab bars/safety frame;wheelchair  -KP       Row Name 12/29/23 1215          Shower Transfer    Comment, (Shower Transfer) pt wanted to wash at the sink, bad night and woken up a lot per pt report  -       Row Name 12/29/23 1517 12/29/23  1215       Balance    Static Sitting Balance standby assist  -KP standby assist  -KP    Dynamic Sitting Balance standby assist  -KP standby assist  -KP    Position, Sitting Balance sitting edge of mat;sitting in chair  -KP sitting in chair  -KP    Static Standing Balance contact guard  -KP contact guard  -KP    Dynamic Standing Balance -- contact guard  -KP      Row Name 12/29/23 1517 12/29/23 1215       Positioning and Restraints    Pre-Treatment Position sitting in chair/recliner  -KP in bed  -KP    Post Treatment Position wheelchair  -KP wheelchair  -KP    In Wheelchair sitting;call light within reach;encouraged to call for assist;exit alarm on  pt had coughing incident during OT session when she swallowed her cheeseburger she had chewed for 20 plus minutes. notified RN. pt was ok and drank some water  - sitting;call light within reach;encouraged to call for assist;exit alarm on  -KP              User Key  (r) = Recorded By, (t) = Taken By, (c) = Cosigned By      Initials Name Effective Dates    Brigid Valverde, OTR 07/11/23 -                      Occupational Therapy Education       Title: PT OT SLP Therapies (In Progress)       Topic: Occupational Therapy (Not Started)       Point: ADL training (Not Started)       Description:   Instruct learner(s) on proper safety adaptation and remediation techniques during self care or transfers.   Instruct in proper use of assistive devices.                  Learner Progress:  Not documented in this visit.              Point: Home exercise program (Not Started)       Description:   Instruct learner(s) on appropriate technique for monitoring, assisting and/or progressing therapeutic exercises/activities.                  Learner Progress:  Not documented in this visit.              Point: Precautions (Not Started)       Description:   Instruct learner(s) on prescribed precautions during self-care and functional transfers.                  Learner Progress:  Not  documented in this visit.              Point: Body mechanics (Not Started)       Description:   Instruct learner(s) on proper positioning and spine alignment during self-care, functional mobility activities and/or exercises.                  Learner Progress:  Not documented in this visit.                                        OT Recommendation and Plan                         Time Calculation:      Time Calculation- OT       Row Name 12/29/23 1527 12/29/23 1219          Time Calculation- OT    OT Start Time 1230  -KP 0800  -KP     OT Stop Time 1300  -KP 0830  -KP     OT Time Calculation (min) 30 min  -KP 30 min  -KP               User Key  (r) = Recorded By, (t) = Taken By, (c) = Cosigned By      Initials Name Provider Type    Brigid Valverde OTR Occupational Therapist                  Therapy Charges for Today       Code Description Service Date Service Provider Modifiers Qty    78635057737 HC OT NEUROMUSC RE EDUCATION EA 15 MIN 12/28/2023 Brigid Osorio OTR GO 2    25870208539 HC OT SELF CARE/MGMT/TRAIN EA 15 MIN 12/29/2023 Brigid Osorio OTR GO 2    33463313684 HC OT NEUROMUSC RE EDUCATION EA 15 MIN 12/29/2023 Brigid Osorio OTR GO 2                     GRACIELA Dow  12/29/2023

## 2023-12-30 LAB
GLUCOSE BLDC GLUCOMTR-MCNC: 127 MG/DL (ref 70–130)
GLUCOSE BLDC GLUCOMTR-MCNC: 158 MG/DL (ref 70–130)
GLUCOSE BLDC GLUCOMTR-MCNC: 181 MG/DL (ref 70–130)
GLUCOSE BLDC GLUCOMTR-MCNC: 210 MG/DL (ref 70–130)

## 2023-12-30 PROCEDURE — 82948 REAGENT STRIP/BLOOD GLUCOSE: CPT

## 2023-12-30 PROCEDURE — 97129 THER IVNTJ 1ST 15 MIN: CPT

## 2023-12-30 PROCEDURE — 63710000001 INSULIN LISPRO (HUMAN) PER 5 UNITS: Performed by: INTERNAL MEDICINE

## 2023-12-30 PROCEDURE — 97112 NEUROMUSCULAR REEDUCATION: CPT

## 2023-12-30 PROCEDURE — 97130 THER IVNTJ EA ADDL 15 MIN: CPT

## 2023-12-30 PROCEDURE — 97535 SELF CARE MNGMENT TRAINING: CPT

## 2023-12-30 PROCEDURE — 97530 THERAPEUTIC ACTIVITIES: CPT

## 2023-12-30 RX ADMIN — VENLAFAXINE HYDROCHLORIDE 150 MG: 150 CAPSULE, EXTENDED RELEASE ORAL at 08:06

## 2023-12-30 RX ADMIN — Medication 1000 MCG: at 08:06

## 2023-12-30 RX ADMIN — INSULIN LISPRO 2 UNITS: 100 INJECTION, SOLUTION INTRAVENOUS; SUBCUTANEOUS at 08:06

## 2023-12-30 RX ADMIN — OXYBUTYNIN CHLORIDE 10 MG: 10 TABLET, EXTENDED RELEASE ORAL at 08:06

## 2023-12-30 RX ADMIN — INSULIN LISPRO 2 UNITS: 100 INJECTION, SOLUTION INTRAVENOUS; SUBCUTANEOUS at 11:43

## 2023-12-30 RX ADMIN — ASPIRIN 325 MG: 325 TABLET ORAL at 08:06

## 2023-12-30 RX ADMIN — GABAPENTIN 400 MG: 400 CAPSULE ORAL at 14:21

## 2023-12-30 RX ADMIN — GABAPENTIN 400 MG: 400 CAPSULE ORAL at 06:03

## 2023-12-30 RX ADMIN — GABAPENTIN 400 MG: 400 CAPSULE ORAL at 22:27

## 2023-12-30 RX ADMIN — BUPROPION HYDROCHLORIDE 150 MG: 150 TABLET, EXTENDED RELEASE ORAL at 06:03

## 2023-12-30 RX ADMIN — ATORVASTATIN CALCIUM 40 MG: 20 TABLET, FILM COATED ORAL at 22:27

## 2023-12-30 RX ADMIN — INSULIN LISPRO 3 UNITS: 100 INJECTION, SOLUTION INTRAVENOUS; SUBCUTANEOUS at 22:27

## 2023-12-30 RX ADMIN — LEVOTHYROXINE SODIUM 100 MCG: 100 TABLET ORAL at 06:03

## 2023-12-30 RX ADMIN — LISINOPRIL 2.5 MG: 2.5 TABLET ORAL at 08:06

## 2023-12-30 NOTE — THERAPY TREATMENT NOTE
"Inpatient Rehabilitation - Occupational Therapy Treatment Note    [facility]     Patient Name: Rekha Bear  : 1940  MRN: [id number]    Today's Date: 2023                 Admit Date: 2023       No diagnosis found.    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    Community acquired pneumonia of left lower lobe of lung    Hyponatremia    Pneumonia due to COVID-19 virus    Major depressive disorder, recurrent, mild    Acute respiratory failure with hypoxia    Supraventricular tachycardia    PVC (premature ventricular contraction)    Dyspnea on exertion    Dizziness    Depressive disorder    Gastroesophageal reflux disease    Midline cystocele    Rectocele    BMI 29.0-29.9,adult    Acute left PCA stroke    B12 deficiency    Bradycardia    Premature atrial contractions    Stroke       Past Medical History:   Diagnosis Date    Chilblains     \"Chilblian's\"    CKD (chronic kidney disease)     Depression     Fatty liver     GERD (gastroesophageal reflux disease)     Hyperlipidemia     Hypertension     Incontinence     Osteoarthritis     OA  Marvin THR, LT TKR - hydrocodone prescibed by Dr. 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 Almaguer    TOTAL KNEE ARTHROPLASTY Left     OA  Marvin THR, LT TKR - hydrocodone prescibed by Dr. Almaguer             IRF OT ASSESSMENT FLOWSHEET (last 12 hours)       IRF OT Evaluation and Treatment       Row Name 12/30/23 1148          OT Time and Intention    Document Type daily treatment  -CC     Mode of Treatment occupational therapy  -CC     Patient Effort good  -CC     Symptoms Noted During/After Treatment none  -CC       Row Name 12/30/23 1148          Pain Assessment    Pretreatment Pain Rating 0/10 - no pain  -CC     Posttreatment Pain Rating 0/10 - no pain  -CC       Row Name 12/30/23 1148          Cognition/Psychosocial    Affect/Mental Status (Cognition) WFL  -CC     Orientation Status (Cognition) oriented x 3  -CC     Follows Commands (Cognition) follows one-step commands;over 90% accuracy  -CC     Personal Safety Interventions fall prevention program maintained;gait belt;nonskid shoes/slippers when out of bed  -CC     Cognitive Function memory deficit  -CC       Row Name 12/30/23 1148          Vision Assessment/Intervention    Visual Field Deficit homonymous hemianopsia, right  -CC     Visual Processing Deficit visual search, right;visual attention, right  -CC       Row Name 12/30/23 1148          Bathing    [place] Level (Bathing) bathing skills;lower body;upper body;contact guard assist;minimum assist (75% patient effort)  -CC     Assistive Device (Bathing) grab bar/tub rail;hand held shower spray hose;tub bench  -CC     Position (Bathing) supported sitting;supported standing  -CC     Set-up Assistance (Bathing)  adaptive equipment set-up;adjust water temperature;obtain supplies  -CC       Row Name 12/30/23 1148          Upper Body Dressing    [place] Level (Upper Body Dressing) upper body dressing skills;doff;don;bra/undergarment;pull over garment;supervision  -CC     Position (Upper Body Dressing) supported sitting  -CC     Set-up Assistance (Upper Body Dressing) obtain clothing  -CC       Row Name 12/30/23 1148          Lower Body Dressing    [place] Level (Lower Body Dressing) doff;don;pants/bottoms;set up;shoes/slippers;socks;minimum assist (75% patient effort)  -CC     Position (Lower Body Dressing) supported sitting;supported standing  -CC     Set-up Assistance (Lower Body Dressing) obtain clothing  -CC       Row Name 12/30/23 1148          Grooming    [place] Level (Grooming) grooming skills;deodorant application;hair care, combing/brushing;wash face, hands;oral care regimen;supervision  -CC     Position (Grooming) sink side;supported sitting  -CC     Set-up Assistance (Grooming) obtain supplies  -       Row Name 12/30/23 1148          Toileting    [place] Level (Toileting) toileting skills;adjust/manage clothing;perform perineal hygiene;set up assistance;verbal cues;minimum assist (75% patient effort)  -CC     Position (Toileting) supported sitting;supported standing  -CC     Comment (Toileting) assist w brief  -CC       Row Name 12/30/23 1148          Self-Feeding    [place] Level (Self-Feeding) feeding skills;set up  -CC     Position (Self-Feeding) supported sitting  -       Row Name 12/30/23 1148          Bed Mobility    Comment, (Bed Mobility) in w/c  -CC       Row Name 12/30/23 1148          Sit-Stand Transfer    Sit-Stand [place] (Transfers) verbal cues;contact guard  -CC     Assistive Device (Sit-Stand Transfers) wheelchair  -CC       Row Name 12/30/23 1148          Stand-Sit Transfer    Stand-Sit [place] (Transfers) set up;contact guard  -CC     Assistive Device  (Stand-Sit Transfers) wheelchair  -CC       Row Name 12/30/23 1148          Toilet Transfer    Type (Toilet Transfer) sit-stand;stand-sit;stand pivot/stand step  -CC     [place] Level (Toilet Transfer) set up;verbal cues;minimum assist (75% patient effort);contact guard  -CC     Assistive Device (Toilet Transfer) grab bars/safety frame;wheelchair  -CC       Row Name 12/30/23 1148          Shower Transfer    Type (Shower Transfer) stand pivot/stand step  -CC     [place] Level (Shower Transfer) verbal cues;minimum assist (75% patient effort);contact guard  -CC     Assistive Device (Shower Transfer) grab bar, tub/shower;tub bench;wheelchair  -CC       Row Name 12/30/23 1148          Balance    Static Sitting Balance supervision  -     Dynamic Sitting Balance standby assist;supervision  -CC     Static Standing Balance contact guard  -CC     Dynamic Standing Balance contact guard;minimal assist  -       Row Name 12/30/23 1148          Positioning and Restraints    Pre-Treatment Position sitting in chair/recliner  -CC     Post Treatment Position wheelchair  -CC     In Wheelchair sitting;exit alarm on;with PT  -CC               User Key  (r) = Recorded By, (t) = Taken By, (c) = Cosigned By      Initials Name Effective Dates    CC Sahra Crisostomo, OTR 06/16/21 -                      Occupational Therapy Education       Title: PT OT SLP Therapies (In Progress)       Topic: Occupational Therapy (In Progress)       Point: ADL training (Done)       Description:   Instruct learner(s) on proper safety adaptation and remediation techniques during self care or transfers.   Instruct in proper use of assistive devices.                  Learning Progress Summary             Patient Acceptance, E,TB,D, NR,VU by CC at 12/30/2023 1148    Comment: Shower tsf to bench                         Point: Home exercise program (Not Started)       Description:   Instruct learner(s) on appropriate technique for monitoring, assisting  and/or progressing therapeutic exercises/activities.                  Learner Progress:  Not documented in this visit.              Point: Precautions (Not Started)       Description:   Instruct learner(s) on prescribed precautions during self-care and functional transfers.                  Learner Progress:  Not documented in this visit.              Point: Body mechanics (Not Started)       Description:   Instruct learner(s) on proper positioning and spine alignment during self-care, functional mobility activities and/or exercises.                  Learner Progress:  Not documented in this visit.                              User Key       Initials Effective Dates Name Provider Type Discipline     06/16/21 -  Sahra Crisostomo OTR Occupational Therapist OT                        OT Recommendation and Plan                         Time Calculation:      Time Calculation- OT       Row Name 12/30/23 0800             Time Calculation- OT    OT Start Time 0800  -CC      OT Stop Time 0900  -CC      OT Time Calculation (min) 60 min  -CC                User Key  (r) = Recorded By, (t) = Taken By, (c) = Cosigned By      Initials Name Provider Type    CC Sahra Crisostomo OTR Occupational Therapist                               GRACIELA De Jesus  12/30/2023

## 2023-12-30 NOTE — THERAPY TREATMENT NOTE
"Inpatient Rehabilitation - Physical Therapy Treatment Note       [facility]     Patient Name: Rekha Bear  : 1940  MRN: [id number]    Today's Date: 2023                    Admit Date: 2023      Visit Dx:   No diagnosis found.    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    Community acquired pneumonia of left lower lobe of lung    Hyponatremia    Pneumonia due to COVID-19 virus    Major depressive disorder, recurrent, mild    Acute respiratory failure with hypoxia    Supraventricular tachycardia    PVC (premature ventricular contraction)    Dyspnea on exertion    Dizziness    Depressive disorder    Gastroesophageal reflux disease    Midline cystocele    Rectocele    BMI 29.0-29.9,adult    Acute left PCA stroke    B12 deficiency    Bradycardia    Premature atrial contractions    Stroke       Past Medical History:   Diagnosis Date    Chilblains     \"Chilblian's\"    CKD (chronic kidney disease)     Depression     Fatty liver     GERD (gastroesophageal reflux disease)     Hyperlipidemia     Hypertension     Incontinence     Osteoarthritis     OA  Marvin THR, LT TKR - hydrocodone prescibed by Dr. 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 Almaguer    TOTAL KNEE ARTHROPLASTY Left     OA  Marvin THR, LT TKR - hydrocodone prescibed by Dr. Almaguer       PT ASSESSMENT (last 12 hours)       IRF PT Evaluation and Treatment       Row Name 12/30/23 0943          PT Time and Intention    Document Type daily treatment  -LB     Mode of Treatment physical therapy  -LB       Row Name 12/30/23 0943          General Information    Existing Precautions/Restrictions fall  -LB     Comment, General Information right visual field cut  -LB       Row Name 12/30/23 0943          Pain Assessment    Pretreatment Pain Rating 0/10 - no pain  -LB     Posttreatment Pain Rating 0/10 - no pain  -LB       Row Name 12/30/23 0943          Cognition/Psychosocial    Affect/Mental Status (Cognition) WFL  slight confusion noted  -LB     Follows Commands (Cognition) follows one-step commands;over 90% accuracy  -LB     Personal Safety Interventions fall prevention program maintained;gait belt;muscle strengthening facilitated;nonskid shoes/slippers when out of bed  -LB       Row Name 12/30/23 0943          Vision Assessment/Intervention    Visual Field Deficit homonymous hemianopsia, right  -LB     Visual Processing Deficit visual search, right  -LB       Row Name 12/30/23 0943          Sit-Stand Transfer    Sit-Stand Corson (Transfers) verbal cues;contact guard  -LB     Assistive Device (Sit-Stand Transfers) walker, front-wheeled;wheelchair  -LB       Row Name 12/30/23 0943          Stand-Sit Transfer    Stand-Sit Corson (Transfers) set up;contact guard  -LB     Assistive Device (Stand-Sit  Transfers) wheelchair  -LB       Row Name 12/30/23 0943          Gait/Stairs (Locomotion)    Norton Level (Gait) verbal cues;contact guard;minimum assist (75% patient effort)  -LB     Assistive Device (Gait) walker, front-wheeled  -LB     Distance in Feet (Gait) 80 x 2  -LB     Pattern (Gait) step-to;step-through  -LB     Deviations/Abnormal Patterns (Gait) gait speed decreased;base of support, narrow;stride length decreased;weight shifting decreased  -LB     Bilateral Gait Deviations forward flexed posture;heel strike decreased  -LB     Gait Assessment/Intervention Cues needed to scan environment and manual and visual cues when turning to the right  -LB     Norton Level (Stairs) verbal cues;contact guard  -LB     Handrail Location (Stairs) both sides  -LB     Number of Steps (Stairs) 4  -LB     Ascending Technique (Stairs) step-over-step  -LB     Descending Technique (Stairs) step-to-step  -LB     Comment, (Gait/Stairs) Placed colored circles on the floor and had patient walk from 1 color to the next.  Patient able to find colors and on second trip was correct 3 out of 4 times support the next color sequence was.  Patient performed with use of rolling walker  -LB       Row Name 12/30/23 0943          Safety Issues, Functional Mobility    Safety Issues Affecting Function (Mobility) awareness of need for assistance;insight into deficits/self-awareness  -LB     Impairments Affecting Function (Mobility) balance;cognition;coordination;endurance/activity tolerance;strength;visual/perceptual  -LB       Row Name 12/30/23 0918          Balance    Comment, Balance Patient stood at table top performing scanning activity with following a sequence with colors.  Patient had no difficulty following sequence and with minimal cueing was able to scan both legs right and left to complete activity.  While standing on foam square skin environment looking for particular objects.  Patient needed min assist to maintain balance  and cues to not only turn head but eyes to complete activity.  -LB       Row Name 12/30/23 0943          Positioning and Restraints    Pre-Treatment Position sitting in chair/recliner  -LB     Post Treatment Position wheelchair  -LB     In Wheelchair call light within reach;encouraged to call for assist;exit alarm on  -LB               User Key  (r) = Recorded By, (t) = Taken By, (c) = Cosigned By      Initials Name Provider Type    LB Mary Ribeiro, PT Physical Therapist                  Wound 12/27/23 0045 Left posterior third toe (Active)   Closure Unable to assess 12/29/23 2120   Base maroon/purple;clean;dry 12/30/23 0754   Periwound Temperature warm 12/29/23 2120   Periwound Skin Turgor soft 12/29/23 2120   Edges callused 12/29/23 2120   Care, Wound cleansed with;sterile normal saline 12/30/23 0754   Dressing Care dressing changed 12/30/23 0754     Physical Therapy Education       Title: PT OT SLP Therapies (In Progress)       Topic: Physical Therapy (In Progress)       Point: Mobility training (Done)       Learning Progress Summary             Patient Acceptance, E,TB,D, VU,NR by MG at 12/27/2023 1554                         Point: Home exercise program (Not Started)       Learner Progress:  Not documented in this visit.              Point: Body mechanics (Done)       Learning Progress Summary             Patient Acceptance, E,TB,D, VU,NR by MG at 12/27/2023 1554                         Point: Precautions (In Progress)       Learning Progress Summary             Patient Acceptance, E, NR by DP at 12/29/2023 1147    Comment: scanning for objects and avoidance of them when walking    Acceptance, E, NR by MD at 12/28/2023 0916    Acceptance, E,TB,D, VU,NR by MG at 12/27/2023 1554                                         User Key       Initials Effective Dates Name Provider Type Discipline    MD 06/16/21 -  Gianna Atwood, PT Physical Therapist PT    MG 05/24/22 -  Dayanna Turpin, PT Physical Therapist PT    DP  08/24/21 -  Julián Pathak, PT Physical Therapist PT                    PT Recommendation and Plan                          Time Calculation:      PT Charges       Row Name 12/30/23 1200             Time Calculation    Start Time 0900  -LB      Stop Time 1000  -LB      Time Calculation (min) 60 min  -LB      PT Received On 12/30/23  -LB      PT - Next Appointment 01/01/24  -LB                User Key  (r) = Recorded By, (t) = Taken By, (c) = Cosigned By      Initials Name Provider Type    LB Mary Ribeiro, PT Physical Therapist                    Therapy Charges for Today       Code Description Service Date Service Provider Modifiers Qty    08937721051 HC PT NEUROMUSC RE EDUCATION EA 15 MIN 12/30/2023 Mary Ribeiro, PT GP 3    54516749557 HC PT THERAPEUTIC ACT EA 15 MIN 12/30/2023 Mary Ribeiro, PT GP 1              PT G-Codes  AM-PAC 6 Clicks Score (PT): 18      Mary Ribeiro PT  12/30/2023

## 2023-12-30 NOTE — THERAPY TREATMENT NOTE
"Inpatient Rehabilitation - Speech Language Pathology Treatment Note    [facility]     Patient Name: Rekha Bear  : 1940  MRN: [id number]    Today's Date: 2023                   Admit Date: 2023       Visit Dx:    No diagnosis found.    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    Community acquired pneumonia of left lower lobe of lung    Hyponatremia    Pneumonia due to COVID-19 virus    Major depressive disorder, recurrent, mild    Acute respiratory failure with hypoxia    Supraventricular tachycardia    PVC (premature ventricular contraction)    Dyspnea on exertion    Dizziness    Depressive disorder    Gastroesophageal reflux disease    Midline cystocele    Rectocele    BMI 29.0-29.9,adult    Acute left PCA stroke    B12 deficiency    Bradycardia    Premature atrial contractions    Stroke       Past Medical History:   Diagnosis Date    Chilblains     \"Chilblian's\"    CKD (chronic kidney disease)     Depression     Fatty liver     GERD (gastroesophageal reflux disease)     Hyperlipidemia     Hypertension     Incontinence     Osteoarthritis     OA  Marvin THR, LT TKR - hydrocodone prescibed by Dr. 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 Almaguer    TOTAL KNEE ARTHROPLASTY Left     OA  Marvin THR, LT TKR - hydrocodone prescibed by Dr. Almaguer       SLP Recommendation and Plan                                                               SLP EVALUATION (last 72 hours)       SLP SLC Evaluation       Row Name 12/30/23 1000 12/29/23 1000 12/28/23 1400 12/28/23 0930          Communication Assessment/Intervention    Document Type therapy note (daily note)  -AL therapy note (daily note)  -CR therapy note (daily note)  -CR therapy note (daily note)  -CR     Subjective Information -- no complaints  -CR no complaints  -CR no complaints  -CR     Patient Observations -- alert;cooperative  -CR alert;cooperative  -CR alert;cooperative  -CR     Patient/Family/Caregiver Comments/Observations Pt participated well.  -AL -- -- --     Patient Effort -- good  -CR excellent  -CR excellent  -CR     Symptoms Noted During/After Treatment -- none  -CR -- --        General Information    Patient Profile Reviewed -- yes  -CR yes  -CR yes  -CR        Pain Scale: Numbers Pre/Post-Treatment    Pretreatment Pain Rating 0/10 - no pain  -AL 0/10 - no pain  -CR 0/10 - no pain  -CR 0/10 - no pain  -CR     Posttreatment Pain Rating 0/10 - no pain  -AL 0/10 - no pain  -CR 0/10 - no pain  -CR --               User Key  (r) = Recorded By, (t) = Taken By, (c) = Cosigned By      Initials Name Effective Dates    Selam Dale, MS CCC-SLP 06/16/21 -     Katina Lam, SLP 08/28/23 -                        EDUCATION    The patient has been educated in the following areas:        Cognitive Impairment Communication Impairment.             SLP GOALS       Row Name 12/30/23 1000 12/29/23 1000 12/28/23 1400       Word Retrieval Skills Goal 1 (SLP)    Improve Word Retrieval Skills By Goal 1 (SLP) completing functional word finding tasks;90%;independently (over 90% accuracy)  -AL completing functional word finding tasks;90%;independently (over 90% accuracy)  -CR completing functional word finding tasks;90%;independently (over 90% accuracy)  -CR    Time Frame (Word Retrieval Goal 1, SLP) short term goal (STG)  -AL short term goal (STG)  -CR short term goal (STG)  -CR    Progress/Outcomes (Word Retrieval Goal 1, SLP) good progress toward goal  -AL goal ongoing  -CR goal ongoing  -CR    Comment (Word Retrieval Goal 1, SLP) Responsive naming given initial letter and definition: 75% with NO cues, 100% with MIN-MOD cues. Filling in category grid: 55% with NO cues, 100% with MIN-MOD cues. Completing sentences with homographs: 100% with NO cues.  -AL Convergent naming task completed with 100% accuracy given NO cues.  -CR Convergent task identifying target word given 3 clues completed with 80% accuracy given NO cues, 100% given MIN cues.  -CR       Connected Speech to Express Thoughts Goal 2 (SLP)    Improve Narrative Discourse to Express Thoughts By Goal 2 (SLP) giving basic definition of a word;giving multiple definitions of a word;90%;independently (over 90% accuracy)  -AL -- --    Time Frame (Connected Speech Goal 2, SLP) short term goal (STG)  -AL -- --    Progress/Outcomes (Connected Speech Goal 2, SLP) good progress toward goal  -AL -- --    Comment (Connected Speech Goal 2, SLP) Pt generated sentences to describe complex pictures with 40% accuracy with NO cues, 90% with MIN cues, 100% with MOD cues.  -AL -- --       Attention Goal 1 (SLP)    Improve Attention by Goal 1 (SLP) complete sustained attention task;90%;independently (over 90% accuracy)  -AL complete sustained attention  "task;90%;independently (over 90% accuracy)  -CR complete sustained attention task;90%;independently (over 90% accuracy)  -CR    Time Frame (Attention Goal 1, SLP) short term goal (STG)  -AL short term goal (STG)  -CR short term goal (STG)  -CR    Progress/Outcomes (Attention Goal 1, SLP) good progress toward goal  -AL good progress toward goal  -CR good progress toward goal  -CR    Comment (Attention Goal 1, SLP) Attention to detail for written directions: 80% with NO cues, 100% with MIN cues. Alternating attention for \"Blink\" card sort task: Pt sorted 30 cards in 3 minutes, 54 seconds with 5 cues.  -AL Alternating attention between numbers/letters during visual task completed with 70% accuracy given NO cues, increased to 100% accuracy when given MOD cues. Written directions completed with 80% accuracy given NO cues. \"Blink\" card sort given x3 paramenters completed; patient sorted 30 cards in 3.5 minutes given MIN cues.  -CR Mildly complex written direction task completed with 50% accuracy given NO cues, increased to 90% accuracy given MOD cues for attention to detail.  -CR       Functional Math Skills Goal 1 (SLP)    Improve Functional Math Skills Through Goal 1 (SLP) -- -- complete functional math task;80%;with minimal cues (75-90%)  -CR    Time Frame (Functional Math Skills Goal 1, SLP) -- -- short term goal (STG)  -CR    Progress/Outcomes (Functional Math Skills Goal 1, SLP) -- -- goal ongoing  -CR    Comment (Functional Math Skills Goal 1, SLP) -- -- Completed moderately complex functional math task involving money in PM session. Patient with 80% accuracy given NO cues, increased to 100% accuracy given MIN cues.  -CR       Right Hemisphere Function Goal 1 (SLP)    Improve Right Hemisphere Function Through Goal 1 (SLP) complete visuo-spatial activities (visual closure, trail making, mazes;complete visuo-perceptual activities (L/R discrimination, spatial concepts);80%;with minimal cues (75-90%)  -AL complete " visuo-spatial activities (visual closure, trail making, mazes;complete visuo-perceptual activities (L/R discrimination, spatial concepts);80%;with minimal cues (75-90%)  -CR complete visuo-spatial activities (visual closure, trail making, mazes;complete visuo-perceptual activities (L/R discrimination, spatial concepts);80%;with minimal cues (75-90%)  -CR    Time Frame (Right Hemisphere Function Goal 1, SLP) short term goal (STG)  -AL short term goal (STG)  -CR short term goal (STG)  -CR    Progress/Outcomes (Right Hemisphere Function Goal 1, SLP) good progress toward goal  -AL good progress toward goal  -CR good progress toward goal  -CR    Comment (Right Hemisphere Function Goal 1, SLP) Pt required NO cues for visual scanning during reading tasks.  -AL x2 visual tasks completed consisting of alternating attention/connect the dots and differentiating between two pictures. NO cues needed to attend to right visual field during both tasks.  -CR MOD cues required for L/R visual scanning during written direction task and attention to right visual field for calculator use during functional math task.  -CR      Row Name 12/28/23 0930             Word Retrieval Skills Goal 1 (SLP)    Improve Word Retrieval Skills By Goal 1 (SLP) completing functional word finding tasks;90%;independently (over 90% accuracy)  -CR      Time Frame (Word Retrieval Goal 1, SLP) short term goal (STG)  -CR      Progress/Outcomes (Word Retrieval Goal 1, SLP) goal ongoing  -CR      Comment (Word Retrieval Goal 1, SLP) Patient completed category member task given target initial letter with 75% accuracy given NO cues, increased to100% accuracy when given MOD cues.  -CR         Functional Math Skills Goal 1 (SLP)    Improve Functional Math Skills Through Goal 1 (SLP) complete functional math task;80%;with minimal cues (75-90%)  -CR      Time Frame (Functional Math Skills Goal 1, SLP) short term goal (STG)  -CR      Progress/Outcomes (Functional Math  Skills Goal 1, SLP) goal ongoing  -CR      Comment (Functional Math Skills Goal 1, SLP) Initiated moderately complex functional math task involving money, however, unable to complete due to time constraints. 50% accuracy given NO cues thus far.  -CR         Right Hemisphere Function Goal 1 (SLP)    Improve Right Hemisphere Function Through Goal 1 (SLP) complete visuo-spatial activities (visual closure, trail making, mazes;complete visuo-perceptual activities (L/R discrimination, spatial concepts);80%;with minimal cues (75-90%)  -CR      Time Frame (Right Hemisphere Function Goal 1, SLP) short term goal (STG)  -CR      Progress/Outcomes (Right Hemisphere Function Goal 1, SLP) good progress toward goal  -CR      Comment (Right Hemisphere Function Goal 1, SLP) Visual scanning task with letters completed with 100% accuracy given NO cues and following education for L/R scanning. Patient required MOD cues to attend to right during functional math task.  -CR                User Key  (r) = Recorded By, (t) = Taken By, (c) = Cosigned By      Initials Name Provider Type    Selam Dale MS CCC-SLP Speech and Language Pathologist    Katina Lam, SLP Speech and Language Pathologist                                Time Calculation:        Time Calculation- SLP       Row Name 12/30/23 1202             Time Calculation- SLP    SLP Start Time 1000  -AL      SLP Stop Time 1100  -AL      SLP Time Calculation (min) 60 min  -AL      SLP Received On 12/30/23  -AL                User Key  (r) = Recorded By, (t) = Taken By, (c) = Cosigned By      Initials Name Provider Type    Selam Dale MS CCC-SLP Speech and Language Pathologist                      Therapy Charges for Today       Code Description Service Date Service Provider Modifiers Qty    14833940522 HC ST DEV OF COGN SKILLS INITIAL 15 MIN 12/30/2023 Selam Shukla MS CCC-SLP  1    59404614838 HC ST DEV OF COGN SKILLS EACH ADDT'L 15 MIN 12/30/2023  Gayla, Selam Beckham, MS CCC-SLP  3                             Selam Shukla, MS CCC-SLP  12/30/2023

## 2023-12-30 NOTE — PROGRESS NOTES
Inpatient Rehabilitation Plan of Care Note    Plan of Care  Care Plan Reviewed - No updates at this time.    Safety    [RN] Potential for Injury(Active)  Current Status(12/29/2023): Risk for fall  Weekly Goal(01/03/2024): Cue to use call bell  Discharge Goal: PT /Family will be aware of risk for fall and safety in the home  setting    Performed Intervention(s)  Safety rounds  Bed alarm and/chair alarm      Psychosocial    [RN] Coping/Adjustment(Active)  Current Status(12/29/2023): Expressing appropriate coping  Weekly Goal(01/03/2024): Provide educational material regarding stroke  Discharge Goal: Knowleadgeable of care needs and stroke recovery    Performed Intervention(s)  Vebalizes her needs and concerns      Sphincter Control    [RN] Bladder Management(Active)  Current Status(12/29/2023): Bladder urgency  Weekly Goal(01/03/2024): Continent 50%  Discharge Goal: Continent 100%    [RN] Bowel Management(Active)  Current Status(12/29/2023): Continent 100%  Weekly Goal(01/03/2024): Continent 100%  Discharge Goal: Continent 100%    Performed Intervention(s)  Bladder training program  Encourage fluid intake  Incontinence products    Signed by: Bailey Thompson RN

## 2023-12-30 NOTE — PROGRESS NOTES
[facility]     Progress Note    Patient Name: Rekha Bear  : 1940  MRN: [id number]  Primary Care Physician:  Eben Porter MD  Date of admission: 2023    Subjective Pt is awake and alert. Pt is pleased with her progress so far.   Subjective     Chief Complaint: same    History of Present Illness  Patient Reports     Review of Systems    Objective  exam unchanged. Calves soft and NT. Resp unlabored and regu;lar  Objective     Vitals:   Temp:  [97.7 °F (36.5 °C)-97.9 °F (36.6 °C)] 97.7 °F (36.5 °C)  Heart Rate:  [47-59] 48  Resp:  [16-18] 16  BP: (109-182)/(58-82) 154/74    Physical Exam     Result Review    Result Review:  I have personally reviewed the results from the time of this admission to 2023 12:17 EST and agree with these findings:  []  Laboratory list / accordion  []  Microbiology  []  Radiology  []  EKG/Telemetry   []  Cardiology/Vascular   []  Pathology  []  Old records  []  Other:  Most notable findings include:       Assessment & Plan  Continue eval phase. BP down today .   Assessment / Plan     Brief Patient Summary:  Rekha Bear is a 83 y.o. female who     Active Hospital Problems:  Active Hospital Problems    Diagnosis     **Stroke      Plan:       DVT prophylaxis:  Mechanical DVT prophylaxis orders are present.    CODE STATUS:    Code Status (Patient has no pulse and is not breathing): CPR (Attempt to Resuscitate)  Medical Interventions (Patient has pulse or is breathing): Full Support    Disposition:  I expect patient to be discharged     Fidencio Merchant MD

## 2023-12-30 NOTE — PLAN OF CARE
Goal Outcome Evaluation:           Progress: improving  Outcome Evaluation: Imelda is alert and oriented, generalized weakness, mild sensory impairment to RLE, word finding issues at times, has been continent of bowel and bladder this shift, no complaints of pain or distress, participated in therapies, no unsafe behaivors, sliding scale insulin given as ordered for elevated blood sugars.

## 2023-12-30 NOTE — PLAN OF CARE
Goal Outcome Evaluation:              Outcome Evaluation: Patient is alert and oriented. Take meds with thin liquids. Continent of bowel and bladder. ACHS- with sliding scale. Patient is bradycardic. Slept well, no complaints of pain. Call light within reach. Bed alarn is set/on.

## 2023-12-31 LAB
GLUCOSE BLDC GLUCOMTR-MCNC: 125 MG/DL (ref 70–130)
GLUCOSE BLDC GLUCOMTR-MCNC: 144 MG/DL (ref 70–130)
GLUCOSE BLDC GLUCOMTR-MCNC: 213 MG/DL (ref 70–130)
GLUCOSE BLDC GLUCOMTR-MCNC: 267 MG/DL (ref 70–130)

## 2023-12-31 PROCEDURE — 63710000001 INSULIN LISPRO (HUMAN) PER 5 UNITS: Performed by: INTERNAL MEDICINE

## 2023-12-31 PROCEDURE — 82948 REAGENT STRIP/BLOOD GLUCOSE: CPT

## 2023-12-31 RX ORDER — DOCUSATE SODIUM 100 MG/1
100 CAPSULE, LIQUID FILLED ORAL 2 TIMES DAILY
Status: DISPENSED | OUTPATIENT
Start: 2023-12-31

## 2023-12-31 RX ORDER — POLYETHYLENE GLYCOL 3350 17 G/17G
17 POWDER, FOR SOLUTION ORAL DAILY
Status: DISPENSED | OUTPATIENT
Start: 2023-12-31

## 2023-12-31 RX ADMIN — INSULIN LISPRO 3 UNITS: 100 INJECTION, SOLUTION INTRAVENOUS; SUBCUTANEOUS at 20:25

## 2023-12-31 RX ADMIN — VENLAFAXINE HYDROCHLORIDE 150 MG: 150 CAPSULE, EXTENDED RELEASE ORAL at 09:06

## 2023-12-31 RX ADMIN — BUPROPION HYDROCHLORIDE 150 MG: 150 TABLET, EXTENDED RELEASE ORAL at 06:02

## 2023-12-31 RX ADMIN — GABAPENTIN 400 MG: 400 CAPSULE ORAL at 13:16

## 2023-12-31 RX ADMIN — INSULIN LISPRO 4 UNITS: 100 INJECTION, SOLUTION INTRAVENOUS; SUBCUTANEOUS at 11:44

## 2023-12-31 RX ADMIN — GABAPENTIN 400 MG: 400 CAPSULE ORAL at 06:02

## 2023-12-31 RX ADMIN — LEVOTHYROXINE SODIUM 100 MCG: 100 TABLET ORAL at 06:02

## 2023-12-31 RX ADMIN — Medication 1000 MCG: at 09:06

## 2023-12-31 RX ADMIN — ATORVASTATIN CALCIUM 40 MG: 20 TABLET, FILM COATED ORAL at 20:25

## 2023-12-31 RX ADMIN — OXYBUTYNIN CHLORIDE 10 MG: 10 TABLET, EXTENDED RELEASE ORAL at 09:06

## 2023-12-31 RX ADMIN — POLYETHYLENE GLYCOL 3350 17 G: 17 POWDER, FOR SOLUTION ORAL at 16:54

## 2023-12-31 RX ADMIN — LISINOPRIL 2.5 MG: 2.5 TABLET ORAL at 09:06

## 2023-12-31 RX ADMIN — ASPIRIN 325 MG: 325 TABLET ORAL at 09:06

## 2023-12-31 RX ADMIN — DOCUSATE SODIUM 100 MG: 100 CAPSULE, LIQUID FILLED ORAL at 20:25

## 2023-12-31 RX ADMIN — GABAPENTIN 400 MG: 400 CAPSULE ORAL at 20:25

## 2023-12-31 NOTE — PLAN OF CARE
Goal Outcome Evaluation:  Plan of Care Reviewed With: patient        Progress: improving  Outcome Evaluation: Imelda is alert and oriented, generalized weakness, mild sensory impairment to RLE, word finding issues at times, has been continent of bowel and bladder this shift, no complaints of pain or distress, no unsafe behaivors, sliding scale insulin given as ordered for elevated blood sugars.

## 2023-12-31 NOTE — PROGRESS NOTES
Inpatient Rehabilitation Plan of Care Note    Plan of Care  Care Plan Reviewed - No updates at this time.    Safety    Performed Intervention(s)  Safety rounds  Bed alarm and/chair alarm      Psychosocial    Performed Intervention(s)  Vebalizes her needs and concerns      Sphincter Control    Performed Intervention(s)  Bladder training program  Encourage fluid intake  Incontinence products    Signed by: Josesito Quintana RN

## 2023-12-31 NOTE — PLAN OF CARE
Problem: Rehabilitation (IRF) Plan of Care  Goal: Plan of Care Review  Outcome: Ongoing, Progressing  Flowsheets (Taken 12/31/2023 0133)  Progress: improving  Plan of Care Reviewed With: patient  Outcome Evaluation: Pt is A&Ox4, does have occassional word finding issues and forgetfulness, up w/ asst x1 w/ walker, generalized weakness, pt does have occassional sensory deficits in R side, however was not experiencing any tongith, meds whole w/ water, no c/o pain, refuses SCD's, cont of B/B, does have some urinary urgency at baseline, R visual field cut, accucheck achs, resting well, plan of care ongoing.   Goal Outcome Evaluation:  Plan of Care Reviewed With: patient        Progress: improving  Outcome Evaluation: Pt is A&Ox4, does have occassional word finding issues and forgetfulness, up w/ asst x1 w/ walker, generalized weakness, pt does have occassional sensory deficits in R side, however was not experiencing any tongith, meds whole w/ water, no c/o pain, refuses SCD's, cont of B/B, does have some urinary urgency at baseline, R visual field cut, accucheck achs, resting well, plan of care ongoing.

## 2023-12-31 NOTE — PROGRESS NOTES
[facility]     Progress Note    Patient Name: Rekha Bear  : 1940  MRN: [id number]  Primary Care Physician:  Eben Porter MD  Date of admission: 2023    Subjective Pt is awake and alert. Pt is pleased wth her early response to therapies.   Subjective     Chief Complaint: same    History of Present Illness  Patient Reports     Review of Systems    Objective  exam unchanged. Calves soft and NT. Resp unlabore d and regular  Objective     Vitals:   Temp:  [97.7 °F (36.5 °C)-98.1 °F (36.7 °C)] 97.7 °F (36.5 °C)  Heart Rate:  [46-56] 56  Resp:  [18-20] 18  BP: (118-138)/(57-65) 137/60    Physical Exam     Result Review    Result Review:  I have personally reviewed the results from the time of this admission to 2023 11:01 EST and agree with these findings:  []  Laboratory list / accordion  []  Microbiology  []  Radiology  []  EKG/Telemetry   []  Cardiology/Vascular   []  Pathology  []  Old records  []  Other:  Most notable findings include:       Assessment & Plan Continue eval phase.   Assessment / Plan     Brief Patient Summary:  Rekha Bear is a 83 y.o. female who     Active Hospital Problems:  Active Hospital Problems    Diagnosis     **Stroke      Plan:       DVT prophylaxis:  Mechanical DVT prophylaxis orders are present.    CODE STATUS:    Code Status (Patient has no pulse and is not breathing): CPR (Attempt to Resuscitate)  Medical Interventions (Patient has pulse or is breathing): Full Support    Disposition:  I expect patient to be discharged     Fidencio Merchant MD

## 2024-01-01 VITALS
HEIGHT: 67 IN | WEIGHT: 192.68 LBS | HEART RATE: 50 BPM | OXYGEN SATURATION: 97 % | RESPIRATION RATE: 18 BRPM | DIASTOLIC BLOOD PRESSURE: 70 MMHG | BODY MASS INDEX: 30.24 KG/M2 | TEMPERATURE: 98.6 F | SYSTOLIC BLOOD PRESSURE: 129 MMHG

## 2024-01-01 LAB
ANION GAP SERPL CALCULATED.3IONS-SCNC: 10.1 MMOL/L (ref 5–15)
BASOPHILS # BLD AUTO: 0.01 10*3/MM3 (ref 0–0.2)
BASOPHILS NFR BLD AUTO: 0.1 % (ref 0–1.5)
BUN SERPL-MCNC: 28 MG/DL (ref 8–23)
BUN/CREAT SERPL: 22.6 (ref 7–25)
CALCIUM SPEC-SCNC: 9 MG/DL (ref 8.6–10.5)
CHLORIDE SERPL-SCNC: 104 MMOL/L (ref 98–107)
CO2 SERPL-SCNC: 23.9 MMOL/L (ref 22–29)
CREAT SERPL-MCNC: 1.24 MG/DL (ref 0.57–1)
DEPRECATED RDW RBC AUTO: 48.4 FL (ref 37–54)
EGFRCR SERPLBLD CKD-EPI 2021: 43.8 ML/MIN/1.73
EOSINOPHIL # BLD AUTO: 0.17 10*3/MM3 (ref 0–0.4)
EOSINOPHIL NFR BLD AUTO: 2.4 % (ref 0.3–6.2)
ERYTHROCYTE [DISTWIDTH] IN BLOOD BY AUTOMATED COUNT: 14.5 % (ref 12.3–15.4)
GLUCOSE BLDC GLUCOMTR-MCNC: 155 MG/DL (ref 70–130)
GLUCOSE BLDC GLUCOMTR-MCNC: 169 MG/DL (ref 70–130)
GLUCOSE BLDC GLUCOMTR-MCNC: 219 MG/DL (ref 70–130)
GLUCOSE BLDC GLUCOMTR-MCNC: 239 MG/DL (ref 70–130)
GLUCOSE SERPL-MCNC: 147 MG/DL (ref 65–99)
HCT VFR BLD AUTO: 35.5 % (ref 34–46.6)
HGB BLD-MCNC: 11.4 G/DL (ref 12–15.9)
IMM GRANULOCYTES # BLD AUTO: 0.03 10*3/MM3 (ref 0–0.05)
IMM GRANULOCYTES NFR BLD AUTO: 0.4 % (ref 0–0.5)
LYMPHOCYTES # BLD AUTO: 2.65 10*3/MM3 (ref 0.7–3.1)
LYMPHOCYTES NFR BLD AUTO: 36.9 % (ref 19.6–45.3)
MCH RBC QN AUTO: 29.7 PG (ref 26.6–33)
MCHC RBC AUTO-ENTMCNC: 32.1 G/DL (ref 31.5–35.7)
MCV RBC AUTO: 92.4 FL (ref 79–97)
MONOCYTES # BLD AUTO: 0.78 10*3/MM3 (ref 0.1–0.9)
MONOCYTES NFR BLD AUTO: 10.8 % (ref 5–12)
NEUTROPHILS NFR BLD AUTO: 3.55 10*3/MM3 (ref 1.7–7)
NEUTROPHILS NFR BLD AUTO: 49.4 % (ref 42.7–76)
NRBC BLD AUTO-RTO: 0.1 /100 WBC (ref 0–0.2)
PLATELET # BLD AUTO: 221 10*3/MM3 (ref 140–450)
PMV BLD AUTO: 8.8 FL (ref 6–12)
POTASSIUM SERPL-SCNC: 4.5 MMOL/L (ref 3.5–5.2)
RBC # BLD AUTO: 3.84 10*6/MM3 (ref 3.77–5.28)
SODIUM SERPL-SCNC: 138 MMOL/L (ref 136–145)
WBC NRBC COR # BLD AUTO: 7.19 10*3/MM3 (ref 3.4–10.8)

## 2024-01-01 PROCEDURE — 63710000001 INSULIN LISPRO (HUMAN) PER 5 UNITS: Performed by: INTERNAL MEDICINE

## 2024-01-01 PROCEDURE — 97530 THERAPEUTIC ACTIVITIES: CPT

## 2024-01-01 PROCEDURE — 97110 THERAPEUTIC EXERCISES: CPT

## 2024-01-01 PROCEDURE — 82948 REAGENT STRIP/BLOOD GLUCOSE: CPT

## 2024-01-01 PROCEDURE — 92507 TX SP LANG VOICE COMM INDIV: CPT

## 2024-01-01 PROCEDURE — 85025 COMPLETE CBC W/AUTO DIFF WBC: CPT | Performed by: PHYSICAL MEDICINE & REHABILITATION

## 2024-01-01 PROCEDURE — 80048 BASIC METABOLIC PNL TOTAL CA: CPT | Performed by: PHYSICAL MEDICINE & REHABILITATION

## 2024-01-01 PROCEDURE — 97112 NEUROMUSCULAR REEDUCATION: CPT

## 2024-01-01 RX ADMIN — INSULIN LISPRO 2 UNITS: 100 INJECTION, SOLUTION INTRAVENOUS; SUBCUTANEOUS at 17:04

## 2024-01-01 RX ADMIN — Medication 1000 MCG: at 07:32

## 2024-01-01 RX ADMIN — ASPIRIN 325 MG: 325 TABLET ORAL at 07:28

## 2024-01-01 RX ADMIN — POLYETHYLENE GLYCOL 3350 17 G: 17 POWDER, FOR SOLUTION ORAL at 07:32

## 2024-01-01 RX ADMIN — LISINOPRIL 2.5 MG: 2.5 TABLET ORAL at 07:29

## 2024-01-01 RX ADMIN — ATORVASTATIN CALCIUM 40 MG: 20 TABLET, FILM COATED ORAL at 21:05

## 2024-01-01 RX ADMIN — INSULIN LISPRO 2 UNITS: 100 INJECTION, SOLUTION INTRAVENOUS; SUBCUTANEOUS at 07:20

## 2024-01-01 RX ADMIN — GABAPENTIN 400 MG: 400 CAPSULE ORAL at 05:20

## 2024-01-01 RX ADMIN — INSULIN LISPRO 3 UNITS: 100 INJECTION, SOLUTION INTRAVENOUS; SUBCUTANEOUS at 11:34

## 2024-01-01 RX ADMIN — LEVOTHYROXINE SODIUM 100 MCG: 100 TABLET ORAL at 05:20

## 2024-01-01 RX ADMIN — DOCUSATE SODIUM 100 MG: 100 CAPSULE, LIQUID FILLED ORAL at 07:29

## 2024-01-01 RX ADMIN — GABAPENTIN 400 MG: 400 CAPSULE ORAL at 14:45

## 2024-01-01 RX ADMIN — GABAPENTIN 400 MG: 400 CAPSULE ORAL at 21:06

## 2024-01-01 RX ADMIN — VENLAFAXINE HYDROCHLORIDE 150 MG: 150 CAPSULE, EXTENDED RELEASE ORAL at 07:32

## 2024-01-01 RX ADMIN — BUPROPION HYDROCHLORIDE 150 MG: 150 TABLET, EXTENDED RELEASE ORAL at 05:20

## 2024-01-01 RX ADMIN — OXYBUTYNIN CHLORIDE 10 MG: 10 TABLET, EXTENDED RELEASE ORAL at 07:31

## 2024-01-01 RX ADMIN — INSULIN LISPRO 3 UNITS: 100 INJECTION, SOLUTION INTRAVENOUS; SUBCUTANEOUS at 21:06

## 2024-01-01 RX ADMIN — DOCUSATE SODIUM 100 MG: 100 CAPSULE, LIQUID FILLED ORAL at 21:06

## 2024-01-01 NOTE — THERAPY TREATMENT NOTE
"Inpatient Rehabilitation - Physical Therapy Treatment Note       [facility]     Patient Name: Rekha Bear  : 1940  MRN: [id number]    Today's Date: 2024                    Admit Date: 2023      Visit Dx:   No diagnosis found.    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    Community acquired pneumonia of left lower lobe of lung    Hyponatremia    Pneumonia due to COVID-19 virus    Major depressive disorder, recurrent, mild    Acute respiratory failure with hypoxia    Supraventricular tachycardia    PVC (premature ventricular contraction)    Dyspnea on exertion    Dizziness    Depressive disorder    Gastroesophageal reflux disease    Midline cystocele    Rectocele    BMI 29.0-29.9,adult    Acute left PCA stroke    B12 deficiency    Bradycardia    Premature atrial contractions    Stroke       Past Medical History:   Diagnosis Date    Chilblains     \"Chilblian's\"    CKD (chronic kidney disease)     Depression     Fatty liver     GERD (gastroesophageal reflux disease)     Hyperlipidemia     Hypertension     Incontinence     Osteoarthritis     OA  Marvin THR, LT TKR - hydrocodone prescibed by Dr. 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 Almaguer    TOTAL KNEE ARTHROPLASTY Left     OA  Marvin THR, LT TKR - hydrocodone prescibed by Dr. Almaguer       PT ASSESSMENT (last 12 hours)       IRF PT Evaluation and Treatment       Row Name 01/01/24 1020          PT Time and Intention    Document Type daily treatment  -     Mode of Treatment physical therapy  -     Patient/Family/Caregiver Comments/Observations UP inchair upon arrival, agreebaleto PT  -       Row Name 01/01/24 1020          General Information    Patient Profile Reviewed yes  -     General Observations of Patient Agreeable, pleasant, motivated  -     Existing Precautions/Restrictions fall  -       Row Name 01/01/24 1020          Pain Assessment    Pretreatment Pain Rating 0/10 - no pain  -     Posttreatment Pain Rating 0/10 - no pain  -       Row Name 01/01/24 1020          Cognition/Psychosocial    Affect/Mental Status (Cognition) WFL  -     Follows Commands (Cognition) follows one-step commands;over 90% accuracy  -     Personal Safety Interventions fall prevention program maintained;gait belt;nonskid shoes/slippers when out of bed;supervised activity  -     Cognitive Function memory deficit;safety deficit  -     Safety Deficit (Cognition) safety precautions follow-through/compliance  secondary to visual deficits  -       Row Name 01/01/24 1020          Sit-Stand Transfer    Sit-Stand [place] (Transfers) contact guard;verbal cues  -     Assistive Device (Sit-Stand Transfers) walker, front-wheeled;wheelchair  -     Comment, (Sit-Stand Transfer) VC for hand placement  -        Row Name 01/01/24 1020          Stand-Sit Transfer    Stand-Sit [place] (Transfers) contact guard;verbal cues  -     Assistive Device (Stand-Sit Transfers) walker, front-wheeled;wheelchair  -     Comment, (Stand-Sit Transfer) VC for hand placement  -       Row Name 01/01/24 1020          Gait/Stairs (Locomotion)    [place] Level (Gait) minimum assist (75% patient effort);contact guard;verbal cues  -     Assistive Device (Gait) walker, front-wheeled  -     Distance in Feet (Gait) 175, 110, 80  -     Pattern (Gait) step-to;step-through  -     Deviations/Abnormal Patterns (Gait) gait speed decreased;base of support, narrow;stride length decreased;weight shifting decreased;right sided deviations;antalgic  -     Bilateral Gait Deviations forward flexed posture;heel strike decreased  -     Gait Assessment/Intervention VC to scan environment, minimize ADD in swing R.  Worked environment scanning with gait.  -       Row Name 01/01/24 1020          Balance    Comment, Balance alt tapping to 6 inch step with B UE support x 10  -[facility] Name 01/01/24 1020          Motor Skills    Additional Documentation Advanced Stepping/Walking Interventions (Group)  -[facility] Name 01/01/24 1020          Hip (Therapeutic Exercise)    Hip Strengthening (Therapeutic Exercise) sitting;bilateral;marching while seated;20 repititions  -[facility] Name 01/01/24 1020          Knee (Therapeutic Exercise)    Knee Strengthening (Therapeutic Exercise) sitting;bilateral;LAQ (long arc quad)  -[facility] Name 01/01/24 1020          Ankle (Therapeutic Exercise)    Ankle AROM (Therapeutic Exercise) sitting;bilateral;dorsiflexion;plantarflexion;15 repititions  -[facility] Name 01/01/24 1020          Advanced Stepping/Walking Interventions    Stepping/Walking Interventions side stepping;other (see comments)  Amb around [place] with L UE support only.  MIld ADD R until VC, then self corrected.  -     Side  Stepping (Stepping/Walking Interventions) B 8 x2 at [place]oda bar - CG  -       Row Name 01/01/24 1020          Positioning and Restraints    Pre-Treatment Position sitting in chair/recliner  -     In Wheelchair sitting;call light within reach;encouraged to call for assist;exit alarm on  -KP               User Key  (r) = Recorded By, (t) = Taken By, (c) = Cosigned By      Initials Name Provider Type     Brigitte Fox, PT Physical Therapist                  Wound 12/27/23 0045 Left posterior third toe (Active)   Dressing Appearance dry;intact 01/01/24 0000   Care, Wound other (see comments) 01/01/24 0000     Physical Therapy Education       Title: PT OT SLP Therapies (In Progress)       Topic: Physical Therapy (In Progress)       Point: Mobility training (Done)       Learning Progress Summary             Patient Acceptance, E,TB,D, VU,NR by MG at 12/27/2023 1554                         Point: Home exercise program (Not Started)       Learner Progress:  Not documented in this visit.              Point: Body mechanics (Done)       Learning Progress Summary             Patient Acceptance, E,TB,D, VU,NR by MG at 12/27/2023 1554                         Point: Precautions (In Progress)       Learning Progress Summary             Patient Acceptance, E, NR by DP at 12/29/2023 1147    Comment: scanning for objects and avoidance of them when walking    Acceptance, E, NR by MD at 12/28/2023 0916    Acceptance, E,TB,D, VU,NR by MG at 12/27/2023 1554                                         User Key       Initials Effective Dates Name Provider Type Discipline    MD 06/16/21 -  Gianna Atwood, PT Physical Therapist PT    MG 05/24/22 -  Dayanna Turpin, PT Physical Therapist PT    DP 08/24/21 -  Julián Pathak, PT Physical Therapist PT                    PT Recommendation and Plan                          Time Calculation:      PT Charges       Row Name 01/01/24 1228             Time Calculation    Start Time 0945  -      Stop Time  1015  -      Time Calculation (min) 30 min  -      PT Received On 01/01/24  -      PT - Next Appointment 01/02/24  -                User Key  (r) = Recorded By, (t) = Taken By, (c) = Cosigned By      Initials Name Provider Type    Brigitte Louis, PT Physical Therapist                    Therapy Charges for Today       Code Description Service Date Service Provider Modifiers Qty    68059191501 HC PT THER PROC EA 15 MIN 1/1/2024 Brigitte Fox, PT GP 1    41077059568 HC PT THERAPEUTIC ACT EA 15 MIN 1/1/2024 Brigitte Fox, PT GP 1              PT G-Codes  AM-PAC 6 Clicks Score (PT): 19      Brigitte Fox, PT  1/1/2024

## 2024-01-01 NOTE — THERAPY TREATMENT NOTE
"Inpatient Rehabilitation - Speech Language Pathology Treatment Note  [facility]     Patient Name: Rekha Bear  : 1940  MRN: [id number]  Today's Date: 2024               Admit Date: 2023     Visit Dx:  No diagnosis found.  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    Community acquired pneumonia of left lower lobe of lung    Hyponatremia    Pneumonia due to COVID-19 virus    Major depressive disorder, recurrent, mild    Acute respiratory failure with hypoxia    Supraventricular tachycardia    PVC (premature ventricular contraction)    Dyspnea on exertion    Dizziness    Depressive disorder    Gastroesophageal reflux disease    Midline cystocele    Rectocele    BMI 29.0-29.9,adult    Acute left PCA stroke    B12 deficiency    Bradycardia    Premature atrial contractions    Stroke     Past Medical History:   Diagnosis Date    Chilblains     \"Chilblian's\"    CKD (chronic kidney disease)     Depression     Fatty liver     GERD (gastroesophageal reflux disease)     Hyperlipidemia     Hypertension     Incontinence     Osteoarthritis     OA  Marvin THR, LT TKR - hydrocodone prescibed by Dr. 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 Almaguer    TOTAL KNEE ARTHROPLASTY Left     OA  Mravin THR, LT TKR - hydrocodone prescibed by Dr. Almaguer       SLP Recommendation and Plan                                                                  SLP EVALUATION (last 72 hours)       SLP SLC Evaluation       Row Name 01/01/24 1400 12/30/23 1000                Communication Assessment/Intervention    Document Type -- therapy note (daily note)  -AL       Patient/Family/Caregiver Comments/Observations -- Pt participated well.  -AL          Pain Scale: Numbers Pre/Post-Treatment    Pretreatment Pain Rating -- 0/10 - no pain  -AL       Posttreatment Pain Rating -- 0/10 - no pain  -AL          Word Retrieval Skills Goal 1 (SLP)    Improve Word Retrieval Skills By Goal 1 (SLP) completing a divergent task  -NR --       Time Frame (Word Retrieval Goal 1, SLP) 1 week  -NR --       Progress (Word Retrieval Skills Goal 1, SLP) 50%  -NR --       Comment (Word Retrieval Goal 1, SLP) Pt was to name at least three members of abstact categories x 5.  Pt required some cueing to state items that were more unrelated than related.  Pt started naming numerous types of makeup when asked to name items in a woman's purse.  Cues were needed to think of additional items such as checkbook, money, etc. Pt able to increase accuracy to 100% when given min/mod cues  on these 5 tasks.  -NR --                 User Key  (r) = Recorded By, (t) = Taken By, (c) = Cosigned By      Initials Name Effective Dates    NR Reina Tovar MA,CCC-SLP 06/16/21 -     Selam Dale, MS CCC-SLP 06/16/21 -                         EDUCATION  The patient has been educated in the following areas:     Communication Impairment.           SLP GOALS       Row Name 01/01/24 1400 12/30/23 1000          Word Retrieval Skills Goal 1 (SLP)    Improve Word Retrieval Skills By Goal 1 (SLP) completing a divergent task  -NR completing functional word finding tasks;90%;independently (over 90% accuracy)  -AL     Time Frame (Word Retrieval Goal 1, SLP) 1 week  -NR short term goal (STG)  -AL     Progress (Word Retrieval Skills Goal 1, SLP) 50%  -NR --     Progress/Outcomes (Word Retrieval Goal 1, SLP) -- good progress toward goal  -AL     Comment (Word Retrieval Goal 1, SLP) Pt was to name at least three members of abstact categories x 5.  Pt required some cueing to state items that were more unrelated than related.  Pt started naming numerous types of makeup when asked to name items in a woman's purse.  Cues were needed to think of additional items such as checkbook, money, etc. Pt able to increase accuracy to 100% when given min/mod cues  on these 5 tasks.  -NR Responsive naming given initial letter and definition: 75% with NO cues, 100% with MIN-MOD cues. Filling in category grid: 55% with NO cues, 100% with MIN-MOD cues. Completing sentences with homographs: 100% with NO cues.  -AL        Connected Speech to Express Thoughts Goal 2 (SLP)    Improve Narrative Discourse to Express Thoughts By Goal 2 (SLP) -- giving basic definition of a word;giving multiple definitions of a word;90%;independently (over 90% accuracy)  -AL     Time Frame (Connected Speech Goal 2, SLP) -- short term goal (STG)  -AL     Progress/Outcomes (Connected Speech Goal 2, SLP) -- good progress toward goal  -AL     Comment (Connected Speech Goal 2, SLP) -- Pt generated sentences to describe complex pictures with 40% accuracy with NO cues, 90% with MIN cues, 100% with MOD cues.  -AL        Attention Goal 1 (SLP)    Improve Attention by Goal 1 (SLP) -- complete sustained attention  "task;90%;independently (over 90% accuracy)  -AL     Time Frame (Attention Goal 1, SLP) -- short term goal (STG)  -AL     Progress/Outcomes (Attention Goal 1, SLP) -- good progress toward goal  -AL     Comment (Attention Goal 1, SLP) -- Attention to detail for written directions: 80% with NO cues, 100% with MIN cues. Alternating attention for \"Blink\" card sort task: Pt sorted 30 cards in 3 minutes, 54 seconds with 5 cues.  -AL        Right Hemisphere Function Goal 1 (SLP)    Improve Right Hemisphere Function Through Goal 1 (SLP) -- complete visuo-spatial activities (visual closure, trail making, mazes;complete visuo-perceptual activities (L/R discrimination, spatial concepts);80%;with minimal cues (75-90%)  -AL     Time Frame (Right Hemisphere Function Goal 1, SLP) -- short term goal (STG)  -AL     Progress/Outcomes (Right Hemisphere Function Goal 1, SLP) -- good progress toward goal  -AL     Comment (Right Hemisphere Function Goal 1, SLP) -- Pt required NO cues for visual scanning during reading tasks.  -AL               User Key  (r) = Recorded By, (t) = Taken By, (c) = Cosigned By      Initials Name Provider Type    NR Reina Tovar MA,CCC-SLP Speech and Language Pathologist    Selam Dale, MS CCC-SLP Speech and Language Pathologist                            Time Calculation:      Time Calculation- SLP       Row Name 01/01/24 1444             Time Calculation- SLP    SLP Start Time 0830  -NR      SLP Stop Time 0900  -NR      SLP Time Calculation (min) 30 min  -NR      SLP Received On 01/01/24  -NR                User Key  (r) = Recorded By, (t) = Taken By, (c) = Cosigned By      Initials Name Provider Type    NR Reina Tovar MA,CCC-SLP Speech and Language Pathologist                    Therapy Charges for Today       Code Description Service Date Service Provider Modifiers Qty    73314845206  ST TREATMENT SPEECH 2 1/1/2024 Reina Tovar MA,CCC-SLP GN 1                       Reina Tovar, " MA,CCC-SLP  1/1/2024

## 2024-01-01 NOTE — PLAN OF CARE
Goal Outcome Evaluation:      Slept fair. Meds with water. Incontinent of bladder, so far tonight. Glucose 213, 3 units of lispro insulin given as ordered. No c/o pain. Transfers x1 assist. Declined SCD's, declined left 3rd toe dressing change.

## 2024-01-01 NOTE — PLAN OF CARE
Goal Outcome Evaluation:  Plan of Care Reviewed With: patient        Progress: improving  Outcome Evaluation: Pt. is A/Ox4. She is forgetful and impulsive at times. Denies c/o pain. Generalized weakness only. Continent of bladder and bowel. Meds whole with water without difficulty. Has some difficulty with word finding.

## 2024-01-01 NOTE — THERAPY TREATMENT NOTE
"Inpatient Rehabilitation - Occupational Therapy Treatment Note    [facility]     Patient Name: Rekha Bear  : 1940  MRN: [id number]    Today's Date: 2024                 Admit Date: 2023       No diagnosis found.    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    Community acquired pneumonia of left lower lobe of lung    Hyponatremia    Pneumonia due to COVID-19 virus    Major depressive disorder, recurrent, mild    Acute respiratory failure with hypoxia    Supraventricular tachycardia    PVC (premature ventricular contraction)    Dyspnea on exertion    Dizziness    Depressive disorder    Gastroesophageal reflux disease    Midline cystocele    Rectocele    BMI 29.0-29.9,adult    Acute left PCA stroke    B12 deficiency    Bradycardia    Premature atrial contractions    Stroke       Past Medical History:   Diagnosis Date    Chilblains     \"Chilblian's\"    CKD (chronic kidney disease)     Depression     Fatty liver     GERD (gastroesophageal reflux disease)     Hyperlipidemia     Hypertension     Incontinence     Osteoarthritis     OA  Marvin THR, LT TKR - hydrocodone prescibed by Dr. 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 Almaguer    TOTAL KNEE ARTHROPLASTY Left     OA  Marvin THR, LT TKR - hydrocodone prescibed by Dr. Almaguer             IRF OT ASSESSMENT FLOWSHEET (last 12 hours)       IRF OT Evaluation and Treatment       Row Name 01/01/24 1100          OT Time and Intention    Document Type daily treatment  -PP     Mode of Treatment occupational therapy  -PP     Patient Effort good  -PP     Symptoms Noted During/After Treatment none  -PP       Row Name 01/01/24 1100          General Information    Patient Profile Reviewed yes  -PP     Patient/Family/Caregiver Comments/Observations Pt was observed in w/c in room  -PP     General Observations of Patient Pt is agreeable and motivated to participate  -PP     Existing Precautions/Restrictions fall  -PP     Comment, General Information right visual deficit  -PP       Row Name 01/01/24 1100          Pain Assessment    Pretreatment Pain Rating 0/10 - no pain  -PP     Posttreatment Pain Rating 0/10 - no pain  -PP       Row Name 01/01/24 1100          Cognition/Psychosocial    Affect/Mental Status (Cognition) WFL  -PP     Orientation Status (Cognition) oriented x 3  -PP     Follows Commands (Cognition) follows one-step commands;over 90% accuracy  -PP     Personal Safety Interventions fall prevention program maintained;nonskid shoes/slippers when out of bed  -PP     Cognitive Function memory deficit  -PP       Row Name 01/01/24 1100          Basic Activities of Daily Living (BADLs)    Basic Activities of Daily Living --  Pt declined the need for self care and was observed dressed already.  -PP       Row Name  01/01/24 1100          Bed Mobility    Comment, (Bed Mobility) pt in w/c at session start and end  -PP       Row Name 01/01/24 1100          Functional Mobility    Functional Mobility- Ind. Level not tested  -PP     Functional Mobility- Comment Pt able to take steps in therapy gym w/ Rwx side to side during fxnl task.  -PP       Row Name 01/01/24 1100          Transfer Assessment/Treatment    Transfers sit-stand transfer  -PP       Row Name 01/01/24 1100          Sit-Stand Transfer    Sit-Stand [place] (Transfers) contact guard;verbal cues  -PP     Assistive Device (Sit-Stand Transfers) walker, front-wheeled;wheelchair  -PP     Comment, (Sit-Stand Transfer) 3x STS from w/c using rwx, Vcs for hand and foot placement  -PP       Row Name 01/01/24 1100          Stand-Sit Transfer    Stand-Sit [place] (Transfers) contact guard;verbal cues  -PP     Assistive Device (Stand-Sit Transfers) walker, front-wheeled;wheelchair  -PP     Comment, (Stand-Sit Transfer) Vcs for hand placement  -PP       Row Name 01/01/24 1100          Motor Skills    Motor Skills coordination  -PP     Coordination fine motor deficit;gross motor deficit  Pt participated in BUE dyn reaching act of thin rings placing them on small, tall rods specifically w/ focus on attention to right side.  -PP       Row Name 01/01/24 1100          Balance    Static Standing Balance contact guard  -PP     Dynamic Standing Balance contact guard;minimal assist  -PP     Position/Device Used, Standing Balance walker, front-wheeled  -PP     Balance Interventions standing;static;dynamic;minimal challenge;supported;dynamic reaching  -PP     Comment, Balance Pt tolerated 3x stands for 3 mins each during dyn reaching act, using Rwx for support and CGA for safety. Seated rest breaks were taken after each stand.  -PP       Row Name 01/01/24 1100          Positioning and Restraints    Pre-Treatment Position other (comment)  in w/c in room  -PP     Post Treatment Position  wheelchair  -PP     In Wheelchair sitting;call light within reach;encouraged to call for assist;exit alarm on  -PP               User Key  (r) = Recorded By, (t) = Taken By, (c) = Cosigned By      Initials Name Effective Dates    PP [place] ZechariahMILKA tripathi 06/09/23 -                      Occupational Therapy Education       Title: PT OT SLP Therapies (In Progress)       Topic: Occupational Therapy (In Progress)       Point: ADL training (Done)       Description:   Instruct learner(s) on proper safety adaptation and remediation techniques during self care or transfers.   Instruct in proper use of assistive devices.                  Learning Progress Summary             Patient Acceptance, E,TB,D, NR,VU by  at 12/30/2023 [id number]    Comment: Shower tsf to bench                         Point: Home exercise program (Not Started)       Description:   Instruct learner(s) on appropriate technique for monitoring, assisting and/or progressing therapeutic exercises/activities.                  Learner Progress:  Not documented in this visit.              Point: Precautions (Not Started)       Description:   Instruct learner(s) on prescribed precautions during self-care and functional transfers.                  Learner Progress:  Not documented in this visit.              Point: Body mechanics (Not Started)       Description:   Instruct learner(s) on proper positioning and spine alignment during self-care, functional mobility activities and/or exercises.                  Learner Progress:  Not documented in this visit.                              User Key       Initials Effective Dates Name Provider Type Discipline     06/16/21 -  Sahra Crisostomo OTR Occupational Therapist OT                        OT Recommendation and Plan                         Time Calculation:      Time Calculation- OT       Row Name 01/01/24 0800             Time Calculation- OT    OT Start Time 0800  -PP      OT Stop Time 0830  -PP      OT Time  Calculation (min) 30 min  -PP      OT Received On --  -PP                User Key  (r) = Recorded By, (t) = Taken By, (c) = Cosigned By      Initials Name Provider Type    PP Angelo Quiroz, MILKA Occupational Therapist                  Therapy Charges for Today       Code Description Service Date Service Provider Modifiers Qty    21476345346  OT NEUROMUSC RE EDUCATION EA 15 MIN 1/1/2024 Angelo Quiroz OT GO 2                     Angelo Quiroz OT  1/1/2024

## 2024-01-01 NOTE — PROGRESS NOTES
Inpatient Rehabilitation Plan of Care Note    Plan of Care  Care Plan Reviewed - No updates at this time.    Safety    Performed Intervention(s)  Safety rounds  Bed alarm and/chair alarm      Psychosocial    Performed Intervention(s)  Vebalizes her needs and concerns      Sphincter Control    Performed Intervention(s)  Bladder training program  Encourage fluid intake  Incontinence products    Signed by: Kelly Blas RN

## 2024-01-01 NOTE — PROGRESS NOTES
[facility]     Progress Note    Patient Name: Rekha Bear  : 1940  MRN: [id number]  Primary Care Physician:  Eben Porter MD  Date of admission: 2023    Subjective   Pt is awake and alert.   Subjective     Chief Complaint: same    History of Present Illness  Patient Reports     Review of Systems    Objective  exam unchanged. Calves soft and NT. Resp unlabored and regular  Objective     Vitals:   Temp:  [97.5 °F (36.4 °C)-98.2 °F (36.8 °C)] 98 °F (36.7 °C)  Heart Rate:  [50-51] 50  Resp:  [18] 18  BP: (126-151)/(59-70) 126/59    Physical Exam     Result Review    Result Review:  I have personally reviewed the results from the time of this admission to 2024 11:36 EST and agree with these findings:  []  Laboratory list / accordion  []  Microbiology  []  Radiology  []  EKG/Telemetry   []  Cardiology/Vascular   []  Pathology  []  Old records  []  Other:  Most notable findings include:       Assessment & Plan  Continue eval phase.   Assessment / Plan     Brief Patient Summary:  Rekha Bear is a 83 y.o. female who     Active Hospital Problems:  Active Hospital Problems    Diagnosis     **Stroke      Plan:       DVT prophylaxis:  Mechanical DVT prophylaxis orders are present.    CODE STATUS:    Code Status (Patient has no pulse and is not breathing): CPR (Attempt to Resuscitate)  Medical Interventions (Patient has pulse or is breathing): Full Support    Disposition:  I expect patient to be discharged     Fidencio Merchant MD

## 2024-01-01 NOTE — PROGRESS NOTES
Inpatient Rehabilitation Plan of Care Note    Plan of Care  Care Plan Reviewed - Updates as Follows    Safety    [RN] Potential for Injury(Active)  Current Status(01/02/2024): Risk for fall  Weekly Goal(01/09/2024): Cue to use call bell  Discharge Goal: PT /Family will be aware of risk for fall and safety in the home  setting    Performed Intervention(s)  Safety rounds  Bed alarm and/chair alarm      Psychosocial    [RN] Coping/Adjustment(Active)  Current Status(01/02/2024): Expressing appropriate coping  Weekly Goal(01/09/2024): Provide educational material regarding stroke  Discharge Goal: Knowleadgeable of care needs and stroke recovery    Performed Intervention(s)  Vebalizes her needs and concerns      Sphincter Control    [RN] Bladder Management(Active)  Current Status(01/02/2024): Bladder urgency  Weekly Goal(01/03/2024): Continent 50%  Discharge Goal: Continent 100%    [RN] Bowel Management(Active)  Current Status(01/02/2024): Continent 100%  Weekly Goal(01/09/2024): Continent 100%  Discharge Goal: Continent 100%    Performed Intervention(s)  Bladder training program  Encourage fluid intake  Incontinence products    Signed by: Iqra Mcbride RN

## 2024-01-02 LAB
GLUCOSE BLDC GLUCOMTR-MCNC: 139 MG/DL (ref 70–130)
GLUCOSE BLDC GLUCOMTR-MCNC: 173 MG/DL (ref 70–130)
GLUCOSE BLDC GLUCOMTR-MCNC: 183 MG/DL (ref 70–130)
GLUCOSE BLDC GLUCOMTR-MCNC: 223 MG/DL (ref 70–130)

## 2024-01-02 PROCEDURE — 97110 THERAPEUTIC EXERCISES: CPT

## 2024-01-02 PROCEDURE — 82948 REAGENT STRIP/BLOOD GLUCOSE: CPT

## 2024-01-02 PROCEDURE — 97129 THER IVNTJ 1ST 15 MIN: CPT

## 2024-01-02 PROCEDURE — 97530 THERAPEUTIC ACTIVITIES: CPT

## 2024-01-02 PROCEDURE — 63710000001 INSULIN LISPRO (HUMAN) PER 5 UNITS: Performed by: INTERNAL MEDICINE

## 2024-01-02 PROCEDURE — 97112 NEUROMUSCULAR REEDUCATION: CPT | Performed by: OCCUPATIONAL THERAPIST

## 2024-01-02 PROCEDURE — 97130 THER IVNTJ EA ADDL 15 MIN: CPT

## 2024-01-02 PROCEDURE — 97116 GAIT TRAINING THERAPY: CPT

## 2024-01-02 PROCEDURE — 97535 SELF CARE MNGMENT TRAINING: CPT | Performed by: OCCUPATIONAL THERAPIST

## 2024-01-02 RX ADMIN — GABAPENTIN 400 MG: 400 CAPSULE ORAL at 21:33

## 2024-01-02 RX ADMIN — ASPIRIN 325 MG: 325 TABLET ORAL at 07:30

## 2024-01-02 RX ADMIN — LISINOPRIL 2.5 MG: 2.5 TABLET ORAL at 07:29

## 2024-01-02 RX ADMIN — Medication 1000 MCG: at 07:30

## 2024-01-02 RX ADMIN — INSULIN LISPRO 2 UNITS: 100 INJECTION, SOLUTION INTRAVENOUS; SUBCUTANEOUS at 17:08

## 2024-01-02 RX ADMIN — ATORVASTATIN CALCIUM 40 MG: 20 TABLET, FILM COATED ORAL at 21:33

## 2024-01-02 RX ADMIN — VENLAFAXINE HYDROCHLORIDE 150 MG: 150 CAPSULE, EXTENDED RELEASE ORAL at 07:30

## 2024-01-02 RX ADMIN — POLYETHYLENE GLYCOL 3350 17 G: 17 POWDER, FOR SOLUTION ORAL at 07:29

## 2024-01-02 RX ADMIN — GABAPENTIN 400 MG: 400 CAPSULE ORAL at 05:36

## 2024-01-02 RX ADMIN — DOCUSATE SODIUM 100 MG: 100 CAPSULE, LIQUID FILLED ORAL at 07:29

## 2024-01-02 RX ADMIN — INSULIN LISPRO 2 UNITS: 100 INJECTION, SOLUTION INTRAVENOUS; SUBCUTANEOUS at 11:56

## 2024-01-02 RX ADMIN — LEVOTHYROXINE SODIUM 100 MCG: 100 TABLET ORAL at 05:35

## 2024-01-02 RX ADMIN — OXYBUTYNIN CHLORIDE 10 MG: 10 TABLET, EXTENDED RELEASE ORAL at 07:29

## 2024-01-02 RX ADMIN — GABAPENTIN 400 MG: 400 CAPSULE ORAL at 14:43

## 2024-01-02 RX ADMIN — BUPROPION HYDROCHLORIDE 150 MG: 150 TABLET, EXTENDED RELEASE ORAL at 05:35

## 2024-01-02 RX ADMIN — INSULIN LISPRO 3 UNITS: 100 INJECTION, SOLUTION INTRAVENOUS; SUBCUTANEOUS at 21:33

## 2024-01-02 NOTE — THERAPY TREATMENT NOTE
"Inpatient Rehabilitation - Physical Therapy Treatment Note       [facility]     Patient Name: Rekha Bear  : 1940  MRN: [id number]    Today's Date: 2024                    Admit Date: 2023      Visit Dx:   No diagnosis found.    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    Community acquired pneumonia of left lower lobe of lung    Hyponatremia    Pneumonia due to COVID-19 virus    Major depressive disorder, recurrent, mild    Acute respiratory failure with hypoxia    Supraventricular tachycardia    PVC (premature ventricular contraction)    Dyspnea on exertion    Dizziness    Depressive disorder    Gastroesophageal reflux disease    Midline cystocele    Rectocele    BMI 29.0-29.9,adult    Acute left PCA stroke    B12 deficiency    Bradycardia    Premature atrial contractions    Stroke       Past Medical History:   Diagnosis Date    Chilblains     \"Chilblian's\"    CKD (chronic kidney disease)     Depression     Fatty liver     GERD (gastroesophageal reflux disease)     Hyperlipidemia     Hypertension     Incontinence     Osteoarthritis     OA  Marvin THR, LT TKR - hydrocodone prescibed by Dr. 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 Almaguer    TOTAL KNEE ARTHROPLASTY Left     OA  Marvin THR, LT TKR - hydrocodone prescibed by Dr. Almaguer       PT ASSESSMENT (last 12 hours)       IRF PT Evaluation and Treatment       Row Name 01/02/24 0830          PT Time and Intention    Document Type daily treatment  -MG     Mode of Treatment individual therapy;physical therapy  -MG     Patient/Family/Caregiver Comments/Observations Pt pleasant and agreeable to PT tx. Reports having a good day so far.  -MG     Total Minutes, Physical Therapy 60  -MG       Row Name 01/02/24 0830          General Information    Patient Profile Reviewed yes  -MG     Existing Precautions/Restrictions fall  -MG     Limitations/Impairments visual  -MG       Row Name 01/02/24 0830          Pain Assessment    Pretreatment Pain Rating 0/10 - no pain  -MG     Posttreatment Pain Rating 0/10 - no pain  -MG       Row Name 01/02/24 0830          Cognition/Psychosocial    Affect/Mental Status (Cognition) WFL  -MG     Orientation Status (Cognition) oriented x 4  -MG     Follows Commands (Cognition) follows one-step commands;over 90% accuracy  -MG     Personal Safety Interventions fall prevention program maintained;gait belt;muscle strengthening facilitated;nonskid shoes/slippers when out of bed;supervised activity  -MG     Cognitive Function memory deficit  -MG       Row Name 01/02/24 0830          Vision Assessment/Intervention    Visual Field Deficit homonymous hemianopsia, right  -MG     Visual Processing Deficit visual search, right;visual attention, right  -MG       Row Name 01/02/24 0830           Bed Mobility    Sit-Supine [place] (Bed Mobility) standby assist  -MG     Bed Mobility, Safety Issues decreased use of arms for pushing/pulling;decreased use of legs for bridging/pushing  -MG     Assistive Device (Bed Mobility) bed rails  -MG       Row Name 01/02/24 0830          Transfer Assessment/Treatment    Comment, (Transfers) STS x6 for strengthening, no AD, from WC, CG/SBA. x2 with BUE support on WC, x2 w/ LUE support on WC, x3 without BUE support.  -MG       Row Name 01/02/24 0830          Chair-Bed Transfer    Chair-Bed [place] (Transfers) contact guard;verbal cues  -MG     Assistive Device (Chair-Bed Transfers) wheelchair  -MG       Row Name 01/02/24 0830          Sit-Stand Transfer    Sit-Stand [place] (Transfers) standby assist;verbal cues  -MG     Assistive Device (Sit-Stand Transfers) wheelchair;walker, front-wheeled  -MG       Row Name 01/02/24 0830          Stand-Sit Transfer    Stand-Sit [place] (Transfers) contact guard;standby assist;verbal cues  -MG     Assistive Device (Stand-Sit Transfers) walker, front-wheeled;wheelchair  -MG       Row Name 01/02/24 0830          Gait/Stairs (Locomotion)    [place] Level (Gait) contact guard;minimum assist (75% patient effort);verbal cues  -MG     Assistive Device (Gait) walker, front-wheeled;walker, 4-wheeled  -MG     Distance in Feet (Gait) 160' x2 w/ RW, 80' x2 w/ HHA  -MG     Pattern (Gait) step-to;step-through  -MG     Deviations/Abnormal Patterns (Gait) gait speed decreased;base of support, narrow;stride length decreased;weight shifting decreased;right sided deviations;antalgic  -MG     Bilateral Gait Deviations forward flexed posture;heel strike decreased  -MG     Right Sided Gait Deviations Trendelenburg sign  -MG     Gait Assessment/Intervention 80' w/ L HHA - first few steps unsteady, but improved balance w/ cueing for upright posture and to push through this PTs hand. Second 80' gait bout attempted w/ quad-tip cane, however  pt unable to perform/understand sequencing causing pt to become unsteady needing increased A of min/mod for balance; switched back to L HHA. R hip drop (trendelenburg sign), will need to work on L glutes.  -       Row Name 01/02/24 0830          Safety Issues, Functional Mobility    Impairments Affecting Function (Mobility) balance;cognition;coordination;endurance/activity tolerance;strength;visual/perceptual  -       Row Name 01/02/24 0830          Balance    Static Standing Balance contact guard;standby assist  -     Position/Device Used, Standing Balance supported;unsupported  -     Balance Interventions standing;static;minimal challenge  -MG     Comment, Balance Static standing following each STS (6) without AD for 15-30s each. Cueing for upright posture as pt demos small lean to L and flexed fwd posture. Mildly unsteady first two stands, but improved to pt only requiring close SBA and with good posture.  -       Row Name 01/02/24 0830          Hip (Therapeutic Exercise)    Hip Strengthening (Therapeutic Exercise) bilateral;aBduction;marching while seated;resistance band;red;15 repititions  -       Row Name 01/02/24 0830          Knee (Therapeutic Exercise)    Knee Strengthening (Therapeutic Exercise) bilateral;LAQ (long arc quad);hamstring curls;resistance band;red;15 repititions  -MG       Row Name 01/02/24 0830          Ankle (Therapeutic Exercise)    Ankle AROM (Therapeutic Exercise) bilateral;dorsiflexion;plantarflexion;15 repititions  -       Row Name 01/02/24 0830          Positioning and Restraints    Pre-Treatment Position sitting in chair/recliner  -     Post Treatment Position wheelchair  -MG     In Wheelchair sitting;call light within reach;encouraged to call for assist;exit alarm on  -MG               User Key  (r) = Recorded By, (t) = Taken By, (c) = Cosigned By      Initials Name Provider Type     Dayanna Turpin, PT Physical Therapist                  Wound 12/27/23 0045 Left  posterior third toe (Active)   Dressing Appearance dry;intact 01/01/24 2110   Closure Unable to assess 01/02/24 0641   Base dressing in place, unable to visualize 01/01/24 2110   Dressing Care dressing reinforced 01/01/24 2110     Physical Therapy Education       Title: PT OT SLP Therapies (In Progress)       Topic: Physical Therapy (In Progress)       Point: Mobility training (Done)       Learning Progress Summary             Patient Acceptance, E,D, VU,NR by MG at 1/2/2024 0842    Acceptance, E,TB,D, VU,NR by MG at 12/27/2023 1554                         Point: Home exercise program (Not Started)       Learner Progress:  Not documented in this visit.              Point: Body mechanics (Done)       Learning Progress Summary             Patient Acceptance, E,D, VU,NR by MG at 1/2/2024 0842    Acceptance, E,TB,D, VU,NR by MG at 12/27/2023 1554                         Point: Precautions (Done)       Learning Progress Summary             Patient Acceptance, E,D, VU,NR by MG at 1/2/2024 0842    Acceptance, E, NR by DP at 12/29/2023 1147    Comment: scanning for objects and avoidance of them when walking    Acceptance, E, NR by MD at 12/28/2023 0916    Acceptance, E,TB,D, VU,NR by MG at 12/27/2023 1554                                         User Key       Initials Effective Dates Name Provider Type Discipline    MD 06/16/21 -  Gianna Atwood, PT Physical Therapist PT    MG 05/24/22 -  Dayanna Turpin, PT Physical Therapist PT    DP 08/24/21 -  Julián Pathak, PT Physical Therapist PT                    PT Recommendation and Plan    Planned Therapy Interventions (PT): balance training, bed mobility training, gait training, home exercise program, lumbar stabilization, manual therapy techniques, motor coordination training, neuromuscular re-education, orthotic fitting/training, patient/family education, postural re-education, ROM (range of motion), stair training, strengthening, stretching, swiss ball techniques, transfer  training, wheelchair management/propulsion training  Frequency of Treatment (PT): 60 minutes per session, 5 times per week     Daily Progress Summary (PT)  Daily Progress Summary (PT): Family present at end of session in room. Discussed sitting on pts R side and having her visually scan for family and turning head to R when talking.               Time Calculation:      PT Charges       Row Name 01/02/24 1145 01/02/24 1144          Time Calculation    Start Time 1100  -MG 0830  -MG     Stop Time 1130  -MG 0900  -MG     Time Calculation (min) 30 min  -MG 30 min  -MG     PT Received On -- 01/02/24  -MG     PT - Next Appointment -- 01/03/24  -MG               User Key  (r) = Recorded By, (t) = Taken By, (c) = Cosigned By      Initials Name Provider Type    Dayanna Petit, PT Physical Therapist                    Therapy Charges for Today       Code Description Service Date Service Provider Modifiers Qty    21289956468 HC GAIT TRAINING EA 15 MIN 1/2/2024 Dayanna Turpin, PT GP 1    37534917857 HC PT THERAPEUTIC ACT EA 15 MIN 1/2/2024 Dayanna Turpin, PT GP 2    17824287413 HC PT THER PROC EA 15 MIN 1/2/2024 Dayanna Turpin, PT GP 1              PT G-Codes  AM-PAC 6 Clicks Score (PT): 19      Dayanna Turpin PT  1/2/2024

## 2024-01-02 NOTE — PROGRESS NOTES
LOS: 7 days   Patient Care Team:  Eben Porter MD as PCP - General  Eben Porter MD as PCP - Family Medicine      MICHAEL S JACQUI  1940      ADMITTING DIAGNOSIS:  Status post left PCA stroke-felt cardioembolic  Bilateral carotid stenosis  Stroke prophylaxis-aspirin 7 days through December 28, then start Eliquis 5 mg twice daily on December 29/atorvastatin  Right visual field deficit-right homonymous hemianopsia  Impaired mobility/self-car      Subjective         Patient tolerating therapy.  Continues with impaired vision to the right side.  No new weakness.  No headache complaint      Objective     Vitals:    01/02/24 1412   BP: 129/66   Pulse: 51   Resp: 18   Temp: 98 °F (36.7 °C)   SpO2: 95%       Intake/Output Summary (Last 24 hours) at 1/2/2024 1527  Last data filed at 1/2/2024 1156  Gross per 24 hour   Intake 1320 ml   Output --   Net 1320 ml     PHYSICAL EXAM:     MENTAL STATUS -  AWAKE / ALERT  HEENT-    LUNGS - CTA, NO WHEEZES, RALES OR RHONCHI  HEART- RRR, NO RUB, MURMUR, OR GALLOP  ABD - NORMOACTIVE BOWEL SOUNDS, SOFT, NT.    EXT - NO EDEMA OR CYANOSIS  NEURO -oriented person place and general situation  Expressive language appears improved  Right homonymous hemianopsia  MOTOR EXAM - RUE/RLE 5/5. LUE/LLE 5/5.      MEDICATIONS  Scheduled Meds:aspirin, 325 mg, Oral, Daily  atorvastatin, 40 mg, Oral, Nightly  buPROPion XL, 150 mg, Oral, QAM  docusate sodium, 100 mg, Oral, BID  gabapentin, 400 mg, Oral, Q8H  insulin lispro, 2-7 Units, Subcutaneous, 4x Daily AC & at Bedtime  levothyroxine, 100 mcg, Oral, Q AM  lisinopril, 2.5 mg, Oral, Q24H  oxybutynin XL, 10 mg, Oral, Daily  polyethylene glycol, 17 g, Oral, Daily  venlafaxine XR, 150 mg, Oral, Daily  vitamin B-12, 1,000 mcg, Oral, Daily  vitamin D, 50,000 Units, Oral, Q7 Days      Continuous Infusions:   PRN Meds:.  acetaminophen **OR** acetaminophen    bisacodyl    dextrose    glucagon (human recombinant)    melatonin     ondansetron      RESULTS  Glucose   Date/Time Value Ref Range Status   01/02/2024 1135 183 (H) 70 - 130 mg/dL Final   01/02/2024 0726 139 (H) 70 - 130 mg/dL Final   01/01/2024 2034 219 (H) 70 - 130 mg/dL Final   01/01/2024 1612 155 (H) 70 - 130 mg/dL Final   01/01/2024 1111 239 (H) 70 - 130 mg/dL Final   01/01/2024 0707 169 (H) 70 - 130 mg/dL Final   12/31/2023 2013 213 (H) 70 - 130 mg/dL Final   12/31/2023 1604 144 (H) 70 - 130 mg/dL Final     Results from last 7 days   Lab Units 01/01/24  0506 12/28/23  0610   WBC 10*3/mm3 7.19 6.55   HEMOGLOBIN g/dL 11.4* 11.8*   HEMATOCRIT % 35.5 34.3   PLATELETS 10*3/mm3 221 205     Results from last 7 days   Lab Units 01/01/24  0505 12/28/23  0610   SODIUM mmol/L 138 139   POTASSIUM mmol/L 4.5 4.3   CHLORIDE mmol/L 104 104   CO2 mmol/L 23.9 25.3   BUN mg/dL 28* 22   CREATININE mg/dL 1.24* 1.24*   CALCIUM mg/dL 9.0 9.1   GLUCOSE mg/dL 147* 228*        Latest Reference Range & Units 12/28/23 06:09   25 Hydroxy, Vitamin D 30.0 - 100.0 ng/ml 13.9 (L)   (L): Data is abnormally low    ASSESSMENT and PLAN    Stroke    Status post left PCA stroke-felt cardioembolic-December 22, 2023- 6 x 3 cm acute infarct throughout the medial left occipital lobe in the left PCA territory     Bilateral carotid stenosis-left 50-69%, right less than 50%     Stroke prophylaxis-aspirin 7 days through December 28, then start Eliquis 5 mg twice daily on December 29/atorvastatin  December 29-starting Eliquis today     Right visual field deficit-right homonymous hemianopsia     Impaired mobility/self-care     Diabetes mellitus type 2-sliding scale insulin     Chronic kidney disease     Hypertension-off metoprolol secondary bradycardia  December 29-systolic blood pressure 157/68 this a.m., later 113/62-variable pattern.  Allergy to amlodipine.  Will add lisinopril initially 2.5 mg daily     Status post bradycardia-resolved off metoprolol     Hypothyroidism-on replacement     B12 deficiency-on  replacement    Vitamin D deficiency-change to ergocalciferol 50,000 units weekly for 8 weeks     Depression-bupropion/venlafaxine     -oxybutynin     DVT prophylaxis-SCDs     Outpatient evaluation for obstructive sleep apnea    TEAM CONF - DEC 28 - TRANSFERS MIN ASSIST. GAIT 80 FEET RW MIN ASSIST. 4 STAIRS MIN ASSIST.   BATH MOD . UBD MIN. LBD MOD MAX. TOILET TRANSFERS MIN ASSIST.   GOOD FINE MOTOR SKILLS. RIGHT VISUAL NEGLECT. DECREASED INSIGHT INTO DEFICITS. OVERALL MILD COGNITIVE IMPAIRMENT.  MILD MODERATE EXPRESSIVE DEFICITS AT SENTENCE LEVEL. MILD ANOMIA. CONTINENT BOWEL AND BLADDER.   ASA COMPLETES TODAY AND STARTS ELIQUIS TOMORROW.   ELOS - TWO WEEKS - MIGHT BE A GOOD CANDIDATE FOR ASSISTED LIVIN    Goal is for home with outpatient   therapies.  Barrier to discharge: Impaired mobility self-care cognition- work on attention to the right, executive function, memory, balance, gait, ADLs to overcome.     Sanjeev Quan MD      Appropriate PPE was worn during the entire visit.  Hand hygiene was completed before and after.

## 2024-01-02 NOTE — PROGRESS NOTES
Recreational Therapy Note    Patient Name: Rekha Bear   MRN: [id number]    Therapeutic Recreation Eval and Treat (last 12 hours)       Therapeutic Recreation Eval & Treat       Row Name 01/02/24 1300       Therapeutic Recreation Participation    Recreation Therapy Participation games  -TW    Games board games  [place]htzee  -TW    Objectives of Recreation Participation increase;sense of autonomy by choosing level of participation;positive attitudes leading to a healthy leisure lifestyle  -TW    Comment, Recreation Participation occ/min cues for directions; occ cues to scan to the R; scorekeeping occ assist  -TW    Recreation Therapy Summary of Participation active participation  -TW              User Key  (r) = Recorded By, (t) = Taken By, (c) = Cosigned By      Initials Name Provider Type    TW Rosa Valadez, AMADEO Recreational Therapist                      AMADEO [place]  1/2/2024

## 2024-01-02 NOTE — THERAPY TREATMENT NOTE
"Inpatient Rehabilitation - Occupational Therapy Treatment Note    [facility]     Patient Name: Rekha Bear  : 1940  MRN: [id number]    Today's Date: 2024                 Admit Date: 2023       No diagnosis found.    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    Community acquired pneumonia of left lower lobe of lung    Hyponatremia    Pneumonia due to COVID-19 virus    Major depressive disorder, recurrent, mild    Acute respiratory failure with hypoxia    Supraventricular tachycardia    PVC (premature ventricular contraction)    Dyspnea on exertion    Dizziness    Depressive disorder    Gastroesophageal reflux disease    Midline cystocele    Rectocele    BMI 29.0-29.9,adult    Acute left PCA stroke    B12 deficiency    Bradycardia    Premature atrial contractions    Stroke       Past Medical History:   Diagnosis Date    Chilblains     \"Chilblian's\"    CKD (chronic kidney disease)     Depression     Fatty liver     GERD (gastroesophageal reflux disease)     Hyperlipidemia     Hypertension     Incontinence     Osteoarthritis     OA  Marvin THR, LT TKR - hydrocodone prescibed by Dr. 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 Almaguer    TOTAL KNEE ARTHROPLASTY Left     OA  Marvin THR, LT TKR - hydrocodone prescibed by Dr. Almaguer             IRF OT ASSESSMENT FLOWSHEET (last 12 hours)       IRF OT Evaluation and Treatment       Row Name 01/02/24 1523 01/02/24 1155       OT Time and Intention    Document Type daily treatment  -KP daily treatment  -KP    Mode of Treatment individual therapy;occupational therapy  -KP individual therapy;occupational therapy  -KP    Patient Effort good  -KP good  -KP    Symptoms Noted During/After Treatment none  -KP none  -KP      Row Name 01/02/24 1523 01/02/24 1155       General Information    Patient/Family/Caregiver Comments/Observations pt sitting in wc  -KP pt sitting EOB finishing her breakfast  -KP    Existing Precautions/Restrictions fall  -KP fall  -KP    Limitations/Impairments visual  -KP visual  -KP    Comment, General Information R visual defecit  -KP R visual defecit  -KP      Row Name 01/02/24 1523 01/02/24 1155       Pain Assessment    Pretreatment Pain Rating 0/10 - no pain  -KP 0/10 - no pain  -KP    Posttreatment Pain Rating 0/10 - no pain  -KP 0/10 - no pain  -KP      Row Name 01/02/24 1523 01/02/24 1155       Cognition/Psychosocial    Affect/Mental Status (Cognition) WFL  -KP WFL  -KP    Orientation Status (Cognition) oriented x 4  -KP oriented x 4  -KP    Follows Commands (Cognition) follows one-step commands;over 90% accuracy  -KP follows one-step commands;over 90% accuracy  -KP    Personal Safety Interventions fall prevention program maintained;gait belt;nonskid shoes/slippers when out of bed  -KP fall  prevention program maintained;gait belt;nonskid shoes/slippers when out of bed  -    Cognitive Function memory deficit  - memory deficit  -KP      Row Name 01/02/24 1523 01/02/24 1155       Vision Assessment/Intervention    Visual Field Deficit homonymous hemianopsia, right  -KP homonymous hemianopsia, right  -KP    Visual Processing Deficit visual search, right;visual attention, right  -KP visual search, right;visual attention, right  -KP    Visual Treatment Interventions therapeutic activities  - --    Vision Assessment Comment pt completed sequence game w min VC on sequence of game and min VC at beginning for technique to locate item. min VC two times when needing to search more to the R side of the board for her card/suit.  - --      Row Name 01/02/24 1155          Bathing    [place] Level (Bathing) bathing skills;lower body;upper body;set up;standby assist  -     Assistive Device (Bathing) grab bar/tub rail  -     Position (Bathing) sink side;supported sitting;supported standing  -     Set-up Assistance (Bathing) obtain supplies  -     Comment (Bathing) pt able to stand at sink SBA to wash [place]/buttocks while intermittent pt holding onto sink  -       Row Name 01/02/24 1155          Upper Body Dressing    [place] Level (Upper Body Dressing) upper body dressing skills;don;doff;pull over garment;set up assistance;supervision  -     Position (Upper Body Dressing) supported sitting  -     Set-up Assistance (Upper Body Dressing) obtain clothing  -       Row Name 01/02/24 1155          Lower Body Dressing    [place] Level (Lower Body Dressing) don;doff;pants/bottoms;shoes/slippers;socks;set up;standby assist;verbal cues  -     Position (Lower Body Dressing) supported sitting;supported standing  -     Set-up Assistance (Lower Body Dressing) obtain clothing  [facility]     Comment (Lower Body Dressing) VC for  to sit down to thread pants on LEs  -       Row Name 01/02/24  1155          Grooming    [place] Level (Grooming) grooming skills;deodorant application;oral care regimen;wash face, hands;set up  -     Position (Grooming) sink side;supported sitting  -       Row Name 01/02/24 1155          Toileting    [place] Level (Toileting) toileting skills;adjust/manage clothing;perform perineal hygiene;set up assistance;supervision  -     Assistive Device Use (Toileting) grab bar/safety frame  -     Position (Toileting) supported standing;supported sitting  -     Comment (Toileting) SBA at sink completing [place]/buttocks and pull up pants  -       Row Name 01/02/24 1523 01/02/24 1155       Bed Mobility    Comment, (Bed Mobility) in   - NT pt sitting EOB  -      Row Name 01/02/24 1523          Transfer Assessment/Treatment    Comment, (Transfers) to mat and back to  SBA  -       Row Name 01/02/24 1155          Bed-Chair Transfer    Bed-Chair [place] (Transfers) set up;standby assist;verbal cues  -     Assistive Device (Bed-Chair Transfers) wheelchair  -     Comment, (Bed-Chair Transfer) stand pivot  -       Row Name 01/02/24 1523 01/02/24 1155       Sit-Stand Transfer    Sit-Stand [place] (Transfers) set up;standby assist;verbal cues  - standby assist;set up  -    Assistive Device (Sit-Stand Transfers) wheelchair  - wheelchair  -      Row Name 01/02/24 1523 01/02/24 1155       Stand-Sit Transfer    Stand-Sit [place] (Transfers) set up;standby assist;verbal cues  - standby assist;set up  -    Assistive Device (Stand-Sit Transfers) wheelchair  - wheelchair  -      Row Name 01/02/24 1155          Shower Transfer    Comment, (Shower Transfer) not enough time today to shower as pt was finishing breakfast  -       Row Name 01/02/24 1523 01/02/24 1155       Balance    Static Sitting Balance modified independence  - standby assist  -    Dynamic Sitting Balance standby assist  - standby assist  -    Position, Sitting  Balance -- sitting edge of bed;sitting in chair  -KP    Static Standing Balance -- standby assist  -KP    Dynamic Standing Balance standby assist  -KP standby assist  -KP    Position/Device Used, Standing Balance -- unsupported;supported  -KP    Comment, Balance -- completed ADL sitting and standing at sink w no A  -KP      Row Name 01/02/24 1523 01/02/24 1155       Positioning and Restraints    Pre-Treatment Position sitting in chair/recliner  -KP in bed  -KP    Post Treatment Position wheelchair  -KP wheelchair  -KP    In Wheelchair sitting;call light within reach;exit alarm on;encouraged to call for assist  -KP sitting;exit alarm on;with PT  -KP      Row Name 01/02/24 1155          Daily Progress Summary (OT)    Overall Progress Toward Functional Goals (OT) progressing toward functional goals as expected  -     Daily Progress Summary (OT) pt is now SBA a LBB and LBD. SBA UBB and UBD  -KP               User Key  (r) = Recorded By, (t) = Taken By, (c) = Cosigned By      Initials Name Effective Dates    Brigid Valverde, OTR 07/11/23 -                      Occupational Therapy Education       Title: PT OT SLP Therapies (In Progress)       Topic: Occupational Therapy (In Progress)       Point: ADL training (Done)       Description:   Instruct learner(s) on proper safety adaptation and remediation techniques during self care or transfers.   Instruct in proper use of assistive devices.                  Learning Progress Summary             Patient Acceptance, E,TB,D, NR,VU by CC at 12/30/2023 1148    Comment: Shower tsf to bench                         Point: Home exercise program (Not Started)       Description:   Instruct learner(s) on appropriate technique for monitoring, assisting and/or progressing therapeutic exercises/activities.                  Learner Progress:  Not documented in this visit.              Point: Precautions (Not Started)       Description:   Instruct learner(s) on prescribed  precautions during self-care and functional transfers.                  Learner Progress:  Not documented in this visit.              Point: Body mechanics (Not Started)       Description:   Instruct learner(s) on proper positioning and spine alignment during self-care, functional mobility activities and/or exercises.                  Learner Progress:  Not documented in this visit.                              User Key       Initials Effective Dates Name Provider Type Discipline    CC 06/16/21 -  Sahra Crisostomo OTR Occupational Therapist OT                        OT Recommendation and Plan            Daily Progress Summary (OT)  Overall Progress Toward Functional Goals (OT): progressing toward functional goals as expected  Daily Progress Summary (OT): pt is now SBA a LBB and LBD. SBA UBB and UBD            Time Calculation:      Time Calculation- OT       Row Name 01/02/24 1527 01/02/24 1202          Time Calculation- OT    OT Start Time 1230  -KP 0800  -KP     OT Stop Time 1300  -KP 0830  -KP     OT Time Calculation (min) 30 min  -KP 30 min  -KP               User Key  (r) = Recorded By, (t) = Taken By, (c) = Cosigned By      Initials Name Provider Type     Brigid Osorio OTR Occupational Therapist                  Therapy Charges for Today       Code Description Service Date Service Provider Modifiers Qty    76276155513 HC OT SELF CARE/MGMT/TRAIN EA 15 MIN 1/2/2024 Brigid Osorio OTR GO 2    12030022118 HC OT NEUROMUSC RE EDUCATION EA 15 MIN 1/2/2024 Brigid Osorio OTR GO 2                     GRACIELA Dow  1/2/2024

## 2024-01-02 NOTE — THERAPY TREATMENT NOTE
"Inpatient Rehabilitation - Speech Language Pathology Treatment Note    [facility]     Patient Name: Rekha Bear  : 1940  MRN: [id number]    Today's Date: 2024                   Admit Date: 2023       Visit Dx:    No diagnosis found.    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    Community acquired pneumonia of left lower lobe of lung    Hyponatremia    Pneumonia due to COVID-19 virus    Major depressive disorder, recurrent, mild    Acute respiratory failure with hypoxia    Supraventricular tachycardia    PVC (premature ventricular contraction)    Dyspnea on exertion    Dizziness    Depressive disorder    Gastroesophageal reflux disease    Midline cystocele    Rectocele    BMI 29.0-29.9,adult    Acute left PCA stroke    B12 deficiency    Bradycardia    Premature atrial contractions    Stroke       Past Medical History:   Diagnosis Date    Chilblains     \"Chilblian's\"    CKD (chronic kidney disease)     Depression     Fatty liver     GERD (gastroesophageal reflux disease)     Hyperlipidemia     Hypertension     Incontinence     Osteoarthritis     OA  Marvin THR, LT TKR - hydrocodone prescibed by Dr. 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 Almaguer    TOTAL KNEE ARTHROPLASTY Left     OA  Marvin THR, LT TKR - hydrocodone prescibed by Dr. Almaguer       SLP Recommendation and Plan                                                               SLP EVALUATION (last 72 hours)       SLP SLC Evaluation       Row Name 01/02/24 1300 01/02/24 1000 01/01/24 1400             Communication Assessment/Intervention    Document Type therapy note (daily note)  -SR therapy note (daily note)  -SR --      Subjective Information no complaints  -SR no complaints  -SR --      Patient Observations alert;cooperative;agree to therapy  -SR alert;cooperative;agree to therapy  -SR --      Patient Effort good  -SR good  -SR --      Symptoms Noted During/After Treatment none  -SR none  -SR --         Pain Scale: Numbers Pre/Post-Treatment    Pretreatment Pain Rating 0/10 - no pain  -SR 0/10 - no pain  -SR --      Posttreatment Pain Rating 0/10 - no pain  -SR 0/10 - no pain  -SR --         Word Retrieval Skills Goal 1 (SLP)    Improve Word Retrieval Skills By Goal 1 (SLP) -- -- completing a divergent task  -NR      Time Frame (Word Retrieval Goal 1, SLP) -- -- 1 week  -NR      Progress (Word Retrieval Skills Goal 1, SLP) -- -- 50%  -NR      Comment (Word Retrieval Goal 1, SLP) -- -- Pt was to name at least three members of abstact categories x 5.  Pt required some cueing to state items that were more unrelated than related.  Pt started naming numerous types of makeup when asked to name items in a woman's purse.  Cues were needed to think of additional items such as checkbook, money, etc.  Pt able to increase accuracy to 100% when given min/mod cues  on these 5 tasks.  -NR                User Key  (r) = Recorded By, (t) = Taken By, (c) = Cosigned By      Initials Name Effective Dates    NR Reina Tovar MA,CCC-SLP 06/16/21 -     SR Jessica Carr, CCC-SLP 11/10/22 -                        EDUCATION    The patient has been educated in the following areas:       Cognitive Impairment Communication Impairment.             SLP GOALS       Row Name 01/02/24 1300 01/02/24 1000 01/01/24 1400       Word Retrieval Skills Goal 1 (SLP)    Improve Word Retrieval Skills By Goal 1 (SLP) -- -- completing a divergent task  -NR    Time Frame (Word Retrieval Goal 1, SLP) -- -- 1 week  -NR    Progress (Word Retrieval Skills Goal 1, SLP) -- -- 50%  -NR    Comment (Word Retrieval Goal 1, SLP) -- -- Pt was to name at least three members of abstact categories x 5.  Pt required some cueing to state items that were more unrelated than related.  Pt started naming numerous types of makeup when asked to name items in a woman's purse.  Cues were needed to think of additional items such as checkbook, money, etc. Pt able to increase accuracy to 100% when given min/mod cues  on these 5 tasks.  -NR       Attention Goal 1 (SLP)    Improve Attention by Goal 1 (SLP) complete sustained attention task;90%;independently (over 90% accuracy)  -SR complete sustained attention task;90%;independently (over 90% accuracy)  -SR --    Time Frame (Attention Goal 1, SLP) short term goal (STG)  -SR short term goal (STG)  -SR --    Progress/Outcomes (Attention Goal 1, SLP) good progress toward goal  -SR good progress toward goal  -SR --    Comment (Attention Goal 1, SLP) Attention to detail targeted with scheduling task. Patient labeled daily calendar/schedule in timeline order  with 80% acc given MIN cues. Required repetition of instruction x3 throughout the task.  -SR -- --       Organizational Skills Goal 1 (SLP)    Improve Thought Organization  Through Goal 1 (SLP) -- completing a divergent naming task;completing a convergent naming task;80%;independently (over 90% accuracy)  -SR --    Time Frame (Thought Organization Skills Goal 1, SLP) -- short term goal (STG)  -SR --    Progress/Outcomes (Thought Organization Skills Goal 1, SLP) -- good progress toward goal  -SR --    Comment (Thought Organization Skills Goal 1, SLP) -- Divergent thinking; patient independently named 17 items in concrete category. Mild difficulty noted with abstract thinking. Patient independently named 7 items in abstract category; increased to 15 items given MIN cues and extended time.  -SR --       Reasoning Goal 1 (SLP)    Improve Reasoning Through Goal 1 (SLP) complete basic reasoning task;complete deductive reasoning task;80%;with minimal cues (75-90%)  -SR -- --    Time Frame (Reasoning Goal 1, SLP) short term goal (STG)  -SR -- --    Progress/Outcomes (Reasoning Goal 1, SLP) goal ongoing  -SR -- --    Comment (Reasoning Goal 1, SLP) Patient completed simple 10-cell logic puzzle by following directional parameters with 50% acc given MIN cues; 80% acc given MOD cues. Demonstrated increased difficulty with attention, working memory, and organization during activity.  -SR -- --       Functional Math Skills Goal 1 (SLP)    Improve Functional Math Skills Through Goal 1 (SLP) -- complete functional math task;80%;with minimal cues (75-90%)  -SR --    Time Frame (Functional Math Skills Goal 1, SLP) -- short term goal (STG)  -SR --    Progress/Outcomes (Functional Math Skills Goal 1, SLP) -- goal ongoing  -SR --    Comment (Functional Math Skills Goal 1, SLP) -- Checkbook balance; given list of transactions (payments and deposits), patient calculated the final monthly balance with 60% acc given NO cues; 80% acc given MIN cues. No calculator assist. Extended time provided to complete task.  -SR --              User Key  (r) = Recorded By, (t) = Taken By, (c) = Cosigned By      Initials  Name Provider Type    Reina Acharya MA,CCC-SLP Speech and Language Pathologist     Jessica Carr CCC-SLP Speech and Language Pathologist                                Time Calculation:        Time Calculation- SLP       Row Name 01/02/24 1530 01/02/24 1224          Time Calculation- SLP    SLP Start Time 1300  -SR 1000  -SR     SLP Stop Time 1330  -SR 1030  -SR     SLP Time Calculation (min) 30 min  -SR 30 min  -SR     SLP Received On 01/02/24  -SR --               User Key  (r) = Recorded By, (t) = Taken By, (c) = Cosigned By      Initials Name Provider Type    Jessica White, CCC-SLP Speech and Language Pathologist                      Therapy Charges for Today       Code Description Service Date Service Provider Modifiers Qty    51900320147 HC ST DEV OF COGN SKILLS INITIAL 15 MIN 1/2/2024 Jessica Carr CCC-SLP  1    22246028174 HC ST DEV OF COGN SKILLS EACH ADDT'L 15 MIN 1/2/2024 Jessica Carr CCC-SLP  3                             THOM Gómez  1/2/2024

## 2024-01-02 NOTE — PLAN OF CARE
Goal Outcome Evaluation:  Plan of Care Reviewed With: patient           Outcome Evaluation: Patient alert and oriented, but forgetful at times. She is assist x1, medication with water, and cont/inc of bladder and continent of bowels.She tolerated all therapies, VS stable, and no complaints of pain. She is an accucheck AC/HS- coverage needed at lunch, dressing to L 3rd toe changed this afternoon, and no other issues at this time.

## 2024-01-02 NOTE — THERAPY TREATMENT NOTE
"Inpatient Rehabilitation - Occupational Therapy Treatment Note    [facility]     Patient Name: Rekha Bear  : 1940  MRN: [id number]    Today's Date: 2024                 Admit Date: 2023       No diagnosis found.    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    Community acquired pneumonia of left lower lobe of lung    Hyponatremia    Pneumonia due to COVID-19 virus    Major depressive disorder, recurrent, mild    Acute respiratory failure with hypoxia    Supraventricular tachycardia    PVC (premature ventricular contraction)    Dyspnea on exertion    Dizziness    Depressive disorder    Gastroesophageal reflux disease    Midline cystocele    Rectocele    BMI 29.0-29.9,adult    Acute left PCA stroke    B12 deficiency    Bradycardia    Premature atrial contractions    Stroke       Past Medical History:   Diagnosis Date    Chilblains     \"Chilblian's\"    CKD (chronic kidney disease)     Depression     Fatty liver     GERD (gastroesophageal reflux disease)     Hyperlipidemia     Hypertension     Incontinence     Osteoarthritis     OA  Marvin THR, LT TKR - hydrocodone prescibed by Dr. 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 ENDOSCOPY;  Service:     DENTAL PROCEDURE      FOOT SURGERY      TONSILLECTOMY      TOTAL HIP ARTHROPLASTY Bilateral     OA  Marvin THR, LT TKR - hydrocodone prescibed by Dr. Almaguer    TOTAL KNEE ARTHROPLASTY Left     OA  Marvin THR, LT TKR - hydrocodone prescibed by Dr. Almaguer             IRF OT ASSESSMENT FLOWSHEET (last 12 hours)       IRF OT Evaluation and Treatment       Row Name 01/02/24 1155          OT Time and Intention    Document Type daily treatment  -KP     Mode of Treatment individual therapy;occupational therapy  -KP     Patient Effort good  -KP     Symptoms Noted During/After Treatment none  -KP       Row Name 01/02/24 1155          General Information    Patient/Family/Caregiver Comments/Observations pt sitting EOB finishing her breakfast  -KP     Existing Precautions/Restrictions fall  -KP     Limitations/Impairments visual  -KP     Comment, General Information R visual defecit  -KP       Row Name 01/02/24 1155          Pain Assessment    Pretreatment Pain Rating 0/10 - no pain  -KP     Posttreatment Pain Rating 0/10 - no pain  -KP       Row Name 01/02/24 1155          Cognition/Psychosocial    Affect/Mental Status (Cognition) WFL  -KP     Orientation Status (Cognition) oriented x 4  -KP     Follows Commands (Cognition) follows one-step commands;over 90% accuracy  -     Personal Safety Interventions fall prevention program maintained;gait belt;nonskid shoes/slippers when out of bed  -KP     Cognitive Function memory deficit  -KP       Row Name 01/02/24 1155          Vision Assessment/Intervention    Visual Field Deficit homonymous hemianopsia, right  -KP     Visual Processing Deficit visual search, right;visual attention, right  -KP       Row Name 01/02/24 1155          Bathing     [place] Level (Bathing) bathing skills;lower body;upper body;set up;standby assist  -     Assistive Device (Bathing) grab bar/tub rail  -     Position (Bathing) sink side;supported sitting;supported standing  -     Set-up Assistance (Bathing) obtain supplies  -     Comment (Bathing) pt able to stand at sink SBA to wash [place]/buttocks while intermittent pt holding onto sink  -       Row Name 01/02/24 1155          Upper Body Dressing    [place] Level (Upper Body Dressing) upper body dressing skills;don;doff;pull over garment;set up assistance;supervision  -     Position (Upper Body Dressing) supported sitting  -     Set-up Assistance (Upper Body Dressing) obtain clothing  -       Row Name 01/02/24 1155          Lower Body Dressing    [place] Level (Lower Body Dressing) don;doff;pants/bottoms;shoes/slippers;socks;set up;standby assist;verbal cues  -     Position (Lower Body Dressing) supported sitting;supported standing  -     Set-up Assistance (Lower Body Dressing) obtain clothing  -     Comment (Lower Body Dressing) VC for  to sit down to thread pants on LEs  -       Row Name 01/02/24 1155          Grooming    [place] Level (Grooming) grooming skills;deodorant application;oral care regimen;wash face, hands;set up  -     Position (Grooming) sink side;supported sitting  -       Row Name 01/02/24 1155          Toileting    [place] Level (Toileting) toileting skills;adjust/manage clothing;perform perineal hygiene;set up assistance;supervision  -     Assistive Device Use (Toileting) grab bar/safety frame  -     Position (Toileting) supported standing;supported sitting  -     Comment (Toileting) SBA at sink completing isadora/buttocks and pull up pants  -       Row Name 01/02/24 1155          Bed Mobility    Comment, (Bed Mobility) NT pt sitting EOB  -       Row Name 01/02/24 1155          Bed-Chair Transfer    Bed-Chair [place] (Transfers) set  up;standby assist;verbal cues  -     Assistive Device (Bed-Chair Transfers) wheelchair  -     Comment, (Bed-Chair Transfer) stand pivot  -KP       Row Name 01/02/24 1155          Sit-Stand Transfer    Sit-Stand [place] (Transfers) standby assist;set up  -KP     Assistive Device (Sit-Stand Transfers) wheelchair  -KP       Row Name 01/02/24 1155          Stand-Sit Transfer    Stand-Sit [place] (Transfers) standby assist;set up  -KP     Assistive Device (Stand-Sit Transfers) wheelchair  -       Row Name 01/02/24 1155          Shower Transfer    Comment, (Shower Transfer) not enough time today to shower as pt was finishing breakfast  -       Row Name 01/02/24 1155          Balance    Static Sitting Balance standby assist  -     Dynamic Sitting Balance standby assist  -     Position, Sitting Balance sitting edge of bed;sitting in chair  -KP     Static Standing Balance standby assist  -KP     Dynamic Standing Balance standby assist  -     Position/Device Used, Standing Balance unsupported;supported  -     Comment, Balance completed ADL sitting and standing at sink w no A  -KP       Row Name 01/02/24 1155          Positioning and Restraints    Pre-Treatment Position in bed  -KP     Post Treatment Position wheelchair  -KP     In Wheelchair sitting;exit alarm on;with PT  -       Row Name 01/02/24 1155          Daily Progress Summary (OT)    Overall Progress Toward Functional Goals (OT) progressing toward functional goals as expected  -     Daily Progress Summary (OT) pt is now SBA a LBB and LBD. SBA UBB and UBD  -KP               User Key  (r) = Recorded By, (t) = Taken By, (c) = Cosigned By      Initials Name Effective Dates     Brigid Osorio, OTR 07/11/23 -                      Occupational Therapy Education       Title: PT OT SLP Therapies (In Progress)       Topic: Occupational Therapy (In Progress)       Point: ADL training (Done)       Description:   Instruct learner(s) on  proper safety adaptation and remediation techniques during self care or transfers.   Instruct in proper use of assistive devices.                  Learning Progress Summary             Patient Acceptance, E,TB,D, NR,VU by  at 12/30/2023 1468    Comment: Shower tsf to bench                         Point: Home exercise program (Not Started)       Description:   Instruct learner(s) on appropriate technique for monitoring, assisting and/or progressing therapeutic exercises/activities.                  Learner Progress:  Not documented in this visit.              Point: Precautions (Not Started)       Description:   Instruct learner(s) on prescribed precautions during self-care and functional transfers.                  Learner Progress:  Not documented in this visit.              Point: Body mechanics (Not Started)       Description:   Instruct learner(s) on proper positioning and spine alignment during self-care, functional mobility activities and/or exercises.                  Learner Progress:  Not documented in this visit.                              User Key       Initials Effective Dates Name Provider Type [facility] 06/16/21 -  Sahra Crisostomo, OTR Occupational Therapist OT                        OT Recommendation and Plan            Daily Progress Summary (OT)  Overall Progress Toward Functional Goals (OT): progressing toward functional goals as expected  Daily Progress Summary (OT): pt is now SBA a LBB and LBD. SBA UBB and UBD            Time Calculation:      Time Calculation- OT       Row Name 01/02/24 1202             Time Calculation- OT    OT Start Time 0800  -      OT Stop Time 0830  -      OT Time Calculation (min) 30 min  -                User Key  (r) = Recorded By, (t) = Taken By, (c) = Cosigned By      Initials Name Provider Type    Brigid Valverde, OTR Occupational Therapist                  Therapy Charges for Today       Code Description Service Date Service Provider  Modifiers Qty    35489696670 HC OT SELF CARE/MGMT/TRAIN EA 15 MIN 1/2/2024 Brigid Osorio, OTR GO 2                     Brigid Osorio, OTR  1/2/2024

## 2024-01-02 NOTE — PLAN OF CARE
Goal Outcome Evaluation:  Plan of Care Reviewed With: patient             Problem: Rehabilitation (IRF) Plan of Care  Goal: Plan of Care Review  Outcome: Ongoing, Progressing  Flowsheets (Taken 1/2/2024 0250)  Plan of Care Reviewed With: patient  Outcome Evaluation:  Alert and oriented x 4. She is forgetful at times. Calm and cooperative. Takes all meds whole PO with thins. Continent of bowel and bladder; does have urgency. Min assist with RW  unsteady gait. Encouraged to use call light when needed. ACHS. 3 units sliding scale administered with snack. Continues with right visual cut. Denies pain. No unsafe behaviors. Able to turn self in bed. Continues to refuse SCD. Call light within reach.

## 2024-01-02 NOTE — PROGRESS NOTES
Inpatient Rehabilitation Plan of Care Note    Plan of Care  Care Plan Reviewed - No updates at this time.    Safety    [RN] Potential for Injury(Active)  Current Status(01/02/2024): Risk for fall  Weekly Goal(01/09/2024): Cue to use call bell  Discharge Goal: PT /Family will be aware of risk for fall and safety in the home  setting    Performed Intervention(s)  Safety rounds  Bed alarm and/chair alarm      Psychosocial    [RN] Coping/Adjustment(Active)  Current Status(01/02/2024): Expressing appropriate coping  Weekly Goal(01/09/2024): Provide educational material regarding stroke  Discharge Goal: Knowleadgeable of care needs and stroke recovery    Performed Intervention(s)  Vebalizes her needs and concerns      Sphincter Control    [RN] Bladder Management(Active)  Current Status(01/02/2024): Bladder urgency  Weekly Goal(01/11/2024): Continent 50%  Discharge Goal: Continent 100%    [RN] Bowel Management(Active)  Current Status(01/02/2024): Continent 100%  Weekly Goal(01/09/2024): Continent 100%  Discharge Goal: Continent 100%    Performed Intervention(s)  Bladder training program  Encourage fluid intake  Incontinence products    Signed by: Bailey Thompson RN

## 2024-01-03 LAB
GLUCOSE BLDC GLUCOMTR-MCNC: 121 MG/DL (ref 70–130)
GLUCOSE BLDC GLUCOMTR-MCNC: 154 MG/DL (ref 70–130)
GLUCOSE BLDC GLUCOMTR-MCNC: 157 MG/DL (ref 70–130)
GLUCOSE BLDC GLUCOMTR-MCNC: 208 MG/DL (ref 70–130)

## 2024-01-03 PROCEDURE — 97535 SELF CARE MNGMENT TRAINING: CPT | Performed by: OCCUPATIONAL THERAPIST

## 2024-01-03 PROCEDURE — 97110 THERAPEUTIC EXERCISES: CPT

## 2024-01-03 PROCEDURE — 97129 THER IVNTJ 1ST 15 MIN: CPT

## 2024-01-03 PROCEDURE — 63710000001 INSULIN LISPRO (HUMAN) PER 5 UNITS: Performed by: INTERNAL MEDICINE

## 2024-01-03 PROCEDURE — 97530 THERAPEUTIC ACTIVITIES: CPT

## 2024-01-03 PROCEDURE — 82948 REAGENT STRIP/BLOOD GLUCOSE: CPT

## 2024-01-03 PROCEDURE — 97112 NEUROMUSCULAR REEDUCATION: CPT | Performed by: OCCUPATIONAL THERAPIST

## 2024-01-03 PROCEDURE — 97130 THER IVNTJ EA ADDL 15 MIN: CPT

## 2024-01-03 RX ORDER — PIOGLITAZONEHYDROCHLORIDE 15 MG/1
15 TABLET ORAL DAILY
Status: DISCONTINUED | OUTPATIENT
Start: 2024-01-04 | End: 2024-01-11 | Stop reason: HOSPADM

## 2024-01-03 RX ADMIN — Medication 1000 MCG: at 08:34

## 2024-01-03 RX ADMIN — INSULIN LISPRO 2 UNITS: 100 INJECTION, SOLUTION INTRAVENOUS; SUBCUTANEOUS at 11:55

## 2024-01-03 RX ADMIN — INSULIN LISPRO 3 UNITS: 100 INJECTION, SOLUTION INTRAVENOUS; SUBCUTANEOUS at 21:45

## 2024-01-03 RX ADMIN — LEVOTHYROXINE SODIUM 100 MCG: 100 TABLET ORAL at 07:24

## 2024-01-03 RX ADMIN — INSULIN LISPRO 2 UNITS: 100 INJECTION, SOLUTION INTRAVENOUS; SUBCUTANEOUS at 08:33

## 2024-01-03 RX ADMIN — VENLAFAXINE HYDROCHLORIDE 150 MG: 150 CAPSULE, EXTENDED RELEASE ORAL at 08:34

## 2024-01-03 RX ADMIN — GABAPENTIN 400 MG: 400 CAPSULE ORAL at 21:46

## 2024-01-03 RX ADMIN — BUPROPION HYDROCHLORIDE 150 MG: 150 TABLET, EXTENDED RELEASE ORAL at 07:23

## 2024-01-03 RX ADMIN — LISINOPRIL 2.5 MG: 2.5 TABLET ORAL at 08:34

## 2024-01-03 RX ADMIN — DOCUSATE SODIUM 100 MG: 100 CAPSULE, LIQUID FILLED ORAL at 08:34

## 2024-01-03 RX ADMIN — OXYBUTYNIN CHLORIDE 10 MG: 10 TABLET, EXTENDED RELEASE ORAL at 08:34

## 2024-01-03 RX ADMIN — GABAPENTIN 400 MG: 400 CAPSULE ORAL at 07:24

## 2024-01-03 RX ADMIN — GABAPENTIN 400 MG: 400 CAPSULE ORAL at 14:18

## 2024-01-03 RX ADMIN — ATORVASTATIN CALCIUM 40 MG: 20 TABLET, FILM COATED ORAL at 22:40

## 2024-01-03 RX ADMIN — ASPIRIN 325 MG: 325 TABLET ORAL at 08:33

## 2024-01-03 RX ADMIN — DOCUSATE SODIUM 100 MG: 100 CAPSULE, LIQUID FILLED ORAL at 21:47

## 2024-01-03 NOTE — PROGRESS NOTES
Inpatient Rehabilitation Plan of Care Note    Plan of Care  Updated Problems/Interventions  Cognition    [ST] Memory(Active)  Current Status(01/03/2024): 1/3 recall with 5 minute delay no cues  Weekly Goal(01/10/2024): 3/3 recall with 5 minute delay MIN cues  Discharge Goal: Patient will utilize memory strategies to aid in functional STM  for return to home, PLOF, IADL and community participation    [ST] Attention(Active)  Current Status(01/03/2024): mild deficits in sustained/selective attention  Weekly Goal(01/10/2024): MIN cues for sustained attention  Discharge Goal: functional attention for return to home, PLOF, IADL, and  community participation        Communication    [ST] Expression(Active)  Current Status(01/03/2024): Mild anomic events, utilizes word finding to repair  given MIN cues  Weekly Goal(01/10/2024): Patient will utilize word finding repair/strategies in  50% of opportunities given MIN cues  Discharge Goal: functional verbal expression for return to home, PLOF, IADL and  community participation    Signed by: Jessica Carr, SLP

## 2024-01-03 NOTE — PROGRESS NOTES
Recreational Therapy Note    Patient Name: Rekha Bear   MRN: [id number]    Therapeutic Recreation Eval and Treat (last 12 hours)       Therapeutic Recreation Eval & Treat       Row Name 01/03/24 1400       Therapeutic Recreation Participation    Recreation Therapy Participation games  -TW    Games card games  [place]  -TW    Objectives of Recreation Participation increase;sense of autonomy by choosing level of participation;positive attitudes leading to a healthy leisure lifestyle  -TW    Comment, Recreation Participation occ/min cues for directions; BUE use to place cards; occ cues to scan to the R  -TW    Recreation Therapy Summary of Participation active participation  -TW              User Key  (r) = Recorded By, (t) = Taken By, (c) = Cosigned By      Initials Name Provider Type    TW Rosa Valadez CTRS Recreational Therapist                      AMADEO [place]amo  1/3/2024

## 2024-01-03 NOTE — PLAN OF CARE
Goal Outcome Evaluation:  Plan of Care Reviewed With: patient           Outcome Evaluation: Pt. AOX4, forgetful at times, on RA, with assistance x1, Cont.to bowel LBM 1/2,/Incnt.to urine, with dressing on L 3rd toe dry and intact, no complains of pain, sleeping comfortably.

## 2024-01-03 NOTE — THERAPY TREATMENT NOTE
"Inpatient Rehabilitation - Speech Language Pathology Treatment Note    [facility]     Patient Name: Rekha Bear  : 1940  MRN: [id number]    Today's Date: 1/3/2024                   Admit Date: 2023       Visit Dx:    No diagnosis found.    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    Community acquired pneumonia of left lower lobe of lung    Hyponatremia    Pneumonia due to COVID-19 virus    Major depressive disorder, recurrent, mild    Acute respiratory failure with hypoxia    Supraventricular tachycardia    PVC (premature ventricular contraction)    Dyspnea on exertion    Dizziness    Depressive disorder    Gastroesophageal reflux disease    Midline cystocele    Rectocele    BMI 29.0-29.9,adult    Acute left PCA stroke    B12 deficiency    Bradycardia    Premature atrial contractions    Stroke       Past Medical History:   Diagnosis Date    Chilblains     \"Chilblian's\"    CKD (chronic kidney disease)     Depression     Fatty liver     GERD (gastroesophageal reflux disease)     Hyperlipidemia     Hypertension     Incontinence     Osteoarthritis     OA  Marvin THR, LT TKR - hydrocodone prescibed by Dr. 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 Almaguer    TOTAL KNEE ARTHROPLASTY Left     OA  Marvin THR, LT TKR - hydrocodone prescibed by Dr. Almaguer       SLP Recommendation and Plan                                                               SLP EVALUATION (last 72 hours)       SLP SLC Evaluation       Row Name 01/03/24 1300 01/03/24 1000 01/02/24 1300 01/02/24 1000 01/01/24 1400       Communication Assessment/Intervention    Document Type therapy note (daily note)  -SR therapy note (daily note)  -SR therapy note (daily note)  -SR therapy note (daily note)  -SR --    Subjective Information no complaints  -SR no complaints  -SR no complaints  -SR no complaints  -SR --    Patient Observations alert;cooperative;agree to therapy  -SR alert;cooperative;agree to therapy  -SR alert;cooperative;agree to therapy  -SR alert;cooperative;agree to therapy  -SR --    Patient Effort good  -SR good  -SR good  -SR good  -SR --    Symptoms Noted During/After Treatment none  -SR none  -SR none  -SR none  -SR --       Pain Scale: Numbers Pre/Post-Treatment    Pretreatment Pain Rating 0/10 - no pain  -SR 0/10 - no pain  -SR 0/10 - no pain  -SR 0/10 - no pain  -SR --    Posttreatment Pain Rating 0/10 - no pain  -SR 0/10 - no pain  -SR 0/10 - no pain  -SR 0/10 - no pain  -SR --       Word Retrieval Skills Goal 1 (SLP)    Improve Word Retrieval Skills By Goal 1 (SLP) -- -- -- -- completing a divergent task  -NR    Time Frame (Word Retrieval Goal 1, SLP) -- -- -- -- 1 week  -NR    Progress (Word Retrieval Skills Goal 1, SLP) -- -- -- -- 50%  -NR    Comment (Word Retrieval Goal 1,  SLP) -- -- -- -- Pt was to name at least three members of abstact categories x 5.  Pt required some cueing to state items that were more unrelated than related.  Pt started naming numerous types of makeup when asked to name items in a woman's purse.  Cues were needed to think of additional items such as checkbook, money, etc. Pt able to increase accuracy to 100% when given min/mod cues  on these 5 tasks.  -NR              User Key  (r) = Recorded By, (t) = Taken By, (c) = Cosigned By      Initials Name Effective Dates    NR Reina Tovra MA,CCC-SLP 06/16/21 -     SR Jessica Carr CCC-SLP 11/10/22 -                        EDUCATION    The patient has been educated in the following areas:       Cognitive Impairment Communication Impairment.             SLP GOALS       Row Name 01/03/24 1300 01/03/24 1000 01/02/24 1300       Attention Goal 1 (SLP)    Improve Attention by Goal 1 (SLP) -- complete sustained attention task;90%;independently (over 90% accuracy)  -SR complete sustained attention task;90%;independently (over 90% accuracy)  -SR    Time Frame (Attention Goal 1, SLP) -- short term goal (STG)  -SR short term goal (STG)  -SR    Progress/Outcomes (Attention Goal 1, SLP) -- good progress toward goal  -SR good progress toward goal  -SR    Comment (Attention Goal 1, SLP) -- Attention to detail targeted with insurance/co-pay scenerios. Given member card information, patient answered questions with 40% acc given NO cues; 80% acc given MIN cues.  -SR Attention to detail targeted with scheduling task. Patient labeled daily calendar/schedule in timeline order  with 80% acc given MIN cues. Required repetition of instruction x3 throughout the task.  -SR       Organizational Skills Goal 1 (SLP)    Improve Thought Organization Through Goal 1 (SLP) completing a divergent naming task;completing a convergent naming task;80%;independently (over 90% accuracy)  -SR -- --    Time Frame (Thought Organization Skills Goal 1, SLP)  "short term goal (STG)  -SR -- --    Progress/Outcomes (Thought Organization Skills Goal 1, SLP) good progress toward goal  -SR -- --    Comment (Thought Organization Skills Goal 1, SLP) Patient reported having mild word finding difficulty in conversation. ST reviewed various communication and word finding strategies including categorization, association, and taking breaks. Patient verbalized and demonstrated good understanding. Exhibited mild anomia during divergent naming task. Given concrete category, patient independently named 12 items in one minute; increased to 18 items given MIN cues. Given abstract category, patient independently named 8 items in one minute; increased to 15 items given MIN cues.  -SR -- --       Reasoning Goal 1 (SLP)    Improve Reasoning Through Goal 1 (SLP) -- complete basic reasoning task;complete deductive reasoning task;80%;with minimal cues (75-90%)  -SR complete basic reasoning task;complete deductive reasoning task;80%;with minimal cues (75-90%)  -SR    Time Frame (Reasoning Goal 1, SLP) -- short term goal (STG)  -SR short term goal (STG)  -SR    Progress/Outcomes (Reasoning Goal 1, SLP) -- good progress toward goal  -SR goal ongoing  -SR    Comment (Reasoning Goal 1, SLP) -- Patient used clues from paragraph and labeled events of a daily schedule (\"day on rehab\") in timeline order with 80% acc given NO cues; 100% acc given MIN cues. Cues provided for attn to detail. Improvement noted with attention, working memory, and organization skills.  -SR Patient completed simple 10-cell logic puzzle by following directional parameters with 50% acc given MIN cues; 80% acc given MOD cues. Demonstrated increased difficulty with attention, working memory, and organization during activity.  -SR       Functional Math Skills Goal 1 (SLP)    Improve Functional Math Skills Through Goal 1 (SLP) complete functional math task;80%;with minimal cues (75-90%)  -SR -- --    Time Frame (Functional Math Skills " Goal 1, SLP) short term goal (STG)  -SR -- --    Progress/Outcomes (Functional Math Skills Goal 1, SLP) goal ongoing  -SR -- --    Comment (Functional Math Skills Goal 1, SLP) Patient answered fxnal math/writing questions given prices from restaurant menu with 100% acc given NO cues. Extended time provided to complete task.  -SR -- --       Executive Functional Skills Goal 1 (SLP)    Improve Executive Function Skills Goal 1 (SLP) -- home management activity;80%;with minimal cues (75-90%)  -SR --    Time Frame (Executive Function Skills Goal 1, SLP) -- short term goal (STG)  -SR --    Progress/Outcomes (Executive Function Skills Goal 1, SLP) -- goal ongoing  -SR --    Comment (Executive Function Skills Goal 1, SLP) -- Medication management; patient answered questions about medication dosage given scenerio of an individual's weight and/or age with 80% acc given NO cues. Extended time provided to complete task.  -SR --      Row Name 01/02/24 1000 01/01/24 1400          Word Retrieval Skills Goal 1 (SLP)    Improve Word Retrieval Skills By Goal 1 (SLP) -- completing a divergent task  -NR     Time Frame (Word Retrieval Goal 1, SLP) -- 1 week  -NR     Progress (Word Retrieval Skills Goal 1, SLP) -- 50%  -NR     Comment (Word Retrieval Goal 1, SLP) -- Pt was to name at least three members of abstact categories x 5.  Pt required some cueing to state items that were more unrelated than related.  Pt started naming numerous types of makeup when asked to name items in a woman's purse.  Cues were needed to think of additional items such as checkbook, money, etc. Pt able to increase accuracy to 100% when given min/mod cues  on these 5 tasks.  -NR        Attention Goal 1 (SLP)    Improve Attention by Goal 1 (SLP) complete sustained attention task;90%;independently (over 90% accuracy)  -SR --     Time Frame (Attention Goal 1, SLP) short term goal (STG)  -SR --     Progress/Outcomes (Attention Goal 1, SLP) good progress toward goal   -SR --        Organizational Skills Goal 1 (SLP)    Improve Thought Organization Through Goal 1 (SLP) completing a divergent naming task;completing a convergent naming task;80%;independently (over 90% accuracy)  -SR --     Time Frame (Thought Organization Skills Goal 1, SLP) short term goal (STG)  -SR --     Progress/Outcomes (Thought Organization Skills Goal 1, SLP) good progress toward goal  -SR --     Comment (Thought Organization Skills Goal 1, SLP) Divergent thinking; patient independently named 17 items in concrete category. Mild difficulty noted with abstract thinking. Patient independently named 7 items in abstract category; increased to 15 items given MIN cues and extended time.  -SR --        Functional Math Skills Goal 1 (SLP)    Improve Functional Math Skills Through Goal 1 (SLP) complete functional math task;80%;with minimal cues (75-90%)  -SR --     Time Frame (Functional Math Skills Goal 1, SLP) short term goal (STG)  -SR --     Progress/Outcomes (Functional Math Skills Goal 1, SLP) goal ongoing  -SR --     Comment (Functional Math Skills Goal 1, SLP) Checkbook balance; given list of transactions (payments and deposits), patient calculated the final monthly balance with 60% acc given NO cues; 80% acc given MIN cues. No calculator assist. Extended time provided to complete task.  -SR --               User Key  (r) = Recorded By, (t) = Taken By, (c) = Cosigned By      Initials Name Provider Type    NR Reina Tovar MA,CCC-SLP Speech and Language Pathologist    Jessica White, CCC-SLP Speech and Language Pathologist                                Time Calculation:        Time Calculation- SLP       Row Name 01/03/24 1348 01/03/24 1158          Time Calculation- SLP    SLP Start Time 1300  -SR 1000  -SR     SLP Stop Time 1330  -SR 1030  -SR     SLP Time Calculation (min) 30 min  -SR 30 min  -SR     SLP Received On 01/03/24  -SR --               User Key  (r) = Recorded By, (t) = Taken By, (c) =  Cosigned By      Initials Name Provider Type     Jessica Carr CCC-SLP Speech and Language Pathologist                      Therapy Charges for Today       Code Description Service Date Service Provider Modifiers Qty    81362064486 HC ST DEV OF COGN SKILLS INITIAL 15 MIN 1/2/2024 Jessica Carr CCC-SLP  1    22050923231 HC ST DEV OF COGN SKILLS EACH ADDT'L 15 MIN 1/2/2024 Jessica Carr CCC-SLP  3    [id number] HC ST DEV OF COGN SKILLS INITIAL 15 MIN 1/3/2024 Jessica Carr CCC-SLP  1    [id number] HC ST DEV OF COGN SKILLS EACH ADDT'L 15 MIN 1/3/2024 Jessica Carr CCC-SLP  3                             THOM Gómez  1/3/2024

## 2024-01-03 NOTE — THERAPY PROGRESS REPORT/RE-CERT
"Inpatient Rehabilitation - Physical Therapy Progress Note       [facility]     Patient Name: Rekha Bear  : 1940  MRN: [id number]    Today's Date: 1/3/2024                    Admit Date: 2023      Visit Dx:   No diagnosis found.    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    Community acquired pneumonia of left lower lobe of lung    Hyponatremia    Pneumonia due to COVID-19 virus    Major depressive disorder, recurrent, mild    Acute respiratory failure with hypoxia    Supraventricular tachycardia    PVC (premature ventricular contraction)    Dyspnea on exertion    Dizziness    Depressive disorder    Gastroesophageal reflux disease    Midline cystocele    Rectocele    BMI 29.0-29.9,adult    Acute left PCA stroke    B12 deficiency    Bradycardia    Premature atrial contractions    Stroke       Past Medical History:   Diagnosis Date    Chilblains     \"Chilblian's\"    CKD (chronic kidney disease)     Depression     Fatty liver     GERD (gastroesophageal reflux disease)     Hyperlipidemia     Hypertension     Incontinence     Osteoarthritis     OA  Marvin THR, LT TKR - hydrocodone prescibed by Dr. 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 Almaguer    TOTAL KNEE ARTHROPLASTY Left     OA  Marvin THR, LT TKR - hydrocodone prescibed by Dr. Almaguer       PT ASSESSMENT (last 12 hours)       IRF PT Evaluation and Treatment       Row Name 01/03/24 0804          PT Time and Intention    Document Type progress note  -MG     Mode of Treatment individual therapy;physical therapy  -MG     Patient/Family/Caregiver Comments/Observations Pt seated UIC, pleasant and agreeable to PT tx.  -MG     Total Minutes, Physical Therapy 60  -MG       Row Name 01/03/24 0804          General Information    Patient Profile Reviewed yes  -MG     Existing Precautions/Restrictions fall  -MG     Limitations/Impairments visual  -MG       Row Name 01/03/24 0804          Pain Assessment    Pretreatment Pain Rating 3/10  -MG     Posttreatment Pain Rating 3/10  -MG     Pain Location - Side/Orientation Bilateral  -MG     Pain Location lower  -MG     Pain Location - back  -MG       Row Name 01/03/24 0804          Cognition/Psychosocial    Affect/Mental Status (Cognition) WFL  -MG     Orientation Status (Cognition) oriented x 4  -MG     Follows Commands (Cognition) follows one-step commands;over 90% accuracy  -MG     Personal Safety Interventions fall prevention program maintained;gait belt;muscle strengthening facilitated;nonskid shoes/slippers when out of bed;supervised activity  -MG     Cognitive Function memory deficit  -MG       Row Name 01/03/24 0804          Vision Assessment/Intervention    Visual Field Deficit homonymous hemianopsia, right  -MG     Visual Processing Deficit visual search,  right;visual attention, right  -MG       Row Name 01/03/24 0804          Bed Mobility    Rolling Left [place] (Bed Mobility) standby assist  -MG     Rolling Right [place] (Bed Mobility) standby assist  -MG     Supine-Sit [place] (Bed Mobility) standby assist  -MG     Sit-Supine [place] (Bed Mobility) standby assist  -MG     Bed Mobility, Safety Issues decreased use of arms for pushing/pulling;decreased use of legs for bridging/pushing  -MG     Assistive Device (Bed Mobility) other (see comments)  Mat table  -MG       Row Name 01/03/24 0804          Bed-Chair Transfer    Bed-Chair [place] (Transfers) contact guard;standby assist;verbal cues  -MG     Assistive Device (Bed-Chair Transfers) wheelchair;walker, front-wheeled  -MG       Row Name 01/03/24 0804          Chair-Bed Transfer    Chair-Bed [place] (Transfers) contact guard;standby assist;verbal cues  -MG     Assistive Device (Chair-Bed Transfers) wheelchair;walker, front-wheeled  -MG       Row Name 01/03/24 0804          Sit-Stand Transfer    Sit-Stand [place] (Transfers) standby assist;verbal cues  -MG     Assistive Device (Sit-Stand Transfers) wheelchair;walker, front-wheeled  -MG       Row Name 01/03/24 0804          Stand-Sit Transfer    Stand-Sit [place] (Transfers) standby assist;contact guard;verbal cues  -MG     Assistive Device (Stand-Sit Transfers) wheelchair;walker, front-wheeled  -MG     Comment, (Stand-Sit Transfer) VC for hand placement.  -MG       Row Name 01/03/24 0804          Car Transfer    Type (Car Transfer) sit-stand;stand-sit  -MG     [place] Level (Car Transfer) contact guard;verbal cues  -MG     Assistive Device (Car Transfer) walker, front-wheeled  -MG     Comment, (Car Transfer) Required VC for hand placement when exiting car.  -MG       Row Name 01/03/24 0804          Gait/Stairs (Locomotion)    [place] Level (Gait) contact guard;standby assist;verbal cues  -MG     Assistive Device  (Gait) walker, front-wheeled  -MG     Distance in Feet (Gait) 160, 120', 220' x2  -MG     Pattern (Gait) step-through  -MG     Deviations/Abnormal Patterns (Gait) gait speed decreased;base of support, narrow;stride length decreased;weight shifting decreased;right sided deviations;antalgic  -MG     Bilateral Gait Deviations forward flexed posture;heel strike decreased  -MG     Right Sided Gait Deviations Trendelenburg sign  -MG     Gait Assessment/Intervention Did not require cueing for R turns this AM, but did take wide turns and decreased R visual scanning noted. PM: Focus on visual scanning to R, finding objects on wall/ceiling to pts R.  -MG     [place] Level (Stairs) contact guard  -MG     Handrail Location (Stairs) both sides  -MG     Number of Steps (Stairs) 4  -MG     Ascending Technique (Stairs) step-to-step  -MG     Descending Technique (Stairs) step-to-step  -MG     Stairs, Safety Issues balance decreased during turns  -MG     Stairs, Impairments strength decreased;impaired balance;coordination impaired;impaired vision  -MG     Stairs Assessment/Intervention Pt able to recall sequencing prior to performing. Did not require cueing for sequencing, R visual scanning or keeping RUE on handrail.  -MG       Row Name 01/03/24 0804          Safety Issues, Functional Mobility    Impairments Affecting Function (Mobility) balance;cognition;coordination;endurance/activity tolerance;strength;visual/perceptual  -MG       Row Name 01/03/24 0804          Rough/Uneven Surface Gait Skills (Mobility)    [place], Gait on Rough/Uneven Surface (Mobility) contact guard;verbal cues  -MG     Assistive Device (Rough/Uneven Surface Gait) walker, front-wheeled  -MG     Distance in Feet (Rough/Uneven Surface Gait) 10  -MG     Comment, Gait Rough/Uneven Surface (Mobility) Carpet  -MG       Row Name 01/03/24 0804          Balance    Static Standing Balance contact guard;minimal assist  -MG     Dynamic Standing Balance minimal  assist;verbal cues  -MG     Position/Device Used, Standing Balance supported;unsupported;walker, front-wheeled  -MG     Balance Interventions standing;static;dynamic;moderate challenge;highly challenging;dynamic reaching;weight shifting activity;UE activity with balance activity  -MG     Comment, Balance Standing at RW for balloon toss x5 min w/ intermittent rest breaks and to achieve balance prior to continuing. Pt required primarily CGA for standing balance, however with return tapping balloon to pts R pt required up to Sean to correct and maintain balance as pt losing balance to anterior R x3.  -MG       Row Name 01/03/24 0804          Hip (Therapeutic Exercise)    Hip Strengthening (Therapeutic Exercise) bilateral;aBduction;aDduction;resistance band;red;10 repetitions  -MG       Row Name 01/03/24 0804          Knee (Therapeutic Exercise)    Knee Strengthening (Therapeutic Exercise) bilateral;LAQ (long arc quad);15 repititions  -MG       Row Name 01/03/24 0804          Ankle (Therapeutic Exercise)    Ankle AROM (Therapeutic Exercise) bilateral;dorsiflexion;plantarflexion;15 repititions  -MG       Row Name 01/03/24 0804          Positioning and Restraints    Pre-Treatment Position sitting in chair/recliner  -MG     Post Treatment Position wheelchair  -MG     In Wheelchair sitting;call light within reach;exit alarm on;encouraged to call for assist  -MG       Row Name 01/03/24 0804          Weekly Progress Summary (PT)    Functional Goal Overall Progress (PT) progressing toward functional goals as expected  -MG     Weekly Progress Summary (PT) Pt demonstrated good progress over the past week with transfers and ambulation as evident by requiring less assistance, increasing ambulation distance and increasing activity tolerance. Pt continues to require cueing and education for R visual scanning with ambulation and transfers for safety. Pt also cont to demonstrate LE weakness and impaired balance limiting her overall  mobility independence. Pt will continue to benefit from skilled PT to address the deficits stated above and to increase her safety and functional mobility independence.  -MG     Impairments Still Limiting Function (PT) balance impairment;cognitive impairment;coordination impairment;strength deficit;visual impairment  -MG       Row Name 01/03/24 0804          Bed Mobility Goal 1 (PT-IRF)    Activity/Assistive Device (Bed Mobility Goal 1, PT-IRF) bed mobility activities, all  -MG     [employer] [place] Level (Bed Mobility Goal 1, PT-IRF) supervision required  -MG     Time Frame (Bed Mobility Goal 1, PT-IRF) long-term goal (LTG);2 weeks  -MG     Progress/Outcomes (Bed Mobility Goal 1, PT-IRF) goal ongoing;good progress toward goal  -MG       Row Name 01/03/24 0804          Transfer Goal 1 (PT-IRF)    Activity/Assistive Device (Transfer Goal 1, PT-IRF) sit-to-stand/stand-to-sit;bed-to-chair/chair-to-bed  -MG     [employer] [place] Level (Transfer Goal 1, PT-IRF) contact guard required  -MG     Time Frame (Transfer Goal 1, PT-IRF) short-term goal (STG);1 week  -MG     Progress/Outcomes (Transfer Goal 1, PT-IRF) goal met  -MG       Row Name 01/03/24 0804          Transfer Goal 2 (PT-IRF)    Activity/Assistive Device (Transfer Goal 2, PT-IRF) sit-to-stand/stand-to-sit;bed-to-chair/chair-to-bed  -MG     [employer] [place] Level (Transfer Goal 2, PT-IRF) standby assist;supervision required  -MG     Time Frame (Transfer Goal 2, PT-IRF) long-term goal (LTG);2 weeks  -MG     Progress/Outcomes (Transfer Goal 2, PT-IRF) good progress toward goal;goal ongoing  -MG       Row Name 01/03/24 0804          Transfer Goal 3 (PT-IRF)    Activity/Assistive Device (Transfer Goal 3, PT-IRF) car transfer  -MG     [employer] [place] Level (Transfer Goal 3, PT-IRF) standby assist  -MG     Time Frame (Transfer Goal 3, PT-IRF) long-term goal (LTG);2 weeks  -MG     Progress/Outcomes (Transfer Goal 3, PT-IRF) good progress toward goal;goal ongoing  -MG       Row Name  01/03/24 0804          Gait/Walking Locomotion Goal 1 (PT-IRF)    Activity/Assistive Device (Gait/Walking Locomotion Goal 1, PT-IRF) gait (walking locomotion)  LRAD  -MG     Gait/Walking Locomotion Distance Goal 1 (PT-IRF) 80  -MG     [place] Level (Gait/Walking Locomotion Goal 1, PT-IRF) contact guard required  -MG     Time Frame (Gait/Walking Locomotion Goal 1, PT-IRF) short-term goal (STG);1 week  -MG     Progress/Outcomes (Gait/Walking Locomotion Goal 1, PT-IRF) goal met  -MG       Row Name 01/03/24 0804          Gait/Walking Locomotion Goal 2 (PT-IRF)    Activity/Assistive Device (Gait/Walking Locomotion Goal 2, PT-IRF) gait (walking locomotion)  LRAD  -MG     Gait/Walking Locomotion Distance Goal 2 (PT-IRF) 160  -MG     [place] Level (Gait/Walking Locomotion Goal 2, PT-IRF) standby assist;supervision required  -MG     Time Frame (Gait/Walking Locomotion Goal 2, PT-IRF) long-term goal (LTG);2 weeks  -MG     Progress/Outcomes (Gait/Walking Locomotion Goal 2, PT-IRF) good progress toward goal;goal ongoing  -MG       Row Name 01/03/24 0804          Stairs Goal 1 (PT-IRF)    Activity/Assistive Device (Stairs Goal 1, PT-IRF) stairs, all skills  -MG     Number of Stairs (Stairs Goal 1, PT-IRF) 4  -MG     [place] Level (Stairs Goal 1, PT-IRF) contact guard required  -MG     Time Frame (Stairs Goal 1, PT-IRF) short-term goal (STG);1 week  -MG     Progress/Outcomes (Stairs Goal 1, PT-IRF) goal met  -MG       Row Name 01/03/24 0804          Stairs Goal 2 (PT-IRF)    Activity/Assistive Device (Stairs Goal 2, PT-IRF) stairs, all skills  -MG     Number of Stairs (Stairs Goal 2, PT-IRF) 4  -MG     [place] Level (Stairs Goal 2, PT-IRF) standby assist  -MG     Time Frame (Stairs Goal 2, PT-IRF) long-term goal (LTG);2 weeks  -MG     Progress/Outcomes (Stairs Goal 2, PT-IRF) good progress toward goal;goal ongoing  -MG               User Key  (r) = Recorded By, (t) = Taken By, (c) = Cosigned By      Initials  Name Provider Type    MG Dayanna Turpin, PT Physical Therapist                  Wound 12/27/23 0045 Left posterior third toe (Active)   Dressing Appearance dry;intact 01/02/24 2140   Closure Unable to assess 01/02/24 2140   Base dressing in place, unable to visualize 01/02/24 2140   [place]iwound Temperature warm 01/03/24 0832   [place] Skin Turgor soft 01/03/24 0832   Edges callused;irregular 01/03/24 0832   Care, Wound cleansed with;sterile normal saline 01/03/24 0832   Dressing Care dressing changed 01/03/24 0832     Physical Therapy Education       Title: PT OT SLP Therapies (In Progress)       Topic: Physical Therapy (In Progress)       Point: Mobility training (Done)       Learning Progress Summary             Patient Acceptance, E,D,TB, VU,NR by MG at 1/3/2024 0747    Acceptance, E,D, VU,NR by MG at 1/2/2024 0842    Acceptance, E,TB,D, VU,NR by MG at 12/27/2023 1554                         Point: Home exercise program (Not Started)       Learner Progress:  Not documented in this visit.              Point: Body mechanics (Done)       Learning Progress Summary             Patient Acceptance, E,D,TB, VU,NR by MG at 1/3/2024 0747    Acceptance, E,D, VU,NR by MG at 1/2/2024 0842    Acceptance, E,TB,D, VU,NR by MG at 12/27/2023 1554                         Point: Precautions (Done)       Learning Progress Summary             Patient Acceptance, E,D,TB, VU,NR by MG at 1/3/2024 0747    Acceptance, E,D, VU,NR by MG at 1/2/2024 0842    Acceptance, E, NR by DP at 12/29/2023 1147    Comment: scanning for objects and avoidance of them when walking    Acceptance, E, NR by MD at 12/28/2023 0916    Acceptance, E,TB,D, VU,NR by MG at 12/27/2023 1554                                         User Key       Initials Effective Dates Name Provider Type Discipline    MD 06/16/21 -  Gianna Atwood, PT Physical Therapist PT    MG 05/24/22 -  Dayanna Turpin, PT Physical Therapist PT    DP 08/24/21 -  Julián Pathak, PT Physical Therapist PT                     PT Recommendation and Plan    Planned Therapy Interventions (PT): balance training, bed mobility training, gait training, home exercise program, lumbar stabilization, manual therapy techniques, motor coordination training, neuromuscular re-education, orthotic fitting/training, patient/family education, postural re-education, ROM (range of motion), stair training, strengthening, stretching, swiss ball techniques, transfer training, wheelchair management/propulsion training  Frequency of Treatment (PT): 60 minutes per session, 5 times per week     Daily Progress Summary (PT)  Daily Progress Summary (PT): Family present at end of session in room. Discussed sitting on pts R side and having her visually scan for family and turning head to R when talking.               Time Calculation:      PT Charges       Row Name 01/03/24 1433 01/03/24 1156          Time Calculation    Start Time 1330  -MG 0830  -MG     Stop Time 1400  -MG 0900  -MG     Time Calculation (min) 30 min  -MG 30 min  -MG     PT Received On -- 01/03/24  -MG     PT - Next Appointment -- 01/04/24  -MG     PT Goal Re-Cert Due Date -- 01/10/24  -MG               User Key  (r) = Recorded By, (t) = Taken By, (c) = Cosigned By      Initials Name Provider Type    MG Dayanna Turpin, PT Physical Therapist                    Therapy Charges for Today       Code Description Service Date Service Provider Modifiers Qty    90045582185 HC GAIT TRAINING EA 15 MIN 1/2/2024 Dayanna Turpin, PT GP 1    62279894810 HC PT THERAPEUTIC ACT EA 15 MIN 1/2/2024 Dayanna Turpin, PT GP 2    20312864677 HC PT THER PROC EA 15 MIN 1/2/2024 Dayanna Turpin, PT GP 1    69144370092 HC PT THERAPEUTIC ACT EA 15 MIN 1/3/2024 Dayanna Turpin, PT GP 1    87095735055 HC PT THER PROC EA 15 MIN 1/3/2024 Dayanna Turpin, PT GP 1    [id number] HC PT THERAPEUTIC ACT EA 15 MIN 1/3/2024 Dayanna Turpin, PT GP 1    87190532375 HC PT THER PROC EA 15 MIN 1/3/2024 Dayanna Turpin, PT GP 1               PT G-Codes  AM-PAC 6 Clicks Score (PT): 19      Dayanna Turpin, PT  1/3/2024

## 2024-01-03 NOTE — PROGRESS NOTES
Inpatient Rehabilitation Plan of Care Note    Plan of Care  Updated Problems/Interventions  Mobility    [PT] Bed/Chair/Wheelchair(Active)  Current Status(01/03/2024): CG/SBA w/ RW  Weekly Goal(01/10/2024): SBA  Discharge Goal: Sup    [PT] Bed Mobility(Active)  Current Status(01/03/2024): CG/SBA  Weekly Goal(01/10/2024): SBA/sup  Discharge Goal: Ind    [PT] Walk(Active)  Current Status(01/03/2024): CGA RW '  Weekly Goal(01/10/2024): SBA RW 80'  Discharge Goal: SBA/sup    [PT] Stairs(Active)  Current Status(01/03/2024): CGA 4 steps  Weekly Goal(01/10/2024): SBA  Discharge Goal: SBA    Signed by: Dayanna Turpin, PT

## 2024-01-03 NOTE — THERAPY TREATMENT NOTE
"Inpatient Rehabilitation - Occupational Therapy Treatment Note    [facility]     Patient Name: Rekha Bear  : 1940  MRN: [id number]    Today's Date: 1/3/2024                 Admit Date: 2023       No diagnosis found.    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    Community acquired pneumonia of left lower lobe of lung    Hyponatremia    Pneumonia due to COVID-19 virus    Major depressive disorder, recurrent, mild    Acute respiratory failure with hypoxia    Supraventricular tachycardia    PVC (premature ventricular contraction)    Dyspnea on exertion    Dizziness    Depressive disorder    Gastroesophageal reflux disease    Midline cystocele    Rectocele    BMI 29.0-29.9,adult    Acute left PCA stroke    B12 deficiency    Bradycardia    Premature atrial contractions    Stroke       Past Medical History:   Diagnosis Date    Chilblains     \"Chilblian's\"    CKD (chronic kidney disease)     Depression     Fatty liver     GERD (gastroesophageal reflux disease)     Hyperlipidemia     Hypertension     Incontinence     Osteoarthritis     OA  Marvin THR, LT TKR - hydrocodone prescibed by Dr. 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 Almaguer    TOTAL KNEE ARTHROPLASTY Left     OA  Marvin THR, LT TKR - hydrocodone prescibed by Dr. Almaguer             IRF OT ASSESSMENT FLOWSHEET (last 12 hours)       IRF OT Evaluation and Treatment       Row Name 01/03/24 1552 01/03/24 1205       OT Time and Intention    Document Type daily treatment  -KP progress note  -KP    Mode of Treatment individual therapy;occupational therapy  -KP individual therapy;occupational therapy  -KP    Patient Effort good  -KP good  -KP    Symptoms Noted During/After Treatment none  -KP none  -KP      Row Name 01/03/24 1552 01/03/24 1205       General Information    Patient/Family/Caregiver Comments/Observations pt sitting in wc  -KP pt sitting in wc  -KP    Existing Precautions/Restrictions fall  -KP fall  -KP    Limitations/Impairments visual  -KP visual  -KP    Comment, General Information R visual defecit  -KP R visual defecit  -KP      Row Name 01/03/24 1552 01/03/24 1205       Pain Assessment    Pretreatment Pain Rating 0/10 - no pain  -KP 0/10 - no pain  -KP    Posttreatment Pain Rating 0/10 - no pain  -KP 0/10 - no pain  -KP      Row Name 01/03/24 1552 01/03/24 1205       Cognition/Psychosocial    Affect/Mental Status (Cognition) WFL  -KP WFL  -KP    Orientation Status (Cognition) oriented x 4  -KP oriented x 4  -KP    Follows Commands (Cognition) follows one-step commands;over 90% accuracy;verbal cues/prompting required  -KP follows one-step commands;over 90% accuracy  -KP    Personal Safety Interventions fall prevention program maintained;nonskid shoes/slippers when out of bed;gait belt;muscle  strengthening facilitated  - fall prevention program maintained;gait belt;nonskid shoes/slippers when out of bed  -    Cognitive Function memory deficit  -KP memory deficit  -KP    Memory Deficit (Cognition) minimal deficit  -KP minimal deficit  -KP    Safety Deficit (Cognition) minimal deficit  -KP minimal deficit  -KP      Row Name 01/03/24 1552 01/03/24 1205       Vision Assessment/Intervention    Visual Field Deficit homonymous hemianopsia, right  -KP homonymous hemianopsia, right  -KP    Visual Processing Deficit visual search, right;visual attention, right  -KP visual search, right;visual attention, right  -KP    Vision Assessment Comment pt worked on scanning nto the R to complete chinese checkers game and grooved peg board w min to no diff. pt completed word search w incr time needed to fine 4 words to incr visual scan to the R. pt completed a dot to dot task to incr visual sanning to the R w 100% accuracy and min VC for orientation for 2/50.  -KP --      Row Name 01/03/24 1205          Bathing    [place] Level (Bathing) bathing skills;lower body;upper body;set up;standby assist  -     Assistive Device (Bathing) grab bar/tub rail;hand held shower spray hose;shower chair  -     Position (Bathing) supported sitting;supported standing  -     Set-up Assistance (Bathing) obtain supplies;adjust water temperature  -     Comment (Bathing) in shower  -       Row Name 01/03/24 1205          Upper Body Dressing    [place] (Upper Body Dressing) doff;upper body dressing skills;don;pull over garment;set up assistance  -     Position (Upper Body Dressing) supported sitting  -     Set-up Assistance (Upper Body Dressing) obtain clothing  -       Row Name 01/03/24 1205          Lower Body Dressing    [place] (Lower Body Dressing) don;doff;pants/bottoms;shoes/slippers;socks;set up;standby assist  -     Position (Lower Body Dressing) supported standing;supported sitting  -      Set-up Assistance (Lower Body Dressing) obtain clothing  -     Comment (Lower Body Dressing) VC for safety to sit down to fully doff pants and to don pants  -       Row Name 01/03/24 1205          Grooming    [place] Level (Grooming) grooming skills;deodorant application;wash face, hands;set up  -     Position (Grooming) supported sitting  -       Row Name 01/03/24 1205          Toileting    Comment (Toileting) NT but SBA in shower w hygiene and clothing  -       Row Name 01/03/24 1552 01/03/24 1205       Bed Mobility    Comment, (Bed Mobility) in wc  -KP in wc  -KP      Row Name 01/03/24 1552          Transfer Assessment/Treatment    Comment, (Transfers) to mat and back to wc CGA to SBA  -       Row Name 01/03/24 1552 01/03/24 1205       Sit-Stand Transfer    Sit-Stand [place] (Transfers) standby assist  - standby assist;set up  -    Assistive Device (Sit-Stand Transfers) wheelchair  - wheelchair  -      Row Name 01/03/24 1552 01/03/24 1205       Stand-Sit Transfer    Stand-Sit [place] (Transfers) standby assist  - standby assist  -    Assistive Device (Stand-Sit Transfers) wheelchair  - wheelchair  -      Row Name 01/03/24 1205          Shower Transfer    Type (Shower Transfer) sit-stand;stand-sit;stand pivot/stand step  -     [place] Level (Shower Transfer) set up;stand by assist  -     Assistive Device (Shower Transfer) grab bar, tub/shower;wheelchair;shower chair  -       Row Name 01/03/24 1552 01/03/24 1205       Balance    Static Sitting Balance modified independence  -KP modified independence  -KP    Dynamic Sitting Balance standby assist  -KP standby assist  -    Position, Sitting Balance sitting edge of mat  - --    Static Standing Balance standby assist  - standby assist  -    Dynamic Standing Balance -- standby assist  -      Row Name 01/03/24 1552 01/03/24 1205       Positioning and Restraints    Pre-Treatment Position sitting in  chair/recliner  -KP sitting in chair/recliner  -KP    Post Treatment Position wheelchair  -KP wheelchair  -KP    In Wheelchair sitting;call light within reach;encouraged to call for assist;exit alarm on  -KP sitting;call light within reach;encouraged to call for assist;exit alarm on;with nsg  -KP      Row Name 01/03/24 1205          Daily Progress Summary (OT)    Overall Progress Toward Functional Goals (OT) progressing toward functional goals as expected  -       Row Name 01/03/24 1205          Transfer Goal 1 (OT-IRF)    Activity/Assistive Device (Transfer Goal 1, OT-IRF) all transfers  -KP     [place] Level (Transfer Goal 1, OT-IRF) contact guard required;verbal cues required  -KP     Time Frame (Transfer Goal 1, OT-IRF) short-term goal (STG);1 week  -KP     Progress/Outcomes (Transfer Goal 1, OT-IRF) goal met  -       Row Name 01/03/24 1205          Transfer Goal 2 (OT-IRF)    Activity/Assistive Device (Transfer Goal 2, OT-IRF) all transfers  -KP     [place] Level (Transfer Goal 2, OT-IRF) supervision required  -KP     Time Frame (Transfer Goal 2, OT-IRF) long-term goal (LTG)  -KP     Progress/Outcomes (Transfer Goal 2, OT-IRF) good progress toward goal  SBA close to supervision  -       Row Name 01/03/24 1205          Bathing Goal 1 (OT-IRF)    Activity/Device (Bathing Goal 1, OT-IRF) bathing skills, all  -KP     [place] Level (Bathing Goal 1, OT-IRF) minimum assist (75% or more patient effort);verbal cues required  -KP     Time Frame (Bathing Goal 1, OT-IRF) short-term goal (STG);1 week  -KP     Progress/Outcomes (Bathing Goal 1, OT-IRF) goal met  -       Row Name 01/03/24 1205          Bathing Goal 2 (OT-IRF)    Activity/Device (Bathing Goal 2, OT-IRF) bathing skills, all  -KP     [place] Level (Bathing Goal 2, OT-IRF) contact guard required;verbal cues required;supervision required  -KP     Time Frame (Bathing Goal 2, OT-IRF) long-term goal (LTG);3 weeks;2 weeks  -KP      Progress/Outcomes (Bathing Goal 2, OT-IRF) goal met;goal revised this date  new goal S/u/mod I  -KP       Row Name 01/03/24 1205          UB Dressing Goal 1 (OT-IRF)    Activity/Device (UB Dressing Goal 1, OT-IRF) upper body dressing  -KP     [place] (UB Dress Goal 1, OT-IRF) set-up required  -KP     Time Frame (UB Dressing Goal 1, OT-IRF) short-term goal (STG);1 week  -KP     Progress/Outcomes (UB Dressing Goal 1, OT-IRF) goal met  -KP       Row Name 01/03/24 1205          UB Dressing Goal 2 (OT-IRF)    Activity/Device (UB Dressing Goal 2, OT-IRF) upper body dressing  -KP     [place] (UB Dress Goal 2, OT-IRF) independent  -KP     Time Frame (UB Dressing Goal 2, OT-IRF) long-term goal (LTG);2 weeks  -KP     Progress/Outcomes (UB Dressing Goal 2, OT-IRF) good progress toward goal  -KP       Row Name 01/03/24 1205          LB Dressing Goal 1 (OT-IRF)    Activity/Device (LB Dressing Goal 1, OT-IRF) lower body dressing  -KP     [place] (LB Dressing Goal 1, OT-IRF) minimum assist (75% or more patient effort)  -KP     Time Frame (LB Dressing Goal 1, OT-IRF) short-term goal (STG);1 week  -KP     Progress/Outcomes (LB Dressing Goal 1, OT-IRF) goal met  -KP       Row Name 01/03/24 1205          LB Dressing Goal 2 (OT-IRF)    Activity/Device (LB Dressing Goal 2, OT-IRF) lower body dressing  -KP     [place] (LB Dressing Goal 2, OT-IRF) contact guard required;supervision required  -KP     Time Frame (LB Dressing Goal 2, OT-IRF) long-term goal (LTG);2 weeks;3 weeks  -KP     Progress/Outcomes (LB Dressing Goal 2, OT-IRF) goal met;goal revised this date  new goal SBA/mod I  -KP       Row Name 01/03/24 1205          Grooming Goal 1 (OT-IRF)    Activity/Device (Grooming Goal 1, OT-IRF) grooming skills, all  -KP     [place] (Grooming Goal 1, OT-IRF) independent  -KP     Time Frame (Grooming Goal 1, OT-IRF) long-term goal (LTG);2 weeks  -KP     Progress/Outcomes (Grooming Goal 1, OT-IRF) good progress toward  goal  -KP       Row Name 01/03/24 1205          Toileting Goal 1 (OT-IRF)    Activity/Device (Toileting Goal 1, OT-IRF) toileting skills, all  -KP     [place] Level (Toileting Goal 1, OT-IRF) minimum assist (75% or more patient effort);moderate assist (50-74% patient effort)  -KP     Progress/Outcomes (Toileting Goal 1, OT-IRF) goal met  -KP       Row Name 01/03/24 1205          Toileting Goal 2 (OT-IRF)    Activity/Device (Toileting Goal 2, OT-IRF) toileting skills, all  -KP     [place] Level (Toileting Goal 2, OT-IRF) contact guard required;supervision required  -KP     Progress/Outcomes (Toileting Goal 2, OT-IRF) goal met;goal revised this date  new goals SBA/mod I  -KP     Time Frame (Toileting Goal 2, OT-IRF) long-term goal (LTG)  -KP       Row Name 01/03/24 1205          Strength Goal 1 (OT-IRF)    Strength Goal 1 (OT-IRF) Pt will improve B UE shoulder strength in all planes to 4/5 MMT  -KP     Time Frame (Strength Goal 1, OT-IRF) long-term goal (LTG);3 weeks;2 weeks  -KP     Progress/Outcomes (Strength Goal 1, OT-IRF) continuing progress toward goal  -KP       Row Name 01/03/24 1205           Activity Tolerance Goal 1 (OT)    Activity Tolerance Goal 1 (OT) pt will demo improved act tolerance w ADL tasks  -KP     [place] Level (Endurance Goal 1, OT) 10 min activity  -KP     Time Frame (Activity Tolerance Goal 1, OT) long term goal (LTG)  -KP     Progress/Outcome (Activity Tolerance Goal 1, OT) goal met  -KP       Row Name 01/03/24 1205          Caregiver Training Goal 1 (OT-IRF)    Caregiver Training Goal 1 (OT-IRF) Pt and caregiver will be ind. with safety techniques for functional tf  -KP     Time Frame (Caregiver Training Goal 1, OT-IRF) long-term goal (LTG);2 weeks  -KP     Progress/Outcomes (Caregiver Training Goal 1, OT-IRF) goal ongoing;continuing progress toward goal  -KP       Row Name 01/03/24 1205          Safety Awareness Goal 1 (OT-IRF)    Activity (Safety Awareness Goal 1, OT-IRF)  awareness of need for assistance;awareness of right side;judgment;insight into deficits/self-awareness  -KP     [place]/Cues/Accuracy (Safety Awareness Goal 1, OT-IRF) with minimum  -KP     Time Frame (Safety Awareness Goal 1, OT-IRF) long-term goal (LTG)  -KP     Progress/Outcomes (Safety Awareness Goal 1, OT-IRF) continuing progress toward goal  -KP               User Key  (r) = Recorded By, (t) = Taken By, (c) = Cosigned By      Initials Name Effective Dates    Brigid Valverde OTROLAND 07/11/23 -                      Occupational Therapy Education       Title: PT OT SLP Therapies (In Progress)       Topic: Occupational Therapy (In Progress)       Point: ADL training (Done)       Description:   Instruct learner(s) on proper safety adaptation and remediation techniques during self care or transfers.   Instruct in proper use of assistive devices.                  Learning Progress Summary             Patient Acceptance, E,TB,D, NR,VU by  at 12/30/2023 9905    Comment: Shower tsf to bench                         Point: Home exercise program (Not Started)       Description:   Instruct learner(s) on appropriate technique for monitoring, assisting and/or progressing therapeutic exercises/activities.                  Learner Progress:  Not documented in this visit.              Point: Precautions (Not Started)       Description:   Instruct learner(s) on prescribed precautions during self-care and functional transfers.                  Learner Progress:  Not documented in this visit.              Point: Body mechanics (Not Started)       Description:   Instruct learner(s) on proper positioning and spine alignment during self-care, functional mobility activities and/or exercises.                  Learner Progress:  Not documented in this visit.                              User Key       Initials Effective Dates Name Provider Type Discipline     06/16/21 -  Sahra Crisostomo OTR Occupational Therapist OT                         OT Recommendation and Plan            Daily Progress Summary (OT)  Overall Progress Toward Functional Goals (OT): progressing toward functional goals as expected  Daily Progress Summary (OT): pt is now SBA a LBB and LBD. SBA UBB and UBD            Time Calculation:      Time Calculation- OT       Row Name 01/03/24 1555 01/03/24 1218          Time Calculation- OT    OT Start Time 1230  -KP 0800  -KP     OT Stop Time 1300  -KP 0830  -KP     OT Time Calculation (min) 30 min  -KP 30 min  -KP               User Key  (r) = Recorded By, (t) = Taken By, (c) = Cosigned By      Initials Name Provider Type    Brigid Valverde, OTR Occupational Therapist                  Therapy Charges for Today       Code Description Service Date Service Provider Modifiers Qty    90476423919 HC OT SELF CARE/MGMT/TRAIN EA 15 MIN 1/2/2024 Brigid Osorio OTR GO 2    74117468222 HC OT NEUROMUSC RE EDUCATION EA 15 MIN 1/2/2024 Brigid Osorio OTR GO 2    51541200258 HC OT SELF CARE/MGMT/TRAIN EA 15 MIN 1/3/2024 Brigid Osorio OTR GO 2    34721839717 HC OT NEUROMUSC RE EDUCATION EA 15 MIN 1/3/2024 Brigid Osorio OTR GO 2                     GRACIELA Dow  1/3/2024

## 2024-01-03 NOTE — PROGRESS NOTES
"Nutrition Services    Patient Name:  Rekha Bear  YOB: 1940  MRN: [id number]  Admit Date:  12/26/2023    Assessment Date:  01/03/24    Summary: Follow up:    Pt's current diet is Consistent Carb (reg/thins). Appetite is good, with intake % x 16 meals. Labs and skin status reviewed, wound noted on left third toe (black eschar noted).   2 BM's 1/2  Plan/Recommend:  Continue with diet as ordered. Assist with meal set up as needed.  Monitor intake, labs, weight and skin status.   RD to follow.      CLINICAL NUTRITION ASSESSMENT      Reason for Assessment Follow-up Protocol     Admitting Diagnosis   Stroke       Medical/Surgical History Past Medical History:   Diagnosis Date    Chilblains     \"Chilblian's\"    CKD (chronic kidney disease)     Depression     Fatty liver     GERD (gastroesophageal reflux disease)     Hyperlipidemia     Hypertension     Incontinence     Osteoarthritis     OA  Marvin THR, LT TKR - hydrocodone prescibed by Dr. 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 Almaguer    TOTAL KNEE ARTHROPLASTY Left     OA  Marvin THR, LT TKR - hydrocodone prescibed by Dr. Almaguer        Current Nutrition Orders & Evaluation of Intake       Oral Nutrition      " "Food Allergies Shellfish    Current PO Diet Diet: Regular/House Diet, Diabetic Diets; Consistent Carbohydrate; Texture: Regular Texture (IDDSI 7); Fluid Consistency: Thin (IDDSI 0)    Supplement -   PO Evaluation      PO Intake % % x 15 meals     Factors Affecting Intake No factors at this time   --  Anthropometrics        Current Height  Current Weight (CBW)  BMI kg/m2 Height: 170.2 cm (67\")  Weight: 87.4 kg (192 lb 10.9 oz) (12/26/23 1917)  Body mass index is 30.18 kg/m².   BMI Category Obese, Class I (30 - 34.9)   Ideal Weight (IBW) 135#   Usual Weight (UBW) 190#   Weight Trend Stable   Weight History Wt Readings from Last 15 Encounters:   12/26/23 1917 87.4 kg (192 lb 10.9 oz)   12/22/23 0856 86.2 kg (190 lb)   12/21/23 1514 86.2 kg (190 lb)   10/16/23 1005 84.4 kg (186 lb)   10/08/23 1819 85.7 kg (189 lb)   06/08/22 1418 89.4 kg (197 lb)   05/10/22 1312 91.2 kg (201 lb)   05/04/22 1516 91.2 kg (201 lb)   04/26/22 1712 89.8 kg (198 lb)   04/26/22 1618 89.8 kg (198 lb)   04/21/22 1133 90 kg (198 lb 6.4 oz)   10/21/21 1525 91.5 kg (201 lb 12.8 oz)   10/14/21 1004 90.7 kg (200 lb)   06/03/21 1322 94.8 kg (209 lb)   05/14/21 1449 94.3 kg (208 lb)   04/27/21 1210 93.9 kg (207 lb)   02/02/21 1207 92.7 kg (204 lb 6.4 oz)      --  Physical Findings          General Appearance alert   Oral/Mouth Cavity dental appliance   Edema  no edema   Gastrointestinal last bowel movement: 1/2 x2   Skin  location of wound: third toe (bottom) with black eschar   Tubes/Drains/Lines none   NFPE Not indicated at this time       Medications & Labs           Scheduled Medications aspirin, 325 mg, Oral, Daily  atorvastatin, 40 mg, Oral, Nightly  buPROPion XL, 150 mg, Oral, QAM  docusate sodium, 100 mg, Oral, BID  gabapentin, 400 mg, Oral, Q8H  insulin lispro, 2-7 Units, Subcutaneous, 4x Daily AC & at Bedtime  levothyroxine, 100 mcg, Oral, Q AM  lisinopril, 2.5 mg, Oral, Q24H  oxybutynin XL, 10 mg, Oral, Daily  polyethylene glycol, 17 " g, Oral, Daily  venlafaxine XR, 150 mg, Oral, Daily  vitamin B-12, 1,000 mcg, Oral, Daily  vitamin D, 50,000 Units, Oral, Q7 Days       Infusions     PRN Medications   acetaminophen **OR** acetaminophen    bisacodyl    dextrose    glucagon (human recombinant)    melatonin    ondansetron            Pertinent Labs   Results from last 7 days   Lab Units 01/01/24  0505 12/28/23  0610   SODIUM mmol/L 138 139   POTASSIUM mmol/L 4.5 4.3   CHLORIDE mmol/L 104 104   CO2 mmol/L 23.9 25.3   BUN mg/dL 28* 22   CREATININE mg/dL 1.24* 1.24*   CALCIUM mg/dL 9.0 9.1   GLUCOSE mg/dL 147* 228*     Results from last 7 days   Lab Units 01/01/24  0506   HEMOGLOBIN g/dL 11.4*   HEMATOCRIT % 35.5   WBC 10*3/mm3 7.19     Results from last 7 days   Lab Units 01/01/24  0506 12/28/23  0610   PLATELETS 10*3/mm3 221 205     Lab Results   Component Value Date    HGBA1C 9.10 (H) 12/22/2023        Nutrition Diagnosis        Nutrition Dx Problem  Problem: Altered Nutrition Related to Labs  Etiology: Medical Diagnosis - T2DM    Signs/Symptoms: Biochemical       NUTRITION INTERVENTION / PLAN OF CARE  Goals        Intervention Goal(s) Improved nutrition related labs and Maintain intake     Nutrition Intervention        RD Action Continue to monitor     Prescription        Diet Prescription    Supplement Prescription    Snack Prescription    EN Prescription    New Prescription Ordered? No changes at this time     Monitor/Evaluation        Monitor Per protocol   Discharge Needs Pending clinical course     RD to follow up per protocol.     Electronically signed by:  Monik Kruger RD  01/03/24 14:33 EST

## 2024-01-03 NOTE — PROGRESS NOTES
Inpatient Rehabilitation Functional Measures Assessment and Plan of Care    Plan of Care  Updated Problems/Interventions  Self Care    [OT] Bathing(Active)  Current Status(01/03/2024): SBA  Weekly Goal(01/10/2024): SBA  Discharge Goal: sup    [OT] Dressing (Lower)(Active)  Current Status(01/03/2024): SBA  Weekly Goal(01/10/2024): SBA  Discharge Goal: SUP    [OT] Dressing (Upper)(Active)  Current Status(01/03/2024): set up  Weekly Goal(01/10/2024): MCGRATH  Discharge Goal: Ind    [OT] Grooming(Active)  Current Status(01/03/2024): MCGRATH  Weekly Goal(01/10/2024): mod i  Discharge Goal: ind    [OT] Toileting(Active)  Current Status(01/03/2024): SBA  Weekly Goal(01/10/2024): SBA  Discharge Goal: Sup        Mobility    [OT] Toilet Transfers(Active)  Current Status(01/03/2024): SBA w/ RW or wc  Weekly Goal(01/10/2024): SBA  Discharge Goal: SUP    [OT] Tub/Shower Transfers(Active)  Current Status(01/03/2024): SBA A w/ RW or wc  Weekly Goal(01/10/2024): SBA  Discharge Goal: SUP    Functional Measures  KD Eating:  Branch  KD Grooming: Branch  KD Bathing:  Branch  KD Upper Body Dressing:  Branch  KD Lower Body Dressing:  Branch  KD Toileting:  Branch    KD Bladder Management  Level of Assistance:  Branch  Frequency/Number of Accidents this Shift:  Branch    KD Bowel Management  Level of Assistance: Branch  Frequency/Number of Accidents this Shift: Branch    KD Bed/Chair/Wheelchair Transfer:  Branch  KD Toilet Transfer:  Branch  KD Tub/Shower Transfer:  Branch    Previously Documented Mode of Locomotion at Discharge: Field  KD Expected Mode of Locomotion at Discharge: Branch  KD Walk/Wheelchair:  Branch  KD Stairs:  Branch    KD Comprehension:  Branch  KD Expression:  Branch  KD Social Interaction:  Branch  KD Problem Solving:  Branch  KD Memory:  Branch    Therapy Mode Minutes  Occupational Therapy: Branch  Physical Therapy: Branch  Speech Language Pathology:  Branch    Signed by: Brigid Osorio OTR/L

## 2024-01-03 NOTE — PROGRESS NOTES
Physical Medicine and Rehabilitation  Inpatient Rehabilitation Interdisciplinary Plan of Care    Demographics            Age: 83Y            Gender: Female    Admission Date: 12/26/2023 6:59:00 PM  Rehabilitation Diagnosis:  Status post left PCA stroke-felt cardioembolic  Status post left PCA stroke-felt cardioembolic-December 22, 2023- 6 x 3 cm acute  infarct throughout the medial left occipital lobe in the left PCA territory    Bilateral carotid stenosis-left 50-69%, right less than 50%    Stroke prophylaxis-aspirin 7 days through December 28, then start Eliquis 5 mg  twice daily on December 29/atorvastatin  December 29-starting Eliquis today    Right visual field deficit-right homonymous hemianopsia    Impaired mobility/self-care    Diabetes mellitus type 2-sliding scale insulin    Chronic kidney disease    Hypertension-off metoprolol secondary bradycardia  December 29-systolic blood pressure 157/68 this a.m., later 113/62-variable  pattern.  Allergy to amlodipine.  Will add lisinopril initially 2.5 mg daily    Status post bradycardia-resolved off metoprolol    Hypothyroidism-on replacement    B12 deficiency-on replacement    Vitamin D deficiency-change to ergocalciferol 50,000 units weekly for 8 weeks    Depression-bupropion/venlafaxine    -oxybutynin    DVT prophylaxis-SCDs    Outpatient evaluation for obstructive sleep apnea    Plan of Care  Anticipated Discharge Date/Estimated Length of Stay: 2-3 weeks  Anticipated Discharge Destination: Community discharge with assistance  Discharge Plan : Patient lives alone in one story home with 5 steps and 2 rails  to enter.  D/C plan is home with assistance from adult children and grandchildren.  Medical Necessity Expected Level Rationale: FAIR - SBA/ SUPERVISION  Updated (if changes indicated)  No changes to plan.    Based on the patient's medical and functional status, their prognosis and  expected level of functional improvement is: FAIR - SBA/  SUPERVISION    Interdisciplinary Problem/Goals/Status  Safety    [RN] Potential for Injury(Active)  Current Status(01/02/2024): Risk for fall  Weekly Goal(01/09/2024): Cue to use call bell  Discharge Goal: PT /Family will be aware of risk for fall and safety in the home  setting        Psychosocial    [RN] Coping/Adjustment(Active)  Current Status(01/02/2024): Expressing appropriate coping  Weekly Goal(01/09/2024): Provide educational material regarding stroke  Discharge Goal: Knowleadgeable of care needs and stroke recovery        Sphincter Control    [RN] Bladder Management(Active)  Current Status(01/02/2024): Bladder urgency  Weekly Goal(01/11/2024): Continent 50%  Discharge Goal: Continent 100%    [RN] Bowel Management(Active)  Current Status(01/02/2024): Continent 100%  Weekly Goal(01/09/2024): Continent 100%  Discharge Goal: Continent 100%        Self Care    [OT] Bathing(Active)  Current Status(01/03/2024): SBA  Weekly Goal(01/10/2024): SBA  Discharge Goal: sup    [OT] Dressing (Lower)(Active)  Current Status(01/03/2024): SBA  Weekly Goal(01/10/2024): SBA  Discharge Goal: SUP    [OT] Dressing (Upper)(Active)  Current Status(01/03/2024): set up  Weekly Goal(01/10/2024): MCGRATH  Discharge Goal: Ind    [OT] Grooming(Active)  Current Status(01/03/2024): MCGRATH  Weekly Goal(01/10/2024): mod i  Discharge Goal: ind    [OT] Toileting(Active)  Current Status(01/03/2024): SBA  Weekly Goal(01/10/2024): SBA  Discharge Goal: Sup        Mobility    [OT] Toilet Transfers(Active)  Current Status(01/03/2024): SBA w/ RW or wc  Weekly Goal(01/10/2024): SBA  Discharge Goal: SUP    [OT] Tub/Shower Transfers(Active)  Current Status(01/03/2024): SBA A w/ RW or wc  Weekly Goal(01/10/2024): SBA  Discharge Goal: SUP    [PT] Bed/Chair/Wheelchair(Active)  Current Status(01/03/2024): CG/SBA w/ RW  Weekly Goal(01/10/2024): SBA  Discharge Goal: Sup    [PT] Bed Mobility(Active)  Current Status(01/03/2024): CG/SBA  Weekly Goal(01/10/2024):  SBA/sup  Discharge Goal: Ind    [PT] Walk(Active)  Current Status(01/03/2024): CGA RW '  Weekly Goal(01/10/2024): SBA RW 80'  Discharge Goal: SBA/sup    [PT] Stairs(Active)  Current Status(01/03/2024): CGA 4 steps  Weekly Goal(01/10/2024): SBA  Discharge Goal: SBA        Cognition    [ST] Memory(Active)  Current Status(01/03/2024): 1/3 recall with 5 minute delay no cues  Weekly Goal(01/10/2024): 3/3 recall with 5 minute delay MIN cues  Discharge Goal: Patient will utilize memory strategies to aid in functional STM  for return to home, PLOF, IADL and community participation    [ST] Attention(Active)  Current Status(01/03/2024): mild deficits in sustained/selective attention  Weekly Goal(01/10/2024): MIN cues for sustained attention  Discharge Goal: functional attention for return to home, PLOF, IADL, and  community participation        Communication    [ST] Expression(Active)  Current Status(01/03/2024): Mild anomic events, utilizes word finding to repair  given MIN cues  Weekly Goal(01/10/2024): Patient will utilize word finding repair/strategies in  50% of opportunities given MIN cues  Discharge Goal: functional verbal expression for return to home, PLOF, IADL and  community participation      Comments: 12/28   amb with rwx 80ft min A, ADLs min-mod A, right side neglect,  mild-mod attention deficits, cont bowel and bladder    Signed by: Sanjeev Quan MD

## 2024-01-03 NOTE — PROGRESS NOTES
Inpatient Rehabilitation Plan of Care Note    Plan of Care  Care Plan Reviewed - No updates at this time.    Safety    Performed Intervention(s)  Safety rounds  Bed alarm and/chair alarm      Psychosocial    Performed Intervention(s)  Vebalizes her needs and concerns      Sphincter Control    Performed Intervention(s)  Bladder training program  Encourage fluid intake  Incontinence products    Signed by: Niko Oviedo RN

## 2024-01-03 NOTE — PROGRESS NOTES
Inpatient Rehabilitation Plan of Care Note    Plan of Care  Care Plan Reviewed - No updates at this time.    Safety    Performed Intervention(s)  Safety rounds  Bed alarm and/chair alarm      Psychosocial    Performed Intervention(s)  Vebalizes her needs and concerns      Sphincter Control    Performed Intervention(s)  Bladder training program  Encourage fluid intake  Incontinence products    Signed by: Christina Molina RN

## 2024-01-03 NOTE — PROGRESS NOTES
LOS: 8 days   Patient Care Team:  Eben Porter MD as PCP - General  Eben Porter MD as PCP - Family Medicine      MICHAEL S JACQUI  1940      ADMITTING DIAGNOSIS:  Status post left PCA stroke-felt cardioembolic  Bilateral carotid stenosis  Stroke prophylaxis-aspirin 7 days through December 28, then start Eliquis 5 mg twice daily on December 29/atorvastatin  Right visual field deficit-right homonymous hemianopsia  Impaired mobility/self-car      Subjective       Patient reports tolerating therapies.  No headache.  Continues with impaired vision to the right side.  Speech continues better.  No new weakness.           Objective     Vitals:    01/03/24 1637   BP:    Pulse: 52   Resp:    Temp:    SpO2:        Intake/Output Summary (Last 24 hours) at 1/3/2024 1653  Last data filed at 1/3/2024 1200  Gross per 24 hour   Intake 720 ml   Output --   Net 720 ml     PHYSICAL EXAM:     MENTAL STATUS -  AWAKE / ALERT  HEENT-    LUNGS - CTA, NO WHEEZES, RALES OR RHONCHI  HEART- RRR, NO RUB, MURMUR, OR GALLOP  ABD - NORMOACTIVE BOWEL SOUNDS, SOFT, NT.    EXT - NO EDEMA OR CYANOSIS  NEURO -oriented person place and general situation  Expressive language appears improved  Right homonymous hemianopsia  MOTOR EXAM - RUE/RLE 5/5. LUE/LLE 5/5.      MEDICATIONS  Scheduled Meds:aspirin, 325 mg, Oral, Daily  atorvastatin, 40 mg, Oral, Nightly  buPROPion XL, 150 mg, Oral, QAM  docusate sodium, 100 mg, Oral, BID  gabapentin, 400 mg, Oral, Q8H  insulin lispro, 2-7 Units, Subcutaneous, 4x Daily AC & at Bedtime  levothyroxine, 100 mcg, Oral, Q AM  lisinopril, 2.5 mg, Oral, Q24H  oxybutynin XL, 10 mg, Oral, Daily  [START ON 1/4/2024] pioglitazone, 15 mg, Oral, Daily  polyethylene glycol, 17 g, Oral, Daily  venlafaxine XR, 150 mg, Oral, Daily  vitamin B-12, 1,000 mcg, Oral, Daily  vitamin D, 50,000 Units, Oral, Q7 Days      Continuous Infusions:   PRN Meds:.  acetaminophen **OR** acetaminophen    bisacodyl    dextrose     glucagon (human recombinant)    melatonin    ondansetron      RESULTS  Glucose   Date/Time Value Ref Range Status   01/03/2024 1622 121 70 - 130 mg/dL Final   01/03/2024 1128 154 (H) 70 - 130 mg/dL Final   01/03/2024 0738 157 (H) 70 - 130 mg/dL Final   01/02/2024 2027 223 (H) 70 - 130 mg/dL Final   01/02/2024 1634 173 (H) 70 - 130 mg/dL Final   01/02/2024 1135 183 (H) 70 - 130 mg/dL Final   01/02/2024 0726 139 (H) 70 - 130 mg/dL Final   01/01/2024 2034 219 (H) 70 - 130 mg/dL Final     Results from last 7 days   Lab Units 01/01/24  0506 12/28/23  0610   WBC 10*3/mm3 7.19 6.55   HEMOGLOBIN g/dL 11.4* 11.8*   HEMATOCRIT % 35.5 34.3   PLATELETS 10*3/mm3 221 205     Results from last 7 days   Lab Units 01/01/24  0505 12/28/23  0610   SODIUM mmol/L 138 139   POTASSIUM mmol/L 4.5 4.3   CHLORIDE mmol/L 104 104   CO2 mmol/L 23.9 25.3   BUN mg/dL 28* 22   CREATININE mg/dL 1.24* 1.24*   CALCIUM mg/dL 9.0 9.1   GLUCOSE mg/dL 147* 228*        Latest Reference Range & Units 12/28/23 06:09   25 Hydroxy, Vitamin D 30.0 - 100.0 ng/ml 13.9 (L)   (L): Data is abnormally low    ASSESSMENT and PLAN    Stroke    Status post left PCA stroke-felt cardioembolic-December 22, 2023- 6 x 3 cm acute infarct throughout the medial left occipital lobe in the left PCA territory     Bilateral carotid stenosis-left 50-69%, right less than 50%     Stroke prophylaxis-aspirin 7 days through December 28, then start Eliquis 5 mg twice daily on December 29/atorvastatin  December 29-starting Eliquis today     Right visual field deficit-right homonymous hemianopsia     Impaired mobility/self-care     Diabetes mellitus type 2-sliding scale insulin-was on Actos 15 mg daily and Tradjenta 5 mg daily at home  January 3-resume Actos 15 mg p.o. daily in the a.m.     Chronic kidney disease     Hypertension-off metoprolol secondary bradycardia  December 29-systolic blood pressure 157/68 this a.m., later 113/62-variable pattern.  Allergy to amlodipine.  Will add  lisinopril initially 2.5 mg daily  January 3-systolic blood pressure 126-141.  Diastolic blood pressure in the 60     Status post bradycardia-resolved off metoprolol     Hypothyroidism-on replacement     B12 deficiency-on replacement    Vitamin D deficiency-change to ergocalciferol 50,000 units weekly for 8 weeks     Depression-bupropion/venlafaxine     -oxybutynin     DVT prophylaxis-SCDs     Outpatient evaluation for obstructive sleep apnea    TEAM CONF - DEC 28 - TRANSFERS MIN ASSIST. GAIT 80 FEET RW MIN ASSIST. 4 STAIRS MIN ASSIST.   BATH MOD . UBD MIN. LBD MOD MAX. TOILET TRANSFERS MIN ASSIST.   GOOD FINE MOTOR SKILLS. RIGHT VISUAL NEGLECT. DECREASED INSIGHT INTO DEFICITS. OVERALL MILD COGNITIVE IMPAIRMENT.  MILD MODERATE EXPRESSIVE DEFICITS AT SENTENCE LEVEL. MILD ANOMIA. CONTINENT BOWEL AND BLADDER.   ASA COMPLETES TODAY AND STARTS ELIQUIS TOMORROW.   ELOS - TWO WEEKS - MIGHT BE A GOOD CANDIDATE FOR ASSISTED LIVING    Goal is for home with outpatient   therapies.  Barrier to discharge: Impaired mobility self-care cognition- work on attention to the right, executive function, memory, balance, gait, ADLs to overcome.     Sanjeev Quan MD      Appropriate PPE was worn during the entire visit.  Hand hygiene was completed before and after.

## 2024-01-03 NOTE — THERAPY PROGRESS REPORT/RE-CERT
"Inpatient Rehabilitation - Occupational Therapy Progress Note    [facility]     Patient Name: Rekha Bear  : 1940  MRN: [id number]    Today's Date: 1/3/2024                 Admit Date: 2023       No diagnosis found.    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    Community acquired pneumonia of left lower lobe of lung    Hyponatremia    Pneumonia due to COVID-19 virus    Major depressive disorder, recurrent, mild    Acute respiratory failure with hypoxia    Supraventricular tachycardia    PVC (premature ventricular contraction)    Dyspnea on exertion    Dizziness    Depressive disorder    Gastroesophageal reflux disease    Midline cystocele    Rectocele    BMI 29.0-29.9,adult    Acute left PCA stroke    B12 deficiency    Bradycardia    Premature atrial contractions    Stroke       Past Medical History:   Diagnosis Date    Chilblains     \"Chilblian's\"    CKD (chronic kidney disease)     Depression     Fatty liver     GERD (gastroesophageal reflux disease)     Hyperlipidemia     Hypertension     Incontinence     Osteoarthritis     OA  Marvin THR, LT TKR - hydrocodone prescibed by Dr. 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 Almaguer    TOTAL KNEE ARTHROPLASTY Left     OA  Marvin THR, LT TKR - hydrocodone prescibed by Dr. Almaguer             IRF OT ASSESSMENT FLOWSHEET (last 12 hours)       IRF OT Evaluation and Treatment       Row Name 01/03/24 1205          OT Time and Intention    Document Type progress note  -KP     Mode of Treatment individual therapy;occupational therapy  -KP     Patient Effort good  -KP     Symptoms Noted During/After Treatment none  -KP       Row Name 01/03/24 1205          General Information    Patient/Family/Caregiver Comments/Observations pt sitting in wc  -KP     Existing Precautions/Restrictions fall  -KP     Limitations/Impairments visual  -KP     Comment, General Information R visual defecit  -KP       Row Name 01/03/24 1205          Pain Assessment    Pretreatment Pain Rating 0/10 - no pain  -KP     Posttreatment Pain Rating 0/10 - no pain  -KP       Row Name 01/03/24 1205          Cognition/Psychosocial    Affect/Mental Status (Cognition) WFL  -KP     Orientation Status (Cognition) oriented x 4  -KP     Follows Commands (Cognition) follows one-step commands;over 90% accuracy  -     Personal Safety Interventions fall prevention program maintained;gait belt;nonskid shoes/slippers when out of bed  -KP     Cognitive Function memory deficit  -KP     Memory Deficit (Cognition) minimal deficit  -KP     Safety Deficit (Cognition) minimal deficit  -KP       Row Name 01/03/24 1205          Vision Assessment/Intervention    Visual Field Deficit homonymous hemianopsia, right  -KP     Visual Processing Deficit visual search, right;visual  attention, right  -       Row Name 01/03/24 1205          Bathing    [place] Level (Bathing) bathing skills;lower body;upper body;set up;standby assist  -     Assistive Device (Bathing) grab bar/tub rail;hand held shower spray hose;shower chair  -     Position (Bathing) supported sitting;supported standing  -     Set-up Assistance (Bathing) obtain supplies;adjust water temperature  -     Comment (Bathing) in shower  -KP       Row Name 01/03/24 1205          Upper Body Dressing    [place] Level (Upper Body Dressing) doff;upper body dressing skills;don;pull over garment;set up assistance  -     Position (Upper Body Dressing) supported sitting  -     Set-up Assistance (Upper Body Dressing) obtain clothing  -[facility] Name 01/03/24 1205          Lower Body Dressing    [place] Level (Lower Body Dressing) don;doff;pants/bottoms;shoes/slippers;socks;set up;standby assist  -     Position (Lower Body Dressing) supported standing;supported sitting  -     Set-up Assistance (Lower Body Dressing) obtain clothing  -     Comment (Lower Body Dressing) VC for safety to sit down to fully doff pants and to don pants  -[facility] Name 01/03/24 1205          Grooming    [place] Level (Grooming) grooming skills;deodorant application;wash face, hands;set up  -     Position (Grooming) supported sitting  -[facility] Name 01/03/24 1205          Toileting    Comment (Toileting) NT but SBA in shower w hygiene and clothing  -[facility] Name 01/03/24 1205          Bed Mobility    Comment, (Bed Mobility) in wc  -       Row Name 01/03/24 1205          Sit-Stand Transfer    Sit-Stand [place] (Transfers) standby assist;set up  -     Assistive Device (Sit-Stand Transfers) wheelchair  -       Row Name 01/03/24 1205          Stand-Sit Transfer    Stand-Sit [place] (Transfers) standby assist  -     Assistive Device (Stand-Sit Transfers) wheelchair  [facility]       Row Name 01/03/24 1205           Shower Transfer    Type (Shower Transfer) sit-stand;stand-sit;stand pivot/stand step  -KP     [place] Level (Shower Transfer) set up;stand by assist  -KP     Assistive Device (Shower Transfer) grab bar, tub/shower;wheelchair;shower chair  -KP       Row Name 01/03/24 1205          Balance    Static Sitting Balance modified independence  -KP     Dynamic Sitting Balance standby assist  -KP     Static Standing Balance standby assist  -KP     Dynamic Standing Balance standby assist  -KP       Row Name 01/03/24 1205          Positioning and Restraints    Pre-Treatment Position sitting in chair/recliner  -KP     Post Treatment Position wheelchair  -KP     In Wheelchair sitting;call light within reach;encouraged to call for assist;exit alarm on;with nsg  -       Row Name 01/03/24 1205          Daily Progress Summary (OT)    Overall Progress Toward Functional Goals (OT) progressing toward functional goals as expected  -       Row Name 01/03/24 1205          Transfer Goal 1 (OT-IRF)    Activity/Assistive Device (Transfer Goal 1, OT-IRF) all transfers  -KP     [place] Level (Transfer Goal 1, OT-IRF) contact guard required;verbal cues required  -KP     Time Frame (Transfer Goal 1, OT-IRF) short-term goal (STG);1 week  -KP     Progress/Outcomes (Transfer Goal 1, OT-IRF) goal met  -       Row Name 01/03/24 1205          Transfer Goal 2 (OT-IRF)    Activity/Assistive Device (Transfer Goal 2, OT-IRF) all transfers  -KP     [place] Level (Transfer Goal 2, OT-IRF) supervision required  -KP     Time Frame (Transfer Goal 2, OT-IRF) long-term goal (LTG)  -KP     Progress/Outcomes (Transfer Goal 2, OT-IRF) good progress toward goal  SBA close to supervision  -       Row Name 01/03/24 1205          Bathing Goal 1 (OT-IRF)    Activity/Device (Bathing Goal 1, OT-IRF) bathing skills, all  -KP     [place] Level (Bathing Goal 1, OT-IRF) minimum assist (75% or more patient effort);verbal cues required  -KP      Time Frame (Bathing Goal 1, OT-IRF) short-term goal (STG);1 week  -KP     Progress/Outcomes (Bathing Goal 1, OT-IRF) goal met  -KP       Row Name 01/03/24 1205          Bathing Goal 2 (OT-IRF)    Activity/Device (Bathing Goal 2, OT-IRF) bathing skills, all  -KP     [place] Level (Bathing Goal 2, OT-IRF) contact guard required;verbal cues required;supervision required  -KP     Time Frame (Bathing Goal 2, OT-IRF) long-term goal (LTG);3 weeks;2 weeks  -KP     Progress/Outcomes (Bathing Goal 2, OT-IRF) goal met;goal revised this date  new goal S/u/mod I  -KP       Row Name 01/03/24 1205          UB Dressing Goal 1 (OT-IRF)    Activity/Device (UB Dressing Goal 1, OT-IRF) upper body dressing  -KP     [place] (UB Dress Goal 1, OT-IRF) set-up required  -KP     Time Frame (UB Dressing Goal 1, OT-IRF) short-term goal (STG);1 week  -KP     Progress/Outcomes (UB Dressing Goal 1, OT-IRF) goal met  -KP       Row Name 01/03/24 1205          UB Dressing Goal 2 (OT-IRF)    Activity/Device (UB Dressing Goal 2, OT-IRF) upper body dressing  -KP     [place] (UB Dress Goal 2, OT-IRF) independent  -KP     Time Frame (UB Dressing Goal 2, OT-IRF) long-term goal (LTG);2 weeks  -KP     Progress/Outcomes (UB Dressing Goal 2, OT-IRF) good progress toward goal  -KP       Row Name 01/03/24 1205          LB Dressing Goal 1 (OT-IRF)    Activity/Device (LB Dressing Goal 1, OT-IRF) lower body dressing  -KP     [place] (LB Dressing Goal 1, OT-IRF) minimum assist (75% or more patient effort)  -KP     Time Frame (LB Dressing Goal 1, OT-IRF) short-term goal (STG);1 week  -KP     Progress/Outcomes (LB Dressing Goal 1, OT-IRF) goal met  -KP       Row Name 01/03/24 1205          LB Dressing Goal 2 (OT-IRF)    Activity/Device (LB Dressing Goal 2, OT-IRF) lower body dressing  -KP     [place] (LB Dressing Goal 2, OT-IRF) contact guard required;supervision required  -KP     Time Frame (LB Dressing Goal 2, OT-IRF) long-term goal (LTG);2  weeks;3 weeks  -KP     Progress/Outcomes (LB Dressing Goal 2, OT-IRF) goal met;goal revised this date  new goal SBA/mod I  -KP       Row Name 01/03/24 1205          Grooming Goal 1 (OT-IRF)    Activity/Device (Grooming Goal 1, OT-IRF) grooming skills, all  -KP     [place] (Grooming Goal 1, OT-IRF) independent  -KP     Time Frame (Grooming Goal 1, OT-IRF) long-term goal (LTG);2 weeks  -KP     Progress/Outcomes (Grooming Goal 1, OT-IRF) good progress toward goal  -KP       Row Name 01/03/24 1205          Toileting Goal 1 (OT-IRF)    Activity/Device (Toileting Goal 1, OT-IRF) toileting skills, all  -KP     [place] Level (Toileting Goal 1, OT-IRF) minimum assist (75% or more patient effort);moderate assist (50-74% patient effort)  -KP     Progress/Outcomes (Toileting Goal 1, OT-IRF) goal met  -KP       Row Name 01/03/24 1205          Toileting Goal 2 (OT-IRF)    Activity/Device (Toileting Goal 2, OT-IRF) toileting skills, all  -KP     [place] Level (Toileting Goal 2, OT-IRF) contact guard required;supervision required  -KP     Progress/Outcomes (Toileting Goal 2, OT-IRF) goal met;goal revised this date  new goals SBA/mod I  -KP     Time Frame (Toileting Goal 2, OT-IRF) long-term goal (LTG)  -KP       Row Name 01/03/24 1205          Strength Goal 1 (OT-IRF)    Strength Goal 1 (OT-IRF) Pt will improve B UE shoulder strength in all planes to 4/5 MMT  -KP     Time Frame (Strength Goal 1, OT-IRF) long-term goal (LTG);3 weeks;2 weeks  -KP     Progress/Outcomes (Strength Goal 1, OT-IRF) continuing progress toward goal  -KP       Row Name 01/03/24 1205           Activity Tolerance Goal 1 (OT)    Activity Tolerance Goal 1 (OT) pt will demo improved act tolerance w ADL tasks  -KP     [place] Level (Endurance Goal 1, OT) 10 min activity  -KP     Time Frame (Activity Tolerance Goal 1, OT) long term goal (LTG)  -KP     Progress/Outcome (Activity Tolerance Goal 1, OT) goal met  -KP       Row Name 01/03/24 120           Caregiver Training Goal 1 (OT-IRF)    Caregiver Training Goal 1 (OT-IRF) Pt and caregiver will be ind. with safety techniques for functional tf  -KP     Time Frame (Caregiver Training Goal 1, OT-IRF) long-term goal (LTG);2 weeks  -KP     Progress/Outcomes (Caregiver Training Goal 1, OT-IRF) goal ongoing;continuing progress toward goal  -KP       Row Name 01/03/24 1205          Safety Awareness Goal 1 (OT-IRF)    Activity (Safety Awareness Goal 1, OT-IRF) awareness of need for assistance;awareness of right side;judgment;insight into deficits/self-awareness  -KP     [place]/Cues/Accuracy (Safety Awareness Goal 1, OT-IRF) with minimum  -KP     Time Frame (Safety Awareness Goal 1, OT-IRF) long-term goal (LTG)  -KP     Progress/Outcomes (Safety Awareness Goal 1, OT-IRF) continuing progress toward goal  -KP               User Key  (r) = Recorded By, (t) = Taken By, (c) = Cosigned By      Initials Name Effective Dates    Brigid Valverde, OTR 07/11/23 -                      Occupational Therapy Education       Title: PT OT SLP Therapies (In Progress)       Topic: Occupational Therapy (In Progress)       Point: ADL training (Done)       Description:   Instruct learner(s) on proper safety adaptation and remediation techniques during self care or transfers.   Instruct in proper use of assistive devices.                  Learning Progress Summary             Patient Acceptance, E,TB,D, NR,VU by CC at 12/30/2023 1148    Comment: Shower tsf to bench                         Point: Home exercise program (Not Started)       Description:   Instruct learner(s) on appropriate technique for monitoring, assisting and/or progressing therapeutic exercises/activities.                  Learner Progress:  Not documented in this visit.              Point: Precautions (Not Started)       Description:   Instruct learner(s) on prescribed precautions during self-care and functional transfers.                  Learner Progress:   Not documented in this visit.              Point: Body mechanics (Not Started)       Description:   Instruct learner(s) on proper positioning and spine alignment during self-care, functional mobility activities and/or exercises.                  Learner Progress:  Not documented in this visit.                              User Key       Initials Effective Dates Name Provider Type Discipline    CC 06/16/21 -  Sahra Crisostomo OTR Occupational Therapist OT                        OT Recommendation and Plan            Daily Progress Summary (OT)  Overall Progress Toward Functional Goals (OT): progressing toward functional goals as expected  Daily Progress Summary (OT): pt is now SBA a LBB and LBD. SBA UBB and UBD            Time Calculation:      Time Calculation- OT       Row Name 01/03/24 1218             Time Calculation- OT    OT Start Time 0800  -KP      OT Stop Time 0830  -      OT Time Calculation (min) 30 min  -                User Key  (r) = Recorded By, (t) = Taken By, (c) = Cosigned By      Initials Name Provider Type     Brigid Osorio OTR Occupational Therapist                  Therapy Charges for Today       Code Description Service Date Service Provider Modifiers Qty    65659359690 HC OT SELF CARE/MGMT/TRAIN EA 15 MIN 1/2/2024 Brigid Osorio OTR GO 2    89172069079 HC OT NEUROMUSC RE EDUCATION EA 15 MIN 1/2/2024 Brigid Osorio OTR GO 2    57578373587 HC OT SELF CARE/MGMT/TRAIN EA 15 MIN 1/3/2024 Brigid Osorio OTR GO 2                     GRACIELA Dow  1/3/2024

## 2024-01-04 LAB
ANION GAP SERPL CALCULATED.3IONS-SCNC: 9.5 MMOL/L (ref 5–15)
BASOPHILS # BLD AUTO: 0.03 10*3/MM3 (ref 0–0.2)
BASOPHILS NFR BLD AUTO: 0.4 % (ref 0–1.5)
BUN SERPL-MCNC: 30 MG/DL (ref 8–23)
BUN/CREAT SERPL: 19.9 (ref 7–25)
CALCIUM SPEC-SCNC: 9.2 MG/DL (ref 8.6–10.5)
CHLORIDE SERPL-SCNC: 103 MMOL/L (ref 98–107)
CO2 SERPL-SCNC: 27.5 MMOL/L (ref 22–29)
CREAT SERPL-MCNC: 1.51 MG/DL (ref 0.57–1)
DEPRECATED RDW RBC AUTO: 47.1 FL (ref 37–54)
EGFRCR SERPLBLD CKD-EPI 2021: 34.6 ML/MIN/1.73
EOSINOPHIL # BLD AUTO: 0.2 10*3/MM3 (ref 0–0.4)
EOSINOPHIL NFR BLD AUTO: 2.9 % (ref 0.3–6.2)
ERYTHROCYTE [DISTWIDTH] IN BLOOD BY AUTOMATED COUNT: 13.9 % (ref 12.3–15.4)
GLUCOSE BLDC GLUCOMTR-MCNC: 147 MG/DL (ref 70–130)
GLUCOSE BLDC GLUCOMTR-MCNC: 150 MG/DL (ref 70–130)
GLUCOSE BLDC GLUCOMTR-MCNC: 159 MG/DL (ref 70–130)
GLUCOSE BLDC GLUCOMTR-MCNC: 169 MG/DL (ref 70–130)
GLUCOSE SERPL-MCNC: 192 MG/DL (ref 65–99)
HCT VFR BLD AUTO: 40 % (ref 34–46.6)
HGB BLD-MCNC: 12.5 G/DL (ref 12–15.9)
IMM GRANULOCYTES # BLD AUTO: 0.03 10*3/MM3 (ref 0–0.05)
IMM GRANULOCYTES NFR BLD AUTO: 0.4 % (ref 0–0.5)
LYMPHOCYTES # BLD AUTO: 2.21 10*3/MM3 (ref 0.7–3.1)
LYMPHOCYTES NFR BLD AUTO: 32.3 % (ref 19.6–45.3)
MCH RBC QN AUTO: 28.7 PG (ref 26.6–33)
MCHC RBC AUTO-ENTMCNC: 31.3 G/DL (ref 31.5–35.7)
MCV RBC AUTO: 91.7 FL (ref 79–97)
MONOCYTES # BLD AUTO: 0.73 10*3/MM3 (ref 0.1–0.9)
MONOCYTES NFR BLD AUTO: 10.7 % (ref 5–12)
NEUTROPHILS NFR BLD AUTO: 3.65 10*3/MM3 (ref 1.7–7)
NEUTROPHILS NFR BLD AUTO: 53.3 % (ref 42.7–76)
NRBC BLD AUTO-RTO: 0 /100 WBC (ref 0–0.2)
PLATELET # BLD AUTO: 232 10*3/MM3 (ref 140–450)
PMV BLD AUTO: 8.7 FL (ref 6–12)
POTASSIUM SERPL-SCNC: 4.8 MMOL/L (ref 3.5–5.2)
RBC # BLD AUTO: 4.36 10*6/MM3 (ref 3.77–5.28)
SODIUM SERPL-SCNC: 140 MMOL/L (ref 136–145)
WBC NRBC COR # BLD AUTO: 6.85 10*3/MM3 (ref 3.4–10.8)

## 2024-01-04 PROCEDURE — 97110 THERAPEUTIC EXERCISES: CPT

## 2024-01-04 PROCEDURE — 80048 BASIC METABOLIC PNL TOTAL CA: CPT | Performed by: PHYSICAL MEDICINE & REHABILITATION

## 2024-01-04 PROCEDURE — 97112 NEUROMUSCULAR REEDUCATION: CPT

## 2024-01-04 PROCEDURE — 97535 SELF CARE MNGMENT TRAINING: CPT

## 2024-01-04 PROCEDURE — 97130 THER IVNTJ EA ADDL 15 MIN: CPT

## 2024-01-04 PROCEDURE — 97112 NEUROMUSCULAR REEDUCATION: CPT | Performed by: OCCUPATIONAL THERAPIST

## 2024-01-04 PROCEDURE — 97116 GAIT TRAINING THERAPY: CPT

## 2024-01-04 PROCEDURE — 82948 REAGENT STRIP/BLOOD GLUCOSE: CPT

## 2024-01-04 PROCEDURE — 85025 COMPLETE CBC W/AUTO DIFF WBC: CPT | Performed by: PHYSICAL MEDICINE & REHABILITATION

## 2024-01-04 PROCEDURE — 97129 THER IVNTJ 1ST 15 MIN: CPT

## 2024-01-04 PROCEDURE — 97530 THERAPEUTIC ACTIVITIES: CPT

## 2024-01-04 PROCEDURE — 63710000001 INSULIN LISPRO (HUMAN) PER 5 UNITS: Performed by: INTERNAL MEDICINE

## 2024-01-04 RX ORDER — LISINOPRIL 5 MG/1
5 TABLET ORAL
Status: DISCONTINUED | OUTPATIENT
Start: 2024-01-04 | End: 2024-01-08

## 2024-01-04 RX ORDER — LANCETS 30 GAUGE
EACH MISCELLANEOUS
Qty: 100 EACH | Refills: 12 | Status: SHIPPED | OUTPATIENT
Start: 2024-01-04

## 2024-01-04 RX ORDER — BLOOD-GLUCOSE METER
KIT MISCELLANEOUS
Qty: 1 EACH | Refills: 0 | Status: SHIPPED | OUTPATIENT
Start: 2024-01-04

## 2024-01-04 RX ADMIN — VENLAFAXINE HYDROCHLORIDE 150 MG: 150 CAPSULE, EXTENDED RELEASE ORAL at 07:12

## 2024-01-04 RX ADMIN — ATORVASTATIN CALCIUM 40 MG: 20 TABLET, FILM COATED ORAL at 21:13

## 2024-01-04 RX ADMIN — GABAPENTIN 400 MG: 400 CAPSULE ORAL at 14:17

## 2024-01-04 RX ADMIN — OXYBUTYNIN CHLORIDE 10 MG: 10 TABLET, EXTENDED RELEASE ORAL at 07:12

## 2024-01-04 RX ADMIN — GABAPENTIN 400 MG: 400 CAPSULE ORAL at 05:48

## 2024-01-04 RX ADMIN — GABAPENTIN 400 MG: 400 CAPSULE ORAL at 21:13

## 2024-01-04 RX ADMIN — INSULIN LISPRO 2 UNITS: 100 INJECTION, SOLUTION INTRAVENOUS; SUBCUTANEOUS at 16:36

## 2024-01-04 RX ADMIN — INSULIN LISPRO 2 UNITS: 100 INJECTION, SOLUTION INTRAVENOUS; SUBCUTANEOUS at 11:42

## 2024-01-04 RX ADMIN — ASPIRIN 325 MG: 325 TABLET ORAL at 07:12

## 2024-01-04 RX ADMIN — DOCUSATE SODIUM 100 MG: 100 CAPSULE, LIQUID FILLED ORAL at 07:16

## 2024-01-04 RX ADMIN — LEVOTHYROXINE SODIUM 100 MCG: 100 TABLET ORAL at 05:48

## 2024-01-04 RX ADMIN — BUPROPION HYDROCHLORIDE 150 MG: 150 TABLET, EXTENDED RELEASE ORAL at 05:48

## 2024-01-04 RX ADMIN — INSULIN LISPRO 2 UNITS: 100 INJECTION, SOLUTION INTRAVENOUS; SUBCUTANEOUS at 07:29

## 2024-01-04 RX ADMIN — LISINOPRIL 2.5 MG: 2.5 TABLET ORAL at 07:15

## 2024-01-04 RX ADMIN — LISINOPRIL 5 MG: 5 TABLET ORAL at 09:51

## 2024-01-04 RX ADMIN — Medication 1000 MCG: at 07:15

## 2024-01-04 RX ADMIN — ERGOCALCIFEROL 50000 UNITS: 1.25 CAPSULE ORAL at 07:15

## 2024-01-04 RX ADMIN — PIOGLITAZONE 15 MG: 15 TABLET ORAL at 07:12

## 2024-01-04 NOTE — THERAPY TREATMENT NOTE
"Inpatient Rehabilitation - Occupational Therapy Treatment Note    [facility]     Patient Name: Rekha Bear  : 1940  MRN: [id number]    Today's Date: 2024                 Admit Date: 2023       No diagnosis found.    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    Community acquired pneumonia of left lower lobe of lung    Hyponatremia    Pneumonia due to COVID-19 virus    Major depressive disorder, recurrent, mild    Acute respiratory failure with hypoxia    Supraventricular tachycardia    PVC (premature ventricular contraction)    Dyspnea on exertion    Dizziness    Depressive disorder    Gastroesophageal reflux disease    Midline cystocele    Rectocele    BMI 29.0-29.9,adult    Acute left PCA stroke    B12 deficiency    Bradycardia    Premature atrial contractions    Stroke       Past Medical History:   Diagnosis Date    Chilblains     \"Chilblian's\"    CKD (chronic kidney disease)     Depression     Fatty liver     GERD (gastroesophageal reflux disease)     Hyperlipidemia     Hypertension     Incontinence     Osteoarthritis     OA  Marvin THR, LT TKR - hydrocodone prescibed by Dr. 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 Almaguer    TOTAL KNEE ARTHROPLASTY Left     OA  Marvin THR, LT TKR - hydrocodone prescibed by Dr. Almaguer             IRF OT ASSESSMENT FLOWSHEET (last 12 hours)       IRF OT Evaluation and Treatment       Row Name 01/04/24 1500 01/04/24 1051       OT Time and Intention    Document Type daily treatment  -KP daily treatment  -CC    Mode of Treatment individual therapy;occupational therapy  - occupational therapy  -CC    Patient Effort good  -KP good  -CC    Symptoms Noted During/After Treatment none  -KP none  -CC      Row Name 01/04/24 1500          General Information    Patient/Family/Caregiver Comments/Observations pt sitting in wc  -KP     Existing Precautions/Restrictions fall  -     Limitations/Impairments visual  -     Comment, General Information R visual defecit  -       Row Name 01/04/24 1500 01/04/24 1051       Pain Assessment    Pretreatment Pain Rating 0/10 - no pain  - 0/10 - no pain  -CC    Posttreatment Pain Rating 0/10 - no pain  -KP 0/10 - no pain  -CC      Row Name 01/04/24 1500 01/04/24 1051       Cognition/Psychosocial    Affect/Mental Status (Cognition) WNL  -KP WFL  -CC    Orientation Status (Cognition) oriented x 4  -KP oriented x 4  -CC    Follows Commands (Cognition) follows one-step commands;over 90% accuracy  - follows one-step commands;over 90% accuracy;verbal cues/prompting required  -CC    Personal Safety Interventions fall prevention program maintained;muscle strengthening facilitated;gait belt;nonskid shoes/slippers when out of bed  - fall prevention program maintained;gait belt;nonskid  shoes/slippers when out of bed  -    Cognitive Function memory deficit  -KP memory deficit  -CC    Memory Deficit (Cognition) minimal deficit  -KP minimal deficit  -CC    Safety Deficit (Cognition) minimal deficit  -KP --      Row Name 01/04/24 1500 01/04/24 1051       Vision Assessment/Intervention    Visual Field Deficit homonymous hemianopsia, right  -KP homonymous hemianopsia, right  -CC    Visual Processing Deficit visual search, right;visual attention, right  -KP visual search, right;visual attention, right  -CC    Visual Treatment Interventions therapeutic activities;searching activities/strategies;scanning activities/strategies  - --    Vision Assessment Comment pt worked on word search w paper set up more on her R side to incr visual scanning to the R w min VC for orientation area for 3 words  - --      Row Name 01/04/24 1051          Bathing    [place] Level (Bathing) bathing skills;lower body;upper body;standby assist  -CC     Position (Bathing) sink side;supported sitting;supported standing  -     Set-up Assistance (Bathing) obtain supplies  -       Row Name 01/04/24 1051          Upper Body Dressing    [place] Level (Upper Body Dressing) upper body dressing skills;doff;don;pull over garment;set up assistance  -CC     Position (Upper Body Dressing) supported sitting  -CC     Set-up Assistance (Upper Body Dressing) obtain clothing  -       Row Name 01/04/24 1051          Lower Body Dressing    [place] Level (Lower Body Dressing) don;shoes/slippers;socks;standby assist  -CC     Position (Lower Body Dressing) supported sitting  -CC       Row Name 01/04/24 1051          Grooming    [place] Level (Grooming) grooming skills;deodorant application;hair care, combing/brushing;oral care regimen;wash face, hands;set up  -CC     Position (Grooming) supported sitting;sink side  -CC     Set-up Assistance (Grooming) obtain supplies  -       Row Name 01/04/24 1500          Bed Mobility     Comment, (Bed Mobility) wc  -       Row Name 01/04/24 1500          Transfer Assessment/Treatment    Comment, (Transfers) to mat and back to  SBA  -       Row Name 01/04/24 1500 01/04/24 1051       Sit-Stand Transfer    Sit-Stand [place] (Transfers) standby assist  - standby assist  -    Assistive Device (Sit-Stand Transfers) wheelchair  - wheelchair  -CC      Row Name 01/04/24 1500 01/04/24 1051       Stand-Sit Transfer    Stand-Sit [place] (Transfers) standby assist  - standby assist  -    Assistive Device (Stand-Sit Transfers) wheelchair  - wheelchair  -CC      Row Name 01/04/24 1500          Motor Skills    Therapeutic Exercise shoulder;elbow/forearm;wrist;hand  -       Row Name 01/04/24 1500          Elbow/Forearm (Therapeutic Exercise)    Elbow/Forearm (Therapeutic Exercise) strengthening exercise  -     Elbow/Forearm Strengthening (Therapeutic Exercise) right;left;flexion;extension;supination;sitting;pronation;2 lb free weight;10 repetitions;2 sets  -       Row Name 01/04/24 1500          Wrist (Therapeutic Exercise)    Wrist (Therapeutic Exercise) strengthening exercise  -     Wrist Strengthening (Therapeutic Exercise) right;left;flexion;extension;2 lb free weight;10 repetitions;2 sets  -       Row Name 01/04/24 1500 01/04/24 1051       Balance    Static Sitting Balance modified [place]  - --    Dynamic Sitting Balance standby assist  - --    Position, Sitting Balance sitting edge of mat  - --    Static Standing Balance standby assist  - --    Balance Interventions -- minimal challenge;standing;UE activity with balance activity  chandu t o R to locate oiems w min vc. Reached across midline and out of base of support w SBA/CGA. No LOB.Able to wt shift R/L  -      Row Name 01/04/24 1500 01/04/24 1051       Positioning and Restraints    Pre-Treatment Position sitting in chair/recliner  - sitting in chair/recliner  -    Post Treatment Position  wheelchair  -KP wheelchair  -CC    In Wheelchair sitting;call light within reach;encouraged to call for assist;exit alarm on  -KP sitting;exit alarm on;with PT  -CC              User Key  (r) = Recorded By, (t) = Taken By, (c) = Cosigned By      Initials Name Effective Dates     Sahra Crisostomo, OTR 06/16/21 -     KP Brigid Osorioley, OTR 07/11/23 -                      Occupational Therapy Education       Title: PT OT SLP Therapies (In Progress)       Topic: Occupational Therapy (In Progress)       Point: ADL training (Done)       Description:   Instruct learner(s) on proper safety adaptation and remediation techniques during self care or transfers.   Instruct in proper use of assistive devices.                  Learning Progress Summary             Patient Acceptance, E,TB,D, NR,VU by  at 12/30/2023 1148    Comment: Shower tsf to bench                         Point: Home exercise program (Not Started)       Description:   Instruct learner(s) on appropriate technique for monitoring, assisting and/or progressing therapeutic exercises/activities.                  Learner Progress:  Not documented in this visit.              Point: Precautions (Not Started)       Description:   Instruct learner(s) on prescribed precautions during self-care and functional transfers.                  Learner Progress:  Not documented in this visit.              Point: Body mechanics (Not Started)       Description:   Instruct learner(s) on proper positioning and spine alignment during self-care, functional mobility activities and/or exercises.                  Learner Progress:  Not documented in this visit.                              User Key       Initials Effective Dates Name Provider Type Discipline     06/16/21 -  Sahra Crisostomo, OTR Occupational Therapist OT                        OT Recommendation and Plan            Daily Progress Summary (OT)  Overall Progress Toward Functional Goals (OT): progressing toward  functional goals as expected  Daily Progress Summary (OT): pt is now SBA a LBB and LBD. SBA UBB and UBD            Time Calculation:      Time Calculation- OT       Row Name 01/04/24 1505 01/04/24 0800          Time Calculation- OT    OT Start Time 1230  -KP 0800  -CC     OT Stop Time 1300  -KP 0830  -CC     OT Time Calculation (min) 30 min  -KP 30 min  -CC               User Key  (r) = Recorded By, (t) = Taken By, (c) = Cosigned By      Initials Name Provider Type    CC Sahra Crisostomo, GRACIELA Occupational Therapist     Brigid Osorio OTR Occupational Therapist                  Therapy Charges for Today       Code Description Service Date Service Provider Modifiers Qty    68709593213 HC OT SELF CARE/MGMT/TRAIN EA 15 MIN 1/3/2024 Brigid Osorio, MILKAR GO 2    74093906907 HC OT NEUROMUSC RE EDUCATION EA 15 MIN 1/3/2024 Brigid Osorio OTR GO 2    82119442009 HC OT NEUROMUSC RE EDUCATION EA 15 MIN 1/4/2024 Brigid Osorio OTR GO 2                     GRACIELA Dow  1/4/2024

## 2024-01-04 NOTE — PLAN OF CARE
Goal Outcome Evaluation:  Plan of Care Reviewed With: patient        Progress: improving  Outcome Evaluation: Pt. AOx4, calm and cooperative, assist x1, Cont.to bowel with LBM 1/2 and Incont. to urine sometimes, no complains of pain and no concerns noted.

## 2024-01-04 NOTE — PLAN OF CARE
Goal Outcome Evaluation:  Plan of Care Reviewed With: patient        Progress: improving  Outcome Evaluation: AAOx4. Cooperative with all care. Transfers assist of 1; no safety concerns oted today. Skin intact, dressing to toe done as ordered. No c/o pain today. VS stable.

## 2024-01-04 NOTE — THERAPY TREATMENT NOTE
"Inpatient Rehabilitation - Speech Language Pathology Treatment Note    [facility]     Patient Name: Rekha Bear  : 1940  MRN: [id number]    Today's Date: 2024                   Admit Date: 2023       Visit Dx:    No diagnosis found.    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    Community acquired pneumonia of left lower lobe of lung    Hyponatremia    Pneumonia due to COVID-19 virus    Major depressive disorder, recurrent, mild    Acute respiratory failure with hypoxia    Supraventricular tachycardia    PVC (premature ventricular contraction)    Dyspnea on exertion    Dizziness    Depressive disorder    Gastroesophageal reflux disease    Midline cystocele    Rectocele    BMI 29.0-29.9,adult    Acute left PCA stroke    B12 deficiency    Bradycardia    Premature atrial contractions    Stroke       Past Medical History:   Diagnosis Date    Chilblains     \"Chilblian's\"    CKD (chronic kidney disease)     Depression     Fatty liver     GERD (gastroesophageal reflux disease)     Hyperlipidemia     Hypertension     Incontinence     Osteoarthritis     OA  Marvin THR, LT TKR - hydrocodone prescibed by Dr. 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 Almaguer    TOTAL KNEE ARTHROPLASTY Left     OA  Marvin THR, LT TKR - hydrocodone prescibed by Dr. Almaguer       SLP Recommendation and Plan                                                               SLP EVALUATION (last 72 hours)       SLP SLC Evaluation       Row Name 01/04/24 1430 01/04/24 1000 01/03/24 1300 01/03/24 1000 01/02/24 1300       Communication Assessment/Intervention    Document Type therapy note (daily note)  -SR therapy note (daily note)  -SR therapy note (daily note)  -SR therapy note (daily note)  -SR therapy note (daily note)  -SR    Subjective Information no complaints  -SR no complaints  -SR no complaints  -SR no complaints  -SR no complaints  -SR    Patient Observations alert;cooperative;agree to therapy  -SR alert;cooperative;agree to therapy  -SR alert;cooperative;agree to therapy  -SR alert;cooperative;agree to therapy  -SR alert;cooperative;agree to therapy  -SR    Patient Effort good  -SR good  -SR good  -SR good  -SR good  -SR    Symptoms Noted During/After Treatment none  -SR none  -SR none  -SR none  -SR none  -SR       Pain Scale: Numbers Pre/Post-Treatment    Pretreatment Pain Rating 0/10 - no pain  -SR 0/10 - no pain  -SR 0/10 - no pain  -SR 0/10 - no pain  -SR 0/10 - no pain  -SR    Posttreatment Pain Rating 0/10 - no pain  -SR 0/10 - no pain  -SR 0/10 - no pain  -SR 0/10 - no pain  -SR 0/10 - no pain  -SR      Row Name 01/02/24 1000                   Communication Assessment/Intervention    Document Type therapy note (daily note)  -SR        Subjective Information no complaints  -SR         Patient Observations alert;cooperative;agree to therapy  -SR        Patient Effort good  -SR        Symptoms Noted During/After Treatment none  -SR           Pain Scale: Numbers Pre/Post-Treatment    Pretreatment Pain Rating 0/10 - no pain  -SR        Posttreatment Pain Rating 0/10 - no pain  -SR                  User Key  (r) = Recorded By, (t) = Taken By, (c) = Cosigned By      Initials Name Effective Dates    SR Jessica Carr CCC-SLP 11/10/22 -                        EDUCATION    The patient has been educated in the following areas:       Cognitive Impairment Communication Impairment.             SLP GOALS       Row Name 01/04/24 1430 01/04/24 1000 01/03/24 1300       Word Retrieval Skills Goal 1 (SLP)    Improve Word Retrieval Skills By Goal 1 (SLP) completing functional word finding tasks;80%;independently (over 90% accuracy)  -SR -- --    Time Frame (Word Retrieval Goal 1, SLP) 1 week  -SR -- --    Progress/Outcomes (Word Retrieval Goal 1, SLP) good progress toward goal  -SR -- --    Comment (Word Retrieval Goal 1, SLP) Response elaboration training was used as a therapy technique to target fxnal word finding and sentence production. Patient generated 1 sentence given moderately-complex visual scene with 70% acc given NO cues; 90% acc given MIN cues. Mild anomia noted throughout activity. Verbal cues were provided to encourage patient to expand detail of description.  -SR -- --       Attention Goal 1 (SLP)    Improve Attention by Goal 1 (SLP) complete sustained attention task;90%;independently (over 90% accuracy)  -SR complete sustained attention task;90%;independently (over 90% accuracy)  -SR --    Time Frame (Attention Goal 1, SLP) short term goal (STG)  -SR short term goal (STG)  -SR --    Progress/Outcomes (Attention Goal 1, SLP) good progress toward goal  -SR good progress toward goal  -SR --       Organizational Skills Goal 1 (SLP)    Improve Thought Organization Through Goal 1 (SLP) completing  a divergent naming task;completing a convergent naming task;80%;independently (over 90% accuracy)  -SR -- completing a divergent naming task;completing a convergent naming task;80%;independently (over 90% accuracy)  -SR    Time Frame (Thought Organization Skills Goal 1, SLP) short term goal (STG)  -SR -- short term goal (STG)  -SR    Progress/Outcomes (Thought Organization Skills Goal 1, SLP) goal partially met  -SR -- good progress toward goal  -SR    Comment (Thought Organization Skills Goal 1, SLP) Convergent abstract naming - patient named category given list of 3 objects with 90% acc given NO cues.  -SR -- Patient reported having mild word finding difficulty in conversation. ST reviewed various communication and word finding strategies including categorization, association, and taking breaks. Patient verbalized and demonstrated good understanding. Exhibited mild anomia during divergent naming task. Given concrete category, patient independently named 12 items in one minute; increased to 18 items given MIN cues. Given abstract category, patient independently named 8 items in one minute; increased to 15 items given MIN cues.  -SR       Reasoning Goal 1 (SLP)    Improve Reasoning Through Goal 1 (SLP) complete basic reasoning task;complete deductive reasoning task;80%;with minimal cues (75-90%)  -SR -- --    Time Frame (Reasoning Goal 1, SLP) short term goal (STG)  -SR -- --    Progress/Outcomes (Reasoning Goal 1, SLP) good progress toward goal  -SR -- --    Comment (Reasoning Goal 1, SLP) Patient completed 12-cell simple logic puzzle using directional parameters with 80% acc given NO cues.  -SR -- --       Functional Math Skills Goal 1 (SLP)    Improve Functional Math Skills Through Goal 1 (SLP) -- -- complete functional math task;80%;with minimal cues (75-90%)  -SR    Time Frame (Functional Math Skills Goal 1, SLP) -- -- short term goal (STG)  -SR    Progress/Outcomes (Functional Math Skills Goal 1, SLP) -- --  goal ongoing  -SR    Comment (Functional Math Skills Goal 1, SLP) -- -- Patient answered fxnal math/writing questions given prices from restaurant menu with 100% acc given NO cues. Extended time provided to complete task.  -SR       Executive Functional Skills Goal 1 (SLP)    Improve Executive Function Skills Goal 1 (SLP) -- home management activity;80%;with minimal cues (75-90%)  -SR --    Time Frame (Executive Function Skills Goal 1, SLP) -- short term goal (STG)  -SR --    Progress/Outcomes (Executive Function Skills Goal 1, SLP) -- goal ongoing  -SR --    Comment (Executive Function Skills Goal 1, SLP) -- Medication management task completed during session. Given list of personal medications, patient sorted information onto a chart organizer by name, dosage, frequency, and reason for taking medication. She completed description for 5/9 medications given NO cues; 9/9 given MIN cues. Looked up unfamiliar/new medications on internet with clinician's iPad x3. Pt reported that she utilizes a pill organizer at home. ST suggested supervision initially upon discharge due to mild difficulty with attention to detail and reasoning. Patient verbalized and demonstrated good understanding for recommendation.  Exteneded time provided to complete home management activity.  -SR --      Row Name 01/03/24 1000 01/02/24 1300 01/02/24 1000       Attention Goal 1 (SLP)    Improve Attention by Goal 1 (SLP) complete sustained attention task;90%;independently (over 90% accuracy)  -SR complete sustained attention task;90%;independently (over 90% accuracy)  -SR complete sustained attention task;90%;independently (over 90% accuracy)  -SR    Time Frame (Attention Goal 1, SLP) short term goal (STG)  -SR short term goal (STG)  -SR short term goal (STG)  -SR    Progress/Outcomes (Attention Goal 1, SLP) good progress toward goal  -SR good progress toward goal  -SR good progress toward goal  -SR    Comment (Attention Goal 1, SLP) Attention to  "detail targeted with insurance/co-pay scenerios. Given member card information, patient answered questions with 40% acc given NO cues; 80% acc given MIN cues.  -SR Attention to detail targeted with scheduling task. Patient labeled daily calendar/schedule in timeline order  with 80% acc given MIN cues. Required repetition of instruction x3 throughout the task.  -SR --       Organizational Skills Goal 1 (SLP)    Improve Thought Organization Through Goal 1 (SLP) -- -- completing a divergent naming task;completing a convergent naming task;80%;independently (over 90% accuracy)  -SR    Time Frame (Thought Organization Skills Goal 1, SLP) -- -- short term goal (STG)  -SR    Progress/Outcomes (Thought Organization Skills Goal 1, SLP) -- -- good progress toward goal  -SR    Comment (Thought Organization Skills Goal 1, SLP) -- -- Divergent thinking; patient independently named 17 items in concrete category. Mild difficulty noted with abstract thinking. Patient independently named 7 items in abstract category; increased to 15 items given MIN cues and extended time.  -SR       Reasoning Goal 1 (SLP)    Improve Reasoning Through Goal 1 (SLP) complete basic reasoning task;complete deductive reasoning task;80%;with minimal cues (75-90%)  -SR complete basic reasoning task;complete deductive reasoning task;80%;with minimal cues (75-90%)  -SR --    Time Frame (Reasoning Goal 1, SLP) short term goal (STG)  -SR short term goal (STG)  -SR --    Progress/Outcomes (Reasoning Goal 1, SLP) good progress toward goal  -SR goal ongoing  -SR --    Comment (Reasoning Goal 1, SLP) Patient used clues from paragraph and labeled events of a daily schedule (\"day on rehab\") in timeline order with 80% acc given NO cues; 100% acc given MIN cues. Cues provided for attn to detail. Improvement noted with attention, working memory, and organization skills.  -SR Patient completed simple 10-cell logic puzzle by following directional parameters with 50% acc " given MIN cues; 80% acc given MOD cues. Demonstrated increased difficulty with attention, working memory, and organization during activity.  -SR --       Functional Math Skills Goal 1 (SLP)    Improve Functional Math Skills Through Goal 1 (SLP) -- -- complete functional math task;80%;with minimal cues (75-90%)  -SR    Time Frame (Functional Math Skills Goal 1, SLP) -- -- short term goal (STG)  -SR    Progress/Outcomes (Functional Math Skills Goal 1, SLP) -- -- goal ongoing  -SR    Comment (Functional Math Skills Goal 1, SLP) -- -- Checkbook balance; given list of transactions (payments and deposits), patient calculated the final monthly balance with 60% acc given NO cues; 80% acc given MIN cues. No calculator assist. Extended time provided to complete task.  -SR       Executive Functional Skills Goal 1 (SLP)    Improve Executive Function Skills Goal 1 (SLP) home management activity;80%;with minimal cues (75-90%)  -SR -- --    Time Frame (Executive Function Skills Goal 1, SLP) short term goal (STG)  -SR -- --    Progress/Outcomes (Executive Function Skills Goal 1, SLP) goal ongoing  -SR -- --    Comment (Executive Function Skills Goal 1, SLP) Medication management; patient answered questions about medication dosage given scenerio of an individual's weight and/or age with 80% acc given NO cues. Extended time provided to complete task.  -SR -- --              User Key  (r) = Recorded By, (t) = Taken By, (c) = Cosigned By      Initials Name Provider Type    SR Jessica Carr CCC-SLP Speech and Language Pathologist                                Time Calculation:        Time Calculation- SLP       Row Name 01/04/24 1510 01/04/24 1156          Time Calculation- SLP    SLP Start Time 1430  -SR 1000  -SR     SLP Stop Time 1500  -SR 1030  -SR     SLP Time Calculation (min) 30 min  -SR 30 min  -SR     SLP Received On 01/04/24  -SR --               User Key  (r) = Recorded By, (t) = Taken By, (c) = Cosigned By      Initials  Name Provider Type     Jessica Carr CCC-SLP Speech and Language Pathologist                      Therapy Charges for Today       Code Description Service Date Service Provider Modifiers Qty    26055839476 HC ST DEV OF COGN SKILLS INITIAL 15 MIN 1/3/2024 Jessica Carr CCC-SLP  1    00423404967 HC ST DEV OF COGN SKILLS EACH ADDT'L 15 MIN 1/3/2024 Jessica Carr CCC-SLP  3    25971855911 HC ST DEV OF COGN SKILLS INITIAL 15 MIN 1/4/2024 Jessica Carr CCC-SLP  1    04160227213 HC ST DEV OF COGN SKILLS EACH ADDT'L 15 MIN 1/4/2024 Jessica Carr CCC-SLP  3                             THOM Gómez  1/4/2024

## 2024-01-04 NOTE — PROGRESS NOTES
LOS: 9 days   Patient Care Team:  Eben Porter MD as PCP - General  Eben Porter MD as PCP - Family Medicine      MICHAEL S JACQUI  1940      ADMITTING DIAGNOSIS:  Status post left PCA stroke-felt cardioembolic  Bilateral carotid stenosis  Stroke prophylaxis-aspirin 7 days through December 28, then start Eliquis 5 mg twice daily on December 29/atorvastatin  Right visual field deficit-right homonymous hemianopsia  Impaired mobility/self-car      Subjective     Still impaired vision to the right side.  No headache.  No new weakness.  Tolerating therapies.              Objective     Vitals:    01/04/24 0520   BP: 164/76   Pulse: 56   Resp: 16   Temp: 97.5 °F (36.4 °C)   SpO2: 94%       Intake/Output Summary (Last 24 hours) at 1/4/2024 0838  Last data filed at 1/3/2024 1700  Gross per 24 hour   Intake 480 ml   Output --   Net 480 ml     PHYSICAL EXAM:     MENTAL STATUS -  AWAKE / ALERT  HEENT-    LUNGS - CTA, NO WHEEZES, RALES OR RHONCHI  HEART- RRR, NO RUB, MURMUR, OR GALLOP  ABD - NORMOACTIVE BOWEL SOUNDS, SOFT, NT.    EXT - NO EDEMA OR CYANOSIS  NEURO -oriented person place and general situation  Expressive language appears improved  Right homonymous hemianopsia  MOTOR EXAM - RUE/RLE 5/5. LUE/LLE 5/5.      MEDICATIONS  Scheduled Meds:aspirin, 325 mg, Oral, Daily  atorvastatin, 40 mg, Oral, Nightly  buPROPion XL, 150 mg, Oral, QAM  docusate sodium, 100 mg, Oral, BID  gabapentin, 400 mg, Oral, Q8H  insulin lispro, 2-7 Units, Subcutaneous, 4x Daily AC & at Bedtime  levothyroxine, 100 mcg, Oral, Q AM  lisinopril, 5 mg, Oral, Q24H  oxybutynin XL, 10 mg, Oral, Daily  pioglitazone, 15 mg, Oral, Daily  polyethylene glycol, 17 g, Oral, Daily  venlafaxine XR, 150 mg, Oral, Daily  vitamin B-12, 1,000 mcg, Oral, Daily  vitamin D, 50,000 Units, Oral, Q7 Days      Continuous Infusions:   PRN Meds:.  acetaminophen **OR** acetaminophen    bisacodyl    dextrose    glucagon (human recombinant)    melatonin     ondansetron      RESULTS  Glucose   Date/Time Value Ref Range Status   01/04/2024 0721 150 (H) 70 - 130 mg/dL Final   01/03/2024 2052 208 (H) 70 - 130 mg/dL Final   01/03/2024 1622 121 70 - 130 mg/dL Final   01/03/2024 1128 154 (H) 70 - 130 mg/dL Final   01/03/2024 0738 157 (H) 70 - 130 mg/dL Final   01/02/2024 2027 223 (H) 70 - 130 mg/dL Final   01/02/2024 1634 173 (H) 70 - 130 mg/dL Final   01/02/2024 1135 183 (H) 70 - 130 mg/dL Final     Results from last 7 days   Lab Units 01/01/24  0506   WBC 10*3/mm3 7.19   HEMOGLOBIN g/dL 11.4*   HEMATOCRIT % 35.5   PLATELETS 10*3/mm3 221     Results from last 7 days   Lab Units 01/01/24  0505   SODIUM mmol/L 138   POTASSIUM mmol/L 4.5   CHLORIDE mmol/L 104   CO2 mmol/L 23.9   BUN mg/dL 28*   CREATININE mg/dL 1.24*   CALCIUM mg/dL 9.0   GLUCOSE mg/dL 147*        Latest Reference Range & Units 12/28/23 06:09   25 Hydroxy, Vitamin D 30.0 - 100.0 ng/ml 13.9 (L)   (L): Data is abnormally low    ASSESSMENT and PLAN    Stroke    Status post left PCA stroke-felt cardioembolic-December 22, 2023- 6 x 3 cm acute infarct throughout the medial left occipital lobe in the left PCA territory     Bilateral carotid stenosis-left 50-69%, right less than 50%     Stroke prophylaxis-aspirin 7 days through December 28, then start Eliquis 5 mg twice daily on December 29/atorvastatin  December 29-starting Eliquis today     Right visual field deficit-right homonymous hemianopsia     Impaired mobility/self-care     Diabetes mellitus type 2-sliding scale insulin-was on Actos 15 mg daily and Tradjenta 5 mg daily at home  January 3-resume Actos 15 mg p.o. daily in the a.m.     Chronic kidney disease     Hypertension-off metoprolol secondary bradycardia  December 29-systolic blood pressure 157/68 this a.m., later 113/62-variable pattern.  Allergy to amlodipine.  Will add lisinopril initially 2.5 mg daily  January 3-systolic blood pressure 126-141.  Diastolic blood pressure in the 60  Jan 4 - BP elevated,  increase lisinopril to 5 mg     Status post bradycardia-resolved off metoprolol     Hypothyroidism-on replacement     B12 deficiency-on replacement    Vitamin D deficiency-change to ergocalciferol 50,000 units weekly for 8 weeks     Depression-bupropion/venlafaxine     -oxybutynin     DVT prophylaxis-SCDs     Outpatient evaluation for obstructive sleep apnea    TEAM CONF - DEC 28 - TRANSFERS MIN ASSIST. GAIT 80 FEET RW MIN ASSIST. 4 STAIRS MIN ASSIST.   BATH MOD . UBD MIN. LBD MOD MAX. TOILET TRANSFERS MIN ASSIST.   GOOD FINE MOTOR SKILLS. RIGHT VISUAL NEGLECT. DECREASED INSIGHT INTO DEFICITS. OVERALL MILD COGNITIVE IMPAIRMENT.  MILD MODERATE EXPRESSIVE DEFICITS AT SENTENCE LEVEL. MILD ANOMIA. CONTINENT BOWEL AND BLADDER.   ASA COMPLETES TODAY AND STARTS ELIQUIS TOMORROW.   ELOS - TWO WEEKS - MIGHT BE A GOOD CANDIDATE FOR ASSISTED LIVING    TEAM CONF - JAN 4 - TRANSFERS CTG SBA WITH RW. BED CTG SBA. GAIT  FEET CTG RW. 4 STAIRS CTG. BATH SBA. LBD SBA. UBD SET UP. GROOMING SET UP. TOILETING SBA.   MEMORY - 1/3 recall with 5 minute delay no cues   ATTENTION - mild deficits in sustained/selective attention   EXPRESSION - Mild anomic events, utilizes word finding to repair given MIN cues. BNE PENDING  ELOS - MIDDLE OF NEXT WEEK. VIJAY 10.     Goal is for home with outpatient   therapies.  Barrier to discharge: Impaired mobility self-care cognition- work on attention to the right, executive function, memory, balance, gait, ADLs to overcome.     Sanjeev Quan MD      Appropriate PPE was worn during the entire visit.  Hand hygiene was completed before and after.

## 2024-01-04 NOTE — NURSING NOTE
"Diabetes Education  Assessment/Teaching    Patient Name:  Rekha Bear  YOB: 1940  MRN: [id number]  Admit Date:  12/26/2023      Assessment Date:  1/4/2024  Flowsheet Row Most Recent Value   General Information     Referral From: Other -RN. Meet with 82 y/o at bedside for initial assessment of ed needs.   Height 170.2 cm (67\")   Height Method Stated   Weight 87.4 kg (192 lb 10.9 oz)   Weight Method Bed scale   Diabetes History    What type of diabetes do you have? Type 2   Length of Diabetes Diagnosis 6 -12 months   Do you test your blood sugar at home? yes -pt reports using CGM/Florin outpt. Pt reports being covered for CGM based on having Raynauds.    Do you have any diabetes complications? -- s/p cva.   Education Preferences    Barriers to Learning -- memory impairment vs cognitive impairment.   Nutrition Information    Assessment Topics    Taking Medication - Assessment -- Anticipate pt will dc home back on oral DM agent(s.) No scheduled insulin ordered at this time.    Healthy Coping - Assessment Competent   Monitoring - Assessment -- pt reports using Florin/CGM outpt.   DM Goals             Flowsheet Row Most Recent Value   DM Education Needs    Meter Has own   Meter Type Freestyle Florin   Medication Oral agents   Healthy Coping Appropriate   Discharge Plan Home, Follow-up with PCP   Motivation Engaged   Teaching Method Explanation, Discussion   Patient Response Verbalized understanding        Other Comments:  Pt reports her test strips are out of date, but she does not know which meter she has at home. Addendum. Called pt's pharmacy. Pharmacy unable to tell me which strips pt rec'd in the past. We will plan to order meter/supply in order to ensure pt can perform fingersticks when necessary.   Electronically signed by:  Sania Leavitt RN  01/04/24 12:19 EST  "

## 2024-01-04 NOTE — THERAPY TREATMENT NOTE
"Inpatient Rehabilitation - Occupational Therapy Treatment Note    [facility]     Patient Name: Rekha Bear  : 1940  MRN: [id number]    Today's Date: 2024                 Admit Date: 2023       No diagnosis found.    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    Community acquired pneumonia of left lower lobe of lung    Hyponatremia    Pneumonia due to COVID-19 virus    Major depressive disorder, recurrent, mild    Acute respiratory failure with hypoxia    Supraventricular tachycardia    PVC (premature ventricular contraction)    Dyspnea on exertion    Dizziness    Depressive disorder    Gastroesophageal reflux disease    Midline cystocele    Rectocele    BMI 29.0-29.9,adult    Acute left PCA stroke    B12 deficiency    Bradycardia    Premature atrial contractions    Stroke       Past Medical History:   Diagnosis Date    Chilblains     \"Chilblian's\"    CKD (chronic kidney disease)     Depression     Fatty liver     GERD (gastroesophageal reflux disease)     Hyperlipidemia     Hypertension     Incontinence     Osteoarthritis     OA  Marvin THR, LT TKR - hydrocodone prescibed by Dr. 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 Almaguer    TOTAL KNEE ARTHROPLASTY Left     OA  Marvin THR, LT TKR - hydrocodone prescibed by Dr. Almaguer             IRF OT ASSESSMENT FLOWSHEET (last 12 hours)       IRF OT Evaluation and Treatment       Row Name 01/04/24 1051          OT Time and Intention    Document Type daily treatment  -CC     Mode of Treatment occupational therapy  -CC     Patient Effort good  -CC     Symptoms Noted During/After Treatment none  -CC       Row Name 01/04/24 1051          Pain Assessment    Pretreatment Pain Rating 0/10 - no pain  -CC     Posttreatment Pain Rating 0/10 - no pain  -CC       Row Name 01/04/24 1051          Cognition/Psychosocial    Affect/Mental Status (Cognition) WFL  -CC     Orientation Status (Cognition) oriented x 4  -CC     Follows Commands (Cognition) follows one-step commands;over 90% accuracy;verbal cues/prompting required  -CC     Personal Safety Interventions fall prevention program maintained;gait belt;nonskid shoes/slippers when out of bed  -CC     Cognitive Function memory deficit  -CC     Memory Deficit (Cognition) minimal deficit  -CC       Row Name 01/04/24 1051          Vision Assessment/Intervention    Visual Field Deficit homonymous hemianopsia, right  -CC     Visual Processing Deficit visual search, right;visual attention, right  -CC       Row Name 01/04/24 1051          Bathing    [place] Level (Bathing) bathing skills;lower body;upper body;standby assist  -CC     Position (Bathing) sink side;supported sitting;supported standing  -CC     Set-up Assistance (Bathing) obtain supplies  -CC       Row Name 01/04/24  1051          Upper Body Dressing    [place] Level (Upper Body Dressing) upper body dressing skills;doff;don;pull over garment;set up assistance  -     Position (Upper Body Dressing) supported sitting  -     Set-up Assistance (Upper Body Dressing) obtain clothing  -       Row Name 01/04/24 1051          Lower Body Dressing    [place] Level (Lower Body Dressing) don;shoes/slippers;socks;standby assist  -     Position (Lower Body Dressing) supported sitting  -CC       Row Name 01/04/24 1051          Grooming    [place] Level (Grooming) grooming skills;deodorant application;hair care, combing/brushing;oral care regimen;wash face, hands;set up  -     Position (Grooming) supported sitting;sink side  -     Set-up Assistance (Grooming) obtain supplies  -       Row Name 01/04/24 1051          Sit-Stand Transfer    Sit-Stand [place] (Transfers) standby assist  -     Assistive Device (Sit-Stand Transfers) wheelchair  -       Row Name 01/04/24 1051          Stand-Sit Transfer    Stand-Sit [place] (Transfers) standby assist  -     Assistive Device (Stand-Sit Transfers) wheelchair  -       Row Name 01/04/24 1051          Balance    Balance Interventions minimal challenge;standing;UE activity with balance activity  sacnning t o R to locate oiems w min vc. Reached across midline and out of base of support w SBA/CGA. No LOB.Able to wt shift R/L  -CC       Row Name 01/04/24 1051          Positioning and Restraints    Pre-Treatment Position sitting in chair/recliner  -CC     Post Treatment Position wheelchair  -     In Wheelchair sitting;exit alarm on;with PT  -CC               User Key  (r) = Recorded By, (t) = Taken By, (c) = Cosigned By      Initials Name Effective Dates    CC Sahra Crisostomo OTR 06/16/21 -                      Occupational Therapy Education       Title: PT OT SLP Therapies (In Progress)       Topic: Occupational Therapy (In Progress)       Point: ADL training  (Done)       Description:   Instruct learner(s) on proper safety adaptation and remediation techniques during self care or transfers.   Instruct in proper use of assistive devices.                  Learning Progress Summary             Patient Acceptance, E,TB,D, NR,VU by  at 12/30/2023 1148    Comment: Shower tsf to bench                         Point: Home exercise program (Not Started)       Description:   Instruct learner(s) on appropriate technique for monitoring, assisting and/or progressing therapeutic exercises/activities.                  Learner Progress:  Not documented in this visit.              Point: Precautions (Not Started)       Description:   Instruct learner(s) on prescribed precautions during self-care and functional transfers.                  Learner Progress:  Not documented in this visit.              Point: Body mechanics (Not Started)       Description:   Instruct learner(s) on proper positioning and spine alignment during self-care, functional mobility activities and/or exercises.                  Learner Progress:  Not documented in this visit.                              User Key       Initials Effective Dates Name Provider Type Discipline     06/16/21 -  Sahra Crisostomo OTR Occupational Therapist OT                        OT Recommendation and Plan                         Time Calculation:      Time Calculation- OT       Row Name 01/04/24 0800             Time Calculation- OT    OT Start Time 0800  -CC      OT Stop Time 0830  -CC      OT Time Calculation (min) 30 min  -                User Key  (r) = Recorded By, (t) = Taken By, (c) = Cosigned By      Initials Name Provider Type     Sahra Crisostomo OTR Occupational Therapist                  Therapy Charges for Today       Code Description Service Date Service Provider Modifiers Qty    19942309921  OT SELF CARE/MGMT/TRAIN EA 15 MIN 1/4/2024 Sahra Crisostomo OTR GO 1    [id number]  OT NEUROMUSC RE EDUCATION EA 15 MIN  1/4/2024 Sahra Crisostomo, OTR GO 1                     Sahra Crisostomo, OTR  1/4/2024

## 2024-01-04 NOTE — THERAPY TREATMENT NOTE
"Inpatient Rehabilitation - Physical Therapy Treatment Note       [facility]     Patient Name: Rekha Bear  : 1940  MRN: [id number]    Today's Date: 2024                    Admit Date: 2023      Visit Dx:   No diagnosis found.    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    Community acquired pneumonia of left lower lobe of lung    Hyponatremia    Pneumonia due to COVID-19 virus    Major depressive disorder, recurrent, mild    Acute respiratory failure with hypoxia    Supraventricular tachycardia    PVC (premature ventricular contraction)    Dyspnea on exertion    Dizziness    Depressive disorder    Gastroesophageal reflux disease    Midline cystocele    Rectocele    BMI 29.0-29.9,adult    Acute left PCA stroke    B12 deficiency    Bradycardia    Premature atrial contractions    Stroke       Past Medical History:   Diagnosis Date    Chilblains     \"Chilblian's\"    CKD (chronic kidney disease)     Depression     Fatty liver     GERD (gastroesophageal reflux disease)     Hyperlipidemia     Hypertension     Incontinence     Osteoarthritis     OA  Marvin THR, LT TKR - hydrocodone prescibed by Dr. 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 Almaguer    TOTAL KNEE ARTHROPLASTY Left     OA  Marvin THR, LT TKR - hydrocodone prescibed by Dr. Almaguer       PT ASSESSMENT (last 12 hours)       IRF PT Evaluation and Treatment       Row Name 01/04/24 0847          PT Time and Intention    Document Type daily treatment  -MD     Mode of Treatment physical therapy  -MD     Patient/Family/Caregiver Comments/Observations Pt sitting in WC showing no signs of acute distress.  -MD       Row Name 01/04/24 0847          General Information    Existing Precautions/Restrictions fall  -MD       Row Name 01/04/24 0847          Pain Assessment    Pretreatment Pain Rating 0/10 - no pain  -MD       Row Name 01/04/24 0847          Cognition/Psychosocial    Orientation Status (Cognition) oriented x 4  -MD     Follows Commands (Cognition) follows one-step commands;over 90% accuracy;verbal cues/prompting required  -MD     Personal Safety Interventions fall prevention program maintained;gait belt  -MD       Row Name 01/04/24 0847          Sit-Stand Transfer    Sit-Stand [place] (Transfers) standby assist  -MD     Assistive Device (Sit-Stand Transfers) wheelchair  -MD       Row Name 01/04/24 0847          Stand-Sit Transfer    Stand-Sit [place] (Transfers) standby assist  -MD     Assistive Device (Stand-Sit Transfers) wheelchair;walker, front-wheeled  -MD       Row Name 01/04/24 0847          Gait/Stairs (Locomotion)    [place] Level (Gait) verbal cues;standby assist;contact guard  -MD     Assistive Device (Gait) walker, front-wheeled  -MD     Distance in Feet (Gait) 80'x2 [id number]'x2   -MD     Pattern (Gait) step-through  -MD     Deviations/Abnormal Patterns (Gait) gait speed decreased;base of support, narrow;stride length decreased;weight shifting decreased;right sided deviations;antalgic  -MD     Bilateral Gait Deviations forward flexed posture;heel strike decreased  -MD     Right Sided Gait Deviations Trendelenburg sign  -MD     [place] Level (Stairs) verbal cues;contact guard  -MD     Handrail Location (Stairs) both sides  -MD     Number of Steps (Stairs) 4  -MD     Ascending Technique (Stairs) step-over-step  -MD     Descending Technique (Stairs) step-to-step  -MD       Row Name 01/04/24 0847          Balance    Static Standing Balance verbal cues;moderate assist;2-person assist  -MD     Balance Interventions standing;supported;moderate challenge;highly challenging;foam;weight shifting activity;combined head and eye movements  tossing bean bags into bucket while standing on foam square  -MD       Row Name 01/04/24 0847          Hip (Therapeutic Exercise)    Hip Strengthening (Therapeutic Exercise) bilateral;aBduction;aDduction;marching while standing;standing;10 repetitions;mini squats  standing holding onto RWx w CGA from PT  -MD       Row Name 01/04/24 0847          Ankle (Therapeutic Exercise)    Ankle AROM (Therapeutic Exercise) bilateral;dorsiflexion;plantarflexion;15 repititions;standing  standing holding onto RWx w CGA  -MD       Row Name 01/04/24 0847          Positioning and Restraints    Pre-Treatment Position sitting in chair/recliner  -MD     Post Treatment Position wheelchair  -MD     In Wheelchair sitting;call light within reach;exit alarm on  -MD               User Key  (r) = Recorded By, (t) = Taken By, (c) = Cosigned By      Initials Name Provider Type    Gianna Eisenberg PT Physical Therapist                  Wound 12/27/23 0045 Left posterior third toe (Active)   Dressing Appearance dry;intact 01/03/24 2145   Closure Unable to assess 01/03/24 2145   Base dressing in  place, unable to visualize 01/03/24 2145   [place] Temperature warm 01/03/24 2145     Physical Therapy Education       Title: PT OT SLP Therapies (In Progress)       Topic: Physical Therapy (In Progress)       Point: Mobility training (Done)       Learning Progress Summary             Patient Acceptance, E,D,TB, VU,NR by MG at 1/3/2024 0747    Acceptance, E,D, VU,NR by MG at 1/2/2024 0842    Acceptance, E,TB,D, VU,NR by MG at 12/27/2023 1554                         Point: Home exercise program (Not Started)       Learner Progress:  Not documented in this visit.              Point: Body mechanics (Done)       Learning Progress Summary             Patient Acceptance, E,D,TB, VU,NR by MG at 1/3/2024 0747    Acceptance, E,D, VU,NR by MG at 1/2/2024 0842    Acceptance, E,TB,D, VU,NR by MG at 12/27/2023 1554                         Point: Precautions (Done)       Learning Progress Summary             Patient Acceptance, E, VU by MD at 1/4/2024 0849    Acceptance, E,D,TB, VU,NR by MG at 1/3/2024 0747    Acceptance, E,D, VU,NR by MG at 1/2/2024 0842    Acceptance, E, NR by DP at 12/29/2023 1147    Comment: scanning for objects and avoidance of them when walking    Acceptance, E, NR by MD at 12/28/2023 0916    Acceptance, E,TB,D, VU,NR by MG at 12/27/2023 1554                                         User Key       Initials Effective Dates Name Provider Type Discipline    MD 06/16/21 -  Gianna Atwood, PT Physical Therapist PT    MG 05/24/22 -  Dayanna Turpin, PT Physical Therapist PT    DP 08/24/21 -  Julián Pathak, PT Physical Therapist PT                    PT Recommendation and Plan                          Time Calculation:      PT Charges       Row Name 01/04/24 1351 01/04/24 0847          Time Calculation    Start Time 1330  -MD 0830  -MD     Stop Time 1400  -MD 0900  -MD     Time Calculation (min) 30 min  -MD 30 min  -MD     PT Received On -- 01/04/24  -MD     PT - Next Appointment -- 01/05/24  -MD               User  Key  (r) = Recorded By, (t) = Taken By, (c) = Cosigned By      Initials Name Provider Type    Gianna Eisenberg, PT Physical Therapist                    Therapy Charges for Today       Code Description Service Date Service Provider Modifiers Qty    54497303634 HC GAIT TRAINING EA 15 MIN 1/4/2024 Gianna Atwood, PT GP 1    85903902837 HC PT THERAPEUTIC ACT EA 15 MIN 1/4/2024 Gianna Atwood, PT GP 1    02530214079 HC PT THER PROC EA 15 MIN 1/4/2024 Gianna Atwood, PT GP 1    10006977665 HC PT NEUROMUSC RE EDUCATION EA 15 MIN 1/4/2024 Gianna Atwood, PT GP 1              PT G-Codes  AM-PAC 6 Clicks Score (PT): 19      Gianna Atwood PT  1/4/2024

## 2024-01-04 NOTE — PLAN OF CARE
Goal Outcome Evaluation:  Plan of Care Reviewed With: patient        Progress: improving  Outcome Evaluation: AAOx4. Cooperative with all care. Transfers assist of 1; no safety concerns noted today. No c/o pain. Skin intact. VS stable; Lisinopril 5 mg added today. Incontinent at times; mostly continent during day.

## 2024-01-04 NOTE — PROGRESS NOTES
Reviewed team conference report with patient, son, Jl, and daughter, Ping. Discussed current status, goals for d/c, and d/c date set for Wednesday, 1/10. Long discussion regarding safety at home and probable recommendation for patient to have supervision at home. Patient feels she wouldn't need 24/7. Son and daughter discussed with patient that they have toured 4 different assisted living facilities. Patient stated she has never been to one so doesn't have good understanding of what this type of senior living community would be like. Recommended family take patient on pass on Saturday to a couple of the facilities that they liked so she could see them. Patient agreeable to this and family also feels this would be helpful. Patient's other son will be in town so could also go with them. Son will come in for family teaching tomorrow at 8:30 a.m. with PT so can take patient on pass on Saturday. Will schedule patient's therapy on Saturday morning so she can be available to go in afternoon on pass. Will discuss with Dr. Quan to approve pass. Scheduled family conference for Monday, 1/8 at 2:00 pm. Will follow and assist with plans.

## 2024-01-05 LAB
GLUCOSE BLDC GLUCOMTR-MCNC: 143 MG/DL (ref 70–130)
GLUCOSE BLDC GLUCOMTR-MCNC: 144 MG/DL (ref 70–130)
GLUCOSE BLDC GLUCOMTR-MCNC: 151 MG/DL (ref 70–130)
GLUCOSE BLDC GLUCOMTR-MCNC: 181 MG/DL (ref 70–130)
GLUCOSE BLDC GLUCOMTR-MCNC: 209 MG/DL (ref 70–130)

## 2024-01-05 PROCEDURE — 97530 THERAPEUTIC ACTIVITIES: CPT

## 2024-01-05 PROCEDURE — 82948 REAGENT STRIP/BLOOD GLUCOSE: CPT

## 2024-01-05 PROCEDURE — 97112 NEUROMUSCULAR REEDUCATION: CPT

## 2024-01-05 PROCEDURE — 97129 THER IVNTJ 1ST 15 MIN: CPT

## 2024-01-05 PROCEDURE — 97130 THER IVNTJ EA ADDL 15 MIN: CPT

## 2024-01-05 PROCEDURE — 97110 THERAPEUTIC EXERCISES: CPT | Performed by: OCCUPATIONAL THERAPIST

## 2024-01-05 PROCEDURE — 97112 NEUROMUSCULAR REEDUCATION: CPT | Performed by: OCCUPATIONAL THERAPIST

## 2024-01-05 PROCEDURE — 97116 GAIT TRAINING THERAPY: CPT

## 2024-01-05 PROCEDURE — 63710000001 INSULIN LISPRO (HUMAN) PER 5 UNITS: Performed by: INTERNAL MEDICINE

## 2024-01-05 PROCEDURE — 97535 SELF CARE MNGMENT TRAINING: CPT | Performed by: OCCUPATIONAL THERAPIST

## 2024-01-05 RX ADMIN — PIOGLITAZONE 15 MG: 15 TABLET ORAL at 07:39

## 2024-01-05 RX ADMIN — VENLAFAXINE HYDROCHLORIDE 150 MG: 150 CAPSULE, EXTENDED RELEASE ORAL at 07:40

## 2024-01-05 RX ADMIN — GABAPENTIN 400 MG: 400 CAPSULE ORAL at 22:20

## 2024-01-05 RX ADMIN — GABAPENTIN 400 MG: 400 CAPSULE ORAL at 06:52

## 2024-01-05 RX ADMIN — LISINOPRIL 5 MG: 5 TABLET ORAL at 07:38

## 2024-01-05 RX ADMIN — Medication 1 MG: at 22:21

## 2024-01-05 RX ADMIN — POLYETHYLENE GLYCOL 3350 17 G: 17 POWDER, FOR SOLUTION ORAL at 07:40

## 2024-01-05 RX ADMIN — Medication 1000 MCG: at 07:41

## 2024-01-05 RX ADMIN — GABAPENTIN 400 MG: 400 CAPSULE ORAL at 14:55

## 2024-01-05 RX ADMIN — DOCUSATE SODIUM 100 MG: 100 CAPSULE, LIQUID FILLED ORAL at 07:45

## 2024-01-05 RX ADMIN — INSULIN LISPRO 2 UNITS: 100 INJECTION, SOLUTION INTRAVENOUS; SUBCUTANEOUS at 22:37

## 2024-01-05 RX ADMIN — OXYBUTYNIN CHLORIDE 10 MG: 10 TABLET, EXTENDED RELEASE ORAL at 07:39

## 2024-01-05 RX ADMIN — INSULIN LISPRO 2 UNITS: 100 INJECTION, SOLUTION INTRAVENOUS; SUBCUTANEOUS at 07:37

## 2024-01-05 RX ADMIN — BUPROPION HYDROCHLORIDE 150 MG: 150 TABLET, EXTENDED RELEASE ORAL at 06:51

## 2024-01-05 RX ADMIN — ASPIRIN 325 MG: 325 TABLET ORAL at 07:38

## 2024-01-05 RX ADMIN — LEVOTHYROXINE SODIUM 100 MCG: 100 TABLET ORAL at 06:51

## 2024-01-05 RX ADMIN — ATORVASTATIN CALCIUM 40 MG: 20 TABLET, FILM COATED ORAL at 22:21

## 2024-01-05 NOTE — THERAPY TREATMENT NOTE
"Inpatient Rehabilitation - Occupational Therapy Treatment Note    [facility]     Patient Name: Rekha Bear  : 1940  MRN: [id number]    Today's Date: 2024                 Admit Date: 2023       No diagnosis found.    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    Community acquired pneumonia of left lower lobe of lung    Hyponatremia    Pneumonia due to COVID-19 virus    Major depressive disorder, recurrent, mild    Acute respiratory failure with hypoxia    Supraventricular tachycardia    PVC (premature ventricular contraction)    Dyspnea on exertion    Dizziness    Depressive disorder    Gastroesophageal reflux disease    Midline cystocele    Rectocele    BMI 29.0-29.9,adult    Acute left PCA stroke    B12 deficiency    Bradycardia    Premature atrial contractions    Stroke       Past Medical History:   Diagnosis Date    Chilblains     \"Chilblian's\"    CKD (chronic kidney disease)     Depression     Fatty liver     GERD (gastroesophageal reflux disease)     Hyperlipidemia     Hypertension     Incontinence     Osteoarthritis     OA  Marvin THR, LT TKR - hydrocodone prescibed by Dr. 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 Almaguer    TOTAL KNEE ARTHROPLASTY Left     OA  Marvin THR, LT TKR - hydrocodone prescibed by Dr. Almaguer             IRF OT ASSESSMENT FLOWSHEET (last 12 hours)       IRF OT Evaluation and Treatment       Row Name 01/05/24 1216          OT Time and Intention    Document Type daily treatment  -KP     Mode of Treatment individual therapy;occupational therapy  -KP     Patient Effort good  -KP     Symptoms Noted During/After Treatment none  -KP       Row Name 01/05/24 1216          General Information    Patient/Family/Caregiver Comments/Observations pt sitting in wc in room drinking some coffee.  -KP     Existing Precautions/Restrictions fall  -KP     Limitations/Impairments visual  -KP     Comment, General Information R visual defecit  -KP       Row Name 01/05/24 1216          Pain Assessment    Pretreatment Pain Rating 0/10 - no pain  -KP     Posttreatment Pain Rating 0/10 - no pain  -KP       Row Name 01/05/24 1216          Cognition/Psychosocial    Affect/Mental Status (Cognition) WFL  -KP     Orientation Status (Cognition) oriented x 4  -KP     Follows Commands (Cognition) follows one-step commands;over 90% accuracy;verbal cues/prompting required  -KP     Personal Safety Interventions fall prevention program maintained;gait belt;nonskid shoes/slippers when out of bed  -KP     Cognitive Function memory deficit  -KP     Memory Deficit (Cognition) minimal deficit  -KP     Safety Deficit (Cognition) minimal deficit  -KP       Row Name 01/05/24 1216          Vision Assessment/Intervention    Visual Field Deficit homonymous hemianopsia, right   -     Visual Processing Deficit visual search, right;visual attention, right  -[facility] Name 01/05/24 1216          Bathing    [place] Level (Bathing) bathing skills;lower body;upper body;standby assist  -     Assistive Device (Bathing) grab bar/tub rail;hand held shower spray hose;tub bench  -     Position (Bathing) supported sitting;supported standing  -     Set-up Assistance (Bathing) obtain supplies  [facility] Name 01/05/24 1216          Upper Body Dressing    [place] Level (Upper Body Dressing) doff;upper body dressing skills;don;bra/undergarment;pull over garment;set up assistance;supervision  -     Position (Upper Body Dressing) supported sitting  -     Set-up Assistance (Upper Body Dressing) obtain clothing  [facility] Name 01/05/24 1216          Lower Body Dressing    [place] Level (Lower Body Dressing) doff;don;pants/bottoms;shoes/slippers;socks;set up;standby assist;verbal cues  -     Position (Lower Body Dressing) supported sitting;supported standing  -     Set-up Assistance (Lower Body Dressing) obtain clothing  [facility]     Comment (Lower Body Dressing) VC to sit to start pants  -[facility] Name 01/05/24 1216          Grooming    [place] Level (Grooming) grooming skills;hair care, combing/brushing;wash face, hands;set up;standby assist  -     Position (Grooming) supported sitting  -     Set-up Assistance (Grooming) obtain supplies  [facility] Name 01/05/24 1216          Toileting    Comment (Toileting) pt able to complete hygiene in shower, pt had bm and did not realize she was going but ableto clean up. and wasn't a lot. notified RN so no more stool sofetner  -       Row Name 01/05/24 1216          Bed Mobility    Comment, (Bed Mobility) in wc  -       Row Name 01/05/24 1216          Functional Mobility    Functional Mobility- Ind. Level set up required;contact guard assist;standby assist  -     Functional Mobility- Device walker, front-wheeled   -KP     Functional Mobility- Comment very close SBA  -       Row Name 01/05/24 1216          Sit-Stand Transfer    Sit-Stand [place] (Transfers) standby assist  -     Assistive Device (Sit-Stand Transfers) walker, front-wheeled  -KP       Row Name 01/05/24 1216          Stand-Sit Transfer    Stand-Sit [place] (Transfers) set up;standby assist  -     Assistive Device (Stand-Sit Transfers) walker, front-wheeled  -       Row Name 01/05/24 1216          Shower Transfer    Type (Shower Transfer) sit-stand;stand-sit;stand pivot/stand step  -     [place] Level (Shower Transfer) set up;stand by assist  -     Assistive Device (Shower Transfer) grab bar, tub/shower;walker, front-wheeled;shower chair  -       Row Name 01/05/24 1216          Balance    Static Sitting Balance modified independence  -     Dynamic Sitting Balance standby assist  -KP     Position, Sitting Balance sitting in chair  -     Static Standing Balance supervision;standby assist  -     Dynamic Standing Balance standby assist  -     Position/Device Used, Standing Balance unsupported;supported  -       Row Name 01/05/24 1216          Positioning and Restraints    Pre-Treatment Position sitting in chair/recliner  -KP     Post Treatment Position wheelchair  -KP     In Wheelchair sitting;exit alarm on;with PT  -KP               User Key  (r) = Recorded By, (t) = Taken By, (c) = Cosigned By      Initials Name Effective Dates     Jo Brigid Christiano, OTR 07/11/23 -                      Occupational Therapy Education       Title: PT OT SLP Therapies (In Progress)       Topic: Occupational Therapy (In Progress)       Point: ADL training (Done)       Description:   Instruct learner(s) on proper safety adaptation and remediation techniques during self care or transfers.   Instruct in proper use of assistive devices.                  Learning Progress Summary             Patient Acceptance, E,TB,D, NR,VU by CC at 12/30/2023  1148    Comment: Shower tsf to bench                         Point: Home exercise program (Not Started)       Description:   Instruct learner(s) on appropriate technique for monitoring, assisting and/or progressing therapeutic exercises/activities.                  Learner Progress:  Not documented in this visit.              Point: Precautions (Not Started)       Description:   Instruct learner(s) on prescribed precautions during self-care and functional transfers.                  Learner Progress:  Not documented in this visit.              Point: Body mechanics (Not Started)       Description:   Instruct learner(s) on proper positioning and spine alignment during self-care, functional mobility activities and/or exercises.                  Learner Progress:  Not documented in this visit.                              User Key       Initials Effective Dates Name Provider Type Discipline    CC 06/16/21 -  Sahra Crisostomo OTR Occupational Therapist OT                        OT Recommendation and Plan            Daily Progress Summary (OT)  Overall Progress Toward Functional Goals (OT): progressing toward functional goals as expected  Daily Progress Summary (OT): pt is now SBA a LBB and LBD. SBA UBB and UBD            Time Calculation:      Time Calculation- OT       Row Name 01/05/24 1222             Time Calculation- OT    OT Start Time 0800  -      OT Stop Time 0830  -      OT Time Calculation (min) 30 min  -                User Key  (r) = Recorded By, (t) = Taken By, (c) = Cosigned By      Initials Name Provider Type     Brigid Osorio OTR Occupational Therapist                  Therapy Charges for Today       Code Description Service Date Service Provider Modifiers Qty    09989431232  OT NEUROMUSC RE EDUCATION EA 15 MIN 1/4/2024 Brigid Osorio OTR GO 2    [id number]  OT SELF CARE/MGMT/TRAIN EA 15 MIN 1/5/2024 Brigid Osorio OTR GO 2                     Brigid Alvarado  Jo, OTR  1/5/2024

## 2024-01-05 NOTE — THERAPY TREATMENT NOTE
"Inpatient Rehabilitation - Occupational Therapy Treatment Note    [facility]     Patient Name: Rekha Bear  : 1940  MRN: [id number]    Today's Date: 2024                 Admit Date: 2023       No diagnosis found.    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    Community acquired pneumonia of left lower lobe of lung    Hyponatremia    Pneumonia due to COVID-19 virus    Major depressive disorder, recurrent, mild    Acute respiratory failure with hypoxia    Supraventricular tachycardia    PVC (premature ventricular contraction)    Dyspnea on exertion    Dizziness    Depressive disorder    Gastroesophageal reflux disease    Midline cystocele    Rectocele    BMI 29.0-29.9,adult    Acute left PCA stroke    B12 deficiency    Bradycardia    Premature atrial contractions    Stroke       Past Medical History:   Diagnosis Date    Chilblains     \"Chilblian's\"    CKD (chronic kidney disease)     Depression     Fatty liver     GERD (gastroesophageal reflux disease)     Hyperlipidemia     Hypertension     Incontinence     Osteoarthritis     OA  Marvin THR, LT TKR - hydrocodone prescibed by Dr. 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 Almaguer    TOTAL KNEE ARTHROPLASTY Left     OA  Marvin THR, LT TKR - hydrocodone prescibed by Dr. Almaguer             IRF OT ASSESSMENT FLOWSHEET (last 12 hours)       IRF OT Evaluation and Treatment       Row Name 01/05/24 1548 01/05/24 1216       OT Time and Intention    Document Type daily treatment  -KP daily treatment  -KP    Mode of Treatment individual therapy;occupational therapy  -KP individual therapy;occupational therapy  -KP    Patient Effort good  -KP good  -KP    Symptoms Noted During/After Treatment none  -KP none  -KP      Row Name 01/05/24 1548 01/05/24 1216       General Information    Patient/Family/Caregiver Comments/Observations pt sitting in wc in room. younger sister present  -KP pt sitting in wc in room drinking some coffee.  -KP    Existing Precautions/Restrictions fall  -KP fall  -KP    Limitations/Impairments visual  -KP visual  -KP    Comment, General Information R visual defecit  -KP R visual defecit  -KP      Row Name 01/05/24 1548 01/05/24 1216       Pain Assessment    Pretreatment Pain Rating 0/10 - no pain  -KP 0/10 - no pain  -KP    Posttreatment Pain Rating 0/10 - no pain  -KP 0/10 - no pain  -KP      Row Name 01/05/24 1548 01/05/24 1216       Cognition/Psychosocial    Affect/Mental Status (Cognition) WNL  -KP WFL  -KP    Orientation Status (Cognition) oriented x 4  -KP oriented x 4  -KP    Follows Commands (Cognition) follows one-step commands;over 90% accuracy;verbal cues/prompting required  -KP follows one-step commands;over 90% accuracy;verbal cues/prompting required  -KP    Personal Safety  Interventions fall prevention program maintained;gait belt;muscle strengthening facilitated;nonskid shoes/slippers when out of bed  -KP fall prevention program maintained;gait belt;nonskid shoes/slippers when out of bed  -KP    Cognitive Function memory deficit  -KP memory deficit  -KP    Memory Deficit (Cognition) minimal deficit  -KP minimal deficit  -KP    Safety Deficit (Cognition) minimal deficit  -KP minimal deficit  -KP      Row Name 01/05/24 1548 01/05/24 1216       Vision Assessment/Intervention    Visual Field Deficit homonymous hemianopsia, right  -KP homonymous hemianopsia, right  -KP    Visual Processing Deficit visual search, right;visual attention, right  -KP visual search, right;visual attention, right  -KP    Vision Assessment Comment completed tracing lines following zig zag lines to other end of paper to incr visual scanning to the R side. completed very difficult maze w min VC for correct way to go two times to inc visual skills.  -KP --      Row Name 01/05/24 1216          Bathing    [place] Level (Bathing) bathing skills;lower body;upper body;standby assist  -     Assistive Device (Bathing) grab bar/tub rail;hand held shower spray hose;tub bench  -     Position (Bathing) supported sitting;supported standing  -     Set-up Assistance (Bathing) obtain supplies  -       Row Name 01/05/24 1216          Upper Body Dressing    [place] Level (Upper Body Dressing) doff;upper body dressing skills;don;bra/undergarment;pull over garment;set up assistance;supervision  -KP     Position (Upper Body Dressing) supported sitting  -     Set-up Assistance (Upper Body Dressing) obtain clothing  -       Row Name 01/05/24 1216          Lower Body Dressing    [place] Level (Lower Body Dressing) doff;don;pants/bottoms;shoes/slippers;socks;set up;standby assist;verbal cues  -KP     Position (Lower Body Dressing) supported sitting;supported standing  -     Set-up Assistance (Lower Body  Dressing) obtain clothing  -     Comment (Lower Body Dressing) VC to sit to start pants  -       Row Name 01/05/24 1216          Grooming    [place] Level (Grooming) grooming skills;hair care, combing/brushing;wash face, hands;set up;standby assist  [facility]     Position (Grooming) supported sitting  -     Set-up Assistance (Grooming) obtain supplies  [facility]       Row Name 01/05/24 1216          Toileting    Comment (Toileting) pt able to complete hygiene in shower, pt had bm and did not realize she was going but ableto clean up. and wasn't a lot. notified RN so no more stool sofetner  -       Row Name 01/05/24 1548 01/05/24 1216       Bed Mobility    Comment, (Bed Mobility) in   - in wc  [facility]      Row Name 01/05/24 1216          Functional Mobility    Functional Mobility- Ind. Level set up required;contact guard assist;standby assist  -     Functional Mobility- Device walker, front-wheeled  [facility]     Functional Mobility- Comment very close SBA  -       Row Name 01/05/24 1548          Transfer Assessment/Treatment    Comment, (Transfers) to mat SBA  -       Row Name 01/05/24 1548 01/05/24 1216       Sit-Stand Transfer    Sit-Stand [place] (Transfers) stand assist  [facility] standby assist  [facility]    Assistive Device (Sit-Stand Transfers) wheelchair  - walker, front-wheeled  [facility]      Row Name 01/05/24 1548 01/05/24 1216       Stand-Sit Transfer    Stand-Sit [place] (Transfers) stand assist  [facility] set up;standby assist  -    Assistive Device (Stand-Sit Transfers) wheelchair  - walker, front-wheeled  [facility]      Row Name 01/05/24 1216          Shower Transfer    Type (Shower Transfer) sit-stand;stand-sit;stand pivot/stand step  -     [place] Level (Shower Transfer) set up;stand by assist  -     Assistive Device (Shower Transfer) grab bar, tub/shower;walker, front-wheeled;shower chair  -       Row Name 01/05/24 1548          Elbow/Forearm (Therapeutic Exercise)    Elbow/Forearm Strengthening  (Therapeutic Exercise) right;left;flexion;extension;supination;pronation;sitting;2 lb free weight;10 repetitions;2 sets  -       Row Name 01/05/24 1548          Wrist (Therapeutic Exercise)    Wrist Strengthening (Therapeutic Exercise) left;right;flexion;extension;2 lb free weight;10 repetitions;2 sets  -KP       Row Name 01/05/24 1548 01/05/24 1216       Balance    Static Sitting Balance modified independence  - modified independence  -KP    Dynamic Sitting Balance standby assist  -KP standby assist  -KP    Position, Sitting Balance sitting edge of mat  -KP sitting in chair  -KP    Static Standing Balance standby assist  -KP supervision;standby assist  -KP    Dynamic Standing Balance -- standby assist  -KP    Position/Device Used, Standing Balance -- unsupported;supported  -      Row Name 01/05/24 1548 01/05/24 1216       Positioning and Restraints    Pre-Treatment Position sitting in chair/recliner  -KP sitting in chair/recliner  -KP    Post Treatment Position wheelchair  -KP wheelchair  -KP    In Wheelchair sitting;call light within reach;encouraged to call for assist;exit alarm on  -KP sitting;exit alarm on;with PT  -KP              User Key  (r) = Recorded By, (t) = Taken By, (c) = Cosigned By      Initials Name Effective Dates    Brigid Valverde, OTR 07/11/23 -                      Occupational Therapy Education       Title: PT OT SLP Therapies (In Progress)       Topic: Occupational Therapy (In Progress)       Point: ADL training (Done)       Description:   Instruct learner(s) on proper safety adaptation and remediation techniques during self care or transfers.   Instruct in proper use of assistive devices.                  Learning Progress Summary             Patient Acceptance, E,TB,D, NR,VU by CC at 12/30/2023 1148    Comment: Shower tsf to bench                         Point: Home exercise program (Not Started)       Description:   Instruct learner(s) on appropriate technique for  monitoring, assisting and/or progressing therapeutic exercises/activities.                  Learner Progress:  Not documented in this visit.              Point: Precautions (Not Started)       Description:   Instruct learner(s) on prescribed precautions during self-care and functional transfers.                  Learner Progress:  Not documented in this visit.              Point: Body mechanics (Not Started)       Description:   Instruct learner(s) on proper positioning and spine alignment during self-care, functional mobility activities and/or exercises.                  Learner Progress:  Not documented in this visit.                              User Key       Initials Effective Dates Name Provider Type [facility] 06/16/21 -  Sahra Crisostomo OTR Occupational Therapist OT                        OT Recommendation and Plan            Daily Progress Summary (OT)  Overall Progress Toward Functional Goals (OT): progressing toward functional goals as expected  Daily Progress Summary (OT): pt is now SBA a LBB and LBD. SBA UBB and UBD            Time Calculation:      Time Calculation- OT       Row Name 01/05/24 1552 01/05/24 1222          Time Calculation- OT    OT Start Time 1230  -KP 0800  -KP     OT Stop Time 1300  -KP 0830  -KP     OT Time Calculation (min) 30 min  -KP 30 min  -KP               User Key  (r) = Recorded By, (t) = Taken By, (c) = Cosigned By      Initials Name Provider Type     Brigid Osorio OTR Occupational Therapist                  Therapy Charges for Today       Code Description Service Date Service Provider Modifiers Qty    19910574389 HC OT NEUROMUSC RE EDUCATION EA 15 MIN 1/4/2024 Brigid Osorio OTR GO 2    72233201770 HC OT SELF CARE/MGMT/TRAIN EA 15 MIN 1/5/2024 Brigid Osorio OTR GO 2    18104832143 HC OT NEUROMUSC RE EDUCATION EA 15 MIN 1/5/2024 Brigid Osorio OTR GO 1    28230829775 HC OT THER PROC EA 15 MIN 1/5/2024 Brigid Osorio OTR  GO 1                     Brigid Osorio, OTR  1/5/2024

## 2024-01-05 NOTE — PROGRESS NOTES
Inpatient Rehabilitation Plan of Care Note    Plan of Care  Care Plan Reviewed - No updates at this time.    Psychosocial    [RN] Coping/Adjustment(Active)  Current Status(01/05/2024): Expressing appropriate coping  Weekly Goal(01/09/2024): Provide educational material regarding stroke  Discharge Goal: [facility] of care needs and stroke recovery    Performed Intervention(s)  Vebalizes her needs and concerns      Safety    Performed Intervention(s)  Safety rounds  Bed alarm and/chair alarm      Sphincter Control    Performed Intervention(s)  Bladder training program  Encourage fluid intake  Incontinence products    Signed by: Iqra Mcbride, RN

## 2024-01-05 NOTE — PROGRESS NOTES
LOS: 10 days   Patient Care Team:  Eben Porter MD as PCP - General  Eben Porter MD as PCP - Family Medicine      MICHAEL S JACQUI  1940      ADMITTING DIAGNOSIS:  Status post left PCA stroke-felt cardioembolic  Bilateral carotid stenosis  Stroke prophylaxis-aspirin 7 days through December 28, then start Eliquis 5 mg twice daily on December 29/atorvastatin  Right visual field deficit-right homonymous hemianopsia  Impaired mobility/self-car      Subjective   Tolerates therapies. Still impaired vision to right side  No new weakness.  To go on therapeutic day pass tomorrow to tour assisted living facilities. Family teaching today.          Objective     Vitals:    01/05/24 1417   BP: 110/58   Pulse: 55   Resp: 18   Temp: 97.8 °F (36.6 °C)   SpO2: 94%       Intake/Output Summary (Last 24 hours) at 1/5/2024 1613  Last data filed at 1/5/2024 1200  Gross per 24 hour   Intake 480 ml   Output --   Net 480 ml     PHYSICAL EXAM:     MENTAL STATUS -  AWAKE / ALERT  HEENT-    LUNGS - CTA, NO WHEEZES, RALES OR RHONCHI  HEART- RRR, NO RUB, MURMUR, OR GALLOP  ABD - NORMOACTIVE BOWEL SOUNDS, SOFT, NT.    EXT - NO EDEMA OR CYANOSIS  NEURO -oriented person place and general situation  Speech fluent.   Right homonymous hemianopsia  MOTOR EXAM - RUE/RLE 5/5. LUE/LLE 5/5.      MEDICATIONS  Scheduled Meds:aspirin, 325 mg, Oral, Daily  atorvastatin, 40 mg, Oral, Nightly  buPROPion XL, 150 mg, Oral, QAM  docusate sodium, 100 mg, Oral, BID  gabapentin, 400 mg, Oral, Q8H  insulin lispro, 2-7 Units, Subcutaneous, 4x Daily AC & at Bedtime  levothyroxine, 100 mcg, Oral, Q AM  lisinopril, 5 mg, Oral, Q24H  oxybutynin XL, 10 mg, Oral, Daily  pioglitazone, 15 mg, Oral, Daily  polyethylene glycol, 17 g, Oral, Daily  venlafaxine XR, 150 mg, Oral, Daily  vitamin B-12, 1,000 mcg, Oral, Daily  vitamin D, 50,000 Units, Oral, Q7 Days      Continuous Infusions:   PRN Meds:.  acetaminophen **OR** acetaminophen    bisacodyl     dextrose    glucagon (human recombinant)    melatonin    ondansetron      RESULTS  Glucose   Date/Time Value Ref Range Status   01/05/2024 1115 144 (H) 70 - 130 mg/dL Final   01/05/2024 0723 151 (H) 70 - 130 mg/dL Final   01/04/2024 1950 147 (H) 70 - 130 mg/dL Final   01/04/2024 1614 169 (H) 70 - 130 mg/dL Final   01/04/2024 1135 159 (H) 70 - 130 mg/dL Final   01/04/2024 0721 150 (H) 70 - 130 mg/dL Final   01/03/2024 2052 208 (H) 70 - 130 mg/dL Final   01/03/2024 1622 121 70 - 130 mg/dL Final     Results from last 7 days   Lab Units 01/04/24  0756 01/01/24  0506   WBC 10*3/mm3 6.85 7.19   HEMOGLOBIN g/dL 12.5 11.4*   HEMATOCRIT % 40.0 35.5   PLATELETS 10*3/mm3 232 221     Results from last 7 days   Lab Units 01/04/24  0756 01/01/24  0505   SODIUM mmol/L 140 138   POTASSIUM mmol/L 4.8 4.5   CHLORIDE mmol/L 103 104   CO2 mmol/L 27.5 23.9   BUN mg/dL 30* 28*   CREATININE mg/dL 1.51* 1.24*   CALCIUM mg/dL 9.2 9.0   GLUCOSE mg/dL 192* 147*        Latest Reference Range & Units 12/28/23 06:09   25 Hydroxy, Vitamin D 30.0 - 100.0 ng/ml 13.9 (L)   (L): Data is abnormally low    ASSESSMENT and PLAN    Stroke    Status post left PCA stroke-felt cardioembolic-December 22, 2023- 6 x 3 cm acute infarct throughout the medial left occipital lobe in the left PCA territory     Bilateral carotid stenosis-left 50-69%, right less than 50%     Stroke prophylaxis-aspirin 7 days through December 28, then start Eliquis 5 mg twice daily on December 29/atorvastatin  December 29-starting Eliquis today     Right visual field deficit-right homonymous hemianopsia     Impaired mobility/self-care     Diabetes mellitus type 2-sliding scale insulin-was on Actos 15 mg daily and Tradjenta 5 mg daily at home  January 3-resume Actos 15 mg p.o. daily in the a.m.     Chronic kidney disease     Hypertension-off metoprolol secondary bradycardia  December 29-systolic blood pressure 157/68 this a.m., later 113/62-variable pattern.  Allergy to amlodipine.   Will add lisinopril initially 2.5 mg daily  January 3-systolic blood pressure 126-141.  Diastolic blood pressure in the 60  Jan 4 - BP elevated, increase lisinopril to 5 mg     Status post bradycardia-resolved off metoprolol     Hypothyroidism-on replacement     B12 deficiency-on replacement    Vitamin D deficiency-change to ergocalciferol 50,000 units weekly for 8 weeks     Depression-bupropion/venlafaxine     -oxybutynin     DVT prophylaxis-SCDs     Outpatient evaluation for obstructive sleep apnea    TEAM CONF - DEC 28 - TRANSFERS MIN ASSIST. GAIT 80 FEET RW MIN ASSIST. 4 STAIRS MIN ASSIST.   BATH MOD . UBD MIN. LBD MOD MAX. TOILET TRANSFERS MIN ASSIST.   GOOD FINE MOTOR SKILLS. RIGHT VISUAL NEGLECT. DECREASED INSIGHT INTO DEFICITS. OVERALL MILD COGNITIVE IMPAIRMENT.  MILD MODERATE EXPRESSIVE DEFICITS AT SENTENCE LEVEL. MILD ANOMIA. CONTINENT BOWEL AND BLADDER.   ASA COMPLETES TODAY AND STARTS ELIQUIS TOMORROW.   ELOS - TWO WEEKS - MIGHT BE A GOOD CANDIDATE FOR ASSISTED LIVING    TEAM CONF - JAN 4 - TRANSFERS CTG SBA WITH RW. BED CTG SBA. GAIT  FEET CTG RW. 4 STAIRS CTG. BATH SBA. LBD SBA. UBD SET UP. GROOMING SET UP. TOILETING SBA.   MEMORY - 1/3 recall with 5 minute delay no cues   ATTENTION - mild deficits in sustained/selective attention   EXPRESSION - Mild anomic events, utilizes word finding to repair given MIN cues. BNE PENDING  ELOS - MIDDLE OF NEXT WEEK. VIJAY 10.     Goal is for home with outpatient   therapies.  Barrier to discharge: Impaired mobility self-care cognition- work on attention to the right, executive function, memory, balance, gait, ADLs to overcome.     Sanjeev Quan MD      Appropriate PPE was worn during the entire visit.  Hand hygiene was completed before and after.

## 2024-01-05 NOTE — THERAPY TREATMENT NOTE
"Inpatient Rehabilitation - Physical Therapy Treatment Note       [facility]     Patient Name: Rekha Bear  : 1940  MRN: [id number]    Today's Date: 2024                    Admit Date: 2023      Visit Dx:   No diagnosis found.    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    Community acquired pneumonia of left lower lobe of lung    Hyponatremia    Pneumonia due to COVID-19 virus    Major depressive disorder, recurrent, mild    Acute respiratory failure with hypoxia    Supraventricular tachycardia    PVC (premature ventricular contraction)    Dyspnea on exertion    Dizziness    Depressive disorder    Gastroesophageal reflux disease    Midline cystocele    Rectocele    BMI 29.0-29.9,adult    Acute left PCA stroke    B12 deficiency    Bradycardia    Premature atrial contractions    Stroke       Past Medical History:   Diagnosis Date    Chilblains     \"Chilblian's\"    CKD (chronic kidney disease)     Depression     Fatty liver     GERD (gastroesophageal reflux disease)     Hyperlipidemia     Hypertension     Incontinence     Osteoarthritis     OA  Marvin THR, LT TKR - hydrocodone prescibed by Dr. 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 Almaguer    TOTAL KNEE ARTHROPLASTY Left     OA  Marvin THR, LT TKR - hydrocodone prescibed by Dr. Almaguer       PT ASSESSMENT (last 12 hours)       [facility] PT Evaluation and Treatment       Row Name 01/05/24 1141 01/05/24 1044       PT Time and Intention    Document Type daily treatment  -MD daily treatment  -DP    Mode of Treatment physical therapy  -MD individual therapy;physical therapy  -DP    Patient/Family/Caregiver Comments/Observations Pt sitting in WC showing no signs of acute distress.  -MD Pt sitting up in w/c upon PT arrival  -DP      Row Name 01/05/24 1141 01/05/24 1044       General Information    Patient Profile Reviewed -- yes  -DP    General Observations of Patient Family present this date for family teaching.  -MD --    Existing Precautions/Restrictions fall  -MD fall  -DP      Row Name 01/05/24 1141 01/05/24 1044       Pain Assessment    Pretreatment Pain Rating 0/10 - no pain  -MD 0/10 - no pain  -DP    Posttreatment Pain Rating -- 0/10 - no pain  -DP      Row Name 01/05/24 1141 01/05/24 1044       Cognition/Psychosocial    Affect/Mental Status (Cognition) -- WFL  -DP    Orientation Status (Cognition) oriented x 4  -MD --    Follows Commands (Cognition) follows one-step commands;over 90% accuracy  -MD --    Personal Safety Interventions fall prevention program maintained;gait belt  -MD --      Row Name 01/05/24 1141 01/05/24 1044       Sit-Stand Transfer    Sit-Stand [place] (Transfers) standby assist  -MD standby assist  -FAREED    Assistive Device (Sit-Stand Transfers) wheelchair  -MD wheelchair  -FAREED [place]  Name 01/05/24 1141 01/05/24 1044       Stand-Sit Transfer    Stand-Sit [place] (Transfers) standby assist  -MD standby assist  -DP    Assistive Device (Stand-Sit Transfers) walker, front-wheeled;wheelchair  -MD wheelchair  -DP      Row Name 01/05/24 1141          Car Transfer    Type (Car Transfer) stand pivot/stand step  -MD     [place] Level (Car Transfer) standby assist  -MD     Assistive Device (Car Transfer) walker, front-wheeled  -MD       Row Name 01/05/24 1141 01/05/24 1044       Gait/Stairs (Locomotion)    [place] Level (Gait) verbal cues;standby assist  -MD verbal cues;standby assist;contact guard  -DP    Assistive Device (Gait) walker, front-wheeled  -MD walker, front-wheeled  -DP    Distance in Feet (Gait) 200'  -'x1  -DP    Pattern (Gait) step-through  -MD step-through  -DP    Deviations/Abnormal Patterns (Gait) gait speed decreased;base of support, narrow;stride length decreased;weight shifting decreased;right sided deviations;antalgic  -MD gait speed decreased;base of support, narrow;stride length decreased;weight shifting decreased;right sided deviations;antalgic  -DP    Bilateral Gait Deviations forward flexed posture;heel strike decreased  -MD forward flexed posture;heel strike decreased  -DP    [place] Level (Stairs) verbal cues;contact guard  -MD --    Handrail Location (Stairs) both sides  -MD --    Number of Steps (Stairs) 4  -MD --    Ascending Technique (Stairs) step-over-step  -MD --    Descending Technique (Stairs) step-over-step  -MD --    Comment, (Gait/Stairs) Family present for family teaching this am.  Ambulation and stairs reviewed.  Pt family educated to provide CGA when pt in different/new/unfamiliar areas as well as different terrain.  Family states understanding.  PT also education family to provide CGA when going up and down stairs w pt.  -MD --      Row Name 01/05/24 1044          Safety Issues, Functional Mobility    Impairments Affecting Function  (Mobility) balance;cognition;coordination;endurance/activity tolerance;strength;visual/perceptual  -DP       Row Name 01/05/24 1044          Balance    Comment, Balance Standing on blue foam mat and throwing objects into bucket approximateyl 6 feet away with CGA/Eboni ~10 bean bags. Standing on blue foam mat with feet narrow 30 seconds CGA/Eboni. EC with feet apart for 7 seconds when on foam and 30 seconds when on solid ground CGA/Eboni. Pt performed Al tapping on 6 inch surface 5x LUE support with CGA/SBA. Alt tap x2 each with Eboni and moderate balance deficit on RLE. MIP with no UE support 5x CGA/Eboni  -DP       Row Name 01/05/24 1141          Positioning and Restraints    Pre-Treatment Position sitting in chair/recliner  -MD     Post Treatment Position wheelchair  -MD     In Wheelchair sitting;call light within reach;exit alarm on;with family/caregiver  -MD               User Key  (r) = Recorded By, (t) = Taken By, (c) = Cosigned By      Initials Name Provider Type    Gianna Eisenberg, PT Physical Therapist    DP Julián Pathak, PT Physical Therapist                  Wound 12/27/23 0045 Left posterior third toe (Active)   Closure Unable to assess 01/04/24 2112   Base dressing in place, unable to visualize 01/04/24 2112   Periwound Temperature warm 01/04/24 2112     Physical Therapy Education       Title: PT OT SLP Therapies (In Progress)       Topic: Physical Therapy (In Progress)       Point: Mobility training (Done)       Learning Progress Summary             Patient Acceptance, E,D, VU,DU by MD at 1/5/2024 1200    Comment: family teaching completed for transfers, ambulation, stairs and car transfers.  Family education on compensation techniques for visual field cuts and RWx use.  PT advised pt not to get up and move w/o assistance due to visual deficits.    Acceptance, E,D,TB, VU,NR by MG at 1/3/2024 0747    Acceptance, E,D, VU,NR by MG at 1/2/2024 0842    Acceptance, E,TB,D, VU,NR by MG at 12/27/2023 1554   Family  Acceptance, E,D, VU,DU by MD at 1/5/2024 1200    Comment: family teaching completed for transfers, ambulation, stairs and car transfers.  Family education on compensation techniques for visual field cuts and RWx use.  PT advised pt not to get up and move w/o assistance due to visual deficits.                         Point: Home exercise program (Not Started)       Learner Progress:  Not documented in this visit.              Point: Body mechanics (Done)       Learning Progress Summary             Patient Acceptance, E,D,TB, VU,NR by MG at 1/3/2024 0747    Acceptance, E,D, VU,NR by MG at 1/2/2024 0842    Acceptance, E,TB,D, VU,NR by MG at 12/27/2023 1554                         Point: Precautions (Done)       Learning Progress Summary             Patient Acceptance, E, VU by MD at 1/4/2024 0849    Acceptance, E,D,TB, VU,NR by MG at 1/3/2024 0747    Acceptance, E,D, VU,NR by MG at 1/2/2024 0842    Acceptance, E, NR by DP at 12/29/2023 1147    Comment: scanning for objects and avoidance of them when walking    Acceptance, E, NR by MD at 12/28/2023 0916    Acceptance, E,TB,D, VU,NR by MG at 12/27/2023 1554                                         User Key       Initials Effective Dates Name Provider Type Discipline    MD 06/16/21 -  Gianna Atwood, PT Physical Therapist PT    MG 05/24/22 -  Dayanna Turpin, PT Physical Therapist PT    DP 08/24/21 -  Julián Pathak, PT Physical Therapist PT                    PT Recommendation and Plan                          Time Calculation:      PT Charges       Row Name 01/05/24 1141 01/05/24 1056          Time Calculation    Start Time 0830  -MD 1030  -DP     Stop Time 0900  -MD 1100  -DP     Time Calculation (min) 30 min  -MD 30 min  -DP     PT Received On -- 01/05/24  -DP     PT - Next Appointment -- 01/06/24  -DP        Time Calculation- PT    Total Timed Code Minutes- PT -- 30 minute(s)  -DP               User Key  (r) = Recorded By, (t) = Taken By, (c) = Cosigned By      Initials  Name Provider Type    Gianna Eisenberg, PT Physical Therapist    Julián Irwin, PT Physical Therapist                    Therapy Charges for Today       Code Description Service Date Service Provider Modifiers Qty    47415311911 HC GAIT TRAINING EA 15 MIN 1/4/2024 Gianna Atwood, PT GP 1    74593970903 HC PT THERAPEUTIC ACT EA 15 MIN 1/4/2024 Gianna Atwood, PT GP 1    95790228718 HC PT THER PROC EA 15 MIN 1/4/2024 Gianna Atwood, PT GP 1    29871538166 HC PT NEUROMUSC RE EDUCATION EA 15 MIN 1/4/2024 Gianna Atwood, PT GP 1    42874620292 HC PT THERAPEUTIC ACT EA 15 MIN 1/5/2024 Gianna Atwood, PT GP 1    01089611670 HC GAIT TRAINING EA 15 MIN 1/5/2024 Gianna Atwood, PT GP 1              PT G-Codes  AM-PAC 6 Clicks Score (PT): 19      Gianna Atwood, PT  1/5/2024

## 2024-01-05 NOTE — PLAN OF CARE
Goal Outcome Evaluation:  Plan of Care Reviewed With: patient        Progress: improving  Outcome Evaluation: Pt. is A/Ox4. Continues to have incontinence of urine and bowels today. Pt. denies c/o pain. Has sat in the recliner for most of the afternoon.No unsafe behavior noted. Pt. has an order for a day pass tomorrow to visit some assisted living facilities.

## 2024-01-05 NOTE — THERAPY TREATMENT NOTE
"Inpatient Rehabilitation - Physical Therapy Treatment Note       [facility]     Patient Name: Rekha Bear  : 1940  MRN: [id number]    Today's Date: 2024                    Admit Date: 2023      Visit Dx:   No diagnosis found.    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    Community acquired pneumonia of left lower lobe of lung    Hyponatremia    Pneumonia due to COVID-19 virus    Major depressive disorder, recurrent, mild    Acute respiratory failure with hypoxia    Supraventricular tachycardia    PVC (premature ventricular contraction)    Dyspnea on exertion    Dizziness    Depressive disorder    Gastroesophageal reflux disease    Midline cystocele    Rectocele    BMI 29.0-29.9,adult    Acute left PCA stroke    B12 deficiency    Bradycardia    Premature atrial contractions    Stroke       Past Medical History:   Diagnosis Date    Chilblains     \"Chilblian's\"    CKD (chronic kidney disease)     Depression     Fatty liver     GERD (gastroesophageal reflux disease)     Hyperlipidemia     Hypertension     Incontinence     Osteoarthritis     OA  Marvin THR, LT TKR - hydrocodone prescibed by Dr. 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 Almaguer    TOTAL KNEE ARTHROPLASTY Left     OA  Marvin THR, LT TKR - hydrocodone prescibed by Dr. Almaguer       PT ASSESSMENT (last 12 hours)       [facility] PT Evaluation and Treatment       Row Name 01/05/24 1141 01/05/24 1044       PT Time and Intention    Document Type daily treatment  -MD daily treatment  -DP    Mode of Treatment physical therapy  -MD individual therapy;physical therapy  -DP    Patient/Family/Caregiver Comments/Observations Pt sitting in WC showing no signs of acute distress.  -MD Pt sitting up in w/c upon PT arrival  -DP      Row Name 01/05/24 1141 01/05/24 1044       General Information    Patient Profile Reviewed -- yes  -DP    General Observations of Patient Family present this date for family teaching.  -MD --    Existing Precautions/Restrictions fall  -MD fall  -DP      Row Name 01/05/24 1141 01/05/24 1044       Pain Assessment    Pretreatment Pain Rating 0/10 - no pain  -MD 0/10 - no pain  -DP    Posttreatment Pain Rating -- 0/10 - no pain  -DP      Row Name 01/05/24 1141 01/05/24 1044       Cognition/Psychosocial    Affect/Mental Status (Cognition) -- WFL  -DP    Orientation Status (Cognition) oriented x 4  -MD --    Follows Commands (Cognition) follows one-step commands;over 90% accuracy  -MD --    Personal Safety Interventions fall prevention program maintained;gait belt  -MD --      Row Name 01/05/24 1141 01/05/24 1044       Sit-Stand Transfer    Sit-Stand [place] (Transfers) standby assist  -MD standby assist  -FAREED    Assistive Device (Sit-Stand Transfers) wheelchair  -MD wheelchair  -FAREED [place]  Name 01/05/24 1141 01/05/24 1044       Stand-Sit Transfer    Stand-Sit [place] (Transfers) standby assist  -MD standby assist  -DP    Assistive Device (Stand-Sit Transfers) walker, front-wheeled;wheelchair  -MD wheelchair  -DP      Row Name 01/05/24 1141          Car Transfer    Type (Car Transfer) stand pivot/stand step  -MD     [place] Level (Car Transfer) standby assist  -MD     Assistive Device (Car Transfer) walker, front-wheeled  -MD       Row Name 01/05/24 1141 01/05/24 1044       Gait/Stairs (Locomotion)    [place] Level (Gait) verbal cues;standby assist  -MD verbal cues;standby assist;contact guard  -DP    Assistive Device (Gait) walker, front-wheeled  -MD walker, front-wheeled  -DP    Distance in Feet (Gait) 200'  -'x1  -DP    Pattern (Gait) step-through  -MD step-through  -DP    Deviations/Abnormal Patterns (Gait) gait speed decreased;base of support, narrow;stride length decreased;weight shifting decreased;right sided deviations;antalgic  -MD gait speed decreased;base of support, narrow;stride length decreased;weight shifting decreased;right sided deviations;antalgic  -DP    Bilateral Gait Deviations forward flexed posture;heel strike decreased  -MD forward flexed posture;heel strike decreased  -DP    [place] Level (Stairs) verbal cues;contact guard  -MD --    Handrail Location (Stairs) both sides  -MD --    Number of Steps (Stairs) 4  -MD --    Ascending Technique (Stairs) step-over-step  -MD --    Descending Technique (Stairs) step-over-step  -MD --    Comment, (Gait/Stairs) Family present for family teaching this am.  Ambulation and stairs reviewed.  Pt family educated to provide CGA when pt in different/new/unfamiliar areas as well as different terrain.  Family states understanding.  PT also education family to provide CGA when going up and down stairs w pt.  -MD --      Row Name 01/05/24 1044          Safety Issues, Functional Mobility    Impairments Affecting Function  (Mobility) balance;cognition;coordination;endurance/activity tolerance;strength;visual/perceptual  -DP       Row Name 01/05/24 1044          Balance    Balance Interventions --  Stepping over hurldes x3 with 1UE. CGA.  -DP     Comment, Balance Standing on blue foam mat and throwing objects into bucket approximateyl 6 feet away with CGA/[place] ~10 bean bags. Standing on blue foam mat with feet narrow 30 seconds CGA/Eboni. EC with feet apart for 7 seconds when on foam and 30 seconds when on solid ground CGA/Eboni. Pt performed Al tapping on 6 inch surface 5x LUE support with CGA/SBA. Alt tap x2 each with [place] and moderate balance deficit on RLE. MIP with no UE support 5x CGA/Eboni  -DP       Row Name 01/05/24 1141          Positioning and Restraints    Pre-Treatment Position sitting in chair/recliner  -MD     Post Treatment Position wheelchair  -MD     In Wheelchair sitting;call light within reach;exit alarm on;with family/caregiver  -MD               User Key  (r) = Recorded By, (t) = Taken By, (c) = Cosigned By      Initials Name Provider Type    Gianna Eisenberg, PT Physical Therapist    Julián Irwin, PAULA Physical Therapist                  Wound 12/27/23 0045 Left posterior third toe (Active)   Closure Unable to assess 01/04/24 2112   Base dressing in place, unable to visualize 01/04/24 2112   Periwound Temperature warm 01/04/24 2112     Physical Therapy Education       Title: PT OT SLP Therapies (In Progress)       Topic: Physical Therapy (In Progress)       Point: Mobility training (Done)       Learning Progress Summary             Patient Acceptance, E,D, VU,DU by MD at 1/5/2024 1200    Comment: family teaching completed for transfers, ambulation, stairs and car transfers.  Family education on compensation techniques for visual field cuts and RWx use.  PT advised pt not to get up and move w/o assistance due to visual deficits.    Acceptance, E,D,TB, VU,NR by MG at 1/3/2024 0747    Acceptance, E,D, VU,NR by MG at  1/2/2024 0842    Acceptance, E,TB,D, VU,NR by MG at 12/27/2023 1554   Family Acceptance, E,D, VU,DU by MD at 1/5/2024 1200    Comment: family teaching completed for transfers, ambulation, stairs and car transfers.  Family education on compensation techniques for visual field cuts and RWx use.  PT advised pt not to get up and move w/o assistance due to visual deficits.                         Point: Home exercise program (Not Started)       Learner Progress:  Not documented in this visit.              Point: Body mechanics (Done)       Learning Progress Summary             Patient Acceptance, E,D,TB, VU,NR by MG at 1/3/2024 0747    Acceptance, E,D, VU,NR by MG at 1/2/2024 0842    Acceptance, E,TB,D, VU,NR by MG at 12/27/2023 1554                         Point: Precautions (Done)       Learning Progress Summary             Patient Acceptance, E, VU by MD at 1/4/2024 0849    Acceptance, E,D,TB, VU,NR by MG at 1/3/2024 0747    Acceptance, E,D, VU,NR by MG at 1/2/2024 0842    Acceptance, E, NR by DP at 12/29/2023 1147    Comment: scanning for objects and avoidance of them when walking    Acceptance, E, NR by MD at 12/28/2023 0916    Acceptance, E,TB,D, VU,NR by MG at 12/27/2023 1554                                         User Key       Initials Effective Dates Name Provider Type Discipline    MD 06/16/21 -  Gianna Atwood, PT Physical Therapist PT    MG 05/24/22 -  Dayanna Turpin, PT Physical Therapist PT    DP 08/24/21 -  Julián Pathak, PT Physical Therapist PT                    PT Recommendation and Plan                          Time Calculation:      PT Charges       Row Name 01/05/24 1141 01/05/24 1056          Time Calculation    Start Time 0830  -MD 1030  -DP     Stop Time 0900  -MD 1100  -DP     Time Calculation (min) 30 min  -MD 30 min  -DP     PT Received On -- 01/05/24  -DP     PT - Next Appointment -- 01/06/24  -DP        Time Calculation- PT    Total Timed Code Minutes- PT -- 30 minute(s)  -DP                User Key  (r) = Recorded By, (t) = Taken By, (c) = Cosigned By      Initials Name Provider Type    Gianna Eisenberg, PT Physical Therapist    Julián Irwin PT Physical Therapist                    Therapy Charges for Today       Code Description Service Date Service Provider Modifiers Qty    26878513211 HC PT NEUROMUSC RE EDUCATION EA 15 MIN 1/5/2024 Julián Pathak, PT GP 1    52107661582 HC PT THERAPEUTIC ACT EA 15 MIN 1/5/2024 Julián Pathak, PT GP 1              PT G-Codes  AM-PAC 6 Clicks Score (PT): 19      Julián Pathak PT  1/5/2024

## 2024-01-05 NOTE — PLAN OF CARE
Goal Outcome Evaluation:  Plan of Care Reviewed With: patient        Progress: improving  Outcome Evaluation: Pt,AOX4, calm and cooperative, assist x1 on transfer, cont.to B/B [facility] 1/4, no complains of pain and no any concerns noted,sleeping comfortably.

## 2024-01-05 NOTE — THERAPY TREATMENT NOTE
"Inpatient Rehabilitation - Speech Language Pathology Treatment Note    [facility]     Patient Name: Rekha Bear  : 1940  MRN: [id number]    Today's Date: 2024                   Admit Date: 2023       Visit Dx:    No diagnosis found.    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    Community acquired pneumonia of left lower lobe of lung    Hyponatremia    Pneumonia due to COVID-19 virus    Major depressive disorder, recurrent, mild    Acute respiratory failure with hypoxia    Supraventricular tachycardia    PVC (premature ventricular contraction)    Dyspnea on exertion    Dizziness    Depressive disorder    Gastroesophageal reflux disease    Midline cystocele    Rectocele    BMI 29.0-29.9,adult    Acute left PCA stroke    B12 deficiency    Bradycardia    Premature atrial contractions    Stroke       Past Medical History:   Diagnosis Date    Chilblains     \"Chilblian's\"    CKD (chronic kidney disease)     Depression     Fatty liver     GERD (gastroesophageal reflux disease)     Hyperlipidemia     Hypertension     Incontinence     Osteoarthritis     OA  Marvin THR, LT TKR - hydrocodone prescibed by Dr. 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 Almaguer    TOTAL KNEE ARTHROPLASTY Left     OA  Marvin THR, LT TKR - hydrocodone prescibed by Dr. Almaguer       SLP Recommendation and Plan                                                               SLP EVALUATION (last 72 hours)       SLP SLC Evaluation       Row Name 01/05/24 1300 01/05/24 1000 01/04/24 1430 01/04/24 1000 01/03/24 1300       Communication Assessment/Intervention    Document Type therapy note (daily note)  -SR therapy note (daily note)  -SR therapy note (daily note)  -SR therapy note (daily note)  -SR therapy note (daily note)  -SR    Subjective Information no complaints  -SR no complaints  -SR no complaints  -SR no complaints  -SR no complaints  -SR    Patient Observations alert;cooperative;agree to therapy  -SR alert;cooperative;agree to therapy  -SR alert;cooperative;agree to therapy  -SR alert;cooperative;agree to therapy  -SR alert;cooperative;agree to therapy  -SR    Patient Effort good  -SR good  -SR good  -SR good  -SR good  -SR    Symptoms Noted During/After Treatment none  -SR none  -SR none  -SR none  -SR none  -SR       Pain Scale: Numbers Pre/Post-Treatment    Pretreatment Pain Rating 0/10 - no pain  -SR 0/10 - no pain  -SR 0/10 - no pain  -SR 0/10 - no pain  -SR 0/10 - no pain  -SR    Posttreatment Pain Rating 0/10 - no pain  -SR 0/10 - no pain  -SR 0/10 - no pain  -SR 0/10 - no pain  -SR 0/10 - no pain  -SR      Row Name 01/03/24 1000                   Communication Assessment/Intervention    Document Type therapy note (daily note)  -SR        Subjective Information no complaints  -SR         Patient Observations alert;cooperative;agree to therapy  -SR        Patient Effort good  -SR        Symptoms Noted During/After Treatment none  -SR           Pain Scale: Numbers Pre/Post-Treatment    Pretreatment Pain Rating 0/10 - no pain  -SR        Posttreatment Pain Rating 0/10 - no pain  -SR                  User Key  (r) = Recorded By, (t) = Taken By, (c) = Cosigned By      Initials Name Effective Dates    SR RiJessica landa CCC-SLP 11/10/22 - 01/04/24     Jessica Carr, SLP 01/05/24 -                        EDUCATION    The patient has been educated in the following areas:       Cognitive Impairment.             SLP GOALS       Row Name 01/05/24 1300 01/05/24 1000 01/04/24 1430       Word Retrieval Skills Goal 1 (SLP)    Improve Word Retrieval Skills By Goal 1 (SLP) -- -- completing functional word finding tasks;80%;independently (over 90% accuracy)  -SR    Time Frame (Word Retrieval Goal 1, SLP) -- -- 1 week  -SR    Progress/Outcomes (Word Retrieval Goal 1, SLP) -- -- good progress toward goal  -SR    Comment (Word Retrieval Goal 1, SLP) -- -- Response elaboration training was used as a therapy technique to target fxnal word finding and sentence production. Patient generated 1 sentence given moderately-complex visual scene with 70% acc given NO cues; 90% acc given MIN cues. Mild anomia noted throughout activity. Verbal cues were provided to encourage patient to expand detail of description.  -SR       Attention Goal 1 (SLP)    Improve Attention by Goal 1 (SLP) -- -- complete sustained attention task;90%;independently (over 90% accuracy)  -SR    Time Frame (Attention Goal 1, SLP) -- -- short term goal (STG)  -SR    Progress/Outcomes (Attention Goal 1, SLP) -- -- good progress toward goal  -SR       Memory Skills Goal 1 (SLP)    Improve Memory Skills Through Goal 1 (SLP) use memory strategies;use external memory aid;listen to a paragraph and answer questions;recall details of the day;80%;with  minimal cues (75-90%)  -SR -- --    Time Frame (Memory Skills Goal 1, SLP) short term goal (STG)  -SR -- --    Progress/Outcomes (Memory Skills Goal 1, SLP) continuing progress toward goal;good progress toward goal  -SR -- --    Comment (Memory Skills Goal 1, SLP) Immediate recall targeted this date. Patient listened to series of voicemail messages and answered questions about the content with 80% acc given NO cues; 100% acc given MIN cues. Accuracy increased with repetition of message.  -SR -- --       Organizational Skills Goal 1 (SLP)    Improve Thought Organization Through Goal 1 (SLP) -- completing a divergent naming task;completing a convergent naming task;80%;independently (over 90% accuracy)  -SR completing a divergent naming task;completing a convergent naming task;80%;independently (over 90% accuracy)  -SR    Time Frame (Thought Organization Skills Goal 1, SLP) -- short term goal (STG)  -SR short term goal (STG)  -SR    Progress/Outcomes (Thought Organization Skills Goal 1, SLP) -- goal partially met  -SR goal partially met  -SR    Comment (Thought Organization Skills Goal 1, SLP) -- Patient provided x2 definitions/meanings of each target word with 60% acc given NO cues; 100% acc given MIN cues. Mild anomia noted during structured activity. Patient independently used word finding strategy and requested to pause and come back to word in x2 opportunities.  -SR Convergent abstract naming - patient named category given list of 3 objects with 90% acc given NO cues.  -SR       Reasoning Goal 1 (SLP)    Improve Reasoning Through Goal 1 (SLP) -- complete basic reasoning task;complete deductive reasoning task;80%;with minimal cues (75-90%)  -SR complete basic reasoning task;complete deductive reasoning task;80%;with minimal cues (75-90%)  -SR    Time Frame (Reasoning Goal 1, SLP) -- short term goal (STG)  -SR short term goal (STG)  -SR    Progress/Outcomes (Reasoning Goal 1, SLP) -- good progress toward goal  -SR  "good progress toward goal  -SR    Comment (Reasoning Goal 1, SLP) -- Patient used clues from paragraph to label events of a daily schedule in timeline order (\"Gianna's Schedule\") with 60% acc given NO cues; 100% acc given MIN cues. Activity targeted attention, working memory, and organization. Extended time provided to complete task.  -SR Patient completed 12-cell simple logic puzzle using directional parameters with 80% acc given NO cues.  -SR       Executive Functional Skills Goal 1 (SLP)    Improve Executive Function Skills Goal 1 (SLP) home management activity;80%;with minimal cues (75-90%)  -SR -- --    Time Frame (Executive Function Skills Goal 1, SLP) short term goal (STG)  -SR -- --    Progress/Outcomes (Executive Function Skills Goal 1, SLP) goal ongoing  -SR -- --    Comment (Executive Function Skills Goal 1, SLP) Medication management task continued this date. Using a simple pill organizer (AM and PM sections), patient sorted medications by name and frequency with 80% acc given NO cues; 100% acc given MIN cues. Cues provided for attn to detail.  -SR -- --      Row Name 01/04/24 1000 01/03/24 1300 01/03/24 1000       Attention Goal 1 (SLP)    Improve Attention by Goal 1 (SLP) complete sustained attention task;90%;independently (over 90% accuracy)  -SR -- complete sustained attention task;90%;independently (over 90% accuracy)  -SR    Time Frame (Attention Goal 1, SLP) short term goal (STG)  -SR -- short term goal (STG)  -SR    Progress/Outcomes (Attention Goal 1, SLP) good progress toward goal  -SR -- good progress toward goal  -SR    Comment (Attention Goal 1, SLP) -- -- Attention to detail targeted with insurance/co-pay scenerios. Given member card information, patient answered questions with 40% acc given NO cues; 80% acc given MIN cues.  -SR       Organizational Skills Goal 1 (SLP)    Improve Thought Organization Through Goal 1 (SLP) -- completing a divergent naming task;completing a convergent naming " "task;80%;independently (over 90% accuracy)  -SR --    Time Frame (Thought Organization Skills Goal 1, SLP) -- short term goal (STG)  -SR --    Progress/Outcomes (Thought Organization Skills Goal 1, SLP) -- good progress toward goal  -SR --    Comment (Thought Organization Skills Goal 1, SLP) -- Patient reported having mild word finding difficulty in conversation. ST reviewed various communication and word finding strategies including categorization, association, and taking breaks. Patient verbalized and demonstrated good understanding. Exhibited mild anomia during divergent naming task. Given concrete category, patient independently named 12 items in one minute; increased to 18 items given MIN cues. Given abstract category, patient independently named 8 items in one minute; increased to 15 items given MIN cues.  -SR --       Reasoning Goal 1 (SLP)    Improve Reasoning Through Goal 1 (SLP) -- -- complete basic reasoning task;complete deductive reasoning task;80%;with minimal cues (75-90%)  -SR    Time Frame (Reasoning Goal 1, SLP) -- -- short term goal (STG)  -SR    Progress/Outcomes (Reasoning Goal 1, SLP) -- -- good progress toward goal  -SR    Comment (Reasoning Goal 1, SLP) -- -- Patient used clues from paragraph and labeled events of a daily schedule (\"day on rehab\") in timeline order with 80% acc given NO cues; 100% acc given MIN cues. Cues provided for attn to detail. Improvement noted with attention, working memory, and organization skills.  -SR       Functional Math Skills Goal 1 (SLP)    Improve Functional Math Skills Through Goal 1 (SLP) -- complete functional math task;80%;with minimal cues (75-90%)  -SR --    Time Frame (Functional Math Skills Goal 1, SLP) -- short term goal (STG)  -SR --    Progress/Outcomes (Functional Math Skills Goal 1, SLP) -- goal ongoing  -SR --    Comment (Functional Math Skills Goal 1, SLP) -- Patient answered fxnal math/writing questions given prices from restaurant menu with " 100% acc given NO cues. Extended time provided to complete task.  -SR --       Executive Functional Skills Goal 1 (SLP)    Improve Executive Function Skills Goal 1 (SLP) home management activity;80%;with minimal cues (75-90%)  -SR -- home management activity;80%;with minimal cues (75-90%)  -SR    Time Frame (Executive Function Skills Goal 1, SLP) short term goal (STG)  -SR -- short term goal (STG)  -SR    Progress/Outcomes (Executive Function Skills Goal 1, SLP) goal ongoing  -SR -- goal ongoing  -SR    Comment (Executive Function Skills Goal 1, SLP) Medication management task completed during session. Given list of personal medications, patient sorted information onto a chart organizer by name, dosage, frequency, and reason for taking medication. She completed description for 5/9 medications given NO cues; 9/9 given MIN cues. Looked up unfamiliar/new medications on internet with clinician's iPad x3. Pt reported that she utilizes a pill organizer at home. ST suggested supervision initially upon discharge due to mild difficulty with attention to detail and reasoning. Patient verbalized and demonstrated good understanding for recommendation.  Exteneded time provided to complete home management activity.  -SR -- Medication management; patient answered questions about medication dosage given scenerio of an individual's weight and/or age with 80% acc given NO cues. Extended time provided to complete task.  -SR              User Key  (r) = Recorded By, (t) = Taken By, (c) = Cosigned By      Initials Name Provider Type    Jessica White CCC-SLP Speech and Language Pathologist    Jessica White SLP Speech and Language Pathologist                                Time Calculation:        Time Calculation- SLP       Row Name 01/05/24 1443 01/05/24 1200          Time Calculation- SLP    SLP Start Time 1300  -SR 1000  -SR     SLP Stop Time 1330  -SR 1030  -SR     SLP Time Calculation (min) 30 min  -SR 30 min  -SR      SLP Received On 01/05/24  - --               User Key  (r) = Recorded By, (t) = Taken By, (c) = Cosigned By      Initials Name Provider Type    Jessica White SLP Speech and Language Pathologist                      Therapy Charges for Today       Code Description Service Date Service Provider Modifiers Qty    44906179214 HC ST DEV OF COGN SKILLS INITIAL 15 MIN 1/4/2024 Jessica Carr SLP  1    58914665368 HC ST DEV OF COGN SKILLS EACH ADDT'L 15 MIN 1/4/2024 Jessica Carr SLP  3    32970195369 HC ST DEV OF COGN SKILLS INITIAL 15 MIN 1/5/2024 Jessica Carr SLP  1    [id number] HC ST DEV OF COGN SKILLS EACH ADDT'L 15 MIN 1/5/2024 Jessica Carr SLP  3                             PAULETTE Gómez  1/5/2024   Island Pedicle Flap Text: The defect edges were debeveled with a #15 scalpel blade.  Given the location of the defect, shape of the defect and the proximity to free margins an island pedicle advancement flap was deemed most appropriate.  Using a sterile surgical marker, an appropriate advancement flap was drawn incorporating the defect, outlining the appropriate donor tissue and placing the expected incisions within the relaxed skin tension lines where possible.    The area thus outlined was incised deep to adipose tissue with a #15 scalpel blade.  The skin margins were undermined to an appropriate distance in all directions around the primary defect and laterally outward around the island pedicle utilizing iris scissors.  There was minimal undermining beneath the pedicle flap.

## 2024-01-05 NOTE — PROGRESS NOTES
Patient's son and daughter-in-law here this morning for family teaching with PT. They plan to take patient on pass tomorrow to visit some assisted living facilities and will pick patient up around 11:30 a.m. Will discuss their visits and plans at family conference on Monday. Will continue to assist with d/c plans.

## 2024-01-06 LAB
GLUCOSE BLDC GLUCOMTR-MCNC: 147 MG/DL (ref 70–130)
GLUCOSE BLDC GLUCOMTR-MCNC: 153 MG/DL (ref 70–130)
GLUCOSE BLDC GLUCOMTR-MCNC: 160 MG/DL (ref 70–130)
GLUCOSE BLDC GLUCOMTR-MCNC: 214 MG/DL (ref 70–130)

## 2024-01-06 PROCEDURE — 63710000001 INSULIN LISPRO (HUMAN) PER 5 UNITS: Performed by: INTERNAL MEDICINE

## 2024-01-06 PROCEDURE — 82948 REAGENT STRIP/BLOOD GLUCOSE: CPT

## 2024-01-06 PROCEDURE — 97530 THERAPEUTIC ACTIVITIES: CPT

## 2024-01-06 PROCEDURE — 97116 GAIT TRAINING THERAPY: CPT

## 2024-01-06 RX ADMIN — PIOGLITAZONE 15 MG: 15 TABLET ORAL at 08:22

## 2024-01-06 RX ADMIN — OXYBUTYNIN CHLORIDE 10 MG: 10 TABLET, EXTENDED RELEASE ORAL at 08:22

## 2024-01-06 RX ADMIN — ASPIRIN 325 MG: 325 TABLET ORAL at 08:22

## 2024-01-06 RX ADMIN — Medication 1 MG: at 21:48

## 2024-01-06 RX ADMIN — VENLAFAXINE HYDROCHLORIDE 150 MG: 150 CAPSULE, EXTENDED RELEASE ORAL at 08:22

## 2024-01-06 RX ADMIN — GABAPENTIN 400 MG: 400 CAPSULE ORAL at 21:49

## 2024-01-06 RX ADMIN — LEVOTHYROXINE SODIUM 100 MCG: 100 TABLET ORAL at 06:01

## 2024-01-06 RX ADMIN — INSULIN LISPRO 2 UNITS: 100 INJECTION, SOLUTION INTRAVENOUS; SUBCUTANEOUS at 16:39

## 2024-01-06 RX ADMIN — ATORVASTATIN CALCIUM 40 MG: 20 TABLET, FILM COATED ORAL at 21:49

## 2024-01-06 RX ADMIN — BUPROPION HYDROCHLORIDE 150 MG: 150 TABLET, EXTENDED RELEASE ORAL at 06:01

## 2024-01-06 RX ADMIN — INSULIN LISPRO 3 UNITS: 100 INJECTION, SOLUTION INTRAVENOUS; SUBCUTANEOUS at 22:00

## 2024-01-06 RX ADMIN — Medication 1000 MCG: at 08:22

## 2024-01-06 RX ADMIN — LISINOPRIL 5 MG: 5 TABLET ORAL at 08:22

## 2024-01-06 RX ADMIN — GABAPENTIN 400 MG: 400 CAPSULE ORAL at 06:01

## 2024-01-06 NOTE — PLAN OF CARE
Goal Outcome Evaluation:  Plan of Care Reviewed With: patient           Outcome Evaluation: Patient alert and oriented x3, but forgetful at times. She is assist x1, cont/inc of b/b, and takes medication with water. She went on a home pass today with family and will be back around 4 pm. No complaints of pain, no safety issues, and accucheck AC/HS- no coverage this morning and no coverage given at lunch because patient heading out on pass. Wound to L 3rd toe, bradycardic at times. and no other issues at this time.

## 2024-01-06 NOTE — PROGRESS NOTES
Inpatient Rehabilitation Plan of Care Note    Plan of Care  Care Plan Reviewed - No updates at this time.    Safety    [RN] Potential for Injury(Active)  Current Status(01/06/2024): Risk for fall  Weekly Goal(01/09/2024): Cue to use call bell  Discharge Goal: PT /Family will be aware of risk for fall and safety in the home  setting    Performed Intervention(s)  Safety rounds  Bed alarm and/chair alarm      Psychosocial    [RN] Coping/Adjustment(Active)  Current Status(01/06/2024): Expressing appropriate coping  Weekly Goal(01/09/2024): Provide educational material regarding stroke  Discharge Goal: Knowleadgeable of care needs and stroke recovery    Performed Intervention(s)  Vebalizes her needs and concerns      Sphincter Control    [RN] Bladder Management(Active)  Current Status(01/06/2024): Bladder urgency  Weekly Goal(01/11/2024): Continent 50%  Discharge Goal: Continent 100%    [RN] Bowel Management(Active)  Current Status(01/06/2024): Continent 100%  Weekly Goal(01/09/2024): Continent 100%  Discharge Goal: Continent 100%    Performed Intervention(s)  Bladder training program  Encourage fluid intake  Incontinence products    Signed by: Bailey Thompson RN

## 2024-01-06 NOTE — THERAPY TREATMENT NOTE
"Inpatient Rehabilitation - Physical Therapy Treatment Note       [facility]     Patient Name: Rekha Bear  : 1940  MRN: [id number]    Today's Date: 2024                    Admit Date: 2023      Visit Dx:   No diagnosis found.    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    Community acquired pneumonia of left lower lobe of lung    Hyponatremia    Pneumonia due to COVID-19 virus    Major depressive disorder, recurrent, mild    Acute respiratory failure with hypoxia    Supraventricular tachycardia    PVC (premature ventricular contraction)    Dyspnea on exertion    Dizziness    Depressive disorder    Gastroesophageal reflux disease    Midline cystocele    Rectocele    BMI 29.0-29.9,adult    Acute left PCA stroke    B12 deficiency    Bradycardia    Premature atrial contractions    Stroke       Past Medical History:   Diagnosis Date    Chilblains     \"Chilblian's\"    CKD (chronic kidney disease)     Depression     Fatty liver     GERD (gastroesophageal reflux disease)     Hyperlipidemia     Hypertension     Incontinence     Osteoarthritis     OA  Marvin THR, LT TKR - hydrocodone prescibed by Dr. 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 Almaguer    TOTAL KNEE ARTHROPLASTY Left     OA  Marvin THR, LT TKR - hydrocodone prescibed by Dr. Almaguer       PT ASSESSMENT (last 12 hours)       IRF PT Evaluation and Treatment       Row Name 01/06/24 0841          PT Time and Intention    Document Type daily treatment  -MD     Mode of Treatment physical therapy  -MD     Patient/Family/Caregiver Comments/Observations Pt sitting in WC showing no signs of acute distress.  -MD       Row Name 01/06/24 0841          General Information    Existing Precautions/Restrictions fall  -MD       Row Name 01/06/24 0841          Pain Assessment    Pretreatment Pain Rating 0/10 - no pain  -MD       Row Name 01/06/24 0841          Cognition/Psychosocial    Orientation Status (Cognition) oriented x 4  -MD     Follows Commands (Cognition) follows one-step commands;over 90% accuracy;verbal cues/prompting required  -MD     Personal Safety Interventions fall prevention program maintained;gait belt  -MD       Row Name 01/06/24 0841          Sit-Stand Transfer    Sit-Stand [place] (Transfers) standby assist  -MD     Assistive Device (Sit-Stand Transfers) wheelchair  -MD       Row Name 01/06/24 0841          Stand-Sit Transfer    Stand-Sit [place] (Transfers) standjavier assist  -MD     Assistive Device (Stand-Sit Transfers) wheelchair  -MD       Row Name 01/06/24 0841          Car Transfer    Type (Car Transfer) stand pivot/stand step  -MD     Troy Level (Car Transfer) standjavier assist  -MD     Assistive Device (Car Transfer) walker, front-wheeled  -MD       Row Name 01/06/24 0841           Gait/Stairs (Locomotion)    Luna Level (Gait) verbal cues;standby assist  -MD     Assistive Device (Gait) walker, front-wheeled  -MD     Distance in Feet (Gait) 160'x2  -MD     Pattern (Gait) step-through  -MD     Deviations/Abnormal Patterns (Gait) gait speed decreased;base of support, narrow;stride length decreased;weight shifting decreased;right sided deviations;antalgic  -MD     Bilateral Gait Deviations forward flexed posture;heel strike decreased  -MD     [place]una Level (Stairs) verbal cues;stand by assist  -MD     Handrail Location (Stairs) both sides  -MD     Number of Steps (Stairs) 4  -MD     Ascending Technique (Stairs) step-over-step  -MD     Descending Technique (Stairs) step-over-step  -MD       Row Name 01/06/24 0841          Positioning and Restraints    Pre-Treatment Position sitting in chair/recliner  -MD     Post Treatment Position wheelchair  -MD     In Wheelchair sitting;call light within reach;exit alarm on  -MD               User Key  (r) = Recorded By, (t) = Taken By, (c) = Cosigned By      Initials Name Provider Type    Gianna Eisenberg, PT Physical Therapist                  Wound 12/27/23 0045 Left posterior third toe (Active)   Dressing Appearance dressing loose 01/06/24 0615   Closure Unable to assess 01/06/24 0822   Base black eschar 01/06/24 0615   Drainage Amount none 01/06/24 0822   Care, Wound cleansed with;sterile normal saline 01/06/24 0615   Dressing Care dressing changed 01/06/24 0615     Physical Therapy Education       Title: PT OT SLP Therapies (In Progress)       Topic: Physical Therapy (In Progress)       Point: Mobility training (Done)       Learning Progress Summary             Patient Acceptance, E,D, ASPEN,PURA by MD at 1/5/2024 1200    Comment: family teaching completed for transfers, ambulation, stairs and car transfers.  Family education on compensation techniques for visual field cuts and RWx use.  PT advised pt not to get up and move w/o assistance due to  visual deficits.    Acceptance, E,D,TB, VU,NR by MG at 1/3/2024 0747    Acceptance, E,D, VU,NR by MG at 1/2/2024 0842    Acceptance, E,TB,D, VU,NR by MG at 12/27/2023 1554   Family Acceptance, E,D, VU,DU by MD at 1/5/2024 1200    Comment: family teaching completed for transfers, ambulation, stairs and car transfers.  Family education on compensation techniques for visual field cuts and RWx use.  PT advised pt not to get up and move w/o assistance due to visual deficits.                         Point: Home exercise program (Not Started)       Learner Progress:  Not documented in this visit.              Point: Body mechanics (Done)       Learning Progress Summary             Patient Acceptance, E,D,TB, VU,NR by MG at 1/3/2024 0747    Acceptance, E,D, VU,NR by MG at 1/2/2024 0842    Acceptance, E,TB,D, VU,NR by MG at 12/27/2023 1554                         Point: Precautions (Done)       Learning Progress Summary             Patient Acceptance, E, VU by MD at 1/6/2024 0845    Acceptance, E, VU by MD at 1/4/2024 0849    Acceptance, E,D,TB, VU,NR by MG at 1/3/2024 0747    Acceptance, E,D, VU,NR by MG at 1/2/2024 0842    Acceptance, E, NR by DP at 12/29/2023 1147    Comment: scanning for objects and avoidance of them when walking    Acceptance, E, NR by MD at 12/28/2023 0916    Acceptance, E,TB,D, VU,NR by MG at 12/27/2023 1554                                         User Key       Initials Effective Dates Name Provider Type Discipline    MD 06/16/21 -  Gianna Atwood, PT Physical Therapist PT    MG 05/24/22 -  Dayanna Turpin, PT Physical Therapist PT    DP 08/24/21 -  Julián Pathak, PT Physical Therapist PT                    PT Recommendation and Plan                          Time Calculation:      PT Charges       Row Name 01/06/24 0840             Time Calculation    Start Time 0830  -MD      Stop Time 0900  -MD      Time Calculation (min) 30 min  -MD      PT Received On 01/06/24  -MD      PT - Next Appointment 01/08/24   -MD                User Key  (r) = Recorded By, (t) = Taken By, (c) = Cosigned By      Initials Name Provider Type    Gianna Eisenberg, PT Physical Therapist                    Therapy Charges for Today       Code Description Service Date Service Provider Modifiers Qty    00097220544 HC PT THERAPEUTIC ACT EA 15 MIN 1/5/2024 Gianna Atwood, PT GP 1    11965962730 HC GAIT TRAINING EA 15 MIN 1/5/2024 Gianna Atwood, PT GP 1    65035742225 HC PT THERAPEUTIC ACT EA 15 MIN 1/6/2024 Gianna Atwodo, PT GP 1    56075673789 HC GAIT TRAINING EA 15 MIN 1/6/2024 Gianna Atwood, PT GP 1              PT G-Codes  AM-PAC 6 Clicks Score (PT): 19      Gianna Atwood PT  1/6/2024

## 2024-01-06 NOTE — PROGRESS NOTES
LOS: 11 days   Patient Care Team:  Eben Porter MD as PCP - General  Eben Porter MD as PCP - Family Medicine      MICHAEL OSMAN  1940      ADMITTING DIAGNOSIS:  Status post left PCA stroke-felt cardioembolic  Bilateral carotid stenosis  Stroke prophylaxis-aspirin 7 days through December 28, then start Eliquis 5 mg twice daily on December 29/atorvastatin  Right visual field deficit-right homonymous hemianopsia  Impaired mobility/self-car      Subjective     Out on therapeutic day pass    Objective     Vitals:    01/06/24 0820   BP: 125/74   Pulse: (!) 49   Resp:    Temp:    SpO2:        Intake/Output Summary (Last 24 hours) at 1/6/2024 1049  Last data filed at 1/6/2024 0900  Gross per 24 hour   Intake 720 ml   Output --   Net 720 ml     PHYSICAL EXAM:     Out on therapeutic day pass    MEDICATIONS  Scheduled Meds:aspirin, 325 mg, Oral, Daily  atorvastatin, 40 mg, Oral, Nightly  buPROPion XL, 150 mg, Oral, QAM  docusate sodium, 100 mg, Oral, BID  gabapentin, 400 mg, Oral, Q8H  insulin lispro, 2-7 Units, Subcutaneous, 4x Daily AC & at Bedtime  levothyroxine, 100 mcg, Oral, Q AM  lisinopril, 5 mg, Oral, Q24H  oxybutynin XL, 10 mg, Oral, Daily  pioglitazone, 15 mg, Oral, Daily  polyethylene glycol, 17 g, Oral, Daily  venlafaxine XR, 150 mg, Oral, Daily  vitamin B-12, 1,000 mcg, Oral, Daily  vitamin D, 50,000 Units, Oral, Q7 Days      Continuous Infusions:   PRN Meds:.  acetaminophen **OR** acetaminophen    bisacodyl    dextrose    glucagon (human recombinant)    melatonin    ondansetron      RESULTS  Glucose   Date/Time Value Ref Range Status   01/06/2024 0725 147 (H) 70 - 130 mg/dL Final   01/05/2024 2232 181 (H) 70 - 130 mg/dL Final   01/05/2024 1931 209 (H) 70 - 130 mg/dL Final   01/05/2024 1626 143 (H) 70 - 130 mg/dL Final   01/05/2024 1115 144 (H) 70 - 130 mg/dL Final   01/05/2024 0723 151 (H) 70 - 130 mg/dL Final   01/04/2024 1950 147 (H) 70 - 130 mg/dL Final   01/04/2024 1614 169 (H) 70 -  130 mg/dL Final     Results from last 7 days   Lab Units 01/04/24  0756 01/01/24  0506   WBC 10*3/mm3 6.85 7.19   HEMOGLOBIN g/dL 12.5 11.4*   HEMATOCRIT % 40.0 35.5   PLATELETS 10*3/mm3 232 221     Results from last 7 days   Lab Units 01/04/24  0756 01/01/24  0505   SODIUM mmol/L 140 138   POTASSIUM mmol/L 4.8 4.5   CHLORIDE mmol/L 103 104   CO2 mmol/L 27.5 23.9   BUN mg/dL 30* 28*   CREATININE mg/dL 1.51* 1.24*   CALCIUM mg/dL 9.2 9.0   GLUCOSE mg/dL 192* 147*        Latest Reference Range & Units 12/28/23 06:09   25 Hydroxy, Vitamin D 30.0 - 100.0 ng/ml 13.9 (L)   (L): Data is abnormally low    ASSESSMENT and PLAN    Stroke    Status post left PCA stroke-felt cardioembolic-December 22, 2023- 6 x 3 cm acute infarct throughout the medial left occipital lobe in the left PCA territory     Bilateral carotid stenosis-left 50-69%, right less than 50%     Stroke prophylaxis-aspirin 7 days through December 28, then start Eliquis 5 mg twice daily on December 29/atorvastatin  December 29-starting Eliquis today     Right visual field deficit-right homonymous hemianopsia     Impaired mobility/self-care     Diabetes mellitus type 2-sliding scale insulin-was on Actos 15 mg daily and Tradjenta 5 mg daily at home  January 3-resume Actos 15 mg p.o. daily in the a.m.     Chronic kidney disease     Hypertension-off metoprolol secondary bradycardia  December 29-systolic blood pressure 157/68 this a.m., later 113/62-variable pattern.  Allergy to amlodipine.  Will add lisinopril initially 2.5 mg daily  January 3-systolic blood pressure 126-141.  Diastolic blood pressure in the 60  Jan 4 - BP elevated, increase lisinopril to 5 mg     Status post bradycardia-resolved off metoprolol     Hypothyroidism-on replacement     B12 deficiency-on replacement    Vitamin D deficiency-change to ergocalciferol 50,000 units weekly for 8 weeks     Depression-bupropion/venlafaxine     -oxybutynin     DVT prophylaxis-SCDs     Outpatient evaluation for  obstructive sleep apnea    TEAM CONF - DEC 28 - TRANSFERS MIN ASSIST. GAIT 80 FEET RW MIN ASSIST. 4 STAIRS MIN ASSIST.   BATH MOD . UBD MIN. LBD MOD MAX. TOILET TRANSFERS MIN ASSIST.   GOOD FINE MOTOR SKILLS. RIGHT VISUAL NEGLECT. DECREASED INSIGHT INTO DEFICITS. OVERALL MILD COGNITIVE IMPAIRMENT.  MILD MODERATE EXPRESSIVE DEFICITS AT SENTENCE LEVEL. MILD ANOMIA. CONTINENT BOWEL AND BLADDER.   ASA COMPLETES TODAY AND STARTS ELIQUIS TOMORROW.   ELOS - TWO WEEKS - MIGHT BE A GOOD CANDIDATE FOR ASSISTED LIVING    TEAM CONF - JAN 4 - TRANSFERS CTG SBA WITH RW. BED CTG SBA. GAIT  FEET CTG RW. 4 STAIRS CTG. BATH SBA. LBD SBA. UBD SET UP. GROOMING SET UP. TOILETING SBA.   MEMORY - 1/3 recall with 5 minute delay no cues   ATTENTION - mild deficits in sustained/selective attention   EXPRESSION - Mild anomic events, utilizes word finding to repair given MIN cues. BNE PENDING  ELOS - MIDDLE OF NEXT WEEK. VIJAY 10.     Goal is for home with outpatient   therapies.  Barrier to discharge: Impaired mobility self-care cognition- work on attention to the right, executive function, memory, balance, gait, ADLs to overcome.     Sanjeev Quan MD      Appropriate PPE was worn during the entire visit.  Hand hygiene was completed before and after.

## 2024-01-06 NOTE — PLAN OF CARE
Goal Outcome Evaluation:               Pt is A/Ox3, calm/cooperative/pleasant, OOB x 1 w CGA w rwx. Meds taken whole w thin liquids. No c/o pain overnight. Both continent and incontinent of urine overnight.

## 2024-01-06 NOTE — PROGRESS NOTES
Inpatient Rehabilitation Plan of Care Note    Plan of Care  Care Plan Reviewed - No updates at this time.    Safety    Performed Intervention(s)  Safety rounds  Bed alarm and/chair alarm      Psychosocial    Performed Intervention(s)  Vebalizes her needs and concerns      Sphincter Control    Performed Intervention(s)  Bladder training program  Encourage fluid intake  Incontinence products    Signed by: Arminda Wilkes RN

## 2024-01-07 LAB
GLUCOSE BLDC GLUCOMTR-MCNC: 134 MG/DL (ref 70–130)
GLUCOSE BLDC GLUCOMTR-MCNC: 176 MG/DL (ref 70–130)
GLUCOSE BLDC GLUCOMTR-MCNC: 245 MG/DL (ref 70–130)
GLUCOSE BLDC GLUCOMTR-MCNC: 249 MG/DL (ref 70–130)

## 2024-01-07 PROCEDURE — 82948 REAGENT STRIP/BLOOD GLUCOSE: CPT

## 2024-01-07 PROCEDURE — 63710000001 INSULIN LISPRO (HUMAN) PER 5 UNITS: Performed by: INTERNAL MEDICINE

## 2024-01-07 RX ADMIN — LEVOTHYROXINE SODIUM 100 MCG: 100 TABLET ORAL at 06:23

## 2024-01-07 RX ADMIN — GABAPENTIN 400 MG: 400 CAPSULE ORAL at 13:24

## 2024-01-07 RX ADMIN — INSULIN LISPRO 3 UNITS: 100 INJECTION, SOLUTION INTRAVENOUS; SUBCUTANEOUS at 11:33

## 2024-01-07 RX ADMIN — VENLAFAXINE HYDROCHLORIDE 150 MG: 150 CAPSULE, EXTENDED RELEASE ORAL at 08:35

## 2024-01-07 RX ADMIN — LISINOPRIL 5 MG: 5 TABLET ORAL at 08:35

## 2024-01-07 RX ADMIN — INSULIN LISPRO 3 UNITS: 100 INJECTION, SOLUTION INTRAVENOUS; SUBCUTANEOUS at 20:58

## 2024-01-07 RX ADMIN — Medication 1000 MCG: at 08:35

## 2024-01-07 RX ADMIN — GABAPENTIN 400 MG: 400 CAPSULE ORAL at 06:23

## 2024-01-07 RX ADMIN — BUPROPION HYDROCHLORIDE 150 MG: 150 TABLET, EXTENDED RELEASE ORAL at 06:23

## 2024-01-07 RX ADMIN — ASPIRIN 325 MG: 325 TABLET ORAL at 08:35

## 2024-01-07 RX ADMIN — PIOGLITAZONE 15 MG: 15 TABLET ORAL at 08:35

## 2024-01-07 RX ADMIN — OXYBUTYNIN CHLORIDE 10 MG: 10 TABLET, EXTENDED RELEASE ORAL at 08:35

## 2024-01-07 RX ADMIN — GABAPENTIN 400 MG: 400 CAPSULE ORAL at 21:00

## 2024-01-07 RX ADMIN — INSULIN LISPRO 2 UNITS: 100 INJECTION, SOLUTION INTRAVENOUS; SUBCUTANEOUS at 08:34

## 2024-01-07 RX ADMIN — ATORVASTATIN CALCIUM 40 MG: 20 TABLET, FILM COATED ORAL at 20:58

## 2024-01-07 NOTE — PROGRESS NOTES
LOS: 12 days   Patient Care Team:  Eben Porter MD as PCP - General  Eben Porter MD as PCP - Family Medicine      MICHAEL OSMAN  1940      ADMITTING DIAGNOSIS:  Status post left PCA stroke-felt cardioembolic  Bilateral carotid stenosis  Stroke prophylaxis-aspirin 7 days through December 28, then start Eliquis 5 mg twice daily on December 29/atorvastatin  Right visual field deficit-right homonymous hemianopsia  Impaired mobility/self-car      Subjective     Reports therapeutic day past went well.  She did activities from a wheelchair level.  Visited a couple of assisted living facilities.  She continues with impaired vision to the right side.  Does not notice any new weakness.      Objective     Vitals:    01/07/24 0833   BP: 127/63   Pulse: 51   Resp:    Temp:    SpO2:        Intake/Output Summary (Last 24 hours) at 1/7/2024 0954  Last data filed at 1/6/2024 1700  Gross per 24 hour   Intake 240 ml   Output --   Net 240 ml     PHYSICAL EXAM:     MENTAL STATUS -  AWAKE / ALERT  HEENT-    LUNGS - CTA, NO WHEEZES, RALES OR RHONCHI  HEART- RRR, NO RUB, MURMUR, OR GALLOP  ABD - NORMOACTIVE BOWEL SOUNDS, SOFT, NT.    EXT - NO EDEMA OR CYANOSIS  NEURO -oriented person place and general situation  Speech fluent.   Right homonymous hemianopsia  MOTOR EXAM - RUE/RLE 5/5. LUE/LLE 5/5.      MEDICATIONS  Scheduled Meds:aspirin, 325 mg, Oral, Daily  atorvastatin, 40 mg, Oral, Nightly  buPROPion XL, 150 mg, Oral, QAM  docusate sodium, 100 mg, Oral, BID  gabapentin, 400 mg, Oral, Q8H  insulin lispro, 2-7 Units, Subcutaneous, 4x Daily AC & at Bedtime  levothyroxine, 100 mcg, Oral, Q AM  lisinopril, 5 mg, Oral, Q24H  oxybutynin XL, 10 mg, Oral, Daily  pioglitazone, 15 mg, Oral, Daily  polyethylene glycol, 17 g, Oral, Daily  venlafaxine XR, 150 mg, Oral, Daily  vitamin B-12, 1,000 mcg, Oral, Daily  vitamin D, 50,000 Units, Oral, Q7 Days      Continuous Infusions:   PRN Meds:.  acetaminophen **OR**  acetaminophen    bisacodyl    dextrose    glucagon (human recombinant)    melatonin    ondansetron      RESULTS  Glucose   Date/Time Value Ref Range Status   01/07/2024 0723 176 (H) 70 - 130 mg/dL Final   01/06/2024 1957 214 (H) 70 - 130 mg/dL Final   01/06/2024 1604 153 (H) 70 - 130 mg/dL Final   01/06/2024 1121 160 (H) 70 - 130 mg/dL Final   01/06/2024 0725 147 (H) 70 - 130 mg/dL Final   01/05/2024 2232 181 (H) 70 - 130 mg/dL Final   01/05/2024 1931 209 (H) 70 - 130 mg/dL Final   01/05/2024 1626 143 (H) 70 - 130 mg/dL Final     Results from last 7 days   Lab Units 01/04/24  0756 01/01/24  0506   WBC 10*3/mm3 6.85 7.19   HEMOGLOBIN g/dL 12.5 11.4*   HEMATOCRIT % 40.0 35.5   PLATELETS 10*3/mm3 232 221     Results from last 7 days   Lab Units 01/04/24  0756 01/01/24  0505   SODIUM mmol/L 140 138   POTASSIUM mmol/L 4.8 4.5   CHLORIDE mmol/L 103 104   CO2 mmol/L 27.5 23.9   BUN mg/dL 30* 28*   CREATININE mg/dL 1.51* 1.24*   CALCIUM mg/dL 9.2 9.0   GLUCOSE mg/dL 192* 147*        Latest Reference Range & Units 12/28/23 06:09   25 Hydroxy, Vitamin D 30.0 - 100.0 ng/ml 13.9 (L)   (L): Data is abnormally low    ASSESSMENT and PLAN    Stroke    Status post left PCA stroke-felt cardioembolic-December 22, 2023- 6 x 3 cm acute infarct throughout the medial left occipital lobe in the left PCA territory     Bilateral carotid stenosis-left 50-69%, right less than 50%     Stroke prophylaxis-aspirin 7 days through December 28, then start Eliquis 5 mg twice daily on December 29/atorvastatin  December 29-starting Eliquis today     Right visual field deficit-right homonymous hemianopsia     Impaired mobility/self-care     Diabetes mellitus type 2-sliding scale insulin-was on Actos 15 mg daily and Tradjenta 5 mg daily at home  January 3-resume Actos 15 mg p.o. daily in the a.m.     Chronic kidney disease     Hypertension-off metoprolol secondary bradycardia  December 29-systolic blood pressure 157/68 this a.m., later 113/62-variable  pattern.  Allergy to amlodipine.  Will add lisinopril initially 2.5 mg daily  January 3-systolic blood pressure 126-141.  Diastolic blood pressure in the 60  Jan 4 - BP elevated, increase lisinopril to 5 mg     Status post bradycardia-resolved off metoprolol     Hypothyroidism-on replacement     B12 deficiency-on replacement    Vitamin D deficiency-change to ergocalciferol 50,000 units weekly for 8 weeks     Depression-bupropion/venlafaxine     -oxybutynin     DVT prophylaxis-SCDs     Outpatient evaluation for obstructive sleep apnea    TEAM CONF - DEC 28 - TRANSFERS MIN ASSIST. GAIT 80 FEET RW MIN ASSIST. 4 STAIRS MIN ASSIST.   BATH MOD . UBD MIN. LBD MOD MAX. TOILET TRANSFERS MIN ASSIST.   GOOD FINE MOTOR SKILLS. RIGHT VISUAL NEGLECT. DECREASED INSIGHT INTO DEFICITS. OVERALL MILD COGNITIVE IMPAIRMENT.  MILD MODERATE EXPRESSIVE DEFICITS AT SENTENCE LEVEL. MILD ANOMIA. CONTINENT BOWEL AND BLADDER.   ASA COMPLETES TODAY AND STARTS ELIQUIS TOMORROW.   ELOS - TWO WEEKS - MIGHT BE A GOOD CANDIDATE FOR ASSISTED LIVING    TEAM CONF - JAN 4 - TRANSFERS CTG SBA WITH RW. BED CTG SBA. GAIT  FEET CTG RW. 4 STAIRS CTG. BATH SBA. LBD SBA. UBD SET UP. GROOMING SET UP. TOILETING SBA.   MEMORY - 1/3 recall with 5 minute delay no cues   ATTENTION - mild deficits in sustained/selective attention   EXPRESSION - Mild anomic events, utilizes word finding to repair given MIN cues. BNE PENDING  ELOS - MIDDLE OF NEXT WEEK. VIJAY 10.     Goal is for home with outpatient   therapies.  Barrier to discharge: Impaired mobility self-care cognition- work on attention to the right, executive function, memory, balance, gait, ADLs to overcome.     Sanjeev Quan MD      Appropriate PPE was worn during the entire visit.  Hand hygiene was completed before and after.

## 2024-01-07 NOTE — PLAN OF CARE
Goal Outcome Evaluation:  Plan of Care Reviewed With: patient           Outcome Evaluation: Patient alert and oriented x3, but forgetful at times. She is assist x1, cont/inc of b/b, and takes medication with water. No complaints of pain, no safety issues, and accucheck AC/HS-coverage needed at breakfast and lunch. Wound to L 3rd toe- dressing changed by night shift nurse, bradycardic at times. and no other issues at this time. No therapy, right visual field deficit, and family at bedside this afternoon..

## 2024-01-07 NOTE — PROGRESS NOTES
Inpatient Rehabilitation Plan of Care Note    Plan of Care  Care Plan Reviewed - No updates at this time.    Safety    [RN] Potential for Injury(Active)  Current Status(01/07/2024): Risk for fall  Weekly Goal(01/09/2024): Cue to use call bell  Discharge Goal: PT /Family will be aware of risk for fall and safety in the home  setting    Performed Intervention(s)  Safety rounds  Bed alarm and/chair alarm      Psychosocial    [RN] Coping/Adjustment(Active)  Current Status(01/07/2024): Expressing appropriate coping  Weekly Goal(01/09/2024): Provide educational material regarding stroke  Discharge Goal: Knowleadgeable of care needs and stroke recovery    Performed Intervention(s)  Vebalizes her needs and concerns      Sphincter Control    [RN] Bladder Management(Active)  Current Status(01/07/2024): Bladder urgency  Weekly Goal(01/11/2024): Continent 50%  Discharge Goal: Continent 100%    [RN] Bowel Management(Active)  Current Status(01/07/2024): Continent 100%  Weekly Goal(01/09/2024): Continent 100%  Discharge Goal: Continent 100%    Performed Intervention(s)  Bladder training program  Encourage fluid intake  Incontinence products    Signed by: Bailey Thompson RN

## 2024-01-07 NOTE — PLAN OF CARE
Goal Outcome Evaluation:       Pt is A/Ox3, calm/cooperative/pleasant, OOB x 1 w CGA w rwx. Meds taken whole w thin liquids. No c/o pain overnight. Pt had urinary incontinence overnight.

## 2024-01-08 ENCOUNTER — TELEPHONE (OUTPATIENT)
Dept: NEUROLOGY | Facility: CLINIC | Age: 84
End: 2024-01-08

## 2024-01-08 LAB
ANION GAP SERPL CALCULATED.3IONS-SCNC: 8.5 MMOL/L (ref 5–15)
BASOPHILS # BLD AUTO: 0.03 10*3/MM3 (ref 0–0.2)
BASOPHILS NFR BLD AUTO: 0.4 % (ref 0–1.5)
BUN SERPL-MCNC: 40 MG/DL (ref 8–23)
BUN/CREAT SERPL: 27.6 (ref 7–25)
CALCIUM SPEC-SCNC: 9.2 MG/DL (ref 8.6–10.5)
CHLORIDE SERPL-SCNC: 102 MMOL/L (ref 98–107)
CO2 SERPL-SCNC: 25.5 MMOL/L (ref 22–29)
CREAT SERPL-MCNC: 1.45 MG/DL (ref 0.57–1)
DEPRECATED RDW RBC AUTO: 48.9 FL (ref 37–54)
EGFRCR SERPLBLD CKD-EPI 2021: 36.3 ML/MIN/1.73
EOSINOPHIL # BLD AUTO: 0.22 10*3/MM3 (ref 0–0.4)
EOSINOPHIL NFR BLD AUTO: 2.7 % (ref 0.3–6.2)
ERYTHROCYTE [DISTWIDTH] IN BLOOD BY AUTOMATED COUNT: 14.1 % (ref 12.3–15.4)
GLUCOSE BLDC GLUCOMTR-MCNC: 160 MG/DL (ref 70–130)
GLUCOSE BLDC GLUCOMTR-MCNC: 231 MG/DL (ref 70–130)
GLUCOSE BLDC GLUCOMTR-MCNC: 232 MG/DL (ref 70–130)
GLUCOSE BLDC GLUCOMTR-MCNC: 84 MG/DL (ref 70–130)
GLUCOSE SERPL-MCNC: 164 MG/DL (ref 65–99)
HCT VFR BLD AUTO: 37.9 % (ref 34–46.6)
HGB BLD-MCNC: 12.4 G/DL (ref 12–15.9)
IMM GRANULOCYTES # BLD AUTO: 0.02 10*3/MM3 (ref 0–0.05)
IMM GRANULOCYTES NFR BLD AUTO: 0.2 % (ref 0–0.5)
LYMPHOCYTES # BLD AUTO: 2.8 10*3/MM3 (ref 0.7–3.1)
LYMPHOCYTES NFR BLD AUTO: 33.7 % (ref 19.6–45.3)
MCH RBC QN AUTO: 30.6 PG (ref 26.6–33)
MCHC RBC AUTO-ENTMCNC: 32.7 G/DL (ref 31.5–35.7)
MCV RBC AUTO: 93.6 FL (ref 79–97)
MONOCYTES # BLD AUTO: 0.79 10*3/MM3 (ref 0.1–0.9)
MONOCYTES NFR BLD AUTO: 9.5 % (ref 5–12)
NEUTROPHILS NFR BLD AUTO: 4.44 10*3/MM3 (ref 1.7–7)
NEUTROPHILS NFR BLD AUTO: 53.5 % (ref 42.7–76)
NRBC BLD AUTO-RTO: 0 /100 WBC (ref 0–0.2)
PLATELET # BLD AUTO: 210 10*3/MM3 (ref 140–450)
PMV BLD AUTO: 9.4 FL (ref 6–12)
POTASSIUM SERPL-SCNC: 5 MMOL/L (ref 3.5–5.2)
RBC # BLD AUTO: 4.05 10*6/MM3 (ref 3.77–5.28)
SODIUM SERPL-SCNC: 136 MMOL/L (ref 136–145)
WBC NRBC COR # BLD AUTO: 8.3 10*3/MM3 (ref 3.4–10.8)

## 2024-01-08 PROCEDURE — 97110 THERAPEUTIC EXERCISES: CPT

## 2024-01-08 PROCEDURE — 97530 THERAPEUTIC ACTIVITIES: CPT

## 2024-01-08 PROCEDURE — 80048 BASIC METABOLIC PNL TOTAL CA: CPT | Performed by: PHYSICAL MEDICINE & REHABILITATION

## 2024-01-08 PROCEDURE — 97130 THER IVNTJ EA ADDL 15 MIN: CPT

## 2024-01-08 PROCEDURE — 82948 REAGENT STRIP/BLOOD GLUCOSE: CPT

## 2024-01-08 PROCEDURE — 97535 SELF CARE MNGMENT TRAINING: CPT

## 2024-01-08 PROCEDURE — 97116 GAIT TRAINING THERAPY: CPT

## 2024-01-08 PROCEDURE — 63710000001 INSULIN LISPRO (HUMAN) PER 5 UNITS: Performed by: INTERNAL MEDICINE

## 2024-01-08 PROCEDURE — 85025 COMPLETE CBC W/AUTO DIFF WBC: CPT | Performed by: PHYSICAL MEDICINE & REHABILITATION

## 2024-01-08 PROCEDURE — 97129 THER IVNTJ 1ST 15 MIN: CPT

## 2024-01-08 RX ORDER — HYDRALAZINE HYDROCHLORIDE 10 MG/1
10 TABLET, FILM COATED ORAL EVERY 8 HOURS SCHEDULED
Status: DISCONTINUED | OUTPATIENT
Start: 2024-01-08 | End: 2024-01-08

## 2024-01-08 RX ORDER — GLIPIZIDE 10 MG/1
10 TABLET ORAL
Status: DISCONTINUED | OUTPATIENT
Start: 2024-01-08 | End: 2024-01-08

## 2024-01-08 RX ORDER — HYDRALAZINE HYDROCHLORIDE 10 MG/1
10 TABLET, FILM COATED ORAL EVERY 8 HOURS SCHEDULED
Status: DISCONTINUED | OUTPATIENT
Start: 2024-01-08 | End: 2024-01-11 | Stop reason: HOSPADM

## 2024-01-08 RX ORDER — GLIPIZIDE 5 MG/1
5 TABLET ORAL
Status: DISCONTINUED | OUTPATIENT
Start: 2024-01-08 | End: 2024-01-11 | Stop reason: HOSPADM

## 2024-01-08 RX ADMIN — INSULIN LISPRO 2 UNITS: 100 INJECTION, SOLUTION INTRAVENOUS; SUBCUTANEOUS at 08:33

## 2024-01-08 RX ADMIN — APIXABAN 5 MG: 5 TABLET, FILM COATED ORAL at 20:48

## 2024-01-08 RX ADMIN — ASPIRIN 325 MG: 325 TABLET ORAL at 08:34

## 2024-01-08 RX ADMIN — OXYBUTYNIN CHLORIDE 10 MG: 10 TABLET, EXTENDED RELEASE ORAL at 08:34

## 2024-01-08 RX ADMIN — VENLAFAXINE HYDROCHLORIDE 150 MG: 150 CAPSULE, EXTENDED RELEASE ORAL at 08:34

## 2024-01-08 RX ADMIN — GABAPENTIN 400 MG: 400 CAPSULE ORAL at 15:32

## 2024-01-08 RX ADMIN — INSULIN LISPRO 3 UNITS: 100 INJECTION, SOLUTION INTRAVENOUS; SUBCUTANEOUS at 20:48

## 2024-01-08 RX ADMIN — LINAGLIPTIN 5 MG: 5 TABLET, FILM COATED ORAL at 12:08

## 2024-01-08 RX ADMIN — LEVOTHYROXINE SODIUM 100 MCG: 100 TABLET ORAL at 06:02

## 2024-01-08 RX ADMIN — BUPROPION HYDROCHLORIDE 150 MG: 150 TABLET, EXTENDED RELEASE ORAL at 06:02

## 2024-01-08 RX ADMIN — GLIPIZIDE 5 MG: 5 TABLET ORAL at 12:08

## 2024-01-08 RX ADMIN — Medication 1000 MCG: at 08:34

## 2024-01-08 RX ADMIN — INSULIN LISPRO 3 UNITS: 100 INJECTION, SOLUTION INTRAVENOUS; SUBCUTANEOUS at 11:38

## 2024-01-08 RX ADMIN — PIOGLITAZONE 15 MG: 15 TABLET ORAL at 08:34

## 2024-01-08 RX ADMIN — HYDRALAZINE HYDROCHLORIDE 10 MG: 10 TABLET, FILM COATED ORAL at 21:52

## 2024-01-08 RX ADMIN — GABAPENTIN 400 MG: 400 CAPSULE ORAL at 06:02

## 2024-01-08 RX ADMIN — GABAPENTIN 400 MG: 400 CAPSULE ORAL at 21:37

## 2024-01-08 RX ADMIN — ATORVASTATIN CALCIUM 40 MG: 20 TABLET, FILM COATED ORAL at 20:48

## 2024-01-08 NOTE — THERAPY TREATMENT NOTE
"Inpatient Rehabilitation - Occupational Therapy Treatment Note    [facility]     Patient Name: Rekha Bear  : 1940  MRN: [id number]    Today's Date: 2024                 Admit Date: 2023       No diagnosis found.    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    Community acquired pneumonia of left lower lobe of lung    Hyponatremia    Pneumonia due to COVID-19 virus    Major depressive disorder, recurrent, mild    Acute respiratory failure with hypoxia    Supraventricular tachycardia    PVC (premature ventricular contraction)    Dyspnea on exertion    Dizziness    Depressive disorder    Gastroesophageal reflux disease    Midline cystocele    Rectocele    BMI 29.0-29.9,adult    Acute left PCA stroke    B12 deficiency    Bradycardia    Premature atrial contractions    Stroke       Past Medical History:   Diagnosis Date    Chilblains     \"Chilblian's\"    CKD (chronic kidney disease)     Depression     Fatty liver     GERD (gastroesophageal reflux disease)     Hyperlipidemia     Hypertension     Incontinence     Osteoarthritis     OA  Marvin THR, LT TKR - hydrocodone prescibed by Dr. 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 Almaguer    TOTAL KNEE ARTHROPLASTY Left     OA  Marvin THR, LT TKR - hydrocodone prescibed by Dr. Almaguer             IRF OT ASSESSMENT FLOWSHEET (last 12 hours)       IRF OT Evaluation and Treatment       Row Name 01/08/24 1213          OT Time and Intention    Document Type daily treatment  -KA     Mode of Treatment individual therapy;occupational therapy  -KA     Patient Effort good  -       Row Name 01/08/24 1213          General Information    Patient Profile Reviewed yes  -KA     Patient/Family/Caregiver Comments/Observations Seated in recliner upon arrival for AM and PM session  -KA     Existing Precautions/Restrictions fall  -KA     Limitations/Impairments visual  R visual field deficit  -       Row Name 01/08/24 1213          Pain Assessment    Pretreatment Pain Rating 0/10 - no pain  -     Posttreatment Pain Rating 0/10 - no pain  -       Row Name 01/08/24 1213          Cognition/Psychosocial    Orientation Status (Cognition) oriented x 4  -KA     Follows Commands (Cognition) follows one-step commands;over 90% accuracy;verbal cues/prompting required  -KA     Personal Safety Interventions fall prevention program maintained;gait belt;nonskid shoes/slippers when out of bed  -KA       Row Name 01/08/24 1213          Vision Assessment/Intervention    Visual Treatment Interventions scanning activities/strategies;searching activities/strategies  -     Vision Assessment Comment Pt performed letter cancellation activity to work on visually scanning worksheet. She was able to complete with increased time with 100% accuracy. Also  worked on visually scanning table top to locate suit/card therapist called out to pt. Also completed with 100% accuracy with increased time scanning tabletop  Worked on line tracing activity worksheet. Demo min difficulty to complete due to various lines on the page  -       Row Name 01/08/24 1213          Bathing    [place] Level (Bathing) bathing skills;lower body;upper body;standby assist  -     Assistive Device (Bathing) hand held shower spray hose;grab bar/tub rail;tub bench  -KA     Position (Bathing) supported sitting;supported standing  -     Set-up Assistance (Bathing) obtain supplies  -       Row Name 01/08/24 1213          Upper Body Dressing    [place] Level (Upper Body Dressing) upper body dressing skills;doff;don;pull over garment;supervision;set up assistance  -KA     Position (Upper Body Dressing) supported sitting  -     Set-up Assistance (Upper Body Dressing) obtain clothing  -       Row Name 01/08/24 1213          Lower Body Dressing    [place] Level (Lower Body Dressing) doff;don;pants/bottoms;shoes/slippers;socks;set up;standby assist;verbal cues  -KA     Position (Lower Body Dressing) supported sitting;supported standing  -     Set-up Assistance (Lower Body Dressing) obtain clothing  -       Row Name 01/08/24 1213          Sit-Stand Transfer    Sit-Stand [place] (Transfers) standby assist  -     Assistive Device (Sit-Stand Transfers) wheelchair  -       Row Name 01/08/24 1213          Stand-Sit Transfer    Stand-Sit [place] (Transfers) standby assist  -     Assistive Device (Stand-Sit Transfers) wheelchair  -       Row Name 01/08/24 1213          Shower Transfer    Type (Shower Transfer) sit-stand;stand-sit  -KA     [place] Level (Shower Transfer) stand by assist  -     Assistive Device (Shower Transfer) grab bar, tub/shower;walker, front-wheeled;shower chair  -       Row Name 01/08/24 1213          Elbow/Forearm (Therapeutic Exercise)     Elbow/Forearm Strengthening (Therapeutic Exercise) bilateral;flexion;extension;supination;pronation;sitting;2 lb free weight;10 repetitions;3 sets  -       Row Name 01/08/24 1213          Balance    Static Sitting Balance modified independence  -     Dynamic Sitting Balance supervision  -     Position, Sitting Balance --  seated on tub bench  -       Row Name 01/08/24 1213          Positioning and Restraints    Pre-Treatment Position sitting in chair/recliner  -     Post Treatment Position wheelchair  AM and PM session  -     In Wheelchair sitting;call light within reach;encouraged to call for assist;exit alarm on;with PT  -               User Key  (r) = Recorded By, (t) = Taken By, (c) = Cosigned By      Initials Name Effective Dates    Jackie Palomo OT 09/22/22 -                      Occupational Therapy Education       Title: PT OT SLP Therapies (In Progress)       Topic: Occupational Therapy (In Progress)       Point: ADL training (Done)       Description:   Instruct learner(s) on proper safety adaptation and remediation techniques during self care or transfers.   Instruct in proper use of assistive devices.                  Learning Progress Summary             Patient Acceptance, E,TB,D, NR,VU by CC at 12/30/2023 1148    Comment: Shower tsf to bench                         Point: Home exercise program (Not Started)       Description:   Instruct learner(s) on appropriate technique for monitoring, assisting and/or progressing therapeutic exercises/activities.                  Learner Progress:  Not documented in this visit.              Point: Precautions (Not Started)       Description:   Instruct learner(s) on prescribed precautions during self-care and functional transfers.                  Learner Progress:  Not documented in this visit.              Point: Body mechanics (Not Started)       Description:   Instruct learner(s) on proper positioning and spine alignment during self-care,  functional mobility activities and/or exercises.                  Learner Progress:  Not documented in this visit.                              User Key       Initials Effective Dates Name Provider Type Discipline    CC 06/16/21 -  Sahra Crisostomo OTR Occupational Therapist OT                        OT Recommendation and Plan                         Time Calculation:      Time Calculation- OT       Row Name 01/08/24 1520 01/08/24 1519          Time Calculation- OT    OT Start Time 1230  -KA 0800  -KA     OT Stop Time 1300  -KA 0830  -KA     OT Time Calculation (min) 30 min  -KA 30 min  -KA               User Key  (r) = Recorded By, (t) = Taken By, (c) = Cosigned By      Initials Name Provider Type    Jackie Palomo OT Occupational Therapist                  Therapy Charges for Today       Code Description Service Date Service Provider Modifiers Qty    96460468545 HC OT SELF CARE/MGMT/TRAIN EA 15 MIN 1/8/2024 Jackie Whiteside OT GO 2    17877341718 HC OT THERAPEUTIC ACT EA 15 MIN 1/8/2024 Jackie Whiteside OT GO 1    [id number]  OT THER PROC EA 15 MIN 1/8/2024 Jackie Whiteside OT GO 1                     Jackie Whiteside OT  1/8/2024

## 2024-01-08 NOTE — PLAN OF CARE
Goal Outcome Evaluation:  Plan of Care Reviewed With: patient           Outcome Evaluation: Pt A&O X3.Forgetful at times.Assistx1.Cont bowel LBM 01/07.Accucheck ACHS.Had coverage,Had family conference   to assisted living  either Wed or Ths.Due hydralazine  onmmited pulse 47bpm.Dr Arndt  informed  ordered  Re time to start tonight,Tolerated therapies well.Family in attendance all day.Meds with water.Dressing to left 3rd toe dressed.Gel oinment applied

## 2024-01-08 NOTE — THERAPY TREATMENT NOTE
"Inpatient Rehabilitation - Physical Therapy Treatment Note       [facility]     Patient Name: Rekha Bear  : 1940  MRN: [id number]    Today's Date: 2024                    Admit Date: 2023      Visit Dx:   No diagnosis found.    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    Community acquired pneumonia of left lower lobe of lung    Hyponatremia    Pneumonia due to COVID-19 virus    Major depressive disorder, recurrent, mild    Acute respiratory failure with hypoxia    Supraventricular tachycardia    PVC (premature ventricular contraction)    Dyspnea on exertion    Dizziness    Depressive disorder    Gastroesophageal reflux disease    Midline cystocele    Rectocele    BMI 29.0-29.9,adult    Acute left PCA stroke    B12 deficiency    Bradycardia    Premature atrial contractions    Stroke       Past Medical History:   Diagnosis Date    Chilblains     \"Chilblian's\"    CKD (chronic kidney disease)     Depression     Fatty liver     GERD (gastroesophageal reflux disease)     Hyperlipidemia     Hypertension     Incontinence     Osteoarthritis     OA  Marvin THR, LT TKR - hydrocodone prescibed by Dr. 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 Almaguer    TOTAL KNEE ARTHROPLASTY Left     OA  Marvin THR, LT TKR - hydrocodone prescibed by Dr. Almaguer       PT ASSESSMENT (last 12 hours)       IRF PT Evaluation and Treatment       Row Name 01/08/24 0852          PT Time and Intention    Document Type daily treatment  -MD     Mode of Treatment physical therapy  -MD     Patient/Family/Caregiver Comments/Observations Pt sitting in WC showing no signs of acute distress.  Pt not happy w going to assisted living.  PT discussed the importance of safety due to R visual field cut at this time.  -MD       Row Name 01/08/24 0852          General Information    Existing Precautions/Restrictions fall  -MD       Row Name 01/08/24 0852          Pain Assessment    Pretreatment Pain Rating 0/10 - no pain  -MD       Row Name 01/08/24 0852          Cognition/Psychosocial    Orientation Status (Cognition) oriented x 4  -MD     Follows Commands (Cognition) follows one-step commands;over 90% accuracy;verbal cues/prompting required  -MD     Personal Safety Interventions fall prevention program maintained;gait belt  -MD       Row Name 01/08/24 0852          Sit-Stand Transfer    Sit-Stand [place] (Transfers) standby assist  -MD     Assistive Device (Sit-Stand Transfers) wheelchair  -MD       Row Name 01/08/24 0852          Stand-Sit Transfer    Stand-Sit [place] (Transfers) standby assist  -MD     Assistive Device (Stand-Sit Transfers) wheelchair  -MD       Row Name 01/08/24 0852          Gait/Stairs (Locomotion)    [place] Level (Gait) verbal cues;standby assist  -MD     Assistive  "Device (Gait) walker, front-wheeled  -MD     Distance in Feet (Gait) 200'x1 and 160'x1  -MD     Pattern (Gait) step-through  -MD     Deviations/Abnormal Patterns (Gait) gait speed decreased;base of support, narrow;stride length decreased;weight shifting decreased;right sided deviations;antalgic  -MD     Bilateral Gait Deviations forward flexed posture;heel strike decreased  -MD     [place] Level (Stairs) verbal cues;stand by assist  -MD     Handrail Location (Stairs) both sides  -MD     Number of Steps (Stairs) 4  -MD     Ascending Technique (Stairs) step-over-step  -MD     Descending Technique (Stairs) step-over-step  -MD       Row Name 01/08/24 0852          Balance    Balance Interventions standing;moderate challenge;core stability exercise;single limb stance;foam;trunk training exercise;eyes closed during activity  standing on foam w/o UE support and eyes closed 3 sets of 10sec.  step ups on foam w UE support 2 sets of 10 reps  -MD       Row Name 01/08/24 0852          Motor Skills    Motor Skills neuro-muscular function  -MD       Row Name 01/08/24 0852          Hip (Therapeutic Exercise)    Hip Strengthening (Therapeutic Exercise) bilateral;aBduction;aDduction;standing;15 repititions  -MD       Row Name 01/08/24 0852          Advanced Stepping/Walking Interventions    Stepping/Walking Interventions braiding;side stepping;box stepping  -MD     Box Stepping (Stepping/Walking Interventions) step ups onto a 4\" step while standing on foam.  Standing at rhoda bars w R UE on bar and L UE at HHA.  2 sets of 10 reps  -MD     Braiding (Stepping/Walking Interventions) CGA w VC for technique. 10'x2 w B UE support at rhoda bars  -MD     Side Stepping (Stepping/Walking Interventions) 10'x4 at rhoda bars w B UE support and CGA  -MD       Row Name 01/08/24 0852          Positioning and Restraints    Pre-Treatment Position sitting in chair/recliner  -MD     Post Treatment Position wheelchair  -MD     In Wheelchair with other " staff  in family confrence  -MD               User Key  (r) = Recorded By, (t) = Taken By, (c) = Cosigned By      Initials Name Provider Type    Gianna Eisenberg, PT Physical Therapist                  Wound 12/27/23 0045 Left posterior third toe (Active)   Dressing Appearance dry;intact;no drainage 01/07/24 2055   Closure Unable to assess 01/07/24 2055   Base dressing in place, unable to visualize 01/07/24 2055     Physical Therapy Education       Title: PT OT SLP Therapies (In Progress)       Topic: Physical Therapy (In Progress)       Point: Mobility training (Done)       Learning Progress Summary             Patient Acceptance, E,D, VU,DU by MD at 1/5/2024 1200    Comment: family teaching completed for transfers, ambulation, stairs and car transfers.  Family education on compensation techniques for visual field cuts and RWx use.  PT advised pt not to get up and move w/o assistance due to visual deficits.    Acceptance, E,D,TB, VU,NR by MG at 1/3/2024 0747    Acceptance, E,D, VU,NR by MG at 1/2/2024 0842    Acceptance, E,TB,D, VU,NR by MG at 12/27/2023 1554   Family Acceptance, E,D, VU,DU by MD at 1/5/2024 1200    Comment: family teaching completed for transfers, ambulation, stairs and car transfers.  Family education on compensation techniques for visual field cuts and RWx use.  PT advised pt not to get up and move w/o assistance due to visual deficits.                         Point: Home exercise program (Not Started)       Learner Progress:  Not documented in this visit.              Point: Body mechanics (Done)       Learning Progress Summary             Patient Acceptance, E,D,TB, VU,NR by MG at 1/3/2024 0747    Acceptance, E,D, VU,NR by MG at 1/2/2024 0842    Acceptance, E,TB,D, VU,NR by MG at 12/27/2023 1554                         Point: Precautions (Done)       Learning Progress Summary             Patient Acceptance, E, VU by MD at 1/8/2024 1610    Acceptance, E, VU by MD at 1/8/2024 0856    Acceptance, E,  VU by MD at 1/6/2024 0845    Acceptance, E, VU by MD at 1/4/2024 0849    Acceptance, E,D,TB, VU,NR by MG at 1/3/2024 0747    Acceptance, E,D, VU,NR by MG at 1/2/2024 0842    Acceptance, E, NR by DP at 12/29/2023 1147    Comment: scanning for objects and avoidance of them when walking    Acceptance, E, NR by MD at 12/28/2023 0916    Acceptance, E,TB,D, VU,NR by MG at 12/27/2023 1554                                         User Key       Initials Effective Dates Name Provider Type Discipline    MD 06/16/21 -  Gianna Atwood, PT Physical Therapist PT    MG 05/24/22 -  Dayanna Turpin, PT Physical Therapist PT    DP 08/24/21 -  Julián Pathak, PT Physical Therapist PT                    PT Recommendation and Plan                          Time Calculation:      PT Charges       Row Name 01/08/24 1338 01/08/24 0852          Time Calculation    Start Time 1330  -MD 0830  -MD     Stop Time 1400  -MD 0900  -MD     Time Calculation (min) 30 min  -MD 30 min  -MD     PT Received On -- 01/08/24  -MD     PT - Next Appointment -- 01/09/24  -MD               User Key  (r) = Recorded By, (t) = Taken By, (c) = Cosigned By      Initials Name Provider Type    Gianna Eisenberg, PT Physical Therapist                    Therapy Charges for Today       Code Description Service Date Service Provider Modifiers Qty    17576727854 HC GAIT TRAINING EA 15 MIN 1/8/2024 Gianna Atwood, PT GP 2    48108729784 HC PT THERAPEUTIC ACT EA 15 MIN 1/8/2024 Gianna Atwood, PT GP 2    98315282239 HC PT THER PROC EA 15 MIN 1/8/2024 Gianna Atwood, PT GP 2              PT G-Codes  AM-PAC 6 Clicks Score (PT): 19      Gianna Atwodo PT  1/8/2024

## 2024-01-08 NOTE — PLAN OF CARE
Problem: Rehabilitation (IRF) Plan of Care  Goal: Plan of Care Review  Outcome: Ongoing, Progressing  Plan of care reviewed with patient    Neuro-Pt alert and oriented x3  GI-Pt cont-incont of urine  -Last BM 1/8  Pain-Pt denied pain  Skin-blanchable redness to bilateral heels, pt refused to wear bilateral foam boots

## 2024-01-08 NOTE — THERAPY TREATMENT NOTE
"Inpatient Rehabilitation - Speech Language Pathology Treatment Note    [facility]     Patient Name: Rekha Bear  : 1940  MRN: [id number]    Today's Date: 2024                   Admit Date: 2023       Visit Dx:    No diagnosis found.    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    Community acquired pneumonia of left lower lobe of lung    Hyponatremia    Pneumonia due to COVID-19 virus    Major depressive disorder, recurrent, mild    Acute respiratory failure with hypoxia    Supraventricular tachycardia    PVC (premature ventricular contraction)    Dyspnea on exertion    Dizziness    Depressive disorder    Gastroesophageal reflux disease    Midline cystocele    Rectocele    BMI 29.0-29.9,adult    Acute left PCA stroke    B12 deficiency    Bradycardia    Premature atrial contractions    Stroke       Past Medical History:   Diagnosis Date    Chilblains     \"Chilblian's\"    CKD (chronic kidney disease)     Depression     Fatty liver     GERD (gastroesophageal reflux disease)     Hyperlipidemia     Hypertension     Incontinence     Osteoarthritis     OA  Marvin THR, LT TKR - hydrocodone prescibed by Dr. 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 Almaguer    TOTAL KNEE ARTHROPLASTY Left     OA  Marvin THR, LT TKR - hydrocodone prescibed by Dr. Almaguer       SLP Recommendation and Plan                                                               SLP EVALUATION (last 72 hours)       SLP SLC Evaluation       Row Name 01/08/24 1300 01/08/24 1000                Communication Assessment/Intervention    Document Type therapy note (daily note)  -SR therapy note (daily note)  -SR       Subjective Information no complaints  -SR no complaints  -SR       Patient Observations alert;cooperative;agree to therapy  -SR alert;cooperative;agree to therapy  -SR       Patient Effort good  -SR good  -SR       Symptoms Noted During/After Treatment none  -SR none  -SR          Pain Scale: Numbers Pre/Post-Treatment    Pretreatment Pain Rating 0/10 - no pain  -SR 0/10 - no pain  -SR       Posttreatment Pain Rating 0/10 - no pain  -SR 0/10 - no pain  -SR                 User Key  (r) = Recorded By, (t) = Taken By, (c) = Cosigned By      Initials Name Effective Dates    SR Jessica Carr SLP 01/05/24 -                        EDUCATION    The patient has been educated in the following areas:       Cognitive Impairment Communication Impairment.             SLP GOALS       Row Name 01/08/24 1300 01/08/24 1000          Attention Goal 1 (SLP)    Improve Attention by Goal 1 (SLP) -- complete sustained attention task;complete selective attention task;80%;independently (over 90% accuracy)  -SR     Time Frame (Attention Goal 1, SLP) -- short term goal (STG)  -SR     Progress/Outcomes (Attention  Goal 1, SLP) -- goal partially met  -SR     Comment (Attention Goal 1, SLP) -- Attention to detail targeted with written directions activity. Patient followed 2 step-2 component written directions with 90% acc given NO cues  -SR        Organizational Skills Goal 1 (SLP)    Improve Thought Organization Through Goal 1 (SLP) completing a divergent naming task;completing a convergent naming task;80%;independently (over 90% accuracy)  -SR --     Time Frame (Thought Organization Skills Goal 1, SLP) short term goal (STG)  -SR --     Progress/Outcomes (Thought Organization Skills Goal 1, SLP) goal partially met  -SR --     Comment (Thought Organization Skills Goal 1, SLP) Patient named 1 similarity and 1 difference between 2 concrete objects with 80% acc given NO cues. Perseveration of previous answer x1  -SR --        Reasoning Goal 1 (SLP)    Improve Reasoning Through Goal 1 (SLP) complete basic reasoning task;complete deductive reasoning task;80%;with minimal cues (75-90%)  -SR --     Time Frame (Reasoning Goal 1, SLP) short term goal (STG)  -SR --     Progress/Outcomes (Reasoning Goal 1, SLP) good progress toward goal  -SR --     Comment (Reasoning Goal 1, SLP) Patient followed written directions and used process of elimination to determine solution to moderately-complex logic puzzle with 50% acc given NO cues; 80% acc given MIN cues. Extended time provided to complete task.  -SR --        Executive Functional Skills Goal 1 (SLP)    Improve Executive Function Skills Goal 1 (SLP) -- home management activity;80%;with minimal cues (75-90%)  -SR     Time Frame (Executive Function Skills Goal 1, SLP) -- short term goal (STG)  -SR     Progress/Outcomes (Executive Function Skills Goal 1, SLP) -- goal partially met  -SR     Comment (Executive Function Skills Goal 1, SLP) -- Medication management task completed targeting attention, sequencing, and organization. Patient used moderately-complex organizer (4x a day, 7 days a week)  for today's task. Given list of personal medications with name, frequency, and dosage, patient sorted mock medications into pill organizer with 90% acc given NO cues. Self-correction x1.  -SR        Right Hemisphere Function Goal 1 (SLP)    Improve Right Hemisphere Function Through Goal 1 (SLP) -- complete visuo-spatial activities (visual closure, trail making, mazes;complete visuo-perceptual activities (L/R discrimination, spatial concepts);80%;with minimal cues (75-90%)  -SR     Time Frame (Right Hemisphere Function Goal 1, SLP) -- short term goal (STG)  -SR     Progress/Outcomes (Right Hemisphere Function Goal 1, SLP) -- good progress toward goal  -SR     Comment (Right Hemisphere Function Goal 1, SLP) -- Pt completed medication management activity with x1 cue to scan to R side.  -SR               User Key  (r) = Recorded By, (t) = Taken By, (c) = Cosigned By      Initials Name Provider Type    SR Jessica Carr SLP Speech and Language Pathologist                                Time Calculation:        Time Calculation- SLP       Row Name 01/08/24 1542 01/08/24 1216          Time Calculation- SLP    SLP Start Time 1300  -SR 1000  -SR     SLP Stop Time 1330  -SR 1030  -SR     SLP Time Calculation (min) 30 min  -SR 30 min  -SR     SLP Received On 01/08/24  -SR 01/08/24  -SR               User Key  (r) = Recorded By, (t) = Taken By, (c) = Cosigned By      Initials Name Provider Type    Jessica White SLP Speech and Language Pathologist                      Therapy Charges for Today       Code Description Service Date Service Provider Modifiers Qty    29015472898 HC ST DEV OF COGN SKILLS INITIAL 15 MIN 1/8/2024 Jessica Carr SLP  1    45104153377 HC ST DEV OF COGN SKILLS EACH ADDT'L 15 MIN 1/8/2024 Jessica Carr SLP  3                             PAULETTE Gómez  1/8/2024

## 2024-01-08 NOTE — PROGRESS NOTES
LOS: 13 days   Patient Care Team:  Eben Porter MD as PCP - General  Eben Porter MD as PCP - Family Medicine      MICHAEL BLANCHARD JACQUI  1940      ADMITTING DIAGNOSIS:  Status post left PCA stroke-felt cardioembolic  Bilateral carotid stenosis  Stroke prophylaxis-aspirin 7 days through December 28, then start Eliquis 5 mg twice daily on December 29/atorvastatin  Right visual field deficit-right homonymous hemianopsia  Impaired mobility/self-car      Subjective     Tolerating activities  Family conference today  Working on plans for assisted living        Objective     Vitals:    01/08/24 0838   BP:    Pulse: 52   Resp:    Temp:    SpO2:        Intake/Output Summary (Last 24 hours) at 1/8/2024 1106  Last data filed at 1/8/2024 0750  Gross per 24 hour   Intake 598 ml   Output --   Net 598 ml     PHYSICAL EXAM:     MENTAL STATUS -  AWAKE / ALERT  HEENT-    LUNGS -normal respiration  HEART-    ABD -     EXT - NO EDEMA OR CYANOSIS  NEURO -oriented person place and general situation  Speech fluent.      MOTOR EXAM -good strength bilaterally    MEDICATIONS  Scheduled Meds:aspirin, 325 mg, Oral, Daily  atorvastatin, 40 mg, Oral, Nightly  buPROPion XL, 150 mg, Oral, QAM  docusate sodium, 100 mg, Oral, BID  gabapentin, 400 mg, Oral, Q8H  glipizide, 5 mg, Oral, QAM AC  hydrALAZINE, 10 mg, Oral, Q8H  insulin lispro, 2-7 Units, Subcutaneous, 4x Daily AC & at Bedtime  levothyroxine, 100 mcg, Oral, Q AM  linagliptin, 5 mg, Oral, Daily  oxybutynin XL, 10 mg, Oral, Daily  pioglitazone, 15 mg, Oral, Daily  polyethylene glycol, 17 g, Oral, Daily  venlafaxine XR, 150 mg, Oral, Daily  vitamin B-12, 1,000 mcg, Oral, Daily  vitamin D, 50,000 Units, Oral, Q7 Days      Continuous Infusions:   PRN Meds:.  acetaminophen **OR** acetaminophen    bisacodyl    dextrose    glucagon (human recombinant)    melatonin    ondansetron      RESULTS  Glucose   Date/Time Value Ref Range Status   01/08/2024 0714 160 (H) 70 - 130 mg/dL Final    01/07/2024 1958 245 (H) 70 - 130 mg/dL Final   01/07/2024 1624 134 (H) 70 - 130 mg/dL Final   01/07/2024 1113 249 (H) 70 - 130 mg/dL Final   01/07/2024 0723 176 (H) 70 - 130 mg/dL Final   01/06/2024 1957 214 (H) 70 - 130 mg/dL Final   01/06/2024 1604 153 (H) 70 - 130 mg/dL Final   01/06/2024 1121 160 (H) 70 - 130 mg/dL Final     Results from last 7 days   Lab Units 01/08/24  0559 01/04/24  0756   WBC 10*3/mm3 8.30 6.85   HEMOGLOBIN g/dL 12.4 12.5   HEMATOCRIT % 37.9 40.0   PLATELETS 10*3/mm3 210 232     Results from last 7 days   Lab Units 01/08/24  0559 01/04/24  0756   SODIUM mmol/L 136 140   POTASSIUM mmol/L 5.0 4.8   CHLORIDE mmol/L 102 103   CO2 mmol/L 25.5 27.5   BUN mg/dL 40* 30*   CREATININE mg/dL 1.45* 1.51*   CALCIUM mg/dL 9.2 9.2   GLUCOSE mg/dL 164* 192*        Latest Reference Range & Units 12/28/23 06:09   25 Hydroxy, Vitamin D 30.0 - 100.0 ng/ml 13.9 (L)   (L): Data is abnormally low    ASSESSMENT and PLAN    Stroke    Status post left PCA stroke-felt cardioembolic-December 22, 2023- 6 x 3 cm acute infarct throughout the medial left occipital lobe in the left PCA territory     Bilateral carotid stenosis-left 50-69%, right less than 50%     Stroke prophylaxis-aspirin 7 days through December 28, then start Eliquis 5 mg twice daily on December 29/atorvastatin  December 29-starting Eliquis today  January 8-aspirin did not switch over to Eliquis on December 29 as expected-medications changed today.  Aspirin discontinued and started on Eliquis 5 mg twice daily     Right visual field deficit-right homonymous hemianopsia     Impaired mobility/self-care     Diabetes mellitus type 2-sliding scale insulin-was on Actos 15 mg daily and Tradjenta 5 mg daily at home  January 3-resume Actos 15 mg p.o. daily in the a.m.  January 8-resume Tradjenta 5 mg daily.  On Amaryl 4 mg twice daily at home, not available here, will initially put on glipizide 5 mg daily with plans to change back to Amaryl at discharge      Chronic kidney disease  Baseline creatinine-appears about 1.4  January 8-BUN 40/creatinine 1.45-encourage fluids.  Discontinued ACE inhibitor.     Hypertension-off metoprolol secondary bradycardia  December 29-systolic blood pressure 157/68 this a.m., later 113/62-variable pattern.  Allergy to amlodipine.  Bradycardia on beta-blocker-metoprolol discontinued permanently.  Will add lisinopril initially 2.5 mg daily  January 3-systolic blood pressure 126-141.  Diastolic blood pressure in the 60  Jan 4 - BP elevated, increase lisinopril to 5 mg  January 8-systolic blood pressure 160.  Given her renal insufficiency, will not increase lisinopril further.  Allergy to amlodipine.  Had bradycardia on metoprolol.  Discontinue lisinopril.  Will change to hydralazine 10 mg every 8 hours and titrate as needed     Status post bradycardia-resolved off metoprolol     Hypothyroidism-on replacement     B12 deficiency-on replacement    Vitamin D deficiency-change to ergocalciferol 50,000 units weekly for 8 weeks     Depression-bupropion/venlafaxine     -oxybutynin     DVT prophylaxis-SCDs     Outpatient evaluation for obstructive sleep apnea    TEAM CONF - DEC 28 - TRANSFERS MIN ASSIST. GAIT 80 FEET RW MIN ASSIST. 4 STAIRS MIN ASSIST.   BATH MOD . UBD MIN. LBD MOD MAX. TOILET TRANSFERS MIN ASSIST.   GOOD FINE MOTOR SKILLS. RIGHT VISUAL NEGLECT. DECREASED INSIGHT INTO DEFICITS. OVERALL MILD COGNITIVE IMPAIRMENT.  MILD MODERATE EXPRESSIVE DEFICITS AT SENTENCE LEVEL. MILD ANOMIA. CONTINENT BOWEL AND BLADDER.   ASA COMPLETES TODAY AND STARTS ELIQUIS TOMORROW.   ELOS - TWO WEEKS - MIGHT BE A GOOD CANDIDATE FOR ASSISTED LIVING    TEAM CONF - JAN 4 - TRANSFERS CTG SBA WITH RW. BED CTG SBA. GAIT  FEET CTG RW. 4 STAIRS CTG. BATH SBA. LBD SBA. UBD SET UP. GROOMING SET UP. TOILETING SBA.   MEMORY - 1/3 recall with 5 minute delay no cues   ATTENTION - mild deficits in sustained/selective attention   EXPRESSION - Mild anomic events,  utilizes word finding to repair given MIN cues. BNE PENDING  ELOS - MIDDLE OF NEXT WEEK. VIJAY 10.     Goal is for home with outpatient   therapies.  Barrier to discharge: Impaired mobility self-care cognition- work on attention to the right, executive function, memory, balance, gait, ADLs to overcome.     Sanjeev Quan MD      Appropriate PPE was worn during the entire visit.  Hand hygiene was completed before and after.

## 2024-01-08 NOTE — PROGRESS NOTES
Inpatient Rehabilitation Plan of Care Note    Plan of Care  Updated Problems/Interventions  Medical Problem(s)    BNE (Active)  Att'n. - WNL  Exec. Fx. - Min-Mildly Imp.  Rsng/Jgmnt - WNL  Arith - WNL  Visuospatial Skills - Mildly Imp.  Visual Mem. - Mildly Imp.  Verbal Mem. - Mildly Imp. for immediate recall, Severely Imp. for delayed recall  Emot - Pt minimized distress assocaited with pending move to assisted living,  but acknowledged mild frustration    Signed by: Symone Jaimesd

## 2024-01-08 NOTE — TELEPHONE ENCOUNTER
Caller: TAMARA    Relationship to patient: [facility] SCHEDULING    Best call back number: [phone] (home)   [phone]  New or established patient?  [x] New  [] Established    Date of discharge: 1/10/24    Facility discharged from: [facility]    Diagnosis/Symptoms: STROKE    Length of stay (If applicable): 15 DAYS COME DISCHARGE DATE    Specialty Only: Did you see a [facility] provider?    [x] Yes  [] No  If so, who? DR. KIM      PATIENT REQUESTED NO WEDNESDAY APPTS

## 2024-01-08 NOTE — PROGRESS NOTES
Family conference held today with patient, son (Jl), and daughter (Ping). PT, ST, NSG, and SW present. OT sent written report. Discussed progress and d/c recommendations and plans.   Family took patient on pass on Saturday to tour two assisted living facilities. They have decided on [facility] and plan to do a 60 day respite trial. Angela Uriarte, with [facility] was here earlier today to evaluate patient.     PT reported patient is independent with bed mobility. SBA with transfers, including car, with occasional cues. Patient walking 200 feet with rolling walker and SBA. She does need occasional cues to scan to right due to right visual deficit. Patient able to go up/down 4 steps with rails and SBA. PT recommended patient use rolling walker at all times. Patient has both regular rolling walker and rollator at home. PT will practice patient using rollator to see how she does with this type of walker.     OT report given by [facility]. Patient at SBA with all ADL's and bathroom transfers. Patient using shower chair with back to bathe seated in shower. Patient does use grab bars when by commode and in shower. OT recommended using grab bars for safety and to use shower chair for bathing in shower. OT reported patient has good movement and strength in UE's.    ST reported working on some word finding difficulty. Reviewed strategies to help with this. ST also working on visual attention, short term memory, and executive functioning tasks. Patient has mild deficits with each. ST working on medicine management task with patient. Patient needed 2 reminders today for this task. Recommended patient have supervision with medications and finance management.     NSG reviewed medications. NSG checked with MD regarding starting on Eliquis as brought up by daughter. Patient will start on Eliquis tonight. MD also put patient back on her oral medications for diabetes. Patient has Florin Freestyle device she uses at home to check  blood sugars. Discussed follow ups with PCP, neurology, cardiology, and possible sleep study.     Team recommended continued PT, OT, ST after d/c. Patient will continue therapies at [facility] from their contracted therapy group.     Patient has rolling walker and rollator to use. [facility] has shower chairs in their walk in showers with grab bars and hand held shower.     Discharge plan is to go to assisted living level of care at [facility]. Patient is agreeable to plan.   Discharge date set for Wednesday, 1/10, but may need to be pushed a day to get paperwork, etc done for assisted living facility. Family also to check again on medication management and cost of adding this. Will follow and assist with plans.

## 2024-01-09 LAB
ANION GAP SERPL CALCULATED.3IONS-SCNC: 9 MMOL/L (ref 5–15)
BUN SERPL-MCNC: 32 MG/DL (ref 8–23)
BUN/CREAT SERPL: 20.5 (ref 7–25)
CALCIUM SPEC-SCNC: 9.1 MG/DL (ref 8.6–10.5)
CHLORIDE SERPL-SCNC: 104 MMOL/L (ref 98–107)
CO2 SERPL-SCNC: 27 MMOL/L (ref 22–29)
CREAT SERPL-MCNC: 1.56 MG/DL (ref 0.57–1)
EGFRCR SERPLBLD CKD-EPI 2021: 33.3 ML/MIN/1.73
GLUCOSE BLDC GLUCOMTR-MCNC: 125 MG/DL (ref 70–130)
GLUCOSE BLDC GLUCOMTR-MCNC: 162 MG/DL (ref 70–130)
GLUCOSE BLDC GLUCOMTR-MCNC: 184 MG/DL (ref 70–130)
GLUCOSE BLDC GLUCOMTR-MCNC: 85 MG/DL (ref 70–130)
GLUCOSE SERPL-MCNC: 117 MG/DL (ref 65–99)
POTASSIUM SERPL-SCNC: 5.1 MMOL/L (ref 3.5–5.2)
SODIUM SERPL-SCNC: 140 MMOL/L (ref 136–145)

## 2024-01-09 PROCEDURE — 80048 BASIC METABOLIC PNL TOTAL CA: CPT | Performed by: PHYSICAL MEDICINE & REHABILITATION

## 2024-01-09 PROCEDURE — 97112 NEUROMUSCULAR REEDUCATION: CPT | Performed by: OCCUPATIONAL THERAPIST

## 2024-01-09 PROCEDURE — 25810000003 SODIUM CHLORIDE 0.9 % SOLUTION: Performed by: PHYSICAL MEDICINE & REHABILITATION

## 2024-01-09 PROCEDURE — 97116 GAIT TRAINING THERAPY: CPT

## 2024-01-09 PROCEDURE — 97535 SELF CARE MNGMENT TRAINING: CPT | Performed by: OCCUPATIONAL THERAPIST

## 2024-01-09 PROCEDURE — 82948 REAGENT STRIP/BLOOD GLUCOSE: CPT

## 2024-01-09 PROCEDURE — 97530 THERAPEUTIC ACTIVITIES: CPT

## 2024-01-09 PROCEDURE — 63710000001 INSULIN LISPRO (HUMAN) PER 5 UNITS: Performed by: INTERNAL MEDICINE

## 2024-01-09 PROCEDURE — 97130 THER IVNTJ EA ADDL 15 MIN: CPT

## 2024-01-09 PROCEDURE — 97129 THER IVNTJ 1ST 15 MIN: CPT

## 2024-01-09 RX ORDER — SODIUM CHLORIDE 0.9 % (FLUSH) 0.9 %
10 SYRINGE (ML) INJECTION EVERY 12 HOURS SCHEDULED
Status: DISCONTINUED | OUTPATIENT
Start: 2024-01-09 | End: 2024-01-11 | Stop reason: HOSPADM

## 2024-01-09 RX ORDER — SODIUM CHLORIDE 9 MG/ML
100 INJECTION, SOLUTION INTRAVENOUS CONTINUOUS
Status: DISCONTINUED | OUTPATIENT
Start: 2024-01-09 | End: 2024-01-09

## 2024-01-09 RX ORDER — SODIUM CHLORIDE 0.9 % (FLUSH) 0.9 %
10 SYRINGE (ML) INJECTION AS NEEDED
Status: DISCONTINUED | OUTPATIENT
Start: 2024-01-09 | End: 2024-01-11

## 2024-01-09 RX ORDER — SODIUM CHLORIDE 9 MG/ML
100 INJECTION, SOLUTION INTRAVENOUS CONTINUOUS
Status: ACTIVE | OUTPATIENT
Start: 2024-01-09 | End: 2024-01-10

## 2024-01-09 RX ADMIN — GABAPENTIN 400 MG: 400 CAPSULE ORAL at 06:16

## 2024-01-09 RX ADMIN — HYDRALAZINE HYDROCHLORIDE 10 MG: 10 TABLET, FILM COATED ORAL at 06:16

## 2024-01-09 RX ADMIN — HYDRALAZINE HYDROCHLORIDE 10 MG: 10 TABLET, FILM COATED ORAL at 15:02

## 2024-01-09 RX ADMIN — VENLAFAXINE HYDROCHLORIDE 150 MG: 150 CAPSULE, EXTENDED RELEASE ORAL at 07:41

## 2024-01-09 RX ADMIN — SODIUM CHLORIDE 100 ML/HR: 9 INJECTION, SOLUTION INTRAVENOUS at 21:43

## 2024-01-09 RX ADMIN — BUPROPION HYDROCHLORIDE 150 MG: 150 TABLET, EXTENDED RELEASE ORAL at 06:16

## 2024-01-09 RX ADMIN — GABAPENTIN 400 MG: 400 CAPSULE ORAL at 21:07

## 2024-01-09 RX ADMIN — GLIPIZIDE 5 MG: 5 TABLET ORAL at 07:41

## 2024-01-09 RX ADMIN — Medication 10 ML: at 21:07

## 2024-01-09 RX ADMIN — Medication 1000 MCG: at 07:41

## 2024-01-09 RX ADMIN — RIVAROXABAN 15 MG: 15 TABLET, FILM COATED ORAL at 16:50

## 2024-01-09 RX ADMIN — HYDRALAZINE HYDROCHLORIDE 10 MG: 10 TABLET, FILM COATED ORAL at 21:07

## 2024-01-09 RX ADMIN — DOCUSATE SODIUM 100 MG: 100 CAPSULE, LIQUID FILLED ORAL at 21:07

## 2024-01-09 RX ADMIN — GABAPENTIN 400 MG: 400 CAPSULE ORAL at 13:37

## 2024-01-09 RX ADMIN — LINAGLIPTIN 5 MG: 5 TABLET, FILM COATED ORAL at 07:42

## 2024-01-09 RX ADMIN — ATORVASTATIN CALCIUM 40 MG: 20 TABLET, FILM COATED ORAL at 21:07

## 2024-01-09 RX ADMIN — INSULIN LISPRO 2 UNITS: 100 INJECTION, SOLUTION INTRAVENOUS; SUBCUTANEOUS at 22:31

## 2024-01-09 RX ADMIN — Medication 10 ML: at 11:50

## 2024-01-09 RX ADMIN — SODIUM CHLORIDE 100 ML/HR: 9 INJECTION, SOLUTION INTRAVENOUS at 11:50

## 2024-01-09 RX ADMIN — PIOGLITAZONE 15 MG: 15 TABLET ORAL at 07:42

## 2024-01-09 RX ADMIN — APIXABAN 5 MG: 5 TABLET, FILM COATED ORAL at 07:41

## 2024-01-09 RX ADMIN — LEVOTHYROXINE SODIUM 100 MCG: 100 TABLET ORAL at 06:16

## 2024-01-09 RX ADMIN — INSULIN LISPRO 2 UNITS: 100 INJECTION, SOLUTION INTRAVENOUS; SUBCUTANEOUS at 11:20

## 2024-01-09 RX ADMIN — OXYBUTYNIN CHLORIDE 10 MG: 10 TABLET, EXTENDED RELEASE ORAL at 07:41

## 2024-01-09 NOTE — PROGRESS NOTES
Recreational Therapy Note    Patient Name: Rekha Bear   MRN: [id number]    Therapeutic Recreation Eval and Treat (last 12 hours)       Therapeutic Recreation Eval & Treat       Row Name 01/09/24 1500       Therapeutic Recreation Participation    Recreation Therapy Participation games  -TW    Games board games  Sequence  -TW    Objectives of Recreation Participation increase;sense of autonomy by choosing level of participation;positive attitudes leading to a healthy leisure lifestyle  -TW    Comment, Recreation Participation occ cues for directions; BUE use to place chips; occ cues to scan to the R  -TW    Recreation Therapy Summary of Participation active participation  -TW              User Key  (r) = Recorded By, (t) = Taken By, (c) = Cosigned By      Initials Name Provider Type    TW Rosa Valadez CTRS Recreational Therapist                      AMADEO Alamo  1/9/2024

## 2024-01-09 NOTE — PROGRESS NOTES
LOS: 14 days   Patient Care Team:  Eben Porter MD as PCP - General  Eben Porter MD as PCP - Family Medicine      MICHAEL WHITEAF  1940      ADMITTING DIAGNOSIS:  Status post left PCA stroke-felt cardioembolic  Bilateral carotid stenosis  Stroke prophylaxis-aspirin 7 days through December 28, then start Eliquis 5 mg twice daily on December 29/atorvastatin  Right visual field deficit-right homonymous hemianopsia  Impaired mobility/self-car      Subjective     Creatinine elevated  Previously responded to IVF when first admitted to acute care wing.  Will hydrate with normal saline at 100 cc/hour x 1.5 liters.   She denies any headache.  No shortness of air.  No new weakness.  Continues impaired vision to the right side  PVR 79 cc    Objective     Vitals:    01/09/24 0601   BP: 160/70   Pulse: 87   Resp: 18   Temp: 97.6 °F (36.4 °C)   SpO2: 97%       Intake/Output Summary (Last 24 hours) at 1/9/2024 0822  Last data filed at 1/8/2024 1659  Gross per 24 hour   Intake 480 ml   Output --   Net 480 ml     PHYSICAL EXAM:     MENTAL STATUS -  AWAKE / ALERT  HEENT-    LUNGS - CTA, NO WHEEZES, RALES OR RHONCHI  HEART- RRR, NO RUB, MURMUR, OR GALLOP  ABD - NORMOACTIVE BOWEL SOUNDS, SOFT, NT.    EXT - NO EDEMA OR CYANOSIS  NEURO -oriented person place and general situation  Speech fluent.   Right homonymous hemianopsia  MOTOR EXAM - RUE/RLE 5/5. LUE/LLE 5/5.         MEDICATIONS  Scheduled Meds:apixaban, 5 mg, Oral, Q12H  atorvastatin, 40 mg, Oral, Nightly  buPROPion XL, 150 mg, Oral, QAM  docusate sodium, 100 mg, Oral, BID  gabapentin, 400 mg, Oral, Q8H  glipizide, 5 mg, Oral, QAM AC  hydrALAZINE, 10 mg, Oral, Q8H  insulin lispro, 2-7 Units, Subcutaneous, 4x Daily AC & at Bedtime  levothyroxine, 100 mcg, Oral, Q AM  linagliptin, 5 mg, Oral, Daily  oxybutynin XL, 10 mg, Oral, Daily  pioglitazone, 15 mg, Oral, Daily  polyethylene glycol, 17 g, Oral, Daily  sodium chloride, 10 mL, Intravenous, Q12H  venlafaxine  XR, 150 mg, Oral, Daily  vitamin B-12, 1,000 mcg, Oral, Daily  vitamin D, 50,000 Units, Oral, Q7 Days      Continuous Infusions:sodium chloride, 100 mL/hr      PRN Meds:.  acetaminophen **OR** acetaminophen    bisacodyl    dextrose    glucagon (human recombinant)    melatonin    ondansetron    sodium chloride      RESULTS  Glucose   Date/Time Value Ref Range Status   01/09/2024 0701 125 70 - 130 mg/dL Final   01/08/2024 2022 231 (H) 70 - 130 mg/dL Final   01/08/2024 1616 84 70 - 130 mg/dL Final   01/08/2024 1114 232 (H) 70 - 130 mg/dL Final   01/08/2024 0714 160 (H) 70 - 130 mg/dL Final   01/07/2024 1958 245 (H) 70 - 130 mg/dL Final   01/07/2024 1624 134 (H) 70 - 130 mg/dL Final   01/07/2024 1113 249 (H) 70 - 130 mg/dL Final     Results from last 7 days   Lab Units 01/08/24  0559 01/04/24  0756   WBC 10*3/mm3 8.30 6.85   HEMOGLOBIN g/dL 12.4 12.5   HEMATOCRIT % 37.9 40.0   PLATELETS 10*3/mm3 210 232     Results from last 7 days   Lab Units 01/09/24  0555 01/08/24  0559 01/04/24  0756   SODIUM mmol/L 140 136 140   POTASSIUM mmol/L 5.1 5.0 4.8   CHLORIDE mmol/L 104 102 103   CO2 mmol/L 27.0 25.5 27.5   BUN mg/dL 32* 40* 30*   CREATININE mg/dL 1.56* 1.45* 1.51*   CALCIUM mg/dL 9.1 9.2 9.2   GLUCOSE mg/dL 117* 164* 192*        Latest Reference Range & Units 12/28/23 06:09   25 Hydroxy, Vitamin D 30.0 - 100.0 ng/ml 13.9 (L)   (L): Data is abnormally low    ASSESSMENT and PLAN    Stroke    Status post left PCA stroke-felt cardioembolic-December 22, 2023- 6 x 3 cm acute infarct throughout the medial left occipital lobe in the left PCA territory     Bilateral carotid stenosis-left 50-69%, right less than 50%     Stroke prophylaxis-aspirin 7 days through December 28, then start Eliquis 5 mg twice daily on December 29/atorvastatin  December 29-starting Eliquis today  January 8-aspirin did not switch over to Eliquis on December 29 as expected-medications changed today.  Aspirin discontinued and started on Eliquis 5 mg twice  daily  Addendum-January 9-5:48 PM-cardiology has changed Eliquis to Xarelto secondary to cost     Right visual field deficit-right homonymous hemianopsia     Impaired mobility/self-care     Diabetes mellitus type 2-sliding scale insulin-was on Actos 15 mg daily and Tradjenta 5 mg daily at home  January 3-resume Actos 15 mg p.o. daily in the a.m.  January 8-resume Tradjenta 5 mg daily.  On Amaryl 4 mg twice daily at home, not available here, will initially put on glipizide 5 mg daily with plans to change back to Amaryl at discharge     Chronic kidney disease  Baseline creatinine-appears about 1.4  January 8-BUN 40/creatinine 1.45-encourage fluids.  Discontinued ACE inhibitor.  Jan 9 - Creatinine elevated 1.56  Previously responded to IVF when first admitted to acute care [place].  Will hydrate with normal saline at 100 cc/hour x 1.5 liters.   PVR 79 cc     Hypertension-off metoprolol secondary bradycardia  December 29-systolic blood pressure 157/68 this a.m., later 113/62-variable pattern.  Allergy to amlodipine.  Bradycardia on beta-blocker-metoprolol discontinued permanently.  Will add lisinopril initially 2.5 mg daily  January 3-systolic blood pressure 126-141.  Diastolic blood pressure in the 60  Jan 4 - BP elevated, increase lisinopril to 5 mg  January 8-systolic blood pressure 160.  Given her renal insufficiency, will not increase lisinopril further.  Allergy to amlodipine.  Had bradycardia on metoprolol.  Discontinue lisinopril.  Will change to hydralazine 10 mg every 8 hours and titrate as needed     Status post bradycardia-resolved off metoprolol     Hypothyroidism-on replacement     B12 deficiency-on replacement    Vitamin D deficiency-change to ergocalciferol 50,000 units weekly for 8 weeks     Depression-bupropion/venlafaxine     -oxybutynin     DVT prophylaxis-SCDs     Outpatient evaluation for obstructive sleep apnea    TEAM CONF - DEC 28 - TRANSFERS MIN ASSIST. GAIT 80 FEET RW MIN ASSIST. 4 STAIRS MIN  ASSIST.   BATH MOD . UBD MIN. LBD MOD MAX. TOILET TRANSFERS MIN ASSIST.   GOOD FINE MOTOR SKILLS. RIGHT VISUAL NEGLECT. DECREASED INSIGHT INTO DEFICITS. OVERALL MILD COGNITIVE IMPAIRMENT.  MILD MODERATE EXPRESSIVE DEFICITS AT SENTENCE LEVEL. MILD ANOMIA. CONTINENT BOWEL AND BLADDER.   ASA COMPLETES TODAY AND STARTS ELIQUIS TOMORROW.   ELOS - TWO WEEKS - MIGHT BE A GOOD CANDIDATE FOR ASSISTED LIVING    TEAM CONF - JAN 4 - TRANSFERS CTG SBA WITH RW. BED CTG SBA. GAIT  FEET CTG RW. 4 STAIRS CTG. BATH SBA. LBD SBA. UBD SET UP. GROOMING SET UP. TOILETING SBA.   MEMORY - 1/3 recall with 5 minute delay no cues   ATTENTION - mild deficits in sustained/selective attention   EXPRESSION - Mild anomic events, utilizes word finding to repair given MIN cues. BNE PENDING  ELOS - MIDDLE OF NEXT WEEK. VIJAY 10.     BNE (Active)- Jan 8  Att'n. - WNL  Exec. Fx. - Min-Mildly Imp.  Rsng/Jgmnt - WNL  Arith - WNL  Visuospatial Skills - Mildly Imp.  Visual Mem. - Mildly Imp.  Verbal Mem. - Mildly Imp. for immediate recall, Severely Imp. for delayed recall  Emot - Pt minimized distress assocaited with pending move to assisted living,  but acknowledged mild frustration    Goal is for home with outpatient   therapies.  Barrier to discharge: Impaired mobility self-care cognition- work on attention to the right, executive function, memory, balance, gait, ADLs to overcome.     Sanjeev Quan MD      Appropriate PPE was worn during the entire visit.  Hand hygiene was completed before and after.

## 2024-01-09 NOTE — PROGRESS NOTES
Patient getting IV fluids today. MD wants to postpone d/c until Thursday, 1/11 vs going tomorrow. Discussed with patient and daughter, Ping, by phone. They are trying to get paperwork filled out for [facility] and discuss her medication management. Plan is for nurse at [facility] to manage medications initially until they can see that she is able to manage on her own. Left message for Angela Uriarte at [facility] regarding patient d/c on 1/11 vs tomorrow. Will assist with plans.

## 2024-01-09 NOTE — PROGRESS NOTES
SECTION GG      Mobility Performance Discharge:     Roll Left and Right: Patient completed the activities by themself with no  assistance from a helper.   Sit to Lying: Patient completed the activities by themself with no assistance  from a helper.   Lying to Sitting on Side of Bed: Patient completed the activities by themself  with no assistance from a helper.   Sit to Stand: [place] provides verbal cues and/or touching/steadying and/or  contact guard assistance as patient completes activity. Assistance may be  provided throughout the activity or intermittently.   Chair/Bed to Chair Transfer: [place] provides verbal cues and/or  touching/steadying and/or contact guard assistance as patient completes  activity. Assistance may be provided throughout the activity or intermittently.   Car Transfer: [place] provides verbal cues and/or touching/steadying and/or  contact guard assistance as patient completes activity. Assistance may be  provided throughout the activity or intermittently.   Walk 10 Feet:   [place] provides verbal cues and/or touching/steadying and/or  contact guard assistance as patient completes activity. Assistance may be  provided throughout the activity or intermittently.  Walk 50 Feet with 2 Turns:   [place] provides verbal cues and/or  touching/steadying and/or contact guard assistance as patient completes  activity. Assistance may be provided throughout the activity or intermittently.  Walk 150 Feet:   [place] provides verbal cues and/or touching/steadying and/or  contact guard assistance as patient completes activity. Assistance may be  provided throughout the activity or intermittently.  Walking 10 Feet on Uneven Surfaces:   [place] provides verbal cues and/or  touching/steadying and/or contact guard assistance as patient completes  activity. Assistance may be provided throughout the activity or intermittently.  1 Step Over Curb or Up/Down Stair:   [place] provides verbal cues and/or  touching/steadying  and/or contact guard assistance as patient completes  activity. Assistance may be provided throughout the activity or intermittently.  4 Steps Up and Down, With/Without Rail:   [place] provides verbal cues and/or  touching/steadying and/or contact guard assistance as patient completes  activity. Assistance may be provided throughout the activity or intermittently.  12 Steps Up and Down, With/Without Rail:   [place] provides verbal cues and/or  touching/steadying and/or contact guard assistance as patient completes  activity. Assistance may be provided throughout the activity or intermittently.  Picking up an Object:   [place] provides verbal cues and/or touching/steadying  and/or contact guard assistance as patient completes activity. Assistance may be  provided throughout the activity or intermittently. Uses Wheelchair and/or  Scooter: No    Section J. Health Conditions (Pain):  Pain Interference with Therapy Activities:   Rarely or not at all  Pain Interference with Day-to-Day Activities:   Rarely or not at all    Signed by: Gianna Atwood, PT

## 2024-01-09 NOTE — THERAPY DISCHARGE NOTE
"Inpatient Rehabilitation - Physical Therapy Treatment Note/Discharge  [facility]     Patient Name: Rekha Bear  : 1940  MRN: [id number]  Today's Date: 2024                Admit Date: 2023    Visit Dx:  No diagnosis found.  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    Community acquired pneumonia of left lower lobe of lung    Hyponatremia    Pneumonia due to COVID-19 virus    Major depressive disorder, recurrent, mild    Acute respiratory failure with hypoxia    Supraventricular tachycardia    PVC (premature ventricular contraction)    Dyspnea on exertion    Dizziness    Depressive disorder    Gastroesophageal reflux disease    Midline cystocele    Rectocele    BMI 29.0-29.9,adult    Acute left PCA stroke    B12 deficiency    Bradycardia    Premature atrial contractions    Stroke     Past Medical History:   Diagnosis Date    Chilblains     \"Chilblian's\"    CKD (chronic kidney disease)     Depression     Fatty liver     GERD (gastroesophageal reflux disease)     Hyperlipidemia     Hypertension     Incontinence     Osteoarthritis     OA  Marvin THR, LT TKR - hydrocodone prescibed by Dr. 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 Almaguer    TOTAL KNEE ARTHROPLASTY Left     OA  Marvin THR, LT TKR - hydrocodone prescibed by Dr. Almaguer       PT ASSESSMENT (last 12 hours)       IRF PT Evaluation and Treatment       Row Name 01/09/24 0854          PT Time and Intention    Document Type discharge evaluation  -MD     Mode of Treatment physical therapy  -MD     Patient/Family/Caregiver Comments/Observations Pt sitting in WC showing no signs of aucte distress.  -MD       Row Name 01/09/24 0854          General Information    Existing Precautions/Restrictions fall  -MD       Row Name 01/09/24 0854          Pain Assessment    Pretreatment Pain Rating 0/10 - no pain  -MD       Row Name 01/09/24 0854          Cognition/Psychosocial    Orientation Status (Cognition) oriented x 4  -MD     Follows Commands (Cognition) follows one-step commands;over 90% accuracy;verbal cues/prompting required  -MD     Personal Safety Interventions fall prevention program maintained;gait belt  -MD       Row Name 01/09/24 0854          Bed Mobility    Rolling Left [place] (Bed Mobility) independent  -MD     Rolling Right [place] (Bed Mobility) independent  -MD     Scooting/Bridging [place] (Bed Mobility) independent  -MD     Supine-Sit [place] (Bed Mobility) independent  -MD     Sit-Supine [place] (Bed Mobility) independent  -MD       Row Name 01/09/24 0854          Bed-Chair Transfer    Bed-Chair [place] (Transfers) standby assist  -MD     Assistive Device (Bed-Chair Transfers) walker, front-wheeled  -MD       Row Name 01/09/24 0854          Chair-Bed Transfer     Chair-Bed [place] (Transfers) standby assist  -MD     Assistive Device (Chair-Bed Transfers) walker, front-wheeled  -MD       Row Name 01/09/24 0854          Sit-Stand Transfer    Sit-Stand [place] (Transfers) standby assist  -MD     Assistive Device (Sit-Stand Transfers) wheelchair  -MD     Comment, (Sit-Stand Transfer) VC for hand placement  -MD       Row Name 01/09/24 0854          Stand-Sit Transfer    Stand-Sit [place] (Transfers) standby assist  -MD     Assistive Device (Stand-Sit Transfers) wheelchair  -MD     Comment, (Stand-Sit Transfer) VC for hand placement  -MD       Row Name 01/09/24 0854          Car Transfer    Type (Car Transfer) stand pivot/stand step  -MD     [place] Level (Car Transfer) standby assist  -MD     Assistive Device (Car Transfer) walker, front-wheeled  -MD       Row Name 01/09/24 0854          Gait/Stairs (Locomotion)    [place] Level (Gait) verbal cues;standby assist  -MD     Assistive Device (Gait) walker, front-wheeled  -MD     Distance in Feet (Gait) 160'  -MD     Pattern (Gait) step-through  -MD     Deviations/Abnormal Patterns (Gait) gait speed decreased;base of support, narrow;stride length decreased;weight shifting decreased;right sided deviations;antalgic  -MD     Bilateral Gait Deviations forward flexed posture;heel strike decreased  -MD     [place] Level (Stairs) verbal cues;stand by assist;contact guard  -MD     Handrail Location (Stairs) both sides  -MD     Number of Steps (Stairs) 12  -MD     Ascending Technique (Stairs) step-over-step  -MD     Descending Technique (Stairs) step-over-step  -MD       Row Name 01/09/24 0854          Rough/Uneven Surface Gait Skills (Mobility)    [place], Gait on Rough/Uneven Surface (Mobility) contact guard;verbal cues  -MD     Assistive Device (Rough/Uneven Surface Gait) walker, front-wheeled  -MD     Distance in Feet (Rough/Uneven Surface Gait) 10'x2  -MD     Comment, Gait Rough/Uneven Surface (Mobility)  Pt able to retrieve object from floor using reacher w CGA and RWx.  -MD       Row Name 01/09/24 0854          Positioning and Restraints    Pre-Treatment Position sitting in chair/recliner  -MD     Post Treatment Position bathroom  -MD     Bathroom notified nsg;sitting;call light within reach;encouraged to call for assist  -MD       Row Name 01/09/24 0854          Bed Mobility Goal 1 (PT-IRF)    Progress/Outcomes (Bed Mobility Goal 1, PT-IRF) goal met  -MD       Row Name 01/09/24 0854          Transfer Goal 2 (PT-IRF)    Progress/Outcomes (Transfer Goal 2, PT-IRF) goal met  -MD       Row Name 01/09/24 0854          Transfer Goal 3 (PT-IRF)    Progress/Outcomes (Transfer Goal 3, PT-IRF) goal met  -MD       Row Name 01/09/24 0854          Gait/Walking Locomotion Goal 1 (PT-IRF)    Progress/Outcomes (Gait/Walking Locomotion Goal 1, PT-IRF) goal met  -MD       Row Name 01/09/24 0854          Gait/Walking Locomotion Goal 2 (PT-IRF)    Progress/Outcomes (Gait/Walking Locomotion Goal 2, PT-IRF) goal met  -MD       Row Name 01/09/24 0854          Stairs Goal 2 (PT-IRF)    Progress/Outcomes (Stairs Goal 2, PT-IRF) goal met  -MD               User Key  (r) = Recorded By, (t) = Taken By, (c) = Cosigned By      Initials Name Provider Type    Gianna Eisenberg, PT Physical Therapist                    Physical Therapy Education       Title: PT OT SLP Therapies (In Progress)       Topic: Physical Therapy (In Progress)       Point: Mobility training (Done)       Learning Progress Summary             Patient Acceptance, E,D, VU,DU by MD at 1/5/2024 1200    Comment: family teaching completed for transfers, ambulation, stairs and car transfers.  Family education on compensation techniques for visual field cuts and RWx use.  PT advised pt not to get up and move w/o assistance due to visual deficits.    Acceptance, E,D,TB, VU,NR by MG at 1/3/2024 0747    Acceptance, E,D, VU,NR by MG at 1/2/2024 0842    Acceptance, E,TB,D, VU,NR by MG at  12/27/2023 1554   Family Acceptance, E,D, VU,DU by MD at 1/5/2024 1200    Comment: family teaching completed for transfers, ambulation, stairs and car transfers.  Family education on compensation techniques for visual field cuts and RWx use.  PT advised pt not to get up and move w/o assistance due to visual deficits.                         Point: Home exercise program (Not Started)       Learner Progress:  Not documented in this visit.              Point: Body mechanics (Done)       Learning Progress Summary             Patient Acceptance, E,D,TB, VU,NR by MG at 1/3/2024 0747    Acceptance, E,D, VU,NR by MG at 1/2/2024 0842    Acceptance, E,TB,D, VU,NR by MG at 12/27/2023 1554                         Point: Precautions (Done)       Learning Progress Summary             Patient Acceptance, E, VU by MD at 1/9/2024 0923    Acceptance, E, VU by MD at 1/8/2024 1610    Acceptance, E, VU by MD at 1/8/2024 0856    Acceptance, E, VU by MD at 1/6/2024 0845    Acceptance, E, VU by MD at 1/4/2024 0849    Acceptance, E,D,TB, VU,NR by MG at 1/3/2024 0747    Acceptance, E,D, VU,NR by MG at 1/2/2024 0842    Acceptance, E, NR by DP at 12/29/2023 1147    Comment: scanning for objects and avoidance of them when walking    Acceptance, E, NR by MD at 12/28/2023 0916    Acceptance, E,TB,D, VU,NR by MG at 12/27/2023 1554                                         User Key       Initials Effective Dates Name Provider Type Discipline    MD 06/16/21 -  Gianna Atwood, PT Physical Therapist PT    MG 05/24/22 -  Dayanna Turpin, PT Physical Therapist PT    DP 08/24/21 -  Julián Pathak, PT Physical Therapist PT                    PT Recommendation and Plan                  Time Calculation:    PT Charges       Row Name 01/09/24 1503 01/09/24 0853          Time Calculation    Start Time 1330  -MD 0830  -MD     Stop Time 1400  -MD 0900  -MD     Time Calculation (min) 30 min  -MD 30 min  -MD     PT Received On -- 01/09/24  -MD               User Key   (r) = Recorded By, (t) = Taken By, (c) = Cosigned By      Initials Name Provider Type    Gianna Eisenberg, PT Physical Therapist                    Therapy Charges for Today       Code Description Service Date Service Provider Modifiers Qty    18834888001 HC GAIT TRAINING EA 15 MIN 1/8/2024 Gianna Atwood, PT GP 2    15765446631 HC PT THERAPEUTIC ACT EA 15 MIN 1/8/2024 Gianna Atwood, PT GP 2    81021878342 HC PT THER PROC EA 15 MIN 1/8/2024 Gianna Atwood, PT GP 2    40344117131 HC GAIT TRAINING EA 15 MIN 1/9/2024 Gianna Atwood, PT GP 2    28460573257 HC PT THERAPEUTIC ACT EA 15 MIN 1/9/2024 Gianna Atwood, PT GP 2            PT G-Codes  AM-PAC 6 Clicks Score (PT): 20         Gianna Atwood, PT  1/9/2024

## 2024-01-09 NOTE — THERAPY TREATMENT NOTE
"Inpatient Rehabilitation - Speech Language Pathology Treatment Note    [facility]     Patient Name: Rekha Bear  : 1940  MRN: [id number]    Today's Date: 2024                   Admit Date: 2023       Visit Dx:    No diagnosis found.    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    Community acquired pneumonia of left lower lobe of lung    Hyponatremia    Pneumonia due to COVID-19 virus    Major depressive disorder, recurrent, mild    Acute respiratory failure with hypoxia    Supraventricular tachycardia    PVC (premature ventricular contraction)    Dyspnea on exertion    Dizziness    Depressive disorder    Gastroesophageal reflux disease    Midline cystocele    Rectocele    BMI 29.0-29.9,adult    Acute left PCA stroke    B12 deficiency    Bradycardia    Premature atrial contractions    Stroke       Past Medical History:   Diagnosis Date    Chilblains     \"Chilblian's\"    CKD (chronic kidney disease)     Depression     Fatty liver     GERD (gastroesophageal reflux disease)     Hyperlipidemia     Hypertension     Incontinence     Osteoarthritis     OA  Marvin THR, LT TKR - hydrocodone prescibed by Dr. 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 Almaguer    TOTAL KNEE ARTHROPLASTY Left     OA  Marvin THR, LT TKR - hydrocodone prescibed by Dr. Almaguer       SLP Recommendation and Plan                                                               SLP EVALUATION (last 72 hours)       SLP SLC Evaluation       Row Name 01/09/24 1030 01/09/24 0930 01/08/24 1300 01/08/24 1000          Communication Assessment/Intervention    Document Type therapy note (daily note)  -SR therapy note (daily note)  -SR therapy note (daily note)  -SR therapy note (daily note)  -SR     Subjective Information no complaints  -SR no complaints  -SR no complaints  -SR no complaints  -SR     Patient Observations alert;cooperative;agree to therapy  -SR alert;cooperative;agree to therapy  -SR alert;cooperative;agree to therapy  -SR alert;cooperative;agree to therapy  -SR     Patient Effort good  -SR good  -SR good  -SR good  -SR     Symptoms Noted During/After Treatment none  -SR none  -SR none  -SR none  -SR        Pain Scale: Numbers Pre/Post-Treatment    Pretreatment Pain Rating 0/10 - no pain  -SR 0/10 - no pain  -SR 0/10 - no pain  -SR 0/10 - no pain  -SR     Posttreatment Pain Rating 0/10 - no pain  -SR 0/10 - no pain  -SR 0/10 - no pain  -SR 0/10 - no pain  -SR               User Key  (r) = Recorded By, (t) = Taken By, (c) = Cosigned By      Initials Name Effective Dates    SR Jessica Carr SLP 01/05/24 -                        EDUCATION    The patient has been educated in the following areas:       Cognitive Impairment Communication Impairment.             SLP GOALS       Row Name  01/09/24 1030 01/09/24 0900 01/08/24 1300       Attention Goal 1 (SLP)    Improve Attention by Goal 1 (SLP) -- complete sustained attention task;complete selective attention task;80%;independently (over 90% accuracy)  -SR --    Time Frame (Attention Goal 1, SLP) -- short term goal (STG)  -SR --    Progress/Outcomes (Attention Goal 1, SLP) -- goal met  -SR --    Comment (Attention Goal 1, SLP) -- Patient sustained attention to structured task with NO cues for redirection.  -SR --       Memory Skills Goal 1 (SLP)    Improve Memory Skills Through Goal 1 (SLP) -- use memory strategies;use external memory aid;listen to a paragraph and answer questions;recall details of the day;80%;with minimal cues (75-90%)  -SR --    Time Frame (Memory Skills Goal 1, SLP) -- short term goal (STG)  -SR --    Progress/Outcomes (Memory Skills Goal 1, SLP) -- goal partially met  -SR --       Organizational Skills Goal 1 (SLP)    Improve Thought Organization Through Goal 1 (SLP) -- completing a divergent naming task;completing a convergent naming task;80%;independently (over 90% accuracy)  -SR completing a divergent naming task;completing a convergent naming task;80%;independently (over 90% accuracy)  -SR    Time Frame (Thought Organization Skills Goal 1, SLP) -- short term goal (STG)  -SR short term goal (STG)  -SR    Progress/Outcomes (Thought Organization Skills Goal 1, SLP) -- goal partially met  -SR goal partially met  -SR    Comment (Thought Organization Skills Goal 1, SLP) -- -- Patient named 1 similarity and 1 difference between 2 concrete objects with 80% acc given NO cues. Perseveration of previous answer x1  -SR       Reasoning Goal 1 (SLP)    Improve Reasoning Through Goal 1 (SLP) complete basic reasoning task;complete deductive reasoning task;80%;with minimal cues (75-90%)  -SR -- complete basic reasoning task;complete deductive reasoning task;80%;with minimal cues (75-90%)  -SR    Time Frame (Reasoning Goal 1, SLP) short term  goal (STG)  -SR -- short term goal (STG)  -SR    Progress/Outcomes (Reasoning Goal 1, SLP) goal partially met  -SR -- good progress toward goal  -SR    Comment (Reasoning Goal 1, SLP) Patient used clues from paragraph to label events of a daily schedule in timeline order with 50% acc given NO cues; 80% acc given MIN cues. Cues provided for attn to detail.  -SR -- Patient followed written directions and used process of elimination to determine solution to moderately-complex logic puzzle with 50% acc given NO cues; 80% acc given MIN cues. Extended time provided to complete task.  -SR       Functional Math Skills Goal 1 (SLP)    Improve Functional Math Skills Through Goal 1 (SLP) complete functional math task;80%;with minimal cues (75-90%)  -SR -- --    Time Frame (Functional Math Skills Goal 1, SLP) short term goal (STG)  -SR -- --    Progress/Outcomes (Functional Math Skills Goal 1, SLP) goal met  -SR -- --    Comment (Functional Math Skills Goal 1, SLP) Patient answered fxnal math and writing questions using prices from restaurant lunch menu with 90% acc given NO cues. No calculator assist needed.  -SR -- --       Executive Functional Skills Goal 1 (SLP)    Improve Executive Function Skills Goal 1 (SLP) -- home management activity;time management activity;80%;with minimal cues (75-90%)  -SR --    Time Frame (Executive Function Skills Goal 1, SLP) -- short term goal (STG)  -SR --    Progress/Outcomes (Executive Function Skills Goal 1, SLP) -- goal partially met  -SR --    Comment (Executive Function Skills Goal 1, SLP) -- Planning, organization, and decision making targeted with weekly meal planning activity. Patient followed directional parameters and organized items for 3 daily meals/ 3 days a week given list of food items and serving size reference. Patient completed task with ~80% acc given MIN cues. Extended time provided due to complexity of task.  -SR --       Right Hemisphere Function Goal 1 (SLP)    Improve  Right Hemisphere Function Through Goal 1 (SLP) complete visuo-spatial activities (visual closure, trail making, mazes;complete visuo-perceptual activities (L/R discrimination, spatial concepts);80%;with minimal cues (75-90%)  -SR -- --    Time Frame (Right Hemisphere Function Goal 1, SLP) short term goal (STG)  -SR -- --    Progress/Outcomes (Right Hemisphere Function Goal 1, SLP) goal partially met  -SR -- --      Row Name 01/08/24 1000             Attention Goal 1 (SLP)    Improve Attention by Goal 1 (SLP) complete sustained attention task;complete selective attention task;80%;independently (over 90% accuracy)  -SR      Time Frame (Attention Goal 1, SLP) short term goal (STG)  -SR      Progress/Outcomes (Attention Goal 1, SLP) goal partially met  -SR      Comment (Attention Goal 1, SLP) Attention to detail targeted with written directions activity. Patient followed 2 step-2 component written directions with 90% acc given NO cues  -SR         Executive Functional Skills Goal 1 (SLP)    Improve Executive Function Skills Goal 1 (SLP) home management activity;80%;with minimal cues (75-90%)  -SR      Time Frame (Executive Function Skills Goal 1, SLP) short term goal (STG)  -SR      Progress/Outcomes (Executive Function Skills Goal 1, SLP) goal partially met  -SR      Comment (Executive Function Skills Goal 1, SLP) Medication management task completed targeting attention, sequencing, and organization. Patient used moderately-complex organizer (4x a day, 7 days a week) for today's task. Given list of personal medications with name, frequency, and dosage, patient sorted mock medications into pill organizer with 90% acc given NO cues. Self-correction x1.  -SR         Right Hemisphere Function Goal 1 (SLP)    Improve Right Hemisphere Function Through Goal 1 (SLP) complete visuo-spatial activities (visual closure, trail making, mazes;complete visuo-perceptual activities (L/R discrimination, spatial concepts);80%;with minimal  cues (75-90%)  -SR      Time Frame (Right Hemisphere Function Goal 1, SLP) short term goal (STG)  -SR      Progress/Outcomes (Right Hemisphere Function Goal 1, SLP) good progress toward goal  -SR      Comment (Right Hemisphere Function Goal 1, SLP) Pt completed medication management activity with x1 cue to scan to R side.  -SR                User Key  (r) = Recorded By, (t) = Taken By, (c) = Cosigned By      Initials Name Provider Type    Jessica White SLP Speech and Language Pathologist                                Time Calculation:        Time Calculation- SLP       Row Name 01/09/24 1223 01/09/24 1000          Time Calculation- SLP    SLP Start Time 1030  -SR 0930  -SR     SLP Stop Time 1100  -SR 1000  -SR     SLP Time Calculation (min) 30 min  -SR 30 min  -SR     SLP Received On 01/09/24  -SR --               User Key  (r) = Recorded By, (t) = Taken By, (c) = Cosigned By      Initials Name Provider Type    Jessica White SLP Speech and Language Pathologist                      Therapy Charges for Today       Code Description Service Date Service Provider Modifiers Qty    28848039869 HC ST DEV OF COGN SKILLS INITIAL 15 MIN 1/8/2024 Jessica Carr SLP  1    45136750008 HC ST DEV OF COGN SKILLS EACH ADDT'L 15 MIN 1/8/2024 Jessica Carr SLP  3    05250525042 HC ST DEV OF COGN SKILLS INITIAL 15 MIN 1/9/2024 Jessica Carr SLP  1    43525455961 HC ST DEV OF COGN SKILLS EACH ADDT'L 15 MIN 1/9/2024 Jessica Carr SLP  3                             PAULETTE Gómez  1/9/2024

## 2024-01-09 NOTE — THERAPY TREATMENT NOTE
"Inpatient Rehabilitation - Occupational Therapy Treatment Note    [facility]     Patient Name: Rekha Bear  : 1940  MRN: [id number]    Today's Date: 2024                 Admit Date: 2023       No diagnosis found.    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    Community acquired pneumonia of left lower lobe of lung    Hyponatremia    Pneumonia due to COVID-19 virus    Major depressive disorder, recurrent, mild    Acute respiratory failure with hypoxia    Supraventricular tachycardia    PVC (premature ventricular contraction)    Dyspnea on exertion    Dizziness    Depressive disorder    Gastroesophageal reflux disease    Midline cystocele    Rectocele    BMI 29.0-29.9,adult    Acute left PCA stroke    B12 deficiency    Bradycardia    Premature atrial contractions    Stroke       Past Medical History:   Diagnosis Date    Chilblains     \"Chilblian's\"    CKD (chronic kidney disease)     Depression     Fatty liver     GERD (gastroesophageal reflux disease)     Hyperlipidemia     Hypertension     Incontinence     Osteoarthritis     OA  Marvin THR, LT TKR - hydrocodone prescibed by Dr. 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 ENDOSCOPY;  Service:     DENTAL PROCEDURE      FOOT SURGERY      TONSILLECTOMY      TOTAL HIP ARTHROPLASTY Bilateral     OA  Marvin THR, LT TKR - hydrocodone prescibed by Dr. Almaguer    TOTAL KNEE ARTHROPLASTY Left     OA  Marvin THR, LT TKR - hydrocodone prescibed by Dr. Almaguer             IRF OT ASSESSMENT FLOWSHEET (last 12 hours)       IRF OT Evaluation and Treatment       Row Name 01/09/24 1542          OT Time and Intention    Document Type daily treatment  completed dc items as pt was to dc tomorrow. not dc set for Thursday  -     Mode of Treatment individual therapy;occupational therapy  -KP     Patient Effort good  -KP     Symptoms Noted During/After Treatment none  -KP       Row Name 01/09/24 1542          General Information    Patient/Family/Caregiver Comments/Observations pt EOB in am and in wc pm  -KP     Existing Precautions/Restrictions fall  -KP     Limitations/Impairments visual  -KP     Comment, General Information R visual neglect  -KP       Row Name 01/09/24 1542          Pain Assessment    Pretreatment Pain Rating 0/10 - no pain  -KP     Posttreatment Pain Rating 0/10 - no pain  -KP       Row Name 01/09/24 1542          Cognition/Psychosocial    Affect/Mental Status (Cognition) WFL  -KP     Orientation Status (Cognition) oriented x 4  -KP     Follows Commands (Cognition) follows one-step commands;over 90% accuracy  -KP     Personal Safety Interventions fall prevention program maintained;gait belt;muscle strengthening facilitated;nonskid shoes/slippers when out of bed  -KP     Cognitive Function memory deficit  -KP     Memory Deficit (Cognition) minimal deficit  -KP     Safety Deficit (Cognition) minimal deficit  -KP       Row Name 01/09/24 1542          Strength (Manual Muscle Testing)     Left Hand, Setting 2 (Dynamometer Testing) 55  -KP     Right Hand, Setting 2 (Dynamometer Testing) 65  -KP     Left Hand: Tip (Pincer) Pinch Strength (Pinch Dynamometer Testing) 10  -KP     Left Hand: Lateral (Key) Pinch Strength (Pinch Dynamometer Testing) 8  -KP     Right Hand: Tip (Pincer) Pinch Strength (Pinch Dynamometer Testing) 10  -KP     Right Hand: Lateral (Key) Pinch Strength (Pinch Dynamometer Testing) 12  -KP       Row Name 01/09/24 1542          Vision Assessment/Intervention    Visual Field Deficit homonymous hemianopsia, right  -KP     Visual Processing Deficit visual search, right;visual attention, right  -KP     Visual Treatment Interventions therapeutic activities;scanning activities/strategies;searching activities/strategies  -     Vision Assessment Comment scanning to the R to complete palette of pegs design w 100% accuracy  -       Row Name 01/09/24 1542          Bathing    Kerr Level (Bathing) bathing skills;set up;standby assist;modified independence  -     Assistive Device (Bathing) grab bar/tub rail  -     Position (Bathing) supported sitting;supported standing  -     Set-up Assistance (Bathing) obtain supplies  [facility]       Row Name 01/09/24 1542          Upper Body Dressing    Kerr Level (Upper Body Dressing) doff;upper body dressing skills;don;bra/undergarment;pull over garment;set up assistance;modified independence  -     Position (Upper Body Dressing) supported sitting  -     Set-up Assistance (Upper Body Dressing) obtain clothing  [facility]       Row Name 01/09/24 1542          Lower Body Dressing    Kerr Level (Lower Body Dressing) doff;don;pants/bottoms;shoes/slippers;socks;modified independence;set up;standby assist  -     Position (Lower Body Dressing) supported sitting;supported standing  -     Set-up Assistance (Lower Body Dressing) obtain clothing  [facility]       Row Name 01/09/24 1542          Grooming    Kerr Level (Grooming) grooming  skills;deodorant application;hair care, combing/brushing;oral care regimen;wash face, hands;independent;modified independence  -     Position (Grooming) sink side;supported sitting  -       Row Name 01/09/24 1542          Toileting    Chauncey Level (Toileting) toileting skills;adjust/manage clothing;perform perineal hygiene;modified independence;supervision  -     Assistive Device Use (Toileting) grab bar/safety frame  [facility]     Position (Toileting) supported sitting;supported standing  -       Row Name 01/09/24 1542          Bed Mobility    Comment, (Bed Mobility) NT EOB  -       Row Name 01/09/24 1542          Sit-Stand Transfer    Sit-Stand Chauncey (Transfers) set up;standby assist  -     Assistive Device (Sit-Stand Transfers) wheelchair  [facility]       Row Name 01/09/24 1542          Stand-Sit Transfer    Stand-Sit Chauncey (Transfers) standby assist;set up  -     Assistive Device (Stand-Sit Transfers) wheelchair  [facility]       Row Name 01/09/24 1542          Toilet Transfer    Type (Toilet Transfer) sit-stand;stand-sit;stand pivot/stand step  -     Chauncey Level (Toilet Transfer) set up;standby assist  -     Assistive Device (Toilet Transfer) wheelchair;grab bars/safety frame  [facility]       Row Name 01/09/24 1542          Motor Skills    Results, 9 Hole Peg Test of Fine Motor Coordination R blocks 49 L blocks 57 R pegs 27 sec L pegs 32 sec  -       Row Name 01/09/24 1542          Balance    Static Sitting Balance modified [facility]     Dynamic Sitting Balance modified [facility]     Static Standing Balance standby assist;modified independence  [facility]     Dynamic Standing Balance standby assist  [facility]     Position/Device Used, Standing Balance supported  -       Row Name 01/09/24 1542          Positioning and Restraints    Pre-Treatment Position in bed  -     Post Treatment Position wheelchair  [facility]     In Wheelchair sitting;call light within reach;encouraged to call for  assist;exit alarm on  -               User Key  (r) = Recorded By, (t) = Taken By, (c) = Cosigned By      Initials Name Effective Dates     Brigid Osorio, OTR 07/11/23 -                      Occupational Therapy Education       Title: PT OT SLP Therapies (In Progress)       Topic: Occupational Therapy (Done)       Point: ADL training (Done)       Description:   Instruct learner(s) on proper safety adaptation and remediation techniques during self care or transfers.   Instruct in proper use of assistive devices.                  Learning Progress Summary             Patient Acceptance, E,TB,D,H, DU,VU by  at 1/9/2024 1550    Comment: ed pt on HEP for 2# hand wt ex and visual activities such as dot to dot, word search and mazes. word searches and dot to dot provided. ed on games to do such as squence to work on R side to incr vision on R    Acceptance, E,TB,D, NR,VU by  at 12/30/2023 1148    Comment: Shower tsf to bench                         Point: Home exercise program (Done)       Description:   Instruct learner(s) on appropriate technique for monitoring, assisting and/or progressing therapeutic exercises/activities.                  Learning Progress Summary             Patient Acceptance, E,TB,D,H, DU,VU by  at 1/9/2024 1550    Comment: ed pt on HEP for 2# hand wt ex and visual activities such as dot to dot, word search and mazes. word searches and dot to dot provided. ed on games to do such as squence to work on R side to incr vision on R                         Point: Precautions (Done)       Description:   Instruct learner(s) on prescribed precautions during self-care and functional transfers.                  Learning Progress Summary             Patient Acceptance, E,TB,D,H, DU,VU by  at 1/9/2024 1550    Comment: ed pt on HEP for 2# hand wt ex and visual activities such as dot to dot, word search and mazes. word searches and dot to dot provided. ed on games to do such as squence to work  on R side to incr vision on R                         Point: Body mechanics (Done)       Description:   Instruct learner(s) on proper positioning and spine alignment during self-care, functional mobility activities and/or exercises.                  Learning Progress Summary             Patient Acceptance, E,TB,D,H, DU,VU by  at 1/9/2024 1550    Comment: ed pt on HEP for 2# hand wt ex and visual activities such as dot to dot, word search and mazes. word searches and dot to dot provided. ed on games to do such as squence to work on R side to incr vision on R                                         User Key       Initials Effective Dates Name Provider Type Discipline     06/16/21 -  Sahra Crisostomo, GRACIELA Occupational Therapist OT     07/11/23 -  Brigid Osorio OTR Occupational Therapist OT                        OT Recommendation and Plan            Daily Progress Summary (OT)  Overall Progress Toward Functional Goals (OT): progressing toward functional goals as expected  Daily Progress Summary (OT): pt is now SBA a LBB and LBD. SBA UBB and UBD            Time Calculation:      Time Calculation- OT       Row Name 01/09/24 1551 01/09/24 0800          Time Calculation- OT    OT Start Time 1230  -KP 0800  -     OT Stop Time 1300  - 0830  -     OT Time Calculation (min) 30 min  - 30 min  -               User Key  (r) = Recorded By, (t) = Taken By, (c) = Cosigned By      Initials Name Provider Type     Brigid Osorio OTR Occupational Therapist                  Therapy Charges for Today       Code Description Service Date Service Provider Modifiers Qty    19297966582 HC OT SELF CARE/MGMT/TRAIN EA 15 MIN 1/9/2024 Brigid Osorio OTR GO 2    40292083796 HC OT NEUROMUSC RE EDUCATION EA 15 MIN 1/9/2024 Brigid Osorio OTR GO 2                     GRACIELA Dow  1/9/2024

## 2024-01-09 NOTE — PROGRESS NOTES
Inpatient Rehabilitation Plan of Care Note    Plan of Care  Care Plan Reviewed - No updates at this time.    Safety    [RN] Potential for Injury(Active)  Current Status(01/09/2024): Risk for fall  Weekly Goal(01/16/2024): Cue to use call bell  Discharge Goal: PT /Family will be aware of risk for fall and safety in the home  setting    Performed Intervention(s)  Safety rounds  Bed alarm and/chair alarm      Psychosocial    [RN] Coping/Adjustment(Active)  Current Status(01/09/2024): Expressing appropriate coping  Weekly Goal(01/16/2024): Provide educational material regarding stroke  Discharge Goal: Knowleadgeable of care needs and stroke recovery    Performed Intervention(s)  Vebalizes her needs and concerns      Sphincter Control    [RN] Bladder Management(Active)  Current Status(01/09/2024): Bladder urgency  Weekly Goal(01/16/2024): Continent 50%  Discharge Goal: Continent 100%    [RN] Bowel Management(Active)  Current Status(01/09/2024): Continent 100%  Weekly Goal(01/16/2024): Continent 100%  Discharge Goal: Continent 100%    Performed Intervention(s)  Bladder training program  Encourage fluid intake  Incontinence products    Signed by: Loraine Leigh RN

## 2024-01-09 NOTE — PLAN OF CARE
Goal Outcome Evaluation:   Patient is calm and cooperative. Alert and oriented x 4. Forgetful at times. She is using the call light for assistance. Generalized weakness. 1 person assist. Right eye partial vision. Numbness to fingertips. Denied pain. She is taking her medication whole with thin liquids. Will continue to monitor.

## 2024-01-09 NOTE — PLAN OF CARE
Problem: Rehabilitation ([facility]) Plan of Care  Goal: Plan of Care Review  Outcome: Ongoing, Progressing  Plan of care reviewed with patient     Neuro-Pt alert and oriented x4  GI-Pt cont-incont of urine  -Last BM 1/8  Pain-Pt denied pain  Skin-blanchable redness to bilateral heels    2147 Paged MD. 2150 Discussed with Dr. Wilson, patient's medical history and patient's current VS. Discussed no parameters on heart rate for hydralazine HR 44. MD stated to give medication.

## 2024-01-10 LAB
ANION GAP SERPL CALCULATED.3IONS-SCNC: 10 MMOL/L (ref 5–15)
BUN SERPL-MCNC: 29 MG/DL (ref 8–23)
BUN/CREAT SERPL: 21.8 (ref 7–25)
CALCIUM SPEC-SCNC: 9.1 MG/DL (ref 8.6–10.5)
CHLORIDE SERPL-SCNC: 105 MMOL/L (ref 98–107)
CO2 SERPL-SCNC: 25 MMOL/L (ref 22–29)
CREAT SERPL-MCNC: 1.33 MG/DL (ref 0.57–1)
EGFRCR SERPLBLD CKD-EPI 2021: 40.3 ML/MIN/1.73
GLUCOSE BLDC GLUCOMTR-MCNC: 126 MG/DL (ref 70–130)
GLUCOSE BLDC GLUCOMTR-MCNC: 144 MG/DL (ref 70–130)
GLUCOSE BLDC GLUCOMTR-MCNC: 164 MG/DL (ref 70–130)
GLUCOSE BLDC GLUCOMTR-MCNC: 212 MG/DL (ref 70–130)
GLUCOSE SERPL-MCNC: 123 MG/DL (ref 65–99)
POTASSIUM SERPL-SCNC: 4.7 MMOL/L (ref 3.5–5.2)
SODIUM SERPL-SCNC: 140 MMOL/L (ref 136–145)

## 2024-01-10 PROCEDURE — 97129 THER IVNTJ 1ST 15 MIN: CPT

## 2024-01-10 PROCEDURE — 97535 SELF CARE MNGMENT TRAINING: CPT | Performed by: OCCUPATIONAL THERAPIST

## 2024-01-10 PROCEDURE — 82948 REAGENT STRIP/BLOOD GLUCOSE: CPT

## 2024-01-10 PROCEDURE — 97130 THER IVNTJ EA ADDL 15 MIN: CPT

## 2024-01-10 PROCEDURE — 97112 NEUROMUSCULAR REEDUCATION: CPT | Performed by: OCCUPATIONAL THERAPIST

## 2024-01-10 PROCEDURE — 63710000001 INSULIN LISPRO (HUMAN) PER 5 UNITS: Performed by: INTERNAL MEDICINE

## 2024-01-10 PROCEDURE — 97110 THERAPEUTIC EXERCISES: CPT

## 2024-01-10 PROCEDURE — 97530 THERAPEUTIC ACTIVITIES: CPT

## 2024-01-10 PROCEDURE — 80048 BASIC METABOLIC PNL TOTAL CA: CPT | Performed by: PHYSICAL MEDICINE & REHABILITATION

## 2024-01-10 RX ADMIN — LINAGLIPTIN 5 MG: 5 TABLET, FILM COATED ORAL at 07:15

## 2024-01-10 RX ADMIN — OXYBUTYNIN CHLORIDE 10 MG: 10 TABLET, EXTENDED RELEASE ORAL at 07:15

## 2024-01-10 RX ADMIN — BUPROPION HYDROCHLORIDE 150 MG: 150 TABLET, EXTENDED RELEASE ORAL at 05:56

## 2024-01-10 RX ADMIN — PIOGLITAZONE 15 MG: 15 TABLET ORAL at 07:15

## 2024-01-10 RX ADMIN — GABAPENTIN 400 MG: 400 CAPSULE ORAL at 14:02

## 2024-01-10 RX ADMIN — INSULIN LISPRO 3 UNITS: 100 INJECTION, SOLUTION INTRAVENOUS; SUBCUTANEOUS at 20:59

## 2024-01-10 RX ADMIN — INSULIN LISPRO 2 UNITS: 100 INJECTION, SOLUTION INTRAVENOUS; SUBCUTANEOUS at 11:40

## 2024-01-10 RX ADMIN — RIVAROXABAN 15 MG: 15 TABLET, FILM COATED ORAL at 17:20

## 2024-01-10 RX ADMIN — HYDRALAZINE HYDROCHLORIDE 10 MG: 10 TABLET, FILM COATED ORAL at 21:03

## 2024-01-10 RX ADMIN — HYDRALAZINE HYDROCHLORIDE 10 MG: 10 TABLET, FILM COATED ORAL at 14:02

## 2024-01-10 RX ADMIN — HYDRALAZINE HYDROCHLORIDE 10 MG: 10 TABLET, FILM COATED ORAL at 05:56

## 2024-01-10 RX ADMIN — GABAPENTIN 400 MG: 400 CAPSULE ORAL at 05:59

## 2024-01-10 RX ADMIN — ATORVASTATIN CALCIUM 40 MG: 20 TABLET, FILM COATED ORAL at 20:16

## 2024-01-10 RX ADMIN — LEVOTHYROXINE SODIUM 100 MCG: 100 TABLET ORAL at 05:56

## 2024-01-10 RX ADMIN — GLIPIZIDE 5 MG: 5 TABLET ORAL at 07:15

## 2024-01-10 RX ADMIN — Medication 1000 MCG: at 07:15

## 2024-01-10 RX ADMIN — VENLAFAXINE HYDROCHLORIDE 150 MG: 150 CAPSULE, EXTENDED RELEASE ORAL at 07:15

## 2024-01-10 RX ADMIN — GABAPENTIN 400 MG: 400 CAPSULE ORAL at 21:00

## 2024-01-10 RX ADMIN — Medication 10 ML: at 20:17

## 2024-01-10 RX ADMIN — Medication 10 ML: at 07:17

## 2024-01-10 NOTE — PROGRESS NOTES
Inpatient Rehabilitation Plan of Care Note    Plan of Care  Care Plan Reviewed - No updates at this time.    Safety    Performed Intervention(s)  Safety rounds  Bed alarm and/chair alarm      Psychosocial    Performed Intervention(s)  Vebalizes her needs and concerns      Sphincter Control    Performed Intervention(s)  Bladder training program  Encourage fluid intake  Incontinence products    Signed by: Loraine Leigh RN

## 2024-01-10 NOTE — PROGRESS NOTES
"Nutrition Services    Patient Name:  Rekha Bear  YOB: 1940  MRN: [id number]  Admit Date:  12/26/2023    Assessment Date:  01/10/24    Summary: Follow up  Tolerating diet. Eating well, >75%. Visited at lunch time - no concerns or issues voiced.     Plan/Recommend:  Continue with diet as ordered.   Monitor intake, labs, weight and skin status.   RD to follow.      CLINICAL NUTRITION ASSESSMENT      Reason for Assessment Follow-up Protocol     Admitting Diagnosis   Stroke       Medical/Surgical History Past Medical History:   Diagnosis Date    Chilblains     \"Chilblian's\"    CKD (chronic kidney disease)     Depression     Fatty liver     GERD (gastroesophageal reflux disease)     Hyperlipidemia     Hypertension     Incontinence     Osteoarthritis     OA  Marvin THR, LT TKR - hydrocodone prescibed by Dr. 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 Almaguer    TOTAL KNEE ARTHROPLASTY Left     OA  Marvin THR, LT TKR - hydrocodone prescibed by Dr. Almaguer        Current Nutrition Orders & Evaluation of Intake       Oral Nutrition      Food Allergies Shellfish    Current PO Diet Diet: Regular/House Diet, Diabetic Diets; Consistent Carbohydrate; Texture: Regular Texture " "(IDDSI 7); Fluid Consistency: Thin (IDDSI 0)    Supplement -   PO Evaluation      PO Intake % % x 15 meals     Factors Affecting Intake No factors at this time   --  Anthropometrics        Current Height  Current Weight (CBW)  BMI kg/m2 Height: 170.2 cm (67\")  Weight: 87.4 kg (192 lb 10.9 oz) (12/26/23 1917)  Body mass index is 30.18 kg/m².   BMI Category Obese, Class I (30 - 34.9)   Ideal Weight (IBW) 135#   Usual Weight (UBW) 190#   Weight Trend No new weight available       --  Physical Findings          General Appearance alert   Oral/Mouth Cavity dental appliance   Edema  1+ (trace)   Gastrointestinal last bowel movement: 1/8   Skin  location of wound: third toe (bottom) with black eschar   Tubes/Drains/Lines none   NFPE No clinical signs of muscle wasting or fat loss       Medications & Labs           Scheduled Medications atorvastatin, 40 mg, Oral, Nightly  buPROPion XL, 150 mg, Oral, QAM  docusate sodium, 100 mg, Oral, BID  gabapentin, 400 mg, Oral, Q8H  glipizide, 5 mg, Oral, QAM AC  hydrALAZINE, 10 mg, Oral, Q8H  insulin lispro, 2-7 Units, Subcutaneous, 4x Daily AC & at Bedtime  levothyroxine, 100 mcg, Oral, Q AM  linagliptin, 5 mg, Oral, Daily  oxybutynin XL, 10 mg, Oral, Daily  pioglitazone, 15 mg, Oral, Daily  polyethylene glycol, 17 g, Oral, Daily  rivaroxaban, 15 mg, Oral, Daily With Dinner  sodium chloride, 10 mL, Intravenous, Q12H  venlafaxine XR, 150 mg, Oral, Daily  vitamin B-12, 1,000 mcg, Oral, Daily  vitamin D, 50,000 Units, Oral, Q7 Days       Infusions     PRN Medications   acetaminophen **OR** acetaminophen    bisacodyl    dextrose    glucagon (human recombinant)    melatonin    ondansetron    sodium chloride            Pertinent Labs   Results from last 7 days   Lab Units 01/10/24  0622 01/09/24  0555 01/08/24  0559   SODIUM mmol/L 140 140 136   POTASSIUM mmol/L 4.7 5.1 5.0   CHLORIDE mmol/L 105 104 102   CO2 mmol/L 25.0 27.0 25.5   BUN mg/dL 29* 32* 40*   CREATININE mg/dL 1.33* 1.56* " 1.45*   CALCIUM mg/dL 9.1 9.1 9.2   GLUCOSE mg/dL 123* 117* 164*     Results from last 7 days   Lab Units 01/08/24  0559   HEMOGLOBIN g/dL 12.4   HEMATOCRIT % 37.9   WBC 10*3/mm3 8.30     Results from last 7 days   Lab Units 01/08/24  0559 01/04/24  0756   PLATELETS 10*3/mm3 210 232     Lab Results   Component Value Date    HGBA1C 9.10 (H) 12/22/2023        Nutrition Diagnosis        Nutrition Dx Problem  Problem: Altered Nutrition Related to Labs  Etiology: Medical Diagnosis - T2DM    Signs/Symptoms: Biochemical       NUTRITION INTERVENTION / PLAN OF CARE  Goals        Intervention Goal(s) Improved nutrition related labs and Maintain intake     Nutrition Intervention        RD Action Continue to monitor     Prescription        Diet Prescription    Supplement Prescription    Snack Prescription    EN Prescription    New Prescription Ordered? No changes at this time     Monitor/Evaluation        Monitor Per protocol   Discharge Needs Pending clinical course     RD to follow up per protocol.     Electronically signed by:  Yvonne Pompa RD  01/10/24 11:57 EST

## 2024-01-10 NOTE — PLAN OF CARE
Goal Outcome Evaluation:   Patient is calm and cooperative. Alert and oriented x 3. Forgetful. Making needs and wants known. Using the call light for assistance. Generalized weakness. 1 person assist. Denied pain. Continent and incontinent of bladder. Sitting up for all meals. Will continue to monitor.

## 2024-01-10 NOTE — PROGRESS NOTES
Inpatient Rehabilitation Functional Measures Assessment and Plan of Care    Plan of Care  Updated Problems/Interventions  Self Care    [OT] Bathing(Active)  Current Status(01/10/2024): SBA  Weekly Goal(01/17/2024): SBA  Discharge Goal: sup    [OT] Dressing (Lower)(Active)  Current Status(01/10/2024): SBA  Weekly Goal(01/17/2024): SBA  Discharge Goal: SUP    [OT] Dressing (Upper)(Active)  Current Status(01/10/2024): I  Weekly Goal(01/17/2024): I  Discharge Goal: Ind    [OT] Grooming(Active)  Current Status(01/10/2024): I  Weekly Goal(01/17/2024): I  Discharge Goal: ind    [OT] Toileting(Active)  Current Status(01/10/2024): SBA  Weekly Goal(01/17/2024): SBA  Discharge Goal: Sup        Mobility    [OT] Toilet Transfers(Active)  Current Status(01/10/2024): SBA w/ RW or wc  Weekly Goal(01/17/2024): SBA  Discharge Goal: SUP    [OT] Tub/Shower Transfers(Active)  Current Status(01/10/2024): SBA A w/ RW or wc  Weekly Goal(01/18/2024): SBA  Discharge Goal: SUP    Functional Measures  KD Eating:  Branch  KD Grooming: Branch  KD Bathing:  Branch  KD Upper Body Dressing:  Branch  KD Lower Body Dressing:  Branch  KD Toileting:  Branch    KD Bladder Management  Level of Assistance:  Branch  Frequency/Number of Accidents this Shift:  Branch    KD Bowel Management  Level of Assistance: Branch  Frequency/Number of Accidents this Shift: Branch    KD Bed/Chair/Wheelchair Transfer:  Branch  KD Toilet Transfer:  Branch  KD Tub/Shower Transfer:  Branch    Previously Documented Mode of Locomotion at Discharge: Field  KD Expected Mode of Locomotion at Discharge: Branch  KD Walk/Wheelchair:  Branch  KD Stairs:  Branch    KD Comprehension:  Branch  KD Expression:  Branch  KD Social Interaction:  Branch  KD Problem Solving:  Branch  KD Memory:  Branch    Therapy Mode Minutes  Occupational Therapy: Branch  Physical Therapy: Branch  Speech Language Pathology:  Branch    Signed by: Brigid Osorio OTR/L

## 2024-01-10 NOTE — PROGRESS NOTES
LOS: 15 days   Patient Care Team:  Eben Porter MD as PCP - General  Eben Porter MD as PCP - Family Medicine      MICHAEL WHITEAF  1940      ADMITTING DIAGNOSIS:  Status post left PCA stroke-felt cardioembolic  Bilateral carotid stenosis  Stroke prophylaxis-aspirin 7 days through December 28, then start Eliquis 5 mg twice daily on December 29/atorvastatin  Right visual field deficit-right homonymous hemianopsia  Impaired mobility/self-car      Subjective     Patient denies any headache.  No new weakness.  Continues with impaired vision to the right side.  She reports tolerated IV fluids.  She had elevated blood pressure last night but improved this morning.      Objective     Vitals:    01/10/24 1253   BP: 132/74   Pulse: 65   Resp: 18   Temp: 97.8 °F (36.6 °C)   SpO2: 98%       Intake/Output Summary (Last 24 hours) at 1/10/2024 1429  Last data filed at 1/10/2024 1200  Gross per 24 hour   Intake 720 ml   Output --   Net 720 ml     PHYSICAL EXAM:     MENTAL STATUS -  AWAKE / ALERT  HEENT-    LUNGS - CTA, NO WHEEZES, RALES OR RHONCHI  HEART- RRR, NO RUB, MURMUR, OR GALLOP  ABD - NORMOACTIVE BOWEL SOUNDS, SOFT, NT.    EXT - NO EDEMA OR CYANOSIS  NEURO -oriented person place and general situation  Speech fluent.   Right homonymous hemianopsia  MOTOR EXAM - RUE/RLE 5/5. LUE/LLE 5/5.         MEDICATIONS  Scheduled Meds:atorvastatin, 40 mg, Oral, Nightly  buPROPion XL, 150 mg, Oral, QAM  docusate sodium, 100 mg, Oral, BID  gabapentin, 400 mg, Oral, Q8H  glipizide, 5 mg, Oral, QAM AC  hydrALAZINE, 10 mg, Oral, Q8H  insulin lispro, 2-7 Units, Subcutaneous, 4x Daily AC & at Bedtime  levothyroxine, 100 mcg, Oral, Q AM  linagliptin, 5 mg, Oral, Daily  oxybutynin XL, 10 mg, Oral, Daily  pioglitazone, 15 mg, Oral, Daily  polyethylene glycol, 17 g, Oral, Daily  rivaroxaban, 15 mg, Oral, Daily With Dinner  sodium chloride, 10 mL, Intravenous, Q12H  venlafaxine XR, 150 mg, Oral, Daily  vitamin B-12, 1,000 mcg,  Oral, Daily  vitamin D, 50,000 Units, Oral, Q7 Days      Continuous Infusions:     PRN Meds:.  acetaminophen **OR** acetaminophen    bisacodyl    dextrose    glucagon (human recombinant)    melatonin    ondansetron    sodium chloride      RESULTS  Glucose   Date/Time Value Ref Range Status   01/10/2024 1129 164 (H) 70 - 130 mg/dL Final   01/10/2024 0725 126 70 - 130 mg/dL Final   01/09/2024 2052 162 (H) 70 - 130 mg/dL Final   01/09/2024 1613 85 70 - 130 mg/dL Final   01/09/2024 1116 184 (H) 70 - 130 mg/dL Final   01/09/2024 0701 125 70 - 130 mg/dL Final   01/08/2024 2022 231 (H) 70 - 130 mg/dL Final   01/08/2024 1616 84 70 - 130 mg/dL Final     Results from last 7 days   Lab Units 01/08/24  0559 01/04/24  0756   WBC 10*3/mm3 8.30 6.85   HEMOGLOBIN g/dL 12.4 12.5   HEMATOCRIT % 37.9 40.0   PLATELETS 10*3/mm3 210 232     Results from last 7 days   Lab Units 01/10/24  0622 01/09/24  0555 01/08/24  0559   SODIUM mmol/L 140 140 136   POTASSIUM mmol/L 4.7 5.1 5.0   CHLORIDE mmol/L 105 104 102   CO2 mmol/L 25.0 27.0 25.5   BUN mg/dL 29* 32* 40*   CREATININE mg/dL 1.33* 1.56* 1.45*   CALCIUM mg/dL 9.1 9.1 9.2   GLUCOSE mg/dL 123* 117* 164*        Latest Reference Range & Units 12/28/23 06:09   25 Hydroxy, Vitamin D 30.0 - 100.0 ng/ml 13.9 (L)   (L): Data is abnormally low    ASSESSMENT and PLAN    Stroke    Status post left PCA stroke-felt cardioembolic-December 22, 2023- 6 x 3 cm acute infarct throughout the medial left occipital lobe in the left PCA territory     Bilateral carotid stenosis-left 50-69%, right less than 50%     Stroke prophylaxis-aspirin 7 days through December 28, then start Eliquis 5 mg twice daily on December 29/atorvastatin  December 29-starting Eliquis today  January 8-aspirin did not switch over to Eliquis on December 29 as expected-medications changed today.  Aspirin discontinued and started on Eliquis 5 mg twice daily  Addendum-January 9-5:48 PM-cardiology has changed Eliquis to Xarelto secondary to  cost     Right visual field deficit-right homonymous hemianopsia     Impaired mobility/self-care     Diabetes mellitus type 2-sliding scale insulin-was on Actos 15 mg daily and Tradjenta 5 mg daily at home  January 3-resume Actos 15 mg p.o. daily in the a.m.  January 8-resume Tradjenta 5 mg daily.  On Amaryl 4 mg twice daily at home, not available here, will initially put on glipizide 5 mg daily with plans to change back to Amaryl at discharge  January 10-blood glucose better [id number]     Chronic kidney disease  Baseline creatinine-appears about 1.4  January 8-BUN 40/creatinine 1.45-encourage fluids.  Discontinued ACE inhibitor.  Jan 9 - Creatinine elevated 1.56  Previously responded to IVF when first admitted to [facility].  Will hydrate with normal saline at 100 cc/hour x 1.5 liters.   PVR 79 cc  January 10-renal insufficiency improved-creatinine 1.33     Hypertension-off metoprolol secondary bradycardia  December 29-systolic blood pressure 157/68 this a.m., later 113/62-variable pattern.  Allergy to amlodipine.  Bradycardia on beta-blocker-metoprolol discontinued permanently.  Will add lisinopril initially 2.5 mg daily  January 3-systolic blood pressure 126-141.  Diastolic blood pressure in the 60  Jan 4 - BP elevated, increase lisinopril to 5 mg  January 8-systolic blood pressure 160.  Given her renal insufficiency, will not increase lisinopril further.  Allergy to amlodipine.  Had bradycardia on metoprolol.  Discontinue lisinopril.  Will change to hydralazine 10 mg every 8 hours and titrate as needed  January 10-blood pressure 132-138/68-74 today     Status post bradycardia-resolved off metoprolol     Hypothyroidism-on replacement     B12 deficiency-on replacement    Vitamin D deficiency-change to ergocalciferol 50,000 units weekly for 8 weeks     Depression-bupropion/venlafaxine     -oxybutynin     DVT prophylaxis-SCDs     Outpatient evaluation for obstructive sleep apnea    TEAM  CONF - DEC 28 - TRANSFERS MIN ASSIST. GAIT 80 FEET RW MIN ASSIST. 4 STAIRS MIN ASSIST.   BATH MOD . UBD MIN. LBD MOD MAX. TOILET TRANSFERS MIN ASSIST.   GOOD FINE MOTOR SKILLS. RIGHT VISUAL NEGLECT. DECREASED INSIGHT INTO DEFICITS. OVERALL MILD COGNITIVE IMPAIRMENT.  MILD MODERATE EXPRESSIVE DEFICITS AT SENTENCE LEVEL. MILD ANOMIA. CONTINENT BOWEL AND BLADDER.   ASA COMPLETES TODAY AND STARTS ELIQUIS TOMORROW.   ELOS - TWO WEEKS - MIGHT BE A GOOD CANDIDATE FOR ASSISTED LIVING    TEAM CONF - JAN 4 - TRANSFERS CTG SBA WITH RW. BED CTG SBA. GAIT  FEET CTG RW. 4 STAIRS CTG. BATH SBA. LBD SBA. UBD SET UP. GROOMING SET UP. TOILETING SBA.   MEMORY - 1/3 recall with 5 minute delay no cues   ATTENTION - mild deficits in sustained/selective attention   EXPRESSION - Mild anomic events, utilizes word finding to repair given MIN cues. BNE PENDING  ELOS - MIDDLE OF NEXT WEEK. VIJAY 10.     BNE (Active)- Jan 8  Att'n. - WNL  Exec. Fx. - Min-Mildly Imp.  Rsng/Jgmnt - WNL  Arith - WNL  Visuospatial Skills - Mildly Imp.  Visual Mem. - Mildly Imp.  Verbal Mem. - Mildly Imp. for immediate recall, Severely Imp. for delayed recall  Emot - Pt minimized distress assocaited with pending move to assisted living,  but acknowledged mild frustration    Goal is for home with outpatient   therapies.  Barrier to discharge: Impaired mobility self-care cognition- work on attention to the right, executive function, memory, balance, gait, ADLs to overcome.     Sanjeev Quan MD      Appropriate PPE was worn during the entire visit.  Hand hygiene was completed before and after.

## 2024-01-10 NOTE — THERAPY TREATMENT NOTE
"Inpatient Rehabilitation - Physical Therapy Treatment Note       [facility]     Patient Name: Rekha Bear  : 1940  MRN: [id number]    Today's Date: 1/10/2024                    Admit Date: 2023      Visit Dx:   No diagnosis found.    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    Community acquired pneumonia of left lower lobe of lung    Hyponatremia    Pneumonia due to COVID-19 virus    Major depressive disorder, recurrent, mild    Acute respiratory failure with hypoxia    Supraventricular tachycardia    PVC (premature ventricular contraction)    Dyspnea on exertion    Dizziness    Depressive disorder    Gastroesophageal reflux disease    Midline cystocele    Rectocele    BMI 29.0-29.9,adult    Acute left PCA stroke    B12 deficiency    Bradycardia    Premature atrial contractions    Stroke       Past Medical History:   Diagnosis Date    Chilblains     \"Chilblian's\"    CKD (chronic kidney disease)     Depression     Fatty liver     GERD (gastroesophageal reflux disease)     Hyperlipidemia     Hypertension     Incontinence     Osteoarthritis     OA  Marvin THR, LT TKR - hydrocodone prescibed by Dr. 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 Almaguer    TOTAL KNEE ARTHROPLASTY Left     OA  Marvin THR, LT TKR - hydrocodone prescibed by Dr. Almaguer       PT ASSESSMENT (last 12 hours)       [facility] PT Evaluation and Treatment       Row Name 01/10/24 0830          PT Time and Intention    Document Type daily treatment  -MG     Mode of Treatment individual therapy;physical therapy  -MG     Patient/Family/Caregiver Comments/Observations Pt pleasant and agreeable to PT. Reports feeling ready for DC, but wishes it was home instead and understands [facility].  -MG     Total Minutes, Physical Therapy 60  -MG       Row Name 01/10/24 0830          General Information    Patient Profile Reviewed yes  -MG     Existing Precautions/Restrictions fall  -MG     Limitations/Impairments visual  R visual field/peripheral deficit.  -MG     Comment, General Information Addendum to DC note as pt had a delayed DC due to needing IV fluids. No changes to DC plan, note or assist levels.  -MG       Row Name 01/10/24 0830          Pain Assessment    Pretreatment Pain Rating 0/10 - no pain  -MG     Posttreatment Pain Rating 0/10 - no pain  -MG       Row Name 01/10/24 0830          Cognition/Psychosocial    Affect/Mental Status (Cognition) WFL  -MG     Orientation Status (Cognition) oriented x 4  -MG     Follows Commands (Cognition) follows one-step commands;over 90% accuracy  -MG     Personal Safety Interventions fall prevention program maintained;gait belt;muscle strengthening facilitated;nonskid shoes/slippers when out of bed;supervised activity  -MG     Cognitive Function memory  deficit  -MG     Memory Deficit (Cognition) minimal deficit  -MG       Row Name 01/10/24 0830          Bed Mobility    Sit-Supine [place] (Bed Mobility) independent  -MG     Assistive Device (Bed Mobility) other (see comments)  None. Bed flat, no bed rails  -MG       Row Name 01/10/24 0830          Sit-Stand Transfer    Sit-Stand [place] (Transfers) standby assist  -MG     Assistive Device (Sit-Stand Transfers) wheelchair;walker, front-wheeled  -MG       Row Name 01/10/24 0830          Stand-Sit Transfer    Stand-Sit [place] (Transfers) standby assist  -MG     Assistive Device (Stand-Sit Transfers) wheelchair;walker, front-wheeled  -MG     Comment, (Stand-Sit Transfer) Cued x1 for hand placement.  -MG       Row Name 01/10/24 0830          Gait/Stairs (Locomotion)    [place] Level (Gait) standby assist;verbal cues  -MG     Assistive Device (Gait) walker, front-wheeled  -MG     Distance in Feet (Gait) 220' x4, 80'  -MG     Pattern (Gait) step-through  -MG     Deviations/Abnormal Patterns (Gait) gait speed decreased;base of support, narrow;stride length decreased;weight shifting decreased;right sided deviations;antalgic  -MG     Bilateral Gait Deviations forward flexed posture;heel strike decreased  -MG     Right Sided Gait Deviations Trendelenburg sign  -MG       Row Name 01/10/24 0830          Safety Issues, Functional Mobility    Impairments Affecting Function (Mobility) balance;cognition;coordination;endurance/activity tolerance;strength;visual/perceptual  -MG       Row Name 01/10/24 0830          Balance    Static Standing Balance contact guard;minimal assist;verbal cues  -MG     Position/Device Used, Standing Balance supported;unsupported;walker, front-wheeled  -MG     Balance Interventions standing;dynamic;static;dynamic reaching  -MG     Comment, Balance Balloon toss x2 min w/ CG/Sean and RW placed in front but cued to not use unless needed. LOB x3 w/ pt able to self recover. Improved  ability to scan to the R.  -MG       Row Name 01/10/24 0830          Hip (Therapeutic Exercise)    Hip Strengthening (Therapeutic Exercise) bilateral;extension;aBduction;aDduction;marching while standing;mini squats;15 repititions  2.5lb ankle weight  -MG       Row Name 01/10/24 0830          Ankle (Therapeutic Exercise)    Ankle (Therapeutic Exercise) strengthening exercise  -MG     Ankle Strengthening (Therapeutic Exercise) bilateral;plantarflexion;standing;15 repititions  -MG       Row Name 01/10/24 0830          Aerobic Exercise    Type (Aerobic Exercise) recumbent elliptical ;for 5 minutes  -MG     Time Performed (Aerobic Exercise) 5 minutes  -MG     Comment, Aerobic Exercise (Therapeutic Exercise) Workload 4, UEs and LEs.  -MG       Row Name 01/10/24 0830          Positioning and Restraints    Pre-Treatment Position sitting in chair/recliner  -MG     Post Treatment Position bed  PM w/ ST  -MG     In Bed supine;call light within reach;encouraged to call for assist;exit alarm on;side rails up x2  -MG     In Wheelchair --  -MG               User Key  (r) = Recorded By, (t) = Taken By, (c) = Cosigned By      Initials Name Provider Type    MG Dayanna Turpin, PT Physical Therapist                  Wound 12/27/23 0045 Left posterior third toe (Active)   Dressing Appearance dry;intact 01/10/24 0715   Closure Unable to assess 01/10/24 0715   Base dressing in place, unable to visualize 01/10/24 0715   Periwound dry;intact 01/09/24 2059   Periwound Temperature warm 01/09/24 2059     Physical Therapy Education       Title: PT OT SLP Therapies (In Progress)       Topic: Physical Therapy (In Progress)       Point: Mobility training (Done)       Learning Progress Summary             Patient Acceptance, E,D,TB, VU,DU by MG at 1/10/2024 0820    Acceptance, E,D, VU,DU by MD at 1/5/2024 1200    Comment: family teaching completed for transfers, ambulation, stairs and car transfers.  Family education on compensation  techniques for visual field cuts and RWx use.  PT advised pt not to get up and move w/o assistance due to visual deficits.    Acceptance, E,D,TB, VU,NR by MG at 1/3/2024 0747    Acceptance, E,D, VU,NR by MG at 1/2/2024 0842    Acceptance, E,TB,D, VU,NR by MG at 12/27/2023 1554   Family Acceptance, E,D, VU,DU by MD at 1/5/2024 1200    Comment: family teaching completed for transfers, ambulation, stairs and car transfers.  Family education on compensation techniques for visual field cuts and RWx use.  PT advised pt not to get up and move w/o assistance due to visual deficits.                         Point: Home exercise program (Not Started)       Learner Progress:  Not documented in this visit.              Point: Body mechanics (Done)       Learning Progress Summary             Patient Acceptance, E,D,TB, VU,DU by MG at 1/10/2024 0820    Acceptance, E,D,TB, VU,NR by MG at 1/3/2024 0747    Acceptance, E,D, VU,NR by MG at 1/2/2024 0842    Acceptance, E,TB,D, VU,NR by MG at 12/27/2023 1554                         Point: Precautions (Done)       Learning Progress Summary             Patient Acceptance, E,D,TB, VU,DU by MG at 1/10/2024 0820    Acceptance, E, VU by MD at 1/9/2024 0923    Acceptance, E, VU by MD at 1/8/2024 1610    Acceptance, E, VU by MD at 1/8/2024 0856    Acceptance, E, VU by MD at 1/6/2024 0845    Acceptance, E, VU by MD at 1/4/2024 0849    Acceptance, E,D,TB, VU,NR by MG at 1/3/2024 0747    Acceptance, E,D, VU,NR by MG at 1/2/2024 0842    Acceptance, E, NR by DP at 12/29/2023 1147    Comment: scanning for objects and avoidance of them when walking    Acceptance, E, NR by MD at 12/28/2023 0916    Acceptance, E,TB,D, VU,NR by MG at 12/27/2023 1554                                         User Key       Initials Effective Dates Name Provider Type Discipline    MD 06/16/21 -  Gianna Atwood, PT Physical Therapist PT    MG 05/24/22 -  Dayanna Turpin, PT Physical Therapist PT    DP 08/24/21 -  Julián Pathak, PT  Physical Therapist PT                    PT Recommendation and Plan    Planned Therapy Interventions (PT): balance training, bed mobility training, gait training, home exercise program, lumbar stabilization, manual therapy techniques, motor coordination training, neuromuscular re-education, orthotic fitting/training, patient/family education, postural re-education, ROM (range of motion), stair training, strengthening, stretching, swiss ball techniques, transfer training, wheelchair management/propulsion training  Frequency of Treatment (PT): 60 minutes per session, 5 times per week     Daily Progress Summary (PT)  Daily Progress Summary (PT): Family present at end of session in room. Discussed sitting on pts R side and having her visually scan for family and turning head to R when talking.               Time Calculation:      PT Charges       Row Name 01/10/24 1351 01/10/24 1058          Time Calculation    Start Time 1330  -MG 0830  -MG     Stop Time 1400  -MG 0900  -MG     Time Calculation (min) 30 min  -MG 30 min  -MG     PT Received On -- 01/10/24  -MG     PT - Next Appointment -- 01/11/24  -MG     PT Goal Re-Cert Due Date -- 01/17/24  -MG               User Key  (r) = Recorded By, (t) = Taken By, (c) = Cosigned By      Initials Name Provider Type    MG Dayanna uTrpin, PT Physical Therapist                    Therapy Charges for Today       Code Description Service Date Service Provider Modifiers Qty    85399802744 HC PT THERAPEUTIC ACT EA 15 MIN 1/10/2024 Dayanna Turpin, PT GP 1    84306880122 HC PT THER PROC EA 15 MIN 1/10/2024 Dayanna Turpin, PT GP 1    27288403383 HC PT THERAPEUTIC ACT EA 15 MIN 1/10/2024 Dayanna Turpin, PT GP 1    33275468014 HC PT THER PROC EA 15 MIN 1/10/2024 Dayanna Turpin, PT GP 1              PT G-Codes  AM-PAC 6 Clicks Score (PT): 20      Dayanna Turpin PT  1/10/2024

## 2024-01-10 NOTE — THERAPY DISCHARGE NOTE
"Inpatient Rehabilitation - IRF Occupational Therapy Treatment Note/Discharge  [facility]     Patient Name: Rekha Bear  : 1940  MRN: [id number]  Today's Date: 1/10/2024               Admit Date: 2023     No diagnosis found.  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    Community acquired pneumonia of left lower lobe of lung    Hyponatremia    Pneumonia due to COVID-19 virus    Major depressive disorder, recurrent, mild    Acute respiratory failure with hypoxia    Supraventricular tachycardia    PVC (premature ventricular contraction)    Dyspnea on exertion    Dizziness    Depressive disorder    Gastroesophageal reflux disease    Midline cystocele    Rectocele    BMI 29.0-29.9,adult    Acute left PCA stroke    B12 deficiency    Bradycardia    Premature atrial contractions    Stroke     Past Medical History:   Diagnosis Date    Chilblains     \"Chilblian's\"    CKD (chronic kidney disease)     Depression     Fatty liver     GERD (gastroesophageal reflux disease)     Hyperlipidemia     Hypertension     Incontinence     Osteoarthritis     OA  [place]vin THR, LT TKR - hydrocodone prescibed by Dr. 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 Almaguer    TOTAL KNEE ARTHROPLASTY Left     OA  Marvin THR, LT TKR - hydrocodone prescibed by Dr. Almaguer       IRF OT ASSESSMENT FLOWSHEET (last 12 hours)       IRF OT Evaluation and Treatment       Row Name 01/10/24 1515          OT Time and Intention    Document Type discharge evaluation  -KP     Mode of Treatment individual therapy;occupational therapy  -KP     Patient Effort good  -KP     Symptoms Noted During/After Treatment none  -KP       Row Name 01/10/24 1515          General Information    Patient/Family/Caregiver Comments/Observations pt in wc in am and pm  -KP     Existing Precautions/Restrictions fall  -KP     Limitations/Impairments visual  -KP     Comment, General Information R neglect at times  -KP       Row Name 01/10/24 1515          Pain Assessment    Pretreatment Pain Rating 0/10 - no pain  -KP     Posttreatment Pain Rating 0/10 - no pain  -KP       Row Name 01/10/24 1515          Cognition/Psychosocial    Affect/Mental Status (Cognition) WFL  -KP     Orientation Status (Cognition) oriented x 4  -KP     Follows Commands (Cognition) follows one-step commands;over 90% accuracy  -     Personal Safety Interventions fall prevention program maintained;gait belt;nonskid shoes/slippers when out of bed  -KP     Cognitive Function memory deficit  -KP     Memory Deficit (Cognition) minimal deficit  -KP     Safety Deficit (Cognition) minimal deficit  -KP       Row Name 01/10/24 1515          Vision Assessment/Intervention    Visual Field Deficit homonymous hemianopsia, right  -KP     Visual Processing Deficit visual search,  right;visual attention, right  -     Vision Assessment Comment completed mosaic design to incr visual skills to the R a min A for 25% of design  -       Row Name 01/10/24 1515          Bathing    [place] Level (Bathing) bathing skills;set up;standby assist;modified independence  -     Assistive Device (Bathing) grab bar/tub rail;hand held shower spray hose;shower chair  -     Position (Bathing) supported sitting;supported standing  -     Set-up Assistance (Bathing) obtain supplies  -     Comment (Bathing) shower.  -       Row Name 01/10/24 1515          Upper Body Dressing    [place] Level (Upper Body Dressing) upper body dressing skills;doff;bra/undergarment;don;pull over garment;modified independence  -     Position (Upper Body Dressing) supported sitting  -     Set-up Assistance (Upper Body Dressing) obtain clothing  -       Row Name 01/10/24 1515          Lower Body Dressing    [place] Level (Lower Body Dressing) doff;don;pants/bottoms;shoes/slippers;socks;set up;standby assist;verbal cues  -     Position (Lower Body Dressing) supported sitting;supported standing  -     Set-up Assistance (Lower Body Dressing) obtain clothing  -     Comment (Lower Body Dressing) one VC for safety to sit down to finishing doffing her pants  -       Row Name 01/10/24 1515          Grooming    [place] Level (Grooming) grooming skills;deodorant application;hair care, combing/brushing;oral care regimen;wash face, hands;independent;modified independence  -     Position (Grooming) sink side;supported sitting  -       Row Name 01/10/24 1515          Toileting    [place] Level (Toileting) toileting skills;adjust/manage clothing;perform perineal hygiene;modified independence;supervision  -     Assistive Device Use (Toileting) grab bar/safety frame  -     Position (Toileting) supported standing;supported sitting  -       Row Name 01/10/24 1515          Bed Mobility    Comment,  (Bed Mobility) NT  -       Row Name 01/10/24 1515          Functional Mobility    Functional Mobility- Ind. Level set up required;standby assist  \[facility]\""     Functional Mobility- Device walker, front-wheeled  -     Functional Mobility- Comment in room to BR. to showerroom at other end of hallway  -       Row Name 01/10/24 1515          Sit-Stand Transfer    Sit-Stand [place] (Transfers) standby assist  \[facility]\""     Assistive Device (Sit-Stand Transfers) wheelchair;walker, front-wheeled  -[facility] Name 01/10/24 1515          Stand-Sit Transfer    Stand-Sit [place] (Transfers) set up;standby assist  \[facility]\""     Assistive Device (Stand-Sit Transfers) wheelchair;walker, front-wheeled  [facility] Name 01/10/24 1515          Toilet Transfer    Type (Toilet Transfer) sit-stand;stand-sit;stand pivot/stand step  -     [place] Level (Toilet Transfer) set up;standby assist  \[facility]\""     Assistive Device (Toilet Transfer) grab bars/safety frame;walker, front-wheeled  [facility] Name 01/10/24 1515          Shower Transfer    Type (Shower Transfer) sit-stand;stand-sit;stand pivot/stand step  -     [place] Level (Shower Transfer) set up;stand by assist  -     Assistive Device (Shower Transfer) walker, front-wheeled;shower chair;grab bar, tub/shower  -[facility] Name 01/10/24 1515          Motor Skills    Results, 9 Hole Peg Test of Fine Motor Coordination see yesterdays note for results from coordination tests  \[facility]\""       Row Name 01/10/24 1515          Balance    Static Sitting Balance modified independence  -     Dynamic Sitting Balance modified independence  -     Static Standing Balance standby assist  -     Dynamic Standing Balance standby assist  -[facility] Name 01/10/24 1515          Positioning and Restraints    Pre-Treatment Position sitting in chair/recliner  -     Post Treatment Position bathroom  -     Bathroom sitting;call light within reach;encouraged to call for assist;with  family/caregiver  notified nsg aide. and fam member in room in pm. in am pt in wc w PT present in room  -       Row Name 01/10/24 1515          Transfer Goal 2 (OT-IRF)    Activity/Assistive Device (Transfer Goal 2, OT-IRF) all transfers  -KP     [place] Level (Transfer Goal 2, OT-IRF) supervision required  -KP     Time Frame (Transfer Goal 2, OT-IRF) long-term goal (LTG)  -KP     Progress/Outcomes (Transfer Goal 2, OT-IRF) goal met  -       Row Name 01/10/24 1515          Bathing Goal 2 (OT-IRF)    Activity/Device (Bathing Goal 2, OT-IRF) bathing skills, all  -KP     [place] Level (Bathing Goal 2, OT-IRF) contact guard required;verbal cues required;supervision required  -KP     Time Frame (Bathing Goal 2, OT-IRF) long-term goal (LTG);3 weeks;2 weeks  -KP     Progress/Outcomes (Bathing Goal 2, OT-IRF) goal met  -       Row Name 01/10/24 1515          UB Dressing Goal 2 (OT-IRF)    Activity/Device (UB Dressing Goal 2, OT-IRF) upper body dressing  -KP     [place] (UB Dress Goal 2, OT-IRF) independent  -KP     Time Frame (UB Dressing Goal 2, OT-IRF) long-term goal (LTG);2 weeks  -KP     Progress/Outcomes (UB Dressing Goal 2, OT-IRF) goal met  -       Row Name 01/10/24 1515          LB Dressing Goal 2 (OT-IRF)    Activity/Device (LB Dressing Goal 2, OT-IRF) lower body dressing  -KP     [place] (LB Dressing Goal 2, OT-IRF) contact guard required;supervision required  -KP     Time Frame (LB Dressing Goal 2, OT-IRF) long-term goal (LTG);2 weeks;3 weeks  -KP     Progress/Outcomes (LB Dressing Goal 2, OT-IRF) goal met  -       Row Name 01/10/24 1515          Grooming Goal 1 (OT-IRF)    Activity/Device (Grooming Goal 1, OT-IRF) grooming skills, all  -KP     [place] (Grooming Goal 1, OT-IRF) independent  -KP     Time Frame (Grooming Goal 1, OT-IRF) long-term goal (LTG);2 weeks  -KP     Progress/Outcomes (Grooming Goal 1, OT-IRF) goal met  -       Row Name 01/10/24 1515          Toileting  Goal 2 (OT-IRF)    Activity/Device (Toileting Goal 2, OT-IRF) toileting skills, all  -KP     [place] Level (Toileting Goal 2, OT-IRF) contact guard required;supervision required  -KP     Progress/Outcomes (Toileting Goal 2, OT-IRF) goal met  -KP     Time Frame (Toileting Goal 2, OT-IRF) long-term goal (LTG)  -KP       Row Name 01/10/24 1515          Strength Goal 1 (OT-IRF)    Strength Goal 1 (OT-IRF) Pt will improve B UE shoulder strength in all planes to 4/5 MMT  -KP     Time Frame (Strength Goal 1, OT-IRF) long-term goal (LTG)  -KP     Progress/Outcomes (Strength Goal 1, OT-IRF) goal met  -       Row Name 01/10/24 1515          Caregiver Training Goal 1 (OT-IRF)    Caregiver Training Goal 1 (OT-IRF) Pt and caregiver will be ind. with safety techniques for functional tf  -KP     Time Frame (Caregiver Training Goal 1, OT-IRF) long-term goal (LTG);2 weeks  -KP     Progress/Outcomes (Caregiver Training Goal 1, OT-IRF) goal met  -       Row Name 01/10/24 1518          Safety Awareness Goal 1 (OT-IRF)    Activity (Safety Awareness Goal 1, OT-IRF) awareness of need for assistance;awareness of right side;judgment;insight into deficits/self-awareness  -     [place]/Cues/Accuracy (Safety Awareness Goal 1, OT-IRF) with minimum  -KP     Time Frame (Safety Awareness Goal 1, OT-IRF) long-term goal (LTG)  -KP     Progress/Outcomes (Safety Awareness Goal 1, OT-IRF) goal met  -       Row Name 01/10/24 1515          Discharge Summary (OT)    Outcomes Achieved Upon Discharge (OT) all goals met within established timeframes  -     Discharge Summary Statement (OT) pt is SBA w all tsf, to mod I, safety wise SBA recommended. pt SBA w all ADLs to mod I. pt met all goals. pt dc to [facility] w SBA for tsf into shower and toilet starting out due to decr R vision. pt provided HEP for weights, mazes, dot to dot etc.  -KP     Transfer to Another Level of Care or Facility (OT) assisted living  -               User Key  (r) =  Recorded By, (t) = Taken By, (c) = Cosigned By      Initials Name Effective Dates     Brigid Osorio, OTR 07/11/23 -                   Wound 12/27/23 0045 Left posterior third toe (Active)   Dressing Appearance dry;intact 01/10/24 0715   Closure Unable to assess 01/10/24 0715   Base dressing in place, unable to visualize 01/10/24 0715   Periwound dry;intact 01/09/24 2059   Periwound Temperature warm 01/09/24 2059       Occupational Therapy Education       Title: PT OT SLP Therapies (In Progress)       Topic: Occupational Therapy (Resolved)       Point: ADL training (Resolved)       Description:   Instruct learner(s) on proper safety adaptation and remediation techniques during self care or transfers.   Instruct in proper use of assistive devices.                  Learning Progress Summary             Patient Acceptance, E,TB,D,H, DU,VU by  at 1/10/2024 1522    Comment: ed pt on safety wADLs and tsf. pt demo w SBA. to mod I. ed pt on HEPs that OT provided her yesterday.[facility].    Acceptance, E,TB,D,H, DU,VU by  at 1/9/2024 1550    Comment: ed pt on HEP for 2# hand wt ex and visual activities such as dot to dot, word search and mazes. word searches and dot to dot provided. ed on games to do such as squence to work on R side to incr vision on R    Acceptance, E,TB,D, NR,VU by  at 12/30/2023 1148    Comment: Shower tsf to bench                         Point: Home exercise program (Resolved)       Description:   Instruct learner(s) on appropriate technique for monitoring, assisting and/or progressing therapeutic exercises/activities.                  Learning Progress Summary             Patient Acceptance, E,TB,D,H, DU,VU by  at 1/10/2024 1522    Comment: ed pt on safety wADLs and tsf. pt demo w SBA. to mod I. ed pt on HEPs that OT provided her yesterday.pt dc to jail tomorrow.    Acceptance, E,TB,D,H, DU,VU by  at 1/9/2024 1550    Comment: ed pt on HEP for 2# hand wt ex and visual  activities such as dot to dot, word search and mazes. word searches and dot to dot provided. ed on games to do such as squence to work on R side to incr vision on R                         Point: Precautions (Resolved)       Description:   Instruct learner(s) on prescribed precautions during self-care and functional transfers.                  Learning Progress Summary             Patient Acceptance, E,TB,D,H, DU,VU by  at 1/10/2024 1522    Comment: ed pt on safety wADLs and tsf. pt kyle CHAVEZ. to mod I. ed pt on HEPs that OT provided her yesterday.pt dc to [facility] tomorrow.    Acceptance, E,TB,D,H, DU,VU by  at 1/9/2024 1550    Comment: ed pt on HEP for 2# hand wt ex and visual activities such as dot to dot, word search and mazes. word searches and dot to dot provided. ed on games to do such as squence to work on R side to incr vision on R                         Point: Body mechanics (Resolved)       Description:   Instruct learner(s) on proper positioning and spine alignment during self-care, functional mobility activities and/or exercises.                  Learning Progress Summary             Patient Acceptance, E,TB,D,H, DU,VU by  at 1/10/2024 1522    Comment: ed pt on safety wADLs and tsf. pt kyle SCHROEDER to mod I. ed pt on HEPs that OT provided her yesterday.pt dc to [facility] tomorrow.    Acceptance, E,TB,D,H, DU,VU by  at 1/9/2024 1550    Comment: ed pt on HEP for 2# hand wt ex and visual activities such as dot to dot, word search and mazes. word searches and dot to dot provided. ed on games to do such as squence to work on R side to incr vision on R                                         User Key       Initials Effective Dates Name Provider Type Discipline     06/16/21 -  Sahra Crisostomo, OTR Occupational Therapist OT     07/11/23 -  Brigid Osorio, OTR Occupational Therapist OT                    OT Recommendation and Plan  Planned Therapy Interventions (OT): activity tolerance training,  adaptive equipment training, BADL retraining, functional balance retraining, neuromuscular control/coordination retraining, manual therapy/joint mobilization, occupation/activity based interventions, orthotic fabrication/fitting/training, patient/caregiver education/training, strengthening exercise, transfer/mobility retraining, ROM/therapeutic exercise    Daily Progress Summary (OT)  Overall Progress Toward Functional Goals (OT): progressing toward functional goals as expected  Daily Progress Summary (OT): pt is now SBA a LBB and LBD. SBA UBB and UBD      OT IRF GOALS       Row Name 01/10/24 1515 01/03/24 1205          Transfer Goal 1 (OT-IRF)    Activity/Assistive Device (Transfer Goal 1, OT-IRF) -- all transfers  -KP     [place] Level (Transfer Goal 1, OT-IRF) -- contact guard required;verbal cues required  -KP     Time Frame (Transfer Goal 1, OT-IRF) -- short-term goal (STG);1 week  -KP     Progress/Outcomes (Transfer Goal 1, OT-IRF) -- goal met  -KP        Transfer Goal 2 (OT-IRF)    Activity/Assistive Device (Transfer Goal 2, OT-IRF) all transfers  -KP all transfers  -KP     [place] Level (Transfer Goal 2, OT-IRF) supervision required  -KP supervision required  -KP     Time Frame (Transfer Goal 2, OT-IRF) long-term goal (LTG)  -KP long-term goal (LTG)  -KP     Progress/Outcomes (Transfer Goal 2, OT-IRF) goal met  -KP good progress toward goal  SBA close to supervision  -KP        Bathing Goal 1 (OT-IRF)    Activity/Device (Bathing Goal 1, OT-IRF) -- bathing skills, all  -KP     [place] Level (Bathing Goal 1, OT-IRF) -- minimum assist (75% or more patient effort);verbal cues required  -KP     Time Frame (Bathing Goal 1, OT-IRF) -- short-term goal (STG);1 week  -KP     Progress/Outcomes (Bathing Goal 1, OT-IRF) -- goal met  -KP        Bathing Goal 2 (OT-IRF)    Activity/Device (Bathing Goal 2, OT-IRF) bathing skills, all  -KP bathing skills, all  -KP     [place] Level (Bathing Goal 2, OT-IRF)  contact guard required;verbal cues required;supervision required  -KP contact guard required;verbal cues required;supervision required  -KP     Time Frame (Bathing Goal 2, OT-IRF) long-term goal (LTG);3 weeks;2 weeks  -KP long-term goal (LTG);3 weeks;2 weeks  -KP     Progress/Outcomes (Bathing Goal 2, OT-IRF) goal met  -KP goal met;goal revised this date  new goal S/u/mod I  -KP        UB Dressing Goal 1 (OT-IRF)    Activity/Device (UB Dressing Goal 1, OT-IRF) -- upper body dressing  -KP     [place] (UB Dress Goal 1, OT-IRF) -- set-up required  -KP     Time Frame (UB Dressing Goal 1, OT-IRF) -- short-term goal (STG);1 week  -KP     Progress/Outcomes (UB Dressing Goal 1, OT-IRF) -- goal met  -KP        UB Dressing Goal 2 (OT-IRF)    Activity/Device (UB Dressing Goal 2, OT-IRF) upper body dressing  -KP upper body dressing  -KP     [place] (UB Dress Goal 2, OT-IRF) independent  -KP independent  -KP     Time Frame (UB Dressing Goal 2, OT-IRF) long-term goal (LTG);2 weeks  -KP long-term goal (LTG);2 weeks  -KP     Progress/Outcomes (UB Dressing Goal 2, OT-IRF) goal met  -KP good progress toward goal  -KP        LB Dressing Goal 1 (OT-IRF)    Activity/Device (LB Dressing Goal 1, OT-IRF) -- lower body dressing  -KP     [place] (LB Dressing Goal 1, OT-IRF) -- minimum assist (75% or more patient effort)  -KP     Time Frame (LB Dressing Goal 1, OT-IRF) -- short-term goal (STG);1 week  -KP     Progress/Outcomes (LB Dressing Goal 1, OT-IRF) -- goal met  -KP        LB Dressing Goal 2 (OT-IRF)    Activity/Device (LB Dressing Goal 2, OT-IRF) lower body dressing  -KP lower body dressing  -KP     [place] (LB Dressing Goal 2, OT-IRF) contact guard required;supervision required  -KP contact guard required;supervision required  -KP     Time Frame (LB Dressing Goal 2, OT-IRF) long-term goal (LTG);2 weeks;3 weeks  -KP long-term goal (LTG);2 weeks;3 weeks  -KP     Progress/Outcomes (LB Dressing Goal 2, OT-IRF) goal met   -KP goal met;goal revised this date  new goal SBA/mod I  -KP        Grooming Goal 1 (OT-IRF)    Activity/Device (Grooming Goal 1, OT-IRF) grooming skills, all  -KP grooming skills, all  -KP     [place] (Grooming Goal 1, OT-IRF) independent  -KP independent  -KP     Time Frame (Grooming Goal 1, OT-IRF) long-term goal (LTG);2 weeks  -KP long-term goal (LTG);2 weeks  -KP     Progress/Outcomes (Grooming Goal 1, OT-IRF) goal met  -KP good progress toward goal  -KP        Toileting Goal 1 (OT-IRF)    Activity/Device (Toileting Goal 1, OT-IRF) -- toileting skills, all  -KP     [place] Level (Toileting Goal 1, OT-IRF) -- minimum assist (75% or more patient effort);moderate assist (50-74% patient effort)  -KP     Progress/Outcomes (Toileting Goal 1, OT-IRF) -- goal met  -KP        Toileting Goal 2 (OT-IRF)    Activity/Device (Toileting Goal 2, OT-IRF) toileting skills, all  -KP toileting skills, all  -KP     [place] Level (Toileting Goal 2, OT-IRF) contact guard required;supervision required  -KP contact guard required;supervision required  -KP     Progress/Outcomes (Toileting Goal 2, OT-IRF) goal met  -KP goal met;goal revised this date  new goals SBA/mod I  -KP     Time Frame (Toileting Goal 2, OT-IRF) long-term goal (LTG)  -KP long-term goal (LTG)  -KP        Strength Goal 1 (OT-IRF)    Strength Goal 1 (OT-IRF) Pt will improve B UE shoulder strength in all planes to 4/5 MMT  -KP Pt will improve B UE shoulder strength in all planes to 4/5 MMT  -KP     Time Frame (Strength Goal 1, OT-IRF) long-term goal (LTG)  -KP long-term goal (LTG);3 weeks;2 weeks  -KP     Progress/Outcomes (Strength Goal 1, OT-IRF) goal met  -KP continuing progress toward goal  -KP        Caregiver Training Goal 1 (OT-IRF)    Caregiver Training Goal 1 (OT-IRF) Pt and caregiver will be ind. with safety techniques for functional tf  -KP Pt and caregiver will be ind. with safety techniques for functional tf  -KP     Time Frame (Caregiver  Training Goal 1, OT-IRF) long-term goal (LTG);2 weeks  -KP long-term goal (LTG);2 weeks  -KP     Progress/Outcomes (Caregiver Training Goal 1, OT-IRF) goal met  -KP goal ongoing;continuing progress toward goal  -KP        Safety Awareness Goal 1 (OT-IRF)    Activity (Safety Awareness Goal 1, OT-IRF) awareness of need for assistance;awareness of right side;judgment;insight into deficits/self-awareness  -KP awareness of need for assistance;awareness of right side;judgment;insight into deficits/self-awareness  -KP     [place]/Cues/Accuracy (Safety Awareness Goal 1, OT-IRF) with minimum  -KP with minimum  -KP     Time Frame (Safety Awareness Goal 1, OT-IRF) long-term goal (LTG)  -KP long-term goal (LTG)  -KP     Progress/Outcomes (Safety Awareness Goal 1, OT-IRF) goal met  -KP continuing progress toward goal  -KP               User Key  (r) = Recorded By, (t) = Taken By, (c) = Cosigned By      Initials Name Provider Type    Brigid Valverde OTR Occupational Therapist                        Time Calculation:    Time Calculation- OT       Row Name 01/10/24 1523 01/10/24 0800          Time Calculation- OT    OT Start Time 1430  -KP 0800  -KP     OT Stop Time 1500  -KP 0830  -KP     OT Time Calculation (min) 30 min  -KP 30 min  -KP               User Key  (r) = Recorded By, (t) = Taken By, (c) = Cosigned By      Initials Name Provider Type    Brigid Valverde OTR Occupational Therapist                    Therapy Charges for Today       Code Description Service Date Service Provider Modifiers Qty    92173956744 HC OT SELF CARE/MGMT/TRAIN EA 15 MIN 1/9/2024 Brigid Osorio OTR GO 2    41072245673 HC OT NEUROMUSC RE EDUCATION EA 15 MIN 1/9/2024 Brigid Osorio OTR GO 2    [id number] HC OT SELF CARE/MGMT/TRAIN EA 15 MIN 1/10/2024 Brigid Osorio OTR GO 2    36399553217 HC OT NEUROMUSC RE EDUCATION EA 15 MIN 1/10/2024 Brigid Osorio OTR GO 2                      Brigid  Christiano Osorio, OTR  1/10/2024

## 2024-01-10 NOTE — PLAN OF CARE
Goal Outcome Evaluation:        Pt. AOX4,  calm and cooperative, with R IV line infusing bolus 1.5L fluids NaCl 0.9% @100ml/hr, Cont.to bowel LBM 1/9 and Incont.to urine sometimes,no complains of pain, sleeping comfortably.

## 2024-01-10 NOTE — PROGRESS NOTES
Case Management  Inpatient Rehabilitation Plan of Care and Discharge Plan Note    Rehabilitation Diagnosis:  Branch  Date of Onset:  Branch    Medical Summary:  Branch  Past Medical History: Branch    Plan of Care  Updated Problems/Interventions  Field    Expected Intensity:  Branch  Interdisciplinary Team:  Tomer  Estimated Length of Stay/Anticipated Discharge Date: Branch  Anticipated Discharge Destination:  Anticipated discharge destination from inpatient rehabilitation is community  discharge with assistance. Patient lives alone in one story home with 5 steps  and 2 rails to enter.  Family conference held with pt, dgt, son on 1/8.  D/C plan is to [facility] . Patient will receive PT, OT, ST  there.      Based on the patient's medical and functional status, their prognosis and  expected level of functional improvement is:  Tomer    Signed by: FEMI Hernandez

## 2024-01-10 NOTE — THERAPY DISCHARGE NOTE
Inpatient Rehabilitation - IRF Speech Language Pathology /Discharge  [facility]     Patient Name: Rekha Bear  : 1940  MRN: [id number]  Today's Date: 1/10/2024         Admit Date: 2023    Visit Dx:   No diagnosis found.  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    Community acquired pneumonia of left lower lobe of lung    Hyponatremia    Pneumonia due to COVID-19 virus    Major depressive disorder, recurrent, mild    Acute respiratory failure with hypoxia    Supraventricular tachycardia    PVC (premature ventricular contraction)    Dyspnea on exertion    Dizziness    Depressive disorder    Gastroesophageal reflux disease    Midline cystocele    Rectocele    BMI 29.0-29.9,adult    Acute left PCA stroke    B12 deficiency    Bradycardia    Premature atrial contractions    Stroke       SLP Recommendation and Plan         Patient made good progress during inpatient stay toward cognitive-communication goals. At [facility], patient presents with an overall mild cognitive impairment characterized by decreased visual attention (i.e., right inattention), short-term and working memory, abstract reasoning, and executive function skills. Recommend indirect supervision with finance and medicine management at discharge.     Verbal expression characterized by mild anomia and perseveration in conversation. ST reviewed word finding strategies throughout inpatient stay including: taking breaks, allowing more time to express thoughts, requesting repetition, etc. Patient demonstrates good understanding of  strategies.     Recommend patient continue to target cogntive-communication skills with OP speech therapy.        Anticipated Discharge Disposition (SLP): home with assist (01/10/24 4953)                                     SLP GOALS       Row Name 01/10/24 1400 01/10/24 1100 01/09/24 1030       Attention Goal 1 (SLP)    Improve Attention by Goal 1 (SLP) -- complete sustained attention task;complete selective attention task;80%;independently (over 90% accuracy)  -SR --    Time Frame (Attention Goal 1, SLP) -- short term goal (STG)  -SR --    Progress/Outcomes (Attention Goal 1, SLP) -- goal met  -SR --    Comment (Attention Goal 1, SLP) -- Patient attended to structured tasks with no cues for redirection.  -SR --       Memory Skills Goal 1 (SLP)    Improve Memory Skills Through Goal 1 (SLP) use memory strategies;use external memory aid;listen to a paragraph and answer questions;recall details of the day;80%;with minimal cues (75-90%)  -SR -- --    Time Frame (Memory Skills Goal 1, SLP) short term goal (STG)  -SR -- --    Progress/Outcomes (Memory Skills Goal 1, SLP) goal partially met  -SR -- --       Organizational Skills Goal 1 (SLP)    Improve Thought Organization Through Goal 1 (SLP) completing a divergent naming task;completing a convergent naming task;80%;independently (over 90% accuracy)  -SR -- --    Time Frame (Thought Organization Skills Goal 1, SLP) short term goal (STG)  -SR -- --    Progress/Outcomes (Thought Organization Skills Goal 1, SLP) goal met  -SR -- --    Comment (Thought Organization Skills Goal 1, SLP) Divergent naming; patient independently named 12 items in concrete category; 15 items given MIN cues. Independently named 10 items in abstract category; increased to 15 items given MIN cues. Pt reported that her word retrieval skills have improved since intial evaluation.  -SR -- --       Reasoning Goal 1 (SLP)    Improve Reasoning Through Goal 1 (SLP) -- complete basic reasoning task;complete  deductive reasoning task;80%;with minimal cues (75-90%)  -SR complete basic reasoning task;complete deductive reasoning task;80%;with minimal cues (75-90%)  -SR    Time Frame (Reasoning Goal 1, SLP) -- short term goal (STG)  -SR short term goal (STG)  -SR    Progress/Outcomes (Reasoning Goal 1, SLP) -- goal partially met  -SR goal partially met  -SR    Comment (Reasoning Goal 1, SLP) -- Moderately-complex logic/reasoning activity completed this date. Patient followed written directions and used process of elimination to determine result to 18-cell logic puzzle with 60% acc given NO cues; 90% acc given MIN cues. Extended time provided to complete activity.  -SR Patient used clues from paragraph to label events of a daily schedule in timeline order with 50% acc given NO cues; 80% acc given MIN cues. Cues provided for attn to detail.  -SR       Functional Math Skills Goal 1 (SLP)    Improve Functional Math Skills Through Goal 1 (SLP) complete functional math task;80%;with minimal cues (75-90%)  -SR -- complete functional math task;80%;with minimal cues (75-90%)  -SR    Time Frame (Functional Math Skills Goal 1, SLP) short term goal (STG)  -SR -- short term goal (STG)  -SR    Progress/Outcomes (Functional Math Skills Goal 1, SLP) goal met  -SR -- goal met  -SR    Comment (Functional Math Skills Goal 1, SLP) Fxnal math and writing; patient answered questions regarding total spending costs, taxes, tips, etc with 80% acc given NO cues. No calculator assist  -SR -- Patient answered fxnal math and writing questions using prices from restaurant lunch menu with 90% acc given NO cues. No calculator assist needed.  -SR       Executive Functional Skills Goal 1 (SLP)    Improve Executive Function Skills Goal 1 (SLP) home management activity;time management activity;80%;with minimal cues (75-90%)  -SR -- --    Time Frame (Executive Function Skills Goal 1, SLP) short term goal (STG)  -SR -- --    Progress/Outcomes (Executive  Function Skills Goal 1, SLP) goal partially met  -SR -- --       Right Hemisphere Function Goal 1 (SLP)    Improve Right Hemisphere Function Through Goal 1 (SLP) complete visuo-spatial activities (visual closure, trail making, mazes;complete visuo-perceptual activities (L/R discrimination, spatial concepts);80%;with minimal cues (75-90%)  -SR -- complete visuo-spatial activities (visual closure, trail making, mazes;complete visuo-perceptual activities (L/R discrimination, spatial concepts);80%;with minimal cues (75-90%)  -SR    Time Frame (Right Hemisphere Function Goal 1, SLP) short term goal (STG)  -SR -- short term goal (STG)  -SR    Progress/Outcomes (Right Hemisphere Function Goal 1, SLP) goal partially met  -SR -- goal partially met  -SR      Row Name 01/09/24 0900 01/08/24 1300 01/08/24 1000       Attention Goal 1 (SLP)    Improve Attention by Goal 1 (SLP) complete sustained attention task;complete selective attention task;80%;independently (over 90% accuracy)  -SR -- complete sustained attention task;complete selective attention task;80%;independently (over 90% accuracy)  -SR    Time Frame (Attention Goal 1, SLP) short term goal (STG)  -SR -- short term goal (STG)  -SR    Progress/Outcomes (Attention Goal 1, SLP) goal met  -SR -- goal partially met  -SR    Comment (Attention Goal 1, SLP) Patient sustained attention to structured task with NO cues for redirection.  -SR -- Attention to detail targeted with written directions activity. Patient followed 2 step-2 component written directions with 90% acc given NO cues  -SR       Memory Skills Goal 1 (SLP)    Improve Memory Skills Through Goal 1 (SLP) use memory strategies;use external memory aid;listen to a paragraph and answer questions;recall details of the day;80%;with minimal cues (75-90%)  -SR -- --    Time Frame (Memory Skills Goal 1, SLP) short term goal (STG)  -SR -- --    Progress/Outcomes (Memory Skills Goal 1, SLP) goal partially met  -SR -- --        Organizational Skills Goal 1 (SLP)    Improve Thought Organization Through Goal 1 (SLP) completing a divergent naming task;completing a convergent naming task;80%;independently (over 90% accuracy)  -SR completing a divergent naming task;completing a convergent naming task;80%;independently (over 90% accuracy)  -SR --    Time Frame (Thought Organization Skills Goal 1, SLP) short term goal (STG)  -SR short term goal (STG)  -SR --    Progress/Outcomes (Thought Organization Skills Goal 1, SLP) goal partially met  -SR goal partially met  -SR --    Comment (Thought Organization Skills Goal 1, SLP) -- Patient named 1 similarity and 1 difference between 2 concrete objects with 80% acc given NO cues. Perseveration of previous answer x1  -SR --       Reasoning Goal 1 (SLP)    Improve Reasoning Through Goal 1 (SLP) -- complete basic reasoning task;complete deductive reasoning task;80%;with minimal cues (75-90%)  -SR --    Time Frame (Reasoning Goal 1, SLP) -- short term goal (STG)  -SR --    Progress/Outcomes (Reasoning Goal 1, SLP) -- good progress toward goal  -SR --    Comment (Reasoning Goal 1, SLP) -- Patient followed written directions and used process of elimination to determine solution to moderately-complex logic puzzle with 50% acc given NO cues; 80% acc given MIN cues. Extended time provided to complete task.  -SR --       Executive Functional Skills Goal 1 (SLP)    Improve Executive Function Skills Goal 1 (SLP) home management activity;time management activity;80%;with minimal cues (75-90%)  -SR -- home management activity;80%;with minimal cues (75-90%)  -SR    Time Frame (Executive Function Skills Goal 1, SLP) short term goal (STG)  -SR -- short term goal (STG)  -SR    Progress/Outcomes (Executive Function Skills Goal 1, SLP) goal partially met  -SR -- goal partially met  -SR    Comment (Executive Function Skills Goal 1, SLP) Planning, organization, and decision making targeted with weekly meal planning activity.  Patient followed directional parameters and organized items for 3 daily meals/ 3 days a week given list of food items and serving size reference. Patient completed task with ~80% acc given MIN cues. Extended time provided due to complexity of task.  -SR -- Medication management task completed targeting attention, sequencing, and organization. Patient used moderately-complex organizer (4x a day, 7 days a week) for today's task. Given list of personal medications with name, frequency, and dosage, patient sorted mock medications into pill organizer with 90% acc given NO cues. Self-correction x1.  -SR       Right Hemisphere Function Goal 1 (SLP)    Improve Right Hemisphere Function Through Goal 1 (SLP) -- -- complete visuo-spatial activities (visual closure, trail making, mazes;complete visuo-perceptual activities (L/R discrimination, spatial concepts);80%;with minimal cues (75-90%)  -SR    Time Frame (Right Hemisphere Function Goal 1, SLP) -- -- short term goal (STG)  -SR    Progress/Outcomes (Right Hemisphere Function Goal 1, SLP) -- -- good progress toward goal  -SR    Comment (Right Hemisphere Function Goal 1, SLP) -- -- Pt completed medication management activity with x1 cue to scan to R side.  -SR              User Key  (r) = Recorded By, (t) = Taken By, (c) = Cosigned By      Initials Name Provider Type    Jessica White SLP Speech and Language Pathologist                    EDUCATION  The patient has been educated in the following areas:   Cognitive Impairment Communication Impairment.              Time Calculation:    Time Calculation- SLP       Row Name 01/10/24 1534 01/10/24 1130          Time Calculation- SLP    SLP Start Time 1400  -SR 1100  -SR     SLP Stop Time 1430  -SR 1130  -SR     SLP Time Calculation (min) 30 min  -SR 30 min  -SR     SLP Received On 01/10/24  -SR 01/10/24  -SR               User Key  (r) = Recorded By, (t) = Taken By, (c) = Cosigned By      Initials Name Provider Type    SR  Jessica Carr, SLP Speech and Language Pathologist                    Therapy Charges for Today       Code Description Service Date Service Provider Modifiers Qty    84200084447 HC ST DEV OF COGN SKILLS INITIAL 15 MIN 1/9/2024 Jessica Carr SLP  1    02424449553 HC ST DEV OF COGN SKILLS EACH ADDT'L 15 MIN 1/9/2024 Jessica Carr, PAULETTE  3    11199483828 HC ST DEV OF COGN SKILLS INITIAL 15 MIN 1/10/2024 Jessica Carr SLP  1    [id number] HC ST DEV OF COGN SKILLS EACH ADDT'L 15 MIN 1/10/2024 Jessica Carr SLP  3                 SLP Discharge Summary  Anticipated Discharge Disposition (SLP): home with assist  Reason for Discharge: discharge from this facility  Progress Toward Achieving Short/long Term Goals: goals partially met within established timelines  Discharge Destination: assisted living facility group home, home w/ OP services    PAUELTTE Gómez  1/10/2024

## 2024-01-11 VITALS
DIASTOLIC BLOOD PRESSURE: 70 MMHG | BODY MASS INDEX: 30.24 KG/M2 | WEIGHT: 192.68 LBS | TEMPERATURE: 97.9 F | RESPIRATION RATE: 20 BRPM | SYSTOLIC BLOOD PRESSURE: 139 MMHG | HEIGHT: 67 IN | OXYGEN SATURATION: 98 % | HEART RATE: 51 BPM

## 2024-01-11 LAB
ANION GAP SERPL CALCULATED.3IONS-SCNC: 9.6 MMOL/L (ref 5–15)
BASOPHILS # BLD AUTO: 0.03 10*3/MM3 (ref 0–0.2)
BASOPHILS NFR BLD AUTO: 0.5 % (ref 0–1.5)
BUN SERPL-MCNC: 31 MG/DL (ref 8–23)
BUN/CREAT SERPL: 20.3 (ref 7–25)
CALCIUM SPEC-SCNC: 9.2 MG/DL (ref 8.6–10.5)
CHLORIDE SERPL-SCNC: 105 MMOL/L (ref 98–107)
CO2 SERPL-SCNC: 23.4 MMOL/L (ref 22–29)
CREAT SERPL-MCNC: 1.53 MG/DL (ref 0.57–1)
DEPRECATED RDW RBC AUTO: 49 FL (ref 37–54)
EGFRCR SERPLBLD CKD-EPI 2021: 34 ML/MIN/1.73
EOSINOPHIL # BLD AUTO: 0.2 10*3/MM3 (ref 0–0.4)
EOSINOPHIL NFR BLD AUTO: 3 % (ref 0.3–6.2)
ERYTHROCYTE [DISTWIDTH] IN BLOOD BY AUTOMATED COUNT: 14.2 % (ref 12.3–15.4)
GLUCOSE BLDC GLUCOMTR-MCNC: 138 MG/DL (ref 70–130)
GLUCOSE BLDC GLUCOMTR-MCNC: 176 MG/DL (ref 70–130)
GLUCOSE SERPL-MCNC: 159 MG/DL (ref 65–99)
HCT VFR BLD AUTO: 35.3 % (ref 34–46.6)
HGB BLD-MCNC: 11.5 G/DL (ref 12–15.9)
IMM GRANULOCYTES # BLD AUTO: 0.02 10*3/MM3 (ref 0–0.05)
IMM GRANULOCYTES NFR BLD AUTO: 0.3 % (ref 0–0.5)
LYMPHOCYTES # BLD AUTO: 2.07 10*3/MM3 (ref 0.7–3.1)
LYMPHOCYTES NFR BLD AUTO: 31.5 % (ref 19.6–45.3)
MCH RBC QN AUTO: 30.7 PG (ref 26.6–33)
MCHC RBC AUTO-ENTMCNC: 32.6 G/DL (ref 31.5–35.7)
MCV RBC AUTO: 94.1 FL (ref 79–97)
MONOCYTES # BLD AUTO: 0.75 10*3/MM3 (ref 0.1–0.9)
MONOCYTES NFR BLD AUTO: 11.4 % (ref 5–12)
NEUTROPHILS NFR BLD AUTO: 3.5 10*3/MM3 (ref 1.7–7)
NEUTROPHILS NFR BLD AUTO: 53.3 % (ref 42.7–76)
NRBC BLD AUTO-RTO: 0 /100 WBC (ref 0–0.2)
PLATELET # BLD AUTO: 185 10*3/MM3 (ref 140–450)
PMV BLD AUTO: 9.3 FL (ref 6–12)
POTASSIUM SERPL-SCNC: 4.7 MMOL/L (ref 3.5–5.2)
RBC # BLD AUTO: 3.75 10*6/MM3 (ref 3.77–5.28)
SODIUM SERPL-SCNC: 138 MMOL/L (ref 136–145)
WBC NRBC COR # BLD AUTO: 6.57 10*3/MM3 (ref 3.4–10.8)

## 2024-01-11 PROCEDURE — 85025 COMPLETE CBC W/AUTO DIFF WBC: CPT | Performed by: PHYSICAL MEDICINE & REHABILITATION

## 2024-01-11 PROCEDURE — 82948 REAGENT STRIP/BLOOD GLUCOSE: CPT

## 2024-01-11 PROCEDURE — 63710000001 INSULIN LISPRO (HUMAN) PER 5 UNITS: Performed by: INTERNAL MEDICINE

## 2024-01-11 PROCEDURE — 80048 BASIC METABOLIC PNL TOTAL CA: CPT | Performed by: PHYSICAL MEDICINE & REHABILITATION

## 2024-01-11 RX ORDER — BUPROPION HYDROCHLORIDE 150 MG/1
150 TABLET ORAL EVERY MORNING
Qty: 30 TABLET | Refills: 1 | Status: SHIPPED | OUTPATIENT
Start: 2024-01-12

## 2024-01-11 RX ORDER — PSEUDOEPHEDRINE HCL 30 MG
100 TABLET ORAL 2 TIMES DAILY
Qty: 60 CAPSULE | Refills: 1 | Status: SHIPPED | OUTPATIENT
Start: 2024-01-11

## 2024-01-11 RX ORDER — LEVOTHYROXINE SODIUM 0.1 MG/1
100 TABLET ORAL DAILY
Qty: 30 TABLET | Refills: 1 | Status: SHIPPED | OUTPATIENT
Start: 2024-01-11

## 2024-01-11 RX ORDER — PIOGLITAZONEHYDROCHLORIDE 15 MG/1
15 TABLET ORAL DAILY
Qty: 30 TABLET | Refills: 1 | Status: SHIPPED | OUTPATIENT
Start: 2024-01-11

## 2024-01-11 RX ORDER — POLYETHYLENE GLYCOL 3350 17 G/17G
17 POWDER, FOR SOLUTION ORAL DAILY
Qty: 30 PACKET | Refills: 1 | Status: SHIPPED | OUTPATIENT
Start: 2024-01-12

## 2024-01-11 RX ORDER — ERGOCALCIFEROL 1.25 MG/1
50000 CAPSULE ORAL
Qty: 5 CAPSULE | Refills: 0 | Status: SHIPPED | OUTPATIENT
Start: 2024-01-11

## 2024-01-11 RX ORDER — HYDRALAZINE HYDROCHLORIDE 10 MG/1
10 TABLET, FILM COATED ORAL EVERY 8 HOURS SCHEDULED
Qty: 90 TABLET | Refills: 1 | Status: SHIPPED | OUTPATIENT
Start: 2024-01-11

## 2024-01-11 RX ORDER — GLIMEPIRIDE 4 MG/1
4 TABLET ORAL
Qty: 30 TABLET | Refills: 1 | Status: SHIPPED | OUTPATIENT
Start: 2024-01-11

## 2024-01-11 RX ORDER — OXYBUTYNIN CHLORIDE 10 MG/1
10 TABLET, EXTENDED RELEASE ORAL DAILY
Qty: 30 TABLET | Refills: 1 | Status: SHIPPED | OUTPATIENT
Start: 2024-01-12

## 2024-01-11 RX ORDER — ATORVASTATIN CALCIUM 40 MG/1
40 TABLET, FILM COATED ORAL NIGHTLY
Qty: 30 TABLET | Refills: 1 | Status: SHIPPED | OUTPATIENT
Start: 2024-01-11

## 2024-01-11 RX ORDER — VENLAFAXINE HYDROCHLORIDE 150 MG/1
150 CAPSULE, EXTENDED RELEASE ORAL DAILY
Qty: 30 CAPSULE | Refills: 1 | Status: SHIPPED | OUTPATIENT
Start: 2024-01-12

## 2024-01-11 RX ORDER — GABAPENTIN 400 MG/1
400 CAPSULE ORAL EVERY 8 HOURS SCHEDULED
Qty: 90 CAPSULE | Refills: 1 | Status: SHIPPED | OUTPATIENT
Start: 2024-01-11

## 2024-01-11 RX ADMIN — GLIPIZIDE 5 MG: 5 TABLET ORAL at 08:45

## 2024-01-11 RX ADMIN — GABAPENTIN 400 MG: 400 CAPSULE ORAL at 12:37

## 2024-01-11 RX ADMIN — LINAGLIPTIN 5 MG: 5 TABLET, FILM COATED ORAL at 08:45

## 2024-01-11 RX ADMIN — Medication 1000 MCG: at 08:45

## 2024-01-11 RX ADMIN — OXYBUTYNIN CHLORIDE 10 MG: 10 TABLET, EXTENDED RELEASE ORAL at 08:45

## 2024-01-11 RX ADMIN — GABAPENTIN 400 MG: 400 CAPSULE ORAL at 05:22

## 2024-01-11 RX ADMIN — ERGOCALCIFEROL 50000 UNITS: 1.25 CAPSULE ORAL at 12:27

## 2024-01-11 RX ADMIN — INSULIN LISPRO 2 UNITS: 100 INJECTION, SOLUTION INTRAVENOUS; SUBCUTANEOUS at 12:27

## 2024-01-11 RX ADMIN — HYDRALAZINE HYDROCHLORIDE 10 MG: 10 TABLET, FILM COATED ORAL at 05:22

## 2024-01-11 RX ADMIN — BUPROPION HYDROCHLORIDE 150 MG: 150 TABLET, EXTENDED RELEASE ORAL at 06:10

## 2024-01-11 RX ADMIN — PIOGLITAZONE 15 MG: 15 TABLET ORAL at 08:45

## 2024-01-11 RX ADMIN — Medication 10 ML: at 08:46

## 2024-01-11 RX ADMIN — DOCUSATE SODIUM 100 MG: 100 CAPSULE, LIQUID FILLED ORAL at 08:45

## 2024-01-11 RX ADMIN — LEVOTHYROXINE SODIUM 100 MCG: 100 TABLET ORAL at 05:22

## 2024-01-11 RX ADMIN — VENLAFAXINE HYDROCHLORIDE 150 MG: 150 CAPSULE, EXTENDED RELEASE ORAL at 08:45

## 2024-01-11 RX ADMIN — HYDRALAZINE HYDROCHLORIDE 10 MG: 10 TABLET, FILM COATED ORAL at 12:34

## 2024-01-11 NOTE — PROGRESS NOTES
Inpatient Rehabilitation Plan of Care Note    Plan of Care  Care Plan Reviewed - No updates at this time.    Safety    [RN] Potential for Injury(Active)  Current Status(01/11/2024): Risk for fall  Weekly Goal(01/16/2024): Cue to use call bell  Discharge Goal: PT /Family will be aware of risk for fall and safety in the home  setting    Performed Intervention(s)  Safety rounds  Bed alarm and/chair alarm      Psychosocial    [RN] Coping/Adjustment(Active)  Current Status(01/11/2024): Expressing appropriate coping  Weekly Goal(01/16/2024): Provide educational material regarding stroke  Discharge Goal: Knowleadgeable of care needs and stroke recovery    Performed Intervention(s)  Vebalizes her needs and concerns      Sphincter Control    [RN] Bladder Management(Active)  Current Status(01/11/2024): Bladder urgency  Weekly Goal(01/16/2024): Continent 50%  Discharge Goal: Continent 100%    [RN] Bowel Management(Active)  Current Status(01/11/2024): Continent 100%  Weekly Goal(01/16/2024): Continent 100%  Discharge Goal: Continent 100%    Performed Intervention(s)  Bladder training program  Encourage fluid intake  Incontinence products    Signed by: Bailey Thompson RN

## 2024-01-11 NOTE — PLAN OF CARE
Goal Outcome Evaluation:  Plan of Care Reviewed With: patient        Progress: improving  Outcome Evaluation: Pt is A&OX4, calm and cooperative. Assist X1. Meds whole with water. Pt discharged to [facility], attempted to call report to nurse station at [phone]. R visiual field deficit. Dsg to L 3rd toe changed. DC instructions reviewd with pt and pts family, including medications and medications that have already been given for the day. Packet sent to [facility]. All belongings sent with pt and pts family. Questions answered and encouraged. IV in R FA removed.

## 2024-01-11 NOTE — PLAN OF CARE
Problem: Rehabilitation (IRF) Plan of Care  Goal: Plan of Care Review  Outcome: Ongoing, Progressing  Plan of care reviewed with patient     Neuro-Pt alert and oriented x4  GI-Pt cont-incont of urine  -Last BM 1/10  Pain-Pt denied pain  Skin-blanchable redness to bilateral heels, refused foam boots to heels. Wound to L toe, dressing in place CDI

## 2024-01-11 NOTE — PLAN OF CARE
Goal Outcome Evaluation:  Plan of Care Reviewed With: patient        Progress: improving  Outcome Evaluation: Pt is A&OX4, calm and cooperative. Assist X1. Meds whole with water. Pt discharged to [facility], attempted to call report to nurse station at [phone]. R visiual field deficit. Dsg to L 3rd toe changed. DC instructions reviewd with pt and pts family, including medications and medications that have already been given for the day. Packet sent to [facility]. All belongings sent with pt and pts family. Questions answered and encouraged. IV in R FA removed.

## 2024-01-11 NOTE — PROGRESS NOTES
Inpatient Rehabilitation Discharge  Section A. Transportation  Issues Due to Lack of Transportation:  No    Section A. Medication List  Medication List to Subsequent Provider:  Not applicable.  Patient was not  discharged to a subsequent provider.  Discharge Location:  99 - Unlisted  Medication List to Patient at Discharge:  Yes - Current reconciled medication  list provided to the patient, family and/or caregiver  Route(s) of Medication List Transmission to Patient:  Electronic Health Record,  Verbal (e.g., in-person, telephone, video conferencing), Paper-based (e.g., fax,  copies, printouts)    Signed by: FEMI Hernandez

## 2024-01-11 NOTE — DISCHARGE SUMMARY
[facility] - REHABILITATION UNIT    MICHAEL OSMAN  1940    ADMIT DATE:  12/26/2023  6:59 PM  DISCHARGE DATE:  01/11/24      CHIEF COMPLAINT:    Status post left PCA stroke-felt cardioembolic  Bilateral carotid stenosis  Stroke prophylaxis-aspirin 7 days through December 28, then start Eliquis 5 mg twice daily on December 29/atorvastatin  Right visual field deficit-right homonymous hemianopsia  Impaired mobility/self-care    HISTORY OF PRESENT ILLNESS:    Patient is 83-year-old female who presented to [facility] on December 21, 2023 with history of hypertension, hyperlipidemia, chronic kidney disease who had difficulty coming to the door of her home.  It took her over an hour to get the door open.  Son said she was confused and had urinated on self.  Had trouble seeing to the right side.  Presented to [facility] diagnosed with a left PCA stroke.     She has had cognitive impairment, right homonymous hemianopsia, impairments with her mobility and self-care.  For stroke prophylaxis she is on aspirin statin with aspirin to continue through December 28 and switch to Eliquis 5 mg twice daily as felt to be cardioembolic source.  She had bradycardia that resolved off of beta-blocker.  Started on B12 replacement.     She has right homonymous hemianopsia , impairments with her memory.  On December 22 noted to have moderate to severe cognitive deficits and mild to moderate expressive aphasia.  With physical therapy she ambulated about 60 feet, right neck leg contact-guard min assist for balance, leans to the left.     Given her functional Impairment and comorbidities now admitted for acute inpatient rehabilitation.     Denies any headache.  Continues with impaired vision to the right side.  No swallowing complaints.  Denies any focal weakness.  Tolerating therapies.  No bowel or bladder complaints.    HOSPITAL COURSE:    Patient participated in a comprehensive acute  inpatient rehabilitation program.     During the hospital stay the following areas were addressed:        Status post left PCA stroke-felt cardioembolic-December 22, 2023- 6 x 3 cm acute infarct throughout the medial left occipital lobe in the left PCA territory     Bilateral carotid stenosis-left 50-69%, right less than 50%     Stroke prophylaxis-aspirin 7 days through December 28, then start Eliquis 5 mg twice daily on December 29/atorvastatin  December 29-starting Eliquis today  January 8-aspirin did not switch over to Eliquis on December 29 as expected-medications changed today.  Aspirin discontinued and started on Eliquis 5 mg twice daily  Addendum-January 9-5:48 PM-cardiology has changed Eliquis to Xarelto secondary to cost     Right visual field deficit-right homonymous hemianopsia     Impaired mobility/self-care     Diabetes mellitus type 2-sliding scale insulin-was on Actos 15 mg daily and Tradjenta 5 mg daily at home  January 3-resume Actos 15 mg p.o. daily in the a.m.  January 8-resume Tradjenta 5 mg daily.  On Amaryl 4 mg twice daily at home, not available here, will initially put on glipizide 5 mg daily with plans to change back to Amaryl at discharge  January 10-blood glucose better [id number]     Chronic kidney disease  Baseline creatinine-appears about 1.4  January 8-BUN 40/creatinine 1.45-encourage fluids.  Discontinued ACE inhibitor.  Jan 9 - Creatinine elevated 1.56  Previously responded to IVF when first admitted to acute care [facility].  Will hydrate with normal saline at 100 cc/hour x 1.5 liters.   PVR 79 cc  January 10-renal insufficiency improved-creatinine 1.33     Hypertension-off metoprolol secondary bradycardia  December 29-systolic blood pressure 157/68 this a.m., later 113/62-variable pattern.  Allergy to amlodipine.  Bradycardia on beta-blocker-metoprolol discontinued permanently.  Will add lisinopril initially 2.5 mg daily  January 3-systolic blood pressure 126-141.   Diastolic blood pressure in the 60  Jan 4 - BP elevated, increase lisinopril to 5 mg  January 8-systolic blood pressure 160.  Given her renal insufficiency, will not increase lisinopril further.  Allergy to amlodipine.  Had bradycardia on metoprolol.  Discontinue lisinopril.  Will change to hydralazine 10 mg every 8 hours and titrate as needed  January 10-blood pressure 132-138/68-74 today     Status post bradycardia-resolved off metoprolol     Hypothyroidism-on replacement     B12 deficiency-on replacement     Vitamin D deficiency-change to ergocalciferol 50,000 units weekly for 8 weeks     Depression-bupropion/venlafaxine     -oxybutynin     DVT prophylaxis-SCDs     Outpatient evaluation for obstructive sleep apnea     TEAM CONF - DEC 28 - TRANSFERS MIN ASSIST. GAIT 80 FEET RW MIN ASSIST. 4 STAIRS MIN ASSIST.   BATH MOD . UBD MIN. LBD MOD MAX. TOILET TRANSFERS MIN ASSIST.   GOOD FINE MOTOR SKILLS. RIGHT VISUAL NEGLECT. DECREASED INSIGHT INTO DEFICITS. OVERALL MILD COGNITIVE IMPAIRMENT.  MILD MODERATE EXPRESSIVE DEFICITS AT SENTENCE LEVEL. MILD ANOMIA. CONTINENT BOWEL AND BLADDER.   ASA COMPLETES TODAY AND STARTS ELIQUIS TOMORROW.   ELOS - TWO WEEKS - MIGHT BE A GOOD CANDIDATE FOR ASSISTED LIVING     TEAM CONF - JAN 4 - TRANSFERS CTG SBA WITH RW. BED CTG SBA. GAIT  FEET CTG RW. 4 STAIRS CTG. BATH SBA. LBD SBA. UBD SET UP. GROOMING SET UP. TOILETING SBA.   MEMORY - 1/3 recall with 5 minute delay no cues   ATTENTION - mild deficits in sustained/selective attention   EXPRESSION - Mild anomic events, utilizes word finding to repair given MIN cues. BNE PENDING  ELOS - MIDDLE OF NEXT WEEK. VIJAY 10.      BNE (Active)- Jan 8  Att'n. - WNL  Exec. Fx. - Min-Mildly Imp.  Rsng/Jgmnt - WNL  Arith - WNL  Visuospatial Skills - Mildly Imp.  Visual Mem. - Mildly Imp.  Verbal Mem. - Mildly Imp. for immediate recall, Severely Imp. for delayed recall  Emot - Pt minimized distress assocaited with pending move to assisted  living,  but acknowledged mild frustration       TEAM CONF - JAN 11 - BED I. TRANSFERS SBA. GAIT 200 SBA RW. SHOWER AND TOILET TRANSFERS SBA. BATHING SBA. LBD SBA. UBD INDPENDENT. DEFICITS WITH STM. DEFICITS WITH ABSTRACT REASONING AND EXECUTIVE FUNCTION.   BNE (Active)  Att'n. - WNL  Exec. Fx. - Min-Mildly Imp.  Rsng/Jgmnt - WNL  Arith - WNL  Visuospatial Skills - Mildly Imp.  Visual Mem. - Mildly Imp.  Verbal Mem. - Mildly Imp. for immediate recall, Severely Imp. for delayed recall  Emot - Pt minimized distress assocaited with pending move to assisted living, but acknowledged mild frustration  BLADDER URGENCY. SMALL LESION DISTAL LEFT THIRD TOE.   [facility] FOR ASSISTED LIVING. TODAY    Goal is for home with outpatient   therapies.  Barrier to discharge: Impaired mobility self-care cognition- work on attention to the right, executive function, memory, balance, gait, ADLs to overcome.     Disposition-January 11-maximized benefit from inpatient rehabilitation program.  Medically stable.  Discharge to assisted living today.  Medications and follow-up reviewed    Physical Exam near the time of discharge:        MENTAL STATUS -  AWAKE / ALERT  HEENT-    LUNGS - CTA, NO WHEEZES, RALES OR RHONCHI  HEART- RRR, NO RUB, MURMUR, OR GALLOP  ABD - NORMOACTIVE BOWEL SOUNDS, SOFT, NT.    EXT - NO EDEMA OR CYANOSIS  NEURO -oriented person place and general situation  Speech fluent.   Right homonymous hemianopsia  MOTOR EXAM - RUE/RLE 5/5. LUE/LLE 5/5.    RESULTS:  Glucose   Date/Time Value Ref Range Status   01/11/2024 0715 138 (H) 70 - 130 mg/dL Final   01/10/2024 1947 212 (H) 70 - 130 mg/dL Final   01/10/2024 1624 144 (H) 70 - 130 mg/dL Final   01/10/2024 1129 164 (H) 70 - 130 mg/dL Final   01/10/2024 0725 126 70 - 130 mg/dL Final   01/09/2024 2052 162 (H) 70 - 130 mg/dL Final   01/09/2024 1613 85 70 - 130 mg/dL Final   01/09/2024 1116 184 (H) 70 - 130 mg/dL Final     Results from last 7 days   Lab Units  01/11/24  0632 01/08/24  0559   WBC 10*3/mm3 6.57 8.30   HEMOGLOBIN g/dL 11.5* 12.4   HEMATOCRIT % 35.3 37.9   PLATELETS 10*3/mm3 185 210     Results from last 7 days   Lab Units 01/11/24  0632 01/10/24  0622 01/09/24  0555   SODIUM mmol/L 138 140 140   POTASSIUM mmol/L 4.7 4.7 5.1   CHLORIDE mmol/L 105 105 104   CO2 mmol/L 23.4 25.0 27.0   BUN mg/dL 31* 29* 32*   CREATININE mg/dL 1.53* 1.33* 1.56*   CALCIUM mg/dL 9.2 9.1 9.1   GLUCOSE mg/dL 159* 123* 117*          Lab Units 12/22/23  0647   CHOLESTEROL mg/dL 146   TRIGLYCERIDES mg/dL 158*   HDL CHOL mg/dL 44   LDL CHOL mg/dL 75        Latest Reference Range & Units 12/22/23 06:47 12/22/23 11:28   Hemoglobin A1C 4.80 - 5.60 % 9.10 (H)     TSH Baseline 0.270 - 4.200 uIU/mL 5.130 (H)     Vitamin B-12 211 - 946 pg/mL   154 (L)         Latest Reference Range & Units 12/28/23 06:09   25 Hydroxy, Vitamin D 30.0 - 100.0 ng/ml 13.9 (L)   (L): Data is abnormally low         Discharge Medications        New Medications        Instructions Start Date   atorvastatin 40 MG tablet  Commonly known as: LIPITOR   40 mg, Oral, Nightly      cyanocobalamin 1000 MCG tablet  Commonly known as: VITAMIN B-12   1,000 mcg, Oral, Daily   Start Date: January 12, 2024     docusate sodium 100 MG capsule   100 mg, Oral, 2 Times Daily      gabapentin 400 MG capsule  Commonly known as: NEURONTIN  Replaces: gabapentin 600 MG tablet   400 mg, Oral, Every 8 Hours Scheduled      glucose blood test strip   Use to test blood glucose daily. Formulary Compliance Approval. Diagnosis: Type 2 Diabetes - Not Insulin Dependent      glucose monitor monitoring kit   Use to test blood glucose daily. Formulary Compliance Approval. Diagnosis: Type 2 Diabetes - Not Insulin Dependent      hydrALAZINE 10 MG tablet  Commonly known as: APRESOLINE   10 mg, Oral, Every 8 Hours Scheduled      Lancets misc   Use to test blood glucose daily. Formulary Compliance Approval. Diagnosis: Type 2 Diabetes - Not Insulin Dependent       oxybutynin XL 10 MG 24 hr tablet  Commonly known as: DITROPAN-XL  Replaces: tolterodine LA 4 MG 24 hr capsule   10 mg, Oral, Daily   Start Date: January 12, 2024     polyethylene glycol 17 g packet  Commonly known as: MIRALAX   17 g, Oral, Daily   Start Date: January 12, 2024     rivaroxaban 15 MG tablet  Commonly known as: XARELTO   15 mg, Oral, Daily With Dinner      vitamin D 1.25 MG (42449 UT) capsule capsule  Commonly known as: ERGOCALCIFEROL   50,000 Units, Oral, Every 7 Days, Ergocalciferol 50,000 units weekly x 5 weeks starting Thursday, Jan 18, 2023, then change to cholecalciferol 1,000 units twice a day             Changes to Medications        Instructions Start Date   buPROPion  MG 24 hr tablet  Commonly known as: WELLBUTRIN XL  What changed:   medication strength  how much to take   150 mg, Oral, Every Morning   Start Date: January 12, 2024     glimepiride 4 MG tablet  Commonly known as: AMARYL  What changed: when to take this   4 mg, Oral, Every Morning Before Breakfast             Continue These Medications        Instructions Start Date   levothyroxine 100 MCG tablet  Commonly known as: SYNTHROID, LEVOTHROID   100 mcg, Oral, Daily      linagliptin 5 MG tablet tablet  Commonly known as: Tradjenta   5 mg, Oral, Daily      pioglitazone 15 MG tablet  Commonly known as: ACTOS   15 mg, Oral, Daily      venlafaxine  MG 24 hr capsule  Commonly known as: EFFEXOR-XR   150 mg, Oral, Daily   Start Date: January 12, 2024            Stop These Medications      gabapentin 600 MG tablet  Commonly known as: NEURONTIN  Replaced by: gabapentin 400 MG capsule     metoprolol succinate XL 25 MG 24 hr tablet  Commonly known as: TOPROL-XL     rosuvastatin 20 MG tablet  Commonly known as: CRESTOR     tolterodine LA 4 MG 24 hr capsule  Commonly known as: DETROL LA  Replaced by: oxybutynin XL 10 MG 24 hr tablet     Vitamin D (Cholecalciferol) 10 MCG (400 UNIT) tablet  Commonly known as: CHOLECALCIFEROL                Follow-up Information       Eben Porter MD Follow up.    Specialty: Family Medicine  Why: 01/16/2024 @2:30 spoke to Diamante  Contact information:  [address]  [facility] [id number] [phone]               Rayo Watson MD Follow up.    Specialty: Neurology  Why: Spoke to Brigid Hall nurse will call Pt after D/C  Contact information:  [address]  [facility] [address]  [phone]               Cy Fuentes MD Follow up.    Specialty: Cardiology  Why: 01/23/2024 Spoke with Juan Diego  Contact information:  [address]  [facility] [address]  [phone]               Sanjeev Quan MD Follow up.    Specialty: Physical Medicine and Rehabilitation  Why: no follow up needed with Dr Quan  Contact information:  [address]  [address]  [facility] [address]  [phone]                           NO DRIVING. NO ALCOHOL.     Ergocalciferol 50,000 units weekly x 8 weeks starting Thursday, December 28, 2023, then change to cholecalciferol 1,000 units twice a day.     Diagnosis: Stroke with right homonymous hemianopsia-cognitive linguistic deficits/impaired mobility/self-care-outpatient occupational therapy/physical therapy/speech therapy                >30 on discharge    Sanjeev Quan MD

## 2024-01-11 NOTE — PROGRESS NOTES
TEAM CONF - JAN 11 - BED I. TRANSFERS SBA. GAIT 200 SBA RW. SHOWER AND TOILET TRANSFERS SBA. BATHING SBA. LBD SBA. UBD INDPENDENT. DEFICITS WITH STM. DEFICITS WITH ABSTRACT REASONING AND EXECUTIVE FUNCTION.   BNE (Active)  Att'n. - WNL  Exec. Fx. - Min-Mildly Imp.  Rsng/Jgmnt - WNL  Arith - WNL  Visuospatial Skills - Mildly Imp.  Visual Mem. - Mildly Imp.  Verbal Mem. - Mildly Imp. for immediate recall, Severely Imp. for delayed recall  Emot - Pt minimized distress assocaited with pending move to assisted living, but acknowledged mild frustration  BLADDER URGENCY. SMALL LESION DISTAL LEFT THIRD TOE.   [facility] FOR ASSISTED LIVING. TODAY.

## 2024-01-11 NOTE — PROGRESS NOTES
Case Management Discharge Note      Final Note: Patient to d/c to [facility] today, 1/11. Plan is for her to do trial 60 days here vs going home. They will provide continued PT, OT, ST for her at facility through their contracted therapy group. Family to transport to [facility]. Patient will have nurse to administer her medications. Patient has rolling walker and facility has necessary bathroom equipment.         Selected Continued Care - Admitted Since 12/26/2023       Destination    No services have been selected for the patient.                Durable Medical Equipment    No services have been selected for the patient.                Dialysis/Infusion    No services have been selected for the patient.                Home Medical Care    No services have been selected for the patient.                Therapy    No services have been selected for the patient.                Community Resources    No services have been selected for the patient.                Community & DME    No services have been selected for the patient.                         Final Discharge Disposition Code: 70 - other health care institution not elsewhere defined

## 2024-01-11 NOTE — PROGRESS NOTES
"Case Management  Inpatient Rehabilitation Team Conference    Conference Date/Time: 1/11/2024 7:41:01 AM    Team Conference Attendees:  MD Justina Mei, [facility] Rai, PT  Brigid Osorio, OT  [facility]  Mary Claudio, ALIVIA Marie, Psychologist  Anay Veronica, RN  Yvonne Pompa, Dietician    Demographics            Age: 83Y            Gender: Female    Admission Date: 12/26/2023 6:59:00 PM  Rehabilitation Diagnosis:  Status post left PCA stroke-felt cardioembolic  Past Medical History: ? Chilblains    \"Chilblian's\"  ? CKD (chronic kidney disease)  ? Depression  ? Fatty liver  ? GERD (gastroesophageal reflux disease)  ? Hyperlipidemia  ? Hypertension  ? Incontinence  ? Osteoarthritis    OA  [facility] THR, LT TKR - hydrocodone prescibed by Dr. Almaguer  ? Pain of esophagus    \" nervous esophagus\" - she takes the occasional alprazolam  ? Peptic ulcer disease  ? Pneumonia due to COVID-19 virus 03/2020    now tested negative  ? Raynaud's disease    \"Raynauds\"  ? Sinus bradycardia  ? Squamous cell carcinoma of neck  ? Stroke  ? Vitamin B 12 deficiency  ? 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  Family conference held with pt, dgt, son on 1/8.  D/C plan is to [facility] . Patient will receive PT, OT, ST  there.  Medical Necessity Expected Level Rationale: FAIR - SBA/ SUPERVISION  Updated (if changes indicated)    Anticipated Discharge Date/Estimated Length of Stay:   ELOS: 1/11 DC to [facility]    Based on the patient's medical and functional status, their prognosis and  expected level of functional improvement is: FAIR - SBA/ SUPERVISION      Interdisciplinary Problem/Goals/Status    All Rehab " Problems:  Safety    [RN] Potential for Injury(Active)  Current Status(01/11/2024): Risk for fall  Weekly Goal(01/16/2024): Cue to use call bell  Discharge Goal: PT /Family will be aware of risk for fall and safety in the home  setting        Psychosocial    [RN] Coping/Adjustment(Active)  Current Status(01/11/2024): Expressing appropriate coping  Weekly Goal(01/16/2024): Provide educational material regarding stroke  Discharge Goal: Knowleadgeable of care needs and stroke recovery        Sphincter Control    [RN] Bladder Management(Active)  Current Status(01/11/2024): Bladder urgency  Weekly Goal(01/16/2024): Continent 50%  Discharge Goal: Continent 100%    [RN] Bowel Management(Active)  Current Status(01/11/2024): Continent 100%  Weekly Goal(01/16/2024): Continent 100%  Discharge Goal: Continent 100%        Self Care    [OT] Bathing(Active)  Current Status(01/10/2024): SBA  Weekly Goal(01/17/2024): SBA  Discharge Goal: sup    [OT] Dressing (Lower)(Active)  Current Status(01/10/2024): SBA  Weekly Goal(01/17/2024): SBA  Discharge Goal: SUP    [OT] Dressing (Upper)(Active)  Current Status(01/10/2024): I  Weekly Goal(01/17/2024): I  Discharge Goal: Ind    [OT] Grooming(Active)  Current Status(01/10/2024): I  Weekly Goal(01/17/2024): I  Discharge Goal: ind    [OT] Toileting(Active)  Current Status(01/10/2024): SBA  Weekly Goal(01/17/2024): SBA  Discharge Goal: Sup        Mobility    [OT] Toilet Transfers(Active)  Current Status(01/10/2024): SBA w/ RW or wc  Weekly Goal(01/17/2024): SBA  Discharge Goal: SUP    [OT] Tub/Shower Transfers(Active)  Current Status(01/10/2024): SBA A w/ RW or wc  Weekly Goal(01/18/2024): SBA  Discharge Goal: SUP    [PT] Bed/Chair/Wheelchair(Active)  Current Status(01/11/2024): SBA w/ RW  Weekly Goal(01/11/2024): SBA  Discharge Goal: Sup    [PT] Bed Mobility(Active)  Current Status(01/11/2024): Independent  Weekly Goal(01/11/2024): Independent  Discharge Goal: Ind    [PT] Walk(Active)  Current  Status(01/11/2024): Amb 200' SB w/ rwx  Weekly Goal(01/11/2024): SBA '  Discharge Goal: SBA    [PT] Stairs(Active)  Current Status(01/11/2024): SB 12 stairs B HR  Weekly Goal(01/10/2024): SBA  Discharge Goal: SBA        Cognition    [ST] Executive Functions(Active)  Current Status(01/10/2024):  mild cognitive impairment characterized by  decreased visual attention (i.e., right inattention), short-term and working  memory, abstract reasoning, and executive function skills.  Weekly Goal(01/17/2024): Patient will participate in higher level cognitive  activities given MIN cues  Discharge Goal: Fxnal cognitive skills for discharge environment    [ST] Attention(Active)  Current Status(01/10/2024): Mild deficits in visual attention  Weekly Goal(01/17/2024): Patient will utilize self-monitoring skills to attend  to items on the R side given MIN cues  Discharge Goal: functional attention for return to home, PLOF, IADL, and  community participation        Communication    [ST] Expression(Active)  Current Status(01/10/2024): Mild anomic events, utilizes word finding to repair  given MIN cues  Weekly Goal(01/17/2024): Patient will utilize word finding repair/strategies in  80% of opportunities given MIN cues  Discharge Goal: functional verbal expression for return to home, PLOF, IADL and  community participation        Comments: 12/28   amb with rwx 80ft min A, ADLs min-mod A, right side neglect,  mild-mod attention deficits, cont bowel and bladder    1/4 CG / SBA bed mob and transfers.  Amb ' CG rwx.  SBA w/ rwx toilet  transfer and SB w/ rwx or wc for shower transfer.  SBA primarily w/ ADL's and  toileting.  Mild deficits in sustained attention.  Mild anomic events.  Cont  bowel and bladder, some incont at night.    1/11  Indep bed mob, SB transfers, SB w/ stairs, amb 200' SB rwx.  SB toilet and  shower transfer rwx or wc.   SB primarily with ADL's and Independent with UBD  and grooming.  Cues for attention w/  med management task but does well.  Mild  deficits in visual attention.  Mild verbal memory, mild deficits with executive  function and visuospatial skills.  Incont bladder due to urgency, cont of bowel.    Signed by: Anay Veronica RN    Physician CoSigned By: Sanjeev Quan 01/11/2024 07:48:50

## 2024-01-12 ENCOUNTER — TELEPHONE (OUTPATIENT)
Dept: FAMILY MEDICINE CLINIC | Facility: CLINIC | Age: 84
End: 2024-01-12

## 2024-01-12 ENCOUNTER — READMISSION MANAGEMENT (OUTPATIENT)
Dept: CALL CENTER | Facility: HOSPITAL | Age: 84
End: 2024-01-12
Payer: MEDICARE

## 2024-01-12 ENCOUNTER — TRANSITIONAL CARE MANAGEMENT TELEPHONE ENCOUNTER (OUTPATIENT)
Dept: CALL CENTER | Facility: HOSPITAL | Age: 84
End: 2024-01-12
Payer: MEDICARE

## 2024-01-12 NOTE — OUTREACH NOTE
Prep Survey      Flowsheet Row Responses   [facility] patient discharged from? [place]   Is LACE score < 7 ? Yes   Eligibility [facility] - REHABILITATION UNIT   Date of Admission 12/26/23   Date of Discharge 01/11/24   Discharge Disposition Home or Self Care  [[facility]]   Discharge diagnosis stroke   Does the patient have one of the following disease processes/diagnoses(primary or secondary)? Stroke   Does the patient have Home health ordered? Yes   What is the Home health agency?  PT, OT, ST at [facility]   Is there a DME ordered? No   Prep survey completed? Yes            Sania BLANCHARD - Registered Nurse

## 2024-01-12 NOTE — OUTREACH NOTE
Call Center TCM Note      Flowsheet Row Responses   [facility] patient discharged from? [place]   Does the patient have one of the following disease processes/diagnoses(primary or secondary)? Stroke   TCM attempt successful? Yes  [No current VR on file from PCP office]   Call start time 1047   Call end time 1052   Discharge diagnosis stroke   Is patient permission given to speak with other caregiver? Yes   List who call center can speak with dtr   Person spoke with today (if not patient) and relationship dtr   Meds reviewed with patient/caregiver? Yes   Is the patient having any side effects they believe may be caused by any medication additions or changes? No   Does the patient have all medications ordered at discharge? Yes   Is the patient taking all medications as directed (includes completed medication regime)? Yes   Medication comments Patient has med management services at [facility]   Comments Hospital d/c follow up 1/16/24@1430.   Does the patient have an appointment with their PCP within 7-14 days of discharge? Yes   What is the Home health agency?  PT, OT, ST at [facility]   Has home health visited the patient within 72 hours of discharge? N/A   Psychosocial issues? No   Does the patient require any assistance with activities of daily living such as eating, bathing, dressing, walking, etc.? No   Does the patient have any residual symptoms from stroke/TIA? Yes   Residual symptoms comments Visual disturbance, speech difficulty   Did the patient receive a copy of their discharge instructions? --  [Non BH]   What is the patient's perception of their health status since discharge? Improving   Nursing interventions Nurse provided patient education   Is the patient/caregiver able to teach back the risk factors for a stroke? High blood pressure-goal below 120/80, Diabetes, High Cholesterol   Is the patient/caregiver able to teach back signs and symptoms related  to disease process for when to call PCP? Yes   Is the patient/caregiver able to teach back signs and symptoms related to disease process for when to call 911? Yes   If the patient is a current smoker, are they able to teach back resources for cessation? Not a smoker   Is the patient/caregiver able to teach back the hierarchy of who to call/visit for symptoms/problems? PCP, Specialist, Home health nurse, Urgent Care, ED, 911 Yes   Is the patient able to teach back FAST for Stroke? T jyotsna: Call 9-1-1 right away, S peech: Listen for slurred speech, A rm: Check if one arm is weak, F ace: Look for an uneven smile, E yes: Check for vision loss, B alance: Watch for sudden loss of balance   TCM call completed? Yes   Wrap up additional comments Patient currently at assisted living facility, dtr reports patient has remaining speech difficulty and visual disturbance in right eye.   Call end time 1052   Would this patient benefit from a Referral to Amb Social Work? No   Is the patient interested in additional calls from an ambulatory ? No            Anastasiya Suazo RN    1/12/2024, 10:56 EST

## 2024-01-15 NOTE — PROGRESS NOTES
PPS CMG Coordinator  Inpatient Rehabilitation Admission    Ethnic Group: White.  Marital Status:  Marital Status: .    IRF Admission Date:  12/26/2023  Admission Class: Initial Rehab.  Admit From:  [facility]    Pre-Hospital Living: Home. Pre-Hospital Living  With: (1) Alone.    Payment Sources: Primary: Medicare Fee for Service  Secondary: Not Listed.  Impairment Group: 01.4 No Paresis  Date of Onset of Impairment: 12/21/2023    Etiologic Diagnosis Code(s):  Rank Code      Description  1    I63.9     Cerebral infarction, unspecified    Comorbidities:  ICD    Are there any arthritis conditions recorded for Impairment Group, Etiologic  Diagnosis, or Comorbid Conditions that meet all of the regulatory requirements  for IRF classification (in 42 .29(b)(2)(x), (xi), and xii))? No    Presence of Pressure Ulcer:  No observed/documented pressure ulcers.    MEDICAL NEEDS  Height on Admission:  67 inches.  Weight on Admission:  193 pounds.    QUALITY INDICATORS  Prior Functioning:  Self Care: Patient completed all the activities by themself, with or without an  assistive device, with no assistance from a helper.  Indoor Mobility: Patient completed the activities by themself, with or without  an assistive device, with no assistance from a helper.  Stairs: Patient completed the activities by themself, with or without an  assistive device, with no assistance from a helper.  Functional Cognition: Patient completed the activities by themself, with or  without an assistive device, with no assistance from a helper.  Prior Device Use: Patient does not use manual or motorized wheelchair or  scooter, mechanical lift, walker, or an orthotic/prosthesis.    Bladder and Bowel: Bladder Continence: Incontinent daily.  Bowel Continence: Occasionally incontinent (one episode of bowel incontinence).  Swallowing/Nutritional Status: Regular food (solids and liquids swallowed safely  without supervision or modified  food or liquid consistency).  Special Conditions: Patient did not receive total parenteral nutrition treatment  at the time of admission.  Section A. Ethnicity/ Race/Language  Ethnicity: Not of , /a, [place] Origin  Race: White  Preferred Language: English  Requests  to Communicate:   No    Section I. Active Diagnosis: Comorbidities and Co-existing Conditions:  Diabetes Mellitus (DM) - e.g., diabetic retinopathy, nephropathy, and  neuropathy).  Section J. Health Conditions: Patient has had two or more falls, or a fall with  injury, in the past year. Patient has not had major surgery during the 100 days  prior to admission.  Section K. Swallowing/Nutritional Status  Nutritional Approaches on Admission:  Nutritional Approaches on Admission:  Therapeutic diet (e.g., low salt, diabetic, low cholesterol)  Section M. Skin Conditions  Unhealed Pressure Ulcer/Injuries at Stage 1 or  Higher on Admission:  No.  Section N. Medication:  Potential Clinically Significant Medication Issues: No issues found during  review  Section . High-Risk Drug Classes: Use and Indication                       Is Taking                    Indication noted  High-RiskDrug Class  I. Antiplatelet      Yes                          Yes  J. Hypoglycemic      Yes                          Yes    Section O. Special Treatments, Procedures, and Programs  None    Signed by: Anay Veronica RN

## 2024-01-15 NOTE — PROGRESS NOTES
PPS CMG Coordinator  Inpatient Rehabilitation Discharge    Mode of Locomotion: Walking.    Discharge Against Medical Advice:  No.  Discharge Information  Patient Discharged Alive:  Yes  Discharge Destination/Living Setting: Home.  At discharge, the patient was discharged to live (with) (01)  Alone  Diagnosis for Interruption/Death: ICD    Impairment Group: Stroke: 01.4 No Paresis    Comorbidities: ICD    Complications: ICD    QUALITY INDICATORS  Section A. Medication List  Medication List to Subsequent Provider:  Not applicable.  Patient was not  discharged to a subsequent provider.  Discharge Location:  01 - Home  Medication List to Patient at Discharge:  Yes - Current reconciled medication  list provided to the patient, family and/or caregiver  Route(s) of Medication List Transmission to Patient:  Electronic Health Record,  Verbal (e.g., in-person, telephone, video conferencing), Paper-based (e.g., fax,  copies, printouts)    Section J Health Conditions: Fall(s) Since Admission:  No    Section K. Swallowing/Nutritional Status  Nutritional Approaches Past 7 Days:   Therapeutic diet (e.g., low salt,  diabetic, low cholesterol)  Nutritional Approaches at Discharge:  Therapeutic diet (e.g., low salt,  diabetic, low cholesterol)    Section M. Skin Conditions Discharge:  Unhealed Pressure Ulcer(s) at Stage 1 or  Higher:  No    . Current Number of Unhealed Pressure Ulcers  Branch    Section N. Medication:  Medication Intervention: Not applicable - There were no potential clinically  significant medication issues identified since admission or patient is not  taking any medications.  Section . High-Risk Drug Classes: Use and Indication                       Is Taking                    Indication noted  High-RiskDrug Class  E. Anticoagulant     Yes                          Yes  J. Hypoglycemic      Yes                          Yes    Section O. Special Treatments, Procedures, and Programs  None    Signed by:  Anay Veronica RN

## 2024-01-16 ENCOUNTER — TELEPHONE (OUTPATIENT)
Dept: FAMILY MEDICINE CLINIC | Facility: CLINIC | Age: 84
End: 2024-01-16

## 2024-01-19 ENCOUNTER — OFFICE VISIT (OUTPATIENT)
Dept: FAMILY MEDICINE CLINIC | Facility: CLINIC | Age: 84
End: 2024-01-19
Payer: MEDICARE

## 2024-01-19 ENCOUNTER — TELEPHONE (OUTPATIENT)
Dept: FAMILY MEDICINE CLINIC | Facility: CLINIC | Age: 84
End: 2024-01-19

## 2024-01-19 VITALS
SYSTOLIC BLOOD PRESSURE: 144 MMHG | OXYGEN SATURATION: 99 % | DIASTOLIC BLOOD PRESSURE: 74 MMHG | RESPIRATION RATE: 18 BRPM | HEIGHT: 67 IN | BODY MASS INDEX: 30.18 KG/M2 | HEART RATE: 69 BPM

## 2024-01-19 DIAGNOSIS — E03.8 OTHER SPECIFIED HYPOTHYROIDISM: ICD-10-CM

## 2024-01-19 DIAGNOSIS — S99.922A INJURY OF TOE ON LEFT FOOT, INITIAL ENCOUNTER: ICD-10-CM

## 2024-01-19 DIAGNOSIS — I63.9 CEREBROVASCULAR ACCIDENT (CVA), UNSPECIFIED MECHANISM: ICD-10-CM

## 2024-01-19 DIAGNOSIS — N18.30 TYPE 2 DIABETES MELLITUS WITH STAGE 3 CHRONIC KIDNEY DISEASE, WITHOUT LONG-TERM CURRENT USE OF INSULIN, UNSPECIFIED WHETHER STAGE 3A OR 3B CKD: ICD-10-CM

## 2024-01-19 DIAGNOSIS — E78.2 MIXED HYPERLIPIDEMIA: Primary | ICD-10-CM

## 2024-01-19 DIAGNOSIS — Z79.899 HIGH RISK MEDICATION USE: ICD-10-CM

## 2024-01-19 DIAGNOSIS — E11.22 TYPE 2 DIABETES MELLITUS WITH STAGE 3 CHRONIC KIDNEY DISEASE, WITHOUT LONG-TERM CURRENT USE OF INSULIN, UNSPECIFIED WHETHER STAGE 3A OR 3B CKD: ICD-10-CM

## 2024-01-19 NOTE — PROGRESS NOTES
"Subjective   Rekha Bear is a 83 y.o. female.   Cerebrovascular Accident, Hypothyroidism, Diabetes, and Hypertension    History of Present Illness  Imelda is here accompanied by her son.  She was hospitalized due to a stroke on 12/21/2023.  She was in rehab and is now living in assisted living at [facility].  She had a left PCA stroke and is recovering slowly.  The family is trying to figure out the next best steps for Imelda.  She still needs some help with therapy and it is pretty clear she will do better if she is not living alone and perhaps has a senior living facility for a new home.  Her son is concerned about the many medications that she is taking and the cost that has been very trying for her and the family.  He is doing the best he can to make the care she needs manageable.    I am addressing her chronic medical concerns and determined that she needs more help and her family certainly needs more help in managing her care.  I will refer her to case management to help coordinate these concerns.  Her son also notes that she has a small dark lesion on the plantar surface of her left third toe.  She is diabetic and is he is concerned that this has not been addressed yet.    Review of Systems   Constitutional: Negative.    HENT: Negative.     Eyes: Negative.    Respiratory: Negative.     Cardiovascular: Negative.    Genitourinary: Negative.    Musculoskeletal:  Positive for gait problem and myalgias.   Skin:         Lesion on third toe of left foot   Neurological:  Positive for numbness.   Psychiatric/Behavioral: Negative.         Objective   Vitals:    01/19/24 1035   BP: 144/74   Pulse: 69   Resp: 18   SpO2: 99%   Weight: Comment: wheelchair   Height: 170.2 cm (67\")  Comment: wheelchair      Body mass index is 30.18 kg/m².        Physical Exam  Constitutional:       Appearance: Normal appearance.   HENT:      Head: Normocephalic.   Eyes:      Extraocular Movements: Extraocular movements intact.      " Pupils: Pupils are equal, round, and reactive to light.   Cardiovascular:      Rate and Rhythm: Normal rate. Rhythm irregular.   Pulmonary:      Effort: Pulmonary effort is normal.      Breath sounds: Normal breath sounds.   Musculoskeletal:         General: Normal range of motion.      Cervical back: Normal range of motion.      Comments: Small dark wound on plantar surface of left third toe.  Able to ambulate a little but does better in a wheelchair.   Skin:     General: Skin is warm and dry.   Neurological:      Mental Status: She is alert.   Psychiatric:         Mood and Affect: Mood normal.         Behavior: Behavior normal.         Judgment: Judgment normal.           Assessment & Plan   Problem List Items Addressed This Visit          Cardiac and Vasculature    Hyperlipidemia - Primary    Overview     Will check lipid panel and she was advised to continue a healthy low fat diet, include regular exercise and take medications as prescribed.She will RTO in 6 months for regularly scheduled follow up to evaluate response to therapy and make medication adjustments as needed.  Lipitor 40 mg a day.         Relevant Orders    Lipid Panel With LDL / HDL Ratio       Endocrine and Metabolic    Type 2 diabetes mellitus with stage 3 chronic kidney disease, without long-term current use of insulin    Overview     She is followed by Dr.Lara Foote and has been doing well on medications.  She is taking Actos 15 mg daily, Tradjenta 5 mg daily, glimepiride 4 mg daily.  She is tolerating these medications well and will follow-up with her endocrinologist.         Relevant Orders    Comprehensive Metabolic Panel    Hemoglobin A1c    Microalbumin / Creatinine Urine Ratio - Urine, Clean Catch    Hypothyroidism    Overview     Well managed on current dose of thyroid replacement. TSH ordered to evaluate her response to therapy.  I will let her and her son know the results of this test .   Well refill medications as needed.   Levothyroxine 100 mcg a day.         Relevant Orders    TSH       Neuro    Stroke  She had an acute left PCA stroke on 12/21/2023.  I we will request case management to help this patient with this new diagnosis and her chronic medical problems that are making this far more difficult.    Relevant Orders    Ambulatory Referral to Case Management     Other Visit Diagnoses       High risk medication use        Relevant Orders    CBC (No Diff)        A urine sample was attempted but she was unable to give us an adequate specimen.  Her son who is here with her today is working hard to make sure his mother gets the help she needs.  We discussed management of medications to help reduce some of the cost and the importance of following up with the physicians who care for her multiple medical problems.  Because of that and her stroke I have requested help from case management.       Orders Placed This Encounter   Procedures    Lipid Panel With LDL / HDL Ratio    Comprehensive Metabolic Panel    CBC (No Diff)    TSH    Hemoglobin A1c    Microalbumin / Creatinine Urine Ratio - Urine, Clean Catch    Ambulatory Referral to Case Management    Ambulatory Referral to Podiatry        Return in about 3 months (around 4/19/2024).

## 2024-01-22 ENCOUNTER — REFERRAL TRIAGE (OUTPATIENT)
Dept: CASE MANAGEMENT | Facility: OTHER | Age: 84
End: 2024-01-22
Payer: MEDICARE

## 2024-01-22 ENCOUNTER — READMISSION MANAGEMENT (OUTPATIENT)
Dept: CALL CENTER | Facility: HOSPITAL | Age: 84
End: 2024-01-22
Payer: MEDICARE

## 2024-01-22 ENCOUNTER — PATIENT OUTREACH (OUTPATIENT)
Dept: CASE MANAGEMENT | Facility: OTHER | Age: 84
End: 2024-01-22
Payer: MEDICARE

## 2024-01-22 NOTE — OUTREACH NOTE
AMBULATORY CASE MANAGEMENT NOTE    Name and Relationship of Patient/Support Person: EdgarPing - Self  Emergency Contact    Patient Outreach    Outgoing call to the patient.  Spoke with daughter, medical POAPing.  The phone number that was listed as the preferred number for the patient was actually Ping's number.  Ping provided the cell phone number for the patient [phone] and EPIC was updated for her contact.      Introduced self and explained role.  Answered her questions about case management.  She is interested in the program and prefers to enroll.  Discussed that [facility] will outreach to the patient and get her consent for HRCM enrollment.  Provided her with [facility] contact and encouraged she outreach with any needs.  She states that her mother is currently at [facility].  She states that she is medical POA and her brother is POA.  Requested that she provide this paperwork for records to be updated at her next  appointment so they can scan into [facility].      Plan to outreach to the patient and discuss HRCM program and services.     Janel GARCIA  Ambulatory Case Management    1/22/2024, 16:50 EST

## 2024-01-22 NOTE — OUTREACH NOTE
Stroke Week 2 Survey      Flowsheet Row Responses   [facility] facility patient discharged from? [place]   Does the patient have one of the following disease processes/diagnoses(primary or secondary)? Stroke   Week 2 attempt successful? No   Unsuccessful attempts Attempt 1   Revoke Other  [HRCM program]            Lesa KEENAN - Registered Nurse

## 2024-01-23 ENCOUNTER — PATIENT OUTREACH (OUTPATIENT)
Dept: CASE MANAGEMENT | Facility: OTHER | Age: 84
End: 2024-01-23
Payer: MEDICARE

## 2024-01-23 ENCOUNTER — OFFICE VISIT (OUTPATIENT)
Dept: CARDIOLOGY | Facility: CLINIC | Age: 84
End: 2024-01-23
Payer: MEDICARE

## 2024-01-23 VITALS
DIASTOLIC BLOOD PRESSURE: 70 MMHG | HEART RATE: 64 BPM | BODY MASS INDEX: 30.23 KG/M2 | SYSTOLIC BLOOD PRESSURE: 138 MMHG | HEIGHT: 67 IN | OXYGEN SATURATION: 98 % | WEIGHT: 192.6 LBS

## 2024-01-23 DIAGNOSIS — I63.532 ACUTE LEFT PCA STROKE: Primary | ICD-10-CM

## 2024-01-23 DIAGNOSIS — I10 PRIMARY HYPERTENSION: ICD-10-CM

## 2024-01-23 DIAGNOSIS — I49.3 PVC (PREMATURE VENTRICULAR CONTRACTION): ICD-10-CM

## 2024-01-23 DIAGNOSIS — R00.2 PALPITATIONS: ICD-10-CM

## 2024-01-23 DIAGNOSIS — R00.1 BRADYCARDIA: ICD-10-CM

## 2024-01-23 DIAGNOSIS — I49.8 WANDERING ATRIAL PACEMAKER BY ELECTROCARDIOGRAM: ICD-10-CM

## 2024-01-23 PROBLEM — R42 DIZZINESS: Status: RESOLVED | Noted: 2022-04-26 | Resolved: 2024-01-23

## 2024-01-23 PROBLEM — R06.09 DYSPNEA ON EXERTION: Status: RESOLVED | Noted: 2022-04-21 | Resolved: 2024-01-23

## 2024-01-23 PROCEDURE — 93000 ELECTROCARDIOGRAM COMPLETE: CPT | Performed by: NURSE PRACTITIONER

## 2024-01-23 PROCEDURE — 1159F MED LIST DOCD IN RCRD: CPT | Performed by: NURSE PRACTITIONER

## 2024-01-23 PROCEDURE — 3075F SYST BP GE 130 - 139MM HG: CPT | Performed by: NURSE PRACTITIONER

## 2024-01-23 PROCEDURE — 3078F DIAST BP <80 MM HG: CPT | Performed by: NURSE PRACTITIONER

## 2024-01-23 PROCEDURE — 1160F RVW MEDS BY RX/DR IN RCRD: CPT | Performed by: NURSE PRACTITIONER

## 2024-01-23 PROCEDURE — 99214 OFFICE O/P EST MOD 30 MIN: CPT | Performed by: NURSE PRACTITIONER

## 2024-01-23 NOTE — OUTREACH NOTE
"AMBULATORY CASE MANAGEMENT NOTE    Name and Relationship of Patient/Support Person: Rekha Bear \"MARIA ANTONIA\" - Self  Self    Adult Patient Profile  Questions/Answers      Flowsheet Row Most Recent Value   Symptoms/Conditions Managed at Home neurological   Neurological Symptoms/Conditions stroke, ischemic  [cario-embolic]   Neurological Management Strategies medication therapy, routine screening   Neurological Self-Management Outcome 4 (good)   Neurological Comment [facility] [place] with PT OT and ST   Barriers to Taking Medication as Prescribed none  [@ [facility], medications are administered with goal for patient to be independent with her medications]   Wear Glasses or Blind yes   Concentrating, Remembering or Making Decisions Difficulty yes  [CVA, short term memory affected]   Difficulty Eating/Swallowing no   Walking or Climbing Stairs Difficulty yes   Walking or Climbing Stairs ambulation difficulty, requires equipment   Mobility Management rolling walker, stand by assist at [facility]   Equipment Currently Used at Home walker, rolling          SDOH updated and reviewed with the patient during this program:  Financial Resource Strain: Low Risk  (1/23/2024)    Overall Financial Resource Strain (CARDIA)     Difficulty of Paying Living Expenses: Not hard at all      --     Food Insecurity: No Food Insecurity (1/23/2024)    Hunger Vital Sign     Worried About Running Out of Food in the Last Year: Never true     Ran Out of Food in the Last Year: Never true      --     Transportation Needs: No Transportation Needs (1/23/2024)    PRAPARE - Transportation     Lack of Transportation (Medical): No     Lack of Transportation (Non-Medical): No     Patient Outreach    Outgoing call to the patient.  Introduced self and explained role and purpose of outreach.  She prefers to enroll in HRCM program and verbalized understanding that she may opt out at any time and that there are no charges for services.      She " was DC from [facility] to [facility] with their level one care.  She is getting PT, OT and ST will be starting.  She was transported to her cardiology appointment today by her daughter, Ping.  She has a heart monitor to wear for 2 weeks.  She denies any questions related to her visit and has a follow up on 2/20.    She prefers an outreach to complete assessments and work on goals of care.  This outreach has been scheduled for later this week. She denies any immediate needs.         Janel GARCIA  Ambulatory Case Management    1/23/2024, 14:04 EST

## 2024-01-23 NOTE — PROGRESS NOTES
Date of Office Visit: 2024  Encounter Provider: CT Lema  Place of Service: [facility] CARDIOLOGY  Patient Name: Rekha Bear  :1940  Primary Cardiologist: Dr. Elizabeth Pina    Chief Complaint   Patient presents with    Stroke    Hospital Follow Up Visit   :     HPI: Rekha Bear is a 83 y.o. female who presents today for hospital follow-up visit.  She is a new patient to me and I reviewed her medical records.    She has been diagnosed with hypertension, hyperlipidemia, type 2 diabetes mellitus, and chronic kidney disease.    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  Follow-up Holter monitor showed normal sinus rhythm with rare ectopy and no atrial arrhythmias.    In , she was hospitalized for COVID-19 pneumonia and acute hypoxic respiratory failure.  She became bradycardic and metoprolol was discontinued.  Follow-up Zio patch monitor showed normal sinus rhythm with rare episodes of nonsustained atrial tachycardia.  She followed up with Dr. Pina and reported that she felt better off the metoprolol.  She is reported chronic dyspnea with exertion over the years.    In 2023, she presented to [facility] ED and was diagnosed with a left PCA stroke.  She was found down in her home and was able to crawl to the door to open it for her family.  Cardiology consulted for possible cardioembolic cause.  Echocardiogram showed EF 59%, borderline concentric hypertrophy, left atrium dilated, left atrium volume moderately increased, saline test negative, aortic valve calcification, mild mitral regurgitation, mild tricuspid regurgitation, and mild pulmonary hypertension (RVSP 38 mmHg).  The family reported that she has had several mechanical falls and she lives alone.  She is also a hoarder and has been felt to live in an unsafe living environment because it is hard for her to get around her things.  The family has denied  "syncopal episodes.  Dr. Fuentes stopped her metoprolol permanently and also felt that she could be having paroxysmal atrial fibrillation.    She was then transferred to inpatient [facility] rehab.  She was noted to have right right homonymous hemianopsia , cognitive impairment, and impairments with her mobility and self-care.  She was switched to rivaroxaban in rehab.  Her bradycardia resolved off metoprolol.    She presents today for a hospital follow-up visit with her daughter, Ping accompanying her.  She is currently residing at [facility] and rehab.  We reviewed the hospital records and cardiac testing.  Since the stroke, she is needing to ambulate with a walker, her right side is a little weaker than the left, she has had memory lapses, and vision changes bilaterally.  On occasion she will feel a skipped heartbeat, but denies tachycardia.  She has had dizziness and lightheadedness.  She denies chest pain, shortness of air, edema, syncope, or bleeding.  Blood pressure and heart rate are both stable.      Past Medical History:   Diagnosis Date    Chilblains     \"Chilblian's\"    CKD (chronic kidney disease)     CVA (cerebral vascular accident)     Depression     Fatty liver     GERD (gastroesophageal reflux disease)     Hyperlipidemia     Hypertension     Incontinence     Osteoarthritis     OA  Marvin THR, LT TKR - hydrocodone prescibed by Dr. 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        Past Surgical History:   Procedure Laterality Date    CATARACT EXTRACTION      CHOLECYSTECTOMY      COLONOSCOPY  2009    COLONOSCOPY N/A 12/20/2016    Procedure: COLONOSCOPY with hot snare polypectomy;  Surgeon: " George Pabon MD;  Location: [facility] ENDOSCOPY;  Service:     DENTAL PROCEDURE      FOOT SURGERY      TONSILLECTOMY      TOTAL HIP ARTHROPLASTY Bilateral     OA  Marvin THR, LT TKR - hydrocodone prescibed by Dr. Almaguer    TOTAL KNEE ARTHROPLASTY Left     OA  Marvin THR, LT TKR - hydrocodone prescibed by Dr. Almaguer       Social History     Socioeconomic History    Marital status:     Number of children: 5   Tobacco Use    Smoking status: Former     Packs/day: 0.50     Years: 14.00     Additional pack years: 0.00     Total pack years: 7.00     Types: Cigarettes     Quit date:      Years since quittin.0    Smokeless tobacco: Never    Tobacco comments:     CAFFEINE USE   Vaping Use    Vaping Use: Never used   Substance and Sexual Activity    Alcohol use: No    Drug use: No    Sexual activity: Not Currently       Family History   Problem Relation Age of Onset    Hypertension Mother     Diabetes type II Mother     Hypertension Father     Coronary artery disease Father     Diabetes type II Father     Colon cancer Brother     Skin cancer Brother     Stroke Son     Breast cancer Maternal Aunt     Hypertension Other     Other Other         Lipids  Thyroid       The following portion of the patient's history were reviewed and updated as appropriate: past medical history, past surgical history, past social history, past family history, allergies, current medications, and problem list.    Review of Systems   Constitutional: Negative.   Eyes:  Positive for vision loss in left eye and vision loss in right eye.   Cardiovascular:  Positive for palpitations.   Respiratory: Negative.     Hematologic/Lymphatic: Negative.    Neurological:  Positive for dizziness and light-headedness.       Allergies   Allergen Reactions    Shellfish-Derived Products Anaphylaxis    Amlodipine Hives and Other (See Comments)     Heart race    Iodine Rash         Current Outpatient Medications:     atorvastatin (LIPITOR) 40 MG tablet, Take 1  tablet by mouth Every Night., Disp: 30 tablet, Rfl: 1    buPROPion XL (WELLBUTRIN XL) 150 MG 24 hr tablet, Take 1 tablet by mouth Every Morning., Disp: 30 tablet, Rfl: 1    docusate sodium 100 MG capsule, Take 1 capsule by mouth 2 (Two) Times a Day., Disp: 60 capsule, Rfl: 1    gabapentin (NEURONTIN) 400 MG capsule, Take 1 capsule by mouth Every 8 (Eight) Hours., Disp: 90 capsule, Rfl: 1    glimepiride (AMARYL) 4 MG tablet, Take 1 tablet by mouth Every Morning Before Breakfast., Disp: 30 tablet, Rfl: 1    glucose blood test strip, Use to test blood glucose daily. Formulary Compliance Approval. Diagnosis: Type 2 Diabetes - Not Insulin Dependent, Disp: 100 each, Rfl: 12    glucose monitor monitoring kit, Use to test blood glucose daily. Formulary Compliance Approval. Diagnosis: Type 2 Diabetes - Not Insulin Dependent, Disp: 1 each, Rfl: 0    hydrALAZINE (APRESOLINE) 10 MG tablet, Take 1 tablet by mouth Every 8 (Eight) Hours., Disp: 90 tablet, Rfl: 1    Lancets misc, Use to test blood glucose daily. Formulary Compliance Approval. Diagnosis: Type 2 Diabetes - Not Insulin Dependent, Disp: 100 each, Rfl: 12    levothyroxine (SYNTHROID, LEVOTHROID) 100 MCG tablet, Take 1 tablet by mouth Daily., Disp: 30 tablet, Rfl: 1    linagliptin (Tradjenta) 5 MG tablet tablet, Take 1 tablet by mouth Daily., Disp: 30 tablet, Rfl: 1    oxybutynin XL (DITROPAN-XL) 10 MG 24 hr tablet, Take 1 tablet by mouth Daily., Disp: 30 tablet, Rfl: 1    pioglitazone (ACTOS) 15 MG tablet, Take 1 tablet by mouth Daily., Disp: 30 tablet, Rfl: 1    polyethylene glycol (MIRALAX) 17 g packet, Take 17 g by mouth Daily., Disp: 30 packet, Rfl: 1    rivaroxaban (XARELTO) 15 MG tablet, Take 1 tablet by mouth Daily With Dinner. Indications: Other - full anticoagulation, Disp: 30 tablet, Rfl: 1    venlafaxine XR (EFFEXOR-XR) 150 MG 24 hr capsule, Take 1 capsule by mouth Daily., Disp: 30 capsule, Rfl: 1    vitamin B-12 (VITAMIN B-12) 1000 MCG tablet, Take 1 tablet  "by mouth Daily., Disp: 90 tablet, Rfl: 0    vitamin D (ERGOCALCIFEROL) 1.25 MG (00498 UT) capsule capsule, Take 1 capsule by mouth Every 7 (Seven) Days. Ergocalciferol 50,000 units weekly x 5 weeks starting Thursday, Jan 18, 2023, then change to cholecalciferol 1,000 units twice a day, Disp: 5 capsule, Rfl: 0         Objective:     Vitals:    01/23/24 1033   BP: 138/70   BP Location: Left arm   Patient Position: Sitting   Cuff Size: Adult   Pulse: 64   SpO2: 98%   Weight: 87.4 kg (192 lb 9.6 oz)   Height: 170.2 cm (67.01\")     Body mass index is 30.16 kg/m².    PHYSICAL EXAM:    Vitals Reviewed.   General Appearance: No acute distress, well developed and well nourished. Obese.   Eyes: Glasses.   HENT: No hearing loss noted.    Respiratory: No signs of respiratory distress. Respiration rhythm and depth normal.  Clear to auscultation.   Cardiovascular:  Jugular Venous Pressure: Normal  Heart Rate and Rhythm: Bradycardic.  Heart Sounds: Normal S1 and S2. No S3 or S4 noted.  Murmurs: RUSB grade 1/6 systolic murmur present..   Lower Extremities: No edema noted.  Musculoskeletal: Normal movement of extremities.  Skin: General appearance normal.    Psychiatric: Patient alert and oriented to person, place, and time. Speech and behavior appropriate. Normal mood and affect.       ECG 12 Lead    Date/Time: 1/23/2024 10:35 AM  Performed by: Jessica Morrison APRN    Authorized by: Jessica Morrison APRN  Comparison: compared with previous ECG from 12/23/2023  Similar to previous ECG  Rhythm comments: Junctional rhythm  Rate: normal  BPM: 64  Conduction: conduction normal  ST Segments: ST segments normal  T Waves: T waves normal  QRS axis: normal    Clinical impression: normal ECG            Assessment:       Diagnosis Plan   1. Acute left PCA stroke  [facility] Telemetry      2. Palpitations  [facility] Telemetry      3. 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  [facility] Outpatient " Telemetry      4. Bradycardia  [facility] Telemetry      5. PVC (premature ventricular contraction)        6. Primary hypertension               Plan:       She has been diagnosed with acute left PCA stroke with residual symptoms of cognitive/memory impairment, bilateral vision loss, and weakness on her right side.    During her hospitalization, there was no documented atrial fibrillation on EKGs or telemetry.  She was noted to have a dilated left atrium and Dr. Fuentes told the patient and family that she may possibly have had atrial fibrillation. Cardiology and neurology decided to treat her with anticoagulation and she has been placed on rivaroxaban. [id number] score of 7.    I have recommended that she wear a 14-day M cot monitor to evaluate for atrial fibrillation.  She may benefit from a loop recorder but I will defer that to Dr. Pina. At this point, I would remain on rivaroxaban.  She is in a junctional rhythm today and asymptomatic.  She has a mild heart murmur from aortic calcification noted on echocardiogram.     Follow up with Dr. Pina in 4 weeks.     As always, it has been a pleasure to participate in your patient's care. Thank you.         Sincerely,         CT Montes De Oca  [facility] Cardiology      Dictated utilizing Dragon Dictation  I spent 35 minutes reviewing her medical records/testing/previous office notes/labs, face-to-face interaction with patient, physical examination, formulating the plan of care, and discussion of plan of care with patient.

## 2024-01-25 ENCOUNTER — TELEPHONE (OUTPATIENT)
Dept: FAMILY MEDICINE CLINIC | Facility: CLINIC | Age: 84
End: 2024-01-25
Payer: MEDICARE

## 2024-01-25 NOTE — TELEPHONE ENCOUNTER
Please return call to Genoveva at [facility] [phone]. There is a message in chart from 01-16-24 regarding forms they need signed and faxed back. She also ask for a new order for laxatives to be PRN.

## 2024-01-30 ENCOUNTER — PATIENT OUTREACH (OUTPATIENT)
Dept: CASE MANAGEMENT | Facility: OTHER | Age: 84
End: 2024-01-30
Payer: MEDICARE

## 2024-01-30 NOTE — OUTREACH NOTE
"AMBULATORY CASE MANAGEMENT NOTE    Name and Relationship of Patient/Support Person: Rekha Bear \"MARIA ANTONIA\" - Self  Self    Adult Patient Profile  Questions/Answers      Flowsheet Row Most Recent Value   Symptoms/Conditions Managed at Home neurological, cardiovascular, HEENT (head, eyes, ears, nose, throat), genitourinary   Cardiovascular Symptoms/Conditions hypertension, high blood cholesterol   Cardiovascular Management Strategies medication therapy, routine screening   Cardiovascular Self-Management Outcome 3 (uncertain)   Genitourinary Symptoms/Conditions incontinence   Genitourinary Management Strategies incontinence garment/pad   Genitourinary Self-Management Outcome 4 (good)   HEENT Symptoms/Conditions vision problem(s)  [CVA affected vision]   Vision Problems blindness/vision loss   HEENT Management Strategies routine screening   Neurological Symptoms/Conditions stroke, ischemic  [cardio emoblic]   Neurological Management Strategies medication therapy, routine screening   Neurological Self-Management Outcome 4 (good)   Barriers to Taking Medication as Prescribed --  [Medications are administered by [facility] with goal to be independent with her medications again]   Hearing Difficulty or Deaf yes   Wear Glasses or Blind yes   Concentrating, Remembering or Making Decisions Difficulty yes   Difficulty Communicating yes   Communication other (see comments)   Communication Management trouble with recalling words and short term memory   Difficulty Eating/Swallowing no   Walking or Climbing Stairs Difficulty yes   Walking or Climbing Stairs ambulation difficulty, requires equipment   Mobility Management rolling walker and has stand by assist   Dressing/Bathing Difficulty no   Doing Errands Independently Difficulty (such as shopping) no   Equipment Currently Used at Home walker, rolling   Change in Functional Status Since Onset of Current Illness/Injury yes  [recent CVA]          SDOH updated and reviewed " with the patient during this program:  --     Disabilities: At Risk (1/30/2024)    Disabilities     Concentrating, Remembering, or Making Decisions Difficulty: yes     Doing Errands Independently Difficulty: no      --     Food Insecurity: No Food Insecurity (1/23/2024)    Hunger Vital Sign     Worried About Running Out of Food in the Last Year: Never true     Ran Out of Food in the Last Year: Never true      --     Housing Stability: Not At Risk (12/27/2023)    Housing Stability     Current Living Arrangements: home     Potentially Unsafe Housing Conditions: other (see comments)        --     Social Connections: Socially Isolated (1/30/2024)    Social Connection and Isolation Panel [NHANES]     Frequency of Communication with Friends and Family: More than three times a week     Frequency of Social Gatherings with Friends and Family: More than three times a week     Attends Sabianism Services: Never     Active Member of Clubs or Organizations: No     Attends Club or Organization Meetings: Never     Marital Status:       --     Stress: No Stress Concern Present (1/30/2024)    [place] [place] [employer] - Occupational Stress Questionnaire     Feeling of Stress : Only a little      --     Transportation Needs: No Transportation Needs (1/23/2024)    PRAPARE - Transportation     Lack of Transportation (Medical): No     Lack of Transportation (Non-Medical): No      --     Utilities: Not At Risk (1/30/2024)    [facility] Utilities     Threatened with loss of utilities: No     Patient Outreach    Outreach with patient for assessments.  Care plan imitated and reviewed.  She prefers an outreach in about one month.  She is at [facility] with PT OT and ST.  She has some short term memory deficits and residual partial visual loss.  She is using her rolling walker independently.  She is working on a goal to be able to self administer her medications and the facility is working with her on this goals but is  administering her medications at this time.     Outreach in 4 weeks is scheduled.      Janel GARCIA  Ambulatory Case Management    1/30/2024, 16:06 EST

## 2024-01-31 ENCOUNTER — PATIENT OUTREACH (OUTPATIENT)
Dept: CASE MANAGEMENT | Facility: OTHER | Age: 84
End: 2024-01-31
Payer: MEDICARE

## 2024-01-31 NOTE — OUTREACH NOTE
AMBULATORY CASE MANAGEMENT NOTE    Name and Relationship of Patient/Support Person: Ping Hernández - Emergency Contact    Patient Outreach    Incoming call from the daughter, Ping.  She would like to request a referral for her mother to have her swallowing evaluated and to have a colonoscopy.  She states that she has witnessed her mother have what she described as issues with swallowing without choking.  Discussed that the patient has a referral for a colonoscopy and a message in [employer] that they had tried to outreach to schedule this appointment.  She is hoping to see one provider that could evaluate both.  Secure chat message to her PCP to ask her to consider adding this referral for daughter's concerns about her mother's swallowing.  Follow up outreach has been scheduled to assist with scheduling and any other needs.         Janel GARCIA  Ambulatory Case Management    1/31/2024, 15:18 EST

## 2024-02-06 ENCOUNTER — OFFICE VISIT (OUTPATIENT)
Dept: SLEEP MEDICINE | Facility: HOSPITAL | Age: 84
End: 2024-02-06
Payer: MEDICARE

## 2024-02-06 VITALS
BODY MASS INDEX: 30.13 KG/M2 | HEART RATE: 75 BPM | SYSTOLIC BLOOD PRESSURE: 142 MMHG | HEIGHT: 67 IN | OXYGEN SATURATION: 99 % | WEIGHT: 192 LBS | DIASTOLIC BLOOD PRESSURE: 72 MMHG

## 2024-02-06 DIAGNOSIS — G47.20 CIRCADIAN RHYTHM DISORDER: ICD-10-CM

## 2024-02-06 DIAGNOSIS — I63.532 ACUTE LEFT PCA STROKE: Primary | ICD-10-CM

## 2024-02-06 DIAGNOSIS — G47.33 OSA (OBSTRUCTIVE SLEEP APNEA): ICD-10-CM

## 2024-02-06 PROCEDURE — G0463 HOSPITAL OUTPT CLINIC VISIT: HCPCS

## 2024-02-06 NOTE — PROGRESS NOTES
"  Reason for Consultation: Sleep apnea and insomnia        Patient Care Team:  Eben Porter MD as PCP - General  Eben Porter MD as PCP - Family Medicine  Janel Parker, RN as Ambulatory  ([facility] [place][note 2[place]  Ney Mendoza MD, MPH as Consulting Physician (Sleep Medicine)      History of present illness:    Thank you for asking me to see your patient.  The patient is a 83 y.o. female is the visit with her daughter who provides excellent concordant collateral history.  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  She comes in today after having a left PCA stroke and referral for sleep apnea.  She had diagnosis of sleep apnea based on a study at [facility] over 10 years ago.  The patient wore CPAP for a while but did not like it and has subsequently stopped using it.    Her sleep-wake schedule is varied and she says it is extremely very when she goes to bed and when she gets up.  She wakes up tired takes 2-3 naps per day.  Her sleep questionnaires positive for loss of 30 pounds and fatigue during the day.  She has jerking leg movements at night and difficulty staying asleep at night and frequent awakenings during the night at least 2 times to go to the bathroom.  He wears depends at night now.  Review of systems is positive for neck pain fatigue shortness of breath anxiety depression heartburn and difficulty swallowing.    [place]: 8    Data Reviewed: Reviewed her medical chart and sleep questionnaire.      PMH:  Past Medical History:   Diagnosis Date    Chilblains     \"Chilblian's\"    CKD (chronic kidney disease)     CVA (cerebral vascular accident)     Depression     Fatty liver     GERD (gastroesophageal reflux disease)     Hyperlipidemia     Hypertension     Incontinence     Osteoarthritis     OA  Marvin THR, LT TKR - hydrocodone prescibed by Dr. Almaguer    Pain of " "esophagus     \" nervous esophagus\" - she takes the occasional alprazolam    Peptic ulcer disease     Pneumonia due to COVID-19 virus 03/2020    now tested negative    Raynaud's disease     \"Raynauds\"    Sinus bradycardia     Squamous cell carcinoma of neck     Stroke     Vitamin B 12 deficiency     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           Allergies:  Shellfish-derived products, Amlodipine, and Iodine     Medication Review:   Current Outpatient Medications on File Prior to Visit   Medication Sig Dispense Refill    atorvastatin (LIPITOR) 40 MG tablet Take 1 tablet by mouth Every Night. 30 tablet 1    buPROPion XL (WELLBUTRIN XL) 150 MG 24 hr tablet Take 1 tablet by mouth Every Morning. 30 tablet 1    docusate sodium 100 MG capsule Take 1 capsule by mouth 2 (Two) Times a Day. 60 capsule 1    gabapentin (NEURONTIN) 400 MG capsule Take 1 capsule by mouth Every 8 (Eight) Hours. 90 capsule 1    glimepiride (AMARYL) 4 MG tablet Take 1 tablet by mouth Every Morning Before Breakfast. 30 tablet 1    glucose blood test strip Use to test blood glucose daily. Formulary Compliance Approval. Diagnosis: Type 2 Diabetes - Not Insulin Dependent 100 each 12    glucose monitor monitoring kit Use to test blood glucose daily. Formulary Compliance Approval. Diagnosis: Type 2 Diabetes - Not Insulin Dependent 1 each 0    hydrALAZINE (APRESOLINE) 10 MG tablet Take 1 tablet by mouth Every 8 (Eight) Hours. 90 tablet 1    Lancets misc Use to test blood glucose daily. Formulary Compliance Approval. Diagnosis: Type 2 Diabetes - Not Insulin Dependent 100 each 12    levothyroxine (SYNTHROID, LEVOTHROID) 100 MCG tablet Take 1 tablet by mouth Daily. 30 tablet 1    linagliptin (Tradjenta) 5 MG tablet tablet Take 1 tablet by mouth Daily. 30 tablet 1    oxybutynin XL (DITROPAN-XL) 10 MG 24 hr tablet Take 1 tablet by mouth Daily. 30 tablet 1    pioglitazone (ACTOS) 15 MG tablet Take 1 tablet by mouth Daily. 30 tablet 1    polyethylene " "glycol (MIRALAX) 17 g packet Take 17 g by mouth Daily. 30 packet 1    rivaroxaban (XARELTO) 15 MG tablet Take 1 tablet by mouth Daily With Dinner. Indications: Other - full anticoagulation 30 tablet 1    venlafaxine XR (EFFEXOR-XR) 150 MG 24 hr capsule Take 1 capsule by mouth Daily. 30 capsule 1    vitamin B-12 (VITAMIN B-12) 1000 MCG tablet Take 1 tablet by mouth Daily. 90 tablet 0    vitamin D (ERGOCALCIFEROL) 1.25 MG (87621 UT) capsule capsule Take 1 capsule by mouth Every 7 (Seven) Days. Ergocalciferol 50,000 units weekly x 5 weeks starting Thursday, Jan 18, 2023, then change to cholecalciferol 1,000 units twice a day 5 capsule 0     No current facility-administered medications on file prior to visit.         Vital Signs:    Vitals:    02/06/24 1406   BP: 142/72   Pulse: 75   SpO2: 99%   Weight: 87.1 kg (192 lb)   Height: 170.2 cm (67.01\")        Body mass index is 30.06 kg/m².  Neck Circumference: 15 inches      Physical Exam:    Constitutional:  Well developed 83 y.o. female that appears in no apparent distress.  Awake & oriented times 3.  Normal mood with normal recent and remote memory and normal judgement.  Eyes:  Conjunctivae normal.  Oropharynx: Moist mucous membranes without exudate and Mallampati 3  Neck: Trachea midline  Respiratory: Effort is not labored  Cardiovascular: Radial pulse regular  Musculoskeletal: Gait appears normal, no digital clubbing evident, no pre-tibial edema        Impression:   Encounter Diagnoses   Name Primary?    Acute left PCA stroke Yes    Circadian rhythm disorder     SHEY (obstructive sleep apnea)      Patient's BMI is Body mass index is 30.06 kg/m².    I would like to try to impose a little bit of order on her circadian clock and have suggested that she come up with a schedule when she could consistently go to bed and I would take melatonin half hour before bedtime.  Think 5 mg will be adequate.  We then like bright light in the morning when she is going to get up.    We " should do a home sleep apnea test to exclude the possibility of at least moderately severe obstructive sleep apnea.  Because of her prior history of stroke I would really like to optimize her risk factors.    Plan:    #1 melatonin 5 mg at desired bedtime with bright light in the morning and #2 Home sleep apnea test.  I will follow her up after #2.    The patient should practice good sleep hygiene measures.      Weight loss might be beneficial in this patient who has a Body mass index is 30.06 kg/m².      Pathophysiology of SHEY described to the patient.  Cardiovascular complications of untreated SHEY also reviewed.      The patient was cautioned about the dangers of drowsy driving.    Rayo Mendoza MD  Sleep Medicine  02/06/24  14:38 EST

## 2024-02-13 DIAGNOSIS — M15.8 OTHER OSTEOARTHRITIS INVOLVING MULTIPLE JOINTS: ICD-10-CM

## 2024-02-13 RX ORDER — GABAPENTIN 400 MG/1
400 CAPSULE ORAL EVERY 8 HOURS SCHEDULED
Qty: 90 CAPSULE | Refills: 1 | Status: SHIPPED | OUTPATIENT
Start: 2024-02-13 | End: 2024-02-16 | Stop reason: SDUPTHER

## 2024-02-15 ENCOUNTER — HOSPITAL ENCOUNTER (OUTPATIENT)
Dept: SLEEP MEDICINE | Facility: HOSPITAL | Age: 84
End: 2024-02-15
Payer: MEDICARE

## 2024-02-15 DIAGNOSIS — I63.532 ACUTE LEFT PCA STROKE: ICD-10-CM

## 2024-02-15 DIAGNOSIS — G47.33 OSA (OBSTRUCTIVE SLEEP APNEA): ICD-10-CM

## 2024-02-15 PROCEDURE — 95806 SLEEP STUDY UNATT&RESP EFFT: CPT

## 2024-02-16 DIAGNOSIS — M15.8 OTHER OSTEOARTHRITIS INVOLVING MULTIPLE JOINTS: ICD-10-CM

## 2024-02-16 RX ORDER — HYDRALAZINE HYDROCHLORIDE 10 MG/1
10 TABLET, FILM COATED ORAL EVERY 8 HOURS SCHEDULED
Qty: 90 TABLET | Refills: 1 | Status: SHIPPED | OUTPATIENT
Start: 2024-02-16

## 2024-02-16 RX ORDER — VENLAFAXINE HYDROCHLORIDE 150 MG/1
150 CAPSULE, EXTENDED RELEASE ORAL DAILY
Qty: 90 CAPSULE | Refills: 1 | Status: SHIPPED | OUTPATIENT
Start: 2024-02-16

## 2024-02-16 RX ORDER — OXYBUTYNIN CHLORIDE 10 MG/1
10 TABLET, EXTENDED RELEASE ORAL DAILY
Qty: 90 TABLET | Refills: 1 | Status: SHIPPED | OUTPATIENT
Start: 2024-02-16

## 2024-02-16 RX ORDER — GABAPENTIN 400 MG/1
400 CAPSULE ORAL EVERY 8 HOURS SCHEDULED
Qty: 90 CAPSULE | Refills: 1 | Status: SHIPPED | OUTPATIENT
Start: 2024-02-16

## 2024-02-16 RX ORDER — BUPROPION HYDROCHLORIDE 150 MG/1
150 TABLET ORAL EVERY MORNING
Qty: 90 TABLET | Refills: 1 | Status: SHIPPED | OUTPATIENT
Start: 2024-02-16

## 2024-02-16 RX ORDER — ERGOCALCIFEROL 1.25 MG/1
50000 CAPSULE ORAL
Qty: 5 CAPSULE | Refills: 0 | Status: SHIPPED | OUTPATIENT
Start: 2024-02-16

## 2024-02-19 NOTE — PROGRESS NOTES
Subjective:     Encounter Date:02/20/2024      Patient ID: Rekha Bear is a 83 y.o. female.    Chief Complaint:  History of Present Illness    This is a 83-year-old female with GERD, hypertension, dyslipidemia, diabetes mellitus type 2, and intermittent wandering atrial pacemaker, who presents for follow up.        She presented back to the hospital in 12/2023 with visual changes and weakness.  Apparently the patient living alone and is a hoarder.  She had been following frequently due to mechanical reasons.  She was having difficulty getting around her house because of the amount of stuff that she had collected.  She was also having some issues with memory loss.  She was brought into the hospital after she was found down for 2 days.  Workup revealed evidence of a left PCA stroke that was concerning for cardioembolic stroke.  A repeat echocardiogram was performed on 12/22/2023 which showed normal left ventricular function and wall motion with an EF of 60%, normal diastolic function, moderately enlarged left atrium, no significant valvular disease and mildly elevated right ventricular systolic pressure of 38 mmHg.  She was also noted to have some issues with bradycardia.  Due to high suspicion for atrial fibrillation anticoagulation was recommended and started.  It was not felt that the bradycardia was symptomatic but metoprolol was stopped at that point.  It was felt that her falls were likely mechanical.  Neurology recommended waiting 7 days before starting anticoagulation.    She returned to the office and was seen by CT Pritchard on 1/23/2024.  Her bradycardia had improved.  A 14-day monitor was recommended.  This ended up showing rare PACs and a brief episode of atrial tachycardia but no atrial fibrillation.    She presents today for her follow-up.  She presents with her daughter who is a psychiatrist.  The patient was in inpatient rehab for a few weeks before being discharged on 1/11.  She has  since been residing at [facility] since.  The patient did suffer a fall recently where she fell on her hip.  She indicates she did not fall very hard but has a significant bruise on her left hip because of it.  Otherwise no other significant bleeding issues except for some blood on a couple occasions with bowel movements with the last being a few weeks ago.  She denies any shortness of breath out of the ordinary, palpitations, lower extremity swelling, chest discomfort, near-syncope or syncope.  She does report some episodes of lightheadedness that she attributes to her ongoing vision deficits following her stroke.    She has follow-up with neurology next month.     Prior History:  I saw the patient initially in 10/2015 when she presented for an evaluation of chest pain.  She was also complaining of chronic stable dyspnea on exertion at that time.  I set her up for a PET stress study which was negative for ischemia.  A few months later I was asked to clear the patient for a gynecologic surgery, which I went ahead and did.  However, prior to that the patient called to say that she had received a copy of her stress test and it was noted on the baseline EKG that she was in atrial fibrillation.  I had the patient come in at that time and an EKG done in the office showed a wandering atrial pacemaker with a heart rate in the low 40s.  I asked her to decrease her metoprolol succinate and had her followup.  I also had her undergo a 24-hour Holter monitor which was performed in 05/2016 which revealed sinus rhythm with rare PACs and PVCs but no atrial arrhythmias including atrial fibrillation.   At her last follow-up in 11/2016, on a lower dose of metoprolol succinate her rhythm appeared to be in sinus with a rate in the 50's.      I saw her last in 6/2018 again for preoperative evaluation.  She continued to complain of dyspnea and fatigue on exertion that had worsened.    She did admit that some of this could be due to  increasing issues with her knee for which she is planning on having surgery performed by Dr. Saji Kuo.  At that office visit I felt that her symptoms were likely due to deconditioning and limited mobility related to her right knee.  I recommended proceeding with an echocardiogram which was performed on 6/12/2018 and showed normal left ventricular systolic function and wall motion with an EF 66%, normal diastolic function, and no significant valvular disease.  I cleared the patient for surgery at that time.     Since she was last seen in the office she was admitted from 3/22/2020 to 4/10/2020 with COVID-19 pneumonia and acute hypoxic respiratory failure.  She required intubation and was treated with hydroxychloroquine.  Following her discharge she was a transfer to a skilled nursing facility where she underwent rehab..  In 5/2020 she began having issues with low heart rates in the 40s to 50s associated with dizziness and weakness.  She ended up going to the emergency room and her metoprolol succinate was discontinued.  She had a ZIO monitor placed around that time which was unremarkable except for rare episodes of nonsustained atrial tachycardia.  When I called her to discuss the monitor results she reported that she was feeling better off of the metoprolol.  At her follow-up in 7/2020 she was slowly recovering from her hospitalization.  Her blood pressures remained well controlled off the metoprolol.     In 2/2021 she reported that her blood pressures were elevated at the time.  She did report some worsening of her chronic dyspnea on exertion but admitted that this occurred after she contracted Covid 19 the year prior and had gained weight over the prior year.  I recommended starting amlodipine 5 mg daily at the time but she developed headaches with this.  We ultimately switched her back to metoprolol succinate 25 mg daily with plans to monitor her for the development of bradycardia which she had issues with in  the past.    Follow-up her blood pressures were well controlled and she was not having any significant issues with bradycardia.     I saw the patient last in 4/2022.  At that office visit she reported worsening dyspnea on exertion.  She was noted to have PVCs on her EKG at that time.  We opted to proceed with a stress test and an echocardiogram.    Before we can pursue her workup she was admitted to the hospital later that month following a syncopal episode.  Was felt that her symptoms were due to orthostatic hypotension and volume depletion.  No changes were made to her management.  It was recommended that she follow-up with her stress test and her echocardiogram as planned.  These were performed on 5/10/2022.  Her echocardiogram showed normal left ventricular function and wall motion with an EF of 66%, mildly dilated left atrium, mild to moderate mitral and tricuspid valve regurgitation and normal right ventricular systolic pressure.  Stress test showed no evidence of ischemia.    Review of Systems   Constitutional: Negative for malaise/fatigue.   HENT:  Negative for hearing loss, hoarse voice, nosebleeds and sore throat.    Eyes:  Negative for pain.   Cardiovascular:  Negative for chest pain, claudication, cyanosis, dyspnea on exertion, irregular heartbeat, leg swelling, near-syncope, orthopnea, palpitations, paroxysmal nocturnal dyspnea and syncope.   Respiratory:  Negative for shortness of breath and snoring.    Endocrine: Negative for cold intolerance, heat intolerance, polydipsia, polyphagia and polyuria.   Skin:  Negative for itching and rash.   Musculoskeletal:  Positive for falls. Negative for arthritis, joint pain, joint swelling, muscle cramps, muscle weakness and myalgias.   Gastrointestinal:  Negative for constipation, diarrhea, dysphagia, heartburn, hematemesis, hematochezia, melena, nausea and vomiting.   Genitourinary:  Negative for frequency, hematuria and hesitancy.   Neurological:  Positive for  light-headedness and loss of balance. Negative for excessive daytime sleepiness, dizziness, headaches, numbness and weakness.   Psychiatric/Behavioral:  Negative for depression. The patient is not nervous/anxious.          Current Outpatient Medications:     atorvastatin (LIPITOR) 40 MG tablet, Take 1 tablet by mouth Every Night., Disp: 30 tablet, Rfl: 1    buPROPion XL (WELLBUTRIN XL) 150 MG 24 hr tablet, Take 1 tablet by mouth Every Morning., Disp: 90 tablet, Rfl: 1    cyanocobalamin (VITAMIN B-12) 1000 MCG tablet, Take 1 tablet by mouth Daily., Disp: 90 tablet, Rfl: 1    docusate sodium 100 MG capsule, Take 1 capsule by mouth 2 (Two) Times a Day., Disp: 60 capsule, Rfl: 1    gabapentin (NEURONTIN) 400 MG capsule, Take 1 capsule by mouth Every 8 (Eight) Hours., Disp: 90 capsule, Rfl: 1    glimepiride (AMARYL) 4 MG tablet, Take 1 tablet by mouth Every Morning Before Breakfast., Disp: 30 tablet, Rfl: 1    glucose blood test strip, Use to test blood glucose daily. Formulary Compliance Approval. Diagnosis: Type 2 Diabetes - Not Insulin Dependent, Disp: 100 each, Rfl: 12    glucose monitor monitoring kit, Use to test blood glucose daily. Formulary Compliance Approval. Diagnosis: Type 2 Diabetes - Not Insulin Dependent, Disp: 1 each, Rfl: 0    hydrALAZINE (APRESOLINE) 10 MG tablet, Take 1 tablet by mouth Every 8 (Eight) Hours., Disp: 90 tablet, Rfl: 1    insulin aspart (novoLOG FLEXPEN) 100 UNIT/ML solution pen-injector sc pen, 2-10 units three times a day before each meal per scale, Disp: , Rfl:     Lancets misc, Use to test blood glucose daily. Formulary Compliance Approval. Diagnosis: Type 2 Diabetes - Not Insulin Dependent, Disp: 100 each, Rfl: 12    levothyroxine (SYNTHROID, LEVOTHROID) 100 MCG tablet, Take 1 tablet by mouth Daily., Disp: 30 tablet, Rfl: 1    linagliptin (Tradjenta) 5 MG tablet tablet, Take 1 tablet by mouth Daily., Disp: 30 tablet, Rfl: 1    oxybutynin XL (DITROPAN-XL) 10 MG 24 hr tablet, Take 1  "tablet by mouth Daily., Disp: 90 tablet, Rfl: 1    pioglitazone (ACTOS) 15 MG tablet, Take 1 tablet by mouth Daily., Disp: 30 tablet, Rfl: 1    polyethylene glycol (MIRALAX) 17 g packet, Take 17 g by mouth Daily., Disp: 30 packet, Rfl: 1    rivaroxaban (XARELTO) 15 MG tablet, Take 1 tablet by mouth Daily With Dinner. Indications: Other - full anticoagulation, Disp: 90 tablet, Rfl: 1    venlafaxine XR (EFFEXOR-XR) 150 MG 24 hr capsule, Take 1 capsule by mouth Daily., Disp: 90 capsule, Rfl: 1    vitamin D (ERGOCALCIFEROL) 1.25 MG (30166 UT) capsule capsule, Take 1 capsule by mouth Every 7 (Seven) Days. Ergocalciferol 50,000 units weekly x 5 weeks starting Thursday, Jan 18, 2023, then change to cholecalciferol 1,000 units twice a day, Disp: 5 capsule, Rfl: 0    Past Medical History:   Diagnosis Date    Chilblains     \"Chilblian's\"    CKD (chronic kidney disease)     CVA (cerebral vascular accident)     Depression     Fatty liver     GERD (gastroesophageal reflux disease)     Hyperlipidemia     Hypertension     Incontinence     Osteoarthritis     OA  Marvin THR, LT TKR - hydrocodone prescibed by Dr. 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        Past Surgical History:   Procedure Laterality Date    CATARACT EXTRACTION      CHOLECYSTECTOMY      COLONOSCOPY  2009    COLONOSCOPY N/A 12/20/2016    Procedure: COLONOSCOPY with hot snare polypectomy;  Surgeon: George Pabon MD;  Location: [facility] ENDOSCOPY;  Service:     DENTAL PROCEDURE      FOOT SURGERY      TONSILLECTOMY      TOTAL HIP ARTHROPLASTY Bilateral     OA  Marvin THR, LT TKR - hydrocodone prescibed by Dr. Almaguer    TOTAL KNEE ARTHROPLASTY Left     " "OA  Marvin THR, LT TKR - hydrocodone prescibed by Dr. Almaguer       Family History   Problem Relation Age of Onset    Hypertension Mother     Diabetes type II Mother     Hypertension Father     Coronary artery disease Father     Diabetes type II Father     Colon cancer Brother     Skin cancer Brother     Stroke Son     Breast cancer Maternal Aunt     Hypertension Other     Other Other         Lipids  Thyroid       Social History     Tobacco Use    Smoking status: Former     Packs/day: 0.50     Years: 14.00     Additional pack years: 0.00     Total pack years: 7.00     Types: Cigarettes     Quit date:      Years since quittin.1    Smokeless tobacco: Never    Tobacco comments:     CAFFEINE USE   Vaping Use    Vaping Use: Never used   Substance Use Topics    Alcohol use: No    Drug use: No         ECG 12 Lead    Date/Time: 2024 2:29 PM  Performed by: Elizabeth Pina MD    Authorized by: Elizabeth Pina MD  Comparison: compared with previous ECG   Comparison to previous ECG: PACs are new  Rhythm: sinus rhythm  Ectopy: atrial premature contractions             Objective:     Visit Vitals  /80 (BP Location: Left arm, Patient Position: Sitting, Cuff Size: Adult)   Pulse 68   Ht 170.2 cm (67.01\")   Wt 90 kg (198 lb 6.4 oz)   LMP  (LMP Unknown)   SpO2 94%   BMI 31.06 kg/m²         Constitutional:       Appearance: Normal appearance. Well-developed.   Eyes:      General: Lids are normal.      Conjunctiva/sclera: Conjunctivae normal.      Pupils: Pupils are equal, round, and reactive to light.   HENT:      Head: Normocephalic and atraumatic.   Neck:      Vascular: No carotid bruit or JVD.      Lymphadenopathy: No cervical adenopathy.   Pulmonary:      Effort: Pulmonary effort is normal.      Breath sounds: Normal breath sounds.   Cardiovascular:      Normal rate. Regular rhythm.      No gallop.    Pulses:     Radial: 2+ bilaterally.  Edema:     Peripheral edema absent.   Abdominal:      Palpations: Abdomen " is soft.   Musculoskeletal:      Cervical back: Full passive range of motion without pain, normal range of motion and neck supple. Skin:     General: Skin is warm and dry.   Neurological:      Mental Status: Alert and oriented to person, place, and time.             Assessment:          Diagnosis Plan   1. Cerebrovascular accident (CVA) due to embolism of left posterior cerebral artery        2. Primary hypertension        3. Supraventricular tachycardia        4. 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 PVC (premature ventricular contraction)        6. Premature atrial contractions        7. Hyperlipidemia, unspecified hyperlipidemia type        8. Bradycardia        9. Type 2 diabetes mellitus with stage 3a chronic kidney disease, without long-term current use of insulin        10. SHEY (obstructive sleep apnea)               Plan:       1.  Left posterior cerebral artery stroke.  [place] to be embolic in high suspicion for cardioembolic stroke.  Has not had any evidence of atrial fibrillation so far including during monitoring in the hospital and a 2-week monitor recently.  She does have evidence of frequent premature atrial contractions and enlarged left atrium which does increase her risk of atrial fibrillation.  Due to neurology is high suspicion that her stroke was embolic in nature they have recommended anticoagulation.  I think at this point we should continue with anticoagulation despite the fact that we have not definitively proven any atrial fibrillation.  Could consider a loop recorder to further evaluate however not sure that this would change her management unless neurology is comfortable with the possibility of stopping anticoagulation.  She has follow-up with them in March.  2.  Hypertension.  Well-controlled on her current regimen medications.  4.  History of wandering atrial pacemaker.  Noted incidentally in the past by EKG.  No evidence of that today.  4.  Premature atrial  contractions.  Frequent on her EKG today but appear to be asymptomatic.  Monitor for now.  5.  Bradycardia.  Has had issues with bradycardia in the past on beta-blockers.  Will continue to avoid AV anuel blocking agents in the future.  6.  Hyperlipidemia.  On atorvastatin for a goal LDL of 70 or below.  7.  Diabetes mellitus type 2  8.  Obstructive sleep apnea    Will plan on seeing the patient back again in 3 months.

## 2024-02-20 ENCOUNTER — OFFICE VISIT (OUTPATIENT)
Age: 84
End: 2024-02-20
Payer: MEDICARE

## 2024-02-20 VITALS
SYSTOLIC BLOOD PRESSURE: 130 MMHG | HEIGHT: 67 IN | OXYGEN SATURATION: 94 % | DIASTOLIC BLOOD PRESSURE: 80 MMHG | HEART RATE: 68 BPM | WEIGHT: 198.4 LBS | BODY MASS INDEX: 31.14 KG/M2

## 2024-02-20 DIAGNOSIS — E11.22 TYPE 2 DIABETES MELLITUS WITH STAGE 3A CHRONIC KIDNEY DISEASE, WITHOUT LONG-TERM CURRENT USE OF INSULIN: ICD-10-CM

## 2024-02-20 DIAGNOSIS — E78.5 HYPERLIPIDEMIA, UNSPECIFIED HYPERLIPIDEMIA TYPE: ICD-10-CM

## 2024-02-20 DIAGNOSIS — I63.432 CEREBROVASCULAR ACCIDENT (CVA) DUE TO EMBOLISM OF LEFT POSTERIOR CEREBRAL ARTERY: Primary | ICD-10-CM

## 2024-02-20 DIAGNOSIS — I49.3 PVC (PREMATURE VENTRICULAR CONTRACTION): ICD-10-CM

## 2024-02-20 DIAGNOSIS — I10 PRIMARY HYPERTENSION: ICD-10-CM

## 2024-02-20 DIAGNOSIS — I47.10 SUPRAVENTRICULAR TACHYCARDIA: ICD-10-CM

## 2024-02-20 DIAGNOSIS — I49.1 PREMATURE ATRIAL CONTRACTIONS: ICD-10-CM

## 2024-02-20 DIAGNOSIS — G47.33 OSA (OBSTRUCTIVE SLEEP APNEA): ICD-10-CM

## 2024-02-20 DIAGNOSIS — N18.31 TYPE 2 DIABETES MELLITUS WITH STAGE 3A CHRONIC KIDNEY DISEASE, WITHOUT LONG-TERM CURRENT USE OF INSULIN: ICD-10-CM

## 2024-02-20 DIAGNOSIS — I49.8 WANDERING ATRIAL PACEMAKER BY ELECTROCARDIOGRAM: ICD-10-CM

## 2024-02-20 DIAGNOSIS — R00.1 BRADYCARDIA: ICD-10-CM

## 2024-02-20 PROCEDURE — 3079F DIAST BP 80-89 MM HG: CPT | Performed by: INTERNAL MEDICINE

## 2024-02-20 PROCEDURE — 93000 ELECTROCARDIOGRAM COMPLETE: CPT | Performed by: INTERNAL MEDICINE

## 2024-02-20 PROCEDURE — 1159F MED LIST DOCD IN RCRD: CPT | Performed by: INTERNAL MEDICINE

## 2024-02-20 PROCEDURE — 3075F SYST BP GE 130 - 139MM HG: CPT | Performed by: INTERNAL MEDICINE

## 2024-02-20 PROCEDURE — 1160F RVW MEDS BY RX/DR IN RCRD: CPT | Performed by: INTERNAL MEDICINE

## 2024-02-20 PROCEDURE — 99214 OFFICE O/P EST MOD 30 MIN: CPT | Performed by: INTERNAL MEDICINE

## 2024-02-20 NOTE — LETTER
February 20, 2024       No Recipients    Patient: Rekha Bear   YOB: 1940   Date of Visit: 2/20/2024       Dear Eben Porter MD,    Rekha Bear was in my office today. Below are the relevant portions of my assessment and plan of care.           If you have questions, please do not hesitate to call me. I look forward to following Rekha along with you.         Sincerely,        Elizabeth Pina MD        CC:   No Recipients

## 2024-02-22 DIAGNOSIS — G47.33 OSA (OBSTRUCTIVE SLEEP APNEA): Primary | ICD-10-CM

## 2024-02-23 ENCOUNTER — TELEPHONE (OUTPATIENT)
Dept: SLEEP MEDICINE | Facility: HOSPITAL | Age: 84
End: 2024-02-23
Payer: MEDICARE

## 2024-02-23 DIAGNOSIS — R13.10 DYSPHAGIA, UNSPECIFIED TYPE: Primary | ICD-10-CM

## 2024-02-23 NOTE — TELEPHONE ENCOUNTER
Spoke with patient about sleep study results, pt tried and failed CPAP therapy previously , would like to talk with Dr about treatment options . Scheduled follow up to discuss

## 2024-03-01 ENCOUNTER — TELEPHONE (OUTPATIENT)
Dept: FAMILY MEDICINE CLINIC | Facility: CLINIC | Age: 84
End: 2024-03-01

## 2024-03-01 NOTE — TELEPHONE ENCOUNTER
Caller: KELVIN CECILIA BACONRA    Relationship:     Best call back number: [phone]     What is the medical concern/diagnosis: PAIN RIGHT SHOULDER & RIGHT KNEE    What specialty or service is being requested: ORTHOPEDICS    What is the provider, practice or medical service name: [facility]    What is the office phone number: [phone]    Any additional details: PATIENT WANTS TO SEE THEIR ORTHOPEDIC PA. PLEASE FAX REFERRAL.    FAX: [phone]

## 2024-03-04 ENCOUNTER — TELEPHONE (OUTPATIENT)
Dept: NEUROLOGY | Facility: CLINIC | Age: 84
End: 2024-03-04

## 2024-03-04 NOTE — TELEPHONE ENCOUNTER
Caller: Ping Hernández    Relationship: Emergency Contact    Best call back number: [phone]     What is the best time to reach you: ANY     Who are you requesting to speak with (clinical staff, provider,  specific staff member): CASH     What was the call regarding: PATIENTS DAUGTER & POA WOULD LIKE TO HAVE A BRIEF CONVERSATION W/PROVIDER PRIOR TO PATIENT APPT  TOMORROW, 3/5/24 @ 2PM.    PLEASE CALL- THANK YOU

## 2024-03-05 ENCOUNTER — OFFICE VISIT (OUTPATIENT)
Dept: NEUROLOGY | Facility: CLINIC | Age: 84
End: 2024-03-05
Payer: MEDICARE

## 2024-03-05 VITALS
HEART RATE: 92 BPM | BODY MASS INDEX: 30.22 KG/M2 | WEIGHT: 193 LBS | DIASTOLIC BLOOD PRESSURE: 88 MMHG | OXYGEN SATURATION: 97 % | SYSTOLIC BLOOD PRESSURE: 122 MMHG

## 2024-03-05 DIAGNOSIS — R41.3 MEMORY PROBLEM: ICD-10-CM

## 2024-03-05 DIAGNOSIS — I10 ESSENTIAL (PRIMARY) HYPERTENSION: ICD-10-CM

## 2024-03-05 DIAGNOSIS — Z86.73 HISTORY OF STROKE: ICD-10-CM

## 2024-03-05 DIAGNOSIS — E53.8 B12 DEFICIENCY: Primary | ICD-10-CM

## 2024-03-05 NOTE — PROGRESS NOTES
CC: Hospital follow-up    HPI:  Rekha Bear is a  83 y.o.  right-handed female with past medical history of insulin-dependent diabetes, hyperlipidemia, hypertension, hypothyroidism, osteoarthritis, depression, severe COVID infection in 2020 requiring ventilator.  She was brought to the hospital by family for confusion and was found to have a sizable left PCA infarct.  She was not an intervention candidate.  There was no significant vascular abnormality no LVO.  Etiology strongly suspect to be cardioembolic and she had had atrial ectopy.  She was started on empiric anticoagulation..  She was changed from Eliquis to Xarelto for cost.  There was concern for vascular dementia related to issues of memory and worsening cognitive complaints.  At baseline there are issues with unkept house, sometimes getting lost when driving.  There are also issues with bradycardia and she is off metoprolol.  B12 less than 200 and she is on oral supplementation    Since discharge she is at [facility].  She finished a course of acute rehab.  She is working with physical therapy.  It was recommended that she be on a walker.  She had a fall bruising her right hip when she was coasting in her room without walker.  Her A1c is much improved 7.5 down from 9.1 in the hospital.  She is here with daughter      Social history: 5 children, nondrinker, previously smoked about a pack a day quit in her 30s, retired nurse, assisted living facility resident    Family history: Cancer, diabetes, arthritis      Pain Scale:        ROS:  Review of Systems      Reviewed ROS conducted by MA and josue        Physical Exam:  Vitals:    03/05/24 1424   Weight: 87.5 kg (193 lb)      Orthostatic BP:    Body mass index is 30.22 kg/m².        General: pt well appearing, no distress  HEENT: Normocephalic, conjunctiva normal, external canals normal, no nasal discharge, moist mucous membranes  Neck: No lymphadenopathy, thyroid not enlarged, no JVD  CV:  "Regular rate and rhythm, no murmurs negative no bruits auscultated at neck, equal pulses  Pulmonary: Normal respiratory effort, clear to auscultation bilaterally  Extremities: no edema, bruising, or skin lesions  Pysch: good eye contact, cooperative, full affect, euthymic mood, good attention, good insight  Mental: alert, conversant, AOx3, provides history, 2/3 recall.  Language fluent, names, repeats, names 10 animals repeats some.  CN: dense OD temporal field deficit> OS , PERRL, EOMi, no gaze palsy or nystagmus, intact facial sensation, no facial assymetry, intact hearing, symmetric palate elevation, tongue midline, good SCM strength  Motor: no abnormal movements, no pronator drift, normal  bulk and tone, 5/5 strength b/l UE & LE  Sensory: normal sensation to crude touch, temperature, and vibration throughout  Reflexes: 2+ throughout, neg babinski  Coordination: no ataxia on finger-nose, heel-shin  Gait: normal gait, no ataxia, intact heel and toe walking        Results:      Lab Results   Component Value Date    GLUCOSE 159 (H) 01/11/2024    BUN 31 (H) 01/11/2024    CREATININE 1.53 (H) 01/11/2024    EGFRIFNONA 50 (L) 10/14/2021    EGFRIFAFRI 61 10/14/2021    BCR 20.3 01/11/2024    CO2 23.4 01/11/2024    CALCIUM 9.2 01/11/2024    PROTENTOTREF 7.0 06/30/2022    ALBUMIN 3.5 12/22/2023    LABIL2 1.4 06/30/2022    AST 16 12/22/2023    ALT 8 12/22/2023       Lab Results   Component Value Date    WBC 6.57 01/11/2024    HGB 11.5 (L) 01/11/2024    HCT 35.3 01/11/2024    MCV 94.1 01/11/2024     01/11/2024         .No results found for: \"RPR\"      Lab Results   Component Value Date    TSH 5.130 (H) 12/22/2023    B3UGDUJ 7.6 06/03/2015         Lab Results   Component Value Date    YVWJZRLW46 154 (L) 12/22/2023         Lab Results   Component Value Date    FOLATE 7.47 12/22/2023         Lab Results   Component Value Date    HGBA1C 9.10 (H) 12/22/2023         Lab Results   Component Value Date    GLUCOSE 159 (H) " 01/11/2024    BUN 31 (H) 01/11/2024    CREATININE 1.53 (H) 01/11/2024    EGFRIFNONA 50 (L) 10/14/2021    EGFRIFAFRI 61 10/14/2021    BCR 20.3 01/11/2024    K 4.7 01/11/2024    CO2 23.4 01/11/2024    CALCIUM 9.2 01/11/2024    PROTENTOTREF 7.0 06/30/2022    ALBUMIN 3.5 12/22/2023    LABIL2 1.4 06/30/2022    AST 16 12/22/2023    ALT 8 12/22/2023         Diagnosis:  1 history of left PCA stroke  2 insulin-dependent diabetes  3 B12 deficiency  4 depression  5 MCI    Impression: 83-year-old female with past medical history of hypertension, hyperlipidemia, CKD, severe COVID infection in 2020 who was found to have left PCA infarct.  Presenting symptoms increased confusion.  Cardioembolic etiology strongly suspected and she was placed on empiric anticoagulation.  She is on Xarelto for cost.  She continues on Lipitor and A1c much improved.  She was started on p.o. supplementation for B12 less than 20.  There were complaints predating memory problems.  MMSE 30 with poor hand draw on clock only.  Continue stroke prevention, correction of dementia mimickers, support for her impaired mobility self-care    Plan:  1 continue Xarelto, Lipitor  2 add on B12, folate, MMA, homocystine  3 formal visual fields  4 encouraged behavioral therapy    F/u in 6 mos    I spent at least 60 minutes interviewing, examining, and counseling patient.  I independently reviewed documentation, laboratory and diagnostic findings, external documentation where applicable, and formulated treatment plan which was discussed with the patient.

## 2024-03-05 NOTE — PATIENT INSTRUCTIONS
1 Get visual field testing from your ophthalmologist  2 Get your labs drawn before seeing Dr Porter.  Ask them to draw b12 as well  3 Begin talking with behavioral therapist  4 Use your walker!    Follow up in 6 months

## 2024-03-06 DIAGNOSIS — M25.561 ACUTE PAIN OF RIGHT KNEE: Primary | ICD-10-CM

## 2024-03-06 DIAGNOSIS — M25.511 ACUTE PAIN OF RIGHT SHOULDER: ICD-10-CM

## 2024-03-06 RX ORDER — HYDRALAZINE HYDROCHLORIDE 10 MG/1
TABLET, FILM COATED ORAL
Qty: 90 TABLET | Refills: 1 | OUTPATIENT
Start: 2024-03-06

## 2024-03-11 ENCOUNTER — PATIENT OUTREACH (OUTPATIENT)
Dept: CASE MANAGEMENT | Facility: OTHER | Age: 84
End: 2024-03-11
Payer: MEDICARE

## 2024-03-11 NOTE — OUTREACH NOTE
AMBULATORY CASE MANAGEMENT NOTE    Name and Relationship of Patient/Support Person: Ping Hernández - Emergency Contact  Emergency Contact    Adult Patient Profile  Questions/Answers      Flowsheet Row Most Recent Value   Barriers to Taking Medication as Prescribed none  [States patient is now taking her own medications and has met that goal while at [facility]]          Patient Outreach    Outgoing call to the patient and spoke with her daughter, Ping.  She reports that the patient has been able to take her own medications now at [facility].  She was working with OT to make that goal.  She has GI consult scheduled for May.  She has upcoming appointment with her PCP in April.  Verified that the patient did not have labs drawn at her last visit.  Daughter will call the office to verify where she needs to get labs done before her next visit.  She will get her labs ordered by Neuro at this lab visit as well.  Discussed using a lab demar closer to [facility] for convenience. She prefers an outreach in a few weeks as she is now driving. Outreach has been scheduled.      Janel GARCIA  Ambulatory Case Management    3/11/2024, 17:30 EDT

## 2024-03-12 ENCOUNTER — OFFICE VISIT (OUTPATIENT)
Dept: SLEEP MEDICINE | Facility: HOSPITAL | Age: 84
End: 2024-03-12
Payer: MEDICARE

## 2024-03-12 VITALS
WEIGHT: 194 LBS | HEIGHT: 67 IN | BODY MASS INDEX: 30.45 KG/M2 | DIASTOLIC BLOOD PRESSURE: 62 MMHG | OXYGEN SATURATION: 99 % | HEART RATE: 93 BPM | SYSTOLIC BLOOD PRESSURE: 136 MMHG

## 2024-03-12 DIAGNOSIS — G47.20 CIRCADIAN RHYTHM DISORDER: ICD-10-CM

## 2024-03-12 DIAGNOSIS — G47.33 OSA (OBSTRUCTIVE SLEEP APNEA): Primary | ICD-10-CM

## 2024-03-12 PROCEDURE — G0463 HOSPITAL OUTPT CLINIC VISIT: HCPCS

## 2024-03-12 NOTE — PROGRESS NOTES
Follow Up Sleep Disorders Center Note     Chief Complaint: Hypersomnia sleep apnea and circadian rhythm disorder.    Primary Care Physician: Eben Porter MD    Interval History:   The patient is a 83 y.o. female  who had previously been diagnosed with sleep apnea and had a CPAP machine but returned it after about 6 months.  This was 20 years ago.  At that visit I saw her last she was with her daughter we agreed that she had irregular circadian pattern, staying up very late and then not having adequate opportunity to sleep.  Since that visit she has moved into an assisted living [facility] and she goes to bed on a routine schedule perhaps 10:00 and gets up at 8 AM.  She is taking melatonin when she goes to bed and has bright lights when she gets.  She completed a home sleep apnea test which revealed an AHI of almost 21 and she seems agreeable to trying CPAP therapy again.  For the interested reader I pasted the home sleep apnea test results below.    [facility] Home Sleep Test Report 2/15/2024     Rekha Wilkesaf  1940  [id number]        Interpreting Physician: Rayo Mendoza MD     Referring Physician:  Rayo Mendoza MD     Primary Care Physician: Eben Porter MD     Clinical Information: Patient is a 83 y.o. female       The patient's BMI: 30.1           Methods: Study performed based on the standardized protocol.     Home Sleep Study Findings:   Recording start time 11:21 PM and recording end time the next morning 8:13 AM. Total recording time 531.3 minutes and monitoring time 474.5 minutes.      The patient had 103 obstructive events and 62 partial obstructive events. AHI/DIONI for total monitoring time is 20.9. The patient's sleep disordered breathing was not positional.        Review of Systems:    A complete review of systems was done and all were negative with the exception of none    Social History:    Social History     Socioeconomic History    Marital status:  "    Number of children: 5   Tobacco Use    Smoking status: Former     Current packs/day: 0.00     Average packs/day: 0.5 packs/day for 14.0 years (7.0 ttl pk-yrs)     Types: Cigarettes     Start date:      Quit date:      Years since quittin.2    Smokeless tobacco: Never    Tobacco comments:     CAFFEINE USE   Vaping Use    Vaping status: Never Used   Substance and Sexual Activity    Alcohol use: No    Drug use: No    Sexual activity: Not Currently       Allergies:  Shellfish-derived products, Amlodipine, and Iodine     Medication Review:  Reviewed.      Vital Signs:    Vitals:    24 1300   BP: 136/62   Pulse: 93   SpO2: 99%   Weight: 88 kg (194 lb)   Height: 170.2 cm (67.01\")     Body mass index is 30.38 kg/m².    Physical Exam:    Constitutional:  Well developed 83 y.o. female that appears in no apparent distress.  Awake & oriented times 3.  Normal mood with normal recent and remote memory and normal judgement.  Eyes:  Conjunctivae normal.      Self-administered [place] Sleepiness Scale test results: 7  0-5 Lower normal daytime sleepiness  6-10 Higher normal daytime sleepiness  11-12 Mild, 13-15 Moderate, & 16-24 Severe excessive daytime sleepiness       I have reviewed the above results and compared them with the patient's last downloads and reviewed with the patient.    Impression:     Encounter Diagnoses   Name Primary?    SHEY (obstructive sleep apnea) Yes    Circadian rhythm disorder      She feels some better I think her circadian rhythm is now more orderly.  The next thing working to try to do is keep her breathing all night.  I have ordered CPAP therapy with the mask of her choice which I think will likely be a fullface mask.    Plan:  Good sleep hygiene measures should be maintained.  Weight loss would be beneficial in this patient who has obesity class I by Body mass index is 30.38 kg/m².        I answered all of the patient's questions.  The patient will call the Sleep Disorder " [facility] for any problems and will follow up in 3 months.      Rayo Mendoza MD  Sleep Medicine  03/12/24  13:49 EDT

## 2024-03-14 DIAGNOSIS — M15.8 OTHER OSTEOARTHRITIS INVOLVING MULTIPLE JOINTS: ICD-10-CM

## 2024-03-14 RX ORDER — GABAPENTIN 400 MG/1
CAPSULE ORAL
Qty: 90 CAPSULE | Refills: 1 | Status: SHIPPED | OUTPATIENT
Start: 2024-03-14

## 2024-03-22 RX ORDER — HYDRALAZINE HYDROCHLORIDE 10 MG/1
TABLET, FILM COATED ORAL
Qty: 90 TABLET | Refills: 0 | Status: SHIPPED | OUTPATIENT
Start: 2024-03-22

## 2024-04-16 ENCOUNTER — OFFICE VISIT (OUTPATIENT)
Dept: FAMILY MEDICINE CLINIC | Facility: CLINIC | Age: 84
End: 2024-04-16
Payer: MEDICARE

## 2024-04-16 VITALS
WEIGHT: 194 LBS | BODY MASS INDEX: 30.45 KG/M2 | HEART RATE: 83 BPM | SYSTOLIC BLOOD PRESSURE: 128 MMHG | HEIGHT: 67 IN | RESPIRATION RATE: 16 BRPM | DIASTOLIC BLOOD PRESSURE: 66 MMHG | OXYGEN SATURATION: 98 %

## 2024-04-16 DIAGNOSIS — I63.532 ACUTE LEFT PCA STROKE: ICD-10-CM

## 2024-04-16 DIAGNOSIS — I10 PRIMARY HYPERTENSION: ICD-10-CM

## 2024-04-16 DIAGNOSIS — E53.8 B12 DEFICIENCY: ICD-10-CM

## 2024-04-16 DIAGNOSIS — R53.83 FATIGUE, UNSPECIFIED TYPE: Primary | ICD-10-CM

## 2024-04-16 DIAGNOSIS — I69.391 DYSPHAGIA AS LATE EFFECT OF STROKE: ICD-10-CM

## 2024-04-16 DIAGNOSIS — F33.0 MAJOR DEPRESSIVE DISORDER, RECURRENT, MILD: ICD-10-CM

## 2024-04-16 DIAGNOSIS — I10 ESSENTIAL (PRIMARY) HYPERTENSION: ICD-10-CM

## 2024-04-16 DIAGNOSIS — M15.8 OTHER OSTEOARTHRITIS INVOLVING MULTIPLE JOINTS: ICD-10-CM

## 2024-04-16 DIAGNOSIS — N39.41 URGE INCONTINENCE OF URINE: ICD-10-CM

## 2024-04-16 DIAGNOSIS — I63.432 CEREBROVASCULAR ACCIDENT (CVA) DUE TO EMBOLISM OF LEFT POSTERIOR CEREBRAL ARTERY: ICD-10-CM

## 2024-04-16 LAB
FOLATE SERPL-MCNC: 7.77 NG/ML (ref 4.78–24.2)
VIT B12 SERPL-MCNC: 787 PG/ML (ref 211–946)

## 2024-04-16 PROCEDURE — 3078F DIAST BP <80 MM HG: CPT | Performed by: FAMILY MEDICINE

## 2024-04-16 PROCEDURE — 3074F SYST BP LT 130 MM HG: CPT | Performed by: FAMILY MEDICINE

## 2024-04-16 PROCEDURE — 99214 OFFICE O/P EST MOD 30 MIN: CPT | Performed by: FAMILY MEDICINE

## 2024-04-16 PROCEDURE — 1160F RVW MEDS BY RX/DR IN RCRD: CPT | Performed by: FAMILY MEDICINE

## 2024-04-16 PROCEDURE — 1159F MED LIST DOCD IN RCRD: CPT | Performed by: FAMILY MEDICINE

## 2024-04-16 RX ORDER — LEVOTHYROXINE SODIUM 88 UG/1
1 TABLET ORAL DAILY
COMMUNITY
Start: 2024-03-29

## 2024-04-16 NOTE — PROGRESS NOTES
Subjective   Rekha Bear is a 84 y.o. female.   Hypertension    History of Present Illness  Imelda is here w/ her daughter.  She states she is doing ok.  She is seeing [facility], Endocrine and Neurology.  She has labs w/ her today and Neurology is requesting additional labs drawn today.  She notes that she has neuropathy in her feet.    She is seeing Dr. Allen for help with type 2 diabetes and her thyroid.  She is going to follow-up with GI to help with concerns that she is choking when she eats at times to the point where it is frightening.  She has an appointment scheduled       Med list is updated and on the chart.    Problem areas that I am managing.     She would like to discuss changing the time she takes Ditropan XL to manage  he urinary problems at night.  She has been taking the medication first thing in the morning and she does very well with that.  But at nighttime when she is trying to sleep it apparently wears off for her and she struggles she would like to take the medication at night and see if she can manage it better and she is pretty certain she can manage urinary impulses during the day    She has chronic depressive disorder and has been doing well on bupropion  mg a day and Effexor 150 mg a day.  She has no signs of anxiety or depression at our visit today.  She has chronic hypertension and has been doing well on hydralazine 10 mg a day.  She was hospitalized due to a stroke on 12/21/2023.  She was in rehab and is now living in assisted living at [facility].  She is taking Xarelto as advised and is ambulating with a walker.She had a left PCA stroke .   She has chronic osteoarthritis and has been taking gabapentin 400 mg, 3 times a day as needed to manage her discomfort.    Review of Systems   Constitutional: Negative.    HENT: Negative.     Eyes:  Positive for visual disturbance.   Respiratory: Negative.     Cardiovascular: Negative.    Genitourinary: Negative.    Musculoskeletal:  " Positive for gait problem and myalgias.   Psychiatric/Behavioral: Negative.         Objective   Vitals:    04/16/24 1054   BP: 128/66   Pulse: 83   Resp: 16   SpO2: 98%   Weight: 88 kg (194 lb)   Height: 170.2 cm (67\")      Body mass index is 30.38 kg/m².        Physical Exam  Constitutional:       Appearance: Normal appearance.   HENT:      Head: Normocephalic.   Eyes:      Extraocular Movements: Extraocular movements intact.      Pupils: Pupils are equal, round, and reactive to light.   Cardiovascular:      Rate and Rhythm: Normal rate. Rhythm irregular.   Pulmonary:      Effort: Pulmonary effort is normal.      Breath sounds: Normal breath sounds.   Musculoskeletal:         General: Normal range of motion.      Cervical back: Normal range of motion.      Comments:   Able to ambulate a little but does better in a wheelchair.   Skin:     General: Skin is warm and dry.   Neurological:      Mental Status: She is alert.   Psychiatric:         Mood and Affect: Mood normal.         Behavior: Behavior normal.         Judgment: Judgment normal.           Assessment & Plan   Problem List Items Addressed This Visit          Cardiac and Vasculature    Primary hypertension    Overview     Well controlled on current medication, will refill medication today and as needed. She will RTO for repeat B/P check in 6 months.  Hydralazine 10 mg a day.            Endocrine and Metabolic    B12 deficiency    Relevant Orders    Vitamin B12    Folate    Methylmalonic Acid, Serum    Homocysteine       Genitourinary and Reproductive     Urinary incontinence    Overview     She has tolerated Ditropan XL 10 mg a day.  She could try taking it later in the day to see if it will help her get through the night better.            Mental Health    Major depressive disorder, recurrent, mild    Overview     She is well managed on current meds and reports no signs or symptoms of self harm, suicidal ideation. This medication helps with daily life and " relationships. Will refill as needed and advised 6 month follow up.she is taking bupropion XL 40 mg a day and Effexor 150 mg a day.            Musculoskeletal and Injuries    Osteoarthritis    Overview     Imelda has chronic osteoarthritis and has been taking tramadol as needed and gabapentin 400 mg 3 times a day.  Imelda has read and signed the [facility] Controlled Substance Contract.  I will continue to see Imelda for regular follow up appointments.  She are well controlled on current medication.  SAUL has been reviewed by me and is updated every 3 months. The patient is aware of the potential for addiction and dependence.                  Neuro    Acute left PCA stroke    Overview     Stroke is affected her vision as well as her ability to ambulate safely.  She is now living at [facility].  And she continues to take Xarelto 15 mg a day.  She is doing well on this medication.         Cerebrovascular accident (CVA) due to embolism of left posterior cerebral artery    Dysphagia as late effect of stroke    Overview     She has an upcoming appointment with gastroenterology to see if they can sort out how to help her.  Her daughter states the choking has become more frequent.          Other Visit Diagnoses       Fatigue, unspecified type    -  Primary                     Orders Placed This Encounter   Procedures    Vitamin B12    Folate    Methylmalonic Acid, Serum    Homocysteine        Return in about 6 months (around 10/16/2024) for Annual physical.

## 2024-04-18 LAB — HCYS SERPL-SCNC: 16.9 UMOL/L (ref 0–21.3)

## 2024-04-19 ENCOUNTER — TELEPHONE (OUTPATIENT)
Dept: NEUROLOGY | Facility: CLINIC | Age: 84
End: 2024-04-19
Payer: MEDICARE

## 2024-04-22 LAB — METHYLMALONATE SERPL-SCNC: 267 NMOL/L (ref 0–378)

## 2024-04-26 RX ORDER — HYDRALAZINE HYDROCHLORIDE 10 MG/1
TABLET, FILM COATED ORAL
Qty: 90 TABLET | Refills: 0 | Status: SHIPPED | OUTPATIENT
Start: 2024-04-26

## 2024-05-08 ENCOUNTER — PATIENT OUTREACH (OUTPATIENT)
Dept: CASE MANAGEMENT | Facility: OTHER | Age: 84
End: 2024-05-08
Payer: MEDICARE

## 2024-05-09 ENCOUNTER — TELEPHONE (OUTPATIENT)
Dept: GASTROENTEROLOGY | Facility: CLINIC | Age: 84
End: 2024-05-09
Payer: MEDICARE

## 2024-05-13 ENCOUNTER — TELEPHONE (OUTPATIENT)
Dept: GASTROENTEROLOGY | Facility: CLINIC | Age: 84
End: 2024-05-13
Payer: MEDICARE

## 2024-05-13 NOTE — TELEPHONE ENCOUNTER
Hub to relay and reschedule  Called patient to reschedule 5/21 appt due to De La Torre not in the office. Unable to reach, left 3rd vm. Appt cancelled at this time. Pt can see Princess Ramírez Kacy, Dobozi or N[facility]un.

## 2024-05-22 NOTE — PROGRESS NOTES
Subjective:     Encounter Date:05/23/2024      Patient ID: Rekha Bear is a 84 y.o. female.    Chief Complaint:  History of Present Illness    This is a 84-year-old female with GERD, hypertension, dyslipidemia, diabetes mellitus type 2, and intermittent wandering atrial pacemaker, who presents for follow up.        She presented back to the hospital in 12/2023 with visual changes and weakness.  Apparently the patient living alone and is a hoarder.  She had been following frequently due to mechanical reasons.  She was having difficulty getting around her house because of the amount of stuff that she had collected.  She was also having some issues with memory loss.  She was brought into the hospital after she was found down for 2 days.  Workup revealed evidence of a left PCA stroke that was concerning for cardioembolic stroke.  A repeat echocardiogram was performed on 12/22/2023 which showed normal left ventricular function and wall motion with an EF of 60%, normal diastolic function, moderately enlarged left atrium, no significant valvular disease and mildly elevated right ventricular systolic pressure of 38 mmHg.  She was also noted to have some issues with bradycardia.  Due to high suspicion for atrial fibrillation anticoagulation was recommended and started.  It was not felt that the bradycardia was symptomatic but metoprolol was stopped at that point.  It was felt that her falls were likely mechanical.  Neurology recommended waiting 7 days before starting anticoagulation.     She returned to the office and was seen by CT Pritchard on 1/23/2024.  Her bradycardia had improved.  A 14-day monitor was recommended.  This ended up showing rare PACs and a brief episode of atrial tachycardia but no atrial fibrillation.     Saw the patient back in follow-up in 2/2024.  At that office visit she was accompanied by her daughter who is a psychiatrist.  The patient was in inpatient rehab for a few weeks before  being discharged on 1/11 and since had moved into [facility].  She did report a fall where she injured her hip but no significant injuries.  She otherwise appeared to be doing well from a cardiac standpoint.  I recommended continuing anticoagulation.  I was not sure that a loop recorder would be helpful unless neurology felt comfortable with the possibility of stopping anticoagulation of the loop recorder showed no evidence of atrial fibrillation.  I recommended that she return in 3 months so we can see how she was doing following her next neurology evaluation.    The patient presents today for routine follow-up.  She reports that she has noted when she checks her blood pressures routinely that her pulse rate has been elevated in the 90s to 100s.  Previously heart rates were usually in the 60s and below.  She cannot really recall when she first noticed this but she believes it was at least in the last 2 months.  She denies any palpitations, shortness of breath, chest pain, near-syncope or syncope or lower extremity swelling.     Prior History:  I saw the patient initially in 10/2015 when she presented for an evaluation of chest pain.  She was also complaining of chronic stable dyspnea on exertion at that time.  I set her up for a PET stress study which was negative for ischemia.  A few months later I was asked to clear the patient for a gynecologic surgery, which I went ahead and did.  However, prior to that the patient called to say that she had received a copy of her stress test and it was noted on the baseline EKG that she was in atrial fibrillation.  I had the patient come in at that time and an EKG done in the office showed a wandering atrial pacemaker with a heart rate in the low 40s.  I asked her to decrease her metoprolol succinate and had her followup.  I also had her undergo a 24-hour Holter monitor which was performed in 05/2016 which revealed sinus rhythm with rare PACs and PVCs but no  atrial arrhythmias including atrial fibrillation.   At her last follow-up in 11/2016, on a lower dose of metoprolol succinate her rhythm appeared to be in sinus with a rate in the 50's.      I saw her last in 6/2018 again for preoperative evaluation.  She continued to complain of dyspnea and fatigue on exertion that had worsened.    She did admit that some of this could be due to increasing issues with her knee for which she is planning on having surgery performed by Dr. Saji Kuo.  At that office visit I felt that her symptoms were likely due to deconditioning and limited mobility related to her right knee.  I recommended proceeding with an echocardiogram which was performed on 6/12/2018 and showed normal left ventricular systolic function and wall motion with an EF 66%, normal diastolic function, and no significant valvular disease.  I cleared the patient for surgery at that time.     Since she was last seen in the office she was admitted from 3/22/2020 to 4/10/2020 with COVID-19 pneumonia and acute hypoxic respiratory failure.  She required intubation and was treated with hydroxychloroquine.  Following her discharge she was a transfer to a skilled nursing facility where she underwent rehab..  In 5/2020 she began having issues with low heart rates in the 40s to 50s associated with dizziness and weakness.  She ended up going to the emergency room and her metoprolol succinate was discontinued.  She had a ZIO monitor placed around that time which was unremarkable except for rare episodes of nonsustained atrial tachycardia.  When I called her to discuss the monitor results she reported that she was feeling better off of the metoprolol.  At her follow-up in 7/2020 she was slowly recovering from her hospitalization.  Her blood pressures remained well controlled off the metoprolol.     In 2/2021 she reported that her blood pressures were elevated at the time.  She did report some worsening of her chronic dyspnea on  exertion but admitted that this occurred after she contracted Covid 19 the year prior and had gained weight over the prior year.  I recommended starting amlodipine 5 mg daily at the time but she developed headaches with this.  We ultimately switched her back to metoprolol succinate 25 mg daily with plans to monitor her for the development of bradycardia which she had issues with in the past.    Follow-up her blood pressures were well controlled and she was not having any significant issues with bradycardia.      I saw the patient last in 4/2022.  At that office visit she reported worsening dyspnea on exertion.  She was noted to have PVCs on her EKG at that time.  We opted to proceed with a stress test and an echocardiogram.     Before we can pursue her workup she was admitted to the hospital later that month following a syncopal episode.  Was felt that her symptoms were due to orthostatic hypotension and volume depletion.  No changes were made to her management.  It was recommended that she follow-up with her stress test and her echocardiogram as planned.  These were performed on 5/10/2022.  Her echocardiogram showed normal left ventricular function and wall motion with an EF of 66%, mildly dilated left atrium, mild to moderate mitral and tricuspid valve regurgitation and normal right ventricular systolic pressure.  Stress test showed no evidence of ischemia.    Review of Systems   Constitutional: Negative for malaise/fatigue.   HENT:  Negative for hearing loss, hoarse voice, nosebleeds and sore throat.    Eyes:  Negative for pain.   Cardiovascular:  Negative for chest pain, claudication, cyanosis, dyspnea on exertion, irregular heartbeat, leg swelling, near-syncope, orthopnea, palpitations, paroxysmal nocturnal dyspnea and syncope.   Respiratory:  Negative for shortness of breath and snoring.    Endocrine: Negative for cold intolerance, heat intolerance, polydipsia, polyphagia and polyuria.   Skin:  Negative for  itching and rash.   Musculoskeletal:  Negative for arthritis, falls, joint pain, joint swelling, muscle cramps, muscle weakness and myalgias.   Gastrointestinal:  Negative for constipation, diarrhea, dysphagia, heartburn, hematemesis, hematochezia, melena, nausea and vomiting.   Genitourinary:  Negative for frequency, hematuria and hesitancy.   Neurological:  Negative for excessive daytime sleepiness, dizziness, headaches, light-headedness, numbness and weakness.   Psychiatric/Behavioral:  Negative for depression. The patient is not nervous/anxious.          Current Outpatient Medications:     atorvastatin (LIPITOR) 40 MG tablet, Take 1 tablet by mouth Every Night., Disp: 30 tablet, Rfl: 1    buPROPion XL (WELLBUTRIN XL) 150 MG 24 hr tablet, Take 1 tablet by mouth Every Morning., Disp: 90 tablet, Rfl: 1    cyanocobalamin (VITAMIN B-12) 1000 MCG tablet, Take 1 tablet by mouth Daily., Disp: 90 tablet, Rfl: 1    docusate sodium 100 MG capsule, Take 1 capsule by mouth 2 (Two) Times a Day., Disp: 60 capsule, Rfl: 1    gabapentin (NEURONTIN) 400 MG capsule, TAKE ONE TABLET BY MOUTH THREE TIMES DAILY (MORNING, NOON, AND BEDTIME), Disp: 90 capsule, Rfl: 1    glimepiride (AMARYL) 4 MG tablet, Take 1 tablet by mouth Every Morning Before Breakfast., Disp: 30 tablet, Rfl: 1    glucose blood test strip, Use to test blood glucose daily. Formulary Compliance Approval. Diagnosis: Type 2 Diabetes - Not Insulin Dependent, Disp: 100 each, Rfl: 12    glucose monitor monitoring kit, Use to test blood glucose daily. Formulary Compliance Approval. Diagnosis: Type 2 Diabetes - Not Insulin Dependent, Disp: 1 each, Rfl: 0    insulin aspart (novoLOG FLEXPEN) 100 UNIT/ML solution pen-injector sc pen, 2-10 units three times a day before each meal per scale, Disp: , Rfl:     Lancets misc, Use to test blood glucose daily. Formulary Compliance Approval. Diagnosis: Type 2 Diabetes - Not Insulin Dependent, Disp: 100 each, Rfl: 12    levothyroxine  "(SYNTHROID, LEVOTHROID) 88 MCG tablet, Take 1 tablet by mouth Daily., Disp: , Rfl:     linagliptin (Tradjenta) 5 MG tablet tablet, Take 1 tablet by mouth Daily., Disp: 30 tablet, Rfl: 1    oxybutynin XL (DITROPAN-XL) 10 MG 24 hr tablet, Take 1 tablet by mouth Daily., Disp: 90 tablet, Rfl: 1    pioglitazone (ACTOS) 15 MG tablet, Take 1 tablet by mouth Daily., Disp: 30 tablet, Rfl: 1    polyethylene glycol (MIRALAX) 17 g packet, Take 17 g by mouth Daily., Disp: 30 packet, Rfl: 1    rivaroxaban (XARELTO) 15 MG tablet, Take 1 tablet by mouth Daily With Dinner. Indications: Other - full anticoagulation, Disp: 90 tablet, Rfl: 1    venlafaxine XR (EFFEXOR-XR) 150 MG 24 hr capsule, Take 1 capsule by mouth Daily., Disp: 90 capsule, Rfl: 1    vitamin D (ERGOCALCIFEROL) 1.25 MG (34015 UT) capsule capsule, Take 1 capsule by mouth Every 7 (Seven) Days. Ergocalciferol 50,000 units weekly x 5 weeks starting Thursday, Jan 18, 2023, then change to cholecalciferol 1,000 units twice a day, Disp: 5 capsule, Rfl: 0    BD AutoShield Duo 30G X 5 MM misc, 1 each by Other route 4 (Four) Times a Day., Disp: , Rfl:     metoprolol succinate XL (TOPROL-XL) 25 MG 24 hr tablet, Take 1 tablet by mouth Daily., Disp: 30 tablet, Rfl: 5    Past Medical History:   Diagnosis Date    Chilblains     \"Chilblian's\"    CKD (chronic kidney disease)     CVA (cerebral vascular accident)     Depression     Fatty liver     GERD (gastroesophageal reflux disease)     Hyperlipidemia     Hypertension     Incontinence     Osteoarthritis     OA  Marvin THR, LT TKR - hydrocodone prescibed by Dr. 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        Past " "Surgical History:   Procedure Laterality Date    CATARACT EXTRACTION      CHOLECYSTECTOMY      COLONOSCOPY      COLONOSCOPY N/A 2016    Procedure: COLONOSCOPY with hot snare polypectomy;  Surgeon: George Pabon MD;  Location: [facility] ENDOSCOPY;  Service:     DENTAL PROCEDURE      FOOT SURGERY      TONSILLECTOMY      TOTAL HIP ARTHROPLASTY Bilateral     OA  Marvin THR, LT TKR - hydrocodone prescibed by Dr. Almaguer    TOTAL KNEE ARTHROPLASTY Left     OA  Marvin THR, LT TKR - hydrocodone prescibed by Dr. Almaguer       Family History   Problem Relation Age of Onset    Hypertension Mother     Diabetes type II Mother     Hypertension Father     Coronary artery disease Father     Diabetes type II Father     Colon cancer Brother     Skin cancer Brother     Stroke Son     Breast cancer Maternal Aunt     Hypertension Other     Other Other         Lipids  Thyroid       Social History     Tobacco Use    Smoking status: Former     Current packs/day: 0.00     Average packs/day: 0.5 packs/day for 14.0 years (7.0 ttl pk-yrs)     Types: Cigarettes     Start date:      Quit date:      Years since quittin.4    Smokeless tobacco: Never    Tobacco comments:     CAFFEINE USE   Vaping Use    Vaping status: Never Used   Substance Use Topics    Alcohol use: No    Drug use: No         ECG 12 Lead    Date/Time: 2024 1:52 PM  Performed by: Elizabeth Pina MD    Authorized by: Elizabeth Pina MD  Comparison: compared with previous ECG   Comparison to previous ECG: Atrial fibrillation is new  Rhythm: atrial fibrillation  Conduction: left anterior fascicular block  Other findings: poor R wave progression             Objective:     Visit Vitals  /70 (BP Location: Left arm, Patient Position: Sitting, Cuff Size: Adult)   Pulse 97   Ht 170.2 cm (67\")   Wt 93.4 kg (205 lb 12.8 oz)   LMP  (LMP Unknown)   SpO2 97%   BMI 32.23 kg/m²         Constitutional:       Appearance: Normal appearance. Well-developed.   Eyes:      " General: Lids are normal.      Conjunctiva/sclera: Conjunctivae normal.      Pupils: Pupils are equal, round, and reactive to light.   HENT:      Head: Normocephalic and atraumatic.   Neck:      Vascular: No carotid bruit or JVD.      Lymphadenopathy: No cervical adenopathy.   Pulmonary:      Effort: Pulmonary effort is normal.      Breath sounds: Normal breath sounds.   Cardiovascular:      Normal rate. Irregularly irregular rhythm.      No gallop.    Pulses:     Radial: 2+ bilaterally.  Edema:     Peripheral edema absent.   Abdominal:      Palpations: Abdomen is soft.   Musculoskeletal:      Cervical back: Full passive range of motion without pain, normal range of motion and neck supple. Skin:     General: Skin is warm and dry.   Neurological:      Mental Status: Alert and oriented to person, place, and time.             Assessment:          Diagnosis Plan   1. Atrial fibrillation, persistent        2. Hyperlipidemia, unspecified hyperlipidemia type        3. Primary hypertension        4. Other specified hypothyroidism        5. Type 2 diabetes mellitus with stage 3a chronic kidney disease, without long-term current use of insulin        6. Cerebrovascular accident (CVA) due to embolism of left posterior cerebral artery        7. SHEY (obstructive sleep apnea)               Plan:       1.  Atrial fibrillation.  Although this was suspected based on her presentation with a cardioembolic stroke earlier this year we have not been able to find evidence of this so far.  Now it appears that she is in atrial fibrillation and may have been in atrial fibrillation persistently for the last several weeks.  Rates that are mildly elevated in the 90s to 100s per the patient.  Otherwise she appears to be asymptomatic.  We did start her back on metoprolol succinate which she was on previously to help with rate control.  Otherwise continue anticoagulation with rivaroxaban.  She should remain on 15 mg dosing of rivaroxaban since her  most recent creatinine clearance was still below 50 around 48.  2.  Hypertension.  Previously well-controlled but more recently the patient notes that her systolic pressures have been running in the 140s more often than not.  She is just on a low-dose of hydralazine at this time.  I am and have her stop the hydralazine and switch back to the metoprolol which she was on previously but discontinued due to issues with bradycardia.  I am less concerned about bradycardia now that she is in atrial fibrillation.  3.  Left posterior cerebral artery stroke.  Likely due to #1.  Now on rivaroxaban.  Followed by neurology.  4.  Hyperlipidemia.  On atorvastatin for goal LDL of 70 or below.  5.  History of bradycardia.  Exacerbated by AV anuel blocking agents in the past.  Plan to resume metoprolol as above.  As long as she remains in persistent atrial fibrillation I suspect this is less likely to be an issue unless she is developed significant conduction disease.  Will need to monitor for recurrent bradycardia with the resumption of the metoprolol.  6.  Hypothyroidism  7.  Diabetes mellitus type 2  8.  Obstructive sleep apnea    Will plan on seeing the patient back again in 3 months.

## 2024-05-23 ENCOUNTER — OFFICE VISIT (OUTPATIENT)
Age: 84
End: 2024-05-23
Payer: MEDICARE

## 2024-05-23 VITALS
SYSTOLIC BLOOD PRESSURE: 140 MMHG | WEIGHT: 205.8 LBS | DIASTOLIC BLOOD PRESSURE: 70 MMHG | HEIGHT: 67 IN | BODY MASS INDEX: 32.3 KG/M2 | OXYGEN SATURATION: 97 % | HEART RATE: 97 BPM

## 2024-05-23 DIAGNOSIS — E78.5 HYPERLIPIDEMIA, UNSPECIFIED HYPERLIPIDEMIA TYPE: ICD-10-CM

## 2024-05-23 DIAGNOSIS — G47.33 OSA (OBSTRUCTIVE SLEEP APNEA): ICD-10-CM

## 2024-05-23 DIAGNOSIS — E11.22 TYPE 2 DIABETES MELLITUS WITH STAGE 3A CHRONIC KIDNEY DISEASE, WITHOUT LONG-TERM CURRENT USE OF INSULIN: ICD-10-CM

## 2024-05-23 DIAGNOSIS — I10 PRIMARY HYPERTENSION: ICD-10-CM

## 2024-05-23 DIAGNOSIS — N18.31 TYPE 2 DIABETES MELLITUS WITH STAGE 3A CHRONIC KIDNEY DISEASE, WITHOUT LONG-TERM CURRENT USE OF INSULIN: ICD-10-CM

## 2024-05-23 DIAGNOSIS — E03.8 OTHER SPECIFIED HYPOTHYROIDISM: ICD-10-CM

## 2024-05-23 DIAGNOSIS — I63.432 CEREBROVASCULAR ACCIDENT (CVA) DUE TO EMBOLISM OF LEFT POSTERIOR CEREBRAL ARTERY: ICD-10-CM

## 2024-05-23 DIAGNOSIS — I48.19 ATRIAL FIBRILLATION, PERSISTENT: Primary | ICD-10-CM

## 2024-05-23 RX ORDER — PEN NEEDLE, DIABETIC, SAFETY 30 GX3/16"
1 NEEDLE, DISPOSABLE MISCELLANEOUS 4 TIMES DAILY
COMMUNITY
Start: 2024-04-10

## 2024-05-23 RX ORDER — METOPROLOL SUCCINATE 25 MG/1
25 TABLET, EXTENDED RELEASE ORAL DAILY
Qty: 30 TABLET | Refills: 5 | Status: SHIPPED | OUTPATIENT
Start: 2024-05-23

## 2024-05-23 NOTE — LETTER
May 23, 2024       No Recipients    Patient: Rekha Bear   YOB: 1940   Date of Visit: 5/23/2024       Dear Eben Porter MD,    Rekha Bear was in my office today. Below are the relevant portions of my assessment and plan of care.           If you have questions, please do not hesitate to call me. I look forward to following Rekha along with you.         Sincerely,        Elizabeth Pina MD        CC:   No Recipients

## 2024-05-30 DIAGNOSIS — M15.8 OTHER OSTEOARTHRITIS INVOLVING MULTIPLE JOINTS: ICD-10-CM

## 2024-05-31 RX ORDER — GABAPENTIN 400 MG/1
CAPSULE ORAL
Qty: 90 CAPSULE | Refills: 1 | Status: SHIPPED | OUTPATIENT
Start: 2024-05-31

## 2024-05-31 RX ORDER — HYDRALAZINE HYDROCHLORIDE 10 MG/1
TABLET, FILM COATED ORAL
Qty: 90 TABLET | Refills: 0 | Status: SHIPPED | OUTPATIENT
Start: 2024-05-31

## 2024-06-20 NOTE — PROGRESS NOTES
CC: follow-up    HPI:  Rekha Bear is a  84 y.o.  right-handed female with past medical history of insulin-dependent diabetes, hyperlipidemia, hypertension, hypothyroidism, osteoarthritis, depression, severe COVID infection in 2020 requiring ventilator.  She was brought to the hospital by family for confusion and was found to have a sizable left PCA infarct.  She was not an intervention candidate.  There was no significant vascular abnormality, no LVO.  Etiology strongly suspected to be cardioembolic and she had had atrial ectopy.  She was started on empiric anticoagulation.  She was changed from Eliquis to Xarelto for cost.  There was concern for vascular dementia related to issues of memory and worsening cognitive complaints.  At baseline there are issues with unkept house, sometimes getting lost when driving.  There are also issues with bradycardia and she is off metoprolol.  B12 less than 200 and she is on oral supplementation    I last saw her in March and she is still at [facility].  She finished a course of acute rehab.  She is working with physical therapy.  It was recommended that she be on a walker and she is using this today.  S Her A1c is much improved 7.5 down from 9.1 in the hospital.  She is here with daughter again today.  She has significant pain of R shoulder.  There is an orthopedist who has treated her at the facility.  She should get xray results soon.  She had forma VF testing and indeed cannot drive, which she is not.  No new stroke like symptoms to report.  Afib has been detected at this point.      Social history: 5 children, nondrinker, previously smoked about a pack a day quit in her 30s, retired nurse, assisted living facility resident    Family history: Cancer, diabetes, arthritis      Pain Scale:        ROS:  Review of Systems      Reviewed ROS conducted by MA and josue        Physical Exam:  Vitals:    06/24/24 1129   BP: 140/88   SpO2: 100%   Weight: 95.3 kg (210  "lb)      Orthostatic BP:    Body mass index is 32.89 kg/m².        General: pt well appearing, no distress  HEENT: Normocephalic, conjunctiva normal, external canals normal, no nasal discharge, moist mucous membranes  Neck: No lymphadenopathy, thyroid not enlarged, no JVD  CV: Regular rate and rhythm, no murmurs negative no bruits auscultated at neck, equal pulses  Pulmonary: Normal respiratory effort, clear to auscultation bilaterally  Extremities: no edema, bruising, or skin lesions  Pysch: good eye contact, cooperative, full affect, euthymic mood, good attention, good insight  Mental: alert, conversant, AOx3, provides history, 2/3 recall.  Language fluent, names, repeats  CN: dense OD temporal field deficit> OS , PERRL, EOMi, no gaze palsy or nystagmus, intact facial sensation, no facial assymetry, intact hearing, symmetric palate elevation, tongue midline, good SCM strength  Motor: no abnormal movements, no pronator drift, normal  bulk and tone, 5/5 strength b/l UE & LE  Sensory: normal sensation to crude touch, temperature, and vibration throughout  Reflexes: 2+ throughout, neg babinski  Coordination: no ataxia on finger-nose, heel-shin  Gait: using walker        Results:      Lab Results   Component Value Date    GLUCOSE 159 (H) 01/11/2024    BUN 31 (H) 01/11/2024    CREATININE 1.53 (H) 01/11/2024    EGFRIFNONA 50 (L) 10/14/2021    EGFRIFAFRI 61 10/14/2021    BCR 20.3 01/11/2024    CO2 23.4 01/11/2024    CALCIUM 9.2 01/11/2024    PROTENTOTREF 7.0 06/30/2022    ALBUMIN 3.5 12/22/2023    LABIL2 1.4 06/30/2022    AST 16 12/22/2023    ALT 8 12/22/2023       Lab Results   Component Value Date    WBC 6.57 01/11/2024    HGB 11.5 (L) 01/11/2024    HCT 35.3 01/11/2024    MCV 94.1 01/11/2024     01/11/2024         .No results found for: \"RPR\"      Lab Results   Component Value Date    TSH 3.36 05/16/2024    O1EWQBY 7.6 06/03/2015         Lab Results   Component Value Date    QVRJATKD08 787 04/16/2024         Lab " Results   Component Value Date    FOLATE 7.77 04/16/2024         Lab Results   Component Value Date    HGBA1C 9.10 (H) 12/22/2023         Lab Results   Component Value Date    GLUCOSE 159 (H) 01/11/2024    BUN 31 (H) 01/11/2024    CREATININE 1.53 (H) 01/11/2024    EGFRIFNONA 50 (L) 10/14/2021    EGFRIFAFRI 61 10/14/2021    BCR 20.3 01/11/2024    K 4.7 01/11/2024    CO2 23.4 01/11/2024    CALCIUM 9.2 01/11/2024    PROTENTOTREF 7.0 06/30/2022    ALBUMIN 3.5 12/22/2023    LABIL2 1.4 06/30/2022    AST 16 12/22/2023    ALT 8 12/22/2023         Diagnosis:  1 history of left PCA stroke  2 insulin-dependent diabetes  3 B12 deficiency  4 depression  5 MCI    Impression: 83-year-old female with past medical history of hypertension, hyperlipidemia, CKD, severe COVID infection in 2020 who was found to have left PCA infarct.  Presenting symptoms increased confusion.  Cardioembolic etiology strongly suspected and she was placed on empiric anticoagulation- afib has since been detected.  She is on Xarelto for cost.  She continues on Lipitor and A1c much improved.  She was started on p.o. supplementation for B12 less than 20.  There were complaints predating memory problems.  MMSE 30 with poor hand draw on clock only.  Continue stroke prevention, correction of dementia mimickers, support for her impaired mobility self-care    Plan:  1 continue Xarelto, Lipitor  2 add on B12, folate, MMA, homocystine  3 had VF testing and to f/u with ophtho per their note.  Provided info for daughter  4 encouraged behavioral therapy as it is provided by her facility  5 control of medical problems, social engagement, routine, treat undertreated depression for tx of MCI-did briefly discuss new FDA approved monoclonal abx , leqembi    F/u in 9-12 mos    I spent at least 60 minutes interviewing, examining, and counseling patient.  I independently reviewed documentation, laboratory and diagnostic findings, external documentation where applicable, and  formulated treatment plan which was discussed with the patient.

## 2024-06-24 ENCOUNTER — OFFICE VISIT (OUTPATIENT)
Dept: NEUROLOGY | Facility: CLINIC | Age: 84
End: 2024-06-24
Payer: MEDICARE

## 2024-06-24 VITALS
DIASTOLIC BLOOD PRESSURE: 88 MMHG | SYSTOLIC BLOOD PRESSURE: 140 MMHG | BODY MASS INDEX: 32.89 KG/M2 | WEIGHT: 210 LBS | OXYGEN SATURATION: 100 %

## 2024-06-24 DIAGNOSIS — F32.89 OTHER DEPRESSION: ICD-10-CM

## 2024-06-24 DIAGNOSIS — R41.3 MEMORY PROBLEM: ICD-10-CM

## 2024-06-24 DIAGNOSIS — Z86.73 HISTORY OF STROKE: ICD-10-CM

## 2024-06-24 DIAGNOSIS — E53.8 B12 DEFICIENCY: Primary | ICD-10-CM

## 2024-06-24 NOTE — PATIENT INSTRUCTIONS
Leqembi is an antiamyloid antibody for the treatment of Alzheimer's disease with mild cognitive impairment or mild dementia    It is been approved by the FDA    It is a monoclonal antibody that binds to the amyloid beta plaques.  Prior studies show that amyloid removal slows the progression of Alzheimer's dementia.  This is a treatment that differs from current therapies that do not treat the underlying disease course but temporarily improve symptoms    A phase 3 randomized trial showed clinical benefit    18-month trial of 1556 participants    Study showed dose and time dependent clearance of amyloid    Resulted in moderately less decline on measures of cognition and function    It also showed adverse events    Amyloid related imaging abnormalities with edema or effusions    Amyloid related imaging abnormalities with hemorrhage    Serious events occurred and 14% of the drug group and 11% in the placebo group.  Adverse events leading to discontinuation of trial agent occurred in 6.9% including the most common being in fusion related reactions, next Aria with cerebral hemorrhage, cerebral hemorrhage, superficial siderosis, headache, falls    0.7%  thought no relation to the drug or ARIA

## 2024-06-25 RX ORDER — HYDRALAZINE HYDROCHLORIDE 10 MG/1
TABLET, FILM COATED ORAL
Qty: 90 TABLET | Refills: 0 | Status: SHIPPED | OUTPATIENT
Start: 2024-06-25 | End: 2024-06-27

## 2024-06-27 ENCOUNTER — TELEPHONE (OUTPATIENT)
Age: 84
End: 2024-06-27
Payer: MEDICARE

## 2024-06-27 NOTE — TELEPHONE ENCOUNTER
Received VM from patient Daughter that from recent OV she understood that you was going to be discontinue her hydralazine 10 mg and start Metoprolol 25 mg ?    Please advise patient has requested a call back for clearance , call back is [phone]

## 2024-06-27 NOTE — TELEPHONE ENCOUNTER
I called and spoke with the patient's daughter.  They indicate that hydralazine does not appear to have ever been discontinued.  I discontinued it in her chart.  However we will need to contact her pharmacy so they can discontinue it on their end.    Can you call her pharmacy and inform them that I want to discontinue the patient's hydralazine?

## 2024-06-28 NOTE — TELEPHONE ENCOUNTER
Left message with [facility] pharmacy that Dr Pina is discontinuing Hydralazine    CHAVO Beltran

## 2024-07-23 DIAGNOSIS — M15.8 OTHER OSTEOARTHRITIS INVOLVING MULTIPLE JOINTS: ICD-10-CM

## 2024-07-23 RX ORDER — RIVAROXABAN 15 MG/1
TABLET, FILM COATED ORAL
Qty: 90 TABLET | Refills: 1 | Status: SHIPPED | OUTPATIENT
Start: 2024-07-23

## 2024-07-23 RX ORDER — OXYBUTYNIN CHLORIDE 10 MG/1
10 TABLET, EXTENDED RELEASE ORAL DAILY
Qty: 90 TABLET | Refills: 1 | Status: SHIPPED | OUTPATIENT
Start: 2024-07-23

## 2024-07-23 RX ORDER — GABAPENTIN 400 MG/1
CAPSULE ORAL
Qty: 90 CAPSULE | Refills: 1 | Status: SHIPPED | OUTPATIENT
Start: 2024-07-23

## 2024-07-23 RX ORDER — VENLAFAXINE HYDROCHLORIDE 150 MG/1
150 CAPSULE, EXTENDED RELEASE ORAL DAILY
Qty: 90 CAPSULE | Refills: 1 | Status: SHIPPED | OUTPATIENT
Start: 2024-07-23

## 2024-07-23 RX ORDER — LANOLIN ALCOHOL/MO/W.PET/CERES
1000 CREAM (GRAM) TOPICAL DAILY
Qty: 90 TABLET | Refills: 1 | Status: SHIPPED | OUTPATIENT
Start: 2024-07-23

## 2024-07-23 RX ORDER — BUPROPION HYDROCHLORIDE 150 MG/1
150 TABLET ORAL EVERY MORNING
Qty: 90 TABLET | Refills: 1 | Status: SHIPPED | OUTPATIENT
Start: 2024-07-23

## 2024-07-23 NOTE — TELEPHONE ENCOUNTER
LAST REFILL - 05/31/24  LAST VISIT - 04/16/24  NEXT VISIT - 10/18/24    Protocol met on all refills except gabapentin, requires signature from provider per controlled substance policy.

## 2024-08-26 ENCOUNTER — OFFICE VISIT (OUTPATIENT)
Dept: CARDIOLOGY | Facility: CLINIC | Age: 84
End: 2024-08-26
Payer: MEDICARE

## 2024-08-26 VITALS
BODY MASS INDEX: 32.18 KG/M2 | HEART RATE: 73 BPM | WEIGHT: 205 LBS | SYSTOLIC BLOOD PRESSURE: 140 MMHG | HEIGHT: 67 IN | DIASTOLIC BLOOD PRESSURE: 80 MMHG

## 2024-08-26 DIAGNOSIS — I10 PRIMARY HYPERTENSION: ICD-10-CM

## 2024-08-26 DIAGNOSIS — I47.10 SUPRAVENTRICULAR TACHYCARDIA: ICD-10-CM

## 2024-08-26 DIAGNOSIS — I48.19 ATRIAL FIBRILLATION, PERSISTENT: Primary | ICD-10-CM

## 2024-08-26 DIAGNOSIS — I49.3 PVC (PREMATURE VENTRICULAR CONTRACTION): ICD-10-CM

## 2024-08-26 DIAGNOSIS — R00.1 BRADYCARDIA: ICD-10-CM

## 2024-08-26 DIAGNOSIS — E78.5 HYPERLIPIDEMIA, UNSPECIFIED HYPERLIPIDEMIA TYPE: ICD-10-CM

## 2024-08-26 DIAGNOSIS — I49.1 PREMATURE ATRIAL CONTRACTIONS: ICD-10-CM

## 2024-08-26 PROCEDURE — 93000 ELECTROCARDIOGRAM COMPLETE: CPT | Performed by: NURSE PRACTITIONER

## 2024-08-26 PROCEDURE — 99214 OFFICE O/P EST MOD 30 MIN: CPT | Performed by: NURSE PRACTITIONER

## 2024-08-26 PROCEDURE — 1160F RVW MEDS BY RX/DR IN RCRD: CPT | Performed by: NURSE PRACTITIONER

## 2024-08-26 PROCEDURE — 1159F MED LIST DOCD IN RCRD: CPT | Performed by: NURSE PRACTITIONER

## 2024-08-26 PROCEDURE — 3079F DIAST BP 80-89 MM HG: CPT | Performed by: NURSE PRACTITIONER

## 2024-08-26 PROCEDURE — 3077F SYST BP >= 140 MM HG: CPT | Performed by: NURSE PRACTITIONER

## 2024-08-26 NOTE — PROGRESS NOTES
Subjective:     Encounter Date:08/26/2024      Patient ID: Rekha Bear is a 84 y.o. female.    Chief Complaint:follow up HTN  History of Present Illness  This is an 85 y/o female who follows with Dr. Pina and is new to me today. She has a pmhx of GERD, hypertension, hyperlipidemia, diabetes, and intermittent wandering atrial pacemaker.    She is here today for a follow up visit. She has been doing well since last seen with no complaints of chest pain, shortness of breath , palpitations, dizziness or syncope. She denies any swelling in her lower extremities, orthopnea or PND. She has not been monitoring her blood pressure routinely but it appears stable today after stopping hydralazine. Both index fingers have been discolored/purple for about 3 weeks. She says it is not painful and she has good sensation in both. Radial pulses are palpable bilaterally. 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    Prior history:  She began following with Dr. Pina in 2015 when she presented for evaluation of chest pain.  She underwent a PET stress test which was negative for ischemia.  EKG during her stress test noted that she was in atrial fibrillation.  Dr. Pina had the patient come in at that time and an EKG was performed in the office showing a wandering atrial pacemaker with a heart rate in the low 40s.  Her metoprolol succinate was decreased.  24-hour Holter monitor was placed in May 2016 which showed sinus rhythm with rare PACs and PVCs but no atrial arrhythmias including atrial fibrillation.  Follow-up in November 2016 she appeared to be in sinus with rates in the 50s on the lower dose of metoprolol.    At follow-up in 2018 she presented for preoperative evaluation to undergo knee surgery.  She did report some dyspnea on exertion and felt it was likely due to deconditioning and limited mobility related to her knee.  An echocardiogram was performed that  showed normal left ventricular systolic function and wall motion with an EF 66%, normal diastolic function, and no significant valvular disease.  She was cleared for surgery.    She was admitted in March 2020 with COVID-19 pneumonia.  She required intubation and was treated with hydroxychloroquine.  After discharge she went to a skilled nursing facility for rehabilitation.  In May 2020 she presented to the emergency room with complaints of dizziness and weakness and her heart rates were noted to be in the 40s to 50s.  Her metoprolol succinate was stopped.  Around that time she had a ZIO monitor placed which was unremarkable except for rare episodes of nonsustained atrial tachycardia.  When the results were called to her she reported she was feeling better off of the metoprolol.    In 2021 she reported that her blood pressures were elevated.  She also reported worsening dyspnea on exertion that occurred after she contracted COVID-19.  She also reported that she gained weight.  She was started on amlodipine but developed headaches with this so she was switched back to metoprolol succinate with plans to monitor for development of bradycardia.    Dr. Pina saw her in April 2022 at which time she reported worsening dyspnea.  She was noted to have PVCs on her EKG.  She was scheduled for a stress test and echocardiogram.  Before testing was performed, she presented to the hospital following a syncopal episode.  It was felt that her symptoms were due to orthostatic hypotension and volume depletion.  She eventually underwent her stress test and echocardiogram. Her echocardiogram showed normal left ventricular function and wall motion with an EF of 66%, mildly dilated left atrium, mild to moderate mitral and tricuspid valve regurgitation and normal right ventricular systolic pressure. Stress test showed no evidence of ischemia.     She presented back to the hospital in December 2023 with vision changes and weakness.   Apparently she is at the time she was living alone and was a hoarder.  She had been falling frequently due to mechanical reasons.  Apparently she was having difficulty getting around the house because the amount of stuff she had collected.  She was also having issues with memory loss.  She was brought into the hospital after she was found down for 2 days.  Workup revealed a left PCA stroke that was concerning for cardioembolic stroke.  A repeat echocardiogram was performed that showed normal left ventricular function and wall motion with an EF of 60%, normal diastolic function, moderately enlarged left atrium, no significant valvular disease and mildly elevated right ventricular systolic pressure of 38 mmHg. She was also noted to have some issues with bradycardia. Due to high suspicion for atrial fibrillation anticoagulation was recommended and started. It was not felt that the bradycardia was symptomatic but metoprolol was stopped at that point. It was felt that her falls were likely mechanical. Neurology recommended waiting 7 days before starting anticoagulation.     In January 2024 she was seen in follow-up by CT Pritchard.  Bradycardia had improved.  A 14-day Holter monitor was placed that showed rare PACs and a brief episode of atrial tachycardia but no atrial fibrillation.  Dr. Pnia saw the patient back in February and the patient was accompanied by her daughter who is a psychiatrist. The patient was in inpatient rehab for a few weeks before being discharged on 1/11 and since had moved into [facility].  She did report a fall where she injured her hip but no significant injuries.  She otherwise appeared to be doing well from a cardiac standpoint.  Was recommended that she continue anticoagulation.     She was last seen in May 2024 by Dr. Pina.  At that visit she reported that when she checks her blood pressure, she was noted to have elevated heart rates in the 90s to low 100s.  She felt  this had been going on for a couple of months.  Her EKG at that visit showed atrial fibrillation.  She was started back on metoprolol succinate.    I have reviewed and updated as appropriate allergies, current medications, past family history, past medical history, past surgical history and problem list.    Review of Systems   Constitutional: Negative for fever, malaise/fatigue, weight gain and weight loss.   HENT:  Negative for congestion, hoarse voice and sore throat.    Eyes:  Negative for blurred vision and double vision.   Cardiovascular:  Negative for chest pain, dyspnea on exertion, leg swelling, orthopnea, palpitations and syncope.   Respiratory:  Negative for cough, shortness of breath and wheezing.    Skin:  Positive for color change.        Index fingertips are purple   Gastrointestinal:  Negative for abdominal pain, hematemesis, hematochezia and melena.   Genitourinary:  Negative for dysuria and hematuria.   Neurological:  Negative for dizziness, headaches, light-headedness and numbness.   Psychiatric/Behavioral:  Negative for depression. The patient is not nervous/anxious.          Current Outpatient Medications:     atorvastatin (LIPITOR) 40 MG tablet, Take 1 tablet by mouth Every Night., Disp: 30 tablet, Rfl: 1    BD AutoShield Duo 30G X 5 MM misc, 1 each by Other route 4 (Four) Times a Day., Disp: , Rfl:     buPROPion XL (WELLBUTRIN XL) 150 MG 24 hr tablet, TAKE ONE TABLET BY MOUTH EVERY MORNING, Disp: 90 tablet, Rfl: 1    docusate sodium 100 MG capsule, Take 1 capsule by mouth 2 (Two) Times a Day., Disp: 60 capsule, Rfl: 1    gabapentin (NEURONTIN) 400 MG capsule, TAKE ONE TABLET BY MOUTH THREE TIMES DAILY (MORNING, NOON, AND BEDTIME), Disp: 90 capsule, Rfl: 1    glimepiride (AMARYL) 4 MG tablet, Take 1 tablet by mouth Every Morning Before Breakfast., Disp: 30 tablet, Rfl: 1    glucose blood test strip, Use to test blood glucose daily. Formulary Compliance Approval. Diagnosis: Type 2 Diabetes - Not  "Insulin Dependent, Disp: 100 each, Rfl: 12    glucose monitor monitoring kit, Use to test blood glucose daily. Formulary Compliance Approval. Diagnosis: Type 2 Diabetes - Not Insulin Dependent, Disp: 1 each, Rfl: 0    insulin aspart (novoLOG FLEXPEN) 100 UNIT/ML solution pen-injector sc pen, 2-10 units three times a day before each meal per scale, Disp: , Rfl:     Lancets misc, Use to test blood glucose daily. Formulary Compliance Approval. Diagnosis: Type 2 Diabetes - Not Insulin Dependent, Disp: 100 each, Rfl: 12    levothyroxine (SYNTHROID, LEVOTHROID) 88 MCG tablet, Take 1 tablet by mouth Daily., Disp: , Rfl:     linagliptin (Tradjenta) 5 MG tablet tablet, Take 1 tablet by mouth Daily., Disp: 30 tablet, Rfl: 1    metoprolol succinate XL (TOPROL-XL) 25 MG 24 hr tablet, Take 1 tablet by mouth Daily., Disp: 30 tablet, Rfl: 5    oxybutynin XL (DITROPAN-XL) 10 MG 24 hr tablet, TAKE ONE TABLET BY MOUTH DAILY, Disp: 90 tablet, Rfl: 1    pioglitazone (ACTOS) 15 MG tablet, Take 1 tablet by mouth Daily., Disp: 30 tablet, Rfl: 1    polyethylene glycol (MIRALAX) 17 g packet, Take 17 g by mouth Daily., Disp: 30 packet, Rfl: 1    venlafaxine XR (EFFEXOR-XR) 150 MG 24 hr capsule, TAKE ONE TABLET BY MOUTH DAILY, Disp: 90 capsule, Rfl: 1    vitamin B-12 (CYANOCOBALAMIN) 1000 MCG tablet, TAKE ONE TABLET BY MOUTH DAILY, Disp: 90 tablet, Rfl: 1    vitamin D (ERGOCALCIFEROL) 1.25 MG (79855 UT) capsule capsule, Take 1 capsule by mouth Every 7 (Seven) Days. Ergocalciferol 50,000 units weekly x 5 weeks starting Thursday, Jan 18, 2023, then change to cholecalciferol 1,000 units twice a day, Disp: 5 capsule, Rfl: 0    Xarelto 15 MG tablet, TAKE ONE TABLET BY MOUTH IN THE EVENING WITH DINNER, Disp: 90 tablet, Rfl: 1    Past Medical History:   Diagnosis Date    Chilblains     \"Chilblian's\"    CKD (chronic kidney disease)     CVA (cerebral vascular accident)     Depression     Fatty liver     GERD (gastroesophageal reflux disease)     " "Hyperlipidemia     Hypertension     Incontinence     Osteoarthritis     OA  Marvin THR, LT TKR - hydrocodone prescibed by Dr. Almaguer    Pain of esophagus     \" nervous esophagus\" - she takes the occasional alprazolam    Peptic ulcer disease     Pneumonia due to COVID-19 virus 2020    now tested negative    Raynaud's disease     \"Raynauds\"    Sinus bradycardia     Squamous cell carcinoma of neck     Stroke     Vitamin B 12 deficiency     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        Past Surgical History:   Procedure Laterality Date    CATARACT EXTRACTION      CHOLECYSTECTOMY      COLONOSCOPY      COLONOSCOPY N/A 2016    Procedure: COLONOSCOPY with hot snare polypectomy;  Surgeon: George Pabon MD;  Location: [facility] ENDOSCOPY;  Service:     DENTAL PROCEDURE      FOOT SURGERY      TONSILLECTOMY      TOTAL HIP ARTHROPLASTY Bilateral     OA  Marvin THR, LT TKR - hydrocodone prescibed by Dr. Almaguer    TOTAL KNEE ARTHROPLASTY Left     OA  Marvin THR, LT TKR - hydrocodone prescibed by Dr. Almaguer       Family History   Problem Relation Age of Onset    Hypertension Mother     Diabetes type II Mother     Hypertension Father     Coronary artery disease Father     Diabetes type II Father     Colon cancer Brother     Skin cancer Brother     Stroke Son     Breast cancer Maternal Aunt     Hypertension Other     Other Other         Lipids  Thyroid       Social History     Tobacco Use    Smoking status: Former     Current packs/day: 0.00     Average packs/day: 0.5 packs/day for 14.0 years (7.0 ttl pk-yrs)     Types: Cigarettes     Start date:      Quit date:      Years since quittin.6    Smokeless tobacco: Never    Tobacco comments:     CAFFEINE USE   Vaping Use    Vaping status: Never Used   Substance Use Topics    Alcohol use: No    Drug use: No         ECG 12 Lead    Date/Time: 2024 11:31 AM  Performed by: Susannah Blackburn APRN    Authorized by: Susannah Blackburn APRN  Comparison: compared with " previous ECG from 5/23/2024  Rhythm: atrial fibrillation  BPM: 73  Conduction: left anterior fascicular block             Objective:     Visit Vitals  LMP  (LMP Unknown)             Physical Exam  Constitutional:       Appearance: Normal appearance. She is obese.   HENT:      Head: Normocephalic.   Neck:      Vascular: No carotid bruit.   Cardiovascular:      Rate and Rhythm: Normal rate. Rhythm irregularly irregular.      Chest Wall: PMI is not displaced.      Pulses: Normal pulses.           Radial pulses are 2+ on the right side and 2+ on the left side.        Posterior tibial pulses are 2+ on the right side and 2+ on the left side.      Heart sounds: Normal heart sounds. No murmur heard.     No friction rub. No gallop.   Pulmonary:      Effort: Pulmonary effort is normal.      Breath sounds: Normal breath sounds.   Abdominal:      General: Bowel sounds are normal. There is no distension.      Palpations: Abdomen is soft.   Musculoskeletal:      Right lower leg: No edema.      Left lower leg: No edema.   Skin:     General: Skin is warm and dry.      Capillary Refill: Capillary refill takes less than 2 seconds.      Comments: Bilateral index fingertips purple   Neurological:      Mental Status: She is alert and oriented to person, place, and time.   Psychiatric:         Mood and Affect: Mood normal.         Behavior: Behavior normal.         Thought Content: Thought content normal.          Lab Review:   Lipid Panel          12/22/2023    06:47 5/16/2024    13:46   Lipid Panel   Total Cholesterol 146     Total Cholesterol  156.0       Triglycerides 158     HDL Cholesterol 44  57       VLDL Cholesterol 27     LDL Cholesterol  75  70.2       LDL/HDL Ratio 1.60        Details          This result is from an external source.                 Cardiac Procedures:       Assessment:         Diagnoses and all orders for this visit:    1. Atrial fibrillation, persistent (Primary)    2. Hyperlipidemia, unspecified  hyperlipidemia type    3. Bradycardia    4. Premature atrial contractions    5. Primary hypertension    6. Supraventricular tachycardia    7. PVC (premature ventricular contraction)            Plan:       Persistent atrial fibrillation: rates are well controlled. Anticoagulated with Xarelto 15 mg daily. She has a small amount of bright red blood on stool at times, no hematuria. She is working on getting her colonoscopy rescheduled as it was cancelled due to MD retiring. Continue with current meds.  HTN: blood pressure stable off hydralazine. Continue current meds.  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 Follows with neurology  HLD: on statin. Goal LDL < 70  Hypothyroidism  Diabetes  History of bradycardia: stable on current dose of metoprolol  SHEY    Thank you for allowing me to participate in this patient's care. Please call with any questions or concerns. Ms. Bear will follow up with Dr. Pina in 6 months.          Your medication list            Accurate as of August 26, 2024  9:16 AM. If you have any questions, ask your nurse or doctor.                CONTINUE taking these medications        Instructions Last Dose Given Next Dose Due   atorvastatin 40 MG tablet  Commonly known as: LIPITOR      Take 1 tablet by mouth Every Night.       BD AutoShield Duo 30G X 5 MM misc  Generic drug: Insulin Pen Needle      1 each by Other route 4 (Four) Times a Day.       buPROPion  MG 24 hr tablet  Commonly known as: WELLBUTRIN XL      TAKE ONE TABLET BY MOUTH EVERY MORNING       docusate sodium 100 MG capsule      Take 1 capsule by mouth 2 (Two) Times a Day.       gabapentin 400 MG capsule  Commonly known as: NEURONTIN      TAKE ONE TABLET BY MOUTH THREE TIMES DAILY (MORNING, NOON, AND BEDTIME)       glimepiride 4 MG tablet  Commonly known as: AMARYL      Take 1 tablet by mouth Every Morning Before Breakfast.       glucose blood test strip      Use to test blood glucose daily. Formulary Compliance  Approval. Diagnosis: Type 2 Diabetes - Not Insulin Dependent       glucose monitor monitoring kit      Use to test blood glucose daily. Formulary Compliance Approval. Diagnosis: Type 2 Diabetes - Not Insulin Dependent       insulin aspart 100 UNIT/ML solution pen-injector sc pen  Commonly known as: novoLOG FLEXPEN      2-10 units three times a day before each meal per scale       Lancets misc      Use to test blood glucose daily. Formulary Compliance Approval. Diagnosis: Type 2 Diabetes - Not Insulin Dependent       levothyroxine 88 MCG tablet  Commonly known as: SYNTHROID, LEVOTHROID      Take 1 tablet by mouth Daily.       linagliptin 5 MG tablet tablet  Commonly known as: Tradjenta      Take 1 tablet by mouth Daily.       metoprolol succinate XL 25 MG 24 hr tablet  Commonly known as: TOPROL-XL      Take 1 tablet by mouth Daily.       oxybutynin XL 10 MG 24 hr tablet  Commonly known as: DITROPAN-XL      TAKE ONE TABLET BY MOUTH DAILY       pioglitazone 15 MG tablet  Commonly known as: ACTOS      Take 1 tablet by mouth Daily.       polyethylene glycol 17 g packet  Commonly known as: MIRALAX      Take 17 g by mouth Daily.       venlafaxine  MG 24 hr capsule  Commonly known as: EFFEXOR-XR      TAKE ONE TABLET BY MOUTH DAILY       vitamin B-12 1000 MCG tablet  Commonly known as: CYANOCOBALAMIN      TAKE ONE TABLET BY MOUTH DAILY       vitamin D 1.25 MG (30300 UT) capsule capsule  Commonly known as: ERGOCALCIFEROL      Take 1 capsule by mouth Every 7 (Seven) Days. Ergocalciferol 50,000 units weekly x 5 weeks starting Thursday, Jan 18, 2023, then change to cholecalciferol 1,000 units twice a day       Xarelto 15 MG tablet  Generic drug: rivaroxaban      TAKE ONE TABLET BY MOUTH IN THE EVENING WITH DINNER                  CT Dorado  08/26/24  9:16 AM EDT

## 2024-09-18 DIAGNOSIS — M15.8 OTHER OSTEOARTHRITIS INVOLVING MULTIPLE JOINTS: ICD-10-CM

## 2024-09-18 RX ORDER — GABAPENTIN 400 MG/1
CAPSULE ORAL
Qty: 90 CAPSULE | Refills: 1 | Status: SHIPPED | OUTPATIENT
Start: 2024-09-18

## 2024-10-10 ENCOUNTER — TELEPHONE (OUTPATIENT)
Dept: FAMILY MEDICINE CLINIC | Facility: CLINIC | Age: 84
End: 2024-10-10
Payer: MEDICARE

## 2024-10-10 NOTE — TELEPHONE ENCOUNTER
[facility] is needing an order for Sliding Scale Insulin.    Fax #: [phone]

## 2024-10-11 ENCOUNTER — TELEPHONE (OUTPATIENT)
Dept: FAMILY MEDICINE CLINIC | Facility: CLINIC | Age: 84
End: 2024-10-11

## 2024-10-11 ENCOUNTER — OFFICE VISIT (OUTPATIENT)
Dept: FAMILY MEDICINE CLINIC | Facility: CLINIC | Age: 84
End: 2024-10-11
Payer: MEDICARE

## 2024-10-11 VITALS
BODY MASS INDEX: 31.55 KG/M2 | WEIGHT: 201 LBS | DIASTOLIC BLOOD PRESSURE: 80 MMHG | TEMPERATURE: 97.8 F | HEART RATE: 70 BPM | OXYGEN SATURATION: 99 % | HEIGHT: 67 IN | SYSTOLIC BLOOD PRESSURE: 116 MMHG

## 2024-10-11 DIAGNOSIS — E78.5 HYPERLIPIDEMIA, UNSPECIFIED HYPERLIPIDEMIA TYPE: ICD-10-CM

## 2024-10-11 DIAGNOSIS — R41.82 ALTERED MENTAL STATUS, UNSPECIFIED ALTERED MENTAL STATUS TYPE: ICD-10-CM

## 2024-10-11 DIAGNOSIS — I10 PRIMARY HYPERTENSION: ICD-10-CM

## 2024-10-11 DIAGNOSIS — Z76.89 ENCOUNTER TO ESTABLISH CARE: Primary | ICD-10-CM

## 2024-10-11 DIAGNOSIS — E03.8 OTHER SPECIFIED HYPOTHYROIDISM: ICD-10-CM

## 2024-10-11 DIAGNOSIS — E11.22 TYPE 2 DIABETES MELLITUS WITH STAGE 3A CHRONIC KIDNEY DISEASE, WITHOUT LONG-TERM CURRENT USE OF INSULIN: ICD-10-CM

## 2024-10-11 DIAGNOSIS — N18.31 TYPE 2 DIABETES MELLITUS WITH STAGE 3A CHRONIC KIDNEY DISEASE, WITHOUT LONG-TERM CURRENT USE OF INSULIN: ICD-10-CM

## 2024-10-11 PROCEDURE — 1126F AMNT PAIN NOTED NONE PRSNT: CPT | Performed by: INTERNAL MEDICINE

## 2024-10-11 PROCEDURE — 3079F DIAST BP 80-89 MM HG: CPT | Performed by: INTERNAL MEDICINE

## 2024-10-11 PROCEDURE — 3074F SYST BP LT 130 MM HG: CPT | Performed by: INTERNAL MEDICINE

## 2024-10-11 PROCEDURE — 99204 OFFICE O/P NEW MOD 45 MIN: CPT | Performed by: INTERNAL MEDICINE

## 2024-10-11 RX ORDER — HYDRALAZINE HYDROCHLORIDE 10 MG/1
TABLET, FILM COATED ORAL
COMMUNITY
Start: 2024-07-01 | End: 2024-10-11

## 2024-10-11 NOTE — TELEPHONE ENCOUNTER
Caller: DARYL    Relationship: [facility]    Best call back number: [phone]     What orders are you requesting (i.e. lab or imaging): URINALSYS AND C + S     In what timeframe would the patient need to come in: ASAP     Where will you receive your lab/imaging services: [phone] [facility]     Additional notes:

## 2024-10-11 NOTE — PROGRESS NOTES
Chief Complaint   Patient presents with    Blood Sugar Problem   History of Present Illness:  Patient presented to the clinic today, accompanied by her daughter to establish care with a new PCP.  She is off boarding from Dr. Porter.  She has history of HTN, HLD, type II DM, CKD stage III, hypothyroidism, left PCA stroke, anxiety/depression.  She is a current resident of [facility].  As per her daughter, patient has been more confused and noncompliant on medication as reported by the nursing care from the assisted living facility hence her visit today.  Lab review indicate worsening A1c and thyroid function test likely due to medication noncompliance.    No facility-administered medications prior to visit.     Outpatient Medications Prior to Visit   Medication Sig Dispense Refill    atorvastatin (LIPITOR) 40 MG tablet Take 1 tablet by mouth Every Night. 30 tablet 1    BD AutoShield Duo 30G X 5 MM misc 1 each by Other route 4 (Four) Times a Day.      buPROPion XL (WELLBUTRIN XL) 150 MG 24 hr tablet TAKE ONE TABLET BY MOUTH EVERY MORNING 90 tablet 1    docusate sodium 100 MG capsule Take 1 capsule by mouth 2 (Two) Times a Day. 60 capsule 1    gabapentin (NEURONTIN) 400 MG capsule TAKE ONE CAPSULE BY MOUTH THREE TIMES DAILY (MORNING, NOON, AND BEDTIME) 90 capsule 1    glimepiride (AMARYL) 4 MG tablet Take 1 tablet by mouth Every Morning Before Breakfast. 30 tablet 1    glucose blood test strip Use to test blood glucose daily. Formulary Compliance Approval. Diagnosis: Type 2 Diabetes - Not Insulin Dependent 100 each 12    glucose monitor monitoring kit Use to test blood glucose daily. Formulary Compliance Approval. Diagnosis: Type 2 Diabetes - Not Insulin Dependent 1 each 0    insulin aspart (novoLOG FLEXPEN) 100 UNIT/ML solution pen-injector sc pen 2-10 units three times a day before each meal per scale      Lancets misc Use to test blood glucose daily. Formulary Compliance Approval.  Diagnosis: Type 2 Diabetes - Not Insulin Dependent 100 each 12    levothyroxine (SYNTHROID, LEVOTHROID) 88 MCG tablet Take 1 tablet by mouth Daily.      linagliptin (Tradjenta) 5 MG tablet tablet Take 1 tablet by mouth Daily. 30 tablet 1    metoprolol succinate XL (TOPROL-XL) 25 MG 24 hr tablet Take 1 tablet by mouth Daily. 30 tablet 5    oxybutynin XL (DITROPAN-XL) 10 MG 24 hr tablet TAKE ONE TABLET BY MOUTH DAILY 90 tablet 1    pioglitazone (ACTOS) 15 MG tablet Take 1 tablet by mouth Daily. 30 tablet 1    polyethylene glycol (MIRALAX) 17 g packet Take 17 g by mouth Daily. 30 packet 1    venlafaxine XR (EFFEXOR-XR) 150 MG 24 hr capsule TAKE ONE TABLET BY MOUTH DAILY 90 capsule 1    vitamin B-12 (CYANOCOBALAMIN) 1000 MCG tablet TAKE ONE TABLET BY MOUTH DAILY 90 tablet 1    vitamin D (ERGOCALCIFEROL) 1.25 MG (66237 UT) capsule capsule Take 1 capsule by mouth Every 7 (Seven) Days. Ergocalciferol 50,000 units weekly x 5 weeks starting Thursday, Jan 18, 2023, then change to cholecalciferol 1,000 units twice a day 5 capsule 0    Xarelto 15 MG tablet TAKE ONE TABLET BY MOUTH IN THE EVENING WITH DINNER 90 tablet 1    hydrALAZINE (APRESOLINE) 10 MG tablet TAKE ONE TABLET BY MOUTH THREE TIMES DAILY (MORNING, NOON, AND BEDTIME) (Patient not taking: Reported on 10/11/2024)        Allergies   Allergen Reactions    Shellfish-Derived Products Anaphylaxis    Amlodipine Hives and Other (See Comments)     Heart race    Iodine Rash     Past Surgical History:   Procedure Laterality Date    CATARACT EXTRACTION      CHOLECYSTECTOMY      COLONOSCOPY  2009    COLONOSCOPY N/A 12/20/2016    Procedure: COLONOSCOPY with hot snare polypectomy;  Surgeon: George Pabon MD;  Location: [facility] ENDOSCOPY;  Service:     DENTAL PROCEDURE      FOOT SURGERY      TONSILLECTOMY      TOTAL HIP ARTHROPLASTY Bilateral     OA  Marvin THR, LT TKR - hydrocodone prescibed by Dr. Almaguer    TOTAL KNEE ARTHROPLASTY Left     OA  Marvin THR, LT TKR - hydrocodone  "prescibed by Dr. Almaguer     family history includes Breast cancer in her maternal aunt; Colon cancer in her brother; Coronary artery disease in her father; Diabetes type II in her father and mother; Hypertension in her father, mother, and another family member; Other in an other family member; Skin cancer in her brother; Stroke in her son.   reports that she quit smoking about 49 years ago. Her smoking use included cigarettes. She started smoking about 63 years ago. She has a 7 pack-year smoking history. She has never used smokeless tobacco. She reports that she does not drink alcohol and does not use drugs.     /80   Pulse 70   Temp 97.8 °F (36.6 °C) (Temporal)   Ht 170.2 cm (67.01\")   Wt 91.2 kg (201 lb)   LMP  (LMP Unknown)   SpO2 99%   BMI 31.47 kg/m²   Physical Exam  Constitutional:       Appearance: Normal appearance.   HENT:      Head: Normocephalic and atraumatic.   Cardiovascular:      Heart sounds: Normal heart sounds.   Pulmonary:      Breath sounds: Normal breath sounds.   Abdominal:      General: Bowel sounds are normal.      Palpations: Abdomen is soft.   Neurological:      Mental Status: She is alert and oriented to person, place, and time.          The following data was reviewed by: Zahida Osorio MD on 10/11/2024:  Common labs          5/16/2024    13:46 8/20/2024    11:10 10/12/2024    15:09   Common Labs   Glucose   182    BUN   46    Creatinine   1.81    Sodium   133    Potassium   5.0    Chloride   95    Calcium   9.7    Albumin   4.1    Total Bilirubin   0.6    Alkaline Phosphatase   109    AST (SGOT)   23    ALT (SGPT)   18    WBC  7.7     11.37    Hemoglobin  13.2     14.7    Hematocrit  41.1     44.5    Platelets  261     260    Total Cholesterol 156.0     288.0        HDL Cholesterol 57     48        LDL Cholesterol  70.2     200.8           Details          This result is from an external source.             TSH          5/16/2024    13:46 8/20/2024    11:10 10/12/2024    " 15:09   TSH   TSH 3.36     5.79     5.060       Details          This result is from an external source.                    Diagnoses and all orders for this visit:    1. Encounter to establish care (Primary)    2. Primary hypertension  Assessment & Plan:  Hypertension is stable and controlled  Continue current treatment regimen.  Blood pressure will be reassessed in 3 months.      3. Other specified hypothyroidism  Assessment & Plan:  Lab review indicated elevated TSH and normal free T4  Patient has not been compliant on medication  Counseled patient on the importance of medication compliance and to take medication properly on an empty stomach  To continue on the current dose of levothyroxine and repeat labs at next visit          4. Type 2 diabetes mellitus with stage 3a chronic kidney disease, without long-term current use of insulin  Assessment & Plan:  Diabetes is worsening due to medication noncompliance  Counseled patient on the importance of medication compliance for optimal glycemic control  Continue current treatment regimen.  Recommended an ADA diet.  Regular aerobic exercise.  Diabetes will be reassessed in 3 months      5. Hyperlipidemia, unspecified hyperlipidemia type  Assessment & Plan:   Lipid abnormalities are worsening due to medication noncompliance    Plan:  Continue same medication/s without change.    Unseld patient on the importance of medication compliance to control hyperlipidemia  Counseled patient on lifestyle modifications to help control hyperlipidemia.   Cholesterol lowering dietary information shared with patient.  Advised patient to exercise for 150 minutes weekly. (30 minute brisk walk, 5 days a week for example)  Weight Loss encouraged    Patient Treatment Goals:   LDL goal less than 70    Followup in 3 months.      6. Altered mental status, unspecified altered mental status type  -     Urinalysis With Microscopic - Urine, Clean Catch             Return in about 3 months (around  1/11/2025) for Follow up with fasting labs.

## 2024-10-12 ENCOUNTER — APPOINTMENT (OUTPATIENT)
Dept: GENERAL RADIOLOGY | Facility: HOSPITAL | Age: 84
End: 2024-10-12
Payer: MEDICARE

## 2024-10-12 ENCOUNTER — APPOINTMENT (OUTPATIENT)
Dept: CT IMAGING | Facility: HOSPITAL | Age: 84
End: 2024-10-12
Payer: MEDICARE

## 2024-10-12 ENCOUNTER — HOSPITAL ENCOUNTER (OUTPATIENT)
Facility: HOSPITAL | Age: 84
Setting detail: OBSERVATION
Discharge: NURSING, REHAB OR LONG-TERM CARE | End: 2024-10-15
Attending: EMERGENCY MEDICINE | Admitting: STUDENT IN AN ORGANIZED HEALTH CARE EDUCATION/TRAINING PROGRAM
Payer: MEDICARE

## 2024-10-12 DIAGNOSIS — R41.82 ALTERED MENTAL STATUS, UNSPECIFIED ALTERED MENTAL STATUS TYPE: ICD-10-CM

## 2024-10-12 DIAGNOSIS — R79.89 ELEVATED TROPONIN: ICD-10-CM

## 2024-10-12 DIAGNOSIS — N17.9 ACUTE RENAL FAILURE SUPERIMPOSED ON CHRONIC KIDNEY DISEASE, UNSPECIFIED ACUTE RENAL FAILURE TYPE, UNSPECIFIED CKD STAGE: ICD-10-CM

## 2024-10-12 DIAGNOSIS — N39.0 ACUTE UTI: Primary | ICD-10-CM

## 2024-10-12 DIAGNOSIS — N18.9 ACUTE RENAL FAILURE SUPERIMPOSED ON CHRONIC KIDNEY DISEASE, UNSPECIFIED ACUTE RENAL FAILURE TYPE, UNSPECIFIED CKD STAGE: ICD-10-CM

## 2024-10-12 DIAGNOSIS — R79.89 ABNORMAL TSH: ICD-10-CM

## 2024-10-12 LAB
ALBUMIN SERPL-MCNC: 4.1 G/DL (ref 3.5–5.2)
ALBUMIN/GLOB SERPL: 1.4 G/DL
ALP SERPL-CCNC: 109 U/L (ref 39–117)
ALT SERPL W P-5'-P-CCNC: 18 U/L (ref 1–33)
AMPHET+METHAMPHET UR QL: NEGATIVE
ANION GAP SERPL CALCULATED.3IONS-SCNC: 11.4 MMOL/L (ref 5–15)
APTT PPP: 29.8 SECONDS (ref 22.7–35.4)
AST SERPL-CCNC: 23 U/L (ref 1–32)
BACTERIA UR QL AUTO: ABNORMAL /HPF
BARBITURATES UR QL SCN: NEGATIVE
BASOPHILS # BLD AUTO: 0.03 10*3/MM3 (ref 0–0.2)
BASOPHILS NFR BLD AUTO: 0.3 % (ref 0–1.5)
BENZODIAZ UR QL SCN: NEGATIVE
BILIRUB SERPL-MCNC: 0.6 MG/DL (ref 0–1.2)
BILIRUB UR QL STRIP: NEGATIVE
BUN SERPL-MCNC: 46 MG/DL (ref 8–23)
BUN/CREAT SERPL: 25.4 (ref 7–25)
CALCIUM SPEC-SCNC: 9.7 MG/DL (ref 8.6–10.5)
CANNABINOIDS SERPL QL: NEGATIVE
CHLORIDE SERPL-SCNC: 95 MMOL/L (ref 98–107)
CK SERPL-CCNC: 117 U/L (ref 20–180)
CLARITY UR: ABNORMAL
CO2 SERPL-SCNC: 26.6 MMOL/L (ref 22–29)
COCAINE UR QL: NEGATIVE
COLOR UR: YELLOW
CREAT SERPL-MCNC: 1.81 MG/DL (ref 0.57–1)
D-LACTATE SERPL-SCNC: 1.2 MMOL/L (ref 0.5–2)
DEPRECATED RDW RBC AUTO: 45.2 FL (ref 37–54)
EGFRCR SERPLBLD CKD-EPI 2021: 27.7 ML/MIN/1.73
EOSINOPHIL # BLD AUTO: 0.07 10*3/MM3 (ref 0–0.4)
EOSINOPHIL NFR BLD AUTO: 0.6 % (ref 0.3–6.2)
ERYTHROCYTE [DISTWIDTH] IN BLOOD BY AUTOMATED COUNT: 14.3 % (ref 12.3–15.4)
ETHANOL BLD-MCNC: <10 MG/DL (ref 0–10)
ETHANOL UR QL: <0.01 %
FENTANYL UR-MCNC: NEGATIVE NG/ML
GLOBULIN UR ELPH-MCNC: 2.9 GM/DL
GLUCOSE BLDC GLUCOMTR-MCNC: 134 MG/DL (ref 70–130)
GLUCOSE SERPL-MCNC: 182 MG/DL (ref 65–99)
GLUCOSE UR STRIP-MCNC: NEGATIVE MG/DL
HCT VFR BLD AUTO: 44.5 % (ref 34–46.6)
HGB BLD-MCNC: 14.7 G/DL (ref 12–15.9)
HGB UR QL STRIP.AUTO: NEGATIVE
HYALINE CASTS UR QL AUTO: ABNORMAL /LPF
IMM GRANULOCYTES # BLD AUTO: 0.04 10*3/MM3 (ref 0–0.05)
IMM GRANULOCYTES NFR BLD AUTO: 0.4 % (ref 0–0.5)
INR PPP: 1.28 (ref 0.9–1.1)
KETONES UR QL STRIP: NEGATIVE
LEUKOCYTE ESTERASE UR QL STRIP.AUTO: ABNORMAL
LYMPHOCYTES # BLD AUTO: 2.17 10*3/MM3 (ref 0.7–3.1)
LYMPHOCYTES NFR BLD AUTO: 19.1 % (ref 19.6–45.3)
MAGNESIUM SERPL-MCNC: 2.2 MG/DL (ref 1.6–2.4)
MCH RBC QN AUTO: 28.4 PG (ref 26.6–33)
MCHC RBC AUTO-ENTMCNC: 33 G/DL (ref 31.5–35.7)
MCV RBC AUTO: 86.1 FL (ref 79–97)
METHADONE UR QL SCN: NEGATIVE
MONOCYTES # BLD AUTO: 0.98 10*3/MM3 (ref 0.1–0.9)
MONOCYTES NFR BLD AUTO: 8.6 % (ref 5–12)
NEUTROPHILS NFR BLD AUTO: 71 % (ref 42.7–76)
NEUTROPHILS NFR BLD AUTO: 8.08 10*3/MM3 (ref 1.7–7)
NITRITE UR QL STRIP: POSITIVE
NRBC BLD AUTO-RTO: 0 /100 WBC (ref 0–0.2)
NT-PROBNP SERPL-MCNC: 827 PG/ML (ref 0–1800)
OPIATES UR QL: POSITIVE
OXYCODONE UR QL SCN: NEGATIVE
PH UR STRIP.AUTO: 5.5 [PH] (ref 5–8)
PHOSPHATE SERPL-MCNC: 4.6 MG/DL (ref 2.5–4.5)
PLATELET # BLD AUTO: 260 10*3/MM3 (ref 140–450)
PMV BLD AUTO: 8.9 FL (ref 6–12)
POTASSIUM SERPL-SCNC: 5 MMOL/L (ref 3.5–5.2)
PROT SERPL-MCNC: 7 G/DL (ref 6–8.5)
PROT UR QL STRIP: ABNORMAL
PROTHROMBIN TIME: 16.2 SECONDS (ref 11.7–14.2)
RBC # BLD AUTO: 5.17 10*6/MM3 (ref 3.77–5.28)
RBC # UR STRIP: ABNORMAL /HPF
REF LAB TEST METHOD: ABNORMAL
SODIUM SERPL-SCNC: 133 MMOL/L (ref 136–145)
SP GR UR STRIP: 1.02 (ref 1–1.03)
SQUAMOUS #/AREA URNS HPF: ABNORMAL /HPF
T4 FREE SERPL-MCNC: 1.2 NG/DL (ref 0.92–1.68)
TROPONIN T SERPL HS-MCNC: 31 NG/L
TROPONIN T SERPL HS-MCNC: 33 NG/L
TSH SERPL DL<=0.05 MIU/L-ACNC: 5.06 UIU/ML (ref 0.27–4.2)
UROBILINOGEN UR QL STRIP: ABNORMAL
WBC # UR STRIP: ABNORMAL /HPF
WBC NRBC COR # BLD AUTO: 11.37 10*3/MM3 (ref 3.4–10.8)

## 2024-10-12 PROCEDURE — 82570 ASSAY OF URINE CREATININE: CPT

## 2024-10-12 PROCEDURE — G0378 HOSPITAL OBSERVATION PER HR: HCPCS

## 2024-10-12 PROCEDURE — 87086 URINE CULTURE/COLONY COUNT: CPT | Performed by: EMERGENCY MEDICINE

## 2024-10-12 PROCEDURE — 85025 COMPLETE CBC W/AUTO DIFF WBC: CPT | Performed by: EMERGENCY MEDICINE

## 2024-10-12 PROCEDURE — 71045 X-RAY EXAM CHEST 1 VIEW: CPT

## 2024-10-12 PROCEDURE — 80307 DRUG TEST PRSMV CHEM ANLYZR: CPT | Performed by: EMERGENCY MEDICINE

## 2024-10-12 PROCEDURE — 25010000002 CEFTRIAXONE PER 250 MG: Performed by: EMERGENCY MEDICINE

## 2024-10-12 PROCEDURE — 82948 REAGENT STRIP/BLOOD GLUCOSE: CPT

## 2024-10-12 PROCEDURE — 82550 ASSAY OF CK (CPK): CPT | Performed by: EMERGENCY MEDICINE

## 2024-10-12 PROCEDURE — 36415 COLL VENOUS BLD VENIPUNCTURE: CPT

## 2024-10-12 PROCEDURE — 81001 URINALYSIS AUTO W/SCOPE: CPT | Performed by: EMERGENCY MEDICINE

## 2024-10-12 PROCEDURE — 83605 ASSAY OF LACTIC ACID: CPT | Performed by: EMERGENCY MEDICINE

## 2024-10-12 PROCEDURE — 84100 ASSAY OF PHOSPHORUS: CPT | Performed by: EMERGENCY MEDICINE

## 2024-10-12 PROCEDURE — 84443 ASSAY THYROID STIM HORMONE: CPT | Performed by: EMERGENCY MEDICINE

## 2024-10-12 PROCEDURE — 85730 THROMBOPLASTIN TIME PARTIAL: CPT | Performed by: EMERGENCY MEDICINE

## 2024-10-12 PROCEDURE — 96365 THER/PROPH/DIAG IV INF INIT: CPT

## 2024-10-12 PROCEDURE — 84484 ASSAY OF TROPONIN QUANT: CPT | Performed by: EMERGENCY MEDICINE

## 2024-10-12 PROCEDURE — 99291 CRITICAL CARE FIRST HOUR: CPT

## 2024-10-12 PROCEDURE — 85610 PROTHROMBIN TIME: CPT | Performed by: EMERGENCY MEDICINE

## 2024-10-12 PROCEDURE — P9612 CATHETERIZE FOR URINE SPEC: HCPCS

## 2024-10-12 PROCEDURE — 93010 ELECTROCARDIOGRAM REPORT: CPT | Performed by: INTERNAL MEDICINE

## 2024-10-12 PROCEDURE — 82077 ASSAY SPEC XCP UR&BREATH IA: CPT | Performed by: EMERGENCY MEDICINE

## 2024-10-12 PROCEDURE — 84300 ASSAY OF URINE SODIUM: CPT

## 2024-10-12 PROCEDURE — 87186 SC STD MICRODIL/AGAR DIL: CPT | Performed by: EMERGENCY MEDICINE

## 2024-10-12 PROCEDURE — 84156 ASSAY OF PROTEIN URINE: CPT

## 2024-10-12 PROCEDURE — 83880 ASSAY OF NATRIURETIC PEPTIDE: CPT | Performed by: EMERGENCY MEDICINE

## 2024-10-12 PROCEDURE — 87077 CULTURE AEROBIC IDENTIFY: CPT | Performed by: EMERGENCY MEDICINE

## 2024-10-12 PROCEDURE — 70450 CT HEAD/BRAIN W/O DYE: CPT

## 2024-10-12 PROCEDURE — 82436 ASSAY OF URINE CHLORIDE: CPT

## 2024-10-12 PROCEDURE — 83735 ASSAY OF MAGNESIUM: CPT | Performed by: EMERGENCY MEDICINE

## 2024-10-12 PROCEDURE — 80053 COMPREHEN METABOLIC PANEL: CPT | Performed by: EMERGENCY MEDICINE

## 2024-10-12 PROCEDURE — 84439 ASSAY OF FREE THYROXINE: CPT | Performed by: EMERGENCY MEDICINE

## 2024-10-12 PROCEDURE — 93005 ELECTROCARDIOGRAM TRACING: CPT | Performed by: EMERGENCY MEDICINE

## 2024-10-12 RX ORDER — SODIUM CHLORIDE 0.9 % (FLUSH) 0.9 %
10 SYRINGE (ML) INJECTION AS NEEDED
Status: DISCONTINUED | OUTPATIENT
Start: 2024-10-12 | End: 2024-10-15 | Stop reason: HOSPADM

## 2024-10-12 RX ORDER — BUPROPION HYDROCHLORIDE 150 MG/1
150 TABLET ORAL EVERY MORNING
Status: DISCONTINUED | OUTPATIENT
Start: 2024-10-13 | End: 2024-10-15 | Stop reason: HOSPADM

## 2024-10-12 RX ORDER — IBUPROFEN 600 MG/1
1 TABLET ORAL
Status: DISCONTINUED | OUTPATIENT
Start: 2024-10-12 | End: 2024-10-15 | Stop reason: HOSPADM

## 2024-10-12 RX ORDER — BISACODYL 10 MG
10 SUPPOSITORY, RECTAL RECTAL DAILY PRN
Status: DISCONTINUED | OUTPATIENT
Start: 2024-10-12 | End: 2024-10-15 | Stop reason: HOSPADM

## 2024-10-12 RX ORDER — ACETAMINOPHEN 650 MG/1
650 SUPPOSITORY RECTAL EVERY 4 HOURS PRN
Status: DISCONTINUED | OUTPATIENT
Start: 2024-10-12 | End: 2024-10-15 | Stop reason: HOSPADM

## 2024-10-12 RX ORDER — ONDANSETRON 2 MG/ML
4 INJECTION INTRAMUSCULAR; INTRAVENOUS EVERY 6 HOURS PRN
Status: DISCONTINUED | OUTPATIENT
Start: 2024-10-12 | End: 2024-10-15 | Stop reason: HOSPADM

## 2024-10-12 RX ORDER — OXYBUTYNIN CHLORIDE 10 MG/1
10 TABLET, EXTENDED RELEASE ORAL DAILY
Status: DISCONTINUED | OUTPATIENT
Start: 2024-10-13 | End: 2024-10-15 | Stop reason: HOSPADM

## 2024-10-12 RX ORDER — ATORVASTATIN CALCIUM 20 MG/1
40 TABLET, FILM COATED ORAL NIGHTLY
Status: DISCONTINUED | OUTPATIENT
Start: 2024-10-12 | End: 2024-10-15 | Stop reason: HOSPADM

## 2024-10-12 RX ORDER — METOPROLOL SUCCINATE 25 MG/1
25 TABLET, EXTENDED RELEASE ORAL DAILY
Status: DISCONTINUED | OUTPATIENT
Start: 2024-10-13 | End: 2024-10-15 | Stop reason: HOSPADM

## 2024-10-12 RX ORDER — VENLAFAXINE HYDROCHLORIDE 150 MG/1
150 CAPSULE, EXTENDED RELEASE ORAL DAILY
Status: DISCONTINUED | OUTPATIENT
Start: 2024-10-13 | End: 2024-10-15 | Stop reason: HOSPADM

## 2024-10-12 RX ORDER — ACETAMINOPHEN 160 MG/5ML
650 SOLUTION ORAL EVERY 4 HOURS PRN
Status: DISCONTINUED | OUTPATIENT
Start: 2024-10-12 | End: 2024-10-15 | Stop reason: HOSPADM

## 2024-10-12 RX ORDER — DOCUSATE SODIUM 100 MG/1
100 CAPSULE, LIQUID FILLED ORAL 2 TIMES DAILY
Status: DISCONTINUED | OUTPATIENT
Start: 2024-10-12 | End: 2024-10-15 | Stop reason: HOSPADM

## 2024-10-12 RX ORDER — GABAPENTIN 300 MG/1
300 CAPSULE ORAL EVERY 12 HOURS SCHEDULED
Status: DISCONTINUED | OUTPATIENT
Start: 2024-10-12 | End: 2024-10-13

## 2024-10-12 RX ORDER — POLYETHYLENE GLYCOL 3350 17 G/17G
17 POWDER, FOR SOLUTION ORAL DAILY PRN
Status: DISCONTINUED | OUTPATIENT
Start: 2024-10-12 | End: 2024-10-15 | Stop reason: HOSPADM

## 2024-10-12 RX ORDER — NICOTINE POLACRILEX 4 MG
15 LOZENGE BUCCAL
Status: DISCONTINUED | OUTPATIENT
Start: 2024-10-12 | End: 2024-10-15 | Stop reason: HOSPADM

## 2024-10-12 RX ORDER — BISACODYL 5 MG/1
5 TABLET, DELAYED RELEASE ORAL DAILY PRN
Status: DISCONTINUED | OUTPATIENT
Start: 2024-10-12 | End: 2024-10-15 | Stop reason: HOSPADM

## 2024-10-12 RX ORDER — UREA 10 %
1000 LOTION (ML) TOPICAL DAILY
Status: DISCONTINUED | OUTPATIENT
Start: 2024-10-13 | End: 2024-10-15 | Stop reason: HOSPADM

## 2024-10-12 RX ORDER — ACETAMINOPHEN 325 MG/1
650 TABLET ORAL EVERY 4 HOURS PRN
Status: DISCONTINUED | OUTPATIENT
Start: 2024-10-12 | End: 2024-10-15 | Stop reason: HOSPADM

## 2024-10-12 RX ORDER — AMOXICILLIN 250 MG
2 CAPSULE ORAL 2 TIMES DAILY PRN
Status: DISCONTINUED | OUTPATIENT
Start: 2024-10-12 | End: 2024-10-15 | Stop reason: HOSPADM

## 2024-10-12 RX ORDER — LEVOTHYROXINE SODIUM 88 UG/1
88 TABLET ORAL DAILY
Status: DISCONTINUED | OUTPATIENT
Start: 2024-10-13 | End: 2024-10-15 | Stop reason: HOSPADM

## 2024-10-12 RX ORDER — ONDANSETRON 4 MG/1
4 TABLET, ORALLY DISINTEGRATING ORAL EVERY 6 HOURS PRN
Status: DISCONTINUED | OUTPATIENT
Start: 2024-10-12 | End: 2024-10-15 | Stop reason: HOSPADM

## 2024-10-12 RX ORDER — INSULIN LISPRO 100 [IU]/ML
2-7 INJECTION, SOLUTION INTRAVENOUS; SUBCUTANEOUS
Status: DISCONTINUED | OUTPATIENT
Start: 2024-10-13 | End: 2024-10-15 | Stop reason: HOSPADM

## 2024-10-12 RX ORDER — DEXTROSE MONOHYDRATE 25 G/50ML
25 INJECTION, SOLUTION INTRAVENOUS
Status: DISCONTINUED | OUTPATIENT
Start: 2024-10-12 | End: 2024-10-15 | Stop reason: HOSPADM

## 2024-10-12 RX ADMIN — ATORVASTATIN CALCIUM 40 MG: 20 TABLET, FILM COATED ORAL at 22:56

## 2024-10-12 RX ADMIN — DOCUSATE SODIUM 100 MG: 100 CAPSULE, LIQUID FILLED ORAL at 22:56

## 2024-10-12 RX ADMIN — RIVAROXABAN 15 MG: 15 TABLET, FILM COATED ORAL at 22:56

## 2024-10-12 RX ADMIN — CEFTRIAXONE 2000 MG: 2 INJECTION, POWDER, FOR SOLUTION INTRAMUSCULAR; INTRAVENOUS at 18:11

## 2024-10-12 RX ADMIN — GABAPENTIN 300 MG: 300 CAPSULE ORAL at 22:56

## 2024-10-12 NOTE — ASSESSMENT & PLAN NOTE
Lab review indicated elevated TSH and normal free T4  Patient has not been compliant on medication  Counseled patient on the importance of medication compliance and to take medication properly on an empty stomach  To continue on the current dose of levothyroxine and repeat labs at next visit

## 2024-10-12 NOTE — ED NOTES
Nursing report ED to floor  Rekha Bear  84 y.o.  female    HPI :       Chief Complaint  Chief Complaint   Patient presents with    Vomiting    Altered Mental Status       Admitting doctor:   Ronna Sawant MD    Admitting diagnosis:   The primary encounter diagnosis was Acute UTI. Diagnoses of Altered mental status, unspecified altered mental status type, Acute renal failure superimposed on chronic kidney disease, unspecified acute renal failure type, unspecified CKD stage, Elevated troponin, and Abnormal TSH were also pertinent to this visit.    Code status:   Current Code Status       Date Active Code Status Order ID Comments User Context       Prior            Allergies:   Shellfish-derived products, Amlodipine, and Iodine    Isolation:   No active isolations    Intake and Output  No intake or output data in the 24 hours ending 10/12/24 1715    Weight:   There were no vitals filed for this visit.    Most recent vitals:   Vitals:    10/12/24 1400 10/12/24 1405 10/12/24 1527 10/12/24 1532   BP:  126/84  123/92   Pulse: 120   86   Temp:  96.6 °F (35.9 °C)     SpO2: 95%  97%        Active LDAs/IV Access:   Lines, Drains & Airways       Active LDAs       None                    Labs (abnormal labs have a star):   Labs Reviewed   COMPREHENSIVE METABOLIC PANEL - Abnormal; Notable for the following components:       Result Value    Glucose 182 (*)     BUN 46 (*)     Creatinine 1.81 (*)     Sodium 133 (*)     Chloride 95 (*)     BUN/Creatinine Ratio 25.4 (*)     eGFR 27.7 (*)     All other components within normal limits    Narrative:     GFR Normal >60  Chronic Kidney Disease <60  Kidney Failure <15    The GFR formula is only valid for adults with stable renal function between ages 18 and 70.   PROTIME-INR - Abnormal; Notable for the following components:    Protime 16.2 (*)     INR 1.28 (*)     All other components within normal limits   URINALYSIS W/ MICROSCOPIC IF INDICATED (NO CULTURE) - Abnormal;  Notable for the following components:    Appearance, UA Cloudy (*)     Protein, UA Trace (*)     Leuk Esterase, UA Moderate (2+) (*)     Nitrite, UA Positive (*)     All other components within normal limits   SINGLE HS TROPONIN T - Abnormal; Notable for the following components:    HS Troponin T 33 (*)     All other components within normal limits    Narrative:     High Sensitive Troponin T Reference Range:  <14.0 ng/L- Negative Female for AMI  <22.0 ng/L- Negative Male for AMI  >=14 - Abnormal Female indicating possible myocardial injury.  >=22 - Abnormal Male indicating possible myocardial injury.   Clinicians would have to utilize clinical acumen, EKG, Troponin, and serial changes to determine if it is an Acute Myocardial Infarction or myocardial injury due to an underlying chronic condition.        URINE DRUG SCREEN - Abnormal; Notable for the following components:    Opiate Screen Positive (*)     All other components within normal limits    Narrative:     Negative Thresholds Per Drugs Screened:    Amphetamines                 500 ng/ml  Barbiturates                 200 ng/ml  Benzodiazepines              100 ng/ml  Cocaine                      300 ng/ml  Methadone                    300 ng/ml  Opiates                      300 ng/ml  Oxycodone                    100 ng/ml  THC                           50 ng/ml  Fentanyl                       5 ng/ml      The Normal Value for all drugs tested is negative. This report includes final unconfirmed screening results to be used for medical treatment purposes only. Unconfirmed results must not be used for non-medical purposes such as employment or legal testing. Clinical consideration should be applied to any drug of abuse test, particularly when unconfirmed results are used.           PHOSPHORUS - Abnormal; Notable for the following components:    Phosphorus 4.6 (*)     All other components within normal limits   TSH - Abnormal; Notable for the following components:     TSH 5.060 (*)     All other components within normal limits   CBC WITH AUTO DIFFERENTIAL - Abnormal; Notable for the following components:    WBC 11.37 (*)     Lymphocyte % 19.1 (*)     Neutrophils, Absolute 8.08 (*)     Monocytes, Absolute 0.98 (*)     All other components within normal limits   SINGLE HS TROPONIN T - Abnormal; Notable for the following components:    HS Troponin T 31 (*)     All other components within normal limits    Narrative:     High Sensitive Troponin T Reference Range:  <14.0 ng/L- Negative Female for AMI  <22.0 ng/L- Negative Male for AMI  >=14 - Abnormal Female indicating possible myocardial injury.  >=22 - Abnormal Male indicating possible myocardial injury.   Clinicians would have to utilize clinical acumen, EKG, Troponin, and serial changes to determine if it is an Acute Myocardial Infarction or myocardial injury due to an underlying chronic condition.        URINALYSIS, MICROSCOPIC ONLY - Abnormal; Notable for the following components:    WBC, UA Too Numerous to Count (*)     Bacteria, UA 3+ (*)     Squamous Epithelial Cells, UA 13-20 (*)     All other components within normal limits   APTT - Normal   BNP (IN-HOUSE) - Normal    Narrative:     This assay is used as an aid in the diagnosis of individuals suspected of having heart failure. It can be used as an aid in the diagnosis of acute decompensated heart failure (ADHF) in patients presenting with signs and symptoms of ADHF to the emergency department (ED). In addition, NT-proBNP of <300 pg/mL indicates ADHF is not likely.    Age Range Result Interpretation  NT-proBNP Concentration (pg/mL:      <50             Positive            >450                   Gray                 300-450                    Negative             <300    50-75           Positive            >900                  Gray                300-900                  Negative            <300      >75             Positive            >1800                  Wallace                 300-1800                  Negative            <300   LACTIC ACID, PLASMA - Normal   CK - Normal   MAGNESIUM - Normal   T4, FREE - Normal   URINE CULTURE   ETHANOL   CBC AND DIFFERENTIAL    Narrative:     The following orders were created for panel order CBC & Differential.  Procedure                               Abnormality         Status                     ---------                               -----------         ------                     CBC Auto Differential[[id number]]        Abnormal            Final result                 Please view results for these tests on the individual orders.       EKG:   ECG 12 Lead Altered Mental Status   Preliminary Result   HEART RATE=77  bpm   RR Odojemcl=082  ms   DC Interval=  ms   P Horizontal Axis=  deg   P Front Axis=  deg   QRSD Czpkuivj=206  ms   QT Yntkveax=242  ms   DDnO=288  ms   QRS Axis=-57  deg   T Wave Axis=60  deg   - ABNORMAL ECG -   Atrial fibrillation   Left anterior fascicular block   Left ventricular hypertrophy   Date and Time of Study:2024-10-12 15:23:43          Meds given in ED:   Medications   sodium chloride 0.9 % flush 10 mL (has no administration in time range)   cefTRIAXone (ROCEPHIN) 2,000 mg in sodium chloride 0.9 % 100 mL MBP (has no administration in time range)       Imaging results:  CT Head Without Contrast    Result Date: 10/12/2024   1. No acute intracranial process. 2. Encephalomalacia in the left occipital lobe, consistent with old PCA territory infarct.  This report was finalized on 10/12/2024 3:31 PM by Dr. Cesar Ruiz M.D on Workstation: [id number]       Ambulatory status:   - bedrest to slide      Social issues:   Social History     Socioeconomic History    Marital status:     Number of children: 5   Tobacco Use    Smoking status: Former     Current packs/day: 0.00     Average packs/day: 0.5 packs/day for 14.0 years (7.0 ttl pk-yrs)     Types: Cigarettes     Start date: 1961     Quit date: 1975     Years since  quittin.8    Smokeless tobacco: Never    Tobacco comments:     CAFFEINE USE   Vaping Use    Vaping status: Never Used   Substance and Sexual Activity    Alcohol use: No    Drug use: No    Sexual activity: Not Currently       Peripheral Neurovascular       Neuro Cognitive       Learning       Respiratory       Abdominal Pain       Pain Assessments  Pain (Adult)  (0-10) Pain Rating: Rest: 0    NIH Stroke Scale       Vishal Byers RN  10/12/24 17:15 EDT

## 2024-10-12 NOTE — ED NOTES
"Patient arrives via pv from [facility] for c/o vomiting and \"head fuzziness\"  from [facility]  Genoveva at facility reports pt had not been taking daily medications, facility started managing pt meds 2 days ago. Pt hx CVA and DM. Pt uses rolator at baseline. on xarellto   "

## 2024-10-12 NOTE — ED PROVIDER NOTES
EMERGENCY DEPARTMENT ENCOUNTER  Room Number:  14/14  Date of encounter:  10/12/2024  PCP: Zahida Osorio MD  Patient Care Team:  Zahida Osorio MD as PCP - General (Internal Medicine)  Eben Porter MD as PCP - Family Medicine     HPI:  Context: Rekha Bear is a 84 y.o. female who presents to the ED c/o chief complaint of altered mental status.  History supplied by patient and by patient's daughter.  Patient from [facility], assisted living.  Patient has a history of prior stroke, has visual deficit but otherwise no residual deficits.  Patient and family complaining of confusion, coming and going.  Patient reportedly has not been taking her medications.  Patient's only other complaint is some nausea.  Family reports that she has chronic issues with nausea vomiting, did have 1 episode of emesis recently.  Patient denies any abdominal pain, no diarrhea, no chest pain or shortness of breath.  Patient denies any cough or upper respiratory symptoms, no urinary symptoms, no fevers.    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/19/2019    Community acquired pneumonia of left lower lobe of lung 03/21/2020    Hyponatremia 03/21/2020    Pneumonia due to COVID-19 virus 03/24/2020    Major depressive disorder, recurrent, mild 05/14/2021    Acute respiratory failure with hypoxia 05/14/2021    Supraventricular tachycardia 05/14/2021    PVC  (premature ventricular contraction) 04/21/2022    Depressive disorder 02/04/2016    Gastroesophageal reflux disease 02/04/2016    Midline cystocele 02/04/2016    Rectocele 02/04/2016    BMI 29.0-29.9,adult 10/16/2023    Acute left PCA stroke 12/21/2023    B12 deficiency 12/23/2023    Bradycardia 12/23/2023    Premature atrial contractions 12/23/2023    Stroke 12/26/2023    SHEY (obstructive sleep apnea) 02/06/2024    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    Cerebrovascular accident (CVA) due to embolism of left posterior cerebral artery 02/20/2024    Anxiety 02/04/2016    Dysphagia as late effect of stroke 04/16/2024    Atrial fibrillation, persistent 05/23/2024     Resolved Ambulatory Problems     Diagnosis Date Noted    Chest pain 05/18/2016    Cardiac arrhythmia 05/18/2016    Hyperlipemia 05/18/2016    Dehydration 03/21/2020    Dyspnea on exertion 04/21/2022    Dizziness 04/26/2022     Past Medical History:   Diagnosis Date    Chilblains     CKD (chronic kidney disease)     CVA (cerebral vascular accident)     Depression     Fatty liver     GERD (gastroesophageal reflux disease)     Hypertension     Incontinence     Pain of esophagus     Peptic ulcer disease     Raynaud's disease     Sinus bradycardia     Squamous cell carcinoma of neck     Vitamin B 12 deficiency        PAST SURGICAL HISTORY  Past Surgical History:   Procedure Laterality Date    CATARACT EXTRACTION      CHOLECYSTECTOMY      COLONOSCOPY  2009    COLONOSCOPY N/A 12/20/2016    Procedure: COLONOSCOPY with hot snare polypectomy;  Surgeon: George Pabon MD;  Location: [facility] ENDOSCOPY;  Service:     DENTAL PROCEDURE      FOOT SURGERY      TONSILLECTOMY      TOTAL HIP ARTHROPLASTY Bilateral     OA  Marvin THR, LT TKR - hydrocodone prescibed by Dr. Almaguer    TOTAL KNEE ARTHROPLASTY Left     OA  Marvin THR, LT TKR - hydrocodone prescibed by Dr. Almaguer       FAMILY HISTORY  Family History   Problem Relation Age of Onset    Hypertension Mother     Diabetes type  II Mother     Hypertension Father     Coronary artery disease Father     Diabetes type II Father     Colon cancer Brother     Skin cancer Brother     Stroke Son     Breast cancer Maternal Aunt     Hypertension Other     Other Other         Lipids  Thyroid       SOCIAL HISTORY  Social History     Socioeconomic History    Marital status:     Number of children: 5   Tobacco Use    Smoking status: Former     Current packs/day: 0.00     Average packs/day: 0.5 packs/day for 14.0 years (7.0 ttl pk-yrs)     Types: Cigarettes     Start date:      Quit date:      Years since quittin.8    Smokeless tobacco: Never    Tobacco comments:     CAFFEINE USE   Vaping Use    Vaping status: Never Used   Substance and Sexual Activity    Alcohol use: No    Drug use: No    Sexual activity: Not Currently       ALLERGIES  Shellfish-derived products, Amlodipine, and Iodine    The patient's allergies have been reviewed    REVIEW OF SYSTEMS  All systems reviewed and negative except for those discussed in HPI.     PHYSICAL EXAM  I have reviewed the triage vital signs and nursing notes.  ED Triage Vitals   Temp Heart Rate Resp BP SpO2   10/12/24 1405 10/12/24 1400 -- 10/12/24 1405 10/12/24 1400   96.6 °F (35.9 °C) 120  126/84 95 %      Temp src Heart Rate Source Patient Position BP Location FiO2 (%)   -- -- -- -- --              General: No acute distress.  HENT: NCAT, PERRL, Nares patent.  Eyes: no scleral icterus.  Neck: trachea midline, no ROM limitations.  CV: regular rhythm, regular rate.  Respiratory: normal effort, CTAB.  Abdomen: soft, nondistended, NTTP, no rebound tenderness, no guarding or rigidity.  Musculoskeletal: no deformity.  Neuro: Alert and oriented, no facial droop, speech clear, no dysarthria or aphasia, moves all extremities well, sensation intact light touch all extremities, no focal deficits    Skin: warm, dry.    LAB RESULTS  Recent Results (from the past 24 hours)   Comprehensive Metabolic Panel     Collection Time: 10/12/24  3:09 PM    Specimen: Blood   Result Value Ref Range    Glucose 182 (H) 65 - 99 mg/dL    BUN 46 (H) 8 - 23 mg/dL    Creatinine 1.81 (H) 0.57 - 1.00 mg/dL    Sodium 133 (L) 136 - 145 mmol/L    Potassium 5.0 3.5 - 5.2 mmol/L    Chloride 95 (L) 98 - 107 mmol/L    CO2 26.6 22.0 - 29.0 mmol/L    Calcium 9.7 8.6 - 10.5 mg/dL    Total Protein 7.0 6.0 - 8.5 g/dL    Albumin 4.1 3.5 - 5.2 g/dL    ALT (SGPT) 18 1 - 33 U/L    AST (SGOT) 23 1 - 32 U/L    Alkaline Phosphatase 109 39 - 117 U/L    Total Bilirubin 0.6 0.0 - 1.2 mg/dL    Globulin 2.9 gm/dL    A/G Ratio 1.4 g/dL    BUN/Creatinine Ratio 25.4 (H) 7.0 - 25.0    Anion Gap 11.4 5.0 - 15.0 mmol/L    eGFR 27.7 (L) >60.0 mL/min/1.73   Protime-INR    Collection Time: 10/12/24  3:09 PM    Specimen: Blood   Result Value Ref Range    Protime 16.2 (H) 11.7 - 14.2 Seconds    INR 1.28 (H) 0.90 - 1.10   aPTT    Collection Time: 10/12/24  3:09 PM    Specimen: Blood   Result Value Ref Range    PTT 29.8 22.7 - 35.4 seconds   BNP    Collection Time: 10/12/24  3:09 PM    Specimen: Blood   Result Value Ref Range    proBNP 827.0 0.0 - 1,800.0 pg/mL   Single High Sensitivity Troponin T    Collection Time: 10/12/24  3:09 PM    Specimen: Blood   Result Value Ref Range    HS Troponin T 33 (H) <14 ng/L   Lactic Acid, Plasma    Collection Time: 10/12/24  3:09 PM    Specimen: Blood   Result Value Ref Range    Lactate 1.2 0.5 - 2.0 mmol/L   Ethanol    Collection Time: 10/12/24  3:09 PM    Specimen: Blood   Result Value Ref Range    Ethanol <10 0 - 10 mg/dL    Ethanol % <0.010 %   CK    Collection Time: 10/12/24  3:09 PM    Specimen: Blood   Result Value Ref Range    Creatine Kinase 117 20 - 180 U/L   Magnesium    Collection Time: 10/12/24  3:09 PM    Specimen: Blood   Result Value Ref Range    Magnesium 2.2 1.6 - 2.4 mg/dL   Phosphorus    Collection Time: 10/12/24  3:09 PM    Specimen: Blood   Result Value Ref Range    Phosphorus 4.6 (H) 2.5 - 4.5 mg/dL   TSH    Collection  Time: 10/12/24  3:09 PM    Specimen: Blood   Result Value Ref Range    TSH 5.060 (H) 0.270 - 4.200 uIU/mL   CBC Auto Differential    Collection Time: 10/12/24  3:09 PM    Specimen: Blood   Result Value Ref Range    WBC 11.37 (H) 3.40 - 10.80 10*3/mm3    RBC 5.17 3.77 - 5.28 10*6/mm3    Hemoglobin 14.7 12.0 - 15.9 g/dL    Hematocrit 44.5 34.0 - 46.6 %    MCV 86.1 79.0 - 97.0 fL    MCH 28.4 26.6 - 33.0 pg    MCHC 33.0 31.5 - 35.7 g/dL    RDW 14.3 12.3 - 15.4 %    RDW-SD 45.2 37.0 - 54.0 fl    MPV 8.9 6.0 - 12.0 fL    Platelets 260 140 - 450 10*3/mm3    Neutrophil % 71.0 42.7 - 76.0 %    Lymphocyte % 19.1 (L) 19.6 - 45.3 %    Monocyte % 8.6 5.0 - 12.0 %    Eosinophil % 0.6 0.3 - 6.2 %    Basophil % 0.3 0.0 - 1.5 %    Immature Grans % 0.4 0.0 - 0.5 %    Neutrophils, Absolute 8.08 (H) 1.70 - 7.00 10*3/mm3    Lymphocytes, Absolute 2.17 0.70 - 3.10 10*3/mm3    Monocytes, Absolute 0.98 (H) 0.10 - 0.90 10*3/mm3    Eosinophils, Absolute 0.07 0.00 - 0.40 10*3/mm3    Basophils, Absolute 0.03 0.00 - 0.20 10*3/mm3    Immature Grans, Absolute 0.04 0.00 - 0.05 10*3/mm3    nRBC 0.0 0.0 - 0.2 /100 WBC   T4, Free    Collection Time: 10/12/24  3:09 PM    Specimen: Blood   Result Value Ref Range    Free T4 1.20 0.92 - 1.68 ng/dL   ECG 12 Lead Altered Mental Status    Collection Time: 10/12/24  3:23 PM   Result Value Ref Range    QT Interval 390 ms    QTC Interval 442 ms   Urinalysis With Microscopic If Indicated (No Culture) - Urine, Catheter    Collection Time: 10/12/24  4:10 PM    Specimen: Urine, Catheter   Result Value Ref Range    Color, UA Yellow Yellow, Straw    Appearance, UA Cloudy (A) Clear    pH, UA 5.5 5.0 - 8.0    Specific Gravity, UA 1.023 1.005 - 1.030    Glucose, UA Negative Negative    Ketones, UA Negative Negative    Bilirubin, UA Negative Negative    Blood, UA Negative Negative    Protein, UA Trace (A) Negative    Leuk Esterase, UA Moderate (2+) (A) Negative    Nitrite, UA Positive (A) Negative    Urobilinogen, UA 1.0  E.U./dL 0.2 - 1.0 E.U./dL   Urinalysis, Microscopic Only - Urine, Catheter    Collection Time: 10/12/24  4:10 PM    Specimen: Urine, Catheter   Result Value Ref Range    RBC, UA 0-2 None Seen, 0-2 /HPF    WBC, UA Too Numerous to Count (A) None Seen, 0-2 /HPF    Bacteria, UA 3+ (A) None Seen /HPF    Squamous Epithelial Cells, UA 13-20 (A) None Seen, 0-2 /HPF    Hyaline Casts, UA 7-12 None Seen /LPF    Methodology Automated Microscopy    Urine Drug Screen - Urine, Clean Catch    Collection Time: 10/12/24  4:11 PM    Specimen: Urine, Clean Catch   Result Value Ref Range    Amphet/Methamphet, Screen Negative Negative    Barbiturates Screen, Urine Negative Negative    Benzodiazepine Screen, Urine Negative Negative    Cocaine Screen, Urine Negative Negative    Opiate Screen Positive (A) Negative    THC, Screen, Urine Negative Negative    Methadone Screen, Urine Negative Negative    Oxycodone Screen, Urine Negative Negative    Fentanyl, Urine Negative Negative   Single High Sensitivity Troponin T    Collection Time: 10/12/24  4:13 PM    Specimen: Blood   Result Value Ref Range    HS Troponin T 31 (H) <14 ng/L       I ordered the above labs and reviewed the results.    RADIOLOGY  CT Head Without Contrast    Result Date: 10/12/2024  CT HEAD WO CONTRAST-  INDICATION: Confusion and vomiting  COMPARISON: CT head December 21, 2023  TECHNIQUE: CT head without IV contrast. Coronal and sagittal reformats. Radiation dose reduction techniques were utilized, including automated exposure control and exposure modulation based on body size.  FINDINGS:  Brain: Hypoattenuation and volume loss within the left occipital lobe, consistent with encephalomalacia from prior PCA territory infarct. No intraparenchymal hemorrhage. Chronic small vessel ischemic changes. No mass effect or midline shift.  Ventricles: Ventriculomegaly, suspect ex vacuo dilatation from central volume loss. No intraventricular hemorrhage.  Extra-axial spaces: No  extra-axial hemorrhage or fluid collection.  Orbits: Cataract extractions.  Osseous structures: Hyperostosis frontalis interna. No fracture.  Sinuses: Patent mastoid air cells and middle ears. Patent paranasal sinuses.       1. No acute intracranial process. 2. Encephalomalacia in the left occipital lobe, consistent with old PCA territory infarct.  This report was finalized on 10/12/2024 3:31 PM by Dr. Cesar Ruiz M.D on Workstation: [id number]      XR Chest 1 View    Result Date: 10/12/2024  XR CHEST 1 VW-  Clinical: Vomiting, altered mental status  COMPARISON examination 4/2/2020  FINDINGS: Cardiac enlargement is again demonstrated. No vascular congestion. Lungs are clear.  CONCLUSION: Cardiomegaly.  This report was finalized on 10/12/2024 3:06 PM by Dr. Dino Page M.D on Workstation: [id number]       I ordered the above noted radiological studies. I reviewed the images and results. I agree with the radiologist interpretation.    PROCEDURES  Procedures    MEDICATIONS GIVEN IN ER  Medications   sodium chloride 0.9 % flush 10 mL (has no administration in time range)   cefTRIAXone (ROCEPHIN) 2,000 mg in sodium chloride 0.9 % 100 mL MBP (has no administration in time range)       PROGRESS, DATA ANALYSIS, CONSULTS, AND MEDICAL DECISION MAKING  A complete history and physical exam have been performed.  All available laboratory and imaging results have been reviewed by myself prior to disposition.    MDM    After the initial H&P, I discussed pertinent information from history and physical exam with patient/family.  Discussed differential diagnosis.  Discussed plan for ED evaluation/workup/treatment.  All questions answered.  Patient/family is agreeable with plan.  ED Course as of 10/12/24 1707   Sat Oct 12, 2024   1440 First Look:  83 yo F with h/o CVA, HTN, HLD, DM (on xarelto) who resides at assisted living and had been administering all of her own medications to herself up until about 1 week ago.  Patient  "has had some mental \"fuzziness\" and off-and-on episodes of nausea and vomiting as well as dysuria for a few weeks now.  Patient's daughter at bedside remarks that other residents who are friends with her mother have commented on her confusion.  The facility staff noted the patient had not been taking her medicine for about 1 week and took over administration of her medications within the last 24 to 48 hours only.  On exam, patient is cooperative and conversant although forgetful and defers to daughter to answer some questions.  Her lungs are clear to auscultation with no rhonchi no wheezing and her heart sounds with normal S1 and S2.  Patient's abdomen is soft and nontender but obese.  Patient's face is symmetric and her speech clear and fluent.  Patient does use a rollator walker so gait was not tested.  Plan for a broad evaluation for altered mental status with changes from patient's baseline.  Serum labs, EKG, chest x-ray, and CT head also ordered.  Patient is on the cardiac monitor.  Results in patient care to be assumed by oncoming provider. [AR]   145 My differential diagnosis for altered mental status includes but is not limited to:  Hypoglycemia, hyperglycemia, DKA, overdose, ethanol intoxication, thiamine deficiency, niacin deficiency, hypothymia, hyperviscosity, Remy's disease, hyponatremia, hypernatremia, liver failure, kidney failure, hyper or hypothyroid, no insufficiency, hypoxia, hypercarbia, carbon monoxide poisoning, postanoxic encephalopathy, ischemic stroke, intracranial bleed, subarachnoid hemorrhage, brain tumor, closed head injury, epidural hematoma, epidural hematoma, seizure activity, postictal state, syncopal episode, disseminated encephalomyelitis, central pontine myelinolysis, post cardiac arrest, bacterial meningitis, viral meningitis, fungal meningitis, encephalitis, brain abscess, subdural empyema, hysteria, catatonic state, malingering, hypertensive encephalopathy, vasculitis, TTP, " DIC     [JG]   1514 Reviewed chest x-ray in PACS, no pulmonary infiltrates per my read. [JG]   1536 EKG independently viewed and contemporaneously interpreted by ED physician. Time: 3:23 PM.  Rate 77.  Interpretation: Atrial fibrillation, left axis deviation, left anterior fascicular block, poor R wave progression, no acute ST changes. [JG]   1537 When compared with prior EKG on 12/23/2023, patient was in sinus rhythm at that time, extremely bradycardic.  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 [JG]   1540 Review of prior external notes (non-ED) -and- Review of prior external test results outside of this encounter:   I reviewed patient's cardiology visit note from 8/26/2024.  Patient has a history of left PCA stroke, concern for cardioembolic, initially no atrial fibrillation was seen but due to high suspicion patient was anticoagulated, when patient was seen in May by Dr. Pina EKG at that time showed atrial fibrillation.  Patient had previously had metoprolol stopped, it was restarted.  I reviewed patient's MRI brain from 12/12/2023, acute infarct left occipital lobe. [JG]   1548 I reviewed CT head imaging in PACS, no intracranial hemorrhage per my read. [JG]   1647 Patient reassessed.  Discussed ED findings, differential diagnosis, and the need for admission for evaluation/treatment.  They are agreeable to admission and all questions were answered.     [JG]   1706 Phone call with Dr. Sawant SANDRINE.  Discussed the patient, relevant history, exam, diagnostics, ED findings/progress, and concerns. They agree to admit the patient to telemetry observation. Care assumed by the admitting physician at this time.     [JG]      ED Course User Index  [AR] Anay Pierre MD  [JG] Roque Chong MD       AS OF 17:07 EDT VITALS:    BP - 123/92  HR - 86  TEMP - 96.6 °F (35.9 °C)  O2 SATS - 97%    DIAGNOSIS  Final diagnoses:   Acute UTI   Altered mental status, unspecified altered mental status type   Acute  renal failure superimposed on chronic kidney disease, unspecified acute renal failure type, unspecified CKD stage   Elevated troponin   Abnormal TSH     Critical care:  Total critical care time of 30 minutes is exclusive of any other billable procedures and includes time spent with direct patient care and observation, retrospective chart review, management of acute condition, and consultation with other physicians.      DISPOSITION  ADMISSION    Discussed treatment plan and reason for admission with pt/family and admitting physician.  Pt/family voiced understanding of the plan for admission for further testing/treatment as needed.        Roque Chong MD  10/12/24 [id number]

## 2024-10-12 NOTE — ASSESSMENT & PLAN NOTE
Lipid abnormalities are worsening due to medication noncompliance    Plan:  Continue same medication/s without change.    Unseld patient on the importance of medication compliance to control hyperlipidemia  Counseled patient on lifestyle modifications to help control hyperlipidemia.   Cholesterol lowering dietary information shared with patient.  Advised patient to exercise for 150 minutes weekly. (30 minute brisk walk, 5 days a week for example)  Weight Loss encouraged    Patient Treatment Goals:   LDL goal less than 70    Followup in 3 months.

## 2024-10-13 ENCOUNTER — APPOINTMENT (OUTPATIENT)
Dept: CT IMAGING | Facility: HOSPITAL | Age: 84
End: 2024-10-13
Payer: MEDICARE

## 2024-10-13 LAB
ANION GAP SERPL CALCULATED.3IONS-SCNC: 9 MMOL/L (ref 5–15)
BUN SERPL-MCNC: 41 MG/DL (ref 8–23)
BUN/CREAT SERPL: 25.6 (ref 7–25)
CALCIUM SPEC-SCNC: 9.2 MG/DL (ref 8.6–10.5)
CHLORIDE SERPL-SCNC: 97 MMOL/L (ref 98–107)
CHLORIDE UR-SCNC: <60 MMOL/L
CO2 SERPL-SCNC: 27 MMOL/L (ref 22–29)
CREAT SERPL-MCNC: 1.6 MG/DL (ref 0.57–1)
CREAT UR-MCNC: 144.3 MG/DL
DEPRECATED RDW RBC AUTO: 48.4 FL (ref 37–54)
EGFRCR SERPLBLD CKD-EPI 2021: 32.1 ML/MIN/1.73
ERYTHROCYTE [DISTWIDTH] IN BLOOD BY AUTOMATED COUNT: 14.7 % (ref 12.3–15.4)
GLUCOSE BLDC GLUCOMTR-MCNC: 155 MG/DL (ref 70–130)
GLUCOSE BLDC GLUCOMTR-MCNC: 177 MG/DL (ref 70–130)
GLUCOSE BLDC GLUCOMTR-MCNC: 196 MG/DL (ref 70–130)
GLUCOSE BLDC GLUCOMTR-MCNC: 243 MG/DL (ref 70–130)
GLUCOSE SERPL-MCNC: 168 MG/DL (ref 65–99)
HCT VFR BLD AUTO: 41.9 % (ref 34–46.6)
HGB BLD-MCNC: 14.3 G/DL (ref 12–15.9)
MCH RBC QN AUTO: 30.4 PG (ref 26.6–33)
MCHC RBC AUTO-ENTMCNC: 34.1 G/DL (ref 31.5–35.7)
MCV RBC AUTO: 89 FL (ref 79–97)
PLATELET # BLD AUTO: 226 10*3/MM3 (ref 140–450)
PMV BLD AUTO: 9.1 FL (ref 6–12)
POTASSIUM SERPL-SCNC: 4.6 MMOL/L (ref 3.5–5.2)
PROCALCITONIN SERPL-MCNC: 0.08 NG/ML (ref 0–0.25)
PROT ?TM UR-MCNC: 14.5 MG/DL
PROT/CREAT UR: 100.5 MG/G CREA (ref 0–200)
QT INTERVAL: 390 MS
QTC INTERVAL: 442 MS
RBC # BLD AUTO: 4.71 10*6/MM3 (ref 3.77–5.28)
SODIUM SERPL-SCNC: 133 MMOL/L (ref 136–145)
SODIUM UR-SCNC: 23 MMOL/L
URATE SERPL-MCNC: 8.5 MG/DL (ref 2.4–5.7)
VIT B12 BLD-MCNC: >2000 PG/ML (ref 211–946)
WBC NRBC COR # BLD AUTO: 10.28 10*3/MM3 (ref 3.4–10.8)

## 2024-10-13 PROCEDURE — 84550 ASSAY OF BLOOD/URIC ACID: CPT

## 2024-10-13 PROCEDURE — 84145 PROCALCITONIN (PCT): CPT | Performed by: HOSPITALIST

## 2024-10-13 PROCEDURE — G0378 HOSPITAL OBSERVATION PER HR: HCPCS

## 2024-10-13 PROCEDURE — 51798 US URINE CAPACITY MEASURE: CPT

## 2024-10-13 PROCEDURE — 74176 CT ABD & PELVIS W/O CONTRAST: CPT

## 2024-10-13 PROCEDURE — 82607 VITAMIN B-12: CPT | Performed by: HOSPITALIST

## 2024-10-13 PROCEDURE — 36415 COLL VENOUS BLD VENIPUNCTURE: CPT | Performed by: INTERNAL MEDICINE

## 2024-10-13 PROCEDURE — 97530 THERAPEUTIC ACTIVITIES: CPT

## 2024-10-13 PROCEDURE — 85014 HEMATOCRIT: CPT | Performed by: HOSPITALIST

## 2024-10-13 PROCEDURE — 63710000001 INSULIN LISPRO (HUMAN) PER 5 UNITS: Performed by: INTERNAL MEDICINE

## 2024-10-13 PROCEDURE — 87040 BLOOD CULTURE FOR BACTERIA: CPT | Performed by: INTERNAL MEDICINE

## 2024-10-13 PROCEDURE — 80048 BASIC METABOLIC PNL TOTAL CA: CPT | Performed by: INTERNAL MEDICINE

## 2024-10-13 PROCEDURE — 25010000002 CEFTRIAXONE PER 250 MG: Performed by: INTERNAL MEDICINE

## 2024-10-13 PROCEDURE — 97162 PT EVAL MOD COMPLEX 30 MIN: CPT

## 2024-10-13 PROCEDURE — 85027 COMPLETE CBC AUTOMATED: CPT | Performed by: INTERNAL MEDICINE

## 2024-10-13 PROCEDURE — 82948 REAGENT STRIP/BLOOD GLUCOSE: CPT

## 2024-10-13 PROCEDURE — 82747 ASSAY OF FOLIC ACID RBC: CPT | Performed by: HOSPITALIST

## 2024-10-13 RX ORDER — GABAPENTIN 100 MG/1
100 CAPSULE ORAL EVERY 12 HOURS SCHEDULED
Status: DISCONTINUED | OUTPATIENT
Start: 2024-10-13 | End: 2024-10-15 | Stop reason: HOSPADM

## 2024-10-13 RX ADMIN — GABAPENTIN 300 MG: 300 CAPSULE ORAL at 08:11

## 2024-10-13 RX ADMIN — Medication 1000 MCG: at 08:10

## 2024-10-13 RX ADMIN — DOCUSATE SODIUM 100 MG: 100 CAPSULE, LIQUID FILLED ORAL at 08:11

## 2024-10-13 RX ADMIN — RIVAROXABAN 15 MG: 15 TABLET, FILM COATED ORAL at 17:28

## 2024-10-13 RX ADMIN — ATORVASTATIN CALCIUM 40 MG: 20 TABLET, FILM COATED ORAL at 22:24

## 2024-10-13 RX ADMIN — LEVOTHYROXINE SODIUM 88 MCG: 88 TABLET ORAL at 08:11

## 2024-10-13 RX ADMIN — VENLAFAXINE HYDROCHLORIDE 150 MG: 150 CAPSULE, EXTENDED RELEASE ORAL at 08:11

## 2024-10-13 RX ADMIN — BUPROPION HYDROCHLORIDE 150 MG: 150 TABLET, EXTENDED RELEASE ORAL at 08:23

## 2024-10-13 RX ADMIN — INSULIN LISPRO 2 UNITS: 100 INJECTION, SOLUTION INTRAVENOUS; SUBCUTANEOUS at 08:11

## 2024-10-13 RX ADMIN — OXYBUTYNIN CHLORIDE 10 MG: 10 TABLET, EXTENDED RELEASE ORAL at 08:11

## 2024-10-13 RX ADMIN — GABAPENTIN 100 MG: 100 CAPSULE ORAL at 22:24

## 2024-10-13 RX ADMIN — INSULIN LISPRO 4 UNITS: 100 INJECTION, SOLUTION INTRAVENOUS; SUBCUTANEOUS at 17:28

## 2024-10-13 RX ADMIN — Medication 10 ML: at 08:12

## 2024-10-13 RX ADMIN — METOPROLOL SUCCINATE 25 MG: 25 TABLET, EXTENDED RELEASE ORAL at 08:11

## 2024-10-13 RX ADMIN — DOCUSATE SODIUM 100 MG: 100 CAPSULE, LIQUID FILLED ORAL at 22:24

## 2024-10-13 RX ADMIN — INSULIN LISPRO 2 UNITS: 100 INJECTION, SOLUTION INTRAVENOUS; SUBCUTANEOUS at 11:53

## 2024-10-13 RX ADMIN — CEFTRIAXONE 2000 MG: 2 INJECTION, POWDER, FOR SOLUTION INTRAMUSCULAR; INTRAVENOUS at 17:29

## 2024-10-13 NOTE — PLAN OF CARE
Problem: Adult Inpatient Plan of Care  Goal: Plan of Care Review  Outcome: Progressing  Flowsheets (Taken 10/13/2024 0308)  Plan of Care Reviewed With: patient     Problem: Adult Inpatient Plan of Care  Goal: Absence of Hospital-Acquired Illness or Injury  Outcome: Progressing  Intervention: Identify and Manage Fall Risk  Recent Flowsheet Documentation  Taken 10/13/2024 0200 by Santiago Rae RN  Safety Promotion/Fall Prevention: safety round/check completed  Taken 10/13/2024 0000 by Santiago Rae RN  Safety Promotion/Fall Prevention: safety round/check completed  Taken 10/12/2024 2200 by Santiago Rae RN  Safety Promotion/Fall Prevention: safety round/check completed  Taken 10/12/2024 2000 by Santiago Rae RN  Safety Promotion/Fall Prevention: safety round/check completed     Problem: Adult Inpatient Plan of Care  Goal: Readiness for Transition of Care  Outcome: Progressing  Intervention: Mutually Develop Transition Plan  Recent Flowsheet Documentation  Taken 10/12/2024 2139 by Santiago Rae RN  Equipment Currently Used at Home: walker, rhoda  Taken 10/12/2024 2136 by Santiago Rae RN  Transportation Anticipated: family or friend will provide  Patient/Family Anticipated Services at Transition:   skilled nursing   rehabilitation services  Patient/Family Anticipates Transition to: long-term care facility     Problem: Fall Injury Risk  Goal: Absence of Fall and Fall-Related Injury  Outcome: Progressing  Intervention: Promote Injury-Free Environment  Recent Flowsheet Documentation  Taken 10/13/2024 0200 by Santiago Rae RN  Safety Promotion/Fall Prevention: safety round/check completed  Taken 10/13/2024 0000 by Santiago Rae RN  Safety Promotion/Fall Prevention: safety round/check completed  Taken 10/12/2024 2200 by Santiago Rae RN  Safety Promotion/Fall Prevention: safety round/check completed  Taken 10/12/2024 2000 by Santiago Rae RN  Safety Promotion/Fall Prevention: safety round/check  completed   Goal Outcome Evaluation:  Plan of Care Reviewed With: patient   Patient educated on treatment and goals of care, including, but not limited to, fall prevention and pressure injury prevention. Discussed details of hospitalization and plan. All questions answered.

## 2024-10-13 NOTE — PROGRESS NOTES
[facility]     LOS: 0 days     Subjective:    CC:Vomiting and Altered Mental Status    DIET:  Diet Order   Procedures    Diet: Cardiac; Healthy Heart (2-3 Na+); Fluid Consistency: Thin (IDDSI 0)       Objective:    Vital Signs:  Temp:  [96.6 °F (35.9 °C)-97.9 °F (36.6 °C)] 97.9 °F (36.6 °C)  Heart Rate:  [] 85  Resp:  [16-18] 18  BP: (123-143)/(80-92) 130/80    SpO2:  [95 %-98 %] 98 %  on   ;   Device (Oxygen Therapy): room air  Body mass index is 31.59 kg/m².    Physical Exam  HENT:      Head: Normocephalic and atraumatic.   Cardiovascular:      Heart sounds: No murmur heard.     No friction rub.   Pulmonary:      Effort: Pulmonary effort is normal.      Breath sounds: Normal breath sounds.   Abdominal:      General: Bowel sounds are normal. There is no distension.      Palpations: Abdomen is soft.      Tenderness: There is no abdominal tenderness.   Skin:     General: Skin is warm and dry.   Neurological:      General: No focal deficit present.      Mental Status: She is disoriented.      Comments: Mild disorientation   Psychiatric:         Mood and Affect: Mood normal.         Behavior: Behavior normal.         Results Review:    Glucose   Date Value Ref Range Status   10/13/2024 168 (H) 65 - 99 mg/dL Final   10/12/2024 182 (H) 65 - 99 mg/dL Final     Results from last 7 days   Lab Units 10/13/24  0456   WBC 10*3/mm3 10.28   HEMOGLOBIN g/dL 14.3   HEMATOCRIT % 41.9   PLATELETS 10*3/mm3 226     Results from last 7 days   Lab Units 10/13/24  0456 10/12/24  1509   SODIUM mmol/L 133* 133*   POTASSIUM mmol/L 4.6 5.0   CHLORIDE mmol/L 97* 95*   CO2 mmol/L 27.0 26.6   BUN mg/dL 41* 46*   CREATININE mg/dL 1.60* 1.81*   CALCIUM mg/dL 9.2 9.7   BILIRUBIN mg/dL  --  0.6   ALK PHOS U/L  --  109   ALT (SGPT) U/L  --  18   AST (SGOT) U/L  --  23   GLUCOSE mg/dL 168* 182*     Results from last 7 days   Lab Units 10/12/24  1509   INR  1.28*   APTT seconds 29.8     Results from last 7 days    Lab Units 10/12/24  1509   MAGNESIUM mg/dL 2.2     Results from last 7 days   Lab Units 10/12/24  1613 10/12/24  1509   CK TOTAL U/L  --  117   HSTROP T ng/L 31* 33*     Cultures:  Urine Culture   Date Value Ref Range Status   10/12/2024 >100,000 CFU/mL Gram Negative Bacilli (A)  Preliminary       I have reviewed daily medications and changes in CPOE    Scheduled meds  atorvastatin, 40 mg, Oral, Nightly  buPROPion XL, 150 mg, Oral, QAM  cefTRIAXone, 2,000 mg, Intravenous, Q24H  docusate sodium, 100 mg, Oral, BID  gabapentin, 300 mg, Oral, Q12H  insulin lispro, 2-7 Units, Subcutaneous, 4x Daily AC & at Bedtime  levothyroxine, 88 mcg, Oral, Daily  metoprolol succinate XL, 25 mg, Oral, Daily  oxybutynin XL, 10 mg, Oral, Daily  rivaroxaban, 15 mg, Oral, Daily With Dinner  venlafaxine XR, 150 mg, Oral, Daily  vitamin B-12, 1,000 mcg, Oral, Daily           PRN meds    acetaminophen **OR** acetaminophen **OR** acetaminophen    senna-docusate sodium **AND** polyethylene glycol **AND** bisacodyl **AND** bisacodyl    dextrose    dextrose    glucagon (human recombinant)    melatonin    ondansetron ODT **OR** ondansetron    [COMPLETED] Insert Peripheral IV **AND** sodium chloride        Acute UTI        Assessment/Plan:    UTI  -rocephin  -ct of the abdomen and pelvis    Nausea  -tolerating po    Metabolic encephalopathy  -mental status seems to be better  -still a slightly confusion    Ckd3  -creatinine 1.6  -nephrology consult    Hypertension  -Bp si good  -metoprolol    Diabetes  Hypothyroidism  hyperlipidemia     Hyponatremia  -stable    Mental status seems to be improved    Addendum: CT scan shows no hydronephrosis, bladder poorly evaluated due to streak artifact, mild interstitial and groundglass opacities in the lung      George Gleason MD  10/13/24  10:34 EDT

## 2024-10-13 NOTE — THERAPY EVALUATION
"Patient Name: Rekha Bear  : 1940    MRN: [id number]                              Today's Date: 10/13/2024       Admit Date: 10/12/2024    Visit Dx:     ICD-10-CM ICD-9-CM   1. Acute UTI  N39.0 599.0   2. Altered mental status, unspecified altered mental status type  R41.82 780.97   3. Acute renal failure superimposed on chronic kidney disease, unspecified acute renal failure type, unspecified CKD stage  N17.9 584.9    N18.9 585.9   4. Elevated troponin  R79.89 790.6   5. 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    Community acquired pneumonia of left lower lobe of lung    Hyponatremia    Pneumonia due to COVID-19 virus    Major depressive disorder, recurrent, mild    Acute respiratory failure with hypoxia    Supraventricular tachycardia    PVC (premature ventricular contraction)    Depressive disorder    Gastroesophageal reflux disease    Midline cystocele    Rectocele    BMI 29.0-29.9,adult    Acute left PCA stroke    B12 deficiency    Bradycardia    Premature atrial contractions    Stroke    SHEY (obstructive sleep apnea)    Circadian rhythm disorder    Cerebrovascular accident (CVA) due to embolism of left posterior cerebral artery    Anxiety    Dysphagia as late effect of stroke    Atrial fibrillation, persistent    Acute UTI     Past Medical History:   Diagnosis Date    Chilblains     \"Chilblian's\"    CKD (chronic kidney disease)     CVA (cerebral vascular accident)     Depression     Fatty liver     GERD (gastroesophageal reflux disease)     Hyperlipidemia     Hypertension     Incontinence     " "Osteoarthritis     OA  Marvin THR, LT TKR - hydrocodone prescibed by Dr. 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 Almaguer    TOTAL KNEE ARTHROPLASTY Left     OA  Marvin THR, LT TKR - hydrocodone prescibed by Dr. Almaguer      General Information       Row Name 10/13/24 1110          Physical Therapy Time and Intention    Document Type evaluation  -ER     Mode of Treatment individual therapy;physical therapy  -ER       Row Name 10/13/24 1110          General Information    Patient Profile Reviewed yes  -ER     Prior Level of Function --  from [facility]  -ER     Existing Precautions/Restrictions other (see comments)  hx of CVA (reports R sided weakness), loss of visual field following CVA (Dec 2023)  -ER     Barriers to Rehab medically complex;previous functional deficit;visual deficit;cognitive status  trouble word finding at times  -ER       Row Name 10/13/24 1110          Living Environment    People in Home facility resident  -ER       Row Name 10/13/24 1110          Home Main Entrance    Number of Stairs, Main Entrance none  -ER       Row Name 10/13/24 1110          Stairs Within Home, Primary    Number of Stairs, Within Home, Primary none  -ER       Row Name 10/13/24 1110          " Cognition    Orientation Status (Cognition) oriented x 3  -ER       Row Name 10/13/24 1110          Safety Issues/Impairments Affecting Functional Mobility    Safety Issues Affecting Function (Mobility) positioning of assistive device  -ER     Impairments Affecting Function (Mobility) endurance/activity tolerance;strength  -ER               User Key  (r) = Recorded By, (t) = Taken By, (c) = Cosigned By      Initials Name Provider Type    ER Arianna Bello, PAULA Physical Therapist                   Mobility       Row Name 10/13/24 1112          Bed Mobility    Comment, (Bed Mobility) UIC at arrival  -ER       Row Name 10/13/24 1112          Sit-Stand Transfer    Sit-Stand [place] (Transfers) contact guard  -ER     Assistive Device (Sit-Stand Transfers) walker, 4-wheeled  -ER     Comment, (Sit-Stand Transfer) needing cues for use of locks on rollator when performing transfers  -ER       Row Name 10/13/24 1112          Gait/Stairs (Locomotion)    [place] Level (Gait) contact guard  -ER     Assistive Device (Gait) walker, 4-wheeled  -ER     Patient was able to Ambulate yes  -ER     Distance in Feet (Gait) 15  -ER     Deviations/Abnormal Patterns (Gait) festinating/shuffling  -ER     Bilateral Gait Deviations forward flexed posture  -ER               User Key  (r) = Recorded By, (t) = Taken By, (c) = Cosigned By      Initials Name Provider Type    ER Arianna Bello, PAULA Physical Therapist                   Obj/Interventions       Row Name 10/13/24 1113          Range of Motion Comprehensive    General Range of Motion no range of motion deficits identified  -ER       Row Name 10/13/24 1113          Strength Comprehensive (MMT)    Comment, General Manual Muscle Testing (MMT) Assessment RLE ~3+/5, LLE 4-/5  -ER       Row Name 10/13/24 1113          Balance    Balance Assessment sitting static balance;sitting dynamic balance;standing dynamic balance;standing static balance  -ER     Static Sitting Balance supervision  -ER      Dynamic Sitting Balance supervision  -ER     Position, Sitting Balance sitting in chair  -ER     Static Standing Balance contact guard  -ER     Dynamic Standing Balance contact guard;minimal assist  -ER     Position/Device Used, Standing Balance supported;walker, 4-wheeled  -ER     Balance Interventions sitting;standing;sit to stand;supported;dynamic;static  -ER       Row Name 10/13/24 1113          Sensory Assessment (Somatosensory)    Sensory Assessment (Somatosensory) sensation intact  -ER               User Key  (r) = Recorded By, (t) = Taken By, (c) = Cosigned By      Initials Name Provider Type    ER Arianna Bello, PT Physical Therapist                   Goals/Plan       Row Name 10/13/24 1117          Bed Mobility Goal 1 (PT)    Activity/Assistive Device (Bed Mobility Goal 1, PT) bed mobility activities, all  -ER     [place] Level/Cues Needed (Bed Mobility Goal 1, PT) standby assist  -ER     Time Frame (Bed Mobility Goal 1, PT) 2 weeks  -ER       Row Name 10/13/24 1117          Transfer Goal 1 (PT)    Activity/Assistive Device (Transfer Goal 1, PT) transfers, all  -ER     [place] Level/Cues Needed (Transfer Goal 1, PT) supervision required  -ER     Time Frame (Transfer Goal 1, PT) 2 weeks  -ER       Row Name 10/13/24 1117          Gait Training Goal 1 (PT)    Activity/Assistive Device (Gait Training Goal 1, PT) gait (walking locomotion)  -ER     [place] Level (Gait Training Goal 1, PT) supervision required  -ER     Distance (Gait Training Goal 1, PT) 50  -ER     Time Frame (Gait Training Goal 1, PT) 2 weeks  -ER       Row Name 10/13/24 1117          Therapy Assessment/Plan (PT)    Planned Therapy Interventions (PT) balance training;bed mobility training;home exercise program;gait training;joint mobilization;patient/family education;stretching;strengthening;stair training;ROM (range of motion);postural re-education;transfer training  -ER               User Key  (r) = Recorded By, (t) = Taken  By, (c) = Cosigned By      Initials Name Provider Type    ER Arianna Bello, PT Physical Therapist                   Clinical Impression       Row Name 10/13/24 1113          Pain    Pretreatment Pain Rating 0/10 - no pain  -ER     Posttreatment Pain Rating 0/10 - no pain  -ER       Row Name 10/13/24 1113          Plan of Care Review    Plan of Care Reviewed With patient  -ER     Outcome Evaluation Imelda Bear is an 84 year old female seen for physical therapy treatment evalaution after being admitted for UTI. She has hx of CVA in December 2023, and notes that she was told she has R sided weakness, but it is not limiting. She has some word finding difficulty today. She lives in [facility], with assist for meals, uses a rollator at baseline, and has hx of 10+ falls within the year. She has visual impairments following the CVA, with some visual field loss per report. She performs STS from chair this AM with CGA, using rollator, however needs cues to engage brakes for safety. She is able to ambulate 15 ft and returns to the chair with CGA. No LOB,however gait is shuffled and slow. PT recommending return to facility or SNF pending progress.  -ER       Row Name 10/13/24 1113          Therapy Assessment/Plan (PT)    Rehab Potential (PT) good  -ER     Criteria for Skilled Interventions Met (PT) yes  -ER     Therapy Frequency (PT) 5 times/wk  -ER       Row Name 10/13/24 1113          Positioning and Restraints    Pre-Treatment Position sitting in chair/recliner  -ER     Post Treatment Position chair  -ER     In Chair notified nsg;with family/caregiver;call light within reach;encouraged to call for assist;exit alarm on  -ER               User Key  (r) = Recorded By, (t) = Taken By, (c) = Cosigned By      Initials Name Provider Type    ER Arianna Bello, PT Physical Therapist                   Outcome Measures       Row Name 10/13/24 1117 10/13/24 0815       How much help from another person do you currently need...    Turning from your  back to your side while in flat bed without using bedrails? 3  -ER 4  -RP    Moving from lying on back to sitting on the side of a flat bed without bedrails? 3  -ER 4  -RP    Moving to and from a bed to a chair (including a wheelchair)? 3  -ER 3  -RP    Standing up from a chair using your arms (e.g., wheelchair, bedside chair)? 3  -ER 3  -RP    Climbing 3-5 steps with a railing? 2  -ER 2  -RP    To walk in hospital room? 3  -ER 3  -RP    AM-PAC 6 Clicks Score (PT) 17  -ER 19  -RP              User Key  (r) = Recorded By, (t) = Taken By, (c) = Cosigned By      Initials Name Provider Type    ER Arianna Bello, PAULA Physical Therapist    Naty Alexis LPN Licensed Nurse                                 Physical Therapy Education       Title: PT OT SLP Therapies (In Progress)       Topic: Physical Therapy (Done)       Point: Mobility training (Done)       Learning Progress Summary            Patient Acceptance, E, VU by ER at 10/13/2024 1117                      Point: Home exercise program (Done)       Learning Progress Summary            Patient Acceptance, E, VU by ER at 10/13/2024 1117                      Point: Body mechanics (Done)       Learning Progress Summary            Patient Acceptance, E, VU by ER at 10/13/2024 1117                      Point: Precautions (Done)       Learning Progress Summary            Patient Acceptance, E, VU by ER at 10/13/2024 1117                                      User Key       Initials Effective Dates Name Provider Type Discipline    ER 10/15/23 -  Arianna Bello, PT Physical Therapist PT                  PT Recommendation and Plan  Planned Therapy Interventions (PT): balance training, bed mobility training, home exercise program, gait training, joint mobilization, patient/family education, stretching, strengthening, stair training, ROM (range of motion), postural re-education, transfer training  Outcome Evaluation: Imelda Bear is an 84 year old female seen for physical  therapy treatment evalaution after being admitted for UTI. She has hx of CVA in December 2023, and notes that she was told she has R sided weakness, but it is not limiting. She has some word finding difficulty today. She lives in [facility], with assist for meals, uses a rollator at baseline, and has hx of 10+ falls within the year. She has visual impairments following the CVA, with some visual field loss per report. She performs STS from chair this AM with CGA, using rollator, however needs cues to engage brakes for safety. She is able to ambulate 15 ft and returns to the chair with CGA. No LOB,however gait is shuffled and slow. PT recommending return to facility or SNF pending progress.     Time Calculation:         PT Charges       Row Name 10/13/24 1118             Time Calculation    Start Time 1051  -ER      Stop Time 1114  -ER      Time Calculation (min) 23 min  -ER      PT Received On 10/13/24  -ER      PT - Next Appointment 10/14/24  -ER      PT Goal Re-Cert Due Date 10/27/24  -ER         Time Calculation- PT    Total Timed Code Minutes- PT 18 minute(s)  -ER         Timed Charges    06100 - PT Therapeutic Activity Minutes 18  -ER         Total Minutes    Timed Charges Total Minutes 18  -ER       Total Minutes 18  -ER                User Key  (r) = Recorded By, (t) = Taken By, (c) = Cosigned By      Initials Name Provider Type    ER Arianna Bello, PT Physical Therapist                  Therapy Charges for Today       Code Description Service Date Service Provider Modifiers Qty    53336674263 HC PT THERAPEUTIC ACT EA 15 MIN 10/13/2024 Arianna Bello, PT GP 1    78775692175 HC PT EVAL MOD COMPLEXITY 3 10/13/2024 Arianna Bello, PT GP 1            PT G-Codes  AM-PAC 6 Clicks Score (PT): 17  PT Discharge Summary  Anticipated Discharge Disposition (PT): skilled nursing facility, assisted living    Arianna Bello PT  10/13/2024

## 2024-10-13 NOTE — H&P
"HISTORY AND PHYSICAL   [facility]        Date of Admission: 10/12/2024  Patient Identification:  Name: Rekha Bear  Age: 84 y.o.  Sex: female  :  1940  MRN: [id number]                     Primary Care Physician: Zahida Osorio MD    Chief Complaint:  84 year old female presented to the emergency room with nausea and confusion which started this morning; she lives in assisted living; she denies fever or chills; she has not been taking her medications per famly; no sick contacts; no pain or burning with urination    History of Present Illness:   As above    Past Medical History:  Past Medical History:   Diagnosis Date    Chilblains     \"Chilblian's\"    CKD (chronic kidney disease)     CVA (cerebral vascular accident)     Depression     Fatty liver     GERD (gastroesophageal reflux disease)     Hyperlipidemia     Hypertension     Incontinence     Osteoarthritis     OA  Marvin THR, LT TKR - hydrocodone prescibed by Dr. 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 ENDOSCOPY;  Service:     DENTAL PROCEDURE      FOOT SURGERY      TONSILLECTOMY      TOTAL HIP ARTHROPLASTY Bilateral     OA  Marvin THR, LT TKR - hydrocodone prescibed by Dr. Almaguer    TOTAL KNEE ARTHROPLASTY Left     OA  Marvin THR, LT TKR - hydrocodone prescibed by Dr. Almaguer      Home Meds:  Medications Prior to Admission   Medication Sig " Dispense Refill Last Dose/Taking    atorvastatin (LIPITOR) 40 MG tablet Take 1 tablet by mouth Every Night. 30 tablet 1 10/11/2024 Evening    buPROPion XL (WELLBUTRIN XL) 150 MG 24 hr tablet TAKE ONE TABLET BY MOUTH EVERY MORNING 90 tablet 1 10/12/2024 Morning    docusate sodium 100 MG capsule Take 1 capsule by mouth 2 (Two) Times a Day. 60 capsule 1 10/12/2024    gabapentin (NEURONTIN) 400 MG capsule TAKE ONE CAPSULE BY MOUTH THREE TIMES DAILY (MORNING, NOON, AND BEDTIME) 90 capsule 1 10/12/2024    glimepiride (AMARYL) 4 MG tablet Take 1 tablet by mouth Every Morning Before Breakfast. 30 tablet 1 10/12/2024 Morning    insulin aspart (novoLOG FLEXPEN) 100 UNIT/ML solution pen-injector sc pen 2-10 units three times a day before each meal per scale   10/12/2024    levothyroxine (SYNTHROID, LEVOTHROID) 88 MCG tablet Take 1 tablet by mouth Daily.   10/12/2024 Morning    linagliptin (Tradjenta) 5 MG tablet tablet Take 1 tablet by mouth Daily. 30 tablet 1 10/12/2024 Morning    metoprolol succinate XL (TOPROL-XL) 25 MG 24 hr tablet Take 1 tablet by mouth Daily. 30 tablet 5 10/12/2024 Morning    oxybutynin XL (DITROPAN-XL) 10 MG 24 hr tablet TAKE ONE TABLET BY MOUTH DAILY 90 tablet 1 10/12/2024 Morning    pioglitazone (ACTOS) 15 MG tablet Take 1 tablet by mouth Daily. 30 tablet 1 10/12/2024 Morning    venlafaxine XR (EFFEXOR-XR) 150 MG 24 hr capsule TAKE ONE TABLET BY MOUTH DAILY 90 capsule 1 10/12/2024 Morning    vitamin B-12 (CYANOCOBALAMIN) 1000 MCG tablet TAKE ONE TABLET BY MOUTH DAILY 90 tablet 1 10/12/2024 Morning    vitamin D (ERGOCALCIFEROL) 1.25 MG (36095 UT) capsule capsule Take 1 capsule by mouth Every 7 (Seven) Days. Ergocalciferol 50,000 units weekly x 5 weeks starting Thursday, Jan 18, 2023, then change to cholecalciferol 1,000 units twice a day 5 capsule 0 Taking    Xarelto 15 MG tablet TAKE ONE TABLET BY MOUTH IN THE EVENING WITH DINNER 90 tablet 1 10/11/2024 Evening    BD AutoShield Duo 30G X 5 MM misc 1  each by Other route 4 (Four) Times a Day.       glucose blood test strip Use to test blood glucose daily. Formulary Compliance Approval. Diagnosis: Type 2 Diabetes - Not Insulin Dependent 100 each 12     glucose monitor monitoring kit Use to test blood glucose daily. Formulary Compliance Approval. Diagnosis: Type 2 Diabetes - Not Insulin Dependent 1 each 0     Lancets misc Use to test blood glucose daily. Formulary Compliance Approval. Diagnosis: Type 2 Diabetes - Not Insulin Dependent 100 each 12     polyethylene glycol (MIRALAX) 17 g packet Take 17 g by mouth Daily. (Patient not taking: Reported on 10/12/2024) 30 packet 1 Not Taking       Allergies:  Allergies   Allergen Reactions    Shellfish-Derived Products Anaphylaxis    Amlodipine Hives and Other (See Comments)     Heart race    Iodine Rash     Immunizations:  Immunization History   Administered Date(s) Administered    Arexvy (RSV, Adults 60+ yrs) 11/21/2023    COVID-19 (PFIZER) BIVALENT 12+YRS 10/06/2022    COVID-19 (PFIZER) Purple Cap Monovalent 02/05/2021, 02/05/2021, 02/26/2021, 02/26/2021, 10/14/2021    Covid-19 (Pfizer) Gray Cap Monovalent 05/04/2022    FLUAD TRI 65YR+ 09/11/2020, 09/11/2020    FluMist 2-49yrs 09/17/2015    Fluad Quad 65+ 10/14/2021    Fluzone High-Dose 65+YRS 10/21/2016, 11/21/2017    Fluzone High-Dose 65+yrs 10/03/2022, 10/16/2023    Pneumococcal Conjugate 13-Valent (PCV13) 01/14/2016    Pneumococcal Polysaccharide (PPSV23) 06/13/2018    Shingrix 03/30/2021, 03/30/2021    TD Preservative Free (Tenivac) 08/16/2016, 06/13/2018    Td (TDVAX) 08/16/2016, 06/13/2018    Tdap 10/08/2023     Social History:   Social History     Social History Narrative    Lives in own home, by herself. She has good family support     Social History     Socioeconomic History    Marital status:     Number of children: 5   Tobacco Use    Smoking status: Former     Current packs/day: 0.00     Average packs/day: 0.5 packs/day for 14.0 years (7.0 ttl  "pk-yrs)     Types: Cigarettes     Start date:      Quit date:      Years since quittin.8    Smokeless tobacco: Never    Tobacco comments:     CAFFEINE USE   Vaping Use    Vaping status: Never Used   Substance and Sexual Activity    Alcohol use: No    Drug use: No    Sexual activity: Not Currently       Family History:  Family History   Problem Relation Age of Onset    Hypertension Mother     Diabetes type II Mother     Hypertension Father     Coronary artery disease Father     Diabetes type II Father     Colon cancer Brother     Skin cancer Brother     Stroke Son     Breast cancer Maternal Aunt     Hypertension Other     Other Other         Lipids  Thyroid        Review of Systems  See history of present illness and past medical history.  Patient denies headache, dizziness, syncope, falls, trauma, change in vision, change in hearing, change in taste, changes in weight, changes in appetite, focal weakness, numbness, or paresthesia.  Patient denies chest pain, palpitations, dyspnea, orthopnea, PND, cough, sinus pressure, rhinorrhea, epistaxis, hemoptysis,  hematemesis, diarrhea, constipation or hematochezia.  Denies cold or heat intolerance, polydipsia, polyuria, polyphagia. Denies hematuria, pyuria, dysuria, hesitancy, frequency or urgency. Denies consumption of raw and under cooked meats foods or change in water source.  Denies fever, chills, sweats, night sweats.  Denies missing any routine medications. Remainder of ROS is negative.    Objective:  T Max 24 hrs: Temp (24hrs), Av °F (36.1 °C), Min:96.6 °F (35.9 °C), Max:97.3 °F (36.3 °C)    Vitals Ranges:   Temp:  [96.6 °F (35.9 °C)-97.3 °F (36.3 °C)] 97.3 °F (36.3 °C)  Heart Rate:  [] 84  Resp:  [16] 16  BP: (123-143)/(84-92) 143/91      Exam:  /91 (BP Location: Left arm, Patient Position: Lying)   Pulse 84   Temp 97.3 °F (36.3 °C) (Oral)   Resp 16   Ht 170.2 cm (67\")   Wt 91.5 kg (201 lb 11.5 oz)   LMP  (LMP Unknown)   SpO2 97%  "  BMI 31.59 kg/m²     General Appearance:    Alert, cooperative, no distress, appears stated age   Head:    Normocephalic, without obvious abnormality, atraumatic   Eyes:    PERRL, conjunctivae/corneas clear, EOM's intact, both eyes   Ears:    Normal external ear canals, both ears   Nose:   Nares normal, septum midline, mucosa normal, no drainage    or sinus tenderness   Throat:   Lips, mucosa, and tongue normal   Neck:   Supple, symmetrical, trachea midline, no adenopathy;     thyroid:  no enlargement/tenderness/nodules; no carotid    bruit or JVD   Back:     Symmetric, no curvature, ROM normal, no CVA tenderness   Lungs:     Clear to auscultation bilaterally, respirations unlabored   Chest Wall:    No tenderness or deformity    Heart:    Regular rate and rhythm, S1 and S2 normal, no murmur, rub   or gallop   Abdomen:     Soft, nontender, bowel sounds active all four quadrants,     no masses, no hepatomegaly, no splenomegaly   Extremities:   Extremities normal, atraumatic, no cyanosis or edema                       .    Data Review:  Labs in chart were reviewed.  WBC   Date Value Ref Range Status   10/12/2024 11.37 (H) 3.40 - 10.80 10*3/mm3 Final     Hemoglobin   Date Value Ref Range Status   10/12/2024 14.7 12.0 - 15.9 g/dL Final     Hematocrit   Date Value Ref Range Status   10/12/2024 44.5 34.0 - 46.6 % Final     Platelets   Date Value Ref Range Status   10/12/2024 260 140 - 450 10*3/mm3 Final     Sodium   Date Value Ref Range Status   10/12/2024 133 (L) 136 - 145 mmol/L Final     Potassium   Date Value Ref Range Status   10/12/2024 5.0 3.5 - 5.2 mmol/L Final     Comment:     Slight hemolysis detected by analyzer. Result may be falsely elevated.     Chloride   Date Value Ref Range Status   10/12/2024 95 (L) 98 - 107 mmol/L Final     CO2   Date Value Ref Range Status   10/12/2024 26.6 22.0 - 29.0 mmol/L Final     BUN   Date Value Ref Range Status   10/12/2024 46 (H) 8 - 23 mg/dL Final     Creatinine   Date Value  Ref Range Status   10/12/2024 1.81 (H) 0.57 - 1.00 mg/dL Final     Glucose   Date Value Ref Range Status   10/12/2024 182 (H) 65 - 99 mg/dL Final     Calcium   Date Value Ref Range Status   10/12/2024 9.7 8.6 - 10.5 mg/dL Final     Magnesium   Date Value Ref Range Status   10/12/2024 2.2 1.6 - 2.4 mg/dL Final     Phosphorus   Date Value Ref Range Status   10/12/2024 4.6 (H) 2.5 - 4.5 mg/dL Final       Results from last 7 days   Lab Units 10/12/24  1509   TSH uIU/mL 5.060*   FREE T4 ng/dL 1.20          Imaging Results (All)       Procedure Component Value Units Date/Time    CT Head Without Contrast [[id number]] Collected: 10/12/24 1528     Updated: 10/12/24 1534    Narrative:      CT HEAD WO CONTRAST-     INDICATION: Confusion and vomiting     COMPARISON: CT head December 21, 2023     TECHNIQUE:  CT head without IV contrast. Coronal and sagittal reformats. Radiation  dose reduction techniques were utilized, including automated exposure  control and exposure modulation based on body size.     FINDINGS:      Brain: Hypoattenuation and volume loss within the left occipital lobe,  consistent with encephalomalacia from prior PCA territory infarct. No  intraparenchymal hemorrhage. Chronic small vessel ischemic changes. No  mass effect or midline shift.     Ventricles: Ventriculomegaly, suspect ex vacuo dilatation from central  volume loss. No intraventricular hemorrhage.     Extra-axial spaces: No extra-axial hemorrhage or fluid collection.     Orbits: Cataract extractions.     Osseous structures: Hyperostosis frontalis interna. No fracture.     Sinuses: Patent mastoid air cells and middle ears. Patent paranasal  sinuses.       Impression:         1. No acute intracranial process.  2. Encephalomalacia in the left occipital lobe, consistent with old PCA  territory infarct.     This report was finalized on 10/12/2024 3:31 PM by Dr. Cesar Ruiz M.D on Workstation: [id number]       XR Chest 1 View [[id number]] Collected:  10/12/24 1504     Updated: 10/12/24 1509    Narrative:      XR CHEST 1 VW-     Clinical: Vomiting, altered mental status     COMPARISON examination 4/2/2020     FINDINGS: Cardiac enlargement is again demonstrated. No vascular  congestion. Lungs are clear.     CONCLUSION: Cardiomegaly.     This report was finalized on 10/12/2024 3:06 PM by Dr. Dino Page M.D on Workstation: [id number]                 Assessment:  Active Hospital Problems    Diagnosis  POA    **Acute UTI [N39.0]  Yes      Resolved Hospital Problems   No resolved problems to display.   Nausea  Metabolic encephalopathy  Ckd3  Hypertension  Diabetes  Hypothyroidism  hyperlipidemia    Plan:  Continue antibiotics  Trend labs  Pt.ot to see  Antiemetics  Dw patient and ed provider as well as family    Ronna Sawant MD  10/12/2024  22:33 EDT

## 2024-10-13 NOTE — CONSULTS
"  [facility] Consult Note      Patient Name: Rekha Bear  : 1940  MRN: [id number]  Primary Care Physician:  Zahida Osorio MD  Referring Physician: No ref. provider found  Date of admission: 10/12/2024    Subjective     Reason for Consult: ALEXANDRA on CKD stage IIIb     HPI:   Rekha Baer is a 84 y.o. female with CKD stage IIIb, prior CVA on anticoagulation, type II DM, hypertension, and hypothyroidism, admitted yesterday for acute onset of nausea and confusion x1 day.    In the ER, SCr 1.81, UA suggestive for UTI with culture grew gram-negative bacilli.  Patient was started on IV Rocephin.  Today, SCr down to 1.6.    Patient's mental status has improved, though still very forgetful.  Denies chest pain, SOA, orthopnea, dizziness/lightheadedness, vomiting/diarrhea, or dysuria.  Patient reports having chronic urinary retention.    Review of Systems:   14 point review of systems is otherwise negative except for mentioned above on HPI    Personal History     Past Medical History:   Diagnosis Date    Chilblains     \"Chilblian's\"    CKD (chronic kidney disease)     CVA (cerebral vascular accident)     Depression     Fatty liver     GERD (gastroesophageal reflux disease)     Hyperlipidemia     Hypertension     Incontinence     Osteoarthritis     OA  Marvin THR, LT TKR - hydrocodone prescibed by Dr. 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 Almaguer    TOTAL KNEE ARTHROPLASTY Left     OA  Marvin THR, LT TKR - hydrocodone prescibed by Dr. Almaguer       Family History: family history includes Breast cancer in her maternal aunt; Colon cancer in her brother; Coronary artery disease in her father; Diabetes type II in her father and mother; Hypertension in her father, mother, and another family member; Other in an other family member; Skin cancer in her brother; Stroke in her son.    Social History:  reports that she quit smoking about 49 years ago. Her smoking use included cigarettes. She started smoking about 63 years ago. She has a 7 pack-year smoking history. She has never used smokeless tobacco. She reports that she does not drink alcohol and does not use drugs.    Home Medications:  Prior to Admission medications    Medication Sig Start Date End Date Taking? Authorizing Provider   atorvastatin (LIPITOR) 40 MG tablet Take 1 tablet by mouth Every Night. 1/11/24  Yes Sanjeev Quan MD   buPROPion XL (WELLBUTRIN XL) 150 MG 24 hr tablet TAKE ONE TABLET BY MOUTH EVERY MORNING 7/23/24  Yes Eben Porter MD   docusate sodium 100 MG capsule Take 1 capsule by mouth 2 (Two) Times a Day. 1/11/24  Yes Sanjeev Quan MD   gabapentin (NEURONTIN) 400 MG capsule TAKE ONE CAPSULE BY MOUTH THREE TIMES DAILY (MORNING, NOON, AND BEDTIME) 9/18/24  Yes Cesar Quiroz MD   glimepiride (AMARYL) 4 MG tablet Take 1 tablet by mouth Every Morning Before Breakfast. 1/11/24  Yes Sanjeev Quan MD   insulin aspart (novoLOG FLEXPEN) 100 UNIT/ML solution pen-injector sc pen 2-10 units three times a day before each meal per scale 2/18/24  Yes ProviderDat MD   levothyroxine (SYNTHROID, LEVOTHROID) 88 MCG tablet Take 1 tablet by mouth Daily. 3/29/24   Yes Dat Leon MD   linagliptin (Tradjenta) 5 MG tablet tablet Take 1 tablet by mouth Daily. 1/11/24  Yes Sanjeev Quan MD   metoprolol succinate XL (TOPROL-XL) 25 MG 24 hr tablet Take 1 tablet by mouth Daily. 5/23/24  Yes Elizabeth Pina MD   oxybutynin XL (DITROPAN-XL) 10 MG 24 hr tablet TAKE ONE TABLET BY MOUTH DAILY 7/23/24  Yes Eben Porter MD   pioglitazone (ACTOS) 15 MG tablet Take 1 tablet by mouth Daily. 1/11/24  Yes Sanjeev Quan MD   venlafaxine XR (EFFEXOR-XR) 150 MG 24 hr capsule TAKE ONE TABLET BY MOUTH DAILY 7/23/24  Yes Eben Porter MD   vitamin B-12 (CYANOCOBALAMIN) 1000 MCG tablet TAKE ONE TABLET BY MOUTH DAILY 7/23/24  Yes Eben Porter MD   vitamin D (ERGOCALCIFEROL) 1.25 MG (87231 UT) capsule capsule Take 1 capsule by mouth Every 7 (Seven) Days. Ergocalciferol 50,000 units weekly x 5 weeks starting Thursday, Jan 18, 2023, then change to cholecalciferol 1,000 units twice a day 2/16/24  Yes Eben Porter MD   Xarelto 15 MG tablet TAKE ONE TABLET BY MOUTH IN THE EVENING WITH DINNER 7/23/24  Yes Eben Porter MD   BD AutoShield Duo 30G X 5 MM misc 1 each by Other route 4 (Four) Times a Day. 4/10/24   Dat Leon MD   glucose blood test strip Use to test blood glucose daily. Formulary Compliance Approval. Diagnosis: Type 2 Diabetes - Not Insulin Dependent 1/4/24   Sanjeev Quan MD   glucose monitor monitoring kit Use to test blood glucose daily. Formulary Compliance Approval. Diagnosis: Type 2 Diabetes - Not Insulin Dependent 1/4/24   Sanjeev Quan MD   Lancets misc Use to test blood glucose daily. Formulary Compliance Approval. Diagnosis: Type 2 Diabetes - Not Insulin Dependent 1/4/24   Sajneev Quan MD   polyethylene glycol (MIRALAX) 17 g packet Take 17 g by mouth Daily.  Patient not taking: Reported on 10/12/2024 1/12/24   Sanjeev Quan MD       Allergies:  Allergies   Allergen Reactions     Shellfish-Derived Products Anaphylaxis    Amlodipine Hives and Other (See Comments)     Heart race    Iodine Rash       Objective     Vitals:   Temp:  [96.6 °F (35.9 °C)-97.9 °F (36.6 °C)] 97.5 °F (36.4 °C)  Heart Rate:  [] 66  Resp:  [16-18] 18  BP: (123-143)/(80-92) 128/88    Intake/Output Summary (Last 24 hours) at 10/13/2024 1314  Last data filed at 10/13/2024 0815  Gross per 24 hour   Intake 100 ml   Output 600 ml   Net -500 ml       Physical Exam:   Constitutional: Awake, chronically ill-appearing, no acute distress.  HEENT: Sclera anicteric, no conjunctival injection  Neck: No JVD  Respiratory: Bibasilar crackles, breathing effort nonlabored  Cardiovascular: Irregularly irregular, no murmurs, no rubs, no carotid bruit  Gastrointestinal: Positive bowel sounds, abdomen is soft, nontender and nondistended  : No palpable bladder  Musculoskeletal: No edema, no clubbing or cyanosis  Psychiatric: Appropriate affect, cooperative  Neurologic: Oriented x3, moving all extremities, normal speech and mental status, no asterixis  Skin: Warm and dry       Scheduled Meds:     atorvastatin, 40 mg, Oral, Nightly  buPROPion XL, 150 mg, Oral, QAM  cefTRIAXone, 2,000 mg, Intravenous, Q24H  docusate sodium, 100 mg, Oral, BID  gabapentin, 100 mg, Oral, Q12H  insulin lispro, 2-7 Units, Subcutaneous, 4x Daily AC & at Bedtime  levothyroxine, 88 mcg, Oral, Daily  metoprolol succinate XL, 25 mg, Oral, Daily  oxybutynin XL, 10 mg, Oral, Daily  rivaroxaban, 15 mg, Oral, Daily With Dinner  venlafaxine XR, 150 mg, Oral, Daily  vitamin B-12, 1,000 mcg, Oral, Daily      IV Meds:        Results Reviewed:   I have personally reviewed the results from the time of this admission to 10/13/2024 13:14 EDT     Lab Results   Component Value Date    GLUCOSE 168 (H) 10/13/2024    CALCIUM 9.2 10/13/2024     (L) 10/13/2024    K 4.6 10/13/2024    CO2 27.0 10/13/2024    CL 97 (L) 10/13/2024    BUN 41 (H) 10/13/2024    CREATININE 1.60 (H)  10/13/2024    EGFRIFAFRI 61 10/14/2021    EGFRIFNONA 50 (L) 10/14/2021    BCR 25.6 (H) 10/13/2024    ANIONGAP 9.0 10/13/2024      Lab Results   Component Value Date    MG 2.2 10/12/2024    PHOS 4.6 (H) 10/12/2024    ALBUMIN 4.1 10/12/2024           Assessment / Plan       Acute UTI      ASSESSMENT:  ALEXANDRA on CKD stage IIIb, SCr 1.5-1.6 as of February '24. On admission, SCr 1.81, today down to 1.6, multifactorial: UTI, chronic urinary retention, and possible decreased p.o. intake with confusion.  Electrolytes within acceptable range except low sodium at 133; volume status appears to be stable and adequate.    Acute UTI with gram-negative bacilli, currently on IV Rocephin, CT abdomen/pelvis pending, managed by primary team  Altered mental status, improved by exam today, though still very forgetful  Cardiomegaly, shown on chest x-ray, echo on 12/22/23 showed EF 59.6% with RVSP 38 mmHg  Hypertension with CKD, BP well-controlled on metoprolol  Type II DM with CKD, no recent A1c, managed by primary team  Prior CVA, on Xarelto  Persistent A-fib, HR controlled, on Xarelto  Hypothyroidism, TSH 5.06, free T41.2 On 10/12/2024, levothyroxine resumed    PLAN:  Continue current treatment  Check random urine for sodium, chloride, protein to creatinine ratio  Check uric acid  Check bladder scan and orthostatic blood pressure  Surveillance labs    I reviewed the chart and other providers notes, reviewed labs.  I discussed the case with the patient and she voiced good understanding    Thank you for involving us in the care of Rekha Bear.  Please feel free to call with any questions.    Gonzalo Edmond MD  10/13/24  13:14 EDT    [facility]  [phone]      Please note that portions of this note were completed with a voice recognition program.

## 2024-10-13 NOTE — PLAN OF CARE
Goal Outcome Evaluation:  Plan of Care Reviewed With: patient           Outcome Evaluation: Imelda Bear is an 84 year old female seen for physical therapy treatment evalaution after being admitted for UTI. She has hx of CVA in December 2023, and notes that she was told she has R sided weakness, but it is not limiting. She has some word finding difficulty today. She lives in [place], with assist for meals, uses a rollator at baseline, and has hx of 10+ falls within the year. She has visual impairments following the CVA, with some visual field loss per report. She performs STS from chair this AM with CGA, using rollator, however needs cues to engage brakes for safety. She is able to ambulate 15 ft and returns to the chair with CGA. No LOB,however gait is shuffled and slow. PT recommending return to facility or SNF pending progress.    Anticipated Discharge Disposition (PT): skilled nursing facility, assisted living

## 2024-10-14 LAB
ALBUMIN SERPL-MCNC: 3.6 G/DL (ref 3.5–5.2)
ANION GAP SERPL CALCULATED.3IONS-SCNC: 8.5 MMOL/L (ref 5–15)
BACTERIA SPEC AEROBE CULT: ABNORMAL
BASOPHILS # BLD AUTO: 0.04 10*3/MM3 (ref 0–0.2)
BASOPHILS NFR BLD AUTO: 0.4 % (ref 0–1.5)
BUN SERPL-MCNC: 33 MG/DL (ref 8–23)
BUN/CREAT SERPL: 22 (ref 7–25)
CALCIUM SPEC-SCNC: 9.4 MG/DL (ref 8.6–10.5)
CHLORIDE SERPL-SCNC: 101 MMOL/L (ref 98–107)
CO2 SERPL-SCNC: 27.5 MMOL/L (ref 22–29)
CREAT SERPL-MCNC: 1.5 MG/DL (ref 0.57–1)
DEPRECATED RDW RBC AUTO: 47.9 FL (ref 37–54)
EGFRCR SERPLBLD CKD-EPI 2021: 34.6 ML/MIN/1.73
EOSINOPHIL # BLD AUTO: 0.21 10*3/MM3 (ref 0–0.4)
EOSINOPHIL NFR BLD AUTO: 2.2 % (ref 0.3–6.2)
ERYTHROCYTE [DISTWIDTH] IN BLOOD BY AUTOMATED COUNT: 14.6 % (ref 12.3–15.4)
GLUCOSE BLDC GLUCOMTR-MCNC: 164 MG/DL (ref 70–130)
GLUCOSE BLDC GLUCOMTR-MCNC: 178 MG/DL (ref 70–130)
GLUCOSE BLDC GLUCOMTR-MCNC: 180 MG/DL (ref 70–130)
GLUCOSE BLDC GLUCOMTR-MCNC: 248 MG/DL (ref 70–130)
GLUCOSE SERPL-MCNC: 187 MG/DL (ref 65–99)
HCT VFR BLD AUTO: 43.7 % (ref 34–46.6)
HGB BLD-MCNC: 13.7 G/DL (ref 12–15.9)
IMM GRANULOCYTES # BLD AUTO: 0.03 10*3/MM3 (ref 0–0.05)
IMM GRANULOCYTES NFR BLD AUTO: 0.3 % (ref 0–0.5)
LYMPHOCYTES # BLD AUTO: 2.41 10*3/MM3 (ref 0.7–3.1)
LYMPHOCYTES NFR BLD AUTO: 25.1 % (ref 19.6–45.3)
MAGNESIUM SERPL-MCNC: 2.1 MG/DL (ref 1.6–2.4)
MCH RBC QN AUTO: 28 PG (ref 26.6–33)
MCHC RBC AUTO-ENTMCNC: 31.4 G/DL (ref 31.5–35.7)
MCV RBC AUTO: 89.2 FL (ref 79–97)
MONOCYTES # BLD AUTO: 0.96 10*3/MM3 (ref 0.1–0.9)
MONOCYTES NFR BLD AUTO: 10 % (ref 5–12)
NEUTROPHILS NFR BLD AUTO: 5.96 10*3/MM3 (ref 1.7–7)
NEUTROPHILS NFR BLD AUTO: 62 % (ref 42.7–76)
NRBC BLD AUTO-RTO: 0 /100 WBC (ref 0–0.2)
PHOSPHATE SERPL-MCNC: 3.3 MG/DL (ref 2.5–4.5)
PLATELET # BLD AUTO: 219 10*3/MM3 (ref 140–450)
PMV BLD AUTO: 9 FL (ref 6–12)
POTASSIUM SERPL-SCNC: 4.8 MMOL/L (ref 3.5–5.2)
RBC # BLD AUTO: 4.9 10*6/MM3 (ref 3.77–5.28)
SODIUM SERPL-SCNC: 137 MMOL/L (ref 136–145)
URATE SERPL-MCNC: 7.7 MG/DL (ref 2.4–5.7)
WBC NRBC COR # BLD AUTO: 9.61 10*3/MM3 (ref 3.4–10.8)

## 2024-10-14 PROCEDURE — 82948 REAGENT STRIP/BLOOD GLUCOSE: CPT

## 2024-10-14 PROCEDURE — 99214 OFFICE O/P EST MOD 30 MIN: CPT | Performed by: STUDENT IN AN ORGANIZED HEALTH CARE EDUCATION/TRAINING PROGRAM

## 2024-10-14 PROCEDURE — 51798 US URINE CAPACITY MEASURE: CPT

## 2024-10-14 PROCEDURE — 25010000002 CEFTRIAXONE PER 250 MG: Performed by: INTERNAL MEDICINE

## 2024-10-14 PROCEDURE — 80069 RENAL FUNCTION PANEL: CPT

## 2024-10-14 PROCEDURE — 63710000001 INSULIN LISPRO (HUMAN) PER 5 UNITS: Performed by: INTERNAL MEDICINE

## 2024-10-14 PROCEDURE — G0378 HOSPITAL OBSERVATION PER HR: HCPCS

## 2024-10-14 PROCEDURE — 85025 COMPLETE CBC W/AUTO DIFF WBC: CPT

## 2024-10-14 PROCEDURE — 84550 ASSAY OF BLOOD/URIC ACID: CPT

## 2024-10-14 PROCEDURE — 83735 ASSAY OF MAGNESIUM: CPT

## 2024-10-14 PROCEDURE — 97165 OT EVAL LOW COMPLEX 30 MIN: CPT

## 2024-10-14 PROCEDURE — 96366 THER/PROPH/DIAG IV INF ADDON: CPT

## 2024-10-14 PROCEDURE — 97530 THERAPEUTIC ACTIVITIES: CPT

## 2024-10-14 RX ORDER — DONEPEZIL HYDROCHLORIDE 10 MG/1
10 TABLET, FILM COATED ORAL NIGHTLY
Status: DISCONTINUED | OUTPATIENT
Start: 2024-10-14 | End: 2024-10-15

## 2024-10-14 RX ADMIN — METOPROLOL SUCCINATE 25 MG: 25 TABLET, EXTENDED RELEASE ORAL at 08:12

## 2024-10-14 RX ADMIN — INSULIN LISPRO 2 UNITS: 100 INJECTION, SOLUTION INTRAVENOUS; SUBCUTANEOUS at 16:48

## 2024-10-14 RX ADMIN — INSULIN LISPRO 2 UNITS: 100 INJECTION, SOLUTION INTRAVENOUS; SUBCUTANEOUS at 08:11

## 2024-10-14 RX ADMIN — LEVOTHYROXINE SODIUM 88 MCG: 88 TABLET ORAL at 08:12

## 2024-10-14 RX ADMIN — DONEPEZIL HYDROCHLORIDE 10 MG: 10 TABLET, FILM COATED ORAL at 21:00

## 2024-10-14 RX ADMIN — OXYBUTYNIN CHLORIDE 10 MG: 10 TABLET, EXTENDED RELEASE ORAL at 08:12

## 2024-10-14 RX ADMIN — DOCUSATE SODIUM 100 MG: 100 CAPSULE, LIQUID FILLED ORAL at 08:12

## 2024-10-14 RX ADMIN — RIVAROXABAN 15 MG: 15 TABLET, FILM COATED ORAL at 16:49

## 2024-10-14 RX ADMIN — VENLAFAXINE HYDROCHLORIDE 150 MG: 150 CAPSULE, EXTENDED RELEASE ORAL at 08:12

## 2024-10-14 RX ADMIN — CEFTRIAXONE 2000 MG: 2 INJECTION, POWDER, FOR SOLUTION INTRAMUSCULAR; INTRAVENOUS at 16:48

## 2024-10-14 RX ADMIN — GABAPENTIN 100 MG: 100 CAPSULE ORAL at 08:12

## 2024-10-14 RX ADMIN — INSULIN LISPRO 2 UNITS: 100 INJECTION, SOLUTION INTRAVENOUS; SUBCUTANEOUS at 22:19

## 2024-10-14 RX ADMIN — BUPROPION HYDROCHLORIDE 150 MG: 150 TABLET, EXTENDED RELEASE ORAL at 08:12

## 2024-10-14 RX ADMIN — GABAPENTIN 100 MG: 100 CAPSULE ORAL at 20:58

## 2024-10-14 RX ADMIN — ATORVASTATIN CALCIUM 40 MG: 20 TABLET, FILM COATED ORAL at 20:58

## 2024-10-14 RX ADMIN — Medication 1000 MCG: at 08:12

## 2024-10-14 RX ADMIN — INSULIN LISPRO 3 UNITS: 100 INJECTION, SOLUTION INTRAVENOUS; SUBCUTANEOUS at 11:44

## 2024-10-14 NOTE — THERAPY EVALUATION
"Patient Name: Rekha Bear  : 1940    MRN: [id number]                              Today's Date: 10/14/2024       Admit Date: 10/12/2024    Visit Dx:     ICD-10-CM ICD-9-CM   1. Acute UTI  N39.0 599.0   2. Altered mental status, unspecified altered mental status type  R41.82 780.97   3. Acute renal failure superimposed on chronic kidney disease, unspecified acute renal failure type, unspecified CKD stage  N17.9 584.9    N18.9 585.9   4. Elevated troponin  R79.89 790.6   5. 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    Community acquired pneumonia of left lower lobe of lung    Hyponatremia    Pneumonia due to COVID-19 virus    Major depressive disorder, recurrent, mild    Acute respiratory failure with hypoxia    Supraventricular tachycardia    PVC (premature ventricular contraction)    Depressive disorder    Gastroesophageal reflux disease    Midline cystocele    Rectocele    BMI 29.0-29.9,adult    Acute left PCA stroke    B12 deficiency    Bradycardia    Premature atrial contractions    Stroke    SHEY (obstructive sleep apnea)    Circadian rhythm disorder    Cerebrovascular accident (CVA) due to embolism of left posterior cerebral artery    Anxiety    Dysphagia as late effect of stroke    Atrial fibrillation, persistent    Acute UTI     Past Medical History:   Diagnosis Date    Chilblains     \"Chilblian's\"    CKD (chronic kidney disease)     CVA (cerebral vascular accident)     Depression     Fatty liver     GERD (gastroesophageal reflux disease)     Hyperlipidemia     Hypertension     Incontinence     " "Osteoarthritis     OA  Marvin THR, LT TKR - hydrocodone prescibed by Dr. 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 ENDOSCOPY;  Service:     DENTAL PROCEDURE      FOOT SURGERY      TONSILLECTOMY      TOTAL HIP ARTHROPLASTY Bilateral     OA  Marvin THR, LT TKR - hydrocodone prescibed by Dr. Almaguer    TOTAL KNEE ARTHROPLASTY Left     OA  Marvin THR, LT TKR - hydrocodone prescibed by Dr. Almaguer      General Information       Row Name 10/14/24 1223          OT Time and Intention    Subjective Information no complaints  -MM     Document Type evaluation  -MM     Mode of Treatment co-treatment;physical therapy;occupational therapy  -MM     Patient Effort good  -MM       Row Name 10/14/24 1223          General Information    Patient Profile Reviewed yes  -MM     Prior Level of Function --  from [facility], reports mod (I) with ADLs, uses rwx  -MM     Existing Precautions/Restrictions fall  -MM     Barriers to Rehab medically complex;previous functional deficit;visual deficit  hx CVA  -MM       Row Name 10/14/24 1223          Living Environment    People in Home facility resident  -MM       Row Name 10/14/24 1223          Home Main Entrance    Number of Stairs, Main Entrance none  -MM       Row Name 10/14/24 1223          Cognition    Orientation Status (Cognition) oriented x 3;verbal cues/prompts needed for orientation  did not know name of hospital  -MM       Row Name " 10/14/24 1223          Safety Issues/Impairments Affecting Functional Mobility    Safety Issues Affecting Function (Mobility) safety precaution awareness  -MM     Impairments Affecting Function (Mobility) endurance/activity tolerance;strength  -MM     Comment, Safety Issues/Impairments (Mobility) Co-treatment medically necessary and appropriate d/t pt's acuity level, decreased endurance and activity tolerance, and safety of patient and staff. Treatment focused on progression of care and goals established in POC. Gait belt and non-skid socks worn.  -MM               User Key  (r) = Recorded By, (t) = Taken By, (c) = Cosigned By      Initials Name Provider Type    MM Tana Montes De Oca OT Occupational Therapist                     Mobility/ADL's       Row Name 10/14/24 1224          Bed Mobility    Bed Mobility sit-supine;supine-sit  -MM     Supine-Sit [place] (Bed Mobility) standby assist  -MM     Sit-Supine [place] (Bed Mobility) standby assist  -MM     Assistive Device (Bed Mobility) head of bed elevated  -MM       Row Name 10/14/24 1224          Transfers    Transfers sit-stand transfer;stand-sit transfer;toilet transfer  -MM       Row Name 10/14/24 1224          Sit-Stand Transfer    Sit-Stand [place] (Transfers) standby assist  -MM     Assistive Device (Sit-Stand Transfers) walker, 4-wheeled  -MM       Row Name 10/14/24 1224          Stand-Sit Transfer    Stand-Sit [place] (Transfers) standby assist  -MM     Assistive Device (Stand-Sit Transfers) walker, 4-wheeled  -MM       Row Name 10/14/24 1224          Toilet Transfer    Comment, (Toilet Transfer) demo'd approp distance to BR commode  -MM       Row Name 10/14/24 1224          Functional Mobility    Functional Mobility- Ind. Level contact guard assist  -MM     Functional Mobility- Device walker, 4-wheeled  -MM       Row Name 10/14/24 1224          Activities of Daily Living    BADL Assessment/Intervention lower body dressing;toileting  -MM        Row Name 10/14/24 1224          Lower Body Dressing Assessment/Training    [place] Level (Lower Body Dressing) don;shoes/slippers;set up;standby assist  -MM     Position (Lower Body Dressing) edge of bed sitting  -MM       Row Name 10/14/24 1224          Toileting Assessment/Training    Comment, (Toileting) encouraged up to BR commode when needing to void with staff  -MM               User Key  (r) = Recorded By, (t) = Taken By, (c) = Cosigned By      Initials Name Provider Type    Tana Navarrete OT Occupational Therapist                   Obj/Interventions       Row Name 10/14/24 1225          Sensory Assessment (Somatosensory)    Sensory Assessment (Somatosensory) UE sensation intact  -MM       Row Name 10/14/24 1225          Vision Assessment/Intervention    Visual Field Deficit homonymous hemianopsia, right  -MM     Vision Assessment Comment reports bilat visual impairment  -MM       Row Name 10/14/24 1225          Range of Motion Comprehensive    General Range of Motion bilateral upper extremity ROM WNL  -MM       Row Name 10/14/24 1225          Strength Comprehensive (MMT)    Comment, General Manual Muscle Testing (MMT) Assessment generalized weakness, no focal deficits noted, BUE grossly 4-/5  -MM       Row Name 10/14/24 1225          Motor Skills    Motor Skills functional endurance;coordination  -MM     Coordination WFL;upper extremity;bilateral  -MM     Functional Endurance fair  -MM       Row Name 10/14/24 1225          Balance    Balance Assessment sitting static balance;sitting dynamic balance;sit to stand dynamic balance;standing static balance;standing dynamic balance  -MM     Static Sitting Balance supervision  -MM     Dynamic Sitting Balance supervision  -MM     Position, Sitting Balance sitting edge of bed  -MM     Sit to Stand Dynamic Balance contact guard  -MM     Static Standing Balance standby assist  -MM     Dynamic Standing Balance contact guard;standby assist  -MM      Position/Device Used, Standing Balance supported;walker, 4-wheeled  -MM     Balance Interventions occupation based/functional task;weight shifting activity;minimal challenge  -MM     Comment, Balance no overt LOBs  -MM               User Key  (r) = Recorded By, (t) = Taken By, (c) = Cosigned By      Initials Name Provider Type    Tana Navarrete OT Occupational Therapist                   Goals/Plan       Row Name 10/14/24 1229          Transfer Goal 1 (OT)    Activity/Assistive Device (Transfer Goal 1, OT) transfers, all  -MM     [place] Level/Cues Needed (Transfer Goal 1, OT) modified independence  -MM     Time Frame (Transfer Goal 1, OT) short term goal (STG);2 weeks  -MM     Progress/Outcome (Transfer Goal 1, OT) goal ongoing  -MM       Row Name 10/14/24 1229          Dressing Goal 1 (OT)    Activity/Device (Dressing Goal 1, OT) dressing skills, all  -MM     [place]/Cues Needed (Dressing Goal 1, OT) modified independence  -MM     Time Frame (Dressing Goal 1, OT) short term goal (STG);2 weeks  -MM     Progress/Outcome (Dressing Goal 1, OT) goal ongoing  -MM       Row Name 10/14/24 1229          Toileting Goal 1 (OT)    Activity/Device (Toileting Goal 1, OT) toileting skills, all  -MM     [place] Level/Cues Needed (Toileting Goal 1, OT) modified independence  -MM     Time Frame (Toileting Goal 1, OT) short term goal (STG);2 weeks  -MM     Progress/Outcome (Toileting Goal 1, OT) goal ongoing  -MM       Row Name 10/14/24 1229          Therapy Assessment/Plan (OT)    Planned Therapy Interventions (OT) activity tolerance training;occupation/activity based interventions;functional balance retraining;patient/caregiver education/training  -MM               User Key  (r) = Recorded By, (t) = Taken By, (c) = Cosigned By      Initials Name Provider Type    Tana Navarrete OT Occupational Therapist                   Clinical Impression       Row Name 10/14/24 1226          Pain Assessment    Pretreatment  Pain Rating 0/10 - no pain  -MM     Posttreatment Pain Rating 0/10 - no pain  -MM       Row Name 10/14/24 1226          Plan of Care Review    Plan of Care Reviewed With patient;son  -MM     Progress no change  -MM     Outcome Evaluation Pt is an 83 yo female admitted for acute UTI, AMS. 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 She resides at [facility] and mostly mod (I) for ADLs using rollator at baseline. She presents minimally below her baseline this date with some decreased activity tolerance and strengt. She completed func transfers and mobility with CGA using rollator, donned shoes with s/up and SPV at EOB. Demo'd approp distance to  commode and encouraged to complete with staff throughout the day. She will continue to benefit from skilled OT to address noted deficits and maximize (I) prior to d/c. She is safe to return to [facility]/ level of care at discharge.  -MM       Row Name 10/14/24 1226          Therapy Assessment/Plan (OT)    Criteria for Skilled Therapeutic Interventions Met (OT) skilled treatment is necessary;yes  -MM     Therapy Frequency (OT) 3 times/wk  -MM       Row Name 10/14/24 1226          Therapy Plan Review/Discharge Plan (OT)    Anticipated Discharge Disposition (OT) assisted living  -       Row Name 10/14/24 1226          Vital Signs    O2 Delivery Pre Treatment room air  -       Row Name 10/14/24 1226          Positioning and Restraints    Pre-Treatment Position in bed  -MM     Post Treatment Position bed  -MM     In Bed notified nsg;fowlers;call light within reach;encouraged to call for assist;exit alarm on;side rails up x3;with family/caregiver  -MM               User Key  (r) = Recorded By, (t) = Taken By, (c) = Cosigned By      Initials Name Provider Type    MM Tana Montes De Oca OT Occupational Therapist                   Outcome Measures       Row Name 10/14/24 1222          How much help from another is currently needed...    Putting on and taking off regular lower  body clothing? 3  -MM     Bathing (including washing, rinsing, and drying) 3  -MM     Toileting (which includes using toilet bed pan or urinal) 3  -MM     Putting on and taking off regular upper body clothing 4  -MM     Taking care of personal grooming (such as brushing teeth) 4  -MM     Eating meals 4  -MM     AM-PAC 6 Clicks Score (OT) 21  -MM       Row Name 10/14/24 1106 10/14/24 0814       How much help from another person do you currently need...    Turning from your back to your side while in flat bed without using bedrails? 4  -CS 3  -AT    Moving from lying on back to sitting on the side of a flat bed without bedrails? 4  -CS 3  -AT    Moving to and from a bed to a chair (including a wheelchair)? 3  -CS 3  -AT    Standing up from a chair using your arms (e.g., wheelchair, bedside chair)? 3  -CS 3  -AT    Climbing 3-5 steps with a railing? 3  -CS 2  -AT    To walk in hospital room? 3  -CS 3  -AT    AM-PAC 6 Clicks Score (PT) 20  -CS 17  -AT    Highest Level of Mobility Goal 6 --> Walk 10 steps or more  -CS 5 --> Static standing  -AT      Row Name 10/14/24 1229          Modified Whitley Scale    Modified Whitley Scale 1 - No significant disability despite symptoms.  Able to carry out all usual duties and activities.  -MM       Row Name 10/14/24 1229 10/14/24 1106       Functional Assessment    Outcome Measure Options AM-PAC 6 Clicks Daily Activity (OT);Modified Whitley  -MM AM-PAC 6 Clicks Basic Mobility (PT)  -CS              User Key  (r) = Recorded By, (t) = Taken By, (c) = Cosigned By      Initials Name Provider Type    MM Tana Montes De Oca OT Occupational Therapist    CS Zoë Sabillon, PT Physical Therapist    AT Jessica Niño RN Registered Nurse                    Occupational Therapy Education       Title: PT OT SLP Therapies (In Progress)       Topic: Occupational Therapy (Done)       Point: ADL training (Done)       Description:   Instruct learner(s) on proper safety adaptation and remediation  techniques during self care or transfers.   Instruct in proper use of assistive devices.                  Learning Progress Summary            Patient Acceptance, E, VU by MM at 10/14/2024 1229    Comment: role of OT, d/c rec                      Point: Precautions (Done)       Description:   Instruct learner(s) on prescribed precautions during self-care and functional transfers.                  Learning Progress Summary            Patient Acceptance, E, VU by MM at 10/14/2024 1229    Comment: role of OT, d/c rec                      Point: Body mechanics (Done)       Description:   Instruct learner(s) on proper positioning and spine alignment during self-care, functional mobility activities and/or exercises.                  Learning Progress Summary            Patient Acceptance, E, VU by MM at 10/14/2024 1229    Comment: role of OT, d/c rec                                      User Key       Initials Effective Dates Name Provider Type Discipline    [place] 05/31/24 -  Tana Montes De Oca OT Occupational Therapist OT                  OT Recommendation and Plan  Planned Therapy Interventions (OT): activity tolerance training, occupation/activity based interventions, functional balance retraining, patient/caregiver education/training  Therapy Frequency (OT): 3 times/wk  Plan of Care Review  Plan of Care Reviewed With: patient, son  Progress: no change  Outcome Evaluation: Pt is an 83 yo female admitted for acute UTI, AMS. 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 She resides at [facility] and mostly mod (I) for ADLs using rollator at baseline. She presents minimally below her baseline this date with some decreased activity tolerance and strengt. She completed func transfers and mobility with CGA using rollator, donned shoes with s/up and SPV at EOB. Demo'd approp distance to  commode and encouraged to complete with staff throughout the day. She will continue to benefit from skilled OT to address noted deficits  and maximize (I) prior to d/c. She is safe to return to half-way/ level of care at discharge.     Time Calculation:   Evaluation Complexity (OT)  Review Occupational Profile/Medical/Therapy History Complexity: brief/low complexity  Assessment, Occupational Performance/Identification of Deficit Complexity: 1-3 performance deficits  Clinical Decision Making Complexity (OT): problem focused assessment/low complexity  Overall Complexity of Evaluation (OT): low complexity     Time Calculation- OT       Row Name 10/14/24 1230             Time Calculation- OT    OT Start Time 1031  -MM      OT Stop Time 1045  -MM      OT Time Calculation (min) 14 min  -MM      OT Received On 10/14/24  -MM      OT - Next Appointment 10/16/24  -MM      OT Goal Re-Cert Due Date 10/28/24  -MM         Untimed Charges    OT Eval/Re-eval Minutes 14  -MM         Total Minutes    Untimed Charges Total Minutes 14  -MM       Total Minutes 14  -MM                User Key  (r) = Recorded By, (t) = Taken By, (c) = Cosigned By      Initials Name Provider Type    MM Tana Montes De Oca OT Occupational Therapist                  Therapy Charges for Today       Code Description Service Date Service Provider Modifiers Qty    [id number]  OT EVAL LOW COMPLEXITY 3 10/14/2024 Tana Montes De Oca OT GO 1                 Tana Montes De Oca OT  10/14/2024

## 2024-10-14 NOTE — THERAPY TREATMENT NOTE
"Patient Name: Rekha Bear  : 1940    MRN: [id number]                              Today's Date: 10/14/2024       Admit Date: 10/12/2024    Visit Dx:     ICD-10-CM ICD-9-CM   1. Acute UTI  N39.0 599.0   2. Altered mental status, unspecified altered mental status type  R41.82 780.97   3. Acute renal failure superimposed on chronic kidney disease, unspecified acute renal failure type, unspecified CKD stage  N17.9 584.9    N18.9 585.9   4. Elevated troponin  R79.89 790.6   5. 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    Community acquired pneumonia of left lower lobe of lung    Hyponatremia    Pneumonia due to COVID-19 virus    Major depressive disorder, recurrent, mild    Acute respiratory failure with hypoxia    Supraventricular tachycardia    PVC (premature ventricular contraction)    Depressive disorder    Gastroesophageal reflux disease    Midline cystocele    Rectocele    BMI 29.0-29.9,adult    Acute left PCA stroke    B12 deficiency    Bradycardia    Premature atrial contractions    Stroke    SHEY (obstructive sleep apnea)    Circadian rhythm disorder    Cerebrovascular accident (CVA) due to embolism of left posterior cerebral artery    Anxiety    Dysphagia as late effect of stroke    Atrial fibrillation, persistent    Acute UTI     Past Medical History:   Diagnosis Date    Chilblains     \"Chilblian's\"    CKD (chronic kidney disease)     CVA (cerebral vascular accident)     Depression     Fatty liver     GERD (gastroesophageal reflux disease)     Hyperlipidemia     Hypertension     Incontinence     " "Osteoarthritis     OA  Marvin THR, LT TKR - hydrocodone prescibed by Dr. 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 Almaguer    TOTAL KNEE ARTHROPLASTY Left     OA  Marvin THR, LT TKR - hydrocodone prescibed by Dr. Almaguer      General Information       Row Name 10/14/24 1103          Physical Therapy Time and Intention    Document Type therapy note (daily note)  -CS     Mode of Treatment co-treatment;physical therapy;occupational therapy  -CS       Row Name 10/14/24 1103          General Information    Patient Profile Reviewed yes  -CS     Existing Precautions/Restrictions fall  -CS       Row Name 10/14/24 1103          Cognition    Orientation Status (Cognition) oriented x 3;verbal cues/prompts needed for orientation  -CS       Row Name 10/14/24 1103          Safety Issues/Impairments Affecting Functional Mobility    Impairments Affecting Function (Mobility) endurance/activity tolerance;strength  -CS     Comment, Safety Issues/Impairments (Mobility) Co treatment medically appropriate and necessary due to patient acuity level, activity tolerance and safety of patient and staff. Treatment is focusing on progression of care and goals established in the POC.  -CS               User Key  (r) " = Recorded By, (t) = Taken By, (c) = Cosigned By      Initials Name Provider Type    CS Zoë Sabillon, PT Physical Therapist                   Mobility       Row Name 10/14/24 1103          Bed Mobility    Bed Mobility sit-supine;supine-sit  -CS     Supine-Sit [place] (Bed Mobility) standby assist  -CS     Sit-Supine [place] (Bed Mobility) standby assist  -CS     Assistive Device (Bed Mobility) head of bed elevated  -CS       Row Name 10/14/24 1103          Sit-Stand Transfer    Sit-Stand [place] (Transfers) standby assist  -CS     Assistive Device (Sit-Stand Transfers) walker, 4-wheeled  -CS       Row Name 10/14/24 1103          Gait/Stairs (Locomotion)    [place] Level (Gait) standby assist;contact guard  -CS     Assistive Device (Gait) walker, 4-wheeled  -CS     Distance in Feet (Gait) 100  -CS     Deviations/Abnormal Patterns (Gait) eric decreased;stride length decreased  -CS     Bilateral Gait Deviations forward flexed posture  -CS     Comment, (Gait/Stairs) slow pace; no LOB  -CS               User Key  (r) = Recorded By, (t) = Taken By, (c) = Cosigned By      Initials Name Provider Type    CS Zoë Sabillon, PT Physical Therapist                   Obj/Interventions       Row Name 10/14/24 1104          Balance    Balance Assessment sitting static balance;sitting dynamic balance;standing static balance;standing dynamic balance  -CS     Static Sitting Balance supervision  -CS     Dynamic Sitting Balance supervision  -CS     Position, Sitting Balance unsupported;sitting edge of bed  -CS     Static Standing Balance standby assist  -CS     Dynamic Standing Balance standby assist;contact guard  -CS     Position/Device Used, Standing Balance supported;walker, 4-wheeled  -CS               User Key  (r) = Recorded By, (t) = Taken By, (c) = Cosigned By      Initials Name Provider Type    CS Zoë Sabillon, PT Physical Therapist                   Goals/Plan    No documentation.                   Clinical Impression       Row Name 10/14/24 1104          Pain    Pretreatment Pain Rating 0/10 - no pain  -CS       Row Name 10/14/24 1104          Plan of Care Review    Plan of Care Reviewed With patient;family  -CS     Progress improving  -CS     Outcome Evaluation Pt received in bed and agreeable to PT. Pt performed bed mobility, STS txf, and ambulated 100' c 4WW requiring SBA/CGA. Pt demo's a slow pace but overall steady gait. Pt appears to be returning to her functional baseline. PT encouraged pt to sit UIC and ambulate with staff as tolerated. PT recommends pt return to her [facility] and f/u with HHPT.  -CS       Row Name 10/14/24 1104          Therapy Assessment/Plan (PT)    Criteria for Skilled Interventions Met (PT) yes;meets criteria  -CS     Therapy Frequency (PT) 3 times/wk  -CS       Row Name 10/14/24 1104          Positioning and Restraints    Pre-Treatment Position in bed  -CS     Post Treatment Position bed  -CS     In Bed fowlers;call light within reach;encouraged to call for assist;exit alarm on;with family/caregiver  -CS               User Key  (r) = Recorded By, (t) = Taken By, (c) = Cosigned By      Initials Name Provider Type    Zoë Clifford, PT Physical Therapist                   Outcome Measures       Row Name 10/14/24 1106 10/14/24 0814       How much help from another person do you currently need...    Turning from your back to your side while in flat bed without using bedrails? 4  -CS 3  -AT    Moving from lying on back to sitting on the side of a flat bed without bedrails? 4  -CS 3  -AT    Moving to and from a bed to a chair (including a wheelchair)? 3  -CS 3  -AT    Standing up from a chair using your arms (e.g., wheelchair, bedside chair)? 3  -CS 3  -AT    Climbing 3-5 steps with a railing? 3  -CS 2  -AT    To walk in hospital room? 3  -CS 3  -AT    AM-PAC 6 Clicks Score (PT) 20  -CS 17  -AT    Highest Level of Mobility Goal 6 --> Walk 10 steps or more  -CS 5 --> Static standing   -AT      Row Name 10/14/24 1106          Functional Assessment    Outcome Measure Options AM-PAC 6 Clicks Basic Mobility (PT)  -               User Key  (r) = Recorded By, (t) = Taken By, (c) = Cosigned By      Initials Name Provider Type    CS Zoë Sabillon, PT Physical Therapist    [facility] RN Registered Nurse                                 Physical Therapy Education       Title: PT OT SLP Therapies (In Progress)       Topic: Physical Therapy (Done)       Point: Mobility training (Done)       Learning Progress Summary            Patient Acceptance, E,TB, VU,DU,NR by  at 10/14/2024 1106    Acceptance, E, VU by ER at 10/13/2024 1117                      Point: Home exercise program (Done)       Learning Progress Summary            Patient Acceptance, E,TB, VU,DU,NR by  at 10/14/2024 1106    Acceptance, E, VU by ER at 10/13/2024 1117                      Point: Body mechanics (Done)       Learning Progress Summary            Patient Acceptance, E,TB, VU,DU,NR by  at 10/14/2024 1106    Acceptance, E, VU by ER at 10/13/2024 1117                      Point: Precautions (Done)       Learning Progress Summary            Patient Acceptance, E,TB, VU,DU,NR by  at 10/14/2024 1106    Acceptance, E, VU by ER at 10/13/2024 1117                                      User Key       Initials Effective Dates Name Provider Type Discipline     09/22/22 -  Zoë Sabillon, PT Physical Therapist PT    ER 10/15/23 -  Arianna Bello, PAULA Physical Therapist PT                  PT Recommendation and Plan     Progress: improving  Outcome Evaluation: Pt received in bed and agreeable to PT. Pt performed bed mobility, STS txf, and ambulated 100' c 4WW requiring SBA/CGA. Pt demo's a slow pace but overall steady gait. Pt appears to be returning to her functional baseline. PT encouraged pt to sit UIC and ambulate with staff as tolerated. PT [facility].     Time Calculation:         PT  Charges       Row Name 10/14/24 1106             Time Calculation    Start Time 1032  -CS      Stop Time 1044  -CS      Time Calculation (min) 12 min  -CS      PT Received On 10/14/24  -CS      PT - Next Appointment 10/16/24  -CS         Time Calculation- PT    Total Timed Code Minutes- PT 10 minute(s)  -CS         Timed Charges    59580 - PT Therapeutic Activity Minutes 10  -CS         Total Minutes    Timed Charges Total Minutes 10  -CS       Total Minutes 10  -CS                User Key  (r) = Recorded By, (t) = Taken By, (c) = Cosigned By      Initials Name Provider Type    CS Zoë Sabillon, PT Physical Therapist                  Therapy Charges for Today       Code Description Service Date Service Provider Modifiers Qty    50380110195 HC PT THERAPEUTIC ACT EA 15 MIN 10/14/2024 Zoë Sabillon, PT GP 1            PT G-Codes  Outcome Measure Options: AM-PAC 6 Clicks Basic Mobility (PT)  AM-PAC 6 Clicks Score (PT): 20  PT Discharge Summary  Anticipated Discharge Disposition (PT): assisted living, home with home health    Zoë Sabillon PT  10/14/2024

## 2024-10-14 NOTE — CONSULTS
"Neurology Consult Note    Consult Date: 10/14/2024    Referring MD: No ref. provider found    Reason for Consult I have been asked to see the patient in neurological consultation to render advice and opinion regarding altered mental status    Rekha Bear is a 84 y.o. female with past medical history of left PCA stroke with no residual deficits on empiric anticoagulation with Xarelto, CKD stage III, hypertension, type 2 diabetes mellitus, hyperlipidemia,, osteoarthritis, depression, history of severe COVID infection requiring ventilator support.  Patient was brought in from assisted living facility for acute confusion her urine analysis was consistent with an infection and she is being treated with antibiotics her urine culture is also growing bacteria.    Patient's family members at bedside who expressed that since last couple of months she has been having progressive decline in short-term memory problems she recently went to her granddaughter's wedding and could not recollect wedding.    Patient had bedside is able to answer all my orientation questions she denies any new weakness numbness speech or vision problems she has right shoulder chronic osteoarthritis which hurts but no other deficit      Past Medical History:   Diagnosis Date    Chilblains     \"Chilblian's\"    CKD (chronic kidney disease)     CVA (cerebral vascular accident)     Depression     Fatty liver     GERD (gastroesophageal reflux disease)     Hyperlipidemia     Hypertension     Incontinence     Osteoarthritis     OA  Marvin THR, LT TKR - hydrocodone prescibed by Dr. 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        Exam  BP " "121/83 (BP Location: Right arm, Patient Position: Lying)   Pulse 77   Temp 97.5 °F (36.4 °C) (Axillary)   Resp 18   Ht 170.2 cm (67\")   Wt 91.5 kg (201 lb 11.5 oz)   LMP  (LMP Unknown)   SpO2 96%   BMI 31.59 kg/m²   Gen: NAD, vitals reviewed  MS:  and awake oriented x 1  normal comprehension repetition and fluency but able to state who is the president of [place] not able to tell which hospital she is at states that she is at [facility]  CN: Left temporal partial hemianopIA PERRL, EOMI, no facial droop, no dysarthria  Motor: Right upper extremity 4 -/5 rest of the extremities 5/5    DATA:    Lab Results   Component Value Date    GLUCOSE 187 (H) 10/14/2024    CALCIUM 9.4 10/14/2024     10/14/2024    K 4.8 10/14/2024    CO2 27.5 10/14/2024     10/14/2024    BUN 33 (H) 10/14/2024    CREATININE 1.50 (H) 10/14/2024    EGFRIFAFRI 61 10/14/2021    EGFRIFNONA 50 (L) 10/14/2021    BCR 22.0 10/14/2024    ANIONGAP 8.5 10/14/2024     Lab Results   Component Value Date    WBC 9.61 10/14/2024    HGB 13.7 10/14/2024    HCT 43.7 10/14/2024    MCV 89.2 10/14/2024     10/14/2024       Lab review:  Sodium 137  Creatinine 1.50  Glucose 187  Normal LFTs    A1c December 2023 9.1 retest pending    Vitamin B12 2000 and folate 7    WBC 9.61  Hb 13 and platelets 219    Urine analysis was cloudy positive nitrite leukocytes WBC bacteria and squamous epithelial cells, urine culture growing gram-negative bacilli    Urine tox positive for opiates    Imaging review:   CT Head done on 10/12/2024 showed no acute hypodensity or hyperdensity showed chronic left PCA infarct     MRI brain done in December 2023 along with MRA head -showed infarcts along the left PCA distribution including left thalamus left PCA with small vessel disease and cortical atrophy    Diagnoses:  Acute metabolic encephalopathy  Acute urinary tract infection on antibiotics  Concern for underlying cognitive " problems    Chronic left PCA infarct on empiric anticoagulation with Xarelto  Chronic pain on opiates at home  Obstructive sleep apnea  Chronic kidney disease  GERD  Hyperlipidemia    Pre-stroke MRS: 2    Plan   -Continue medical management of her urinary tract infection this is most likely the cause of acute encephalopathy  -Patient's short-term memory problems are 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  -As per the family over the past couple of months she had progressive decline in her mentation she is also not been taking her blood thinners and high cholesterol medications at the facility for unclear reasons.  -Her mentation has considerably gotten better but she is still slightly confused  -Considering she is not taking her blood thinners and other medications at the nursing facility I do not think MRI is grossly going to change the plan even if we see new strokes.   -Start her on Aricept low-dose to help with vascular dementia  -I am okay with her going back to nursing facility and seeing us in the clinic in a couple of months.    I stated that she needs to be compliant with her medication take her blood thinner cholesterol medications she is also increased risk of falls while being on blood thinner can cause brain bleeds, patient and patient's family understand the risks and benefits.      MDM   Reviewed: Previous charts, nursing notes and vitals   Reviewed: Previous labs and CT scan    Interpretation: Labs and CT scan   Total time providing care is :30-74 minutes. This excluded time spent performing separately reportable procedures and services  Consults :Neurology/Stroke    Please note that portions of this note were completed with a voice recognition program.     Hollis Arevalo MD  Neuro Hospitalist /Vascular Neurology.

## 2024-10-14 NOTE — PROGRESS NOTES
[facility] Progress Note      Patient Name: Rekha Bear  : 1940  MRN: [id number]  Primary Care Physician:  Zahida Osorio MD  Date of admission: 10/12/2024    Subjective     Interval History:   No shortness of breath on room air lying flat  Appetite is good; no N/V  No further urinary complaints  Has many questions about disposition    Review of Systems:   As noted above    Objective     Vitals:   Temp:  [97.5 °F (36.4 °C)] 97.5 °F (36.4 °C)  Heart Rate:  [77-91] 82  Resp:  [16-18] 16  BP: (100-145)/(64-89) 100/68    Intake/Output Summary (Last 24 hours) at 10/14/2024 1631  Last data filed at 10/14/2024 1350  Gross per 24 hour   Intake 240 ml   Output 1082 ml   Net -842 ml       Physical Exam:    General Appearance: alert, oriented x 3, NAD  Skin: warm and dry  HEENT: oral mucosa normal, nonicteric sclera  Neck: supple, no JVD  Lungs: CTA  Heart: Irregularly irregular, not tachycardic  Abdomen: soft, nontender, nondistended, BS +  : no palpable bladder; external urinary catheter  Extremities: no edema, cyanosis or clubbing  Neuro: normal speech and mental status     Scheduled Meds:     atorvastatin, 40 mg, Oral, Nightly  buPROPion XL, 150 mg, Oral, QAM  cefTRIAXone, 2,000 mg, Intravenous, Q24H  docusate sodium, 100 mg, Oral, BID  donepezil, 10 mg, Oral, Nightly  gabapentin, 100 mg, Oral, Q12H  insulin lispro, 2-7 Units, Subcutaneous, 4x Daily AC & at Bedtime  levothyroxine, 88 mcg, Oral, Daily  metoprolol succinate XL, 25 mg, Oral, Daily  oxybutynin XL, 10 mg, Oral, Daily  rivaroxaban, 15 mg, Oral, Daily With Dinner  venlafaxine XR, 150 mg, Oral, Daily  vitamin B-12, 1,000 mcg, Oral, Daily      IV Meds:        Results Reviewed:   I have personally reviewed the results from the time of this admission to 10/14/2024 16:31 EDT     Results from last 7 days   Lab Units 10/14/24  0637 10/13/24  0456 10/12/24  1509   SODIUM mmol/L 137 133* 133*   POTASSIUM mmol/L 4.8 4.6 5.0    CHLORIDE mmol/L 101 97* 95*   CO2 mmol/L 27.5 27.0 26.6   BUN mg/dL 33* 41* 46*   CREATININE mg/dL 1.50* 1.60* 1.81*   CALCIUM mg/dL 9.4 9.2 9.7   BILIRUBIN mg/dL  --   --  0.6   ALK PHOS U/L  --   --  109   ALT (SGPT) U/L  --   --  18   AST (SGOT) U/L  --   --  23   GLUCOSE mg/dL 187* 168* 182*       Estimated Creatinine Clearance: 33.6 mL/min (A) (by C-G formula based on SCr of 1.5 mg/dL (H)).    Results from last 7 days   Lab Units 10/14/24  0637 10/12/24  1509   MAGNESIUM mg/dL 2.1 2.2   PHOSPHORUS mg/dL 3.3 4.6*       Results from last 7 days   Lab Units 10/14/24  0637 10/13/24  0456   URIC ACID mg/dL 7.7* 8.5*       Results from last 7 days   Lab Units 10/14/24  0637 10/13/24  0456 10/12/24  1509   WBC 10*3/mm3 9.61 10.28 11.37*   HEMOGLOBIN g/dL 13.7 14.3 14.7   PLATELETS 10*3/mm3 219 226 260       Results from last 7 days   Lab Units 10/12/24  1509   INR  1.28*       Assessment / Plan     ASSESSMENT:  1.  ALEXANDRA on CKD3b, nonoliguric, improving:  prerenal due to poor oral intake, earlier hypotension, and infection in the urinary tract.  Looks euvolemic; electrolytes stable  2.  UTI: E. coli by urine culture  3.  PVD with prior stroke  4.  DM2  5.  Atrial fibrillation: Rate-controlled    PLAN:  1.  Antibiotic already on board  2.  Encouraged her to eat and drink  3.  Discharge anytime from renal view    Thank you for involving us in the care of Rekha Bear.  Please feel free to call with any questions.    Dakota Caballero MD  10/14/24  16:31 EDT    [facility]  [phone]    Please note that portions of this note were completed with a voice recognition program.

## 2024-10-14 NOTE — PLAN OF CARE
Problem: Adult Inpatient Plan of Care  Goal: Plan of Care Review  Outcome: Progressing  Flowsheets (Taken 10/14/2024 0314)  Plan of Care Reviewed With: patient  Goal: Patient-Specific Goal (Individualized)  Outcome: Progressing  Goal: Absence of Hospital-Acquired Illness or Injury  Outcome: Progressing  Intervention: Identify and Manage Fall Risk  Recent Flowsheet Documentation  Taken 10/14/2024 0200 by Santiago Rae RN  Safety Promotion/Fall Prevention: safety round/check completed  Taken 10/14/2024 0000 by Santiago Rae RN  Safety Promotion/Fall Prevention: safety round/check completed  Taken 10/13/2024 2200 by Santiago Rae RN  Safety Promotion/Fall Prevention: safety round/check completed  Taken 10/13/2024 2000 by Santiago Rae RN  Safety Promotion/Fall Prevention: safety round/check completed  Goal: Optimal Comfort and Wellbeing  Outcome: Progressing  Goal: Readiness for Transition of Care  Outcome: Progressing     Problem: Fall Injury Risk  Goal: Absence of Fall and Fall-Related Injury  Outcome: Progressing  Intervention: Promote Injury-Free Environment  Recent Flowsheet Documentation  Taken 10/14/2024 0200 by Santiago Rae RN  Safety Promotion/Fall Prevention: safety round/check completed  Taken 10/14/2024 0000 by Santiago Rae RN  Safety Promotion/Fall Prevention: safety round/check completed  Taken 10/13/2024 2200 by Santiago Rae RN  Safety Promotion/Fall Prevention: safety round/check completed  Taken 10/13/2024 2000 by Santiago Rae RN  Safety Promotion/Fall Prevention: safety round/check completed     Problem: Comorbidity Management  Goal: Blood Glucose Level Within Target Range  Outcome: Progressing  Goal: Blood Pressure in Desired Range  Outcome: Progressing     Problem: Skin Injury Risk Increased  Goal: Skin Health and Integrity  Outcome: Progressing  Intervention: Optimize Skin Protection  Recent Flowsheet Documentation  Taken 10/14/2024 0200 by Santaigo Rae RN  Head of Bed  (HOB) Positioning: HOB elevated  Taken 10/14/2024 0000 by Santiago Rae RN  Head of Bed (HOB) Positioning: HOB elevated  Taken 10/13/2024 2200 by Santiago Rae RN  Head of Bed (HOB) Positioning: HOB elevated  Taken 10/13/2024 2000 by Santiago Rae RN  Head of Bed (HOB) Positioning: HOB elevated   Goal Outcome Evaluation:  Plan of Care Reviewed With: patient   Patient educated on treatment and goals of care, including, but not limited to, fall prevention and pressure injury prevention. Discussed details of hospitalization and plan. All questions answered.

## 2024-10-14 NOTE — PLAN OF CARE
Goal Outcome Evaluation:  Plan of Care Reviewed With: patient, family        Progress: improving  Outcome Evaluation: Pt received in bed and agreeable to PT. Pt performed bed mobility, STS txf, and ambulated 100' c 4WW requiring SBA/CGA. Pt demo's a slow pace but overall steady gait. Pt appears to be returning to her functional baseline. PT encouraged pt to sit UIC and ambulate with staff as tolerated. PT recommends pt return to her senior living and f/u with HHPT.    Anticipated Discharge Disposition (PT): assisted living, home with home health

## 2024-10-14 NOTE — CASE MANAGEMENT/SOCIAL WORK
Discharge Planning Assessment  [facility]     Patient Name: Rekha Bear  MRN: [id number]  Today's Date: 10/14/2024    Admit Date: 10/12/2024    Plan: Return to [facility] personal care   Discharge Needs Assessment       Row Name 10/14/24 1010       Living Environment    People in Home facility resident    Current Living Arrangements assisted living facility    Potentially Unsafe Housing Conditions none    Primary Care Provided by child([place])    Provides Primary Care For no one, unable/limited ability to care for self    Family Caregiver if Needed child([place]), adult    Quality of Family Relationships helpful;involved;supportive    Able to Return to Prior Arrangements yes       Resource/Environmental Concerns    Transportation Concerns none       Transportation Needs    In the past 12 months, has lack of transportation kept you from medical appointments or from getting medications? no    In the past 12 months, has lack of transportation kept you from meetings, work, or from getting things needed for daily living? No       Transition Planning    Patient/Family Anticipates Transition to home    Patient/Family Anticipated Services at Transition rehabilitation services    Transportation Anticipated family or friend will provide       Discharge Needs Assessment    Readmission Within the Last 30 Days no previous admission in last 30 days    Equipment Currently Used at Home rollator;cane, straight    Concerns to be Addressed discharge planning    Anticipated Changes Related to Illness none    Equipment Needed After Discharge none    Outpatient/Agency/Support Group Needs outpatient therapy    Provided Post Acute Provider List? N/A    Provided Post Acute Provider Quality & Resource List? N/A                   Discharge Plan       Row Name 10/14/24 1011       Plan    Plan Return to [facility] personal care    Plan Comments S/W pt and her son Jl at bedside.  Pt was admitted from [facility] and plansn to  return there upon DC. CCP spoke w/ Melba/ [facility]AD at [facility] ([phone]) - PT notes reviewed.  Melba states pt was mostly independent w/ ADLs with her rollator prior to admit and can return if she is able to walk independently or w/ assist x1.  PT notes state contact jose f for ambulation.  Plan at present is to return to [facility] upon DC. Pt uses [facility]. Packet for [facility] placed in pt's miko.  Pt's son Jl states family will provide transport upon DC. .............Justina MALIK/ EVERARDO                  Continued Care and Services - Admitted Since 10/12/2024       Destination       Service Provider Request Status Services Address Phone Fax Patient Preferred    [facility] Accepted -- [address] [place] [id number] [phone] --                     Demographic Summary       Row Name 10/14/24 1009       General Information    Admission Type observation    Arrived From home    Required Notices Provided Observation Status Notice    Referral Source admission list    Reason for Consult discharge planning    Preferred Language English                   Functional Status       Row Name 10/14/24 1009       Functional Status    Usual Activity Tolerance moderate       Functional Status, IADL    Medications assistive person;independent    Meal Preparation assistive person    Housekeeping assistive person    Laundry assistive person    Shopping assistive person       Employment/    Employment Status retired                      Justina Wilkins RN

## 2024-10-14 NOTE — PLAN OF CARE
Creatinine has mildly increased.  Will decrease the doses of spironolactone   Goal Outcome Evaluation:  Plan of Care Reviewed With: patient, son        Progress: no change  Outcome Evaluation: Pt is an 85 yo female admitted for acute UTI, AMS. 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 She resides at [facility] and mostly mod (I) for ADLs using rollator at baseline. She presents minimally below her baseline this date with some decreased activity tolerance and strengt. She completed func transfers and mobility with CGA using rollator, donned shoes with s/up and SPV at EOB. Demo'd approp distance to  commode and encouraged to complete with staff throughout the day. She will continue to benefit from skilled OT to address noted deficits and maximize (I) prior to d/c. She is safe to return to [facility]/ level of care at discharge.    Anticipated Discharge Disposition (OT): assisted living                         No recent atrial fibrillation   Recently admitted to Hospital in heart failure started on diuretics.  She denies any swelling   Sinus pause Chronic atrial fibrillation Sinus pause Sinus pause

## 2024-10-15 ENCOUNTER — TELEPHONE (OUTPATIENT)
Dept: NEUROLOGY | Facility: CLINIC | Age: 84
End: 2024-10-15
Payer: MEDICARE

## 2024-10-15 VITALS
HEART RATE: 65 BPM | WEIGHT: 201.72 LBS | HEIGHT: 67 IN | SYSTOLIC BLOOD PRESSURE: 150 MMHG | TEMPERATURE: 97.7 F | RESPIRATION RATE: 18 BRPM | OXYGEN SATURATION: 93 % | BODY MASS INDEX: 31.66 KG/M2 | DIASTOLIC BLOOD PRESSURE: 94 MMHG

## 2024-10-15 LAB
ALBUMIN SERPL-MCNC: 3.4 G/DL (ref 3.5–5.2)
ALBUMIN/GLOB SERPL: 1.1 G/DL
ALP SERPL-CCNC: 92 U/L (ref 39–117)
ALT SERPL W P-5'-P-CCNC: 13 U/L (ref 1–33)
ANION GAP SERPL CALCULATED.3IONS-SCNC: 9.8 MMOL/L (ref 5–15)
AST SERPL-CCNC: 17 U/L (ref 1–32)
BILIRUB SERPL-MCNC: 0.3 MG/DL (ref 0–1.2)
BUN SERPL-MCNC: 27 MG/DL (ref 8–23)
BUN/CREAT SERPL: 19.9 (ref 7–25)
CALCIUM SPEC-SCNC: 9.1 MG/DL (ref 8.6–10.5)
CHLORIDE SERPL-SCNC: 103 MMOL/L (ref 98–107)
CO2 SERPL-SCNC: 23.2 MMOL/L (ref 22–29)
CREAT SERPL-MCNC: 1.36 MG/DL (ref 0.57–1)
DEPRECATED RDW RBC AUTO: 46.6 FL (ref 37–54)
EGFRCR SERPLBLD CKD-EPI 2021: 39 ML/MIN/1.73
ERYTHROCYTE [DISTWIDTH] IN BLOOD BY AUTOMATED COUNT: 14.5 % (ref 12.3–15.4)
FOLATE BLD-MCNC: 351 NG/ML
FOLATE RBC-MCNC: 761 NG/ML
GLOBULIN UR ELPH-MCNC: 3.1 GM/DL
GLUCOSE BLDC GLUCOMTR-MCNC: 168 MG/DL (ref 70–130)
GLUCOSE SERPL-MCNC: 221 MG/DL (ref 65–99)
HCT VFR BLD AUTO: 42.9 % (ref 34–46.6)
HCT VFR BLD AUTO: 46.1 % (ref 34–46.6)
HGB BLD-MCNC: 14 G/DL (ref 12–15.9)
MCH RBC QN AUTO: 28.8 PG (ref 26.6–33)
MCHC RBC AUTO-ENTMCNC: 32.6 G/DL (ref 31.5–35.7)
MCV RBC AUTO: 88.3 FL (ref 79–97)
PHOSPHATE SERPL-MCNC: 3.3 MG/DL (ref 2.5–4.5)
PLATELET # BLD AUTO: 230 10*3/MM3 (ref 140–450)
PMV BLD AUTO: 9.3 FL (ref 6–12)
POTASSIUM SERPL-SCNC: 4.4 MMOL/L (ref 3.5–5.2)
PROT SERPL-MCNC: 6.5 G/DL (ref 6–8.5)
RBC # BLD AUTO: 4.86 10*6/MM3 (ref 3.77–5.28)
SODIUM SERPL-SCNC: 136 MMOL/L (ref 136–145)
WBC NRBC COR # BLD AUTO: 9.67 10*3/MM3 (ref 3.4–10.8)

## 2024-10-15 PROCEDURE — 63710000001 INSULIN LISPRO (HUMAN) PER 5 UNITS: Performed by: INTERNAL MEDICINE

## 2024-10-15 PROCEDURE — 99214 OFFICE O/P EST MOD 30 MIN: CPT | Performed by: NURSE PRACTITIONER

## 2024-10-15 PROCEDURE — 85027 COMPLETE CBC AUTOMATED: CPT | Performed by: NURSE PRACTITIONER

## 2024-10-15 PROCEDURE — G0378 HOSPITAL OBSERVATION PER HR: HCPCS

## 2024-10-15 PROCEDURE — 80053 COMPREHEN METABOLIC PANEL: CPT | Performed by: NURSE PRACTITIONER

## 2024-10-15 PROCEDURE — 84100 ASSAY OF PHOSPHORUS: CPT | Performed by: INTERNAL MEDICINE

## 2024-10-15 PROCEDURE — 82948 REAGENT STRIP/BLOOD GLUCOSE: CPT

## 2024-10-15 RX ORDER — DONEPEZIL HYDROCHLORIDE 5 MG/1
5 TABLET, FILM COATED ORAL NIGHTLY
Qty: 30 TABLET | Refills: 0 | Status: ON HOLD | OUTPATIENT
Start: 2024-10-15

## 2024-10-15 RX ORDER — DONEPEZIL HYDROCHLORIDE 10 MG/1
10 TABLET, FILM COATED ORAL NIGHTLY
Qty: 30 TABLET | Refills: 0 | Status: SHIPPED | OUTPATIENT
Start: 2024-10-15 | End: 2024-10-15 | Stop reason: HOSPADM

## 2024-10-15 RX ORDER — CEFDINIR 300 MG/1
300 CAPSULE ORAL 2 TIMES DAILY
Qty: 6 CAPSULE | Refills: 0 | Status: SHIPPED | OUTPATIENT
Start: 2024-10-15 | End: 2024-10-18

## 2024-10-15 RX ORDER — DONEPEZIL HYDROCHLORIDE 5 MG/1
5 TABLET, FILM COATED ORAL NIGHTLY
Status: DISCONTINUED | OUTPATIENT
Start: 2024-10-15 | End: 2024-10-15 | Stop reason: HOSPADM

## 2024-10-15 RX ADMIN — METOPROLOL SUCCINATE 25 MG: 25 TABLET, EXTENDED RELEASE ORAL at 08:18

## 2024-10-15 RX ADMIN — OXYBUTYNIN CHLORIDE 10 MG: 10 TABLET, EXTENDED RELEASE ORAL at 08:18

## 2024-10-15 RX ADMIN — Medication 1000 MCG: at 08:17

## 2024-10-15 RX ADMIN — GABAPENTIN 100 MG: 100 CAPSULE ORAL at 08:17

## 2024-10-15 RX ADMIN — ACETAMINOPHEN 325MG 650 MG: 325 TABLET ORAL at 00:33

## 2024-10-15 RX ADMIN — LEVOTHYROXINE SODIUM 88 MCG: 88 TABLET ORAL at 08:18

## 2024-10-15 RX ADMIN — BUPROPION HYDROCHLORIDE 150 MG: 150 TABLET, EXTENDED RELEASE ORAL at 07:33

## 2024-10-15 RX ADMIN — VENLAFAXINE HYDROCHLORIDE 150 MG: 150 CAPSULE, EXTENDED RELEASE ORAL at 08:17

## 2024-10-15 RX ADMIN — Medication 2.5 MG: at 00:34

## 2024-10-15 RX ADMIN — INSULIN LISPRO 2 UNITS: 100 INJECTION, SOLUTION INTRAVENOUS; SUBCUTANEOUS at 11:34

## 2024-10-15 RX ADMIN — DOCUSATE SODIUM 100 MG: 100 CAPSULE, LIQUID FILLED ORAL at 08:17

## 2024-10-15 RX ADMIN — INSULIN LISPRO 3 UNITS: 100 INJECTION, SOLUTION INTRAVENOUS; SUBCUTANEOUS at 08:16

## 2024-10-15 NOTE — PLAN OF CARE
Goal Outcome Evaluation:  Plan of Care Reviewed With: patient, family        Progress: improving     Problem: Adult Inpatient Plan of Care  Goal: Absence of Hospital-Acquired Illness or Injury  Intervention: Identify and Manage Fall Risk  Recent Flowsheet Documentation  Taken 10/15/2024 0410 by Hatchett, Clarissa, RN  Safety Promotion/Fall Prevention:   activity supervised   assistive device/personal items within reach   clutter free environment maintained   fall prevention program maintained   lighting adjusted   nonskid shoes/slippers when out of bed   room organization consistent   safety round/check completed  Taken 10/15/2024 0001 by Hatchett, Clarissa, RN  Safety Promotion/Fall Prevention:   activity supervised   assistive device/personal items within reach   clutter free environment maintained   fall prevention program maintained   lighting adjusted   nonskid shoes/slippers when out of bed   room organization consistent   safety round/check completed   toileting scheduled  Taken 10/14/2024 2200 by Hatchett, Clarissa, RN  Safety Promotion/Fall Prevention:   activity supervised   assistive device/personal items within reach   clutter free environment maintained   lighting adjusted   safety round/check completed  Taken 10/14/2024 2058 by Hatchett, Clarissa, RN  Safety Promotion/Fall Prevention:   activity supervised   assistive device/personal items within reach   clutter free environment maintained   fall prevention program maintained   lighting adjusted   nonskid shoes/slippers when out of bed   room organization consistent   safety round/check completed   toileting scheduled  Intervention: Prevent Skin Injury  Recent Flowsheet Documentation  Taken 10/15/2024 0410 by Hatchett, Clarissa, RN  Body Position:   position changed independently   side-lying   right  Taken 10/15/2024 0216 by Hatchett, Clarissa, RN  Body Position: position changed independently  Taken 10/15/2024 0103 by Hatchett, Clarissa, RN  Skin  Protection:   incontinence pads utilized   protective footwear used  Taken 10/15/2024 0001 by Hatchett, Clarissa, RN  Body Position: position changed independently  Taken 10/14/2024 2200 by Hatchett, Clarissa, RN  Body Position:   side-lying   left  Taken 10/14/2024 2058 by Hatchett, Clarissa, RN  Body Position: supine  Intervention: Prevent and Manage VTE (Venous Thromboembolism) Risk  Recent Flowsheet Documentation  Taken 10/14/2024 2058 by Hatchett, Clarissa, RN  VTE Prevention/Management: (Xarelto) other (see comments)  Goal: Optimal Comfort and Wellbeing  Intervention: Provide Person-Centered Care  Recent Flowsheet Documentation  Taken 10/14/2024 2058 by Hatchett, Clarissa, RN  Trust Relationship/Rapport:   care explained   choices provided   questions answered   thoughts/feelings acknowledged     Problem: Fall Injury Risk  Goal: Absence of Fall and Fall-Related Injury  Intervention: Identify and Manage Contributors  Recent Flowsheet Documentation  Taken 10/14/2024 2058 by Hatchett, Clarissa, RN  Medication Review/Management: medications reviewed  Intervention: Promote Injury-Free Environment  Recent Flowsheet Documentation  Taken 10/15/2024 0410 by Hatchett, Clarissa, RN  Safety Promotion/Fall Prevention:   activity supervised   assistive device/personal items within reach   clutter free environment maintained   fall prevention program maintained   lighting adjusted   nonskid shoes/slippers when out of bed   room organization consistent   safety round/check completed  Taken 10/15/2024 0001 by Hatchett, Clarissa, RN  Safety Promotion/Fall Prevention:   activity supervised   assistive device/personal items within reach   clutter free environment maintained   fall prevention program maintained   lighting adjusted   nonskid shoes/slippers when out of bed   room organization consistent   safety round/check completed   toileting scheduled  Taken 10/14/2024 2200 by Hatchett, Clarissa, RN  Safety Promotion/Fall  Prevention:   activity supervised   assistive device/personal items within reach   clutter free environment maintained   lighting adjusted   safety round/check completed  Taken 10/14/2024 2058 by Hatchett, Clarissa, RN  Safety Promotion/Fall Prevention:   activity supervised   assistive device/personal items within reach   clutter free environment maintained   fall prevention program maintained   lighting adjusted   nonskid shoes/slippers when out of bed   room organization consistent   safety round/check completed   toileting scheduled     Problem: Comorbidity Management  Goal: Blood Glucose Level Within Target Range  Intervention: Monitor and Manage Glycemia  Recent Flowsheet Documentation  Taken 10/14/2024 2058 by Hatchett, Clarissa, RN  Medication Review/Management: medications reviewed  Goal: Blood Pressure in Desired Range  Intervention: Maintain Blood Pressure Management  Recent Flowsheet Documentation  Taken 10/14/2024 2058 by Hatchett, Clarissa, RN  Medication Review/Management: medications reviewed     Problem: Skin Injury Risk Increased  Goal: Skin Health and Integrity  Intervention: Optimize Skin Protection  Recent Flowsheet Documentation  Taken 10/15/2024 0410 by Hatchett, Clarissa, RN  Activity Management: activity minimized  Head of Bed (HOB) Positioning: HOB lowered  Taken 10/15/2024 0216 by Hatchett, Clarissa, RN  Activity Management: activity minimized  Head of Bed (HOB) Positioning: HOB lowered  Taken 10/15/2024 0103 by Hatchett, Clarissa, RN  Pressure Reduction Techniques: frequent weight shift encouraged  Skin Protection:   incontinence pads utilized   protective footwear used  Taken 10/15/2024 0001 by Hatchett, Clarissa, RN  Head of Bed (HOB) Positioning: HOB at 30-45 degrees  Taken 10/14/2024 2200 by Hatchett, Clarissa, RN  Head of Bed (HOB) Positioning: HOB at 30 degrees  Taken 10/14/2024 2058 by Hatchett, Clarissa, RN  Activity Management: activity encouraged  Pressure Reduction Techniques:  frequent weight shift encouraged  Head of Bed (HOB) Positioning: HOB at 30 degrees     Problem: Adult Inpatient Plan of Care  Goal: Optimal Comfort and Wellbeing  Intervention: Provide Person-Centered Care  Recent Flowsheet Documentation  Taken 10/14/2024 2058 by Hatchett, Clarissa, RN  Trust Relationship/Rapport:   care explained   choices provided   questions answered   thoughts/feelings acknowledged     Problem: Fall Injury Risk  Goal: Absence of Fall and Fall-Related Injury  Intervention: Identify and Manage Contributors  Recent Flowsheet Documentation  Taken 10/14/2024 2058 by Hatchett, Clarissa, RN  Medication Review/Management: medications reviewed  Intervention: Promote Injury-Free Environment  Recent Flowsheet Documentation  Taken 10/15/2024 0410 by Hatchett, Clarissa, RN  Safety Promotion/Fall Prevention:   activity supervised   assistive device/personal items within reach   clutter free environment maintained   fall prevention program maintained   lighting adjusted   nonskid shoes/slippers when out of bed   room organization consistent   safety round/check completed  Taken 10/15/2024 0001 by Hatchett, Clarissa, RN  Safety Promotion/Fall Prevention:   activity supervised   assistive device/personal items within reach   clutter free environment maintained   fall prevention program maintained   lighting adjusted   nonskid shoes/slippers when out of bed   room organization consistent   safety round/check completed   toileting scheduled  Taken 10/14/2024 2200 by Hatchett, Clarissa, RN  Safety Promotion/Fall Prevention:   activity supervised   assistive device/personal items within reach   clutter free environment maintained   lighting adjusted   safety round/check completed  Taken 10/14/2024 2058 by Hatchett, Clarissa, RN  Safety Promotion/Fall Prevention:   activity supervised   assistive device/personal items within reach   clutter free environment maintained   fall prevention program maintained   lighting  adjusted   nonskid shoes/slippers when out of bed   room organization consistent   safety round/check completed   toileting scheduled

## 2024-10-15 NOTE — PLAN OF CARE
Goal Outcome Evaluation:      Discharged to home, returning to [facility].

## 2024-10-15 NOTE — DISCHARGE SUMMARY
Patient Name: Rekha Bear  : 1940  MRN: [id number]    Date of Admission: 10/12/2024  Date of Discharge:  10/15/2024  Primary Care Physician: Zahida Osorio MD      Chief Complaint:   Vomiting and Altered Mental Status      Discharge Diagnoses     Active Hospital Problems    Diagnosis  POA    **Acute UTI [N39.0]  Yes    Type 2 diabetes mellitus with stage 3 chronic kidney disease, without long-term current use of insulin [E11.22, N18.30]  Yes    Primary hypertension [I10]  Yes    Hypothyroidism [E03.9]  Yes      Resolved Hospital Problems   No resolved problems to display.        Hospital Course     Ms. Bear is a 84 y.o. female with a history of CVA, PAF on Xarelto, hyperlipidemia, hypothyroidism, hypertension, and type 2 diabetes mellitus, who presented to [facility] initially complaining of nausea and worsening confusion.  Please see the admitting history and physical for further details.  She was found to have metabolic encephalopathy secondary to acute urinary tract infection and was admitted to the hospital for further evaluation and treatment. At time of admission urinalysis positive nitrates, moderate leukocytes, too numerous to count WBCs, 3+ bacteria, urine culture positive for E. coli.  CT scan abdomen/pelvis showed no hydronephrosis, interstitial and groundglass opacities. She responded well to IV antibiotics and had significant improvement in her mental status but was not back to her baseline. Family reported she has had worsening memory loss since her left PCA stroke.  Neurology was consulted and evaluated, secondary to her worsening confusion, CT scan of her head showed no acute intracranial processes there is noted hypoattenuation and volume loss within the left occipital lobe consistent with encephalomalacia form prior PCA, and chronic small vessel ischemic changes.  Neurology did not recommend follow-up MRI, did start her on low-dose Aricept and would like for  her to follow-up in the clinic at her next scheduled appointment.  He was noted to have an acute kidney injury felt to be secondary to poor oral intake, urinary tract infection, as well as intermittent hypotension.  Nephrology was consulted and evaluated and recommended increased oral intake.   During admission she has noted to have elevated glucose levels, that have been managed with sliding scale insulin. She has maintained on her Xarelto for previous CVA as mentioned above, PAF.  It is felt Ms Bear has not been complaint with her routine medications, this is likely secondary to her increasing memory loss, and should not be managing her medications.   She has been evaluated by physical therapy and occupational therapy physical therapy recommends assisted living with home health, occupational therapy recommends ongoing skilled OT to address deficits including decreased activity tolerance, strengthening, transfers, mobility with prior rollator.  She remains intermittently confused, but is easily reoriented.  She is medically stable for discharge to skilled nursing/assisted living.  Day of Discharge     Subjective:  She is pleasantly confused, but states she is ready to to get back to her assisted living facility.    Physical Exam:  Temp:  [97.5 °F (36.4 °C)-97.9 °F (36.6 °C)] 97.5 °F (36.4 °C)  Heart Rate:  [77-86] 78  Resp:  [16-18] 18  BP: (100-159)/(68-91) 129/91  Body mass index is 31.59 kg/m².  Physical Exam  Vitals and nursing note reviewed.   Constitutional:       Appearance: She is ill-appearing.   HENT:      Head: Atraumatic.      Mouth/Throat:      Mouth: Mucous membranes are dry.   Cardiovascular:      Rate and Rhythm: Normal rate. Rhythm irregular.      Pulses: Normal pulses.           Radial pulses are 2+ on the right side and 2+ on the left side.      Heart sounds: Normal heart sounds.   Pulmonary:      Effort: Pulmonary effort is normal.      Breath sounds: Normal breath sounds.   Abdominal:       General: Bowel sounds are normal.      Palpations: Abdomen is soft.   Musculoskeletal:      Right lower leg: No edema.      Left lower leg: No edema.   Skin:     General: Skin is warm and dry.      Capillary Refill: Capillary refill takes less than 2 seconds.   Neurological:      General: No focal deficit present.      Mental Status: She is alert. Mental status is at baseline.         Consultants     Consult Orders (all) (From admission, onward)       Start     Ordered    10/13/24 1039  Inpatient Neurology Consult General  Once        Specialty:  Neurology  Provider:  Rayo Watson MD    10/13/24 1039    10/13/24 1038  Inpatient Nephrology Consult  Once        Specialty:  Nephrology  Provider:  Gonzalo Edmond MD    10/13/24 1038    10/12/24 2138  Inpatient Case Management  Consult  Once        Provider:  (Not yet assigned)    10/12/24 2137    10/12/24 1644  [facility] (on-call MD unless specified) Details  Once        Specialty:  Hospitalist  Provider:  Ronna Sawant MD    10/12/24 1643                  Procedures     * Surgery not found *    Imaging Results (All)       Procedure Component Value Units Date/Time    CT Abdomen Pelvis Without Contrast [[id number]] Collected: 10/13/24 1514     Updated: 10/13/24 1521    Narrative:      CT ABDOMEN AND PELVIS WITHOUT IV CONTRAST     HISTORY: Urinary tract infection, vomiting     TECHNIQUE: Radiation dose reduction techniques were utilized, including  automated exposure control and exposure modulation based on body size.  Axial images were obtained through the abdomen and pelvis without the  administration of IV contrast. Coronal and sagittal reformatted images  were obtained.     COMPARISON: None available     FINDINGS:      ABDOMEN:  There are mild interstitial and groundglass opacities in the lung bases.  This may reflect a more acute infectious/inflammatory process or could  reflect chronic interstitial lung disease. The liver is  unremarkable.  Cholecystectomy. The spleen is normal in size. There is atrophy of the  right kidney with respect to the left. There is no hydronephrosis. There  are no kidney stones. The adrenal glands and pancreas are unremarkable.     PELVIS:  The lower pelvis is obscured by streak artifact. The bladder is poorly  evaluated. Sigmoid diverticulosis without appreciable diverticulitis.  Appendix is normal. Bone windows show bilateral hip arthroplasties.  There are lumbar spine degenerative changes.       Impression:      1. No hydronephrosis. There is atrophy of the right kidney with respect  to the left kidney  2. The bladder is poorly evaluated due to streak artifact  3. Mild interstitial and groundglass opacities in the lung bases. This  may be a more acute infectious/inflammatory process or chronic  interstitial lung disease correlate clinically     Radiation dose reduction techniques were utilized, including automated  exposure control and exposure modulation based on body size.        This report was finalized on 10/13/2024 3:18 PM by Dr. Palomo Hilton M.D on Workstation: [id number]       CT Head Without Contrast [[id number]] Collected: 10/12/24 1528     Updated: 10/12/24 1534    Narrative:      CT HEAD WO CONTRAST-     INDICATION: Confusion and vomiting     COMPARISON: CT head December 21, 2023     TECHNIQUE:  CT head without IV contrast. Coronal and sagittal reformats. Radiation  dose reduction techniques were utilized, including automated exposure  control and exposure modulation based on body size.     FINDINGS:      Brain: Hypoattenuation and volume loss within the left occipital lobe,  consistent with encephalomalacia from prior PCA territory infarct. No  intraparenchymal hemorrhage. Chronic small vessel ischemic changes. No  mass effect or midline shift.     Ventricles: Ventriculomegaly, suspect ex vacuo dilatation from central  volume loss. No intraventricular hemorrhage.     Extra-axial spaces: No  extra-axial hemorrhage or fluid collection.     Orbits: Cataract extractions.     Osseous structures: Hyperostosis frontalis interna. No fracture.     Sinuses: Patent mastoid air cells and middle ears. Patent paranasal  sinuses.       Impression:         1. No acute intracranial process.  2. Encephalomalacia in the left occipital lobe, consistent with old PCA  territory infarct.     This report was finalized on 10/12/2024 3:31 PM by Dr. Cesar Ruiz M.D on Workstation: [id number]       XR Chest 1 View [[id number]] Collected: 10/12/24 1504     Updated: 10/12/24 1509    Narrative:      XR CHEST 1 VW-     Clinical: Vomiting, altered mental status     COMPARISON examination 4/2/2020     FINDINGS: Cardiac enlargement is again demonstrated. No vascular  congestion. Lungs are clear.     CONCLUSION: Cardiomegaly.     This report was finalized on 10/12/2024 3:06 PM by Dr. Dino Page M.D on Workstation: [id number]             Results for orders placed during the hospital encounter of 12/21/23    Duplex Carotid Ultrasound CAR    Interpretation Summary    Right internal carotid artery demonstrates a less than 50% stenosis.    Left internal carotid artery demonstrates a 50-69% stenosis.    Results for orders placed during the hospital encounter of 12/21/23    Adult transthoracic echo complete    Interpretation Summary    Left ventricular systolic function is normal. Calculated left ventricular EF = 59.6%    Left ventricular wall thickness is consistent with borderline concentric hypertrophy.    Left ventricular diastolic function was normal.    Left atrial volume is moderately increased.    Saline test results are negative.    There is calcification of the aortic valve.    Estimated right ventricular systolic pressure from tricuspid regurgitation is mildly elevated (35-45 mmHg). Calculated right ventricular systolic pressure from tricuspid regurgitation is 38 mmHg.    Mild pulmonary hypertension is  "present.    Pertinent Labs     Results from last 7 days   Lab Units 10/15/24  0445 10/14/24  0637 10/13/24  0456 10/12/24  1509   WBC 10*3/mm3 9.67 9.61 10.28 11.37*   HEMOGLOBIN g/dL 14.0 13.7 14.3 14.7   PLATELETS 10*3/mm3 230 219 226 260     Results from last 7 days   Lab Units 10/15/24  0445 10/14/24  0637 10/13/24  0456 10/12/24  1509   SODIUM mmol/L 136 137 133* 133*   POTASSIUM mmol/L 4.4 4.8 4.6 5.0   CHLORIDE mmol/L 103 101 97* 95*   CO2 mmol/L 23.2 27.5 27.0 26.6   BUN mg/dL 27* 33* 41* 46*   CREATININE mg/dL 1.36* 1.50* 1.60* 1.81*   GLUCOSE mg/dL 221* 187* 168* 182*   EGFR mL/min/1.73 39.0* 34.6* 32.1* 27.7*     Results from last 7 days   Lab Units 10/15/24  0445 10/14/24  0637 10/12/24  1509   ALBUMIN g/dL 3.4* 3.6 4.1   BILIRUBIN mg/dL 0.3  --  0.6   ALK PHOS U/L 92  --  109   AST (SGOT) U/L 17  --  23   ALT (SGPT) U/L 13  --  18     Results from last 7 days   Lab Units 10/15/24  0445 10/14/24  0637 10/13/24  0456 10/12/24  1509   CALCIUM mg/dL 9.1 9.4 9.2 9.7   ALBUMIN g/dL 3.4* 3.6  --  4.1   MAGNESIUM mg/dL  --  2.1  --  2.2   PHOSPHORUS mg/dL 3.3 3.3  --  4.6*       Results from last 7 days   Lab Units 10/12/24  1613 10/12/24  1509   CK TOTAL U/L  --  117   HSTROP T ng/L 31* 33*   PROBNP pg/mL  --  827.0     Results from last 7 days   Lab Units 10/14/24  0637 10/13/24  0456 10/12/24  1611   SODIUM UR mmol/L  --   --  23   CREATININE UR mg/dL  --   --  144.3   CHLORIDE UR mmol/L  --   --  <60   PROTEIN TOTAL URINE mg/dL  --   --  14.5   URIC ACID mg/dL 7.7*   < >  --    PROT/CREAT RATIO UR mg/G Crea  --   --  100.5    < > = values in this interval not displayed.         Invalid input(s): \"LDLCALC\"  Results from last 7 days   Lab Units 10/13/24  0503 10/13/24  0456 10/12/24  1610   BLOODCX  No growth at 2 days No growth at 2 days  --    URINECX   --   --  >100,000 CFU/mL Escherichia coli*         Test Results Pending at Discharge     Pending Results       Procedure [Order ID] Specimen - Date/Time    " Folate RBC [[id number]] Collected: 10/13/24 1146    Specimen: Blood Updated: 10/13/24 1226              Discharge Details        Discharge Medications        New Medications        Instructions Start Date   cefdinir 300 MG capsule  Commonly known as: OMNICEF   300 mg, Oral, 2 Times Daily      donepezil 10 MG tablet  Commonly known as: ARICEPT   10 mg, Oral, Nightly             Continue These Medications        Instructions Start Date   atorvastatin 40 MG tablet  Commonly known as: LIPITOR   40 mg, Oral, Nightly      BD AutoShield Duo 30G X 5 MM misc  Generic drug: Insulin Pen Needle   1 each, Other, 4 Times Daily      buPROPion  MG 24 hr tablet  Commonly known as: WELLBUTRIN XL   150 mg, Oral, Every Morning      docusate sodium 100 MG capsule   100 mg, Oral, 2 Times Daily      gabapentin 400 MG capsule  Commonly known as: NEURONTIN   TAKE ONE CAPSULE BY MOUTH THREE TIMES DAILY (MORNING, NOON, AND BEDTIME)      glimepiride 4 MG tablet  Commonly known as: AMARYL   4 mg, Oral, Every Morning Before Breakfast      glucose blood test strip   Use to test blood glucose daily. Formulary Compliance Approval. Diagnosis: Type 2 Diabetes - Not Insulin Dependent      glucose monitor monitoring kit   Use to test blood glucose daily. Formulary Compliance Approval. Diagnosis: Type 2 Diabetes - Not Insulin Dependent      insulin aspart 100 UNIT/ML solution pen-injector sc pen  Commonly known as: novoLOG FLEXPEN   2-10 units three times a day before each meal per scale      Lancets misc   Use to test blood glucose daily. Formulary Compliance Approval. Diagnosis: Type 2 Diabetes - Not Insulin Dependent      levothyroxine 88 MCG tablet  Commonly known as: SYNTHROID, LEVOTHROID   1 tablet, Daily      linagliptin 5 MG tablet tablet  Commonly known as: Tradjenta   5 mg, Oral, Daily      metoprolol succinate XL 25 MG 24 hr tablet  Commonly known as: TOPROL-XL   25 mg, Oral, Daily      oxybutynin XL 10 MG 24 hr tablet  Commonly known  as: DITROPAN-XL   10 mg, Oral, Daily      pioglitazone 15 MG tablet  Commonly known as: ACTOS   15 mg, Oral, Daily      venlafaxine  MG 24 hr capsule  Commonly known as: EFFEXOR-XR   150 mg, Oral, Daily      vitamin B-12 1000 MCG tablet  Commonly known as: CYANOCOBALAMIN   1,000 mcg, Oral, Daily      vitamin D 1.25 MG (14760 UT) capsule capsule  Commonly known as: ERGOCALCIFEROL   50,000 Units, Oral, Every 7 Days, Ergocalciferol 50,000 units weekly x 5 weeks starting Thursday, Jan 18, 2023, then change to cholecalciferol 1,000 units twice a day      Xarelto 15 MG tablet  Generic drug: rivaroxaban   TAKE ONE TABLET BY MOUTH IN THE EVENING WITH DINNER             Stop These Medications      polyethylene glycol 17 g packet  Commonly known as: MIRALAX              Allergies   Allergen Reactions    Shellfish-Derived Products Anaphylaxis    Amlodipine Hives and Other (See Comments)     Heart race    Iodine Rash       Discharge Disposition:  Skilled Nursing Facility (DC - External)      Discharge Diet:  Diet Order   Procedures    Diet: Cardiac, Diabetic; Healthy Heart (2-3 Na+); Consistent Carbohydrate; Texture: Regular (IDDSI 7); Fluid Consistency: Thin (IDDSI 0)       Discharge Activity:       CODE STATUS:    Code Status and Medical Interventions: CPR (Attempt to Resuscitate); Full   Ordered at: 10/12/24 1740     Code Status (Patient has no pulse and is not breathing):    CPR (Attempt to Resuscitate)     Medical Interventions (Patient has pulse or is breathing):    Full       Future Appointments   Date Time Provider Department Center   1/13/2025 10:30 AM Zahida Osorio MD [facility]   2/27/2025 11:45 AM Elizabeth Pina MD [id number] None   5/27/2025 11:30 AM Julisa Watson PA [facility]      Follow-up Information       Zahida Osorio MD .    Specialty: Internal Medicine  Contact information:  [address] [place], [place] [id number] [phone]               Eben Porter,  MD .    Specialty: Family Medicine  Contact information:  [address]  [facility] [id number] [phone]                             Time Spent on Discharge:  Greater than 30 minutes      CT Devi  [facility]  10/15/24  08:11 EDT

## 2024-10-15 NOTE — PROGRESS NOTES
[facility] Progress Note      Patient Name: Rekha Bear  : 1940  MRN: [id number]  Primary Care Physician:  Zahida Osorio MD  Date of admission: 10/12/2024    Subjective     Interval History:   No shortness of breath on room air lying flat  Appetite is good; no N/V  No urinary complaints      Review of Systems:   As noted above    Objective     Vitals:   Temp:  [97.5 °F (36.4 °C)-97.9 °F (36.6 °C)] 97.5 °F (36.4 °C)  Heart Rate:  [78-86] 78  Resp:  [16-18] 18  BP: (100-159)/(68-91) 129/91    Intake/Output Summary (Last 24 hours) at 10/15/2024 0836  Last data filed at 10/15/2024 0027  Gross per 24 hour   Intake 240 ml   Output 1346 ml   Net -1106 ml       Physical Exam:    General Appearance: alert, oriented x 3, NAD  Skin: warm and dry  HEENT: oral mucosa normal, nonicteric sclera  Neck: supple, no JVD  Lungs: CTA; not labored on room air lying flat  Heart: Irregularly irregular, not tachycardic  Abdomen: soft, nontender, nondistended, BS +  : no palpable bladder; external urinary catheter  Extremities: no edema, cyanosis or clubbing  Neuro: normal speech and mental status     Scheduled Meds:     atorvastatin, 40 mg, Oral, Nightly  buPROPion XL, 150 mg, Oral, QAM  cefTRIAXone, 2,000 mg, Intravenous, Q24H  docusate sodium, 100 mg, Oral, BID  donepezil, 10 mg, Oral, Nightly  gabapentin, 100 mg, Oral, Q12H  insulin lispro, 2-7 Units, Subcutaneous, 4x Daily AC & at Bedtime  levothyroxine, 88 mcg, Oral, Daily  metoprolol succinate XL, 25 mg, Oral, Daily  oxybutynin XL, 10 mg, Oral, Daily  rivaroxaban, 15 mg, Oral, Daily With Dinner  venlafaxine XR, 150 mg, Oral, Daily  vitamin B-12, 1,000 mcg, Oral, Daily      IV Meds:        Results Reviewed:   I have personally reviewed the results from the time of this admission to 10/15/2024 08:36 EDT     Results from last 7 days   Lab Units 10/15/24  0445 10/14/24  0637 10/13/24  0456 10/12/24  1509   SODIUM mmol/L 136 137 133* 133*    POTASSIUM mmol/L 4.4 4.8 4.6 5.0   CHLORIDE mmol/L 103 101 97* 95*   CO2 mmol/L 23.2 27.5 27.0 26.6   BUN mg/dL 27* 33* 41* 46*   CREATININE mg/dL 1.36* 1.50* 1.60* 1.81*   CALCIUM mg/dL 9.1 9.4 9.2 9.7   BILIRUBIN mg/dL 0.3  --   --  0.6   ALK PHOS U/L 92  --   --  109   ALT (SGPT) U/L 13  --   --  18   AST (SGOT) U/L 17  --   --  23   GLUCOSE mg/dL 221* 187* 168* 182*       Estimated Creatinine Clearance: 37.1 mL/min (A) (by C-G formula based on SCr of 1.36 mg/dL (H)).    Results from last 7 days   Lab Units 10/15/24  0445 10/14/24  0637 10/12/24  1509   MAGNESIUM mg/dL  --  2.1 2.2   PHOSPHORUS mg/dL 3.3 3.3 4.6*       Results from last 7 days   Lab Units 10/14/24  0637 10/13/24  0456   URIC ACID mg/dL 7.7* 8.5*       Results from last 7 days   Lab Units 10/15/24  0445 10/14/24  0637 10/13/24  0456 10/12/24  1509   WBC 10*3/mm3 9.67 9.61 10.28 11.37*   HEMOGLOBIN g/dL 14.0 13.7 14.3 14.7   PLATELETS 10*3/mm3 230 219 226 260       Results from last 7 days   Lab Units 10/12/24  1509   INR  1.28*       Assessment / Plan     ASSESSMENT:  1.  ALEXANDRA on CKD3b, nonoliguric, improving:  prerenal due to poor oral intake, earlier hypotension, and infection in the urinary tract.  Looks euvolemic; electrolytes stable  2.  UTI: E. coli by urine culture  3.  PVD with prior stroke  4.  DM2  5.  Atrial fibrillation: Rate-controlled    PLAN:  1.  Encouraged her to eat and drink  2.  Discharge anytime from renal view    Thank you for involving us in the care of Rekha Bear.  Please feel free to call with any questions.    Dakota Caballero MD  10/15/24  08:36 EDT    [facility]  [phone]    Please note that portions of this note were completed with a voice recognition program.

## 2024-10-15 NOTE — PROGRESS NOTES
"DOS: 10/15/2024  NAME: Rekha Bear   : 1940  PCP: Zahida Osorio MD  Chief Complaint   Patient presents with    Vomiting    Altered Mental Status     Neurology    Subjective: Patient son at bedside, reports she has had some episodes of intermittent confusion since admission.  No acute events overnight.  Patient endorses some difficulty thinking and recalling. Pt seen in follow up today, however the problem is new to the examiner.      Objective:  Vital signs: /91 (BP Location: Right arm, Patient Position: Lying)   Pulse 78   Temp 97.5 °F (36.4 °C) (Oral)   Resp 18   Ht 170.2 cm (67\")   Wt 91.5 kg (201 lb 11.5 oz)   LMP  (LMP Unknown)   SpO2 95%   BMI 31.59 kg/m²       GEN: NAD, v/s reviewed  HEENT: Normocephalic, atraumatic   COR: RRR  Resp: Even and unlabored, clear to auscultation bilaterally  Extremities: Normal, no edema    Neurological:   MS: AO to self, month/year.  Oriented to hospital but difficulty coming up with the hospital name.  Impaired recent/remote memory, intact attention/concentration, intact language.  No neglect.  CN: Left temporal partial hemianopia, PERRL, extraocular movements intact without nystagmus, normal facial sensation, no facial droop, tongue midline, no dysarthria  Motor: 5/5 except right upper extremity with decreased range of motion proximally due to chronic shoulder injury, normal tone  Sensory: Intact to light touch in all extremities  Coordination: Normal finger-to-nose and heel-to-shin    Scheduled Meds:atorvastatin, 40 mg, Oral, Nightly  buPROPion XL, 150 mg, Oral, QAM  cefTRIAXone, 2,000 mg, Intravenous, Q24H  docusate sodium, 100 mg, Oral, BID  donepezil, 5 mg, Oral, Nightly  gabapentin, 100 mg, Oral, Q12H  insulin lispro, 2-7 Units, Subcutaneous, 4x Daily AC & at Bedtime  levothyroxine, 88 mcg, Oral, Daily  metoprolol succinate XL, 25 mg, Oral, Daily  oxybutynin XL, 10 mg, Oral, Daily  rivaroxaban, 15 mg, Oral, Daily With Dinner  venlafaxine " XR, 150 mg, Oral, Daily  vitamin B-12, 1,000 mcg, Oral, Daily      Continuous Infusions:   PRN Meds:.  acetaminophen **OR** acetaminophen **OR** acetaminophen    senna-docusate sodium **AND** polyethylene glycol **AND** bisacodyl **AND** bisacodyl    dextrose    dextrose    glucagon (human recombinant)    melatonin    ondansetron ODT **OR** ondansetron    [COMPLETED] Insert Peripheral IV **AND** sodium chloride    Laboratory results:  Lab Results   Component Value Date    GLUCOSE 221 (H) 10/15/2024    CALCIUM 9.1 10/15/2024     10/15/2024    K 4.4 10/15/2024    CO2 23.2 10/15/2024     10/15/2024    BUN 27 (H) 10/15/2024    CREATININE 1.36 (H) 10/15/2024    EGFRIFAFRI 61 10/14/2021    EGFRIFNONA 50 (L) 10/14/2021    BCR 19.9 10/15/2024    ANIONGAP 9.8 10/15/2024     Lab Results   Component Value Date    WBC 9.67 10/15/2024    HGB 14.0 10/15/2024    HCT 42.9 10/15/2024    MCV 88.3 10/15/2024     10/15/2024     Lab Results   Component Value Date    CHOL 146 12/22/2023     Lab Results   Component Value Date    HDL 48 08/20/2024    HDL 57 05/16/2024    HDL 44 12/22/2023     Lab Results   Component Value Date    .8 (H) 08/20/2024    LDL 70.2 05/16/2024    LDL 75 12/22/2023     Lab Results   Component Value Date    TRIG 158 (H) 12/22/2023    TRIG 258 (H) 10/14/2021    TRIG 227 (H) 05/11/2021          Review and interpretation of imaging:  CT Abdomen Pelvis Without Contrast    Result Date: 10/13/2024  CT ABDOMEN AND PELVIS WITHOUT IV CONTRAST  HISTORY: Urinary tract infection, vomiting  TECHNIQUE: Radiation dose reduction techniques were utilized, including automated exposure control and exposure modulation based on body size. Axial images were obtained through the abdomen and pelvis without the administration of IV contrast. Coronal and sagittal reformatted images were obtained.  COMPARISON: None available  FINDINGS:  ABDOMEN: There are mild interstitial and groundglass opacities in the lung  bases. This may reflect a more acute infectious/inflammatory process or could reflect chronic interstitial lung disease. The liver is unremarkable. Cholecystectomy. The spleen is normal in size. There is atrophy of the right kidney with respect to the left. There is no hydronephrosis. There are no kidney stones. The adrenal glands and pancreas are unremarkable.  PELVIS: The lower pelvis is obscured by streak artifact. The bladder is poorly evaluated. Sigmoid diverticulosis without appreciable diverticulitis. Appendix is normal. Bone windows show bilateral hip arthroplasties. There are lumbar spine degenerative changes.      1. No hydronephrosis. There is atrophy of the right kidney with respect to the left kidney 2. The bladder is poorly evaluated due to streak artifact 3. Mild interstitial and groundglass opacities in the lung bases. This may be a more acute infectious/inflammatory process or chronic interstitial lung disease correlate clinically  Radiation dose reduction techniques were utilized, including automated exposure control and exposure modulation based on body size.   This report was finalized on 10/13/2024 3:18 PM by Dr. Palomo Hilton M.D on Workstation: [id number]      CT Head Without Contrast    Result Date: 10/12/2024  CT HEAD WO CONTRAST-  INDICATION: Confusion and vomiting  COMPARISON: CT head December 21, 2023  TECHNIQUE: CT head without IV contrast. Coronal and sagittal reformats. Radiation dose reduction techniques were utilized, including automated exposure control and exposure modulation based on body size.  FINDINGS:  Brain: Hypoattenuation and volume loss within the left occipital lobe, consistent with encephalomalacia from prior PCA territory infarct. No intraparenchymal hemorrhage. Chronic small vessel ischemic changes. No mass effect or midline shift.  Ventricles: Ventriculomegaly, suspect ex vacuo dilatation from central volume loss. No intraventricular hemorrhage.  Extra-axial  spaces: No extra-axial hemorrhage or fluid collection.  Orbits: Cataract extractions.  Osseous structures: Hyperostosis frontalis interna. No fracture.  Sinuses: Patent mastoid air cells and middle ears. Patent paranasal sinuses.       1. No acute intracranial process. 2. Encephalomalacia in the left occipital lobe, consistent with old PCA territory infarct.  This report was finalized on 10/12/2024 3:31 PM by Dr. Cesar Ruiz M.D on Workstation: [id number]      XR Chest 1 View    Result Date: 10/12/2024  XR CHEST 1 VW-  Clinical: Vomiting, altered mental status  COMPARISON examination 4/2/2020  FINDINGS: Cardiac enlargement is again demonstrated. No vascular congestion. Lungs are clear.  CONCLUSION: Cardiomegaly.  This report was finalized on 10/12/2024 3:06 PM by Dr. Dino Page M.D on Workstation: [id number]       Impression:  84-year-old female with hypertension, hyperlipidemia, diabetes, osteoarthritis, depression, previous left PCA stroke on empiric anticoagulation with Xarelto, CKD 3, SHEY, history of severe COVID requiring ventilator support who was brought in from her assisted living facility for acute confusion.  Urinalysis felt to be consistent with UTI with culture showing greater than 100,000 E. coli, which was treated with antibiotics.  Patient's family reported couple month history of progressive decline in short-term memory problems noting she recently went to her granddaughter's wedding but could not recall that.  She was also not taking her medications reliably    Workup:  CT head: No acute findings.  Encephalomalacia noted in the left occipital lobe.  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  Recommend outpatient neurology follow-up with cognitive screening with [facility] or [facility], currently scheduled with Julisa Watson PA-C in December.  Do not recommend MRI as it would not  as patient was not taking medications reliably.Her assisted living facility is going to take over her medication management.  If no improvement in mental status could consider changing Ditropan as outpatient which can contribute to cognitive problems.  Discussed with patient and son at bedside.  Discussed with Dr. Arevalo today.  Neurology will sign off but please call if further questions or concerns.

## 2024-10-15 NOTE — CASE MANAGEMENT/SOCIAL WORK
Continued Stay Note  [facility]     Patient Name: Rekha Bear  MRN: [id number]  Today's Date: 10/15/2024    Admit Date: 10/12/2024    Plan: Return to [facility] personal care   Discharge Plan       Row Name 10/15/24 1123       Plan    Patient/Family in Agreement with Plan yes    Plan Comments DC orders noted.  S/W Inna/ nurse at [facility] ([phone]) to notify that pt will return today, bed remains available.  S/W pt and her son Jl who are in agreement w/ DC back to [facility] today.  They will discuss / [facility] about receiving in-house PT.  DC summary and DC packet given to RN.  Pt's son will provide transport.    Final Note DC back to [facility]. .............Justina MALIK/ EVERARDO                   Discharge Codes    No documentation.                 Expected Discharge Date and Time       Expected Discharge Date Expected Discharge Time    Oct 15, 2024               Justina Wilkins, ALIVIA

## 2024-10-15 NOTE — PROGRESS NOTES
Name: Rekha Bear ADMIT: 10/12/2024   : 1940  PCP: Zahida Osorio MD    MRN: [id number] LOS: 0 days   AGE/SEX: 84 y.o. female  ROOM: [facility]     Subjective   Subjective   Patient is pleasantly confused today, able to carry on conversation and make needs known. Son is at bedside and feels she is not at her baseline.          Objective   Objective   Vital Signs  Temp:  [97.5 °F (36.4 °C)-97.9 °F (36.6 °C)] 97.7 °F (36.5 °C)  Heart Rate:  [77-86] 81  Resp:  [16-18] 18  BP: (100-159)/(68-89) 159/89  SpO2:  [96 %-98 %] 98 %  on   ;   Device (Oxygen Therapy): room air  Body mass index is 31.59 kg/m².  Physical Exam  Vitals and nursing note reviewed.   Constitutional:       General: She is awake. She is not in acute distress.     Appearance: Normal appearance.   HENT:      Head: Normocephalic and atraumatic.      Mouth/Throat:      Mouth: Mucous membranes are moist.      Pharynx: No posterior oropharyngeal erythema.   Eyes:      General: No scleral icterus.     Conjunctiva/sclera: Conjunctivae normal.   Neck:      Vascular: No JVD.   Cardiovascular:      Rate and Rhythm: Normal rate and regular rhythm.      Pulses: Normal pulses.           Radial pulses are 2+ on the right side and 2+ on the left side.      Heart sounds: Normal heart sounds. No murmur heard.  Pulmonary:      Effort: Pulmonary effort is normal. No respiratory distress.      Breath sounds: Normal breath sounds.   Abdominal:      General: Bowel sounds are normal. There is no distension.      Palpations: Abdomen is soft.      Tenderness: There is no abdominal tenderness.   Musculoskeletal:         General: No swelling or tenderness.      Cervical back: Neck supple.   Skin:     General: Skin is warm and dry.      Capillary Refill: Capillary refill takes less than 2 seconds.      Coloration: Skin is not jaundiced.      Findings: No rash.   Neurological:      General: No focal deficit present.      Mental Status: She is alert. Mental status is  at baseline. She is confused.      Comments: Confused to situation    Psychiatric:         Mood and Affect: Mood normal.         Behavior: Behavior normal. Behavior is cooperative.       Results Review     I reviewed the patient's new clinical results.  Results from last 7 days   Lab Units 10/15/24  0445 10/14/24  0637 10/13/24  0456 10/12/24  1509   WBC 10*3/mm3 9.67 9.61 10.28 11.37*   HEMOGLOBIN g/dL 14.0 13.7 14.3 14.7   PLATELETS 10*3/mm3 230 219 226 260     Results from last 7 days   Lab Units 10/15/24  0445 10/14/24  0637 10/13/24  0456 10/12/24  1509   SODIUM mmol/L 136 137 133* 133*   POTASSIUM mmol/L 4.4 4.8 4.6 5.0   CHLORIDE mmol/L 103 101 97* 95*   CO2 mmol/L 23.2 27.5 27.0 26.6   BUN mg/dL 27* 33* 41* 46*   CREATININE mg/dL 1.36* 1.50* 1.60* 1.81*   GLUCOSE mg/dL 221* 187* 168* 182*   EGFR mL/min/1.73 39.0* 34.6* 32.1* 27.7*     Results from last 7 days   Lab Units 10/15/24  0445 10/14/24  0637 10/12/24  1509   ALBUMIN g/dL 3.4* 3.6 4.1   BILIRUBIN mg/dL 0.3  --  0.6   ALK PHOS U/L 92  --  109   AST (SGOT) U/L 17  --  23   ALT (SGPT) U/L 13  --  18     Results from last 7 days   Lab Units 10/15/24  0445 10/14/24  0637 10/13/24  0456 10/12/24  1509   CALCIUM mg/dL 9.1 9.4 9.2 9.7   ALBUMIN g/dL 3.4* 3.6  --  4.1   MAGNESIUM mg/dL  --  2.1  --  2.2   PHOSPHORUS mg/dL 3.3 3.3  --  4.6*     Results from last 7 days   Lab Units 10/13/24  1146 10/12/24  1509   PROCALCITONIN ng/mL 0.08  --    LACTATE mmol/L  --  1.2     Glucose   Date/Time Value Ref Range Status   10/14/2024 2137 180 (H) 70 - 130 mg/dL Final   10/14/2024 1645 178 (H) 70 - 130 mg/dL Final   10/14/2024 1139 248 (H) 70 - 130 mg/dL Final   10/14/2024 0555 164 (H) 70 - 130 mg/dL Final   10/13/2024 2128 155 (H) 70 - 130 mg/dL Final   10/13/2024 1605 243 (H) 70 - 130 mg/dL Final   10/13/2024 1121 177 (H) 70 - 130 mg/dL Final       CT Abdomen Pelvis Without Contrast    Result Date: 10/13/2024  1. No hydronephrosis. There is atrophy of the right  kidney with respect to the left kidney 2. The bladder is poorly evaluated due to streak artifact 3. Mild interstitial and groundglass opacities in the lung bases. This may be a more acute infectious/inflammatory process or chronic interstitial lung disease correlate clinically  Radiation dose reduction techniques were utilized, including automated exposure control and exposure modulation based on body size.   This report was finalized on 10/13/2024 3:18 PM by Dr. Palomo Hilton M.D on Workstation: [id number]       I have personally reviewed all medications:  Scheduled Medications  atorvastatin, 40 mg, Oral, Nightly  buPROPion XL, 150 mg, Oral, QAM  cefTRIAXone, 2,000 mg, Intravenous, Q24H  docusate sodium, 100 mg, Oral, BID  donepezil, 10 mg, Oral, Nightly  gabapentin, 100 mg, Oral, Q12H  insulin lispro, 2-7 Units, Subcutaneous, 4x Daily AC & at Bedtime  levothyroxine, 88 mcg, Oral, Daily  metoprolol succinate XL, 25 mg, Oral, Daily  oxybutynin XL, 10 mg, Oral, Daily  rivaroxaban, 15 mg, Oral, Daily With Dinner  venlafaxine XR, 150 mg, Oral, Daily  vitamin B-12, 1,000 mcg, Oral, Daily    Infusions   Diet  Diet: Cardiac, Diabetic; Healthy Heart (2-3 Na+); Consistent Carbohydrate; Texture: Regular (IDDSI 7); Fluid Consistency: Thin (IDDSI 0)    I have personally reviewed:  [x]  Laboratory   [x]  Microbiology   [x]  Radiology   [x]  EKG/Telemetry  [x]  Cardiology/Vascular   []  Pathology    []  Records       Assessment/Plan     Active Hospital Problems    Diagnosis  POA    **Acute UTI [N39.0]  Yes    Type 2 diabetes mellitus with stage 3 chronic kidney disease, without long-term current use of insulin [E11.22, N18.30]  Yes    Primary hypertension [I10]  Yes    Hypothyroidism [E03.9]  Yes      Resolved Hospital Problems   No resolved problems to display.       84 y.o. female admitted with Acute UTI.    Acute UTI  She denies any  symptoms today   Continue ceftriaxone   Urine culture positive for E coli      Metabolic encephalopathy  Acute  Mental status improving, family reports that she is not at her baseline, she is still slightly confused.  She is able to carry on conversation is alert to self, place, and time, she is unsure of situation   Neurology signed off today   She has positive history for dementia on Aricept at baseline.    Type 2 diabetes mellitus with stage 3 chronic kidney disease, without long-term current use of insulin  Chronic   SSI    Blood glucose levels have been elevated   Changed diet to NCS today   Gabapentin for diabetic peripheral neuropathy  Holding home SSI, Tradjenta and glimepiride    Primary hypertension  Chronic   Blood pressures acceptable   Continue metoprolol    Hypothyroidism  Continue levothyroxine    Hyper lipidemia  Continue statin            Xarelto (home med) for DVT prophylaxis.  Full code.  Discussed with patient, family, nursing staff, and Dr Gleason  .  Anticipate discharge to SNU facility tomorrow.  Expected Discharge Date: 10/15/2024; Expected Discharge Time:     Patient was assessed and evaluated on 1114/2024 1105- delayed dictation     CT Devi  [facility]  10/14/24  11:05 EDT

## 2024-10-16 ENCOUNTER — DOCUMENTATION (OUTPATIENT)
Dept: FAMILY MEDICINE CLINIC | Facility: CLINIC | Age: 84
End: 2024-10-16
Payer: MEDICARE

## 2024-10-17 RX ORDER — METOPROLOL SUCCINATE 25 MG/1
TABLET, EXTENDED RELEASE ORAL
Qty: 30 TABLET | Refills: 5 | Status: SHIPPED | OUTPATIENT
Start: 2024-10-17

## 2024-10-18 LAB
BACTERIA SPEC AEROBE CULT: NORMAL
BACTERIA SPEC AEROBE CULT: NORMAL

## 2024-10-26 ENCOUNTER — APPOINTMENT (OUTPATIENT)
Dept: CT IMAGING | Facility: HOSPITAL | Age: 84
End: 2024-10-26
Payer: MEDICARE

## 2024-10-26 ENCOUNTER — APPOINTMENT (OUTPATIENT)
Dept: GENERAL RADIOLOGY | Facility: HOSPITAL | Age: 84
End: 2024-10-26
Payer: MEDICARE

## 2024-10-26 ENCOUNTER — HOSPITAL ENCOUNTER (INPATIENT)
Facility: HOSPITAL | Age: 84
LOS: 3 days | Discharge: NURSING, REHAB OR LONG-TERM CARE | End: 2024-10-30
Attending: EMERGENCY MEDICINE | Admitting: HOSPITALIST
Payer: MEDICARE

## 2024-10-26 DIAGNOSIS — S72.002A CLOSED FRACTURE OF LEFT HIP, INITIAL ENCOUNTER: Primary | ICD-10-CM

## 2024-10-26 DIAGNOSIS — Z96.643 HISTORY OF TOTAL REPLACEMENT OF BOTH HIP JOINTS: ICD-10-CM

## 2024-10-26 DIAGNOSIS — N18.9 CHRONIC RENAL IMPAIRMENT, UNSPECIFIED CKD STAGE: ICD-10-CM

## 2024-10-26 DIAGNOSIS — M15.8 OTHER OSTEOARTHRITIS INVOLVING MULTIPLE JOINTS: ICD-10-CM

## 2024-10-26 DIAGNOSIS — Z79.01 CHRONIC ANTICOAGULATION: ICD-10-CM

## 2024-10-26 PROBLEM — M97.02XA PERIPROSTHETIC FRACTURE AROUND INTERNAL PROSTHETIC LEFT HIP JOINT: Status: ACTIVE | Noted: 2024-10-26

## 2024-10-26 LAB
ANION GAP SERPL CALCULATED.3IONS-SCNC: 9 MMOL/L (ref 5–15)
BASOPHILS # BLD AUTO: 0.02 10*3/MM3 (ref 0–0.2)
BASOPHILS NFR BLD AUTO: 0.2 % (ref 0–1.5)
BUN SERPL-MCNC: 24 MG/DL (ref 8–23)
BUN/CREAT SERPL: 18.5 (ref 7–25)
CALCIUM SPEC-SCNC: 8.5 MG/DL (ref 8.6–10.5)
CHLORIDE SERPL-SCNC: 102 MMOL/L (ref 98–107)
CO2 SERPL-SCNC: 28 MMOL/L (ref 22–29)
CREAT SERPL-MCNC: 1.3 MG/DL (ref 0.57–1)
DEPRECATED RDW RBC AUTO: 48 FL (ref 37–54)
EGFRCR SERPLBLD CKD-EPI 2021: 41.1 ML/MIN/1.73
EOSINOPHIL # BLD AUTO: 0.11 10*3/MM3 (ref 0–0.4)
EOSINOPHIL NFR BLD AUTO: 1 % (ref 0.3–6.2)
ERYTHROCYTE [DISTWIDTH] IN BLOOD BY AUTOMATED COUNT: 14.5 % (ref 12.3–15.4)
GLUCOSE SERPL-MCNC: 152 MG/DL (ref 65–99)
HCT VFR BLD AUTO: 37.8 % (ref 34–46.6)
HGB BLD-MCNC: 12.2 G/DL (ref 12–15.9)
IMM GRANULOCYTES # BLD AUTO: 0.04 10*3/MM3 (ref 0–0.05)
IMM GRANULOCYTES NFR BLD AUTO: 0.4 % (ref 0–0.5)
LYMPHOCYTES # BLD AUTO: 1.76 10*3/MM3 (ref 0.7–3.1)
LYMPHOCYTES NFR BLD AUTO: 16.4 % (ref 19.6–45.3)
MCH RBC QN AUTO: 28.8 PG (ref 26.6–33)
MCHC RBC AUTO-ENTMCNC: 32.3 G/DL (ref 31.5–35.7)
MCV RBC AUTO: 89.2 FL (ref 79–97)
MONOCYTES # BLD AUTO: 0.95 10*3/MM3 (ref 0.1–0.9)
MONOCYTES NFR BLD AUTO: 8.8 % (ref 5–12)
NEUTROPHILS NFR BLD AUTO: 7.88 10*3/MM3 (ref 1.7–7)
NEUTROPHILS NFR BLD AUTO: 73.2 % (ref 42.7–76)
NRBC BLD AUTO-RTO: 0 /100 WBC (ref 0–0.2)
PLATELET # BLD AUTO: 171 10*3/MM3 (ref 140–450)
PMV BLD AUTO: 9 FL (ref 6–12)
POTASSIUM SERPL-SCNC: 4.6 MMOL/L (ref 3.5–5.2)
RBC # BLD AUTO: 4.24 10*6/MM3 (ref 3.77–5.28)
SODIUM SERPL-SCNC: 139 MMOL/L (ref 136–145)
WBC NRBC COR # BLD AUTO: 10.76 10*3/MM3 (ref 3.4–10.8)

## 2024-10-26 PROCEDURE — 25010000002 MORPHINE PER 10 MG: Performed by: HOSPITALIST

## 2024-10-26 PROCEDURE — 99285 EMERGENCY DEPT VISIT HI MDM: CPT

## 2024-10-26 PROCEDURE — 73502 X-RAY EXAM HIP UNI 2-3 VIEWS: CPT

## 2024-10-26 PROCEDURE — 36415 COLL VENOUS BLD VENIPUNCTURE: CPT

## 2024-10-26 PROCEDURE — 80048 BASIC METABOLIC PNL TOTAL CA: CPT | Performed by: EMERGENCY MEDICINE

## 2024-10-26 PROCEDURE — 25010000002 MORPHINE PER 10 MG: Performed by: EMERGENCY MEDICINE

## 2024-10-26 PROCEDURE — G0378 HOSPITAL OBSERVATION PER HR: HCPCS

## 2024-10-26 PROCEDURE — 72192 CT PELVIS W/O DYE: CPT

## 2024-10-26 PROCEDURE — 85025 COMPLETE CBC W/AUTO DIFF WBC: CPT | Performed by: EMERGENCY MEDICINE

## 2024-10-26 RX ORDER — MORPHINE SULFATE 2 MG/ML
4 INJECTION, SOLUTION INTRAMUSCULAR; INTRAVENOUS ONCE
Status: COMPLETED | OUTPATIENT
Start: 2024-10-26 | End: 2024-10-26

## 2024-10-26 RX ORDER — IBUPROFEN 600 MG/1
1 TABLET ORAL
Status: DISCONTINUED | OUTPATIENT
Start: 2024-10-26 | End: 2024-10-30 | Stop reason: HOSPADM

## 2024-10-26 RX ORDER — AMOXICILLIN 250 MG
2 CAPSULE ORAL 2 TIMES DAILY
Status: DISCONTINUED | OUTPATIENT
Start: 2024-10-26 | End: 2024-10-28

## 2024-10-26 RX ORDER — BISACODYL 10 MG
10 SUPPOSITORY, RECTAL RECTAL DAILY PRN
Status: DISCONTINUED | OUTPATIENT
Start: 2024-10-26 | End: 2024-10-28

## 2024-10-26 RX ORDER — MORPHINE SULFATE 2 MG/ML
2 INJECTION, SOLUTION INTRAMUSCULAR; INTRAVENOUS
Status: DISCONTINUED | OUTPATIENT
Start: 2024-10-26 | End: 2024-10-30 | Stop reason: HOSPADM

## 2024-10-26 RX ORDER — UREA 10 %
1000 LOTION (ML) TOPICAL DAILY
Status: DISCONTINUED | OUTPATIENT
Start: 2024-10-27 | End: 2024-10-30 | Stop reason: HOSPADM

## 2024-10-26 RX ORDER — LEVOTHYROXINE SODIUM 88 UG/1
88 TABLET ORAL DAILY
Status: DISCONTINUED | OUTPATIENT
Start: 2024-10-27 | End: 2024-10-30 | Stop reason: HOSPADM

## 2024-10-26 RX ORDER — DEXTROSE MONOHYDRATE 25 G/50ML
25 INJECTION, SOLUTION INTRAVENOUS
Status: DISCONTINUED | OUTPATIENT
Start: 2024-10-26 | End: 2024-10-30 | Stop reason: HOSPADM

## 2024-10-26 RX ORDER — ONDANSETRON 2 MG/ML
4 INJECTION INTRAMUSCULAR; INTRAVENOUS EVERY 6 HOURS PRN
Status: DISCONTINUED | OUTPATIENT
Start: 2024-10-26 | End: 2024-10-30 | Stop reason: HOSPADM

## 2024-10-26 RX ORDER — BISACODYL 5 MG/1
5 TABLET, DELAYED RELEASE ORAL DAILY PRN
Status: DISCONTINUED | OUTPATIENT
Start: 2024-10-26 | End: 2024-10-28

## 2024-10-26 RX ORDER — BUPROPION HYDROCHLORIDE 150 MG/1
150 TABLET ORAL EVERY MORNING
Status: DISCONTINUED | OUTPATIENT
Start: 2024-10-27 | End: 2024-10-30 | Stop reason: HOSPADM

## 2024-10-26 RX ORDER — INSULIN LISPRO 100 [IU]/ML
2-9 INJECTION, SOLUTION INTRAVENOUS; SUBCUTANEOUS
Status: DISCONTINUED | OUTPATIENT
Start: 2024-10-27 | End: 2024-10-30 | Stop reason: HOSPADM

## 2024-10-26 RX ORDER — ONDANSETRON 4 MG/1
4 TABLET, ORALLY DISINTEGRATING ORAL EVERY 6 HOURS PRN
Status: DISCONTINUED | OUTPATIENT
Start: 2024-10-26 | End: 2024-10-30 | Stop reason: HOSPADM

## 2024-10-26 RX ORDER — DONEPEZIL HYDROCHLORIDE 5 MG/1
5 TABLET, FILM COATED ORAL NIGHTLY
Status: DISCONTINUED | OUTPATIENT
Start: 2024-10-27 | End: 2024-10-30 | Stop reason: HOSPADM

## 2024-10-26 RX ORDER — VENLAFAXINE HYDROCHLORIDE 150 MG/1
150 CAPSULE, EXTENDED RELEASE ORAL DAILY
Status: DISCONTINUED | OUTPATIENT
Start: 2024-10-27 | End: 2024-10-30 | Stop reason: HOSPADM

## 2024-10-26 RX ORDER — POLYETHYLENE GLYCOL 3350 17 G/17G
17 POWDER, FOR SOLUTION ORAL DAILY PRN
Status: DISCONTINUED | OUTPATIENT
Start: 2024-10-26 | End: 2024-10-28

## 2024-10-26 RX ORDER — NALOXONE HCL 0.4 MG/ML
0.4 VIAL (ML) INJECTION
Status: DISCONTINUED | OUTPATIENT
Start: 2024-10-26 | End: 2024-10-30 | Stop reason: HOSPADM

## 2024-10-26 RX ORDER — ACETAMINOPHEN 325 MG/1
650 TABLET ORAL EVERY 4 HOURS PRN
Status: DISCONTINUED | OUTPATIENT
Start: 2024-10-26 | End: 2024-10-30 | Stop reason: HOSPADM

## 2024-10-26 RX ORDER — GABAPENTIN 400 MG/1
400 CAPSULE ORAL EVERY 12 HOURS SCHEDULED
Status: DISCONTINUED | OUTPATIENT
Start: 2024-10-27 | End: 2024-10-30 | Stop reason: HOSPADM

## 2024-10-26 RX ORDER — ATORVASTATIN CALCIUM 20 MG/1
40 TABLET, FILM COATED ORAL NIGHTLY
Status: DISCONTINUED | OUTPATIENT
Start: 2024-10-27 | End: 2024-10-30 | Stop reason: HOSPADM

## 2024-10-26 RX ORDER — NICOTINE POLACRILEX 4 MG
15 LOZENGE BUCCAL
Status: DISCONTINUED | OUTPATIENT
Start: 2024-10-26 | End: 2024-10-30 | Stop reason: HOSPADM

## 2024-10-26 RX ORDER — METOPROLOL SUCCINATE 25 MG/1
25 TABLET, EXTENDED RELEASE ORAL
Status: DISCONTINUED | OUTPATIENT
Start: 2024-10-27 | End: 2024-10-30 | Stop reason: HOSPADM

## 2024-10-26 RX ORDER — OXYBUTYNIN CHLORIDE 10 MG/1
10 TABLET, EXTENDED RELEASE ORAL DAILY
Status: DISCONTINUED | OUTPATIENT
Start: 2024-10-27 | End: 2024-10-30 | Stop reason: HOSPADM

## 2024-10-26 RX ORDER — HYDROCODONE BITARTRATE AND ACETAMINOPHEN 5; 325 MG/1; MG/1
1 TABLET ORAL EVERY 4 HOURS PRN
Status: DISCONTINUED | OUTPATIENT
Start: 2024-10-26 | End: 2024-10-30 | Stop reason: HOSPADM

## 2024-10-26 RX ADMIN — MORPHINE SULFATE 4 MG: 2 INJECTION, SOLUTION INTRAMUSCULAR; INTRAVENOUS at 18:19

## 2024-10-26 RX ADMIN — HYDROCODONE BITARTRATE AND ACETAMINOPHEN 1 TABLET: 5; 325 TABLET ORAL at 21:58

## 2024-10-26 RX ADMIN — SENNOSIDES AND DOCUSATE SODIUM 2 TABLET: 50; 8.6 TABLET ORAL at 20:32

## 2024-10-26 RX ADMIN — MORPHINE SULFATE 2 MG: 2 INJECTION, SOLUTION INTRAMUSCULAR; INTRAVENOUS at 20:32

## 2024-10-26 NOTE — ED NOTES
Nursing report ED to floor  Rekha Bear  84 y.o.  female    Rekha Bear is a 84 y.o. female who presents to the ED via EMS c/o acute left hip pain after a fall earlier today.  Did not hit her head, no LOC.  Does take Xarelto.  Is having left hip pain but no other concerns.       HPI :  HPI  Stated Reason for Visit: Pt to ED from [facility] via EMS. Pt had mechanical fall, complaining of L hip pain. Pt did not hit head, no LOC, is on Xarelto, hx of afib. Pt also reports she recently finished up medication for UTI and itermittently has hallucinations, but denies any today. Pt is AOx4, uses rollator to ambulate.  History Obtained From: patient, EMS    Chief Complaint  Chief Complaint   Patient presents with    Fall    Hip Pain       Admitting doctor:   Iain Pryor MD    Admitting diagnosis:   The primary encounter diagnosis was Closed fracture of left hip, initial encounter. Diagnoses of History of total replacement of both hip joints, Chronic renal impairment, unspecified CKD stage, and Chronic anticoagulation were also pertinent to this visit.    Code status:   Current Code Status       Date Active Code Status Order ID Comments User Context       Prior            Allergies:   Shellfish-derived products, Amlodipine, and Iodine    Isolation:   No active isolations    Intake and Output  No intake or output data in the 24 hours ending 10/26/24 1904    Weight:   There were no vitals filed for this visit.    Most recent vitals:   Vitals:    10/26/24 1613 10/26/24 1630 10/26/24 1737 10/26/24 1840   BP: 138/91      BP Location:       Patient Position:       Pulse:  67 79 76   Resp:       Temp:       TempSrc:       SpO2:  95% 91% 94%       Active LDAs/IV Access:   Lines, Drains & Airways       Active LDAs       Name Placement date Placement time Site Days    Peripheral IV 10/26/24 1547 Left Antecubital 10/26/24  1547  Antecubital  less than 1    External Urinary Catheter 10/26/24  1823  --  less than 1                     Labs (abnormal labs have a star):   Labs Reviewed   BASIC METABOLIC PANEL   CBC WITH AUTO DIFFERENTIAL   CBC AND DIFFERENTIAL    Narrative:     The following orders were created for panel order CBC & Differential.  Procedure                               Abnormality         Status                     ---------                               -----------         ------                     CBC Auto Differential[[id number]]                                                         Please view results for these tests on the individual orders.       EKG:   No orders to display       Meds given in ED:   Medications   morphine injection 4 mg (4 mg Intravenous Given 10/26/24 1819)       Imaging results:  No radiology results for the last day    Ambulatory status:   - bedrest    Social issues:   Social History     Socioeconomic History    Marital status:     Number of children: 5   Tobacco Use    Smoking status: Former     Current packs/day: 0.00     Average packs/day: 0.5 packs/day for 14.0 years (7.0 ttl pk-yrs)     Types: Cigarettes     Start date:      Quit date:      Years since quittin.8    Smokeless tobacco: Never    Tobacco comments:     CAFFEINE USE   Vaping Use    Vaping status: Never Used   Substance and Sexual Activity    Alcohol use: No    Drug use: No    Sexual activity: Not Currently       Peripheral Neurovascular  Peripheral Neurovascular (Adult)  Peripheral Neurovascular WDL: WDL, neurovascular assessment upper, neurovascular assessment lower, pulse assessment  Pulse Assessment: dorsalis pedis  LUE Neurovascular Assessment  Temperature LUE: warm  Color LUE: no discoloration  Sensation LUE: no tenderness, no tingling, no numbness  RUE Neurovascular Assessment  Temperature RUE: warm  Color RUE: no discoloration  Sensation RUE: no numbness, no tenderness, no tingling  LLE Neurovascular Assessment  Temperature LLE: warm  Color LLE: no discoloration  Sensation LLE: no numbness, no  tenderness, no tingling  RLE Neurovascular Assessment  Temperature RLE: warm  Color RLE: no discoloration  Sensation RLE: no numbness, no tenderness, no tingling    Neuro Cognitive  Neuro Cognitive (Adult)  Cognitive/Neuro/Behavioral WDL: .WDL except  Orientation: oriented x 4  Sensory Impairment: other (see comments) (partial blindness both eyes)  Memory Deficit: forgetful  Additional Documentation: Memory Deficit (Row), Sensory Impairment (Row)  Pupils  Pupil PERRLA: yes    Learning  Learning Assessment  Learning Readiness and Ability: no barriers identified  Education Provided  Person Taught: patient  Teaching Method: verbal instruction  Teaching Focus: symptom/problem overview  Education Outcome Evaluation: eager to learn, acceptance expressed, verbalizes understanding, needs reinforcement    Respiratory  Respiratory  Airway WDL: WDL  Respiratory WDL  Respiratory WDL: WDL, rhythm/pattern  Rhythm/Pattern, Respiratory: no shortness of breath reported, depth regular, pattern regular, unlabored    Abdominal Pain       Pain Assessments  Pain (Adult)  Preferred Pain Scale: FACES (Torrez-Baker FACES Pain Rating Scale)  FACES Pain Rating: Rest: 6-->hurts even more  FACES Pain Rating: Activity: 8-->hurts whole lot  Pain Location: hip  Pain Side/Orientation: left    NIH Stroke Scale       Zac Lopez RN  10/26/24 19:04 EDT

## 2024-10-26 NOTE — H&P
"    Patient Name:  Rekha Bear  YOB: 1940  MRN:  [id number]  Admit Date:  10/26/2024  Patient Care Team:  Zahida Osorio MD as PCP - General (Internal Medicine)  Eben Porter MD as PCP - Family Medicine      Subjective   History Present Illness     Chief Complaint   Patient presents with    Fall    Hip Pain       Ms. Bear is a 84 y.o. female with a history of prior stroke, CKD, HTN that presents to [facility] complaining of hip pain after a fall.She said she stumbled walking into her home when the door she was going through did not open all the way.  She fell to the ground injuring her hip.  She has had severe pain and inability to walk since that time.  Did not hit her head or lose consciousness or sustain any other significant injuries.  She is a bit of a poor historian due to prior stroke.      Review of Systems     Personal History     Past Medical History:   Diagnosis Date    Chilblains     \"Chilblian's\"    CKD (chronic kidney disease)     CVA (cerebral vascular accident)     Depression     Fatty liver     GERD (gastroesophageal reflux disease)     Hyperlipidemia     Hypertension     Incontinence     Osteoarthritis     OA  Marvin THR, LT TKR - hydrocodone prescibed by Dr. 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 Almaguer    TOTAL KNEE ARTHROPLASTY Left     OA  Marvin THR, LT TKR - hydrocodone prescibed by Dr. Almaguer     Family History   Problem Relation Age of Onset    Hypertension Mother     Diabetes type II Mother     Hypertension Father     Coronary artery disease Father     Diabetes type II Father     Colon cancer Brother     Skin cancer Brother     Stroke Son     Breast cancer Maternal Aunt     Hypertension Other     Other Other         Lipids  Thyroid     Social History     Tobacco Use    Smoking status: Former     Current packs/day: 0.00     Average packs/day: 0.5 packs/day for 14.0 years (7.0 ttl pk-yrs)     Types: Cigarettes     Start date:      Quit date:      Years since quittin.8    Smokeless tobacco: Never    Tobacco comments:     CAFFEINE USE   Vaping Use    Vaping status: Never Used   Substance Use Topics    Alcohol use: No    Drug use: No     No current facility-administered medications on file prior to encounter.     Current Outpatient Medications on File Prior to Encounter   Medication Sig Dispense Refill    atorvastatin (LIPITOR) 40 MG tablet Take 1 tablet by mouth Every Night. 30 tablet 1    buPROPion XL (WELLBUTRIN XL) 150 MG 24 hr tablet TAKE ONE TABLET BY MOUTH EVERY MORNING 90 tablet 1    docusate sodium 100 MG capsule Take 1 capsule by mouth 2 (Two) Times a Day. 60 capsule 1    donepezil (ARICEPT) 5 MG tablet Take 1 tablet by mouth Every Night. 30 tablet 0    gabapentin (NEURONTIN) 400 MG capsule TAKE ONE CAPSULE BY MOUTH THREE TIMES DAILY (MORNING, NOON, AND BEDTIME) 90 capsule 1    glimepiride (AMARYL) 4 MG tablet Take 1 tablet by mouth Every Morning Before Breakfast. 30 tablet 1    insulin aspart (novoLOG FLEXPEN) 100 UNIT/ML solution pen-injector sc pen 2-10 units three times a day before each meal per scale      levothyroxine (SYNTHROID, LEVOTHROID) 88 MCG tablet Take 1 tablet by mouth Daily.       linagliptin (Tradjenta) 5 MG tablet tablet Take 1 tablet by mouth Daily. 30 tablet 1    metoprolol succinate XL (TOPROL-XL) 25 MG 24 hr tablet TAKE ONE TABLET BY MOUTH DAILY **NEW MED** 30 tablet 5    oxybutynin XL (DITROPAN-XL) 10 MG 24 hr tablet TAKE ONE TABLET BY MOUTH DAILY 90 tablet 1    pioglitazone (ACTOS) 15 MG tablet Take 1 tablet by mouth Daily. 30 tablet 1    venlafaxine XR (EFFEXOR-XR) 150 MG 24 hr capsule TAKE ONE TABLET BY MOUTH DAILY 90 capsule 1    vitamin B-12 (CYANOCOBALAMIN) 1000 MCG tablet TAKE ONE TABLET BY MOUTH DAILY 90 tablet 1    vitamin D (ERGOCALCIFEROL) 1.25 MG (35138 UT) capsule capsule Take 1 capsule by mouth Every 7 (Seven) Days. Ergocalciferol 50,000 units weekly x 5 weeks starting Thursday, Jan 18, 2023, then change to cholecalciferol 1,000 units twice a day 5 capsule 0    Xarelto 15 MG tablet TAKE ONE TABLET BY MOUTH IN THE EVENING WITH DINNER 90 tablet 1    BD AutoShield Duo 30G X 5 MM misc 1 each by Other route 4 (Four) Times a Day.      glucose blood test strip Use to test blood glucose daily. Formulary Compliance Approval. Diagnosis: Type 2 Diabetes - Not Insulin Dependent 100 each 12    glucose monitor monitoring kit Use to test blood glucose daily. Formulary Compliance Approval. Diagnosis: Type 2 Diabetes - Not Insulin Dependent 1 each 0    Lancets misc Use to test blood glucose daily. Formulary Compliance Approval. Diagnosis: Type 2 Diabetes - Not Insulin Dependent 100 each 12     Allergies   Allergen Reactions    Shellfish-Derived Products Anaphylaxis    Amlodipine Hives and Other (See Comments)     Heart race    Iodine Rash       Objective    Objective     Vital Signs  Temp:  [97.4 °F (36.3 °C)-98.8 °F (37.1 °C)] 98.4 °F (36.9 °C)  Heart Rate:  [67-86] 83  Resp:  [18] 18  BP: (129-167)/(71-93) 129/71  SpO2:  [91 %-97 %] 97 %  on   ;   Device (Oxygen Therapy): room air  Body mass index is 32.77 kg/m².    Physical Exam  Vitals and nursing note reviewed.   Constitutional:        General: She is not in acute distress.     Appearance: She is obese.   HENT:      Head: Normocephalic and atraumatic.   Cardiovascular:      Rate and Rhythm: Normal rate. Rhythm irregular.   Pulmonary:      Effort: Pulmonary effort is normal.      Breath sounds: Normal breath sounds.   Abdominal:      General: Bowel sounds are normal. There is no distension.      Palpations: Abdomen is soft.      Tenderness: There is no abdominal tenderness.   Musculoskeletal:         General: Tenderness and signs of injury present.   Skin:     General: Skin is warm and dry.   Neurological:      Mental Status: She is alert and oriented to person, place, and time.   Psychiatric:         Behavior: Behavior normal.         Results Review:  I reviewed the patient's new clinical results.  I reviewed the patient's new imaging results and agree with the interpretation.  I reviewed the patient's other test results and agree with the interpretation  I personally viewed and interpreted the patient's EKG/Telemetry data  Discussed with ED provider.    Lab Results (last 24 hours)       Procedure Component Value Units Date/Time    CBC & Differential [[id number]]  (Abnormal) Collected: 10/26/24 1909    Specimen: Blood Updated: 10/26/24 1921    Narrative:      The following orders were created for panel order CBC & Differential.  Procedure                               Abnormality         Status                     ---------                               -----------         ------                     CBC Auto Differential[[id number]]        Abnormal            Final result                 Please view results for these tests on the individual orders.    Basic Metabolic Panel [[id number]]  (Abnormal) Collected: 10/26/24 1909    Specimen: Blood Updated: 10/26/24 1944     Glucose 152 mg/dL      BUN 24 mg/dL      Creatinine 1.30 mg/dL      Sodium 139 mmol/L      Potassium 4.6 mmol/L      Comment: Specimen hemolyzed.  Result may be falsely elevated.         Chloride 102 mmol/L      CO2 28.0 mmol/L      Calcium 8.5 mg/dL      BUN/Creatinine Ratio 18.5     Anion Gap 9.0 mmol/L      eGFR 41.1 mL/min/1.73     Narrative:      GFR Normal >60  Chronic Kidney Disease <60  Kidney Failure <15    The GFR formula is only valid for adults with stable renal function between ages 18 and 70.    CBC Auto Differential [[id number]]  (Abnormal) Collected: 10/26/24 1909    Specimen: Blood Updated: 10/26/24 1921     WBC 10.76 10*3/mm3      RBC 4.24 10*6/mm3      Hemoglobin 12.2 g/dL      Hematocrit 37.8 %      MCV 89.2 fL      MCH 28.8 pg      MCHC 32.3 g/dL      RDW 14.5 %      RDW-SD 48.0 fl      MPV 9.0 fL      Platelets 171 10*3/mm3      Neutrophil % 73.2 %      Lymphocyte % 16.4 %      Monocyte % 8.8 %      Eosinophil % 1.0 %      Basophil % 0.2 %      Immature Grans % 0.4 %      Neutrophils, Absolute 7.88 10*3/mm3      Lymphocytes, Absolute 1.76 10*3/mm3      Monocytes, Absolute 0.95 10*3/mm3      Eosinophils, Absolute 0.11 10*3/mm3      Basophils, Absolute 0.02 10*3/mm3      Immature Grans, Absolute 0.04 10*3/mm3      nRBC 0.0 /100 WBC             Imaging Results (Last 24 Hours)       Procedure Component Value Units Date/Time    CT Pelvis Without Contrast [[id number]] Collected: 10/26/24 1959     Updated: 10/26/24 2009    Narrative:      CT OF THE BONY PELVIS     HISTORY: Left hip pain     COMPARISON: October 13, 2024     TECHNIQUE: Axial CT imaging was obtained through the bony pelvis. No IV  contrast was administered. Coronal and sagittal reformatted images were  obtained.     FINDINGS:  Study confirms the presence of the previously identified periprosthetic  fracture involving the femoral component of the patient's left hip  arthroplasty. No additional fractures are seen. The patient does have  bilateral hip arthroplasties. The hardware appears stable in position  when compared to the exam from October 13, 2024. Review of soft tissues  does not demonstrate any pelvic hematoma. There  is colonic  diverticulosis. There are aortoiliac calcifications. The appendix is  normal. Uterus appears unremarkable.       Impression:      Periprosthetic fracture involving the femoral component of the patient's  left hip arthroplasty.     Radiation dose reduction techniques were utilized, including automated  exposure control and exposure modulation based on body size.        This report was finalized on 10/26/2024 8:06 PM by Dr. Fadia Eubanks M.D on Workstation: [id number]       XR Hip With or Without Pelvis 2 - 3 View Left [[id number]] Collected: 10/26/24 1740     Updated: 10/26/24 1740    Narrative:      EMERGENCY SINGLE VIEW OF THE PELVIS AND TWO VIEWS OF THE LEFT HIP ON  10/26/2024     CLINICAL HISTORY: This is an 84-year-old female patient who fell and has  left hip pain.     This is correlated to CT scan of the abdomen and pelvis 10/13/2024 and  prior plain films of the hips on 12/21/2023.     FINDINGS: Patient has bilateral total hip prostheses in place and there  is good alignment of the hip prostheses. Since the CT scan of the  abdomen and pelvis on 10/13/2024, 13 days ago there has been development  of an acute fracture of the proximal left femur that extends through the  lateral cortex of the proximal left femur 2 cm below the level of the  left greater trochanter tract superior medially and abuts the lateral  aspect of the femoral component left hip prosthesis. No additional  fractures are seen. Dedicated CT of the pelvis/hips is suggested for  more comprehensive assessment. The results were communicated to Dr. Ortiz  in the emergency room by telephone on 10/26/2024 at 5:15 p.m.               Results for orders placed during the hospital encounter of 12/21/23    Adult transthoracic echo complete    Interpretation Summary    Left ventricular systolic function is normal. Calculated left ventricular EF = 59.6%    Left ventricular wall thickness is consistent with borderline concentric  hypertrophy.    Left ventricular diastolic function was normal.    Left atrial volume is moderately increased.    Saline test results are negative.    There is calcification of the aortic valve.    Estimated right ventricular systolic pressure from tricuspid regurgitation is mildly elevated (35-45 mmHg). Calculated right ventricular systolic pressure from tricuspid regurgitation is 38 mmHg.    Mild pulmonary hypertension is present.    No orders to display     Assessment/Plan   Assessment & Plan   Active Hospital Problems    Diagnosis  POA    **Periprosthetic fracture around internal prosthetic left hip joint [M97.02XA]  Not Applicable    Atrial fibrillation, persistent [I48.19]  Yes    SHEY (obstructive sleep apnea) [G47.33]  Yes    Primary hypertension [I10]  Yes    Type 2 diabetes mellitus with stage 3 chronic kidney disease, without long-term current use of insulin [E11.22, N18.30]  Yes    Anxiety [F41.9]  Yes      Resolved Hospital Problems   No resolved problems to display.       84 y.o. female admitted with Periprosthetic fracture around internal prosthetic left hip joint.    Dr. Kathleen on-call for orthopedics evaluated x-rays and feels at this time this likely be managed nonoperatively.  CT scan ordered.  Will continue current pain control efforts.  Will have physical therapy and case management see her.  She reports living in [facility].  Monitor blood sugar provide correctional insulin.  Continue home medicines for atrial fibrillation except for Xarelto.      SCDs for DVT prophylaxis.  Hold Xarelto until after seen by orthopedic surgery.  Full code.  Discussed with patient and ED provider.      Iain Pryor MD  [facility]  10/26/24  23:29 EDT

## 2024-10-26 NOTE — ED PROVIDER NOTES
EMERGENCY DEPARTMENT ENCOUNTER    Room Number:  18/18  PCP: Zahida Osorio MD  Historian: Patient      HPI:  Chief Complaint: Left hip pain after fall  A complete HPI/ROS/PMH/PSH/SH/FH are unobtainable due to: None    Context: Rekha Bear is a 84 y.o. female who presents to the ED via EMS c/o acute left hip pain after a fall earlier today.  Did not hit her head, no LOC.  Does take Xarelto.  Is having left hip pain but no other concerns.      MEDICAL RECORD REVIEW    External (non-ED) record review: Chart review in epic shows the patient does take Xarelto              PAST MEDICAL HISTORY  Active Ambulatory Problems     Diagnosis Date Noted    Acute bronchitis 05/18/2016    Chronic pain of right knee 05/18/2016    Type 2 diabetes mellitus with stage 3 chronic kidney disease, without long-term current use of insulin 05/18/2016    Hyperlipidemia 05/18/2016    Primary hypertension 05/18/2016    Hypothyroidism 05/18/2016    Urinary incontinence 05/18/2016    Allergic reaction 04/04/2018    Hx of food anaphylaxis 04/04/2018    Herpes simplex 04/04/2018    Osteoarthritis 06/13/2018    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/19/2019    Community acquired pneumonia of left lower lobe of lung 03/21/2020    Hyponatremia 03/21/2020    Pneumonia due to COVID-19 virus 03/24/2020    Major depressive disorder, recurrent, mild 05/14/2021    Acute respiratory failure with hypoxia 05/14/2021    Supraventricular tachycardia 05/14/2021    PVC (premature ventricular contraction) 04/21/2022    Depressive disorder 02/04/2016    Gastroesophageal reflux disease 02/04/2016    Midline cystocele 02/04/2016    Rectocele 02/04/2016    BMI 29.0-29.9,adult 10/16/2023    Acute left PCA stroke 12/21/2023    B12 deficiency 12/23/2023    Bradycardia 12/23/2023    Premature atrial contractions 12/23/2023    Stroke 12/26/2023    SHEY (obstructive sleep apnea) 02/06/2024    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    Cerebrovascular  accident (CVA) due to embolism of left posterior cerebral artery 02/20/2024    Anxiety 02/04/2016    Dysphagia as late effect of stroke 04/16/2024    Atrial fibrillation, persistent 05/23/2024    Acute UTI 10/12/2024     Resolved Ambulatory Problems     Diagnosis Date Noted    Chest pain 05/18/2016    Cardiac arrhythmia 05/18/2016    Hyperlipemia 05/18/2016    Dehydration 03/21/2020    Dyspnea on exertion 04/21/2022    Dizziness 04/26/2022     Past Medical History:   Diagnosis Date    Chilblains     CKD (chronic kidney disease)     CVA (cerebral vascular accident)     Depression     Fatty liver     GERD (gastroesophageal reflux disease)     Hypertension     Incontinence     Pain of esophagus     Peptic ulcer disease     Raynaud's disease     Sinus bradycardia     Squamous cell carcinoma of neck     Vitamin B 12 deficiency          PAST SURGICAL HISTORY  Past Surgical History:   Procedure Laterality Date    CATARACT EXTRACTION      CHOLECYSTECTOMY      COLONOSCOPY  2009    COLONOSCOPY N/A 12/20/2016    Procedure: COLONOSCOPY with hot snare polypectomy;  Surgeon: George Pabon MD;  Location: [facility] ENDOSCOPY;  Service:     DENTAL PROCEDURE      FOOT SURGERY      TONSILLECTOMY      TOTAL HIP ARTHROPLASTY Bilateral     OA  Marvin THR, LT TKR - hydrocodone prescibed by Dr. Almaguer    TOTAL KNEE ARTHROPLASTY Left     OA  Marvin THR, LT TKR - hydrocodone prescibed by Dr. Almaguer         FAMILY HISTORY  Family History   Problem Relation Age of Onset    Hypertension Mother     Diabetes type II Mother     Hypertension Father     Coronary artery disease Father     Diabetes type II Father     Colon cancer Brother     Skin cancer Brother     Stroke Son     Breast cancer Maternal Aunt     Hypertension Other     Other Other         Lipids  Thyroid         SOCIAL HISTORY  Social History     Socioeconomic History    Marital status:     Number of children: 5   Tobacco Use    Smoking status: Former     Current packs/day: 0.00      Average packs/day: 0.5 packs/day for 14.0 years (7.0 ttl pk-yrs)     Types: Cigarettes     Start date:      Quit date: 1975     Years since quittin.8    Smokeless tobacco: Never    Tobacco comments:     CAFFEINE USE   Vaping Use    Vaping status: Never Used   Substance and Sexual Activity    Alcohol use: No    Drug use: No    Sexual activity: Not Currently         ALLERGIES  Shellfish-derived products, Amlodipine, and Iodine        REVIEW OF SYSTEMS  Review of Systems     All systems reviewed and negative except for those discussed in HPI.       PHYSICAL EXAM    I have reviewed the triage vital signs and nursing notes.    ED Triage Vitals [10/26/24 1548]   Temp Heart Rate Resp BP SpO2   97.4 °F (36.3 °C) 86 18 167/93 93 %      Temp src Heart Rate Source Patient Position BP Location FiO2 (%)   Tympanic -- Lying Right arm --       Physical Exam  General: No acute distress, nontoxic  HEENT: EOMI  Pulm: Symmetric chest rise, nonlabored breathing  CV: Regular rate and rhythm  GI: Nondistended  MSK: Range of motion of the left leg due to pain, no significant overtly obvious for shortening or malrotation, tenderness at the lateral hip joint  Skin: Warm, dry  Neuro: Awake, alert, oriented x 4, neurovascular intact distal left lower extremity, moving all extremities, no focal deficits  Psych: Calm, cooperative    Vital signs and nursing notes reviewed.         LAB RESULTS  No results found for this or any previous visit (from the past 24 hours).    Ordered the above labs and independently interpreted results. My findings will be discussed in the medical decision making section below        RADIOLOGY  XR Hip With or Without Pelvis 2 - 3 View Left    Result Date: 10/26/2024  EMERGENCY SINGLE VIEW OF THE PELVIS AND TWO VIEWS OF THE LEFT HIP ON 10/26/2024  CLINICAL HISTORY: This is an 84-year-old female patient who fell and has left hip pain.  This is correlated to CT scan of the abdomen and pelvis 10/13/2024 and prior  plain films of the hips on 12/21/2023.  FINDINGS: Patient has bilateral total hip prostheses in place and there is good alignment of the hip prostheses. Since the CT scan of the abdomen and pelvis on 10/13/2024, 13 days ago there has been development of an acute fracture of the proximal left femur that extends through the lateral cortex of the proximal left femur 2 cm below the level of the left greater trochanter tract superior medially and abuts the lateral aspect of the femoral component left hip prosthesis. No additional fractures are seen. Dedicated CT of the pelvis/hips is suggested for more comprehensive assessment. The results were communicated to Dr. Ortiz in the emergency room by telephone on 10/26/2024 at 5:15 p.m.       Ordered the above noted radiological studies.  Independently interpreted by me and my independent review of findings can be found in the ED Course.  See dictation for official radiology interpretation.      PROCEDURES    Procedures        MEDICATIONS GIVEN IN ER    Medications   morphine injection 4 mg (4 mg Intravenous Given 10/26/24 1819)         PROGRESS, DATA ANALYSIS, CONSULTS, AND MEDICAL DECISION MAKING    Please note that this section constitutes my independent interpretation of clinical data including lab results, radiology, EKG's.  This constitutes my independent professional opinion regarding differential diagnosis and management of this patient.  It may include any factors such as history from outside sources, review of external records, social determinants of health, management of medications, response to those treatments, and discussions with other providers.    Differential Diagnosis and Plan: Initial concern for contusion, fracture, hip dislocation, among others.  Plan for supportive care, x-ray, and reevaluation with results.    Additional sources:  - Discussed/ obtained information from independent historians:       - (Social Determinants of Health): None     - Shared  decision making:  Patient and daughter at bedside fully updated on and in agreement with the course and plan moving forward    ED Course as of 10/26/24 1859   Sat Oct 26, 2024   1856 Discussed with Dr. Kathleen, Orthopedics, discussed outpatient clinical course and find today, he has reviewed the x-rays, he feels like this will be a nonoperative fracture and will consult tomorrow, does recommend adding on a CT of the pelvis for further clarity. [DC]   1856 Patient and daughter bedside and fully updated, patient with improved pain but still quite a bit of discomfort with any type of movement of the leg, will plan for hospitalization for pain control and physical therapy evaluation, may need short-term rehab placement. [DC]   1857 Discussed with Dr. Pryor, [facility], discussed patient's clinical course and findings today, orthopedic consult and recommendations, and need for hospital stay. [DC]      ED Course User Index  [DC] Julian Ortiz MD       Hospitalization Considered?: yes    Orders Placed During This Visit:  Orders Placed This Encounter   Procedures    XR Hip With or Without Pelvis 2 - 3 View Left    CT Pelvis Without Contrast    Basic Metabolic Panel    CBC Auto Differential    Ortho (on-call MD unless specified)    [facility] (on-call MD unless specified) Details    Initiate Observation Status    CBC & Differential       Additional orders considered but not placed:      Independent interpretation of labs, radiology studies, and discussions with consultants: See ED Course        AS OF 18:59 EDT VITALS:    BP - 138/91  HR - 76  TEMP - 97.4 °F (36.3 °C) (Tympanic)  02 SATS - 94%          DIAGNOSIS  Final diagnoses:   Closed fracture of left hip, initial encounter   History of total replacement of both hip joints   Chronic renal impairment, unspecified CKD stage   Chronic anticoagulation         DISPOSITION  ED Disposition       ED Disposition   Decision to Admit    Condition   --    Comment   Level of Care: Med/Surg [1]    Diagnosis: Closed left hip fracture [283795]   Admitting Physician: KOLTON FREED [[id number]]   Attending Physician: KOLTON FREED [[id number]]   Is patient appropriate for Inpatient Observation Unit?: No [0]                  Please note that portions of this document were completed with a voice recognition program.    Note Disclaimer: At [facility], we believe that sharing information builds trust and better relationships. You are receiving this note because you recently visited [facility]. It is possible you will see health information before a provider has talked with you about it. This kind of information can be easy to misunderstand. To help you fully understand what it means for your health, we urge you to discuss this note with your provider.                       Julian Ortiz MD  10/26/24 9515

## 2024-10-27 PROBLEM — M97.8XXA PERI-PROSTHETIC FRACTURE AROUND PROSTHETIC HIP: Status: ACTIVE | Noted: 2024-10-27

## 2024-10-27 PROBLEM — Z96.649 PERI-PROSTHETIC FRACTURE AROUND PROSTHETIC HIP: Status: ACTIVE | Noted: 2024-10-27

## 2024-10-27 LAB
ANION GAP SERPL CALCULATED.3IONS-SCNC: 7.8 MMOL/L (ref 5–15)
BASOPHILS # BLD AUTO: 0.02 10*3/MM3 (ref 0–0.2)
BASOPHILS NFR BLD AUTO: 0.3 % (ref 0–1.5)
BUN SERPL-MCNC: 22 MG/DL (ref 8–23)
BUN/CREAT SERPL: 16.3 (ref 7–25)
CALCIUM SPEC-SCNC: 8.8 MG/DL (ref 8.6–10.5)
CHLORIDE SERPL-SCNC: 102 MMOL/L (ref 98–107)
CO2 SERPL-SCNC: 27.2 MMOL/L (ref 22–29)
CREAT SERPL-MCNC: 1.35 MG/DL (ref 0.57–1)
DEPRECATED RDW RBC AUTO: 48.1 FL (ref 37–54)
EGFRCR SERPLBLD CKD-EPI 2021: 39.3 ML/MIN/1.73
EOSINOPHIL # BLD AUTO: 0.08 10*3/MM3 (ref 0–0.4)
EOSINOPHIL NFR BLD AUTO: 1.1 % (ref 0.3–6.2)
ERYTHROCYTE [DISTWIDTH] IN BLOOD BY AUTOMATED COUNT: 14.5 % (ref 12.3–15.4)
GLUCOSE BLDC GLUCOMTR-MCNC: 140 MG/DL (ref 70–130)
GLUCOSE BLDC GLUCOMTR-MCNC: 150 MG/DL (ref 70–130)
GLUCOSE BLDC GLUCOMTR-MCNC: 162 MG/DL (ref 70–130)
GLUCOSE BLDC GLUCOMTR-MCNC: 164 MG/DL (ref 70–130)
GLUCOSE BLDC GLUCOMTR-MCNC: 165 MG/DL (ref 70–130)
GLUCOSE SERPL-MCNC: 179 MG/DL (ref 65–99)
HBA1C MFR BLD: 8.7 % (ref 4.8–5.6)
HCT VFR BLD AUTO: 36.3 % (ref 34–46.6)
HGB BLD-MCNC: 11.5 G/DL (ref 12–15.9)
IMM GRANULOCYTES # BLD AUTO: 0.03 10*3/MM3 (ref 0–0.05)
IMM GRANULOCYTES NFR BLD AUTO: 0.4 % (ref 0–0.5)
LYMPHOCYTES # BLD AUTO: 1.92 10*3/MM3 (ref 0.7–3.1)
LYMPHOCYTES NFR BLD AUTO: 25.6 % (ref 19.6–45.3)
MCH RBC QN AUTO: 28.5 PG (ref 26.6–33)
MCHC RBC AUTO-ENTMCNC: 31.7 G/DL (ref 31.5–35.7)
MCV RBC AUTO: 89.9 FL (ref 79–97)
MONOCYTES # BLD AUTO: 0.77 10*3/MM3 (ref 0.1–0.9)
MONOCYTES NFR BLD AUTO: 10.3 % (ref 5–12)
NEUTROPHILS NFR BLD AUTO: 4.67 10*3/MM3 (ref 1.7–7)
NEUTROPHILS NFR BLD AUTO: 62.3 % (ref 42.7–76)
NRBC BLD AUTO-RTO: 0 /100 WBC (ref 0–0.2)
PLATELET # BLD AUTO: 147 10*3/MM3 (ref 140–450)
PMV BLD AUTO: 8.8 FL (ref 6–12)
POTASSIUM SERPL-SCNC: 4.7 MMOL/L (ref 3.5–5.2)
RBC # BLD AUTO: 4.04 10*6/MM3 (ref 3.77–5.28)
SODIUM SERPL-SCNC: 137 MMOL/L (ref 136–145)
WBC NRBC COR # BLD AUTO: 7.49 10*3/MM3 (ref 3.4–10.8)

## 2024-10-27 PROCEDURE — 97162 PT EVAL MOD COMPLEX 30 MIN: CPT

## 2024-10-27 PROCEDURE — 80048 BASIC METABOLIC PNL TOTAL CA: CPT | Performed by: HOSPITALIST

## 2024-10-27 PROCEDURE — 83036 HEMOGLOBIN GLYCOSYLATED A1C: CPT | Performed by: HOSPITALIST

## 2024-10-27 PROCEDURE — 97530 THERAPEUTIC ACTIVITIES: CPT

## 2024-10-27 PROCEDURE — 25010000002 MORPHINE PER 10 MG: Performed by: HOSPITALIST

## 2024-10-27 PROCEDURE — 82948 REAGENT STRIP/BLOOD GLUCOSE: CPT

## 2024-10-27 PROCEDURE — 85025 COMPLETE CBC W/AUTO DIFF WBC: CPT | Performed by: HOSPITALIST

## 2024-10-27 PROCEDURE — 63710000001 INSULIN LISPRO (HUMAN) PER 5 UNITS: Performed by: HOSPITALIST

## 2024-10-27 RX ADMIN — BUPROPION HYDROCHLORIDE 150 MG: 150 TABLET, EXTENDED RELEASE ORAL at 09:23

## 2024-10-27 RX ADMIN — METOPROLOL SUCCINATE 25 MG: 25 TABLET, EXTENDED RELEASE ORAL at 09:23

## 2024-10-27 RX ADMIN — HYDROCODONE BITARTRATE AND ACETAMINOPHEN 1 TABLET: 5; 325 TABLET ORAL at 09:23

## 2024-10-27 RX ADMIN — VENLAFAXINE HYDROCHLORIDE 150 MG: 150 CAPSULE, EXTENDED RELEASE ORAL at 09:23

## 2024-10-27 RX ADMIN — HYDROCODONE BITARTRATE AND ACETAMINOPHEN 1 TABLET: 5; 325 TABLET ORAL at 22:03

## 2024-10-27 RX ADMIN — DONEPEZIL HYDROCHLORIDE 5 MG: 5 TABLET, FILM COATED ORAL at 02:45

## 2024-10-27 RX ADMIN — SENNOSIDES AND DOCUSATE SODIUM 2 TABLET: 50; 8.6 TABLET ORAL at 09:23

## 2024-10-27 RX ADMIN — GABAPENTIN 400 MG: 400 CAPSULE ORAL at 00:10

## 2024-10-27 RX ADMIN — HYDROCODONE BITARTRATE AND ACETAMINOPHEN 1 TABLET: 5; 325 TABLET ORAL at 02:46

## 2024-10-27 RX ADMIN — INSULIN LISPRO 2 UNITS: 100 INJECTION, SOLUTION INTRAVENOUS; SUBCUTANEOUS at 09:23

## 2024-10-27 RX ADMIN — Medication 1000 MCG: at 09:23

## 2024-10-27 RX ADMIN — INSULIN LISPRO 2 UNITS: 100 INJECTION, SOLUTION INTRAVENOUS; SUBCUTANEOUS at 12:22

## 2024-10-27 RX ADMIN — GABAPENTIN 400 MG: 400 CAPSULE ORAL at 22:03

## 2024-10-27 RX ADMIN — MORPHINE SULFATE 2 MG: 2 INJECTION, SOLUTION INTRAMUSCULAR; INTRAVENOUS at 00:10

## 2024-10-27 RX ADMIN — GABAPENTIN 400 MG: 400 CAPSULE ORAL at 09:26

## 2024-10-27 RX ADMIN — INSULIN LISPRO 2 UNITS: 100 INJECTION, SOLUTION INTRAVENOUS; SUBCUTANEOUS at 18:11

## 2024-10-27 RX ADMIN — ATORVASTATIN CALCIUM 40 MG: 20 TABLET, FILM COATED ORAL at 00:10

## 2024-10-27 RX ADMIN — ATORVASTATIN CALCIUM 40 MG: 20 TABLET, FILM COATED ORAL at 22:03

## 2024-10-27 RX ADMIN — RIVAROXABAN 20 MG: 20 TABLET, FILM COATED ORAL at 18:11

## 2024-10-27 RX ADMIN — INSULIN LISPRO 2 UNITS: 100 INJECTION, SOLUTION INTRAVENOUS; SUBCUTANEOUS at 22:04

## 2024-10-27 RX ADMIN — OXYBUTYNIN CHLORIDE 10 MG: 10 TABLET, EXTENDED RELEASE ORAL at 09:23

## 2024-10-27 RX ADMIN — LEVOTHYROXINE SODIUM 88 MCG: 88 TABLET ORAL at 09:23

## 2024-10-27 RX ADMIN — LINAGLIPTIN 5 MG: 5 TABLET, FILM COATED ORAL at 09:23

## 2024-10-27 RX ADMIN — HYDROCODONE BITARTRATE AND ACETAMINOPHEN 1 TABLET: 5; 325 TABLET ORAL at 14:27

## 2024-10-27 RX ADMIN — DONEPEZIL HYDROCHLORIDE 5 MG: 5 TABLET, FILM COATED ORAL at 22:03

## 2024-10-27 NOTE — THERAPY EVALUATION
"Patient Name: Rekha Bear  : 1940    MRN: [id number]                              Today's Date: 10/27/2024       Admit Date: 10/26/2024    Visit Dx:     ICD-10-CM ICD-9-CM   1. Closed fracture of left hip, initial encounter  S72.002A 820.8   2. History of total replacement of both hip joints  Z96.643 V43.64   3. Chronic renal impairment, unspecified CKD stage  N18.9 585.9   4. 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    Community acquired pneumonia of left lower lobe of lung    Hyponatremia    Pneumonia due to COVID-19 virus    Major depressive disorder, recurrent, mild    Acute respiratory failure with hypoxia    Supraventricular tachycardia    PVC (premature ventricular contraction)    Depressive disorder    Gastroesophageal reflux disease    Midline cystocele    Rectocele    BMI 29.0-29.9,adult    Acute left PCA stroke    B12 deficiency    Bradycardia    Premature atrial contractions    Stroke    SHEY (obstructive sleep apnea)    Circadian rhythm disorder    Cerebrovascular accident (CVA) due to embolism of left posterior cerebral artery    Anxiety    Dysphagia as late effect of stroke    Atrial fibrillation, persistent    Acute UTI    Closed left hip fracture    Periprosthetic fracture around internal prosthetic left hip joint     Past Medical History:   Diagnosis Date    Chilblains     \"Chilblian's\"    CKD (chronic kidney disease)     CVA (cerebral vascular accident)     Depression     Fatty liver     GERD (gastroesophageal reflux disease)     Hyperlipidemia     Hypertension     " "Incontinence     Osteoarthritis     OA  Marvin THR, LT TKR - hydrocodone prescibed by Dr. 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 Almaguer    TOTAL KNEE ARTHROPLASTY Left     OA  Marvin THR, LT TKR - hydrocodone prescibed by Dr. Almaguer      General Information       [facility] Name 10/27/24 [facility]          Physical Therapy Time and Intention    Document Type evaluation  -     Mode of Treatment individual therapy;physical therapy  -       Row Name 10/27/24 1233          General Information    Patient Profile Reviewed yes  -     Prior Level of Function independent:;gait;transfer;bed mobility  -     Existing Precautions/Restrictions partial weight bearing;other (see comments)  TDWB LLE <10%  -     Barriers to Rehab none identified  -       Row Name 10/27/24 1233          Living Environment    People in Home facility resident  [facility]  -       Row Name 10/27/24 1233          Home Main Entrance    Number of Stairs, Main Entrance none  -       Row Name 10/27/24 1233          Stairs Within Home, Primary    Number of Stairs, Within Home, Primary none  -       Row Name 10/27/24 1233          Cognition    Orientation Status (Cognition) oriented x 4  -       Row Name 10/27/24 1233       "    Safety Issues/Impairments Affecting Functional Mobility    Impairments Affecting Function (Mobility) balance;endurance/activity tolerance;strength;pain;range of motion (ROM)  -               User Key  (r) = Recorded By, (t) = Taken By, (c) = Cosigned By      Initials Name Provider Type     So Redding PT Physical Therapist                   Mobility       Row Name 10/27/24 1233          Bed Mobility    Bed Mobility supine-sit;sit-supine  -     Supine-Sit [place] (Bed Mobility) minimum assist (75% patient effort);1 person assist;verbal cues  -     Sit-Supine [place] (Bed Mobility) minimum assist (75% patient effort);1 person assist;verbal cues  -     Assistive Device (Bed Mobility) head of bed elevated;bed rails  -       Row Name 10/27/24 1233          Sit-Stand Transfer    Sit-Stand [place] (Transfers) moderate assist (50% patient effort);1 person assist;verbal cues  -     Assistive Device (Sit-Stand Transfers) walker, front-wheeled  -     Comment, (Sit-Stand Transfer) Stood ~3 minutes, maintained TDWB well, though unable to maintain when attempting to pivot on RLE  -[facility] Name 10/27/24 1233          Mobility    Extremity Weight-bearing Status left lower extremity  -     Left Lower Extremity (Weight-bearing Status) touch down weight-bearing (TDWB)  -               User Key  (r) = Recorded By, (t) = Taken By, (c) = Cosigned By      Initials Name Provider Type     So Redding PT Physical Therapist                   Obj/Interventions       Row Name 10/27/24 1234          Range of Motion Comprehensive    General Range of Motion lower extremity range of motion deficits identified  -[facility] Name 10/27/24 1234          Strength Comprehensive (MMT)    General Manual Muscle Testing (MMT) Assessment lower extremity strength deficits identified  -     Comment, General Manual Muscle Testing (MMT) Assessment Generalized weakness, RLE grossly 4/5, LLE grossly 3+/5  - limited by pain  -[facility] Name 10/27/24 1234          Balance    Balance Assessment sitting static balance;sitting dynamic balance;standing static balance  -     Static Sitting Balance standby assist;verbal cues  -     Dynamic Sitting Balance standby assist;verbal cues  -     Position, Sitting Balance unsupported;sitting edge of bed  -     Static Standing Balance minimal assist;verbal cues;1-person assist  -     Position/Device Used, Standing Balance supported;walker, front-wheeled  -     Balance Interventions sitting;standing;sit to stand;supported;static;dynamic  -[facility] Name 10/27/24 1234          Sensory Assessment (Somatosensory)    Sensory Assessment (Somatosensory) LE sensation intact  -               User Key  (r) = Recorded By, (t) = Taken By, (c) = Cosigned By      Initials Name Provider Type     So Redding, PT Physical Therapist                   Goals/Plan       [facility] Name 10/27/24 1240          Bed Mobility Goal 1 (PT)    Activity/Assistive Device (Bed Mobility Goal 1, PT) bed mobility activities, all  -     [place] Level/Cues Needed (Bed Mobility Goal 1, PT) contact guard required  -     Time Frame (Bed Mobility Goal 1, PT) 1 week  -BH       Row Name 10/27/24 1240          Transfer Goal 1 (PT)    Activity/Assistive Device (Transfer Goal 1, PT) transfers, all  -     [place] Level/Cues Needed (Transfer Goal 1, PT) contact guard required  -     Time Frame (Transfer Goal 1, PT) 1 week  -[facility] Name 10/27/24 1240          Gait Training Goal 1 (PT)    Activity/Assistive Device (Gait Training Goal 1, PT) gait (walking locomotion)  -     [place] Level (Gait Training Goal 1, PT) minimum assist (75% or more patient effort)  -     Distance (Gait Training Goal 1, PT) 5  -     Time Frame (Gait Training Goal 1, PT) 1 week  -[facility] Name 10/27/24 1240          Therapy Assessment/Plan (PT)    Planned Therapy Interventions (PT) balance  training;bed mobility training;home exercise program;gait training;patient/family education;stair training;ROM (range of motion);strengthening;stretching;transfer training  -               User Key  (r) = Recorded By, (t) = Taken By, (c) = Cosigned By      Initials Name Provider Type     So Redding, PT Physical Therapist                   Clinical Impression       Row Name 10/27/24 1235          Pain    Pretreatment Pain Rating 1/10  -     Posttreatment Pain Rating 7/10  -     Pain Location hip  -     Pain Side/Orientation left  -     Pain Management Interventions exercise or physical activity utilized  -     Response to Pain Interventions activity level improved;activity participation with increased pain  -       Row Name 10/27/24 1235          Plan of Care Review    Plan of Care Reviewed With patient  -     Outcome Evaluation Pt is an 85 yo F admitted from [facility] after a fall resulting in a L hip periprosthetic greater trochanteric fracture. Per ortho, plans for conservative management and pt is to be TDWB <10% LLE for 4 weeks. Pt reports independence at BL with a rollator, staff provides meals but pt is typically independent with all ADLs and walks to the dining esteban. Pt presents to PT with impaired strength, endurance, and pain limiting overall mobility. Pt transferred to EOB with min A x1 and stood with mod A x1 and rwx. Pt given VCs and visual demo for WB restrictions, actually maintained fairly well with transfer and maintainined NWB once standing. Pt unable to pivot on RLE to get to a BSC or chair - recommend bedpan/purewick for now. Pt able to stand for ~3 minutes with tolerable pain, assisted back to supine min A x1 and scooted up in bed, left with needs met. PT will continue to follow, pt is unable to return to [facility] with current mobility restrictions, will need SNF placement.  -       Row Name 10/27/24 1235          Therapy Assessment/Plan (PT)    Patient/Family Therapy Goals  Statement (PT) Return to PLOF  -     Rehab Potential (PT) good  -     Criteria for Skilled Interventions Met (PT) yes  -     Therapy Frequency (PT) 5 times/wk  -       Row Name 10/27/24 1235          Vital Signs    O2 Delivery Pre Treatment supplemental O2  -     O2 Delivery Intra Treatment room air  -     O2 Delivery Post Treatment room air  -       Row Name 10/27/24 1235          Positioning and Restraints    Pre-Treatment Position in bed  -     Post Treatment Position bed  -     In Bed notified nsg;fowlers;call light within reach;encouraged to call for assist;exit alarm on  -               User Key  (r) = Recorded By, (t) = Taken By, (c) = Cosigned By      Initials Name Provider Type     So Redding PT Physical Therapist                   Outcome Measures       Row Name 10/27/24 1240          How much help from another person do you currently need...    Turning from your back to your side while in flat bed without using bedrails? 3  -     Moving from lying on back to sitting on the side of a flat bed without bedrails? 3  -BH     Moving to and from a bed to a chair (including a wheelchair)? 1  -     Standing up from a chair using your arms (e.g., wheelchair, bedside chair)? 2  -     Climbing 3-5 steps with a railing? 1  -     To walk in hospital room? 1  -     AM-PAC 6 Clicks Score (PT) 11  -     Highest Level of Mobility Goal 4 --> Transfer to chair/commode  -       Row Name 10/27/24 1240          Functional Assessment    Outcome Measure Options AM-PAC 6 Clicks Basic Mobility (PT)  -               User Key  (r) = Recorded By, (t) = Taken By, (c) = Cosigned By      Initials Name Provider Type     So Redding PT Physical Therapist                                 Physical Therapy Education       Title: PT OT SLP Therapies (In Progress)       Topic: Physical Therapy (In Progress)       Point: Mobility training (Done)       Learning Progress Summary             Patient Acceptance, E,TB,D, VU,NR by  at 10/27/2024 1240                      Point: Home exercise program (Not Started)       Learner Progress:  Not documented in this visit.              Point: Body mechanics (Done)       Learning Progress Summary            Patient Acceptance, E,TB,D, VU,NR by  at 10/27/2024 1240                      Point: Precautions (Done)       Learning Progress Summary            Patient Acceptance, E,TB,D, VU,NR by  at 10/27/2024 1240                                      User Key       Initials Effective Dates Name Provider Type Discipline     04/08/22 -  So Redding, PT Physical Therapist PT                  PT Recommendation and Plan  Planned Therapy Interventions (PT): balance training, bed mobility training, home exercise program, gait training, patient/family education, stair training, ROM (range of motion), strengthening, stretching, transfer training  Outcome Evaluation: Pt is an 85 yo F admitted from [facility] after a fall resulting in a L hip periprosthetic greater trochanteric fracture. Per ortho, plans for conservative management and pt is to be TDWB <10% LLE for 4 weeks. Pt reports independence at BL with a rollator, staff provides meals but pt is typically independent with all ADLs and walks to the dining esteban. Pt presents to PT with impaired strength, endurance, and pain limiting overall mobility. Pt transferred to EOB with min A x1 and stood with mod A x1 and rwx. Pt given VCs and visual demo for WB restrictions, actually maintained fairly well with transfer and maintainined NWB once standing. Pt unable to pivot on RLE to get to a BSC or chair - recommend bedpan/purewick for now. Pt able to stand for ~3 minutes with tolerable pain, assisted back to supine min A x1 and scooted up in bed, left with needs met. PT will continue to follow, pt is unable to return to [facility] with current mobility restrictions, will need SNF placement.     Time Calculation:   PT Evaluation  Complexity  History, PT Evaluation Complexity: 1-2 personal factors and/or comorbidities  Examination of Body Systems (PT Eval Complexity): total of 3 or more elements  Clinical Presentation (PT Evaluation Complexity): evolving  Clinical Decision Making (PT Evaluation Complexity): moderate complexity  Overall Complexity (PT Evaluation Complexity): moderate complexity     PT Charges       Row Name 10/27/24 1241             Time Calculation    Start Time 0844  -      Stop Time 0912  -      Time Calculation (min) 28 min  -      PT Received On 10/27/24  -      PT - Next Appointment 10/28/24  -      PT Goal Re-Cert Due Date 11/03/24  -         Time Calculation- PT    Total Timed Code Minutes- PT 23 minute(s)  -         Timed Charges    30442 - PT Therapeutic Activity Minutes 23  -BH         Total Minutes    Timed Charges Total Minutes 23  -       Total Minutes 23  -                User Key  (r) = Recorded By, (t) = Taken By, (c) = Cosigned By      Initials Name Provider Type     So Redding, PT Physical Therapist                  Therapy Charges for Today       Code Description Service Date Service Provider Modifiers Qty    [id number]  PT THERAPEUTIC ACT EA 15 MIN 10/27/2024 So Redding, PT GP 2    [id number] HC PT EVAL MOD COMPLEXITY 3 10/27/2024 So Redding, PT GP 1            PT G-Codes  Outcome Measure Options: AM-PAC 6 Clicks Basic Mobility (PT)  AM-PAC 6 Clicks Score (PT): 11  PT Discharge Summary  Anticipated Discharge Disposition (PT): skilled nursing facility    So Redding PT  10/27/2024

## 2024-10-27 NOTE — PLAN OF CARE
Goal Outcome Evaluation:  Plan of Care Reviewed With: patient           Outcome Evaluation: Pt is an 85 yo F admitted from [facility] after a fall resulting in a L hip periprosthetic greater trochanteric fracture. Per ortho, plans for conservative management and pt is to be TDWB <10% LLE for 4 weeks. Pt reports independence at BL with a rollator, staff provides meals but pt is typically independent with all ADLs and walks to the dining esteban. Pt presents to PT with impaired strength, endurance, and pain limiting overall mobility. Pt transferred to EOB with min A x1 and stood with mod A x1 and rwx. Pt given VCs and visual demo for WB restrictions, actually maintained fairly well with transfer and maintainined NWB once standing. Pt unable to pivot on RLE to get to a BSC or chair - recommend bedpan/purewick for now. Pt able to stand for ~3 minutes with tolerable pain, assisted back to supine min A x1 and scooted up in bed, left with needs met. PT will continue to follow, pt is unable to return to [facility] with current mobility restrictions, will need SNF placement.    Anticipated Discharge Disposition (PT): skilled nursing facility

## 2024-10-27 NOTE — CONSULTS
"      Orthopaedic & Sports Medicine Surgery  Dr. Jose Kathleen MD  [phone]    Orthopaedic Surgery  Consult Note      HPI:  Patient is a 84 y.o. female who presents with left hip pain after a fall.  She fell going into the door at home and she said she did bump to door the door hit her and she fell.  She had pain in the left hip and difficulty ambulating.  She was brought to [facility] and was found to have a greater trochanter periprosthetic hip fracture.  Orthopedic surgery was consulted.  She had a left total hip replacement with Dr. Almaguer in the past.  She lives in a senior assisted living.    MEDICAL HISTORY  Past Medical History:   Diagnosis Date    Chilblains     \"Chilblian's\"    CKD (chronic kidney disease)     CVA (cerebral vascular accident)     Depression     Fatty liver     GERD (gastroesophageal reflux disease)     Hyperlipidemia     Hypertension     Incontinence     Osteoarthritis     OA  Marvin THR, LT TKR - hydrocodone prescibed by Dr. 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 Almaguer    TOTAL KNEE ARTHROPLASTY Left     OA  Marvin THR, LT TKR - hydrocodone prescibed by Dr. Almaguer     Prior to Admission medications    Medication Sig Start Date End Date " Taking? Authorizing Provider   atorvastatin (LIPITOR) 40 MG tablet Take 1 tablet by mouth Every Night. 1/11/24  Yes Sanjeev Quan MD   buPROPion XL (WELLBUTRIN XL) 150 MG 24 hr tablet TAKE ONE TABLET BY MOUTH EVERY MORNING 7/23/24  Yes Eben Porter MD   docusate sodium 100 MG capsule Take 1 capsule by mouth 2 (Two) Times a Day. 1/11/24  Yes Sanjeev Quan MD   donepezil (ARICEPT) 5 MG tablet Take 1 tablet by mouth Every Night. 10/15/24  Yes Brittany Ordonez APRN   gabapentin (NEURONTIN) 400 MG capsule TAKE ONE CAPSULE BY MOUTH THREE TIMES DAILY (MORNING, NOON, AND BEDTIME) 9/18/24  Yes Cesar Quiroz MD   glimepiride (AMARYL) 4 MG tablet Take 1 tablet by mouth Every Morning Before Breakfast. 1/11/24  Yes Sanjeev Quan MD   insulin aspart (novoLOG FLEXPEN) 100 UNIT/ML solution pen-injector sc pen 2-10 units three times a day before each meal per scale 2/18/24  Yes Dat Leon MD   levothyroxine (SYNTHROID, LEVOTHROID) 88 MCG tablet Take 1 tablet by mouth Daily. 3/29/24  Yes Dat Leon MD   linagliptin (Tradjenta) 5 MG tablet tablet Take 1 tablet by mouth Daily. 1/11/24  Yes Sanjeev Quan MD   metoprolol succinate XL (TOPROL-XL) 25 MG 24 hr tablet TAKE ONE TABLET BY MOUTH DAILY **NEW MED** 10/17/24  Yes Elizabeth Pina MD   oxybutynin XL (DITROPAN-XL) 10 MG 24 hr tablet TAKE ONE TABLET BY MOUTH DAILY 7/23/24  Yes Eben Porter MD   pioglitazone (ACTOS) 15 MG tablet Take 1 tablet by mouth Daily. 1/11/24  Yes Sanjeev Quan MD   venlafaxine XR (EFFEXOR-XR) 150 MG 24 hr capsule TAKE ONE TABLET BY MOUTH DAILY 7/23/24  Yes Eben Porter MD   vitamin B-12 (CYANOCOBALAMIN) 1000 MCG tablet TAKE ONE TABLET BY MOUTH DAILY 7/23/24  Yes Eben Porter MD   vitamin D (ERGOCALCIFEROL) 1.25 MG (64989 UT) capsule capsule Take 1 capsule by mouth Every 7 (Seven) Days. Ergocalciferol 50,000 units weekly x 5 weeks starting Thursday,  2023, then change to cholecalciferol 1,000 units twice a day 24  Yes Eben Porter MD   Xarelto 15 MG tablet TAKE ONE TABLET BY MOUTH IN THE EVENING WITH DINNER 24  Yes Ebne Porter MD   BD AutoShield Duo 30G X 5 MM misc 1 each by Other route 4 (Four) Times a Day. 4/10/24   Provider, MD Dat   glucose blood test strip Use to test blood glucose daily. Formulary Compliance Approval. Diagnosis: Type 2 Diabetes - Not Insulin Dependent 24   Sanjeev Quan MD   glucose monitor monitoring kit Use to test blood glucose daily. Formulary Compliance Approval. Diagnosis: Type 2 Diabetes - Not Insulin Dependent 24   Sanjeev Quan MD   Lancets misc Use to test blood glucose daily. Formulary Compliance Approval. Diagnosis: Type 2 Diabetes - Not Insulin Dependent 24   Sanjeev Quan MD     Allergies   Allergen Reactions    Shellfish-Derived Products Anaphylaxis    Amlodipine Hives and Other (See Comments)     Heart race    Iodine Rash     Most Recent Immunizations   Administered Date(s) Administered    Arexvy (RSV, Adults 60+ yrs) 2023    COVID-19 (PFIZER) BIVALENT 12+YRS 10/06/2022    COVID-19 (PFIZER) Purple Cap Monovalent 10/14/2021    Covid-19 (Pfizer) Gray Cap Monovalent 2022    FLUAD TRI 65YR+ 2020, 2020    FluMist 2-49yrs 2015    Fluad Quad 65+ 10/14/2021    Fluzone High-Dose 65+YRS 2017    Fluzone High-Dose 65+yrs 10/16/2023    Pneumococcal Conjugate 13-Valent (PCV13) 2016    Pneumococcal Polysaccharide (PPSV23) 2018    Shingrix 2021, 2021    TD Preservative Free (Tenivac) 2018    Td (TDVAX) 2018    Tdap 10/08/2023     Social History     Tobacco Use    Smoking status: Former     Current packs/day: 0.00     Average packs/day: 0.5 packs/day for 14.0 years (7.0 ttl pk-yrs)     Types: Cigarettes     Start date:      Quit date:      Years since quittin.8    Smokeless  "tobacco: Never    Tobacco comments:     CAFFEINE USE   Substance Use Topics    Alcohol use: No      Social History     Substance and Sexual Activity   Drug Use No       VITALS: /79 (BP Location: Right arm, Patient Position: Lying)   Pulse 64   Temp 98.6 °F (37 °C) (Oral)   Resp 16   Ht 170.2 cm (67\")   Wt 94.9 kg (209 lb 3.5 oz)   LMP  (LMP Unknown)   SpO2 100%   BMI 32.77 kg/m²  Body mass index is 32.77 kg/m².    PHYSICAL EXAM:   CONSTITUTIONAL: No acute distress  LUNGS: Equal chest rise, no shortness of air  CARDIOVASCULAR: palpable peripheral pulses  EXTREMITY:   Left lower extremity  Tenderness to Palpation: Tenderness to palpation at the greater trochanter.  Nontender over the mid thigh or distal  Gross Deformity: No Gross Deformity  Pulses:  Brisk Capillary Refill  Sensation: Intact to Saphenous, Sural, Deep Peroneal, Superficial Peroneal, and Tibial Nerves and grossly throughout extremity  Motor: 5/5 EHL/FHL/TA/GS motor complexes     Right lower extremity  Tenderness to Palpation: No tenderness to palpation  Gross Deformity: No Gross Deformity  Pulses:  Brisk Capillary Refill  Sensation: Intact to Saphenous, Sural, Deep Peroneal, Superficial Peroneal, and Tibial Nerves and grossly throughout extremity  Motor: 5/5 EHL/FHL/TA/GS motor complexes     RADIOLOGY REVIEW:   CT Pelvis Without Contrast    Result Date: 10/26/2024  Periprosthetic fracture involving the femoral component of the patient's left hip arthroplasty.  Radiation dose reduction techniques were utilized, including automated exposure control and exposure modulation based on body size.   This report was finalized on 10/26/2024 8:06 PM by Dr. Fadia Eubanks M.D on Workstation: [id number]       LABS:   Results for the past 24 hours:   Recent Results (from the past 24 hours)   Basic Metabolic Panel    Collection Time: 10/26/24  7:09 PM    Specimen: Blood   Result Value Ref Range    Glucose 152 (H) 65 - 99 mg/dL    BUN 24 (H) 8 - 23 " mg/dL    Creatinine 1.30 (H) 0.57 - 1.00 mg/dL    Sodium 139 136 - 145 mmol/L    Potassium 4.6 3.5 - 5.2 mmol/L    Chloride 102 98 - 107 mmol/L    CO2 28.0 22.0 - 29.0 mmol/L    Calcium 8.5 (L) 8.6 - 10.5 mg/dL    BUN/Creatinine Ratio 18.5 7.0 - 25.0    Anion Gap 9.0 5.0 - 15.0 mmol/L    eGFR 41.1 (L) >60.0 mL/min/1.73   CBC Auto Differential    Collection Time: 10/26/24  7:09 PM    Specimen: Blood   Result Value Ref Range    WBC 10.76 3.40 - 10.80 10*3/mm3    RBC 4.24 3.77 - 5.28 10*6/mm3    Hemoglobin 12.2 12.0 - 15.9 g/dL    Hematocrit 37.8 34.0 - 46.6 %    MCV 89.2 79.0 - 97.0 fL    MCH 28.8 26.6 - 33.0 pg    MCHC 32.3 31.5 - 35.7 g/dL    RDW 14.5 12.3 - 15.4 %    RDW-SD 48.0 37.0 - 54.0 fl    MPV 9.0 6.0 - 12.0 fL    Platelets 171 140 - 450 10*3/mm3    Neutrophil % 73.2 42.7 - 76.0 %    Lymphocyte % 16.4 (L) 19.6 - 45.3 %    Monocyte % 8.8 5.0 - 12.0 %    Eosinophil % 1.0 0.3 - 6.2 %    Basophil % 0.2 0.0 - 1.5 %    Immature Grans % 0.4 0.0 - 0.5 %    Neutrophils, Absolute 7.88 (H) 1.70 - 7.00 10*3/mm3    Lymphocytes, Absolute 1.76 0.70 - 3.10 10*3/mm3    Monocytes, Absolute 0.95 (H) 0.10 - 0.90 10*3/mm3    Eosinophils, Absolute 0.11 0.00 - 0.40 10*3/mm3    Basophils, Absolute 0.02 0.00 - 0.20 10*3/mm3    Immature Grans, Absolute 0.04 0.00 - 0.05 10*3/mm3    nRBC 0.0 0.0 - 0.2 /100 WBC   POC Glucose Once    Collection Time: 10/27/24 12:13 AM    Specimen: Blood   Result Value Ref Range    Glucose 140 (H) 70 - 130 mg/dL   Hemoglobin A1c    Collection Time: 10/27/24  4:51 AM    Specimen: Blood   Result Value Ref Range    Hemoglobin A1C 8.70 (H) 4.80 - 5.60 %   Basic Metabolic Panel    Collection Time: 10/27/24  4:52 AM    Specimen: Blood   Result Value Ref Range    Glucose 179 (H) 65 - 99 mg/dL    BUN 22 8 - 23 mg/dL    Creatinine 1.35 (H) 0.57 - 1.00 mg/dL    Sodium 137 136 - 145 mmol/L    Potassium 4.7 3.5 - 5.2 mmol/L    Chloride 102 98 - 107 mmol/L    CO2 27.2 22.0 - 29.0 mmol/L    Calcium 8.8 8.6 - 10.5 mg/dL     BUN/Creatinine Ratio 16.3 7.0 - 25.0    Anion Gap 7.8 5.0 - 15.0 mmol/L    eGFR 39.3 (L) >60.0 mL/min/1.73   CBC Auto Differential    Collection Time: 10/27/24  4:52 AM    Specimen: Blood   Result Value Ref Range    WBC 7.49 3.40 - 10.80 10*3/mm3    RBC 4.04 3.77 - 5.28 10*6/mm3    Hemoglobin 11.5 (L) 12.0 - 15.9 g/dL    Hematocrit 36.3 34.0 - 46.6 %    MCV 89.9 79.0 - 97.0 fL    MCH 28.5 26.6 - 33.0 pg    MCHC 31.7 31.5 - 35.7 g/dL    RDW 14.5 12.3 - 15.4 %    RDW-SD 48.1 37.0 - 54.0 fl    MPV 8.8 6.0 - 12.0 fL    Platelets 147 140 - 450 10*3/mm3    Neutrophil % 62.3 42.7 - 76.0 %    Lymphocyte % 25.6 19.6 - 45.3 %    Monocyte % 10.3 5.0 - 12.0 %    Eosinophil % 1.1 0.3 - 6.2 %    Basophil % 0.3 0.0 - 1.5 %    Immature Grans % 0.4 0.0 - 0.5 %    Neutrophils, Absolute 4.67 1.70 - 7.00 10*3/mm3    Lymphocytes, Absolute 1.92 0.70 - 3.10 10*3/mm3    Monocytes, Absolute 0.77 0.10 - 0.90 10*3/mm3    Eosinophils, Absolute 0.08 0.00 - 0.40 10*3/mm3    Basophils, Absolute 0.02 0.00 - 0.20 10*3/mm3    Immature Grans, Absolute 0.03 0.00 - 0.05 10*3/mm3    nRBC 0.0 0.0 - 0.2 /100 WBC   POC Glucose Once    Collection Time: 10/27/24  7:17 AM    Specimen: Blood   Result Value Ref Range    Glucose 164 (H) 70 - 130 mg/dL       IMPRESSION:  Patient is a 84 y.o. female with left hip periprosthetic greater trochanteric fracture    PLAN:   Admited to: Iain Pryor MD  Diet: Okay for diet  Weight Bearing: Touchdown weightbearing left lower extremity, likely 4 weeks  I think this is likely a [place] A type fracture and stable.  On the x-rays and CT scan it appears to just involve the greater trochanter and I think the femoral stem is likely stable.  Thinks the best be treated conservatively versus ORIF or revision.  Will plan on touchdown weightbearing, 10% or less of her body weight, for approximately 4 weeks.  Then can likely progress weightbearing.  Okay for diet today.  Disposition: Follow-up in the office in 2 to 3 weeks with  myself or Sierra Martin at [facility] for repeat x-rays and assessment.  Please call [phone] to make an appointment.    Jose Kathleen MD  [facility]  Orthopaedic Surgery, Sports Medicine  [phone]

## 2024-10-27 NOTE — PLAN OF CARE
Goal Outcome Evaluation:  Plan of Care Reviewed With: patient        Progress: improving  Outcome Evaluation: Patient is here with a closed left hip fracture after a fall. VSS. PO/IV pain medication helping with pain. Bedrest. Voiding per purewick. NPO sips with medications. Education provided on pain control and blood sugar monitoring. Patient verbalized understanding.

## 2024-10-27 NOTE — PROGRESS NOTES
"    DAILY PROGRESS NOTE  [facility]    Patient Identification:  Name: Rekha Bear  Age: 84 y.o.  Sex: female  :  1940  MRN: [id number]         Primary Care Physician: Zahida Osorio MD    Subjective:  Interval History: Doing pretty well.  Tolerating pain medication with no aversion.  No current chest pain troubles breathing acute nausea and/or vomiting altered mentation    Objective: Family x 2 at bedside.  Case discussed above goal.  Patient getting ready to eat lunch otherwise conversational pleasant nontoxic    Scheduled Meds:atorvastatin, 40 mg, Oral, Nightly  buPROPion XL, 150 mg, Oral, QAM  donepezil, 5 mg, Oral, Nightly  gabapentin, 400 mg, Oral, Q12H  insulin lispro, 2-9 Units, Subcutaneous, 4x Daily AC & at Bedtime  levothyroxine, 88 mcg, Oral, Daily  linagliptin, 5 mg, Oral, Daily  metoprolol succinate XL, 25 mg, Oral, Q24H  oxybutynin XL, 10 mg, Oral, Daily  senna-docusate sodium, 2 tablet, Oral, BID  venlafaxine XR, 150 mg, Oral, Daily  vitamin B-12, 1,000 mcg, Oral, Daily      Continuous Infusions:     Vital signs in last 24 hours:  Temp:  [97.4 °F (36.3 °C)-98.8 °F (37.1 °C)] 97.9 °F (36.6 °C)  Heart Rate:  [64-86] 67  Resp:  [16-18] 16  BP: (118-167)/(71-93) 135/72    Intake/Output:    Intake/Output Summary (Last 24 hours) at 10/27/2024 1125  Last data filed at 10/27/2024 1044  Gross per 24 hour   Intake 50 ml   Output 400 ml   Net -350 ml       Exam:  /72 (BP Location: Left arm, Patient Position: Lying)   Pulse 67   Temp 97.9 °F (36.6 °C) (Oral)   Resp 16   Ht 170.2 cm (67\")   Wt 94.9 kg (209 lb 3.5 oz)   LMP  (LMP Unknown)   SpO2 95%   BMI 32.77 kg/m²     General Appearance:    Alert, cooperative/following commands                          Head:    Normocephalic, without obvious abnormality, atraumatic                         Throat:   Oral mucosa pink and moist                           Neck: No TTP over JVD                         Lungs:    Clear to " auscultation bilaterally, respirations unlabored                 Chest Wall:    No tenderness or deformity                          Heart:    Regular rate and rhythm, S1 and S2 normal                  Abdomen:     Soft, nontender, bowel sounds active                 Extremities: Discomfort with manipulation of the left lower extremity, volume status reassuring                        pulses:   Pulses palpable in lower extremities                  Neurologic:   CNII-XII intact     Data Review:  Labs in chart were reviewed.    Assessment:  Active Hospital Problems    Diagnosis  POA    **Periprosthetic fracture around internal prosthetic left hip joint [M97.02XA]  Not Applicable    Atrial fibrillation, persistent [I48.19]  Yes    SHEY (obstructive sleep apnea) [G47.33]  Yes    Primary hypertension [I10]  Yes    Type 2 diabetes mellitus with stage 3 chronic kidney disease, without long-term current use of insulin [E11.22, N18.30]  Yes    Anxiety [F41.9]  Yes      Resolved Hospital Problems   No resolved problems to display.       Plan:    Nonoperable periprosthetic fracture at this time per evaluation of  with weightbearing per their recommendations   -Continue as needed pain control    Labs overall unremarkable.  Clinically stable vital    DM2 -multiple meds held at admission-Amaryl/Actos caution with resumption of either.  SSI   -Peripheral neuropathy on gabapentin    Hypothyroidism on levothyroxine    HTN/PAF stable-resume AC with good rate control on Toprol    HLD on statin    Dementia without behavior on donepezil    Resume Xarelto and DC SCDs        CC peak to coordinate discharge needs.  Likely you need rehab.  Normally resides at [facility] and is walker dependent at base    Edward Stoll MD  10/27/2024  11:25 EDT

## 2024-10-28 PROBLEM — Z86.73 HISTORY OF CVA (CEREBROVASCULAR ACCIDENT): Status: ACTIVE | Noted: 2024-10-28

## 2024-10-28 LAB
ANION GAP SERPL CALCULATED.3IONS-SCNC: 8 MMOL/L (ref 5–15)
BASOPHILS # BLD AUTO: 0.02 10*3/MM3 (ref 0–0.2)
BASOPHILS NFR BLD AUTO: 0.2 % (ref 0–1.5)
BUN SERPL-MCNC: 25 MG/DL (ref 8–23)
BUN/CREAT SERPL: 18 (ref 7–25)
CALCIUM SPEC-SCNC: 8.6 MG/DL (ref 8.6–10.5)
CHLORIDE SERPL-SCNC: 104 MMOL/L (ref 98–107)
CO2 SERPL-SCNC: 27 MMOL/L (ref 22–29)
CREAT SERPL-MCNC: 1.39 MG/DL (ref 0.57–1)
DEPRECATED RDW RBC AUTO: 48 FL (ref 37–54)
EGFRCR SERPLBLD CKD-EPI 2021: 38 ML/MIN/1.73
EOSINOPHIL # BLD AUTO: 0.17 10*3/MM3 (ref 0–0.4)
EOSINOPHIL NFR BLD AUTO: 2 % (ref 0.3–6.2)
ERYTHROCYTE [DISTWIDTH] IN BLOOD BY AUTOMATED COUNT: 14.7 % (ref 12.3–15.4)
GLUCOSE BLDC GLUCOMTR-MCNC: 176 MG/DL (ref 70–130)
GLUCOSE BLDC GLUCOMTR-MCNC: 194 MG/DL (ref 70–130)
GLUCOSE BLDC GLUCOMTR-MCNC: 210 MG/DL (ref 70–130)
GLUCOSE BLDC GLUCOMTR-MCNC: 236 MG/DL (ref 70–130)
GLUCOSE SERPL-MCNC: 124 MG/DL (ref 65–99)
HCT VFR BLD AUTO: 38 % (ref 34–46.6)
HGB BLD-MCNC: 11.9 G/DL (ref 12–15.9)
IMM GRANULOCYTES # BLD AUTO: 0.02 10*3/MM3 (ref 0–0.05)
IMM GRANULOCYTES NFR BLD AUTO: 0.2 % (ref 0–0.5)
LYMPHOCYTES # BLD AUTO: 2.36 10*3/MM3 (ref 0.7–3.1)
LYMPHOCYTES NFR BLD AUTO: 28 % (ref 19.6–45.3)
MCH RBC QN AUTO: 28.1 PG (ref 26.6–33)
MCHC RBC AUTO-ENTMCNC: 31.3 G/DL (ref 31.5–35.7)
MCV RBC AUTO: 89.8 FL (ref 79–97)
MONOCYTES # BLD AUTO: 0.89 10*3/MM3 (ref 0.1–0.9)
MONOCYTES NFR BLD AUTO: 10.6 % (ref 5–12)
NEUTROPHILS NFR BLD AUTO: 4.96 10*3/MM3 (ref 1.7–7)
NEUTROPHILS NFR BLD AUTO: 59 % (ref 42.7–76)
NRBC BLD AUTO-RTO: 0 /100 WBC (ref 0–0.2)
PLATELET # BLD AUTO: 150 10*3/MM3 (ref 140–450)
PMV BLD AUTO: 9 FL (ref 6–12)
POTASSIUM SERPL-SCNC: 4.8 MMOL/L (ref 3.5–5.2)
RBC # BLD AUTO: 4.23 10*6/MM3 (ref 3.77–5.28)
SODIUM SERPL-SCNC: 139 MMOL/L (ref 136–145)
WBC NRBC COR # BLD AUTO: 8.42 10*3/MM3 (ref 3.4–10.8)

## 2024-10-28 PROCEDURE — 85025 COMPLETE CBC W/AUTO DIFF WBC: CPT | Performed by: HOSPITALIST

## 2024-10-28 PROCEDURE — 97530 THERAPEUTIC ACTIVITIES: CPT

## 2024-10-28 PROCEDURE — 80048 BASIC METABOLIC PNL TOTAL CA: CPT | Performed by: HOSPITALIST

## 2024-10-28 PROCEDURE — 63710000001 INSULIN GLARGINE PER 5 UNITS: Performed by: INTERNAL MEDICINE

## 2024-10-28 PROCEDURE — 63710000001 INSULIN LISPRO (HUMAN) PER 5 UNITS: Performed by: HOSPITALIST

## 2024-10-28 PROCEDURE — 82948 REAGENT STRIP/BLOOD GLUCOSE: CPT

## 2024-10-28 RX ORDER — BISACODYL 5 MG/1
5 TABLET, DELAYED RELEASE ORAL DAILY PRN
Status: DISCONTINUED | OUTPATIENT
Start: 2024-10-28 | End: 2024-10-30 | Stop reason: HOSPADM

## 2024-10-28 RX ORDER — AMOXICILLIN 250 MG
2 CAPSULE ORAL 2 TIMES DAILY
Status: DISCONTINUED | OUTPATIENT
Start: 2024-10-28 | End: 2024-10-30 | Stop reason: HOSPADM

## 2024-10-28 RX ORDER — POLYETHYLENE GLYCOL 3350 17 G/17G
17 POWDER, FOR SOLUTION ORAL 2 TIMES DAILY
Status: DISCONTINUED | OUTPATIENT
Start: 2024-10-28 | End: 2024-10-30 | Stop reason: HOSPADM

## 2024-10-28 RX ORDER — BISACODYL 10 MG
10 SUPPOSITORY, RECTAL RECTAL DAILY PRN
Status: DISCONTINUED | OUTPATIENT
Start: 2024-10-28 | End: 2024-10-30 | Stop reason: HOSPADM

## 2024-10-28 RX ADMIN — LEVOTHYROXINE SODIUM 88 MCG: 88 TABLET ORAL at 08:24

## 2024-10-28 RX ADMIN — GABAPENTIN 400 MG: 400 CAPSULE ORAL at 08:25

## 2024-10-28 RX ADMIN — METOPROLOL SUCCINATE 25 MG: 25 TABLET, EXTENDED RELEASE ORAL at 08:24

## 2024-10-28 RX ADMIN — DONEPEZIL HYDROCHLORIDE 5 MG: 5 TABLET, FILM COATED ORAL at 21:59

## 2024-10-28 RX ADMIN — INSULIN LISPRO 2 UNITS: 100 INJECTION, SOLUTION INTRAVENOUS; SUBCUTANEOUS at 08:25

## 2024-10-28 RX ADMIN — INSULIN LISPRO 4 UNITS: 100 INJECTION, SOLUTION INTRAVENOUS; SUBCUTANEOUS at 16:23

## 2024-10-28 RX ADMIN — HYDROCODONE BITARTRATE AND ACETAMINOPHEN 1 TABLET: 5; 325 TABLET ORAL at 22:10

## 2024-10-28 RX ADMIN — GABAPENTIN 400 MG: 400 CAPSULE ORAL at 21:59

## 2024-10-28 RX ADMIN — SENNOSIDES AND DOCUSATE SODIUM 2 TABLET: 50; 8.6 TABLET ORAL at 08:25

## 2024-10-28 RX ADMIN — ATORVASTATIN CALCIUM 40 MG: 20 TABLET, FILM COATED ORAL at 21:59

## 2024-10-28 RX ADMIN — INSULIN LISPRO 2 UNITS: 100 INJECTION, SOLUTION INTRAVENOUS; SUBCUTANEOUS at 21:59

## 2024-10-28 RX ADMIN — HYDROCODONE BITARTRATE AND ACETAMINOPHEN 1 TABLET: 5; 325 TABLET ORAL at 06:14

## 2024-10-28 RX ADMIN — BUPROPION HYDROCHLORIDE 150 MG: 150 TABLET, EXTENDED RELEASE ORAL at 06:14

## 2024-10-28 RX ADMIN — RIVAROXABAN 20 MG: 20 TABLET, FILM COATED ORAL at 18:19

## 2024-10-28 RX ADMIN — LINAGLIPTIN 5 MG: 5 TABLET, FILM COATED ORAL at 08:25

## 2024-10-28 RX ADMIN — HYDROCODONE BITARTRATE AND ACETAMINOPHEN 1 TABLET: 5; 325 TABLET ORAL at 10:49

## 2024-10-28 RX ADMIN — VENLAFAXINE HYDROCHLORIDE 150 MG: 150 CAPSULE, EXTENDED RELEASE ORAL at 08:25

## 2024-10-28 RX ADMIN — INSULIN GLARGINE 15 UNITS: 100 INJECTION, SOLUTION SUBCUTANEOUS at 22:00

## 2024-10-28 RX ADMIN — OXYBUTYNIN CHLORIDE 10 MG: 10 TABLET, EXTENDED RELEASE ORAL at 08:24

## 2024-10-28 RX ADMIN — Medication 1000 MCG: at 08:24

## 2024-10-28 RX ADMIN — INSULIN LISPRO 4 UNITS: 100 INJECTION, SOLUTION INTRAVENOUS; SUBCUTANEOUS at 12:07

## 2024-10-28 NOTE — DISCHARGE PLACEMENT REQUEST
"Rekha Bear \"MARIA ANTONIA\" (84 y.o. Female)       Date of Birth   1940    Social Security Number       Address   [address] [place] [address]    Home Phone   [phone]    MRN   [id number]       [employer]   [employer]    Marital Status                               Admission Date   10/26/24    Admission Type   Emergency    Admitting Provider   Iain Pryor MD    Attending Provider   Silvano Zuniga MD    Department, Room/Bed   [address], P797/1       Discharge Date       Discharge Disposition       Discharge Destination                                 Attending Provider: Silvano Zuniga MD    Allergies: Shellfish-derived Products, Amlodipine, Iodine    Isolation: None   Infection: None   Code Status: CPR    Ht: 170.2 cm (67\")   Wt: 94.9 kg (209 lb 3.5 oz)    Admission Cmt: None   Principal Problem: Periprosthetic fracture around internal prosthetic left hip joint [M97.02XA]                   Active Insurance as of 10/26/2024       Primary Coverage       Payor Plan Insurance Group Employer/Plan Group    MEDICARE MEDICARE A & B        Payor Plan Address Payor Plan Phone Number Payor Plan Fax Number Effective Dates    [address] [phone]  1/1/2008 - None Entered    [facility] [id number]         Subscriber Name Subscriber Birth Date Member ID       REKHA BEAR 1940 [id number]               Secondary Coverage       Payor Plan Insurance Group Employer/Plan Group    [facility] SUPP [id number]       Payor Plan Address Payor Plan Phone Number Payor Plan Fax Number Effective Dates    [address]   12/1/2016 - None Entered    [facility] [id number]         Subscriber Name Subscriber Birth Date Member ID       REKHA BEAR 1940 [id number]                     Emergency Contacts        (Rel.) Home Phone Work Phone Mobile Phone    Ping Hernández (Daughter) [phone] -- [phone]    Jl Bear " (Son) [phone] -- --    Ragini Bear (Daughter) [phone] -- --    Jessica Massey (Daughter) [phone] -- --

## 2024-10-28 NOTE — PROGRESS NOTES
Name: Rekha Bear ADMIT: 10/26/2024   : 1940  PCP: Zahida Osorio MD    MRN: [id number] LOS: 1 days   AGE/SEX: 84 y.o. female  ROOM: [facility]     Subjective   Subjective   No acute events. Patient denies new complaints. Pain is reasonably controlled. Her daughter in law is at bedside.    Objective   Objective   Vital Signs  Temp:  [98.1 °F (36.7 °C)-99 °F (37.2 °C)] 99 °F (37.2 °C)  Heart Rate:  [62-77] 77  Resp:  [16-18] 18  BP: ()/(74-87) 127/83  SpO2:  [92 %-97 %] 97 %  on   ;   Device (Oxygen Therapy): room air  Body mass index is 32.77 kg/m².  Physical Exam  Vitals and nursing note reviewed.   Constitutional:       General: She is not in acute distress.     Appearance: She is not toxic-appearing or diaphoretic.   HENT:      Head: Normocephalic and atraumatic.      Nose: Nose normal.      Mouth/Throat:      Mouth: Mucous membranes are moist.      Pharynx: Oropharynx is clear.   Eyes:      Conjunctiva/sclera: Conjunctivae normal.      Pupils: Pupils are equal, round, and reactive to light.   Cardiovascular:      Rate and Rhythm: Normal rate. Rhythm irregular.      Pulses: Normal pulses.   Pulmonary:      Effort: Pulmonary effort is normal.   Abdominal:      General: Bowel sounds are normal. There is no distension.      Palpations: Abdomen is soft.      Tenderness: There is no abdominal tenderness.   Musculoskeletal:         General: No swelling.      Cervical back: Neck supple.   Skin:     General: Skin is warm and dry.      Capillary Refill: Capillary refill takes less than 2 seconds.   Neurological:      General: No focal deficit present.      Mental Status: She is alert and oriented to person, place, and time.   Psychiatric:         Mood and Affect: Mood normal.         Behavior: Behavior normal.       Results Review     I reviewed the patient's new clinical results.  Results from last 7 days   Lab Units 10/28/24  0415 10/27/24  0452 10/26/24  1909   WBC 10*3/mm3 8.42 7.49 10.76    HEMOGLOBIN g/dL 11.9* 11.5* 12.2   PLATELETS 10*3/mm3 150 147 171     Results from last 7 days   Lab Units 10/28/24  0415 10/27/24  0452 10/26/24  1909   SODIUM mmol/L 139 137 139   POTASSIUM mmol/L 4.8 4.7 4.6   CHLORIDE mmol/L 104 102 102   CO2 mmol/L 27.0 27.2 28.0   BUN mg/dL 25* 22 24*   CREATININE mg/dL 1.39* 1.35* 1.30*   GLUCOSE mg/dL 124* 179* 152*   EGFR mL/min/1.73 38.0* 39.3* 41.1*       Results from last 7 days   Lab Units 10/28/24  0415 10/27/24  0452 10/26/24  1909   CALCIUM mg/dL 8.6 8.8 8.5*       Hemoglobin A1C   Date/Time Value Ref Range Status   10/27/2024 0451 8.70 (H) 4.80 - 5.60 % Final     Glucose   Date/Time Value Ref Range Status   10/28/2024 1525 236 (H) 70 - 130 mg/dL Final   10/28/2024 1034 210 (H) 70 - 130 mg/dL Final   10/28/2024 0800 194 (H) 70 - 130 mg/dL Final   10/27/2024 2055 150 (H) 70 - 130 mg/dL Final   10/27/2024 1625 165 (H) 70 - 130 mg/dL Final   10/27/2024 1122 162 (H) 70 - 130 mg/dL Final   10/27/2024 0717 164 (H) 70 - 130 mg/dL Final       CT Pelvis Without Contrast    Result Date: 10/26/2024  Periprosthetic fracture involving the femoral component of the patient's left hip arthroplasty.  Radiation dose reduction techniques were utilized, including automated exposure control and exposure modulation based on body size.   This report was finalized on 10/26/2024 8:06 PM by Dr. Fadia Eubanks M.D on Workstation: [id number]       I have personally reviewed all medications:  Scheduled Medications  atorvastatin, 40 mg, Oral, Nightly  buPROPion XL, 150 mg, Oral, QAM  donepezil, 5 mg, Oral, Nightly  gabapentin, 400 mg, Oral, Q12H  insulin lispro, 2-9 Units, Subcutaneous, 4x Daily AC & at Bedtime  levothyroxine, 88 mcg, Oral, Daily  linagliptin, 5 mg, Oral, Daily  metoprolol succinate XL, 25 mg, Oral, Q24H  oxybutynin XL, 10 mg, Oral, Daily  senna-docusate sodium, 2 tablet, Oral, BID   And  polyethylene glycol, 17 g, Oral, BID  rivaroxaban, 20 mg, Oral, Daily With  Dinner  venlafaxine XR, 150 mg, Oral, Daily  vitamin B-12, 1,000 mcg, Oral, Daily    Infusions   Diet  Diet: Cardiac, Diabetic; Healthy Heart (2-3 Na+); Consistent Carbohydrate; Fluid Consistency: Thin (IDDSI 0)    I have personally reviewed:  [x]  Laboratory   [x]  Microbiology   [x]  Radiology   []  EKG/Telemetry  []  Cardiology/Vascular   []  Pathology    [x]  Records    Assessment/Plan     Active Hospital Problems    Diagnosis  POA    **Periprosthetic fracture around internal prosthetic left hip joint [M97.02XA]  Not Applicable    History of CVA (cerebrovascular accident) [Z86.73]  Not Applicable    Naz-prosthetic fracture around prosthetic hip [M97.8XXA, Z96.649]  Not Applicable    Atrial fibrillation, persistent [I48.19]  Yes    SHEY (obstructive sleep apnea) [G47.33]  Yes    Primary hypertension [I10]  Yes    Type 2 diabetes mellitus with stage 3 chronic kidney disease, without long-term current use of insulin [E11.22, N18.30]  Yes    Hypothyroidism [E03.9]  Yes    Anxiety [F41.9]  Yes    Gastroesophageal reflux disease [K21.9]  Yes      Resolved Hospital Problems   No resolved problems to display.   Left Hip Periprosthetic Fracture  - conservative treatment recommended  - continue pain control and toe-touch weightbearing LLE  - appreciate orthopedic surgery recs, follow up with them in 2-3 weeks    Type 2 DM  - BG elevated, A1C 8.70%  - will add lantus 15 units nightly  - continue tradjenta  - continue coverage with ssi/hypoglycemia protocol    HTN/Longstanding Persistent Afib/Hx of CVA  - BP and HR acceptable, continue metoprolol  - continue xarelto    Hypothyroidism  - continue levothyroxine    GERD  - continue PPI    SHEY  - continue CPAP if available  - supplement oxygen as needed    Xarelto (home med) for DVT prophylaxis.  Full code.  Discussed with patient, family, and nursing staff.  Anticipate discharge to SNU facility once arrangements have been made.  Expected Discharge Date: 10/30/2024; Expected  Discharge Time:       Silvano Zuniga MD  [facility]  10/28/24  17:27 EDT

## 2024-10-28 NOTE — PLAN OF CARE
Goal Outcome Evaluation:  Plan of Care Reviewed With: patient, child        Progress: improving  Outcome Evaluation: Pt seen for PT this AM and tolerated mobility well, though continues to be limited by pain. Pt transferred to EOB with min A x1 and increased time - pt able to slowly move BLE to EOB without assist, but did need min A to bring trunk upright (hx R shoulder injury/pain). Pt stood 2x with mod A x2 and rwx, TCs for WB and pt able to maintain with STS. Certain LLE positioning causes pain, but in standing with LLE extended slightly in front of her, pt is comfortable and able to stand for several minutes with min A for safety. Pt remains unable to pivot on RLE despite increased assist and cueing - pt with BUE weakness and fatigues quickly. Assisted back to supine min A x1 and repositioned in bed, left with needs met. PT will continue to follow, anticipate DC to SNF.    Anticipated Discharge Disposition (PT): skilled nursing facility

## 2024-10-28 NOTE — CASE MANAGEMENT/SOCIAL WORK
Continued Stay Note  [facility]     Patient Name: Rekha Bear  MRN: [id number]  Today's Date: 10/28/2024    Admit Date: 10/26/2024    Plan: SNF- referrals pending   Discharge Plan       Row Name 10/28/24 1504       Plan    Plan Comments Spoke with [facility] does not have any beds. Called [facility] and their admissions person is off today so they asked that CCP call back tomorrow. Spoke with [facility] can accept Wednesday. Son, Jl, updated. Will follow up with [facility] tomorrow and get facility decision.                   Discharge Codes    No documentation.                 Expected Discharge Date and Time       Expected Discharge Date Expected Discharge Time    Oct 30, 2024               Adamaris Evangelista RN

## 2024-10-28 NOTE — PLAN OF CARE
Problem: Adult Inpatient Plan of Care  Goal: Plan of Care Review  Outcome: Progressing  Flowsheets (Taken 10/27/2024 2048)  Progress: improving  Outcome Evaluation: Patient stable during shift. VSS and voiding function is intact. Patient evaluated by ortho this am and plan for nonoperative treatment with touch down weight bear of  <10% to LLE. Evaluated by PT this am and patient able to stand, but not able to pivot. Pain managed with prn norco. Educated on skin injury prevention and SSI protocol. Plan for d/c to snf, CCP to follow.  Plan of Care Reviewed With: patient   Goal Outcome Evaluation:

## 2024-10-28 NOTE — PLAN OF CARE
Goal Outcome Evaluation:  Plan of Care Reviewed With: patient           Outcome Evaluation: Patient remains stable. Alert and oriented. Touch down weight bearing on LLE. Voiding per purewick. Pain managed with prn pain meds. Plans to d/c to SNF.

## 2024-10-28 NOTE — THERAPY TREATMENT NOTE
"Patient Name: Rekha Bear  : 1940    MRN: [id number]                              Today's Date: 10/28/2024       Admit Date: 10/26/2024    Visit Dx:     ICD-10-CM ICD-9-CM   1. Closed fracture of left hip, initial encounter  S72.002A 820.8   2. History of total replacement of both hip joints  Z96.643 V43.64   3. Chronic renal impairment, unspecified CKD stage  N18.9 585.9   4. 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    Community acquired pneumonia of left lower lobe of lung    Hyponatremia    Pneumonia due to COVID-19 virus    Major depressive disorder, recurrent, mild    Acute respiratory failure with hypoxia    Supraventricular tachycardia    PVC (premature ventricular contraction)    Depressive disorder    Gastroesophageal reflux disease    Midline cystocele    Rectocele    BMI 29.0-29.9,adult    Acute left PCA stroke    B12 deficiency    Bradycardia    Premature atrial contractions    Stroke    SHEY (obstructive sleep apnea)    Circadian rhythm disorder    Cerebrovascular accident (CVA) due to embolism of left posterior cerebral artery    Anxiety    Dysphagia as late effect of stroke    Atrial fibrillation, persistent    Acute UTI    Closed left hip fracture    Periprosthetic fracture around internal prosthetic left hip joint    Naz-prosthetic fracture around prosthetic hip     Past Medical History:   Diagnosis Date    Chilblains     \"Chilblian's\"    CKD (chronic kidney disease)     CVA (cerebral vascular accident)     Depression     Fatty liver     GERD (gastroesophageal reflux disease)  " "   Hyperlipidemia     Hypertension     Incontinence     Osteoarthritis     OA  Marvin THR, LT TKR - hydrocodone prescibed by Dr. 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 ENDOSCOPY;  Service:     DENTAL PROCEDURE      FOOT SURGERY      TONSILLECTOMY      TOTAL HIP ARTHROPLASTY Bilateral     OA  Marvin THR, LT TKR - hydrocodone prescibed by Dr. Almaguer    TOTAL KNEE ARTHROPLASTY Left     OA  Marvin THR, LT TKR - hydrocodone prescibed by Dr. Almaguer      General Information       Row Name 10/28/24 [facility]          Physical Therapy Time and Intention    Document Type therapy note (daily note)  -     Mode of Treatment individual therapy;physical therapy  -       Row Name 10/28/24 [facility]          General Information    Patient Profile Reviewed yes  -     Existing Precautions/Restrictions partial weight bearing;other (see comments)  TDWB LLE <10%  -       Row Name 10/28/24 1351          Cognition    Orientation Status (Cognition) oriented x 4  -       Row Name 10/28/24 135          Safety Issues/Impairments Affecting Functional Mobility    Impairments Affecting Function (Mobility) balance;endurance/activity tolerance;strength;pain;range of motion (ROM)  -               User Key  (r) = Recorded By, (t) = Taken By, (c) = Cosigned By      Initials Name Provider Type     So Redding PT Physical Therapist                   Mobility       Row Name " 10/28/24 1351          Bed Mobility    Bed Mobility supine-sit;sit-supine  -     Supine-Sit [place] (Bed Mobility) minimum assist (75% patient effort);1 person assist;verbal cues  -     Sit-Supine [place] (Bed Mobility) minimum assist (75% patient effort);1 person assist;verbal cues  -     Assistive Device (Bed Mobility) head of bed elevated;bed rails  -BH       Row Name 10/28/24 1351          Sit-Stand Transfer    Sit-Stand [place] (Transfers) moderate assist (50% patient effort);verbal cues;2 person assist  -     Assistive Device (Sit-Stand Transfers) walker, front-wheeled  -     Comment, (Sit-Stand Transfer) Increased assist to stand today 2/2 pain but continues to maintain TDWB well, though remains unable to pivot  -BH       Row Name 10/28/24 [facility]          Mobility    Extremity Weight-bearing Status left lower extremity  -     Left Lower Extremity (Weight-bearing Status) touch down weight-bearing (TDWB)  <10%  -               User Key  (r) = Recorded By, (t) = Taken By, (c) = Cosigned By      Initials Name Provider Type     So Redding PT Physical Therapist                   Obj/Interventions       Row Name 10/28/24 135          Motor Skills    Therapeutic Exercise --  10 reps B AP/LAQ/seated marches  -               User Key  (r) = Recorded By, (t) = Taken By, (c) = Cosigned By      Initials Name Provider Type     So Redding PT Physical Therapist                   Goals/Plan    No documentation.                  Clinical Impression       Row Name 10/28/24 1356          Pain    Pretreatment Pain Rating 1/10  -     Posttreatment Pain Rating 5/10  -     Pain Location hip  -     Pain Side/Orientation left  -     Pain Management Interventions exercise or physical activity utilized  -     Response to Pain Interventions activity participation with increased pain  -[facility] Name 10/28/24 1356          Plan of Care Review    Plan of Care Reviewed With  patient;child  -     Progress improving  -     Outcome Evaluation Pt seen for PT this AM and tolerated mobility well, though continues to be limited by pain. Pt transferred to EOB with min A x1 and increased time - pt able to slowly move BLE to EOB without assist, but did need min A to bring trunk upright (hx R shoulder injury/pain). Pt stood 2x with mod A x2 and rwx, TCs for WB and pt able to maintain with STS. Certain LLE positioning causes pain, but in standing with LLE extended slightly in front of her, pt is comfortable and able to stand for several minutes with min A for safety. Pt remains unable to pivot on RLE despite increased assist and cueing - pt with BUE weakness and fatigues quickly. Assisted back to supine min A x1 and repositioned in bed, left with needs met. PT will continue to follow, anticipate DC to SNF.  -       Row Name 10/28/24 1356          Vital Signs    O2 Delivery Pre Treatment room air  -     O2 Delivery Intra Treatment room air  -     O2 Delivery Post Treatment room air  -       Row Name 10/28/24 1356          Positioning and Restraints    Pre-Treatment Position in bed  -     Post Treatment Position bed  -     In Bed supine;call light within reach;encouraged to call for assist;exit alarm on;with family/caregiver  -               User Key  (r) = Recorded By, (t) = Taken By, (c) = Cosigned By      Initials Name Provider Type     So Redding, PT Physical Therapist                   Outcome Measures       Row Name 10/28/24 1410 10/28/24 0824       How much help from another person do you currently need...    Turning from your back to your side while in flat bed without using bedrails? 4  - 3  -KULWINDER    Moving from lying on back to sitting on the side of a flat bed without bedrails? 3  - 3  -KULWINDER    Moving to and from a bed to a chair (including a wheelchair)? 1  - 1  -KULWINDER    Standing up from a chair using your arms (e.g., wheelchair, bedside chair)? 2  - 2  -KULWINDER     Climbing 3-5 steps with a railing? 1  - 1  -    To walk in hospital room? 1  - 1  -KULWINDER    AM-PAC 6 Clicks Score (PT) 12  - 11  -KULWINDER    Highest Level of Mobility Goal 4 --> Transfer to chair/commode  - 4 --> Transfer to chair/commode  -      Row Name 10/28/24 1410          Functional Assessment    Outcome Measure Options AM-PAC 6 Clicks Basic Mobility (PT)  -               User Key  (r) = Recorded By, (t) = Taken By, (c) = Cosigned By      Initials Name Provider Type     So Redding, PT Physical Therapist    Felisa Champagne, RN Registered Nurse                                 Physical Therapy Education       Title: PT OT SLP Therapies (Done)       Topic: Physical Therapy (Done)       Point: Mobility training (Done)       Learning Progress Summary            Patient Acceptance, E,TB,D, VU,NR by  at 10/28/2024 1410    Acceptance, E,TB,D, VU,NR by  at 10/27/2024 1240                      Point: Home exercise program (Done)       Learning Progress Summary            Patient Acceptance, E,TB,D, VU,NR by  at 10/28/2024 1410                      Point: Body mechanics (Done)       Learning Progress Summary            Patient Acceptance, E,TB,D, VU,NR by  at 10/28/2024 1410    Acceptance, E,TB,D, VU,NR by  at 10/27/2024 1240                      Point: Precautions (Done)       Learning Progress Summary            Patient Acceptance, E,TB,D, VU,NR by  at 10/28/2024 1410    Acceptance, E,TB,D, VU,NR by  at 10/27/2024 1240                                      User Key       Initials Effective Dates Name Provider Type Discipline     04/08/22 -  So Redding, PT Physical Therapist PT                  PT Recommendation and Plan  Planned Therapy Interventions (PT): balance training, bed mobility training, home exercise program, gait training, patient/family education, stair training, ROM (range of motion), strengthening, stretching, transfer training  Progress: improving  Outcome  Evaluation: Pt seen for PT this AM and tolerated mobility well, though continues to be limited by pain. Pt transferred to EOB with min A x1 and increased time - pt able to slowly move BLE to EOB without assist, but did need min A to bring trunk upright (hx R shoulder injury/pain). Pt stood 2x with mod A x2 and rwx, TCs for WB and pt able to maintain with STS. Certain LLE positioning causes pain, but in standing with LLE extended slightly in front of her, pt is comfortable and able to stand for several minutes with min A for safety. Pt remains unable to pivot on RLE despite increased assist and cueing - pt with BUE weakness and fatigues quickly. Assisted back to supine min A x1 and repositioned in bed, left with needs met. PT will continue to follow, anticipate DC to SNF.     Time Calculation:   PT Evaluation Complexity  History, PT Evaluation Complexity: 1-2 personal factors and/or comorbidities  Examination of Body Systems (PT Eval Complexity): total of 3 or more elements  Clinical Presentation (PT Evaluation Complexity): evolving  Clinical Decision Making (PT Evaluation Complexity): moderate complexity  Overall Complexity (PT Evaluation Complexity): moderate complexity     PT Charges       Row Name 10/28/24 1410             Time Calculation    Start Time 1038  -      Stop Time 1104  -      Time Calculation (min) 26 min  -      PT Received On 10/28/24  -      PT - Next Appointment 10/29/24  -         Time Calculation- PT    Total Timed Code Minutes- PT 26 minute(s)  -         Timed Charges    20934 - PT Therapeutic Activity Minutes 26  -BH         Total Minutes    Timed Charges Total Minutes 26  -BH       Total Minutes 26  -BH                User Key  (r) = Recorded By, (t) = Taken By, (c) = Cosigned By      Initials Name Provider Type    So Quinones, PT Physical Therapist                  Therapy Charges for Today       Code Description Service Date Service Provider Modifiers Qty    17504649217  HC PT THERAPEUTIC ACT EA 15 MIN 10/27/2024 So Redding, PT GP 2    09241695099 HC PT EVAL MOD COMPLEXITY 3 10/27/2024 So Redding, PT GP 1    10229413026 HC PT THERAPEUTIC ACT EA 15 MIN 10/28/2024 So Redding, PT GP 2    26062124494 HC PT THER SUPP EA 15 MIN 10/28/2024 So Redding, PT GP 1            PT G-Codes  Outcome Measure Options: AM-PAC 6 Clicks Basic Mobility (PT)  AM-PAC 6 Clicks Score (PT): 12  PT Discharge Summary  Anticipated Discharge Disposition (PT): skilled nursing facility    So Redding, PT  10/28/2024

## 2024-10-28 NOTE — PLAN OF CARE
Goal Outcome Evaluation:         VSS. Aox4. TDWB on LLE. Voiding via purewick. Pain managed with PO pain medication. ACHS with SSI. Plan to d/c to SNF.

## 2024-10-28 NOTE — CASE MANAGEMENT/SOCIAL WORK
Discharge Planning Assessment  [facility]     Patient Name: Rekha Bear  MRN: [id number]  Today's Date: 10/28/2024    Admit Date: 10/26/2024    Plan: SNF- referrals pending   Discharge Needs Assessment       Row Name 10/28/24 1155       Living Environment    People in Home facility resident  from [facility]    Current Living Arrangements home    Potentially Unsafe Housing Conditions none    Primary Care Provided by self    Provides Primary Care For no one    Family Caregiver if Needed child([place]), adult    Family Caregiver Names daughters, Ping and Jessica and sonJl    Quality of Family Relationships helpful;involved;supportive    Able to Return to Prior Arrangements yes       Resource/Environmental Concerns    Resource/Environmental Concerns none    Transportation Concerns none       Transition Planning    Patient/Family Anticipates Transition to home    Patient/Family Anticipated Services at Transition rehabilitation services       Discharge Needs Assessment    Readmission Within the Last 30 Days no previous admission in last 30 days    Equipment Currently Used at Home rollator    Concerns to be Addressed denies needs/concerns at this time                   Discharge Plan       Row Name 10/28/24 8628       Plan    Plan SNF- referrals pending    Patient/Family in Agreement with Plan yes    Plan Comments Spoke with patient and son, Jl Bear [phone], at bedside. Introduced self and explained role. Facesheet verified. Patient lives in personal care [facility]. At baseline, she requires minimal assist with ADLS and ambulates independently with a rollator. PT notes reviewed and patient will likely need SNF. Patient and family request referrals to [facility], [facility] and [facility]. Referrals placed in EPIC. Transfer packet started and in CCP office. CCP will follow.                  Continued Care and Services - Admitted Since 10/26/2024       Destination        Service Provider Request Status Services Address Phone Fax Patient Preferred    [facility] Pending - Request Sent -- [address] [address] [phone] --    [facility] Pending - Request Sent -- [address] [phone] [phone] --    [facility] Pending - Request Sent -- [address] [address] [phone] --                  Selected Continued Care - Prior Encounters Includes continued care and service providers with selected services from prior encounters from 7/28/2024 to 10/28/2024      Discharged on 10/15/2024 Admission date: 10/12/2024 - Discharge disposition: Skilled Nursing Facility (DC - External)      Destination       Service Provider Services Address Phone Fax Patient Preferred    [facility] [address] [address] [phone] --                             Demographic Summary       Row Name 10/28/24 1154       General Information    Admission Type inpatient    Arrived From home  from [facility] personal care    Required Notices Provided Important Message from Medicare    Referral Source admission list    Reason for Consult discharge planning    Preferred Language English       Contact Information    Permission Granted to Share Info With                    Functional Status       Row Name 10/28/24 1155       Functional Status    Usual Activity Tolerance moderate    Current Activity Tolerance fair       Functional Status, IADL    Medications assistive person    Meal Preparation assistive person    Housekeeping assistive person    Laundry assistive person    Shopping assistive person    If for any reason you need help with day-to-day activities such as bathing, preparing meals, shopping, managing finances, etc., do you get the help you need? I get all the help I need       Mental Status    General  Appearance WDL WDL                   Psychosocial    No documentation.                  Abuse/Neglect    No documentation.                  Legal    No documentation.                  Substance Abuse    No documentation.                  Patient Forms    No documentation.                     Adamaris Evangelista RN

## 2024-10-29 LAB
ANION GAP SERPL CALCULATED.3IONS-SCNC: 6.4 MMOL/L (ref 5–15)
BASOPHILS # BLD AUTO: 0.04 10*3/MM3 (ref 0–0.2)
BASOPHILS NFR BLD AUTO: 0.4 % (ref 0–1.5)
BUN SERPL-MCNC: 20 MG/DL (ref 8–23)
BUN/CREAT SERPL: 13.6 (ref 7–25)
CALCIUM SPEC-SCNC: 8.8 MG/DL (ref 8.6–10.5)
CHLORIDE SERPL-SCNC: 102 MMOL/L (ref 98–107)
CO2 SERPL-SCNC: 28.6 MMOL/L (ref 22–29)
CREAT SERPL-MCNC: 1.47 MG/DL (ref 0.57–1)
DEPRECATED RDW RBC AUTO: 47.2 FL (ref 37–54)
EGFRCR SERPLBLD CKD-EPI 2021: 35.5 ML/MIN/1.73
EOSINOPHIL # BLD AUTO: 0.16 10*3/MM3 (ref 0–0.4)
EOSINOPHIL NFR BLD AUTO: 1.7 % (ref 0.3–6.2)
ERYTHROCYTE [DISTWIDTH] IN BLOOD BY AUTOMATED COUNT: 14.5 % (ref 12.3–15.4)
GLUCOSE BLDC GLUCOMTR-MCNC: 139 MG/DL (ref 70–130)
GLUCOSE BLDC GLUCOMTR-MCNC: 147 MG/DL (ref 70–130)
GLUCOSE BLDC GLUCOMTR-MCNC: 155 MG/DL (ref 70–130)
GLUCOSE BLDC GLUCOMTR-MCNC: 192 MG/DL (ref 70–130)
GLUCOSE SERPL-MCNC: 129 MG/DL (ref 65–99)
HCT VFR BLD AUTO: 38.4 % (ref 34–46.6)
HGB BLD-MCNC: 12.2 G/DL (ref 12–15.9)
IMM GRANULOCYTES # BLD AUTO: 0.03 10*3/MM3 (ref 0–0.05)
IMM GRANULOCYTES NFR BLD AUTO: 0.3 % (ref 0–0.5)
LYMPHOCYTES # BLD AUTO: 2.59 10*3/MM3 (ref 0.7–3.1)
LYMPHOCYTES NFR BLD AUTO: 27.1 % (ref 19.6–45.3)
MCH RBC QN AUTO: 28.4 PG (ref 26.6–33)
MCHC RBC AUTO-ENTMCNC: 31.8 G/DL (ref 31.5–35.7)
MCV RBC AUTO: 89.3 FL (ref 79–97)
MONOCYTES # BLD AUTO: 1.05 10*3/MM3 (ref 0.1–0.9)
MONOCYTES NFR BLD AUTO: 11 % (ref 5–12)
NEUTROPHILS NFR BLD AUTO: 5.67 10*3/MM3 (ref 1.7–7)
NEUTROPHILS NFR BLD AUTO: 59.5 % (ref 42.7–76)
NRBC BLD AUTO-RTO: 0 /100 WBC (ref 0–0.2)
PLATELET # BLD AUTO: 163 10*3/MM3 (ref 140–450)
PMV BLD AUTO: 9.2 FL (ref 6–12)
POTASSIUM SERPL-SCNC: 4.7 MMOL/L (ref 3.5–5.2)
RBC # BLD AUTO: 4.3 10*6/MM3 (ref 3.77–5.28)
SODIUM SERPL-SCNC: 137 MMOL/L (ref 136–145)
WBC NRBC COR # BLD AUTO: 9.54 10*3/MM3 (ref 3.4–10.8)

## 2024-10-29 PROCEDURE — 82948 REAGENT STRIP/BLOOD GLUCOSE: CPT

## 2024-10-29 PROCEDURE — 80048 BASIC METABOLIC PNL TOTAL CA: CPT | Performed by: HOSPITALIST

## 2024-10-29 PROCEDURE — 63710000001 INSULIN LISPRO (HUMAN) PER 5 UNITS: Performed by: HOSPITALIST

## 2024-10-29 PROCEDURE — 85025 COMPLETE CBC W/AUTO DIFF WBC: CPT | Performed by: HOSPITALIST

## 2024-10-29 PROCEDURE — 63710000001 INSULIN GLARGINE PER 5 UNITS: Performed by: INTERNAL MEDICINE

## 2024-10-29 PROCEDURE — 97530 THERAPEUTIC ACTIVITIES: CPT

## 2024-10-29 RX ADMIN — RIVAROXABAN 15 MG: 20 TABLET, FILM COATED ORAL at 18:17

## 2024-10-29 RX ADMIN — SENNOSIDES AND DOCUSATE SODIUM 2 TABLET: 50; 8.6 TABLET ORAL at 21:00

## 2024-10-29 RX ADMIN — SENNOSIDES AND DOCUSATE SODIUM 2 TABLET: 50; 8.6 TABLET ORAL at 08:38

## 2024-10-29 RX ADMIN — INSULIN LISPRO 2 UNITS: 100 INJECTION, SOLUTION INTRAVENOUS; SUBCUTANEOUS at 22:31

## 2024-10-29 RX ADMIN — LINAGLIPTIN 5 MG: 5 TABLET, FILM COATED ORAL at 08:39

## 2024-10-29 RX ADMIN — OXYBUTYNIN CHLORIDE 10 MG: 10 TABLET, EXTENDED RELEASE ORAL at 08:37

## 2024-10-29 RX ADMIN — LEVOTHYROXINE SODIUM 88 MCG: 88 TABLET ORAL at 08:37

## 2024-10-29 RX ADMIN — METOPROLOL SUCCINATE 25 MG: 25 TABLET, EXTENDED RELEASE ORAL at 08:38

## 2024-10-29 RX ADMIN — GABAPENTIN 400 MG: 400 CAPSULE ORAL at 08:38

## 2024-10-29 RX ADMIN — INSULIN GLARGINE 15 UNITS: 100 INJECTION, SOLUTION SUBCUTANEOUS at 22:31

## 2024-10-29 RX ADMIN — INSULIN LISPRO 2 UNITS: 100 INJECTION, SOLUTION INTRAVENOUS; SUBCUTANEOUS at 11:39

## 2024-10-29 RX ADMIN — BUPROPION HYDROCHLORIDE 150 MG: 150 TABLET, EXTENDED RELEASE ORAL at 06:10

## 2024-10-29 RX ADMIN — POLYETHYLENE GLYCOL 3350 17 G: 17 POWDER, FOR SOLUTION ORAL at 08:37

## 2024-10-29 RX ADMIN — Medication 1000 MCG: at 08:37

## 2024-10-29 RX ADMIN — BISACODYL 5 MG: 5 TABLET, COATED ORAL at 21:00

## 2024-10-29 RX ADMIN — GABAPENTIN 400 MG: 400 CAPSULE ORAL at 21:01

## 2024-10-29 RX ADMIN — VENLAFAXINE HYDROCHLORIDE 150 MG: 150 CAPSULE, EXTENDED RELEASE ORAL at 08:39

## 2024-10-29 RX ADMIN — POLYETHYLENE GLYCOL 3350 17 G: 17 POWDER, FOR SOLUTION ORAL at 21:00

## 2024-10-29 RX ADMIN — ATORVASTATIN CALCIUM 40 MG: 20 TABLET, FILM COATED ORAL at 21:00

## 2024-10-29 RX ADMIN — DONEPEZIL HYDROCHLORIDE 5 MG: 5 TABLET, FILM COATED ORAL at 21:00

## 2024-10-29 RX ADMIN — HYDROCODONE BITARTRATE AND ACETAMINOPHEN 1 TABLET: 5; 325 TABLET ORAL at 15:41

## 2024-10-29 NOTE — PROGRESS NOTES
Name: Rekha Bear ADMIT: 10/26/2024   : 1940  PCP: Zahida Osorio MD    MRN: [id number] LOS: 2 days   AGE/SEX: 84 y.o. female  ROOM: Counts include 234 beds at the [facility]     Subjective   Subjective   No acute events. Patient denies new complaints. Pain is reasonably controlled. Her son is at bedside.    Objective   Objective   Vital Signs  Temp:  [98 °F (36.7 °C)-99 °F (37.2 °C)] 98 °F (36.7 °C)  Heart Rate:  [37-82] 82  Resp:  [17-18] 18  BP: (108-161)/(74-92) 161/92  SpO2:  [91 %-99 %] 96 %  on   ;   Device (Oxygen Therapy): room air  Body mass index is 32.77 kg/m².  Physical Exam  Vitals and nursing note reviewed.   Constitutional:       General: She is not in acute distress.     Appearance: She is not toxic-appearing or diaphoretic.   HENT:      Head: Normocephalic and atraumatic.      Nose: Nose normal.      Mouth/Throat:      Mouth: Mucous membranes are moist.      Pharynx: Oropharynx is clear.   Eyes:      Conjunctiva/sclera: Conjunctivae normal.      Pupils: Pupils are equal, round, and reactive to light.   Cardiovascular:      Rate and Rhythm: Normal rate. Rhythm irregular.      Pulses: Normal pulses.   Pulmonary:      Effort: Pulmonary effort is normal.   Abdominal:      General: Bowel sounds are normal. There is no distension.      Palpations: Abdomen is soft.      Tenderness: There is no abdominal tenderness.   Musculoskeletal:         General: No swelling.      Cervical back: Neck supple.   Skin:     General: Skin is warm and dry.      Capillary Refill: Capillary refill takes less than 2 seconds.   Neurological:      General: No focal deficit present.      Mental Status: She is alert and oriented to person, place, and time.   Psychiatric:         Mood and Affect: Mood normal.         Behavior: Behavior normal.       Results Review     I reviewed the patient's new clinical results.  Results from last 7 days   Lab Units 10/29/24  0546 10/28/24  0415 10/27/24  0452 10/26/24  1909   WBC 10*3/mm3 9.54 8.42 7.49 10.76    HEMOGLOBIN g/dL 12.2 11.9* 11.5* 12.2   PLATELETS 10*3/mm3 163 150 147 171     Results from last 7 days   Lab Units 10/29/24  0546 10/28/24  0415 10/27/24  0452 10/26/24  1909   SODIUM mmol/L 137 139 137 139   POTASSIUM mmol/L 4.7 4.8 4.7 4.6   CHLORIDE mmol/L 102 104 102 102   CO2 mmol/L 28.6 27.0 27.2 28.0   BUN mg/dL 20 25* 22 24*   CREATININE mg/dL 1.47* 1.39* 1.35* 1.30*   GLUCOSE mg/dL 129* 124* 179* 152*   EGFR mL/min/1.73 35.5* 38.0* 39.3* 41.1*       Results from last 7 days   Lab Units 10/29/24  0546 10/28/24  0415 10/27/24  0452 10/26/24  1909   CALCIUM mg/dL 8.8 8.6 8.8 8.5*       Hemoglobin A1C   Date/Time Value Ref Range Status   10/27/2024 0451 8.70 (H) 4.80 - 5.60 % Final     Glucose   Date/Time Value Ref Range Status   10/29/2024 1131 155 (H) 70 - 130 mg/dL Final   10/29/2024 0721 139 (H) 70 - 130 mg/dL Final   10/28/2024 2101 176 (H) 70 - 130 mg/dL Final   10/28/2024 1525 236 (H) 70 - 130 mg/dL Final   10/28/2024 1034 210 (H) 70 - 130 mg/dL Final   10/28/2024 0800 194 (H) 70 - 130 mg/dL Final   10/27/2024 2055 150 (H) 70 - 130 mg/dL Final       No radiology results for the last day    I have personally reviewed all medications:  Scheduled Medications  atorvastatin, 40 mg, Oral, Nightly  buPROPion XL, 150 mg, Oral, QAM  donepezil, 5 mg, Oral, Nightly  gabapentin, 400 mg, Oral, Q12H  insulin glargine, 15 Units, Subcutaneous, Nightly  insulin lispro, 2-9 Units, Subcutaneous, 4x Daily AC & at Bedtime  levothyroxine, 88 mcg, Oral, Daily  linagliptin, 5 mg, Oral, Daily  metoprolol succinate XL, 25 mg, Oral, Q24H  oxybutynin XL, 10 mg, Oral, Daily  senna-docusate sodium, 2 tablet, Oral, BID   And  polyethylene glycol, 17 g, Oral, BID  rivaroxaban, 20 mg, Oral, Daily With Dinner  venlafaxine XR, 150 mg, Oral, Daily  vitamin B-12, 1,000 mcg, Oral, Daily    Infusions   Diet  Diet: Cardiac, Diabetic; Healthy Heart (2-3 Na+); Consistent Carbohydrate; Fluid Consistency: Thin (IDDSI 0)    I have personally  reviewed:  [x]  Laboratory   [x]  Microbiology   [x]  Radiology   []  EKG/Telemetry  []  Cardiology/Vascular   []  Pathology    [x]  Records    Assessment/Plan     Active Hospital Problems    Diagnosis  POA    **Periprosthetic fracture around internal prosthetic left hip joint [M97.02XA]  Not Applicable    History of CVA (cerebrovascular accident) [Z86.73]  Not Applicable    Anz-prosthetic fracture around prosthetic hip [M97.8XXA, Z96.649]  Not Applicable    Atrial fibrillation, persistent [I48.19]  Yes    SHEY (obstructive sleep apnea) [G47.33]  Yes    Primary hypertension [I10]  Yes    Type 2 diabetes mellitus with stage 3 chronic kidney disease, without long-term current use of insulin [E11.22, N18.30]  Yes    Hypothyroidism [E03.9]  Yes    Anxiety [F41.9]  Yes    Gastroesophageal reflux disease [K21.9]  Yes      Resolved Hospital Problems   No resolved problems to display.   Left Hip Periprosthetic Fracture  - conservative treatment recommended  - continue pain control and bowel regimen  - toe-touch weightbearing LLE  - appreciate orthopedic surgery recs, follow up with them in 2-3 weeks    Type 2 DM  - BG acceptable, A1C 8.70%  - continue lantus 15 units nightly  - continue tradjenta  - continue coverage with ssi/hypoglycemia protocol    HTN/Longstanding Persistent Afib/Hx of CVA  - BP and HR acceptable, continue metoprolol  - continue xarelto    Hypothyroidism  - continue levothyroxine    GERD  - continue PPI    SHEY  - continue CPAP if available  - supplement oxygen as needed    Xarelto (home med) for DVT prophylaxis.  Full code.  Discussed with patient, family, and nursing staff.  Anticipate discharge to SNU facility tomorrow.  Expected Discharge Date: 10/30/2024; Expected Discharge Time:       Silvano Zuniga MD  [facility]  10/29/24  12:49 EDT    Portions of this text have been copied and I have reviewed them. They are accurate as of 10/29/2024

## 2024-10-29 NOTE — PLAN OF CARE
Goal Outcome Evaluation:  Plan of Care Reviewed With: patient           Outcome Evaluation: Patient remains stable. Alert and oriented. Touchdown weight bearing on LLE. Voiding per purewick. Pain managed with prn pain meds. Plans to d/c to SNF.

## 2024-10-29 NOTE — CASE MANAGEMENT/SOCIAL WORK
Continued Stay Note  [facility]     Patient Name: Rekah Bear  MRN: [id number]  Today's Date: 10/29/2024    Admit Date: 10/26/2024    [facility]- [facility] set up for 10/30 ( Wednesday) @ 1200   Discharge Plan       Row Name 10/29/24 1259       Plan    Plan [facility]- [facility] set up for 10/30 ( Wednesday) @ 1200    Patient/Family in Agreement with Plan yes    Plan Comments Spoke with [facility]. They  had a bed open up and can accept the patient tomorrow after 1100. Patient and son updated in room and they would like patient to go to [facility]. Secure chart sent to MD and he anticipates dc tomorrow. [facility] arranged for 10/30 ( Wednesday) @ 1200. Transfer packet in cubbie and pharmacy updated. Patient and son updated and they are agreeable. MD aware. Arun Beckett notified.                   Discharge Codes    No documentation.                 Expected Discharge Date and Time       Expected Discharge Date Expected Discharge Time    Oct 30, 2024               Adamaris Evangelista RN

## 2024-10-29 NOTE — THERAPY TREATMENT NOTE
"Patient Name: Rekha Bear  : 1940    MRN: [id number]                              Today's Date: 10/29/2024       Admit Date: 10/26/2024    Visit Dx:     ICD-10-CM ICD-9-CM   1. Closed fracture of left hip, initial encounter  S72.002A 820.8   2. History of total replacement of both hip joints  Z96.643 V43.64   3. Chronic renal impairment, unspecified CKD stage  N18.9 585.9   4. 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    Community acquired pneumonia of left lower lobe of lung    Hyponatremia    Pneumonia due to COVID-19 virus    Major depressive disorder, recurrent, mild    Acute respiratory failure with hypoxia    Supraventricular tachycardia    PVC (premature ventricular contraction)    Depressive disorder    Gastroesophageal reflux disease    Midline cystocele    Rectocele    BMI 29.0-29.9,adult    Acute left PCA stroke    B12 deficiency    Bradycardia    Premature atrial contractions    Stroke    SHEY (obstructive sleep apnea)    Circadian rhythm disorder    Cerebrovascular accident (CVA) due to embolism of left posterior cerebral artery    Anxiety    Dysphagia as late effect of stroke    Atrial fibrillation, persistent    Acute UTI    Closed left hip fracture    Periprosthetic fracture around internal prosthetic left hip joint    Naz-prosthetic fracture around prosthetic hip    History of CVA (cerebrovascular accident)     Past Medical History:   Diagnosis Date    Chilblains     \"Chilblian's\"    CKD (chronic kidney disease)     CVA (cerebral vascular accident)     Depression     Fatty liver " "    GERD (gastroesophageal reflux disease)     Hyperlipidemia     Hypertension     Incontinence     Osteoarthritis     OA  Marvin THR, LT TKR - hydrocodone prescibed by Dr. 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 ENDOSCOPY;  Service:     DENTAL PROCEDURE      FOOT SURGERY      TONSILLECTOMY      TOTAL HIP ARTHROPLASTY Bilateral     OA  Marvin THR, LT TKR - hydrocodone prescibed by Dr. Almaguer    TOTAL KNEE ARTHROPLASTY Left     OA  Marvin THR, LT TKR - hydrocodone prescibed by Dr. Almaguer      General Information       Row Name 10/29/24 1613          Physical Therapy Time and Intention    Document Type therapy note (daily note)  -     Mode of Treatment individual therapy;physical therapy  -       Row Name 10/29/24 [facility]          General Information    Patient Profile Reviewed yes  -     Existing Precautions/Restrictions partial weight bearing;other (see comments)  TDWB <10% LLE  -       Row Name 10/29/24 1613          Cognition    Orientation Status (Cognition) oriented x 4  -       Row Name 10/29/24 1613          Safety Issues/Impairments Affecting Functional Mobility    Impairments Affecting Function (Mobility) balance;endurance/activity tolerance;strength;pain;range of motion (ROM)  -               User Key  (r) = Recorded By, (t) = Taken By, (c) = Cosigned By      Initials Name Provider Type     So Redding PT Physical Therapist      "              Mobility       [facility] Name 10/29/24 1617          Bed Mobility    Bed Mobility supine-sit;sit-supine  -     Supine-Sit Aguila (Bed Mobility) minimum assist (75% patient effort);verbal cues;2 person assist  -     Sit-Supine Aguila (Bed Mobility) minimum assist (75% patient effort);verbal cues;1 person assist  -     Assistive Device (Bed Mobility) head of bed elevated;bed rails  -     Comment, (Bed Mobility) Increased time  -       Row Name 10/29/24 1617          Sit-Stand Transfer    Sit-Stand Aguila (Transfers) verbal cues;2 person assist;moderate assist (50% patient effort);minimum assist (75% patient effort)  -     Assistive Device (Sit-Stand Transfers) walker, front-wheeled  -     Comment, (Sit-Stand Transfer) 2x STS - TCs to maintain TDWB, better on first stand than second 2/2 fatigue/pain  -[facility] Name 10/29/24 1617          Mobility    Extremity Weight-bearing Status left lower extremity  -     Left Lower Extremity (Weight-bearing Status) touch down weight-bearing (TDWB)  <10%  -               User Key  (r) = Recorded By, (t) = Taken By, (c) = Cosigned By      Initials Name Provider Type     So Redding, PT Physical Therapist                   Obj/Interventions       [facility] Name 10/29/24 1619          Motor Skills    Therapeutic Exercise --  10 reps B AP/LAQ/seated marches  -               User Key  (r) = Recorded By, (t) = Taken By, (c) = Cosigned By      Initials Name Provider Type     So Redding, PT Physical Therapist                   Goals/Plan    No documentation.                  Clinical Impression       [facility] Name 10/29/24 1619          Pain    Pre/Posttreatment Pain Comment C/o L hip pain with mobility, did not rate  -BH       Row Name 10/29/24 1619          Plan of Care Review    Plan of Care Reviewed With patient;child  -     Progress improving  -     Outcome Evaluation Pt seen for PT this PM and tolerated mobility well, though  still not able to maintain TDWB precautions well enough to attempt a pivot to chair. Pt transferred to EOB with min A x2 and increased time to complete. Pt stood 2x with mod A x2 and rwx, TCs for TDWB and pt frequently sliding her L foot off therapist's foot requiring cues to keep it in place to ensure no WBing. Pt able to stand for ~1-2 minutes on each stand. Able to better maintain WB precautions on first stand than second stand d/t fatigue and increased pain. Pt cued for hand placement and posture correction, unable to maintain WBing when attempting pivot. Returned to supine with min A x1 and repositioned, left with needs met. PT will continue to follow, anticipate DC to SNF.  -       Row Name 10/29/24 1619          Vital Signs    O2 Delivery Pre Treatment room air  -     O2 Delivery Intra Treatment room air  -     O2 Delivery Post Treatment room air  -       Row Name 10/29/24 1619          Positioning and Restraints    Pre-Treatment Position in bed  -     Post Treatment Position bed  -     In Bed notified nsg;supine;call light within reach;encouraged to call for assist;exit alarm on;with family/caregiver  -               User Key  (r) = Recorded By, (t) = Taken By, (c) = Cosigned By      Initials Name Provider Type     So Redding, PT Physical Therapist                   Outcome Measures       Row Name 10/29/24 1624 10/29/24 0838       How much help from another person do you currently need...    Turning from your back to your side while in flat bed without using bedrails? 3  - 4  -DD    Moving from lying on back to sitting on the side of a flat bed without bedrails? 3  - 3  -DD    Moving to and from a bed to a chair (including a wheelchair)? 1  - 1  -DD    Standing up from a chair using your arms (e.g., wheelchair, bedside chair)? 2  - 2  -DD    Climbing 3-5 steps with a railing? 1  - 1  -DD    To walk in hospital room? 1  - 1  -DD    AM-PAC 6 Clicks Score (PT) 11  - 12  -DD     Highest Level of Mobility Goal 4 --> Transfer to chair/commode  - 4 --> Transfer to chair/commode  -      Row Name 10/29/24 1624          Functional Assessment    Outcome Measure Options AM-PAC 6 Clicks Basic Mobility (PT)  -               User Key  (r) = Recorded By, (t) = Taken By, (c) = Cosigned By      Initials Name Provider Type     So Redding, PT Physical Therapist    Vania Harden, RN Registered Nurse                                 Physical Therapy Education       Title: PT OT SLP Therapies (Done)       Topic: Physical Therapy (Done)       Point: Mobility training (Done)       Learning Progress Summary            Patient Acceptance, E,TB,D, VU,NR by  at 10/29/2024 1624    Acceptance, E,TB,D, VU,NR by  at 10/28/2024 1410    Acceptance, E,TB,D, VU,NR by  at 10/27/2024 1240                      Point: Home exercise program (Done)       Learning Progress Summary            Patient Acceptance, E,TB,D, VU,NR by  at 10/29/2024 1624    Acceptance, E,TB,D, VU,NR by  at 10/28/2024 1410                      Point: Body mechanics (Done)       Learning Progress Summary            Patient Acceptance, E,TB,D, VU,NR by  at 10/29/2024 1624    Acceptance, E,TB,D, VU,NR by  at 10/28/2024 1410    Acceptance, E,TB,D, VU,NR by  at 10/27/2024 1240                      Point: Precautions (Done)       Learning Progress Summary            Patient Acceptance, E,TB,D, VU,NR by  at 10/29/2024 1624    Acceptance, E,TB,D, VU,NR by  at 10/28/2024 1410    Acceptance, E,TB,D, VU,NR by  at 10/27/2024 1240                                      User Key       Initials Effective Dates Name Provider Type [facility] 04/08/22 -  So Redding PT Physical Therapist PT                  PT Recommendation and Plan  Planned Therapy Interventions (PT): balance training, bed mobility training, home exercise program, gait training, patient/family education, stair training, ROM (range of motion),  strengthening, stretching, transfer training  Progress: improving  Outcome Evaluation: Pt seen for PT this PM and tolerated mobility well, though still not able to maintain TDWB precautions well enough to attempt a pivot to chair. Pt transferred to EOB with min A x2 and increased time to complete. Pt stood 2x with mod A x2 and rwx, TCs for TDWB and pt frequently sliding her L foot off therapist's foot requiring cues to keep it in place to ensure no WBing. Pt able to stand for ~1-2 minutes on each stand. Able to better maintain WB precautions on first stand than second stand d/t fatigue and increased pain. Pt cued for hand placement and posture correction, unable to maintain WBing when attempting pivot. Returned to supine with min A x1 and repositioned, left with needs met. PT will continue to follow, anticipate DC to SNF.     Time Calculation:   PT Evaluation Complexity  History, PT Evaluation Complexity: 1-2 personal factors and/or comorbidities  Examination of Body Systems (PT Eval Complexity): total of 3 or more elements  Clinical Presentation (PT Evaluation Complexity): evolving  Clinical Decision Making (PT Evaluation Complexity): moderate complexity  Overall Complexity (PT Evaluation Complexity): moderate complexity     PT Charges       Row Name 10/29/24 1624             Time Calculation    Start Time 1457  -      Stop Time 1520  -      Time Calculation (min) 23 min  -      PT Received On 10/29/24  -      PT - Next Appointment 10/30/24  -         Time Calculation- PT    Total Timed Code Minutes- PT 23 minute(s)  -BH         Timed Charges    80846 - PT Therapeutic Activity Minutes 23  -BH         Total Minutes    Timed Charges Total Minutes 23  -BH       Total Minutes 23  -BH                User Key  (r) = Recorded By, (t) = Taken By, (c) = Cosigned By      Initials Name Provider Type    So Quinones, PT Physical Therapist                  Therapy Charges for Today       Code Description  Service Date Service Provider Modifiers Qty    87438246154  PT THERAPEUTIC ACT EA 15 MIN 10/28/2024 So Redding, PT GP 2    85310944804 HC PT THER SUPP EA 15 MIN 10/28/2024 So Redding, PT GP 1    [id number]  PT THERAPEUTIC ACT EA 15 MIN 10/29/2024 So Redding, PT GP 2    49928711874 HC PT THER SUPP EA 15 MIN 10/29/2024 So Redding, PT GP 1            PT G-Codes  Outcome Measure Options: AM-PAC 6 Clicks Basic Mobility (PT)  AM-PAC 6 Clicks Score (PT): 11  PT Discharge Summary  Anticipated Discharge Disposition (PT): skilled nursing facility    So Redding PT  10/29/2024

## 2024-10-29 NOTE — PLAN OF CARE
Goal Outcome Evaluation:  Plan of Care Reviewed With: patient, child        Progress: improving  Outcome Evaluation: Pt seen for PT this PM and tolerated mobility well, though still not able to maintain TDWB precautions well enough to attempt a pivot to chair. Pt transferred to EOB with min A x2 and increased time to complete. Pt stood 2x with mod A x2 and rwx, TCs for TDWB and pt frequently sliding her L foot off therapist's foot requiring cues to keep it in place to ensure no WBing. Pt able to stand for ~1-2 minutes on each stand. Able to better maintain WB precautions on first stand than second stand d/t fatigue and increased pain. Pt cued for hand placement and posture correction, unable to maintain WBing when attempting pivot. Returned to supine with min A x1 and repositioned, left with needs met. PT will continue to follow, anticipate DC to SNF.    Anticipated Discharge Disposition (PT): skilled nursing facility

## 2024-10-30 VITALS
HEART RATE: 71 BPM | SYSTOLIC BLOOD PRESSURE: 124 MMHG | TEMPERATURE: 98.1 F | BODY MASS INDEX: 32.84 KG/M2 | OXYGEN SATURATION: 91 % | RESPIRATION RATE: 17 BRPM | WEIGHT: 209.22 LBS | HEIGHT: 67 IN | DIASTOLIC BLOOD PRESSURE: 88 MMHG

## 2024-10-30 LAB
ANION GAP SERPL CALCULATED.3IONS-SCNC: 6.7 MMOL/L (ref 5–15)
BASOPHILS # BLD AUTO: 0.02 10*3/MM3 (ref 0–0.2)
BASOPHILS NFR BLD AUTO: 0.2 % (ref 0–1.5)
BUN SERPL-MCNC: 21 MG/DL (ref 8–23)
BUN/CREAT SERPL: 17.9 (ref 7–25)
CALCIUM SPEC-SCNC: 9.4 MG/DL (ref 8.6–10.5)
CHLORIDE SERPL-SCNC: 99 MMOL/L (ref 98–107)
CO2 SERPL-SCNC: 26.3 MMOL/L (ref 22–29)
CREAT SERPL-MCNC: 1.17 MG/DL (ref 0.57–1)
DEPRECATED RDW RBC AUTO: 45.9 FL (ref 37–54)
EGFRCR SERPLBLD CKD-EPI 2021: 46.7 ML/MIN/1.73
EOSINOPHIL # BLD AUTO: 0.12 10*3/MM3 (ref 0–0.4)
EOSINOPHIL NFR BLD AUTO: 1.2 % (ref 0.3–6.2)
ERYTHROCYTE [DISTWIDTH] IN BLOOD BY AUTOMATED COUNT: 14.4 % (ref 12.3–15.4)
GLUCOSE BLDC GLUCOMTR-MCNC: 144 MG/DL (ref 70–130)
GLUCOSE BLDC GLUCOMTR-MCNC: 149 MG/DL (ref 70–130)
GLUCOSE SERPL-MCNC: 135 MG/DL (ref 65–99)
HCT VFR BLD AUTO: 40.9 % (ref 34–46.6)
HGB BLD-MCNC: 13.3 G/DL (ref 12–15.9)
IMM GRANULOCYTES # BLD AUTO: 0.05 10*3/MM3 (ref 0–0.05)
IMM GRANULOCYTES NFR BLD AUTO: 0.5 % (ref 0–0.5)
LYMPHOCYTES # BLD AUTO: 1.78 10*3/MM3 (ref 0.7–3.1)
LYMPHOCYTES NFR BLD AUTO: 18.4 % (ref 19.6–45.3)
MCH RBC QN AUTO: 28.7 PG (ref 26.6–33)
MCHC RBC AUTO-ENTMCNC: 32.5 G/DL (ref 31.5–35.7)
MCV RBC AUTO: 88.1 FL (ref 79–97)
MONOCYTES # BLD AUTO: 1.05 10*3/MM3 (ref 0.1–0.9)
MONOCYTES NFR BLD AUTO: 10.8 % (ref 5–12)
NEUTROPHILS NFR BLD AUTO: 6.68 10*3/MM3 (ref 1.7–7)
NEUTROPHILS NFR BLD AUTO: 68.9 % (ref 42.7–76)
NRBC BLD AUTO-RTO: 0 /100 WBC (ref 0–0.2)
PLATELET # BLD AUTO: 176 10*3/MM3 (ref 140–450)
PMV BLD AUTO: 9.1 FL (ref 6–12)
POTASSIUM SERPL-SCNC: 4.3 MMOL/L (ref 3.5–5.2)
POTASSIUM SERPL-SCNC: 5.6 MMOL/L (ref 3.5–5.2)
RBC # BLD AUTO: 4.64 10*6/MM3 (ref 3.77–5.28)
SODIUM SERPL-SCNC: 132 MMOL/L (ref 136–145)
WBC NRBC COR # BLD AUTO: 9.7 10*3/MM3 (ref 3.4–10.8)

## 2024-10-30 PROCEDURE — 85025 COMPLETE CBC W/AUTO DIFF WBC: CPT | Performed by: HOSPITALIST

## 2024-10-30 PROCEDURE — 82948 REAGENT STRIP/BLOOD GLUCOSE: CPT

## 2024-10-30 PROCEDURE — 84132 ASSAY OF SERUM POTASSIUM: CPT | Performed by: INTERNAL MEDICINE

## 2024-10-30 PROCEDURE — 80048 BASIC METABOLIC PNL TOTAL CA: CPT | Performed by: HOSPITALIST

## 2024-10-30 PROCEDURE — 25810000003 SODIUM CHLORIDE 0.9 % SOLUTION: Performed by: INTERNAL MEDICINE

## 2024-10-30 RX ORDER — AMOXICILLIN 250 MG
2 CAPSULE ORAL 2 TIMES DAILY
Qty: 120 TABLET | Refills: 0 | Status: SHIPPED | OUTPATIENT
Start: 2024-10-30

## 2024-10-30 RX ORDER — POLYETHYLENE GLYCOL 3350 17 G/17G
17 POWDER, FOR SOLUTION ORAL 2 TIMES DAILY
Qty: 60 EACH | Refills: 0 | Status: SHIPPED | OUTPATIENT
Start: 2024-10-30

## 2024-10-30 RX ORDER — GABAPENTIN 400 MG/1
400 CAPSULE ORAL EVERY 12 HOURS
Qty: 6 CAPSULE | Refills: 0 | Status: SHIPPED | OUTPATIENT
Start: 2024-10-30

## 2024-10-30 RX ORDER — HYDROCODONE BITARTRATE AND ACETAMINOPHEN 5; 325 MG/1; MG/1
1 TABLET ORAL EVERY 4 HOURS PRN
Qty: 18 TABLET | Refills: 0 | Status: SHIPPED | OUTPATIENT
Start: 2024-10-30

## 2024-10-30 RX ADMIN — Medication 1000 MCG: at 09:24

## 2024-10-30 RX ADMIN — BUPROPION HYDROCHLORIDE 150 MG: 150 TABLET, EXTENDED RELEASE ORAL at 09:24

## 2024-10-30 RX ADMIN — POLYETHYLENE GLYCOL 3350 17 G: 17 POWDER, FOR SOLUTION ORAL at 09:25

## 2024-10-30 RX ADMIN — VENLAFAXINE HYDROCHLORIDE 150 MG: 150 CAPSULE, EXTENDED RELEASE ORAL at 09:24

## 2024-10-30 RX ADMIN — OXYBUTYNIN CHLORIDE 10 MG: 10 TABLET, EXTENDED RELEASE ORAL at 09:24

## 2024-10-30 RX ADMIN — SENNOSIDES AND DOCUSATE SODIUM 2 TABLET: 50; 8.6 TABLET ORAL at 09:24

## 2024-10-30 RX ADMIN — GABAPENTIN 400 MG: 400 CAPSULE ORAL at 09:24

## 2024-10-30 RX ADMIN — HYDROCODONE BITARTRATE AND ACETAMINOPHEN 1 TABLET: 5; 325 TABLET ORAL at 05:50

## 2024-10-30 RX ADMIN — LINAGLIPTIN 5 MG: 5 TABLET, FILM COATED ORAL at 09:31

## 2024-10-30 RX ADMIN — SODIUM CHLORIDE 500 ML: 9 INJECTION, SOLUTION INTRAVENOUS at 09:21

## 2024-10-30 RX ADMIN — LEVOTHYROXINE SODIUM 88 MCG: 88 TABLET ORAL at 09:24

## 2024-10-30 RX ADMIN — HYDROCODONE BITARTRATE AND ACETAMINOPHEN 1 TABLET: 5; 325 TABLET ORAL at 09:28

## 2024-10-30 RX ADMIN — BISACODYL 10 MG: 10 SUPPOSITORY RECTAL at 09:25

## 2024-10-30 RX ADMIN — METOPROLOL SUCCINATE 25 MG: 25 TABLET, EXTENDED RELEASE ORAL at 09:24

## 2024-10-30 RX ADMIN — HYDROCODONE BITARTRATE AND ACETAMINOPHEN 1 TABLET: 5; 325 TABLET ORAL at 01:41

## 2024-10-30 NOTE — PLAN OF CARE
Goal Outcome Evaluation:  Plan of Care Reviewed With: patient        Progress: no change  Outcome Evaluation: Care assumed at 1500. Patient stable, alert to person, waxing and waning confusion, worsening as evening progressed, but able to redirect and reorient, daughter Ping called for support and reorientation. Patient able to stand with x2 assist but able to pivot. Plan for d/c to snf tomorrow via EMS scheduled for 12 pm. Medicated x1 with norco for pain after working with PT. Educated patient and family on hospital delirium and precautions.

## 2024-10-30 NOTE — DISCHARGE SUMMARY
Date of Admission: 10/26/2024  Date of Discharge:  10/30/2024  Primary Care Physician: Zahida Osorio MD     Discharge Diagnosis:  Active Hospital Problems    Diagnosis  POA    **Periprosthetic fracture around internal prosthetic left hip joint [M97.02XA]  Not Applicable    History of CVA (cerebrovascular accident) [Z86.73]  Not Applicable    Naz-prosthetic fracture around prosthetic hip [M97.8XXA, Z96.649]  Not Applicable    Atrial fibrillation, persistent [I48.19]  Yes    SHEY (obstructive sleep apnea) [G47.33]  Yes    Primary hypertension [I10]  Yes    Type 2 diabetes mellitus with stage 3 chronic kidney disease, without long-term current use of insulin [E11.22, N18.30]  Yes    Hypothyroidism [E03.9]  Yes    Anxiety [F41.9]  Yes    Gastroesophageal reflux disease [K21.9]  Yes      Resolved Hospital Problems   No resolved problems to display.       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 female with a history of HTN, Type 2 DM, Persistent Afib, CVA, SHEY, Hypothyroidism, anxiety, GERD, and previous left hip replacement who presented with left hip pain following a mechanical fall and was found to have a periprosthetic fracture. She was evaluated by orthopedic surgery and it was determined that this was a stable fracture and that non-operative management was indicated. She was provided with pain control and physical therapy. She is medically stable and will be discharged to rehab. She is to remain toe-touch weightbearing to the left lower extremity until cleared by orthopedic surgery and she is to follow up with Dr. Kathleen or Sierra Martin at [facility] in 2-3 weeks, call [phone] for appointment.  Of note she had some mild intermittent confusion during the hospitalization and this appears resolved this AM. Her son asked about a possible UTI. She does not have any symptoms or physical examination findings consistent with infection and she has been afebrile with a normal WBC count. I suspect this was due to mild hospital delirium possible exacerbated by pain medications. Again, this appears to have resolved.     Exam Today:  Blood pressure 124/88, pulse 71, temperature 98.1 °F (36.7 °C), temperature source Oral, resp. rate 17, height 170.2 cm (67\"), weight 94.9 kg (209 lb 3.5 oz), SpO2 91%, not currently breastfeeding.  Vitals and nursing note reviewed.   Constitutional:       General: She is not in acute distress.     Appearance: She is not toxic-appearing or diaphoretic.   HENT:      Head: Normocephalic and atraumatic.      Nose: Nose normal.      Mouth/Throat:      Mouth: Mucous membranes are moist.      Pharynx: Oropharynx is clear.   Eyes:      Conjunctiva/sclera: Conjunctivae normal.      Pupils: Pupils are equal, round, and reactive to light.   Cardiovascular:      Rate and Rhythm: Normal rate. Rhythm irregular.      Pulses: Normal pulses.   Pulmonary:      Effort: Pulmonary effort is normal.   Abdominal:      General: Bowel sounds are normal. There is no distension.      Palpations: Abdomen is soft.      Tenderness: There is no abdominal tenderness.   Musculoskeletal:         General: No swelling.      Cervical back: Neck supple.   Skin:     General: Skin is warm and dry.      Capillary Refill: Capillary refill takes less than 2 seconds.   Neurological:      General: No focal deficit present.      Mental Status: She is alert and oriented to person, place, and time.   Psychiatric:         Mood and Affect: Mood normal.         Behavior: Behavior normal.       Consults:   Consults       Date and Time Order Name Status Description    " 10/26/2024  8:13 PM Inpatient Orthopedic Surgery Consult Completed     10/13/2024 10:39 AM Inpatient Neurology Consult General Completed     10/13/2024 10:38 AM Inpatient Nephrology Consult Completed              Discharge Disposition:  Skilled Nursing Facility (DC - External)    Discharge Medications:     Discharge Medications        New Medications        Instructions Start Date   HYDROcodone-acetaminophen 5-325 MG per tablet  Commonly known as: NORCO   1 tablet, Oral, Every 4 Hours PRN      polyethylene glycol 17 g packet  Commonly known as: MIRALAX   17 g, Oral, 2 Times Daily      sennosides-docusate 8.6-50 MG per tablet  Commonly known as: PERICOLACE   2 tablets, Oral, 2 Times Daily             Changes to Medications        Instructions Start Date   gabapentin 400 MG capsule  Commonly known as: NEURONTIN  What changed: See the new instructions.   400 mg, Oral, Every 12 Hours             Continue These Medications        Instructions Start Date   atorvastatin 40 MG tablet  Commonly known as: LIPITOR   40 mg, Oral, Nightly      BD AutoShield Duo 30G X 5 MM misc  Generic drug: Insulin Pen Needle   1 each, Other, 4 Times Daily      buPROPion  MG 24 hr tablet  Commonly known as: WELLBUTRIN XL   150 mg, Oral, Every Morning      docusate sodium 100 MG capsule   100 mg, Oral, 2 Times Daily      donepezil 5 MG tablet  Commonly known as: ARICEPT   5 mg, Oral, Nightly      glimepiride 4 MG tablet  Commonly known as: AMARYL   4 mg, Oral, Every Morning Before Breakfast      glucose blood test strip   Use to test blood glucose daily. Formulary Compliance Approval. Diagnosis: Type 2 Diabetes - Not Insulin Dependent      glucose monitor monitoring kit   Use to test blood glucose daily. Formulary Compliance Approval. Diagnosis: Type 2 Diabetes - Not Insulin Dependent      insulin aspart 100 UNIT/ML solution pen-injector sc pen  Commonly known as: novoLOG FLEXPEN   2-10 units three times a day before each meal per scale       Lancets misc   Use to test blood glucose daily. Formulary Compliance Approval. Diagnosis: Type 2 Diabetes - Not Insulin Dependent      levothyroxine 88 MCG tablet  Commonly known as: SYNTHROID, LEVOTHROID   1 tablet, Daily      linagliptin 5 MG tablet tablet  Commonly known as: Tradjenta   5 mg, Oral, Daily      metoprolol succinate XL 25 MG 24 hr tablet  Commonly known as: TOPROL-XL   TAKE ONE TABLET BY MOUTH DAILY **NEW MED**      oxybutynin XL 10 MG 24 hr tablet  Commonly known as: DITROPAN-XL   10 mg, Oral, Daily      pioglitazone 15 MG tablet  Commonly known as: ACTOS   15 mg, Oral, Daily      venlafaxine  MG 24 hr capsule  Commonly known as: EFFEXOR-XR   150 mg, Oral, Daily      vitamin B-12 1000 MCG tablet  Commonly known as: CYANOCOBALAMIN   1,000 mcg, Oral, Daily      vitamin D 1.25 MG (89631 UT) capsule capsule  Commonly known as: ERGOCALCIFEROL   50,000 Units, Oral, Every 7 Days, Ergocalciferol 50,000 units weekly x 5 weeks starting Thursday, Jan 18, 2023, then change to cholecalciferol 1,000 units twice a day      Xarelto 15 MG tablet  Generic drug: rivaroxaban   TAKE ONE TABLET BY MOUTH IN THE EVENING WITH DINNER               Discharge Diet:   Diet Instructions       Diet: Cardiac Diets, Diabetic Diets; Healthy Heart (2-3 Na+); Regular (IDDSI 7); Thin (IDDSI 0); Consistent Carbohydrate      Discharge Diet:  Cardiac Diets  Diabetic Diets       Cardiac Diet: Healthy Heart (2-3 Na+)    Texture: Regular (IDDSI 7)    Fluid Consistency: Thin (IDDSI 0)    Diabetic Diet: Consistent Carbohydrate            Activity at Discharge:   Activity Instructions       Other Activity Instructions      Activity Instructions: toe touch weightbearing left lower extremity            Follow-up Appointments:  Future Appointments   Date Time Provider Department Center   12/11/2024  3:00 PM Julisa Watson PA [facility]   1/13/2025 10:30 AM Zahida Osorio MD [facility]   2/27/2025 11:45 AM Elizabeth Pina,  MD [id number] None   5/27/2025 11:30 AM Julisa Watson PA [facility] Matthew D Lykins, MD  10/30/24  11:43 EDT    Time Spent on Discharge Activities: Greater than 30 minutes.

## 2024-10-31 NOTE — CASE MANAGEMENT/SOCIAL WORK
Case Management Discharge Note      Final Note: dc to skilled bed at [facility] via [facility]         Selected Continued Care - Discharged on 10/30/2024 Admission date: 10/26/2024 - Discharge disposition: Skilled Nursing Facility (DC - External)      Destination Coordination complete.      Service Provider Services Address Phone Fax Patient Preferred    [facility] [address] [place] [id number] [phone] --              Durable Medical Equipment    No services have been selected for the patient.                Dialysis/Infusion    No services have been selected for the patient.                Home Medical Care    No services have been selected for the patient.                Therapy    No services have been selected for the patient.                Community Resources    No services have been selected for the patient.                Community & DME    No services have been selected for the patient.                    Selected Continued Care - Prior Encounters Includes continued care and service providers with selected services from prior encounters from 7/28/2024 to 10/30/2024      Discharged on 10/15/2024 Admission date: 10/12/2024 - Discharge disposition: Skilled Nursing Facility (DC - External)      Destination       Service Provider Services Address Phone Fax Patient Preferred    [address] [id number] [phone] --                          Transportation Services  Ambulance: [facility]    Final Discharge Disposition Code: 03 - skilled nursing facility (SNF)

## 2024-12-05 ENCOUNTER — DOCUMENTATION (OUTPATIENT)
Dept: FAMILY MEDICINE CLINIC | Facility: CLINIC | Age: 84
End: 2024-12-05
Payer: MEDICARE

## 2024-12-06 NOTE — PROGRESS NOTES
CC: follow-up    HPI:  Rekha Bear is a  84 y.o.  right-handed female with past medical history of insulin-dependent diabetes, hyperlipidemia, hypertension, hypothyroidism, osteoarthritis, depression, severe COVID infection in 2020 requiring ventilator.  She was brought to the hospital by family for confusion and was found to have a sizable left PCA infarct.  She was not an intervention candidate.  There was no significant vascular abnormality, no LVO.  Etiology strongly suspected to be cardioembolic and she had had atrial ectopy.  She was started on empiric anticoagulation.  She was changed from Eliquis to Xarelto for cost.  There was concern for vascular dementia related to issues of memory and worsening cognitive complaints.  At baseline there are issues with unkept house, sometimes getting lost when driving.  There are also issues with bradycardia and she is off metoprolol.  B12 less than 200 and she is on oral supplementation    I last saw her in March and she is still at [facility].  She finished a course of acute rehab.  She is working with physical therapy.  It was recommended that she be on a walker and she is using this today.  S Her A1c is much improved 7.5 down from 9.1 in the hospital.  She is here with daughter again today.  She has significant pain of R shoulder.  There is an orthopedist who has treated her at the facility.  She should get xray results soon.  She had forma VF testing and indeed cannot drive, which she is not.  No new stroke like symptoms to report.  Afib has been detected at this point.    She is here with her other daughter today.  Since I last saw her she had a fall, got tangled in door and broke her hip with non surgical mgmt.  She was also in the hospital for confusion and UTI.  Neuro team recommended starting aricept and sounds like she is tolerating fine.  They have hired someone to ensure she is taking her medications properly.  Prior they were using bubble  pack arranged at pharmacy. B12 above 2000.  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        General: pt well appearing, no distress  HEENT: Normocephalic, conjunctiva normal, external canals normal, no nasal discharge, moist mucous membranes  Neck: No lymphadenopathy, thyroid not enlarged, no JVD  CV: Regular rate and rhythm, no murmurs negative no bruits auscultated at neck, equal pulses  Pulmonary: Normal respiratory effort, clear to auscultation bilaterally  Extremities: no edema, bruising, or skin lesions  Pysch: good eye contact, cooperative, full affect, euthymic mood, good attention, good insight  Mental: alert, conversant, AOx3, provides history, 2/3 recall.  Language fluent, names, repeats  CN: dense OD temporal field deficit> OS , PERRL, EOMi, no gaze palsy or nystagmus, intact facial sensation, no facial assymetry, intact hearing, symmetric palate elevation, tongue midline, good SCM strength  Motor: no abnormal movements, no pronator drift, normal  bulk and tone, 5/5 strength b/l UE & LE  Sensory: normal sensation to crude touch, temperature, and vibration throughout  Reflexes: 2+ throughout, neg babinski  Coordination: no ataxia on finger-nose, heel-shin  Gait: using walker        Results:      Lab Results   Component Value Date    GLUCOSE 135 (H) 10/30/2024    BUN 21 10/30/2024    CREATININE 1.17 (H) 10/30/2024    EGFRIFNONA 50 (L) 10/14/2021    EGFRIFAFRI 61 10/14/2021    BCR 17.9 10/30/2024    CO2 26.3 10/30/2024    CALCIUM 9.4 10/30/2024    PROTENTOTREF 7.0  "06/30/2022    ALBUMIN 3.4 (L) 10/15/2024    LABIL2 1.4 06/30/2022    AST 17 10/15/2024    ALT 13 10/15/2024       Lab Results   Component Value Date    WBC 9.70 10/30/2024    HGB 13.3 10/30/2024    HCT 40.9 10/30/2024    MCV 88.1 10/30/2024     10/30/2024         .No results found for: \"RPR\"      Lab Results   Component Value Date    TSH 5.060 (H) 10/12/2024    Q5OQKHF 7.6 06/03/2015         Lab Results   Component Value Date    JQDVBWBL93 >2,000 (H) 10/13/2024         Lab Results   Component Value Date    FOLATE 7.77 04/16/2024         Lab Results   Component Value Date    HGBA1C 8.70 (H) 10/27/2024         Lab Results   Component Value Date    GLUCOSE 135 (H) 10/30/2024    BUN 21 10/30/2024    CREATININE 1.17 (H) 10/30/2024    EGFRIFNONA 50 (L) 10/14/2021    EGFRIFAFRI 61 10/14/2021    BCR 17.9 10/30/2024    K 4.3 10/30/2024    CO2 26.3 10/30/2024    CALCIUM 9.4 10/30/2024    PROTENTOTREF 7.0 06/30/2022    ALBUMIN 3.4 (L) 10/15/2024    LABIL2 1.4 06/30/2022    AST 17 10/15/2024    ALT 13 10/15/2024         Diagnosis:  1 history of left PCA stroke  2 insulin-dependent diabetes  3 B12 deficiency  4 depression  5 MCI    Impression: 83-year-old female with past medical history of hypertension, hyperlipidemia, CKD, severe COVID infection in 2020 who was found to have left PCA infarct.  Presenting symptoms increased confusion.  Cardioembolic etiology strongly suspected and she was placed on empiric anticoagulation- afib has since been detected.  She is on Xarelto for cost.  She continues on Lipitor and A1c much improved.  She was started on p.o. supplementation for B12 less than 20.  There were complaints predating memory problems.  MMSE 30 with poor hand draw on clock only.  Continue stroke prevention, correction of dementia mimickers, support for her impaired mobility self-care    Plan:  1 continue Xarelto, Lipitor  2 referral to neuroophthomology-prisms glasses may increase depth of field  3 increase " aricept  4 referral to neuropsych      I spent at least 60 minutes interviewing, examining, and counseling patient.  I independently reviewed documentation, laboratory and diagnostic findings, external documentation where applicable, and formulated treatment plan which was discussed with the patient.

## 2024-12-11 ENCOUNTER — OFFICE VISIT (OUTPATIENT)
Dept: NEUROLOGY | Facility: CLINIC | Age: 84
End: 2024-12-11
Payer: MEDICARE

## 2024-12-11 VITALS — DIASTOLIC BLOOD PRESSURE: 74 MMHG | SYSTOLIC BLOOD PRESSURE: 120 MMHG | HEART RATE: 76 BPM | OXYGEN SATURATION: 96 %

## 2024-12-11 DIAGNOSIS — I63.532 ACUTE LEFT PCA STROKE: Primary | ICD-10-CM

## 2024-12-11 DIAGNOSIS — R41.3 MEMORY PROBLEM: Primary | ICD-10-CM

## 2024-12-11 RX ORDER — FERROUS SULFATE 325(65) MG
1 TABLET ORAL DAILY
COMMUNITY
Start: 2024-12-06

## 2024-12-11 RX ORDER — INSULIN GLARGINE 100 [IU]/ML
INJECTION, SOLUTION SUBCUTANEOUS
COMMUNITY
Start: 2024-12-06

## 2024-12-11 RX ORDER — DONEPEZIL HYDROCHLORIDE 10 MG/1
10 TABLET, FILM COATED ORAL NIGHTLY
Qty: 30 TABLET | Refills: 2 | Status: SHIPPED | OUTPATIENT
Start: 2024-12-11 | End: 2025-12-11

## 2024-12-11 NOTE — PATIENT INSTRUCTIONS
1 increase aricept to 10mg daily  2 referral to neuropsychology  3 referral to neuro optometry for question of prism glasses  4 f/u in 4-6 mos

## 2024-12-11 NOTE — LETTER
December 15, 2024     Zahida Osorio MD  [address] [facility] [address]  [place] [address]    Patient: Rekha Bear   YOB: 1940   Date of Visit: 12/11/2024     Dear Zahida Osorio MD:       Thank you for referring Rekha Bear to me for evaluation. Below are the relevant portions of my assessment and plan of care.    If you have questions, please do not hesitate to call me. I look forward to following Rekha along with you.         Sincerely,        JAC Wood        CC: No Recipients    Julisa Watson PA  12/15/24 [id number]  Sign when Signing Visit  CC: follow-up    HPI:  Rekha Bear is a  84 y.o.  right-handed female with past medical history of insulin-dependent diabetes, hyperlipidemia, hypertension, hypothyroidism, osteoarthritis, depression, severe COVID infection in 2020 requiring ventilator.  She was brought to the hospital by family for confusion and was found to have a sizable left PCA infarct.  She was not an intervention candidate.  There was no significant vascular abnormality, no LVO.  Etiology strongly suspected to be cardioembolic and she had had atrial ectopy.  She was started on empiric anticoagulation.  She was changed from Eliquis to Xarelto for cost.  There was concern for vascular dementia related to issues of memory and worsening cognitive complaints.  At baseline there are issues with unkept house, sometimes getting lost when driving.  There are also issues with bradycardia and she is off metoprolol.  B12 less than 200 and she is on oral supplementation    I last saw her in March and [facility]  She finished a course of acute rehab.  She is working with physical therapy.  It was recommended that she be on a walker and she is using this today.  S Her A1c is much improved 7.5 down from 9.1 in the hospital.  She is here with daughter again today.  She has significant pain of R shoulder.  There is an orthopedist who has  treated her at the facility.  She should get xray results soon.  She had forma VF testing and indeed cannot drive, which she is not.  No new stroke like symptoms to report.  Afib has been detected at this point.    She is here with her other daughter today.  Since I last saw her she had a fall, got tangled in door and broke her hip with non surgical mgmt.  She was also in the hospital for confusion and UTI.  Neuro team recommended starting aricept and sounds like she is tolerating fine.  They have hired someone to ensure she is taking her medications properly.  Prior they were using bubble pack arranged at pharmacy. B12 above 2000.  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        General: pt well appearing, no distress  HEENT: Normocephalic, conjunctiva normal, external canals normal, no nasal discharge, moist mucous membranes  Neck: No lymphadenopathy, thyroid not enlarged, no JVD  CV: Regular rate and rhythm, no murmurs negative no bruits auscultated at neck, equal pulses  Pulmonary: Normal respiratory effort, clear to auscultation bilaterally  Extremities: no edema, bruising, or skin lesions  Pysch: good eye contact, cooperative, full affect, euthymic mood, good attention, good insight  Mental: alert, conversant, AOx3, provides history, 2/3 recall.  Language fluent, names, repeats  CN: dense OD temporal field deficit> OS , PERRL, EOMi, no gaze palsy or nystagmus, intact facial sensation, no facial assymetry, intact hearing, symmetric palate elevation, tongue midline, good  "SCM strength  Motor: no abnormal movements, no pronator drift, normal  bulk and tone, 5/5 strength b/l UE & LE  Sensory: normal sensation to crude touch, temperature, and vibration throughout  Reflexes: 2+ throughout, neg babinski  Coordination: no ataxia on finger-nose, heel-shin  Gait: using walker        Results:      Lab Results   Component Value Date    GLUCOSE 135 (H) 10/30/2024    BUN 21 10/30/2024    CREATININE 1.17 (H) 10/30/2024    EGFRIFNONA 50 (L) 10/14/2021    EGFRIFAFRI 61 10/14/2021    BCR 17.9 10/30/2024    CO2 26.3 10/30/2024    CALCIUM 9.4 10/30/2024    PROTENTOTREF 7.0 06/30/2022    ALBUMIN 3.4 (L) 10/15/2024    LABIL2 1.4 06/30/2022    AST 17 10/15/2024    ALT 13 10/15/2024       Lab Results   Component Value Date    WBC 9.70 10/30/2024    HGB 13.3 10/30/2024    HCT 40.9 10/30/2024    MCV 88.1 10/30/2024     10/30/2024         .No results found for: \"RPR\"      Lab Results   Component Value Date    TSH 5.060 (H) 10/12/2024    G8OXEHV 7.6 06/03/2015         Lab Results   Component Value Date    [id number] >2,000 (H) 10/13/2024         Lab Results   Component Value Date    FOLATE 7.77 04/16/2024         Lab Results   Component Value Date    HGBA1C 8.70 (H) 10/27/2024         Lab Results   Component Value Date    GLUCOSE 135 (H) 10/30/2024    BUN 21 10/30/2024    CREATININE 1.17 (H) 10/30/2024    EGFRIFNONA 50 (L) 10/14/2021    EGFRIFAFRI 61 10/14/2021    BCR 17.9 10/30/2024    K 4.3 10/30/2024    CO2 26.3 10/30/2024    CALCIUM 9.4 10/30/2024    PROTENTOTREF 7.0 06/30/2022    ALBUMIN 3.4 (L) 10/15/2024    LABIL2 1.4 06/30/2022    AST 17 10/15/2024    ALT 13 10/15/2024         Diagnosis:  1 history of left PCA stroke  2 insulin-dependent diabetes  3 B12 deficiency  4 depression  5 MCI    Impression: 83-year-old female with past medical history of hypertension, hyperlipidemia, CKD, severe COVID infection in 2020 who was found to have left PCA infarct.  Presenting symptoms increased confusion.  " Cardioembolic etiology strongly suspected and she was placed on empiric anticoagulation- afib has since been detected.  She is on Xarelto for cost.  She continues on Lipitor and A1c much improved.  She was started on p.o. supplementation for B12 less than 20.  There were complaints predating memory problems.  MMSE 30 with poor hand draw on clock only.  Continue stroke prevention, correction of dementia mimickers, support for her impaired mobility self-care    Plan:  1 continue Xarelto, Lipitor  2 referral to neuroophthomology-prisms glasses may increase depth of field  3 increase aricept  4 referral to neuropsych      I spent at least 60 minutes interviewing, examining, and counseling patient.  I independently reviewed documentation, laboratory and diagnostic findings, external documentation where applicable, and formulated treatment plan which was discussed with the patient.

## 2024-12-19 ENCOUNTER — TELEPHONE (OUTPATIENT)
Dept: FAMILY MEDICINE CLINIC | Facility: CLINIC | Age: 84
End: 2024-12-19
Payer: MEDICARE

## 2024-12-19 RX ORDER — POLYETHYLENE GLYCOL 3350 17 G/17G
17 POWDER, FOR SOLUTION ORAL 2 TIMES DAILY PRN
Qty: 60 EACH | Refills: 0 | Status: SHIPPED | OUTPATIENT
Start: 2024-12-19

## 2024-12-19 NOTE — TELEPHONE ENCOUNTER
Assisted Living is asking if     Voltaren can be changed from TID to as needed.    Miralax to one time daily as needed.    They will need new order faxed to [phone]      Insurance will only cover the newly requested directions.    Thanks!

## 2024-12-23 DIAGNOSIS — I63.432 CEREBROVASCULAR ACCIDENT (CVA) DUE TO EMBOLISM OF LEFT POSTERIOR CEREBRAL ARTERY: ICD-10-CM

## 2024-12-23 DIAGNOSIS — I48.19 ATRIAL FIBRILLATION, PERSISTENT: Primary | ICD-10-CM

## 2024-12-23 DIAGNOSIS — E53.8 B12 DEFICIENCY: ICD-10-CM

## 2024-12-23 RX ORDER — FERROUS SULFATE 325(65) MG
1 TABLET ORAL DAILY
Qty: 90 TABLET | Refills: 0 | Status: SHIPPED | OUTPATIENT
Start: 2024-12-23

## 2025-01-02 ENCOUNTER — DOCUMENTATION (OUTPATIENT)
Dept: FAMILY MEDICINE CLINIC | Facility: CLINIC | Age: 85
End: 2025-01-02
Payer: MEDICARE

## 2025-01-13 ENCOUNTER — OFFICE VISIT (OUTPATIENT)
Dept: FAMILY MEDICINE CLINIC | Facility: CLINIC | Age: 85
End: 2025-01-13
Payer: MEDICARE

## 2025-01-13 VITALS
SYSTOLIC BLOOD PRESSURE: 112 MMHG | BODY MASS INDEX: 32.71 KG/M2 | OXYGEN SATURATION: 95 % | RESPIRATION RATE: 16 BRPM | DIASTOLIC BLOOD PRESSURE: 86 MMHG | TEMPERATURE: 98 F | HEIGHT: 67 IN | HEART RATE: 96 BPM | WEIGHT: 208.4 LBS

## 2025-01-13 DIAGNOSIS — E11.22 TYPE 2 DIABETES MELLITUS WITH STAGE 3A CHRONIC KIDNEY DISEASE, WITHOUT LONG-TERM CURRENT USE OF INSULIN: ICD-10-CM

## 2025-01-13 DIAGNOSIS — Z12.11 ENCOUNTER FOR SCREENING FOR MALIGNANT NEOPLASM OF COLON: ICD-10-CM

## 2025-01-13 DIAGNOSIS — Z78.0 MENOPAUSE: ICD-10-CM

## 2025-01-13 DIAGNOSIS — I96 DRY GANGRENE: ICD-10-CM

## 2025-01-13 DIAGNOSIS — I10 PRIMARY HYPERTENSION: ICD-10-CM

## 2025-01-13 DIAGNOSIS — Z13.820 ENCOUNTER FOR SCREENING FOR OSTEOPOROSIS: ICD-10-CM

## 2025-01-13 DIAGNOSIS — E53.8 B12 DEFICIENCY: ICD-10-CM

## 2025-01-13 DIAGNOSIS — I73.01 RAYNAUD DISEASE WITH GANGRENE: ICD-10-CM

## 2025-01-13 DIAGNOSIS — E03.8 OTHER SPECIFIED HYPOTHYROIDISM: ICD-10-CM

## 2025-01-13 DIAGNOSIS — Z91.81 AT HIGH RISK FOR FALLS: ICD-10-CM

## 2025-01-13 DIAGNOSIS — Z00.00 MEDICARE ANNUAL WELLNESS VISIT, SUBSEQUENT: Primary | ICD-10-CM

## 2025-01-13 DIAGNOSIS — N18.31 TYPE 2 DIABETES MELLITUS WITH STAGE 3A CHRONIC KIDNEY DISEASE, WITHOUT LONG-TERM CURRENT USE OF INSULIN: ICD-10-CM

## 2025-01-13 DIAGNOSIS — N39.44 NOCTURNAL ENURESIS: ICD-10-CM

## 2025-01-13 DIAGNOSIS — E78.5 HYPERLIPIDEMIA, UNSPECIFIED HYPERLIPIDEMIA TYPE: ICD-10-CM

## 2025-01-13 PROCEDURE — 3079F DIAST BP 80-89 MM HG: CPT | Performed by: INTERNAL MEDICINE

## 2025-01-13 PROCEDURE — 99214 OFFICE O/P EST MOD 30 MIN: CPT | Performed by: INTERNAL MEDICINE

## 2025-01-13 PROCEDURE — G0439 PPPS, SUBSEQ VISIT: HCPCS | Performed by: INTERNAL MEDICINE

## 2025-01-13 PROCEDURE — 1126F AMNT PAIN NOTED NONE PRSNT: CPT | Performed by: INTERNAL MEDICINE

## 2025-01-13 PROCEDURE — 3074F SYST BP LT 130 MM HG: CPT | Performed by: INTERNAL MEDICINE

## 2025-01-13 RX ORDER — ROSUVASTATIN CALCIUM 20 MG/1
1 TABLET, COATED ORAL DAILY
COMMUNITY

## 2025-01-13 RX ORDER — MIRABEGRON 50 MG/1
25 TABLET, FILM COATED, EXTENDED RELEASE ORAL DAILY
Qty: 30 TABLET | Refills: 0 | Status: SHIPPED | OUTPATIENT
Start: 2025-01-13

## 2025-01-13 RX ORDER — CYANOCOBALAMIN (VITAMIN B-12) 500 MCG
500 TABLET ORAL DAILY
Qty: 30 TABLET | Refills: 3 | Status: SHIPPED | OUTPATIENT
Start: 2025-01-13

## 2025-01-13 RX ORDER — INSULIN LISPRO 100 [IU]/ML
INJECTION, SOLUTION INTRAVENOUS; SUBCUTANEOUS
COMMUNITY

## 2025-01-13 RX ORDER — HYDRALAZINE HYDROCHLORIDE 10 MG/1
TABLET, FILM COATED ORAL
COMMUNITY

## 2025-01-13 RX ORDER — SILDENAFIL CITRATE 20 MG/1
20 TABLET ORAL DAILY
Qty: 30 TABLET | Refills: 3 | Status: SHIPPED | OUTPATIENT
Start: 2025-01-13

## 2025-01-13 RX ORDER — ACETAMINOPHEN 160 MG
1 TABLET,DISINTEGRATING ORAL DAILY
COMMUNITY
Start: 2024-12-06

## 2025-01-13 NOTE — PROGRESS NOTES
Subjective   The ABCs of the Annual Wellness Visit  Medicare Wellness Visit      Rekha Bear is a 84 y.o. patient who presents for a Medicare Wellness Visit.    The following portions of the patient's history were reviewed and   updated as appropriate: allergies, current medications, past family history, past medical history, past social history, past surgical history, and problem list.    Compared to one year ago, the patient's physical   health is better.  Compared to one year ago, the patient's mental   health is better.    Recent Hospitalizations:  This patient has had a [facility] admission record on file within the last 365 days.  Current Medical Providers:  Patient Care Team:  Zahida Osorio MD as PCP - General (Internal Medicine)  Eben Porter MD as PCP - Family Medicine    Outpatient Medications Prior to Visit   Medication Sig Dispense Refill    atorvastatin (LIPITOR) 40 MG tablet Take 1 tablet by mouth Every Night. 30 tablet 1    BD AutoShield Duo 30G X 5 MM misc 1 each by Other route 4 (Four) Times a Day.      buPROPion XL (WELLBUTRIN XL) 150 MG 24 hr tablet TAKE ONE TABLET BY MOUTH EVERY MORNING 90 tablet 1    Cholecalciferol (Vitamin D3) 50 MCG (2000 UT) capsule Take 1 capsule by mouth Daily.      Diclofenac Sodium (VOLTAREN) 1 % gel gel APPLY A THIN LAYER TO THE RIGHT TO SHOULDER THREE TIMES DAILY      docusate sodium 100 MG capsule Take 1 capsule by mouth 2 (Two) Times a Day. 60 capsule 1    donepezil (Aricept) 10 MG tablet Take 1 tablet by mouth Every Night. 30 tablet 2    FeroSul 325 (65 Fe) MG tablet Take 1 tablet by mouth Daily. 90 tablet 0    gabapentin (NEURONTIN) 400 MG capsule Take 1 capsule by mouth Every 12 (Twelve) Hours. 6 capsule 0    glimepiride (AMARYL) 4 MG tablet Take 1 tablet by mouth Every Morning Before Breakfast. 30 tablet 1    glucose blood test strip Use to test blood glucose daily. Formulary Compliance Approval. Diagnosis: Type 2 Diabetes - Not Insulin  Dependent 100 each 12    glucose monitor monitoring kit Use to test blood glucose daily. Formulary Compliance Approval. Diagnosis: Type 2 Diabetes - Not Insulin Dependent 1 each 0    hydrALAZINE (APRESOLINE) 10 MG tablet TAKE ONE TABLET BY MOUTH THREE TIMES DAILY (MORNING, NOON, AND BEDTIME)      HYDROcodone-acetaminophen (NORCO) 5-325 MG per tablet Take 1 tablet by mouth Every 4 (Four) Hours As Needed for Moderate Pain or Severe Pain. 18 tablet 0    insulin aspart (novoLOG FLEXPEN) 100 UNIT/ML solution pen-injector sc pen 2-10 units three times a day before each meal per scale      Insulin Lispro, 1 Unit Dial, (HumaLOG KwikPen) 100 UNIT/ML solution pen-injector       Lancets misc Use to test blood glucose daily. Formulary Compliance Approval. Diagnosis: Type 2 Diabetes - Not Insulin Dependent 100 each 12    Lantus SoloStar 100 UNIT/ML injection pen INJECT 5 UNITS AT BEDTIME      levothyroxine (SYNTHROID, LEVOTHROID) 88 MCG tablet Take 1 tablet by mouth Daily.      linagliptin (Tradjenta) 5 MG tablet tablet Take 1 tablet by mouth Daily. 30 tablet 1    metoprolol succinate XL (TOPROL-XL) 25 MG 24 hr tablet TAKE ONE TABLET BY MOUTH DAILY **NEW MED** 30 tablet 5    pioglitazone (ACTOS) 15 MG tablet Take 1 tablet by mouth Daily. 30 tablet 1    polyethylene glycol (MIRALAX) 17 g packet Take 17 g by mouth 2 (Two) Times a Day As Needed (constipation). 60 each 0    rivaroxaban (Xarelto) 15 MG tablet Take 1 tablet by mouth Daily With Dinner. 90 tablet 1    rosuvastatin (CRESTOR) 20 MG tablet Take 1 tablet by mouth Daily.      sennosides-docusate (PERICOLACE) 8.6-50 MG per tablet Take 2 tablets by mouth 2 (Two) Times a Day. 120 tablet 0    venlafaxine XR (EFFEXOR-XR) 150 MG 24 hr capsule TAKE ONE TABLET BY MOUTH DAILY 90 capsule 1    vitamin D (ERGOCALCIFEROL) 1.25 MG (02257 UT) capsule capsule Take 1 capsule by mouth Every 7 (Seven) Days. Ergocalciferol 50,000 units weekly x 5 weeks starting Thursday, Jan 18, 2023, then  change to cholecalciferol 1,000 units twice a day 5 capsule 0    oxybutynin XL (DITROPAN-XL) 10 MG 24 hr tablet TAKE ONE TABLET BY MOUTH DAILY 90 tablet 1    vitamin B-12 (CYANOCOBALAMIN) 1000 MCG tablet TAKE ONE TABLET BY MOUTH DAILY 90 tablet 1    donepezil (ARICEPT) 5 MG tablet Take 1 tablet by mouth Every Night. (Patient not taking: Reported on 1/13/2025) 30 tablet 0     No facility-administered medications prior to visit.     Opioid medication/s are on active medication list.  and I have evaluated her active treatment plan and pain score trends (see table).  There were no vitals filed for this visit.  I have reviewed the chart for potential of high risk medication and harmful drug interactions in the elderly.        Aspirin is not on active medication list.  Aspirin use is not indicated based on review of current medical condition/s. Risk of harm outweighs potential benefits.  .    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    Community acquired pneumonia of left lower lobe of lung    Hyponatremia    Pneumonia due to COVID-19 virus    Major depressive disorder, recurrent, mild    Acute respiratory failure with hypoxia    Supraventricular tachycardia    PVC (premature ventricular contraction)    Depressive disorder    Gastroesophageal reflux disease    Midline cystocele    Rectocele    BMI 29.0-29.9,adult    Acute left PCA stroke    B12 deficiency    Bradycardia    Premature atrial contractions    Stroke    SHEY (obstructive sleep apnea)    Circadian rhythm disorder    Cerebrovascular accident (CVA) due to embolism of left posterior cerebral artery  "   Anxiety    Dysphagia as late effect of stroke    Atrial fibrillation, persistent    Acute UTI    Closed left hip fracture    Periprosthetic fracture around internal prosthetic left hip joint    Naz-prosthetic fracture around prosthetic hip    History of CVA (cerebrovascular accident)     Advance Care Planning Advance Directive is not on file.  ACP discussion was held with the patient during this visit. Patient has an advance directive (not in EMR), copy requested.            Objective   Vitals:    25 1028   BP: 112/86   BP Location: Right arm   Patient Position: Sitting   Cuff Size: Adult   Resp: 16   Temp: 98 °F (36.7 °C)   TempSrc: Oral   Weight: 94.5 kg (208 lb 6.4 oz)   Height: 170.2 cm (67.01\")       Estimated body mass index is 32.63 kg/m² as calculated from the following:    Height as of this encounter: 170.2 cm (67.01\").    Weight as of this encounter: 94.5 kg (208 lb 6.4 oz).    BMI is >= 30 and <35. (Class 1 Obesity). The following options were offered after discussion;: weight loss educational material (shared in after visit summary), exercise counseling/recommendations, and nutrition counseling/recommendations           Does the patient have evidence of cognitive impairment? Yes  Lab Results   Component Value Date    HGBA1C 8.70 (H) 10/27/2024                                                                                                Health  Risk Assessment    Smoking Status:  Social History     Tobacco Use   Smoking Status Former    Current packs/day: 0.00    Average packs/day: 0.5 packs/day for 14.0 years (7.0 ttl pk-yrs)    Types: Cigarettes    Start date:     Quit date:     Years since quittin.0    Passive exposure: Past   Smokeless Tobacco Never   Tobacco Comments    CAFFEINE USE     Alcohol Consumption:  Social History     Substance and Sexual Activity   Alcohol Use No       Fall Risk Screen  STEADI Fall Risk Assessment was completed, and patient is at HIGH risk for falls. " Assessment completed on:1/13/2025    Depression Screening   Little interest or pleasure in doing things? Not at all   Feeling down, depressed, or hopeless? Not at all   PHQ-2 Total Score 0      Health Habits and Functional and Cognitive Screening:      10/16/2023    10:11 AM   Functional & Cognitive Status   Do you have difficulty preparing food and eating? No   Do you have difficulty bathing yourself, getting dressed or grooming yourself? No   Do you have difficulty using the toilet? No   Do you have difficulty moving around from place to place? No   Do you have trouble with steps or getting out of a bed or a chair? No   Current Diet Well Balanced Diet   Dental Exam Up to date   Eye Exam Up to date   Exercise (times per week) 0 times per week   Current Exercises Include No Regular Exercise   Do you need help using the phone?  No   Are you deaf or do you have serious difficulty hearing?  No   Do you need help to go to places out of walking distance? No   Do you need help shopping? No   Do you need help preparing meals?  No   Do you need help with housework?  Yes   Do you need help with laundry? No   Do you need help taking your medications? No   Do you need help managing money? No   Do you ever drive or ride in a car without wearing a seat belt? No   Have you felt unusual stress, anger or loneliness in the last month? No   Who do you live with? Alone   If you need help, do you have trouble finding someone available to you? No   Have you been bothered in the last four weeks by sexual problems? No   Do you have difficulty concentrating, remembering or making decisions? No           Age-appropriate Screening Schedule:  Refer to the list below for future screening recommendations based on patient's age, sex and/or medical conditions. Orders for these recommended tests are listed in the plan section. The patient has been provided with a written plan.    Health Maintenance List  Health Maintenance   Topic Date Due    DXA  SCAN  Never done    COLORECTAL CANCER SCREENING  12/20/2021    ANNUAL WELLNESS VISIT  10/16/2024    BMI FOLLOWUP  10/16/2024    HEMOGLOBIN A1C  04/27/2025    LIPID PANEL  08/20/2025    DIABETIC EYE EXAM  09/05/2025    URINE MICROALBUMIN  10/12/2025    TDAP/TD VACCINES (6 - Td or Tdap) 10/08/2033    COVID-19 Vaccine  Completed    RSV Vaccine - Adults  Completed    INFLUENZA VACCINE  Completed    Pneumococcal Vaccine 65+  Completed    ZOSTER VACCINE  Completed    MAMMOGRAM  Discontinued                                                                                                                                                CMS Preventative Services Quick Reference  Risk Factors Identified During Encounter  Glaucoma or Family History of Glaucoma:  Optometry Referral Order Placed  Dental Screening Recommended    The above risks/problems have been discussed with the patient.  Pertinent information has been shared with the patient in the After Visit Summary.  An After Visit Summary and PPPS were made available to the patient.    Follow Up:   Next Medicare Wellness visit to be scheduled in 1 year.         Additional E&M Note during same encounter follows:  Patient has additional, significant, and separately identifiable condition(s)/problem(s) that require work above and beyond the Medicare Wellness Visit     Chief Complaint  Hyperlipidemia, Diabetes, Hypertension, Hypothyroidism, Follow-up (Pt here for routine follow up, complains of right index finger pain has a spot at the top of finger looks necrotic, complains of overactive bladder at night ), and Hand Pain    Subjective   HPI  MARIA ANTONIA is also being seen today for follow-up on hypertension, hyperlipidemia, type II DM, stage III CKD, vitamin B12 and D deficiency.  She reports blood pressure well-controlled on medication.  Home blood sugar ranging in the 130s to 140s.  Last A1c improved from 9.1 to 8.7%.  Compliant on medication including insulin [short and  "long-acting].    She reports history of Raynaud's phenomenon for years, not on any medication.  Today she reports painful black discoloration at the tip of the left index finger seem to have worsened over time.  She stated to have been exposed to cold in in the setting of Raynaud's phenomena few days prior to the discoloration.    She reports still experiencing nocturnal urinary incontinence despite being on oxybutynin.  Patient has a history of stroke and symptoms have worsened since then.              Objective   Vital Signs:  /86 (BP Location: Right arm, Patient Position: Sitting, Cuff Size: Adult)   Temp 98 °F (36.7 °C) (Oral)   Resp 16   Ht 170.2 cm (67.01\")   Wt 94.5 kg (208 lb 6.4 oz)   BMI 32.63 kg/m²   Physical Exam  Constitutional:       Appearance: Normal appearance.   HENT:      Head: Normocephalic and atraumatic.   Cardiovascular:      Heart sounds: Normal heart sounds.   Pulmonary:      Breath sounds: Normal breath sounds.   Musculoskeletal:      Left hand: Tenderness present. Normal capillary refill. Normal pulse.      Comments: Necrotic discoloration of the tip of the index finger with surrounding erythema and mild tenderness.  No purulent/serous discharge noted.   Neurological:      Mental Status: She is alert and oriented to person, place, and time.         The following data was reviewed by: Zahida Osorio MD on 01/13/2025:    Common labs          10/28/2024    04:15 10/29/2024    05:46 10/30/2024    03:22 10/30/2024    09:10   Common Labs   Glucose 124  129  135     BUN 25  20  21     Creatinine 1.39  1.47  1.17     Sodium 139  137  132     Potassium 4.8  4.7  5.6  4.3    Chloride 104  102  99     Calcium 8.6  8.8  9.4     WBC 8.42  9.54  9.70     Hemoglobin 11.9  12.2  13.3     Hematocrit 38.0  38.4  40.9     Platelets 150  163  176       TSH          5/16/2024    13:46 8/20/2024    11:10 10/12/2024    15:09   TSH   TSH 3.36     5.79     5.060       Details          This result is " from an external source.                     Assessment and Plan Additional age appropriate preventative wellness advice topics were discussed during today's preventative wellness exam(some topics already addressed during AWV portion of the note above):    Physical Activity: Advised cardiovascular activity 150 minutes per week as tolerated. (example brisk walk for 30 minutes, 5 days a week).     Nutrition: Discussed nutrition plan with patient. Information shared in after visit summary. Goal is for a well balanced diet to enhance overall health.     Healthy Weight: Discussed current and goal BMI with patient. Steps to attain this goal discussed. Information shared in after visit summary.           Medicare annual wellness visit, subsequent  Discussed the importance of maintaining a healthy weight and getting regular exercise.  Educated patient on the benefits of healthy diet.  Advise follow-up annually for wellness exams.       Primary hypertension  Hypertension is stable and controlled  Continue current treatment regimen.  Dietary sodium restriction.  Weight loss.  Regular aerobic exercise.  Ambulatory blood pressure monitoring.  Blood pressure will be reassessed in 3 months.         Hyperlipidemia, unspecified hyperlipidemia type    Plan:  Continue same medication/s without change.    To repeat fasting lipid panel  Counseled patient on lifestyle modifications to help control hyperlipidemia.   Cholesterol lowering dietary information shared with patient.  Advised patient to exercise for 150 minutes weekly. (30 minute brisk walk, 5 days a week for example)  Weight Loss encouraged    Patient Treatment Goals:   LDL goal less than 70    Followup in 3 months.         Type 2 diabetes mellitus with stage 3a chronic kidney disease, without long-term current use of insulin  Diabetes is improving with treatment.   Continue current treatment regimen.  Recommended an ADA diet.  Regular aerobic exercise.  Discussed foot  care.  Reminded to get yearly retinal exam.  Diabetes will be reassessed in 3 months         Other specified hypothyroidism  Lab review indicates elevated TSH improving with normal free T4  To continue on levothyroxine 88 mcg as prescribed  To be reassessed at next regular scheduled appointment       B12 deficiency  Lab review indicate elevated vitamin B12 above 2000  To decrease dose to 500 mcg daily from 1000 mcg daily  Orders:    vitamin B-12 (VITAMIN B-12) 500 MCG tablet; Take 1 tablet by mouth Daily.    Raynaud disease with gangrene  Given features of Raynaud's disease with necrosis of the finger, I think patient will benefit from vasodilators.  Due to allergy to amlodipine, will start her on sildenafil 20 mg daily  Orders:    sildenafil (REVATIO) 20 MG tablet; Take 1 tablet by mouth Daily.    Dry gangrene  Dry necrotic tissue at the tip of the left index finger, will refer to wound clinic for debridement.  Orders:    Ambulatory Referral to Wound Clinic    Nocturnal enuresis  We discussed starting pure wick external urinary device for incontinence however due to logistics with usage at the [facility], patient will not be able to maintain this form of therapy.  Will switch her to mirabegron on and discontinue oxybutynin for now.  Starting at 25 mg nightly and increase to 50 mg after 4 to 8 weeks.  Orders:    Mirabegron ER (Myrbetriq) 50 MG tablet sustained-release 24 hour 24 hr tablet; Take 25 mg by mouth Daily.    Encounter for screening for malignant neoplasm of colon    Orders:    Ambulatory Referral For Screening Colonoscopy    Encounter for screening for osteoporosis    Orders:    DEXA Bone Density, Axial (Hospital); Future    Menopause    Orders:    DEXA Bone Density, Axial (Hospital); Future    At high risk for falls                 Follow Up   Return in about 3 months (around 4/13/2025).  Patient was given instructions and counseling regarding her condition or for health maintenance advice.  Please see specific information pulled into the AVS if appropriate.

## 2025-01-13 NOTE — ASSESSMENT & PLAN NOTE
Diabetes is improving with treatment.   Continue current treatment regimen.  Recommended an ADA diet.  Regular aerobic exercise.  Discussed foot care.  Reminded to get yearly retinal exam.  Diabetes will be reassessed in 3 months

## 2025-01-13 NOTE — ASSESSMENT & PLAN NOTE
We discussed starting pure wick external urinary device for incontinence however due to logistics with usage at the [facility], patient will not be able to maintain this form of therapy.  Will switch her to mirabegron on and discontinue oxybutynin for now.  Starting at 25 mg nightly and increase to 50 mg after 4 to 8 weeks.  Orders:    Mirabegron ER (Myrbetriq) 50 MG tablet sustained-release 24 hour 24 hr tablet; Take 25 mg by mouth Daily.

## 2025-01-13 NOTE — PATIENT INSTRUCTIONS
Advance Care Planning and Advance Directives     You make decisions on a daily basis - decisions about where you want to live, your career, your home, your life. Perhaps one of the most important decisions you face is your choice for future medical care. Take time to talk with your family and your healthcare team and start planning today.  Advance Care Planning is a process that can help you:  Understand possible future healthcare decisions in light of your own experiences  Reflect on those decision in light of your goals and values  Discuss your decisions with those closest to you and the healthcare professionals that care for you  Make a plan by creating a document that reflects your wishes    Surrogate Decision Maker  In the event of a medical emergency, which has left you unable to communicate or to make your own decisions, you would need someone to make decisions for you.  It is important to discuss your preferences for medical treatment with this person while you are in good health.     Qualities of a surrogate decision maker:  Willing to take on this role and responsibility  Knows what you want for future medical care  Willing to follow your wishes even if they don't agree with them  Able to make difficult medical decisions under stressful circumstances    Advance Directives  These are legal documents you can create that will guide your healthcare team and decision maker(s) when needed. These documents can be stored in the electronic medical record.    Living Will - a legal document to guide your care if you have a terminal condition or a serious illness and are unable to communicate. States vary by statute in document names/types, but most forms may include one or more of the following:        -  Directions regarding life-prolonging treatments        -  Directions regarding artificially provided nutrition/hydration        -  Choosing a healthcare decision maker        -  Direction regarding organ/tissue  donation    Durable Power of  for Healthcare - this document names an -in-fact to make medical decisions for you, but it may also allow this person to make personal and financial decisions for you. Please seek the advice of an  if you need this type of document.    **Advance Directives are not required and no one may discriminate against you if you do not sign one.    Medical Orders  Many states allow specific forms/orders signed by your physician to record your wishes for medical treatment in your current state of health. This form, signed in personal communication with your physician, addresses resuscitation and other medical interventions that you may or may not want.      For more information or to schedule a time with a [facility] Advance Care Planning Facilitator contact: [facility] or call [phone] and someone will contact you directly.  Fall Prevention in the Home, Adult  Falls can cause injuries and affect people of all ages. There are many simple things that you can do to make your home safe and to help prevent falls.  If you need it, ask for help making these changes.  What actions can I take to prevent falls?  General information  Use good lighting in all rooms. Make sure to:  Replace any light bulbs that burn out.  Turn on lights if it is dark and use night-lights.  Keep items that you use often in easy-to-reach places. Lower the shelves around your home if needed.  Move furniture so that there are clear paths around it.  Do not keep throw rugs or other things on the floor that can make you trip.  If any of your floors are uneven, fix them.  Add color or contrast paint or tape to clearly leandro and help you see:  Grab bars or handrails.  First and last steps of staircases.  Where the edge of each step is.  If you use a ladder or stepladder:  Make sure that it is fully opened. Do not climb a closed ladder.  Make sure the sides of the ladder are locked in  place.  Have someone hold the ladder while you use it.  Know where your pets are as you move through your home.  What can I do in the bathroom?         Keep the floor dry. Clean up any water that is on the floor right away.  Remove soap buildup in the bathtub or shower. Buildup makes bathtubs and showers slippery.  Use non-skid mats or decals on the floor of the bathtub or shower.  Attach bath mats securely with double-sided, non-slip rug tape.  If you need to sit down while you are in the shower, use a non-slip stool.  Install grab bars by the toilet and in the bathtub and shower. Do not use towel bars as grab bars.  What can I do in the bedroom?  Make sure that you have a light by your bed that is easy to reach.  Do not use any sheets or blankets on your bed that hang to the floor.  Have a firm bench or chair with side arms that you can use for support when you get dressed.  What can I do in the kitchen?  Clean up any spills right away.  If you need to reach something above you, use a sturdy step stool that has a grab bar.  Keep electrical cables out of the way.  Do not use floor polish or wax that makes floors slippery.  What can I do with my stairs?  Do not leave anything on the stairs.  Make sure that you have a light switch at the top and the bottom of the stairs. Have them installed if you do not have them.  Make sure that there are handrails on both sides of the stairs. Fix handrails that are broken or loose. Make sure that handrails are as long as the staircases.  Install non-slip stair treads on all stairs in your home if they do not have carpet.  Avoid having throw rugs at the top or bottom of stairs, or secure the rugs with carpet tape to prevent them from moving.  Choose a carpet design that does not hide the edge of steps on the stairs. Make sure that carpet is firmly attached to the stairs. Fix any carpet that is loose or worn.  What can I do on the outside of my home?  Use bright outdoor  lighting.  Repair the edges of walkways and driveways and fix any cracks. Clear paths of anything that can make you trip, such as tools or rocks.  Add color or contrast paint or tape to clearly leandro and help you see high doorway thresholds.  Trim any bushes or trees on the main path into your home.  Check that handrails are securely fastened and in good repair. Both sides of all steps should have handrails.  Install guardrails along the edges of any raised decks or porches.  Have leaves, snow, and ice cleared regularly. Use sand, salt, or ice melt on walkways during winter months if you live where there is ice and snow.  In the garage, clean up any spills right away, including grease or oil spills.  What other actions can I take?  Review your medicines with your health care provider. Some medicines can make you confused or feel dizzy. This can increase your chance of falling.  Wear closed-toe shoes that fit well and support your feet. Wear shoes that have rubber soles and low heels.  Use a cane, walker, scooter, or crutches that help you move around if needed.  Talk with your provider about other ways that you can decrease your risk of falls. This may include seeing a physical therapist to learn to do exercises to improve movement and strength.  Where to find more information  Centers for Disease Control and PreventionROSALES: cdc.gov  [place] on Aging: luis e.nih.gov  [place] on Aging: luis e.nih.gov  Contact a health care provider if:  You are afraid of falling at home.  You feel weak, drowsy, or dizzy at home.  You fall at home.  Get help right away if you:  Lose consciousness or have trouble moving after a fall.  Have a fall that causes a head injury.  These symptoms may be an emergency. Get help right away. Call 911.  Do not wait to see if the symptoms will go away.  Do not drive yourself to the hospital.  This information is not intended to replace advice given to you by your health care  provider. Make sure you discuss any questions you have with your health care provider.  Document Revised: 08/21/2023 Document Reviewed: 08/21/2023  Elsevier Patient Education © 2024 [employer]  Sit-to-Stand Exercise    The sit-to-stand exercise (also known as the chair stand or chair rise exercise) strengthens your lower body and helps you maintain or improve your mobility and independence. The end goal is to do the sit-to-stand exercise without using your hands. This will be easier as you become stronger. You should always talk with your health care provider before starting any exercise program, especially if you have had recent surgery.  Do the exercise exactly as told by your health care provider and adjust it as directed. It is normal to feel mild stretching, pulling, tightness, or discomfort as you do this exercise, but you should stop right away if you feel sudden pain or your pain gets worse. Do not begin doing this exercise until told by your health care provider.  What the sit-to-stand exercise does  The sit-to-stand exercise helps to strengthen the muscles in your thighs and the muscles in the center of your body that give you stability (core muscles). This exercise is especially helpful if:  You have had knee or hip surgery.  You have trouble getting up from a chair, out of a car, or off the toilet due to muscle weakness.  How to do the sit-to-stand exercise  Sit toward the front edge of a sturdy chair without armrests. Your knees should be bent and your feet should be flat on the floor and shoulder-width apart and underneath your hips.  Place your hands lightly on each side of the seat. Keep your back and neck as straight as possible, with your chest slightly forward.  Breathe in slowly. Lean forward and slightly shift your weight to the front of your feet.  Breathe out as you slowly stand up. Try not to support any weight with your hands.  Stand and pause for a full breath in and out.  Breathe in as  you sit down slowly. Tighten your core and abdominal muscles to control your lowering as much as possible. You should lower yourself back to the chair slowly, not just drop back into the seat.  Breathe out slowly.  Do this exercise 10-15 times. If needed, do it fewer times until you build up strength.  Rest for 1 minute, then do another set of 10-15 repetitions.  To change the difficulty of the sit-to-stand exercise  If the exercise is too difficult, use a chair with sturdy armrests, and push off the armrests to help you come to the standing position. You can also use the armrests to help slowly lower yourself back to sitting. As this gets easier, try to use your arms less. You can also place a firm cushion or pillow on the chair to make the surface higher.  If this exercise is too easy, do not use your arms to help raise or lower yourself. You can also wear a weighted vest, use hand weights, increase your repetitions, or try a lower chair.  General tips  You may feel tired when starting an exercise routine. This is normal.  You may have muscle soreness that lasts a few days. This is normal. As you get stronger, you may not feel muscle soreness.  Use smooth, steady movements.  Do not  hold your breath during strength exercises. This can cause unsafe changes in your blood pressure.  Breathe in slowly through your nose, and breathe out slowly through your mouth.  Summary  Strengthening your lower body is an important step to help you move safely and independently.  The sit-to-stand exercise helps strengthen the muscles in your thighs and core.  You should always talk with your health care provider before starting any exercise program, especially if you have had recent surgery.  This information is not intended to replace advice given to you by your health care provider. Make sure you discuss any questions you have with your health care provider.  Document Revised: 04/10/2022 Document Reviewed: 04/10/2022  Delia  Patient Education 2024 Elsevier Inc.    Medicare Wellness  Personal Prevention Plan of Service     Date of Office Visit:    Encounter Provider:  Zahida Osorio MD  Place of Service:  [facility]  Patient Name: Rekha Bear  :  1940    As part of the Medicare Wellness portion of your visit today, we are providing you with this personalized preventive plan of services (PPPS). This plan is based upon recommendations of the [place] Preventive Services Task Force (USPSTF) and the Advisory Committee on Immunization Practices (ACIP).    This lists the preventive care services that should be considered, and provides dates of when you are due. Items listed as completed are up-to-date and do not require any further intervention.    Health Maintenance   Topic Date Due   • DXA SCAN  Never done   • COLORECTAL CANCER SCREENING  2021   • ANNUAL WELLNESS VISIT  10/16/2024   • BMI FOLLOWUP  10/16/2024   • HEMOGLOBIN A1C  2025   • LIPID PANEL  2025   • DIABETIC EYE EXAM  2025   • URINE MICROALBUMIN  10/12/2025   • TDAP/TD VACCINES (6 - Td or Tdap) 10/08/2033   • COVID-19 Vaccine  Completed   • RSV Vaccine - Adults  Completed   • INFLUENZA VACCINE  Completed   • Pneumococcal Vaccine 65+  Completed   • ZOSTER VACCINE  Completed   • MAMMOGRAM  Discontinued       Orders Placed This Encounter   Procedures   • DEXA Bone Density, Axial (Hospital)     Standing Status:   Future     Standing Expiration Date:   2026     Order Specific Question:   Is patient taking or have taken long term Glucocorticoid (steroids)?     Answer:   No     Order Specific Question:   Does the patient have rheumatoid arthritis?     Answer:   No     Order Specific Question:   Does the patient have secondary osteoporosis?     Answer:   No     Order Specific Question:   Reason for Exam:     Answer:   postmenopausal     Order Specific Question:   Does this patient have a diabetic  monitoring/medication delivering device on?     Answer:   No     Order Specific Question:   Release to patient     Answer:   Routine Release [[id number]]   • Ambulatory Referral to Wound Clinic     Referral Priority:   Routine     Referral Type:   Consultation     Referral Reason:   Specialty Services Required     Requested Specialty:   Wound Care     Number of Visits Requested:   1   • Ambulatory Referral For Screening Colonoscopy     Referral Priority:   Routine     Referral Type:   Diagnostic Medical     Referral Reason:   Specialty Services Required     Number of Visits Requested:   1       Return in about 3 months (around 4/13/2025).

## 2025-01-13 NOTE — ASSESSMENT & PLAN NOTE
Lab review indicate elevated vitamin B12 above 2000  To decrease dose to 500 mcg daily from 1000 mcg daily  Orders:    vitamin B-12 (VITAMIN B-12) 500 MCG tablet; Take 1 tablet by mouth Daily.

## 2025-01-13 NOTE — ASSESSMENT & PLAN NOTE
Hypertension is stable and controlled  Continue current treatment regimen.  Dietary sodium restriction.  Weight loss.  Regular aerobic exercise.  Ambulatory blood pressure monitoring.  Blood pressure will be reassessed in 3 months.

## 2025-01-13 NOTE — ASSESSMENT & PLAN NOTE
Lab review indicates elevated TSH improving with normal free T4  To continue on levothyroxine 88 mcg as prescribed  To be reassessed at next regular scheduled appointment

## 2025-01-13 NOTE — ASSESSMENT & PLAN NOTE
Plan:  Continue same medication/s without change.    To repeat fasting lipid panel  Counseled patient on lifestyle modifications to help control hyperlipidemia.   Cholesterol lowering dietary information shared with patient.  Advised patient to exercise for 150 minutes weekly. (30 minute brisk walk, 5 days a week for example)  Weight Loss encouraged    Patient Treatment Goals:   LDL goal less than 70    Followup in 3 months.

## 2025-01-14 ENCOUNTER — TELEPHONE (OUTPATIENT)
Dept: FAMILY MEDICINE CLINIC | Facility: CLINIC | Age: 85
End: 2025-01-14

## 2025-01-14 NOTE — TELEPHONE ENCOUNTER
Caller: KELVIN RAZA - HERNANDEZ    Relationship:     Best call back number: [phone]     What is the best time to reach you: ANYTIME    Who are you requesting to speak with (clinical staff, provider,  specific staff member): CLINICAL    What was the call regarding: [facility] CALLING STATING THAT THEY ARE NEEDING A DR ORDER FOR ALL NEW UPDATED MEDICATIONS. SHE WOULD ALSO LIKE TO GET A COPY OF HER RECENT LABS.

## 2025-01-15 ENCOUNTER — DOCUMENTATION (OUTPATIENT)
Dept: FAMILY MEDICINE CLINIC | Facility: CLINIC | Age: 85
End: 2025-01-15
Payer: MEDICARE

## 2025-01-19 ENCOUNTER — DOCUMENTATION (OUTPATIENT)
Dept: FAMILY MEDICINE CLINIC | Facility: CLINIC | Age: 85
End: 2025-01-19
Payer: MEDICARE

## 2025-01-20 DIAGNOSIS — M15.8 OTHER OSTEOARTHRITIS INVOLVING MULTIPLE JOINTS: ICD-10-CM

## 2025-01-20 RX ORDER — GABAPENTIN 400 MG/1
CAPSULE ORAL
Qty: 90 CAPSULE | Refills: 1 | OUTPATIENT
Start: 2025-01-20

## 2025-01-21 ENCOUNTER — OFFICE VISIT (OUTPATIENT)
Dept: WOUND CARE | Facility: HOSPITAL | Age: 85
End: 2025-01-21
Payer: MEDICARE

## 2025-01-27 ENCOUNTER — HOSPITAL ENCOUNTER (OUTPATIENT)
Dept: GENERAL RADIOLOGY | Facility: HOSPITAL | Age: 85
Discharge: HOME | End: 2025-01-27
Admitting: INTERNAL MEDICINE
Payer: MEDICARE

## 2025-01-27 ENCOUNTER — OFFICE VISIT (OUTPATIENT)
Dept: FAMILY MEDICINE CLINIC | Facility: CLINIC | Age: 85
End: 2025-01-27
Payer: MEDICARE

## 2025-01-27 VITALS
TEMPERATURE: 96.1 F | BODY MASS INDEX: 32 KG/M2 | WEIGHT: 203.9 LBS | DIASTOLIC BLOOD PRESSURE: 80 MMHG | OXYGEN SATURATION: 95 % | SYSTOLIC BLOOD PRESSURE: 106 MMHG | HEIGHT: 67 IN | HEART RATE: 54 BPM | RESPIRATION RATE: 16 BRPM

## 2025-01-27 DIAGNOSIS — M54.50 ACUTE LEFT-SIDED LOW BACK PAIN WITHOUT SCIATICA: Primary | ICD-10-CM

## 2025-01-27 DIAGNOSIS — M15.8 OTHER OSTEOARTHRITIS INVOLVING MULTIPLE JOINTS: ICD-10-CM

## 2025-01-27 DIAGNOSIS — I73.01 RAYNAUD DISEASE WITH GANGRENE: ICD-10-CM

## 2025-01-27 DIAGNOSIS — Z91.81 HISTORY OF FALL: ICD-10-CM

## 2025-01-27 PROCEDURE — 3074F SYST BP LT 130 MM HG: CPT | Performed by: INTERNAL MEDICINE

## 2025-01-27 PROCEDURE — 99214 OFFICE O/P EST MOD 30 MIN: CPT | Performed by: INTERNAL MEDICINE

## 2025-01-27 PROCEDURE — 1126F AMNT PAIN NOTED NONE PRSNT: CPT | Performed by: INTERNAL MEDICINE

## 2025-01-27 PROCEDURE — 96372 THER/PROPH/DIAG INJ SC/IM: CPT | Performed by: INTERNAL MEDICINE

## 2025-01-27 PROCEDURE — 3079F DIAST BP 80-89 MM HG: CPT | Performed by: INTERNAL MEDICINE

## 2025-01-27 PROCEDURE — 72110 X-RAY EXAM L-2 SPINE 4/>VWS: CPT

## 2025-01-27 RX ORDER — METHYLPREDNISOLONE SODIUM SUCCINATE 125 MG/2ML
125 INJECTION INTRAMUSCULAR; INTRAVENOUS ONCE
Status: COMPLETED | OUTPATIENT
Start: 2025-01-27 | End: 2025-01-27

## 2025-01-27 RX ORDER — METHYLPREDNISOLONE SODIUM SUCCINATE 125 MG/2ML
125 INJECTION INTRAMUSCULAR; INTRAVENOUS ONCE
Status: DISCONTINUED | OUTPATIENT
Start: 2025-01-27 | End: 2025-01-27

## 2025-01-27 RX ORDER — OXYBUTYNIN CHLORIDE 10 MG/1
TABLET, EXTENDED RELEASE ORAL
COMMUNITY
Start: 2025-01-20

## 2025-01-27 RX ORDER — METHYLPREDNISOLONE 4 MG/1
TABLET ORAL
Qty: 21 TABLET | Refills: 0 | Status: SHIPPED | OUTPATIENT
Start: 2025-01-27

## 2025-01-27 RX ORDER — LOSARTAN POTASSIUM 50 MG/1
50 TABLET ORAL DAILY
Qty: 30 TABLET | Refills: 0 | Status: SHIPPED | OUTPATIENT
Start: 2025-01-27

## 2025-01-27 RX ORDER — GABAPENTIN 400 MG/1
400 CAPSULE ORAL EVERY 12 HOURS
Qty: 52 CAPSULE | Refills: 0 | Status: SHIPPED | OUTPATIENT
Start: 2025-01-27

## 2025-01-27 RX ADMIN — METHYLPREDNISOLONE SODIUM SUCCINATE 125 MG: 125 INJECTION INTRAMUSCULAR; INTRAVENOUS at 12:05

## 2025-01-27 NOTE — PROGRESS NOTES
"Chief Complaint  Fall (Pt had a fall last Sunday and it's gotten persistently worse, low back pain, some middle back pain, been using diclofenac cream for pain. Has an appointment with ortho later this week, sildenifil was denied is there an alternative //)    Subjective        Rekha Bear presents to [facility]  History of Present Illness  Patient presented to the office today with complaints of bilateral low back pain.  She has sustained a fall on her buttock and back last week Sunday.  Pain was of gradual onset and has persistently worsened over time which prompted her visit today.  Pain is described as a sharp pain located at the lower back and buttock area more on the left than the right and radiates up to the upper back.  No shooting pain down the lower extremity, weakness, numbness or tingling sensation of the LE.  Has tried Tylenol without much relief.    Requesting refill for gabapentin for chronic pain.  And states previously prescribed sildenafil for Raynaud's phenomena was not covered by insurance and we needed tentative.  Patient is tolerable to calcium channel blocker.        Objective   Vital Signs:  /80 (BP Location: Right arm, Patient Position: Sitting, Cuff Size: Adult)   Pulse 54   Temp 96.1 °F (35.6 °C) (Temporal)   Resp 16   Ht 170.2 cm (67.01\")   Wt 92.5 kg (203 lb 14.4 oz)   SpO2 95%   BMI 31.93 kg/m²   Estimated body mass index is 31.93 kg/m² as calculated from the following:    Height as of this encounter: 170.2 cm (67.01\").    Weight as of this encounter: 92.5 kg (203 lb 14.4 oz).            Physical Exam  Constitutional:       Appearance: Normal appearance.   HENT:      Head: Normocephalic and atraumatic.   Musculoskeletal:      Cervical back: Normal.      Thoracic back: Normal.      Lumbar back: Tenderness present. No swelling or deformity. Normal range of motion. Negative right straight leg raise test and negative left straight leg raise " test.      Comments: Tenderness over the lumbosacral region, no swelling or erythema noted   Neurological:      Mental Status: She is alert and oriented to person, place, and time.        Result Review :                Assessment and Plan   Diagnoses and all orders for this visit:    1. Acute left-sided low back pain without sciatica (Primary)  -     methylPREDNISolone (Medrol) 4 MG dose pack; Take as directed on package instructions.  Dispense: 21 tablet; Refill: 0  -     Cancel: XR Spine Lumbar 2 or 3 View  -     Discontinue: methylPREDNISolone sodium succinate (SOLU-Medrol) injection 125 mg  -     XR Spine Lumbar 4+ View  -     methylPREDNISolone sodium succinate (SOLU-Medrol) injection 125 mg    2. History of fall  -     methylPREDNISolone sodium succinate (SOLU-Medrol) injection 125 mg    3. Other osteoarthritis involving multiple joints  -     gabapentin (NEURONTIN) 400 MG capsule; Take 1 capsule by mouth Every 12 (Twelve) Hours.  Dispense: 52 capsule; Refill: 0    4. Raynaud disease with gangrene  -     losartan (Cozaar) 50 MG tablet; Take 1 tablet by mouth Daily.  Dispense: 30 tablet; Refill: 0    Acute low back pain secondary to trauma due to fall.  Will give an IM Solu-Medrol and prescribed Medrol Dosepak for few days.  Not a candidate for NSAID due to CKD.  Will get an x-ray to rule out any fractures.  For her Raynaud's disease, given intolerability to CCB and unaffordable sildenafil, will prescribe ARB [losartan] daily.  Counseled patient to monitor blood pressure daily and keep records for review       Follow Up   No follow-ups on file.  Patient was given instructions and counseling regarding her condition or for health maintenance advice. Please see specific information pulled into the AVS if appropriate.

## 2025-02-06 ENCOUNTER — HOSPITAL ENCOUNTER (INPATIENT)
Facility: HOSPITAL | Age: 85
LOS: 7 days | Discharge: HOME | DRG: 638 | End: 2025-02-13
Attending: EMERGENCY MEDICINE | Admitting: INTERNAL MEDICINE
Payer: MEDICARE

## 2025-02-06 ENCOUNTER — APPOINTMENT (OUTPATIENT)
Dept: GENERAL RADIOLOGY | Facility: HOSPITAL | Age: 85
DRG: 638 | End: 2025-02-06
Payer: MEDICARE

## 2025-02-06 DIAGNOSIS — I48.11 LONGSTANDING PERSISTENT ATRIAL FIBRILLATION: ICD-10-CM

## 2025-02-06 DIAGNOSIS — M54.50 ACUTE LEFT-SIDED LOW BACK PAIN WITHOUT SCIATICA: Primary | ICD-10-CM

## 2025-02-06 DIAGNOSIS — L03.115 CELLULITIS OF RIGHT FOOT: Primary | ICD-10-CM

## 2025-02-06 PROBLEM — L03.119 CELLULITIS IN DIABETIC FOOT: Status: ACTIVE | Noted: 2025-02-06

## 2025-02-06 PROBLEM — E11.628 CELLULITIS IN DIABETIC FOOT: Status: ACTIVE | Noted: 2025-02-06

## 2025-02-06 LAB
ALBUMIN SERPL-MCNC: 3.6 G/DL (ref 3.5–5.2)
ALBUMIN/GLOB SERPL: 1.1 G/DL
ALP SERPL-CCNC: 357 U/L (ref 39–117)
ALT SERPL W P-5'-P-CCNC: 19 U/L (ref 1–33)
ANION GAP SERPL CALCULATED.3IONS-SCNC: 6.3 MMOL/L (ref 5–15)
AST SERPL-CCNC: 13 U/L (ref 1–32)
BASOPHILS # BLD AUTO: 0.02 10*3/MM3 (ref 0–0.2)
BASOPHILS NFR BLD AUTO: 0.2 % (ref 0–1.5)
BILIRUB SERPL-MCNC: 0.5 MG/DL (ref 0–1.2)
BUN SERPL-MCNC: 32 MG/DL (ref 8–23)
BUN/CREAT SERPL: 24.4 (ref 7–25)
CALCIUM SPEC-SCNC: 9.4 MG/DL (ref 8.6–10.5)
CHLORIDE SERPL-SCNC: 98 MMOL/L (ref 98–107)
CO2 SERPL-SCNC: 28.7 MMOL/L (ref 22–29)
CREAT SERPL-MCNC: 1.31 MG/DL (ref 0.57–1)
D-LACTATE SERPL-SCNC: 1.3 MMOL/L (ref 0.5–2)
DEPRECATED RDW RBC AUTO: 47.6 FL (ref 37–54)
EGFRCR SERPLBLD CKD-EPI 2021: 40.8 ML/MIN/1.73
EOSINOPHIL # BLD AUTO: 0.1 10*3/MM3 (ref 0–0.4)
EOSINOPHIL NFR BLD AUTO: 1 % (ref 0.3–6.2)
ERYTHROCYTE [DISTWIDTH] IN BLOOD BY AUTOMATED COUNT: 13.9 % (ref 12.3–15.4)
GLOBULIN UR ELPH-MCNC: 3.3 GM/DL
GLUCOSE SERPL-MCNC: 220 MG/DL (ref 65–99)
HCT VFR BLD AUTO: 42.8 % (ref 34–46.6)
HGB BLD-MCNC: 13.8 G/DL (ref 12–15.9)
IMM GRANULOCYTES # BLD AUTO: 0.05 10*3/MM3 (ref 0–0.05)
IMM GRANULOCYTES NFR BLD AUTO: 0.5 % (ref 0–0.5)
LYMPHOCYTES # BLD AUTO: 2.23 10*3/MM3 (ref 0.7–3.1)
LYMPHOCYTES NFR BLD AUTO: 21.9 % (ref 19.6–45.3)
MCH RBC QN AUTO: 29.6 PG (ref 26.6–33)
MCHC RBC AUTO-ENTMCNC: 32.2 G/DL (ref 31.5–35.7)
MCV RBC AUTO: 91.6 FL (ref 79–97)
MONOCYTES # BLD AUTO: 0.99 10*3/MM3 (ref 0.1–0.9)
MONOCYTES NFR BLD AUTO: 9.7 % (ref 5–12)
NEUTROPHILS NFR BLD AUTO: 6.77 10*3/MM3 (ref 1.7–7)
NEUTROPHILS NFR BLD AUTO: 66.7 % (ref 42.7–76)
PLATELET # BLD AUTO: 260 10*3/MM3 (ref 140–450)
PMV BLD AUTO: 9.2 FL (ref 6–12)
POTASSIUM SERPL-SCNC: 4.7 MMOL/L (ref 3.5–5.2)
PROT SERPL-MCNC: 6.9 G/DL (ref 6–8.5)
RBC # BLD AUTO: 4.67 10*6/MM3 (ref 3.77–5.28)
SODIUM SERPL-SCNC: 133 MMOL/L (ref 136–145)
WBC NRBC COR # BLD AUTO: 10.16 10*3/MM3 (ref 3.4–10.8)

## 2025-02-06 PROCEDURE — 25010000002 PIPERACILLIN SOD-TAZOBACTAM PER 1 G: Performed by: EMERGENCY MEDICINE

## 2025-02-06 PROCEDURE — 93010 ELECTROCARDIOGRAM REPORT: CPT | Performed by: INTERNAL MEDICINE

## 2025-02-06 PROCEDURE — 99285 EMERGENCY DEPT VISIT HI MDM: CPT

## 2025-02-06 PROCEDURE — 80053 COMPREHEN METABOLIC PANEL: CPT | Performed by: EMERGENCY MEDICINE

## 2025-02-06 PROCEDURE — 25810000003 SODIUM CHLORIDE 0.9 % SOLUTION 250 ML FLEX CONT: Performed by: EMERGENCY MEDICINE

## 2025-02-06 PROCEDURE — 85025 COMPLETE CBC W/AUTO DIFF WBC: CPT | Performed by: EMERGENCY MEDICINE

## 2025-02-06 PROCEDURE — 83605 ASSAY OF LACTIC ACID: CPT | Performed by: EMERGENCY MEDICINE

## 2025-02-06 PROCEDURE — 73630 X-RAY EXAM OF FOOT: CPT

## 2025-02-06 PROCEDURE — 87040 BLOOD CULTURE FOR BACTERIA: CPT | Performed by: EMERGENCY MEDICINE

## 2025-02-06 PROCEDURE — 63710000001 INSULIN REGULAR HUMAN PER 5 UNITS: Performed by: EMERGENCY MEDICINE

## 2025-02-06 PROCEDURE — 99285 EMERGENCY DEPT VISIT HI MDM: CPT | Performed by: EMERGENCY MEDICINE

## 2025-02-06 PROCEDURE — 71045 X-RAY EXAM CHEST 1 VIEW: CPT

## 2025-02-06 PROCEDURE — 25010000002 VANCOMYCIN HCL 1.25 G RECONSTITUTED SOLUTION 1 EACH VIAL: Performed by: EMERGENCY MEDICINE

## 2025-02-06 PROCEDURE — 93005 ELECTROCARDIOGRAM TRACING: CPT | Performed by: EMERGENCY MEDICINE

## 2025-02-06 PROCEDURE — 36415 COLL VENOUS BLD VENIPUNCTURE: CPT

## 2025-02-06 PROCEDURE — 25810000003 SODIUM CHLORIDE 0.9 % SOLUTION: Performed by: EMERGENCY MEDICINE

## 2025-02-06 RX ORDER — ACETAMINOPHEN 160 MG/5ML
650 SOLUTION ORAL EVERY 4 HOURS PRN
Status: DISCONTINUED | OUTPATIENT
Start: 2025-02-06 | End: 2025-02-13 | Stop reason: HOSPADM

## 2025-02-06 RX ORDER — SODIUM CHLORIDE 9 MG/ML
100 INJECTION, SOLUTION INTRAVENOUS CONTINUOUS
Status: ACTIVE | OUTPATIENT
Start: 2025-02-06 | End: 2025-02-07

## 2025-02-06 RX ORDER — NITROGLYCERIN 0.4 MG/1
0.4 TABLET SUBLINGUAL
Status: DISCONTINUED | OUTPATIENT
Start: 2025-02-06 | End: 2025-02-13 | Stop reason: HOSPADM

## 2025-02-06 RX ORDER — INSULIN LISPRO 100 [IU]/ML
2-7 INJECTION, SOLUTION INTRAVENOUS; SUBCUTANEOUS
Status: DISCONTINUED | OUTPATIENT
Start: 2025-02-07 | End: 2025-02-13 | Stop reason: HOSPADM

## 2025-02-06 RX ORDER — ONDANSETRON 2 MG/ML
4 INJECTION INTRAMUSCULAR; INTRAVENOUS EVERY 6 HOURS PRN
Status: DISCONTINUED | OUTPATIENT
Start: 2025-02-06 | End: 2025-02-13 | Stop reason: HOSPADM

## 2025-02-06 RX ORDER — SODIUM CHLORIDE 0.9 % (FLUSH) 0.9 %
10 SYRINGE (ML) INJECTION EVERY 12 HOURS SCHEDULED
Status: DISCONTINUED | OUTPATIENT
Start: 2025-02-07 | End: 2025-02-13 | Stop reason: HOSPADM

## 2025-02-06 RX ORDER — POLYETHYLENE GLYCOL 3350 17 G/17G
17 POWDER, FOR SOLUTION ORAL DAILY PRN
Status: DISCONTINUED | OUTPATIENT
Start: 2025-02-06 | End: 2025-02-13 | Stop reason: HOSPADM

## 2025-02-06 RX ORDER — ACETAMINOPHEN 650 MG/1
650 SUPPOSITORY RECTAL EVERY 4 HOURS PRN
Status: DISCONTINUED | OUTPATIENT
Start: 2025-02-06 | End: 2025-02-13 | Stop reason: HOSPADM

## 2025-02-06 RX ORDER — AMOXICILLIN 250 MG
2 CAPSULE ORAL 2 TIMES DAILY PRN
Status: DISCONTINUED | OUTPATIENT
Start: 2025-02-06 | End: 2025-02-13 | Stop reason: HOSPADM

## 2025-02-06 RX ORDER — DEXTROSE MONOHYDRATE 25 G/50ML
25 INJECTION, SOLUTION INTRAVENOUS
Status: DISCONTINUED | OUTPATIENT
Start: 2025-02-06 | End: 2025-02-13 | Stop reason: HOSPADM

## 2025-02-06 RX ORDER — IBUPROFEN 600 MG/1
1 TABLET ORAL
Status: DISCONTINUED | OUTPATIENT
Start: 2025-02-06 | End: 2025-02-13 | Stop reason: HOSPADM

## 2025-02-06 RX ORDER — ACETAMINOPHEN 325 MG/1
650 TABLET ORAL EVERY 4 HOURS PRN
Status: DISCONTINUED | OUTPATIENT
Start: 2025-02-06 | End: 2025-02-13 | Stop reason: HOSPADM

## 2025-02-06 RX ORDER — BISACODYL 5 MG/1
5 TABLET, DELAYED RELEASE ORAL DAILY PRN
Status: DISCONTINUED | OUTPATIENT
Start: 2025-02-06 | End: 2025-02-13 | Stop reason: HOSPADM

## 2025-02-06 RX ORDER — SODIUM CHLORIDE 0.9 % (FLUSH) 0.9 %
10 SYRINGE (ML) INJECTION AS NEEDED
Status: DISCONTINUED | OUTPATIENT
Start: 2025-02-06 | End: 2025-02-13 | Stop reason: HOSPADM

## 2025-02-06 RX ORDER — FAMOTIDINE 20 MG/1
20 TABLET, FILM COATED ORAL 2 TIMES DAILY PRN
Status: DISCONTINUED | OUTPATIENT
Start: 2025-02-06 | End: 2025-02-13 | Stop reason: HOSPADM

## 2025-02-06 RX ORDER — BISACODYL 10 MG
10 SUPPOSITORY, RECTAL RECTAL DAILY PRN
Status: DISCONTINUED | OUTPATIENT
Start: 2025-02-06 | End: 2025-02-13 | Stop reason: HOSPADM

## 2025-02-06 RX ORDER — NICOTINE POLACRILEX 4 MG
15 LOZENGE BUCCAL
Status: DISCONTINUED | OUTPATIENT
Start: 2025-02-06 | End: 2025-02-13 | Stop reason: HOSPADM

## 2025-02-06 RX ORDER — TRAMADOL HYDROCHLORIDE 50 MG/1
50 TABLET ORAL EVERY 6 HOURS PRN
Qty: 28 TABLET | Refills: 0 | Status: ON HOLD | OUTPATIENT
Start: 2025-02-06 | End: 2025-02-13

## 2025-02-06 RX ORDER — SODIUM CHLORIDE 9 MG/ML
40 INJECTION, SOLUTION INTRAVENOUS AS NEEDED
Status: DISCONTINUED | OUTPATIENT
Start: 2025-02-06 | End: 2025-02-13 | Stop reason: HOSPADM

## 2025-02-06 RX ADMIN — INSULIN HUMAN 2 UNITS: 100 INJECTION, SOLUTION PARENTERAL at 18:35

## 2025-02-06 RX ADMIN — PIPERACILLIN AND TAZOBACTAM 3.38 G: 3; .375 INJECTION, POWDER, FOR SOLUTION INTRAVENOUS at 17:53

## 2025-02-06 RX ADMIN — SODIUM CHLORIDE 100 ML/HR: 9 INJECTION, SOLUTION INTRAVENOUS at 18:35

## 2025-02-06 RX ADMIN — VANCOMYCIN HYDROCHLORIDE 1250 MG: 1.25 INJECTION, POWDER, LYOPHILIZED, FOR SOLUTION INTRAVENOUS at 18:22

## 2025-02-06 NOTE — FSED PROVIDER NOTE
"Subjective   History of Present Illness  85yo female pmh significant htn/hyperlipidemia/dm2/hypothyroid/atrial fibrillation/s[place]ana/ckd/cva/Raynauds presents ED c/o 2d hx nontraumatic right foot pain/erythema/tenderness/increased warmth.  ROS neg fever/chills/soa/chest pain/abdominal pain/edema.    History provided by:  Patient  Foot Pain      Review of Systems   Constitutional: Negative.    HENT: Negative.     Eyes: Negative.    Respiratory: Negative.     Cardiovascular: Negative.    Gastrointestinal: Negative.    Musculoskeletal:  Positive for arthralgias.   Skin:  Positive for color change.   All other systems reviewed and are negative.      Past Medical History:   Diagnosis Date    Chilblains     \"Chilblian's\"    CKD (chronic kidney disease)     CVA (cerebral vascular accident)     Depression     Fatty liver     GERD (gastroesophageal reflux disease)     Hyperlipidemia     Hypertension     Incontinence     Osteoarthritis     OA  Marvin THR, LT TKR - hydrocodone prescibed by Dr. 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        Allergies   Allergen Reactions    Shellfish-Derived Products Anaphylaxis    Amlodipine Hives and Other (See Comments)     Heart race    Iodine Rash       Past Surgical History:   Procedure Laterality Date    CATARACT EXTRACTION      CHOLECYSTECTOMY      COLONOSCOPY  2009    COLONOSCOPY N/A 12/20/2016    Procedure: COLONOSCOPY with hot snare polypectomy;  Surgeon: George Pabon MD;  Location: [facility] ENDOSCOPY;  Service:     DENTAL PROCEDURE      FOOT SURGERY      TONSILLECTOMY      TOTAL HIP ARTHROPLASTY Bilateral     OA  Marvin THR, LT TKR - hydrocodone prescibed by Dr. Almaguer    " TOTAL KNEE ARTHROPLASTY Left     OA  Marvin THR, LT TKR - hydrocodone prescibed by Dr. Almaguer       Family History   Problem Relation Age of Onset    Hypertension Mother     Diabetes type II Mother     Hypertension Father     Coronary artery disease Father     Diabetes type II Father     Colon cancer Brother     Skin cancer Brother     Stroke Son     Breast cancer Maternal Aunt     Hypertension Other     Other Other         Lipids  Thyroid       Social History     Socioeconomic History    Marital status:     Number of children: 5   Tobacco Use    Smoking status: Former     Current packs/day: 0.00     Average packs/day: 0.5 packs/day for 14.0 years (7.0 ttl pk-yrs)     Types: Cigarettes     Start date:      Quit date:      Years since quittin.1     Passive exposure: Past    Smokeless tobacco: Never    Tobacco comments:     CAFFEINE USE   Vaping Use    Vaping status: Never Used   Substance and Sexual Activity    Alcohol use: No    Drug use: No    Sexual activity: Not Currently           Objective   Physical Exam  Vitals and nursing note reviewed.   Constitutional:       Appearance: Normal appearance.   HENT:      Head: Normocephalic and atraumatic.      Right Ear: External ear normal.      Left Ear: External ear normal.      Nose: Nose normal.      Mouth/Throat:      Mouth: Mucous membranes are moist.      Pharynx: Oropharynx is clear.   Eyes:      Pupils: Pupils are equal, round, and reactive to light.   Cardiovascular:      Rate and Rhythm: Normal rate. Rhythm irregularly irregular.      Heart sounds: Normal heart sounds. No murmur heard.     No friction rub. No gallop.   Pulmonary:      Effort: Pulmonary effort is normal. No respiratory distress.      Breath sounds: Normal breath sounds. No wheezing, rhonchi or rales.   Abdominal:      General: Abdomen is flat. Bowel sounds are normal. There is no distension.      Palpations: Abdomen is soft.      Tenderness: There is no abdominal tenderness.  There is no guarding or rebound.   Musculoskeletal:      Cervical back: Normal range of motion and neck supple. No rigidity.      Right foot: Normal capillary refill. Tenderness present. No swelling or bony tenderness.        Legs:    Lymphadenopathy:      Cervical: No cervical adenopathy.   Skin:     Findings: Erythema present.   Neurological:      General: No focal deficit present.      Mental Status: She is alert and oriented to person, place, and time.      GCS: GCS eye subscore is 4. GCS verbal subscore is 5. GCS motor subscore is 6.         ECG 12 Lead      Date/Time: 2/6/2025 5:32 PM    Performed by: Wilmar Mendoza MD  Authorized by: Wilmar Mendoza MD  Interpreted by ED physician  Rhythm: atrial fibrillation  Rate: normal  BPM: 78  QRS axis: left  Conduction: left bundle branch block  ST Segments: ST segments normal  T Waves: T waves normal  Other: no other findings  Clinical impression: abnormal ECG and dysrhythmia - atrial               ED Course  ED Course as of 02/06/25 1820   Thu Feb 06, 2025   18[facility] paged for admit [SD]   1818 D/w Dr. Mendoza, [facility] hospitalist accepting. Pt stable transport. [SD]      ED Course User Index  [SD] Wilmar Mendoza MD      Labs Reviewed   COMPREHENSIVE METABOLIC PANEL - Abnormal; Notable for the following components:       Result Value    Glucose 220 (*)     BUN 32 (*)     Creatinine 1.31 (*)     Sodium 133 (*)     Alkaline Phosphatase 357 (*)     eGFR 40.8 (*)     All other components within normal limits    Narrative:     GFR Categories in Chronic Kidney Disease (CKD)      GFR Category          GFR (mL/min/1.73)    Interpretation  G1                     90 or greater         Normal or high (1)  G2                      60-89                Mild decrease (1)  G3a                   45-59                Mild to moderate decrease  G3b                   30-44                Moderate to severe decrease  G4                    15-29                Severe decrease  G5                     14 or less           Kidney failure          (1)In the absence of evidence of kidney disease, neither GFR category G1 or G2 fulfill the criteria for CKD.    eGFR calculation 2021 CKD-EPI creatinine equation, which does not include race as a factor   CBC WITH AUTO DIFFERENTIAL - Abnormal; Notable for the following components:    Monocytes, Absolute 0.99 (*)     All other components within normal limits   LACTIC ACID, PLASMA - Normal   BLOOD CULTURE   BLOOD CULTURE   MRSA SCREEN, PCR   CBC AND DIFFERENTIAL    Narrative:     The following orders were created for panel order CBC & Differential.  Procedure                               Abnormality         Status                     ---------                               -----------         ------                     CBC Auto Differential[[id number]]        Abnormal            Final result                 Please view results for these tests on the individual orders.     XR Foot 3+ View Right    Result Date: 2/6/2025  Narrative: XR FOOT 3+ VW RIGHT-  Mid and forefoot  FINDINGS: No soft tissue gas nor radiopaque foreign body is demonstrated. Benign periosteal thickening involving the fourth metatarsal. No cortical bone destruction typical for osteomyelitis. There is moderate hind mid and forefoot joint degeneration seen. There is bone hypertrophy at the Achilles insertion and an early calcaneal spur. The remainder is unremarkable.  This report was finalized on 2/6/2025 5:43 PM by Dr. Dino Page M.D on Workstation: [id number]      XR Chest 1 View    Result Date: 2/6/2025  Narrative: XR CHEST 1 VW-  Clinical: Cellulitis right foot  COMPARISON examination 10/12/2024  FINDINGS: The cardiomediastinal silhouette is stable. No acute airspace disease or vascular congestion has developed.  CONCLUSION: Stable chest.  This report was finalized on 2/6/2025 5:42 PM by Dr. Dino Page M.D on Workstation: [id number]      XR Spine Lumbar 4+ View    Result Date:  1/27/2025  Narrative: XR SPINE LUMBAR COMPLETE 4+VW-  CLINICAL INFORMATION: Low back pain.  FINDINGS: There is multilevel moderate to substantial lower thoracic and lumbar disc degeneration. There is preservation of the L1-2 disc. The L2-3 interspace is affected to the greatest degree. Mid and lower lumbar facet hypertrophy seen. No compression deformity, spondylolysis or spondylolisthesis seen. There is 13 degrees of lumbar scoliosis convexity towards the left.  CONCLUSION: Degenerative change as described above.  This report was finalized on 1/27/2025 3:52 PM by Dr. Dino Page M.D on Workstation: [id number]                                          Medical Decision Making  Problems Addressed:  Cellulitis of right foot: complicated acute illness or injury  Longstanding persistent atrial fibrillation: complicated acute illness or injury    Amount and/or Complexity of Data Reviewed  Labs: ordered.  Radiology: ordered.  ECG/medicine tests: ordered and independent interpretation performed.    Risk  OTC drugs.  Prescription drug management.  Decision regarding hospitalization.        Final diagnoses:   Cellulitis of right foot   Longstanding persistent atrial fibrillation       ED Disposition  ED Disposition       ED Disposition   Decision to Admit    Condition   --    Comment   Level of Care: Telemetry [5]   Diagnosis: Cellulitis in diabetic foot [599923]   Admitting Physician: CRISTINE KAMARA [[id number]]   Attending Physician: CRISTINE KAMARA [[id number]]   Certification: I Certify That Inpatient Hospital Services Are Medically Necessary For Greater Than 2 Midnights                 No follow-up provider specified.       Medication List      No changes were made to your prescriptions during this visit.

## 2025-02-06 NOTE — ED NOTES
PT INFORMED THAT VISIT TODAY WILL BE TRANSITIONED FROM URGENT CARE TO EMERGENCY ROOM CARE AND BILLING. ER ACKNOWLEDGEMENT FORM SIGNED AND ALL QUESTIONS ANSWERED.

## 2025-02-07 ENCOUNTER — DOCUMENTATION (OUTPATIENT)
Dept: FAMILY MEDICINE CLINIC | Facility: CLINIC | Age: 85
End: 2025-02-07
Payer: MEDICARE

## 2025-02-07 LAB
ALBUMIN SERPL-MCNC: 3 G/DL (ref 3.5–5.2)
ALBUMIN/GLOB SERPL: 1.1 G/DL
ALP SERPL-CCNC: 296 U/L (ref 39–117)
ALT SERPL W P-5'-P-CCNC: 17 U/L (ref 1–33)
ANION GAP SERPL CALCULATED.3IONS-SCNC: 4.5 MMOL/L (ref 5–15)
ANION GAP SERPL CALCULATED.3IONS-SCNC: 5 MMOL/L (ref 5–15)
AST SERPL-CCNC: 14 U/L (ref 1–32)
BILIRUB SERPL-MCNC: 0.4 MG/DL (ref 0–1.2)
BUN SERPL-MCNC: 22 MG/DL (ref 8–23)
BUN SERPL-MCNC: 24 MG/DL (ref 8–23)
BUN/CREAT SERPL: 15.9 (ref 7–25)
BUN/CREAT SERPL: 16.9 (ref 7–25)
CALCIUM SPEC-SCNC: 8.6 MG/DL (ref 8.6–10.5)
CALCIUM SPEC-SCNC: 9.3 MG/DL (ref 8.6–10.5)
CHLORIDE SERPL-SCNC: 106 MMOL/L (ref 98–107)
CHLORIDE SERPL-SCNC: 106 MMOL/L (ref 98–107)
CO2 SERPL-SCNC: 27 MMOL/L (ref 22–29)
CO2 SERPL-SCNC: 27.5 MMOL/L (ref 22–29)
CREAT SERPL-MCNC: 1.38 MG/DL (ref 0.57–1)
CREAT SERPL-MCNC: 1.42 MG/DL (ref 0.57–1)
CRP SERPL-MCNC: 2.9 MG/DL (ref 0–0.5)
DEPRECATED RDW RBC AUTO: 44.5 FL (ref 37–54)
EGFRCR SERPLBLD CKD-EPI 2021: 37 ML/MIN/1.73
EGFRCR SERPLBLD CKD-EPI 2021: 38.3 ML/MIN/1.73
ERYTHROCYTE [DISTWIDTH] IN BLOOD BY AUTOMATED COUNT: 13.3 % (ref 12.3–15.4)
ERYTHROCYTE [SEDIMENTATION RATE] IN BLOOD: 12 MM/HR (ref 0–30)
GLOBULIN UR ELPH-MCNC: 2.7 GM/DL
GLUCOSE BLDC GLUCOMTR-MCNC: 171 MG/DL (ref 70–130)
GLUCOSE BLDC GLUCOMTR-MCNC: 184 MG/DL (ref 70–130)
GLUCOSE BLDC GLUCOMTR-MCNC: 198 MG/DL (ref 70–130)
GLUCOSE BLDC GLUCOMTR-MCNC: 317 MG/DL (ref 70–130)
GLUCOSE SERPL-MCNC: 193 MG/DL (ref 65–99)
GLUCOSE SERPL-MCNC: 215 MG/DL (ref 65–99)
HBA1C MFR BLD: 9 % (ref 4.8–5.6)
HCT VFR BLD AUTO: 38.7 % (ref 34–46.6)
HGB BLD-MCNC: 12.9 G/DL (ref 12–15.9)
MCH RBC QN AUTO: 30.4 PG (ref 26.6–33)
MCHC RBC AUTO-ENTMCNC: 33.3 G/DL (ref 31.5–35.7)
MCV RBC AUTO: 91.3 FL (ref 79–97)
PLATELET # BLD AUTO: 222 10*3/MM3 (ref 140–450)
PMV BLD AUTO: 8.8 FL (ref 6–12)
POTASSIUM SERPL-SCNC: 4.7 MMOL/L (ref 3.5–5.2)
POTASSIUM SERPL-SCNC: 5.8 MMOL/L (ref 3.5–5.2)
PROT SERPL-MCNC: 5.7 G/DL (ref 6–8.5)
QT INTERVAL: 400 MS
QTC INTERVAL: 456 MS
RBC # BLD AUTO: 4.24 10*6/MM3 (ref 3.77–5.28)
SODIUM SERPL-SCNC: 138 MMOL/L (ref 136–145)
SODIUM SERPL-SCNC: 138 MMOL/L (ref 136–145)
VANCOMYCIN SERPL-MCNC: 12 MCG/ML (ref 5–40)
WBC NRBC COR # BLD AUTO: 9.91 10*3/MM3 (ref 3.4–10.8)

## 2025-02-07 PROCEDURE — 25010000002 VANCOMYCIN 1 G RECONSTITUTED SOLUTION 1 EACH VIAL: Performed by: INTERNAL MEDICINE

## 2025-02-07 PROCEDURE — 25010000002 PIPERACILLIN SOD-TAZOBACTAM PER 1 G: Performed by: NURSE PRACTITIONER

## 2025-02-07 PROCEDURE — 85027 COMPLETE CBC AUTOMATED: CPT | Performed by: NURSE PRACTITIONER

## 2025-02-07 PROCEDURE — 63710000001 INSULIN GLARGINE PER 5 UNITS: Performed by: INTERNAL MEDICINE

## 2025-02-07 PROCEDURE — 25810000003 SODIUM CHLORIDE 0.9 % SOLUTION 250 ML FLEX CONT: Performed by: INTERNAL MEDICINE

## 2025-02-07 PROCEDURE — 85652 RBC SED RATE AUTOMATED: CPT | Performed by: NURSE PRACTITIONER

## 2025-02-07 PROCEDURE — 80202 ASSAY OF VANCOMYCIN: CPT | Performed by: NURSE PRACTITIONER

## 2025-02-07 PROCEDURE — 97162 PT EVAL MOD COMPLEX 30 MIN: CPT

## 2025-02-07 PROCEDURE — 82948 REAGENT STRIP/BLOOD GLUCOSE: CPT

## 2025-02-07 PROCEDURE — 83036 HEMOGLOBIN GLYCOSYLATED A1C: CPT | Performed by: NURSE PRACTITIONER

## 2025-02-07 PROCEDURE — 80053 COMPREHEN METABOLIC PANEL: CPT | Performed by: NURSE PRACTITIONER

## 2025-02-07 PROCEDURE — 97530 THERAPEUTIC ACTIVITIES: CPT

## 2025-02-07 PROCEDURE — 63710000001 INSULIN LISPRO (HUMAN) PER 5 UNITS: Performed by: NURSE PRACTITIONER

## 2025-02-07 PROCEDURE — 97166 OT EVAL MOD COMPLEX 45 MIN: CPT

## 2025-02-07 PROCEDURE — 86140 C-REACTIVE PROTEIN: CPT | Performed by: NURSE PRACTITIONER

## 2025-02-07 PROCEDURE — 97110 THERAPEUTIC EXERCISES: CPT

## 2025-02-07 RX ORDER — METOPROLOL SUCCINATE 25 MG/1
25 TABLET, EXTENDED RELEASE ORAL
Status: DISCONTINUED | OUTPATIENT
Start: 2025-02-07 | End: 2025-02-13 | Stop reason: HOSPADM

## 2025-02-07 RX ORDER — VENLAFAXINE HYDROCHLORIDE 75 MG/1
150 CAPSULE, EXTENDED RELEASE ORAL DAILY
Status: DISCONTINUED | OUTPATIENT
Start: 2025-02-07 | End: 2025-02-13 | Stop reason: HOSPADM

## 2025-02-07 RX ORDER — BUPROPION HYDROCHLORIDE 150 MG/1
150 TABLET ORAL EVERY MORNING
Status: DISCONTINUED | OUTPATIENT
Start: 2025-02-08 | End: 2025-02-13 | Stop reason: HOSPADM

## 2025-02-07 RX ORDER — LOSARTAN POTASSIUM 50 MG/1
50 TABLET ORAL DAILY
Status: DISCONTINUED | OUTPATIENT
Start: 2025-02-08 | End: 2025-02-13 | Stop reason: HOSPADM

## 2025-02-07 RX ORDER — ATORVASTATIN CALCIUM 20 MG/1
40 TABLET, FILM COATED ORAL NIGHTLY
Status: DISCONTINUED | OUTPATIENT
Start: 2025-02-07 | End: 2025-02-13 | Stop reason: HOSPADM

## 2025-02-07 RX ORDER — LANCETS 30 GAUGE
EACH MISCELLANEOUS
Qty: 100 EACH | Refills: 12 | Status: SHIPPED | OUTPATIENT
Start: 2025-02-07 | End: 2025-02-18

## 2025-02-07 RX ORDER — LEVOTHYROXINE SODIUM 88 UG/1
88 TABLET ORAL DAILY
Status: DISCONTINUED | OUTPATIENT
Start: 2025-02-07 | End: 2025-02-13 | Stop reason: HOSPADM

## 2025-02-07 RX ORDER — DONEPEZIL HYDROCHLORIDE 10 MG/1
10 TABLET, FILM COATED ORAL NIGHTLY
Status: DISCONTINUED | OUTPATIENT
Start: 2025-02-07 | End: 2025-02-13 | Stop reason: HOSPADM

## 2025-02-07 RX ORDER — GABAPENTIN 400 MG/1
400 CAPSULE ORAL EVERY 12 HOURS
Status: DISCONTINUED | OUTPATIENT
Start: 2025-02-07 | End: 2025-02-13 | Stop reason: HOSPADM

## 2025-02-07 RX ORDER — OXYBUTYNIN CHLORIDE 10 MG/1
10 TABLET, EXTENDED RELEASE ORAL DAILY
Status: DISCONTINUED | OUTPATIENT
Start: 2025-02-07 | End: 2025-02-13 | Stop reason: HOSPADM

## 2025-02-07 RX ORDER — BLOOD-GLUCOSE METER
KIT MISCELLANEOUS
Qty: 1 EACH | Refills: 0 | Status: SHIPPED | OUTPATIENT
Start: 2025-02-07 | End: 2025-02-18

## 2025-02-07 RX ADMIN — INSULIN LISPRO 2 UNITS: 100 INJECTION, SOLUTION INTRAVENOUS; SUBCUTANEOUS at 21:14

## 2025-02-07 RX ADMIN — RIVAROXABAN 15 MG: 15 TABLET, FILM COATED ORAL at 17:38

## 2025-02-07 RX ADMIN — OXYBUTYNIN CHLORIDE 10 MG: 10 TABLET, EXTENDED RELEASE ORAL at 10:23

## 2025-02-07 RX ADMIN — INSULIN LISPRO 2 UNITS: 100 INJECTION, SOLUTION INTRAVENOUS; SUBCUTANEOUS at 12:40

## 2025-02-07 RX ADMIN — PIPERACILLIN AND TAZOBACTAM 3.38 G: 3; .375 INJECTION, POWDER, FOR SOLUTION INTRAVENOUS at 17:38

## 2025-02-07 RX ADMIN — VENLAFAXINE HYDROCHLORIDE 150 MG: 75 CAPSULE, EXTENDED RELEASE ORAL at 10:23

## 2025-02-07 RX ADMIN — GABAPENTIN 400 MG: 400 CAPSULE ORAL at 15:53

## 2025-02-07 RX ADMIN — INSULIN LISPRO 5 UNITS: 100 INJECTION, SOLUTION INTRAVENOUS; SUBCUTANEOUS at 10:23

## 2025-02-07 RX ADMIN — PIPERACILLIN AND TAZOBACTAM 3.38 G: 3; .375 INJECTION, POWDER, FOR SOLUTION INTRAVENOUS at 10:24

## 2025-02-07 RX ADMIN — INSULIN GLARGINE 5 UNITS: 100 INJECTION, SOLUTION SUBCUTANEOUS at 21:14

## 2025-02-07 RX ADMIN — INSULIN LISPRO 2 UNITS: 100 INJECTION, SOLUTION INTRAVENOUS; SUBCUTANEOUS at 17:38

## 2025-02-07 RX ADMIN — LEVOTHYROXINE SODIUM 88 MCG: 88 TABLET ORAL at 10:23

## 2025-02-07 RX ADMIN — PIPERACILLIN AND TAZOBACTAM 3.38 G: 3; .375 INJECTION, POWDER, FOR SOLUTION INTRAVENOUS at 00:57

## 2025-02-07 RX ADMIN — Medication 10 ML: at 10:24

## 2025-02-07 RX ADMIN — Medication 10 ML: at 21:16

## 2025-02-07 RX ADMIN — Medication 10 ML: at 00:45

## 2025-02-07 RX ADMIN — DONEPEZIL HYDROCHLORIDE 10 MG: 10 TABLET, FILM COATED ORAL at 21:16

## 2025-02-07 RX ADMIN — VANCOMYCIN HYDROCHLORIDE 1000 MG: 1 INJECTION, POWDER, LYOPHILIZED, FOR SOLUTION INTRAVENOUS at 15:17

## 2025-02-07 RX ADMIN — METOPROLOL SUCCINATE 25 MG: 25 TABLET, EXTENDED RELEASE ORAL at 10:23

## 2025-02-07 RX ADMIN — ATORVASTATIN CALCIUM 40 MG: 20 TABLET, FILM COATED ORAL at 21:16

## 2025-02-07 NOTE — THERAPY EVALUATION
"Patient Name: Rekha Bear  : 1940    MRN: [id number]                              Today's Date: 2025       Admit Date: 2025    Visit Dx:     ICD-10-CM ICD-9-CM   1. Cellulitis of right foot  L03.115 682.7   2. 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    Community acquired pneumonia of left lower lobe of lung    Hyponatremia    Pneumonia due to COVID-19 virus    Major depressive disorder, recurrent, mild    Acute respiratory failure with hypoxia    Supraventricular tachycardia    PVC (premature ventricular contraction)    Depressive disorder    Gastroesophageal reflux disease    Midline cystocele    Rectocele    BMI 29.0-29.9,adult    Acute left PCA stroke    B12 deficiency    Bradycardia    Premature atrial contractions    Stroke    SHEY (obstructive sleep apnea)    Circadian rhythm disorder    Cerebrovascular accident (CVA) due to embolism of left posterior cerebral artery    Anxiety    Dysphagia as late effect of stroke    Atrial fibrillation, persistent    Acute UTI    Closed left hip fracture    Periprosthetic fracture around internal prosthetic left hip joint    Naz-prosthetic fracture around prosthetic hip    History of CVA (cerebrovascular accident)    Cellulitis in diabetic foot     Past Medical History:   Diagnosis Date    Chilblains     \"Chilblian's\"    CKD (chronic kidney disease)     CVA (cerebral vascular accident)     Depression     Fatty liver     GERD (gastroesophageal reflux disease)     Hyperlipidemia     Hypertension     Incontinence     " "Osteoarthritis     OA  Marvin THR, LT TKR - hydrocodone prescibed by Dr. 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 Almaguer    TOTAL KNEE ARTHROPLASTY Left     OA  Marvin THR, LT TKR - hydrocodone prescibed by Dr. Almaguer      General Information       Row Name 02/07/25 1237          OT Time and Intention    Document Type evaluation  -     Mode of Treatment occupational therapy;individual therapy  -       Row Name 02/07/25 1237          General Information    Prior Level of Function independent:;ADL's  -       Row Name 02/07/25 1237          Living Environment    People in Home facility resident  -       Row Name 02/07/25 1237          Home Main Entrance    Number of Stairs, Main Entrance none  -JR     Stair Railings, Main Entrance none  -       Row Name 02/07/25 1237          Cognition    Orientation Status (Cognition) oriented x 4  -       Row Name 02/07/25 1237          Safety Issues/Impairments Affecting Functional Mobility    Comment, Safety Issues/Impairments (Mobility) gait belt and non skid socks.  -               User Key  (r) = Recorded By, (t) = Taken By, (c) = Cosigned By      Initials Name Provider Type    Sanjeev Guerrero, OT Occupational Therapist              "        Mobility/ADL's       Row Name 02/07/25 1238          Bed Mobility    Bed Mobility supine-sit;sit-supine  -JR     Supine-Sit [place] (Bed Mobility) contact guard  -JR     Sit-Supine [place] (Bed Mobility) contact guard  -JR       Row Name 02/07/25 1238          Transfers    Transfers sit-stand transfer  -JR       Row Name 02/07/25 1238          Sit-Stand Transfer    Sit-Stand [place] (Transfers) contact guard  -JR               User Key  (r) = Recorded By, (t) = Taken By, (c) = Cosigned By      Initials Name Provider Type    Sanjeev Guerrero OT Occupational Therapist                   Obj/Interventions       Row Name 02/07/25 1239          Sensory Assessment (Somatosensory)    Sensory Assessment (Somatosensory) sensation intact  -       Row Name 02/07/25 1239          Vision Assessment/Intervention    Visual Impairment/Limitations WFL  -JR       Row Name 02/07/25 1239          Range of Motion Comprehensive    General Range of Motion bilateral upper extremity ROM WFL  -JR     Comment, General Range of Motion BUE WFL  -JR       Row Name 02/07/25 1239          Strength Comprehensive (MMT)    General Manual Muscle Testing (MMT) Assessment upper extremity strength deficits identified  -JR     Comment, General Manual Muscle Testing (MMT) Assessment BUE 3+/5  -JR       Row Name 02/07/25 1239          Balance    Balance Assessment sitting static balance;sitting dynamic balance;standing static balance;standing dynamic balance  -JR     Static Sitting Balance standby assist  -JR     Dynamic Sitting Balance standby assist  -JR     Position, Sitting Balance sitting edge of bed  -JR     Static Standing Balance contact guard  -JR     Dynamic Standing Balance contact guard  -JR               User Key  (r) = Recorded By, (t) = Taken By, (c) = Cosigned By      Initials Name Provider Type    Sanjeev Guerrero OT Occupational Therapist                   Goals/Plan       Row Name 02/07/25 1245          Bed Mobility  Goal 1 (OT)    Activity/Assistive Device (Bed Mobility Goal 1, OT) bed mobility activities, all  -JR     [place] Level/Cues Needed (Bed Mobility Goal 1, OT) modified independence  -JR     Time Frame (Bed Mobility Goal 1, OT) 2 weeks  -JR     Progress/Outcomes (Bed Mobility Goal 1, OT) new [facility]  -       Row Name 02/07/25 1245          Transfer Goal 1 (OT)    Activity/Assistive Device (Transfer Goal 1, OT) transfers, all  -JR     [place] Level/Cues Needed (Transfer Goal 1, OT) modified independence  -JR     Time Frame (Transfer Goal 1, OT) 2 weeks  -JR     Progress/Outcome (Transfer Goal 1, OT) new goal  -       Row Name 02/07/25 1245          Bathing Goal 1 (OT)    Activity/Device (Bathing Goal 1, OT) bathing skills, all  -JR     [place] Level/Cues Needed (Bathing Goal 1, OT) modified independence  -JR     Time Frame (Bathing Goal 1, OT) 2 weeks  -JR     Progress/Outcomes (Bathing Goal 1, OT) new goal  -[facility] Name 02/07/25 1245          Dressing Goal 1 (OT)    Activity/Device (Dressing Goal 1, OT) dressing skills, all  -JR     [place]/Cues Needed (Dressing Goal 1, OT) modified independence  -JR     Time Frame (Dressing Goal 1, OT) 2 weeks  -JR     Progress/Outcome (Dressing Goal 1, OT) new [facility]  -[facility] Name 02/07/25 1245          Toileting Goal 1 (OT)    Activity/Device (Toileting Goal 1, OT) toileting skills, all  -JR     [place] Level/Cues Needed (Toileting Goal 1, OT) modified independence  -JR     Time Frame (Toileting Goal 1, OT) 2 weeks  -JR     Progress/Outcome (Toileting Goal 1, OT) new goal  -       Row Name 02/07/25 1245          Grooming Goal 1 (OT)    [place] (Grooming Goal 1, OT) modified independence  -JR     Time Frame (Grooming Goal 1, OT) 2 weeks  -JR     Progress/Outcome (Grooming Goal 1, OT) new [facility]  -[facility] Name 02/07/25 1245          Therapy Assessment/Plan (OT)    Planned Therapy Interventions (OT) activity tolerance training;adaptive  equipment training;BADL retraining;neuromuscular control/coordination retraining;functional balance retraining;occupation/activity based interventions;passive ROM/stretching;transfer/mobility retraining;strengthening exercise;ROM/therapeutic exercise  -JR               User Key  (r) = Recorded By, (t) = Taken By, (c) = Cosigned By      Initials Name Provider Type    Sanjeev Guerrero, MILKA Occupational Therapist                   Clinical Impression       Row Name 02/07/25 1240          Pain Assessment    Pretreatment Pain Rating 4/10  -JR     Posttreatment Pain Rating 4/10  -JR     Pain Location foot  -JR     Pain Side/Orientation right  -JR     Pain Management Interventions exercise or physical activity utilized  -     Response to Pain Interventions activity participation with tolerable pain  -JR       Row Name 02/07/25 1240          Plan of Care Review    Plan of Care Reviewed With patient  -JR     Progress improving  -JR     Outcome Evaluation 84 y.o. female admitted to [facility] for Right foot swelling.  Post work up reveals cellulitis of foot.  At baseline, patient lives at [facility] and performs ADS with [place] and functional mobility (Rw).  Patient presents to OT with decreased strength, activity tolerance, coordination and balance.  A&Ox4, BUE WFL, 3+/5.  Patient resting in bed upon arrival. Patient rated pain in right foot 4/10.  Patient transitioned to EOB with CGA, STS from EOB with Cga and performed side stepping toward HOB with CGA and no AD.  Patient declined any more mobility due to Right foot pain.  Patient returned to bed with CGA.  OT would be beneficial to address underlying physical deficits and increase ADLs.  Anticipate patient d/c back to [facility] with  assistance.  -JR       Row Name 02/07/25 1240          Therapy Assessment/Plan (OT)    Rehab Potential (OT) good  -JR     Criteria for Skilled Therapeutic Interventions Met (OT) yes;skilled treatment is necessary  -JR     Therapy Frequency (OT) 5 times/wk   -       Row Name 02/07/25 1240          Therapy Plan Review/Discharge Plan (OT)    Anticipated Discharge Disposition (OT) home with home health  -       Row Name 02/07/25 1240          Vital Signs    O2 Delivery Pre Treatment room air  -JR     O2 Delivery Intra Treatment room air  -JR     O2 Delivery Post Treatment room air  -JR     Pre Patient Position Supine  -JR     Intra Patient Position Standing  -JR     Post Patient Position Supine  -JR       Row Name 02/07/25 1240          Positioning and Restraints    Pre-Treatment Position in bed  -JR     Post Treatment Position bed  -JR     In Bed call light within reach;notified nsg;encouraged to call for assist;exit alarm on  -JR               User Key  (r) = Recorded By, (t) = Taken By, (c) = Cosigned By      Initials Name Provider Type    Sanjeev Guerrero OT Occupational Therapist                   Outcome Measures       Row Name 02/07/25 1246          How much help from another is currently needed...    Putting on and taking off regular lower body clothing? 2  -JR     Bathing (including washing, rinsing, and drying) 2  -JR     Toileting (which includes using toilet bed pan or urinal) 3  -JR     Putting on and taking off regular upper body clothing 3  -JR     Taking care of personal grooming (such as brushing teeth) 3  -JR     Eating meals 3  -JR     AM-PAC 6 Clicks Score (OT) 16  -       Row Name 02/07/25 1246          Modified Daniel Scale    Modified [place] Scale 3 - Moderate disability.  Requiring some help, but able to walk without assistance.  -       Row Name 02/07/25 1246          Functional Assessment    Outcome Measure Options AM-PAC 6 Clicks Daily Activity (OT);Modified Daniel  -               User Key  (r) = Recorded By, (t) = Taken By, (c) = Cosigned By      Initials Name Provider Type    Sanjeev Guerrero OT Occupational Therapist                    Occupational Therapy Education       Title: PT OT SLP Therapies (Done)       Topic: Occupational  Therapy (Done)       Point: ADL training (Done)       Description:   Instruct learner(s) on proper safety adaptation and remediation techniques during self care or transfers.   Instruct in proper use of assistive devices.                  Learning Progress Summary            Patient KarinaravenDANA VU by  at 2/7/2025 1247    Comment: Role of OT                      Point: Home exercise program (Done)       Description:   Instruct learner(s) on appropriate technique for monitoring, assisting and/or progressing therapeutic exercises/activities.                  Learning Progress Summary            Patient DANA Ortiz VU by  at 2/7/2025 1247    Comment: Role of OT                      Point: Precautions (Done)       Description:   Instruct learner(s) on prescribed precautions during self-care and functional transfers.                  Learning Progress Summary            Patient DANA Ortiz VU by  at 2/7/2025 1247    Comment: Role of OT                      Point: Body mechanics (Done)       Description:   Instruct learner(s) on proper positioning and spine alignment during self-care, functional mobility activities and/or exercises.                  Learning Progress Summary            Patient DANA Ortiz VU by  at 2/7/2025 1245    Comment: Role of OT                                      User Key       Initials Effective Dates Name Provider Type Discipline     07/24/24 -  Sanjeev Tovar OT Occupational Therapist OT                  OT Recommendation and Plan  Planned Therapy Interventions (OT): activity tolerance training, adaptive equipment training, BADL retraining, neuromuscular control/coordination retraining, functional balance retraining, occupation/activity based interventions, passive ROM/stretching, transfer/mobility retraining, strengthening exercise, ROM/therapeutic exercise  Therapy Frequency (OT): 5 times/wk  Plan of Care Review  Plan of Care Reviewed With: patient  Progress: improving  Outcome Evaluation: 84  y.o. female admitted to [facility] for Right foot swelling.  Post work up reveals cellulitis of foot.  At baseline, patient lives at [facility] and performs ADS with [place] and functional mobility (Rw).  Patient presents to OT with decreased strength, activity tolerance, coordination and balance.  A&Ox4, BUE WFL, 3+/5.  Patient resting in bed upon arrival. Patient rated pain in right foot 4/10.  Patient transitioned to EOB with CGA, STS from EOB with Cga and performed side stepping toward HOB with CGA and no AD.  Patient declined any more mobility due to Right foot pain.  Patient returned to bed with CGA.  OT would be beneficial to address underlying physical deficits and increase ADLs.  Anticipate patient d/c back to [facility] with  assistance.     Time Calculation:   Evaluation Complexity (OT)  Review Occupational Profile/Medical/Therapy History Complexity: expanded/moderate complexity  Assessment, Occupational Performance/Identification of Deficit Complexity: 3-5 performance deficits  Clinical Decision Making Complexity (OT): detailed assessment/moderate complexity  Overall Complexity of Evaluation (OT): moderate complexity     Time Calculation- OT       Row Name 02/07/25 1247             Time Calculation- OT    OT Start Time 0843  -JR      OT Stop Time 0907  -JR      OT Time Calculation (min) 24 min  -JR      Total Timed Code Minutes- OT 14 minute(s)  -JR      OT Received On 02/07/25  -JR      OT - Next Appointment 02/10/25  -      OT Goal Re-Cert Due Date 02/21/25  -JR         Timed Charges    19573 - OT Therapeutic Activity Minutes 14  -JR         Untimed Charges    OT Eval/Re-eval Minutes 10  -JR         Total Minutes    Timed Charges Total Minutes 14  -JR      Untimed Charges Total Minutes 10  -JR       Total Minutes 24  -JR                User Key  (r) = Recorded By, (t) = Taken By, (c) = Cosigned By      Initials Name Provider Type    Sanjeev Guerrero, OT Occupational Therapist                  Therapy Charges for  Today       Code Description Service Date Service Provider Modifiers Qty    47937801768 HC OT THERAPEUTIC ACT EA 15 MIN 2/7/2025 Sanjeev Tovar OT GO 1    53174208230 HC OT EVAL MOD COMPLEXITY 3 2/7/2025 Sanjeev Tovar OT GO 1                 Sanjeev Tovar OT  2/7/2025

## 2025-02-07 NOTE — H&P
Patient Name:  Rekha Bear  YOB: 1940  MRN:  [id number]  Admit Date:  2/6/2025  Patient Care Team:  Zahida Osorio MD as PCP - General (Internal Medicine)  Eben Porter MD as PCP - Family Medicine      Subjective   History Present Illness     Chief Complaint   Patient presents with   • Foot Swelling     Right foot redness and swelling that patient noticed yesterday morning.      History of Present Illness  Ms. Bear is an 84-year-old female with history of anxiety, A-fib, GERD, hypothyroidism, type 2 diabetes, hypertension, CVA who presents to the emergency room with right foot swelling and redness.  Patient states she noted some redness of her foot about 2 days ago had progressively gotten worse and the swelling started to move up her foot.  She states it started in her toes and all 5 of her toes on her right foot were red and swollen and has progressed up her foot.  She denies having any fever.  She denies having any previous injury to her foot that she is aware of.  She does walk with a walker at her assisted living facility.  She does have some mild word finding and expressive aphasia from previous CVA so is a little forgetful but is able to give a good history.  She has not seen any drainage from her toes or open areas.  She has  ever had any significant issues with her feet in the past.  She does have Raynaud's syndrome and has some discoloration of her fingers but has not had any trouble with her toes.  In the emergency room her sodium was 133, potassium 4.7, BUN 32, creatinine 1.31, her last creatinine was 1.1 but she does fluctuate to 1.4, she denies any trouble with urination or changes in urinary pattern.  No abdominal pain.  Lactate is 1.3, white blood cell count is 10.1, hemoglobin 13.8, platelets 260.  EKG showed A-fib which is baseline for patient.  X-ray of her right foot shows no soft tissue gas nor radiopaque foreign body, benign periosteal thickening involving  "the fourth metatarsal, no cortical bone destruction typical for osteomyelitis.  There is hind mid and forefoot joint degeneration.  Patient was started on vancomycin and Zosyn in the emergency room.    Review of Systems   Constitutional:  Negative for appetite change and fever.   HENT:  Negative for nosebleeds and trouble swallowing.    Eyes:  Negative for photophobia, redness and visual disturbance.   Respiratory:  Negative for cough, chest tightness, shortness of breath and wheezing.    Cardiovascular:  Negative for chest pain, palpitations and leg swelling.   Gastrointestinal:  Negative for abdominal distention, abdominal pain, nausea and vomiting.   Endocrine: Negative.    Genitourinary: Negative.    Musculoskeletal:  Negative for gait problem and joint swelling.   Skin: Negative.         Redness and swelling of her right foot   Neurological:  Negative for dizziness, seizures, speech difficulty, light-headedness and headaches.   Hematological: Negative.    Psychiatric/Behavioral:  Negative for behavioral problems and confusion.         Personal History     Past Medical History:   Diagnosis Date   • Chilblains     \"Chilblian's\"   • CKD (chronic kidney disease)    • CVA (cerebral vascular accident)    • Depression    • Fatty liver    • GERD (gastroesophageal reflux disease)    • Hyperlipidemia    • Hypertension    • Incontinence    • Osteoarthritis     OA  Marvin THR, LT TKR - hydrocodone prescibed by Dr. 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 ENDOSCOPY;  Service:    • DENTAL PROCEDURE     • FOOT SURGERY     • TONSILLECTOMY     • TOTAL HIP ARTHROPLASTY Bilateral     OA  Marvin THR, LT TKR - hydrocodone prescibed by Dr. Almaguer   • TOTAL KNEE ARTHROPLASTY Left     OA  Marvin THR, LT TKR - hydrocodone prescibed by Dr. Almaguer     Family History   Problem Relation Age of Onset   • Hypertension Mother    • Diabetes type II Mother    • Hypertension Father    • Coronary artery disease Father    • Diabetes type II Father    • Colon cancer Brother    • Skin cancer Brother    • Stroke Son    • Breast cancer Maternal Aunt    • Hypertension Other    • Other Other         Lipids  Thyroid     Social History     Tobacco Use   • Smoking status: Former     Current packs/day: 0.00     Average packs/day: 0.5 packs/day for 14.0 years (7.0 ttl pk-yrs)     Types: Cigarettes     Start date:      Quit date:      Years since quittin.1     Passive exposure: Past   • Smokeless tobacco: Never   • Tobacco comments:     CAFFEINE USE   Vaping Use   • Vaping status: Never Used   Substance Use Topics   • Alcohol use: No   • Drug use: No     No current facility-administered medications on file prior to encounter.     Current Outpatient Medications on File Prior to Encounter   Medication Sig Dispense Refill   • atorvastatin (LIPITOR) 40 MG tablet Take 1 tablet by mouth Every Night. 30 tablet 1   • buPROPion XL (WELLBUTRIN XL) 150 MG 24 hr tablet TAKE ONE TABLET BY MOUTH EVERY MORNING 90 tablet 1   • Cholecalciferol (Vitamin D3) 50 MCG (2000 UT) capsule Take 1 capsule by mouth Daily.     • Diclofenac Sodium (VOLTAREN) 1 % gel gel APPLY A THIN LAYER TO THE RIGHT TO SHOULDER THREE TIMES DAILY     • donepezil (Aricept) 10 MG tablet Take 1 tablet by mouth Every Night. 30 tablet 2   • FeroSul 325 (65 Fe) MG tablet Take 1 tablet by mouth Daily. 90 tablet 0   • gabapentin  (NEURONTIN) 400 MG capsule Take 1 capsule by mouth Every 12 (Twelve) Hours. 52 capsule 0   • glimepiride (AMARYL) 4 MG tablet Take 1 tablet by mouth Every Morning Before Breakfast. (Patient taking differently: Take 1 tablet by mouth 2 (Two) Times a Day.) 30 tablet 1   • Lantus SoloStar 100 UNIT/ML injection pen INJECT 5 UNITS AT BEDTIME     • levothyroxine (SYNTHROID, LEVOTHROID) 88 MCG tablet Take 1 tablet by mouth Daily.     • linagliptin (Tradjenta) 5 MG tablet tablet Take 1 tablet by mouth Daily. 30 tablet 1   • losartan (Cozaar) 50 MG tablet Take 1 tablet by mouth Daily. 30 tablet 0   • methylPREDNISolone (Medrol) 4 MG dose pack Take as directed on package instructions. 21 tablet 0   • metoprolol succinate XL (TOPROL-XL) 25 MG 24 hr tablet TAKE ONE TABLET BY MOUTH DAILY **NEW MED** 30 tablet 5   • Mirabegron ER (Myrbetriq) 50 MG tablet sustained-release 24 hour 24 hr tablet Take 25 mg by mouth Daily. 30 tablet 0   • oxybutynin XL (DITROPAN-XL) 10 MG 24 hr tablet      • pioglitazone (ACTOS) 15 MG tablet Take 1 tablet by mouth Daily. 30 tablet 1   • polyethylene glycol (MIRALAX) 17 g packet Take 17 g by mouth 2 (Two) Times a Day As Needed (constipation). 60 each 0   • rivaroxaban (Xarelto) 15 MG tablet Take 1 tablet by mouth Daily With Dinner. 90 tablet 1   • sildenafil (REVATIO) 20 MG tablet Take 1 tablet by mouth Daily. (Patient not taking: Reported on 1/27/2025) 30 tablet 3   • traMADol (ULTRAM) 50 MG tablet Take 1 tablet by mouth Every 6 (Six) Hours As Needed for Moderate Pain for up to 7 days. 28 tablet 0   • venlafaxine XR (EFFEXOR-XR) 150 MG 24 hr capsule TAKE ONE TABLET BY MOUTH DAILY 90 capsule 1   • vitamin B-12 (VITAMIN B-12) 500 MCG tablet Take 1 tablet by mouth Daily. 30 tablet 3     Allergies   Allergen Reactions   • Shellfish-Derived Products Anaphylaxis   • Amlodipine Hives and Other (See Comments)     Heart race   • Iodine Rash       Objective    Objective     Vital Signs  Temp:  [97.6 °F (36.4  °C)-98.4 °F (36.9 °C)] 98.2 °F (36.8 °C)  Heart Rate:  [67-88] 67  Resp:  [16-18] 18  BP: ()/(57-96) 151/86  SpO2:  [95 %-99 %] 99 %  on   ;   Device (Oxygen Therapy): room air  Body mass index is 32.11 kg/m².    Physical Exam  Vitals and nursing note reviewed.   Constitutional:       General: She is not in acute distress.     Appearance: She is well-developed.   HENT:      Head: Normocephalic.   Neck:      Vascular: No JVD.   Cardiovascular:      Rate and Rhythm: Normal rate and regular rhythm.      Comments: Able to follow monitor with heart rate in the 70s, she denies any chest pain, no peripheral edema  Pulmonary:      Effort: Pulmonary effort is normal.      Breath sounds: Normal breath sounds.      Comments: Lung sounds clear, sats 97% on room air  Abdominal:      General: There is no distension.      Palpations: Abdomen is soft.      Tenderness: There is no abdominal tenderness.   Musculoskeletal:         General: Normal range of motion.      Cervical back: Normal range of motion.      Right lower leg: No edema.      Left lower leg: No edema.        Feet:    Feet:      Comments: Right top of foot and toes red with mild edema, good pedal pulses.  No open areas although she does have some dried flaky skin.  Skin:     General: Skin is warm and dry.      Capillary Refill: Capillary refill takes less than 2 seconds.   Neurological:      Mental Status: She is alert and oriented to person, place, and time.   Psychiatric:         Behavior: Behavior normal.         Results Review:  I reviewed the patient's new clinical results.  I reviewed the patient's new imaging results and agree with the interpretation.  I reviewed the patient's other test results and agree with the interpretation  I personally viewed and interpreted the patient's EKG/Telemetry data  Discussed with ED provider.    Lab Results (last 24 hours)       Procedure Component Value Units Date/Time    CBC & Differential [[id number]]  (Abnormal)  Collected: 02/06/25 1724    Specimen: Blood from Arm, Left Updated: 02/06/25 7539    Narrative:      The following orders were created for panel order CBC & Differential.  Procedure                               Abnormality         Status                     ---------                               -----------         ------                     CBC Auto Differential[[id number]]        Abnormal            Final result                 Please view results for these tests on the individual orders.    Comprehensive Metabolic Panel [[id number]]  (Abnormal) Collected: 02/06/25 1724    Specimen: Blood from Arm, Left Updated: 02/06/25 1809     Glucose 220 mg/dL      BUN 32 mg/dL      Creatinine 1.31 mg/dL      Sodium 133 mmol/L      Potassium 4.7 mmol/L      Chloride 98 mmol/L      CO2 28.7 mmol/L      Calcium 9.4 mg/dL      Total Protein 6.9 g/dL      Albumin 3.6 g/dL      ALT (SGPT) 19 U/L      AST (SGOT) 13 U/L      Alkaline Phosphatase 357 U/L      Total Bilirubin 0.5 mg/dL      Globulin 3.3 gm/dL      A/G Ratio 1.1 g/dL      BUN/Creatinine Ratio 24.4     Anion Gap 6.3 mmol/L      eGFR 40.8 mL/min/1.73     Narrative:      GFR Categories in Chronic Kidney Disease (CKD)      GFR Category          GFR (mL/min/1.73)    Interpretation  G1                     90 or greater         Normal or high (1)  G2                      60-89                Mild decrease (1)  G3a                   45-59                Mild to moderate decrease  G3b                   30-44                Moderate to severe decrease  G4                    15-29                Severe decrease  G5                    14 or less           Kidney failure          (1)In the absence of evidence of kidney disease, neither GFR category G1 or G2 fulfill the criteria for CKD.    eGFR calculation 2021 CKD-EPI creatinine equation, which does not include race as a factor    Lactic Acid, Plasma [[id number]]  (Normal) Collected: 02/06/25 1724    Specimen: Blood from Arm, Left  Updated: 02/06/25 1804     Lactate 1.3 mmol/L     Blood Culture - Blood, Arm, Left [[id number]] Collected: 02/06/25 1724    Specimen: Blood from Arm, Left Updated: 02/06/25 1744    CBC Auto Differential [[id number]]  (Abnormal) Collected: 02/06/25 1724    Specimen: Blood from Arm, Left Updated: 02/06/25 1749     WBC 10.16 10*3/mm3      RBC 4.67 10*6/mm3      Hemoglobin 13.8 g/dL      Hematocrit 42.8 %      MCV 91.6 fL      MCH 29.6 pg      MCHC 32.2 g/dL      RDW 13.9 %      RDW-SD 47.6 fl      MPV 9.2 fL      Platelets 260 10*3/mm3      Neutrophil % 66.7 %      Lymphocyte % 21.9 %      Monocyte % 9.7 %      Eosinophil % 1.0 %      Basophil % 0.2 %      Immature Grans % 0.5 %      Neutrophils, Absolute 6.77 10*3/mm3      Lymphocytes, Absolute 2.23 10*3/mm3      Monocytes, Absolute 0.99 10*3/mm3      Eosinophils, Absolute 0.10 10*3/mm3      Basophils, Absolute 0.02 10*3/mm3      Immature Grans, Absolute 0.05 10*3/mm3     Blood Culture - Blood, Arm, Right [[id number]] Collected: 02/06/25 1738    Specimen: Blood from Arm, Right Updated: 02/06/25 1744            Imaging Results (Last 24 Hours)       Procedure Component Value Units Date/Time    XR Foot 3+ View Right [[id number]] Collected: 02/06/25 1742     Updated: 02/06/25 1746    Narrative:      XR FOOT 3+ VW RIGHT-     Mid and forefoot     FINDINGS: No soft tissue gas nor radiopaque foreign body is  demonstrated. Benign periosteal thickening involving the fourth  metatarsal. No cortical bone destruction typical for osteomyelitis.  There is moderate hind mid and forefoot joint degeneration seen. There  is bone hypertrophy at the Achilles insertion and an early calcaneal  spur. The remainder is unremarkable.     This report was finalized on 2/6/2025 5:43 PM by Dr. Dino Page M.D  on Workstation: [id number]       XR Chest 1 View [[id number]] Collected: 02/06/25 1741     Updated: 02/06/25 1745    Narrative:      XR CHEST 1 VW-     Clinical: Cellulitis right foot      COMPARISON examination 10/12/2024     FINDINGS: The cardiomediastinal silhouette is stable. No acute airspace  disease or vascular congestion has developed.     CONCLUSION: Stable chest.     This report was finalized on 2/6/2025 5:42 PM by Dr. Dino Page M.D  on Workstation: [id number]               Results for orders placed during the hospital encounter of 12/21/23    Adult transthoracic echo complete    Interpretation Summary  •  Left ventricular systolic function is normal. Calculated left ventricular EF = 59.6%  •  Left ventricular wall thickness is consistent with borderline concentric hypertrophy.  •  Left ventricular diastolic function was normal.  •  Left atrial volume is moderately increased.  •  Saline test results are negative.  •  There is calcification of the aortic valve.  •  Estimated right ventricular systolic pressure from tricuspid regurgitation is mildly elevated (35-45 mmHg). Calculated right ventricular systolic pressure from tricuspid regurgitation is 38 mmHg.  •  Mild pulmonary hypertension is present.      ECG 12 Lead   ED Interpretation   Abnormal   Wilmar Mendoza MD     2/6/2025  6:20 PM   ECG 12 Lead         Date/Time: 2/6/2025 5:32 PM      Performed by: Wilmar Mendoza MD   Authorized by: Wilmar Mendoza MD  Interpreted by ED physician   Rhythm: atrial fibrillation   Rate: normal   BPM: 78   QRS axis: left   Conduction: left bundle branch block   ST Segments: ST segments normal   T Waves: T waves normal   Other: no other findings   Clinical impression: abnormal ECG and dysrhythmia - atrial      ECG 12 Lead Other; afib   Preliminary Result   HEART RATE=78  bpm   RR Hgjngvcm=208  ms   MS Interval=  ms   P Horizontal Axis=  deg   P Front Axis=  deg   QRSD Cxfvqpeb=936  ms   QT Bwdnuaqa=321  ms   TWyY=517  ms   QRS Axis=-52  deg   T Wave Axis=51  deg   - ABNORMAL ECG -   Atrial fibrillation   Left bundle branch block   Date and Time of Study:2025-02-06 17:25:18      Telemetry Scan    Final Result      Telemetry Scan   Final Result           Assessment/Plan     Active Hospital Problems    Diagnosis  POA   • **Cellulitis in diabetic foot [E11.628, L03.119]  Yes   • Atrial fibrillation, persistent [I48.19]  Yes   • SHEY (obstructive sleep apnea) [G47.33]  Yes   • Hypothyroidism [E03.9]  Yes   • Primary hypertension [I10]  Yes   • Type 2 diabetes mellitus with stage 3 chronic kidney disease, without long-term current use of insulin [E11.22, N18.30]  Yes   • Anxiety [F41.9]  Yes     Formatting of this note might be different from the original.   Description: (PL)     • Gastroesophageal reflux disease [K21.9]  Yes     Formatting of this note might be different from the original. Description: (PL)       Ms. Bear is an 84-year-old female with history of anxiety, A-fib, GERD, hypothyroidism, type 2 diabetes, hypertension, CVA who presents to the emergency room with right foot swelling and redness.     Cellulitis of the right foot  -Patient started on vancomycin and Zosyn  -Vascular checks every 4 hours  -White blood cell count normal  -Check sed rate and CRP in a.m., repeat CBC in a.m.  -X-ray showed no signs of osteomyelitis, if little to no improvement after antibiotics, may need to further evaluate with MRI    Type 2 diabetes  -Accu-Cheks before meals and at bedtime with correctional dose insulin  -Hold oral diabetic medications at this time  -Check hemoglobin A1c in a.m.    A-fib/hypertension  -Chronic conditions stable  -Telemetry unit for monitoring  -Continue home medications when med rec complete    I discussed the patient's findings and my recommendations with patient.    VTE Prophylaxis - SCDs.  Code Status - Full code.       CT Rendon  [facility]  02/07/25  04:26 EST  '

## 2025-02-07 NOTE — PROGRESS NOTES
"[facility] Clinical Pharmacy Services: Vancomycin Pharmacokinetic Initial Consult Note    Rkeha Bear is a 84 y.o. female who is on day 1 of pharmacy to dose vancomycin.    Indication: Skin and Soft Tissue  Consulting Provider: Princess Huff NP  Planned Duration of Therapy: 5 days   Loading Dose Ordered or Given: 1250 mg on 02/06 at 1822    Culture/Source: blood culture pending   Target: -600 mg/L.hr     Other Antimicrobials: Zosyn 3.375GM IV Q8HR    Vitals/Labs  Ht: 170.2 cm (67\"); Wt: 90.7 kg (200 lb)  Temp Readings from Last 1 Encounters:   02/06/25 98.4 °F (36.9 °C) (Oral)    Estimated Creatinine Clearance: 38.3 mL/min (A) (by C-G formula based on SCr of 1.31 mg/dL (H)).       Results from last 7 days   Lab Units 02/06/25  1724   CREATININE mg/dL 1.31*   WBC 10*3/mm3 10.16     Assessment/Plan:    Vancomycin loading dose of 1250mg IV given on 02/06 at 1822  Vanc Random has been ordered for 02/07 at 0630  AM Clinical to follow with additional dosing regimen      Pharmacy will follow patient's kidney function and will adjust doses and obtain levels as necessary. Thank you for involving pharmacy in this patient's care. Please contact pharmacy with any questions or concerns.                           Adelso Arroyo PharmD   Clinical Pharmacist    "

## 2025-02-07 NOTE — PLAN OF CARE
Goal Outcome Evaluation:              Outcome Evaluation: Pt admitted from [facility] with R foot cellulitis.  Pt normally uses rollator but still building strength after non-op hip fx in Oct and back issue few weeks ago, pt has been using RW and having meals delivered to her room.  Pt abl eto stand w/ min A using RW.  Ambulated 2' w/ min A using RW, slow shuffling steps, limited by fatigue, R foot pain, weakness and some dizziness which improved once sitting.  Recommend using BSC instead of purewick, sitting in chair for meals and currently recommend DC to SNU.    Anticipated Discharge Disposition (PT): skilled nursing facility, assisted living (pending progress)

## 2025-02-07 NOTE — PLAN OF CARE
Problem: Adult Inpatient Plan of Care  Goal: Plan of Care Review  Outcome: Progressing  Flowsheets (Taken 2/7/2025 3589)  Progress: no change  Outcome Evaluation: Pt admitted to 4E from ER eder, with diagnosed with cellulitis in diabetic foot, right foot red warm to touch, and pulse obtained by doppler, site marked on foot, no other skin issues. Alert and oriented, pleasant. IV Zosyn started, Plan of care on going.  Plan of Care Reviewed With: patient

## 2025-02-07 NOTE — CONSULTS
"Diabetes Education  Assessment/Teaching    Patient Name:  Rekha Bear  YOB: 1940  MRN: [id number]  Admit Date:  2/6/2025      Assessment Date:  2/7/2025  Flowsheet Row Most Recent Value   General Information     Referral From: A1c  [9%]   Height 170.2 cm (67\")   Height Method Stated   Weight 93 kg (205 lb 0.4 oz)   Weight Method Bed scale   Diabetes History    What type of diabetes do you have? Type 2   Do you test your blood sugar at home? yes   Frequency of checks CGM Florin 2   What makes it difficult for you to take care of your diabetes or yourself? dietary habits   Do you have any diabetes complications? circulation problems, recurring infections   Education Preferences    What areas of diabetes would you like to learn about? testing my blood sugar at home, medications for diabetes   Barriers to Learning other (comment)  [None noted]   Assessment Topics    Healthy Eating - Assessment Needs education   Being Active - Assessment Needs education   Taking Medication - Assessment Needs education   Problem Solving - Assessment Needs education   Reducing Risk - Assessment Needs education   Healthy Coping - Assessment Needs education   Monitoring - Assessment Needs education   DM Goals    Healthy Eating - Goal 0-7 days from discharge   Being Active - Goal 0-7 days from discharge   Taking Medication - Goal 0-7 days from discharge   Problem Solving - Goal 0-7 days from discharge   Reducing Risk - Goal 0-30 days from discharge   Healthy Coping - Goal 0-30 days from discharge   Monitoring - Goal 0-7 days from discharge   Contact Plan Follow-up medical care, 0-30 days from discharge       Flowsheet Row Most Recent Value   DM Education Needs    Meter Needs meter   Medication Oral, Insulin, Pen   Reducing Risks A1C testing   Healthy Coping Appropriate   Discharge Plan Facility  [Assisted Living]   Motivation Moderate   Teaching Method Explanation, Discussion   Patient Response Verbalized understanding "       Other Comments:  Met with pt at bedside. Pt reports being on insulin, and her assisted living facility has been administering her medications. Pt has Florin 2 CGM to monitor BG. BG meter ordered to home pharmacy. Declines additional needs at this time.         Electronically signed by:  Art Kuo RN  02/07/25 13:13 EST

## 2025-02-07 NOTE — NURSING NOTE
02/07/25 1116   Wound 02/07/25 Left lower index finger   Placement Date: 02/07/25   Side: Left  Orientation: lower  Location: index finger  Present on Original Admission: Yes   Dressing Appearance open to air   Base necrotic   Periwound redness   Edges irregular   Drainage Amount none     WOCN: patient with history of raynaud's . Left 2nd finger necrotic on tip and very painful.  She has been to [facility] and it has been debrided some previously. Will continue current plan.  Recommendations placed in epic.

## 2025-02-07 NOTE — THERAPY EVALUATION
"Patient Name: Rekha Bear  : 1940    MRN: [id number]                              Today's Date: 2025       Admit Date: 2025    Visit Dx:     ICD-10-CM ICD-9-CM   1. Cellulitis of right foot  L03.115 682.7   2. 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    Community acquired pneumonia of left lower lobe of lung    Hyponatremia    Pneumonia due to COVID-19 virus    Major depressive disorder, recurrent, mild    Acute respiratory failure with hypoxia    Supraventricular tachycardia    PVC (premature ventricular contraction)    Depressive disorder    Gastroesophageal reflux disease    Midline cystocele    Rectocele    BMI 29.0-29.9,adult    Acute left PCA stroke    B12 deficiency    Bradycardia    Premature atrial contractions    Stroke    SHEY (obstructive sleep apnea)    Circadian rhythm disorder    Cerebrovascular accident (CVA) due to embolism of left posterior cerebral artery    Anxiety    Dysphagia as late effect of stroke    Atrial fibrillation, persistent    Acute UTI    Closed left hip fracture    Periprosthetic fracture around internal prosthetic left hip joint    Naz-prosthetic fracture around prosthetic hip    History of CVA (cerebrovascular accident)    Cellulitis in diabetic foot     Past Medical History:   Diagnosis Date    Chilblains     \"Chilblian's\"    CKD (chronic kidney disease)     CVA (cerebral vascular accident)     Depression     Fatty liver     GERD (gastroesophageal reflux disease)     Hyperlipidemia     Hypertension     Incontinence     " "Osteoarthritis     OA  Marvin THR, LT TKR - hydrocodone prescibed by Dr. 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 Almaguer    TOTAL KNEE ARTHROPLASTY Left     OA  Marvin THR, LT TKR - hydrocodone prescibed by Dr. Almaguer      General Information       Row Name 02/07/25 1411          Physical Therapy Time and Intention    Document Type evaluation  -AR     Mode of Treatment physical therapy  -AR       Row Name 02/07/25 1411          General Information    Patient Profile Reviewed yes  -AR     Prior Level of Function min assist:  was still recovering from nonop hip fx in Oct, had back issue few weeks ago.  Has been getting meals delivered, using RW instead of rollator  -AR     Existing Precautions/Restrictions fall  -AR     Barriers to Rehab none identified  -AR       Row Name 02/07/25 1411          Living Environment    People in Home facility resident  -AR       Row Name 02/07/25 1411          Home Main Entrance    Number of Stairs, Main Entrance none  -AR     Stair Railings, Main Entrance none  -AR       Row Name 02/07/25 1411          Cognition    Orientation Status (Cognition) oriented x 3  -AR       Row Name 02/07/25 1411          Safety Issues/Impairments Affecting Functional " Mobility    Comment, Safety Issues/Impairments (Mobility) oriented but somewhat difficult historian  -AR               User Key  (r) = Recorded By, (t) = Taken By, (c) = Cosigned By      Initials Name Provider Type    Maribell Islas, PAULA Physical Therapist                   Mobility       Row Name 02/07/25 1412          Bed Mobility    Bed Mobility supine-sit  -AR     Supine-Sit [place] (Bed Mobility) contact guard  -AR     Sit-Supine [place] (Bed Mobility) not tested  -AR     Assistive Device (Bed Mobility) bed rails;head of bed elevated  -AR     Comment, (Bed Mobility) inccreased time  -AR       Row Name 02/07/25 1412          Sit-Stand Transfer    Sit-Stand [place] (Transfers) minimum assist (75% patient effort)  -AR     Assistive Device (Sit-Stand Transfers) walker, front-wheeled  -AR       Row Name 02/07/25 1412          Gait/Stairs (Locomotion)    [place] Level (Gait) minimum assist (75% patient effort)  -AR     Assistive Device (Gait) walker, front-wheeled  -AR     Patient was able to Ambulate yes  -AR     Distance in Feet (Gait) 2  -AR     Deviations/Abnormal Patterns (Gait) eric decreased;festinating/shuffling  -AR     Bilateral Gait Deviations heel strike decreased;forward flexed posture  -AR     Right Sided Gait Deviations weight shift ability decreased  -AR     Comment, (Gait/Stairs) limited by fatigue, weakness, and gerson dizziness improved once sitting  -AR               User Key  (r) = Recorded By, (t) = Taken By, (c) = Cosigned By      Initials Name Provider Type    Maribell Islas PT Physical Therapist                   Obj/Interventions       Row Name 02/07/25 1413          Balance    Static Standing Balance contact guard  -AR     Dynamic Standing Balance minimal assist  -AR               User Key  (r) = Recorded By, (t) = Taken By, (c) = Cosigned By      Initials Name Provider Type    Maribell Islas, PT Physical Therapist                   Goals/Plan       Row  Name 02/07/25 1416          Bed Mobility Goal 1 (PT)    Activity/Assistive Device (Bed Mobility Goal 1, PT) bed mobility activities, all  -AR     [place] Level/Cues Needed (Bed Mobility Goal 1, PT) modified independence  -AR     Time Frame (Bed Mobility Goal 1, PT) 1 week  -AR       Row Name 02/07/25 1416          Transfer Goal 1 (PT)    Activity/Assistive Device (Transfer Goal 1, PT) sit-to-stand/stand-to-sit;bed-to-chair/chair-to-bed;walker, rolling  -AR     [place] Level/Cues Needed (Transfer Goal 1, PT) standby assist  -AR     Time Frame (Transfer Goal 1, PT) 1 week  -AR       Row Name 02/07/25 1416          Gait Training Goal 1 (PT)    Activity/Assistive Device (Gait Training Goal 1, PT) gait (walking locomotion);walker, rolling  -AR     [place] Level (Gait Training Goal 1, PT) standby assist  -AR     Distance (Gait Training Goal 1, PT) 100  -AR     Time Frame (Gait Training Goal 1, PT) 1 week  -AR       Row Name 02/07/25 1416          Therapy Assessment/Plan (PT)    Planned Therapy Interventions (PT) balance training;bed mobility training;gait training;home exercise program;transfer training;strengthening;ROM (range of motion);postural re-education;patient/family education  -AR               User Key  (r) = Recorded By, (t) = Taken By, (c) = Cosigned By      Initials Name Provider Type    AR Maribell Peace, PT Physical Therapist                   Clinical Impression       Row Name 02/07/25 1413          Pain    Pretreatment Pain Rating 4/10  -AR     Posttreatment Pain Rating 4/10  -AR     Pain Location foot  -AR     Pain Side/Orientation right  -AR     Response to Pain Interventions activity participation with tolerable pain  -AR       Row Name 02/07/25 1413          Plan of Care Review    Outcome Evaluation Pt admitted from [facility] with R foot cellulitis.  Pt normally uses rollator but still building strength after non-op hip fx in Oct and back issue few weeks ago, pt has been using RW and having  meals delivered to her room.  Pt abl eto stand w/ min A using RW.  Ambulated 2' w/ min A using RW, slow shuffling steps, limited by fatigue, R foot pain, weakness and some dizziness which improved once sitting.  Recommend using BSC instead of purewick, sitting in chair for meals and currently recommend DC to SNU.  -AR       Row Name 02/07/25 1413          Therapy Assessment/Plan (PT)    Rehab Potential (PT) good  -AR     Criteria for Skilled Interventions Met (PT) yes  -AR     Therapy Frequency (PT) 6 times/wk  -AR       Row Name 02/07/25 1413          Vital Signs    O2 Delivery Pre Treatment room air  -AR       Row Name 02/07/25 1413          Positioning and Restraints    Pre-Treatment Position in bed  -AR     Post Treatment Position chair  -AR     In Chair notified nsg;reclined;sitting;call light within reach;encouraged to call for assist;exit alarm on;with family/caregiver  -AR               User Key  (r) = Recorded By, (t) = Taken By, (c) = Cosigned By      Initials Name Provider Type    AR Maribell Peace, PT Physical Therapist                   Outcome Measures       Row Name 02/07/25 1416          How much help from another person do you currently need...    Turning from your back to your side while in flat bed without using bedrails? 4  -AR     Moving from lying on back to sitting on the side of a flat bed without bedrails? 3  -AR     Moving to and from a bed to a chair (including a wheelchair)? 3  -AR     Standing up from a chair using your arms (e.g., wheelchair, bedside chair)? 3  -AR     Climbing 3-5 steps with a railing? 2  -AR     To walk in hospital room? 2  -AR     AM-PAC 6 Clicks Score (PT) 17  -AR     Highest Level of Mobility Goal 5 --> Static standing  -AR       Row Name 02/07/25 1246          Modified Daniel Scale    Modified Daniel Scale 3 - Moderate disability.  Requiring some help, but able to walk without assistance.  -JR       Row Name 02/07/25 1416 02/07/25 1246       Functional  Assessment    Outcome Measure Options AM-PAC 6 Clicks Basic Mobility (PT)  -AR AM-PAC 6 Clicks Daily Activity (OT);Modified [place]  -              User Key  (r) = Recorded By, (t) = Taken By, (c) = Cosigned By      Initials Name Provider Type    AR Maribell Peace, PAULA Physical Therapist    Sanjeev Guerrero OT Occupational Therapist                                 Physical Therapy Education       Title: PT OT SLP Therapies (In Progress)       Topic: Physical Therapy (In Progress)       Point: Mobility training (In Progress)       Learning Progress Summary            Patient Acceptance, E, NR by AR at 2/7/2025 1417                      Point: Home exercise program (In Progress)       Learning Progress Summary            Patient Acceptance, E, NR by AR at 2/7/2025 1417                      Point: Body mechanics (In Progress)       Learning Progress Summary            Patient Acceptance, E, NR by AR at 2/7/2025 1417                      Point: Precautions (In Progress)       Learning Progress Summary            Patient Acceptance, E, NR by AR at 2/7/2025 1417                                      User Key       Initials Effective Dates Name Provider Type Discipline    AR 06/16/21 -  Maribell Peace, PT Physical Therapist PT                  PT Recommendation and Plan  Planned Therapy Interventions (PT): balance training, bed mobility training, gait training, home exercise program, transfer training, strengthening, ROM (range of motion), postural re-education, patient/family education  Outcome Evaluation: Pt admitted from [facility] with R foot cellulitis.  Pt normally uses rollator but still building strength after non-op hip fx in Oct and back issue few weeks ago, pt has been using RW and having meals delivered to her room.  Pt abl eto stand w/ min A using RW.  Ambulated 2' w/ min A using RW, slow shuffling steps, limited by fatigue, R foot pain, weakness and some dizziness which improved once sitting.  Recommend using BSC  instead of purewick, sitting in chair for meals and currently recommend DC to SNU.     Time Calculation:         PT Charges       Row Name 02/07/25 1410             Time Calculation    Start Time 1302  -AR      Stop Time 1325  -AR      Time Calculation (min) 23 min  -AR      PT Received On 02/07/25  -AR      PT - Next Appointment 02/10/25  -AR      PT Goal Re-Cert Due Date 02/14/25  -AR                User Key  (r) = Recorded By, (t) = Taken By, (c) = Cosigned By      Initials Name Provider Type    Maribell Islas, PT Physical Therapist                  Therapy Charges for Today       Code Description Service Date Service Provider Modifiers Qty    28049445270 HC PT EVAL MOD COMPLEXITY 3 2/7/2025 Maribell Peace, PT GP 1    45030627580 HC PT THER PROC EA 15 MIN 2/7/2025 Maribell Peace, PT GP 1            PT G-Codes  Outcome Measure Options: AM-PAC 6 Clicks Basic Mobility (PT)  AM-PAC 6 Clicks Score (PT): 17  AM-PAC 6 Clicks Score (OT): 16  Modified [place] Scale: 3 - Moderate disability.  Requiring some help, but able to walk without assistance.  PT Discharge Summary  Anticipated Discharge Disposition (PT): skilled nursing facility, assisted living (pending progress)    Maribell Peace PT  2/7/2025

## 2025-02-07 NOTE — PROGRESS NOTES
"[facility] Clinical Pharmacy Services: Piperacillin-Tazobactam Consult    Pt Name: Rekha Bear   : 1940    Indication: Skin and Soft Tissue    Relevant clinical data and objective history reviewed:    Past Medical History:   Diagnosis Date    Chilblains     \"Chilblian's\"    CKD (chronic kidney disease)     CVA (cerebral vascular accident)     Depression     Fatty liver     GERD (gastroesophageal reflux disease)     Hyperlipidemia     Hypertension     Incontinence     Osteoarthritis     OA  Marvin THR, LT TKR - hydrocodone prescibed by Dr. 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    Lab Results   Component Value Date    WBC 10.16 2025     Temp Readings from Last 3 Encounters:   25 98.4 °F (36.9 °C) (Oral)   25 96.1 °F (35.6 °C) (Temporal)   25 98 °F (36.7 °C) (Oral)      Assessment/Plan  Estimated CrCl >20 mL/min at this time; BMI 31.32 kg/m2  Will start piperacillin-tazobactam 3.375 g IV every 8 hours     Pharmacy will continue to follow daily while on piperacillin-tazobactam and adjust as needed. Thank you for this consult.    Adelso Arroyo PharmRADHA   Clinical Pharmacist    "

## 2025-02-07 NOTE — PLAN OF CARE
Goal Outcome Evaluation:  Plan of Care Reviewed With: patient        Progress: improving  Outcome Evaluation: 84 y.o. female admitted to [facility] for Right foot swelling.  Post work up reveals cellulitis of foot.  At baseline, patient lives at [facility] and performs ADS with [place] and functional mobility (Rw).  Patient presents to OT with decreased strength, activity tolerance, coordination and balance.  A&Ox4, BUE WFL, 3+/5.  Patient resting in bed upon arrival. Patient rated pain in right foot 4/10.  Patient transitioned to EOB with CGA, STS from EOB with Cga and performed side stepping toward HOB with CGA and no AD.  Patient declined any more mobility due to Right foot pain.  Patient returned to bed with CGA.  OT would be beneficial to address underlying physical deficits and increase ADLs.  Anticipate patient d/c back to [facility] with  assistance.    Anticipated Discharge Disposition (OT): home with home health

## 2025-02-07 NOTE — CASE MANAGEMENT/SOCIAL WORK
Continued Stay Note  [facility]     Patient Name: Rekha Bear  MRN: [id number]  Today's Date: 2/7/2025    Admit Date: 2/6/2025    Plan: SNF vs return to [facility] with onsite PT/OT.  PT rec SNF   Discharge Plan       Row Name 02/07/25 1724       Plan    Plan SNF vs return to [facility] with onsite PT/OT.  PT rec SNF    Patient/Family in Agreement with Plan yes    Plan Comments Met with pt at bedside. Explained roll of . Face sheet and pharmacy verified. Pt lives at [facility]. Pt receives her meals, laundry and cleaning services there otherwise independent. Home DME includes a walker and rollator.  Pt states she is independent with ADLs using her walker. Pt has been to [facility], and [facility] for Rehab. She has used [facility] and [facility] in the past. Pt is currently getting onsite PT services at [facility]. Pt's PCP is Zahida Osorio MD. Uses [facility] on [address] [facility]. PT recommending SNF at this time. SNF information given and pt will review. Explained that CCP would follow to assess for discharge needs.  Drew Layton RN-BC                   Discharge Codes    No documentation.                       rDew Layton RN

## 2025-02-07 NOTE — PROGRESS NOTES
"[facility] Clinical Pharmacy Services: Vancomycin Monitoring Note    Rekha Bear is a 84 y.o. female who is on day 2/7 of pharmacy to dose vancomycin for Skin and Soft Tissue.    Previous Vancomycin Dose:   intermittent dosing  Updated Cultures and Sensitivities:   XR (-) for osteo  2/6 BCx x 2 in process     Results from last 7 days   Lab Units 02/07/25  0642   VANCOMYCIN RM mcg/mL 12.00     Vitals/Labs  Ht: 170.2 cm (67\"); Wt: 93 kg (205 lb 0.4 oz)   Temp Readings from Last 1 Encounters:   02/07/25 97.5 °F (36.4 °C) (Oral)     Estimated Creatinine Clearance: 35.8 mL/min (A) (by C-G formula based on SCr of 1.42 mg/dL (H)).   CKD- according to Dr. Quan in outpatient rehab, baseline does seem to be 1.4 for her. Cr trending up from 2/6 but still around personal baseline.     Results from last 7 days   Lab Units 02/07/25  0642 02/06/25  1724   CREATININE mg/dL 1.42* 1.31*   WBC 10*3/mm3 9.91 10.16     Assessment/Plan    Current Vancomycin Dose: Will tentatively schedule 1000mg IV q24h given that patient Scr currently at baseline.    mg/L.hr   Next Level Date and Time: Vanc Trough on 2/10 at 1130 prior to the fourth dose, or sooner if renal function changes.   We will continue to monitor patient changes and renal function     Thank you for involving pharmacy in this patient's care. Please contact pharmacy with any questions or concerns.       Eryn Duenas, PharmD  Clinical Pharmacist          "

## 2025-02-08 ENCOUNTER — APPOINTMENT (OUTPATIENT)
Dept: MRI IMAGING | Facility: HOSPITAL | Age: 85
DRG: 638 | End: 2025-02-08
Payer: MEDICARE

## 2025-02-08 LAB
ALBUMIN SERPL-MCNC: 2.7 G/DL (ref 3.5–5.2)
ALBUMIN/GLOB SERPL: 0.9 G/DL
ALP SERPL-CCNC: 270 U/L (ref 39–117)
ALT SERPL W P-5'-P-CCNC: 14 U/L (ref 1–33)
ANION GAP SERPL CALCULATED.3IONS-SCNC: 7.6 MMOL/L (ref 5–15)
AST SERPL-CCNC: 14 U/L (ref 1–32)
BILIRUB SERPL-MCNC: 0.5 MG/DL (ref 0–1.2)
BUN SERPL-MCNC: 21 MG/DL (ref 8–23)
BUN/CREAT SERPL: 18.6 (ref 7–25)
CALCIUM SPEC-SCNC: 8.7 MG/DL (ref 8.6–10.5)
CHLORIDE SERPL-SCNC: 106 MMOL/L (ref 98–107)
CO2 SERPL-SCNC: 22.4 MMOL/L (ref 22–29)
CREAT SERPL-MCNC: 1.13 MG/DL (ref 0.57–1)
DEPRECATED RDW RBC AUTO: 43.1 FL (ref 37–54)
EGFRCR SERPLBLD CKD-EPI 2021: 48.7 ML/MIN/1.73
ERYTHROCYTE [DISTWIDTH] IN BLOOD BY AUTOMATED COUNT: 13.2 % (ref 12.3–15.4)
GGT SERPL-CCNC: 19 U/L (ref 5–36)
GLOBULIN UR ELPH-MCNC: 3 GM/DL
GLUCOSE BLDC GLUCOMTR-MCNC: 128 MG/DL (ref 70–130)
GLUCOSE BLDC GLUCOMTR-MCNC: 160 MG/DL (ref 70–130)
GLUCOSE BLDC GLUCOMTR-MCNC: 162 MG/DL (ref 70–130)
GLUCOSE BLDC GLUCOMTR-MCNC: 222 MG/DL (ref 70–130)
GLUCOSE SERPL-MCNC: 106 MG/DL (ref 65–99)
HCT VFR BLD AUTO: 37 % (ref 34–46.6)
HGB BLD-MCNC: 12.2 G/DL (ref 12–15.9)
MAGNESIUM SERPL-MCNC: 1.9 MG/DL (ref 1.6–2.4)
MCH RBC QN AUTO: 29.5 PG (ref 26.6–33)
MCHC RBC AUTO-ENTMCNC: 33 G/DL (ref 31.5–35.7)
MCV RBC AUTO: 89.4 FL (ref 79–97)
PHOSPHATE SERPL-MCNC: 3.4 MG/DL (ref 2.5–4.5)
PLATELET # BLD AUTO: 219 10*3/MM3 (ref 140–450)
PMV BLD AUTO: 9 FL (ref 6–12)
POTASSIUM SERPL-SCNC: 4.3 MMOL/L (ref 3.5–5.2)
PROT SERPL-MCNC: 5.7 G/DL (ref 6–8.5)
RBC # BLD AUTO: 4.14 10*6/MM3 (ref 3.77–5.28)
SODIUM SERPL-SCNC: 136 MMOL/L (ref 136–145)
WBC NRBC COR # BLD AUTO: 8.45 10*3/MM3 (ref 3.4–10.8)

## 2025-02-08 PROCEDURE — 82977 ASSAY OF GGT: CPT | Performed by: INTERNAL MEDICINE

## 2025-02-08 PROCEDURE — 84100 ASSAY OF PHOSPHORUS: CPT | Performed by: INTERNAL MEDICINE

## 2025-02-08 PROCEDURE — A9577 INJ MULTIHANCE: HCPCS | Performed by: INTERNAL MEDICINE

## 2025-02-08 PROCEDURE — 82948 REAGENT STRIP/BLOOD GLUCOSE: CPT

## 2025-02-08 PROCEDURE — 25010000002 CEFAZOLIN PER 500 MG: Performed by: INTERNAL MEDICINE

## 2025-02-08 PROCEDURE — 25510000002 GADOBENATE DIMEGLUMINE 529 MG/ML SOLUTION: Performed by: INTERNAL MEDICINE

## 2025-02-08 PROCEDURE — 63710000001 INSULIN GLARGINE PER 5 UNITS: Performed by: INTERNAL MEDICINE

## 2025-02-08 PROCEDURE — 85027 COMPLETE CBC AUTOMATED: CPT | Performed by: INTERNAL MEDICINE

## 2025-02-08 PROCEDURE — 63710000001 INSULIN LISPRO (HUMAN) PER 5 UNITS: Performed by: NURSE PRACTITIONER

## 2025-02-08 PROCEDURE — 25010000002 VANCOMYCIN 1 G RECONSTITUTED SOLUTION 1 EACH VIAL: Performed by: INTERNAL MEDICINE

## 2025-02-08 PROCEDURE — 25810000003 SODIUM CHLORIDE 0.9 % SOLUTION 250 ML FLEX CONT: Performed by: INTERNAL MEDICINE

## 2025-02-08 PROCEDURE — 83735 ASSAY OF MAGNESIUM: CPT | Performed by: INTERNAL MEDICINE

## 2025-02-08 PROCEDURE — 25010000002 PIPERACILLIN SOD-TAZOBACTAM PER 1 G: Performed by: NURSE PRACTITIONER

## 2025-02-08 PROCEDURE — 80053 COMPREHEN METABOLIC PANEL: CPT | Performed by: INTERNAL MEDICINE

## 2025-02-08 PROCEDURE — 73720 MRI LWR EXTREMITY W/O&W/DYE: CPT

## 2025-02-08 RX ADMIN — LEVOTHYROXINE SODIUM 88 MCG: 88 TABLET ORAL at 10:16

## 2025-02-08 RX ADMIN — ATORVASTATIN CALCIUM 40 MG: 20 TABLET, FILM COATED ORAL at 22:19

## 2025-02-08 RX ADMIN — Medication 5 MG: at 00:28

## 2025-02-08 RX ADMIN — VENLAFAXINE HYDROCHLORIDE 150 MG: 75 CAPSULE, EXTENDED RELEASE ORAL at 10:16

## 2025-02-08 RX ADMIN — LOSARTAN POTASSIUM 50 MG: 50 TABLET, FILM COATED ORAL at 13:16

## 2025-02-08 RX ADMIN — RIVAROXABAN 15 MG: 15 TABLET, FILM COATED ORAL at 17:46

## 2025-02-08 RX ADMIN — PIPERACILLIN AND TAZOBACTAM 3.38 G: 3; .375 INJECTION, POWDER, FOR SOLUTION INTRAVENOUS at 10:14

## 2025-02-08 RX ADMIN — BUPROPION HYDROCHLORIDE 150 MG: 150 TABLET, EXTENDED RELEASE ORAL at 10:16

## 2025-02-08 RX ADMIN — INSULIN LISPRO 3 UNITS: 100 INJECTION, SOLUTION INTRAVENOUS; SUBCUTANEOUS at 12:49

## 2025-02-08 RX ADMIN — GADOBENATE DIMEGLUMINE 20 ML: 529 INJECTION, SOLUTION INTRAVENOUS at 15:46

## 2025-02-08 RX ADMIN — PIPERACILLIN AND TAZOBACTAM 3.38 G: 3; .375 INJECTION, POWDER, FOR SOLUTION INTRAVENOUS at 00:29

## 2025-02-08 RX ADMIN — GABAPENTIN 400 MG: 400 CAPSULE ORAL at 00:28

## 2025-02-08 RX ADMIN — INSULIN GLARGINE 5 UNITS: 100 INJECTION, SOLUTION SUBCUTANEOUS at 22:18

## 2025-02-08 RX ADMIN — OXYBUTYNIN CHLORIDE 10 MG: 10 TABLET, EXTENDED RELEASE ORAL at 10:16

## 2025-02-08 RX ADMIN — INSULIN LISPRO 2 UNITS: 100 INJECTION, SOLUTION INTRAVENOUS; SUBCUTANEOUS at 17:46

## 2025-02-08 RX ADMIN — CEFAZOLIN 2000 MG: 2 INJECTION, POWDER, FOR SOLUTION INTRAMUSCULAR; INTRAVENOUS at 19:20

## 2025-02-08 RX ADMIN — VANCOMYCIN HYDROCHLORIDE 1000 MG: 1 INJECTION, POWDER, LYOPHILIZED, FOR SOLUTION INTRAVENOUS at 13:15

## 2025-02-08 RX ADMIN — INSULIN LISPRO 2 UNITS: 100 INJECTION, SOLUTION INTRAVENOUS; SUBCUTANEOUS at 22:17

## 2025-02-08 RX ADMIN — Medication 10 ML: at 10:17

## 2025-02-08 RX ADMIN — GABAPENTIN 400 MG: 400 CAPSULE ORAL at 13:15

## 2025-02-08 RX ADMIN — METOPROLOL SUCCINATE 25 MG: 25 TABLET, EXTENDED RELEASE ORAL at 10:16

## 2025-02-08 RX ADMIN — DONEPEZIL HYDROCHLORIDE 10 MG: 10 TABLET, FILM COATED ORAL at 22:19

## 2025-02-08 NOTE — PLAN OF CARE
Goal Outcome Evaluation:  Plan of Care Reviewed With: patient           Outcome Evaluation: BP elevated. Pain minimal on Right foot which is still red with some trace swelling. Remains on RA. No other complaints

## 2025-02-08 NOTE — PROGRESS NOTES
Name: Rekha Bear ADMIT: 2025   : 1940  PCP: Zahida Osorio MD    MRN: [id number] LOS: 2 days   AGE/SEX: 84 y.o. female  ROOM: [facility]     Subjective   Subjective   Chief Complaint   Patient presents with    Foot Swelling     Right foot redness and swelling that patient noticed yesterday morning.      Her erythema and swelling of her right foot are improving.  She still has pain of her right heel in the area of discoloration there on the plantar surface.  Toes are red appearing today consistent with her Raynaud's.  Left index finger is dressed.  She is not reporting any chest pain palpitation shortness of breath.  No nausea vomiting diarrhea or abdominal pain.  Not reporting calf tenderness or rest pain of the right leg.     Objective   Objective   Vital Signs  Temp:  [97.3 °F (36.3 °C)-98.4 °F (36.9 °C)] 97.7 °F (36.5 °C)  Heart Rate:  [66-84] 84  Resp:  [16-20] 20  BP: (105-155)/(78-96) 140/78  SpO2:  [95 %-100 %] 100 %  on   ;   Device (Oxygen Therapy): room air  Body mass index is 32.11 kg/m².    Physical Exam  General AA NAD  HEENT NCAT  CV normal rate, irregular rhythm  Lungs CTAB  Abdomen ND NT  Extremity no pitting, right foot with cellulitis changes and more dusky appearance on the plantar surface of the heel.  She does have a small punctate spot that does not appear open and is not having any drainage currently.  Raynaud's type color changes of toes on that foot.  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  Neuro CN II through XII grossly intact  Psych normal mood and affect    Results Review  I reviewed the patient's new clinical results.    Results from last 7 days   Lab Units 257 25  0642 25  1724   WBC 10*3/mm3 8.45 9.91 10.16   HEMOGLOBIN g/dL 12.2 12.9 13.8   PLATELETS 10*3/mm3 219 222 260     Results from last 7 days   Lab Units 25  0417 25  1133 25  0642 25  1724   SODIUM  mmol/L 136 138 138 133*   POTASSIUM mmol/L 4.3 4.7 5.8* 4.7   CHLORIDE mmol/L 106 106 106 98   CO2 mmol/L 22.4 27.5 27.0 28.7   BUN mg/dL 21 22 24* 32*   CREATININE mg/dL 1.13* 1.38* 1.42* 1.31*   GLUCOSE mg/dL 106* 193* 215* 220*   EGFR mL/min/1.73 48.7* 38.3* 37.0* 40.8*     Results from last 7 days   Lab Units 02/08/25  0417 02/07/25  0642 02/06/25  1724   ALBUMIN g/dL 2.7* 3.0* 3.6   BILIRUBIN mg/dL 0.5 0.4 0.5   ALK PHOS U/L 270* 296* 357*   AST (SGOT) U/L 14 14 13   ALT (SGPT) U/L 14 17 19     Results from last 7 days   Lab Units 02/08/25  0417 02/07/25  1133 02/07/25  0642 02/06/25  1724   CALCIUM mg/dL 8.7 9.3 8.6 9.4   ALBUMIN g/dL 2.7*  --  3.0* 3.6   MAGNESIUM mg/dL 1.9  --   --   --    PHOSPHORUS mg/dL 3.4  --   --   --      Results from last 7 days   Lab Units 02/06/25  1724   LACTATE mmol/L 1.3     Hemoglobin A1C   Date/Time Value Ref Range Status   02/07/2025 0642 9.00 (H) 4.80 - 5.60 % Final     Glucose   Date/Time Value Ref Range Status   02/08/2025 1128 222 (H) 70 - 130 mg/dL Final   02/08/2025 0744 128 70 - 130 mg/dL Final   02/07/2025 2051 171 (H) 70 - 130 mg/dL Final   02/07/2025 1653 198 (H) 70 - 130 mg/dL Final   02/07/2025 1157 184 (H) 70 - 130 mg/dL Final   02/07/2025 0746 317 (H) 70 - 130 mg/dL Final       No radiology results for the last day    I have personally reviewed all medications:  Scheduled Medications  atorvastatin, 40 mg, Oral, Nightly  buPROPion XL, 150 mg, Oral, QAM  donepezil, 10 mg, Oral, Nightly  gabapentin, 400 mg, Oral, Q12H  insulin glargine, 5 Units, Subcutaneous, Nightly  insulin lispro, 2-7 Units, Subcutaneous, 4x Daily AC & at Bedtime  levothyroxine, 88 mcg, Oral, Daily  losartan, 50 mg, Oral, Daily  metoprolol succinate XL, 25 mg, Oral, Q24H  oxybutynin XL, 10 mg, Oral, Daily  piperacillin-tazobactam, 3.375 g, Intravenous, Q8H  rivaroxaban, 15 mg, Oral, Daily With Dinner  sodium chloride, 10 mL, Intravenous, Q12H  vancomycin, 1,000 mg, Intravenous, Q24H  venlafaxine  XR, 150 mg, Oral, Daily      Infusions  Pharmacy to dose vancomycin,       Diet  Diet: Cardiac, Diabetic; Healthy Heart (2-3 Na+); Consistent Carbohydrate; Fluid Consistency: Thin (IDDSI 0)       Assessment/Plan     Active Hospital Problems    Diagnosis  POA    **Cellulitis in diabetic foot [E11.628, L03.119]  Yes    Atrial fibrillation, persistent [I48.19]  Yes    SHEY (obstructive sleep apnea) [G47.33]  Yes    Hypothyroidism [E03.9]  Yes    Primary hypertension [I10]  Yes    Type 2 diabetes mellitus with stage 3 chronic kidney disease, without long-term current use of insulin [E11.22, N18.30]  Yes    Anxiety [F41.9]  Yes    Gastroesophageal reflux disease [K21.9]  Yes      Resolved Hospital Problems   No resolved problems to display.       84 y.o. female admitted with Cellulitis in diabetic foot.    RLE cellulitis: Blood culture no growth to date.  Had elevated inflammatory markers.  A.m. checking MRI due to the dusky appearance of her right heel.  She has some more typical Raynaud's findings today of her toes.  Will check SHELBY and duplex.  Changed to cefazolin for treatment  Raynaud's  Chronic dry gangrene left index finger: Continue wound care  Diabetes: A1c 9.0.  Continue Lantus and SSI.  Monitor.  Hypothyroidism: Synthroid  Chronic A-fib: Rate okay.  On Xarelto  Hypertension: Monitoring  Elevated alk phos: GGT okay.  Suspect this is from her finger.  Standing MRI of her foot as above.  PPX: See above  Disposition: SNF versus AL/few days    She is on a complex outpatient medical regimen that took many times to get correct at her facility. The tentative plan is for her to discharge when she is ready on her previous outpatient regimen and likely have the addition of an antibiotic.     Expected Discharge Date: 2/10/2025; Expected Discharge Time:      Dakota Raygoza MD  [facility]  02/08/25  14:24 EST    Dictated portions of note using Dragon dictation software.  Copied text in this note  has been reviewed by me and remains accurate as of 02/08/25

## 2025-02-09 ENCOUNTER — APPOINTMENT (OUTPATIENT)
Dept: CARDIOLOGY | Facility: HOSPITAL | Age: 85
DRG: 638 | End: 2025-02-09
Payer: MEDICARE

## 2025-02-09 LAB
ALBUMIN SERPL-MCNC: 3.1 G/DL (ref 3.5–5.2)
ALBUMIN/GLOB SERPL: 1.1 G/DL
ALP SERPL-CCNC: 295 U/L (ref 39–117)
ALT SERPL W P-5'-P-CCNC: 12 U/L (ref 1–33)
ANION GAP SERPL CALCULATED.3IONS-SCNC: 10.5 MMOL/L (ref 5–15)
AST SERPL-CCNC: 14 U/L (ref 1–32)
BH CV LOWER ARTERIAL LEFT ABI RATIO: 1.23
BH CV LOWER ARTERIAL LEFT DORSALIS PEDIS SYS MAX: 201
BH CV LOWER ARTERIAL LEFT GREAT TOE SYS MAX: 119
BH CV LOWER ARTERIAL LEFT POST TIBIAL SYS MAX: 145
BH CV LOWER ARTERIAL LEFT TBI RATIO: 0.74
BH CV LOWER ARTERIAL RIGHT ABI RATIO: 0.97
BH CV LOWER ARTERIAL RIGHT DORSALIS PEDIS SYS MAX: 154
BH CV LOWER ARTERIAL RIGHT GREAT TOE SYS MAX: 189
BH CV LOWER ARTERIAL RIGHT POST TIBIAL SYS MAX: 156
BH CV LOWER ARTERIAL RIGHT TBI RATIO: 1.18
BH CV LOWER VASCULAR LEFT COMMON FEMORAL AUGMENT: NORMAL
BH CV LOWER VASCULAR LEFT COMMON FEMORAL COMPETENT: NORMAL
BH CV LOWER VASCULAR LEFT COMMON FEMORAL COMPRESS: NORMAL
BH CV LOWER VASCULAR LEFT COMMON FEMORAL PHASIC: NORMAL
BH CV LOWER VASCULAR LEFT COMMON FEMORAL SPONT: NORMAL
BH CV LOWER VASCULAR RIGHT COMMON FEMORAL AUGMENT: NORMAL
BH CV LOWER VASCULAR RIGHT COMMON FEMORAL COMPETENT: NORMAL
BH CV LOWER VASCULAR RIGHT COMMON FEMORAL COMPRESS: NORMAL
BH CV LOWER VASCULAR RIGHT COMMON FEMORAL PHASIC: NORMAL
BH CV LOWER VASCULAR RIGHT COMMON FEMORAL SPONT: NORMAL
BH CV LOWER VASCULAR RIGHT DISTAL FEMORAL COMPRESS: NORMAL
BH CV LOWER VASCULAR RIGHT GASTRONEMIUS COMPRESS: NORMAL
BH CV LOWER VASCULAR RIGHT GREATER SAPH AK COMPRESS: NORMAL
BH CV LOWER VASCULAR RIGHT GREATER SAPH BK COMPRESS: NORMAL
BH CV LOWER VASCULAR RIGHT LESSER SAPH COMPRESS: NORMAL
BH CV LOWER VASCULAR RIGHT MID FEMORAL AUGMENT: NORMAL
BH CV LOWER VASCULAR RIGHT MID FEMORAL COMPETENT: NORMAL
BH CV LOWER VASCULAR RIGHT MID FEMORAL COMPRESS: NORMAL
BH CV LOWER VASCULAR RIGHT MID FEMORAL PHASIC: NORMAL
BH CV LOWER VASCULAR RIGHT MID FEMORAL SPONT: NORMAL
BH CV LOWER VASCULAR RIGHT PERONEAL COMPRESS: NORMAL
BH CV LOWER VASCULAR RIGHT POPLITEAL AUGMENT: NORMAL
BH CV LOWER VASCULAR RIGHT POPLITEAL COMPETENT: NORMAL
BH CV LOWER VASCULAR RIGHT POPLITEAL COMPRESS: NORMAL
BH CV LOWER VASCULAR RIGHT POPLITEAL PHASIC: NORMAL
BH CV LOWER VASCULAR RIGHT POPLITEAL SPONT: NORMAL
BH CV LOWER VASCULAR RIGHT POSTERIOR TIBIAL COMPRESS: NORMAL
BH CV LOWER VASCULAR RIGHT PROFUNDA FEMORAL COMPRESS: NORMAL
BH CV LOWER VASCULAR RIGHT PROXIMAL FEMORAL COMPRESS: NORMAL
BH CV LOWER VASCULAR RIGHT SAPHENOFEMORAL JUNCTION COMPRESS: NORMAL
BILIRUB SERPL-MCNC: 0.3 MG/DL (ref 0–1.2)
BUN SERPL-MCNC: 21 MG/DL (ref 8–23)
BUN/CREAT SERPL: 20.4 (ref 7–25)
CALCIUM SPEC-SCNC: 8.8 MG/DL (ref 8.6–10.5)
CHLORIDE SERPL-SCNC: 103 MMOL/L (ref 98–107)
CO2 SERPL-SCNC: 23.5 MMOL/L (ref 22–29)
CREAT SERPL-MCNC: 1.03 MG/DL (ref 0.57–1)
DEPRECATED RDW RBC AUTO: 44 FL (ref 37–54)
EGFRCR SERPLBLD CKD-EPI 2021: 54.4 ML/MIN/1.73
ERYTHROCYTE [DISTWIDTH] IN BLOOD BY AUTOMATED COUNT: 13.3 % (ref 12.3–15.4)
GLOBULIN UR ELPH-MCNC: 2.8 GM/DL
GLUCOSE BLDC GLUCOMTR-MCNC: 166 MG/DL (ref 70–130)
GLUCOSE BLDC GLUCOMTR-MCNC: 171 MG/DL (ref 70–130)
GLUCOSE BLDC GLUCOMTR-MCNC: 200 MG/DL (ref 70–130)
GLUCOSE BLDC GLUCOMTR-MCNC: 232 MG/DL (ref 70–130)
GLUCOSE SERPL-MCNC: 175 MG/DL (ref 65–99)
HCT VFR BLD AUTO: 37.8 % (ref 34–46.6)
HGB BLD-MCNC: 12.4 G/DL (ref 12–15.9)
MCH RBC QN AUTO: 29.9 PG (ref 26.6–33)
MCHC RBC AUTO-ENTMCNC: 32.8 G/DL (ref 31.5–35.7)
MCV RBC AUTO: 91.1 FL (ref 79–97)
PLATELET # BLD AUTO: 203 10*3/MM3 (ref 140–450)
PMV BLD AUTO: 8.9 FL (ref 6–12)
POTASSIUM SERPL-SCNC: 4.4 MMOL/L (ref 3.5–5.2)
PROT SERPL-MCNC: 5.9 G/DL (ref 6–8.5)
RBC # BLD AUTO: 4.15 10*6/MM3 (ref 3.77–5.28)
SODIUM SERPL-SCNC: 137 MMOL/L (ref 136–145)
UPPER ARTERIAL LEFT ARM BRACHIAL SYS MAX: 135
UPPER ARTERIAL RIGHT ARM BRACHIAL SYS MAX: 160
WBC NRBC COR # BLD AUTO: 9.7 10*3/MM3 (ref 3.4–10.8)

## 2025-02-09 PROCEDURE — 80053 COMPREHEN METABOLIC PANEL: CPT | Performed by: INTERNAL MEDICINE

## 2025-02-09 PROCEDURE — 85027 COMPLETE CBC AUTOMATED: CPT | Performed by: INTERNAL MEDICINE

## 2025-02-09 PROCEDURE — 93971 EXTREMITY STUDY: CPT | Performed by: SURGERY

## 2025-02-09 PROCEDURE — 93922 UPR/L XTREMITY ART 2 LEVELS: CPT

## 2025-02-09 PROCEDURE — 63710000001 INSULIN GLARGINE PER 5 UNITS: Performed by: INTERNAL MEDICINE

## 2025-02-09 PROCEDURE — 93923 UPR/LXTR ART STDY 3+ LVLS: CPT

## 2025-02-09 PROCEDURE — 25010000002 CEFAZOLIN PER 500 MG: Performed by: INTERNAL MEDICINE

## 2025-02-09 PROCEDURE — 63710000001 INSULIN LISPRO (HUMAN) PER 5 UNITS: Performed by: NURSE PRACTITIONER

## 2025-02-09 PROCEDURE — 97116 GAIT TRAINING THERAPY: CPT

## 2025-02-09 PROCEDURE — 82948 REAGENT STRIP/BLOOD GLUCOSE: CPT

## 2025-02-09 PROCEDURE — 93971 EXTREMITY STUDY: CPT

## 2025-02-09 PROCEDURE — 93922 UPR/L XTREMITY ART 2 LEVELS: CPT | Performed by: SURGERY

## 2025-02-09 RX ADMIN — RIVAROXABAN 15 MG: 15 TABLET, FILM COATED ORAL at 18:06

## 2025-02-09 RX ADMIN — INSULIN GLARGINE 5 UNITS: 100 INJECTION, SOLUTION SUBCUTANEOUS at 21:17

## 2025-02-09 RX ADMIN — BUPROPION HYDROCHLORIDE 150 MG: 150 TABLET, EXTENDED RELEASE ORAL at 09:23

## 2025-02-09 RX ADMIN — OXYBUTYNIN CHLORIDE 10 MG: 10 TABLET, EXTENDED RELEASE ORAL at 09:23

## 2025-02-09 RX ADMIN — ATORVASTATIN CALCIUM 40 MG: 20 TABLET, FILM COATED ORAL at 21:16

## 2025-02-09 RX ADMIN — GABAPENTIN 400 MG: 400 CAPSULE ORAL at 13:17

## 2025-02-09 RX ADMIN — Medication 10 ML: at 21:18

## 2025-02-09 RX ADMIN — GABAPENTIN 400 MG: 400 CAPSULE ORAL at 00:43

## 2025-02-09 RX ADMIN — VENLAFAXINE HYDROCHLORIDE 150 MG: 75 CAPSULE, EXTENDED RELEASE ORAL at 09:23

## 2025-02-09 RX ADMIN — INSULIN LISPRO 3 UNITS: 100 INJECTION, SOLUTION INTRAVENOUS; SUBCUTANEOUS at 21:17

## 2025-02-09 RX ADMIN — CEFAZOLIN 2000 MG: 2 INJECTION, POWDER, FOR SOLUTION INTRAMUSCULAR; INTRAVENOUS at 03:23

## 2025-02-09 RX ADMIN — INSULIN LISPRO 2 UNITS: 100 INJECTION, SOLUTION INTRAVENOUS; SUBCUTANEOUS at 09:23

## 2025-02-09 RX ADMIN — Medication 10 ML: at 09:24

## 2025-02-09 RX ADMIN — LEVOTHYROXINE SODIUM 88 MCG: 88 TABLET ORAL at 09:23

## 2025-02-09 RX ADMIN — INSULIN LISPRO 3 UNITS: 100 INJECTION, SOLUTION INTRAVENOUS; SUBCUTANEOUS at 13:18

## 2025-02-09 RX ADMIN — INSULIN LISPRO 2 UNITS: 100 INJECTION, SOLUTION INTRAVENOUS; SUBCUTANEOUS at 18:06

## 2025-02-09 RX ADMIN — CEFAZOLIN 2000 MG: 2 INJECTION, POWDER, FOR SOLUTION INTRAMUSCULAR; INTRAVENOUS at 18:06

## 2025-02-09 RX ADMIN — METOPROLOL SUCCINATE 25 MG: 25 TABLET, EXTENDED RELEASE ORAL at 09:23

## 2025-02-09 RX ADMIN — LOSARTAN POTASSIUM 50 MG: 50 TABLET, FILM COATED ORAL at 09:23

## 2025-02-09 RX ADMIN — DONEPEZIL HYDROCHLORIDE 10 MG: 10 TABLET, FILM COATED ORAL at 21:17

## 2025-02-09 RX ADMIN — CEFAZOLIN 2000 MG: 2 INJECTION, POWDER, FOR SOLUTION INTRAMUSCULAR; INTRAVENOUS at 13:17

## 2025-02-09 RX ADMIN — Medication 5 MG: at 00:43

## 2025-02-09 NOTE — PLAN OF CARE
Goal Outcome Evaluation:              Outcome Evaluation: VSS. MRI of foot completed this shift. Plan for bilateral dopplers per [facility]. Abx adjusted per [facility]. Now on cefazolin. Dressing on finger changed per order. No other complaints. Needs met at this time.

## 2025-02-09 NOTE — PROGRESS NOTES
Name: Rekha Bear ADMIT: 2025   : 1940  PCP: Zahida Osorio MD    MRN: [id number] LOS: 3 days   AGE/SEX: 84 y.o. female  ROOM: [facility]     Subjective   Subjective   Patient is seen bedside, he is feeling better.  No acute overnight events have been noted.       Objective   Objective   Vital Signs  Temp:  [97.7 °F (36.5 °C)-98.4 °F (36.9 °C)] 98.2 °F (36.8 °C)  Heart Rate:  [71-86] 71  Resp:  [20] 20  BP: (130-169)/() 135/88     on   ;   Device (Oxygen Therapy): room air  Body mass index is 32.11 kg/m².  Physical Exam  General, awake and alert.  Head and ENT, normocephalic and atraumatic.  Lungs, symmetric expansion, equal air entry bilaterally.  Heart, regular rate and rhythm.  Abdomen, soft and nontender.  Extremities, no clubbing or cyanosis.  right foot with cellulitis changes and more dusky appearance on the plantar surface of the heel.  She does have a small punctate spot that does not appear open and is not having any drainage currently.  Raynaud's type color changes of toes on that foot.  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   Neuro, no focal deficits.  Skin: Warm and no rash.  Psych, normal mood and affect.  Musculoskeletal, joint examination is grossly normal.    Copied text material from yesterday's note has been reviewed for appropriate changes and remains accurate as of 25.      Results Review     I reviewed the patient's new clinical results.  Results from last 7 days   Lab Units 25  0503 25  0417 25  0642 25  1724   WBC 10*3/mm3 9.70 8.45 9.91 10.16   HEMOGLOBIN g/dL 12.4 12.2 12.9 13.8   PLATELETS 10*3/mm3 203 219 222 260     Results from last 7 days   Lab Units 25  0503 25  0417 25  1133 25  0642   SODIUM mmol/L 137 136 138 138   POTASSIUM mmol/L 4.4 4.3 4.7 5.8*   CHLORIDE mmol/L 103 106 106 106   CO2 mmol/L 23.5 22.4 27.5 27.0   BUN mg/dL 21 21 22 24*    CREATININE mg/dL 1.03* 1.13* 1.38* 1.42*   GLUCOSE mg/dL 175* 106* 193* 215*   EGFR mL/min/1.73 54.4* 48.7* 38.3* 37.0*     Results from last 7 days   Lab Units 02/09/25  0503 02/08/25  0417 02/07/25  0642 02/06/25  1724   ALBUMIN g/dL 3.1* 2.7* 3.0* 3.6   BILIRUBIN mg/dL 0.3 0.5 0.4 0.5   ALK PHOS U/L 295* 270* 296* 357*   AST (SGOT) U/L 14 14 14 13   ALT (SGPT) U/L 12 14 17 19     Results from last 7 days   Lab Units 02/09/25  0503 02/08/25  0417 02/07/25  1133 02/07/25  0642 02/06/25  1724   CALCIUM mg/dL 8.8 8.7 9.3 8.6 9.4   ALBUMIN g/dL 3.1* 2.7*  --  3.0* 3.6   MAGNESIUM mg/dL  --  1.9  --   --   --    PHOSPHORUS mg/dL  --  3.4  --   --   --      Results from last 7 days   Lab Units 02/06/25  1724   LACTATE mmol/L 1.3     Hemoglobin A1C   Date/Time Value Ref Range Status   02/07/2025 0642 9.00 (H) 4.80 - 5.60 % Final     Glucose   Date/Time Value Ref Range Status   02/09/2025 1642 171 (H) 70 - 130 mg/dL Final   02/09/2025 1128 232 (H) 70 - 130 mg/dL Final   02/09/2025 0739 166 (H) 70 - 130 mg/dL Final   02/08/2025 2153 162 (H) 70 - 130 mg/dL Final   02/08/2025 1634 160 (H) 70 - 130 mg/dL Final   02/08/2025 1128 222 (H) 70 - 130 mg/dL Final   02/08/2025 0744 128 70 - 130 mg/dL Final       No radiology results for the last day    I have personally reviewed all medications:  Scheduled Medications  atorvastatin, 40 mg, Oral, Nightly  buPROPion XL, 150 mg, Oral, QAM  ceFAZolin, 2,000 mg, Intravenous, Q8H  donepezil, 10 mg, Oral, Nightly  gabapentin, 400 mg, Oral, Q12H  insulin glargine, 5 Units, Subcutaneous, Nightly  insulin lispro, 2-7 Units, Subcutaneous, 4x Daily AC & at Bedtime  levothyroxine, 88 mcg, Oral, Daily  losartan, 50 mg, Oral, Daily  metoprolol succinate XL, 25 mg, Oral, Q24H  oxybutynin XL, 10 mg, Oral, Daily  rivaroxaban, 15 mg, Oral, Daily With Dinner  sodium chloride, 10 mL, Intravenous, Q12H  venlafaxine XR, 150 mg, Oral, Daily    Infusions   Diet  Diet: Cardiac, Diabetic; Healthy Heart (2-3  Na+); Consistent Carbohydrate; Fluid Consistency: Thin (IDDSI 0)    I have personally reviewed:  [x]  Laboratory   [x]  Microbiology   [x]  Radiology   [x]  EKG/Telemetry  [x]  Cardiology/Vascular   []  Pathology    []  Records       Assessment/Plan     Active Hospital Problems    Diagnosis  POA    **Cellulitis in diabetic foot [E11.628, L03.119]  Yes    Atrial fibrillation, persistent [I48.19]  Yes    SHEY (obstructive sleep apnea) [G47.33]  Yes    Hypothyroidism [E03.9]  Yes    Primary hypertension [I10]  Yes    Type 2 diabetes mellitus with stage 3 chronic kidney disease, without long-term current use of insulin [E11.22, N18.30]  Yes    Anxiety [F41.9]  Yes    Gastroesophageal reflux disease [K21.9]  Yes      Resolved Hospital Problems   No resolved problems to display.       84 y.o. female admitted with Cellulitis in diabetic foot.      84 y.o. female admitted with Cellulitis in diabetic foot.     RLE cellulitis: Blood culture no growth to date.  Had elevated inflammatory markers.  MRI of the right foot has been ordered, we will follow-up on results.  Raynaud's  Chronic dry gangrene left index finger: Continue wound care  Diabetes: A1c 9.0.  Continue Lantus and SSI.  Monitor.  Hypothyroidism: Synthroid  Chronic A-fib: Rate okay.  On Xarelto  Hypertension: Monitoring  Elevated alk phos: GGT okay.  Suspect this is from her finger.  Standing MRI of her foot as above.  PPX: See above  Disposition: SNF versus AL/few days     She is on a complex outpatient medical regimen that took many times to get correct at her facility. The tentative plan is for her to discharge when she is ready on her previous outpatient regimen and likely have the addition of an antibiotic.           Expected Discharge Date: 2/10/2025; Expected Discharge Time:       Jacob Youngblood MD  [facility]  02/09/25  16:59 EST

## 2025-02-09 NOTE — PLAN OF CARE
Goal Outcome Evaluation:  Plan of Care Reviewed With: patient        Progress: improving  Outcome Evaluation: Pt seen for PT tx today. Pt agreeable to participate. Pt had no c/o pain or dizziness throughout tx session. Pt able to complete bed mobility with SBA and stands with CGA.  Pt ambulated 80ft with rwx, SBA. No unsteadiness or LOB noted. Cued pt for posture. Possible d/c back to residential with PT. Will continue to follow for progress.

## 2025-02-09 NOTE — THERAPY TREATMENT NOTE
"Patient Name: Rekha Bear  : 1940    MRN: [id number]                              Today's Date: 2025       Admit Date: 2025    Visit Dx:     ICD-10-CM ICD-9-CM   1. Cellulitis of right foot  L03.115 682.7   2. 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    Community acquired pneumonia of left lower lobe of lung    Hyponatremia    Pneumonia due to COVID-19 virus    Major depressive disorder, recurrent, mild    Acute respiratory failure with hypoxia    Supraventricular tachycardia    PVC (premature ventricular contraction)    Depressive disorder    Gastroesophageal reflux disease    Midline cystocele    Rectocele    BMI 29.0-29.9,adult    Acute left PCA stroke    B12 deficiency    Bradycardia    Premature atrial contractions    Stroke    SHEY (obstructive sleep apnea)    Circadian rhythm disorder    Cerebrovascular accident (CVA) due to embolism of left posterior cerebral artery    Anxiety    Dysphagia as late effect of stroke    Atrial fibrillation, persistent    Acute UTI    Closed left hip fracture    Periprosthetic fracture around internal prosthetic left hip joint    Naz-prosthetic fracture around prosthetic hip    History of CVA (cerebrovascular accident)    Cellulitis in diabetic foot     Past Medical History:   Diagnosis Date    Chilblains     \"Chilblian's\"    CKD (chronic kidney disease)     CVA (cerebral vascular accident)     Depression     Fatty liver     GERD (gastroesophageal reflux disease)     Hyperlipidemia     Hypertension     Incontinence     " "Osteoarthritis     OA  Marvin THR, LT TKR - hydrocodone prescibed by Dr. 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 Almaguer    TOTAL KNEE ARTHROPLASTY Left     OA  Marvin THR, LT TKR - hydrocodone prescibed by Dr. Almaguer      General Information       Row Name 02/09/25 1459          Physical Therapy Time and Intention    Document Type therapy note (daily note)  -EB     Mode of Treatment physical therapy  -EB       Row Name 02/09/25 145          General Information    Patient Profile Reviewed yes  -EB     Existing Precautions/Restrictions fall  -EB       Row Name 02/09/25 1456          Cognition    Orientation Status (Cognition) oriented x 3  -EB               User Key  (r) = Recorded By, (t) = Taken By, (c) = Cosigned By      Initials Name Provider Type    EB Mely Lazcano PTA Physical Therapist Assistant                   Mobility       Row Name 02/09/25 145          Bed Mobility    Supine-Sit [place] (Bed Mobility) standby assist  -EB     Sit-Supine [place] (Bed Mobility) not tested  -EB     Assistive Device (Bed Mobility) bed rails;head of bed elevated  -EB     Comment, (Bed Mobility) increased time  -EB       Row Name 02/09/25 1452          Sit-Stand Transfer    " Sit-Stand [place] (Transfers) contact guard  -     Assistive Device (Sit-Stand Transfers) walker, front-wheeled  -EB     Comment, (Sit-Stand Transfer) needed a couple of minutes to acclimate after standing. Pt reports visual deficits in right mainly and some in left.  -       Row Name 02/09/25 1453          Gait/Stairs (Locomotion)    [place] Level (Gait) standby assist  -EB     Assistive Device (Gait) walker, front-wheeled  -EB     Distance in Feet (Gait) 80  -EB     Deviations/Abnormal Patterns (Gait) gait speed decreased;stride length decreased  -EB     Bilateral Gait Deviations forward flexed posture;heel strike decreased  -EB     Comment, (Gait/Stairs) cues for upright posture. No unsteadiness or LOB noted.  No c/o dizziness, pain or fatigue.  -               User Key  (r) = Recorded By, (t) = Taken By, (c) = Cosigned By      Initials Name Provider Type    Mely Box PTA Physical Therapist Assistant                   Obj/Interventions    No documentation.                  Goals/Plan    No documentation.                  Clinical Impression       Row Name 02/09/25 1453          Pain    Pretreatment Pain Rating 0/10 - no pain  -EB     Posttreatment Pain Rating 0/10 - no pain  -EB       Row Name 02/09/25 1454          Plan of Care Review    Plan of Care Reviewed With patient  -EB     Progress improving  -     Outcome Evaluation Pt seen for PT tx today. Pt agreeable to participate. Pt had no c/o pain or dizziness throughout tx session. Pt able to complete bed mobility with SBA and stands with CGA.  Pt ambulated 80ft with rwx, SBA. No unsteadiness or LOB noted. Cued pt for posture. Possible d/c back to [facility] with PT. Will continue to follow for progress.  -       Row Name 02/09/25 1454          Therapy Assessment/Plan (PT)    Therapy Frequency (PT) 6 times/wk  -       Row Name 02/09/25 145          Positioning and Restraints    Pre-Treatment Position in bed  -EB     Post Treatment  Position chair  -EB     In Chair reclined;call light within reach;encouraged to call for assist;exit alarm on  -EB               User Key  (r) = Recorded By, (t) = Taken By, (c) = Cosigned By      Initials Name Provider Type    Mely Box PTA Physical Therapist Assistant                   Outcome Measures       Row Name 02/09/25 1458          How much help from another person do you currently need...    Turning from your back to your side while in flat bed without using bedrails? 4  -EB     Moving from lying on back to sitting on the side of a flat bed without bedrails? 3  -EB     Moving to and from a bed to a chair (including a wheelchair)? 3  -EB     Standing up from a chair using your arms (e.g., wheelchair, bedside chair)? 3  -EB     Climbing 3-5 steps with a railing? 2  -EB     To walk in hospital room? 3  -EB     AM-PAC 6 Clicks Score (PT) 18  -EB     Highest Level of Mobility Goal 6 --> Walk 10 steps or more  -EB               User Key  (r) = Recorded By, (t) = Taken By, (c) = Cosigned By      Initials Name Provider Type    Mely Box PTA Physical Therapist Assistant                                 Physical Therapy Education       Title: PT OT SLP Therapies (In Progress)       Topic: Physical Therapy (In Progress)       Point: Mobility training (Done)       Learning Progress Summary            Patient Acceptance, E, VU by EB at 2/9/2025 1456    Acceptance, E, NR by AR at 2/7/2025 1417                      Point: Home exercise program (In Progress)       Learning Progress Summary            Patient Acceptance, E, NR by AR at 2/7/2025 1417                      Point: Body mechanics (In Progress)       Learning Progress Summary            Patient Acceptance, E, NR by AR at 2/7/2025 1417                      Point: Precautions (In Progress)       Learning Progress Summary            Patient Acceptance, E, NR by AR at 2/7/2025 1417                                      User Key       Initials  Effective Dates Name Provider Type Discipline    AR 06/16/21 -  Maribell Peace, PT Physical Therapist PT    EB 02/14/23 -  Mely Lazcano PTA Physical Therapist Assistant PT                  PT Recommendation and Plan     Progress: improving  Outcome Evaluation: Pt seen for PT tx today. Pt agreeable to participate. Pt had no c/o pain or dizziness throughout tx session. Pt able to complete bed mobility with SBA and stands with CGA.  Pt ambulated 80ft with rwx, SBA. No unsteadiness or LOB noted. Cued pt for posture. Possible d/c back to [facility] with PT. Will continue to follow for progress.     Time Calculation:         PT Charges       Row Name 02/09/25 1450             Time Calculation    Start Time 1426  -EB      Stop Time 1441  -EB      Time Calculation (min) 15 min  -EB      PT Received On 02/09/25  -EB      PT - Next Appointment 02/10/25  -         Time Calculation- PT    Total Timed Code Minutes- PT 15 minute(s)  -EB                User Key  (r) = Recorded By, (t) = Taken By, (c) = Cosigned By      Initials Name Provider Type    EB Mely Lazcano PTA Physical Therapist Assistant                  Therapy Charges for Today       Code Description Service Date Service Provider Modifiers Qty    [id number] HC GAIT TRAINING EA 15 MIN 2/9/2025 Mely Lazcano PTA GP 1            PT G-Codes  Outcome Measure Options: AM-PAC 6 Clicks Basic Mobility (PT)  AM-PAC 6 Clicks Score (PT): 18  AM-PAC 6 Clicks Score (OT): 16  Modified [place]ley Scale: 3 - Moderate disability.  Requiring some help, but able to walk without assistance.       Mely Lazcano PTA  2/9/2025

## 2025-02-09 NOTE — PLAN OF CARE
Goal Outcome Evaluation:  Plan of Care Reviewed With: patient           Outcome Evaluation: Right foot less pain per patient. Remains red with less swelling.BP elevated. Tolerating RA. Abx iv therapy.

## 2025-02-09 NOTE — PLAN OF CARE
Goal Outcome Evaluation:              Outcome Evaluation: VSS. Pt went for dopplerrs this shift. Up to chair with PT. Continue abx. No compaints of pain or discomfort in right foot. No other complaints. Needs met at this time.

## 2025-02-10 ENCOUNTER — DOCUMENTATION (OUTPATIENT)
Dept: FAMILY MEDICINE CLINIC | Facility: CLINIC | Age: 85
End: 2025-02-10
Payer: MEDICARE

## 2025-02-10 LAB
ALBUMIN SERPL-MCNC: 3.2 G/DL (ref 3.5–5.2)
ALBUMIN/GLOB SERPL: 1 G/DL
ALP SERPL-CCNC: 304 U/L (ref 39–117)
ALT SERPL W P-5'-P-CCNC: 8 U/L (ref 1–33)
ANION GAP SERPL CALCULATED.3IONS-SCNC: 10.3 MMOL/L (ref 5–15)
AST SERPL-CCNC: 13 U/L (ref 1–32)
BASOPHILS # BLD AUTO: 0.02 10*3/MM3 (ref 0–0.2)
BASOPHILS NFR BLD AUTO: 0.2 % (ref 0–1.5)
BILIRUB SERPL-MCNC: 0.3 MG/DL (ref 0–1.2)
BUN SERPL-MCNC: 20 MG/DL (ref 8–23)
BUN/CREAT SERPL: 18.5 (ref 7–25)
CALCIUM SPEC-SCNC: 8.8 MG/DL (ref 8.6–10.5)
CHLORIDE SERPL-SCNC: 99 MMOL/L (ref 98–107)
CO2 SERPL-SCNC: 23.7 MMOL/L (ref 22–29)
CREAT SERPL-MCNC: 1.08 MG/DL (ref 0.57–1)
DEPRECATED RDW RBC AUTO: 42.3 FL (ref 37–54)
EGFRCR SERPLBLD CKD-EPI 2021: 51.4 ML/MIN/1.73
EOSINOPHIL # BLD AUTO: 0.05 10*3/MM3 (ref 0–0.4)
EOSINOPHIL NFR BLD AUTO: 0.5 % (ref 0.3–6.2)
ERYTHROCYTE [DISTWIDTH] IN BLOOD BY AUTOMATED COUNT: 13 % (ref 12.3–15.4)
GLOBULIN UR ELPH-MCNC: 3.1 GM/DL
GLUCOSE BLDC GLUCOMTR-MCNC: 190 MG/DL (ref 70–130)
GLUCOSE BLDC GLUCOMTR-MCNC: 193 MG/DL (ref 70–130)
GLUCOSE BLDC GLUCOMTR-MCNC: 211 MG/DL (ref 70–130)
GLUCOSE BLDC GLUCOMTR-MCNC: 238 MG/DL (ref 70–130)
GLUCOSE SERPL-MCNC: 213 MG/DL (ref 65–99)
HCT VFR BLD AUTO: 38.4 % (ref 34–46.6)
HGB BLD-MCNC: 12.7 G/DL (ref 12–15.9)
IMM GRANULOCYTES # BLD AUTO: 0.07 10*3/MM3 (ref 0–0.05)
IMM GRANULOCYTES NFR BLD AUTO: 0.8 % (ref 0–0.5)
LYMPHOCYTES # BLD AUTO: 1.34 10*3/MM3 (ref 0.7–3.1)
LYMPHOCYTES NFR BLD AUTO: 14.6 % (ref 19.6–45.3)
MCH RBC QN AUTO: 29.6 PG (ref 26.6–33)
MCHC RBC AUTO-ENTMCNC: 33.1 G/DL (ref 31.5–35.7)
MCV RBC AUTO: 89.5 FL (ref 79–97)
MONOCYTES # BLD AUTO: 0.64 10*3/MM3 (ref 0.1–0.9)
MONOCYTES NFR BLD AUTO: 7 % (ref 5–12)
NEUTROPHILS NFR BLD AUTO: 7.07 10*3/MM3 (ref 1.7–7)
NEUTROPHILS NFR BLD AUTO: 76.9 % (ref 42.7–76)
NRBC BLD AUTO-RTO: 0 /100 WBC (ref 0–0.2)
PLATELET # BLD AUTO: 214 10*3/MM3 (ref 140–450)
PMV BLD AUTO: 8.8 FL (ref 6–12)
POTASSIUM SERPL-SCNC: 4.4 MMOL/L (ref 3.5–5.2)
PROT SERPL-MCNC: 6.3 G/DL (ref 6–8.5)
RBC # BLD AUTO: 4.29 10*6/MM3 (ref 3.77–5.28)
SODIUM SERPL-SCNC: 133 MMOL/L (ref 136–145)
WBC NRBC COR # BLD AUTO: 9.19 10*3/MM3 (ref 3.4–10.8)

## 2025-02-10 PROCEDURE — 82948 REAGENT STRIP/BLOOD GLUCOSE: CPT

## 2025-02-10 PROCEDURE — 80053 COMPREHEN METABOLIC PANEL: CPT | Performed by: INTERNAL MEDICINE

## 2025-02-10 PROCEDURE — 85025 COMPLETE CBC W/AUTO DIFF WBC: CPT | Performed by: INTERNAL MEDICINE

## 2025-02-10 PROCEDURE — 63710000001 INSULIN LISPRO (HUMAN) PER 5 UNITS: Performed by: NURSE PRACTITIONER

## 2025-02-10 PROCEDURE — 97116 GAIT TRAINING THERAPY: CPT

## 2025-02-10 PROCEDURE — 63710000001 INSULIN GLARGINE PER 5 UNITS: Performed by: INTERNAL MEDICINE

## 2025-02-10 PROCEDURE — 25010000002 CEFAZOLIN PER 500 MG: Performed by: INTERNAL MEDICINE

## 2025-02-10 RX ADMIN — GABAPENTIN 400 MG: 400 CAPSULE ORAL at 00:01

## 2025-02-10 RX ADMIN — INSULIN LISPRO 2 UNITS: 100 INJECTION, SOLUTION INTRAVENOUS; SUBCUTANEOUS at 22:13

## 2025-02-10 RX ADMIN — RIVAROXABAN 15 MG: 15 TABLET, FILM COATED ORAL at 17:48

## 2025-02-10 RX ADMIN — GABAPENTIN 400 MG: 400 CAPSULE ORAL at 23:54

## 2025-02-10 RX ADMIN — Medication 10 ML: at 22:14

## 2025-02-10 RX ADMIN — BUPROPION HYDROCHLORIDE 150 MG: 150 TABLET, EXTENDED RELEASE ORAL at 06:56

## 2025-02-10 RX ADMIN — LOSARTAN POTASSIUM 50 MG: 50 TABLET, FILM COATED ORAL at 08:24

## 2025-02-10 RX ADMIN — INSULIN LISPRO 3 UNITS: 100 INJECTION, SOLUTION INTRAVENOUS; SUBCUTANEOUS at 12:07

## 2025-02-10 RX ADMIN — INSULIN LISPRO 2 UNITS: 100 INJECTION, SOLUTION INTRAVENOUS; SUBCUTANEOUS at 17:48

## 2025-02-10 RX ADMIN — CEFAZOLIN 2000 MG: 2 INJECTION, POWDER, FOR SOLUTION INTRAMUSCULAR; INTRAVENOUS at 12:07

## 2025-02-10 RX ADMIN — GABAPENTIN 400 MG: 400 CAPSULE ORAL at 12:07

## 2025-02-10 RX ADMIN — CEFAZOLIN 2000 MG: 2 INJECTION, POWDER, FOR SOLUTION INTRAMUSCULAR; INTRAVENOUS at 17:48

## 2025-02-10 RX ADMIN — CEFAZOLIN 2000 MG: 2 INJECTION, POWDER, FOR SOLUTION INTRAMUSCULAR; INTRAVENOUS at 03:01

## 2025-02-10 RX ADMIN — Medication 10 ML: at 08:27

## 2025-02-10 RX ADMIN — OXYBUTYNIN CHLORIDE 10 MG: 10 TABLET, EXTENDED RELEASE ORAL at 08:24

## 2025-02-10 RX ADMIN — INSULIN GLARGINE 5 UNITS: 100 INJECTION, SOLUTION SUBCUTANEOUS at 22:13

## 2025-02-10 RX ADMIN — DONEPEZIL HYDROCHLORIDE 10 MG: 10 TABLET, FILM COATED ORAL at 22:13

## 2025-02-10 RX ADMIN — LEVOTHYROXINE SODIUM 88 MCG: 88 TABLET ORAL at 08:24

## 2025-02-10 RX ADMIN — INSULIN LISPRO 3 UNITS: 100 INJECTION, SOLUTION INTRAVENOUS; SUBCUTANEOUS at 08:24

## 2025-02-10 RX ADMIN — METOPROLOL SUCCINATE 25 MG: 25 TABLET, EXTENDED RELEASE ORAL at 08:24

## 2025-02-10 RX ADMIN — VENLAFAXINE HYDROCHLORIDE 150 MG: 75 CAPSULE, EXTENDED RELEASE ORAL at 08:24

## 2025-02-10 RX ADMIN — ATORVASTATIN CALCIUM 40 MG: 20 TABLET, FILM COATED ORAL at 22:13

## 2025-02-10 NOTE — THERAPY TREATMENT NOTE
"Patient Name: Rekha Bear  : 1940    MRN: [id number]                              Today's Date: 2/10/2025       Admit Date: 2025    Visit Dx:     ICD-10-CM ICD-9-CM   1. Cellulitis of right foot  L03.115 682.7   2. 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    Community acquired pneumonia of left lower lobe of lung    Hyponatremia    Pneumonia due to COVID-19 virus    Major depressive disorder, recurrent, mild    Acute respiratory failure with hypoxia    Supraventricular tachycardia    PVC (premature ventricular contraction)    Depressive disorder    Gastroesophageal reflux disease    Midline cystocele    Rectocele    BMI 29.0-29.9,adult    Acute left PCA stroke    B12 deficiency    Bradycardia    Premature atrial contractions    Stroke    SHEY (obstructive sleep apnea)    Circadian rhythm disorder    Cerebrovascular accident (CVA) due to embolism of left posterior cerebral artery    Anxiety    Dysphagia as late effect of stroke    Atrial fibrillation, persistent    Acute UTI    Closed left hip fracture    Periprosthetic fracture around internal prosthetic left hip joint    Naz-prosthetic fracture around prosthetic hip    History of CVA (cerebrovascular accident)    Cellulitis in diabetic foot     Past Medical History:   Diagnosis Date    Chilblains     \"Chilblian's\"    CKD (chronic kidney disease)     CVA (cerebral vascular accident)     Depression     Fatty liver     GERD (gastroesophageal reflux disease)     Hyperlipidemia     Hypertension     Incontinence     " "Osteoarthritis     OA  Marvin THR, LT TKR - hydrocodone prescibed by Dr. 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 Almaguer    TOTAL KNEE ARTHROPLASTY Left     OA  Marvin THR, LT TKR - hydrocodone prescibed by Dr. Almaguer      General Information       Row Name 02/10/25 1626          Physical Therapy Time and Intention    Document Type therapy note (daily note)  -EB     Mode of Treatment physical therapy  -EB       Row Name 02/10/25 1626          General Information    Patient Profile Reviewed yes  -EB     Existing Precautions/Restrictions fall  -EB       Row Name 02/10/25 1626          Cognition    Orientation Status (Cognition) oriented x 3  -EB       Row Name 02/10/25 1626          Safety Issues/Impairments Affecting Functional Mobility    Impairments Affecting Function (Mobility) endurance/activity tolerance;strength  -EB               User Key  (r) = Recorded By, (t) = Taken By, (c) = Cosigned By      Initials Name Provider Type    EB Mely Lazcano PTA Physical Therapist Assistant                   Mobility       Row Name 02/10/25 1626          Bed Mobility    Supine-Sit Crook (Bed Mobility) not tested  -EB     Sit-Supine [place] (Bed Mobility) standby assist  -EB     " Assistive Device (Bed Mobility) bed rails;head of bed elevated  -EB       Row Name 02/10/25 1626          Sit-Stand Transfer    Sit-Stand Pike (Transfers) contact guard  -     Assistive Device (Sit-Stand Transfers) walker, front-wheeled  -EB     Comment, (Sit-Stand Transfer) cues for hand placement to push up from the chair  -EB       Row Name 02/10/25 1626          Gait/Stairs (Locomotion)    Pike Level (Gait) standby assist  -EB     Assistive Device (Gait) walker, front-wheeled  -EB     Distance in Feet (Gait) 150  -EB     Deviations/Abnormal Patterns (Gait) gait speed decreased;stride length decreased  -EB     Bilateral Gait Deviations forward flexed posture;heel strike decreased  -EB     Comment, (Gait/Stairs) cues for posture. No unsteadiness or LOB noted  -               User Key  (r) = Recorded By, (t) = Taken By, (c) = Cosigned By      Initials Name Provider Type    EB Mely Lazcano PTA Physical Therapist Assistant                   Obj/Interventions    No documentation.                  Goals/Plan    No documentation.                  Clinical Impression       Row Name 02/10/25 1627          Pain    Pain Location knee  -     Pain Side/Orientation left  -       Row Name 02/10/25 1627          Plan of Care Review    Plan of Care Reviewed With patient  -EB     Progress improving  -     Outcome Evaluation Pt seen for PT tx today. Pt UIC but ready to get back to bed. Pt able to stand from recliner with CGA and ambulated 150ft with rwx, SBA. No unsteadiness or LOB noted. Pt returned to supine with SBA. Will continue to follow pt for d/c needs. Pt to return [facility] with HHPT.  -       Row Name 02/10/25 1627          Therapy Assessment/Plan (PT)    Therapy Frequency (PT) 6 times/wk  -       Row Name 02/10/25 1627          Positioning and Restraints    Pre-Treatment Position sitting in chair/recliner  -EB     Post Treatment Position bed  -EB     In Bed supine;call light within  reach;exit alarm on;encouraged to call for assist  -EB               User Key  (r) = Recorded By, (t) = Taken By, (c) = Cosigned By      Initials Name Provider Type    Mely Box PTA Physical Therapist Assistant                   Outcome Measures       Row Name 02/10/25 1629          How much help from another person do you currently need...    Turning from your back to your side while in flat bed without using bedrails? 3  -EB     Moving from lying on back to sitting on the side of a flat bed without bedrails? 3  -EB     Moving to and from a bed to a chair (including a wheelchair)? 3  -EB     Standing up from a chair using your arms (e.g., wheelchair, bedside chair)? 3  -EB     Climbing 3-5 steps with a railing? 3  -EB     To walk in hospital room? 3  -EB     AM-PAC 6 Clicks Score (PT) 18  -EB     Highest Level of Mobility Goal 6 --> Walk 10 steps or more  -EB               User Key  (r) = Recorded By, (t) = Taken By, (c) = Cosigned By      Initials Name Provider Type    Mely Box PTA Physical Therapist Assistant                                 Physical Therapy Education       Title: PT OT SLP Therapies (In Progress)       Topic: Physical Therapy (In Progress)       Point: Mobility training (Done)       Learning Progress Summary            Patient Acceptance, E,D, VU,NR by EB at 2/10/2025 1629    Acceptance, E, VU by EB at 2/9/2025 1456    Acceptance, E, NR by AR at 2/7/2025 1417                      Point: Home exercise program (In Progress)       Learning Progress Summary            Patient Acceptance, E, NR by AR at 2/7/2025 1417                      Point: Body mechanics (Done)       Learning Progress Summary            Patient Acceptance, E,D, VU,NR by EB at 2/10/2025 1629    Acceptance, E, NR by AR at 2/7/2025 1417                      Point: Precautions (In Progress)       Learning Progress Summary            Patient Acceptance, E, NR by AR at 2/7/2025 1417                                       User Key       Initials Effective Dates Name Provider Type Discipline    AR 06/16/21 -  Maribell Peace, PT Physical Therapist PT    EB 02/14/23 -  Mely Lazcano PTA Physical Therapist Assistant PT                  PT Recommendation and Plan     Progress: improving  Outcome Evaluation: Pt seen for PT tx today. Pt UIC but ready to get back to bed. Pt able to stand from recliner with CGA and ambulated 150ft with rwx, SBA. No unsteadiness or LOB noted. Pt returned to supine with SBA. Will continue to follow pt for d/c needs. [facility] to senior living with HHPT.     Time Calculation:         PT Charges       Row Name 02/10/25 1625             Time Calculation    Start Time 1525  -EB      Stop Time 1543  -EB      Time Calculation (min) 18 min  -EB      PT Received On 02/10/25  -EB      PT - Next Appointment 02/11/25  -EB         Time Calculation- PT    Total Timed Code Minutes- PT 18 minute(s)  -EB                User Key  (r) = Recorded By, (t) = Taken By, (c) = Cosigned By      Initials Name Provider Type    EB Mely Lazcano PTA Physical Therapist Assistant                  Therapy Charges for Today       Code Description Service Date Service Provider Modifiers Qty    99072856883 HC GAIT TRAINING EA 15 MIN 2/9/2025 Mely Lazcano PTA GP 1    37711410181 HC GAIT TRAINING EA 15 MIN 2/10/2025 Mely Lazcano PTA GP 1            PT G-Codes  Outcome Measure Options: AM-PAC 6 Clicks Basic Mobility (PT)  AM-PAC 6 Clicks Score (PT): 18  AM-PAC 6 Clicks Score (OT): 16  Modified [place] Scale: 3 - Moderate disability.  Requiring some help, but able to walk without assistance.       Mely Lazcano PTA  2/10/2025

## 2025-02-10 NOTE — PLAN OF CARE
Goal Outcome Evaluation:  Plan of Care Reviewed With: patient        Progress: improving  Outcome Evaluation: Pt seen for PT tx today. Pt UIC but ready to get back to bed. Pt able to stand from recliner with CGA and ambulated 150ft with rwx, SBA. No unsteadiness or LOB noted. Pt returned to supine with SBA. Will continue to follow pt for d/c needs. Pt to return to [facility] with HHPT.

## 2025-02-10 NOTE — PROGRESS NOTES
Name: Rekha Bear ADMIT: 2025   : 1940  PCP: Zahida Osorio MD    MRN: [id number] LOS: 4 days   AGE/SEX: 84 y.o. female  ROOM: [facility]     Subjective   Subjective   Patient seen at bedside, she reports improvement in her foot redness and pain.  She is sitting up in the recliner.       Objective   Objective   Vital Signs  Temp:  [97.7 °F (36.5 °C)-99.7 °F (37.6 °C)] 97.7 °F (36.5 °C)  Heart Rate:  [74-83] 74  Resp:  [18] 18  BP: (125-173)/(72-96) 155/72  SpO2:  [96 %-98 %] 98 %  on   ;   Device (Oxygen Therapy): room air  Body mass index is 32.11 kg/m².  Physical Exam  General, awake and alert.  Head and ENT, normocephalic and atraumatic.  Lungs, symmetric expansion, equal air entry bilaterally.  Heart, regular rate and rhythm.  Abdomen, soft and nontender.  Extremities, no clubbing or cyanosis.  right foot with cellulitis changes and more dusky appearance on the plantar surface of the heel.  She does have a small punctate spot that does not appear open and is not having any drainage currently.  Raynaud's type color changes of toes on that foot.  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   Neuro, no focal deficits.  Skin: Warm and no rash.  Psych, normal mood and affect.  Musculoskeletal, joint examination is grossly normal.     Copied text material from yesterday's note has been reviewed for appropriate changes and remains accurate as of 2/10/25.      Results Review     I reviewed the patient's new clinical results.  Results from last 7 days   Lab Units 02/10/25  0321 25  0503 25  0642   WBC 10*3/mm3 9.19 9.70 8.45 9.91   HEMOGLOBIN g/dL 12.7 12.4 12.2 12.9   PLATELETS 10*3/mm3 214 203 219 222     Results from last 7 days   Lab Units 02/10/25  0321 25  0503 25  0417 25  1133   SODIUM mmol/L 133* 137 136 138   POTASSIUM mmol/L 4.4 4.4 4.3 4.7   CHLORIDE mmol/L 99 103 106 106   CO2 mmol/L 23.7  23.5 22.4 27.5   BUN mg/dL 20 21 21 22   CREATININE mg/dL 1.08* 1.03* 1.13* 1.38*   GLUCOSE mg/dL 213* 175* 106* 193*   EGFR mL/min/1.73 51.4* 54.4* 48.7* 38.3*     Results from last 7 days   Lab Units 02/10/25  0321 02/09/25  0503 02/08/25  0417 02/07/25  0642   ALBUMIN g/dL 3.2* 3.1* 2.7* 3.0*   BILIRUBIN mg/dL 0.3 0.3 0.5 0.4   ALK PHOS U/L 304* 295* 270* 296*   AST (SGOT) U/L 13 14 14 14   ALT (SGPT) U/L 8 12 14 17     Results from last 7 days   Lab Units 02/10/25  0321 02/09/25  0503 02/08/25  0417 02/07/25  1133 02/07/25  0642   CALCIUM mg/dL 8.8 8.8 8.7 9.3 8.6   ALBUMIN g/dL 3.2* 3.1* 2.7*  --  3.0*   MAGNESIUM mg/dL  --   --  1.9  --   --    PHOSPHORUS mg/dL  --   --  3.4  --   --      Results from last 7 days   Lab Units 02/06/25  1724   LACTATE mmol/L 1.3     Glucose   Date/Time Value Ref Range Status   02/10/2025 1140 238 (H) 70 - 130 mg/dL Final   02/10/2025 0805 211 (H) 70 - 130 mg/dL Final   02/09/2025 2023 200 (H) 70 - 130 mg/dL Final   02/09/2025 1642 171 (H) 70 - 130 mg/dL Final   02/09/2025 1128 232 (H) 70 - 130 mg/dL Final   02/09/2025 0739 166 (H) 70 - 130 mg/dL Final   02/08/2025 2153 162 (H) 70 - 130 mg/dL Final       MRI Foot Right With & Without Contrast    Result Date: 2/10/2025  1. Nondisplaced distal cuboid fracture with intra-articular extension. In the absence of trauma history, this is likely a stress or insufficiency fracture. There is no finding to suggest that this is related to underlying osteomyelitis though follow-up exam could be obtained if symptoms persist or worsen. 2. 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 3. Edema within the subcutaneous fat greatest dorsally is nonspecific and could be associated with cellulitis. 4. Suspect posterior tibial insertional adenopathy. Arthritic changes at the naviculocuneiform joint.  This report was finalized on 2/10/2025 11:34 AM by Roe Rose M.D on Workstation: [id number]       I have personally reviewed all  medications:  Scheduled Medications  atorvastatin, 40 mg, Oral, Nightly  buPROPion XL, 150 mg, Oral, QAM  ceFAZolin, 2,000 mg, Intravenous, Q8H  donepezil, 10 mg, Oral, Nightly  gabapentin, 400 mg, Oral, Q12H  insulin glargine, 5 Units, Subcutaneous, Nightly  insulin lispro, 2-7 Units, Subcutaneous, 4x Daily AC & at Bedtime  levothyroxine, 88 mcg, Oral, Daily  losartan, 50 mg, Oral, Daily  metoprolol succinate XL, 25 mg, Oral, Q24H  oxybutynin XL, 10 mg, Oral, Daily  rivaroxaban, 15 mg, Oral, Daily With Dinner  sodium chloride, 10 mL, Intravenous, Q12H  venlafaxine XR, 150 mg, Oral, Daily    Infusions   Diet  Diet: Cardiac, Diabetic; Healthy Heart (2-3 Na+); Consistent Carbohydrate; Fluid Consistency: Thin (IDDSI 0)    I have personally reviewed:  [x]  Laboratory   [x]  Microbiology   [x]  Radiology   [x]  EKG/Telemetry  [x]  Cardiology/Vascular   []  Pathology    []  Records       Assessment/Plan     Active Hospital Problems    Diagnosis  POA    **Cellulitis in diabetic foot [E11.628, L03.119]  Yes    Atrial fibrillation, persistent [I48.19]  Yes    SHEY (obstructive sleep apnea) [G47.33]  Yes    Hypothyroidism [E03.9]  Yes    Primary hypertension [I10]  Yes    Type 2 diabetes mellitus with stage 3 chronic kidney disease, without long-term current use of insulin [E11.22, N18.30]  Yes    Anxiety [F41.9]  Yes    Gastroesophageal reflux disease [K21.9]  Yes      Resolved Hospital Problems   No resolved problems to display.       84 y.o. female admitted with Cellulitis in diabetic foot.    84 y.o. female admitted with Cellulitis in diabetic foot.     RLE cellulitis: Blood culture no growth to date.  Had elevated inflammatory markers.  MRI of the right foot, IMPRESSION:  1. Nondisplaced distal cuboid fracture with intra-articular extension.  In the absence of trauma history, this is likely a stress or  insufficiency fracture. There is no finding to suggest that this is  related to underlying osteomyelitis though  follow-up exam could be  obtained if symptoms persist or worsen.  2. 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  3. Edema within the subcutaneous fat greatest dorsally is nonspecific  and could be associated with cellulitis.  4. Suspect posterior tibial insertional adenopathy. Arthritic changes at  the naviculocuneiform joint.  Will ask orthopedics opinion.    Raynaud's  Chronic dry gangrene left index finger: Continue wound care  Diabetes: A1c 9.0.  Continue Lantus and SSI.  Monitor.  Hypothyroidism: Synthroid  Chronic A-fib: Rate okay.  On Xarelto  Hypertension: Monitoring  Elevated alk phos: GGT okay.  Suspect this is from her finger.  Standing MRI of her foot as above.  PPX: See above  Disposition: SNF versus AL/few days     She is on a complex outpatient medical regimen that took many times to get correct at her facility. The tentative plan is for her to discharge when she is ready on her previous outpatient regimen and likely have the addition of an antibiotic.        Expected Discharge Date: 2/10/2025; Expected Discharge Time:       Jacob Youngblood MD  [facility]  02/10/25  14:23 EST

## 2025-02-10 NOTE — PROGRESS NOTES
Continued Stay Note  [facility]     Patient Name: Rekha Bear  MRN: [id number]  Today's Date: 2/10/2025    Admit Date: 2/6/2025    Plan: SNF vs return to [facility] [place] with onsite PT/OT.  PT rec SNF   Discharge Plan       Row Name 02/10/25 1540       Plan    Plan Comments Continue to follow pt's progress for DCP/needs.  Pt did walk 80ft with PT on 2/9/25.  Spoke with pt at bedside who stated taht she is hoping to return back to [facility] [place].   Informed pt that CCP would be following her progress with her tx team amd be available for any DC needs.  Call placed and L for Angela [phone]  with [facility] to confirm pt can return.                   Discharge Codes    No documentation.                 Expected Discharge Date and Time       Expected Discharge Date Expected Discharge Time    Feb 10, 2025               ROSANNE Hines

## 2025-02-10 NOTE — PLAN OF CARE
Problem: Adult Inpatient Plan of Care  Goal: Plan of Care Review  Outcome: Progressing  Flowsheets (Taken 2/10/2025 0411)  Outcome Evaluation: Vitals stable, continue tx for cellulitis on R foot, IV ABx continued, No complaints of pains on right foot or any discomfort. Plan of care on going.

## 2025-02-11 LAB
ALBUMIN SERPL-MCNC: 3.2 G/DL (ref 3.5–5.2)
ALBUMIN/GLOB SERPL: 1.1 G/DL
ALP SERPL-CCNC: 287 U/L (ref 39–117)
ALT SERPL W P-5'-P-CCNC: 6 U/L (ref 1–33)
ANION GAP SERPL CALCULATED.3IONS-SCNC: 8 MMOL/L (ref 5–15)
AST SERPL-CCNC: 20 U/L (ref 1–32)
BACTERIA SPEC AEROBE CULT: NORMAL
BACTERIA SPEC AEROBE CULT: NORMAL
BASOPHILS # BLD AUTO: 0.03 10*3/MM3 (ref 0–0.2)
BASOPHILS NFR BLD AUTO: 0.3 % (ref 0–1.5)
BILIRUB SERPL-MCNC: 0.3 MG/DL (ref 0–1.2)
BUN SERPL-MCNC: 21 MG/DL (ref 8–23)
BUN/CREAT SERPL: 20.4 (ref 7–25)
CALCIUM SPEC-SCNC: 8.9 MG/DL (ref 8.6–10.5)
CHLORIDE SERPL-SCNC: 105 MMOL/L (ref 98–107)
CO2 SERPL-SCNC: 25 MMOL/L (ref 22–29)
CREAT SERPL-MCNC: 1.03 MG/DL (ref 0.57–1)
DEPRECATED RDW RBC AUTO: 42.5 FL (ref 37–54)
EGFRCR SERPLBLD CKD-EPI 2021: 54.4 ML/MIN/1.73
EOSINOPHIL # BLD AUTO: 0.18 10*3/MM3 (ref 0–0.4)
EOSINOPHIL NFR BLD AUTO: 2 % (ref 0.3–6.2)
ERYTHROCYTE [DISTWIDTH] IN BLOOD BY AUTOMATED COUNT: 13 % (ref 12.3–15.4)
GLOBULIN UR ELPH-MCNC: 2.9 GM/DL
GLUCOSE BLDC GLUCOMTR-MCNC: 123 MG/DL (ref 70–130)
GLUCOSE BLDC GLUCOMTR-MCNC: 200 MG/DL (ref 70–130)
GLUCOSE BLDC GLUCOMTR-MCNC: 211 MG/DL (ref 70–130)
GLUCOSE BLDC GLUCOMTR-MCNC: 295 MG/DL (ref 70–130)
GLUCOSE SERPL-MCNC: 141 MG/DL (ref 65–99)
HCT VFR BLD AUTO: 39.4 % (ref 34–46.6)
HGB BLD-MCNC: 13.1 G/DL (ref 12–15.9)
IMM GRANULOCYTES # BLD AUTO: 0.05 10*3/MM3 (ref 0–0.05)
IMM GRANULOCYTES NFR BLD AUTO: 0.5 % (ref 0–0.5)
LYMPHOCYTES # BLD AUTO: 2.41 10*3/MM3 (ref 0.7–3.1)
LYMPHOCYTES NFR BLD AUTO: 26.2 % (ref 19.6–45.3)
MCH RBC QN AUTO: 29.8 PG (ref 26.6–33)
MCHC RBC AUTO-ENTMCNC: 33.2 G/DL (ref 31.5–35.7)
MCV RBC AUTO: 89.5 FL (ref 79–97)
MONOCYTES # BLD AUTO: 1.04 10*3/MM3 (ref 0.1–0.9)
MONOCYTES NFR BLD AUTO: 11.3 % (ref 5–12)
NEUTROPHILS NFR BLD AUTO: 5.49 10*3/MM3 (ref 1.7–7)
NEUTROPHILS NFR BLD AUTO: 59.7 % (ref 42.7–76)
NRBC BLD AUTO-RTO: 0 /100 WBC (ref 0–0.2)
PLATELET # BLD AUTO: 203 10*3/MM3 (ref 140–450)
PMV BLD AUTO: 8.6 FL (ref 6–12)
POTASSIUM SERPL-SCNC: 4.2 MMOL/L (ref 3.5–5.2)
PROT SERPL-MCNC: 6.1 G/DL (ref 6–8.5)
RBC # BLD AUTO: 4.4 10*6/MM3 (ref 3.77–5.28)
SODIUM SERPL-SCNC: 138 MMOL/L (ref 136–145)
WBC NRBC COR # BLD AUTO: 9.2 10*3/MM3 (ref 3.4–10.8)

## 2025-02-11 PROCEDURE — 80053 COMPREHEN METABOLIC PANEL: CPT | Performed by: INTERNAL MEDICINE

## 2025-02-11 PROCEDURE — 63710000001 INSULIN GLARGINE PER 5 UNITS: Performed by: INTERNAL MEDICINE

## 2025-02-11 PROCEDURE — 63710000001 INSULIN LISPRO (HUMAN) PER 5 UNITS: Performed by: NURSE PRACTITIONER

## 2025-02-11 PROCEDURE — 25010000002 CEFAZOLIN PER 500 MG: Performed by: INTERNAL MEDICINE

## 2025-02-11 PROCEDURE — 85025 COMPLETE CBC W/AUTO DIFF WBC: CPT | Performed by: INTERNAL MEDICINE

## 2025-02-11 PROCEDURE — 82948 REAGENT STRIP/BLOOD GLUCOSE: CPT

## 2025-02-11 PROCEDURE — 97530 THERAPEUTIC ACTIVITIES: CPT

## 2025-02-11 RX ADMIN — Medication 10 ML: at 09:06

## 2025-02-11 RX ADMIN — INSULIN LISPRO 3 UNITS: 100 INJECTION, SOLUTION INTRAVENOUS; SUBCUTANEOUS at 09:05

## 2025-02-11 RX ADMIN — INSULIN LISPRO 4 UNITS: 100 INJECTION, SOLUTION INTRAVENOUS; SUBCUTANEOUS at 11:49

## 2025-02-11 RX ADMIN — OXYBUTYNIN CHLORIDE 10 MG: 10 TABLET, EXTENDED RELEASE ORAL at 09:05

## 2025-02-11 RX ADMIN — METOPROLOL SUCCINATE 25 MG: 25 TABLET, EXTENDED RELEASE ORAL at 09:05

## 2025-02-11 RX ADMIN — VENLAFAXINE HYDROCHLORIDE 150 MG: 75 CAPSULE, EXTENDED RELEASE ORAL at 09:05

## 2025-02-11 RX ADMIN — DONEPEZIL HYDROCHLORIDE 10 MG: 10 TABLET, FILM COATED ORAL at 21:09

## 2025-02-11 RX ADMIN — CEFAZOLIN 2000 MG: 2 INJECTION, POWDER, FOR SOLUTION INTRAMUSCULAR; INTRAVENOUS at 03:59

## 2025-02-11 RX ADMIN — LEVOTHYROXINE SODIUM 88 MCG: 88 TABLET ORAL at 09:05

## 2025-02-11 RX ADMIN — ATORVASTATIN CALCIUM 40 MG: 20 TABLET, FILM COATED ORAL at 21:09

## 2025-02-11 RX ADMIN — INSULIN LISPRO 3 UNITS: 100 INJECTION, SOLUTION INTRAVENOUS; SUBCUTANEOUS at 21:11

## 2025-02-11 RX ADMIN — RIVAROXABAN 15 MG: 15 TABLET, FILM COATED ORAL at 17:41

## 2025-02-11 RX ADMIN — LOSARTAN POTASSIUM 50 MG: 50 TABLET, FILM COATED ORAL at 09:05

## 2025-02-11 RX ADMIN — CEFAZOLIN 2000 MG: 2 INJECTION, POWDER, FOR SOLUTION INTRAMUSCULAR; INTRAVENOUS at 19:23

## 2025-02-11 RX ADMIN — Medication 10 ML: at 21:10

## 2025-02-11 RX ADMIN — INSULIN GLARGINE 5 UNITS: 100 INJECTION, SOLUTION SUBCUTANEOUS at 21:11

## 2025-02-11 RX ADMIN — CEFAZOLIN 2000 MG: 2 INJECTION, POWDER, FOR SOLUTION INTRAMUSCULAR; INTRAVENOUS at 11:49

## 2025-02-11 RX ADMIN — BUPROPION HYDROCHLORIDE 150 MG: 150 TABLET, EXTENDED RELEASE ORAL at 06:45

## 2025-02-11 RX ADMIN — GABAPENTIN 400 MG: 400 CAPSULE ORAL at 09:06

## 2025-02-11 NOTE — PLAN OF CARE
Goal Outcome Evaluation:  Plan of Care Reviewed With: patient        Progress: improving  Outcome Evaluation: Patient seen for OT this am.  Patient resting in bed upon arrival. Patient transitioned to EOB with CGA.  STS from EOB with CGA and Rw.  Patient ambulated to.from hallway with CGA.  No LOB, minimal unsteadiness.  Patient agreeable to sit UIC at end of tx session.  Noticeable decrease in enema in Right foot with mproved mobility since admission.  Cont OT as tolerated.    Anticipated Discharge Disposition (OT): home with home health

## 2025-02-11 NOTE — PLAN OF CARE
Problem: Adult Inpatient Plan of Care  Goal: Plan of Care Review  Outcome: Progressing  Flowsheets (Taken 2/11/2025 0405)  Outcome Evaluation: Vitals stable, IV Abx continued, no problem identified, plan of care on going.

## 2025-02-11 NOTE — PLAN OF CARE
Goal Outcome Evaluation:  Plan of Care Reviewed With: patient        Progress: improving  Outcome Evaluation: Up in chair during the day. No c/o severe pain in R foot, tolerable mild pain so far. Dsging changed on L finger. VSS, call light within reach.

## 2025-02-11 NOTE — PROGRESS NOTES
Name: Rekha Bear ADMIT: 2025   : 1940  PCP: Zahida Osorio MD    MRN: [id number] LOS: 5 days   AGE/SEX: 84 y.o. female  ROOM: [facility]     Subjective   Subjective   Patient is seen at bedside, she is sitting in the recliner.       Objective   Objective   Vital Signs  Temp:  [97.5 °F (36.4 °C)-98.6 °F (37 °C)] 98.6 °F (37 °C)  Heart Rate:  [67-75] 75  Resp:  [18] 18  BP: (133-171)/(89-91) 171/91  SpO2:  [97 %-100 %] 100 %  on   ;   Device (Oxygen Therapy): room air  Body mass index is 32.11 kg/m².  Physical Exam  General, awake and alert.  Head and ENT, normocephalic and atraumatic.  Lungs, symmetric expansion, equal air entry bilaterally.  Heart, regular rate and rhythm.  Abdomen, soft and nontender.  Extremities, no clubbing or cyanosis.  right foot with cellulitis changes and more dusky appearance on the plantar surface of the heel.  She does have a small punctate spot that does not appear open and is not having any drainage currently.  Raynaud's type color changes of toes on that foot.  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   Neuro, no focal deficits.  Skin: Warm and no rash.  Psych, normal mood and affect.  Musculoskeletal, joint examination is grossly normal.     Copied text material from yesterday's note has been reviewed for appropriate changes and remains accurate as of 25.        Results Review     I reviewed the patient's new clinical results.  Results from last 7 days   Lab Units 25  0600 02/10/25  0321 25  0503 25  0417   WBC 10*3/mm3 9.20 9.19 9.70 8.45   HEMOGLOBIN g/dL 13.1 12.7 12.4 12.2   PLATELETS 10*3/mm3 203 214 203 219     Results from last 7 days   Lab Units 25  0559 02/10/25  0321 25  0503 25  0417   SODIUM mmol/L 138 133* 137 136   POTASSIUM mmol/L 4.2 4.4 4.4 4.3   CHLORIDE mmol/L 105 99 103 106   CO2 mmol/L 25.0 23.7 23.5 22.4   BUN mg/dL 21 20 21 21   CREATININE mg/dL  1.03* 1.08* 1.03* 1.13*   GLUCOSE mg/dL 141* 213* 175* 106*   EGFR mL/min/1.73 54.4* 51.4* 54.4* 48.7*     Results from last 7 days   Lab Units 02/11/25  0559 02/10/25  0321 02/09/25  0503 02/08/25  0417   ALBUMIN g/dL 3.2* 3.2* 3.1* 2.7*   BILIRUBIN mg/dL 0.3 0.3 0.3 0.5   ALK PHOS U/L 287* 304* 295* 270*   AST (SGOT) U/L 20 13 14 14   ALT (SGPT) U/L 6 8 12 14     Results from last 7 days   Lab Units 02/11/25  0559 02/10/25  0321 02/09/25  0503 02/08/25  0417   CALCIUM mg/dL 8.9 8.8 8.8 8.7   ALBUMIN g/dL 3.2* 3.2* 3.1* 2.7*   MAGNESIUM mg/dL  --   --   --  1.9   PHOSPHORUS mg/dL  --   --   --  3.4     Results from last 7 days   Lab Units 02/06/25  1724   LACTATE mmol/L 1.3     Glucose   Date/Time Value Ref Range Status   02/11/2025 1123 295 (H) 70 - 130 mg/dL Final   02/11/2025 0751 200 (H) 70 - 130 mg/dL Final   02/10/2025 2206 190 (H) 70 - 130 mg/dL Final   02/10/2025 1629 193 (H) 70 - 130 mg/dL Final   02/10/2025 1140 238 (H) 70 - 130 mg/dL Final   02/10/2025 0805 211 (H) 70 - 130 mg/dL Final   02/09/2025 2023 200 (H) 70 - 130 mg/dL Final       No radiology results for the last day    I have personally reviewed all medications:  Scheduled Medications  atorvastatin, 40 mg, Oral, Nightly  buPROPion XL, 150 mg, Oral, QAM  ceFAZolin, 2,000 mg, Intravenous, Q8H  donepezil, 10 mg, Oral, Nightly  gabapentin, 400 mg, Oral, Q12H  insulin glargine, 5 Units, Subcutaneous, Nightly  insulin lispro, 2-7 Units, Subcutaneous, 4x Daily AC & at Bedtime  levothyroxine, 88 mcg, Oral, Daily  losartan, 50 mg, Oral, Daily  metoprolol succinate XL, 25 mg, Oral, Q24H  oxybutynin XL, 10 mg, Oral, Daily  rivaroxaban, 15 mg, Oral, Daily With Dinner  sodium chloride, 10 mL, Intravenous, Q12H  venlafaxine XR, 150 mg, Oral, Daily    Infusions   Diet  Diet: Cardiac, Diabetic; Healthy Heart (2-3 Na+); Consistent Carbohydrate; Fluid Consistency: Thin (IDDSI 0)    I have personally reviewed:  [x]  Laboratory   [x]  Microbiology   [x]  Radiology    [x]  EKG/Telemetry  [x]  Cardiology/Vascular   []  Pathology    []  Records       Assessment/Plan     Active Hospital Problems    Diagnosis  POA    **Cellulitis in diabetic foot [E11.628, L03.119]  Yes    Atrial fibrillation, persistent [I48.19]  Yes    SHEY (obstructive sleep apnea) [G47.33]  Yes    Hypothyroidism [E03.9]  Yes    Primary hypertension [I10]  Yes    Type 2 diabetes mellitus with stage 3 chronic kidney disease, without long-term current use of insulin [E11.22, N18.30]  Yes    Anxiety [F41.9]  Yes    Gastroesophageal reflux disease [K21.9]  Yes      Resolved Hospital Problems   No resolved problems to display.         84 y.o. female admitted with Cellulitis in diabetic foot.     RLE cellulitis: Blood culture no growth to date.  Had elevated inflammatory markers.  MRI of the right foot, IMPRESSION:  1. Nondisplaced distal cuboid fracture with intra-articular extension.  In the absence of trauma history, this is likely a stress or  insufficiency fracture. There is no finding to suggest that this is  related to underlying osteomyelitis though follow-up exam could be  obtained if symptoms persist or worsen.  2. 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  3. Edema within the subcutaneous fat greatest dorsally is nonspecific  and could be associated with cellulitis.  4. Suspect posterior tibial insertional adenopathy. Arthritic changes at  the naviculocuneiform joint.  Will ask orthopedics opinion.     Raynaud's  Chronic dry gangrene left index finger: Continue wound care  Diabetes: A1c 9.0.  Continue Lantus and SSI.  Monitor.  Hypothyroidism: Synthroid  Chronic A-fib: Rate okay.  On Xarelto  Hypertension: Monitoring  Elevated alk phos: GGT okay.  Suspect this is from her finger.  Standing MRI of her foot as above.  PPX: See above  Disposition: SNF versus AL/few days     She is on a complex outpatient medical regimen that took many times to get correct at her facility. The tentative plan is for her to  discharge when she is ready on her previous outpatient regimen and likely have the addition of an antibiotic.     Jacob Youngblood MD  [facility]  02/11/25  13:48 EST

## 2025-02-11 NOTE — THERAPY TREATMENT NOTE
"Patient Name: Rekha Bear  : 1940    MRN: [id number]                              Today's Date: 2025       Admit Date: 2025    Visit Dx:     ICD-10-CM ICD-9-CM   1. Cellulitis of right foot  L03.115 682.7   2. 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    Community acquired pneumonia of left lower lobe of lung    Hyponatremia    Pneumonia due to COVID-19 virus    Major depressive disorder, recurrent, mild    Acute respiratory failure with hypoxia    Supraventricular tachycardia    PVC (premature ventricular contraction)    Depressive disorder    Gastroesophageal reflux disease    Midline cystocele    Rectocele    BMI 29.0-29.9,adult    Acute left PCA stroke    B12 deficiency    Bradycardia    Premature atrial contractions    Stroke    SHEY (obstructive sleep apnea)    Circadian rhythm disorder    Cerebrovascular accident (CVA) due to embolism of left posterior cerebral artery    Anxiety    Dysphagia as late effect of stroke    Atrial fibrillation, persistent    Acute UTI    Closed left hip fracture    Periprosthetic fracture around internal prosthetic left hip joint    Naz-prosthetic fracture around prosthetic hip    History of CVA (cerebrovascular accident)    Cellulitis in diabetic foot     Past Medical History:   Diagnosis Date    Chilblains     \"Chilblian's\"    CKD (chronic kidney disease)     CVA (cerebral vascular accident)     Depression     Fatty liver     GERD (gastroesophageal reflux disease)     Hyperlipidemia     Hypertension     Incontinence     " "Osteoarthritis     OA  Marvin THR, LT TKR - hydrocodone prescibed by Dr. 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 Almaguer    TOTAL KNEE ARTHROPLASTY Left     OA  Marvin THR, LT TKR - hydrocodone prescibed by Dr. Almaguer      General Information       Row Name 02/11/25 1224          OT Time and Intention    Document Type therapy note (daily note)  -JR     Mode of Treatment occupational therapy;individual therapy  -       Row Name 02/11/25 1224          General Information    Patient Profile Reviewed yes  -JR     Existing Precautions/Restrictions fall  -JR       Row Name 02/11/25 1224          Cognition    Orientation Status (Cognition) oriented x 3  -JR       Row Name 02/11/25 1224          Safety Issues/Impairments Affecting Functional Mobility    Impairments Affecting Function (Mobility) endurance/activity tolerance;strength  -     Comment, Safety Issues/Impairments (Mobility) gait belt and non skid socks donned.  -JR               User Key  (r) = Recorded By, (t) = Taken By, (c) = Cosigned By      Initials Name Provider Type    JR Sanjeev Tovar OT Occupational Therapist                     Mobility/ADL's       Row Name 02/11/25 1225          Bed Mobility    Bed Mobility supine-sit  " -JR     Supine-Sit [place] (Bed Mobility) contact guard  -JR     Assistive Device (Bed Mobility) bed rails;head of bed elevated  -       Row Name 02/11/25 1225          Transfers    Transfers sit-stand transfer  -       Row Name 02/11/25 1225          Sit-Stand Transfer    Sit-Stand [place] (Transfers) contact guard  -JR     Assistive Device (Sit-Stand Transfers) walker, front-wheeled  -       Row Name 02/11/25 1225          Functional Mobility    Patient was able to Ambulate yes  -               User Key  (r) = Recorded By, (t) = Taken By, (c) = Cosigned By      Initials Name Provider Type    Sanjeev Guerrero OT Occupational Therapist                   Obj/Interventions       Row Name 02/11/25 1226          Sensory Assessment (Somatosensory)    Sensory Assessment (Somatosensory) sensation intact  -[facility] Name 02/11/25 1226          Vision Assessment/Intervention    Visual Impairment/Limitations WFL  -       Row Name 02/11/25 1226          Balance    Balance Assessment sitting static balance;sitting dynamic balance;standing static balance;standing dynamic balance  -     Static Sitting Balance standby assist  -JR     Dynamic Sitting Balance standby assist  -JR     Position, Sitting Balance sitting edge of bed  -JR     Static Standing Balance contact guard  -JR     Dynamic Standing Balance contact guard  -JR     Position/Device Used, Standing Balance walker, front-wheeled  -               User Key  (r) = Recorded By, (t) = Taken By, (c) = Cosigned By      Initials Name Provider Type    Sanjeev Guerrero OT Occupational Therapist                   Goals/Plan    No documentation.                  Clinical Impression       Row Name 02/11/25 1226          Pain Assessment    Pretreatment Pain Rating 0/10 - no pain  -     Posttreatment Pain Rating 0/10 - no pain  -       Row Name 02/11/25 1226          Plan of Care Review    Plan of Care Reviewed With patient  -     Progress improving  -      Outcome Evaluation Patient seen for OT this am.  Patient resting in bed upon arrival. Patient transitioned to EOB with CGA.  STS from EOB with CGA and Rw.  Patient ambulated to.from hallway with CGA.  No LOB, minimal unsteadiness.  Patient agreeable to sit UIC at end of tx session.  Noticeable decrease in enema in Right foot with mproved mobility since admission.  Cont OT as tolerated.  -       Row Name 02/11/25 1226          Therapy Assessment/Plan (OT)    Rehab Potential (OT) good  -     Criteria for Skilled Therapeutic Interventions Met (OT) yes;skilled treatment is necessary  -     Therapy Frequency (OT) 5 times/wk  -       Row Name 02/11/25 1226          Therapy Plan Review/Discharge Plan (OT)    Anticipated Discharge Disposition (OT) home with home health  -       Row Name 02/11/25 1226          Vital Signs    O2 Delivery Pre Treatment room air  -JR     O2 Delivery Intra Treatment room air  -JR     O2 Delivery Post Treatment room air  -JR     Pre Patient Position Supine  -JR     Intra Patient Position Standing  -JR     Post Patient Position Sitting  -       Row Name 02/11/25 1226          Positioning and Restraints    Pre-Treatment Position in bed  -JR     Post Treatment Position chair  -JR     In Chair notified nsg;reclined;sitting;encouraged to call for assist;call light within reach  -JR               User Key  (r) = Recorded By, (t) = Taken By, (c) = Cosigned By      Initials Name Provider Type    Sanjeev Guerrero, OT Occupational Therapist                   Outcome Measures       Row Name 02/11/25 6297          How much help from another is currently needed...    Putting on and taking off regular lower body clothing? 2  -JR     Bathing (including washing, rinsing, and drying) 3  -JR     Toileting (which includes using toilet bed pan or urinal) 3  -JR     Putting on and taking off regular upper body clothing 3  -JR     Taking care of personal grooming (such as brushing teeth) 3  -JR     Eating  meals 3  -JR     AM-PAC 6 Clicks Score (OT) 17  -       Row Name 02/11/25 1244          Modified [place] Scale    Modified [place] Scale 3 - Moderate disability.  Requiring some help, but able to walk without assistance.  -       Row Name 02/11/25 1244          Functional Assessment    Outcome Measure Options AM-PAC 6 Clicks Daily Activity (OT);Modified [place]  -JR               User Key  (r) = Recorded By, (t) = Taken By, (c) = Cosigned By      Initials Name Provider Type    Sanjeev Guerrero OT Occupational Therapist                    Occupational Therapy Education       Title: PT OT SLP Therapies (In Progress)       Topic: Occupational Therapy (Done)       Point: ADL training (Done)       Description:   Instruct learner(s) on proper safety adaptation and remediation techniques during self care or transfers.   Instruct in proper use of assistive devices.                  Learning Progress Summary            Patient DANA Ortiz VU by  at 2/7/2025 1247    Comment: Role of OT                      Point: Home exercise program (Done)       Description:   Instruct learner(s) on appropriate technique for monitoring, assisting and/or progressing therapeutic exercises/activities.                  Learning Progress Summary            Patient DANA Ortiz VU by  at 2/7/2025 1247    Comment: Role of OT                      Point: Precautions (Done)       Description:   Instruct learner(s) on prescribed precautions during self-care and functional transfers.                  Learning Progress Summary            DANA Ramirez VU by  at 2/7/2025 1247    Comment: Role of OT                      Point: Body mechanics (Done)       Description:   Instruct learner(s) on proper positioning and spine alignment during self-care, functional mobility activities and/or exercises.                  Learning Progress Summary            DANA Ramirez VU by  at 2/7/2025 1247    Comment: Role of OT                                       User Key       Initials Effective Dates Name Provider Type Discipline     07/24/24 -  Sanjeev Tovar OT Occupational Therapist OT                  OT Recommendation and Plan  Planned Therapy Interventions (OT): activity tolerance training, adaptive equipment training, BADL retraining, neuromuscular control/coordination retraining, functional balance retraining, occupation/activity based interventions, passive ROM/stretching, transfer/mobility retraining, strengthening exercise, ROM/therapeutic exercise  Therapy Frequency (OT): 5 times/wk  Plan of Care Review  Plan of Care Reviewed With: patient  Progress: improving  Outcome Evaluation: Patient seen for OT this am.  Patient resting in bed upon arrival. Patient transitioned to EOB with CGA.  STS from EOB with CGA and Rw.  Patient ambulated to.from hallway with CGA.  No LOB, minimal unsteadiness.  Patient agreeable to sit UIC at end of tx session.  Noticeable decrease in enema in Right foot with mproved mobility since admission.  Cont OT as tolerated.     Time Calculation:   Evaluation Complexity (OT)  Review Occupational Profile/Medical/Therapy History Complexity: expanded/moderate complexity  Assessment, Occupational Performance/Identification of Deficit Complexity: 3-5 performance deficits  Clinical Decision Making Complexity (OT): detailed assessment/moderate complexity  Overall Complexity of Evaluation (OT): moderate complexity     Time Calculation- OT       Row Name 02/11/25 1244             Time Calculation- OT    OT Start Time 0951  -      OT Stop Time 1014  -      OT Time Calculation (min) 23 min  -JR      Total Timed Code Minutes- OT 23 minute(s)  -      OT Received On 02/11/25  -      OT - Next Appointment 02/12/25  -         Timed Charges    65046 - OT Therapeutic Activity Minutes 23  -JR         Total Minutes    Timed Charges Total Minutes 23  -JR       Total Minutes 23  -JR                User Key  (r) = Recorded By, (t) = Taken By, (c) =  Cosigned By      Initials Name Provider Type    JR Sanjeev Tovar OT Occupational Therapist                  Therapy Charges for Today       Code Description Service Date Service Provider Modifiers Qty    83073054660 HC OT THERAPEUTIC ACT EA 15 MIN 2/11/2025 Sanjeev Tovar OT GO 2                 Sanjeev Tovar OT  2/11/2025

## 2025-02-12 LAB
ALBUMIN SERPL-MCNC: 3.1 G/DL (ref 3.5–5.2)
ALBUMIN/GLOB SERPL: 1 G/DL
ALP SERPL-CCNC: 282 U/L (ref 39–117)
ALT SERPL W P-5'-P-CCNC: <5 U/L (ref 1–33)
ANION GAP SERPL CALCULATED.3IONS-SCNC: 9 MMOL/L (ref 5–15)
AST SERPL-CCNC: 19 U/L (ref 1–32)
BASOPHILS # BLD AUTO: 0.05 10*3/MM3 (ref 0–0.2)
BASOPHILS NFR BLD AUTO: 0.7 % (ref 0–1.5)
BILIRUB SERPL-MCNC: 0.3 MG/DL (ref 0–1.2)
BUN SERPL-MCNC: 21 MG/DL (ref 8–23)
BUN/CREAT SERPL: 16.9 (ref 7–25)
CALCIUM SPEC-SCNC: 8.9 MG/DL (ref 8.6–10.5)
CHLORIDE SERPL-SCNC: 103 MMOL/L (ref 98–107)
CO2 SERPL-SCNC: 22 MMOL/L (ref 22–29)
CREAT SERPL-MCNC: 1.24 MG/DL (ref 0.57–1)
DEPRECATED RDW RBC AUTO: 44.3 FL (ref 37–54)
EGFRCR SERPLBLD CKD-EPI 2021: 43.5 ML/MIN/1.73
EOSINOPHIL # BLD AUTO: 0.17 10*3/MM3 (ref 0–0.4)
EOSINOPHIL NFR BLD AUTO: 2.2 % (ref 0.3–6.2)
ERYTHROCYTE [DISTWIDTH] IN BLOOD BY AUTOMATED COUNT: 13.2 % (ref 12.3–15.4)
GLOBULIN UR ELPH-MCNC: 3 GM/DL
GLUCOSE BLDC GLUCOMTR-MCNC: 166 MG/DL (ref 70–130)
GLUCOSE BLDC GLUCOMTR-MCNC: 211 MG/DL (ref 70–130)
GLUCOSE BLDC GLUCOMTR-MCNC: 231 MG/DL (ref 70–130)
GLUCOSE BLDC GLUCOMTR-MCNC: 256 MG/DL (ref 70–130)
GLUCOSE SERPL-MCNC: 158 MG/DL (ref 65–99)
HCT VFR BLD AUTO: 38.9 % (ref 34–46.6)
HGB BLD-MCNC: 13.2 G/DL (ref 12–15.9)
IMM GRANULOCYTES # BLD AUTO: 0.04 10*3/MM3 (ref 0–0.05)
IMM GRANULOCYTES NFR BLD AUTO: 0.5 % (ref 0–0.5)
LYMPHOCYTES # BLD AUTO: 1.99 10*3/MM3 (ref 0.7–3.1)
LYMPHOCYTES NFR BLD AUTO: 25.9 % (ref 19.6–45.3)
MCH RBC QN AUTO: 30.7 PG (ref 26.6–33)
MCHC RBC AUTO-ENTMCNC: 33.9 G/DL (ref 31.5–35.7)
MCV RBC AUTO: 90.5 FL (ref 79–97)
MONOCYTES # BLD AUTO: 0.76 10*3/MM3 (ref 0.1–0.9)
MONOCYTES NFR BLD AUTO: 9.9 % (ref 5–12)
NEUTROPHILS NFR BLD AUTO: 4.68 10*3/MM3 (ref 1.7–7)
NEUTROPHILS NFR BLD AUTO: 60.8 % (ref 42.7–76)
NRBC BLD AUTO-RTO: 0 /100 WBC (ref 0–0.2)
PLATELET # BLD AUTO: 188 10*3/MM3 (ref 140–450)
PMV BLD AUTO: 8.7 FL (ref 6–12)
POTASSIUM SERPL-SCNC: 4.3 MMOL/L (ref 3.5–5.2)
PROT SERPL-MCNC: 6.1 G/DL (ref 6–8.5)
RBC # BLD AUTO: 4.3 10*6/MM3 (ref 3.77–5.28)
SODIUM SERPL-SCNC: 134 MMOL/L (ref 136–145)
WBC NRBC COR # BLD AUTO: 7.69 10*3/MM3 (ref 3.4–10.8)

## 2025-02-12 PROCEDURE — 25010000002 CEFAZOLIN PER 500 MG: Performed by: INTERNAL MEDICINE

## 2025-02-12 PROCEDURE — 82948 REAGENT STRIP/BLOOD GLUCOSE: CPT

## 2025-02-12 PROCEDURE — 80053 COMPREHEN METABOLIC PANEL: CPT | Performed by: INTERNAL MEDICINE

## 2025-02-12 PROCEDURE — 85025 COMPLETE CBC W/AUTO DIFF WBC: CPT | Performed by: INTERNAL MEDICINE

## 2025-02-12 PROCEDURE — 63710000001 INSULIN GLARGINE PER 5 UNITS: Performed by: INTERNAL MEDICINE

## 2025-02-12 PROCEDURE — 63710000001 INSULIN LISPRO (HUMAN) PER 5 UNITS: Performed by: NURSE PRACTITIONER

## 2025-02-12 RX ADMIN — INSULIN GLARGINE 5 UNITS: 100 INJECTION, SOLUTION SUBCUTANEOUS at 21:28

## 2025-02-12 RX ADMIN — CEFAZOLIN 2000 MG: 2 INJECTION, POWDER, FOR SOLUTION INTRAMUSCULAR; INTRAVENOUS at 19:04

## 2025-02-12 RX ADMIN — DONEPEZIL HYDROCHLORIDE 10 MG: 10 TABLET, FILM COATED ORAL at 21:29

## 2025-02-12 RX ADMIN — RIVAROXABAN 15 MG: 15 TABLET, FILM COATED ORAL at 17:48

## 2025-02-12 RX ADMIN — LEVOTHYROXINE SODIUM 88 MCG: 88 TABLET ORAL at 08:41

## 2025-02-12 RX ADMIN — INSULIN LISPRO 4 UNITS: 100 INJECTION, SOLUTION INTRAVENOUS; SUBCUTANEOUS at 12:11

## 2025-02-12 RX ADMIN — Medication 10 ML: at 21:31

## 2025-02-12 RX ADMIN — INSULIN LISPRO 3 UNITS: 100 INJECTION, SOLUTION INTRAVENOUS; SUBCUTANEOUS at 21:28

## 2025-02-12 RX ADMIN — INSULIN LISPRO 3 UNITS: 100 INJECTION, SOLUTION INTRAVENOUS; SUBCUTANEOUS at 17:48

## 2025-02-12 RX ADMIN — VENLAFAXINE HYDROCHLORIDE 150 MG: 75 CAPSULE, EXTENDED RELEASE ORAL at 08:41

## 2025-02-12 RX ADMIN — CEFAZOLIN 2000 MG: 2 INJECTION, POWDER, FOR SOLUTION INTRAMUSCULAR; INTRAVENOUS at 11:56

## 2025-02-12 RX ADMIN — BUPROPION HYDROCHLORIDE 150 MG: 150 TABLET, EXTENDED RELEASE ORAL at 06:40

## 2025-02-12 RX ADMIN — GABAPENTIN 400 MG: 400 CAPSULE ORAL at 11:56

## 2025-02-12 RX ADMIN — Medication 10 ML: at 08:44

## 2025-02-12 RX ADMIN — CEFAZOLIN 2000 MG: 2 INJECTION, POWDER, FOR SOLUTION INTRAMUSCULAR; INTRAVENOUS at 02:13

## 2025-02-12 RX ADMIN — OXYBUTYNIN CHLORIDE 10 MG: 10 TABLET, EXTENDED RELEASE ORAL at 08:41

## 2025-02-12 RX ADMIN — INSULIN LISPRO 2 UNITS: 100 INJECTION, SOLUTION INTRAVENOUS; SUBCUTANEOUS at 08:41

## 2025-02-12 RX ADMIN — GABAPENTIN 400 MG: 400 CAPSULE ORAL at 00:11

## 2025-02-12 RX ADMIN — ATORVASTATIN CALCIUM 40 MG: 20 TABLET, FILM COATED ORAL at 21:30

## 2025-02-12 RX ADMIN — LOSARTAN POTASSIUM 50 MG: 50 TABLET, FILM COATED ORAL at 08:41

## 2025-02-12 RX ADMIN — METOPROLOL SUCCINATE 25 MG: 25 TABLET, EXTENDED RELEASE ORAL at 08:41

## 2025-02-12 NOTE — PLAN OF CARE
Goal Outcome Evaluation:  Plan of Care Reviewed With: patient        Progress: no change  Outcome Evaluation: Vitals stable. Continued treatment for foot cellulitis. On IV antibiotics. Awaiting Ortho consult, consult re-called today. Wound care continued on finger. Sat up in the chair for a few hours. No further issues at this time.

## 2025-02-12 NOTE — PLAN OF CARE
Goal Outcome Evaluation:  Plan of Care Reviewed With: patient        Progress: no change  Outcome Evaluation: vitals stable.  pt denied pain.  meds given per orders.  external catheter in place.  will continue to monitor.

## 2025-02-12 NOTE — PROGRESS NOTES
Name: Rekha Bear ADMIT: 2025   : 1940  PCP: Zahida Osorio MD    MRN: [id number] LOS: 6 days   AGE/SEX: 84 y.o. female  ROOM: [facility]     Subjective   Subjective   Patient is seen at bedside today, no acute issues have been reported from overnight.       Objective   Objective   Vital Signs  Temp:  [97.9 °F (36.6 °C)-98.2 °F (36.8 °C)] 97.9 °F (36.6 °C)  Heart Rate:  [69-81] 81  Resp:  [16-18] 18  BP: (151-177)/(78-98) 152/89  SpO2:  [95 %-100 %] 95 %  on   ;   Device (Oxygen Therapy): room air  Body mass index is 32.11 kg/m².  Physical Exam  General, awake and alert.  Head and ENT, normocephalic and atraumatic.  Lungs, symmetric expansion, equal air entry bilaterally.  Heart, regular rate and rhythm.  Abdomen, soft and nontender.  Extremities, no clubbing or cyanosis.  right foot with cellulitis changes and more dusky appearance on the plantar surface of the heel.  She does have a small punctate spot that does not appear open and is not having any drainage currently.  Raynaud's type color changes of toes on that foot.  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   Neuro, no focal deficits.  Skin: Warm and no rash.  Psych, normal mood and affect.  Musculoskeletal, joint examination is grossly normal.     Copied text material from yesterday's note has been reviewed for appropriate changes and remains accurate as of 25.         Results Review     I reviewed the patient's new clinical results.  Results from last 7 days   Lab Units 25  0409 25  0600 02/10/25  0321 25  0503   WBC 10*3/mm3 7.69 9.20 9.19 9.70   HEMOGLOBIN g/dL 13.2 13.1 12.7 12.4   PLATELETS 10*3/mm3 188 203 214 203     Results from last 7 days   Lab Units 25  0409 25  0559 02/10/25  0321 25  0503   SODIUM mmol/L 134* 138 133* 137   POTASSIUM mmol/L 4.3 4.2 4.4 4.4   CHLORIDE mmol/L 103 105 99 103   CO2 mmol/L 22.0 25.0 23.7 23.5   BUN  mg/dL 21 21 20 21   CREATININE mg/dL 1.24* 1.03* 1.08* 1.03*   GLUCOSE mg/dL 158* 141* 213* 175*   EGFR mL/min/1.73 43.5* 54.4* 51.4* 54.4*     Results from last 7 days   Lab Units 02/12/25  0409 02/11/25  0559 02/10/25  0321 02/09/25  0503   ALBUMIN g/dL 3.1* 3.2* 3.2* 3.1*   BILIRUBIN mg/dL 0.3 0.3 0.3 0.3   ALK PHOS U/L 282* 287* 304* 295*   AST (SGOT) U/L 19 20 13 14   ALT (SGPT) U/L <5 6 8 12     Results from last 7 days   Lab Units 02/12/25  0409 02/11/25  0559 02/10/25  0321 02/09/25  0503 02/08/25  0417   CALCIUM mg/dL 8.9 8.9 8.8 8.8 8.7   ALBUMIN g/dL 3.1* 3.2* 3.2* 3.1* 2.7*   MAGNESIUM mg/dL  --   --   --   --  1.9   PHOSPHORUS mg/dL  --   --   --   --  3.4     Results from last 7 days   Lab Units 02/06/25  1724   LACTATE mmol/L 1.3     Glucose   Date/Time Value Ref Range Status   02/12/2025 1157 256 (H) 70 - 130 mg/dL Final   02/12/2025 0822 166 (H) 70 - 130 mg/dL Final   02/11/2025 2033 211 (H) 70 - 130 mg/dL Final   02/11/2025 1635 123 70 - 130 mg/dL Final   02/11/2025 1123 295 (H) 70 - 130 mg/dL Final   02/11/2025 0751 200 (H) 70 - 130 mg/dL Final   02/10/2025 2206 190 (H) 70 - 130 mg/dL Final       No radiology results for the last day    I have personally reviewed all medications:  Scheduled Medications  atorvastatin, 40 mg, Oral, Nightly  buPROPion XL, 150 mg, Oral, QAM  ceFAZolin, 2,000 mg, Intravenous, Q8H  donepezil, 10 mg, Oral, Nightly  gabapentin, 400 mg, Oral, Q12H  insulin glargine, 5 Units, Subcutaneous, Nightly  insulin lispro, 2-7 Units, Subcutaneous, 4x Daily AC & at Bedtime  levothyroxine, 88 mcg, Oral, Daily  losartan, 50 mg, Oral, Daily  metoprolol succinate XL, 25 mg, Oral, Q24H  oxybutynin XL, 10 mg, Oral, Daily  rivaroxaban, 15 mg, Oral, Daily With Dinner  sodium chloride, 10 mL, Intravenous, Q12H  venlafaxine XR, 150 mg, Oral, Daily    Infusions   Diet  Diet: Cardiac, Diabetic; Healthy Heart (2-3 Na+); Consistent Carbohydrate; Fluid Consistency: Thin (IDDSI 0)    I have  personally reviewed:  [x]  Laboratory   [x]  Microbiology   [x]  Radiology   [x]  EKG/Telemetry  [x]  Cardiology/Vascular   []  Pathology    []  Records       Assessment/Plan     Active Hospital Problems    Diagnosis  POA    **Cellulitis in diabetic foot [E11.628, L03.119]  Yes    Atrial fibrillation, persistent [I48.19]  Yes    SHEY (obstructive sleep apnea) [G47.33]  Yes    Hypothyroidism [E03.9]  Yes    Primary hypertension [I10]  Yes    Type 2 diabetes mellitus with stage 3 chronic kidney disease, without long-term current use of insulin [E11.22, N18.30]  Yes    Anxiety [F41.9]  Yes    Gastroesophageal reflux disease [K21.9]  Yes      Resolved Hospital Problems   No resolved problems to display.       84 y.o. female admitted with Cellulitis in diabetic foot.    84 y.o. female admitted with Cellulitis in diabetic foot.     RLE cellulitis: Blood culture no growth to date.  Had elevated inflammatory markers.  MRI of the right foot, IMPRESSION:  1. Nondisplaced distal cuboid fracture with intra-articular extension.  In the absence of trauma history, this is likely a stress or  insufficiency fracture. There is no finding to suggest that this is  related to underlying osteomyelitis though follow-up exam could be  obtained if symptoms persist or worsen.  2. 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  3. Edema within the subcutaneous fat greatest dorsally is nonspecific  and could be associated with cellulitis.  4. Suspect posterior tibial insertional adenopathy. Arthritic changes at  the naviculocuneiform joint.  Will ask orthopedics opinion.     Raynaud's  Chronic dry gangrene left index finger: Continue wound care  Diabetes: A1c 9.0.  Continue Lantus and SSI.  Monitor.  Hypothyroidism: Synthroid  Chronic A-fib: Rate okay.  On Xarelto  Hypertension: Monitoring  Elevated alk phos: GGT okay.  Suspect this is from her finger.  Standing MRI of her foot as above.  PPX: See above  Disposition: SNF versus AL/few days      She is on a complex outpatient medical regimen that took many times to get correct at her facility. The tentative plan is for her to discharge when she is ready on her previous outpatient regimen and likely have the addition of an antibiotic.     Jacob Youngblood MD  [facility]  02/12/25  13:43 EST

## 2025-02-13 ENCOUNTER — READMISSION MANAGEMENT (OUTPATIENT)
Dept: CALL CENTER | Facility: HOSPITAL | Age: 85
End: 2025-02-13
Payer: MEDICARE

## 2025-02-13 ENCOUNTER — HOSPITAL ENCOUNTER (OUTPATIENT)
Facility: HOSPITAL | Age: 85
Discharge: HOME | End: 2025-02-13
Payer: MEDICARE

## 2025-02-13 VITALS
WEIGHT: 205.03 LBS | BODY MASS INDEX: 32.18 KG/M2 | HEART RATE: 74 BPM | SYSTOLIC BLOOD PRESSURE: 158 MMHG | RESPIRATION RATE: 18 BRPM | TEMPERATURE: 97.7 F | HEIGHT: 67 IN | OXYGEN SATURATION: 95 % | DIASTOLIC BLOOD PRESSURE: 91 MMHG

## 2025-02-13 DIAGNOSIS — M15.8 OTHER OSTEOARTHRITIS INVOLVING MULTIPLE JOINTS: ICD-10-CM

## 2025-02-13 DIAGNOSIS — N39.44 NOCTURNAL ENURESIS: ICD-10-CM

## 2025-02-13 LAB
ALBUMIN SERPL-MCNC: 3.1 G/DL (ref 3.5–5.2)
ALBUMIN/GLOB SERPL: 1 G/DL
ALP SERPL-CCNC: 283 U/L (ref 39–117)
ALT SERPL W P-5'-P-CCNC: <5 U/L (ref 1–33)
ANION GAP SERPL CALCULATED.3IONS-SCNC: 8.9 MMOL/L (ref 5–15)
AST SERPL-CCNC: 19 U/L (ref 1–32)
BASOPHILS # BLD AUTO: 0.03 10*3/MM3 (ref 0–0.2)
BASOPHILS NFR BLD AUTO: 0.4 % (ref 0–1.5)
BILIRUB SERPL-MCNC: 0.3 MG/DL (ref 0–1.2)
BUN SERPL-MCNC: 20 MG/DL (ref 8–23)
BUN/CREAT SERPL: 16.1 (ref 7–25)
CALCIUM SPEC-SCNC: 9 MG/DL (ref 8.6–10.5)
CHLORIDE SERPL-SCNC: 100 MMOL/L (ref 98–107)
CO2 SERPL-SCNC: 26.1 MMOL/L (ref 22–29)
CREAT SERPL-MCNC: 1.24 MG/DL (ref 0.57–1)
DEPRECATED RDW RBC AUTO: 44.7 FL (ref 37–54)
EGFRCR SERPLBLD CKD-EPI 2021: 43.5 ML/MIN/1.73
EOSINOPHIL # BLD AUTO: 0.19 10*3/MM3 (ref 0–0.4)
EOSINOPHIL NFR BLD AUTO: 2.4 % (ref 0.3–6.2)
ERYTHROCYTE [DISTWIDTH] IN BLOOD BY AUTOMATED COUNT: 13.5 % (ref 12.3–15.4)
GLOBULIN UR ELPH-MCNC: 3.1 GM/DL
GLUCOSE BLDC GLUCOMTR-MCNC: 141 MG/DL (ref 70–130)
GLUCOSE BLDC GLUCOMTR-MCNC: 228 MG/DL (ref 70–130)
GLUCOSE BLDC GLUCOMTR-MCNC: 236 MG/DL (ref 70–130)
GLUCOSE SERPL-MCNC: 143 MG/DL (ref 65–99)
HCT VFR BLD AUTO: 38.7 % (ref 34–46.6)
HGB BLD-MCNC: 13.1 G/DL (ref 12–15.9)
IMM GRANULOCYTES # BLD AUTO: 0.06 10*3/MM3 (ref 0–0.05)
IMM GRANULOCYTES NFR BLD AUTO: 0.8 % (ref 0–0.5)
LYMPHOCYTES # BLD AUTO: 1.98 10*3/MM3 (ref 0.7–3.1)
LYMPHOCYTES NFR BLD AUTO: 25 % (ref 19.6–45.3)
MCH RBC QN AUTO: 30.6 PG (ref 26.6–33)
MCHC RBC AUTO-ENTMCNC: 33.9 G/DL (ref 31.5–35.7)
MCV RBC AUTO: 90.4 FL (ref 79–97)
MONOCYTES # BLD AUTO: 0.84 10*3/MM3 (ref 0.1–0.9)
MONOCYTES NFR BLD AUTO: 10.6 % (ref 5–12)
NEUTROPHILS NFR BLD AUTO: 4.82 10*3/MM3 (ref 1.7–7)
NEUTROPHILS NFR BLD AUTO: 60.8 % (ref 42.7–76)
NRBC BLD AUTO-RTO: 0 /100 WBC (ref 0–0.2)
PLATELET # BLD AUTO: 185 10*3/MM3 (ref 140–450)
PMV BLD AUTO: 8.6 FL (ref 6–12)
POTASSIUM SERPL-SCNC: 4.3 MMOL/L (ref 3.5–5.2)
PROT SERPL-MCNC: 6.2 G/DL (ref 6–8.5)
RBC # BLD AUTO: 4.28 10*6/MM3 (ref 3.77–5.28)
SODIUM SERPL-SCNC: 135 MMOL/L (ref 136–145)
WBC NRBC COR # BLD AUTO: 7.92 10*3/MM3 (ref 3.4–10.8)

## 2025-02-13 PROCEDURE — 25010000002 CEFAZOLIN PER 500 MG: Performed by: INTERNAL MEDICINE

## 2025-02-13 PROCEDURE — 82948 REAGENT STRIP/BLOOD GLUCOSE: CPT

## 2025-02-13 PROCEDURE — 85025 COMPLETE CBC W/AUTO DIFF WBC: CPT | Performed by: INTERNAL MEDICINE

## 2025-02-13 PROCEDURE — 63710000001 INSULIN LISPRO (HUMAN) PER 5 UNITS: Performed by: NURSE PRACTITIONER

## 2025-02-13 PROCEDURE — 80053 COMPREHEN METABOLIC PANEL: CPT | Performed by: INTERNAL MEDICINE

## 2025-02-13 RX ORDER — MIRABEGRON 25 MG/1
TABLET, FILM COATED, EXTENDED RELEASE ORAL
Qty: 30 TABLET | Refills: 0 | Status: SHIPPED | OUTPATIENT
Start: 2025-02-13

## 2025-02-13 RX ORDER — GABAPENTIN 400 MG/1
400 CAPSULE ORAL 2 TIMES DAILY
Qty: 56 CAPSULE | Refills: 0 | Status: SHIPPED | OUTPATIENT
Start: 2025-02-13

## 2025-02-13 RX ORDER — CEFDINIR 300 MG/1
300 CAPSULE ORAL 2 TIMES DAILY
Qty: 10 CAPSULE | Refills: 0 | Status: SHIPPED | OUTPATIENT
Start: 2025-02-13 | End: 2025-02-18

## 2025-02-13 RX ADMIN — INSULIN LISPRO 2 UNITS: 100 INJECTION, SOLUTION INTRAVENOUS; SUBCUTANEOUS at 17:55

## 2025-02-13 RX ADMIN — LOSARTAN POTASSIUM 50 MG: 50 TABLET, FILM COATED ORAL at 09:44

## 2025-02-13 RX ADMIN — RIVAROXABAN 15 MG: 15 TABLET, FILM COATED ORAL at 17:55

## 2025-02-13 RX ADMIN — LEVOTHYROXINE SODIUM 88 MCG: 88 TABLET ORAL at 09:44

## 2025-02-13 RX ADMIN — CEFAZOLIN 2000 MG: 2 INJECTION, POWDER, FOR SOLUTION INTRAMUSCULAR; INTRAVENOUS at 02:29

## 2025-02-13 RX ADMIN — INSULIN LISPRO 3 UNITS: 100 INJECTION, SOLUTION INTRAVENOUS; SUBCUTANEOUS at 12:26

## 2025-02-13 RX ADMIN — GABAPENTIN 400 MG: 400 CAPSULE ORAL at 12:27

## 2025-02-13 RX ADMIN — METOPROLOL SUCCINATE 25 MG: 25 TABLET, EXTENDED RELEASE ORAL at 09:44

## 2025-02-13 RX ADMIN — BUPROPION HYDROCHLORIDE 150 MG: 150 TABLET, EXTENDED RELEASE ORAL at 06:24

## 2025-02-13 RX ADMIN — OXYBUTYNIN CHLORIDE 10 MG: 10 TABLET, EXTENDED RELEASE ORAL at 09:44

## 2025-02-13 RX ADMIN — GABAPENTIN 400 MG: 400 CAPSULE ORAL at 00:18

## 2025-02-13 RX ADMIN — CEFAZOLIN 2000 MG: 2 INJECTION, POWDER, FOR SOLUTION INTRAMUSCULAR; INTRAVENOUS at 12:27

## 2025-02-13 RX ADMIN — VENLAFAXINE HYDROCHLORIDE 150 MG: 75 CAPSULE, EXTENDED RELEASE ORAL at 09:44

## 2025-02-13 RX ADMIN — Medication 10 ML: at 09:50

## 2025-02-13 NOTE — DISCHARGE SUMMARY
Date of Discharge:  2/13/2025    PCP: Zahida Osorio MD    Discharge Diagnosis:   Active Hospital Problems    Diagnosis  POA    **Cellulitis in diabetic foot [E11.628, L03.119]  Yes    Atrial fibrillation, persistent [I48.19]  Yes    SHEY (obstructive sleep apnea) [G47.33]  Yes    Hypothyroidism [E03.9]  Yes    Primary hypertension [I10]  Yes    Type 2 diabetes mellitus with stage 3 chronic kidney disease, without long-term current use of insulin [E11.22, N18.30]  Yes    Anxiety [F41.9]  Yes    Gastroesophageal reflux disease [K21.9]  Yes      Resolved Hospital Problems   No resolved problems to display.          Consults       Date and Time Order Name Status Description    2/10/2025  2:23 PM Inpatient Orthopedic Surgery Consult Completed           Hospital Course  84 y.o. female admitted with Cellulitis in diabetic foot.     RLE cellulitis: Blood culture no growth to date.  Had elevated inflammatory markers.  MRI of the right foot, IMPRESSION:  1. Nondisplaced distal cuboid fracture with intra-articular extension.  In the absence of trauma history, this is likely a stress or  insufficiency fracture. There is no finding to suggest that this is  related to underlying osteomyelitis though follow-up exam could be  obtained if symptoms persist or worsen.  2. 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  3. Edema within the subcutaneous fat greatest dorsally is nonspecific  and could be associated with cellulitis.  4. Suspect posterior tibial insertional adenopathy. Arthritic changes at  the naviculocuneiform joint.  Will ask orthopedics opinion.  Conservative management recommended by orthopedics.  No plans for acute intervention at this point of time.     Raynaud's  Chronic dry gangrene left index finger: Continue wound care  Diabetes: A1c 9.0.  Continue Lantus and SSI.  Monitor.  Hypothyroidism: Synthroid  Chronic A-fib: Rate okay.  On Xarelto  Hypertension: Monitoring  Elevated alk phos: GGT okay.  Suspect this  is from her finger.  Standing MRI of her foot as above.  PPX: See above  Disposition: SNF versus AL/few days     She is on a complex outpatient medical regimen that took many times to get correct at her facility.    Patient has stabilized for discharge, I have seen and examined patient at bedside, total time spent on discharge management is more than 30 minutes.  Patient will be switched to oral antibiotics at the time of discharge.  She will finish the course on outpatient basis, she will follow-up closely with PCP on outpatient basis.    Temp:  [97.7 °F (36.5 °C)-98.6 °F (37 °C)] 97.7 °F (36.5 °C)  Heart Rate:  [68-78] 74  Resp:  [16-20] 18  BP: (139-169)/(91-98) 158/91  Body mass index is 32.11 kg/m².    Physical Exam  General, awake and alert.  Head and ENT, normocephalic and atraumatic.  Lungs, symmetric expansion, equal air entry bilaterally.  Heart, regular rate and rhythm.  Abdomen, soft and nontender.  Extremities, no clubbing or cyanosis.  right foot with cellulitis changes and more dusky appearance on the plantar surface of the heel.  She does have a small punctate spot that does not appear open and is not having any drainage currently.  Raynaud's type color changes of toes on that foot.  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   Neuro, no focal deficits.  Skin: Warm and no rash.  Psych, normal mood and affect.  Musculoskeletal, joint examination is grossly normal.      Disposition: Home or Self Care       Discharge Medications        New Medications        Instructions Start Date   cefdinir 300 MG capsule  Commonly known as: OMNICEF   300 mg, Oral, 2 Times Daily      glucose blood test strip   Use to test blood glucose daily. Formulary Compliance Approval. Diagnosis: Type 2 Diabetes - Not Insulin Dependent.  Diagnosis Code: E11.9      glucose monitor monitoring kit   Use to test blood glucose daily. Formulary Compliance Approval. Diagnosis: Type 2  Diabetes - Not Insulin Dependent.  Diagnosis Code: E11.9      Lancets misc   Use to test blood glucose daily. Formulary Compliance Approval. Diagnosis: Type 2 Diabetes - Not Insulin Dependent.  Diagnosis Code: E11.9             Changes to Medications        Instructions Start Date   gabapentin 400 MG capsule  Commonly known as: NEURONTIN  What changed: when to take this   400 mg, Oral, 2 Times Daily      glimepiride 4 MG tablet  Commonly known as: AMARYL  What changed: when to take this   4 mg, Oral, Every Morning Before Breakfast      Myrbetriq 25 MG tablet sustained-release 24 hour 24 hr tablet  Generic drug: Mirabegron ER  What changed:   medication strength  See the new instructions.   TAKE ONE TABLET BY MOUTH DAILY *NEW MED*             Continue These Medications        Instructions Start Date   atorvastatin 40 MG tablet  Commonly known as: LIPITOR   40 mg, Oral, Nightly      buPROPion  MG 24 hr tablet  Commonly known as: WELLBUTRIN XL   150 mg, Oral, Every Morning      cyanocobalamin 500 MCG tablet  Commonly known as: VITAMIN B-12   500 mcg, Oral, Daily      Diclofenac Sodium 1 % gel gel  Commonly known as: VOLTAREN   APPLY A THIN LAYER TO THE RIGHT TO SHOULDER THREE TIMES DAILY      donepezil 10 MG tablet  Commonly known as: Aricept   10 mg, Oral, Nightly      FeroSul 325 (65 Fe) MG tablet  Generic drug: ferrous sulfate   325 mg, Oral, Daily      Lantus SoloStar 100 UNIT/ML injection pen  Generic drug: Insulin Glargine   INJECT 5 UNITS AT BEDTIME      levothyroxine 88 MCG tablet  Commonly known as: SYNTHROID, LEVOTHROID   1 tablet, Daily      linagliptin 5 MG tablet tablet  Commonly known as: Tradjenta   5 mg, Oral, Daily      losartan 50 MG tablet  Commonly known as: Cozaar   50 mg, Oral, Daily      methylPREDNISolone 4 MG dose pack  Commonly known as: Medrol   Take as directed on package instructions.      metoprolol succinate XL 25 MG 24 hr tablet  Commonly known as: TOPROL-XL   TAKE ONE TABLET BY  MOUTH DAILY **NEW MED**      pioglitazone 15 MG tablet  Commonly known as: ACTOS   15 mg, Oral, Daily      polyethylene glycol 17 g packet  Commonly known as: MIRALAX   17 g, Oral, 2 Times Daily PRN      rivaroxaban 15 MG tablet  Commonly known as: Xarelto   15 mg, Oral, Daily With Dinner      traMADol 50 MG tablet  Commonly known as: ULTRAM   50 mg, Oral, Every 6 Hours PRN      venlafaxine  MG 24 hr capsule  Commonly known as: EFFEXOR-XR   150 mg, Oral, Daily      Vitamin D3 50 MCG (2000 UT) capsule   1 capsule, Daily             Stop These Medications      sildenafil 20 MG tablet  Commonly known as: REVATIO               Additional Instructions for the Follow-ups that You Need to Schedule       Discharge Follow-up with PCP   As directed       Currently Documented PCP:    Zahida Osorio MD    PCP Phone Number:    [phone]     Follow Up Details: Follow-up with PCP in 7 days.               Follow-up Information       Zahida Osorio MD .    Specialty: Internal Medicine  Why: Follow-up with PCP in 7 days.  Contact information:  [address] [address]  [phone]               Eben Porter MD .    Specialty: Family Medicine  Why: Follow-up with PCP in 7 days.  Contact information:  [address] [address]  [phone]                            Future Appointments   Date Time Provider Department Center   2/18/2025 11:00 AM [facility]   2/27/2025 11:45 AM Elizabeth Pina MD [id number] None   4/15/2025 11:15 AM Zahida Osorio MD [facility]   5/21/2025  3:30 PM Julisa Watson PA [facility] RAFAELA Youngblood MD  [facility]  02/13/25 15:17 EST    Discharge time spent greater than 30 minutes.

## 2025-02-13 NOTE — THERAPY TREATMENT NOTE
"Patient Name: Rekha Bear  : 1940    MRN: [id number]                              Today's Date: 2025       Admit Date: 2025    Visit Dx:     ICD-10-CM ICD-9-CM   1. Cellulitis of right foot  L03.115 682.7   2. 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    Community acquired pneumonia of left lower lobe of lung    Hyponatremia    Pneumonia due to COVID-19 virus    Major depressive disorder, recurrent, mild    Acute respiratory failure with hypoxia    Supraventricular tachycardia    PVC (premature ventricular contraction)    Depressive disorder    Gastroesophageal reflux disease    Midline cystocele    Rectocele    BMI 29.0-29.9,adult    Acute left PCA stroke    B12 deficiency    Bradycardia    Premature atrial contractions    Stroke    SHEY (obstructive sleep apnea)    Circadian rhythm disorder    Cerebrovascular accident (CVA) due to embolism of left posterior cerebral artery    Anxiety    Dysphagia as late effect of stroke    Atrial fibrillation, persistent    Acute UTI    Closed left hip fracture    Periprosthetic fracture around internal prosthetic left hip joint    Naz-prosthetic fracture around prosthetic hip    History of CVA (cerebrovascular accident)    Cellulitis in diabetic foot     Past Medical History:   Diagnosis Date    Chilblains     \"Chilblian's\"    CKD (chronic kidney disease)     CVA (cerebral vascular accident)     Depression     Fatty liver     GERD (gastroesophageal reflux disease)     Hyperlipidemia     Hypertension     Incontinence     " "Osteoarthritis     OA  Marvin THR, LT TKR - hydrocodone prescibed by Dr. 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 ENDOSCOPY;  Service:     DENTAL PROCEDURE      FOOT SURGERY      TONSILLECTOMY      TOTAL HIP ARTHROPLASTY Bilateral     OA  Marvin THR, LT TKR - hydrocodone prescibed by Dr. Almaguer    TOTAL KNEE ARTHROPLASTY Left     OA  Mravin THR, LT TKR - hydrocodone prescibed by Dr. Almaguer      General Information       Row Name 02/13/25 1556          Physical Therapy Time and Intention    Document Type therapy note (daily note)  -     Mode of Treatment individual therapy;physical therapy  -       Row Name 02/13/25 1556          General Information    Patient Profile Reviewed yes  -     Existing Precautions/Restrictions fall  hx of neuropathy  -       Row Name 02/13/25 1556          Living Environment    People in Home facility resident  [facility]  -       Row Name 02/13/25 1556          Home Main Entrance    Number of Stairs, Main Entrance none  -       Row Name 02/13/25 1556          Cognition    Orientation Status (Cognition) oriented x 4  -       Row Name 02/13/25 1556          Safety Issues/Impairments Affecting Functional Mobility    Impairments Affecting Function (Mobility) endurance/activity tolerance;strength;sensation/sensory awareness  -     Comment, Safety Issues/Impairments (Mobility) has neuropathy, pt states she uses rwx at " baseline for safety  -               User Key  (r) = Recorded By, (t) = Taken By, (c) = Cosigned By      Initials Name Provider Type    Ragini Dhillon PTA Physical Therapist Assistant                   Mobility       Row Name 02/13/25 1558          Bed Mobility    Supine-Sit [place] (Bed Mobility) standby assist;verbal cues  -     Sit-Supine [place] (Bed Mobility) not tested  -     Assistive Device (Bed Mobility) head of bed elevated  -     Comment, (Bed Mobility) incr time for pt to perf more indep  -       Row Name 02/13/25 1558          Sit-Stand Transfer    Sit-Stand [place] (Transfers) standby assist;contact guard;verbal cues  -     Assistive Device (Sit-Stand Transfers) walker, front-wheeled  -     Comment, (Sit-Stand Transfer) cues for safe hand placement, cues to slow pace with movement to ensure safe tfers  -[facility] Name 02/13/25 1558          Gait/Stairs (Locomotion)    [place] Level (Gait) standby assist  -     Assistive Device (Gait) walker, front-wheeled  -     Distance in Feet (Gait) 100  need for BR limiting distance  -     Deviations/Abnormal Patterns (Gait) eric decreased;stride length decreased  -     Bilateral Gait Deviations forward flexed posture;heel strike decreased  -               User Key  (r) = Recorded By, (t) = Taken By, (c) = Cosigned By      Initials Name Provider Type    Ragini Dhillon PTA Physical Therapist Assistant                   Obj/Interventions    No documentation.                  Goals/Plan    No documentation.                  Clinical Impression       Row Name 02/13/25 1612          Pain    Pain Location foot  -     Pain Side/Orientation left  -     Pre/Posttreatment Pain Comment did not rate  -[facility] Name 02/13/25 1612          Plan of Care Review    Plan of Care Reviewed With patient  -     Outcome Evaluation Pt agreed to PT session, ready to get OOB, yuval SBA tfers , amb ~100ft CG/SBA, slightly  more assist on turns , cues to slow pace for safety, pt yuval stand to sit on commode w/SBA and grab bar, plans return to [facility] at DC  -       Row Name 02/13/25 1612          Therapy Assessment/Plan (PT)    Rehab Potential (PT) good  -       Row Name 02/13/25 1612          Vital Signs    O2 Delivery Pre Treatment room air  -       Row Name 02/13/25 1612          Positioning and Restraints    Pre-Treatment Position in bed  -     Post Treatment Position bathroom  -     Bathroom sitting;call light within reach;encouraged to call for assist;notified nsg  -               User Key  (r) = Recorded By, (t) = Taken By, (c) = Cosigned By      Initials Name Provider Type    Ragini Dhillon PTA Physical Therapist Assistant                   Outcome Measures       Row Name 02/13/25 1616          How much help from another person do you currently need...    Turning from your back to your side while in flat bed without using bedrails? 4  -JM     Moving from lying on back to sitting on the side of a flat bed without bedrails? 4  -JM     Moving to and from a bed to a chair (including a wheelchair)? 3  -JM     Standing up from a chair using your arms (e.g., wheelchair, bedside chair)? 4  -JM     Climbing 3-5 steps with a railing? 2  -JM     To walk in hospital room? 3  -JM     AM-PAC 6 Clicks Score (PT) 20  -JM     Highest Level of Mobility Goal 6 --> Walk 10 steps or more  -               User Key  (r) = Recorded By, (t) = Taken By, (c) = Cosigned By      Initials Name Provider Type    Ragini Dhillon PTA Physical Therapist Assistant                                 Physical Therapy Education       Title: PT OT SLP Therapies (Done)       Topic: Physical Therapy (Done)       Point: Mobility training (Done)       Learning Progress Summary            Patient Eager, E,TB,D, VU by NOHEMI at 2/13/2025 1616    Acceptance, E,D, VU,NR by JASON at 2/10/2025 1629    Acceptance, E, VU by JASON at 2/9/2025 1456    Acceptance, E, NR by  AR at 2/7/2025 1417                      Point: Home exercise program (Done)       Learning Progress Summary            Patient Eager, E,TB,D, VU by NOHEMI at 2/13/2025 1616    Acceptance, E, NR by AR at 2/7/2025 1417                      Point: Body mechanics (Done)       Learning Progress Summary            Patient Eager, E,TB,D, VU by NOHEMI at 2/13/2025 1616    Acceptance, E,D, VU,NR by EB at 2/10/2025 1629    Acceptance, E, NR by AR at 2/7/2025 1417                      Point: Precautions (Done)       Learning Progress Summary            Patient Eager, E,TB,D, VU by NOHEMI at 2/13/2025 1616    Acceptance, E, NR by AR at 2/7/2025 1417                                      User Key       Initials Effective Dates Name Provider Type Discipline    NOHEMI 03/07/18 -  Ragini Veronica PTA Physical Therapist Assistant PT    AR 06/16/21 -  Maribell Peace PT Physical Therapist PT    JASON 02/14/23 -  Mely Lazcano PTA Physical Therapist Assistant PT                  PT Recommendation and Plan     Outcome Evaluation: Pt agreed to PT session, ready to get OOB, yuval SBA tfers , amb ~100ft CG/SBA, slightly more assist on turns , cues to slow pace for safety, pt yuval stand to sit on commode w/SBA and grab bar, plans return to [facility] at DC     Time Calculation:              PT G-Codes  Outcome Measure Options: AM-PAC 6 Clicks Daily Activity (OT), Modified Daniel  AM-PAC 6 Clicks Score (PT): 20  AM-PAC 6 Clicks Score (OT): 17  Modified [place] Scale: 3 - Moderate disability.  Requiring some help, but able to walk without assistance.  PT Discharge Summary  Anticipated Discharge Disposition (PT): assisted living, home with home health    Ragini Vernoica PTA  2/13/2025

## 2025-02-13 NOTE — CASE MANAGEMENT/SOCIAL WORK
Continued Stay Note  [facility]     Patient Name: Rekha Bear  MRN: [id number]  Today's Date: 2/13/2025    Admit Date: 2/6/2025    Plan: [facility] pending PT/OT fax sent to the DON at the facility.   Discharge Plan       Row Name 02/13/25 1649       Plan    Plan [facility] pending PT/OT fax sent to the DON at the facility.    Plan Comments CCP faxed the DON at [facility] updated PT/OT notes. Before the patient can return they must make sure the patient is able to still qualify for AL. CCP called Nita to follow up on fax to see if patient can return. Patient said daughter could possibly transport patient. CCP following.                   Discharge Codes    No documentation.                 Expected Discharge Date and Time       Expected Discharge Date Expected Discharge Time    Feb 13, 2025

## 2025-02-13 NOTE — CASE MANAGEMENT/SOCIAL WORK
Case Management Discharge Note      Final Note: [facility] can accept back per [employer] [facility]. Family transport. Packet given to nurse.    Provided Post Acute Provider List?: Yes  Post Acute Provider List: Home Health, Nursing Home  Provided Post Acute Provider Quality & Resource List?: Yes  Post Acute Provider Quality and Resource List: Home Health, Nursing Home  Delivered To: Patient  Method of Delivery: In person    Selected Continued Care - Admitted Since 2/6/2025       Destination    No services have been selected for the patient.                Durable Medical Equipment    No services have been selected for the patient.                Dialysis/Infusion    No services have been selected for the patient.                Home Medical Care    No services have been selected for the patient.                Therapy    No services have been selected for the patient.                Community Resources    No services have been selected for the patient.                Community & DME    No services have been selected for the patient.                    Transportation Services  Private: Car    Final Discharge Disposition Code: 01 - home or self-care

## 2025-02-13 NOTE — PLAN OF CARE
Goal Outcome Evaluation:  Plan of Care Reviewed With: patient           Outcome Evaluation: Pt agreed to PT session, ready to get OOB, yuval SBA tfers , amb ~100ft CG/SBA, slightly more assist on turns , cues to slow pace for safety, pt yuval stand to sit on commode w/SBA and grab bar, plans return to [facility] at DC    Anticipated Discharge Disposition (PT): assisted living, home with home health

## 2025-02-13 NOTE — CONSULTS
"  Orthopaedic Surgery  Consult Note  Dr. ROLAND Archibald” Gloria II  [phone]    HPI:  Patient is a 84 y.o. Not  or  female who presents with MRI findings in her right foot of a nondisplaced cuboid fracture and some general degenerative changes in the right foot.  Due to the MRI findings, orthopedics was consulted.  She is currently being treated for cellulitis in that foot.  This morning she does not complain of any pain in the foot.    MEDICAL HISTORY  Past Medical History:   Diagnosis Date    Chilblains     \"Chilblian's\"    CKD (chronic kidney disease)     CVA (cerebral vascular accident)     Depression     Fatty liver     GERD (gastroesophageal reflux disease)     Hyperlipidemia     Hypertension     Incontinence     Osteoarthritis     OA  Marvin THR, LT TKR - hydrocodone prescibed by Dr. 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 Almaguer    TOTAL KNEE ARTHROPLASTY Left     OA  Marvin THR, LT TKR - hydrocodone prescibed by Dr. Almaguer     Prior to Admission medications    Medication Sig Start Date End Date Taking? Authorizing Provider   atorvastatin (LIPITOR) 40 MG tablet Take 1 tablet by mouth Every Night. 1/11/24  Yes Sanjeev Quan MD   buPROPion XL (WELLBUTRIN " XL) 150 MG 24 hr tablet TAKE ONE TABLET BY MOUTH EVERY MORNING 7/23/24  Yes Eben Porter MD   Cholecalciferol (Vitamin D3) 50 MCG (2000 UT) capsule Take 1 capsule by mouth Daily. 12/6/24  Yes Dat Leon MD   donepezil (Aricept) 10 MG tablet Take 1 tablet by mouth Every Night. 12/11/24 12/11/25 Yes Julisa Watson PA   FeroSul 325 (65 Fe) MG tablet Take 1 tablet by mouth Daily. 12/23/24  Yes Zahida Osorio MD   gabapentin (NEURONTIN) 400 MG capsule Take 1 capsule by mouth Every 12 (Twelve) Hours. 1/27/25  Yes Zahida Osorio MD   glimepiride (AMARYL) 4 MG tablet Take 1 tablet by mouth Every Morning Before Breakfast.  Patient taking differently: Take 1 tablet by mouth 2 (Two) Times a Day. 1/11/24  Yes Sanjeev Quan MD   Lantus SoloStar 100 UNIT/ML injection pen INJECT 5 UNITS AT BEDTIME 12/6/24  Yes ProviderDat MD   levothyroxine (SYNTHROID, LEVOTHROID) 88 MCG tablet Take 1 tablet by mouth Daily. 3/29/24  Yes Dat Leon MD   losartan (Cozaar) 50 MG tablet Take 1 tablet by mouth Daily. 1/27/25  Yes Zahida Osorio MD   metoprolol succinate XL (TOPROL-XL) 25 MG 24 hr tablet TAKE ONE TABLET BY MOUTH DAILY **NEW MED** 10/17/24  Yes Elizabeth Pina MD   Mirabegron ER (Myrbetriq) 50 MG tablet sustained-release 24 hour 24 hr tablet Take 25 mg by mouth Daily. 1/13/25  Yes Zahida Osorio MD   pioglitazone (ACTOS) 15 MG tablet Take 1 tablet by mouth Daily. 1/11/24  Yes Sanjeev Quan MD   rivaroxaban (Xarelto) 15 MG tablet Take 1 tablet by mouth Daily With Dinner. 12/23/24  Yes Zahida Osorio MD   vitamin B-12 (VITAMIN B-12) 500 MCG tablet Take 1 tablet by mouth Daily. 1/13/25  Yes Zahida Osorio MD   Diclofenac Sodium (VOLTAREN) 1 % gel gel APPLY A THIN LAYER TO THE RIGHT TO SHOULDER THREE TIMES DAILY 12/19/24   Provider, MD Dat   glucose blood test strip Use to test blood glucose daily. Formulary Compliance Approval. Diagnosis: Type 2 Diabetes -  Not Insulin Dependent.  Diagnosis Code: E11.9 2/7/25   Dakota Raygoza MD   glucose monitor monitoring kit Use to test blood glucose daily. Formulary Compliance Approval. Diagnosis: Type 2 Diabetes - Not Insulin Dependent.  Diagnosis Code: E11.9 2/7/25   Dakota Raygoza MD   Lancets misc Use to test blood glucose daily. Formulary Compliance Approval. Diagnosis: Type 2 Diabetes - Not Insulin Dependent.  Diagnosis Code: E11.9 2/7/25   Dakota Raygoza MD   linagliptin (Tradjenta) 5 MG tablet tablet Take 1 tablet by mouth Daily. 1/11/24   Sanjeev Quan MD   methylPREDNISolone (Medrol) 4 MG dose pack Take as directed on package instructions. 1/27/25   Zahida Osorio MD   oxybutynin XL (DITROPAN-XL) 10 MG 24 hr tablet  1/20/25   Dat Leon MD   polyethylene glycol (MIRALAX) 17 g packet Take 17 g by mouth 2 (Two) Times a Day As Needed (constipation). 12/19/24   Zahida Osorio MD   sildenafil (REVATIO) 20 MG tablet Take 1 tablet by mouth Daily.  Patient not taking: Reported on 1/27/2025 1/13/25   Zahida Osorio MD   traMADol (ULTRAM) 50 MG tablet Take 1 tablet by mouth Every 6 (Six) Hours As Needed for Moderate Pain for up to 7 days. 2/6/25 2/13/25  Zahida Osorio MD   venlafaxine XR (EFFEXOR-XR) 150 MG 24 hr capsule TAKE ONE TABLET BY MOUTH DAILY 7/23/24   Eben Porter MD     Allergies   Allergen Reactions    Shellfish-Derived Products Anaphylaxis    Amlodipine Hives and Other (See Comments)     Heart race    Iodine Rash     Most Recent Immunizations   Administered Date(s) Administered    Arexvy (RSV, Adults 60+ yrs) 11/21/2023    COVID-19 (PFIZER) BIVALENT 12+YRS 10/06/2022    COVID-19 (PFIZER) Purple Cap Monovalent 10/14/2021    Covid-19 (Pfizer) Gray Cap Monovalent 05/04/2022    FLUAD TRI 65YR+ 09/11/2020, 09/11/2020    FluMist 2-49yrs 09/17/2015    Fluad Quad 65+ 10/14/2021    Fluzone High-Dose 65+YRS 11/21/2017    Fluzone High-Dose 65+yrs 10/16/2023    Pneumococcal  "Conjugate 13-Valent (PCV13) 2016    Pneumococcal Polysaccharide (PPSV23) 2018    Shingrix 2021, 2021    TD Preservative Free (Tenivac) 2018    Td (TDVAX) 2018    Tdap 10/08/2023     Social History     Tobacco Use    Smoking status: Former     Current packs/day: 0.00     Average packs/day: 0.5 packs/day for 14.0 years (7.0 ttl pk-yrs)     Types: Cigarettes     Start date:      Quit date:      Years since quittin.1     Passive exposure: Past    Smokeless tobacco: Never    Tobacco comments:     CAFFEINE USE   Substance Use Topics    Alcohol use: No      Social History     Substance and Sexual Activity   Drug Use No       VITALS: /95 (BP Location: Right arm, Patient Position: Sitting)   Pulse 68   Temp 98.2 °F (36.8 °C) (Oral)   Resp 18   Ht 170.2 cm (67\")   Wt 93 kg (205 lb 0.4 oz)   LMP  (LMP Unknown)   SpO2 95%   BMI 32.11 kg/m²  Body mass index is 32.11 kg/m².    PHYSICAL EXAM:   CONSTITUTIONAL: No acute distress  LUNGS: Equal chest rise, no shortness of air  CARDIOVASCULAR: palpable peripheral pulses  SKIN: no skin lesions in the area examined  LYMPH: no lymphadenopathy in the area examined  Musculoskeletal: Right foot with minimal swelling.  No changes at this point of significant cellulitis or sign of infection.  She has no tenderness over the cuboid or midfoot.  Otherwise normal neurovascular exam    RADIOLOGY REVIEW:   MRI Foot Right With & Without Contrast    Result Date: 2/10/2025  1. Nondisplaced distal cuboid fracture with intra-articular extension. In the absence of trauma history, this is likely a stress or insufficiency fracture. There is no finding to suggest that this is related to underlying osteomyelitis though follow-up exam could be obtained if symptoms persist or worsen. 2. 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 3. Edema within the subcutaneous fat greatest dorsally is nonspecific and could be associated with cellulitis. 4. " Suspect posterior tibial insertional adenopathy. Arthritic changes at the naviculocuneiform joint.  This report was finalized on 2/10/2025 11:34 AM by Roe Rose M.D on Workstation: [id number]       LABS:   Results for the past 24 hours:   Recent Results (from the past 24 hours)   POC Glucose Once    Collection Time: 02/12/25 11:57 AM    Specimen: Blood   Result Value Ref Range    Glucose 256 (H) 70 - 130 mg/dL   POC Glucose Once    Collection Time: 02/12/25  5:07 PM    Specimen: Blood   Result Value Ref Range    Glucose 211 (H) 70 - 130 mg/dL   POC Glucose Once    Collection Time: 02/12/25  8:47 PM    Specimen: Blood   Result Value Ref Range    Glucose 231 (H) 70 - 130 mg/dL   Comprehensive Metabolic Panel    Collection Time: 02/13/25  4:10 AM    Specimen: Blood   Result Value Ref Range    Glucose 143 (H) 65 - 99 mg/dL    BUN 20 8 - 23 mg/dL    Creatinine 1.24 (H) 0.57 - 1.00 mg/dL    Sodium 135 (L) 136 - 145 mmol/L    Potassium 4.3 3.5 - 5.2 mmol/L    Chloride 100 98 - 107 mmol/L    CO2 26.1 22.0 - 29.0 mmol/L    Calcium 9.0 8.6 - 10.5 mg/dL    Total Protein 6.2 6.0 - 8.5 g/dL    Albumin 3.1 (L) 3.5 - 5.2 g/dL    ALT (SGPT) <5 1 - 33 U/L    AST (SGOT) 19 1 - 32 U/L    Alkaline Phosphatase 283 (H) 39 - 117 U/L    Total Bilirubin 0.3 0.0 - 1.2 mg/dL    Globulin 3.1 gm/dL    A/G Ratio 1.0 g/dL    BUN/Creatinine Ratio 16.1 7.0 - 25.0    Anion Gap 8.9 5.0 - 15.0 mmol/L    eGFR 43.5 (L) >60.0 mL/min/1.73   CBC Auto Differential    Collection Time: 02/13/25  4:10 AM    Specimen: Blood   Result Value Ref Range    WBC 7.92 3.40 - 10.80 10*3/mm3    RBC 4.28 3.77 - 5.28 10*6/mm3    Hemoglobin 13.1 12.0 - 15.9 g/dL    Hematocrit 38.7 34.0 - 46.6 %    MCV 90.4 79.0 - 97.0 fL    MCH 30.6 26.6 - 33.0 pg    MCHC 33.9 31.5 - 35.7 g/dL    RDW 13.5 12.3 - 15.4 %    RDW-SD 44.7 37.0 - 54.0 fl    MPV 8.6 6.0 - 12.0 fL    Platelets 185 140 - 450 10*3/mm3    Neutrophil % 60.8 42.7 - 76.0 %    Lymphocyte % 25.0 19.6 - 45.3 %     "Monocyte % 10.6 5.0 - 12.0 %    Eosinophil % 2.4 0.3 - 6.2 %    Basophil % 0.4 0.0 - 1.5 %    Immature Grans % 0.8 (H) 0.0 - 0.5 %    Neutrophils, Absolute 4.82 1.70 - 7.00 10*3/mm3    Lymphocytes, Absolute 1.98 0.70 - 3.10 10*3/mm3    Monocytes, Absolute 0.84 0.10 - 0.90 10*3/mm3    Eosinophils, Absolute 0.19 0.00 - 0.40 10*3/mm3    Basophils, Absolute 0.03 0.00 - 0.20 10*3/mm3    Immature Grans, Absolute 0.06 (H) 0.00 - 0.05 10*3/mm3    nRBC 0.0 0.0 - 0.2 /100 WBC   POC Glucose Once    Collection Time: 02/13/25  6:21 AM    Specimen: Blood   Result Value Ref Range    Glucose 141 (H) 70 - 130 mg/dL       IMPRESSION:  Patient is a 84 y.o. Not  or  female with right foot chronic arthritic changes and questionable cuboid insufficiency fracture    PLAN:   Admited to: Dakota Raygoza MD  Recommend conservative treatment for this injury.  She has no complaints of pain.  If the fracture is real, as long as she is not having any pain that may be treated nonoperatively.  If she does have some pain with weightbearing recommend boot immobilization.  Okay for weightbearing as tolerated    R \"Joselo\" Gloria CARMONA MD  Orthopaedic Surgery  [facility]  [phone] - [place] Office  [phone] - [place] Office            "

## 2025-02-14 ENCOUNTER — TELEPHONE (OUTPATIENT)
Dept: FAMILY MEDICINE CLINIC | Facility: CLINIC | Age: 85
End: 2025-02-14
Payer: MEDICARE

## 2025-02-14 ENCOUNTER — TRANSITIONAL CARE MANAGEMENT TELEPHONE ENCOUNTER (OUTPATIENT)
Dept: CALL CENTER | Facility: HOSPITAL | Age: 85
End: 2025-02-14
Payer: MEDICARE

## 2025-02-14 NOTE — OUTREACH NOTE
Prep Survey      Flowsheet Row Responses   [facility] facility patient discharged from? [place]   Is LACE score < 7 ? No   Eligibility [facility]   Date of Admission 02/06/25   Date of Discharge 02/13/25   Discharge Disposition Home or Self Care   Discharge diagnosis Cellulitis in diabetic foot   Does the patient have one of the following disease processes/diagnoses(primary or secondary)? Other   Does the patient have Home health ordered? No   Is there a DME ordered? No   General alerts for this patient retirement   Prep survey completed? Yes            OMID DELUCA - Registered Nurse

## 2025-02-14 NOTE — TELEPHONE ENCOUNTER
KELVIN [facility]ZA CALLED..  STATES SITUATION WITH PT'S MEDS IS CONFUSING...WHEN PT WAS D/C'ED FROM HOSPITAL LAST NIGHT THEY SAID CONTINUE THESE MEDS THAT SHE WAS ON BEFORE SHE LEFT THE HOSPITAL, THE LIST OF WHAT SHE IS ON WAS WHAT NOT SHE WAS ON BEFORE SHE GOT THERE. DOSAGES ARE WRONG. THEY'RE NEEDING ORDER TO CONTINUE MEDS AS THEY WERE BEFORE HOSPITALIZATION. PT HAS PRE PACKAGED MEDS SO THIS MIX UP IS CAUSING ISSUES. IF NEEDING MORE CLARIFICATION, PLEASE CALL BACK [phone]. IF NOT, THEY NEED AN ORDER FAXED OVER [phone] STATING FOR THE MEDS TO BE CONTINUED AS THEY WERE BEFORE HOSPITALIZATION.    THANK YOU

## 2025-02-14 NOTE — OUTREACH NOTE
Call Center TCM Note      Flowsheet Row Responses   [facility] patient discharged from? [place]   Does the patient have one of the following disease processes/diagnoses(primary or secondary)? Other   TCM attempt successful? Yes   Call start time 1354   Call end time 1357   Discharge diagnosis Cellulitis in diabetic foot   Is patient permission given to speak with other caregiver? Yes   Person spoke with today (if not patient) and relationship daughterPing   Meds reviewed with patient/caregiver? Yes  [Daughter reports office has already been contacted for patient to resume usual meds as previous before hospitalization with addition of the antibiotic.]   Medication comments [facility] needing clarification of med orders. Message routed to office.   Comments PCP Dr Osorio. Hospital follow up appt in place for 2/18  1130am with PCP.   Does the patient have an appointment with their PCP within 7-14 days of discharge? Yes   Has home health visited the patient within 72 hours of discharge? N/A   Psychosocial issues? No   Psychosocial comments Patient returned to [facility].   Comments Daughter reports patient wears continous glucose sensor and AL staff monitor 3-4 times a day.   Did the patient receive a copy of their discharge instructions? Yes   Nursing interventions Reviewed instructions with patient  [daughter]   What is the patient's perception of their health status since discharge? Same   Is the patient/caregiver able to teach back signs and symptoms related to disease process for when to call PCP? Yes   Is the patient/caregiver able to teach back signs and symptoms related to disease process for when to call 911? Yes   Is the patient/caregiver able to teach back the hierarchy of who to call/visit for symptoms/problems? PCP, Specialist, Home health nurse, Urgent Care, ED, 911 Yes   If the patient is a current smoker, are they able to teach back resources for cessation? Not a smoker    TCM call completed? Yes   Call end time 8240   Would this patient benefit from a Referral to [facility] Social Work? No   Is the patient interested in additional calls from an ambulatory ? No            Gianna Mae RN    2/14/2025, 14:01 EST

## 2025-02-18 ENCOUNTER — OFFICE VISIT (OUTPATIENT)
Dept: WOUND CARE | Facility: HOSPITAL | Age: 85
End: 2025-02-18
Payer: MEDICARE

## 2025-02-18 ENCOUNTER — OFFICE VISIT (OUTPATIENT)
Dept: FAMILY MEDICINE CLINIC | Facility: CLINIC | Age: 85
End: 2025-02-18
Payer: MEDICARE

## 2025-02-18 ENCOUNTER — DOCUMENTATION (OUTPATIENT)
Dept: FAMILY MEDICINE CLINIC | Facility: CLINIC | Age: 85
End: 2025-02-18

## 2025-02-18 VITALS
HEART RATE: 56 BPM | HEIGHT: 67 IN | TEMPERATURE: 97.1 F | BODY MASS INDEX: 32.8 KG/M2 | WEIGHT: 209 LBS | DIASTOLIC BLOOD PRESSURE: 64 MMHG | OXYGEN SATURATION: 90 % | RESPIRATION RATE: 18 BRPM | SYSTOLIC BLOOD PRESSURE: 102 MMHG

## 2025-02-18 DIAGNOSIS — Z09 HOSPITAL DISCHARGE FOLLOW-UP: Primary | ICD-10-CM

## 2025-02-18 DIAGNOSIS — I73.01 RAYNAUD DISEASE WITH GANGRENE: ICD-10-CM

## 2025-02-18 DIAGNOSIS — L03.115 CELLULITIS OF LEG, RIGHT: ICD-10-CM

## 2025-02-18 PROCEDURE — 3074F SYST BP LT 130 MM HG: CPT | Performed by: INTERNAL MEDICINE

## 2025-02-18 PROCEDURE — G0463 HOSPITAL OUTPT CLINIC VISIT: HCPCS

## 2025-02-18 PROCEDURE — 1111F DSCHRG MED/CURRENT MED MERGE: CPT | Performed by: INTERNAL MEDICINE

## 2025-02-18 PROCEDURE — 1126F AMNT PAIN NOTED NONE PRSNT: CPT | Performed by: INTERNAL MEDICINE

## 2025-02-18 PROCEDURE — 99495 TRANSJ CARE MGMT MOD F2F 14D: CPT | Performed by: INTERNAL MEDICINE

## 2025-02-18 PROCEDURE — 3078F DIAST BP <80 MM HG: CPT | Performed by: INTERNAL MEDICINE

## 2025-02-18 RX ORDER — LOSARTAN POTASSIUM 50 MG/1
50 TABLET ORAL DAILY
Qty: 30 TABLET | Refills: 0 | OUTPATIENT
Start: 2025-02-18

## 2025-02-18 RX ORDER — SILDENAFIL CITRATE 20 MG/1
20 TABLET ORAL 2 TIMES DAILY
Qty: 60 TABLET | Refills: 3 | Status: SHIPPED | OUTPATIENT
Start: 2025-02-18

## 2025-02-18 RX ORDER — OXYBUTYNIN CHLORIDE 10 MG/1
TABLET, EXTENDED RELEASE ORAL
COMMUNITY
Start: 2025-02-17 | End: 2025-02-18 | Stop reason: ALTCHOICE

## 2025-02-18 NOTE — PROGRESS NOTES
Transitional Care Follow Up Visit  Subjective     Rekha Bear is a 84 y.o. female who presents for a transitional care management visit.    Within 48 business hours after discharge our office contacted her via telephone to coordinate her care and needs.      I reviewed and discussed the details of that call along with the discharge summary, hospital problems, inpatient lab results, inpatient diagnostic studies, and consultation reports with Rekha.     Current outpatient and discharge medications have been reconciled for the patient.  Reviewed by: Zahida Osorio MD          2/13/2025     8:27 PM   Date of TCM Phone Call   [facility]   Date of Admission 2/6/2025   Date of Discharge 2/13/2025   Discharge Disposition Home or Self Care     Risk for Readmission (LACE) Score: 15 (2/13/2025  6:00 AM)      History of Present Illness   Course During Hospital Stay: Patient is here today for hospital follow-up visit.  She was recently admitted in the hospital and treated for cellulitis in the right foot.  Received IV antibiotic and then switched to p.o. antibiotic upon discharge.  Currently on cefdinir, to complete duration of 5 days.    Today, she reports feeling a lot better.  Symptoms improving, redness and swelling resolving.  No fever or chills.    Lab review indicate normal CBC, stable kidney function, elevated ALP with normal GGT, electrolytes WNL except slight hyponatremia, A1c at 9.0%.  Imaging studies with arterial Doppler and duplex venous ultrasound were negative for DVT and patent arterial blood flow.      MRI foot showed nondisplaced distal cuboid fracture and tarsometatarsal arthritis but no evidence of osteomyelitis.     The following portions of the patient's history were reviewed and updated as appropriate: allergies, current medications, past family history, past medical history, past social history, past surgical history, and problem list.    Review of Systems    Objective   /64  "(BP Location: Right arm, Patient Position: Sitting, Cuff Size: Adult)   Pulse 56   Temp 97.1 °F (36.2 °C) (Temporal)   Resp 18   Ht 170.2 cm (67.01\")   Wt 94.8 kg (209 lb)   SpO2 90%   BMI 32.73 kg/m²   Physical Exam  Cardiovascular:      Pulses:           Dorsalis pedis pulses are 2+ on the right side and 2+ on the left side.        Posterior tibial pulses are 2+ on the right side and 2+ on the left side.   Musculoskeletal:        Feet:    Feet:      Right foot:      Skin integrity: Erythema present. No ulcer, skin breakdown or warmth.      Toenail Condition: Right toenails are abnormally thick and long.      Left foot:      Skin integrity: Skin integrity normal.      Toenail Condition: Left toenails are abnormally thick and long.         Assessment & Plan   Diagnoses and all orders for this visit:    1. Hospital discharge follow-up (Primary)    2. Cellulitis of leg, right    3. Raynaud disease with gangrene  -     sildenafil (REVATIO) 20 MG tablet; Take 1 tablet by mouth 2 (Two) Times a Day.  Dispense: 60 tablet; Refill: 3    Cellulitis of right leg, improving.  To continue with oral antibiotic and complete for duration of therapy.  Advised patient to continue all other current medications as earlier prescribed.  For her Raynaud's disease, continue with wound care.  Will switch to sildenafil 20 mg twice daily and discontinue losartan 50 mg daily as medication has been approved.  Advised proper foot hygiene, frequent leg elevation and skin moisturizer to prevent dryness and breakdown.  Advised to wear comfortable shoes to avoid pressure at pressure points.  Continue weightbearing as tolerated.               "

## 2025-02-19 ENCOUNTER — TELEPHONE (OUTPATIENT)
Dept: GASTROENTEROLOGY | Facility: CLINIC | Age: 85
End: 2025-02-19
Payer: MEDICARE

## 2025-02-19 ENCOUNTER — PREP FOR SURGERY (OUTPATIENT)
Dept: OTHER | Facility: HOSPITAL | Age: 85
End: 2025-02-19
Payer: MEDICARE

## 2025-02-19 DIAGNOSIS — Z79.01 ANTICOAGULATED: ICD-10-CM

## 2025-02-19 DIAGNOSIS — Z12.11 ENCOUNTER FOR SCREENING FOR MALIGNANT NEOPLASM OF COLON: Primary | ICD-10-CM

## 2025-02-19 DIAGNOSIS — Z86.0101 HISTORY OF ADENOMATOUS POLYP OF COLON: ICD-10-CM

## 2025-02-19 NOTE — TELEPHONE ENCOUNTER
Last colonoscopy 12/20/16    Personal hx polyps    Family hx polyps    Family hx of cx    ASA or blood thinners:  Xarelto    List medications:  Trajenta  Actos  Amaryl  Levothyroxine  Lipitor  Aricept  Myrbetriq  Toprol  Effexor xl  Wellbutrin xl  Gabapentin  Losartan  Vitamin B 12  Ferrous sulfate  Vitamin D3  Voltaren  Miralax as needed    OA form scanned in media

## 2025-02-20 NOTE — TELEPHONE ENCOUNTER
Hub to relay  Called and left vm for patient to hold Xarelto 48 hrs prior to colonoscopy and advised to call back with any questions.

## 2025-02-24 ENCOUNTER — READMISSION MANAGEMENT (OUTPATIENT)
Dept: CALL CENTER | Facility: HOSPITAL | Age: 85
End: 2025-02-24
Payer: MEDICARE

## 2025-02-24 NOTE — OUTREACH NOTE
Medical Week 2 Survey      Flowsheet Row Responses   [facility] patient discharged from? [place]   Does the patient have one of the following disease processes/diagnoses(primary or secondary)? Other   Week 2 attempt successful? Yes   Call start time 1755   General alerts for this patient [facility]   Discharge diagnosis Cellulitis in diabetic foot   Call end time 1801   Person spoke with today (if not patient) and relationship daughter, Ping Hernández   Is the patient taking all medications as directed (includes completed medication regime)? Yes   Does the patient have a primary care provider?  Yes   Has the patient kept scheduled appointments due by today? Yes   Psychosocial issues? No   What is the patient's perception of their health status since discharge? Improving   Is the patient/caregiver able to teach back signs and symptoms related to disease process for when to call PCP? Yes   Is the patient/caregiver able to teach back signs and symptoms related to disease process for when to call 911? Yes   Is the patient/caregiver able to teach back the hierarchy of who to call/visit for symptoms/problems? PCP, Specialist, Home health nurse, Urgent Care, ED, 911 Yes   If the patient is a current smoker, are they able to teach back resources for cessation? Not a smoker   Additional teach back comments Daughter states she seen a few days ago and was doing well.  Had f/u with PCP.  States in Nov they went to a neuro dr who suggested a psych neuro eval and advised to speak with PCP regarding referral.   Week 2 Call Completed? Yes   Graduated Yes   Graduated/Revoked comments States she is doing well and no need for further calls.   Call end time 1801            Guillermina ARIZA - Licensed Nurse

## 2025-02-26 NOTE — PROGRESS NOTES
Subjective:     Encounter Date:02/27/2025      Patient ID: Rekha Bear is a 84 y.o. female.    Chief Complaint:  History of Present Illness    This is a 84-year-old female with GERD, hypertension, dyslipidemia, diabetes mellitus type 2, persistent atrial fibrillation, prior posterior circulation embolic stroke, who presents for follow up.        She returned in 5/2024 at which time the patient noted that her heart rates have been running in the 90s to 100s over the prior 2 months.  She was noted to be in atrial fibrillation at that office visit which was a new diagnosis outside of been previously suspected.  I started her back on metoprolol succinate for rate control.  She is continued on rivaroxaban 15 mg daily.  Hydralazine was discontinued.    She returned and was seen by CT Maldonado in 8/2024.  Heart rates were well-controlled on metoprolol.  Blood pressures remain well-controlled.  She was tolerating the rivaroxaban.    She presents today for follow-up.  She was hospitalized earlier this month with cellulitis.  This has since improved.  She denies any chest pain, shortness of breath out of the ordinary, palpitations, near-syncope or syncope.  She does report some balance issues and a decline in her exercise tolerance since she had to back off on physical therapy and Occupational Therapy.  She reports that her balance issues are also in part due to right visual field loss after her prior stroke.     Prior History:  I saw the patient initially in 10/2015 when she presented for an evaluation of chest pain.  She was also complaining of chronic stable dyspnea on exertion at that time.  I set her up for a PET stress study which was negative for ischemia.  A few months later I was asked to clear the patient for a gynecologic surgery, which I went ahead and did.  However, prior to that the patient called to say that she had received a copy of her stress test and it was noted on the baseline EKG that she  was in atrial fibrillation.  I had the patient come in at that time and an EKG done in the office showed a wandering atrial pacemaker with a heart rate in the low 40s.  I asked her to decrease her metoprolol succinate and had her followup.  I also had her undergo a 24-hour Holter monitor which was performed in 05/2016 which revealed sinus rhythm with rare PACs and PVCs but no atrial arrhythmias including atrial fibrillation.   At her last follow-up in 11/2016, on a lower dose of metoprolol succinate her rhythm appeared to be in sinus with a rate in the 50's.      I saw her last in 6/2018 again for preoperative evaluation.  She continued to complain of dyspnea and fatigue on exertion that had worsened.    She did admit that some of this could be due to increasing issues with her knee for which she is planning on having surgery performed by Dr. Saji Kuo.  At that office visit I felt that her symptoms were likely due to deconditioning and limited mobility related to her right knee.  I recommended proceeding with an echocardiogram which was performed on 6/12/2018 and showed normal left ventricular systolic function and wall motion with an EF 66%, normal diastolic function, and no significant valvular disease.  I cleared the patient for surgery at that time.     Since she was last seen in the office she was admitted from 3/22/2020 to 4/10/2020 with COVID-19 pneumonia and acute hypoxic respiratory failure.  She required intubation and was treated with hydroxychloroquine.  Following her discharge she was a transfer to a skilled nursing facility where she underwent rehab..  In 5/2020 she began having issues with low heart rates in the 40s to 50s associated with dizziness and weakness.  She ended up going to the emergency room and her metoprolol succinate was discontinued.  She had a ZIO monitor placed around that time which was unremarkable except for rare episodes of nonsustained atrial tachycardia.  When I called her to  discuss the monitor results she reported that she was feeling better off of the metoprolol.  At her follow-up in 7/2020 she was slowly recovering from her hospitalization.  Her blood pressures remained well controlled off the metoprolol.     In 2/2021 she reported that her blood pressures were elevated at the time.  She did report some worsening of her chronic dyspnea on exertion but admitted that this occurred after she contracted Covid 19 the year prior and had gained weight over the prior year.  I recommended starting amlodipine 5 mg daily at the time but she developed headaches with this.  We ultimately switched her back to metoprolol succinate 25 mg daily with plans to monitor her for the development of bradycardia which she had issues with in the past.    Follow-up her blood pressures were well controlled and she was not having any significant issues with bradycardia.      I saw the patient last in 4/2022.  At that office visit she reported worsening dyspnea on exertion.  She was noted to have PVCs on her EKG at that time.  We opted to proceed with a stress test and an echocardiogram.     Before we can pursue her workup she was admitted to the hospital later that month following a syncopal episode.  Was felt that her symptoms were due to orthostatic hypotension and volume depletion.  No changes were made to her management.  It was recommended that she follow-up with her stress test and her echocardiogram as planned.  These were performed on 5/10/2022.  Her echocardiogram showed normal left ventricular function and wall motion with an EF of 66%, mildly dilated left atrium, mild to moderate mitral and tricuspid valve regurgitation and normal right ventricular systolic pressure.  Stress test showed no evidence of ischemia.    She presented back to the hospital in 12/2023 with visual changes and weakness.  Apparently the patient was living alone and was found to be a hoarder.  She had been following frequently due  to mechanical reasons.  She was having difficulty getting around her house because of the amount of stuff that she had collected.  She was also having some issues with memory loss.  She was brought into the hospital after she was found down for 2 days.  Workup revealed evidence of a left PCA stroke that was concerning for cardioembolic stroke.  A repeat echocardiogram was performed on 12/22/2023 which showed normal left ventricular function and wall motion with an EF of 60%, normal diastolic function, moderately enlarged left atrium, no significant valvular disease and mildly elevated right ventricular systolic pressure of 38 mmHg.  She was also noted to have some issues with bradycardia.  Due to high suspicion for atrial fibrillation anticoagulation was recommended and started.  It was not felt that the bradycardia was symptomatic but metoprolol was stopped at that point.  It was felt that her falls were likely mechanical.  Neurology recommended waiting 7 days before starting anticoagulation.     She returned to the office and was seen by CT Pritchard on 1/23/2024.  Her bradycardia had improved.  A 14-day monitor was recommended.  This ended up showing rare PACs and a brief episode of atrial tachycardia but no atrial fibrillation.     In 2/2024 I saw the patient in the office the presence of her daughter who is a psychiatrist.  The patient was in inpatient rehab for a few weeks before being discharged on 1/11 and since had moved into [facility] living.  She did report a fall where she injured her hip but no significant injuries.  She otherwise appeared to be doing well from a cardiac standpoint.  I recommended continuing anticoagulation.  I was not sure that a loop recorder would be helpful unless neurology felt comfortable with the possibility of stopping anticoagulation if the loop recorder showed no evidence of atrial fibrillation.  I recommended that she return in 3 months so we can see how she was  doing following her next neurology evaluation.    Review of Systems   Constitutional: Positive for malaise/fatigue.   HENT:  Negative for hearing loss, hoarse voice, nosebleeds and sore throat.    Eyes:  Negative for pain.   Cardiovascular:  Positive for dyspnea on exertion and leg swelling. Negative for chest pain, claudication, cyanosis, irregular heartbeat, near-syncope, orthopnea, palpitations, paroxysmal nocturnal dyspnea and syncope.   Respiratory:  Negative for shortness of breath and snoring.    Endocrine: Negative for cold intolerance, heat intolerance, polydipsia, polyphagia and polyuria.   Skin:  Negative for itching and rash.   Musculoskeletal:  Negative for arthritis, falls, joint pain, joint swelling, muscle cramps, muscle weakness and myalgias.   Gastrointestinal:  Negative for constipation, diarrhea, dysphagia, heartburn, hematemesis, hematochezia, melena, nausea and vomiting.   Genitourinary:  Negative for frequency, hematuria and hesitancy.   Neurological:  Positive for loss of balance. Negative for excessive daytime sleepiness, dizziness, headaches, light-headedness, numbness and weakness.   Psychiatric/Behavioral:  Negative for depression. The patient is not nervous/anxious.          Current Outpatient Medications:     atorvastatin (LIPITOR) 40 MG tablet, Take 1 tablet by mouth Every Night., Disp: 30 tablet, Rfl: 1    buPROPion XL (WELLBUTRIN XL) 150 MG 24 hr tablet, TAKE ONE TABLET BY MOUTH EVERY MORNING, Disp: 90 tablet, Rfl: 1    Cholecalciferol (Vitamin D3) 50 MCG (2000 UT) capsule, Take 1 capsule by mouth Daily., Disp: , Rfl:     Diclofenac Sodium (VOLTAREN) 1 % gel gel, APPLY A THIN LAYER TO THE RIGHT TO SHOULDER THREE TIMES DAILY, Disp: , Rfl:     donepezil (Aricept) 10 MG tablet, Take 1 tablet by mouth Every Night., Disp: 30 tablet, Rfl: 2    FeroSul 325 (65 Fe) MG tablet, Take 1 tablet by mouth Daily., Disp: 90 tablet, Rfl: 0    gabapentin (NEURONTIN) 400 MG capsule, TAKE ONE CAPSULE BY  "MOUTH TWICE DAILY, Disp: 56 capsule, Rfl: 0    glimepiride (AMARYL) 4 MG tablet, Take 1 tablet by mouth Every Morning Before Breakfast. (Patient taking differently: Take 1 tablet by mouth 2 (Two) Times a Day.), Disp: 30 tablet, Rfl: 1    Lantus SoloStar 100 UNIT/ML injection pen, INJECT 5 UNITS AT BEDTIME (Patient taking differently: 10 Units Every Night.), Disp: , Rfl:     levothyroxine (SYNTHROID, LEVOTHROID) 88 MCG tablet, Take 1 tablet by mouth Daily. (Patient taking differently: Take 100 tablets by mouth Daily.), Disp: , Rfl:     linagliptin (Tradjenta) 5 MG tablet tablet, Take 1 tablet by mouth Daily., Disp: 30 tablet, Rfl: 1    metoprolol succinate XL (TOPROL-XL) 25 MG 24 hr tablet, TAKE ONE TABLET BY MOUTH DAILY **NEW MED**, Disp: 30 tablet, Rfl: 5    Myrbetriq 25 MG tablet sustained-release 24 hour 24 hr tablet, TAKE ONE TABLET BY MOUTH DAILY *NEW MED*, Disp: 30 tablet, Rfl: 0    pioglitazone (ACTOS) 15 MG tablet, Take 1 tablet by mouth Daily., Disp: 30 tablet, Rfl: 1    polyethylene glycol (MIRALAX) 17 g packet, Take 17 g by mouth 2 (Two) Times a Day As Needed (constipation)., Disp: 60 each, Rfl: 0    rivaroxaban (Xarelto) 15 MG tablet, Take 1 tablet by mouth Daily With Dinner., Disp: 90 tablet, Rfl: 1    sildenafil (REVATIO) 20 MG tablet, Take 1 tablet by mouth 2 (Two) Times a Day., Disp: 60 tablet, Rfl: 3    venlafaxine XR (EFFEXOR-XR) 150 MG 24 hr capsule, TAKE ONE TABLET BY MOUTH DAILY, Disp: 90 capsule, Rfl: 1    vitamin B-12 (VITAMIN B-12) 500 MCG tablet, Take 1 tablet by mouth Daily., Disp: 30 tablet, Rfl: 3    methylPREDNISolone (Medrol) 4 MG dose pack, Take as directed on package instructions. (Patient not taking: Reported on 2/27/2025), Disp: 21 tablet, Rfl: 0    Past Medical History:   Diagnosis Date    Chilblains     \"Chilblian's\"    CKD (chronic kidney disease)     CVA (cerebral vascular accident)     Depression     Fatty liver     GERD (gastroesophageal reflux disease)     Hyperlipidemia     " "Hypertension     Incontinence     Osteoarthritis     OA  Marvin THR, LT TKR - hydrocodone prescibed by Dr. 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 Almaguer    TOTAL KNEE ARTHROPLASTY Left     OA  Marvin THR, LT TKR - hydrocodone prescibed by Dr. Almaguer       Family History   Problem Relation Age of Onset    Hypertension Mother     Diabetes type II Mother     Hypertension Father     Coronary artery disease Father     Diabetes type II Father     Colon cancer Brother     Skin cancer Brother     Stroke Son     Breast cancer Maternal Aunt     Hypertension Other     Other Other         Lipids  Thyroid       Social History     Tobacco Use    Smoking status: Former     Current packs/day: 0.00     Average packs/day: 0.5 packs/day for 14.0 years (7.0 ttl pk-yrs)     Types: Cigarettes     Start date:      Quit date:      Years since quittin.1     Passive exposure: Past    Smokeless tobacco: Never    Tobacco comments:     CAFFEINE USE   Vaping Use    Vaping status: Never Used   Substance Use Topics    Alcohol use: No    Drug use: No         ECG 12 Lead    Date/Time: 2025 12:18 PM  Performed by: Elizabeth Pina MD    Authorized by: Elizabeth Pina MD  Comparison: compared " "with previous ECG   Similar to previous ECG  Rhythm: atrial fibrillation  Conduction: left anterior fascicular block  Other findings: left ventricular hypertrophy             Objective:     Visit Vitals  /80 (BP Location: Left arm, Patient Position: Sitting, Cuff Size: Adult)   Pulse 81   Ht 170.2 cm (67.01\")   Wt 93.9 kg (207 lb)   LMP  (LMP Unknown)   SpO2 99%   BMI 32.41 kg/m²         Constitutional:       Appearance: Normal appearance. Well-developed.   Eyes:      General: Lids are normal.      Conjunctiva/sclera: Conjunctivae normal.      Pupils: Pupils are equal, round, and reactive to light.   HENT:      Head: Normocephalic and atraumatic.   Neck:      Vascular: No carotid bruit or JVD.      Lymphadenopathy: No cervical adenopathy.   Pulmonary:      Effort: Pulmonary effort is normal.      Breath sounds: Normal breath sounds.   Cardiovascular:      Normal rate. Irregularly irregular rhythm.      No gallop.    Pulses:     Radial: 2+ bilaterally.  Edema:     Peripheral edema absent.   Abdominal:      Palpations: Abdomen is soft.   Musculoskeletal:      Cervical back: Full passive range of motion without pain, normal range of motion and neck supple. Skin:     General: Skin is warm and dry.   Neurological:      Mental Status: Alert and oriented to person, place, and time.             Assessment:          Diagnosis Plan   1. Atrial fibrillation, persistent        2. Hyperlipidemia, unspecified hyperlipidemia type        3. Primary hypertension        4. Supraventricular tachycardia        5. Type 2 diabetes mellitus with stage 3a chronic kidney disease, without long-term current use of insulin        6. Cerebrovascular accident (CVA) due to embolism of left posterior cerebral artery        7. SHEY (obstructive sleep apnea)               Plan:       1.  Persistent atrial fibrillation.  Rate controlled on metoprolol.  She is on renally dosed rivaroxaban.  Her recent creatinine clearance was little bit above 50 " during her recent hospitalization.  However after discussion with the patient and her daughter we felt that this was likely due to being better hydrated than normal during her hospitalization.  I suspect at baseline her creatinine clearance still remains below 50.  Will continue her current dose of rivaroxaban.  2.  History of left posterior circulation stroke.  Due to cardioembolism.  3.  Hyperlipidemia.  On atorvastatin for goal LDL of 70 or below.  4.  Hypertension.  Well-controlled on current regimen medications.  5.  Diabetes mellitus type 2  6.  Obstructive sleep apnea  7.  Hypothyroidism.    Will plan on seeing the patient back again in 6 months.

## 2025-02-27 ENCOUNTER — OFFICE VISIT (OUTPATIENT)
Age: 85
End: 2025-02-27
Payer: MEDICARE

## 2025-02-27 VITALS
HEART RATE: 81 BPM | OXYGEN SATURATION: 99 % | BODY MASS INDEX: 32.49 KG/M2 | HEIGHT: 67 IN | DIASTOLIC BLOOD PRESSURE: 80 MMHG | WEIGHT: 207 LBS | SYSTOLIC BLOOD PRESSURE: 130 MMHG

## 2025-02-27 DIAGNOSIS — N18.31 TYPE 2 DIABETES MELLITUS WITH STAGE 3A CHRONIC KIDNEY DISEASE, WITHOUT LONG-TERM CURRENT USE OF INSULIN: ICD-10-CM

## 2025-02-27 DIAGNOSIS — E78.5 HYPERLIPIDEMIA, UNSPECIFIED HYPERLIPIDEMIA TYPE: ICD-10-CM

## 2025-02-27 DIAGNOSIS — E11.22 TYPE 2 DIABETES MELLITUS WITH STAGE 3A CHRONIC KIDNEY DISEASE, WITHOUT LONG-TERM CURRENT USE OF INSULIN: ICD-10-CM

## 2025-02-27 DIAGNOSIS — I10 PRIMARY HYPERTENSION: ICD-10-CM

## 2025-02-27 DIAGNOSIS — I48.19 ATRIAL FIBRILLATION, PERSISTENT: Primary | ICD-10-CM

## 2025-02-27 DIAGNOSIS — G47.33 OSA (OBSTRUCTIVE SLEEP APNEA): ICD-10-CM

## 2025-02-27 DIAGNOSIS — I63.432 CEREBROVASCULAR ACCIDENT (CVA) DUE TO EMBOLISM OF LEFT POSTERIOR CEREBRAL ARTERY: ICD-10-CM

## 2025-02-27 DIAGNOSIS — I47.10 SUPRAVENTRICULAR TACHYCARDIA: ICD-10-CM

## 2025-03-04 ENCOUNTER — TELEPHONE (OUTPATIENT)
Dept: GASTROENTEROLOGY | Facility: CLINIC | Age: 85
End: 2025-03-04
Payer: MEDICARE

## 2025-03-06 ENCOUNTER — TELEPHONE (OUTPATIENT)
Dept: GASTROENTEROLOGY | Facility: CLINIC | Age: 85
End: 2025-03-06

## 2025-03-06 NOTE — TELEPHONE ENCOUNTER
Hub staff attempted to follow warm transfer process and was unsuccessful     Caller: Ping Hernández    Relationship to patient: Emergency Contact    Best call back number: [phone]    Patient is needing: PTS DAUGHTER RETURNING MISSED CALL TO SCHEDULE SCOPE. PTS PREFERRED CONTACT # HAS BEEN CHANGED TO DAUGHTERS PHONE ([facility] ON FILE) DAUGHTER ALSO REQUESTS EGD BE ADDED TO SCOPE AT SAME TIME. PLEASE CONTACT DAUGHTER TO ASSIST.

## 2025-03-06 NOTE — TELEPHONE ENCOUNTER
Hub staff attempted to follow warm transfer process and was unsuccessful     Caller: Ping Hernández    Relationship to patient: Emergency Contact    Best call back number: [phone]    Patient is needing: PTS DAUGHTER RETURNING MISSED CALL TO SCHEDULE SCOPE. PTS PREFERRED CONTACT # HAS BEEN CHANGED TO DAUGHTERS PHONE ([facility] ON FILE) DAUGHTER ALSO REQUESTS EGD BE ADDED TO SCOPE AT SAME TIME. PLEASE CONTACT DAUGHTER TO ASSIST.

## 2025-03-07 ENCOUNTER — TELEPHONE (OUTPATIENT)
Dept: GASTROENTEROLOGY | Facility: CLINIC | Age: 85
End: 2025-03-07
Payer: MEDICARE

## 2025-03-07 NOTE — TELEPHONE ENCOUNTER
PT IS ALSO ASKING FOR A UPPER SCOPE AND HAS BEEN HAVING SOME VOMITING EFFECT GOING ON AS WELL . DOES HAVE UPPER THROAT RINGS . ADVISED PT I WOULD SEND TO PROVIDER TO SEE IF WE CAN GET THE CASE FOR EGD AS WELL IN WITH COLON           HAFSA BILLINGSLEY  FOR COLON ON  04/08/2025  ARRIVE AT 1230PM [facility] Mailed Prep instructions to Mailing address on-file. OK FOR HUB TO READ

## 2025-03-12 DIAGNOSIS — M15.8 OTHER OSTEOARTHRITIS INVOLVING MULTIPLE JOINTS: ICD-10-CM

## 2025-03-12 DIAGNOSIS — N39.44 NOCTURNAL ENURESIS: ICD-10-CM

## 2025-03-12 DIAGNOSIS — E53.8 B12 DEFICIENCY: ICD-10-CM

## 2025-03-13 RX ORDER — GABAPENTIN 400 MG/1
400 CAPSULE ORAL 2 TIMES DAILY
Qty: 56 CAPSULE | Refills: 0 | Status: ON HOLD | OUTPATIENT
Start: 2025-03-13

## 2025-03-13 RX ORDER — MIRABEGRON 25 MG/1
TABLET, FILM COATED, EXTENDED RELEASE ORAL
Qty: 30 TABLET | Refills: 0 | Status: ON HOLD | OUTPATIENT
Start: 2025-03-13

## 2025-03-13 RX ORDER — FERROUS SULFATE 325(65) MG
1 TABLET ORAL DAILY
Qty: 90 TABLET | Refills: 0 | Status: ON HOLD | OUTPATIENT
Start: 2025-03-13

## 2025-03-15 ENCOUNTER — APPOINTMENT (OUTPATIENT)
Dept: GENERAL RADIOLOGY | Facility: HOSPITAL | Age: 85
End: 2025-03-15
Payer: MEDICARE

## 2025-03-15 ENCOUNTER — HOSPITAL ENCOUNTER (INPATIENT)
Facility: HOSPITAL | Age: 85
LOS: 4 days | Discharge: HOME | End: 2025-03-19
Attending: EMERGENCY MEDICINE | Admitting: INTERNAL MEDICINE
Payer: MEDICARE

## 2025-03-15 DIAGNOSIS — E11.65 TYPE 2 DIABETES MELLITUS WITH HYPERGLYCEMIA, UNSPECIFIED WHETHER LONG TERM INSULIN USE: ICD-10-CM

## 2025-03-15 DIAGNOSIS — L03.115 CELLULITIS OF RIGHT FOOT: Primary | ICD-10-CM

## 2025-03-15 LAB
ALBUMIN SERPL-MCNC: 3.8 G/DL (ref 3.5–5.2)
ALBUMIN/GLOB SERPL: 1.2 G/DL
ALP SERPL-CCNC: 157 U/L (ref 39–117)
ALT SERPL W P-5'-P-CCNC: 22 U/L (ref 1–33)
ANION GAP SERPL CALCULATED.3IONS-SCNC: 15.4 MMOL/L (ref 5–15)
AST SERPL-CCNC: 22 U/L (ref 1–32)
BASOPHILS # BLD AUTO: 0.03 10*3/MM3 (ref 0–0.2)
BASOPHILS NFR BLD AUTO: 0.3 % (ref 0–1.5)
BILIRUB SERPL-MCNC: 0.5 MG/DL (ref 0–1.2)
BUN SERPL-MCNC: 28 MG/DL (ref 8–23)
BUN/CREAT SERPL: 20.4 (ref 7–25)
CALCIUM SPEC-SCNC: 9.2 MG/DL (ref 8.6–10.5)
CHLORIDE SERPL-SCNC: 100 MMOL/L (ref 98–107)
CO2 SERPL-SCNC: 24.6 MMOL/L (ref 22–29)
CREAT SERPL-MCNC: 1.37 MG/DL (ref 0.57–1)
CRP SERPL-MCNC: <0.3 MG/DL (ref 0–0.5)
D-LACTATE SERPL-SCNC: 2.4 MMOL/L (ref 0.5–2)
D-LACTATE SERPL-SCNC: 2.5 MMOL/L (ref 0.5–2)
D-LACTATE SERPL-SCNC: 2.8 MMOL/L (ref 0.5–2)
DEPRECATED RDW RBC AUTO: 45.4 FL (ref 37–54)
EGFRCR SERPLBLD CKD-EPI 2021: 38.6 ML/MIN/1.73
EOSINOPHIL # BLD AUTO: 0.07 10*3/MM3 (ref 0–0.4)
EOSINOPHIL NFR BLD AUTO: 0.6 % (ref 0.3–6.2)
ERYTHROCYTE [DISTWIDTH] IN BLOOD BY AUTOMATED COUNT: 13.1 % (ref 12.3–15.4)
ERYTHROCYTE [SEDIMENTATION RATE] IN BLOOD: 28 MM/HR (ref 0–30)
GLOBULIN UR ELPH-MCNC: 3.2 GM/DL
GLUCOSE BLDC GLUCOMTR-MCNC: 252 MG/DL (ref 70–130)
GLUCOSE SERPL-MCNC: 270 MG/DL (ref 65–99)
HCT VFR BLD AUTO: 42.6 % (ref 34–46.6)
HGB BLD-MCNC: 13.9 G/DL (ref 12–15.9)
IMM GRANULOCYTES # BLD AUTO: 0.06 10*3/MM3 (ref 0–0.05)
IMM GRANULOCYTES NFR BLD AUTO: 0.5 % (ref 0–0.5)
INR PPP: 1.81 (ref 0.9–1.1)
LYMPHOCYTES # BLD AUTO: 2.03 10*3/MM3 (ref 0.7–3.1)
LYMPHOCYTES NFR BLD AUTO: 17.3 % (ref 19.6–45.3)
MCH RBC QN AUTO: 30.7 PG (ref 26.6–33)
MCHC RBC AUTO-ENTMCNC: 32.6 G/DL (ref 31.5–35.7)
MCV RBC AUTO: 94 FL (ref 79–97)
MONOCYTES # BLD AUTO: 0.93 10*3/MM3 (ref 0.1–0.9)
MONOCYTES NFR BLD AUTO: 7.9 % (ref 5–12)
NEUTROPHILS NFR BLD AUTO: 73.4 % (ref 42.7–76)
NEUTROPHILS NFR BLD AUTO: 8.64 10*3/MM3 (ref 1.7–7)
NRBC BLD AUTO-RTO: 0 /100 WBC (ref 0–0.2)
PLATELET # BLD AUTO: 211 10*3/MM3 (ref 140–450)
PMV BLD AUTO: 9 FL (ref 6–12)
POTASSIUM SERPL-SCNC: 4.4 MMOL/L (ref 3.5–5.2)
PROCALCITONIN SERPL-MCNC: 0.06 NG/ML (ref 0–0.25)
PROT SERPL-MCNC: 7 G/DL (ref 6–8.5)
PROTHROMBIN TIME: 21.1 SECONDS (ref 11.7–14.2)
RBC # BLD AUTO: 4.53 10*6/MM3 (ref 3.77–5.28)
SODIUM SERPL-SCNC: 140 MMOL/L (ref 136–145)
WBC NRBC COR # BLD AUTO: 11.76 10*3/MM3 (ref 3.4–10.8)

## 2025-03-15 PROCEDURE — 36415 COLL VENOUS BLD VENIPUNCTURE: CPT | Performed by: EMERGENCY MEDICINE

## 2025-03-15 PROCEDURE — 87040 BLOOD CULTURE FOR BACTERIA: CPT

## 2025-03-15 PROCEDURE — 99285 EMERGENCY DEPT VISIT HI MDM: CPT

## 2025-03-15 PROCEDURE — 85610 PROTHROMBIN TIME: CPT | Performed by: EMERGENCY MEDICINE

## 2025-03-15 PROCEDURE — 85025 COMPLETE CBC W/AUTO DIFF WBC: CPT | Performed by: EMERGENCY MEDICINE

## 2025-03-15 PROCEDURE — 25010000002 VANCOMYCIN 10 G RECONSTITUTED SOLUTION: Performed by: EMERGENCY MEDICINE

## 2025-03-15 PROCEDURE — 83605 ASSAY OF LACTIC ACID: CPT | Performed by: EMERGENCY MEDICINE

## 2025-03-15 PROCEDURE — 82948 REAGENT STRIP/BLOOD GLUCOSE: CPT

## 2025-03-15 PROCEDURE — 25010000002 AMPICILLIN-SULBACTAM PER 1.5 G: Performed by: EMERGENCY MEDICINE

## 2025-03-15 PROCEDURE — 25810000003 SODIUM CHLORIDE 0.9 % SOLUTION: Performed by: EMERGENCY MEDICINE

## 2025-03-15 PROCEDURE — 63710000001 INSULIN GLARGINE PER 5 UNITS: Performed by: INTERNAL MEDICINE

## 2025-03-15 PROCEDURE — 80053 COMPREHEN METABOLIC PANEL: CPT | Performed by: EMERGENCY MEDICINE

## 2025-03-15 PROCEDURE — 84145 PROCALCITONIN (PCT): CPT | Performed by: EMERGENCY MEDICINE

## 2025-03-15 PROCEDURE — 86140 C-REACTIVE PROTEIN: CPT | Performed by: EMERGENCY MEDICINE

## 2025-03-15 PROCEDURE — 73630 X-RAY EXAM OF FOOT: CPT

## 2025-03-15 PROCEDURE — 85652 RBC SED RATE AUTOMATED: CPT | Performed by: EMERGENCY MEDICINE

## 2025-03-15 RX ORDER — MULTIVITAMIN WITH IRON
500 TABLET ORAL DAILY
Status: DISCONTINUED | OUTPATIENT
Start: 2025-03-16 | End: 2025-03-19 | Stop reason: HOSPADM

## 2025-03-15 RX ORDER — BISACODYL 10 MG
10 SUPPOSITORY, RECTAL RECTAL DAILY PRN
Status: DISCONTINUED | OUTPATIENT
Start: 2025-03-15 | End: 2025-03-19 | Stop reason: HOSPADM

## 2025-03-15 RX ORDER — CHOLECALCIFEROL (VITAMIN D3) 25 MCG
2000 TABLET ORAL DAILY
Status: DISCONTINUED | OUTPATIENT
Start: 2025-03-16 | End: 2025-03-19 | Stop reason: HOSPADM

## 2025-03-15 RX ORDER — ONDANSETRON 2 MG/ML
4 INJECTION INTRAMUSCULAR; INTRAVENOUS EVERY 6 HOURS PRN
Status: DISCONTINUED | OUTPATIENT
Start: 2025-03-15 | End: 2025-03-19 | Stop reason: HOSPADM

## 2025-03-15 RX ORDER — POLYETHYLENE GLYCOL 3350 17 G/17G
17 POWDER, FOR SOLUTION ORAL DAILY PRN
Status: DISCONTINUED | OUTPATIENT
Start: 2025-03-15 | End: 2025-03-19 | Stop reason: HOSPADM

## 2025-03-15 RX ORDER — BUPROPION HYDROCHLORIDE 150 MG/1
150 TABLET ORAL EVERY MORNING
Status: DISCONTINUED | OUTPATIENT
Start: 2025-03-16 | End: 2025-03-19 | Stop reason: HOSPADM

## 2025-03-15 RX ORDER — ATORVASTATIN CALCIUM 20 MG/1
40 TABLET, FILM COATED ORAL NIGHTLY
Status: DISCONTINUED | OUTPATIENT
Start: 2025-03-16 | End: 2025-03-19 | Stop reason: HOSPADM

## 2025-03-15 RX ORDER — DONEPEZIL HYDROCHLORIDE 10 MG/1
10 TABLET, FILM COATED ORAL NIGHTLY
Status: DISCONTINUED | OUTPATIENT
Start: 2025-03-16 | End: 2025-03-19 | Stop reason: HOSPADM

## 2025-03-15 RX ORDER — VANCOMYCIN 2 GRAM/500 ML IN 0.9 % SODIUM CHLORIDE INTRAVENOUS
20 ONCE
Status: COMPLETED | OUTPATIENT
Start: 2025-03-15 | End: 2025-03-15

## 2025-03-15 RX ORDER — GABAPENTIN 400 MG/1
400 CAPSULE ORAL 2 TIMES DAILY
Status: DISCONTINUED | OUTPATIENT
Start: 2025-03-15 | End: 2025-03-19 | Stop reason: HOSPADM

## 2025-03-15 RX ORDER — BISACODYL 5 MG/1
5 TABLET, DELAYED RELEASE ORAL DAILY PRN
Status: DISCONTINUED | OUTPATIENT
Start: 2025-03-15 | End: 2025-03-19 | Stop reason: HOSPADM

## 2025-03-15 RX ORDER — FERROUS SULFATE 325(65) MG
325 TABLET ORAL DAILY
Status: DISCONTINUED | OUTPATIENT
Start: 2025-03-16 | End: 2025-03-19 | Stop reason: HOSPADM

## 2025-03-15 RX ORDER — VENLAFAXINE HYDROCHLORIDE 37.5 MG/1
150 CAPSULE, EXTENDED RELEASE ORAL DAILY
Status: DISCONTINUED | OUTPATIENT
Start: 2025-03-16 | End: 2025-03-19 | Stop reason: HOSPADM

## 2025-03-15 RX ORDER — AMOXICILLIN 250 MG
2 CAPSULE ORAL 2 TIMES DAILY PRN
Status: DISCONTINUED | OUTPATIENT
Start: 2025-03-15 | End: 2025-03-19 | Stop reason: HOSPADM

## 2025-03-15 RX ORDER — SODIUM CHLORIDE 0.9 % (FLUSH) 0.9 %
10 SYRINGE (ML) INJECTION AS NEEDED
Status: DISCONTINUED | OUTPATIENT
Start: 2025-03-15 | End: 2025-03-19 | Stop reason: HOSPADM

## 2025-03-15 RX ORDER — ACETAMINOPHEN 160 MG/5ML
650 SOLUTION ORAL EVERY 4 HOURS PRN
Status: DISCONTINUED | OUTPATIENT
Start: 2025-03-15 | End: 2025-03-19 | Stop reason: HOSPADM

## 2025-03-15 RX ORDER — METOPROLOL SUCCINATE 25 MG/1
25 TABLET, EXTENDED RELEASE ORAL
Status: DISCONTINUED | OUTPATIENT
Start: 2025-03-16 | End: 2025-03-19 | Stop reason: HOSPADM

## 2025-03-15 RX ORDER — OXYCODONE AND ACETAMINOPHEN 5; 325 MG/1; MG/1
1 TABLET ORAL ONCE
Refills: 0 | Status: COMPLETED | OUTPATIENT
Start: 2025-03-15 | End: 2025-03-15

## 2025-03-15 RX ORDER — ACETAMINOPHEN 325 MG/1
650 TABLET ORAL EVERY 4 HOURS PRN
Status: DISCONTINUED | OUTPATIENT
Start: 2025-03-15 | End: 2025-03-19 | Stop reason: HOSPADM

## 2025-03-15 RX ORDER — OXYBUTYNIN CHLORIDE 5 MG/1
5 TABLET, EXTENDED RELEASE ORAL DAILY
Status: DISCONTINUED | OUTPATIENT
Start: 2025-03-16 | End: 2025-03-19 | Stop reason: HOSPADM

## 2025-03-15 RX ORDER — ACETAMINOPHEN 650 MG/1
650 SUPPOSITORY RECTAL EVERY 4 HOURS PRN
Status: DISCONTINUED | OUTPATIENT
Start: 2025-03-15 | End: 2025-03-19 | Stop reason: HOSPADM

## 2025-03-15 RX ORDER — LEVOTHYROXINE SODIUM 100 UG/1
100 TABLET ORAL
Status: DISCONTINUED | OUTPATIENT
Start: 2025-03-16 | End: 2025-03-19 | Stop reason: HOSPADM

## 2025-03-15 RX ADMIN — GABAPENTIN 400 MG: 400 CAPSULE ORAL at 21:10

## 2025-03-15 RX ADMIN — SODIUM CHLORIDE 1000 ML: 9 INJECTION, SOLUTION INTRAVENOUS at 17:14

## 2025-03-15 RX ADMIN — SODIUM CHLORIDE 2000 MG: 9 INJECTION, SOLUTION INTRAVENOUS at 17:11

## 2025-03-15 RX ADMIN — SODIUM CHLORIDE 1.5 G: 900 INJECTION INTRAVENOUS at 16:31

## 2025-03-15 RX ADMIN — OXYCODONE AND ACETAMINOPHEN 1 TABLET: 5; 325 TABLET ORAL at 16:30

## 2025-03-15 RX ADMIN — SODIUM CHLORIDE, SODIUM LACTATE, POTASSIUM CHLORIDE, CALCIUM CHLORIDE 1000 ML: 20; 30; 600; 310 INJECTION, SOLUTION INTRAVENOUS at 21:10

## 2025-03-15 RX ADMIN — INSULIN GLARGINE 10 UNITS: 100 INJECTION, SOLUTION SUBCUTANEOUS at 21:10

## 2025-03-15 NOTE — ED NOTES
Nursing report ED to floor  Rekha Bear  84 y.o.  female    HPI :  HPI  Stated Reason for Visit: From [facility] with worsening redness of R foot with streaking.  recent admission for cellulitis  History Obtained From: family, patient    Chief Complaint  Chief Complaint   Patient presents with    Wound Check       Admitting doctor:   Ronna Sawant MD    Admitting diagnosis:   The primary encounter diagnosis was Cellulitis of right foot. A diagnosis of Type 2 diabetes mellitus with hyperglycemia, unspecified whether long term insulin use was also pertinent to this visit.    Code status:   Current Code Status       Date Active Code Status Order ID Comments User Context       Prior            Allergies:   Shellfish-derived products, Amlodipine, and Iodine    Isolation:   No active isolations    Intake and Output  No intake or output data in the 24 hours ending 03/15/25 1725    Weight:   There were no vitals filed for this visit.    Most recent vitals:   Vitals:    03/15/25 1432 03/15/25 1435 03/15/25 1634 03/15/25 1659   BP:  (!) 145/102 143/88 160/85   BP Location:  Right arm     Patient Position:  Sitting     Pulse:  88     Resp:  16  16   Temp: 97 °F (36.1 °C)      TempSrc: Tympanic          Active LDAs/IV Access:   Lines, Drains & Airways       Active LDAs       Name Placement date Placement time Site Days    Peripheral IV 03/15/25 1619 Anterior;Right Forearm 03/15/25  1619  Forearm  less than 1                    Labs (abnormal labs have a star):   Labs Reviewed   COMPREHENSIVE METABOLIC PANEL - Abnormal; Notable for the following components:       Result Value    Glucose 270 (*)     BUN 28 (*)     Creatinine 1.37 (*)     Alkaline Phosphatase 157 (*)     Anion Gap 15.4 (*)     eGFR 38.6 (*)     All other components within normal limits    Narrative:     GFR Categories in Chronic Kidney Disease (CKD)      GFR Category          GFR (mL/min/1.73)    Interpretation  G1                     90 or  "greater         Normal or high (1)  G2                      60-89                Mild decrease (1)  G3a                   45-59                Mild to moderate decrease  G3b                   30-44                Moderate to severe decrease  G4                    15-29                Severe decrease  G5                    14 or less           Kidney failure          (1)In the absence of evidence of kidney disease, neither GFR category G1 or G2 fulfill the criteria for CKD.    eGFR calculation 2021 CKD-EPI creatinine equation, which does not include race as a factor   PROTIME-INR - Abnormal; Notable for the following components:    Protime 21.1 (*)     INR 1.81 (*)     All other components within normal limits   LACTIC ACID, PLASMA - Abnormal; Notable for the following components:    Lactate 2.8 (*)     All other components within normal limits   CBC WITH AUTO DIFFERENTIAL - Abnormal; Notable for the following components:    WBC 11.76 (*)     Lymphocyte % 17.3 (*)     Neutrophils, Absolute 8.64 (*)     Monocytes, Absolute 0.93 (*)     Immature Grans, Absolute 0.06 (*)     All other components within normal limits   PROCALCITONIN - Normal    Narrative:     As a Marker for Sepsis (Non-Neonates):    1. <0.5 ng/mL represents a low risk of severe sepsis and/or septic shock.  2. >2 ng/mL represents a high risk of severe sepsis and/or septic shock.    As a Marker for Lower Respiratory Tract Infections that require antibiotic therapy:    PCT on Admission    Antibiotic Therapy       6-12 Hrs later    >0.5                Strongly Recommended  >0.25 - <0.5        Recommended   0.1 - 0.25          Discouraged              Remeasure/reassess PCT  <0.1                Strongly Discouraged     Remeasure/reassess PCT    As 28 day mortality risk marker: \"Change in Procalcitonin Result\" (>80% or <=80%) if Day 0 (or Day 1) and Day 4 values are available. Refer to http://www.Tails.coms-pct-calculator.com    Change in PCT <=80%  A decrease of " PCT levels below or equal to 80% defines a positive change in PCT test result representing a higher risk for 28-day all-cause mortality of patients diagnosed with severe sepsis for septic shock.    Change in PCT >80%  A decrease of PCT levels of more than 80% defines a negative change in PCT result representing a lower risk for 28-day all-cause mortality of patients diagnosed with severe sepsis or septic shock.      C-REACTIVE PROTEIN - Normal   SEDIMENTATION RATE - Normal   LACTIC ACID, REFLEX   CBC AND DIFFERENTIAL    Narrative:     The following orders were created for panel order CBC & Differential.  Procedure                               Abnormality         Status                     ---------                               -----------         ------                     CBC Auto Differential[[id number]]        Abnormal            Final result                 Please view results for these tests on the individual orders.       EKG:   No orders to display       Meds given in ED:   Medications   sodium chloride 0.9 % flush 10 mL (has no administration in time range)   vancomycin IVPB 2000 mg in 0.9% Sodium Chloride 500 mL (2,000 mg Intravenous New Bag 3/15/25 1711)   sodium chloride 0.9 % bolus 1,000 mL (1,000 mL Intravenous New Bag 3/15/25 1714)   ampicillin-sulbactam (UNASYN) 1.5 g in sodium chloride 0.9 % 100 mL MBP (0 g Intravenous Stopped 3/15/25 1717)   oxyCODONE-acetaminophen (PERCOCET) 5-325 MG per tablet 1 tablet (1 tablet Oral Given 3/15/25 1630)       Imaging results:  No radiology results for the last day    Ambulatory status:   - +1    Social issues:   Social History     Socioeconomic History    Marital status:     Number of children: 5   Tobacco Use    Smoking status: Former     Current packs/day: 0.00     Average packs/day: 0.5 packs/day for 14.0 years (7.0 ttl pk-yrs)     Types: Cigarettes     Start date:      Quit date:      Years since quittin.2     Passive exposure: Past     Smokeless tobacco: Never    Tobacco comments:     CAFFEINE USE   Vaping Use    Vaping status: Never Used   Substance and Sexual Activity    Alcohol use: No    Drug use: No    Sexual activity: Not Currently       Peripheral Neurovascular  Peripheral Neurovascular (Adult)  Peripheral Neurovascular WDL: WDL    Neuro Cognitive  Neuro Cognitive (Adult)  Cognitive/Neuro/Behavioral WDL: WDL    Learning  Learning Assessment  Learning Readiness and Ability: no barriers identified  Education Provided  Person Taught: patient  Teaching Method: verbal instruction  Teaching Focus: symptom/problem overview, diagnostic test  Education Outcome Evaluation: eager to learn, verbalizes understanding    Respiratory  Respiratory WDL  Respiratory WDL: WDL    Abdominal Pain       Pain Assessments  Pain (Adult)  (0-10) Pain Rating: Rest: 3  Pain Location: foot    NIH Stroke Scale       Ping Garcia RN  03/15/25 17:25 EDT

## 2025-03-15 NOTE — PLAN OF CARE
Goal Outcome Evaluation:  Plan of Care Reviewed With: patient        Progress: no change  Outcome Evaluation: From ER. RLE red. Pain 2/10. IV abx infusing. Bed alarm for safety. Daughter at bedside this evening.

## 2025-03-15 NOTE — ED PROVIDER NOTES
EMERGENCY DEPARTMENT ENCOUNTER    Room Number:  36/36  PCP: Zahida Osorio MD  Independent Historians: Patient    HPI:  Chief Complaint: had concerns including Wound Check.      A complete HPI/ROS/PMH/PSH/SH/FH are unobtainable due to: None    Chronic or social conditions impacting patient care (Social Determinants of Health): None      Context: Rekha Bear is a 84 y.o. female with a medical history of A-fib on Xarelto, sleep apnea, diabetes, hypertension, GERD, hypothyroidism who presents to the ED c/o acute right foot redness and pain that started yesterday and got worse today.  Patient remarks being admitted for cellulitis in the same foot and lower leg recently.  Patient completed discharge antibiotics and the foot was looking a lot better.  Redness to her great toe returned yesterday with warmth and redness to the rest of her foot with some streaking up her anterior lower leg noted today.  Patient denies any fever, vomiting.        Review of prior external notes (non-ED) -and- Review of prior external test results outside of this encounter: I reviewed patient's discharge summary from February 13.  Patient had right lower extremity cellulitis with elevated inflammatory markers.  Patient had an MRI of her foot significant for a nondisplaced distal cuboid fracture thought to be a stress fracture.  Patient with soft tissue edema, tarsometatarsal arthritis and first MTP arthritis as well as posterior tibial insertion adenopathy and arthritic changes of the naviculocuneiform joint.  Pt discharged on cefdinir    Prescription drug monitoring program review:     N/A    PAST MEDICAL HISTORY  Active Ambulatory Problems     Diagnosis Date Noted    Acute bronchitis 05/18/2016    Chronic pain of right knee 05/18/2016    Type 2 diabetes mellitus with stage 3 chronic kidney disease, without long-term current use of insulin 05/18/2016    Hyperlipidemia 05/18/2016    Primary hypertension 05/18/2016    Hypothyroidism  05/18/2016    Urinary incontinence 05/18/2016    Allergic reaction 04/04/2018    Hx of food anaphylaxis 04/04/2018    Herpes simplex 04/04/2018    Osteoarthritis 06/13/2018    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/19/2019    Community acquired pneumonia of left lower lobe of lung 03/21/2020    Hyponatremia 03/21/2020    Pneumonia due to COVID-19 virus 03/24/2020    Major depressive disorder, recurrent, mild 05/14/2021    Acute respiratory failure with hypoxia 05/14/2021    Supraventricular tachycardia 05/14/2021    PVC (premature ventricular contraction) 04/21/2022    Depressive disorder 02/04/2016    Gastroesophageal reflux disease 02/04/2016    Midline cystocele 02/04/2016    Rectocele 02/04/2016    BMI 29.0-29.9,adult 10/16/2023    Acute left PCA stroke 12/21/2023    B12 deficiency 12/23/2023    Bradycardia 12/23/2023    Premature atrial contractions 12/23/2023    Stroke 12/26/2023    SHEY (obstructive sleep apnea) 02/06/2024    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    Cerebrovascular accident (CVA) due to embolism of left posterior cerebral artery 02/20/2024    Anxiety 02/04/2016    Dysphagia as late effect of stroke 04/16/2024    Atrial fibrillation, persistent 05/23/2024    Acute UTI 10/12/2024    Closed left hip fracture 10/26/2024    Periprosthetic fracture around internal prosthetic left hip joint 10/26/2024    Naz-prosthetic fracture around prosthetic hip 10/27/2024    History of CVA (cerebrovascular accident) 10/28/2024    Cellulitis in diabetic foot 02/06/2025    Encounter for screening for malignant neoplasm of colon 02/19/2025    History of adenomatous polyp of colon 02/19/2025    Anticoagulated 02/19/2025     Resolved Ambulatory Problems     Diagnosis Date Noted    Chest pain 05/18/2016    Cardiac arrhythmia 05/18/2016    Hyperlipemia 05/18/2016    Dehydration 03/21/2020    Dyspnea on exertion 04/21/2022    Dizziness 04/26/2022     Past Medical History:   Diagnosis Date    Chilblains      CKD (chronic kidney disease)     CVA (cerebral vascular accident)     Depression     Fatty liver     GERD (gastroesophageal reflux disease)     Hypertension     Incontinence     Pain of esophagus     Peptic ulcer disease     Raynaud's disease     Sinus bradycardia     Squamous cell carcinoma of neck     Vitamin B 12 deficiency          PAST SURGICAL HISTORY  Past Surgical History:   Procedure Laterality Date    CATARACT EXTRACTION      CHOLECYSTECTOMY      COLONOSCOPY  2009    COLONOSCOPY N/A 2016    Procedure: COLONOSCOPY with hot snare polypectomy;  Surgeon: George Pabon MD;  Location: [facility] ENDOSCOPY;  Service:     DENTAL PROCEDURE      FOOT SURGERY      TONSILLECTOMY      TOTAL HIP ARTHROPLASTY Bilateral     OA  Marvin THR, LT TKR - hydrocodone prescibed by Dr. Almaguer    TOTAL KNEE ARTHROPLASTY Left     OA  Marvin THR, LT TKR - hydrocodone prescibed by Dr. Almaguer         FAMILY HISTORY  Family History   Problem Relation Age of Onset    Hypertension Mother     Diabetes type II Mother     Hypertension Father     Coronary artery disease Father     Diabetes type II Father     Colon cancer Brother     Skin cancer Brother     Stroke Son     Breast cancer Maternal Aunt     Hypertension Other     Other Other         Lipids  Thyroid         SOCIAL HISTORY  Social History     Socioeconomic History    Marital status:     Number of children: 5   Tobacco Use    Smoking status: Former     Current packs/day: 0.00     Average packs/day: 0.5 packs/day for 14.0 years (7.0 ttl pk-yrs)     Types: Cigarettes     Start date:      Quit date:      Years since quittin.2     Passive exposure: Past    Smokeless tobacco: Never    Tobacco comments:     CAFFEINE USE   Vaping Use    Vaping status: Never Used   Substance and Sexual Activity    Alcohol use: No    Drug use: No    Sexual activity: Not Currently         ALLERGIES  Shellfish-derived products, Amlodipine, and Iodine        REVIEW OF SYSTEMS  Review of  Systems  Included in HPI  All systems reviewed and negative except for those discussed in HPI.      PHYSICAL EXAM    I have reviewed the triage vital signs and nursing notes.    ED Triage Vitals   Temp Heart Rate Resp BP SpO2   03/15/25 1432 03/15/25 1435 03/15/25 1435 03/15/25 1435 --   97 °F (36.1 °C) 88 16 (!) 145/102       Temp src Heart Rate Source Patient Position BP Location FiO2 (%)   03/15/25 1432 -- 03/15/25 1435 03/15/25 1435 --   Tympanic  Sitting Right arm        Physical Exam  GENERAL: Pleasant cooperative and conversant female, alert, no acute distress  SKIN: Warm, dry, right foot with erythema that appears to originate from right great toe and distal foot but expands to all 5 toes, midfoot, and anterior ankle with some faint streaking up the anterior distal lower leg, patient has a small linear wound on her right heel with mild surrounding erythema but no drainage  HENT: Normocephalic, atraumatic  RESPIRATORY: relaxed breathing  MUSCULOSKELETAL: no calf tenderness to palpation  NEURO: alert, moves all extremities, follows commands                                                                   LAB RESULTS  Recent Results (from the past 24 hours)   Comprehensive Metabolic Panel    Collection Time: 03/15/25  4:09 PM    Specimen: Blood   Result Value Ref Range    Glucose 270 (H) 65 - 99 mg/dL    BUN 28 (H) 8 - 23 mg/dL    Creatinine 1.37 (H) 0.57 - 1.00 mg/dL    Sodium 140 136 - 145 mmol/L    Potassium 4.4 3.5 - 5.2 mmol/L    Chloride 100 98 - 107 mmol/L    CO2 24.6 22.0 - 29.0 mmol/L    Calcium 9.2 8.6 - 10.5 mg/dL    Total Protein 7.0 6.0 - 8.5 g/dL    Albumin 3.8 3.5 - 5.2 g/dL    ALT (SGPT) 22 1 - 33 U/L    AST (SGOT) 22 1 - 32 U/L    Alkaline Phosphatase 157 (H) 39 - 117 U/L    Total Bilirubin 0.5 0.0 - 1.2 mg/dL    Globulin 3.2 gm/dL    A/G Ratio 1.2 g/dL    BUN/Creatinine Ratio 20.4 7.0 - 25.0    Anion Gap 15.4 (H) 5.0 - 15.0 mmol/L    eGFR 38.6 (L) >60.0 mL/min/1.73   Protime-INR    Collection  Time: 03/15/25  4:09 PM    Specimen: Blood   Result Value Ref Range    Protime 21.1 (H) 11.7 - 14.2 Seconds    INR 1.81 (H) 0.90 - 1.10   Lactic Acid, Plasma    Collection Time: 03/15/25  4:09 PM    Specimen: Blood   Result Value Ref Range    Lactate 2.8 (C) 0.5 - 2.0 mmol/L   Procalcitonin    Collection Time: 03/15/25  4:09 PM    Specimen: Blood   Result Value Ref Range    Procalcitonin 0.06 0.00 - 0.25 ng/mL   C-reactive Protein    Collection Time: 03/15/25  4:09 PM    Specimen: Blood   Result Value Ref Range    C-Reactive Protein <0.30 0.00 - 0.50 mg/dL   Sedimentation Rate    Collection Time: 03/15/25  4:09 PM    Specimen: Blood   Result Value Ref Range    Sed Rate 28 0 - 30 mm/hr   CBC Auto Differential    Collection Time: 03/15/25  4:09 PM    Specimen: Blood   Result Value Ref Range    WBC 11.76 (H) 3.40 - 10.80 10*3/mm3    RBC 4.53 3.77 - 5.28 10*6/mm3    Hemoglobin 13.9 12.0 - 15.9 g/dL    Hematocrit 42.6 34.0 - 46.6 %    MCV 94.0 79.0 - 97.0 fL    MCH 30.7 26.6 - 33.0 pg    MCHC 32.6 31.5 - 35.7 g/dL    RDW 13.1 12.3 - 15.4 %    RDW-SD 45.4 37.0 - 54.0 fl    MPV 9.0 6.0 - 12.0 fL    Platelets 211 140 - 450 10*3/mm3    Neutrophil % 73.4 42.7 - 76.0 %    Lymphocyte % 17.3 (L) 19.6 - 45.3 %    Monocyte % 7.9 5.0 - 12.0 %    Eosinophil % 0.6 0.3 - 6.2 %    Basophil % 0.3 0.0 - 1.5 %    Immature Grans % 0.5 0.0 - 0.5 %    Neutrophils, Absolute 8.64 (H) 1.70 - 7.00 10*3/mm3    Lymphocytes, Absolute 2.03 0.70 - 3.10 10*3/mm3    Monocytes, Absolute 0.93 (H) 0.10 - 0.90 10*3/mm3    Eosinophils, Absolute 0.07 0.00 - 0.40 10*3/mm3    Basophils, Absolute 0.03 0.00 - 0.20 10*3/mm3    Immature Grans, Absolute 0.06 (H) 0.00 - 0.05 10*3/mm3    nRBC 0.0 0.0 - 0.2 /100 WBC         RADIOLOGY  XR Foot 3+ View Right  Result Date: 3/15/2025  XR FOOT 3+ VW RIGHT-  Clinical: Pain and erythema  COMPARISON examination dated 2/6/2025  FINDINGS: Stable benign periosteal calcification and thickening of the fourth metatarsal as before.  First and fifth metatarsal phalangeal joint degeneration as before. No acute osseous or articular abnormality has developed. There is bone hypertrophy at the Achilles insertion with early calcaneal spur formation. Moderate hindfoot joint degeneration again seen. There is no soft tissue gas or radiopaque foreign body identified.  CONCLUSION: Stable imaging of the right foot.  This report was finalized on 3/15/2025 4:08 PM by Dr. Dino Page M.D on Workstation: [id number]          MEDICATIONS GIVEN IN ER  Medications   sodium chloride 0.9 % flush 10 mL (has no administration in time range)   vancomycin IVPB 2000 mg in 0.9% Sodium Chloride 500 mL (2,000 mg Intravenous New Bag 3/15/25 1711)   sodium chloride 0.9 % bolus 1,000 mL (1,000 mL Intravenous New Bag 3/15/25 1714)   ampicillin-sulbactam (UNASYN) 1.5 g in sodium chloride 0.9 % 100 mL MBP (0 g Intravenous Stopped 3/15/25 1717)   oxyCODONE-acetaminophen (PERCOCET) 5-325 MG per tablet 1 tablet (1 tablet Oral Given 3/15/25 1630)         ORDERS PLACED DURING THIS VISIT:  Orders Placed This Encounter   Procedures    XR Foot 3+ View Right    Comprehensive Metabolic Panel    Protime-INR    Lactic Acid, Plasma    Procalcitonin    C-reactive Protein    Sedimentation Rate    CBC Auto Differential    STAT Lactic Acid, Reflex    Discontinue Cardiac Monitoring    [facility] (on-call MD unless specified) Details    Insert Peripheral IV    Inpatient Admission    CBC & Differential         OUTPATIENT MEDICATION MANAGEMENT:  Current Facility-Administered Medications Ordered in Epic   Medication Dose Route Frequency Provider Last Rate Last Admin    sodium chloride 0.9 % bolus 1,000 mL  1,000 mL Intravenous Once Anay Pierre MD 2,000 mL/hr at 03/15/25 1714 1,000 mL at 03/15/25 1714    sodium chloride 0.9 % flush 10 mL  10 mL Intravenous PRN Anay Pierre MD        vancomycin IVPB 2000 mg in 0.9% Sodium Chloride 500 mL  20 mg/kg Intravenous Once Anay Pierre   mL/hr at 03/15/25 1711 2,000 mg at 03/15/25 1711     Current Outpatient Medications Ordered in Epic   Medication Sig Dispense Refill    atorvastatin (LIPITOR) 40 MG tablet Take 1 tablet by mouth Every Night. 30 tablet 1    buPROPion XL (WELLBUTRIN XL) 150 MG 24 hr tablet TAKE ONE TABLET BY MOUTH EVERY MORNING 90 tablet 1    Cholecalciferol (Vitamin D3) 50 MCG (2000 UT) capsule Take 1 capsule by mouth Daily.      Diclofenac Sodium (VOLTAREN) 1 % gel gel APPLY A THIN LAYER TO THE RIGHT TO SHOULDER THREE TIMES DAILY      donepezil (Aricept) 10 MG tablet Take 1 tablet by mouth Every Night. 30 tablet 2    FeroSul 325 (65 Fe) MG tablet TAKE ONE TABLET BY MOUTH DAILY 90 tablet 0    gabapentin (NEURONTIN) 400 MG capsule TAKE ONE CAPSULE BY MOUTH TWICE DAILY 56 capsule 0    glimepiride (AMARYL) 4 MG tablet Take 1 tablet by mouth Every Morning Before Breakfast. (Patient taking differently: Take 1 tablet by mouth 2 (Two) Times a Day.) 30 tablet 1    Lantus SoloStar 100 UNIT/ML injection pen INJECT 5 UNITS AT BEDTIME (Patient taking differently: 10 Units Every Night.)      levothyroxine (SYNTHROID, LEVOTHROID) 88 MCG tablet Take 1 tablet by mouth Daily. (Patient taking differently: Take 100 tablets by mouth Daily.)      linagliptin (Tradjenta) 5 MG tablet tablet Take 1 tablet by mouth Daily. 30 tablet 1    methylPREDNISolone (Medrol) 4 MG dose pack Take as directed on package instructions. (Patient not taking: Reported on 2/18/2025) 21 tablet 0    metoprolol succinate XL (TOPROL-XL) 25 MG 24 hr tablet TAKE ONE TABLET BY MOUTH DAILY **NEW MED** 30 tablet 5    Myrbetriq 25 MG tablet sustained-release 24 hour 24 hr tablet TAKE ONE TABLET BY MOUTH DAILY *NEW MED* 30 tablet 0    pioglitazone (ACTOS) 15 MG tablet Take 1 tablet by mouth Daily. 30 tablet 1    polyethylene glycol (MIRALAX) 17 g packet Take 17 g by mouth 2 (Two) Times a Day As Needed (constipation). 60 each 0    rivaroxaban (Xarelto) 15 MG tablet Take 1 tablet by  mouth Daily With Dinner. 90 tablet 1    sildenafil (REVATIO) 20 MG tablet Take 1 tablet by mouth 2 (Two) Times a Day. 60 tablet 3    venlafaxine XR (EFFEXOR-XR) 150 MG 24 hr capsule TAKE ONE TABLET BY MOUTH DAILY 90 capsule 1    vitamin B-12 (VITAMIN B-12) 500 MCG tablet Take 1 tablet by mouth Daily. 30 tablet 3         PROCEDURES  Procedures            PROGRESS, DATA ANALYSIS, CONSULTS, AND MEDICAL DECISION MAKING  All labs have been independently interpreted by me.  All radiology studies have been reviewed by me. All EKG's have been independently viewed and interpreted by me.  Discussion below represents my analysis of pertinent findings related to patient's condition, differential diagnosis, treatment plan and final disposition.    This patient presents with lower extremity erythema, warmth, and swelling concerning for cellulitis. Patient does have sensitivity/pain to light touch around the erythematous area. No fluctuance concerning for abscess noted. Low concern for osteomyelitis or DVT. No bullae, pain out of proportion, or rapid progression concerning for necrotizing fasciitis.  Patient is diabetic.  Calf is soft and no concern for compartment syndrome.          ED Course as of 03/15/25 1737   Sat Mar 15, 2025   1616 Viewed patient's foot x-ray and on my interpretation patient has no obvious fracture nor malalignment [AR]   1645 Ckd stable [AR]   1705 I discussed patient's case with Dr. Sawant who is amenable to admitting the patient for further care [AR]      ED Course User Index  [AR] Anay Pierre MD             AS OF 17:37 EDT VITALS:    BP - 160/85  HR - 88  TEMP - 97 °F (36.1 °C) (Tympanic)  O2 SATS -        COMPLEXITY OF CARE  The patient requires admission.    DIAGNOSIS  Final diagnoses:   Cellulitis of right foot   Type 2 diabetes mellitus with hyperglycemia, unspecified whether long term insulin use         DISPOSITION  ED Disposition       ED Disposition   Decision to Admit    Condition    --    Comment   Level of Care: Med/Surg [1]   Diagnosis: Cellulitis of right foot [120791]   Admitting Physician: ATIF DAVISON [[id number]]   Attending Physician: ATIF DAVISON [[id number]]   Isolate for COVID?: No [0]   Certification: I Certify That Inpatient Hospital Services Are Medically Necessary For Greater Than 2 Midnights                  Please note that portions of this document were completed with a voice recognition program.    Note Disclaimer: At [facility], we believe that sharing information builds trust and better relationships. You are receiving this note because you recently visited [facility]. It is possible you will see health information before a provider has talked with you about it. This kind of information can be easy to misunderstand. To help you fully understand what it means for your health, we urge you to discuss this note with your provider.         Anay Pierre MD  03/15/25 [id number]

## 2025-03-16 LAB
ANION GAP SERPL CALCULATED.3IONS-SCNC: 8 MMOL/L (ref 5–15)
BASOPHILS # BLD AUTO: 0.02 10*3/MM3 (ref 0–0.2)
BASOPHILS NFR BLD AUTO: 0.2 % (ref 0–1.5)
BUN SERPL-MCNC: 19 MG/DL (ref 8–23)
BUN/CREAT SERPL: 17.8 (ref 7–25)
CALCIUM SPEC-SCNC: 8.9 MG/DL (ref 8.6–10.5)
CHLORIDE SERPL-SCNC: 104 MMOL/L (ref 98–107)
CO2 SERPL-SCNC: 29 MMOL/L (ref 22–29)
CREAT SERPL-MCNC: 1.07 MG/DL (ref 0.57–1)
D-LACTATE SERPL-SCNC: 1 MMOL/L (ref 0.5–2)
DEPRECATED RDW RBC AUTO: 47.1 FL (ref 37–54)
EGFRCR SERPLBLD CKD-EPI 2021: 52 ML/MIN/1.73
EOSINOPHIL # BLD AUTO: 0.08 10*3/MM3 (ref 0–0.4)
EOSINOPHIL NFR BLD AUTO: 0.9 % (ref 0.3–6.2)
ERYTHROCYTE [DISTWIDTH] IN BLOOD BY AUTOMATED COUNT: 13.1 % (ref 12.3–15.4)
GLUCOSE BLDC GLUCOMTR-MCNC: 109 MG/DL (ref 70–130)
GLUCOSE BLDC GLUCOMTR-MCNC: 129 MG/DL (ref 70–130)
GLUCOSE BLDC GLUCOMTR-MCNC: 152 MG/DL (ref 70–130)
GLUCOSE BLDC GLUCOMTR-MCNC: 166 MG/DL (ref 70–130)
GLUCOSE SERPL-MCNC: 172 MG/DL (ref 65–99)
HCT VFR BLD AUTO: 40.9 % (ref 34–46.6)
HGB BLD-MCNC: 12.9 G/DL (ref 12–15.9)
IMM GRANULOCYTES # BLD AUTO: 0.04 10*3/MM3 (ref 0–0.05)
IMM GRANULOCYTES NFR BLD AUTO: 0.4 % (ref 0–0.5)
LYMPHOCYTES # BLD AUTO: 2.54 10*3/MM3 (ref 0.7–3.1)
LYMPHOCYTES NFR BLD AUTO: 27.1 % (ref 19.6–45.3)
MCH RBC QN AUTO: 30.3 PG (ref 26.6–33)
MCHC RBC AUTO-ENTMCNC: 31.5 G/DL (ref 31.5–35.7)
MCV RBC AUTO: 96 FL (ref 79–97)
MONOCYTES # BLD AUTO: 0.92 10*3/MM3 (ref 0.1–0.9)
MONOCYTES NFR BLD AUTO: 9.8 % (ref 5–12)
NEUTROPHILS NFR BLD AUTO: 5.76 10*3/MM3 (ref 1.7–7)
NEUTROPHILS NFR BLD AUTO: 61.6 % (ref 42.7–76)
NRBC BLD AUTO-RTO: 0 /100 WBC (ref 0–0.2)
PLATELET # BLD AUTO: 195 10*3/MM3 (ref 140–450)
PMV BLD AUTO: 9.1 FL (ref 6–12)
POTASSIUM SERPL-SCNC: 4.9 MMOL/L (ref 3.5–5.2)
RBC # BLD AUTO: 4.26 10*6/MM3 (ref 3.77–5.28)
SODIUM SERPL-SCNC: 141 MMOL/L (ref 136–145)
WBC NRBC COR # BLD AUTO: 9.36 10*3/MM3 (ref 3.4–10.8)

## 2025-03-16 PROCEDURE — 83605 ASSAY OF LACTIC ACID: CPT | Performed by: EMERGENCY MEDICINE

## 2025-03-16 PROCEDURE — 63710000001 INSULIN LISPRO (HUMAN) PER 5 UNITS: Performed by: INTERNAL MEDICINE

## 2025-03-16 PROCEDURE — 25010000002 CEFAZOLIN PER 500 MG: Performed by: INTERNAL MEDICINE

## 2025-03-16 PROCEDURE — 80048 BASIC METABOLIC PNL TOTAL CA: CPT | Performed by: INTERNAL MEDICINE

## 2025-03-16 PROCEDURE — G0545 PR INHERENT VISIT TO INPT: HCPCS | Performed by: INTERNAL MEDICINE

## 2025-03-16 PROCEDURE — 63710000001 INSULIN GLARGINE PER 5 UNITS: Performed by: INTERNAL MEDICINE

## 2025-03-16 PROCEDURE — 82948 REAGENT STRIP/BLOOD GLUCOSE: CPT

## 2025-03-16 PROCEDURE — 99223 1ST HOSP IP/OBS HIGH 75: CPT | Performed by: INTERNAL MEDICINE

## 2025-03-16 PROCEDURE — 85025 COMPLETE CBC W/AUTO DIFF WBC: CPT | Performed by: INTERNAL MEDICINE

## 2025-03-16 RX ORDER — DEXTROSE MONOHYDRATE 25 G/50ML
25 INJECTION, SOLUTION INTRAVENOUS
Status: DISCONTINUED | OUTPATIENT
Start: 2025-03-16 | End: 2025-03-19 | Stop reason: HOSPADM

## 2025-03-16 RX ORDER — NICOTINE POLACRILEX 4 MG
15 LOZENGE BUCCAL
Status: DISCONTINUED | OUTPATIENT
Start: 2025-03-16 | End: 2025-03-19 | Stop reason: HOSPADM

## 2025-03-16 RX ORDER — INSULIN LISPRO 100 [IU]/ML
2-9 INJECTION, SOLUTION INTRAVENOUS; SUBCUTANEOUS
Status: DISCONTINUED | OUTPATIENT
Start: 2025-03-16 | End: 2025-03-19 | Stop reason: HOSPADM

## 2025-03-16 RX ORDER — IBUPROFEN 600 MG/1
1 TABLET ORAL
Status: DISCONTINUED | OUTPATIENT
Start: 2025-03-16 | End: 2025-03-19 | Stop reason: HOSPADM

## 2025-03-16 RX ADMIN — RIVAROXABAN 15 MG: 15 TABLET, FILM COATED ORAL at 17:56

## 2025-03-16 RX ADMIN — LEVOTHYROXINE SODIUM 100 MCG: 100 TABLET ORAL at 06:53

## 2025-03-16 RX ADMIN — VENLAFAXINE HYDROCHLORIDE 150 MG: 37.5 CAPSULE, EXTENDED RELEASE ORAL at 08:42

## 2025-03-16 RX ADMIN — INSULIN GLARGINE 10 UNITS: 100 INJECTION, SOLUTION SUBCUTANEOUS at 22:07

## 2025-03-16 RX ADMIN — FERROUS SULFATE TAB 325 MG (65 MG ELEMENTAL FE) 325 MG: 325 (65 FE) TAB at 08:43

## 2025-03-16 RX ADMIN — GABAPENTIN 400 MG: 400 CAPSULE ORAL at 22:07

## 2025-03-16 RX ADMIN — Medication 2000 UNITS: at 08:42

## 2025-03-16 RX ADMIN — DONEPEZIL HYDROCHLORIDE 10 MG: 10 TABLET, FILM COATED ORAL at 22:07

## 2025-03-16 RX ADMIN — INSULIN LISPRO 2 UNITS: 100 INJECTION, SOLUTION INTRAVENOUS; SUBCUTANEOUS at 17:56

## 2025-03-16 RX ADMIN — GABAPENTIN 400 MG: 400 CAPSULE ORAL at 08:42

## 2025-03-16 RX ADMIN — ATORVASTATIN CALCIUM 40 MG: 20 TABLET, FILM COATED ORAL at 22:06

## 2025-03-16 RX ADMIN — OXYBUTYNIN CHLORIDE 5 MG: 5 TABLET, EXTENDED RELEASE ORAL at 08:42

## 2025-03-16 RX ADMIN — BUPROPION HYDROCHLORIDE 150 MG: 150 TABLET, EXTENDED RELEASE ORAL at 06:51

## 2025-03-16 RX ADMIN — Medication 500 MCG: at 08:42

## 2025-03-16 RX ADMIN — CEFAZOLIN 2000 MG: 2 INJECTION, POWDER, FOR SOLUTION INTRAMUSCULAR; INTRAVENOUS at 10:39

## 2025-03-16 RX ADMIN — CEFAZOLIN 2000 MG: 2 INJECTION, POWDER, FOR SOLUTION INTRAMUSCULAR; INTRAVENOUS at 17:56

## 2025-03-16 RX ADMIN — METOPROLOL SUCCINATE 25 MG: 25 TABLET, EXTENDED RELEASE ORAL at 08:42

## 2025-03-16 NOTE — PLAN OF CARE
Goal Outcome Evaluation:  Plan of Care Reviewed With: patient        Progress: improving  Outcome Evaluation: Up with assist and walker. Falls precautions maintained. Sat in chair this afternoon. Pain 2/10 in right foot. IV abx given. Monitoring blood sugars. Seen by Infectious disease. Ortho consult discontinued.

## 2025-03-16 NOTE — SIGNIFICANT NOTE
03/16/25 1056   OTHER   Discipline physical therapist   Rehab Time/Intention   Session Not Performed patient/family declined evaluation  (Pt declined PT eval today due to pain. Discussed plan for follow up tomorrow.)   Recommendation   PT - Next Appointment 03/17/25

## 2025-03-16 NOTE — CONSULTS
"Referring Provider: Ronna Sawant MD  [id number] [facility]  [address]  [place] [address]    Reason for Consultation: Cellulitis    History of present illness:  Rekha Bear is a 84 y.o. with PMH Raynaud's who I am asked to evaluate and give opinion for cellulitis. History is obtained from the patient and review of the old medical records which I summarize/synthesize as follows: She was admitted in December for right lower extremity cellulitis that improved with cefazolin and she was discharged on cefdinir.  She says that the right foot markedly improved with this therapy.  She has underlying Raynaud's disease so the skin color is never quite normal but she says it got back to her baseline.    She came back to the emergency room yesterday due to recurrence of her symptoms.  She says she has erythema of the right toes that spread up to the foot.  She does not think this was associated with Raynaud's given its unilateral nature.  She was not having any fevers, chills, or sweats.    Labs were notable for WBC 11 but CRP was normal.  Procalcitonin was also normal.    She was given one-time doses of vancomycin and Unasyn and she says she is improved.    Past Medical History:   Diagnosis Date    Chilblains     \"Chilblian's\"    CKD (chronic kidney disease)     CVA (cerebral vascular accident)     Depression     Fatty liver     GERD (gastroesophageal reflux disease)     Hyperlipidemia     Hypertension     Incontinence     Osteoarthritis     OA  Marvin THR, LT TKR - hydrocodone prescibed by Dr. 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 Almaguer    TOTAL KNEE ARTHROPLASTY Left     OA  Marvin THR, LT TKR - hydrocodone prescibed by Dr. Almaguer       Social History:  Lives in [place], [place]    Antibiotic allergies and intolerances:  None    Medications:    Current Facility-Administered Medications:     acetaminophen (TYLENOL) tablet 650 mg, 650 mg, Oral, Q4H PRN **OR** acetaminophen (TYLENOL) 160 MG/5ML oral solution 650 mg, 650 mg, Oral, Q4H PRN **OR** acetaminophen (TYLENOL) suppository 650 mg, 650 mg, Rectal, Q4H PRN, Ronna Sawant MD    atorvastatin (LIPITOR) tablet 40 mg, 40 mg, Oral, Nightly, Ronna Sawant MD    sennosides-docusate (PERICOLACE) 8.6-50 MG per tablet 2 tablet, 2 tablet, Oral, BID PRN **AND** polyethylene glycol (MIRALAX) packet 17 g, 17 g, Oral, Daily PRN **AND** bisacodyl (DULCOLAX) EC tablet 5 mg, 5 mg, Oral, Daily PRN **AND** bisacodyl (DULCOLAX) suppository 10 mg, 10 mg, Rectal, Daily PRN, Ronna Sawant MD    buPROPion XL (WELLBUTRIN XL) 24 hr tablet 150 mg, 150 mg, Oral, QAM, Ronna Sawant MD, 150 mg at 03/16/25 0651    cholecalciferol (VITAMIN D3) tablet 2,000 Units, 2,000 Units, Oral, Daily, Ronna Sawant MD, 2,000 Units at 03/16/25 0842    dextrose (D50W) (25 g/50 mL) IV injection 25 g, 25 g, Intravenous, Q15 Min PRN, Mars Lopez MD    dextrose (GLUTOSE) oral gel 15 g, 15 g, Oral, Q15 Min PRN, Mars Lopez MD    donepezil (ARICEPT) tablet 10 mg, 10 mg, Oral, Nightly, Ronna Sawant MD    ferrous sulfate tablet 325 mg, 325 mg, Oral, Daily, Ronna Sawant MD, 325 mg at 03/16/25 2613     gabapentin (NEURONTIN) capsule 400 mg, 400 mg, Oral, BID, Ronna Sawant MD, 400 mg at 03/16/25 0842    glucagon (GLUCAGEN) injection 1 mg, 1 mg, Intramuscular, Q15 Min PRN, Mars Lopez MD    insulin glargine (LANTUS, SEMGLEE) injection 10 Units, 10 Units, Subcutaneous, Nightly, Ronna Sawant MD, 10 Units at 03/15/25 2110    insulin lispro (HUMALOG/ADMELOG) injection 2-9 Units, 2-9 Units, Subcutaneous, 4x Daily AC & at Bedtime, Mars Lopez MD    levothyroxine (SYNTHROID, LEVOTHROID) tablet 100 mcg, 100 mcg, Oral, Q AM, Ronna Sawant MD, 100 mcg at 03/16/25 0653    metoprolol succinate XL (TOPROL-XL) 24 hr tablet 25 mg, 25 mg, Oral, Q24H, Ronna Sawant MD, 25 mg at 03/16/25 0842    ondansetron (ZOFRAN) injection 4 mg, 4 mg, Intravenous, Q6H PRN, Ronna Sawant MD    oxybutynin XL (DITROPAN-XL) 24 hr tablet 5 mg, 5 mg, Oral, Daily, Ronna Sawant MD, 5 mg at 03/16/25 0842    rivaroxaban (XARELTO) tablet 15 mg, 15 mg, Oral, Daily With Dinner, Ronna Sawant MD    [COMPLETED] Insert Peripheral IV, , , Once **AND** sodium chloride 0.9 % flush 10 mL, 10 mL, Intravenous, PRN, Anay Pierre MD    venlafaxine XR (EFFEXOR-XR) 24 hr capsule 150 mg, 150 mg, Oral, Daily, Ronna Sawant MD, 150 mg at 03/16/25 0842    vitamin B-12 (CYANOCOBALAMIN) tablet 500 mcg, 500 mcg, Oral, Daily, Ronna Sawant MD, 500 mcg at 03/16/25 0842      Objective   Vital Signs   Temp:  [96.6 °F (35.9 °C)-98.4 °F (36.9 °C)] 96.8 °F (36 °C)  Heart Rate:  [77-93] 77  Resp:  [14-16] 14  BP: (143-175)/() 146/91    Physical Exam:   General: awake, alert, NAD, very nice, sitting up in bed  Eyes: no scleral icterus  ENT: no thrush  Cardiovascular: NR  Respiratory: normal work of breathing on RA  GI: Abdomen is soft, not tender, + bowel sounds in all four quadrants  :  no Gee catheter  SK: + Onychomycosis  Skin: Erythema all 5 toes on the right foot  spreading up to the midfoot; she says this is improved compared to yesterday  Neurological: Alert and oriented x 3  Psychiatric: Normal mood and affect   Vasc: PIV w/o erythema    Labs:     Lab Results   Component Value Date    WBC 9.36 03/16/2025    HGB 12.9 03/16/2025    HCT 40.9 03/16/2025    MCV 96.0 03/16/2025     03/16/2025       Lab Results   Component Value Date    GLUCOSE 172 (H) 03/16/2025    BUN 19 03/16/2025    CREATININE 1.07 (H) 03/16/2025    EGFRIFNONA 50 (L) 10/14/2021    EGFRIFAFRI 61 10/14/2021    BCR 17.8 03/16/2025    CO2 29.0 03/16/2025    CALCIUM 8.9 03/16/2025    ALBUMIN 3.8 03/15/2025    AST 22 03/15/2025    ALT 22 03/15/2025     Lab Results   Component Value Date    CRP <0.30 03/15/2025     Lab Results   Component Value Date    HGBA1C 9.00 (H) 02/07/2025     Lactate 2.8 admission  Procalcitonin 0.06    Microbiology:  3/15 BCx: Pending    Radiology:  3/15 XR right foot negative for osteomyelitis    Isolation:  No active isolations    ASSESSMENT/PLAN:  Right foot cellulitis  Raynaud's disease   Onychomycosis  Obesity BMI 32    I agree that she likely has recurrent cellulitis.  Underlying risk factors include dry skin and onychomycosis.  She was only given one-time doses of antibiotics in the ER.  I will start cefazolin 2 g IV every 8 hours for nonpurulent cellulitis.  D/W Dr. Lopez.    She tells me that she is planning to follow-up with podiatry as an outpatient for treatment of her onychomycosis.    ID will follow.     -----------------------------------------------------------------------------    The above decisions regarding antimicrobials incorporates elements of engaging in complex medical decision-making associated with antimicrobial prescribing including considerations such as antimicrobial resistance patterns, emergence of new variants/strains, recent antibiotic exposure, interactions/complications from comorbidities including concurrent infections, public health  considerations to minimize development of antimicrobial resistance, and emerging and re-emerging infections.

## 2025-03-16 NOTE — PLAN OF CARE
Goal Outcome Evaluation:  Plan of Care Reviewed With: patient           Outcome Evaluation: VSS< IVF infusing, LR bolus given, no c/o pain, Blood sugars checked, bed alarm on for safety, will continue to monitor.

## 2025-03-16 NOTE — PROGRESS NOTES
Name: Rekha Bear ADMIT: 3/15/2025   : 1940  PCP: Zahida Osorio MD    MRN: [id number] LOS: 1 days   AGE/SEX: 84 y.o. female  ROOM: [facility]   Subjective   Chief Complaint   Patient presents with    Wound Check     85 yo with history of  A-fib on Xarelto, sleep apnea, diabetes, hypertension, GERD, hypothyroidism who presented with redness in her leg.     Recently she was admitted to the hospital about a month ago. Patient had right lower extremity cellulitis with elevated inflammatory markers.  Patient had an MRI of her foot significant for a nondisplaced distal cuboid fracture thought to be a stress fracture.  Patient with soft tissue edema, tarsometatarsal arthritis and first MTP arthritis as well as posterior tibial insertion adenopathy and arthritic changes of the naviculocuneiform joint.  Pt discharged on cefdinir last month     Feels her redness is improved after abx overnight    ROS  No f/c  No n/v  No cp/palp  No soa/cough    Objective   Vital Signs  Temp:  [96.6 °F (35.9 °C)-98.4 °F (36.9 °C)] 97.1 °F (36.2 °C)  Heart Rate:  [70-93] 70  Resp:  [14-16] 16  BP: (143-175)/() 152/83  SpO2:  [92 %-97 %] 92 %  on   ;   Device (Oxygen Therapy): room air  There is no height or weight on file to calculate BMI.    Physical Exam  Constitutional:       General: She is not in acute distress.  HENT:      Head: Normocephalic and atraumatic.   Eyes:      General: No scleral icterus.  Cardiovascular:      Rate and Rhythm: Regular rhythm.      Heart sounds: Normal heart sounds.   Pulmonary:      Effort: Pulmonary effort is normal. No respiratory distress.   Abdominal:      General: There is no distension.      Palpations: Abdomen is soft.   Musculoskeletal:      Cervical back: Neck supple.   Neurological:      Mental Status: She is alert.   Psychiatric:         Behavior: Behavior normal.     Erythema to right foot c/w cellulitis    Results Review:       I reviewed the patient's new clinical  "results.  Results from last 7 days   Lab Units 03/16/25  0602 03/15/25  1609   WBC 10*3/mm3 9.36 11.76*   HEMOGLOBIN g/dL 12.9 13.9   PLATELETS 10*3/mm3 195 211     Results from last 7 days   Lab Units 03/16/25  0602 03/15/25  1609   SODIUM mmol/L 141 140   POTASSIUM mmol/L 4.9 4.4   CHLORIDE mmol/L 104 100   CO2 mmol/L 29.0 24.6   BUN mg/dL 19 28*   CREATININE mg/dL 1.07* 1.37*   GLUCOSE mg/dL 172* 270*   Estimated Creatinine Clearance: 47.7 mL/min (A) (by C-G formula based on SCr of 1.07 mg/dL (H)).  Results from last 7 days   Lab Units 03/15/25  1609   ALBUMIN g/dL 3.8   BILIRUBIN mg/dL 0.5   ALK PHOS U/L 157*   AST (SGOT) U/L 22   ALT (SGPT) U/L 22     Results from last 7 days   Lab Units 03/16/25  0602 03/15/25  1609   CALCIUM mg/dL 8.9 9.2   ALBUMIN g/dL  --  3.8     Results from last 7 days   Lab Units 03/16/25  0052 03/15/25  2145 03/15/25  1843 03/15/25  1609   PROCALCITONIN ng/mL  --   --   --  0.06   LACTATE mmol/L 1.0 2.4* 2.5* 2.8*       Coag   Results from last 7 days   Lab Units 03/15/25  1609   INR  1.81*     HbA1C   Lab Results   Component Value Date    HGBA1C 9.00 (H) 02/07/2025    HGBA1C 8.70 (H) 10/27/2024    HGBA1C 9.10 (H) 12/22/2023     Infection   Results from last 7 days   Lab Units 03/15/25  1609   PROCALCITONIN ng/mL 0.06     Radiology(recent) No radiology results for the last day  No results found for: \"TROPONINT\", \"TROPONINI\", \"BNP\"  No components found for: \"TSH;2\"    atorvastatin, 40 mg, Oral, Nightly  buPROPion XL, 150 mg, Oral, QAM  ceFAZolin, 2,000 mg, Intravenous, Q8H  cholecalciferol, 2,000 Units, Oral, Daily  donepezil, 10 mg, Oral, Nightly  ferrous sulfate, 325 mg, Oral, Daily  gabapentin, 400 mg, Oral, BID  insulin glargine, 10 Units, Subcutaneous, Nightly  insulin lispro, 2-9 Units, Subcutaneous, 4x Daily AC & at Bedtime  levothyroxine, 100 mcg, Oral, Q AM  metoprolol succinate XL, 25 mg, Oral, Q24H  oxybutynin XL, 5 mg, Oral, Daily  rivaroxaban, 15 mg, Oral, Daily With " Dinner  venlafaxine XR, 150 mg, Oral, Daily  cyanocobalamin, 500 mcg, Oral, Daily       Diet: Regular/House, Diabetic; Consistent Carbohydrate; Fluid Consistency: Thin (IDDSI 0)      Assessment & Plan      Active Hospital Problems    Diagnosis  POA    **Cellulitis of right foot [L03.115]  Yes    History of CVA (cerebrovascular accident) [Z86.73]  Not Applicable    Atrial fibrillation, persistent [I48.19]  Yes    Type 2 diabetes mellitus with stage 3 chronic kidney disease, without long-term current use of insulin [E11.22, N18.30]  Yes    Primary hypertension [I10]  Yes      Resolved Hospital Problems   No resolved problems to display.     83 yo with history of  A-fib on Xarelto, sleep apnea, diabetes, hypertension, GERD, hypothyroidism who presented with redness in her leg. Recurrent cellulitis.     Recently she was admitted to the hospital about a month ago. Patient had right lower extremity cellulitis with elevated inflammatory markers.  Patient had an MRI of her foot significant for a nondisplaced distal cuboid fracture thought to be a stress fracture.  Patient with soft tissue edema, tarsometatarsal arthritis and first MTP arthritis as well as posterior tibial insertion adenopathy and arthritic changes of the naviculocuneiform joint.  Pt discharged on cefdinir last month     Received unasyn and vanc in the ER. Agree with Dr. Washington for cefazolin and can transition to likely keflex at discharge  Plans for podiatry as outpatient. Can cancel the orthopedic consult placed yesterday  On insulin, monitor BG  Agents for dementia, hypothyroidism  Chronic xarelto         DW Dr. Washington, reviewed records  Dispo- hopefully dc tomorrow if cellulitis improving and ambulating safely.     Mars Lopez MD  [facility]  03/16/25  07:49 EDT

## 2025-03-16 NOTE — H&P
"HISTORY AND PHYSICAL   [facility]        Date of Admission: 3/15/2025  Patient Identification:  Name: Rekha Bear  Age: 84 y.o.  Sex: female  :  1940  MRN: [id number]                     Primary Care Physician: Zahida Osorio MD    Chief Complaint:  84 year old female presented to the emergency room with pain and redness of her right foot whish started yesterday; she was admitted for the same last month; she completed her antibiotics and was doing well until yesterday; denies fever or chills; no injury; she also has a sore on her heel which has not healed; she denies fever or chills     History of Present Illness:   As above    Past Medical History:  Past Medical History:   Diagnosis Date    Chilblains     \"Chilblian's\"    CKD (chronic kidney disease)     CVA (cerebral vascular accident)     Depression     Fatty liver     GERD (gastroesophageal reflux disease)     Hyperlipidemia     Hypertension     Incontinence     Osteoarthritis     OA  Marvin THR, LT TKR - hydrocodone prescibed by Dr. 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 Almaguer    TOTAL KNEE ARTHROPLASTY Left     OA  Marvin THR, LT TKR - hydrocodone prescibed by Dr." Tripp      Home Meds:  Medications Prior to Admission   Medication Sig Dispense Refill Last Dose/Taking    atorvastatin (LIPITOR) 40 MG tablet Take 1 tablet by mouth Every Night. 30 tablet 1 3/15/2025    buPROPion XL (WELLBUTRIN XL) 150 MG 24 hr tablet TAKE ONE TABLET BY MOUTH EVERY MORNING 90 tablet 1 3/15/2025    Cholecalciferol (Vitamin D3) 50 MCG (2000 UT) capsule Take 1 capsule by mouth Daily.   3/15/2025    Diclofenac Sodium (VOLTAREN) 1 % gel gel APPLY A THIN LAYER TO THE RIGHT TO SHOULDER THREE TIMES DAILY   3/15/2025    donepezil (Aricept) 10 MG tablet Take 1 tablet by mouth Every Night. 30 tablet 2 3/15/2025    FeroSul 325 (65 Fe) MG tablet TAKE ONE TABLET BY MOUTH DAILY 90 tablet 0 Taking    gabapentin (NEURONTIN) 400 MG capsule TAKE ONE CAPSULE BY MOUTH TWICE DAILY 56 capsule 0 3/15/2025    glimepiride (AMARYL) 4 MG tablet Take 1 tablet by mouth Every Morning Before Breakfast. (Patient taking differently: Take 1 tablet by mouth 2 (Two) Times a Day.) 30 tablet 1 3/15/2025    Lantus SoloStar 100 UNIT/ML injection pen INJECT 5 UNITS AT BEDTIME (Patient taking differently: 10 Units Every Night.)   3/15/2025    levothyroxine (SYNTHROID, LEVOTHROID) 88 MCG tablet Take 1 tablet by mouth Daily. (Patient taking differently: Take 100 tablets by mouth Daily. 100 mcg tablet)   3/15/2025    linagliptin (Tradjenta) 5 MG tablet tablet Take 1 tablet by mouth Daily. 30 tablet 1 3/15/2025    methylPREDNISolone (Medrol) 4 MG dose pack Take as directed on package instructions. (Patient not taking: Reported on 2/18/2025) 21 tablet 0 Not Taking    metoprolol succinate XL (TOPROL-XL) 25 MG 24 hr tablet TAKE ONE TABLET BY MOUTH DAILY **NEW MED** 30 tablet 5 3/15/2025    Myrbetriq 25 MG tablet sustained-release 24 hour 24 hr tablet TAKE ONE TABLET BY MOUTH DAILY *NEW MED* 30 tablet 0 3/15/2025    pioglitazone (ACTOS) 15 MG tablet Take 1 tablet by mouth Daily. 30 tablet 1 3/15/2025    polyethylene glycol (MIRALAX) 17 g packet  Take 17 g by mouth 2 (Two) Times a Day As Needed (constipation). 60 each 0 Past Week    rivaroxaban (Xarelto) 15 MG tablet Take 1 tablet by mouth Daily With Dinner. 90 tablet 1 3/15/2025    sildenafil (REVATIO) 20 MG tablet Take 1 tablet by mouth 2 (Two) Times a Day. 60 tablet 3     venlafaxine XR (EFFEXOR-XR) 150 MG 24 hr capsule TAKE ONE TABLET BY MOUTH DAILY 90 capsule 1 3/15/2025    vitamin B-12 (VITAMIN B-12) 500 MCG tablet Take 1 tablet by mouth Daily. 30 tablet 3 3/15/2025       Allergies:  Allergies   Allergen Reactions    Shellfish-Derived Products Anaphylaxis    Amlodipine Hives and Other (See Comments)     Heart race    Iodine Rash     Immunizations:  Immunization History   Administered Date(s) Administered    Arexvy (RSV, Adults 60+ yrs) 11/21/2023    COVID-19 (MODERNA) 12YRS+ (SPIKEVAX) 09/22/2024    COVID-19 (PFIZER) BIVALENT 12+YRS 10/06/2022    COVID-19 (PFIZER) Purple Cap Monovalent 02/05/2021, 02/05/2021, 02/26/2021, 02/26/2021, 10/14/2021    Covid-19 (Pfizer) Gray Cap Monovalent 05/04/2022    FLUAD TRI 65YR+ 09/11/2020, 09/11/2020    FluMist 2-49yrs 09/17/2015    Fluad Quad 65+ 10/14/2021    Fluzone High-Dose 65+YRS 10/21/2016, 11/21/2017, 09/22/2024    Fluzone High-Dose 65+yrs 10/03/2022, 10/16/2023    Influenza, Unspecified 10/10/2024    Pneumococcal Conjugate 13-Valent (PCV13) 01/14/2016    Pneumococcal Polysaccharide (PPSV23) 06/13/2018    Shingrix 03/30/2021, 03/30/2021    TD Preservative Free (Tenivac) 08/16/2016, 06/13/2018    Td (TDVAX) 08/16/2016, 06/13/2018    Tdap 10/08/2023     Social History:   Social History     Social History Narrative    Lives in own home, by herself. She has good family support     Social History     Socioeconomic History    Marital status:     Number of children: 5   Tobacco Use    Smoking status: Former     Current packs/day: 0.00     Average packs/day: 0.5 packs/day for 14.0 years (7.0 ttl pk-yrs)     Types: Cigarettes     Start date: 1961     Quit  date:      Years since quittin.2     Passive exposure: Past    Smokeless tobacco: Never    Tobacco comments:     CAFFEINE USE   Vaping Use    Vaping status: Never Used   Substance and Sexual Activity    Alcohol use: No    Drug use: No    Sexual activity: Not Currently       Family History:  Family History   Problem Relation Age of Onset    Hypertension Mother     Diabetes type II Mother     Hypertension Father     Coronary artery disease Father     Diabetes type II Father     Colon cancer Brother     Skin cancer Brother     Stroke Son     Breast cancer Maternal Aunt     Hypertension Other     Other Other         Lipids  Thyroid        Review of Systems  See history of present illness and past medical history.  Patient denies headache, dizziness, syncope, falls, trauma, change in vision, change in hearing, change in taste, changes in weight, changes in appetite, focal weakness, numbness, or paresthesia.  Patient denies chest pain, palpitations, dyspnea, orthopnea, PND, cough, sinus pressure, rhinorrhea, epistaxis, hemoptysis, nausea, vomiting,hematemesis, diarrhea, constipation or hematochezia.  Denies cold or heat intolerance, polydipsia, polyuria, polyphagia. Denies hematuria, pyuria, dysuria, hesitancy, frequency or urgency. Denies consumption of raw and under cooked meats foods or change in water source.  Denies fever, chills, sweats, night sweats.  Denies missing any routine medications. Remainder of ROS is negative.    Objective:  T Max 24 hrs: Temp (24hrs), Av.8 °F (36 °C), Min:96.6 °F (35.9 °C), Max:97 °F (36.1 °C)    Vitals Ranges:   Temp:  [96.6 °F (35.9 °C)-97 °F (36.1 °C)] 96.6 °F (35.9 °C)  Heart Rate:  [88-93] 93  Resp:  [16] 16  BP: (143-175)/() 175/92      Exam:  /92 (Patient Position: Lying)   Pulse 93   Temp 96.6 °F (35.9 °C) (Oral)   Resp 16   LMP  (LMP Unknown)     General Appearance:    Alert, cooperative, no distress, appears stated age   Head:    Normocephalic,  without obvious abnormality, atraumatic   Eyes:    PERRL, conjunctivae/corneas clear, EOM's intact, both eyes   Ears:    Normal external ear canals, both ears   Nose:   Nares normal, septum midline, mucosa normal, no drainage    or sinus tenderness   Throat:   Lips, mucosa, and tongue normal   Neck:   Supple, symmetrical, trachea midline, no adenopathy;     thyroid:  no enlargement/tenderness/nodules; no carotid    bruit or JVD   Back:     Symmetric, no curvature, ROM normal, no CVA tenderness   Lungs:     Clear to auscultation bilaterally, respirations unlabored   Chest Wall:    No tenderness or deformity    Heart:    Regular rate and rhythm, S1 and S2 normal, no murmur, rub   or gallop   Abdomen:     Soft, nontender, bowel sounds active all four quadrants,     no masses, no hepatomegaly, no splenomegaly   Extremities:   Extremities normal, atraumatic, erythema right great toe, medial foot; warm to touch; no drainage                       .    Data Review:  Labs in chart were reviewed.  WBC   Date Value Ref Range Status   03/15/2025 11.76 (H) 3.40 - 10.80 10*3/mm3 Final     Hemoglobin   Date Value Ref Range Status   03/15/2025 13.9 12.0 - 15.9 g/dL Final     Hematocrit   Date Value Ref Range Status   03/15/2025 42.6 34.0 - 46.6 % Final     Platelets   Date Value Ref Range Status   03/15/2025 211 140 - 450 10*3/mm3 Final     Sodium   Date Value Ref Range Status   03/15/2025 140 136 - 145 mmol/L Final     Potassium   Date Value Ref Range Status   03/15/2025 4.4 3.5 - 5.2 mmol/L Final     Comment:     Slight hemolysis detected by analyzer. Result may be falsely elevated.     Chloride   Date Value Ref Range Status   03/15/2025 100 98 - 107 mmol/L Final     CO2   Date Value Ref Range Status   03/15/2025 24.6 22.0 - 29.0 mmol/L Final     BUN   Date Value Ref Range Status   03/15/2025 28 (H) 8 - 23 mg/dL Final     Creatinine   Date Value Ref Range Status   03/15/2025 1.37 (H) 0.57 - 1.00 mg/dL Final     Glucose   Date  Value Ref Range Status   03/15/2025 270 (H) 65 - 99 mg/dL Final     Calcium   Date Value Ref Range Status   03/15/2025 9.2 8.6 - 10.5 mg/dL Final     AST (SGOT)   Date Value Ref Range Status   03/15/2025 22 1 - 32 U/L Final     ALT (SGPT)   Date Value Ref Range Status   03/15/2025 22 1 - 33 U/L Final     Alkaline Phosphatase   Date Value Ref Range Status   03/15/2025 157 (H) 39 - 117 U/L Final                Imaging Results (All)       Procedure Component Value Units Date/Time    XR Foot 3+ View Right [[id number]] Collected: 03/15/25 1606     Updated: 03/15/25 1611    Narrative:      XR FOOT 3+ VW RIGHT-     Clinical: Pain and erythema     COMPARISON examination dated 2/6/2025     FINDINGS: Stable benign periosteal calcification and thickening of the  fourth metatarsal as before. First and fifth metatarsal phalangeal joint  degeneration as before. No acute osseous or articular abnormality has  developed. There is bone hypertrophy at the Achilles insertion with  early calcaneal spur formation. Moderate hindfoot joint degeneration  again seen. There is no soft tissue gas or radiopaque foreign body  identified.     CONCLUSION: Stable imaging of the right foot.     This report was finalized on 3/15/2025 4:08 PM by Dr. Dino Page M.D  on Workstation: [id number]                 Assessment:  Active Hospital Problems    Diagnosis  POA    **Cellulitis of right foot [L03.115]  Yes      Resolved Hospital Problems   No resolved problems to display.   Diabetes  Hypertension  Hyperlipidemia  Hypothyroidism  ckd3    Plan:  Ask ortho to see her again  Continue antibiotics  Ask id to see her  Trend labs  Accu checks, sliding scale  Dw patient and ed provider  Patient is full code and daughter is clinton Friend Carlos Sawant MD  3/15/2025  20:27 EDT

## 2025-03-17 LAB
ANION GAP SERPL CALCULATED.3IONS-SCNC: 11.8 MMOL/L (ref 5–15)
BUN SERPL-MCNC: 21 MG/DL (ref 8–23)
BUN/CREAT SERPL: 18.1 (ref 7–25)
CALCIUM SPEC-SCNC: 9 MG/DL (ref 8.6–10.5)
CHLORIDE SERPL-SCNC: 100 MMOL/L (ref 98–107)
CO2 SERPL-SCNC: 24.2 MMOL/L (ref 22–29)
CREAT SERPL-MCNC: 1.16 MG/DL (ref 0.57–1)
DEPRECATED RDW RBC AUTO: 44.7 FL (ref 37–54)
EGFRCR SERPLBLD CKD-EPI 2021: 46.9 ML/MIN/1.73
ERYTHROCYTE [DISTWIDTH] IN BLOOD BY AUTOMATED COUNT: 12.9 % (ref 12.3–15.4)
GLUCOSE BLDC GLUCOMTR-MCNC: 140 MG/DL (ref 70–130)
GLUCOSE BLDC GLUCOMTR-MCNC: 160 MG/DL (ref 70–130)
GLUCOSE BLDC GLUCOMTR-MCNC: 175 MG/DL (ref 70–130)
GLUCOSE BLDC GLUCOMTR-MCNC: 209 MG/DL (ref 70–130)
GLUCOSE SERPL-MCNC: 193 MG/DL (ref 65–99)
HCT VFR BLD AUTO: 40.5 % (ref 34–46.6)
HGB BLD-MCNC: 13.1 G/DL (ref 12–15.9)
MCH RBC QN AUTO: 30 PG (ref 26.6–33)
MCHC RBC AUTO-ENTMCNC: 32.3 G/DL (ref 31.5–35.7)
MCV RBC AUTO: 92.9 FL (ref 79–97)
PLATELET # BLD AUTO: 183 10*3/MM3 (ref 140–450)
PMV BLD AUTO: 9 FL (ref 6–12)
POTASSIUM SERPL-SCNC: 3.9 MMOL/L (ref 3.5–5.2)
RBC # BLD AUTO: 4.36 10*6/MM3 (ref 3.77–5.28)
SODIUM SERPL-SCNC: 136 MMOL/L (ref 136–145)
WBC NRBC COR # BLD AUTO: 9.82 10*3/MM3 (ref 3.4–10.8)

## 2025-03-17 PROCEDURE — 82948 REAGENT STRIP/BLOOD GLUCOSE: CPT

## 2025-03-17 PROCEDURE — 25010000002 CEFAZOLIN PER 500 MG: Performed by: INTERNAL MEDICINE

## 2025-03-17 PROCEDURE — G0545 PR INHERENT VISIT TO INPT: HCPCS | Performed by: INTERNAL MEDICINE

## 2025-03-17 PROCEDURE — 97530 THERAPEUTIC ACTIVITIES: CPT

## 2025-03-17 PROCEDURE — 63710000001 INSULIN GLARGINE PER 5 UNITS: Performed by: INTERNAL MEDICINE

## 2025-03-17 PROCEDURE — 63710000001 INSULIN LISPRO (HUMAN) PER 5 UNITS: Performed by: INTERNAL MEDICINE

## 2025-03-17 PROCEDURE — 85027 COMPLETE CBC AUTOMATED: CPT | Performed by: INTERNAL MEDICINE

## 2025-03-17 PROCEDURE — 80048 BASIC METABOLIC PNL TOTAL CA: CPT | Performed by: INTERNAL MEDICINE

## 2025-03-17 PROCEDURE — 97162 PT EVAL MOD COMPLEX 30 MIN: CPT

## 2025-03-17 PROCEDURE — 99232 SBSQ HOSP IP/OBS MODERATE 35: CPT | Performed by: INTERNAL MEDICINE

## 2025-03-17 RX ORDER — VENLAFAXINE HYDROCHLORIDE 150 MG/1
150 CAPSULE, EXTENDED RELEASE ORAL DAILY
Qty: 90 CAPSULE | Refills: 1 | Status: SHIPPED | OUTPATIENT
Start: 2025-03-17

## 2025-03-17 RX ORDER — BUPROPION HYDROCHLORIDE 150 MG/1
150 TABLET ORAL EVERY MORNING
Qty: 90 TABLET | Refills: 1 | Status: SHIPPED | OUTPATIENT
Start: 2025-03-17

## 2025-03-17 RX ADMIN — OXYBUTYNIN CHLORIDE 5 MG: 5 TABLET, EXTENDED RELEASE ORAL at 08:42

## 2025-03-17 RX ADMIN — LEVOTHYROXINE SODIUM 100 MCG: 100 TABLET ORAL at 06:02

## 2025-03-17 RX ADMIN — DONEPEZIL HYDROCHLORIDE 10 MG: 10 TABLET, FILM COATED ORAL at 22:20

## 2025-03-17 RX ADMIN — Medication 500 MCG: at 08:41

## 2025-03-17 RX ADMIN — CEFAZOLIN 2000 MG: 2 INJECTION, POWDER, FOR SOLUTION INTRAMUSCULAR; INTRAVENOUS at 02:40

## 2025-03-17 RX ADMIN — INSULIN LISPRO 4 UNITS: 100 INJECTION, SOLUTION INTRAVENOUS; SUBCUTANEOUS at 22:21

## 2025-03-17 RX ADMIN — GABAPENTIN 400 MG: 400 CAPSULE ORAL at 22:20

## 2025-03-17 RX ADMIN — Medication 2000 UNITS: at 08:42

## 2025-03-17 RX ADMIN — ATORVASTATIN CALCIUM 40 MG: 20 TABLET, FILM COATED ORAL at 22:20

## 2025-03-17 RX ADMIN — RIVAROXABAN 15 MG: 15 TABLET, FILM COATED ORAL at 18:49

## 2025-03-17 RX ADMIN — VENLAFAXINE HYDROCHLORIDE 150 MG: 37.5 CAPSULE, EXTENDED RELEASE ORAL at 08:41

## 2025-03-17 RX ADMIN — INSULIN GLARGINE 10 UNITS: 100 INJECTION, SOLUTION SUBCUTANEOUS at 22:21

## 2025-03-17 RX ADMIN — BUPROPION HYDROCHLORIDE 150 MG: 150 TABLET, EXTENDED RELEASE ORAL at 06:02

## 2025-03-17 RX ADMIN — FERROUS SULFATE TAB 325 MG (65 MG ELEMENTAL FE) 325 MG: 325 (65 FE) TAB at 08:42

## 2025-03-17 RX ADMIN — CEFAZOLIN 2000 MG: 2 INJECTION, POWDER, FOR SOLUTION INTRAMUSCULAR; INTRAVENOUS at 10:51

## 2025-03-17 RX ADMIN — CEFAZOLIN 2000 MG: 2 INJECTION, POWDER, FOR SOLUTION INTRAMUSCULAR; INTRAVENOUS at 18:49

## 2025-03-17 RX ADMIN — METOPROLOL SUCCINATE 25 MG: 25 TABLET, EXTENDED RELEASE ORAL at 08:42

## 2025-03-17 RX ADMIN — INSULIN LISPRO 2 UNITS: 100 INJECTION, SOLUTION INTRAVENOUS; SUBCUTANEOUS at 08:41

## 2025-03-17 RX ADMIN — INSULIN LISPRO 2 UNITS: 100 INJECTION, SOLUTION INTRAVENOUS; SUBCUTANEOUS at 12:34

## 2025-03-17 RX ADMIN — GABAPENTIN 400 MG: 400 CAPSULE ORAL at 08:42

## 2025-03-17 NOTE — PLAN OF CARE
Goal Outcome Evaluation:  Plan of Care Reviewed With: patient        Progress: improving  Outcome Evaluation: Pt is a 85 y.o. female admitted to [facility] with c/o wound check on 3/15/2025. Work up reveals bacterial skin infection. PMHx includes DM, HTN, A-fib, stroke, OA, major depressive disorder, anxiety and hypothyroidism. Prior to hospital admission, pt reports residing in [facility] and [facility]. Prior to hospital stay, pt was I with ADLs and utilized rollator AD at baseline. Pt reports that she receives PT services at her facility and they have been going well. Pt required SBA for bed mobility, CGA for STS transfers, and CGA for ambulation with rollator for 100 ft. Pt does require cues throughout for proper hand placement during transfers and during ambulation with placement of the rollator and keeping it within her base of support to minimize risk of falls. Pt presents today with below baseline strength, dec endurance, and decreased functional mobility. Pt will benefit from skilled PT to address the previous deficits and improve overall safety with functional mobility. Anticipate pt will D/C with return to facility with PT services at facility pending progress.    Anticipated Discharge Disposition (PT): assisted living, other (see comments) (return to facility with PT services)

## 2025-03-17 NOTE — PLAN OF CARE
Goal Outcome Evaluation:  Plan of Care Reviewed With: patient           Outcome Evaluation: VSS, up with asst and walker, sat in chair first few hrs of shift, on falls with bed alarm, denies need for pain medications, IV replaced, IV abx given, blood sugars monitored with needed insulin per orders, purewick in place overnight per pt request, to be removed in morning. Will continue to monitor.

## 2025-03-17 NOTE — CONSULTS
"    Podiatry Consult Note      Patient: Rekha Bear Admit Date: 03/15/2025    Age: 85 y.o.   PCP: Zahida Osorio MD    MRN: [id number]  Room: [facility]        Subjective     Chief Complaint     Chief Complaint   Patient presents with    Wound Check        HPI      this is an 85-year-old female with her daughter at bedside who has worsening redness to her right lower extremity.  Patient states she has a history of Raynaud's disease and chilblains.  She has been brought in for cellulitis to her right lower extremity before which resolved with antibiotics.  Patient states this incident started again over the last few days.  She has 1 open spot on the bottom of her heel as well as some abrasions to her hallux.  No other open pressure areas at the time.    Past Medical History     Past Medical History:   Diagnosis Date    Chilblains     \"Chilblian's\"    CKD (chronic kidney disease)     CVA (cerebral vascular accident)     Depression     Fatty liver     GERD (gastroesophageal reflux disease)     Hyperlipidemia     Hypertension     Incontinence     Osteoarthritis     OA  Marvin THR, LT TKR - hydrocodone prescibed by Dr. 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 Almaguer    TOTAL " KNEE ARTHROPLASTY Left     OA  Marvin THR, LT TKR - hydrocodone prescibed by Dr. Almaguer        Allergies   Allergen Reactions    Shellfish-Derived Products Anaphylaxis    Amlodipine Hives and Other (See Comments)     Heart race    Iodine Rash        Social History     Tobacco Use   Smoking Status Former    Current packs/day: 0.00    Average packs/day: 0.5 packs/day for 14.0 years (7.0 ttl pk-yrs)    Types: Cigarettes    Start date:     Quit date:     Years since quittin.2    Passive exposure: Past   Smokeless Tobacco Never   Tobacco Comments    CAFFEINE USE        Objective   Physical Exam    Vitals:    25 0923   BP: 135/81   Pulse: 97   Resp: 16   Temp: 97.7 °F (36.5 °C)   SpO2: 97%        Dermatology:   Erythema to the digits of the right foot.  Small discoloration to the plantar aspect of the hallux without any open lesions or drainage.  There is a small crack in the heel without any deep extension.    Vascular:   DP and PT pulses faintly palpable    Neurological: light touch and protective sensation are diminished    Musckuloskeletal: pain to palpation of the right hallux at the first MTPJ    Labs     Lab Results   Component Value Date    HGBA1C 9.00 (H) 2025    POCGLU 160 (H) 2025    SEDRATE 28 03/15/2025        CBC:      Lab 25  0639 25  0602 03/15/25  1609   WBC 9.82 9.36 11.76*   HEMOGLOBIN 13.1 12.9 13.9   HEMATOCRIT 40.5 40.9 42.6   PLATELETS 183 195 211   NEUTROS ABS  --  5.76 8.64*   IMMATURE GRANS (ABS)  --  0.04 0.06*   LYMPHS ABS  --  2.54 2.03   MONOS ABS  --  0.92* 0.93*   EOS ABS  --  0.08 0.07   MCV 92.9 96.0 94.0          Results for orders placed or performed during the hospital encounter of 03/15/25   Blood Culture - Blood, Hand, Left    Specimen: Hand, Left; Blood   Result Value Ref Range    Blood Culture No growth at 24 hours         XR Foot 3+ View Right  XR FOOT 3+ VW RIGHT-     Clinical: Pain and erythema     COMPARISON examination dated 2025      FINDINGS: Stable benign periosteal calcification and thickening of the  fourth metatarsal as before. First and fifth metatarsal phalangeal joint  degeneration as before. No acute osseous or articular abnormality has  developed. There is bone hypertrophy at the Achilles insertion with  early calcaneal spur formation. Moderate hindfoot joint degeneration  again seen. There is no soft tissue gas or radiopaque foreign body  identified.     CONCLUSION: Stable imaging of the right foot.     This report was finalized on 3/15/2025 4:08 PM by Dr. Dino Page M.D  on Workstation: [id number]          Assessment/Plan      85-year-old female with Raynaud's disease and cellulitis of the right lower extremity    -patient examined and evaluated by myself  - Agree with the assessment of cellulitis and continue antibiotics per infectious disease.  Appreciate the recommendations  - Unsure what the sources for the cellulitis but likely some of the small abrasions to her foot  - No further intervention needed from my standpoint.  Advised the patient that she can follow-up with me outpatient      Fidencio Izquierdo DPM  [facility]  Office: [phone]

## 2025-03-17 NOTE — THERAPY EVALUATION
"Patient Name: Rekha Bear  : 1940    MRN: [id number]                              Today's Date: 3/17/2025       Admit Date: 3/15/2025    Visit Dx:     ICD-10-CM ICD-9-CM   1. Cellulitis of right foot  L03.115 682.7   2. 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    Community acquired pneumonia of left lower lobe of lung    Hyponatremia    Pneumonia due to COVID-19 virus    Major depressive disorder, recurrent, mild    Acute respiratory failure with hypoxia    Supraventricular tachycardia    PVC (premature ventricular contraction)    Depressive disorder    Gastroesophageal reflux disease    Midline cystocele    Rectocele    BMI 29.0-29.9,adult    Acute left PCA stroke    B12 deficiency    Bradycardia    Premature atrial contractions    Stroke    SHEY (obstructive sleep apnea)    Circadian rhythm disorder    Cerebrovascular accident (CVA) due to embolism of left posterior cerebral artery    Anxiety    Dysphagia as late effect of stroke    Atrial fibrillation, persistent    Acute UTI    Closed left hip fracture    Periprosthetic fracture around internal prosthetic left hip joint    Naz-prosthetic fracture around prosthetic hip    History of CVA (cerebrovascular accident)    Cellulitis in diabetic foot    Encounter for screening for malignant neoplasm of colon    History of adenomatous polyp of colon    Anticoagulated    Cellulitis of right foot     Past Medical History:   Diagnosis Date    Chilblains     \"Chilblian's\"    CKD (chronic " "kidney disease)     CVA (cerebral vascular accident)     Depression     Fatty liver     GERD (gastroesophageal reflux disease)     Hyperlipidemia     Hypertension     Incontinence     Osteoarthritis     OA  Marvin THR, LT TKR - hydrocodone prescibed by Dr. 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 Almaguer    TOTAL KNEE ARTHROPLASTY Left     OA  Marvin THR, LT TKR - hydrocodone prescibed by Dr. Almaguer      General Information       Row Name 03/17/25 1253          Physical Therapy Time and Intention    Document Type evaluation  -     Mode of Treatment physical therapy;individual therapy  -       Row Name 03/17/25 1253          General Information    Patient Profile Reviewed yes  -     Prior Level of Function independent:;gait;transfer;ADL's  -     Existing Precautions/Restrictions fall  -     Barriers to Rehab none identified  -       Row Name 03/17/25 1253          Living Environment    Current Living Arrangements assisted living facility  -     People in Home alone  -       Row Name 03/17/25 1253          Home Main Entrance    Number of Stairs, Main Entrance none  -     Stair Railings, Main Entrance none  -       Row Name 03/17/25 1253          Stairs Within Home, " Primary    Number of Stairs, Within Home, Primary none  -     Stair Railings, Within Home, Primary none  -       Row Name 03/17/25 1253          Cognition    Orientation Status (Cognition) oriented x 4  -       Row Name 03/17/25 1253          Safety Issues/Impairments Affecting Functional Mobility    Impairments Affecting Function (Mobility) balance;endurance/activity tolerance;strength  -     Comment, Safety Issues/Impairments (Mobility) Gait belt and non skid socks  -               User Key  (r) = Recorded By, (t) = Taken By, (c) = Cosigned By      Initials Name Provider Type     Silvano Thomas, PT Physical Therapist                   Mobility       Row Name 03/17/25 1254          Bed Mobility    Bed Mobility scooting/bridging;supine-sit;sit-supine  -     Scooting/Bridging [place] (Bed Mobility) standby assist  -     Supine-Sit [place] (Bed Mobility) standby assist  -     Sit-Supine [place] (Bed Mobility) not tested  -     Assistive Device (Bed Mobility) head of bed elevated;bed rails  -       Row Name 03/17/25 1254          Sit-Stand Transfer    Sit-Stand [place] (Transfers) contact guard  -     Assistive Device (Sit-Stand Transfers) other (see comments);walker, rolling platform  -       Row Name 03/17/25 1254          Gait/Stairs (Locomotion)    Assistive Device (Gait) other (see comments);walker, rolling platform  rollator  -     Patient was able to Ambulate yes  -     Distance in Feet (Gait) 100  -     Deviations/Abnormal Patterns (Gait) stride length decreased;weight shifting decreased;eric decreased  -     Bilateral Gait Deviations heel strike decreased;forward flexed posture;weight shift ability decreased  -               User Key  (r) = Recorded By, (t) = Taken By, (c) = Cosigned By      Initials Name Provider Type     Silvano Thomas, PT Physical Therapist                   Obj/Interventions       Row Name 03/17/25 1252          Range of Motion  Comprehensive    General Range of Motion bilateral lower extremity ROM WFL;bilateral upper extremity ROM WFL  -       Row Name 03/17/25 1255          Strength Comprehensive (MMT)    Comment, General Manual Muscle Testing (MMT) Assessment Grossly 3+/5 BLE  -[facility] Name 03/17/25 1255          Motor Skills    Motor Skills functional endurance  -     Functional Endurance Poor; 1 x rest break during ambulation  -[facility] Name 03/17/25 1255          Balance    Balance Assessment sitting static balance;standing static balance;sitting dynamic balance;standing dynamic balance  -     Static Sitting Balance standby assist  -     Dynamic Sitting Balance standby assist  -     Position, Sitting Balance sitting edge of bed  -     Static Standing Balance supervision  -     Dynamic Standing Balance contact guard  -     Position/Device Used, Standing Balance other (see comments)  rollator  -     Balance Interventions sitting;standing;sit to stand;static;dynamic;minimal challenge  -[facility] Name 03/17/25 1255          Sensory Assessment (Somatosensory)    Sensory Assessment (Somatosensory) sensation intact  -               User Key  (r) = Recorded By, (t) = Taken By, (c) = Cosigned By      Initials Name Provider Type     Silvano Thomas, PT Physical Therapist                   Goals/Plan       [facility] Name 03/17/25 1257          Bed Mobility Goal 1 (PT)    Activity/Assistive Device (Bed Mobility Goal 1, PT) bed mobility activities, all  -     [place] Level/Cues Needed (Bed Mobility Goal 1, PT) independent  -     Time Frame (Bed Mobility Goal 1, PT) short term goal (STG);1 week  -     Progress/Outcomes (Bed Mobility Goal 1, PT) new goal  -[facility] Name 03/17/25 1257          Transfer Goal 1 (PT)    Activity/Assistive Device (Transfer Goal 1, PT) transfers, all  -     [place] Level/Cues Needed (Transfer Goal 1, PT) standby assist  -     Time Frame (Transfer Goal 1, PT) short term  goal (STG);10 days  -     Progress/Outcome (Transfer Goal 1, PT) new goal  -       Row Name 03/17/25 1257          Gait Training Goal 1 (PT)    Activity/Assistive Device (Gait Training Goal 1, PT) gait (walking locomotion)  -     [place] Level (Gait Training Goal 1, PT) contact guard required  -     Distance (Gait Training Goal 1, PT) 150  -MH     Time Frame (Gait Training Goal 1, PT) short term goal (STG);10 days  -     Progress/Outcome (Gait Training Goal 1, PT) new goal  -       Row Name 03/17/25 1257          Therapy Assessment/Plan (PT)    Planned Therapy Interventions (PT) balance training;bed mobility training;gait training;home exercise program;motor coordination training;strengthening;stair training;ROM (range of motion);postural re-education;neuromuscular re-education;stretching;transfer training;patient/family education  -               User Key  (r) = Recorded By, (t) = Taken By, (c) = Cosigned By      Initials Name Provider Type     Silvano Thomas, PT Physical Therapist                   Clinical Impression       Row Name 03/17/25 1256          Pain    Pretreatment Pain Rating 0/10 - no pain  -     Posttreatment Pain Rating 0/10 - no pain  -       Row Name 03/17/25 1256          Plan of Care Review    Plan of Care Reviewed With patient  -     Progress improving  -     Outcome Evaluation Pt is a 85 y.o. female admitted to [facility] with c/o wound check on 3/15/2025. Work up reveals bacterial skin infection. PMHx includes DM, HTN, A-fib, stroke, OA, major depressive disorder, anxiety and hypothyroidism. Prior to hospital admission, pt reports residing in [facility] and [facility]. Prior to hospital stay, pt was I with ADLs and utilized rollator AD at baseline. Pt reports that she receives PT services at her facility and they have been going well. Pt required SBA for bed mobility, CGA for STS transfers, and CGA for ambulation with rollator for 100 ft. Pt does require cues throughout for proper  hand placement during transfers and during ambulation with placement of the rollator and keeping it within her base of support to minimize risk of falls. Pt presents today with below baseline strength, dec endurance, and decreased functional mobility. Pt will benefit from skilled PT to address the previous deficits and improve overall safety with functional mobility. Anticipate pt will D/C with return to facility with PT services at facility pending progress.  -       Row Name 03/17/25 1256          Therapy Assessment/Plan (PT)    Patient/Family Therapy Goals Statement (PT) return to [facility]  -     Rehab Potential (PT) good  -     Criteria for Skilled Interventions Met (PT) yes  -     Therapy Frequency (PT) 6 times/wk  -       Row Name 03/17/25 1256          Vital Signs    O2 Delivery Pre Treatment room air  -     O2 Delivery Intra Treatment room air  -MH     O2 Delivery Post Treatment room air  -MH     Pre Patient Position Supine  -MH     Intra Patient Position Standing  -MH     Post Patient Position Sitting  -       Row Name 03/17/25 1256          Positioning and Restraints    Pre-Treatment Position in bed  -MH     Post Treatment Position chair  -     In Chair notified nsg;exit alarm on;encouraged to call for assist;call light within reach;sitting  -               User Key  (r) = Recorded By, (t) = Taken By, (c) = Cosigned By      Initials Name Provider Type     Silvano Thomas, PT Physical Therapist                   Outcome Measures       Row Name 03/17/25 1254          How much help from another person do you currently need...    Turning from your back to your side while in flat bed without using bedrails? 4  -MH     Moving from lying on back to sitting on the side of a flat bed without bedrails? 4  -MH     Moving to and from a bed to a chair (including a wheelchair)? 3  -MH     Standing up from a chair using your arms (e.g., wheelchair, bedside chair)? 3  -MH     Climbing 3-5 steps with a  railing? 2  -     To walk in hospital room? 3  -     AM-PAC 6 Clicks Score (PT) 19  -     Highest Level of Mobility Goal 6 --> Walk 10 steps or more  -       Row Name 03/17/25 1258          Functional Assessment    Outcome Measure Options AM-PAC 6 Clicks Basic Mobility (PT)  -               User Key  (r) = Recorded By, (t) = Taken By, (c) = Cosigned By      Initials Name Provider Type     Silvano Thomas, PT Physical Therapist                                 Physical Therapy Education       Title: PT OT SLP Therapies (Done)       Topic: Physical Therapy (Done)       Point: Mobility training (Done)       Learning Progress Summary            Patient Acceptance, E, VU by  at 3/17/2025 1258                      Point: Home exercise program (Done)       Learning Progress Summary            Patient Acceptance, E, VU by  at 3/17/2025 1258                      Point: Body mechanics (Done)       Learning Progress Summary            Patient Acceptance, E, VU by  at 3/17/2025 1258                      Point: Precautions (Done)       Learning Progress Summary            Patient Acceptance, E, VU by  at 3/17/2025 1258                                      User Key       Initials Effective Dates Name Provider Type [facility] 09/11/24 -  Silvano Thomas, PAULA Physical Therapist PT                  PT Recommendation and Plan  Planned Therapy Interventions (PT): balance training, bed mobility training, gait training, home exercise program, motor coordination training, strengthening, stair training, ROM (range of motion), postural re-education, neuromuscular re-education, stretching, transfer training, patient/family education  Progress: improving  Outcome Evaluation: Pt is a 85 y.o. female admitted to [facility] with c/o wound check on 3/15/2025. Work up reveals bacterial skin infection. PMHx includes DM, HTN, A-fib, stroke, OA, major depressive disorder, anxiety and hypothyroidism. Prior to hospital admission, pt  reports residing in [facility] and [facility]. Prior to hospital stay, pt was I with ADLs and utilized rollator AD at baseline. Pt reports that she receives PT services at her facility and they have been going well. Pt required SBA for bed mobility, CGA for STS transfers, and CGA for ambulation with rollator for 100 ft. Pt does require cues throughout for proper hand placement during transfers and during ambulation with placement of the rollator and keeping it within her base of support to minimize risk of falls. Pt presents today with below baseline strength, dec endurance, and decreased functional mobility. Pt will benefit from skilled PT to address the previous deficits and improve overall safety with functional mobility. Anticipate pt will D/C with return to facility with PT services at facility pending progress.     Time Calculation:         PT Charges       Row Name 03/17/25 1252             Time Calculation    Start Time 0920  -      Stop Time 0942  -      Time Calculation (min) 22 min  -MH      PT Received On 03/17/25  -      PT - Next Appointment 03/18/25  -      PT Goal Re-Cert Due Date 03/24/25  -         Time Calculation- PT    Total Timed Code Minutes- PT 12 minute(s)  -MH         Timed Charges    45641 - PT Therapeutic Activity Minutes 12  -MH         Untimed Charges    PT Eval/Re-eval Minutes 10  -MH         Total Minutes    Timed Charges Total Minutes 12  -MH      Untimed Charges Total Minutes 10  -MH       Total Minutes 22  -MH                User Key  (r) = Recorded By, (t) = Taken By, (c) = Cosigned By      Initials Name Provider Type     Silvano Thomas, PT Physical Therapist                  Therapy Charges for Today       Code Description Service Date Service Provider Modifiers Qty    71329033623 HC PT THERAPEUTIC ACT EA 15 MIN 3/17/2025 Silvano Thomas, PT GP 1    71106010878 HC PT EVAL MOD COMPLEXITY 2 3/17/2025 Silvano Thomas, PT GP 1            PT G-Codes  Outcome Measure Options: AM-PAC 6  Clicks Basic Mobility (PT)  AM-PAC 6 Clicks Score (PT): 19  PT Discharge Summary  Anticipated Discharge Disposition (PT): assisted living, other (see comments) (return to facility with PT services)    Silvano Thomas, PT  3/17/2025

## 2025-03-17 NOTE — PROGRESS NOTES
Name: Rekha Bear ADMIT: 3/15/2025   : 1940  PCP: Zahida Osorio MD    MRN: [id number] LOS: 2 days   AGE/SEX: 85 y.o. female  ROOM: [facility]     Subjective   Subjective   Patient seen at bedside, patient is comfortably lying in bed.       Objective   Objective   Vital Signs  Temp:  [96.9 °F (36.1 °C)-99 °F (37.2 °C)] 97.3 °F (36.3 °C)  Heart Rate:  [72-97] 85  Resp:  [16] 16  BP: (123-176)/() 128/88  SpO2:  [93 %-100 %] 96 %  on   ;   Device (Oxygen Therapy): room air  There is no height or weight on file to calculate BMI.  Physical Exam  Constitutional:       General: She is not in acute distress.  HENT:      Head: Normocephalic and atraumatic.   Eyes:      General: No scleral icterus.  Cardiovascular:      Rate and Rhythm: Regular rhythm.      Heart sounds: Normal heart sounds.   Pulmonary:      Effort: Pulmonary effort is normal. No respiratory distress.   Abdominal:      General: There is no distension.      Palpations: Abdomen is soft.   Musculoskeletal:      Cervical back: Neck supple.   Neurological:      Mental Status: She is alert.   Psychiatric:         Behavior: Behavior normal.      Erythema to right foot c/w cellulitis    Copied text material from yesterday's note has been reviewed for appropriate changes and remains accurate as of 3/17/25.      Results Review     I reviewed the patient's new clinical results.  Results from last 7 days   Lab Units 25  0639 25  0602 03/15/25  1609   WBC 10*3/mm3 9.82 9.36 11.76*   HEMOGLOBIN g/dL 13.1 12.9 13.9   PLATELETS 10*3/mm3 183 195 211     Results from last 7 days   Lab Units 25  0639 25  0602 03/15/25  1609   SODIUM mmol/L 136 141 140   POTASSIUM mmol/L 3.9 4.9 4.4   CHLORIDE mmol/L 100 104 100   CO2 mmol/L 24.2 29.0 24.6   BUN mg/dL 21 19 28*   CREATININE mg/dL 1.16* 1.07* 1.37*   GLUCOSE mg/dL 193* 172* 270*   EGFR mL/min/1.73 46.9* 52.0* 38.6*     Results from last 7 days   Lab Units 03/15/25  1609   ALBUMIN  g/dL 3.8   BILIRUBIN mg/dL 0.5   ALK PHOS U/L 157*   AST (SGOT) U/L 22   ALT (SGPT) U/L 22     Results from last 7 days   Lab Units 03/17/25  0639 03/16/25  0602 03/15/25  1609   CALCIUM mg/dL 9.0 8.9 9.2   ALBUMIN g/dL  --   --  3.8     Results from last 7 days   Lab Units 03/16/25  0052 03/15/25  2145 03/15/25  1843 03/15/25  1609   PROCALCITONIN ng/mL  --   --   --  0.06   LACTATE mmol/L 1.0 2.4* 2.5* 2.8*     Glucose   Date/Time Value Ref Range Status   03/17/2025 1621 140 (H) 70 - 130 mg/dL Final   03/17/2025 1225 160 (H) 70 - 130 mg/dL Final   03/17/2025 0800 175 (H) 70 - 130 mg/dL Final   03/16/2025 2155 152 (H) 70 - 130 mg/dL Final   03/16/2025 1631 166 (H) 70 - 130 mg/dL Final   03/16/2025 1209 109 70 - 130 mg/dL Final   03/16/2025 0822 129 70 - 130 mg/dL Final       No radiology results for the last day    I have personally reviewed all medications:  Scheduled Medications  atorvastatin, 40 mg, Oral, Nightly  buPROPion XL, 150 mg, Oral, QAM  ceFAZolin, 2,000 mg, Intravenous, Q8H  cholecalciferol, 2,000 Units, Oral, Daily  donepezil, 10 mg, Oral, Nightly  ferrous sulfate, 325 mg, Oral, Daily  gabapentin, 400 mg, Oral, BID  insulin glargine, 10 Units, Subcutaneous, Nightly  insulin lispro, 2-9 Units, Subcutaneous, 4x Daily AC & at Bedtime  levothyroxine, 100 mcg, Oral, Q AM  metoprolol succinate XL, 25 mg, Oral, Q24H  oxybutynin XL, 5 mg, Oral, Daily  rivaroxaban, 15 mg, Oral, Daily With Dinner  venlafaxine XR, 150 mg, Oral, Daily  cyanocobalamin, 500 mcg, Oral, Daily    Infusions   Diet  Diet: Regular/House, Diabetic; Consistent Carbohydrate; Fluid Consistency: Thin (IDDSI 0)    I have personally reviewed:  [x]  Laboratory   [x]  Microbiology   [x]  Radiology   [x]  EKG/Telemetry  [x]  Cardiology/Vascular   []  Pathology    []  Records       Assessment/Plan     Active Hospital Problems    Diagnosis  POA    **Cellulitis of right foot [L03.115]  Yes    History of CVA (cerebrovascular accident) [Z86.73]  Not  Applicable    Atrial fibrillation, persistent [I48.19]  Yes    Type 2 diabetes mellitus with stage 3 chronic kidney disease, without long-term current use of insulin [E11.22, N18.30]  Yes    Primary hypertension [I10]  Yes      Resolved Hospital Problems   No resolved problems to display.       85 y.o. female admitted with Cellulitis of right foot.    Assessment and plan  Recently she was admitted to the hospital about a month ago. Patient had right lower extremity cellulitis with elevated inflammatory markers.  Patient had an MRI of her foot significant for a nondisplaced distal cuboid fracture thought to be a stress fracture.  Patient with soft tissue edema, tarsometatarsal arthritis and first MTP arthritis as well as posterior tibial insertion adenopathy and arthritic changes of the naviculocuneiform joint.  Pt discharged on cefdinir last month      Received unasyn and vanc in the ER. Agree with Dr. Washington for cefazolin and can transition to likely keflex at discharge  Plans for podiatry as outpatient.  On insulin, monitor BG  Agents for dementia, hypothyroidism  Chronic xarelto       Expected Discharge Date: 3/18/2025; Expected Discharge Time:       Jacob Youngblood MD  [facility]  03/17/25  16:48 EDT

## 2025-03-17 NOTE — PROGRESS NOTES
Discharge Planning Assessment  [facility]     Patient Name: Rekha Bear  MRN: [id number]  Today's Date: 3/17/2025    Admit Date: 3/15/2025    Plan: Home no needs   Discharge Needs Assessment       Row Name 03/17/25 1129       Living Environment    People in Home facility resident    Current Living Arrangements assisted living facility    Duration at Residence 1 1/2 yrs    Potentially Unsafe Housing Conditions none    Primary Care Provided by self  Facility Resident    Provides Primary Care For no one    Family Caregiver if Needed child([place]), adult;other (see comments)  Facility Resident    Quality of Family Relationships helpful;involved;supportive    Able to Return to Prior Arrangements yes       Resource/Environmental Concerns    Resource/Environmental Concerns none       Transition Planning    Patient/Family Anticipates Transition to home  Facility Resident    Transportation Anticipated family or friend will provide       Discharge Needs Assessment    Equipment Currently Used at Home shower chair;grab bar;rollator;wheelchair    Concerns to be Addressed no discharge needs identified    Equipment Needed After Discharge none    Provided Post Acute Provider List? N/A    N/A Provider List Comment Denies needs. Plan is home                   Discharge Plan       Row Name 03/17/25 1132       Plan    Plan Home no needs    Patient/Family in Agreement with Plan yes    Plan Comments IMM noted. Introduced self and role. Facesheet verified. Patient lives in [facility]. Stated she has lived there for approx 1 1/2 years. Stated she has one bedroom, one bathroom, living area and a kitchenette (refridge, sink and microwave). Stated she is mostly independent as she is able to bath, dress and feed self. Stated she goes to dining room for meals. Uses a rollator at all times. Patient does not drive. Stated she uses [facility] and is able to give herself her own medications. DC plan is to return home  ([facility]). Stated family will transport at [place] . Denies any needs/equipment.                Demographic Summary       Row Name 03/17/25 1128       General Information    Admission Type inpatient    Arrived From home    Required Notices Provided Important Message from Medicare    Reason for Consult discharge planning    Preferred Language English                   Functional Status       Row Name 03/17/25 1129       Functional Status    Usual Activity Tolerance moderate    Current Activity Tolerance moderate       Mental Status    General Appearance WDL WDL       Employment/    Employment Status retired                   Psychosocial       Row Name 03/17/25 1129       Values/Beliefs    Spiritual, Cultural Beliefs, Church Practices, Values that Affect Care no       Behavior WDL    Behavior WDL WDL       Emotion Mood WDL    Emotion/Mood/Affect WDL WDL       Speech WDL    Speech WDL WDL       Perceptual State WDL    Perceptual State WDL WDL       Coping/Stress    Sources of Support adult child([place])    Reaction to Health Status adjusting    Understanding of Condition and Treatment adequate understanding of treatment       Developmental Stage (Eriksson's Stages of Development)    Developmental Stage Stage 8 (65 years-death/Late Adulthood) Integrity vs. Despair                   Abuse/Neglect       Row Name 03/17/25 1129       Personal Safety    Feels Unsafe at Home or Work/School no    Feels Threatened by Someone no    Does Anyone Try to Keep You From Having Contact with Others or Doing Things Outside Your Home? no    Physical Signs of Abuse Present no               Justina Saldana RN

## 2025-03-17 NOTE — PLAN OF CARE
Goal Outcome Evaluation:  Plan of Care Reviewed With: patient, child        Progress: improving  Outcome Evaluation: VSS, on room air, denies need for pain meds, up in chair, up w/ assist x1 and walker, accu checks w/ SSI as indicated, IV abx per order, daughter at bedside, pt updated on plan of care, redness to RLE unchanged from start of shift, pt updated on plan of care.

## 2025-03-17 NOTE — PROGRESS NOTES
ID NOTE    CC: f/u R foot cellulitis    Subj: No acute events.  No fever.  Her foot is slightly improved today.  She is tolerating cefazolin without any side effects.  She says she does not think she is quite ready to go home.      Medications:    Current Facility-Administered Medications:     acetaminophen (TYLENOL) tablet 650 mg, 650 mg, Oral, Q4H PRN **OR** acetaminophen (TYLENOL) 160 MG/5ML oral solution 650 mg, 650 mg, Oral, Q4H PRN **OR** acetaminophen (TYLENOL) suppository 650 mg, 650 mg, Rectal, Q4H PRN, Ronna Sawant MD    atorvastatin (LIPITOR) tablet 40 mg, 40 mg, Oral, Nightly, Ronna Sawant MD, 40 mg at 03/16/25 2206    sennosides-docusate (PERICOLACE) 8.6-50 MG per tablet 2 tablet, 2 tablet, Oral, BID PRN **AND** polyethylene glycol (MIRALAX) packet 17 g, 17 g, Oral, Daily PRN **AND** bisacodyl (DULCOLAX) EC tablet 5 mg, 5 mg, Oral, Daily PRN **AND** bisacodyl (DULCOLAX) suppository 10 mg, 10 mg, Rectal, Daily PRN, Ronna Sawant MD    buPROPion XL (WELLBUTRIN XL) 24 hr tablet 150 mg, 150 mg, Oral, QAM, Ronna Sawant MD, 150 mg at 03/17/25 0602    ceFAZolin 2000 mg IVPB in 100 mL NS (MBP), 2,000 mg, Intravenous, Q8H, Rene Washington MD, 2,000 mg at 03/17/25 0240    cholecalciferol (VITAMIN D3) tablet 2,000 Units, 2,000 Units, Oral, Daily, Ronna Sawant MD, 2,000 Units at 03/17/25 0842    dextrose (D50W) (25 g/50 mL) IV injection 25 g, 25 g, Intravenous, Q15 Min PRN, Mars Lopez MD    dextrose (GLUTOSE) oral gel 15 g, 15 g, Oral, Q15 Min PRN, Mars Lopez MD    donepezil (ARICEPT) tablet 10 mg, 10 mg, Oral, Nightly, Ronna Sawant MD, 10 mg at 03/16/25 2207    ferrous sulfate tablet 325 mg, 325 mg, Oral, Daily, Ronna Sawant MD, 325 mg at 03/17/25 0842    gabapentin (NEURONTIN) capsule 400 mg, 400 mg, Oral, BID, Ronna Sawant MD, 400 mg at 03/17/25 0842    glucagon (GLUCAGEN) injection 1 mg, 1 mg,  Intramuscular, Q15 Min PRN, Mars Lopez MD    insulin glargine (LANTUS, SEMGLEE) injection 10 Units, 10 Units, Subcutaneous, Nightly, Ronna Sawant MD, 10 Units at 03/16/25 2207    insulin lispro (HUMALOG/ADMELOG) injection 2-9 Units, 2-9 Units, Subcutaneous, 4x Daily AC & at Bedtime, Mars Lopez MD, 2 Units at 03/17/25 0841    levothyroxine (SYNTHROID, LEVOTHROID) tablet 100 mcg, 100 mcg, Oral, Q AM, Ronna Sawant MD, 100 mcg at 03/17/25 0602    metoprolol succinate XL (TOPROL-XL) 24 hr tablet 25 mg, 25 mg, Oral, Q24H, Ronna Sawant MD, 25 mg at 03/17/25 0842    ondansetron (ZOFRAN) injection 4 mg, 4 mg, Intravenous, Q6H PRN, Ronna Sawant MD    oxybutynin XL (DITROPAN-XL) 24 hr tablet 5 mg, 5 mg, Oral, Daily, Ronna Sawant MD, 5 mg at 03/17/25 0842    rivaroxaban (XARELTO) tablet 15 mg, 15 mg, Oral, Daily With Dinner, Ronna Sawant MD, 15 mg at 03/16/25 1756    [COMPLETED] Insert Peripheral IV, , , Once **AND** sodium chloride 0.9 % flush 10 mL, 10 mL, Intravenous, PRN, Anay Pierre MD    venlafaxine XR (EFFEXOR-XR) 24 hr capsule 150 mg, 150 mg, Oral, Daily, Ronna Sawant MD, 150 mg at 03/17/25 0841    vitamin B-12 (CYANOCOBALAMIN) tablet 500 mcg, 500 mcg, Oral, Daily, Ronna Sawant MD, 500 mcg at 03/17/25 0841      Objective   Vital Signs   Temp:  [96.9 °F (36.1 °C)-99 °F (37.2 °C)] 96.9 °F (36.1 °C)  Heart Rate:  [70-91] 78  Resp:  [16] 16  BP: (123-176)/() 170/90    Physical Exam:   General: awake, alert, NAD, very nice, sitting up in bed  Eyes: no scleral icterus  Cardiovascular: Normal rate  Respiratory: normal work of breathing on ambient air  GI: Abdomen is soft, not tender or distended  :  no Gee catheter  MSK: + Onychomycosis  Skin: Erythema of all 5 toes on the right foot spreading up to the midfoot; she says this is improved compared to yesterday; some skin peeling present  Neurological: Alert  and oriented x 3  Psychiatric: Normal mood and affect     Labs:   CBC, BMP, and blood cultures reviewed today  Lab Results   Component Value Date    WBC 9.82 03/17/2025    HGB 13.1 03/17/2025    HCT 40.5 03/17/2025    MCV 92.9 03/17/2025     03/17/2025     Lab Results   Component Value Date    GLUCOSE 193 (H) 03/17/2025    CALCIUM 9.0 03/17/2025     03/17/2025    K 3.9 03/17/2025    CO2 24.2 03/17/2025     03/17/2025    BUN 21 03/17/2025    CREATININE 1.16 (H) 03/17/2025    EGFR 46.9 (L) 03/17/2025    BCR 18.1 03/17/2025    ANIONGAP 11.8 03/17/2025       Lab Results   Component Value Date    CRP <0.30 03/15/2025     Lab Results   Component Value Date    HGBA1C 9.00 (H) 02/07/2025     Lactate 2.8 admission  Procalcitonin 0.06    Microbiology:  3/15 BCx: NGTD    Prior radiology:  3/15 XR right foot negative for osteomyelitis    Isolation:  No active isolations    ASSESSMENT/PLAN:  Right foot cellulitis  Raynaud's disease   Onychomycosis  Obesity BMI 32    She is afebrile with a normal WBC and her CRP was undetectable on admission.  Blood cultures are negative.    For nonpurulent cellulitis of the right foot, continue cefazolin 2 g IV every 8 hours while admitted to the hospital.  At discharge she can be changed to cephalexin 1 g p.o. every 8 hours through 3/22/2025.    I will follow-up podiatry evaluation.  She likely needs treatment of her onychomycosis.    ID will follow.     -----------------------------------------------------------------------------    The above decisions regarding antimicrobials incorporates elements of engaging in complex medical decision-making associated with antimicrobial prescribing including considerations such as antimicrobial resistance patterns, emergence of new variants/strains, recent antibiotic exposure, interactions/complications from comorbidities including concurrent infections, public health considerations to minimize development of antimicrobial resistance,  and emerging and re-emerging infections.

## 2025-03-18 ENCOUNTER — APPOINTMENT (OUTPATIENT)
Dept: WOUND CARE | Facility: HOSPITAL | Age: 85
End: 2025-03-18
Payer: MEDICARE

## 2025-03-18 LAB
ANION GAP SERPL CALCULATED.3IONS-SCNC: 11.4 MMOL/L (ref 5–15)
BUN SERPL-MCNC: 20 MG/DL (ref 8–23)
BUN/CREAT SERPL: 15.7 (ref 7–25)
CALCIUM SPEC-SCNC: 9 MG/DL (ref 8.6–10.5)
CHLORIDE SERPL-SCNC: 102 MMOL/L (ref 98–107)
CO2 SERPL-SCNC: 25.6 MMOL/L (ref 22–29)
CREAT SERPL-MCNC: 1.27 MG/DL (ref 0.57–1)
DEPRECATED RDW RBC AUTO: 44.2 FL (ref 37–54)
EGFRCR SERPLBLD CKD-EPI 2021: 42 ML/MIN/1.73
ERYTHROCYTE [DISTWIDTH] IN BLOOD BY AUTOMATED COUNT: 13.1 % (ref 12.3–15.4)
GLUCOSE BLDC GLUCOMTR-MCNC: 144 MG/DL (ref 70–130)
GLUCOSE BLDC GLUCOMTR-MCNC: 158 MG/DL (ref 70–130)
GLUCOSE BLDC GLUCOMTR-MCNC: 182 MG/DL (ref 70–130)
GLUCOSE BLDC GLUCOMTR-MCNC: 235 MG/DL (ref 70–130)
GLUCOSE SERPL-MCNC: 148 MG/DL (ref 65–99)
HCT VFR BLD AUTO: 41.7 % (ref 34–46.6)
HGB BLD-MCNC: 13.5 G/DL (ref 12–15.9)
MCH RBC QN AUTO: 29.9 PG (ref 26.6–33)
MCHC RBC AUTO-ENTMCNC: 32.4 G/DL (ref 31.5–35.7)
MCV RBC AUTO: 92.3 FL (ref 79–97)
PLATELET # BLD AUTO: 194 10*3/MM3 (ref 140–450)
PMV BLD AUTO: 9.1 FL (ref 6–12)
POTASSIUM SERPL-SCNC: 4.5 MMOL/L (ref 3.5–5.2)
RBC # BLD AUTO: 4.52 10*6/MM3 (ref 3.77–5.28)
SODIUM SERPL-SCNC: 139 MMOL/L (ref 136–145)
WBC NRBC COR # BLD AUTO: 8.92 10*3/MM3 (ref 3.4–10.8)

## 2025-03-18 PROCEDURE — 25010000002 CEFAZOLIN PER 500 MG: Performed by: INTERNAL MEDICINE

## 2025-03-18 PROCEDURE — 63710000001 INSULIN LISPRO (HUMAN) PER 5 UNITS: Performed by: INTERNAL MEDICINE

## 2025-03-18 PROCEDURE — G0545 PR INHERENT VISIT TO INPT: HCPCS | Performed by: INTERNAL MEDICINE

## 2025-03-18 PROCEDURE — 63710000001 INSULIN GLARGINE PER 5 UNITS: Performed by: INTERNAL MEDICINE

## 2025-03-18 PROCEDURE — 80048 BASIC METABOLIC PNL TOTAL CA: CPT | Performed by: INTERNAL MEDICINE

## 2025-03-18 PROCEDURE — 99232 SBSQ HOSP IP/OBS MODERATE 35: CPT | Performed by: INTERNAL MEDICINE

## 2025-03-18 PROCEDURE — 82948 REAGENT STRIP/BLOOD GLUCOSE: CPT

## 2025-03-18 PROCEDURE — 85027 COMPLETE CBC AUTOMATED: CPT | Performed by: INTERNAL MEDICINE

## 2025-03-18 RX ORDER — AMMONIUM LACTATE 12 G/100G
1 CREAM TOPICAL 2 TIMES DAILY PRN
Status: DISCONTINUED | OUTPATIENT
Start: 2025-03-18 | End: 2025-03-18

## 2025-03-18 RX ORDER — AMMONIUM LACTATE 12 G/100G
1 CREAM TOPICAL 2 TIMES DAILY
Status: DISCONTINUED | OUTPATIENT
Start: 2025-03-18 | End: 2025-03-19 | Stop reason: HOSPADM

## 2025-03-18 RX ADMIN — CEFAZOLIN 2000 MG: 2 INJECTION, POWDER, FOR SOLUTION INTRAMUSCULAR; INTRAVENOUS at 10:23

## 2025-03-18 RX ADMIN — ATORVASTATIN CALCIUM 40 MG: 20 TABLET, FILM COATED ORAL at 22:35

## 2025-03-18 RX ADMIN — FERROUS SULFATE TAB 325 MG (65 MG ELEMENTAL FE) 325 MG: 325 (65 FE) TAB at 08:40

## 2025-03-18 RX ADMIN — VENLAFAXINE HYDROCHLORIDE 150 MG: 37.5 CAPSULE, EXTENDED RELEASE ORAL at 08:41

## 2025-03-18 RX ADMIN — GABAPENTIN 400 MG: 400 CAPSULE ORAL at 08:41

## 2025-03-18 RX ADMIN — Medication 2000 UNITS: at 08:41

## 2025-03-18 RX ADMIN — OXYBUTYNIN CHLORIDE 5 MG: 5 TABLET, EXTENDED RELEASE ORAL at 08:41

## 2025-03-18 RX ADMIN — Medication 1 APPLICATION: at 12:28

## 2025-03-18 RX ADMIN — Medication 500 MCG: at 08:41

## 2025-03-18 RX ADMIN — Medication 1 APPLICATION: at 22:36

## 2025-03-18 RX ADMIN — INSULIN LISPRO 2 UNITS: 100 INJECTION, SOLUTION INTRAVENOUS; SUBCUTANEOUS at 12:28

## 2025-03-18 RX ADMIN — BUPROPION HYDROCHLORIDE 150 MG: 150 TABLET, EXTENDED RELEASE ORAL at 06:25

## 2025-03-18 RX ADMIN — GABAPENTIN 400 MG: 400 CAPSULE ORAL at 22:35

## 2025-03-18 RX ADMIN — CEFAZOLIN 2000 MG: 2 INJECTION, POWDER, FOR SOLUTION INTRAMUSCULAR; INTRAVENOUS at 18:35

## 2025-03-18 RX ADMIN — CEFAZOLIN 2000 MG: 2 INJECTION, POWDER, FOR SOLUTION INTRAMUSCULAR; INTRAVENOUS at 02:48

## 2025-03-18 RX ADMIN — METOPROLOL SUCCINATE 25 MG: 25 TABLET, EXTENDED RELEASE ORAL at 08:41

## 2025-03-18 RX ADMIN — DONEPEZIL HYDROCHLORIDE 10 MG: 10 TABLET, FILM COATED ORAL at 22:35

## 2025-03-18 RX ADMIN — INSULIN LISPRO 4 UNITS: 100 INJECTION, SOLUTION INTRAVENOUS; SUBCUTANEOUS at 22:35

## 2025-03-18 RX ADMIN — INSULIN GLARGINE 10 UNITS: 100 INJECTION, SOLUTION SUBCUTANEOUS at 22:35

## 2025-03-18 RX ADMIN — LEVOTHYROXINE SODIUM 100 MCG: 100 TABLET ORAL at 06:25

## 2025-03-18 RX ADMIN — RIVAROXABAN 15 MG: 15 TABLET, FILM COATED ORAL at 18:35

## 2025-03-18 NOTE — PROGRESS NOTES
Name: Rekha Bear ADMIT: 3/15/2025   : 1940  PCP: Zahida Osorio MD    MRN: [id number] LOS: 3 days   AGE/SEX: 85 y.o. female  ROOM: [facility]     Subjective   Subjective   Patient seen at bedside, feeling better today.       Objective   Objective   Vital Signs  Temp:  [96.7 °F (35.9 °C)-97.7 °F (36.5 °C)] 97.7 °F (36.5 °C)  Heart Rate:  [74-85] 85  Resp:  [16] 16  BP: (131-145)/(76-97) 141/94  SpO2:  [90 %-99 %] 90 %  on   ;   Device (Oxygen Therapy): room air  There is no height or weight on file to calculate BMI.  Physical Exam  Constitutional:       General: She is not in acute distress.  HENT:      Head: Normocephalic and atraumatic.   Eyes:      General: No scleral icterus.  Cardiovascular:      Rate and Rhythm: Regular rhythm.      Heart sounds: Normal heart sounds.   Pulmonary:      Effort: Pulmonary effort is normal. No respiratory distress.   Abdominal:      General: There is no distension.      Palpations: Abdomen is soft.   Musculoskeletal:      Cervical back: Neck supple.   Neurological:      Mental Status: She is alert.   Psychiatric:         Behavior: Behavior normal.      Erythema to right foot c/w cellulitis     Copied text material from yesterday's note has been reviewed for appropriate changes and remains accurate as of 3/18/25.      Results Review     I reviewed the patient's new clinical results.  Results from last 7 days   Lab Units 2539 25  0602 03/15/25  1609   WBC 10*3/mm3 8.92 9.82 9.36 11.76*   HEMOGLOBIN g/dL 13.5 13.1 12.9 13.9   PLATELETS 10*3/mm3 194 183 195 211     Results from last 7 days   Lab Units 25  0639 25  0602 03/15/25  1609   SODIUM mmol/L 139 136 141 140   POTASSIUM mmol/L 4.5 3.9 4.9 4.4   CHLORIDE mmol/L 102 100 104 100   CO2 mmol/L 25.6 24.2 29.0 24.6   BUN mg/dL 20 21 19 28*   CREATININE mg/dL 1.27* 1.16* 1.07* 1.37*   GLUCOSE mg/dL 148* 193* 172* 270*   EGFR mL/min/1.73 42.0* 46.9* 52.0* 38.6*      Results from last 7 days   Lab Units 03/15/25  1609   ALBUMIN g/dL 3.8   BILIRUBIN mg/dL 0.5   ALK PHOS U/L 157*   AST (SGOT) U/L 22   ALT (SGPT) U/L 22     Results from last 7 days   Lab Units 03/18/25  0528 03/17/25  0639 03/16/25  0602 03/15/25  1609   CALCIUM mg/dL 9.0 9.0 8.9 9.2   ALBUMIN g/dL  --   --   --  3.8     Results from last 7 days   Lab Units 03/16/25  0052 03/15/25  2145 03/15/25  1843 03/15/25  1609   PROCALCITONIN ng/mL  --   --   --  0.06   LACTATE mmol/L 1.0 2.4* 2.5* 2.8*     Glucose   Date/Time Value Ref Range Status   03/18/2025 1711 144 (H) 70 - 130 mg/dL Final   03/18/2025 1212 182 (H) 70 - 130 mg/dL Final   03/18/2025 0759 158 (H) 70 - 130 mg/dL Final   03/17/2025 2143 209 (H) 70 - 130 mg/dL Final   03/17/2025 1621 140 (H) 70 - 130 mg/dL Final   03/17/2025 1225 160 (H) 70 - 130 mg/dL Final   03/17/2025 0800 175 (H) 70 - 130 mg/dL Final       No radiology results for the last day    I have personally reviewed all medications:  Scheduled Medications  ammonium lactate, 1 Application, Topical, BID  atorvastatin, 40 mg, Oral, Nightly  buPROPion XL, 150 mg, Oral, QAM  ceFAZolin, 2,000 mg, Intravenous, Q8H  cholecalciferol, 2,000 Units, Oral, Daily  donepezil, 10 mg, Oral, Nightly  ferrous sulfate, 325 mg, Oral, Daily  gabapentin, 400 mg, Oral, BID  insulin glargine, 10 Units, Subcutaneous, Nightly  insulin lispro, 2-9 Units, Subcutaneous, 4x Daily AC & at Bedtime  levothyroxine, 100 mcg, Oral, Q AM  metoprolol succinate XL, 25 mg, Oral, Q24H  oxybutynin XL, 5 mg, Oral, Daily  rivaroxaban, 15 mg, Oral, Daily With Dinner  venlafaxine XR, 150 mg, Oral, Daily  cyanocobalamin, 500 mcg, Oral, Daily    Infusions   Diet  Diet: Regular/House, Diabetic; Consistent Carbohydrate; Fluid Consistency: Thin (IDDSI 0)    I have personally reviewed:  [x]  Laboratory   [x]  Microbiology   [x]  Radiology   [x]  EKG/Telemetry  [x]  Cardiology/Vascular   []  Pathology    []  Records       Assessment/Plan      Active Hospital Problems    Diagnosis  POA    **Cellulitis of right foot [L03.115]  Yes    History of CVA (cerebrovascular accident) [Z86.73]  Not Applicable    Atrial fibrillation, persistent [I48.19]  Yes    Type 2 diabetes mellitus with stage 3 chronic kidney disease, without long-term current use of insulin [E11.22, N18.30]  Yes    Primary hypertension [I10]  Yes      Resolved Hospital Problems   No resolved problems to display.       85 y.o. female admitted with Cellulitis of right foot.    Assessment and plan  Recently she was admitted to the hospital about a month ago. Patient had right lower extremity cellulitis with elevated inflammatory markers.  Patient had an MRI of her foot significant for a nondisplaced distal cuboid fracture thought to be a stress fracture.  Patient with soft tissue edema, tarsometatarsal arthritis and first MTP arthritis as well as posterior tibial insertion adenopathy and arthritic changes of the naviculocuneiform joint.  Pt discharged on cefdinir last month      Received unasyn and vanc in the ER. Agree with Dr. Washington for cefazolin and can transition to likely keflex at discharge, anticipating discharge tomorrow.  Plans for podiatry as outpatient.  On insulin, monitor BG  Agents for dementia, hypothyroidism  Chronic xarelto      Expected Discharge Date: 3/19/2025; Expected Discharge Time:       Jacob Youngblood MD  [facility]  03/18/25  18:09 EDT

## 2025-03-18 NOTE — PLAN OF CARE
Goal Outcome Evaluation:  Plan of Care Reviewed With: patient        Progress: improving  Outcome Evaluation: Went over the plan of care and answered all questions. Vitals stable and pain is well controlled. Patient is up with assistance and tolereating her diet. All meds given without complicaions and no other issues this shift.

## 2025-03-18 NOTE — PROGRESS NOTES
ID NOTE    CC: f/u R foot cellulitis    Subj: No acute events.  No fever.  The foot looks much better today.  Her main symptom is pain on the right heel where she has dry, cracked skin.    Medications:    Current Facility-Administered Medications:     acetaminophen (TYLENOL) tablet 650 mg, 650 mg, Oral, Q4H PRN **OR** acetaminophen (TYLENOL) 160 MG/5ML oral solution 650 mg, 650 mg, Oral, Q4H PRN **OR** acetaminophen (TYLENOL) suppository 650 mg, 650 mg, Rectal, Q4H PRN, Ronna Sawant MD    atorvastatin (LIPITOR) tablet 40 mg, 40 mg, Oral, Nightly, Ronna Sawant MD, 40 mg at 03/17/25 2220    sennosides-docusate (PERICOLACE) 8.6-50 MG per tablet 2 tablet, 2 tablet, Oral, BID PRN **AND** polyethylene glycol (MIRALAX) packet 17 g, 17 g, Oral, Daily PRN **AND** bisacodyl (DULCOLAX) EC tablet 5 mg, 5 mg, Oral, Daily PRN **AND** bisacodyl (DULCOLAX) suppository 10 mg, 10 mg, Rectal, Daily PRN, Ronna Sawant MD    buPROPion XL (WELLBUTRIN XL) 24 hr tablet 150 mg, 150 mg, Oral, QAM, Ronna Sawant MD, 150 mg at 03/18/25 0625    ceFAZolin 2000 mg IVPB in 100 mL NS (MBP), 2,000 mg, Intravenous, Q8H, Rene Washington MD, 2,000 mg at 03/18/25 0248    cholecalciferol (VITAMIN D3) tablet 2,000 Units, 2,000 Units, Oral, Daily, Ronna Sawant MD, 2,000 Units at 03/18/25 0841    dextrose (D50W) (25 g/50 mL) IV injection 25 g, 25 g, Intravenous, Q15 Min PRN, Mars Lopez MD    dextrose (GLUTOSE) oral gel 15 g, 15 g, Oral, Q15 Min PRN, Mars Lopez MD    donepezil (ARICEPT) tablet 10 mg, 10 mg, Oral, Nightly, Ronna Sawant MD, 10 mg at 03/17/25 2220    ferrous sulfate tablet 325 mg, 325 mg, Oral, Daily, Ronna Sawant MD, 325 mg at 03/18/25 0840    gabapentin (NEURONTIN) capsule 400 mg, 400 mg, Oral, BID, Ronna Sawant MD, 400 mg at 03/18/25 0841    glucagon (GLUCAGEN) injection 1 mg, 1 mg, Intramuscular, Q15 Min PRN, Mars Lopez,  MD    insulin glargine (LANTUS, SEMGLEE) injection 10 Units, 10 Units, Subcutaneous, Nightly, Ronna Sawant MD, 10 Units at 03/17/25 2221    insulin lispro (HUMALOG/ADMELOG) injection 2-9 Units, 2-9 Units, Subcutaneous, 4x Daily AC & at Bedtime, Mars Lopez MD, 4 Units at 03/17/25 2221    levothyroxine (SYNTHROID, LEVOTHROID) tablet 100 mcg, 100 mcg, Oral, Q AM, Ronna Sawant MD, 100 mcg at 03/18/25 0625    metoprolol succinate XL (TOPROL-XL) 24 hr tablet 25 mg, 25 mg, Oral, Q24H, Ronna Sawant MD, 25 mg at 03/18/25 0841    ondansetron (ZOFRAN) injection 4 mg, 4 mg, Intravenous, Q6H PRN, Ronna Sawant MD    oxybutynin XL (DITROPAN-XL) 24 hr tablet 5 mg, 5 mg, Oral, Daily, Ronna Sawant MD, 5 mg at 03/18/25 0841    rivaroxaban (XARELTO) tablet 15 mg, 15 mg, Oral, Daily With Dinner, Ronna Sawant MD, 15 mg at 03/17/25 1849    [COMPLETED] Insert Peripheral IV, , , Once **AND** sodium chloride 0.9 % flush 10 mL, 10 mL, Intravenous, PRN, Anay Pierre MD    venlafaxine XR (EFFEXOR-XR) 24 hr capsule 150 mg, 150 mg, Oral, Daily, Ronna Sawant MD, 150 mg at 03/18/25 0841    vitamin B-12 (CYANOCOBALAMIN) tablet 500 mcg, 500 mcg, Oral, Daily, Ronna Sawant MD, 500 mcg at 03/18/25 0841      Objective   Vital Signs   Temp:  [96.7 °F (35.9 °C)-97.7 °F (36.5 °C)] 96.7 °F (35.9 °C)  Heart Rate:  [74-97] 74  Resp:  [16] 16  BP: (128-143)/(76-88) 143/79    Physical Exam:   General: awake, alert, NAD, very nice, sitting up in bed  Eyes: no scleral icterus  Cardiovascular: NR  Respiratory: normal work of breathing on RA  GI: Abdomen is soft, not tender or distended  :  no Gee catheter  MSK: + Onychomycosis  Skin: Erythema of all 5 toes is improving  Neurological: Alert and oriented x 3  Psychiatric: Normal mood and affect     Labs:   CBC, BMP, and blood cultures reviewed today  Lab Results   Component Value Date    WBC 8.92 03/18/2025    HGB  13.5 03/18/2025    HCT 41.7 03/18/2025    MCV 92.3 03/18/2025     03/18/2025     Lab Results   Component Value Date    GLUCOSE 148 (H) 03/18/2025    CALCIUM 9.0 03/18/2025     03/18/2025    K 4.5 03/18/2025    CO2 25.6 03/18/2025     03/18/2025    BUN 20 03/18/2025    CREATININE 1.27 (H) 03/18/2025    EGFR 42.0 (L) 03/18/2025    BCR 15.7 03/18/2025    ANIONGAP 11.4 03/18/2025       Lab Results   Component Value Date    CRP <0.30 03/15/2025     Lab Results   Component Value Date    HGBA1C 9.00 (H) 02/07/2025     Lactate 2.8 admission  Procalcitonin 0.06    Microbiology:  3/15 BCx: Negative so far    Prior radiology:  3/15 XR right foot negative for osteomyelitis    Isolation:  No active isolations    ASSESSMENT/PLAN:  Right foot cellulitis  Raynaud's disease   Onychomycosis  Obesity BMI 32    She is afebrile with a normal WBC and her CRP was undetectable on admission.  Blood cultures are negative.  Her foot exam is improving.  While in the hospital, continue cefazolin 2 g IV every 8 hours while admitted to the hospital.  At discharge she can be changed to cephalexin 1 g p.o. every 8 hours through 3/22/2025.    I have ordered AmLactin lotion for her dry, cracked skin on the foot.    Reviewed podiatry note.  Patient to follow-up outpatient.    Thank you for allowing me to be involved in the care of this patient. Infectious diseases will sign off at this time with antibiotics plan in place, but please call me at [phone] if any further ID questions or new ID concerns.      -----------------------------------------------------------------------------    The above decisions regarding antimicrobials incorporates elements of engaging in complex medical decision-making associated with antimicrobial prescribing including considerations such as antimicrobial resistance patterns, emergence of new variants/strains, recent antibiotic exposure, interactions/complications from comorbidities including concurrent  infections, public health considerations to minimize development of antimicrobial resistance, and emerging and re-emerging infections.

## 2025-03-18 NOTE — PLAN OF CARE
Goal Outcome Evaluation:  Plan of Care Reviewed With: patient        Progress: improving  Outcome Evaluation: Patient is A&Ox4 forgetful at times. No pain, N/V/D reported. IV abx given, running KVO. Up with assist x1 and walker to restroom. PW applied at night. 4 units of fast acting SSI given and 10 units long acting. Family visited for birthday at beginning of night. Fall precautions maintained. POC ongoing.

## 2025-03-19 ENCOUNTER — READMISSION MANAGEMENT (OUTPATIENT)
Dept: CALL CENTER | Facility: HOSPITAL | Age: 85
End: 2025-03-19
Payer: MEDICARE

## 2025-03-19 VITALS
TEMPERATURE: 97.2 F | OXYGEN SATURATION: 96 % | SYSTOLIC BLOOD PRESSURE: 161 MMHG | DIASTOLIC BLOOD PRESSURE: 99 MMHG | HEART RATE: 70 BPM | RESPIRATION RATE: 18 BRPM

## 2025-03-19 LAB
ALBUMIN SERPL-MCNC: 3.5 G/DL (ref 3.5–5.2)
ALBUMIN/GLOB SERPL: 1.3 G/DL
ALP SERPL-CCNC: 127 U/L (ref 39–117)
ALT SERPL W P-5'-P-CCNC: 6 U/L (ref 1–33)
ANION GAP SERPL CALCULATED.3IONS-SCNC: 10 MMOL/L (ref 5–15)
AST SERPL-CCNC: 20 U/L (ref 1–32)
BASOPHILS # BLD AUTO: 0.03 10*3/MM3 (ref 0–0.2)
BASOPHILS NFR BLD AUTO: 0.3 % (ref 0–1.5)
BILIRUB SERPL-MCNC: 0.5 MG/DL (ref 0–1.2)
BUN SERPL-MCNC: 23 MG/DL (ref 8–23)
BUN/CREAT SERPL: 21.1 (ref 7–25)
CALCIUM SPEC-SCNC: 9.3 MG/DL (ref 8.6–10.5)
CHLORIDE SERPL-SCNC: 102 MMOL/L (ref 98–107)
CO2 SERPL-SCNC: 25 MMOL/L (ref 22–29)
CREAT SERPL-MCNC: 1.09 MG/DL (ref 0.57–1)
DEPRECATED RDW RBC AUTO: 42.3 FL (ref 37–54)
EGFRCR SERPLBLD CKD-EPI 2021: 50.5 ML/MIN/1.73
EOSINOPHIL # BLD AUTO: 0.15 10*3/MM3 (ref 0–0.4)
EOSINOPHIL NFR BLD AUTO: 1.6 % (ref 0.3–6.2)
ERYTHROCYTE [DISTWIDTH] IN BLOOD BY AUTOMATED COUNT: 12.8 % (ref 12.3–15.4)
GLOBULIN UR ELPH-MCNC: 2.8 GM/DL
GLUCOSE BLDC GLUCOMTR-MCNC: 159 MG/DL (ref 70–130)
GLUCOSE BLDC GLUCOMTR-MCNC: 180 MG/DL (ref 70–130)
GLUCOSE BLDC GLUCOMTR-MCNC: 221 MG/DL (ref 70–130)
GLUCOSE SERPL-MCNC: 140 MG/DL (ref 65–99)
HCT VFR BLD AUTO: 42.3 % (ref 34–46.6)
HGB BLD-MCNC: 14 G/DL (ref 12–15.9)
IMM GRANULOCYTES # BLD AUTO: 0.03 10*3/MM3 (ref 0–0.05)
IMM GRANULOCYTES NFR BLD AUTO: 0.3 % (ref 0–0.5)
LYMPHOCYTES # BLD AUTO: 2.19 10*3/MM3 (ref 0.7–3.1)
LYMPHOCYTES NFR BLD AUTO: 23.3 % (ref 19.6–45.3)
MCH RBC QN AUTO: 30.2 PG (ref 26.6–33)
MCHC RBC AUTO-ENTMCNC: 33.1 G/DL (ref 31.5–35.7)
MCV RBC AUTO: 91.2 FL (ref 79–97)
MONOCYTES # BLD AUTO: 0.91 10*3/MM3 (ref 0.1–0.9)
MONOCYTES NFR BLD AUTO: 9.7 % (ref 5–12)
NEUTROPHILS NFR BLD AUTO: 6.07 10*3/MM3 (ref 1.7–7)
NEUTROPHILS NFR BLD AUTO: 64.8 % (ref 42.7–76)
NRBC BLD AUTO-RTO: 0 /100 WBC (ref 0–0.2)
PLATELET # BLD AUTO: 189 10*3/MM3 (ref 140–450)
PMV BLD AUTO: 8.9 FL (ref 6–12)
POTASSIUM SERPL-SCNC: 4.4 MMOL/L (ref 3.5–5.2)
PROT SERPL-MCNC: 6.3 G/DL (ref 6–8.5)
RBC # BLD AUTO: 4.64 10*6/MM3 (ref 3.77–5.28)
SODIUM SERPL-SCNC: 137 MMOL/L (ref 136–145)
WBC NRBC COR # BLD AUTO: 9.38 10*3/MM3 (ref 3.4–10.8)

## 2025-03-19 PROCEDURE — 25010000002 CEFAZOLIN PER 500 MG: Performed by: INTERNAL MEDICINE

## 2025-03-19 PROCEDURE — 82948 REAGENT STRIP/BLOOD GLUCOSE: CPT

## 2025-03-19 PROCEDURE — 63710000001 INSULIN LISPRO (HUMAN) PER 5 UNITS: Performed by: INTERNAL MEDICINE

## 2025-03-19 PROCEDURE — 80053 COMPREHEN METABOLIC PANEL: CPT | Performed by: INTERNAL MEDICINE

## 2025-03-19 PROCEDURE — 85025 COMPLETE CBC W/AUTO DIFF WBC: CPT | Performed by: INTERNAL MEDICINE

## 2025-03-19 RX ORDER — CEPHALEXIN 500 MG/1
1000 CAPSULE ORAL 3 TIMES DAILY
Qty: 24 CAPSULE | Refills: 0 | Status: SHIPPED | OUTPATIENT
Start: 2025-03-19 | End: 2025-03-23

## 2025-03-19 RX ORDER — LEVOTHYROXINE SODIUM 100 UG/1
100 TABLET ORAL
Qty: 30 TABLET | Refills: 0 | Status: SHIPPED | OUTPATIENT
Start: 2025-03-20 | End: 2025-04-19

## 2025-03-19 RX ADMIN — INSULIN LISPRO 2 UNITS: 100 INJECTION, SOLUTION INTRAVENOUS; SUBCUTANEOUS at 17:31

## 2025-03-19 RX ADMIN — INSULIN LISPRO 4 UNITS: 100 INJECTION, SOLUTION INTRAVENOUS; SUBCUTANEOUS at 13:17

## 2025-03-19 RX ADMIN — Medication 1 APPLICATION: at 09:12

## 2025-03-19 RX ADMIN — GABAPENTIN 400 MG: 400 CAPSULE ORAL at 09:13

## 2025-03-19 RX ADMIN — VENLAFAXINE HYDROCHLORIDE 150 MG: 37.5 CAPSULE, EXTENDED RELEASE ORAL at 09:13

## 2025-03-19 RX ADMIN — Medication 500 MCG: at 09:13

## 2025-03-19 RX ADMIN — Medication 2000 UNITS: at 09:13

## 2025-03-19 RX ADMIN — FERROUS SULFATE TAB 325 MG (65 MG ELEMENTAL FE) 325 MG: 325 (65 FE) TAB at 09:13

## 2025-03-19 RX ADMIN — INSULIN LISPRO 2 UNITS: 100 INJECTION, SOLUTION INTRAVENOUS; SUBCUTANEOUS at 09:13

## 2025-03-19 RX ADMIN — BUPROPION HYDROCHLORIDE 150 MG: 150 TABLET, EXTENDED RELEASE ORAL at 06:33

## 2025-03-19 RX ADMIN — CEFAZOLIN 2000 MG: 2 INJECTION, POWDER, FOR SOLUTION INTRAMUSCULAR; INTRAVENOUS at 09:18

## 2025-03-19 RX ADMIN — LEVOTHYROXINE SODIUM 100 MCG: 100 TABLET ORAL at 06:33

## 2025-03-19 RX ADMIN — METOPROLOL SUCCINATE 25 MG: 25 TABLET, EXTENDED RELEASE ORAL at 09:12

## 2025-03-19 RX ADMIN — CEFAZOLIN 2000 MG: 2 INJECTION, POWDER, FOR SOLUTION INTRAMUSCULAR; INTRAVENOUS at 02:34

## 2025-03-19 RX ADMIN — RIVAROXABAN 15 MG: 15 TABLET, FILM COATED ORAL at 18:12

## 2025-03-19 RX ADMIN — OXYBUTYNIN CHLORIDE 5 MG: 5 TABLET, EXTENDED RELEASE ORAL at 09:13

## 2025-03-19 NOTE — NURSING NOTE
Pt discharged to home. IV removed. Went over discharge and follow-up instructions. Pt to  prescriptions at personal pharmacy. Family providing transportation.

## 2025-03-19 NOTE — OUTREACH NOTE
Prep Survey      Flowsheet Row Responses   [facility], [place] patient discharged from? [facility]   Is LACE score < 7 ? No   Eligibility [facility]   Date of Admission 03/15/25   Date of Discharge 03/19/25   Discharge Disposition Home or Self Care   Discharge diagnosis Cellulitis of right foot   Does the patient have one of the following disease processes/diagnoses(primary or secondary)? Other   Does the patient have Home health ordered? No   Is there a DME ordered? No   Prep survey completed? Yes            Lanie BLANCHARD - Registered Nurse

## 2025-03-19 NOTE — PLAN OF CARE
Goal Outcome Evaluation:  Plan of Care Reviewed With: patient        Progress: improving  Outcome Evaluation: Patient is A&Ox4 forgetful at times. No pain, N/V/D reported. IV abx given, running KVO. Up with assist x1 and walker to restroom. PW applied at night. 4 units of fast acting SSI given and 10 units long acting. Cream applied to bilateral feet. Fall precautions maintained. POC ongoing.

## 2025-03-19 NOTE — DISCHARGE SUMMARY
Date of Discharge:  3/19/2025    PCP: Zahida Osorio MD    Discharge Diagnosis:   Active Hospital Problems    Diagnosis  POA    **Cellulitis of right foot [L03.115]  Yes    History of CVA (cerebrovascular accident) [Z86.73]  Not Applicable    Atrial fibrillation, persistent [I48.19]  Yes    Type 2 diabetes mellitus with stage 3 chronic kidney disease, without long-term current use of insulin [E11.22, N18.30]  Yes    Primary hypertension [I10]  Yes      Resolved Hospital Problems   No resolved problems to display.          Consults       Date and Time Order Name Status Description    3/17/2025  8:29 AM Inpatient Podiatry Consult Completed     3/15/2025  8:27 PM Inpatient Infectious Diseases Consult Completed     3/15/2025  4:46 PM [facility] (on-call MD unless specified) Details            Hospital Course  Patient is a 85 y.o. female     83 yo with history of  A-fib on Xarelto, sleep apnea, diabetes, hypertension, GERD, hypothyroidism who presented with redness in her leg. Recurrent cellulitis.   Recently she was admitted to the hospital about a month ago. Patient had right lower extremity cellulitis with elevated inflammatory markers.  Patient had an MRI of her foot significant for a nondisplaced distal cuboid fracture thought to be a stress fracture.  Patient with soft tissue edema, tarsometatarsal arthritis and first MTP arthritis as well as posterior tibial insertion adenopathy and arthritic changes of the naviculocuneiform joint.  Pt discharged on cefdinir last month      Received unasyn and vanc in the ER. Agree with Dr. Washington for cefazolin and can transition to likely keflex at discharge, patient has been transitioned to oral Keflex today, patient will complete the course of antibiotics on outpatient basis.  Patient will follow-up with PCP and podiatry on outpatient basis.  Plans for podiatry as outpatient.  On insulin, monitor BG  Agents for dementia, hypothyroidism  Chronic xarelto   I have seen and  examined patient at bedside, total time spent on discharge management for this patient is more than 30 minutes.  Plan of care was discussed with the patient and she has verbalized understanding.    Temp:  [97.2 °F (36.2 °C)-97.9 °F (36.6 °C)] 97.2 °F (36.2 °C)  Heart Rate:  [70-85] 70  Resp:  [16-18] 18  BP: (141-169)/(85-99) 161/99  There is no height or weight on file to calculate BMI.    Physical Exam general, awake and alert.  Head and ENT, normocephalic and atraumatic.  Lungs, symmetric expansion, equal air entry bilaterally.  Heart, regular rate and rhythm.  Abdomen, soft and nontender.  Extremities, no clubbing or cyanosis.  Neuro, no focal deficits.  Skin: Warm and no rash.  Psych, normal mood and affect.  Musculoskeletal, joint examination is grossly normal.      Disposition: Home or Self Care       Discharge Medications        New Medications        Instructions Start Date   cephalexin 500 MG capsule  Commonly known as: KEFLEX   1,000 mg, Oral, 3 Times Daily             Changes to Medications        Instructions Start Date   glimepiride 4 MG tablet  Commonly known as: AMARYL  What changed: when to take this   4 mg, Oral, Every Morning Before Breakfast      Lantus SoloStar 100 UNIT/ML injection pen  Generic drug: Insulin Glargine  What changed: See the new instructions.   INJECT 5 UNITS AT BEDTIME      levothyroxine 100 MCG tablet  Commonly known as: SYNTHROID, LEVOTHROID  What changed:   medication strength  how much to take  when to take this   100 mcg, Oral, Every Early Morning   Start Date: March 20, 2025            Continue These Medications        Instructions Start Date   atorvastatin 40 MG tablet  Commonly known as: LIPITOR   40 mg, Oral, Nightly      buPROPion  MG 24 hr tablet  Commonly known as: WELLBUTRIN XL   150 mg, Oral, Every Morning      cyanocobalamin 500 MCG tablet  Commonly known as: VITAMIN B-12   500 mcg, Oral, Daily      Diclofenac Sodium 1 % gel gel  Commonly known as:  VOLTAREN   APPLY A THIN LAYER TO THE RIGHT TO SHOULDER THREE TIMES DAILY      donepezil 10 MG tablet  Commonly known as: Aricept   10 mg, Oral, Nightly      FeroSul 325 (65 Fe) MG tablet  Generic drug: ferrous sulfate   325 mg, Oral, Daily      gabapentin 400 MG capsule  Commonly known as: NEURONTIN   400 mg, Oral, 2 Times Daily      linagliptin 5 MG tablet tablet  Commonly known as: Tradjenta   5 mg, Oral, Daily      metoprolol succinate XL 25 MG 24 hr tablet  Commonly known as: TOPROL-XL   TAKE ONE TABLET BY MOUTH DAILY **NEW MED**      Myrbetriq 25 MG tablet sustained-release 24 hour 24 hr tablet  Generic drug: Mirabegron ER   TAKE ONE TABLET BY MOUTH DAILY *NEW MED*      pioglitazone 15 MG tablet  Commonly known as: ACTOS   15 mg, Oral, Daily      polyethylene glycol 17 g packet  Commonly known as: MIRALAX   17 g, Oral, 2 Times Daily PRN      rivaroxaban 15 MG tablet  Commonly known as: Xarelto   15 mg, Oral, Daily With Dinner      sildenafil 20 MG tablet  Commonly known as: REVATIO   20 mg, Oral, 2 Times Daily      venlafaxine  MG 24 hr capsule  Commonly known as: EFFEXOR-XR   150 mg, Oral, Daily      Vitamin D3 50 MCG (2000 UT) capsule   1 capsule, Daily             Stop These Medications      methylPREDNISolone 4 MG dose pack  Commonly known as: Medrol               Additional Instructions for the Follow-ups that You Need to Schedule       Discharge Follow-up with PCP   As directed       Currently Documented PCP:    Zahida Osorio MD    PCP Phone Number:    [phone]     Follow Up Details: Follow-up with PCP in 7 days.               Follow-up Information       Zahida Osorio MD .    Specialty: Internal Medicine  Why: Follow-up with PCP in 7 days.  Contact information:  [address]  [facility] [address] [phone]               Eben Porter MD .    Specialty: Family Medicine  Why: Follow-up with PCP in 7 days.  Contact information:  [address] [facility]  [address]  [phone]                            Future Appointments   Date Time Provider Department Center   4/15/2025 11:15 AM Zahida Osorio MD [facility]   4/29/2025 11:30 AM [facility] DEXA [facility] DX BR None   5/21/2025  3:30 PM Julisa Watson PA [facility]   8/27/2025 11:30 AM Susannah Blackburn APRN [facility]     Pending Labs       Order Current Status    Blood Culture - Blood, Arm, Left Preliminary result    Blood Culture - Blood, Hand, Left Preliminary result           Jacob Youngblood MD  [facility]  03/19/25 12:18 EDT    Discharge time spent greater than 30 minutes.

## 2025-03-20 ENCOUNTER — TRANSITIONAL CARE MANAGEMENT TELEPHONE ENCOUNTER (OUTPATIENT)
Dept: CALL CENTER | Facility: HOSPITAL | Age: 85
End: 2025-03-20
Payer: MEDICARE

## 2025-03-20 LAB
BACTERIA SPEC AEROBE CULT: NORMAL
BACTERIA SPEC AEROBE CULT: NORMAL

## 2025-03-20 NOTE — PROGRESS NOTES
Case Management Discharge Note      Final Note: Discharged home. Justina Saldana RN    Provided Post Acute Provider List?: N/A  N/A Provider List Comment: Denies needs. Plan is home    Selected Continued Care - Discharged on 3/19/2025 Admission date: 3/15/2025 - Discharge disposition: Home or Self Care         Transportation Services  Private: Car    Final Discharge Disposition Code: 01 - home or self-care

## 2025-03-20 NOTE — OUTREACH NOTE
Call Center TCM Note      Flowsheet Row Responses   [facility] patient discharged from? [place]   Does the patient have one of the following disease processes/diagnoses(primary or secondary)? Other   TCM attempt successful? Yes   Call start time 0928   Call end time 0932   General alerts for this patient PARUL   Discharge diagnosis Cellulitis of right foot   Is patient permission given to speak with other caregiver? Yes   List who call center can speak with daughter- Ping   Person spoke with today (if not patient) and relationship daughter- Ping   Meds reviewed with patient/caregiver? Yes   Is the patient having any side effects they believe may be caused by any medication additions or changes? No   Does the patient have all medications ordered at discharge? Yes   Is the patient taking all medications as directed (includes completed medication regime)? Yes   Comments has a previously scheduled appt with PCP for 4/15 that she will be keeping   Does the patient have an appointment with their PCP within 7-14 days of discharge? No   Nursing Interventions Confirmed date/time of appointment   Has home health visited the patient within 72 hours of discharge? N/A   Psychosocial issues? No   Did the patient receive a copy of their discharge instructions? Yes   Nursing interventions Reviewed instructions with patient   What is the patient's perception of their health status since discharge? Improving   Is the patient/caregiver able to teach back signs and symptoms related to disease process for when to call PCP? Yes   Is the patient/caregiver able to teach back signs and symptoms related to disease process for when to call 911? Yes   Is the patient/caregiver able to teach back the hierarchy of who to call/visit for symptoms/problems? PCP, Specialist, Home health nurse, Urgent Care, ED, 911 Yes   If the patient is a current smoker, are they able to teach back resources for cessation? Not a smoker   TCM call completed?  Yes   Wrap up additional comments Per daughter, patient is doing ok but fell this morning trying to go to the bathroom, she is ok and was not hurt, all concerns addressed, daughter states that patient has a previously scheduled appt for 4/15 that patient will be keeping to see PCP.   Call end time 0932   Would this patient benefit from a Referral to [facility] Social Work? No   Is the patient interested in additional calls from an ambulatory ? No            Tammy Serrato RN    3/20/2025, 09:32 EDT

## 2025-03-31 ENCOUNTER — READMISSION MANAGEMENT (OUTPATIENT)
Dept: CALL CENTER | Facility: HOSPITAL | Age: 85
End: 2025-03-31
Payer: MEDICARE

## 2025-03-31 NOTE — OUTREACH NOTE
Medical Week 2 Survey      Flowsheet Row Responses   [facility] patient discharged from? [place]   Does the patient have one of the following disease processes/diagnoses(primary or secondary)? Other   Week 2 attempt successful? No   Unsuccessful attempts Attempt 1   Revoke Other transitional program   Revoke Other transitional program            Sania Rojas Registered Nurse

## 2025-04-04 NOTE — ASSESSMENT & PLAN NOTE
Diabetes is worsening due to medication noncompliance  Counseled patient on the importance of medication compliance for optimal glycemic control  Continue current treatment regimen.  Recommended an ADA diet.  Regular aerobic exercise.  Diabetes will be reassessed in 3 months   Him/He

## 2025-04-08 ENCOUNTER — ANESTHESIA (OUTPATIENT)
Dept: GASTROENTEROLOGY | Facility: HOSPITAL | Age: 85
End: 2025-04-08
Payer: MEDICARE

## 2025-04-08 ENCOUNTER — ANESTHESIA EVENT (OUTPATIENT)
Dept: GASTROENTEROLOGY | Facility: HOSPITAL | Age: 85
End: 2025-04-08
Payer: MEDICARE

## 2025-04-08 ENCOUNTER — HOSPITAL ENCOUNTER (OUTPATIENT)
Facility: HOSPITAL | Age: 85
Setting detail: HOSPITAL OUTPATIENT SURGERY
Discharge: HOME | End: 2025-04-08
Attending: INTERNAL MEDICINE | Admitting: INTERNAL MEDICINE
Payer: MEDICARE

## 2025-04-08 VITALS
RESPIRATION RATE: 16 BRPM | WEIGHT: 204.5 LBS | SYSTOLIC BLOOD PRESSURE: 135 MMHG | HEIGHT: 67 IN | DIASTOLIC BLOOD PRESSURE: 97 MMHG | BODY MASS INDEX: 32.1 KG/M2 | HEART RATE: 108 BPM | OXYGEN SATURATION: 95 %

## 2025-04-08 DIAGNOSIS — Z86.0101 HISTORY OF ADENOMATOUS POLYP OF COLON: ICD-10-CM

## 2025-04-08 DIAGNOSIS — Z79.01 ANTICOAGULATED: ICD-10-CM

## 2025-04-08 DIAGNOSIS — Z12.11 ENCOUNTER FOR SCREENING FOR MALIGNANT NEOPLASM OF COLON: ICD-10-CM

## 2025-04-08 LAB — GLUCOSE BLDC GLUCOMTR-MCNC: 249 MG/DL (ref 70–130)

## 2025-04-08 PROCEDURE — 25810000003 LACTATED RINGERS PER 1000 ML: Performed by: INTERNAL MEDICINE

## 2025-04-08 PROCEDURE — C1726 CATH, BAL DIL, NON-VASCULAR: HCPCS | Performed by: INTERNAL MEDICINE

## 2025-04-08 PROCEDURE — S0260 H&P FOR SURGERY: HCPCS | Performed by: INTERNAL MEDICINE

## 2025-04-08 PROCEDURE — 25010000002 GLYCOPYRROLATE 0.2 MG/ML SOLUTION: Performed by: NURSE ANESTHETIST, CERTIFIED REGISTERED

## 2025-04-08 PROCEDURE — 88305 TISSUE EXAM BY PATHOLOGIST: CPT | Performed by: INTERNAL MEDICINE

## 2025-04-08 PROCEDURE — 82948 REAGENT STRIP/BLOOD GLUCOSE: CPT

## 2025-04-08 PROCEDURE — 25010000002 PROPOFOL 1000 MG/100ML EMULSION: Performed by: NURSE ANESTHETIST, CERTIFIED REGISTERED

## 2025-04-08 PROCEDURE — 25010000002 LIDOCAINE 2% SOLUTION: Performed by: NURSE ANESTHETIST, CERTIFIED REGISTERED

## 2025-04-08 DEVICE — REPLAY HEMOSTASIS CLIP, 16MM SPAN
Type: IMPLANTABLE DEVICE | Site: SIGMOID COLON | Status: FUNCTIONAL
Brand: REPLAY

## 2025-04-08 RX ORDER — LIDOCAINE HYDROCHLORIDE 20 MG/ML
INJECTION, SOLUTION INFILTRATION; PERINEURAL AS NEEDED
Status: DISCONTINUED | OUTPATIENT
Start: 2025-04-08 | End: 2025-04-08 | Stop reason: SURG

## 2025-04-08 RX ORDER — PANTOPRAZOLE SODIUM 20 MG/1
20 TABLET, DELAYED RELEASE ORAL 2 TIMES DAILY
Qty: 60 TABLET | Refills: 1 | Status: SHIPPED | OUTPATIENT
Start: 2025-04-08

## 2025-04-08 RX ORDER — GLYCOPYRROLATE 0.2 MG/ML
INJECTION INTRAMUSCULAR; INTRAVENOUS AS NEEDED
Status: DISCONTINUED | OUTPATIENT
Start: 2025-04-08 | End: 2025-04-08 | Stop reason: SURG

## 2025-04-08 RX ORDER — SODIUM CHLORIDE 0.9 % (FLUSH) 0.9 %
10 SYRINGE (ML) INJECTION AS NEEDED
Status: DISCONTINUED | OUTPATIENT
Start: 2025-04-08 | End: 2025-04-08 | Stop reason: HOSPADM

## 2025-04-08 RX ORDER — SODIUM CHLORIDE, SODIUM LACTATE, POTASSIUM CHLORIDE, CALCIUM CHLORIDE 600; 310; 30; 20 MG/100ML; MG/100ML; MG/100ML; MG/100ML
1000 INJECTION, SOLUTION INTRAVENOUS CONTINUOUS
Status: DISCONTINUED | OUTPATIENT
Start: 2025-04-08 | End: 2025-04-08 | Stop reason: HOSPADM

## 2025-04-08 RX ORDER — PROPOFOL 10 MG/ML
INJECTION, EMULSION INTRAVENOUS AS NEEDED
Status: DISCONTINUED | OUTPATIENT
Start: 2025-04-08 | End: 2025-04-08 | Stop reason: SURG

## 2025-04-08 RX ADMIN — PROPOFOL INJECTABLE EMULSION 180 MCG/KG/MIN: 10 INJECTION, EMULSION INTRAVENOUS at 14:50

## 2025-04-08 RX ADMIN — LIDOCAINE HYDROCHLORIDE 80 MG: 20 INJECTION, SOLUTION INFILTRATION; PERINEURAL at 14:49

## 2025-04-08 RX ADMIN — PROPOFOL INJECTABLE EMULSION 70 MG: 10 INJECTION, EMULSION INTRAVENOUS at 14:49

## 2025-04-08 RX ADMIN — SODIUM CHLORIDE, POTASSIUM CHLORIDE, SODIUM LACTATE AND CALCIUM CHLORIDE 1000 ML: 600; 310; 30; 20 INJECTION, SOLUTION INTRAVENOUS at 14:04

## 2025-04-08 RX ADMIN — GLYCOPYRROLATE 0.2 MG: 0.2 INJECTION INTRAMUSCULAR; INTRAVENOUS at 14:49

## 2025-04-08 NOTE — ANESTHESIA PREPROCEDURE EVALUATION
Anesthesia Evaluation     NPO Solid Status: > 8 hours  NPO Liquid Status: > 4 hours           Airway   Mallampati: I  No difficulty expected  Dental      Pulmonary    (+) ,sleep apnea  Cardiovascular   Exercise tolerance: good (4-7 METS)    (+) hypertension, dysrhythmias, hyperlipidemia      Neuro/Psych  (+) CVA, psychiatric history  GI/Hepatic/Renal/Endo    (+) GERD, liver disease, renal disease-, diabetes mellitus, thyroid problem     Musculoskeletal     Abdominal    Substance History      OB/GYN          Other   arthritis,   history of cancer                Anesthesia Plan    ASA 3     MAC     intravenous induction     Anesthetic plan, risks, benefits, and alternatives have been provided, discussed and informed consent has been obtained with: patient.    CODE STATUS:

## 2025-04-08 NOTE — ANESTHESIA POSTPROCEDURE EVALUATION
Patient: Rekha Bear    Procedure Summary       Date: 04/08/25 Room / Location: [facility] ENDOSCOPY 1 /  [facility] ENDOS[facility]    Anesthesia Start: 1440 Anesthesia Stop: 1527    Procedures:       COLONOSCOPY to the cecum and TI with cold and hot snare polypectomies with clip placement x 1      ESOPHAGOGASTRODUODENOSCOPY with biopsy; balloon dilatation 12-14mm (Esophagus) Diagnosis:       Encounter for screening for malignant neoplasm of colon      History of adenomatous polyp of colon      Anticoagulated      (Encounter for screening for malignant neoplasm of colon [Z12.11])      (History of adenomatous polyp of colon [Z86.0101])      (Anticoagulated [Z79.01])    Surgeons: Johnna Dong MD Provider: Schuyler Laird MD    Anesthesia Type: MAC ASA Status: 3            Anesthesia Type: MAC    Vitals  Vitals Value Taken Time   /97 04/08/25 15:45   Temp     Pulse 108 04/08/25 15:45   Resp 16 04/08/25 15:45   SpO2 95 % 04/08/25 15:45           Post Anesthesia Care and Evaluation    Patient location during evaluation: bedside  Patient participation: complete - patient participated  Level of consciousness: awake  Pain management: adequate    Airway patency: patent  Anesthetic complications: No anesthetic complications  PONV Status: none  Cardiovascular status: acceptable  Respiratory status: acceptable  Hydration status: acceptable  Post Neuraxial Block status: Motor and sensory function returned to baseline

## 2025-04-08 NOTE — H&P
"[facility]  Pre Procedure History & Physical    Chief Complaint:   Dysphagia, h/o polyps    Subjective     HPI:   84 yo here today for egd/colonoscopy.  Pt reports + FH CRC/polyps.    Last exam 2016.  She reports intermittent issues with dysphagia.  This typically when she is nervous or anxious.  It was much more prevalent when she was younger but she has had some episodes that have occurred when she is out that have involved vomiting.  She has a history of A-fib, prior CVA and she is on Xarelto.    Past Medical History:   Past Medical History:   Diagnosis Date    A-fib 2024    Chilblains     \"Chilblian's\"    CKD (chronic kidney disease)     CVA (cerebral vascular accident)     Depression     Fatty liver     GERD (gastroesophageal reflux disease)     Hyperlipidemia     Hypertension     Incontinence     Osteoarthritis     OA  Marvin THR, LT TKR - hydrocodone prescibed by Dr. 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 Almaguer    TOTAL KNEE ARTHROPLASTY Left     OA  Marvin THR, LT TKR - hydrocodone prescibed by Dr. Almaguer       Family History:  Family History   Problem Relation Age of Onset    Hypertension Mother     Diabetes type II " Mother     Hypertension Father     Coronary artery disease Father     Diabetes type II Father     Colon cancer Brother     Skin cancer Brother     Stroke Son     Breast cancer Maternal Aunt     Hypertension Other     Other Other         Lipids  Thyroid       Social History:   reports that she quit smoking about 50 years ago. Her smoking use included cigarettes. She started smoking about 64 years ago. She has a 7 pack-year smoking history. She has been exposed to tobacco smoke. She has never used smokeless tobacco. She reports that she does not drink alcohol and does not use drugs.    Medications:   Medications Prior to Admission   Medication Sig Dispense Refill Last Dose/Taking    atorvastatin (LIPITOR) 40 MG tablet Take 1 tablet by mouth Every Night. 30 tablet 1 4/7/2025    buPROPion XL (WELLBUTRIN XL) 150 MG 24 hr tablet TAKE ONE TABLET BY MOUTH EVERY MORNING 90 tablet 1 4/7/2025 Morning    Cholecalciferol (Vitamin D3) 50 MCG (2000 UT) capsule Take 1 capsule by mouth Daily.   Past Week    Diclofenac Sodium (VOLTAREN) 1 % gel gel APPLY A THIN LAYER TO THE RIGHT TO SHOULDER THREE TIMES DAILY   Past Week    donepezil (Aricept) 10 MG tablet Take 1 tablet by mouth Every Night. 30 tablet 2 4/7/2025    FeroSul 325 (65 Fe) MG tablet TAKE ONE TABLET BY MOUTH DAILY 90 tablet 0 Past Month    gabapentin (NEURONTIN) 400 MG capsule TAKE ONE CAPSULE BY MOUTH TWICE DAILY 56 capsule 0 4/7/2025 Morning    glimepiride (AMARYL) 4 MG tablet Take 1 tablet by mouth Every Morning Before Breakfast. 30 tablet 1 4/8/2025 Morning    Lantus SoloStar 100 UNIT/ML injection pen INJECT 5 UNITS AT BEDTIME (Patient taking differently: 10 Units Every Night.)   4/7/2025    levothyroxine (SYNTHROID, LEVOTHROID) 100 MCG tablet Take 1 tablet by mouth Every Morning for 30 days. 30 tablet 0 4/7/2025 Morning    linagliptin (Tradjenta) 5 MG tablet tablet Take 1 tablet by mouth Daily. 30 tablet 1 4/7/2025    metoprolol succinate XL (TOPROL-XL) 25 MG 24 hr  "tablet TAKE ONE TABLET BY MOUTH DAILY **NEW MED** 30 tablet 5 4/8/2025 Morning    Myrbetriq 25 MG tablet sustained-release 24 hour 24 hr tablet TAKE ONE TABLET BY MOUTH DAILY *NEW MED* 30 tablet 0 4/7/2025    pioglitazone (ACTOS) 15 MG tablet Take 1 tablet by mouth Daily. 30 tablet 1 4/7/2025    polyethylene glycol (MIRALAX) 17 g packet Take 17 g by mouth 2 (Two) Times a Day As Needed (constipation). 60 each 0 4/8/2025    sildenafil (REVATIO) 20 MG tablet Take 1 tablet by mouth 2 (Two) Times a Day. 60 tablet 3 4/7/2025    venlafaxine XR (EFFEXOR-XR) 150 MG 24 hr capsule TAKE ONE TABLET BY MOUTH DAILY 90 capsule 1 4/7/2025 Morning    vitamin B-12 (VITAMIN B-12) 500 MCG tablet Take 1 tablet by mouth Daily. 30 tablet 3 Past Month    rivaroxaban (Xarelto) 15 MG tablet Take 1 tablet by mouth Daily With Dinner. 90 tablet 1 4/5/2025       Allergies:  Shellfish-derived products, Amlodipine, and Iodine    ROS:    Pertinent items are noted in HPI     Objective     Blood pressure 142/79, pulse 92, height 170.2 cm (67\"), weight 92.8 kg (204 lb 8 oz), SpO2 99%, not currently breastfeeding.    Physical Exam   Constitutional: Pt is oriented to person, place, and time and well-developed, well-nourished, and in no distress.   Mouth/Throat: Oropharynx is clear and moist.   Neck: Normal range of motion.   Cardiovascular: Normal rate, regular rhythm    Pulmonary/Chest: Effort normal    Abdominal: Soft. Nontender  Skin: Skin is warm and dry.   Psychiatric: Mood, memory, affect and judgment normal.     Assessment & Plan     Diagnosis:  Dysphagia, history of polyps, family history of colon cancer    Anticipated Surgical Procedure:  Egd/colonoscopy    The risks, benefits, and alternatives of this procedure have been discussed with the patient or the responsible party- the patient understands and agrees to proceed.                                                              "

## 2025-04-09 DIAGNOSIS — M15.8 OTHER OSTEOARTHRITIS INVOLVING MULTIPLE JOINTS: ICD-10-CM

## 2025-04-09 DIAGNOSIS — N39.44 NOCTURNAL ENURESIS: ICD-10-CM

## 2025-04-09 RX ORDER — MIRABEGRON 25 MG/1
TABLET, FILM COATED, EXTENDED RELEASE ORAL
Qty: 30 TABLET | Refills: 0 | Status: SHIPPED | OUTPATIENT
Start: 2025-04-09

## 2025-04-09 RX ORDER — GABAPENTIN 400 MG/1
400 CAPSULE ORAL 2 TIMES DAILY
Qty: 56 CAPSULE | Refills: 0 | Status: SHIPPED | OUTPATIENT
Start: 2025-04-09

## 2025-04-10 ENCOUNTER — TELEPHONE (OUTPATIENT)
Dept: FAMILY MEDICINE CLINIC | Facility: CLINIC | Age: 85
End: 2025-04-10

## 2025-04-10 LAB
CYTO UR: NORMAL
LAB AP CASE REPORT: NORMAL
PATH REPORT.FINAL DX SPEC: NORMAL
PATH REPORT.GROSS SPEC: NORMAL

## 2025-04-10 NOTE — TELEPHONE ENCOUNTER
REC'D CALL FROM     LIZZ  @ [facility]  -[phone]    SAID THAT PT HAS A WOUND ON RIGHT HEEL AND ASKING FOR DR VIDAL TO SEND A HAND WRITTEN NOTE TO THEM SAYING IT'S OKAY TO APPLY BETADINE TO THE WOUND UNTIL PT GETS INTO [facility] NEXT WEEK.     FAX NOTE [phone]    ANY QUESTIONS CALL  [facility]  [phone]

## 2025-04-15 ENCOUNTER — OFFICE VISIT (OUTPATIENT)
Dept: FAMILY MEDICINE CLINIC | Facility: CLINIC | Age: 85
End: 2025-04-15
Payer: MEDICARE

## 2025-04-15 VITALS
DIASTOLIC BLOOD PRESSURE: 80 MMHG | RESPIRATION RATE: 16 BRPM | BODY MASS INDEX: 32.43 KG/M2 | WEIGHT: 206.6 LBS | TEMPERATURE: 97.1 F | HEART RATE: 72 BPM | HEIGHT: 67 IN | SYSTOLIC BLOOD PRESSURE: 114 MMHG | OXYGEN SATURATION: 99 %

## 2025-04-15 DIAGNOSIS — K21.00 GASTROESOPHAGEAL REFLUX DISEASE WITH ESOPHAGITIS WITHOUT HEMORRHAGE: ICD-10-CM

## 2025-04-15 DIAGNOSIS — L60.8 TOENAIL DEFORMITY: ICD-10-CM

## 2025-04-15 DIAGNOSIS — N39.44 NOCTURNAL ENURESIS: ICD-10-CM

## 2025-04-15 DIAGNOSIS — I73.01 RAYNAUD DISEASE WITH GANGRENE: ICD-10-CM

## 2025-04-15 DIAGNOSIS — N18.31 TYPE 2 DIABETES MELLITUS WITH STAGE 3A CHRONIC KIDNEY DISEASE, WITHOUT LONG-TERM CURRENT USE OF INSULIN: ICD-10-CM

## 2025-04-15 DIAGNOSIS — E03.9 HYPOTHYROIDISM, UNSPECIFIED TYPE: ICD-10-CM

## 2025-04-15 DIAGNOSIS — E78.5 HYPERLIPIDEMIA, UNSPECIFIED HYPERLIPIDEMIA TYPE: ICD-10-CM

## 2025-04-15 DIAGNOSIS — L97.521 ULCER OF LEFT FOOT, LIMITED TO BREAKDOWN OF SKIN: ICD-10-CM

## 2025-04-15 DIAGNOSIS — I10 PRIMARY HYPERTENSION: Primary | ICD-10-CM

## 2025-04-15 DIAGNOSIS — E11.22 TYPE 2 DIABETES MELLITUS WITH STAGE 3A CHRONIC KIDNEY DISEASE, WITHOUT LONG-TERM CURRENT USE OF INSULIN: ICD-10-CM

## 2025-04-15 RX ORDER — INSULIN GLARGINE 100 [IU]/ML
10 INJECTION, SOLUTION SUBCUTANEOUS NIGHTLY
Qty: 3 ML | Refills: 5 | Status: SHIPPED | OUTPATIENT
Start: 2025-04-15

## 2025-04-15 RX ORDER — MIRABEGRON 50 MG/1
50 TABLET, FILM COATED, EXTENDED RELEASE ORAL DAILY
Qty: 30 TABLET | Refills: 3 | Status: SHIPPED | OUTPATIENT
Start: 2025-04-15

## 2025-04-15 NOTE — PROGRESS NOTES
Chief Complaint   Patient presents with    Primary Care Follow-Up     Pt here for 3 month f/u, has wound on heel that's not healing     Diabetes    Hyperlipidemia    Hypothyroidism    Hypertension      History of Present Illness:  Patient presented to the office today for routine follow up of HTN, HLD, T2DM, hypothyroidism and Raynaud's.  She reports small wound on the left heel which she currently dressed with betadine, she is scheduled to follow up with wound clinic on Thursday.  Review of home blood glucose readings are averaging in the 200s to 300s.  She was on Lantus 10 units prior to hospitalization and then was decreased to 5 units prior to discharge.  BP well-controlled on medication. She reports still experiencing nocturnal urinary incontinence despite being on mirabegron 25 mg daily.      PMH:   Outpatient Medications Prior to Visit   Medication Sig Dispense Refill    atorvastatin (LIPITOR) 40 MG tablet Take 1 tablet by mouth Every Night. 30 tablet 1    buPROPion XL (WELLBUTRIN XL) 150 MG 24 hr tablet TAKE ONE TABLET BY MOUTH EVERY MORNING 90 tablet 1    Cholecalciferol (Vitamin D3) 50 MCG (2000 UT) capsule Take 1 capsule by mouth Daily.      Diclofenac Sodium (VOLTAREN) 1 % gel gel APPLY A THIN LAYER TO THE RIGHT TO SHOULDER THREE TIMES DAILY      donepezil (Aricept) 10 MG tablet Take 1 tablet by mouth Every Night. 30 tablet 2    FeroSul 325 (65 Fe) MG tablet TAKE ONE TABLET BY MOUTH DAILY 90 tablet 0    gabapentin (NEURONTIN) 400 MG capsule TAKE ONE CAPSULE BY MOUTH TWICE DAILY 56 capsule 0    glimepiride (AMARYL) 4 MG tablet Take 1 tablet by mouth Every Morning Before Breakfast. 30 tablet 1    levothyroxine (SYNTHROID, LEVOTHROID) 100 MCG tablet Take 1 tablet by mouth Every Morning for 30 days. 30 tablet 0    linagliptin (Tradjenta) 5 MG tablet tablet Take 1 tablet by mouth Daily. 30 tablet 1    metoprolol succinate XL (TOPROL-XL) 25 MG 24 hr tablet TAKE ONE TABLET BY MOUTH DAILY **NEW MED** 30 tablet  5    pantoprazole (PROTONIX) 20 MG EC tablet Take 1 tablet by mouth 2 (Two) Times a Day. 60 tablet 1    pioglitazone (ACTOS) 15 MG tablet Take 1 tablet by mouth Daily. 30 tablet 1    polyethylene glycol (MIRALAX) 17 g packet Take 17 g by mouth 2 (Two) Times a Day As Needed (constipation). 60 each 0    rivaroxaban (Xarelto) 15 MG tablet Take 1 tablet by mouth Daily With Dinner. 90 tablet 1    sildenafil (REVATIO) 20 MG tablet Take 1 tablet by mouth 2 (Two) Times a Day. 60 tablet 3    venlafaxine XR (EFFEXOR-XR) 150 MG 24 hr capsule TAKE ONE TABLET BY MOUTH DAILY 90 capsule 1    vitamin B-12 (VITAMIN B-12) 500 MCG tablet Take 1 tablet by mouth Daily. 30 tablet 3    Lantus SoloStar 100 UNIT/ML injection pen Inject 10 Units under the skin into the appropriate area as directed Every Night.      Myrbetriq 25 MG tablet sustained-release 24 hour 24 hr tablet TAKE ONE TABLET BY MOUTH DAILY *NEW MED* 30 tablet 0     No facility-administered medications prior to visit.      Allergies   Allergen Reactions    Shellfish-Derived Products Anaphylaxis    Amlodipine Hives and Other (See Comments)     Heart race    Iodine Rash     Past Surgical History:   Procedure Laterality Date    CATARACT EXTRACTION      CHOLECYSTECTOMY      COLONOSCOPY  2009    COLONOSCOPY N/A 12/20/2016    Procedure: COLONOSCOPY with hot snare polypectomy;  Surgeon: George Pabon MD;  Location: [facility] ENDOSCOPY;  Service:     COLONOSCOPY N/A 4/8/2025    Procedure: COLONOSCOPY to the cecum and TI with cold and hot snare polypectomies with clip placement x 1;  Surgeon: Johnna Dong MD;  Location: [facility] ENDOSCOPY;  Service: Gastroenterology;  Laterality: N/A;  pre-screening  post-diverticulosis; polyps    DENTAL PROCEDURE      ENDOSCOPY N/A 4/8/2025    Procedure: ESOPHAGOGASTRODUODENOSCOPY with biopsy; balloon dilatation 12-14mm;  Surgeon: Johnna Dong MD;  Location: [facility] ENDOSCOPY;  Service: Gastroenterology;  Laterality: N/A;   "pre-GERD  post-gastritis; Duodenitis; esophageal stricture    FOOT SURGERY      TONSILLECTOMY      TOTAL HIP ARTHROPLASTY Bilateral     OA  Marvin THR, LT TKR - hydrocodone prescibed by Dr. Almaguer    TOTAL KNEE ARTHROPLASTY Left     OA  Marvin THR, LT TKR - hydrocodone prescibed by Dr. Almaguer     family history includes Breast cancer in her maternal aunt; Colon cancer in her brother; Coronary artery disease in her father; Diabetes type II in her father and mother; Hypertension in her father, mother, and another family member; Other in an other family member; Skin cancer in her brother; Stroke in her son.   reports that she quit smoking about 50 years ago. Her smoking use included cigarettes. She started smoking about 64 years ago. She has a 7 pack-year smoking history. She has been exposed to tobacco smoke. She has never used smokeless tobacco. She reports that she does not drink alcohol and does not use drugs.     /80 (BP Location: Right arm, Patient Position: Sitting, Cuff Size: Adult)   Pulse 72   Temp 97.1 °F (36.2 °C) (Temporal)   Resp 16   Ht 170.2 cm (67.01\")   Wt 93.7 kg (206 lb 9.6 oz)   LMP  (LMP Unknown)   SpO2 99%   BMI 32.35 kg/m²   Physical Exam  Constitutional:       Appearance: Normal appearance.   HENT:      Head: Normocephalic and atraumatic.   Cardiovascular:      Pulses:           Dorsalis pedis pulses are 1+ on the right side and 1+ on the left side.        Posterior tibial pulses are 2+ on the right side and 2+ on the left side.   Musculoskeletal:        Feet:    Feet:      Right foot:      Skin integrity: Dry skin present.      Toenail Condition: Right toenails are abnormally thick.      Left foot:      Skin integrity: Dry skin present.      Toenail Condition: Left toenails are abnormally thick.      Comments: Small nonbleeding ulcer wound on the heel of the left foot posteriorly  Neurological:      Mental Status: She is alert and oriented to person, place, and time.          The " following data was reviewed by: Zahida Osorio MD on 04/15/2025:  Common labs          3/17/2025    06:39 3/18/2025    05:28 3/19/2025    05:22 3/19/2025    05:23   Common Labs   Glucose 193  148   140    BUN 21  20   23    Creatinine 1.16  1.27   1.09    Sodium 136  139   137    Potassium 3.9  4.5   4.4    Chloride 100  102   102    Calcium 9.0  9.0   9.3    Albumin    3.5    Total Bilirubin    0.5    Alkaline Phosphatase    127    AST (SGOT)    20    ALT (SGPT)    6    WBC 9.82  8.92  9.38     Hemoglobin 13.1  13.5  14.0     Hematocrit 40.5  41.7  42.3     Platelets 183  194  189       A1C Last 3 Results          10/27/2024    04:51 2/7/2025    06:42   HGBA1C Last 3 Results   Hemoglobin A1C 8.70  9.00       TSH          8/20/2024    11:10 10/12/2024    15:09 1/13/2025    12:36   TSH   TSH 5.79     5.060  5.77          Details          This result is from an external source.                  Diagnoses and all orders for this visit:    1. Primary hypertension (Primary)  Assessment & Plan:  Hypertension is stable and controlled  Continue current treatment regimen.  Dietary sodium restriction.  Weight loss.  Regular aerobic exercise.  Ambulatory blood pressure monitoring.  Blood pressure will be reassessed in 6 months.    Orders:  -     CBC & Differential; Future  -     Comprehensive Metabolic Panel; Future    2. Type 2 diabetes mellitus with stage 3a chronic kidney disease, without long-term current use of insulin  Assessment & Plan:  Diabetes is worsening.   Will increase Lantus to 10 mg nightly  Medication changes per orders.  Recommended an ADA diet.  Regular aerobic exercise.  Diabetes will be reassessed in 6 months    Orders:  -     Lantus SoloStar 100 UNIT/ML injection pen; Inject 10 Units under the skin into the appropriate area as directed Every Night.  Dispense: 3 mL; Refill: 5  -     Hemoglobin A1c; Future  -     Microalbumin / Creatinine Urine Ratio - Urine, Clean Catch; Future    3. Raynaud disease with  gangrene  Assessment & Plan:  Recently started back on sildenafil about 2 weeks ago  Continue on current medication for now   will reassess at next scheduled appointment      4. Nocturnal enuresis  Assessment & Plan:  Symptoms unchanged  Increase mirabegron to 50 mg  Will reassess this at next scheduled visit    Orders:  -     Mirabegron ER (Myrbetriq) 50 MG tablet sustained-release 24 hour 24 hr tablet; Take 50 mg by mouth Daily.  Dispense: 30 tablet; Refill: 3    5. Gastroesophageal reflux disease with esophagitis without hemorrhage  Assessment & Plan:  Symptoms improving  Continue PPI therapy      6. Hyperlipidemia, unspecified hyperlipidemia type  -     Lipid Panel With / Chol / HDL Ratio; Future    7. Toenail deformity  -     Ambulatory Referral to Podiatry    8. Hypothyroidism, unspecified type  -     TSH+Free T4; Future    9. Ulcer of left foot, limited to breakdown of skin    To follow up with wound clinic.  Next visit in 2 days        Return in about 6 months (around 10/15/2025) for Follow up with fasting labs.

## 2025-04-16 PROBLEM — I73.01: Status: ACTIVE | Noted: 2025-04-16

## 2025-04-16 NOTE — ASSESSMENT & PLAN NOTE
Recently started back on sildenafil about 2 weeks ago  Continue on current medication for now   will reassess at next scheduled appointment

## 2025-04-16 NOTE — ASSESSMENT & PLAN NOTE
Diabetes is worsening.   Will increase Lantus to 10 mg nightly  Medication changes per orders.  Recommended an ADA diet.  Regular aerobic exercise.  Diabetes will be reassessed in 6 months

## 2025-04-17 ENCOUNTER — HOSPITAL ENCOUNTER (OUTPATIENT)
Dept: GENERAL RADIOLOGY | Facility: HOSPITAL | Age: 85
Discharge: HOME | End: 2025-04-17
Payer: MEDICARE

## 2025-04-17 ENCOUNTER — PATIENT MESSAGE (OUTPATIENT)
Dept: NEUROLOGY | Facility: CLINIC | Age: 85
End: 2025-04-17
Payer: MEDICARE

## 2025-04-17 ENCOUNTER — LAB REQUISITION (OUTPATIENT)
Dept: LAB | Facility: HOSPITAL | Age: 85
End: 2025-04-17
Payer: MEDICARE

## 2025-04-17 ENCOUNTER — OFFICE VISIT (OUTPATIENT)
Dept: WOUND CARE | Facility: HOSPITAL | Age: 85
End: 2025-04-17
Payer: MEDICARE

## 2025-04-17 DIAGNOSIS — L97.509 DIABETIC FOOT ULCER WITH OSTEOMYELITIS: ICD-10-CM

## 2025-04-17 DIAGNOSIS — E11.621 DIABETIC FOOT ULCER WITH OSTEOMYELITIS: ICD-10-CM

## 2025-04-17 DIAGNOSIS — M86.9 DIABETIC FOOT ULCER WITH OSTEOMYELITIS: ICD-10-CM

## 2025-04-17 DIAGNOSIS — E11.69 DIABETIC FOOT ULCER WITH OSTEOMYELITIS: ICD-10-CM

## 2025-04-17 DIAGNOSIS — E11.621 TYPE 2 DIABETES MELLITUS WITH FOOT ULCER (CODE): ICD-10-CM

## 2025-04-17 PROCEDURE — 73650 X-RAY EXAM OF HEEL: CPT

## 2025-04-17 PROCEDURE — 87070 CULTURE OTHR SPECIMN AEROBIC: CPT | Performed by: NURSE PRACTITIONER

## 2025-04-17 PROCEDURE — 87075 CULTR BACTERIA EXCEPT BLOOD: CPT | Performed by: NURSE PRACTITIONER

## 2025-04-17 PROCEDURE — G0463 HOSPITAL OUTPT CLINIC VISIT: HCPCS

## 2025-04-17 PROCEDURE — 97602 WOUND(S) CARE NON-SELECTIVE: CPT

## 2025-04-17 PROCEDURE — 87015 SPECIMEN INFECT AGNT CONCNTJ: CPT | Performed by: NURSE PRACTITIONER

## 2025-04-17 PROCEDURE — 87205 SMEAR GRAM STAIN: CPT | Performed by: NURSE PRACTITIONER

## 2025-04-17 RX ORDER — DONEPEZIL HYDROCHLORIDE 10 MG/1
10 TABLET, FILM COATED ORAL NIGHTLY
Qty: 30 TABLET | Refills: 2 | Status: SHIPPED | OUTPATIENT
Start: 2025-04-17 | End: 2026-04-17

## 2025-04-19 LAB
BACTERIA SPEC AEROBE CULT: ABNORMAL
GRAM STN SPEC: ABNORMAL

## 2025-04-20 LAB — BACTERIA SPEC ANAEROBE CULT: NORMAL

## 2025-04-21 RX ORDER — PANTOPRAZOLE SODIUM 20 MG/1
TABLET, DELAYED RELEASE ORAL
Qty: 60 TABLET | Refills: 1 | OUTPATIENT
Start: 2025-04-21

## 2025-04-22 LAB — BACTERIA SPEC ANAEROBE CULT: NORMAL

## 2025-04-29 ENCOUNTER — HOSPITAL ENCOUNTER (OUTPATIENT)
Facility: HOSPITAL | Age: 85
Discharge: HOME | End: 2025-04-29
Admitting: INTERNAL MEDICINE
Payer: MEDICARE

## 2025-04-29 DIAGNOSIS — Z78.0 MENOPAUSE: ICD-10-CM

## 2025-04-29 DIAGNOSIS — Z13.820 ENCOUNTER FOR SCREENING FOR OSTEOPOROSIS: ICD-10-CM

## 2025-04-29 PROCEDURE — 77080 DXA BONE DENSITY AXIAL: CPT

## 2025-04-30 ENCOUNTER — TELEPHONE (OUTPATIENT)
Dept: FAMILY MEDICINE CLINIC | Facility: CLINIC | Age: 85
End: 2025-04-30

## 2025-05-08 DIAGNOSIS — E53.8 B12 DEFICIENCY: ICD-10-CM

## 2025-05-08 DIAGNOSIS — M15.8 OTHER OSTEOARTHRITIS INVOLVING MULTIPLE JOINTS: ICD-10-CM

## 2025-05-08 RX ORDER — PANTOPRAZOLE SODIUM 20 MG/1
TABLET, DELAYED RELEASE ORAL
Qty: 60 TABLET | Refills: 0 | Status: SHIPPED | OUTPATIENT
Start: 2025-05-08

## 2025-05-09 ENCOUNTER — OFFICE VISIT (OUTPATIENT)
Dept: WOUND CARE | Facility: HOSPITAL | Age: 85
End: 2025-05-09
Payer: MEDICARE

## 2025-05-09 PROCEDURE — G0463 HOSPITAL OUTPT CLINIC VISIT: HCPCS

## 2025-05-09 RX ORDER — GABAPENTIN 400 MG/1
400 CAPSULE ORAL 2 TIMES DAILY
Qty: 56 CAPSULE | Refills: 0 | Status: SHIPPED | OUTPATIENT
Start: 2025-05-09

## 2025-05-09 RX ORDER — MULTIVITAMIN WITH IRON
TABLET ORAL
Qty: 30 TABLET | Refills: 3 | Status: SHIPPED | OUTPATIENT
Start: 2025-05-09

## 2025-05-15 ENCOUNTER — OFFICE VISIT (OUTPATIENT)
Dept: WOUND CARE | Facility: HOSPITAL | Age: 85
End: 2025-05-15
Payer: MEDICARE

## 2025-05-15 ENCOUNTER — TELEPHONE (OUTPATIENT)
Dept: FAMILY MEDICINE CLINIC | Facility: CLINIC | Age: 85
End: 2025-05-15
Payer: MEDICARE

## 2025-05-15 DIAGNOSIS — N18.31 TYPE 2 DIABETES MELLITUS WITH STAGE 3A CHRONIC KIDNEY DISEASE, WITHOUT LONG-TERM CURRENT USE OF INSULIN: Primary | ICD-10-CM

## 2025-05-15 DIAGNOSIS — E11.22 TYPE 2 DIABETES MELLITUS WITH STAGE 3A CHRONIC KIDNEY DISEASE, WITHOUT LONG-TERM CURRENT USE OF INSULIN: Primary | ICD-10-CM

## 2025-05-15 PROCEDURE — G0463 HOSPITAL OUTPT CLINIC VISIT: HCPCS

## 2025-05-15 NOTE — TELEPHONE ENCOUNTER
Caller: HERNANDEZ    Relationship: Other [facility]     Best call back number: [phone]     What medication are you requesting: AUTOSHIELD DUO  NEEDLES 5 MM X 30 G     If a prescription is needed, what is your preferred pharmacy and phone number: [facility] - [place], [place] - [address] - [phone]  - [phone]     Additional notes:    PATIENT HAS NONE LEFT.

## 2025-06-05 ENCOUNTER — OFFICE VISIT (OUTPATIENT)
Dept: WOUND CARE | Facility: HOSPITAL | Age: 85
End: 2025-06-05
Payer: MEDICARE

## 2025-06-05 PROCEDURE — 11055 PARING/CUTG B9 HYPRKER LES 1: CPT

## 2025-06-06 DIAGNOSIS — I73.01 RAYNAUD DISEASE WITH GANGRENE: ICD-10-CM

## 2025-06-06 DIAGNOSIS — E53.8 B12 DEFICIENCY: ICD-10-CM

## 2025-06-06 RX ORDER — SILDENAFIL CITRATE 20 MG/1
20 TABLET ORAL 2 TIMES DAILY
Qty: 60 TABLET | Refills: 3 | Status: SHIPPED | OUTPATIENT
Start: 2025-06-06

## 2025-06-06 RX ORDER — METOPROLOL SUCCINATE 25 MG/1
25 TABLET, EXTENDED RELEASE ORAL DAILY
Qty: 90 TABLET | Refills: 3 | Status: SHIPPED | OUTPATIENT
Start: 2025-06-06

## 2025-06-06 RX ORDER — FERROUS SULFATE 325(65) MG
1 TABLET ORAL DAILY
Qty: 90 TABLET | Refills: 3 | Status: SHIPPED | OUTPATIENT
Start: 2025-06-06

## 2025-06-09 DIAGNOSIS — I63.432 CEREBROVASCULAR ACCIDENT (CVA) DUE TO EMBOLISM OF LEFT POSTERIOR CEREBRAL ARTERY: ICD-10-CM

## 2025-06-09 DIAGNOSIS — M15.8 OTHER OSTEOARTHRITIS INVOLVING MULTIPLE JOINTS: ICD-10-CM

## 2025-06-09 DIAGNOSIS — I48.19 ATRIAL FIBRILLATION, PERSISTENT: ICD-10-CM

## 2025-06-09 RX ORDER — GABAPENTIN 400 MG/1
400 CAPSULE ORAL 2 TIMES DAILY
Qty: 56 CAPSULE | Refills: 1 | Status: SHIPPED | OUTPATIENT
Start: 2025-06-09

## 2025-06-09 RX ORDER — PANTOPRAZOLE SODIUM 20 MG/1
20 TABLET, DELAYED RELEASE ORAL
Qty: 90 TABLET | Refills: 0 | Status: SHIPPED | OUTPATIENT
Start: 2025-06-09

## 2025-06-09 RX ORDER — RIVAROXABAN 15 MG/1
15 TABLET, FILM COATED ORAL
Qty: 90 TABLET | Refills: 1 | Status: SHIPPED | OUTPATIENT
Start: 2025-06-09

## 2025-07-07 RX ORDER — DONEPEZIL HYDROCHLORIDE 10 MG/1
10 TABLET, FILM COATED ORAL
Qty: 30 TABLET | Refills: 2 | Status: SHIPPED | OUTPATIENT
Start: 2025-07-07

## 2025-07-30 DIAGNOSIS — M15.8 OTHER OSTEOARTHRITIS INVOLVING MULTIPLE JOINTS: ICD-10-CM

## 2025-07-30 RX ORDER — GABAPENTIN 400 MG/1
400 CAPSULE ORAL 2 TIMES DAILY
Qty: 56 CAPSULE | Refills: 1 | Status: SHIPPED | OUTPATIENT
Start: 2025-07-30

## 2025-08-03 ENCOUNTER — HOSPITAL ENCOUNTER (EMERGENCY)
Facility: HOSPITAL | Age: 85
Discharge: HOME | End: 2025-08-03
Attending: EMERGENCY MEDICINE | Admitting: EMERGENCY MEDICINE
Payer: MEDICARE

## 2025-08-03 ENCOUNTER — APPOINTMENT (OUTPATIENT)
Dept: CT IMAGING | Facility: HOSPITAL | Age: 85
End: 2025-08-03
Payer: MEDICARE

## 2025-08-03 VITALS
HEIGHT: 67 IN | SYSTOLIC BLOOD PRESSURE: 143 MMHG | RESPIRATION RATE: 18 BRPM | WEIGHT: 210 LBS | BODY MASS INDEX: 32.96 KG/M2 | OXYGEN SATURATION: 97 % | DIASTOLIC BLOOD PRESSURE: 83 MMHG | HEART RATE: 84 BPM | TEMPERATURE: 97.8 F

## 2025-08-03 DIAGNOSIS — M51.360 DEGENERATION OF INTERVERTEBRAL DISC OF LUMBAR REGION WITH DISCOGENIC BACK PAIN: Primary | ICD-10-CM

## 2025-08-03 DIAGNOSIS — S32.10XA CLOSED FRACTURE OF SACRUM, UNSPECIFIED PORTION OF SACRUM, INITIAL ENCOUNTER: ICD-10-CM

## 2025-08-03 LAB
BILIRUB UR QL STRIP: NEGATIVE
CLARITY UR: CLEAR
COLOR UR: YELLOW
GLUCOSE UR STRIP-MCNC: NEGATIVE MG/DL
HGB UR QL STRIP.AUTO: NEGATIVE
KETONES UR QL STRIP: NEGATIVE
LEUKOCYTE ESTERASE UR QL STRIP.AUTO: ABNORMAL
NITRITE UR QL STRIP: POSITIVE
PH UR STRIP.AUTO: 5.5 [PH] (ref 5–8)
PROT UR QL STRIP: NEGATIVE
SP GR UR STRIP: 1.02 (ref 1–1.03)
UROBILINOGEN UR QL STRIP: ABNORMAL

## 2025-08-03 PROCEDURE — 72131 CT LUMBAR SPINE W/O DYE: CPT

## 2025-08-03 PROCEDURE — 99284 EMERGENCY DEPT VISIT MOD MDM: CPT | Performed by: EMERGENCY MEDICINE

## 2025-08-03 PROCEDURE — 81003 URINALYSIS AUTO W/O SCOPE: CPT | Performed by: EMERGENCY MEDICINE

## 2025-08-03 RX ORDER — LIDOCAINE 50 MG/G
1 PATCH TOPICAL DAILY PRN
Qty: 5 PATCH | Refills: 0 | Status: SHIPPED | OUTPATIENT
Start: 2025-08-03

## 2025-08-03 RX ORDER — CYCLOBENZAPRINE HCL 5 MG
5 TABLET ORAL 3 TIMES DAILY PRN
Qty: 15 TABLET | Refills: 0 | Status: SHIPPED | OUTPATIENT
Start: 2025-08-03

## 2025-08-27 DIAGNOSIS — E53.8 B12 DEFICIENCY: ICD-10-CM

## 2025-08-27 DIAGNOSIS — N39.44 NOCTURNAL ENURESIS: ICD-10-CM

## 2025-08-27 RX ORDER — VENLAFAXINE HYDROCHLORIDE 150 MG/1
150 CAPSULE, EXTENDED RELEASE ORAL DAILY
Qty: 90 CAPSULE | Refills: 1 | OUTPATIENT
Start: 2025-08-27

## 2025-08-27 RX ORDER — MIRABEGRON 50 MG/1
50 TABLET, FILM COATED, EXTENDED RELEASE ORAL DAILY
Qty: 30 TABLET | Refills: 1 | OUTPATIENT
Start: 2025-08-27

## 2025-08-27 RX ORDER — MULTIVITAMIN WITH IRON
500 TABLET ORAL DAILY
Qty: 30 TABLET | Refills: 1 | OUTPATIENT
Start: 2025-08-27

## 2025-08-27 RX ORDER — PANTOPRAZOLE SODIUM 20 MG/1
20 TABLET, DELAYED RELEASE ORAL EVERY MORNING
Qty: 90 TABLET | Refills: 1 | Status: SHIPPED | OUTPATIENT
Start: 2025-08-27

## 2025-08-27 RX ORDER — BUPROPION HYDROCHLORIDE 150 MG/1
150 TABLET ORAL EVERY MORNING
Qty: 90 TABLET | Refills: 1 | OUTPATIENT
Start: 2025-08-27

## 2025-08-29 DIAGNOSIS — N39.44 NOCTURNAL ENURESIS: ICD-10-CM

## 2025-08-29 DIAGNOSIS — E53.8 B12 DEFICIENCY: ICD-10-CM

## 2025-08-29 RX ORDER — MULTIVITAMIN WITH IRON
500 TABLET ORAL DAILY
Qty: 30 TABLET | Refills: 1 | OUTPATIENT
Start: 2025-08-29

## 2025-08-29 RX ORDER — MIRABEGRON 50 MG/1
50 TABLET, FILM COATED, EXTENDED RELEASE ORAL DAILY
Qty: 30 TABLET | Refills: 1 | OUTPATIENT
Start: 2025-08-29

## 2025-08-29 RX ORDER — BUPROPION HYDROCHLORIDE 150 MG/1
150 TABLET ORAL EVERY MORNING
Qty: 90 TABLET | Refills: 1 | OUTPATIENT
Start: 2025-08-29

## 2025-08-29 RX ORDER — VENLAFAXINE HYDROCHLORIDE 150 MG/1
150 CAPSULE, EXTENDED RELEASE ORAL DAILY
Qty: 90 CAPSULE | Refills: 1 | OUTPATIENT
Start: 2025-08-29

## (undated) DEVICE — SNAR POLYP SENSATION STDOVL 27 240 BX40

## (undated) DEVICE — LN SMPL CO2 SHTRM SD STREAM W/M LUER

## (undated) DEVICE — ADAPT CLN BIOGUARD AIR/H2O DISP

## (undated) DEVICE — ESOPHAGEAL BALLOON DILATATION CATHETER: Brand: CRE FIXED WIRE

## (undated) DEVICE — BLCK/BITE BLOX W/DENTL/RIM W/STRAP 54F

## (undated) DEVICE — KT ORCA ORCAPOD DISP STRL

## (undated) DEVICE — TUBING, SUCTION, 1/4" X 10', STRAIGHT: Brand: MEDLINE

## (undated) DEVICE — FRCP BX RADJAW4 NDL 2.8 240CM LG OG BX40

## (undated) DEVICE — MSK PROC CURAPLEX O2 2/ADAPT 7FT

## (undated) DEVICE — SENSR O2 OXIMAX FNGR A/ 18IN NONSTR

## (undated) DEVICE — THE SINGLE USE ETRAP – POLYP TRAP IS USED FOR SUCTION RETRIEVAL OF ENDOSCOPICALLY REMOVED POLYPS.: Brand: ETRAP

## (undated) DEVICE — DEV INFL CRE STERIFLATE 60CC DISP